# Patient Record
Sex: MALE | Race: ASIAN | NOT HISPANIC OR LATINO
[De-identification: names, ages, dates, MRNs, and addresses within clinical notes are randomized per-mention and may not be internally consistent; named-entity substitution may affect disease eponyms.]

---

## 2016-10-27 RX ORDER — SIMVASTATIN 20 MG/1
1 TABLET, FILM COATED ORAL
Qty: 0 | Refills: 0 | DISCHARGE
Start: 2016-10-27

## 2017-03-09 ENCOUNTER — APPOINTMENT (OUTPATIENT)
Dept: CARDIOLOGY | Facility: CLINIC | Age: 72
End: 2017-03-09

## 2017-03-09 ENCOUNTER — OUTPATIENT (OUTPATIENT)
Dept: OUTPATIENT SERVICES | Facility: HOSPITAL | Age: 72
LOS: 1 days | End: 2017-03-09
Payer: MEDICARE

## 2017-03-09 VITALS
HEIGHT: 69 IN | BODY MASS INDEX: 31.1 KG/M2 | OXYGEN SATURATION: 95 % | HEART RATE: 58 BPM | RESPIRATION RATE: 16 BRPM | WEIGHT: 210 LBS | DIASTOLIC BLOOD PRESSURE: 62 MMHG | SYSTOLIC BLOOD PRESSURE: 182 MMHG

## 2017-03-09 DIAGNOSIS — Z00.8 ENCOUNTER FOR OTHER GENERAL EXAMINATION: ICD-10-CM

## 2017-03-09 DIAGNOSIS — I10 ESSENTIAL (PRIMARY) HYPERTENSION: ICD-10-CM

## 2017-03-09 DIAGNOSIS — Z98.49 CATARACT EXTRACTION STATUS, UNSPECIFIED EYE: Chronic | ICD-10-CM

## 2017-03-09 DIAGNOSIS — Z98.89 OTHER SPECIFIED POSTPROCEDURAL STATES: Chronic | ICD-10-CM

## 2017-03-09 DIAGNOSIS — Z96.659 PRESENCE OF UNSPECIFIED ARTIFICIAL KNEE JOINT: Chronic | ICD-10-CM

## 2017-03-09 PROCEDURE — G0463: CPT | Mod: 25

## 2017-03-09 PROCEDURE — 93010 ELECTROCARDIOGRAM REPORT: CPT

## 2017-03-09 PROCEDURE — 93005 ELECTROCARDIOGRAM TRACING: CPT

## 2017-03-10 ENCOUNTER — APPOINTMENT (OUTPATIENT)
Dept: INTERNAL MEDICINE | Facility: CLINIC | Age: 72
End: 2017-03-10

## 2017-03-17 ENCOUNTER — RX RENEWAL (OUTPATIENT)
Age: 72
End: 2017-03-17

## 2017-03-20 ENCOUNTER — RX RENEWAL (OUTPATIENT)
Age: 72
End: 2017-03-20

## 2017-04-01 ENCOUNTER — APPOINTMENT (OUTPATIENT)
Dept: INTERNAL MEDICINE | Facility: CLINIC | Age: 72
End: 2017-04-01

## 2017-04-01 VITALS
TEMPERATURE: 98.3 F | BODY MASS INDEX: 31.1 KG/M2 | OXYGEN SATURATION: 98 % | DIASTOLIC BLOOD PRESSURE: 80 MMHG | SYSTOLIC BLOOD PRESSURE: 140 MMHG | WEIGHT: 210 LBS | HEIGHT: 69 IN | RESPIRATION RATE: 16 BRPM | HEART RATE: 77 BPM

## 2017-04-01 RX ORDER — POLYVINYL ALCOHOL 14 MG/ML
1.4 SOLUTION/ DROPS OPHTHALMIC
Qty: 15 | Refills: 5 | Status: ACTIVE | COMMUNITY
Start: 2017-02-04

## 2017-05-04 ENCOUNTER — RX RENEWAL (OUTPATIENT)
Age: 72
End: 2017-05-04

## 2017-06-01 ENCOUNTER — OUTPATIENT (OUTPATIENT)
Dept: OUTPATIENT SERVICES | Facility: HOSPITAL | Age: 72
LOS: 1 days | End: 2017-06-01
Payer: MEDICAID

## 2017-06-01 DIAGNOSIS — Z96.659 PRESENCE OF UNSPECIFIED ARTIFICIAL KNEE JOINT: Chronic | ICD-10-CM

## 2017-06-01 DIAGNOSIS — Z98.49 CATARACT EXTRACTION STATUS, UNSPECIFIED EYE: Chronic | ICD-10-CM

## 2017-06-01 DIAGNOSIS — Z98.89 OTHER SPECIFIED POSTPROCEDURAL STATES: Chronic | ICD-10-CM

## 2017-06-08 DIAGNOSIS — R69 ILLNESS, UNSPECIFIED: ICD-10-CM

## 2017-07-22 ENCOUNTER — APPOINTMENT (OUTPATIENT)
Dept: INTERNAL MEDICINE | Facility: CLINIC | Age: 72
End: 2017-07-22

## 2017-07-22 VITALS
SYSTOLIC BLOOD PRESSURE: 130 MMHG | TEMPERATURE: 98.3 F | OXYGEN SATURATION: 98 % | RESPIRATION RATE: 16 BRPM | BODY MASS INDEX: 31.16 KG/M2 | WEIGHT: 211 LBS | DIASTOLIC BLOOD PRESSURE: 80 MMHG | HEART RATE: 66 BPM

## 2017-07-23 LAB
ALBUMIN SERPL ELPH-MCNC: 4.4 G/DL
ALP BLD-CCNC: 46 U/L
ALT SERPL-CCNC: 11 U/L
ANION GAP SERPL CALC-SCNC: 13 MMOL/L
AST SERPL-CCNC: 23 U/L
BASOPHILS # BLD AUTO: 0.03 K/UL
BASOPHILS NFR BLD AUTO: 0.5 %
BILIRUB SERPL-MCNC: 0.5 MG/DL
BUN SERPL-MCNC: 12 MG/DL
CALCIUM SERPL-MCNC: 9.8 MG/DL
CHLORIDE SERPL-SCNC: 95 MMOL/L
CHOLEST SERPL-MCNC: 159 MG/DL
CHOLEST/HDLC SERPL: 2.6 RATIO
CO2 SERPL-SCNC: 25 MMOL/L
CREAT SERPL-MCNC: 0.87 MG/DL
EOSINOPHIL # BLD AUTO: 0.22 K/UL
EOSINOPHIL NFR BLD AUTO: 3.9 %
GGT SERPL-CCNC: 25 U/L
GLUCOSE SERPL-MCNC: 127 MG/DL
HBA1C MFR BLD HPLC: 7.2 %
HCT VFR BLD CALC: 37 %
HDLC SERPL-MCNC: 61 MG/DL
HEMOCCULT STL QL IA: NEGATIVE
HGB BLD-MCNC: 12.2 G/DL
IMM GRANULOCYTES NFR BLD AUTO: 0.2 %
LDLC SERPL CALC-MCNC: 79 MG/DL
LYMPHOCYTES # BLD AUTO: 1.55 K/UL
LYMPHOCYTES NFR BLD AUTO: 27.4 %
MAN DIFF?: NORMAL
MCHC RBC-ENTMCNC: 29.5 PG
MCHC RBC-ENTMCNC: 33 GM/DL
MCV RBC AUTO: 89.4 FL
MONOCYTES # BLD AUTO: 0.73 K/UL
MONOCYTES NFR BLD AUTO: 12.9 %
NEUTROPHILS # BLD AUTO: 3.12 K/UL
NEUTROPHILS NFR BLD AUTO: 55.1 %
PLATELET # BLD AUTO: 286 K/UL
POTASSIUM SERPL-SCNC: 5.2 MMOL/L
PROT SERPL-MCNC: 7.3 G/DL
RBC # BLD: 4.14 M/UL
RBC # FLD: 13.9 %
SODIUM SERPL-SCNC: 133 MMOL/L
TRIGL SERPL-MCNC: 94 MG/DL
WBC # FLD AUTO: 5.66 K/UL

## 2017-09-01 PROCEDURE — G9001: CPT

## 2017-09-11 ENCOUNTER — RX RENEWAL (OUTPATIENT)
Age: 72
End: 2017-09-11

## 2017-10-21 ENCOUNTER — MOBILE ON CALL (OUTPATIENT)
Age: 72
End: 2017-10-21

## 2017-10-22 LAB
ALBUMIN SERPL ELPH-MCNC: 4.2 G/DL
ALP BLD-CCNC: 42 U/L
ALT SERPL-CCNC: 8 U/L
ANION GAP SERPL CALC-SCNC: 15 MMOL/L
AST SERPL-CCNC: 14 U/L
BASOPHILS # BLD AUTO: 0.01 K/UL
BASOPHILS NFR BLD AUTO: 0.2 %
BILIRUB SERPL-MCNC: 0.5 MG/DL
BUN SERPL-MCNC: 8 MG/DL
CALCIUM SERPL-MCNC: 9.5 MG/DL
CHLORIDE SERPL-SCNC: 97 MMOL/L
CHOLEST SERPL-MCNC: 134 MG/DL
CHOLEST/HDLC SERPL: 2.4 RATIO
CO2 SERPL-SCNC: 24 MMOL/L
CREAT SERPL-MCNC: 0.79 MG/DL
EOSINOPHIL # BLD AUTO: 0.18 K/UL
EOSINOPHIL NFR BLD AUTO: 3.3 %
GGT SERPL-CCNC: 19 U/L
GLUCOSE SERPL-MCNC: 111 MG/DL
HBA1C MFR BLD HPLC: 7.1 %
HCT VFR BLD CALC: 36.3 %
HDLC SERPL-MCNC: 56 MG/DL
HGB BLD-MCNC: 11.7 G/DL
IMM GRANULOCYTES NFR BLD AUTO: 0 %
LDLC SERPL CALC-MCNC: 60 MG/DL
LYMPHOCYTES # BLD AUTO: 1.53 K/UL
LYMPHOCYTES NFR BLD AUTO: 27.7 %
MAN DIFF?: NORMAL
MCHC RBC-ENTMCNC: 30.5 PG
MCHC RBC-ENTMCNC: 32.2 GM/DL
MCV RBC AUTO: 94.5 FL
MONOCYTES # BLD AUTO: 0.8 K/UL
MONOCYTES NFR BLD AUTO: 14.5 %
NEUTROPHILS # BLD AUTO: 3 K/UL
NEUTROPHILS NFR BLD AUTO: 54.3 %
PLATELET # BLD AUTO: 256 K/UL
POTASSIUM SERPL-SCNC: 4.2 MMOL/L
PROT SERPL-MCNC: 6.9 G/DL
RBC # BLD: 3.84 M/UL
RBC # FLD: 14.7 %
SODIUM SERPL-SCNC: 136 MMOL/L
TRIGL SERPL-MCNC: 89 MG/DL
WBC # FLD AUTO: 5.52 K/UL

## 2017-10-28 ENCOUNTER — APPOINTMENT (OUTPATIENT)
Dept: INTERNAL MEDICINE | Facility: CLINIC | Age: 72
End: 2017-10-28
Payer: MEDICARE

## 2017-10-28 VITALS
BODY MASS INDEX: 32.58 KG/M2 | DIASTOLIC BLOOD PRESSURE: 80 MMHG | OXYGEN SATURATION: 97 % | HEART RATE: 63 BPM | SYSTOLIC BLOOD PRESSURE: 130 MMHG | RESPIRATION RATE: 18 BRPM | WEIGHT: 215 LBS | HEIGHT: 68 IN | TEMPERATURE: 98.7 F

## 2017-10-28 PROCEDURE — G0008: CPT

## 2017-10-28 PROCEDURE — 99214 OFFICE O/P EST MOD 30 MIN: CPT | Mod: 25

## 2017-10-28 PROCEDURE — 90686 IIV4 VACC NO PRSV 0.5 ML IM: CPT

## 2017-10-28 RX ORDER — SIMVASTATIN 20 MG/1
20 TABLET, FILM COATED ORAL
Refills: 0 | Status: DISCONTINUED | COMMUNITY
End: 2017-10-28

## 2017-10-28 RX ORDER — CHLORHEXIDINE GLUCONATE, 0.12% ORAL RINSE 1.2 MG/ML
0.12 SOLUTION DENTAL
Qty: 473 | Refills: 0 | Status: DISCONTINUED | COMMUNITY
Start: 2017-09-19 | End: 2017-10-28

## 2017-10-28 RX ORDER — CHLORHEXIDINE GLUCONATE, 0.12% ORAL RINSE 1.2 MG/ML
0.12 SOLUTION DENTAL
Refills: 0 | Status: DISCONTINUED | COMMUNITY
End: 2017-10-28

## 2017-10-28 RX ORDER — METFORMIN HYDROCHLORIDE 500 MG/1
500 TABLET, COATED ORAL
Refills: 0 | Status: DISCONTINUED | COMMUNITY
End: 2017-10-28

## 2017-10-28 RX ORDER — AMLODIPINE BESYLATE 5 MG/1
5 TABLET ORAL
Refills: 0 | Status: DISCONTINUED | COMMUNITY
End: 2017-10-28

## 2017-10-28 RX ORDER — LINAGLIPTIN 5 MG/1
5 TABLET, FILM COATED ORAL
Refills: 0 | Status: DISCONTINUED | COMMUNITY
End: 2017-10-28

## 2017-10-28 RX ORDER — DOXAZOSIN 4 MG/1
4 TABLET ORAL
Refills: 0 | Status: DISCONTINUED | COMMUNITY
End: 2017-10-28

## 2017-10-28 RX ORDER — CLOPIDOGREL BISULFATE 75 MG/1
75 TABLET, FILM COATED ORAL
Refills: 0 | Status: DISCONTINUED | COMMUNITY
End: 2017-10-28

## 2017-10-28 RX ORDER — CARVEDILOL 25 MG/1
25 TABLET, FILM COATED ORAL
Refills: 0 | Status: DISCONTINUED | COMMUNITY
End: 2017-10-28

## 2017-11-01 ENCOUNTER — MOBILE ON CALL (OUTPATIENT)
Age: 72
End: 2017-11-01

## 2018-01-12 ENCOUNTER — RX RENEWAL (OUTPATIENT)
Age: 73
End: 2018-01-12

## 2018-02-01 ENCOUNTER — TRANSCRIPTION ENCOUNTER (OUTPATIENT)
Age: 73
End: 2018-02-01

## 2018-03-17 ENCOUNTER — RX RENEWAL (OUTPATIENT)
Age: 73
End: 2018-03-17

## 2018-04-05 ENCOUNTER — CLINICAL ADVICE (OUTPATIENT)
Age: 73
End: 2018-04-05

## 2018-04-05 ENCOUNTER — LABORATORY RESULT (OUTPATIENT)
Age: 73
End: 2018-04-05

## 2018-04-07 ENCOUNTER — APPOINTMENT (OUTPATIENT)
Dept: INTERNAL MEDICINE | Facility: CLINIC | Age: 73
End: 2018-04-07
Payer: MEDICARE

## 2018-04-07 ENCOUNTER — NON-APPOINTMENT (OUTPATIENT)
Age: 73
End: 2018-04-07

## 2018-04-07 VITALS
DIASTOLIC BLOOD PRESSURE: 80 MMHG | SYSTOLIC BLOOD PRESSURE: 140 MMHG | BODY MASS INDEX: 32.89 KG/M2 | RESPIRATION RATE: 18 BRPM | OXYGEN SATURATION: 93 % | HEIGHT: 68 IN | HEART RATE: 64 BPM | TEMPERATURE: 98.6 F | WEIGHT: 217 LBS

## 2018-04-07 PROCEDURE — 93000 ELECTROCARDIOGRAM COMPLETE: CPT

## 2018-04-07 PROCEDURE — 99214 OFFICE O/P EST MOD 30 MIN: CPT | Mod: 25

## 2018-04-07 RX ORDER — AMOXICILLIN 500 MG/1
500 CAPSULE ORAL
Qty: 21 | Refills: 0 | Status: DISCONTINUED | COMMUNITY
Start: 2017-11-06 | End: 2018-04-07

## 2018-04-07 RX ORDER — IBUPROFEN 600 MG/1
600 TABLET, FILM COATED ORAL
Qty: 18 | Refills: 0 | Status: DISCONTINUED | COMMUNITY
Start: 2017-11-06 | End: 2018-04-07

## 2018-04-08 LAB
25(OH)D3 SERPL-MCNC: 29.8 NG/ML
ALBUMIN SERPL ELPH-MCNC: 4 G/DL
ALP BLD-CCNC: 54 U/L
ALT SERPL-CCNC: 9 U/L
ANION GAP SERPL CALC-SCNC: 16 MMOL/L
APPEARANCE: CLEAR
AST SERPL-CCNC: 21 U/L
BASOPHILS # BLD AUTO: 0.03 K/UL
BASOPHILS NFR BLD AUTO: 0.5 %
BILIRUB SERPL-MCNC: 0.5 MG/DL
BILIRUBIN URINE: NEGATIVE
BLOOD URINE: NEGATIVE
BUN SERPL-MCNC: 11 MG/DL
CALCIUM SERPL-MCNC: 9.5 MG/DL
CHLORIDE SERPL-SCNC: 95 MMOL/L
CHOLEST SERPL-MCNC: 145 MG/DL
CHOLEST/HDLC SERPL: 2.2 RATIO
CO2 SERPL-SCNC: 24 MMOL/L
COLOR: YELLOW
CREAT SERPL-MCNC: 0.81 MG/DL
EOSINOPHIL # BLD AUTO: 0.18 K/UL
EOSINOPHIL NFR BLD AUTO: 3 %
ERYTHROCYTE [SEDIMENTATION RATE] IN BLOOD BY WESTERGREN METHOD: 28 MM/HR
FOLATE SERPL-MCNC: 18.2 NG/ML
GGT SERPL-CCNC: 29 U/L
GLUCOSE QUALITATIVE U: NEGATIVE
GLUCOSE SERPL-MCNC: 161 MG/DL
HBA1C MFR BLD HPLC: 8.2 %
HCT VFR BLD CALC: 37.3 %
HDLC SERPL-MCNC: 65 MG/DL
HGB BLD-MCNC: 12.3 G/DL
IMM GRANULOCYTES NFR BLD AUTO: 0.2 %
IRON SATN MFR SERPL: 15 %
IRON SERPL-MCNC: 61 UG/DL
KETONES URINE: NEGATIVE
LDLC SERPL CALC-MCNC: 64 MG/DL
LEUKOCYTE ESTERASE URINE: NEGATIVE
LYMPHOCYTES # BLD AUTO: 1.44 K/UL
LYMPHOCYTES NFR BLD AUTO: 24.2 %
MAN DIFF?: NORMAL
MCHC RBC-ENTMCNC: 29.1 PG
MCHC RBC-ENTMCNC: 33 GM/DL
MCV RBC AUTO: 88.2 FL
MONOCYTES # BLD AUTO: 0.87 K/UL
MONOCYTES NFR BLD AUTO: 14.6 %
NEUTROPHILS # BLD AUTO: 3.42 K/UL
NEUTROPHILS NFR BLD AUTO: 57.5 %
NITRITE URINE: NEGATIVE
PH URINE: 7
PLATELET # BLD AUTO: 273 K/UL
POTASSIUM SERPL-SCNC: 4.5 MMOL/L
PROT SERPL-MCNC: 7.5 G/DL
PROTEIN URINE: 100
PSA FREE FLD-MCNC: 54.2
PSA FREE SERPL-MCNC: 0.32 NG/ML
PSA SERPL-MCNC: 0.59 NG/ML
RBC # BLD: 4.23 M/UL
RBC # FLD: 15.1 %
SODIUM SERPL-SCNC: 135 MMOL/L
SPECIFIC GRAVITY URINE: 1.01
T3 SERPL-MCNC: 122 NG/DL
T4 FREE SERPL-MCNC: 1.4 NG/DL
TIBC SERPL-MCNC: 419 UG/DL
TRIGL SERPL-MCNC: 82 MG/DL
TSH SERPL-ACNC: 2.57 UIU/ML
UIBC SERPL-MCNC: 358 UG/DL
UROBILINOGEN URINE: NEGATIVE
VIT B12 SERPL-MCNC: >2000 PG/ML
WBC # FLD AUTO: 5.95 K/UL

## 2018-04-11 ENCOUNTER — RX RENEWAL (OUTPATIENT)
Age: 73
End: 2018-04-11

## 2018-07-01 LAB
ALBUMIN SERPL ELPH-MCNC: 4.1 G/DL
ALP BLD-CCNC: 48 U/L
ALT SERPL-CCNC: 10 U/L
ANION GAP SERPL CALC-SCNC: 15 MMOL/L
AST SERPL-CCNC: 20 U/L
BASOPHILS # BLD AUTO: 0.03 K/UL
BASOPHILS NFR BLD AUTO: 0.5 %
BILIRUB SERPL-MCNC: 0.4 MG/DL
BUN SERPL-MCNC: 8 MG/DL
CALCIUM SERPL-MCNC: 9.5 MG/DL
CHLORIDE SERPL-SCNC: 98 MMOL/L
CHOLEST SERPL-MCNC: 209 MG/DL
CHOLEST/HDLC SERPL: 3.7 RATIO
CO2 SERPL-SCNC: 26 MMOL/L
CREAT SERPL-MCNC: 0.74 MG/DL
EOSINOPHIL # BLD AUTO: 0.24 K/UL
EOSINOPHIL NFR BLD AUTO: 4.3 %
GGT SERPL-CCNC: 26 U/L
GLUCOSE SERPL-MCNC: 131 MG/DL
HBA1C MFR BLD HPLC: 7.7 %
HCT VFR BLD CALC: 38.2 %
HDLC SERPL-MCNC: 57 MG/DL
HGB BLD-MCNC: 12 G/DL
IMM GRANULOCYTES NFR BLD AUTO: 0.2 %
LDLC SERPL CALC-MCNC: 130 MG/DL
LYMPHOCYTES # BLD AUTO: 1.56 K/UL
LYMPHOCYTES NFR BLD AUTO: 28 %
MAN DIFF?: NORMAL
MCHC RBC-ENTMCNC: 28.8 PG
MCHC RBC-ENTMCNC: 31.4 GM/DL
MCV RBC AUTO: 91.8 FL
MONOCYTES # BLD AUTO: 0.68 K/UL
MONOCYTES NFR BLD AUTO: 12.2 %
NEUTROPHILS # BLD AUTO: 3.06 K/UL
NEUTROPHILS NFR BLD AUTO: 54.8 %
PLATELET # BLD AUTO: 276 K/UL
POTASSIUM SERPL-SCNC: 5.1 MMOL/L
PROT SERPL-MCNC: 7.2 G/DL
RBC # BLD: 4.16 M/UL
RBC # FLD: 15.2 %
SODIUM SERPL-SCNC: 139 MMOL/L
TRIGL SERPL-MCNC: 112 MG/DL
WBC # FLD AUTO: 5.58 K/UL

## 2018-09-01 ENCOUNTER — APPOINTMENT (OUTPATIENT)
Dept: INTERNAL MEDICINE | Facility: CLINIC | Age: 73
End: 2018-09-01
Payer: MEDICARE

## 2018-09-01 VITALS
WEIGHT: 218 LBS | HEART RATE: 60 BPM | BODY MASS INDEX: 33.04 KG/M2 | TEMPERATURE: 97.6 F | SYSTOLIC BLOOD PRESSURE: 140 MMHG | DIASTOLIC BLOOD PRESSURE: 80 MMHG | HEIGHT: 68 IN | OXYGEN SATURATION: 95 % | RESPIRATION RATE: 16 BRPM

## 2018-09-01 PROCEDURE — G0009: CPT

## 2018-09-01 PROCEDURE — 99214 OFFICE O/P EST MOD 30 MIN: CPT | Mod: 25

## 2018-09-01 PROCEDURE — 90670 PCV13 VACCINE IM: CPT

## 2018-09-01 RX ORDER — ASPIRIN 81 MG
81 TABLET, DELAYED RELEASE (ENTERIC COATED) ORAL DAILY
Qty: 90 | Refills: 3 | Status: ACTIVE | COMMUNITY
Start: 2017-10-28 | End: 1900-01-01

## 2018-09-01 NOTE — ASSESSMENT
[FreeTextEntry1] : 73 year old male found to have stable Essential Hypertension, Type 2 Diabetes Mellitus with hyperglycemia, CAD, Hypercholesterolemia with Hypertriglyceridemia, Allergic Bronchitis, improved Edema of legs,with the current regimen, diet and life style modifications, as counseled. Prior results reviewed and discussed with the patient during today's examination. Plan as ordered.\par

## 2018-09-01 NOTE — HISTORY OF PRESENT ILLNESS
[Weight Gain ___ Pounds] : Weight gain of [unfilled] pounds [___ # of attempts] : [unfilled] weight lost attempts [Following Diet Successfully] : Following the diet successfully [___ times per week] : Patient exercises [unfilled] times per week [Following  treatment as advised] : Patient is following  treatment as advised [Action] : Action: patient is following a plan to lose weight [Good understanding] : Patient has a good understanding of disease, goals and obesity follow-up plan [de-identified] : 73 year old  male patient with history of stable Essential Hypertension, Type 2 Diabetes Mellitus with hyperglycemia, CAD, Hypercholesterolemia with Hypertriglyceridemia, Allergic Bronchitis, Edema of legs, history as stated, presented for follow up examination of Elevated BP checked. Patient is compliant with all medications. Denies shortness of breath, chest pain or abdominal pains at this time. ROS as stated.\par

## 2018-09-01 NOTE — HEALTH RISK ASSESSMENT
[No falls in past year] : Patient reported no falls in the past year [0] : 2) Feeling down, depressed, or hopeless: Not at all (0) [Discussed at today's visit] : Advance Directives Discussed at today's visit [Fully functional (bathing, dressing, toileting, transferring, walking, feeding)] : Fully functional (bathing, dressing, toileting, transferring, walking, feeding) [Fully functional (using the telephone, shopping, preparing meals, housekeeping, doing laundry, using] : Fully functional and needs no help or supervision to perform IADLs (using the telephone, shopping, preparing meals, housekeeping, doing laundry, using transportation, managing medications and managing finances) [] : No [de-identified] : None [[id number]] : 0

## 2018-10-08 ENCOUNTER — RX RENEWAL (OUTPATIENT)
Age: 73
End: 2018-10-08

## 2018-10-17 ENCOUNTER — RX RENEWAL (OUTPATIENT)
Age: 73
End: 2018-10-17

## 2018-12-01 ENCOUNTER — LABORATORY RESULT (OUTPATIENT)
Age: 73
End: 2018-12-01

## 2018-12-02 LAB
ALBUMIN SERPL ELPH-MCNC: 4.2 G/DL
ALP BLD-CCNC: 53 U/L
ALT SERPL-CCNC: 11 U/L
ANION GAP SERPL CALC-SCNC: 12 MMOL/L
APPEARANCE: CLEAR
AST SERPL-CCNC: 21 U/L
BASOPHILS # BLD AUTO: 0.05 K/UL
BASOPHILS NFR BLD AUTO: 0.9 %
BILIRUB SERPL-MCNC: 0.3 MG/DL
BILIRUBIN URINE: NEGATIVE
BLOOD URINE: NEGATIVE
BUN SERPL-MCNC: 11 MG/DL
CALCIUM SERPL-MCNC: 9.1 MG/DL
CHLORIDE SERPL-SCNC: 95 MMOL/L
CHOLEST SERPL-MCNC: 221 MG/DL
CHOLEST/HDLC SERPL: 4.3 RATIO
CO2 SERPL-SCNC: 28 MMOL/L
COLOR: YELLOW
CREAT SERPL-MCNC: 0.76 MG/DL
CREAT SPEC-SCNC: 41 MG/DL
EOSINOPHIL # BLD AUTO: 0.31 K/UL
EOSINOPHIL NFR BLD AUTO: 5.3 %
GGT SERPL-CCNC: 29 U/L
GLUCOSE QUALITATIVE U: NEGATIVE MG/DL
GLUCOSE SERPL-MCNC: 153 MG/DL
HBA1C MFR BLD HPLC: 7.8 %
HCT VFR BLD CALC: 37.1 %
HDLC SERPL-MCNC: 52 MG/DL
HEMOCCULT STL QL IA: NEGATIVE
HGB BLD-MCNC: 12 G/DL
IMM GRANULOCYTES NFR BLD AUTO: 0.2 %
KETONES URINE: NEGATIVE
LDLC SERPL CALC-MCNC: 148 MG/DL
LEUKOCYTE ESTERASE URINE: NEGATIVE
LYMPHOCYTES # BLD AUTO: 1.79 K/UL
LYMPHOCYTES NFR BLD AUTO: 30.6 %
MAN DIFF?: NORMAL
MCHC RBC-ENTMCNC: 28.8 PG
MCHC RBC-ENTMCNC: 32.3 GM/DL
MCV RBC AUTO: 89.2 FL
MICROALBUMIN 24H UR DL<=1MG/L-MCNC: 22.3 MG/DL
MICROALBUMIN/CREAT 24H UR-RTO: 548 MG/G
MONOCYTES # BLD AUTO: 0.93 K/UL
MONOCYTES NFR BLD AUTO: 15.9 %
NEUTROPHILS # BLD AUTO: 2.76 K/UL
NEUTROPHILS NFR BLD AUTO: 47.1 %
NITRITE URINE: NEGATIVE
PH URINE: 7
PLATELET # BLD AUTO: 243 K/UL
POTASSIUM SERPL-SCNC: 4.5 MMOL/L
PROT SERPL-MCNC: 7.1 G/DL
PROTEIN URINE: 30 MG/DL
RBC # BLD: 4.16 M/UL
RBC # FLD: 14 %
SODIUM SERPL-SCNC: 135 MMOL/L
SPECIFIC GRAVITY URINE: 1.01
TRIGL SERPL-MCNC: 105 MG/DL
UROBILINOGEN URINE: NEGATIVE MG/DL
WBC # FLD AUTO: 5.85 K/UL

## 2018-12-15 ENCOUNTER — APPOINTMENT (OUTPATIENT)
Dept: INTERNAL MEDICINE | Facility: CLINIC | Age: 73
End: 2018-12-15
Payer: MEDICARE

## 2018-12-15 VITALS
HEART RATE: 64 BPM | HEIGHT: 68 IN | BODY MASS INDEX: 31.83 KG/M2 | SYSTOLIC BLOOD PRESSURE: 140 MMHG | OXYGEN SATURATION: 96 % | RESPIRATION RATE: 16 BRPM | DIASTOLIC BLOOD PRESSURE: 80 MMHG | WEIGHT: 210 LBS | TEMPERATURE: 97.7 F

## 2018-12-15 DIAGNOSIS — R80.9 PROTEINURIA, UNSPECIFIED: ICD-10-CM

## 2018-12-15 PROCEDURE — G0008: CPT

## 2018-12-15 PROCEDURE — 90662 IIV NO PRSV INCREASED AG IM: CPT

## 2018-12-15 PROCEDURE — 99214 OFFICE O/P EST MOD 30 MIN: CPT | Mod: 25

## 2018-12-15 NOTE — HISTORY OF PRESENT ILLNESS
[de-identified] : 73 year old  male patient with history of stable Essential Hypertension, CAD, Type 2 Diabetes Mellitus with hyperglycemia, Hypercholesterolemia with Hypertriglyceridemia, Allergic Bronchitis, Edema of legs , history as stated, presented for follow up examination of Elevated BP checked. Patient is compliant with all medications. Denies shortness of breath, chest pain or abdominal pains at this time. ROS as stated.\par

## 2018-12-15 NOTE — ASSESSMENT
[FreeTextEntry1] : 73 year old male found to have stable Essential Hypertension, CAD, Type 2 Diabetes Mellitus with hyperglycemia, Hypercholesterolemia with Hypertriglyceridemia, Allergic Bronchitis, Edema of legs,with the current regimen, diet and life style modifications, as counseled. Prior results reviewed and discussed with the patient during today's examination. Plan as ordered.\par

## 2018-12-15 NOTE — HEALTH RISK ASSESSMENT
[No falls in past year] : Patient reported no falls in the past year [0] : 2) Feeling down, depressed, or hopeless: Not at all (0) [] : No [de-identified] : None [[id number]] : 0

## 2019-01-16 ENCOUNTER — RX RENEWAL (OUTPATIENT)
Age: 74
End: 2019-01-16

## 2019-02-11 ENCOUNTER — RX RENEWAL (OUTPATIENT)
Age: 74
End: 2019-02-11

## 2019-02-28 ENCOUNTER — RX RENEWAL (OUTPATIENT)
Age: 74
End: 2019-02-28

## 2019-04-07 LAB
ALBUMIN SERPL ELPH-MCNC: 4.3 G/DL
ALP BLD-CCNC: 50 U/L
ALT SERPL-CCNC: 11 U/L
ANION GAP SERPL CALC-SCNC: 13 MMOL/L
AST SERPL-CCNC: 18 U/L
BASOPHILS # BLD AUTO: 0.05 K/UL
BASOPHILS NFR BLD AUTO: 0.8 %
BILIRUB SERPL-MCNC: 0.4 MG/DL
BUN SERPL-MCNC: 10 MG/DL
CALCIUM SERPL-MCNC: 9.2 MG/DL
CHLORIDE SERPL-SCNC: 95 MMOL/L
CHOLEST SERPL-MCNC: 143 MG/DL
CHOLEST/HDLC SERPL: 2.4 RATIO
CO2 SERPL-SCNC: 27 MMOL/L
CREAT SERPL-MCNC: 0.67 MG/DL
EOSINOPHIL # BLD AUTO: 0.24 K/UL
EOSINOPHIL NFR BLD AUTO: 3.8 %
GGT SERPL-CCNC: 25 U/L
GLUCOSE SERPL-MCNC: 154 MG/DL
HBA1C MFR BLD HPLC: 8.2 %
HCT VFR BLD CALC: 39.3 %
HDLC SERPL-MCNC: 60 MG/DL
HGB BLD-MCNC: 12.1 G/DL
IMM GRANULOCYTES NFR BLD AUTO: 0.3 %
LDLC SERPL CALC-MCNC: 67 MG/DL
LYMPHOCYTES # BLD AUTO: 1.6 K/UL
LYMPHOCYTES NFR BLD AUTO: 25.3 %
MAN DIFF?: NORMAL
MCHC RBC-ENTMCNC: 29.3 PG
MCHC RBC-ENTMCNC: 30.8 GM/DL
MCV RBC AUTO: 95.2 FL
MONOCYTES # BLD AUTO: 0.79 K/UL
MONOCYTES NFR BLD AUTO: 12.5 %
NEUTROPHILS # BLD AUTO: 3.63 K/UL
NEUTROPHILS NFR BLD AUTO: 57.3 %
PLATELET # BLD AUTO: 246 K/UL
POTASSIUM SERPL-SCNC: 4.3 MMOL/L
PROT SERPL-MCNC: 6.9 G/DL
RBC # BLD: 4.13 M/UL
RBC # FLD: 13.9 %
SODIUM SERPL-SCNC: 135 MMOL/L
TRIGL SERPL-MCNC: 78 MG/DL
WBC # FLD AUTO: 6.33 K/UL

## 2019-05-04 ENCOUNTER — NON-APPOINTMENT (OUTPATIENT)
Age: 74
End: 2019-05-04

## 2019-05-04 ENCOUNTER — APPOINTMENT (OUTPATIENT)
Dept: INTERNAL MEDICINE | Facility: CLINIC | Age: 74
End: 2019-05-04
Payer: MEDICARE

## 2019-05-04 VITALS
BODY MASS INDEX: 32.58 KG/M2 | DIASTOLIC BLOOD PRESSURE: 80 MMHG | OXYGEN SATURATION: 97 % | HEART RATE: 65 BPM | HEIGHT: 68 IN | SYSTOLIC BLOOD PRESSURE: 140 MMHG | TEMPERATURE: 98.4 F | WEIGHT: 215 LBS | RESPIRATION RATE: 16 BRPM

## 2019-05-04 PROCEDURE — 99214 OFFICE O/P EST MOD 30 MIN: CPT | Mod: 25

## 2019-05-04 PROCEDURE — 93000 ELECTROCARDIOGRAM COMPLETE: CPT

## 2019-05-04 NOTE — ASSESSMENT
[FreeTextEntry1] : 74 year old male found to have stable Essential Hypertension, CAD, Type 2 Diabetes Mellitus with hyperglycemia, Hypercholesterolemia with Hypertriglyceridemia, Allergic Bronchitis, Edema of legs,with the current regimen, diet and life style modifications, as counseled. Prior results reviewed and discussed with the patient during today's examination. Plan as ordered.\par EKG showed NSR at the rate of 62 BPM, LAD, non-sp ST-T changes noted.\par

## 2019-05-04 NOTE — HISTORY OF PRESENT ILLNESS
[de-identified] : 74 year old  male patient with history of stable Essential Hypertension, CAD, Type 2 Diabetes Mellitus with hyperglycemia, Hypercholesterolemia with Hypertriglyceridemia, Allergic Bronchitis, Edema of legs , history as stated, presented for follow up examination of Elevated BP checked. Patient is compliant with all medications. Denies shortness of breath, chest pain or abdominal pains at this time. ROS as stated.\par

## 2019-05-04 NOTE — HEALTH RISK ASSESSMENT
[No falls in past year] : Patient reported no falls in the past year [0] : 2) Feeling down, depressed, or hopeless: Not at all (0) [] : No [de-identified] : None [[id number]] : 0

## 2019-06-05 ENCOUNTER — NON-APPOINTMENT (OUTPATIENT)
Age: 74
End: 2019-06-05

## 2019-06-05 ENCOUNTER — APPOINTMENT (OUTPATIENT)
Dept: CARDIOLOGY | Facility: CLINIC | Age: 74
End: 2019-06-05
Payer: MEDICARE

## 2019-06-05 VITALS
OXYGEN SATURATION: 96 % | WEIGHT: 215 LBS | SYSTOLIC BLOOD PRESSURE: 182 MMHG | DIASTOLIC BLOOD PRESSURE: 74 MMHG | HEIGHT: 68 IN | BODY MASS INDEX: 32.58 KG/M2 | HEART RATE: 54 BPM

## 2019-06-05 PROCEDURE — 93000 ELECTROCARDIOGRAM COMPLETE: CPT

## 2019-06-05 PROCEDURE — 99214 OFFICE O/P EST MOD 30 MIN: CPT

## 2019-06-05 RX ORDER — SPIRONOLACTONE 50 MG/1
TABLET ORAL
Refills: 0 | Status: DISCONTINUED | COMMUNITY
End: 2019-06-05

## 2019-06-05 RX ORDER — AMOXICILLIN 500 MG/1
CAPSULE ORAL
Refills: 0 | Status: DISCONTINUED | COMMUNITY
End: 2019-06-05

## 2019-06-05 NOTE — DISCUSSION/SUMMARY
[FreeTextEntry1] : 1.  cad - s/p stent to the lcx.  WIll continue meds..  Stop plavix\par \par 2.  htn -  on amlodipine 5 mg a day..   Told him to email me the bp results at home for 1 week morning and night.  He doesn’t email but his son does.

## 2019-06-05 NOTE — HISTORY OF PRESENT ILLNESS
[FreeTextEntry1] : Mr. Amaya is a 74 year who has cad s/p stent in 2004 at [facility] and now in October with a stent to the LCx.  Has some tension about his bp.  No chest pain or sob.  Has an exercise tolerance of a few blocks.  No TUAN< PND or orthopnea.  Follows a low salt diet.  States that his bp is high today due to lack of sleep last night.

## 2019-06-05 NOTE — PHYSICAL EXAM
[General Appearance - Well Developed] : well developed [Normal Appearance] : normal appearance [Well Groomed] : well groomed [General Appearance - Well Nourished] : well nourished [No Deformities] : no deformities [General Appearance - In No Acute Distress] : no acute distress [Normal Conjunctiva] : the conjunctiva exhibited no abnormalities [Normal Oral Mucosa] : normal oral mucosa [Eyelids - No Xanthelasma] : the eyelids demonstrated no xanthelasmas [No Oral Pallor] : no oral pallor [No Oral Cyanosis] : no oral cyanosis [Normal Jugular Venous A Waves Present] : normal jugular venous A waves present [Normal Jugular Venous V Waves Present] : normal jugular venous V waves present [No Jugular Venous Shrestha A Waves] : no jugular venous shrestha A waves [Respiration, Rhythm And Depth] : normal respiratory rhythm and effort [Exaggerated Use Of Accessory Muscles For Inspiration] : no accessory muscle use [Heart Rate And Rhythm] : heart rate and rhythm were normal [Heart Sounds] : normal S1 and S2 [Auscultation Breath Sounds / Voice Sounds] : lungs were clear to auscultation bilaterally [Abdomen Soft] : soft [Murmurs] : no murmurs present [Abdomen Mass (___ Cm)] : no abdominal mass palpated [Abdomen Tenderness] : non-tender [] : no hepato-splenomegaly [Gait - Sufficient For Exercise Testing] : the gait was sufficient for exercise testing [Abnormal Walk] : normal gait

## 2019-06-21 ENCOUNTER — MEDICATION RENEWAL (OUTPATIENT)
Age: 74
End: 2019-06-21

## 2019-06-25 ENCOUNTER — RX RENEWAL (OUTPATIENT)
Age: 74
End: 2019-06-25

## 2019-08-21 ENCOUNTER — LABORATORY RESULT (OUTPATIENT)
Age: 74
End: 2019-08-21

## 2019-08-21 PROBLEM — R60.0 EDEMA EXTREMITIES: Status: ACTIVE | Noted: 2017-10-28

## 2019-08-21 LAB
25(OH)D3 SERPL-MCNC: 29.1 NG/ML
ALBUMIN SERPL ELPH-MCNC: 4.4 G/DL
ALP BLD-CCNC: 51 U/L
ALT SERPL-CCNC: 11 U/L
ANION GAP SERPL CALC-SCNC: 15 MMOL/L
APPEARANCE: CLEAR
AST SERPL-CCNC: 22 U/L
BASOPHILS # BLD AUTO: 0.06 K/UL
BASOPHILS NFR BLD AUTO: 0.8 %
BILIRUB SERPL-MCNC: 0.5 MG/DL
BILIRUBIN URINE: NEGATIVE
BLOOD URINE: NEGATIVE
BUN SERPL-MCNC: 9 MG/DL
CALCIUM SERPL-MCNC: 9.5 MG/DL
CHLORIDE SERPL-SCNC: 98 MMOL/L
CHOLEST SERPL-MCNC: 151 MG/DL
CHOLEST/HDLC SERPL: 2.4 RATIO
CO2 SERPL-SCNC: 26 MMOL/L
COLOR: YELLOW
CREAT SERPL-MCNC: 0.79 MG/DL
CREAT SPEC-SCNC: 143 MG/DL
EOSINOPHIL # BLD AUTO: 0.23 K/UL
EOSINOPHIL NFR BLD AUTO: 3.2 %
ERYTHROCYTE [SEDIMENTATION RATE] IN BLOOD BY WESTERGREN METHOD: 25 MM/HR
ESTIMATED AVERAGE GLUCOSE: 177 MG/DL
FOLATE SERPL-MCNC: >20 NG/ML
GGT SERPL-CCNC: 23 U/L
GLUCOSE QUALITATIVE U: NEGATIVE
GLUCOSE SERPL-MCNC: 146 MG/DL
HBA1C MFR BLD HPLC: 7.8 %
HCT VFR BLD CALC: 41.1 %
HDLC SERPL-MCNC: 62 MG/DL
HGB BLD-MCNC: 12.9 G/DL
IMM GRANULOCYTES NFR BLD AUTO: 0.1 %
IRON SATN MFR SERPL: 14 %
IRON SERPL-MCNC: 59 UG/DL
KETONES URINE: NEGATIVE
LDLC SERPL CALC-MCNC: 71 MG/DL
LEUKOCYTE ESTERASE URINE: NEGATIVE
LYMPHOCYTES # BLD AUTO: 1.69 K/UL
LYMPHOCYTES NFR BLD AUTO: 23.4 %
MAN DIFF?: NORMAL
MCHC RBC-ENTMCNC: 29.1 PG
MCHC RBC-ENTMCNC: 31.4 GM/DL
MCV RBC AUTO: 92.8 FL
MICROALBUMIN 24H UR DL<=1MG/L-MCNC: 200.9 MG/DL
MICROALBUMIN/CREAT 24H UR-RTO: 1404 MG/G
MONOCYTES # BLD AUTO: 0.91 K/UL
MONOCYTES NFR BLD AUTO: 12.6 %
NEUTROPHILS # BLD AUTO: 4.33 K/UL
NEUTROPHILS NFR BLD AUTO: 59.9 %
NITRITE URINE: NEGATIVE
PH URINE: 6.5
PLATELET # BLD AUTO: 223 K/UL
POTASSIUM SERPL-SCNC: 4.9 MMOL/L
PROT SERPL-MCNC: 7.4 G/DL
PROTEIN URINE: ABNORMAL
PSA FREE FLD-MCNC: 56 %
PSA FREE SERPL-MCNC: 0.44 NG/ML
PSA SERPL-MCNC: 0.79 NG/ML
RBC # BLD: 4.43 M/UL
RBC # FLD: 14.6 %
SODIUM SERPL-SCNC: 139 MMOL/L
SPECIFIC GRAVITY URINE: 1.02
T3 SERPL-MCNC: 142 NG/DL
T4 FREE SERPL-MCNC: 1.4 NG/DL
TIBC SERPL-MCNC: 423 UG/DL
TRIGL SERPL-MCNC: 90 MG/DL
TSH SERPL-ACNC: 2.87 UIU/ML
UIBC SERPL-MCNC: 364 UG/DL
UROBILINOGEN URINE: NORMAL
VIT B12 SERPL-MCNC: >2000 PG/ML
WBC # FLD AUTO: 7.23 K/UL

## 2019-08-24 ENCOUNTER — APPOINTMENT (OUTPATIENT)
Dept: INTERNAL MEDICINE | Facility: CLINIC | Age: 74
End: 2019-08-24
Payer: MEDICARE

## 2019-08-24 VITALS
OXYGEN SATURATION: 97 % | SYSTOLIC BLOOD PRESSURE: 146 MMHG | RESPIRATION RATE: 16 BRPM | BODY MASS INDEX: 32.28 KG/M2 | WEIGHT: 213 LBS | DIASTOLIC BLOOD PRESSURE: 80 MMHG | HEART RATE: 68 BPM | TEMPERATURE: 97.2 F | HEIGHT: 68 IN

## 2019-08-24 DIAGNOSIS — R60.0 LOCALIZED EDEMA: ICD-10-CM

## 2019-08-24 PROCEDURE — 99214 OFFICE O/P EST MOD 30 MIN: CPT

## 2019-08-25 NOTE — ASSESSMENT
[FreeTextEntry1] : 74 year old male found to have stable Essential Hypertension, CAD, Type 2 Diabetes Mellitus with hyperglycemia, Hypercholesterolemia with Hypertriglyceridemia, Allergic Bronchitis, Edema of legs,with the current regimen, diet and life style modifications, as counseled. Prior results reviewed and discussed with the patient during today's examination. Plan as ordered.\par Unable to tolerate ACEI/ARB in the past, due to persistent cough, consider trial of Ramipril after PULM F/U, to prevent progressively worsening Microalbuminuria, as counseled.

## 2019-08-25 NOTE — HEALTH RISK ASSESSMENT
[No] : In the past 12 months have you used drugs other than those required for medical reasons? No [No falls in past year] : Patient reported no falls in the past year [0] : 2) Feeling down, depressed, or hopeless: Not at all (0) [] : No [de-identified] : None [[id number]] : 0

## 2019-08-25 NOTE — HISTORY OF PRESENT ILLNESS
[de-identified] : 74 year old  male patient with history of stable Essential Hypertension, CAD, Type 2 Diabetes Mellitus with hyperglycemia, Hypercholesterolemia with Hypertriglyceridemia, Allergic Bronchitis, Edema of legs , history as stated, presented for follow up examination of Elevated BP checked. Patient is compliant with all medications. Denies shortness of breath, chest pain or abdominal pains at this time. ROS as stated.\par

## 2019-08-26 ENCOUNTER — FORM ENCOUNTER (OUTPATIENT)
Age: 74
End: 2019-08-26

## 2019-08-27 ENCOUNTER — APPOINTMENT (OUTPATIENT)
Dept: RADIOLOGY | Facility: IMAGING CENTER | Age: 74
End: 2019-08-27

## 2019-08-27 ENCOUNTER — APPOINTMENT (OUTPATIENT)
Dept: PULMONOLOGY | Facility: CLINIC | Age: 74
End: 2019-08-27
Payer: MEDICARE

## 2019-08-27 ENCOUNTER — OUTPATIENT (OUTPATIENT)
Dept: OUTPATIENT SERVICES | Facility: HOSPITAL | Age: 74
LOS: 1 days | End: 2019-08-27
Payer: MEDICARE

## 2019-08-27 VITALS
WEIGHT: 213 LBS | OXYGEN SATURATION: 94 % | RESPIRATION RATE: 16 BRPM | SYSTOLIC BLOOD PRESSURE: 163 MMHG | HEIGHT: 68 IN | DIASTOLIC BLOOD PRESSURE: 86 MMHG | TEMPERATURE: 98.4 F | BODY MASS INDEX: 32.28 KG/M2 | HEART RATE: 69 BPM

## 2019-08-27 VITALS — SYSTOLIC BLOOD PRESSURE: 150 MMHG | DIASTOLIC BLOOD PRESSURE: 73 MMHG

## 2019-08-27 DIAGNOSIS — J45.909 UNSPECIFIED ASTHMA, UNCOMPLICATED: ICD-10-CM

## 2019-08-27 DIAGNOSIS — Z98.49 CATARACT EXTRACTION STATUS, UNSPECIFIED EYE: Chronic | ICD-10-CM

## 2019-08-27 DIAGNOSIS — Z96.659 PRESENCE OF UNSPECIFIED ARTIFICIAL KNEE JOINT: Chronic | ICD-10-CM

## 2019-08-27 DIAGNOSIS — Z98.89 OTHER SPECIFIED POSTPROCEDURAL STATES: Chronic | ICD-10-CM

## 2019-08-27 PROCEDURE — 99215 OFFICE O/P EST HI 40 MIN: CPT | Mod: 25

## 2019-08-27 PROCEDURE — 36415 COLL VENOUS BLD VENIPUNCTURE: CPT

## 2019-08-27 PROCEDURE — 71046 X-RAY EXAM CHEST 2 VIEWS: CPT | Mod: 26

## 2019-08-27 PROCEDURE — 71046 X-RAY EXAM CHEST 2 VIEWS: CPT

## 2019-08-27 RX ORDER — LOSARTAN POTASSIUM 25 MG/1
25 TABLET, FILM COATED ORAL DAILY
Qty: 90 | Refills: 3 | Status: DISCONTINUED | COMMUNITY
Start: 2018-12-15 | End: 2019-08-27

## 2019-08-27 NOTE — HISTORY OF PRESENT ILLNESS
[Snoring] : no snoring [Frequent Nocturnal Awakening] : no nocturnal awakening [Witnessed Apneas] : no witnessed sleep apnea [Unintentional Sleep while Active] : no unintentional sleep while active [Daytime Somnolence] : no daytime somnolence [Unintentional Sleep While Inactive] : no unintentional sleep while inactive [Awakes Unrefreshed] : does not awake unrefreshed [FreeTextEntry1] : This letter  is regarding your patient  who  attended pulmonary out patient office today.  I have reviewed  patient's  past history, social history, family history and medication list. I also  reviewed nurse practitioners/ and fellows  notes and assessment and agree with it.  \par The patient was referred by Dr. Chen\par \par ------No history of , fever, chills , rigors, chest pain, or hemoptysis. Questionable history of Raynaud's phenomenon. No h/o significant weight loss in last few months. No history of liver dysfunction , collagen vascular disorder or chronic thromboembolic disease. I would classify the patient's dyspnea as WHO  FUNCTIONAL CLASS II--------\par \denisha Has chronic cough associated with some shortness of breath. --COUGH BECAME WORSE  ON ACE INHIBITOR\denisha Has also been prescribed ACe inhibitor due to significant proteinuria from his diabtes. \par \par ----Echo  date------ N/AVAILABLE  \par ----Pft date--------- not done\par ----Ct scan date------- not done\par ----Cath date------------ not done\par \par

## 2019-08-27 NOTE — PHYSICAL EXAM
[General Appearance - Well Developed] : well developed [Normal Appearance] : normal appearance [General Appearance - Well Nourished] : well nourished [Normal Conjunctiva] : the conjunctiva exhibited no abnormalities [Neck Appearance] : the appearance of the neck was normal [Heart Sounds] : normal S1 and S2 [Heart Rate And Rhythm] : heart rate and rhythm were normal [] : no respiratory distress [Respiration, Rhythm And Depth] : normal respiratory rhythm and effort [Bowel Sounds] : normal bowel sounds [Abdomen Tenderness] : non-tender [Abdomen Soft] : soft [Cranial Nerves] : cranial nerves 2-12 were intact [Skin Color & Pigmentation] : normal skin color and pigmentation [Abnormal Walk] : normal gait [Impaired Insight] : insight and judgment were intact [Nail Clubbing] : no clubbing of the fingernails [FreeTextEntry1] : NO SPINE TENDERNESS

## 2019-08-27 NOTE — DISCUSSION/SUMMARY
[FreeTextEntry1] : ---Assessment plan----------The patient has been referred here for further opinion regarding cough, shortness of breath. --FIABTES UNCONTROLLED-PROTEINURIA\par Likley combination of allergy and reactive airways disease, maybe worsened by ACEi. Needs to be on an ACEi or ARB though because of significant proteinuria, 1/5g/day\par \par \par 2- Cough\par Will check IgE today , Eos done last week were normal\par Start symbicort today, check xray\par \par 3Will check sleep study given age, history of hypertension, though less likely severe disease as he is not obese, has no daytime sleepiness/headache, has resorative sleep/ \par \par 4  DM--F/U ENDOCRINOLOGIST \par Thanks for allowing  me to participate  in the care of this patient.  Patient at this time  will follow  the above mentioned recommendations and return back for follow up visit. If you have any questions  I can be reached  at #[phone] (beeper)  or  [phone] (office).\par \par Darby Krueger MD, FCCP \par Director, Pulmonary Hypertension Program \par [facility] \par Division of Pulmonary, Critical Care and Sleep Medicine \par  Professor of Medicine \par [employer]\par

## 2019-08-28 ENCOUNTER — MEDICATION RENEWAL (OUTPATIENT)
Age: 74
End: 2019-08-28

## 2019-08-28 RX ORDER — FLUTICASONE PROPIONATE AND SALMETEROL 250; 50 UG/1; UG/1
250-50 POWDER RESPIRATORY (INHALATION) TWICE DAILY
Qty: 1 | Refills: 1 | Status: DISCONTINUED | COMMUNITY
Start: 2019-08-28 | End: 2019-08-28

## 2019-08-28 RX ORDER — FLUTICASONE PROPIONATE AND SALMETEROL 113; 14 UG/1; UG/1
113-14 POWDER, METERED RESPIRATORY (INHALATION) TWICE DAILY
Qty: 1 | Refills: 0 | Status: DISCONTINUED | COMMUNITY
Start: 2019-08-28 | End: 2019-08-28

## 2019-08-28 RX ORDER — BUDESONIDE AND FORMOTEROL FUMARATE DIHYDRATE 80; 4.5 UG/1; UG/1
80-4.5 AEROSOL RESPIRATORY (INHALATION) TWICE DAILY
Qty: 1 | Refills: 2 | Status: DISCONTINUED | COMMUNITY
Start: 2019-08-27 | End: 2019-08-28

## 2019-08-30 ENCOUNTER — APPOINTMENT (OUTPATIENT)
Dept: SLEEP CENTER | Facility: CLINIC | Age: 74
End: 2019-08-30
Payer: MEDICARE

## 2019-08-30 ENCOUNTER — OUTPATIENT (OUTPATIENT)
Dept: OUTPATIENT SERVICES | Facility: HOSPITAL | Age: 74
LOS: 1 days | End: 2019-08-30
Payer: MEDICARE

## 2019-08-30 DIAGNOSIS — Z98.89 OTHER SPECIFIED POSTPROCEDURAL STATES: Chronic | ICD-10-CM

## 2019-08-30 DIAGNOSIS — Z96.659 PRESENCE OF UNSPECIFIED ARTIFICIAL KNEE JOINT: Chronic | ICD-10-CM

## 2019-08-30 DIAGNOSIS — Z98.49 CATARACT EXTRACTION STATUS, UNSPECIFIED EYE: Chronic | ICD-10-CM

## 2019-08-30 PROCEDURE — 95810 POLYSOM 6/> YRS 4/> PARAM: CPT

## 2019-08-30 PROCEDURE — 95810 POLYSOM 6/> YRS 4/> PARAM: CPT | Mod: 26

## 2019-09-03 DIAGNOSIS — G47.33 OBSTRUCTIVE SLEEP APNEA (ADULT) (PEDIATRIC): ICD-10-CM

## 2019-09-06 LAB — TOTAL IGE SMQN RAST: 808 KU/L

## 2019-09-09 ENCOUNTER — APPOINTMENT (OUTPATIENT)
Dept: SLEEP CENTER | Facility: CLINIC | Age: 74
End: 2019-09-09
Payer: MEDICARE

## 2019-09-09 ENCOUNTER — OUTPATIENT (OUTPATIENT)
Dept: OUTPATIENT SERVICES | Facility: HOSPITAL | Age: 74
LOS: 1 days | End: 2019-09-09
Payer: MEDICARE

## 2019-09-09 DIAGNOSIS — Z96.659 PRESENCE OF UNSPECIFIED ARTIFICIAL KNEE JOINT: Chronic | ICD-10-CM

## 2019-09-09 DIAGNOSIS — Z98.89 OTHER SPECIFIED POSTPROCEDURAL STATES: Chronic | ICD-10-CM

## 2019-09-09 DIAGNOSIS — Z98.49 CATARACT EXTRACTION STATUS, UNSPECIFIED EYE: Chronic | ICD-10-CM

## 2019-09-09 PROCEDURE — 95811 POLYSOM 6/>YRS CPAP 4/> PARM: CPT

## 2019-09-09 PROCEDURE — 95811 POLYSOM 6/>YRS CPAP 4/> PARM: CPT | Mod: 26

## 2019-09-10 ENCOUNTER — TRANSCRIPTION ENCOUNTER (OUTPATIENT)
Age: 74
End: 2019-09-10

## 2019-09-10 DIAGNOSIS — G47.33 OBSTRUCTIVE SLEEP APNEA (ADULT) (PEDIATRIC): ICD-10-CM

## 2019-09-27 ENCOUNTER — APPOINTMENT (OUTPATIENT)
Dept: PULMONOLOGY | Facility: CLINIC | Age: 74
End: 2019-09-27
Payer: MEDICARE

## 2019-09-27 ENCOUNTER — MED ADMIN CHARGE (OUTPATIENT)
Age: 74
End: 2019-09-27

## 2019-09-27 VITALS
HEIGHT: 68 IN | DIASTOLIC BLOOD PRESSURE: 77 MMHG | BODY MASS INDEX: 31.07 KG/M2 | TEMPERATURE: 98.3 F | SYSTOLIC BLOOD PRESSURE: 168 MMHG | HEART RATE: 69 BPM | OXYGEN SATURATION: 95 % | WEIGHT: 205 LBS | RESPIRATION RATE: 15 BRPM

## 2019-09-27 VITALS — SYSTOLIC BLOOD PRESSURE: 127 MMHG | DIASTOLIC BLOOD PRESSURE: 74 MMHG

## 2019-09-27 PROCEDURE — 99215 OFFICE O/P EST HI 40 MIN: CPT | Mod: 25

## 2019-09-27 PROCEDURE — 90662 IIV NO PRSV INCREASED AG IM: CPT

## 2019-09-27 PROCEDURE — G0008: CPT

## 2019-09-27 NOTE — PHYSICAL EXAM
[General Appearance - Well Developed] : well developed [Normal Appearance] : normal appearance [Normal Conjunctiva] : the conjunctiva exhibited no abnormalities [General Appearance - Well Nourished] : well nourished [Neck Appearance] : the appearance of the neck was normal [Heart Rate And Rhythm] : heart rate and rhythm were normal [Heart Sounds] : normal S1 and S2 [Respiration, Rhythm And Depth] : normal respiratory rhythm and effort [Bowel Sounds] : normal bowel sounds [Abdomen Tenderness] : non-tender [Abdomen Soft] : soft [Abnormal Walk] : normal gait [Cranial Nerves] : cranial nerves 2-12 were intact [Nail Clubbing] : no clubbing of the fingernails [Impaired Insight] : insight and judgment were intact [Normal Oropharynx] : normal oropharynx [Skin Color & Pigmentation] : normal skin color and pigmentation [No Venous Stasis] : no venous stasis [] : no rash [No Skin Ulcers] : no skin ulcer [Skin Lesions] : no skin lesions [No Xanthoma] : no  xanthoma was observed [FreeTextEntry1] : NO SPINE TENDERNESS

## 2019-09-27 NOTE — HISTORY OF PRESENT ILLNESS
[Snoring] : no snoring [Witnessed Apneas] : no witnessed sleep apnea [Frequent Nocturnal Awakening] : no nocturnal awakening [Daytime Somnolence] : no daytime somnolence [Unintentional Sleep while Active] : no unintentional sleep while active [Unintentional Sleep While Inactive] : no unintentional sleep while inactive [Awakes Unrefreshed] : does not awake unrefreshed [FreeTextEntry1] : This letter  is regarding your patient  who  attended pulmonary out patient office today.  I have reviewed  patient's  past history, social history, family history and medication list. I also  reviewed nurse practitioners/ and fellows  notes and assessment and agree with it.  \par The patient was referred by Dr. Chen\par \par ------No history of , fever, chills , rigors, chest pain, or hemoptysis. Questionable history of Raynaud's phenomenon. No h/o significant weight loss in last few months. No history of liver dysfunction , collagen vascular disorder or chronic thromboembolic disease. I would classify the patient's dyspnea as WHO  FUNCTIONAL CLASS II--------\par \denisha Has chronic cough associated with some shortness of breath. --COUGH BECAME WORSE  ON ACE INHIBITOR\denisha Has also been prescribed ACe inhibitor due to significant proteinuria from his diabtes. \par \par ----Echo  date------ N/AVAILABLE  \par ----Pft date--------- not done\par ----Ct scan date------- not done\par ----Cath date------------ not done\par \par   ---DAX  --- psg w/ahi=10.7  ON CPAP 8  CM H2O \par \par sept 2019 accompanied by children- feels improved since last visit\par

## 2019-10-17 ENCOUNTER — FORM ENCOUNTER (OUTPATIENT)
Age: 74
End: 2019-10-17

## 2019-10-18 ENCOUNTER — APPOINTMENT (OUTPATIENT)
Dept: CV DIAGNOSITCS | Facility: HOSPITAL | Age: 74
End: 2019-10-18
Payer: MEDICARE

## 2019-10-18 ENCOUNTER — OUTPATIENT (OUTPATIENT)
Dept: OUTPATIENT SERVICES | Facility: HOSPITAL | Age: 74
LOS: 1 days | End: 2019-10-18
Payer: MEDICARE

## 2019-10-18 ENCOUNTER — OUTPATIENT (OUTPATIENT)
Dept: OUTPATIENT SERVICES | Facility: HOSPITAL | Age: 74
LOS: 1 days | End: 2019-10-18

## 2019-10-18 ENCOUNTER — APPOINTMENT (OUTPATIENT)
Dept: CT IMAGING | Facility: IMAGING CENTER | Age: 74
End: 2019-10-18
Payer: MEDICARE

## 2019-10-18 DIAGNOSIS — I10 ESSENTIAL (PRIMARY) HYPERTENSION: ICD-10-CM

## 2019-10-18 DIAGNOSIS — Z96.659 PRESENCE OF UNSPECIFIED ARTIFICIAL KNEE JOINT: Chronic | ICD-10-CM

## 2019-10-18 DIAGNOSIS — Z98.89 OTHER SPECIFIED POSTPROCEDURAL STATES: Chronic | ICD-10-CM

## 2019-10-18 DIAGNOSIS — Z98.49 CATARACT EXTRACTION STATUS, UNSPECIFIED EYE: Chronic | ICD-10-CM

## 2019-10-18 PROCEDURE — 71250 CT THORAX DX C-: CPT | Mod: 26

## 2019-10-18 PROCEDURE — 93306 TTE W/DOPPLER COMPLETE: CPT | Mod: 26

## 2019-10-18 PROCEDURE — 71250 CT THORAX DX C-: CPT

## 2019-10-23 ENCOUNTER — MOBILE ON CALL (OUTPATIENT)
Age: 74
End: 2019-10-23

## 2019-10-24 ENCOUNTER — CHART COPY (OUTPATIENT)
Age: 74
End: 2019-10-24

## 2019-11-13 ENCOUNTER — NON-APPOINTMENT (OUTPATIENT)
Age: 74
End: 2019-11-13

## 2019-11-13 ENCOUNTER — APPOINTMENT (OUTPATIENT)
Dept: CARDIOLOGY | Facility: CLINIC | Age: 74
End: 2019-11-13
Payer: MEDICARE

## 2019-11-13 VITALS
SYSTOLIC BLOOD PRESSURE: 154 MMHG | HEIGHT: 68 IN | OXYGEN SATURATION: 98 % | HEART RATE: 56 BPM | BODY MASS INDEX: 29.71 KG/M2 | DIASTOLIC BLOOD PRESSURE: 64 MMHG

## 2019-11-13 VITALS — BODY MASS INDEX: 29.71 KG/M2 | WEIGHT: 195.38 LBS

## 2019-11-13 VITALS — DIASTOLIC BLOOD PRESSURE: 67 MMHG | SYSTOLIC BLOOD PRESSURE: 125 MMHG

## 2019-11-13 PROCEDURE — 99215 OFFICE O/P EST HI 40 MIN: CPT

## 2019-11-13 PROCEDURE — 93000 ELECTROCARDIOGRAM COMPLETE: CPT

## 2019-11-15 NOTE — PHYSICAL EXAM
[General Appearance - Well Developed] : well developed [Normal Conjunctiva] : the conjunctiva exhibited no abnormalities [Normal Oropharynx] : normal oropharynx [Normal Jugular Venous V Waves Present] : normal jugular venous V waves present [Heart Sounds] : normal S1 and S2 [Heart Rate And Rhythm] : heart rate and rhythm were normal [Respiration, Rhythm And Depth] : normal respiratory rhythm and effort [Abdomen Soft] : soft [Nail Clubbing] : no clubbing of the fingernails

## 2019-11-15 NOTE — HISTORY OF PRESENT ILLNESS
[FreeTextEntry1] : 74 Yr with CAD, PHT, s/p PCI to OM.\par Over last few weeks has been complaining of chest tightness walking 10min, that he could do without any symptoms. He also get SOb and has been diagnosed with PHT and is fu Dr Krueger.

## 2019-11-15 NOTE — REVIEW OF SYSTEMS
[Fever] : no fever [Blurry Vision] : no blurred vision [Shortness Of Breath] : shortness of breath [Earache] : no earache [Cough] : no cough [Chest Pain] : chest pain [Abdominal Pain] : no abdominal pain [Skin: A Rash] : no rash: [Joint Pain] : no joint pain [Confusion] : no confusion was observed [Dizziness] : no dizziness [Excessive Thirst] : no polydipsia [Easy Bleeding] : no tendency for easy bleeding

## 2019-12-13 ENCOUNTER — OUTPATIENT (OUTPATIENT)
Dept: OUTPATIENT SERVICES | Facility: HOSPITAL | Age: 74
LOS: 1 days | End: 2019-12-13
Payer: MEDICARE

## 2019-12-13 VITALS
SYSTOLIC BLOOD PRESSURE: 170 MMHG | OXYGEN SATURATION: 98 % | RESPIRATION RATE: 16 BRPM | TEMPERATURE: 98 F | HEART RATE: 57 BPM | WEIGHT: 186.95 LBS | HEIGHT: 69 IN | DIASTOLIC BLOOD PRESSURE: 69 MMHG

## 2019-12-13 DIAGNOSIS — Z98.49 CATARACT EXTRACTION STATUS, UNSPECIFIED EYE: Chronic | ICD-10-CM

## 2019-12-13 DIAGNOSIS — Z98.89 OTHER SPECIFIED POSTPROCEDURAL STATES: Chronic | ICD-10-CM

## 2019-12-13 DIAGNOSIS — Z96.659 PRESENCE OF UNSPECIFIED ARTIFICIAL KNEE JOINT: Chronic | ICD-10-CM

## 2019-12-13 DIAGNOSIS — I25.10 ATHEROSCLEROTIC HEART DISEASE OF NATIVE CORONARY ARTERY WITHOUT ANGINA PECTORIS: ICD-10-CM

## 2019-12-13 LAB
ALBUMIN SERPL ELPH-MCNC: 4.5 G/DL — SIGNIFICANT CHANGE UP (ref 3.3–5)
ALP SERPL-CCNC: 47 U/L — SIGNIFICANT CHANGE UP (ref 40–120)
ALT FLD-CCNC: 10 U/L — SIGNIFICANT CHANGE UP (ref 10–45)
ANION GAP SERPL CALC-SCNC: 10 MMOL/L — SIGNIFICANT CHANGE UP (ref 5–17)
AST SERPL-CCNC: 21 U/L — SIGNIFICANT CHANGE UP (ref 10–40)
BILIRUB SERPL-MCNC: 0.4 MG/DL — SIGNIFICANT CHANGE UP (ref 0.2–1.2)
BUN SERPL-MCNC: 13 MG/DL — SIGNIFICANT CHANGE UP (ref 7–23)
CALCIUM SERPL-MCNC: 9.3 MG/DL — SIGNIFICANT CHANGE UP (ref 8.4–10.5)
CHLORIDE SERPL-SCNC: 98 MMOL/L — SIGNIFICANT CHANGE UP (ref 96–108)
CO2 SERPL-SCNC: 27 MMOL/L — SIGNIFICANT CHANGE UP (ref 22–31)
CREAT SERPL-MCNC: 0.73 MG/DL — SIGNIFICANT CHANGE UP (ref 0.5–1.3)
GLUCOSE SERPL-MCNC: 102 MG/DL — HIGH (ref 70–99)
HCT VFR BLD CALC: 39.3 % — SIGNIFICANT CHANGE UP (ref 39–50)
HGB BLD-MCNC: 12.8 G/DL — LOW (ref 13–17)
MCHC RBC-ENTMCNC: 29.4 PG — SIGNIFICANT CHANGE UP (ref 27–34)
MCHC RBC-ENTMCNC: 32.6 GM/DL — SIGNIFICANT CHANGE UP (ref 32–36)
MCV RBC AUTO: 90.1 FL — SIGNIFICANT CHANGE UP (ref 80–100)
NRBC # BLD: 0 /100 WBCS — SIGNIFICANT CHANGE UP (ref 0–0)
PLATELET # BLD AUTO: 230 K/UL — SIGNIFICANT CHANGE UP (ref 150–400)
POTASSIUM SERPL-MCNC: 3.9 MMOL/L — SIGNIFICANT CHANGE UP (ref 3.5–5.3)
POTASSIUM SERPL-SCNC: 3.9 MMOL/L — SIGNIFICANT CHANGE UP (ref 3.5–5.3)
PROT SERPL-MCNC: 7.9 G/DL — SIGNIFICANT CHANGE UP (ref 6–8.3)
RBC # BLD: 4.36 M/UL — SIGNIFICANT CHANGE UP (ref 4.2–5.8)
RBC # FLD: 14.7 % — HIGH (ref 10.3–14.5)
SODIUM SERPL-SCNC: 135 MMOL/L — SIGNIFICANT CHANGE UP (ref 135–145)
WBC # BLD: 7.37 K/UL — SIGNIFICANT CHANGE UP (ref 3.8–10.5)
WBC # FLD AUTO: 7.37 K/UL — SIGNIFICANT CHANGE UP (ref 3.8–10.5)

## 2019-12-13 PROCEDURE — 85027 COMPLETE CBC AUTOMATED: CPT

## 2019-12-13 PROCEDURE — 99152 MOD SED SAME PHYS/QHP 5/>YRS: CPT

## 2019-12-13 PROCEDURE — 93460 R&L HRT ART/VENTRICLE ANGIO: CPT | Mod: 26

## 2019-12-13 PROCEDURE — 93010 ELECTROCARDIOGRAM REPORT: CPT

## 2019-12-13 PROCEDURE — C1887: CPT

## 2019-12-13 PROCEDURE — 80053 COMPREHEN METABOLIC PANEL: CPT

## 2019-12-13 PROCEDURE — C1894: CPT

## 2019-12-13 PROCEDURE — 93005 ELECTROCARDIOGRAM TRACING: CPT

## 2019-12-13 PROCEDURE — 93460 R&L HRT ART/VENTRICLE ANGIO: CPT

## 2019-12-13 PROCEDURE — C1769: CPT

## 2019-12-13 NOTE — H&P CARDIOLOGY - HISTORY OF PRESENT ILLNESS
75 y/o [place] male PMH of HTN, HLD, uncomplicated DM2, CAD s/p stents, with c/o exertional dyspnea (> 2 flights) over the past 6 months. Reports occasional intermittent left sided chest pain. Seen and evaluated by Dr. Alaniz and referred for further evaluation.     Symptoms:       Angina (Class): 2      Ischemic Symptoms: exertional dyspnea   Heart Failure: NO      Systolic/Diastolic/Combined:       NYHA Class (within 2 weeks):   Assessment of LVEF:       EF: 65%      Assessed by: echo      Date: 10/18/19  Prior Cardiac Interventions:      PCI's:  < from: Cardiac Cath Lab - Adult (10.26.16 @ 17:27) > CORONARY VESSELS: The coronary circulation is right dominant. LM:   --  LM: Angiography showed minor luminal irregularities with no flow limiting lesions. LAD:   --  Mid LAD: There was a 10 % stenosis at the site of a prior stent. CX:   --  Distal circumflex: There was a 70 % stenosis. RCA:   --  RCA: Angiography showed minor luminal irregularities with no flow limiting lesions. COMPLICATIONS: There were no complications. DIAGNOSTIC RECOMMENDATIONS: ASA and Plavix for 1 year INTERVENTIONAL RECOMMENDATIONS: ASA and Plavix for 1 year Prepared and signed by Rylan Alaniz M.D.  < end of copied text >       CABG: NO  Noninvasive Testing:   Stress Test: Date:N/A       Protocol:       Duration of Exercise:       Symptoms:       EKG Changes:       DTS:       Myocardial Imaging:       Risk Assessment:   Echo:  < from: Transthoracic Echocardiogram (10.18.19 @ 13:18) > CONCLUSIONS: 1. Mitral annular calcification, otherwise normal mitral valve. Minimal mitral regurgitation. 2. Moderately dilated left atrium.  LA volume index = 45 cc/m2. 3. Normal left ventricular internal dimensions and wall thicknesses. 4. Normal left ventricular systolic function. No segmental wall motion abnormalities. 5. Normal right ventricular size and function. 6. Estimated right ventricular systolic pressure equals 54 mm Hg, assuming right atrial pressure equals 10 mm Hg, consistent with moderate pulmonary hypertension.  < end of copied text >  Antianginal Therapies:       Beta Blockers:  carvedilol      Calcium Channel Blockers: amlodipine      Long Acting Nitrates:       Ranexa:   Associated Risk Factors:       Cerebrovascular Disease: N/A      Chronic Lung Disease: N/A      Peripheral Arterial Disease: N/A      Chronic Kidney Disease (if yes, what is GFR): N/A      Uncontrolled Diabetes (if yes, what is HgbA1C or FBS): N/A      Poorly Controlled Hypertension (if yes, what is SBP): N/A      Morbid Obesity (if yes, what is BMI): N/A      History of Recent Ventricular Arrhythmia: N/A      Inability to Ambulate Safely: N/A      Need for Therapeutic Anticoagulation: N/A      Antiplatelet or Contrast Allergy: N/A 73 y/o [place] male PMH of HTN, HLD, uncomplicated DM2, CAD s/p stents, with c/o exertional dyspnea (> 2 flights) over the past 6 months. Reports occasional intermittent left sided chest pain. Pt also saw Dr. Krueger due to chronic cough and had recent CT chest with findings below. Referred for R/LHC.  < from: CT Chest No Cont (10.18.19 @ 14:50) > IMPRESSION:  Bilateral upper lobe peripheral areas of fibrosis with associated  traction bronchiectasis.  Multiple calcified lymph nodes within the mediastinum. This cluster of  findings is highly suggestive of sarcoidosis.          MARYANN MORALES M.D., ATTENDING RADIOLOGIST  This document has been electronically signed. Oct 18 2019  3:44PM  < end of copied text >     Symptoms:       Angina (Class): 2      Ischemic Symptoms: exertional dyspnea   Heart Failure: NO      Systolic/Diastolic/Combined:       NYHA Class (within 2 weeks):   Assessment of LVEF:       EF: 65%      Assessed by: echo      Date: 10/18/19  Prior Cardiac Interventions:      PCI's:  < from: Cardiac Cath Lab - Adult (10.26.16 @ 17:27) > CORONARY VESSELS: The coronary circulation is right dominant. LM:   --  LM: Angiography showed minor luminal irregularities with no flow limiting lesions. LAD:   --  Mid LAD: There was a 10 % stenosis at the site of a prior stent. CX:   --  Distal circumflex: There was a 70 % stenosis. RCA:   --  RCA: Angiography showed minor luminal irregularities with no flow limiting lesions. COMPLICATIONS: There were no complications. DIAGNOSTIC RECOMMENDATIONS: ASA and Plavix for 1 year INTERVENTIONAL RECOMMENDATIONS: ASA and Plavix for 1 year Prepared and signed by Rylan Alaniz M.D.  < end of copied text >       CABG: NO  Noninvasive Testing:   Stress Test: Date:N/A       Protocol:       Duration of Exercise:       Symptoms:       EKG Changes:       DTS:       Myocardial Imaging:       Risk Assessment:   Echo:  < from: Transthoracic Echocardiogram (10.18.19 @ 13:18) > CONCLUSIONS: 1. Mitral annular calcification, otherwise normal mitral valve. Minimal mitral regurgitation. 2. Moderately dilated left atrium.  LA volume index = 45 cc/m2. 3. Normal left ventricular internal dimensions and wall thicknesses. 4. Normal left ventricular systolic function. No segmental wall motion abnormalities. 5. Normal right ventricular size and function. 6. Estimated right ventricular systolic pressure equals 54 mm Hg, assuming right atrial pressure equals 10 mm Hg, consistent with moderate pulmonary hypertension.  < end of copied text >   Antianginal Therapies:       Beta Blockers:  carvedilol      Calcium Channel Blockers: amlodipine      Long Acting Nitrates:       Ranexa:   Associated Risk Factors:       Cerebrovascular Disease: N/A      Chronic Lung Disease: N/A      Peripheral Arterial Disease: N/A      Chronic Kidney Disease (if yes, what is GFR): N/A      Uncontrolled Diabetes (if yes, what is HgbA1C or FBS): N/A      Poorly Controlled Hypertension (if yes, what is SBP): N/A      Morbid Obesity (if yes, what is BMI): N/A      History of Recent Ventricular Arrhythmia: N/A      Inability to Ambulate Safely: N/A      Need for Therapeutic Anticoagulation: N/A      Antiplatelet or Contrast Allergy: N/A

## 2019-12-13 NOTE — H&P CARDIOLOGY - PMH
Coronary artery disease of native artery of native heart with stable angina pectoris    Diabetes    High cholesterol    Hypertension    Primary osteoarthritis of both knees

## 2019-12-28 ENCOUNTER — APPOINTMENT (OUTPATIENT)
Dept: INTERNAL MEDICINE | Facility: CLINIC | Age: 74
End: 2019-12-28

## 2020-01-08 ENCOUNTER — NON-APPOINTMENT (OUTPATIENT)
Age: 75
End: 2020-01-08

## 2020-01-08 ENCOUNTER — APPOINTMENT (OUTPATIENT)
Dept: CARDIOLOGY | Facility: CLINIC | Age: 75
End: 2020-01-08
Payer: MEDICARE

## 2020-01-08 VITALS
OXYGEN SATURATION: 96 % | DIASTOLIC BLOOD PRESSURE: 77 MMHG | BODY MASS INDEX: 28.34 KG/M2 | HEIGHT: 68 IN | WEIGHT: 187 LBS | HEART RATE: 54 BPM | SYSTOLIC BLOOD PRESSURE: 155 MMHG

## 2020-01-08 PROCEDURE — 93000 ELECTROCARDIOGRAM COMPLETE: CPT

## 2020-01-08 PROCEDURE — 99214 OFFICE O/P EST MOD 30 MIN: CPT

## 2020-01-08 RX ORDER — RAMIPRIL 5 MG/1
5 CAPSULE ORAL
Qty: 90 | Refills: 3 | Status: DISCONTINUED | COMMUNITY
Start: 2019-08-27 | End: 2020-01-08

## 2020-01-08 RX ORDER — AMLODIPINE BESYLATE 10 MG/1
10 TABLET ORAL
Qty: 90 | Refills: 0 | Status: DISCONTINUED | COMMUNITY
Start: 2019-09-06

## 2020-01-08 RX ORDER — BLOOD SUGAR DIAGNOSTIC
STRIP MISCELLANEOUS
Qty: 200 | Refills: 0 | Status: DISCONTINUED | COMMUNITY
Start: 2019-09-16

## 2020-01-08 RX ORDER — LANCETS 30 GAUGE
EACH MISCELLANEOUS
Qty: 200 | Refills: 0 | Status: DISCONTINUED | COMMUNITY
Start: 2019-09-16

## 2020-01-08 RX ORDER — BLOOD-GLUCOSE METER
W/DEVICE EACH MISCELLANEOUS
Qty: 1 | Refills: 0 | Status: DISCONTINUED | COMMUNITY
Start: 2019-09-11

## 2020-01-08 RX ORDER — VALSARTAN 80 MG/1
80 TABLET, COATED ORAL
Qty: 30 | Refills: 0 | Status: DISCONTINUED | COMMUNITY
Start: 2019-09-06

## 2020-01-08 NOTE — HISTORY OF PRESENT ILLNESS
[FreeTextEntry1] : Mr. Amaya is a 74 year who has cad s/p stent in 2004 at [facility] and now in October with a stent to the LCx.  Has some tension about his bp.  No chest pain or sob.  Has an exercise tolerance of a few blocks.  No TUAN< PND or orthopnea.  Follows a low salt diet.  States that his bp is high today due to lack of sleep last night.  He had an angiogram which showed nonobstructive disease.

## 2020-01-08 NOTE — DISCUSSION/SUMMARY
[FreeTextEntry1] : 1.  cad - s/p stent to the lcx.  WIll continue meds..   \par \par 2.  htn -  on amlodipine 5 mg a day..   BP is much better controlled.

## 2020-01-08 NOTE — PHYSICAL EXAM
[General Appearance - Well Developed] : well developed [Normal Appearance] : normal appearance [Well Groomed] : well groomed [General Appearance - Well Nourished] : well nourished [General Appearance - In No Acute Distress] : no acute distress [No Deformities] : no deformities [Eyelids - No Xanthelasma] : the eyelids demonstrated no xanthelasmas [Normal Conjunctiva] : the conjunctiva exhibited no abnormalities [Normal Oral Mucosa] : normal oral mucosa [No Oral Pallor] : no oral pallor [No Oral Cyanosis] : no oral cyanosis [Normal Jugular Venous V Waves Present] : normal jugular venous V waves present [Normal Jugular Venous A Waves Present] : normal jugular venous A waves present [No Jugular Venous Shrestha A Waves] : no jugular venous shrestha A waves [Respiration, Rhythm And Depth] : normal respiratory rhythm and effort [Auscultation Breath Sounds / Voice Sounds] : lungs were clear to auscultation bilaterally [Exaggerated Use Of Accessory Muscles For Inspiration] : no accessory muscle use [Heart Sounds] : normal S1 and S2 [Heart Rate And Rhythm] : heart rate and rhythm were normal [Murmurs] : no murmurs present [Abdomen Tenderness] : non-tender [Abdomen Soft] : soft [] : no hepato-splenomegaly [Abdomen Mass (___ Cm)] : no abdominal mass palpated [Abnormal Walk] : normal gait [Gait - Sufficient For Exercise Testing] : the gait was sufficient for exercise testing

## 2020-02-04 ENCOUNTER — APPOINTMENT (OUTPATIENT)
Dept: PULMONOLOGY | Facility: CLINIC | Age: 75
End: 2020-02-04
Payer: MEDICARE

## 2020-02-04 VITALS — BODY MASS INDEX: 28.49 KG/M2 | HEIGHT: 68 IN | WEIGHT: 188 LBS

## 2020-02-04 VITALS
WEIGHT: 187 LBS | TEMPERATURE: 97.4 F | SYSTOLIC BLOOD PRESSURE: 147 MMHG | HEART RATE: 63 BPM | RESPIRATION RATE: 15 BRPM | DIASTOLIC BLOOD PRESSURE: 88 MMHG | HEIGHT: 68 IN | BODY MASS INDEX: 28.34 KG/M2 | OXYGEN SATURATION: 96 %

## 2020-02-04 PROCEDURE — ZZZZZ: CPT

## 2020-02-04 PROCEDURE — 94729 DIFFUSING CAPACITY: CPT

## 2020-02-04 PROCEDURE — 99215 OFFICE O/P EST HI 40 MIN: CPT | Mod: 25

## 2020-02-04 PROCEDURE — 94726 PLETHYSMOGRAPHY LUNG VOLUMES: CPT

## 2020-02-04 PROCEDURE — 94618 PULMONARY STRESS TESTING: CPT

## 2020-02-04 PROCEDURE — 94060 EVALUATION OF WHEEZING: CPT

## 2020-02-04 RX ORDER — BUDESONIDE AND FORMOTEROL FUMARATE DIHYDRATE 80; 4.5 UG/1; UG/1
80-4.5 AEROSOL RESPIRATORY (INHALATION) TWICE DAILY
Qty: 1 | Refills: 1 | Status: DISCONTINUED | COMMUNITY
Start: 2019-08-28 | End: 2020-02-04

## 2020-02-04 NOTE — DISCUSSION/SUMMARY
[FreeTextEntry1] : ---Assessment plan----------The patient has been referred here for further opinion regarding cough, shortness of breath. --Diabetes uNCONTROLLED-PROTEINURIA\par likely combination of allergy and reactive airways disease, maybe worsened by ACEi. 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  Patient at this time  will follow  the above mentioned recommendations and return back for follow up visit. If you have any questions  I can be reached  at #[phone] (beeper)  or  [phone] (office).\par \par Darby Krueger MD, FCCP \par Director, Pulmonary Hypertension Program \par [facility] \par Division of Pulmonary, Critical Care and Sleep Medicine \par  Professor of Medicine \par [employer]\par

## 2020-02-04 NOTE — PHYSICAL EXAM
[General Appearance - Well Developed] : well developed [Normal Appearance] : normal appearance [Normal Conjunctiva] : the conjunctiva exhibited no abnormalities [Normal Oropharynx] : normal oropharynx [General Appearance - Well Nourished] : well nourished [Neck Appearance] : the appearance of the neck was normal [Heart Rate And Rhythm] : heart rate and rhythm were normal [Heart Sounds] : normal S1 and S2 [Respiration, Rhythm And Depth] : normal respiratory rhythm and effort [Bowel Sounds] : normal bowel sounds [Abdomen Soft] : soft [Abdomen Tenderness] : non-tender [Abnormal Walk] : normal gait [Nail Clubbing] : no clubbing of the fingernails [] : no rash [No Venous Stasis] : no venous stasis [Skin Lesions] : no skin lesions [No Skin Ulcers] : no skin ulcer [Cranial Nerves] : cranial nerves 2-12 were intact [No Xanthoma] : no  xanthoma was observed [Impaired Insight] : insight and judgment were intact [Skin Color & Pigmentation] : normal skin color and pigmentation [FreeTextEntry1] : NO SPINE TENDERNESS

## 2020-02-04 NOTE — HISTORY OF PRESENT ILLNESS
[Snoring] : no snoring [Witnessed Apneas] : no witnessed sleep apnea [Frequent Nocturnal Awakening] : no nocturnal awakening [Daytime Somnolence] : no daytime somnolence [Awakes Unrefreshed] : does not awake unrefreshed [Unintentional Sleep While Inactive] : no unintentional sleep while inactive [Unintentional Sleep while Active] : no unintentional sleep while active [FreeTextEntry1] : This letter  is regarding your patient  who  attended pulmonary out patient office today.  I have reviewed  patient's  past history, social history, family history and medication list. I also  reviewed nurse practitioners/ and fellows  notes and assessment and agree with it.  \par The patient was referred by Dr. Chen\par \par ------No history of , fever, chills , rigors, chest pain, or hemoptysis. Questionable history of Raynaud's phenomenon. No h/o significant weight loss in last few months. No history of liver dysfunction , collagen vascular disorder or chronic thromboembolic disease. I would classify the patient's dyspnea as WHO  FUNCTIONAL CLASS II--------\par \par Has chronic cough associated with some shortness of breath. --COUGH BECAME WORSE  ON ACE INHIBITOR\par Has also been prescribed ACe inhibitor due to significant proteinuria from his diabtes. \par \par ----Echo  date----2019-- pasp 56 \par ----Pft date---------2020  MILD RESTRICTION \par --6MWT  2020--363 METERS\par ----Ct scan date-------SLIGHT UPPER LOBE FIBROSIS \par ----Cath date------------ H/O LCX STENT, MT SINAInot done\par ----repeat   Jennifer womack  mean pa---24  mmhg\par \par   ---DAX  --- psg w/ahi=10.7  ON CPAP 8  CM H2O \par \par sept 2019 accompanied by children- feels improved since last visit\par \par FEB 2020------ DAX ON CPAP, NO SIGNIFICANT RESP COMPLAINTS

## 2020-08-03 ENCOUNTER — APPOINTMENT (OUTPATIENT)
Dept: ULTRASOUND IMAGING | Facility: CLINIC | Age: 75
End: 2020-08-03
Payer: MEDICARE

## 2020-08-03 ENCOUNTER — APPOINTMENT (OUTPATIENT)
Dept: RADIOLOGY | Facility: CLINIC | Age: 75
End: 2020-08-03
Payer: MEDICARE

## 2020-08-03 ENCOUNTER — OUTPATIENT (OUTPATIENT)
Dept: OUTPATIENT SERVICES | Facility: HOSPITAL | Age: 75
LOS: 1 days | End: 2020-08-03
Payer: COMMERCIAL

## 2020-08-03 DIAGNOSIS — Z00.8 ENCOUNTER FOR OTHER GENERAL EXAMINATION: ICD-10-CM

## 2020-08-03 DIAGNOSIS — Z98.89 OTHER SPECIFIED POSTPROCEDURAL STATES: Chronic | ICD-10-CM

## 2020-08-03 DIAGNOSIS — Z98.49 CATARACT EXTRACTION STATUS, UNSPECIFIED EYE: Chronic | ICD-10-CM

## 2020-08-03 DIAGNOSIS — Z96.659 PRESENCE OF UNSPECIFIED ARTIFICIAL KNEE JOINT: Chronic | ICD-10-CM

## 2020-08-03 PROCEDURE — 71046 X-RAY EXAM CHEST 2 VIEWS: CPT | Mod: 26

## 2020-08-03 PROCEDURE — 71046 X-RAY EXAM CHEST 2 VIEWS: CPT

## 2020-08-05 ENCOUNTER — NON-APPOINTMENT (OUTPATIENT)
Age: 75
End: 2020-08-05

## 2020-08-05 ENCOUNTER — APPOINTMENT (OUTPATIENT)
Dept: CARDIOLOGY | Facility: CLINIC | Age: 75
End: 2020-08-05
Payer: MEDICARE

## 2020-08-05 VITALS
WEIGHT: 188 LBS | BODY MASS INDEX: 28.49 KG/M2 | HEIGHT: 68 IN | DIASTOLIC BLOOD PRESSURE: 76 MMHG | OXYGEN SATURATION: 97 % | SYSTOLIC BLOOD PRESSURE: 145 MMHG | HEART RATE: 59 BPM

## 2020-08-05 PROCEDURE — 99214 OFFICE O/P EST MOD 30 MIN: CPT

## 2020-08-05 PROCEDURE — 93000 ELECTROCARDIOGRAM COMPLETE: CPT

## 2020-08-05 RX ORDER — BENZONATATE 100 MG/1
100 CAPSULE ORAL 3 TIMES DAILY
Qty: 30 | Refills: 5 | Status: DISCONTINUED | COMMUNITY
Start: 2018-04-07 | End: 2020-08-05

## 2020-08-05 NOTE — PHYSICAL EXAM
[General Appearance - Well Developed] : well developed [Normal Appearance] : normal appearance [Well Groomed] : well groomed [General Appearance - Well Nourished] : well nourished [No Deformities] : no deformities [General Appearance - In No Acute Distress] : no acute distress [Normal Conjunctiva] : the conjunctiva exhibited no abnormalities [Eyelids - No Xanthelasma] : the eyelids demonstrated no xanthelasmas [Normal Oral Mucosa] : normal oral mucosa [No Oral Pallor] : no oral pallor [No Oral Cyanosis] : no oral cyanosis [Normal Jugular Venous A Waves Present] : normal jugular venous A waves present [Normal Jugular Venous V Waves Present] : normal jugular venous V waves present [No Jugular Venous Shrestha A Waves] : no jugular venous srhestha A waves [Respiration, Rhythm And Depth] : normal respiratory rhythm and effort [Exaggerated Use Of Accessory Muscles For Inspiration] : no accessory muscle use [Auscultation Breath Sounds / Voice Sounds] : lungs were clear to auscultation bilaterally [Heart Rate And Rhythm] : heart rate and rhythm were normal [Heart Sounds] : normal S1 and S2 [Murmurs] : no murmurs present [Abdomen Soft] : soft [Abdomen Tenderness] : non-tender [] : no hepato-splenomegaly [Abdomen Mass (___ Cm)] : no abdominal mass palpated [Abnormal Walk] : normal gait [Gait - Sufficient For Exercise Testing] : the gait was sufficient for exercise testing

## 2020-08-07 ENCOUNTER — APPOINTMENT (OUTPATIENT)
Dept: ULTRASOUND IMAGING | Facility: CLINIC | Age: 75
End: 2020-08-07
Payer: MEDICARE

## 2020-08-07 ENCOUNTER — OUTPATIENT (OUTPATIENT)
Dept: OUTPATIENT SERVICES | Facility: HOSPITAL | Age: 75
LOS: 1 days | End: 2020-08-07
Payer: COMMERCIAL

## 2020-08-07 DIAGNOSIS — Z00.8 ENCOUNTER FOR OTHER GENERAL EXAMINATION: ICD-10-CM

## 2020-08-07 DIAGNOSIS — Z98.89 OTHER SPECIFIED POSTPROCEDURAL STATES: Chronic | ICD-10-CM

## 2020-08-07 DIAGNOSIS — Z96.659 PRESENCE OF UNSPECIFIED ARTIFICIAL KNEE JOINT: Chronic | ICD-10-CM

## 2020-08-07 DIAGNOSIS — Z98.49 CATARACT EXTRACTION STATUS, UNSPECIFIED EYE: Chronic | ICD-10-CM

## 2020-08-07 PROCEDURE — 93970 EXTREMITY STUDY: CPT

## 2020-08-07 PROCEDURE — 93970 EXTREMITY STUDY: CPT | Mod: 26

## 2020-08-11 ENCOUNTER — OUTPATIENT (OUTPATIENT)
Dept: OUTPATIENT SERVICES | Facility: HOSPITAL | Age: 75
LOS: 1 days | End: 2020-08-11
Payer: COMMERCIAL

## 2020-08-11 ENCOUNTER — APPOINTMENT (OUTPATIENT)
Dept: ULTRASOUND IMAGING | Facility: HOSPITAL | Age: 75
End: 2020-08-11
Payer: MEDICARE

## 2020-08-11 DIAGNOSIS — Z98.89 OTHER SPECIFIED POSTPROCEDURAL STATES: Chronic | ICD-10-CM

## 2020-08-11 DIAGNOSIS — Z96.659 PRESENCE OF UNSPECIFIED ARTIFICIAL KNEE JOINT: Chronic | ICD-10-CM

## 2020-08-11 DIAGNOSIS — Z00.00 ENCOUNTER FOR GENERAL ADULT MEDICAL EXAMINATION WITHOUT ABNORMAL FINDINGS: ICD-10-CM

## 2020-08-11 DIAGNOSIS — Z98.49 CATARACT EXTRACTION STATUS, UNSPECIFIED EYE: Chronic | ICD-10-CM

## 2020-08-11 PROCEDURE — 93925 LOWER EXTREMITY STUDY: CPT

## 2020-08-11 PROCEDURE — 93925 LOWER EXTREMITY STUDY: CPT | Mod: 26

## 2020-08-12 ENCOUNTER — APPOINTMENT (OUTPATIENT)
Dept: ULTRASOUND IMAGING | Facility: CLINIC | Age: 75
End: 2020-08-12

## 2020-09-03 ENCOUNTER — APPOINTMENT (OUTPATIENT)
Dept: PULMONOLOGY | Facility: CLINIC | Age: 75
End: 2020-09-03
Payer: MEDICARE

## 2020-09-03 PROCEDURE — 99205 OFFICE O/P NEW HI 60 MIN: CPT | Mod: 95

## 2020-09-03 NOTE — DISCUSSION/SUMMARY
[FreeTextEntry1] : 1.  cad - s/p stent to the lcx.  WIll continue meds..   \par \par 2.  htn -  on amlodipine 5 mg a day..   BP is much better controlled.\par \par The patient is at acceptable risk to proceed with his surgery

## 2020-09-03 NOTE — HISTORY OF PRESENT ILLNESS
[FreeTextEntry1] : Mr. Amaya is a 75 year who has cad s/p stent in 2004 at [facility] and now in October, 2016 with a stent to the LCx.  Has some tension about his bp.  No chest pain or sob.  Has an exercise tolerance of a few blocks.  No TUAN< PND or orthopnea.  Follows a low salt diet.  S .  He had an angiogram in 2019 which showed nonobstructive disease.

## 2020-09-03 NOTE — PHYSICAL EXAM
[No Acute Distress] : no acute distress [Normal Oropharynx] : normal oropharynx [III] : Mallampati Class: III [No Neck Mass] : no neck mass [No Murmurs] : no murmurs [Clear to Auscultation Bilaterally] : clear to auscultation bilaterally [No Abnormalities] : no abnormalities [Benign] : benign [Normal Gait] : normal gait [No Clubbing] : no clubbing [No Focal Deficits] : no focal deficits [Normal Color/ Pigmentation] : normal color/ pigmentation [Oriented x3] : oriented x3 [Normal Affect] : normal affect

## 2020-09-03 NOTE — DISCUSSION/SUMMARY
[FreeTextEntry1] : Assessment plan----------The patient has been referred here for further opinion regarding cough, shortness of breath. --Diabetes uNCONTROLLED-PROTEINURIA\par likely combination of allergy and reactive airways disease, maybe worsened by ACEi. 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  Patient at this time  will follow  the above mentioned recommendations and return back for follow up visit. If you have any questions  I can be reached  at #[phone] (beeper)  or  [phone] (office).\par \par Darby Krueger MD, FCCP \par Director, Pulmonary Hypertension Program \par [facility] \par Division of Pulmonary, Critical Care and Sleep Medicine \par  Professor of Medicine \par [employer]\par

## 2020-09-03 NOTE — REVIEW OF SYSTEMS
[Cough] : cough [Nocturia] : nocturia [Diabetes] : diabetes [Fever] : no fever [Dry Eyes] : no dry eyes [Chest Discomfort] : no chest discomfort [Hay Fever] : no hay fever [GERD] : no gerd [Arthralgias] : no arthralgias [Raynaud] : no raynaud [Anemia] : no anemia [Headache] : no headache

## 2020-09-03 NOTE — HISTORY OF PRESENT ILLNESS
[TextBox_4] : francesco Amaya ----- son. I can be reached at [phone]. \par \par This letter  is regarding your patient  who  attended pulmonary out patient office today.  I have reviewed  patient's  past history, social history, family history and medication list. I also  reviewed nurse practitioners/ and fellows  notes and assessment and agree with it.  \par The patient was referred by Dr. Chen\par \par ------No history of , fever, chills , rigors, chest pain, or hemoptysis. Questionable history of Raynaud's phenomenon. No h/o significant weight loss in last few months. No history of liver dysfunction , collagen vascular disorder or chronic thromboembolic disease. I would classify the patient's dyspnea as WHO  FUNCTIONAL CLASS II--------\par \par Has chronic cough associated with some shortness of breath. --COUGH BECAME WORSE  ON ACE INHIBITOR\par Has also been prescribed ACe inhibitor due to significant proteinuria from his diabtes. \par \par ----Echo  date----2019-- pasp 56 \par ----Pft date---------2020  MILD RESTRICTION \par --6MWT  2020--363 METERS\par ----Ct scan date-------SLIGHT UPPER LOBE FIBROSIS \par ---ct chest 2020---b/l upper lobe ggo\par ----Cath date------------ H/O LCX STENT, [place] SINAInot done\par ----repeat   Jennifer womack  mean pa---24  mmhg\par \par   ---DAX  --- psg w/ahi=10.7  ON CPAP 8  CM H2O \par \par sept 2019 accompanied by children- feels improved since last visit\par \par FEB 2020------ DAX ON CPAP, NO SIGNIFICANT RESP COMPLAINTS\par \par august 2020-----  sq cell ca tongue ------being planned for surgery at [place]--on cpap 8 cm b27---hv chest done report not available------

## 2020-09-03 NOTE — REASON FOR VISIT
[Follow-Up] : a follow-up visit [Home] : at home, [unfilled] , at the time of the visit. [Medical Office: ([facility])___] : at the medical office located in

## 2020-10-07 NOTE — PHYSICAL EXAM
[No Acute Distress] : no acute distress [Normal Oropharynx] : normal oropharynx [III] : Mallampati Class: III [No Neck Mass] : no neck mass [No Murmurs] : no murmurs [Clear to Auscultation Bilaterally] : clear to auscultation bilaterally [No Abnormalities] : no abnormalities [Benign] : benign [Normal Gait] : normal gait [No Clubbing] : no clubbing [Normal Color/ Pigmentation] : normal color/ pigmentation [No Focal Deficits] : no focal deficits [Oriented x3] : oriented x3 [Normal Affect] : normal affect

## 2020-10-09 ENCOUNTER — APPOINTMENT (OUTPATIENT)
Dept: RADIATION ONCOLOGY | Facility: CLINIC | Age: 75
End: 2020-10-09
Payer: MEDICARE

## 2020-10-09 ENCOUNTER — OUTPATIENT (OUTPATIENT)
Dept: OUTPATIENT SERVICES | Facility: HOSPITAL | Age: 75
LOS: 1 days | Discharge: HOME | End: 2020-10-09
Payer: MEDICARE

## 2020-10-09 ENCOUNTER — APPOINTMENT (OUTPATIENT)
Dept: PULMONOLOGY | Facility: CLINIC | Age: 75
End: 2020-10-09
Payer: MEDICARE

## 2020-10-09 VITALS
OXYGEN SATURATION: 98 % | SYSTOLIC BLOOD PRESSURE: 164 MMHG | BODY MASS INDEX: 28.34 KG/M2 | DIASTOLIC BLOOD PRESSURE: 79 MMHG | HEIGHT: 68 IN | RESPIRATION RATE: 17 BRPM | TEMPERATURE: 97.8 F | HEART RATE: 58 BPM | WEIGHT: 187 LBS

## 2020-10-09 DIAGNOSIS — J45.909 UNSPECIFIED ASTHMA, UNCOMPLICATED: ICD-10-CM

## 2020-10-09 DIAGNOSIS — Z96.659 PRESENCE OF UNSPECIFIED ARTIFICIAL KNEE JOINT: Chronic | ICD-10-CM

## 2020-10-09 DIAGNOSIS — Z98.49 CATARACT EXTRACTION STATUS, UNSPECIFIED EYE: Chronic | ICD-10-CM

## 2020-10-09 DIAGNOSIS — Z86.79 PERSONAL HISTORY OF OTHER DISEASES OF THE CIRCULATORY SYSTEM: ICD-10-CM

## 2020-10-09 DIAGNOSIS — Z98.89 OTHER SPECIFIED POSTPROCEDURAL STATES: Chronic | ICD-10-CM

## 2020-10-09 LAB
ALBUMIN SERPL ELPH-MCNC: 4.4 G/DL
ALP BLD-CCNC: 48 U/L
ALT SERPL-CCNC: 9 U/L
ANION GAP SERPL CALC-SCNC: 12 MMOL/L
AST SERPL-CCNC: 22 U/L
BASOPHILS # BLD AUTO: 0.04 K/UL
BASOPHILS NFR BLD AUTO: 0.5 %
BILIRUB SERPL-MCNC: 0.4 MG/DL
BUN SERPL-MCNC: 12 MG/DL
CALCIUM SERPL-MCNC: 9.6 MG/DL
CHLORIDE SERPL-SCNC: 102 MMOL/L
CO2 SERPL-SCNC: 27 MMOL/L
CREAT SERPL-MCNC: 0.75 MG/DL
EOSINOPHIL # BLD AUTO: 0.16 K/UL
EOSINOPHIL NFR BLD AUTO: 1.8 %
GLUCOSE SERPL-MCNC: 118 MG/DL
HCT VFR BLD CALC: 40.2 %
HGB BLD-MCNC: 12.4 G/DL
IMM GRANULOCYTES NFR BLD AUTO: 0.2 %
LYMPHOCYTES # BLD AUTO: 1.46 K/UL
LYMPHOCYTES NFR BLD AUTO: 16.6 %
MAN DIFF?: NORMAL
MCHC RBC-ENTMCNC: 28.7 PG
MCHC RBC-ENTMCNC: 30.8 GM/DL
MCV RBC AUTO: 93.1 FL
MONOCYTES # BLD AUTO: 1.05 K/UL
MONOCYTES NFR BLD AUTO: 11.9 %
NEUTROPHILS # BLD AUTO: 6.06 K/UL
NEUTROPHILS NFR BLD AUTO: 69 %
PLATELET # BLD AUTO: 227 K/UL
POTASSIUM SERPL-SCNC: 4.9 MMOL/L
PROT SERPL-MCNC: 7.2 G/DL
RBC # BLD: 4.32 M/UL
RBC # FLD: 15.3 %
SARS-COV-2 IGG SERPL IA-ACNC: 0.09 INDEX
SARS-COV-2 IGG SERPL QL IA: NEGATIVE
SODIUM SERPL-SCNC: 141 MMOL/L
WBC # FLD AUTO: 8.79 K/UL

## 2020-10-09 PROCEDURE — 36415 COLL VENOUS BLD VENIPUNCTURE: CPT

## 2020-10-09 PROCEDURE — 77263 THER RADIOLOGY TX PLNG CPLX: CPT

## 2020-10-09 PROCEDURE — 99205 OFFICE O/P NEW HI 60 MIN: CPT | Mod: 25,GC,95

## 2020-10-09 PROCEDURE — 99215 OFFICE O/P EST HI 40 MIN: CPT | Mod: 25

## 2020-10-09 NOTE — HISTORY OF PRESENT ILLNESS
[TextBox_4] : francesco Amaya ----- son. I can be reached at [phone]. \par \par This letter  is regarding your patient  who  attended pulmonary out patient office today.  I have reviewed  patient's  past history, social history, family history and medication list. I also  reviewed nurse practitioners/ and fellows  notes and assessment and agree with it.  \par The patient was referred by Dr. Chen--S/P SURGERY FOR TONGUE CA   SEPT 2020-----\par \par ------No history of , fever, chills , rigors, chest pain, or hemoptysis. Questionable history of Raynaud's phenomenon. No h/o significant weight loss in last few months. No history of liver dysfunction , collagen vascular disorder or chronic thromboembolic disease. I would classify the patient's dyspnea as WHO  FUNCTIONAL CLASS II--------\par \par Has chronic cough associated with some shortness of breath. --COUGH BECAME WORSE  ON ACE INHIBITOR\par Has also been prescribed ACe inhibitor due to significant proteinuria from his diabtes. \par \par ----Echo  date----2019-- pasp 56 \par ----Pft date---------2020  MILD RESTRICTION \par --6MWT  2020--363 METERS\par ----Ct scan date-------SLIGHT UPPER LOBE FIBROSIS \par ---ct chest 2020---b/l upper lobe ggo\par ----Cath date------------ H/O LCX STENT, MT SINAInot done\par ----repeat   Jennifer womack  mean pa---24  mmhg\par \par   ---DAX  --- psg w/ahi=10.7  ON CPAP 8  CM H2O \par \par sept 2019 accompanied by children- feels improved since last visit\par \par FEB 2020------ DAX ON CPAP, NO SIGNIFICANT RESP COMPLAINTS\par \par OCT  2020-----  sq cell ca tongue -----s/p  surgery at [place]--\par DAX on cpap 8 cm h20---

## 2020-10-09 NOTE — REASON FOR VISIT
[Consideration of Curative Therapy] : consideration of curative therapy for [Head and Neck Cancer] : head and neck cancer [Home] : at home, [unfilled] , at the time of the visit. [Medical Office: ([facility])___] : at the medical office located in  [Family Member] : family member [Verbal consent obtained from patient] : the patient, [unfilled]

## 2020-10-09 NOTE — DISCUSSION/SUMMARY
[FreeTextEntry1] : Assessment plan----------The patient has been referred here for further opinion regarding cough, shortness of breath. --Diabetes uNCONTROLLED-PROTEINURIA\par likely combination of allergy and reactive airways disease, maybe worsened by ACEi. 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  Patient at this time  will follow  the above mentioned recommendations and return back for follow up visit. If you have any questions  I can be reached  at #[phone] (beeper)  or  [phone] (office).\par \par Darby Krueger MD, FCCP \par Director, Pulmonary Hypertension Program \par [facility] \par Division of Pulmonary, Critical Care and Sleep Medicine \par  Professor of Medicine \par [employer]\par

## 2020-10-11 NOTE — REVIEW OF SYSTEMS
[Negative] : Allergic/Immunologic [FreeTextEntry4] : h/o surgery for tongue cancer [FreeTextEntry5] : HTN, CAD

## 2020-10-11 NOTE — HISTORY OF PRESENT ILLNESS
[FreeTextEntry1] : 74 y/o M recently diagnosed with SCC of the R lateral tongue, presenting as ill-fit of his dentures causing tongue irritation. Ulcerative 2cm mass of R lateral tongue noted by ENT. Biopsy August 2020 demonstrated well to moderately differentiated squamous cell carcinoma.\par \par CT neck 8/26/20: tongue lesion poorly visualized, no pathological cervical nodes\par \par PET-CT 9/4/20: FDG avid lesion of anterior right oropharynx/lateral tongue SUV 5.5, no cervical LAD. Additionally multiple mildly enlarged, faintly calcified, mildly avid right paratracheal, subcarinal, and b/l hilar nodes c/w inflammatory etiology suspicious for sarcoidosis.\par \par On 9/10/20 he underwent right hemiglossectomy and partial neck dissection with Dr. Darinel Servin at [facility]. Pathology revealed a 1.2x1.0x0.55cm well to moderately differentiated squamous cell carcinoma, DOI 5.5mm, PNI+, LVI-, a closest lateral margin was 4mm. 4 lymph nodes from right levels IA, IB, and 2 were evaluated and negative for carcinoma.\par \par He presents for consideration of adjuvant radiotherapy.  none

## 2021-01-01 ENCOUNTER — NON-APPOINTMENT (OUTPATIENT)
Age: 76
End: 2021-01-01

## 2021-01-01 ENCOUNTER — APPOINTMENT (OUTPATIENT)
Dept: OTOLARYNGOLOGY | Facility: CLINIC | Age: 76
End: 2021-01-01

## 2021-01-01 ENCOUNTER — TRANSCRIPTION ENCOUNTER (OUTPATIENT)
Age: 76
End: 2021-01-01

## 2021-01-01 ENCOUNTER — APPOINTMENT (OUTPATIENT)
Dept: HEMATOLOGY ONCOLOGY | Facility: CLINIC | Age: 76
End: 2021-01-01
Payer: MEDICARE

## 2021-01-01 ENCOUNTER — RX RENEWAL (OUTPATIENT)
Age: 76
End: 2021-01-01

## 2021-01-01 ENCOUNTER — APPOINTMENT (OUTPATIENT)
Dept: CARDIOLOGY | Facility: CLINIC | Age: 76
End: 2021-01-01

## 2021-01-01 ENCOUNTER — APPOINTMENT (OUTPATIENT)
Dept: SURGERY | Facility: CLINIC | Age: 76
End: 2021-01-01

## 2021-01-01 ENCOUNTER — APPOINTMENT (OUTPATIENT)
Dept: CT IMAGING | Facility: IMAGING CENTER | Age: 76
End: 2021-01-01
Payer: MEDICARE

## 2021-01-01 ENCOUNTER — APPOINTMENT (OUTPATIENT)
Dept: PULMONOLOGY | Facility: CLINIC | Age: 76
End: 2021-01-01
Payer: MEDICARE

## 2021-01-01 ENCOUNTER — RESULT REVIEW (OUTPATIENT)
Age: 76
End: 2021-01-01

## 2021-01-01 ENCOUNTER — APPOINTMENT (OUTPATIENT)
Dept: RADIATION ONCOLOGY | Facility: CLINIC | Age: 76
End: 2021-01-01
Payer: MEDICARE

## 2021-01-01 ENCOUNTER — LABORATORY RESULT (OUTPATIENT)
Age: 76
End: 2021-01-01

## 2021-01-01 ENCOUNTER — APPOINTMENT (OUTPATIENT)
Dept: SPEECH THERAPY | Facility: CLINIC | Age: 76
End: 2021-01-01

## 2021-01-01 ENCOUNTER — APPOINTMENT (OUTPATIENT)
Dept: INFUSION THERAPY | Facility: HOSPITAL | Age: 76
End: 2021-01-01

## 2021-01-01 ENCOUNTER — APPOINTMENT (OUTPATIENT)
Dept: SPEECH THERAPY | Facility: CLINIC | Age: 76
End: 2021-01-01
Payer: MEDICARE

## 2021-01-01 ENCOUNTER — APPOINTMENT (OUTPATIENT)
Dept: HEMATOLOGY ONCOLOGY | Facility: CLINIC | Age: 76
End: 2021-01-01

## 2021-01-01 ENCOUNTER — INPATIENT (INPATIENT)
Facility: HOSPITAL | Age: 76
LOS: 38 days | Discharge: NURSING, REHAB OR LONG-TERM CARE | DRG: 4 | End: 2022-01-28
Attending: INTERNAL MEDICINE | Admitting: INTERNAL MEDICINE
Payer: MEDICARE

## 2021-01-01 ENCOUNTER — APPOINTMENT (OUTPATIENT)
Dept: NUCLEAR MEDICINE | Facility: IMAGING CENTER | Age: 76
End: 2021-01-01
Payer: MEDICARE

## 2021-01-01 ENCOUNTER — OUTPATIENT (OUTPATIENT)
Dept: OUTPATIENT SERVICES | Facility: HOSPITAL | Age: 76
LOS: 1 days | Discharge: HOME | End: 2021-01-01

## 2021-01-01 ENCOUNTER — RESULT CHARGE (OUTPATIENT)
Age: 76
End: 2021-01-01

## 2021-01-01 ENCOUNTER — APPOINTMENT (OUTPATIENT)
Dept: OTOLARYNGOLOGY | Facility: CLINIC | Age: 76
End: 2021-01-01
Payer: MEDICARE

## 2021-01-01 ENCOUNTER — APPOINTMENT (OUTPATIENT)
Dept: PULMONOLOGY | Facility: CLINIC | Age: 76
End: 2021-01-01

## 2021-01-01 ENCOUNTER — OUTPATIENT (OUTPATIENT)
Dept: OUTPATIENT SERVICES | Facility: HOSPITAL | Age: 76
LOS: 1 days | End: 2021-01-01

## 2021-01-01 ENCOUNTER — APPOINTMENT (OUTPATIENT)
Dept: RADIOLOGY | Facility: HOSPITAL | Age: 76
End: 2021-01-01
Payer: MEDICARE

## 2021-01-01 ENCOUNTER — OUTPATIENT (OUTPATIENT)
Dept: OUTPATIENT SERVICES | Facility: HOSPITAL | Age: 76
LOS: 1 days | End: 2021-01-01
Payer: MEDICARE

## 2021-01-01 ENCOUNTER — APPOINTMENT (OUTPATIENT)
Dept: INTERNAL MEDICINE | Facility: CLINIC | Age: 76
End: 2021-01-01

## 2021-01-01 ENCOUNTER — APPOINTMENT (OUTPATIENT)
Dept: RADIOLOGY | Facility: IMAGING CENTER | Age: 76
End: 2021-01-01

## 2021-01-01 ENCOUNTER — APPOINTMENT (OUTPATIENT)
Dept: INTERNAL MEDICINE | Facility: CLINIC | Age: 76
End: 2021-01-01
Payer: MEDICARE

## 2021-01-01 ENCOUNTER — APPOINTMENT (OUTPATIENT)
Dept: PHYSICAL MEDICINE AND REHAB | Facility: CLINIC | Age: 76
End: 2021-01-01
Payer: MEDICARE

## 2021-01-01 ENCOUNTER — APPOINTMENT (OUTPATIENT)
Dept: RADIATION ONCOLOGY | Facility: CLINIC | Age: 76
End: 2021-01-01

## 2021-01-01 ENCOUNTER — APPOINTMENT (OUTPATIENT)
Dept: RADIOLOGY | Facility: IMAGING CENTER | Age: 76
End: 2021-01-01
Payer: MEDICARE

## 2021-01-01 ENCOUNTER — APPOINTMENT (OUTPATIENT)
Dept: SPEECH THERAPY | Facility: HOSPITAL | Age: 76
End: 2021-01-01
Payer: MEDICARE

## 2021-01-01 ENCOUNTER — OUTPATIENT (OUTPATIENT)
Dept: OUTPATIENT SERVICES | Facility: HOSPITAL | Age: 76
LOS: 1 days | Discharge: HOME | End: 2021-01-01
Payer: MEDICARE

## 2021-01-01 ENCOUNTER — OUTPATIENT (OUTPATIENT)
Dept: OUTPATIENT SERVICES | Facility: HOSPITAL | Age: 76
LOS: 1 days | End: 2021-01-01
Payer: COMMERCIAL

## 2021-01-01 ENCOUNTER — INPATIENT (INPATIENT)
Facility: HOSPITAL | Age: 76
LOS: 7 days | Discharge: HOME | End: 2021-11-11
Attending: INTERNAL MEDICINE | Admitting: INTERNAL MEDICINE
Payer: MEDICARE

## 2021-01-01 ENCOUNTER — APPOINTMENT (OUTPATIENT)
Dept: CARDIOLOGY | Facility: CLINIC | Age: 76
End: 2021-01-01
Payer: MEDICARE

## 2021-01-01 VITALS
RESPIRATION RATE: 15 BRPM | BODY MASS INDEX: 27.28 KG/M2 | HEART RATE: 54 BPM | OXYGEN SATURATION: 97 % | WEIGHT: 180 LBS | DIASTOLIC BLOOD PRESSURE: 75 MMHG | SYSTOLIC BLOOD PRESSURE: 158 MMHG | HEIGHT: 68 IN | TEMPERATURE: 97 F

## 2021-01-01 VITALS — BODY MASS INDEX: 26.83 KG/M2 | HEIGHT: 68 IN | WEIGHT: 177 LBS | RESPIRATION RATE: 16 BRPM

## 2021-01-01 VITALS
HEART RATE: 68 BPM | DIASTOLIC BLOOD PRESSURE: 77 MMHG | OXYGEN SATURATION: 94 % | WEIGHT: 157.38 LBS | BODY MASS INDEX: 23.31 KG/M2 | RESPIRATION RATE: 17 BRPM | SYSTOLIC BLOOD PRESSURE: 137 MMHG | HEIGHT: 69 IN | TEMPERATURE: 97.5 F

## 2021-01-01 VITALS
RESPIRATION RATE: 18 BRPM | OXYGEN SATURATION: 97 % | SYSTOLIC BLOOD PRESSURE: 129 MMHG | TEMPERATURE: 98 F | HEART RATE: 66 BPM | DIASTOLIC BLOOD PRESSURE: 82 MMHG

## 2021-01-01 VITALS
OXYGEN SATURATION: 97 % | DIASTOLIC BLOOD PRESSURE: 75 MMHG | TEMPERATURE: 98.8 F | RESPIRATION RATE: 18 BRPM | SYSTOLIC BLOOD PRESSURE: 132 MMHG | HEART RATE: 68 BPM | HEIGHT: 67.99 IN | WEIGHT: 176.59 LBS | BODY MASS INDEX: 26.76 KG/M2

## 2021-01-01 VITALS
BODY MASS INDEX: 26.81 KG/M2 | HEIGHT: 69 IN | HEART RATE: 56 BPM | SYSTOLIC BLOOD PRESSURE: 125 MMHG | WEIGHT: 181 LBS | DIASTOLIC BLOOD PRESSURE: 66 MMHG

## 2021-01-01 VITALS
OXYGEN SATURATION: 96 % | TEMPERATURE: 97.9 F | WEIGHT: 179.02 LBS | RESPIRATION RATE: 18 BRPM | BODY MASS INDEX: 27.13 KG/M2 | SYSTOLIC BLOOD PRESSURE: 149 MMHG | HEART RATE: 64 BPM | DIASTOLIC BLOOD PRESSURE: 63 MMHG | HEIGHT: 67.99 IN

## 2021-01-01 VITALS
RESPIRATION RATE: 16 BRPM | HEART RATE: 61 BPM | SYSTOLIC BLOOD PRESSURE: 163 MMHG | WEIGHT: 176.37 LBS | TEMPERATURE: 98.1 F | BODY MASS INDEX: 26.82 KG/M2 | DIASTOLIC BLOOD PRESSURE: 75 MMHG | OXYGEN SATURATION: 96 %

## 2021-01-01 VITALS
SYSTOLIC BLOOD PRESSURE: 115 MMHG | OXYGEN SATURATION: 97 % | BODY MASS INDEX: 27.4 KG/M2 | HEART RATE: 56 BPM | HEIGHT: 66.97 IN | DIASTOLIC BLOOD PRESSURE: 71 MMHG | RESPIRATION RATE: 18 BRPM | WEIGHT: 174.58 LBS | TEMPERATURE: 97.8 F

## 2021-01-01 VITALS
WEIGHT: 182.98 LBS | DIASTOLIC BLOOD PRESSURE: 79 MMHG | OXYGEN SATURATION: 98 % | TEMPERATURE: 98 F | RESPIRATION RATE: 14 BRPM | HEART RATE: 60 BPM | BODY MASS INDEX: 27.82 KG/M2 | SYSTOLIC BLOOD PRESSURE: 158 MMHG

## 2021-01-01 VITALS
BODY MASS INDEX: 22.45 KG/M2 | DIASTOLIC BLOOD PRESSURE: 68 MMHG | HEART RATE: 65 BPM | SYSTOLIC BLOOD PRESSURE: 113 MMHG | RESPIRATION RATE: 17 BRPM | TEMPERATURE: 98.1 F | OXYGEN SATURATION: 95 % | WEIGHT: 152 LBS

## 2021-01-01 VITALS
HEIGHT: 69 IN | RESPIRATION RATE: 16 BRPM | DIASTOLIC BLOOD PRESSURE: 64 MMHG | TEMPERATURE: 98 F | SYSTOLIC BLOOD PRESSURE: 151 MMHG | OXYGEN SATURATION: 99 % | HEART RATE: 70 BPM

## 2021-01-01 VITALS
HEART RATE: 59 BPM | BODY MASS INDEX: 2.51 KG/M2 | HEIGHT: 67 IN | TEMPERATURE: 96.9 F | DIASTOLIC BLOOD PRESSURE: 66 MMHG | OXYGEN SATURATION: 97 % | SYSTOLIC BLOOD PRESSURE: 130 MMHG | WEIGHT: 16 LBS

## 2021-01-01 VITALS — WEIGHT: 176.15 LBS | HEIGHT: 69 IN

## 2021-01-01 VITALS — BODY MASS INDEX: 27.28 KG/M2 | WEIGHT: 180 LBS | TEMPERATURE: 97.9 F | HEIGHT: 68 IN | HEART RATE: 54 BPM

## 2021-01-01 VITALS
BODY MASS INDEX: 25.92 KG/M2 | WEIGHT: 175 LBS | HEART RATE: 64 BPM | DIASTOLIC BLOOD PRESSURE: 77 MMHG | HEIGHT: 69 IN | SYSTOLIC BLOOD PRESSURE: 137 MMHG

## 2021-01-01 VITALS
HEART RATE: 59 BPM | OXYGEN SATURATION: 98 % | SYSTOLIC BLOOD PRESSURE: 148 MMHG | DIASTOLIC BLOOD PRESSURE: 66 MMHG | TEMPERATURE: 97.3 F

## 2021-01-01 VITALS
WEIGHT: 179.21 LBS | DIASTOLIC BLOOD PRESSURE: 68 MMHG | HEART RATE: 58 BPM | RESPIRATION RATE: 16 BRPM | OXYGEN SATURATION: 99 % | SYSTOLIC BLOOD PRESSURE: 145 MMHG | BODY MASS INDEX: 26.85 KG/M2 | WEIGHT: 172 LBS | BODY MASS INDEX: 25.48 KG/M2 | TEMPERATURE: 98.1 F | HEIGHT: 68.5 IN | RESPIRATION RATE: 18 BRPM | HEIGHT: 69 IN

## 2021-01-01 VITALS
DIASTOLIC BLOOD PRESSURE: 66 MMHG | HEART RATE: 75 BPM | WEIGHT: 182 LBS | OXYGEN SATURATION: 98 % | BODY MASS INDEX: 27.67 KG/M2 | SYSTOLIC BLOOD PRESSURE: 145 MMHG

## 2021-01-01 DIAGNOSIS — Z92.3 PERSONAL HISTORY OF IRRADIATION: ICD-10-CM

## 2021-01-01 DIAGNOSIS — Z98.890 OTHER SPECIFIED POSTPROCEDURAL STATES: Chronic | ICD-10-CM

## 2021-01-01 DIAGNOSIS — Z96.659 PRESENCE OF UNSPECIFIED ARTIFICIAL KNEE JOINT: Chronic | ICD-10-CM

## 2021-01-01 DIAGNOSIS — R62.7 ADULT FAILURE TO THRIVE: ICD-10-CM

## 2021-01-01 DIAGNOSIS — J96.01 ACUTE RESPIRATORY FAILURE WITH HYPOXIA: ICD-10-CM

## 2021-01-01 DIAGNOSIS — G89.3 NEOPLASM RELATED PAIN (ACUTE) (CHRONIC): ICD-10-CM

## 2021-01-01 DIAGNOSIS — Z51.11 ENCOUNTER FOR ANTINEOPLASTIC CHEMOTHERAPY: ICD-10-CM

## 2021-01-01 DIAGNOSIS — C02.9 MALIGNANT NEOPLASM OF TONGUE, UNSPECIFIED: ICD-10-CM

## 2021-01-01 DIAGNOSIS — I25.10 ATHEROSCLEROTIC HEART DISEASE OF NATIVE CORONARY ARTERY W/OUT ANGINA PECTORIS: ICD-10-CM

## 2021-01-01 DIAGNOSIS — I46.9 CARDIAC ARREST, CAUSE UNSPECIFIED: ICD-10-CM

## 2021-01-01 DIAGNOSIS — K59.00 CONSTIPATION, UNSPECIFIED: ICD-10-CM

## 2021-01-01 DIAGNOSIS — R11.2 NAUSEA WITH VOMITING, UNSPECIFIED: ICD-10-CM

## 2021-01-01 DIAGNOSIS — E43 UNSPECIFIED SEVERE PROTEIN-CALORIE MALNUTRITION: ICD-10-CM

## 2021-01-01 DIAGNOSIS — E86.0 DEHYDRATION: ICD-10-CM

## 2021-01-01 DIAGNOSIS — E87.1 HYPO-OSMOLALITY AND HYPONATREMIA: ICD-10-CM

## 2021-01-01 DIAGNOSIS — Z98.49 CATARACT EXTRACTION STATUS, UNSPECIFIED EYE: Chronic | ICD-10-CM

## 2021-01-01 DIAGNOSIS — Z00.00 ENCOUNTER FOR GENERAL ADULT MEDICAL EXAMINATION W/OUT ABNORMAL FINDINGS: ICD-10-CM

## 2021-01-01 DIAGNOSIS — Z98.89 OTHER SPECIFIED POSTPROCEDURAL STATES: Chronic | ICD-10-CM

## 2021-01-01 DIAGNOSIS — E11.9 TYPE 2 DIABETES MELLITUS WITHOUT COMPLICATIONS: ICD-10-CM

## 2021-01-01 DIAGNOSIS — C79.9 SECONDARY MALIGNANT NEOPLASM OF UNSPECIFIED SITE: ICD-10-CM

## 2021-01-01 DIAGNOSIS — E78.2 MIXED HYPERLIPIDEMIA: ICD-10-CM

## 2021-01-01 DIAGNOSIS — G47.33 OBSTRUCTIVE SLEEP APNEA (ADULT) (PEDIATRIC): ICD-10-CM

## 2021-01-01 DIAGNOSIS — C76.0 MALIGNANT NEOPLASM OF HEAD, FACE AND NECK: ICD-10-CM

## 2021-01-01 DIAGNOSIS — C34.90 MALIGNANT NEOPLASM OF UNSPECIFIED PART OF UNSPECIFIED BRONCHUS OR LUNG: ICD-10-CM

## 2021-01-01 DIAGNOSIS — L03.211 CELLULITIS OF FACE: ICD-10-CM

## 2021-01-01 DIAGNOSIS — J93.9 PNEUMOTHORAX, UNSPECIFIED: ICD-10-CM

## 2021-01-01 DIAGNOSIS — Z71.89 OTHER SPECIFIED COUNSELING: ICD-10-CM

## 2021-01-01 DIAGNOSIS — Z96.651 PRESENCE OF RIGHT ARTIFICIAL KNEE JOINT: ICD-10-CM

## 2021-01-01 DIAGNOSIS — I25.10 ATHEROSCLEROTIC HEART DISEASE OF NATIVE CORONARY ARTERY WITHOUT ANGINA PECTORIS: ICD-10-CM

## 2021-01-01 DIAGNOSIS — I10 ESSENTIAL (PRIMARY) HYPERTENSION: ICD-10-CM

## 2021-01-01 DIAGNOSIS — Z51.5 ENCOUNTER FOR PALLIATIVE CARE: ICD-10-CM

## 2021-01-01 DIAGNOSIS — Z85.118 PERSONAL HISTORY OF OTHER MALIGNANT NEOPLASM OF BRONCHUS AND LUNG: ICD-10-CM

## 2021-01-01 DIAGNOSIS — R09.89 OTHER SPECIFIED SYMPTOMS AND SIGNS INVOLVING THE CIRCULATORY AND RESPIRATORY SYSTEMS: ICD-10-CM

## 2021-01-01 DIAGNOSIS — J84.10 PULMONARY FIBROSIS, UNSPECIFIED: ICD-10-CM

## 2021-01-01 DIAGNOSIS — J93.83 OTHER PNEUMOTHORAX: ICD-10-CM

## 2021-01-01 DIAGNOSIS — J43.9 EMPHYSEMA, UNSPECIFIED: ICD-10-CM

## 2021-01-01 DIAGNOSIS — E55.9 VITAMIN D DEFICIENCY, UNSPECIFIED: ICD-10-CM

## 2021-01-01 DIAGNOSIS — E11.319 TYPE 2 DIABETES MELLITUS WITH UNSPECIFIED DIABETIC RETINOPATHY W/OUT MACULAR EDEMA: ICD-10-CM

## 2021-01-01 DIAGNOSIS — K12.30 ORAL MUCOSITIS (ULCERATIVE), UNSPECIFIED: ICD-10-CM

## 2021-01-01 DIAGNOSIS — R13.12 DYSPHAGIA, OROPHARYNGEAL PHASE: ICD-10-CM

## 2021-01-01 DIAGNOSIS — F43.20 ADJUSTMENT DISORDER, UNSPECIFIED: ICD-10-CM

## 2021-01-01 DIAGNOSIS — E11.65 TYPE 2 DIABETES MELLITUS WITH HYPERGLYCEMIA: ICD-10-CM

## 2021-01-01 DIAGNOSIS — J98.4 EMPHYSEMA, UNSPECIFIED: ICD-10-CM

## 2021-01-01 DIAGNOSIS — R53.81 OTHER MALAISE: ICD-10-CM

## 2021-01-01 DIAGNOSIS — R42 DIZZINESS AND GIDDINESS: ICD-10-CM

## 2021-01-01 DIAGNOSIS — C78.00 SECONDARY MALIGNANT NEOPLASM OF UNSPECIFIED LUNG: ICD-10-CM

## 2021-01-01 DIAGNOSIS — R13.10 DYSPHAGIA, UNSPECIFIED: ICD-10-CM

## 2021-01-01 DIAGNOSIS — Z29.9 ENCOUNTER FOR PROPHYLACTIC MEASURES, UNSPECIFIED: ICD-10-CM

## 2021-01-01 DIAGNOSIS — R53.83 OTHER FATIGUE: ICD-10-CM

## 2021-01-01 LAB
25(OH)D3 SERPL-MCNC: 82.2 NG/ML
A1C WITH ESTIMATED AVERAGE GLUCOSE RESULT: 6.3 % — HIGH (ref 4–5.6)
ALBUMIN SERPL ELPH-MCNC: 1.3 G/DL — LOW (ref 3.5–5)
ALBUMIN SERPL ELPH-MCNC: 1.3 G/DL — LOW (ref 3.5–5)
ALBUMIN SERPL ELPH-MCNC: 1.4 G/DL — LOW (ref 3.5–5)
ALBUMIN SERPL ELPH-MCNC: 1.5 G/DL — LOW (ref 3.5–5)
ALBUMIN SERPL ELPH-MCNC: 1.6 G/DL — LOW (ref 3.5–5)
ALBUMIN SERPL ELPH-MCNC: 1.7 G/DL — LOW (ref 3.5–5)
ALBUMIN SERPL ELPH-MCNC: 1.7 G/DL — LOW (ref 3.5–5)
ALBUMIN SERPL ELPH-MCNC: 1.8 G/DL — LOW (ref 3.5–5)
ALBUMIN SERPL ELPH-MCNC: 1.9 G/DL — LOW (ref 3.5–5)
ALBUMIN SERPL ELPH-MCNC: 3.2 G/DL
ALBUMIN SERPL ELPH-MCNC: 3.6 G/DL — SIGNIFICANT CHANGE UP (ref 3.3–5)
ALBUMIN SERPL ELPH-MCNC: 3.8 G/DL
ALBUMIN SERPL ELPH-MCNC: 3.9 G/DL — SIGNIFICANT CHANGE UP (ref 3.3–5)
ALBUMIN SERPL ELPH-MCNC: 4 G/DL
ALP BLD-CCNC: 46 U/L
ALP BLD-CCNC: 54 U/L
ALP BLD-CCNC: 56 U/L
ALP SERPL-CCNC: 46 U/L — SIGNIFICANT CHANGE UP (ref 40–120)
ALP SERPL-CCNC: 49 U/L — SIGNIFICANT CHANGE UP (ref 40–120)
ALP SERPL-CCNC: 51 U/L — SIGNIFICANT CHANGE UP (ref 40–120)
ALP SERPL-CCNC: 52 U/L — SIGNIFICANT CHANGE UP (ref 40–120)
ALP SERPL-CCNC: 53 U/L — SIGNIFICANT CHANGE UP (ref 40–120)
ALP SERPL-CCNC: 54 U/L — SIGNIFICANT CHANGE UP (ref 40–120)
ALP SERPL-CCNC: 60 U/L — SIGNIFICANT CHANGE UP (ref 40–120)
ALP SERPL-CCNC: 62 U/L — SIGNIFICANT CHANGE UP (ref 40–120)
ALP SERPL-CCNC: 66 U/L — SIGNIFICANT CHANGE UP (ref 40–120)
ALP SERPL-CCNC: 72 U/L — SIGNIFICANT CHANGE UP (ref 40–120)
ALP SERPL-CCNC: 89 U/L — SIGNIFICANT CHANGE UP (ref 40–120)
ALT FLD-CCNC: 12 U/L DA — SIGNIFICANT CHANGE UP (ref 10–60)
ALT FLD-CCNC: 12 U/L DA — SIGNIFICANT CHANGE UP (ref 10–60)
ALT FLD-CCNC: 13 U/L DA — SIGNIFICANT CHANGE UP (ref 10–60)
ALT FLD-CCNC: 13 U/L DA — SIGNIFICANT CHANGE UP (ref 10–60)
ALT FLD-CCNC: 14 U/L DA — SIGNIFICANT CHANGE UP (ref 10–60)
ALT FLD-CCNC: 14 U/L DA — SIGNIFICANT CHANGE UP (ref 10–60)
ALT FLD-CCNC: 15 U/L DA — SIGNIFICANT CHANGE UP (ref 10–60)
ALT FLD-CCNC: 15 U/L — SIGNIFICANT CHANGE UP (ref 4–41)
ALT FLD-CCNC: 15 U/L — SIGNIFICANT CHANGE UP (ref 4–41)
ALT FLD-CCNC: 21 U/L DA — SIGNIFICANT CHANGE UP (ref 10–60)
ALT FLD-CCNC: 24 U/L DA — SIGNIFICANT CHANGE UP (ref 10–60)
ALT FLD-CCNC: 32 U/L DA — SIGNIFICANT CHANGE UP (ref 10–60)
ALT FLD-CCNC: 37 U/L DA — SIGNIFICANT CHANGE UP (ref 10–60)
ALT SERPL-CCNC: 27 U/L
ALT SERPL-CCNC: 8 U/L
ALT SERPL-CCNC: 9 U/L
ANION GAP SERPL CALC-SCNC: 10 MMOL/L — SIGNIFICANT CHANGE UP (ref 7–14)
ANION GAP SERPL CALC-SCNC: 11 MMOL/L
ANION GAP SERPL CALC-SCNC: 11 MMOL/L
ANION GAP SERPL CALC-SCNC: 13 MMOL/L
ANION GAP SERPL CALC-SCNC: 2 MMOL/L — LOW (ref 5–17)
ANION GAP SERPL CALC-SCNC: 3 MMOL/L — LOW (ref 5–17)
ANION GAP SERPL CALC-SCNC: 4 MMOL/L — LOW (ref 5–17)
ANION GAP SERPL CALC-SCNC: 5 MMOL/L — SIGNIFICANT CHANGE UP (ref 5–17)
ANION GAP SERPL CALC-SCNC: 6 MMOL/L — SIGNIFICANT CHANGE UP (ref 5–17)
ANION GAP SERPL CALC-SCNC: 8 MMOL/L — SIGNIFICANT CHANGE UP (ref 7–14)
ANION GAP SERPL CALC-SCNC: 9 MMOL/L — SIGNIFICANT CHANGE UP (ref 7–14)
ANISOCYTOSIS BLD QL: SLIGHT — SIGNIFICANT CHANGE UP
APPEARANCE UR: CLEAR — SIGNIFICANT CHANGE UP
AST SERPL-CCNC: 14 U/L — SIGNIFICANT CHANGE UP (ref 10–40)
AST SERPL-CCNC: 17 U/L
AST SERPL-CCNC: 17 U/L — SIGNIFICANT CHANGE UP (ref 10–40)
AST SERPL-CCNC: 20 U/L
AST SERPL-CCNC: 20 U/L — SIGNIFICANT CHANGE UP (ref 4–40)
AST SERPL-CCNC: 21 U/L — SIGNIFICANT CHANGE UP (ref 10–40)
AST SERPL-CCNC: 22 U/L — SIGNIFICANT CHANGE UP (ref 10–40)
AST SERPL-CCNC: 24 U/L — SIGNIFICANT CHANGE UP (ref 10–40)
AST SERPL-CCNC: 24 U/L — SIGNIFICANT CHANGE UP (ref 4–40)
AST SERPL-CCNC: 25 U/L — SIGNIFICANT CHANGE UP (ref 10–40)
AST SERPL-CCNC: 28 U/L — SIGNIFICANT CHANGE UP (ref 10–40)
AST SERPL-CCNC: 29 U/L — SIGNIFICANT CHANGE UP (ref 10–40)
AST SERPL-CCNC: 32 U/L
AST SERPL-CCNC: 37 U/L — SIGNIFICANT CHANGE UP (ref 10–40)
AST SERPL-CCNC: 55 U/L — HIGH (ref 10–40)
B PERT DNA SPEC QL NAA+PROBE: SIGNIFICANT CHANGE UP
B PERT+PARAPERT DNA PNL SPEC NAA+PROBE: SIGNIFICANT CHANGE UP
BASE EXCESS BLDA CALC-SCNC: 11.1 MMOL/L — HIGH (ref -2–3)
BASE EXCESS BLDA CALC-SCNC: 11.4 MMOL/L — HIGH (ref -2–3)
BASE EXCESS BLDA CALC-SCNC: 5 MMOL/L — HIGH (ref -2–3)
BASE EXCESS BLDA CALC-SCNC: 5.1 MMOL/L — HIGH (ref -2–3)
BASE EXCESS BLDA CALC-SCNC: 5.5 MMOL/L — HIGH (ref -2–3)
BASE EXCESS BLDA CALC-SCNC: 5.7 MMOL/L — HIGH (ref -2–3)
BASE EXCESS BLDA CALC-SCNC: 6.4 MMOL/L — HIGH (ref -2–3)
BASE EXCESS BLDA CALC-SCNC: 7.4 MMOL/L — HIGH (ref -2–3)
BASE EXCESS BLDA CALC-SCNC: 7.6 MMOL/L — HIGH (ref -2–3)
BASE EXCESS BLDA CALC-SCNC: 8.2 MMOL/L — HIGH (ref -2–3)
BASE EXCESS BLDA CALC-SCNC: 8.5 MMOL/L — HIGH (ref -2–3)
BASE EXCESS BLDA CALC-SCNC: 8.5 MMOL/L — HIGH (ref -2–3)
BASE EXCESS BLDA CALC-SCNC: 8.7 MMOL/L — HIGH (ref -2–3)
BASE EXCESS BLDA CALC-SCNC: 8.8 MMOL/L — HIGH (ref -2–3)
BASE EXCESS BLDV CALC-SCNC: 7.6 MMOL/L — SIGNIFICANT CHANGE UP
BASOPHILS # BLD AUTO: 0 K/UL — SIGNIFICANT CHANGE UP (ref 0–0.2)
BASOPHILS # BLD AUTO: 0 K/UL — SIGNIFICANT CHANGE UP (ref 0–0.2)
BASOPHILS # BLD AUTO: 0.01 K/UL — SIGNIFICANT CHANGE UP (ref 0–0.2)
BASOPHILS # BLD AUTO: 0.02 K/UL — SIGNIFICANT CHANGE UP (ref 0–0.2)
BASOPHILS # BLD AUTO: 0.03 K/UL
BASOPHILS # BLD AUTO: 0.03 K/UL — SIGNIFICANT CHANGE UP (ref 0–0.2)
BASOPHILS # BLD AUTO: 0.04 K/UL — SIGNIFICANT CHANGE UP (ref 0–0.2)
BASOPHILS # BLD AUTO: 0.05 K/UL
BASOPHILS # BLD AUTO: 0.05 K/UL — SIGNIFICANT CHANGE UP (ref 0–0.2)
BASOPHILS # BLD AUTO: 0.05 K/UL — SIGNIFICANT CHANGE UP (ref 0–0.2)
BASOPHILS # BLD AUTO: 0.06 K/UL — SIGNIFICANT CHANGE UP (ref 0–0.2)
BASOPHILS # BLD AUTO: 0.06 K/UL — SIGNIFICANT CHANGE UP (ref 0–0.2)
BASOPHILS # BLD AUTO: 0.11 K/UL — SIGNIFICANT CHANGE UP (ref 0–0.2)
BASOPHILS NFR BLD AUTO: 0 % — SIGNIFICANT CHANGE UP (ref 0–2)
BASOPHILS NFR BLD AUTO: 0 % — SIGNIFICANT CHANGE UP (ref 0–2)
BASOPHILS NFR BLD AUTO: 0.1 % — SIGNIFICANT CHANGE UP (ref 0–2)
BASOPHILS NFR BLD AUTO: 0.2 % — SIGNIFICANT CHANGE UP (ref 0–2)
BASOPHILS NFR BLD AUTO: 0.2 % — SIGNIFICANT CHANGE UP (ref 0–2)
BASOPHILS NFR BLD AUTO: 0.3 %
BASOPHILS NFR BLD AUTO: 0.3 % — SIGNIFICANT CHANGE UP (ref 0–2)
BASOPHILS NFR BLD AUTO: 0.3 % — SIGNIFICANT CHANGE UP (ref 0–2)
BASOPHILS NFR BLD AUTO: 0.4 % — SIGNIFICANT CHANGE UP (ref 0–2)
BASOPHILS NFR BLD AUTO: 0.4 % — SIGNIFICANT CHANGE UP (ref 0–2)
BASOPHILS NFR BLD AUTO: 0.5 % — SIGNIFICANT CHANGE UP (ref 0–2)
BASOPHILS NFR BLD AUTO: 0.5 % — SIGNIFICANT CHANGE UP (ref 0–2)
BASOPHILS NFR BLD AUTO: 0.7 % — SIGNIFICANT CHANGE UP (ref 0–2)
BASOPHILS NFR BLD AUTO: 0.8 % — SIGNIFICANT CHANGE UP (ref 0–2)
BASOPHILS NFR BLD AUTO: 0.8 % — SIGNIFICANT CHANGE UP (ref 0–2)
BASOPHILS NFR BLD AUTO: 0.9 % — SIGNIFICANT CHANGE UP (ref 0–2)
BASOPHILS NFR BLD AUTO: 1 % — SIGNIFICANT CHANGE UP (ref 0–2)
BASOPHILS NFR BLD AUTO: 1.2 % — SIGNIFICANT CHANGE UP (ref 0–2)
BASOPHILS NFR BLD AUTO: 2 % — SIGNIFICANT CHANGE UP (ref 0–2)
BASOPHILS NFR BLD AUTO: 3.5 %
BILIRUB SERPL-MCNC: 0.2 MG/DL
BILIRUB SERPL-MCNC: 0.3 MG/DL — SIGNIFICANT CHANGE UP (ref 0.2–1.2)
BILIRUB SERPL-MCNC: 0.4 MG/DL
BILIRUB SERPL-MCNC: 0.4 MG/DL
BILIRUB SERPL-MCNC: 0.4 MG/DL — SIGNIFICANT CHANGE UP (ref 0.2–1.2)
BILIRUB SERPL-MCNC: 0.5 MG/DL — SIGNIFICANT CHANGE UP (ref 0.2–1.2)
BILIRUB SERPL-MCNC: 0.6 MG/DL — SIGNIFICANT CHANGE UP (ref 0.2–1.2)
BILIRUB UR-MCNC: NEGATIVE — SIGNIFICANT CHANGE UP
BLOOD GAS COMMENTS ARTERIAL: SIGNIFICANT CHANGE UP
BLOOD GAS VENOUS COMPREHENSIVE RESULT: SIGNIFICANT CHANGE UP
BORDETELLA PARAPERTUSSIS (RAPRVP): SIGNIFICANT CHANGE UP
BUN SERPL-MCNC: 10 MG/DL — SIGNIFICANT CHANGE UP (ref 7–23)
BUN SERPL-MCNC: 10 MG/DL — SIGNIFICANT CHANGE UP (ref 7–23)
BUN SERPL-MCNC: 11 MG/DL — SIGNIFICANT CHANGE UP (ref 7–23)
BUN SERPL-MCNC: 12 MG/DL
BUN SERPL-MCNC: 12 MG/DL
BUN SERPL-MCNC: 13 MG/DL — SIGNIFICANT CHANGE UP (ref 7–23)
BUN SERPL-MCNC: 15 MG/DL — SIGNIFICANT CHANGE UP (ref 7–23)
BUN SERPL-MCNC: 16 MG/DL
BUN SERPL-MCNC: 19 MG/DL — HIGH (ref 7–18)
BUN SERPL-MCNC: 21 MG/DL — HIGH (ref 7–18)
BUN SERPL-MCNC: 21 MG/DL — HIGH (ref 7–18)
BUN SERPL-MCNC: 22 MG/DL — HIGH (ref 7–18)
BUN SERPL-MCNC: 27 MG/DL — HIGH (ref 7–18)
BUN SERPL-MCNC: 27 MG/DL — HIGH (ref 7–18)
BUN SERPL-MCNC: 29 MG/DL — HIGH (ref 7–18)
BUN SERPL-MCNC: 29 MG/DL — HIGH (ref 7–18)
BUN SERPL-MCNC: 35 MG/DL — HIGH (ref 7–18)
BUN SERPL-MCNC: 36 MG/DL — HIGH (ref 7–18)
BUN SERPL-MCNC: 36 MG/DL — HIGH (ref 7–18)
BUN SERPL-MCNC: 37 MG/DL — HIGH (ref 7–18)
BUN SERPL-MCNC: 37 MG/DL — HIGH (ref 7–18)
BUN SERPL-MCNC: 38 MG/DL — HIGH (ref 7–18)
BUN SERPL-MCNC: 38 MG/DL — HIGH (ref 7–18)
BUN SERPL-MCNC: 40 MG/DL — HIGH (ref 7–18)
BUN SERPL-MCNC: 41 MG/DL — HIGH (ref 7–18)
BUN SERPL-MCNC: 7 MG/DL — SIGNIFICANT CHANGE UP (ref 7–23)
BUN SERPL-MCNC: 7 MG/DL — SIGNIFICANT CHANGE UP (ref 7–23)
BUN SERPL-MCNC: 9 MG/DL — SIGNIFICANT CHANGE UP (ref 7–23)
C PNEUM DNA SPEC QL NAA+PROBE: SIGNIFICANT CHANGE UP
CALCIUM SERPL-MCNC: 10 MG/DL — SIGNIFICANT CHANGE UP (ref 8.4–10.5)
CALCIUM SERPL-MCNC: 10.1 MG/DL
CALCIUM SERPL-MCNC: 10.1 MG/DL — SIGNIFICANT CHANGE UP (ref 8.4–10.5)
CALCIUM SERPL-MCNC: 10.1 MG/DL — SIGNIFICANT CHANGE UP (ref 8.4–10.5)
CALCIUM SERPL-MCNC: 10.2 MG/DL — SIGNIFICANT CHANGE UP (ref 8.4–10.5)
CALCIUM SERPL-MCNC: 10.2 MG/DL — SIGNIFICANT CHANGE UP (ref 8.4–10.5)
CALCIUM SERPL-MCNC: 10.3 MG/DL — SIGNIFICANT CHANGE UP (ref 8.4–10.5)
CALCIUM SERPL-MCNC: 10.3 MG/DL — SIGNIFICANT CHANGE UP (ref 8.4–10.5)
CALCIUM SERPL-MCNC: 10.6 MG/DL — HIGH (ref 8.4–10.5)
CALCIUM SERPL-MCNC: 10.7 MG/DL — HIGH (ref 8.4–10.5)
CALCIUM SERPL-MCNC: 12 MG/DL — HIGH (ref 8.4–10.5)
CALCIUM SERPL-MCNC: 8.8 MG/DL — SIGNIFICANT CHANGE UP (ref 8.4–10.5)
CALCIUM SERPL-MCNC: 8.8 MG/DL — SIGNIFICANT CHANGE UP (ref 8.4–10.5)
CALCIUM SERPL-MCNC: 9.2 MG/DL
CALCIUM SERPL-MCNC: 9.2 MG/DL — SIGNIFICANT CHANGE UP (ref 8.4–10.5)
CALCIUM SERPL-MCNC: 9.4 MG/DL — SIGNIFICANT CHANGE UP (ref 8.4–10.5)
CALCIUM SERPL-MCNC: 9.5 MG/DL — SIGNIFICANT CHANGE UP (ref 8.4–10.5)
CALCIUM SERPL-MCNC: 9.6 MG/DL — SIGNIFICANT CHANGE UP (ref 8.4–10.5)
CALCIUM SERPL-MCNC: 9.7 MG/DL
CALCIUM SERPL-MCNC: 9.7 MG/DL — SIGNIFICANT CHANGE UP (ref 8.4–10.5)
CALCIUM SERPL-MCNC: 9.7 MG/DL — SIGNIFICANT CHANGE UP (ref 8.4–10.5)
CALCIUM SERPL-MCNC: 9.8 MG/DL — SIGNIFICANT CHANGE UP (ref 8.4–10.5)
CALCIUM SERPL-MCNC: 9.8 MG/DL — SIGNIFICANT CHANGE UP (ref 8.4–10.5)
CALCIUM SERPL-MCNC: 9.9 MG/DL — SIGNIFICANT CHANGE UP (ref 8.4–10.5)
CALCIUM SERPL-MCNC: 9.9 MG/DL — SIGNIFICANT CHANGE UP (ref 8.4–10.5)
CHLORIDE SERPL-SCNC: 104 MMOL/L — SIGNIFICANT CHANGE UP (ref 96–108)
CHLORIDE SERPL-SCNC: 107 MMOL/L — SIGNIFICANT CHANGE UP (ref 96–108)
CHLORIDE SERPL-SCNC: 110 MMOL/L — HIGH (ref 96–108)
CHLORIDE SERPL-SCNC: 111 MMOL/L — HIGH (ref 96–108)
CHLORIDE SERPL-SCNC: 112 MMOL/L — HIGH (ref 96–108)
CHLORIDE SERPL-SCNC: 113 MMOL/L — HIGH (ref 96–108)
CHLORIDE SERPL-SCNC: 114 MMOL/L — HIGH (ref 96–108)
CHLORIDE SERPL-SCNC: 114 MMOL/L — HIGH (ref 96–108)
CHLORIDE SERPL-SCNC: 115 MMOL/L — HIGH (ref 96–108)
CHLORIDE SERPL-SCNC: 117 MMOL/L — HIGH (ref 96–108)
CHLORIDE SERPL-SCNC: 118 MMOL/L — HIGH (ref 96–108)
CHLORIDE SERPL-SCNC: 118 MMOL/L — HIGH (ref 96–108)
CHLORIDE SERPL-SCNC: 119 MMOL/L — HIGH (ref 96–108)
CHLORIDE SERPL-SCNC: 119 MMOL/L — HIGH (ref 96–108)
CHLORIDE SERPL-SCNC: 90 MMOL/L — LOW (ref 98–107)
CHLORIDE SERPL-SCNC: 91 MMOL/L
CHLORIDE SERPL-SCNC: 91 MMOL/L — LOW (ref 98–107)
CHLORIDE SERPL-SCNC: 91 MMOL/L — LOW (ref 98–107)
CHLORIDE SERPL-SCNC: 92 MMOL/L
CHLORIDE SERPL-SCNC: 92 MMOL/L — LOW (ref 98–107)
CHLORIDE SERPL-SCNC: 93 MMOL/L — LOW (ref 98–107)
CHLORIDE SERPL-SCNC: 94 MMOL/L — LOW (ref 98–107)
CHLORIDE SERPL-SCNC: 94 MMOL/L — LOW (ref 98–107)
CHLORIDE SERPL-SCNC: 95 MMOL/L
CHLORIDE SERPL-SCNC: 96 MMOL/L — LOW (ref 98–107)
CHLORIDE SERPL-SCNC: 98 MMOL/L — SIGNIFICANT CHANGE UP (ref 96–108)
CHOLEST SERPL-MCNC: 188 MG/DL
CO2 SERPL-SCNC: 24 MMOL/L
CO2 SERPL-SCNC: 25 MMOL/L — SIGNIFICANT CHANGE UP (ref 22–31)
CO2 SERPL-SCNC: 26 MMOL/L
CO2 SERPL-SCNC: 28 MMOL/L — SIGNIFICANT CHANGE UP (ref 22–31)
CO2 SERPL-SCNC: 29 MMOL/L — SIGNIFICANT CHANGE UP (ref 22–31)
CO2 SERPL-SCNC: 30 MMOL/L — SIGNIFICANT CHANGE UP (ref 22–31)
CO2 SERPL-SCNC: 31 MMOL/L
CO2 SERPL-SCNC: 31 MMOL/L — SIGNIFICANT CHANGE UP (ref 22–31)
CO2 SERPL-SCNC: 31 MMOL/L — SIGNIFICANT CHANGE UP (ref 22–31)
CO2 SERPL-SCNC: 32 MMOL/L — HIGH (ref 22–31)
CO2 SERPL-SCNC: 33 MMOL/L — HIGH (ref 22–31)
CO2 SERPL-SCNC: 35 MMOL/L — HIGH (ref 22–31)
CO2 SERPL-SCNC: 36 MMOL/L — HIGH (ref 22–31)
COLOR SPEC: YELLOW — SIGNIFICANT CHANGE UP
COVID-19 NUCLEOCAPSID GAM AB INTERP: NEGATIVE — SIGNIFICANT CHANGE UP
COVID-19 NUCLEOCAPSID TOTAL GAM ANTIBODY RESULT: 0.08 INDEX — SIGNIFICANT CHANGE UP
COVID-19 SPIKE DOMAIN AB INTERP: POSITIVE
COVID-19 SPIKE DOMAIN ANTIBODY RESULT: >250 U/ML — HIGH
CREAT SERPL-MCNC: 0.5 MG/DL — SIGNIFICANT CHANGE UP (ref 0.5–1.3)
CREAT SERPL-MCNC: 0.54 MG/DL — SIGNIFICANT CHANGE UP (ref 0.5–1.3)
CREAT SERPL-MCNC: 0.54 MG/DL — SIGNIFICANT CHANGE UP (ref 0.5–1.3)
CREAT SERPL-MCNC: 0.55 MG/DL — SIGNIFICANT CHANGE UP (ref 0.5–1.3)
CREAT SERPL-MCNC: 0.56 MG/DL — SIGNIFICANT CHANGE UP (ref 0.5–1.3)
CREAT SERPL-MCNC: 0.57 MG/DL — SIGNIFICANT CHANGE UP (ref 0.5–1.3)
CREAT SERPL-MCNC: 0.58 MG/DL — SIGNIFICANT CHANGE UP (ref 0.5–1.3)
CREAT SERPL-MCNC: 0.6 MG/DL — SIGNIFICANT CHANGE UP (ref 0.5–1.3)
CREAT SERPL-MCNC: 0.65 MG/DL
CREAT SERPL-MCNC: 0.65 MG/DL
CREAT SERPL-MCNC: 0.66 MG/DL — SIGNIFICANT CHANGE UP (ref 0.5–1.3)
CREAT SERPL-MCNC: 0.67 MG/DL
CREAT SERPL-MCNC: 0.74 MG/DL — SIGNIFICANT CHANGE UP (ref 0.5–1.3)
CREAT SERPL-MCNC: 0.75 MG/DL — SIGNIFICANT CHANGE UP (ref 0.5–1.3)
CREAT SERPL-MCNC: 0.76 MG/DL — SIGNIFICANT CHANGE UP (ref 0.5–1.3)
CREAT SERPL-MCNC: 0.76 MG/DL — SIGNIFICANT CHANGE UP (ref 0.5–1.3)
CREAT SERPL-MCNC: 0.78 MG/DL — SIGNIFICANT CHANGE UP (ref 0.5–1.3)
CREAT SERPL-MCNC: 0.79 MG/DL — SIGNIFICANT CHANGE UP (ref 0.5–1.3)
CREAT SERPL-MCNC: 0.81 MG/DL — SIGNIFICANT CHANGE UP (ref 0.5–1.3)
CREAT SERPL-MCNC: 0.82 MG/DL — SIGNIFICANT CHANGE UP (ref 0.5–1.3)
CREAT SERPL-MCNC: 0.83 MG/DL — SIGNIFICANT CHANGE UP (ref 0.5–1.3)
CREAT SERPL-MCNC: 0.83 MG/DL — SIGNIFICANT CHANGE UP (ref 0.5–1.3)
CREAT SERPL-MCNC: 0.85 MG/DL — SIGNIFICANT CHANGE UP (ref 0.5–1.3)
CREAT SERPL-MCNC: 0.93 MG/DL — SIGNIFICANT CHANGE UP (ref 0.5–1.3)
CREAT SERPL-MCNC: 0.97 MG/DL — SIGNIFICANT CHANGE UP (ref 0.5–1.3)
CREAT SERPL-MCNC: 1.03 MG/DL — SIGNIFICANT CHANGE UP (ref 0.5–1.3)
CREAT SPEC-SCNC: 47 MG/DL
CRP SERPL-MCNC: 344 MG/L — HIGH
CULTURE RESULTS: NO GROWTH — SIGNIFICANT CHANGE UP
CULTURE RESULTS: SIGNIFICANT CHANGE UP
DACRYOCYTES BLD QL SMEAR: SLIGHT — SIGNIFICANT CHANGE UP
DIFF PNL FLD: NEGATIVE — SIGNIFICANT CHANGE UP
ELLIPTOCYTES BLD QL SMEAR: SLIGHT — SIGNIFICANT CHANGE UP
EOSINOPHIL # BLD AUTO: 0 K/UL — SIGNIFICANT CHANGE UP (ref 0–0.5)
EOSINOPHIL # BLD AUTO: 0.01 K/UL — SIGNIFICANT CHANGE UP (ref 0–0.5)
EOSINOPHIL # BLD AUTO: 0.02 K/UL — SIGNIFICANT CHANGE UP (ref 0–0.5)
EOSINOPHIL # BLD AUTO: 0.03 K/UL — SIGNIFICANT CHANGE UP (ref 0–0.5)
EOSINOPHIL # BLD AUTO: 0.03 K/UL — SIGNIFICANT CHANGE UP (ref 0–0.5)
EOSINOPHIL # BLD AUTO: 0.04 K/UL — SIGNIFICANT CHANGE UP (ref 0–0.5)
EOSINOPHIL # BLD AUTO: 0.05 K/UL — SIGNIFICANT CHANGE UP (ref 0–0.5)
EOSINOPHIL # BLD AUTO: 0.06 K/UL — SIGNIFICANT CHANGE UP (ref 0–0.5)
EOSINOPHIL # BLD AUTO: 0.08 K/UL — SIGNIFICANT CHANGE UP (ref 0–0.5)
EOSINOPHIL # BLD AUTO: 0.08 K/UL — SIGNIFICANT CHANGE UP (ref 0–0.5)
EOSINOPHIL # BLD AUTO: 0.1 K/UL
EOSINOPHIL # BLD AUTO: 0.11 K/UL
EOSINOPHIL # BLD AUTO: 0.11 K/UL — SIGNIFICANT CHANGE UP (ref 0–0.5)
EOSINOPHIL # BLD AUTO: 0.13 K/UL — SIGNIFICANT CHANGE UP (ref 0–0.5)
EOSINOPHIL # BLD AUTO: 0.14 K/UL — SIGNIFICANT CHANGE UP (ref 0–0.5)
EOSINOPHIL # BLD AUTO: 0.15 K/UL — SIGNIFICANT CHANGE UP (ref 0–0.5)
EOSINOPHIL # BLD AUTO: 0.16 K/UL — SIGNIFICANT CHANGE UP (ref 0–0.5)
EOSINOPHIL # BLD AUTO: 0.17 K/UL — SIGNIFICANT CHANGE UP (ref 0–0.5)
EOSINOPHIL # BLD AUTO: 0.19 K/UL — SIGNIFICANT CHANGE UP (ref 0–0.5)
EOSINOPHIL # BLD AUTO: 0.21 K/UL — SIGNIFICANT CHANGE UP (ref 0–0.5)
EOSINOPHIL # BLD AUTO: 0.28 K/UL — SIGNIFICANT CHANGE UP (ref 0–0.5)
EOSINOPHIL # BLD AUTO: 0.55 K/UL — HIGH (ref 0–0.5)
EOSINOPHIL NFR BLD AUTO: 0 % — SIGNIFICANT CHANGE UP (ref 0–6)
EOSINOPHIL NFR BLD AUTO: 0.1 % — SIGNIFICANT CHANGE UP (ref 0–6)
EOSINOPHIL NFR BLD AUTO: 0.1 % — SIGNIFICANT CHANGE UP (ref 0–6)
EOSINOPHIL NFR BLD AUTO: 0.3 % — SIGNIFICANT CHANGE UP (ref 0–6)
EOSINOPHIL NFR BLD AUTO: 0.3 % — SIGNIFICANT CHANGE UP (ref 0–6)
EOSINOPHIL NFR BLD AUTO: 0.4 % — SIGNIFICANT CHANGE UP (ref 0–6)
EOSINOPHIL NFR BLD AUTO: 0.4 % — SIGNIFICANT CHANGE UP (ref 0–6)
EOSINOPHIL NFR BLD AUTO: 0.6 % — SIGNIFICANT CHANGE UP (ref 0–6)
EOSINOPHIL NFR BLD AUTO: 0.9 % — SIGNIFICANT CHANGE UP (ref 0–6)
EOSINOPHIL NFR BLD AUTO: 1 %
EOSINOPHIL NFR BLD AUTO: 1.2 % — SIGNIFICANT CHANGE UP (ref 0–6)
EOSINOPHIL NFR BLD AUTO: 1.2 % — SIGNIFICANT CHANGE UP (ref 0–6)
EOSINOPHIL NFR BLD AUTO: 1.3 % — SIGNIFICANT CHANGE UP (ref 0–6)
EOSINOPHIL NFR BLD AUTO: 1.3 % — SIGNIFICANT CHANGE UP (ref 0–6)
EOSINOPHIL NFR BLD AUTO: 1.6 % — SIGNIFICANT CHANGE UP (ref 0–6)
EOSINOPHIL NFR BLD AUTO: 1.7 % — SIGNIFICANT CHANGE UP (ref 0–6)
EOSINOPHIL NFR BLD AUTO: 2 % — SIGNIFICANT CHANGE UP (ref 0–6)
EOSINOPHIL NFR BLD AUTO: 2.1 % — SIGNIFICANT CHANGE UP (ref 0–6)
EOSINOPHIL NFR BLD AUTO: 2.5 % — SIGNIFICANT CHANGE UP (ref 0–6)
EOSINOPHIL NFR BLD AUTO: 2.6 % — SIGNIFICANT CHANGE UP (ref 0–6)
EOSINOPHIL NFR BLD AUTO: 2.6 % — SIGNIFICANT CHANGE UP (ref 0–6)
EOSINOPHIL NFR BLD AUTO: 2.8 % — SIGNIFICANT CHANGE UP (ref 0–6)
EOSINOPHIL NFR BLD AUTO: 3.3 % — SIGNIFICANT CHANGE UP (ref 0–6)
EOSINOPHIL NFR BLD AUTO: 5.6 % — SIGNIFICANT CHANGE UP (ref 0–6)
EOSINOPHIL NFR BLD AUTO: 7 %
ESTIMATED AVERAGE GLUCOSE: 134 — SIGNIFICANT CHANGE UP
ESTIMATED AVERAGE GLUCOSE: 146 MG/DL
FLUAV SUBTYP SPEC NAA+PROBE: SIGNIFICANT CHANGE UP
FLUBV RNA SPEC QL NAA+PROBE: SIGNIFICANT CHANGE UP
GAMMA INTERFERON BACKGROUND BLD IA-ACNC: 0.02 IU/ML — SIGNIFICANT CHANGE UP
GLUCOSE BLDC GLUCOMTR-MCNC: 102 MG/DL — HIGH (ref 70–99)
GLUCOSE BLDC GLUCOMTR-MCNC: 102 MG/DL — HIGH (ref 70–99)
GLUCOSE BLDC GLUCOMTR-MCNC: 109 MG/DL — HIGH (ref 70–99)
GLUCOSE BLDC GLUCOMTR-MCNC: 111 MG/DL — HIGH (ref 70–99)
GLUCOSE BLDC GLUCOMTR-MCNC: 113 MG/DL — HIGH (ref 70–99)
GLUCOSE BLDC GLUCOMTR-MCNC: 115 MG/DL — HIGH (ref 70–99)
GLUCOSE BLDC GLUCOMTR-MCNC: 116 MG/DL — HIGH (ref 70–99)
GLUCOSE BLDC GLUCOMTR-MCNC: 116 MG/DL — HIGH (ref 70–99)
GLUCOSE BLDC GLUCOMTR-MCNC: 118 MG/DL — HIGH (ref 70–99)
GLUCOSE BLDC GLUCOMTR-MCNC: 120 MG/DL — HIGH (ref 70–99)
GLUCOSE BLDC GLUCOMTR-MCNC: 122 MG/DL — HIGH (ref 70–99)
GLUCOSE BLDC GLUCOMTR-MCNC: 122 MG/DL — HIGH (ref 70–99)
GLUCOSE BLDC GLUCOMTR-MCNC: 123 MG/DL — HIGH (ref 70–99)
GLUCOSE BLDC GLUCOMTR-MCNC: 124 MG/DL — HIGH (ref 70–99)
GLUCOSE BLDC GLUCOMTR-MCNC: 125 MG/DL — HIGH (ref 70–99)
GLUCOSE BLDC GLUCOMTR-MCNC: 125 MG/DL — HIGH (ref 70–99)
GLUCOSE BLDC GLUCOMTR-MCNC: 130 MG/DL — HIGH (ref 70–99)
GLUCOSE BLDC GLUCOMTR-MCNC: 133 MG/DL — HIGH (ref 70–99)
GLUCOSE BLDC GLUCOMTR-MCNC: 136 MG/DL — HIGH (ref 70–99)
GLUCOSE BLDC GLUCOMTR-MCNC: 136 MG/DL — HIGH (ref 70–99)
GLUCOSE BLDC GLUCOMTR-MCNC: 138 MG/DL — HIGH (ref 70–99)
GLUCOSE BLDC GLUCOMTR-MCNC: 140 MG/DL — HIGH (ref 70–99)
GLUCOSE BLDC GLUCOMTR-MCNC: 146 MG/DL — HIGH (ref 70–99)
GLUCOSE BLDC GLUCOMTR-MCNC: 150 MG/DL — HIGH (ref 70–99)
GLUCOSE BLDC GLUCOMTR-MCNC: 150 MG/DL — HIGH (ref 70–99)
GLUCOSE BLDC GLUCOMTR-MCNC: 161 MG/DL — HIGH (ref 70–99)
GLUCOSE BLDC GLUCOMTR-MCNC: 162 MG/DL — HIGH (ref 70–99)
GLUCOSE BLDC GLUCOMTR-MCNC: 164 MG/DL — HIGH (ref 70–99)
GLUCOSE BLDC GLUCOMTR-MCNC: 165 MG/DL — HIGH (ref 70–99)
GLUCOSE BLDC GLUCOMTR-MCNC: 175 MG/DL — HIGH (ref 70–99)
GLUCOSE BLDC GLUCOMTR-MCNC: 177 MG/DL — HIGH (ref 70–99)
GLUCOSE SERPL-MCNC: 101 MG/DL
GLUCOSE SERPL-MCNC: 102 MG/DL
GLUCOSE SERPL-MCNC: 108 MG/DL — HIGH (ref 70–99)
GLUCOSE SERPL-MCNC: 113 MG/DL — HIGH (ref 70–99)
GLUCOSE SERPL-MCNC: 114 MG/DL — HIGH (ref 70–99)
GLUCOSE SERPL-MCNC: 116 MG/DL — HIGH (ref 70–99)
GLUCOSE SERPL-MCNC: 120 MG/DL — HIGH (ref 70–99)
GLUCOSE SERPL-MCNC: 127 MG/DL — HIGH (ref 70–99)
GLUCOSE SERPL-MCNC: 131 MG/DL — HIGH (ref 70–99)
GLUCOSE SERPL-MCNC: 157 MG/DL — HIGH (ref 70–99)
GLUCOSE SERPL-MCNC: 160 MG/DL — HIGH (ref 70–99)
GLUCOSE SERPL-MCNC: 171 MG/DL
GLUCOSE SERPL-MCNC: 171 MG/DL — HIGH (ref 70–99)
GLUCOSE SERPL-MCNC: 174 MG/DL — HIGH (ref 70–99)
GLUCOSE SERPL-MCNC: 182 MG/DL — HIGH (ref 70–99)
GLUCOSE SERPL-MCNC: 188 MG/DL — HIGH (ref 70–99)
GLUCOSE SERPL-MCNC: 189 MG/DL — HIGH (ref 70–99)
GLUCOSE SERPL-MCNC: 200 MG/DL — HIGH (ref 70–99)
GLUCOSE SERPL-MCNC: 201 MG/DL — HIGH (ref 70–99)
GLUCOSE SERPL-MCNC: 204 MG/DL — HIGH (ref 70–99)
GLUCOSE SERPL-MCNC: 213 MG/DL — HIGH (ref 70–99)
GLUCOSE SERPL-MCNC: 214 MG/DL — HIGH (ref 70–99)
GLUCOSE SERPL-MCNC: 215 MG/DL — HIGH (ref 70–99)
GLUCOSE SERPL-MCNC: 223 MG/DL — HIGH (ref 70–99)
GLUCOSE SERPL-MCNC: 226 MG/DL — HIGH (ref 70–99)
GLUCOSE SERPL-MCNC: 257 MG/DL — HIGH (ref 70–99)
GLUCOSE SERPL-MCNC: 259 MG/DL — HIGH (ref 70–99)
GLUCOSE SERPL-MCNC: 301 MG/DL — HIGH (ref 70–99)
GLUCOSE UR QL: ABNORMAL
GRAM STN FLD: SIGNIFICANT CHANGE UP
HADV DNA SPEC QL NAA+PROBE: SIGNIFICANT CHANGE UP
HAV IGM SER QL: NONREACTIVE
HBA1C MFR BLD HPLC: 6.7 %
HBV CORE IGM SER QL: NONREACTIVE
HBV SURFACE AG SER QL: NONREACTIVE
HCO3 BLDA-SCNC: 29 MMOL/L — HIGH (ref 21–28)
HCO3 BLDA-SCNC: 29 MMOL/L — HIGH (ref 21–28)
HCO3 BLDA-SCNC: 30 MMOL/L — HIGH (ref 21–28)
HCO3 BLDA-SCNC: 31 MMOL/L — HIGH (ref 21–28)
HCO3 BLDA-SCNC: 32 MMOL/L — HIGH (ref 21–28)
HCO3 BLDA-SCNC: 33 MMOL/L — HIGH (ref 21–28)
HCO3 BLDA-SCNC: 34 MMOL/L — HIGH (ref 21–28)
HCO3 BLDA-SCNC: 34 MMOL/L — HIGH (ref 21–28)
HCO3 BLDA-SCNC: 35 MMOL/L — HIGH (ref 21–28)
HCO3 BLDA-SCNC: 35 MMOL/L — HIGH (ref 21–28)
HCO3 BLDA-SCNC: 36 MMOL/L — HIGH (ref 21–28)
HCO3 BLDV-SCNC: 32 MMOL/L — HIGH (ref 22–29)
HCOV 229E RNA SPEC QL NAA+PROBE: SIGNIFICANT CHANGE UP
HCOV HKU1 RNA SPEC QL NAA+PROBE: SIGNIFICANT CHANGE UP
HCOV NL63 RNA SPEC QL NAA+PROBE: SIGNIFICANT CHANGE UP
HCOV OC43 RNA SPEC QL NAA+PROBE: SIGNIFICANT CHANGE UP
HCT VFR BLD CALC: 27.7 % — LOW (ref 39–50)
HCT VFR BLD CALC: 28.2 % — LOW (ref 39–50)
HCT VFR BLD CALC: 28.8 % — LOW (ref 39–50)
HCT VFR BLD CALC: 28.9 % — LOW (ref 39–50)
HCT VFR BLD CALC: 29.6 % — LOW (ref 39–50)
HCT VFR BLD CALC: 29.6 % — LOW (ref 39–50)
HCT VFR BLD CALC: 30.2 % — LOW (ref 39–50)
HCT VFR BLD CALC: 30.3 %
HCT VFR BLD CALC: 30.7 % — LOW (ref 39–50)
HCT VFR BLD CALC: 30.9 % — LOW (ref 39–50)
HCT VFR BLD CALC: 30.9 % — LOW (ref 39–50)
HCT VFR BLD CALC: 31.3 % — LOW (ref 39–50)
HCT VFR BLD CALC: 31.7 % — LOW (ref 39–50)
HCT VFR BLD CALC: 31.8 % — LOW (ref 39–50)
HCT VFR BLD CALC: 31.8 % — LOW (ref 39–50)
HCT VFR BLD CALC: 32.4 % — LOW (ref 39–50)
HCT VFR BLD CALC: 32.8 % — LOW (ref 39–50)
HCT VFR BLD CALC: 33.2 % — LOW (ref 39–50)
HCT VFR BLD CALC: 33.4 %
HCT VFR BLD CALC: 34 % — LOW (ref 39–50)
HCT VFR BLD CALC: 34 % — LOW (ref 39–50)
HCT VFR BLD CALC: 34.1 % — LOW (ref 39–50)
HCT VFR BLD CALC: 34.1 % — LOW (ref 39–50)
HCT VFR BLD CALC: 34.2 % — LOW (ref 39–50)
HCT VFR BLD CALC: 34.4 % — LOW (ref 39–50)
HCT VFR BLD CALC: 34.6 % — LOW (ref 39–50)
HCT VFR BLD CALC: 34.8 % — LOW (ref 39–50)
HCT VFR BLD CALC: 35.2 % — LOW (ref 39–50)
HCT VFR BLD CALC: 35.4 % — LOW (ref 39–50)
HCT VFR BLD CALC: 35.5 % — LOW (ref 39–50)
HCT VFR BLD CALC: 35.9 % — LOW (ref 39–50)
HCT VFR BLD CALC: 36.2 % — LOW (ref 39–50)
HCT VFR BLD CALC: 36.3 % — LOW (ref 39–50)
HCT VFR BLD CALC: 37.7 % — LOW (ref 39–50)
HCV AB SER QL: NONREACTIVE
HCV S/CO RATIO: 0.41 S/CO
HDLC SERPL-MCNC: 75 MG/DL
HGB BLD-MCNC: 10 G/DL — LOW (ref 13–17)
HGB BLD-MCNC: 10.1 G/DL — LOW (ref 13–17)
HGB BLD-MCNC: 10.4 G/DL — LOW (ref 13–17)
HGB BLD-MCNC: 10.4 G/DL — LOW (ref 13–17)
HGB BLD-MCNC: 10.5 G/DL — LOW (ref 13–17)
HGB BLD-MCNC: 10.5 G/DL — LOW (ref 13–17)
HGB BLD-MCNC: 10.6 G/DL — LOW (ref 13–17)
HGB BLD-MCNC: 10.7 G/DL — LOW (ref 13–17)
HGB BLD-MCNC: 10.9 G/DL
HGB BLD-MCNC: 10.9 G/DL — LOW (ref 13–17)
HGB BLD-MCNC: 11 G/DL — LOW (ref 13–17)
HGB BLD-MCNC: 11.2 G/DL — LOW (ref 13–17)
HGB BLD-MCNC: 11.2 G/DL — LOW (ref 13–17)
HGB BLD-MCNC: 11.3 G/DL — LOW (ref 13–17)
HGB BLD-MCNC: 11.5 G/DL — LOW (ref 13–17)
HGB BLD-MCNC: 11.7 G/DL — LOW (ref 13–17)
HGB BLD-MCNC: 11.7 G/DL — LOW (ref 13–17)
HGB BLD-MCNC: 11.8 G/DL — LOW (ref 13–17)
HGB BLD-MCNC: 11.8 G/DL — LOW (ref 13–17)
HGB BLD-MCNC: 11.9 G/DL — LOW (ref 13–17)
HGB BLD-MCNC: 11.9 G/DL — LOW (ref 13–17)
HGB BLD-MCNC: 12.3 G/DL — LOW (ref 13–17)
HGB BLD-MCNC: 12.4 G/DL — LOW (ref 13–17)
HGB BLD-MCNC: 12.5 G/DL — LOW (ref 13–17)
HGB BLD-MCNC: 8.6 G/DL — LOW (ref 13–17)
HGB BLD-MCNC: 8.8 G/DL — LOW (ref 13–17)
HGB BLD-MCNC: 9.1 G/DL — LOW (ref 13–17)
HGB BLD-MCNC: 9.3 G/DL — LOW (ref 13–17)
HGB BLD-MCNC: 9.4 G/DL — LOW (ref 13–17)
HGB BLD-MCNC: 9.4 G/DL — LOW (ref 13–17)
HGB BLD-MCNC: 9.5 G/DL — LOW (ref 13–17)
HGB BLD-MCNC: 9.9 G/DL
HMPV RNA SPEC QL NAA+PROBE: SIGNIFICANT CHANGE UP
HOROWITZ INDEX BLDA+IHG-RTO: 100 — SIGNIFICANT CHANGE UP
HOROWITZ INDEX BLDA+IHG-RTO: 40 — SIGNIFICANT CHANGE UP
HOROWITZ INDEX BLDA+IHG-RTO: 60 — SIGNIFICANT CHANGE UP
HOROWITZ INDEX BLDA+IHG-RTO: 60 — SIGNIFICANT CHANGE UP
HOROWITZ INDEX BLDV+IHG-RTO: 40 — SIGNIFICANT CHANGE UP
HPIV1 RNA SPEC QL NAA+PROBE: SIGNIFICANT CHANGE UP
HPIV2 RNA SPEC QL NAA+PROBE: SIGNIFICANT CHANGE UP
HPIV3 RNA SPEC QL NAA+PROBE: SIGNIFICANT CHANGE UP
HPIV4 RNA SPEC QL NAA+PROBE: SIGNIFICANT CHANGE UP
IANC: 6.35 K/UL — SIGNIFICANT CHANGE UP (ref 1.5–8.5)
IANC: 7.62 K/UL — SIGNIFICANT CHANGE UP (ref 1.5–8.5)
IMM GRANULOCYTES NFR BLD AUTO: 0.3 % — SIGNIFICANT CHANGE UP (ref 0–1.5)
IMM GRANULOCYTES NFR BLD AUTO: 0.3 % — SIGNIFICANT CHANGE UP (ref 0–1.5)
IMM GRANULOCYTES NFR BLD AUTO: 0.4 % — SIGNIFICANT CHANGE UP (ref 0–1.5)
IMM GRANULOCYTES NFR BLD AUTO: 0.5 %
IMM GRANULOCYTES NFR BLD AUTO: 0.5 % — SIGNIFICANT CHANGE UP (ref 0–1.5)
IMM GRANULOCYTES NFR BLD AUTO: 0.6 % — SIGNIFICANT CHANGE UP (ref 0–1.5)
IMM GRANULOCYTES NFR BLD AUTO: 0.9 % — SIGNIFICANT CHANGE UP (ref 0–1.5)
IMM GRANULOCYTES NFR BLD AUTO: 1 % — SIGNIFICANT CHANGE UP (ref 0–1.5)
IMM GRANULOCYTES NFR BLD AUTO: 1.2 % — SIGNIFICANT CHANGE UP (ref 0–1.5)
IMM GRANULOCYTES NFR BLD AUTO: 1.4 % — SIGNIFICANT CHANGE UP (ref 0–1.5)
IMM GRANULOCYTES NFR BLD AUTO: 1.9 % — HIGH (ref 0–1.5)
IMM GRANULOCYTES NFR BLD AUTO: 2 % — HIGH (ref 0–1.5)
KETONES UR-MCNC: NEGATIVE — SIGNIFICANT CHANGE UP
LACTATE SERPL-SCNC: 1.3 MMOL/L — SIGNIFICANT CHANGE UP (ref 0.7–2)
LDLC SERPL CALC-MCNC: 102 MG/DL
LDLC SERPL DIRECT ASSAY-MCNC: 103 MG/DL
LEUKOCYTE ESTERASE UR-ACNC: NEGATIVE — SIGNIFICANT CHANGE UP
LG PLATELETS BLD QL AUTO: SLIGHT — SIGNIFICANT CHANGE UP
LIDOCAIN IGE QN: 43 U/L — SIGNIFICANT CHANGE UP (ref 7–60)
LYMPHOCYTES # BLD AUTO: 0.21 K/UL — LOW (ref 1–3.3)
LYMPHOCYTES # BLD AUTO: 0.29 K/UL — LOW (ref 1–3.3)
LYMPHOCYTES # BLD AUTO: 0.35 K/UL — LOW (ref 1–3.3)
LYMPHOCYTES # BLD AUTO: 0.37 K/UL — LOW (ref 1–3.3)
LYMPHOCYTES # BLD AUTO: 0.38 K/UL — LOW (ref 1–3.3)
LYMPHOCYTES # BLD AUTO: 0.38 K/UL — LOW (ref 1–3.3)
LYMPHOCYTES # BLD AUTO: 0.41 K/UL — LOW (ref 1–3.3)
LYMPHOCYTES # BLD AUTO: 0.42 K/UL — LOW (ref 1–3.3)
LYMPHOCYTES # BLD AUTO: 0.42 K/UL — LOW (ref 1–3.3)
LYMPHOCYTES # BLD AUTO: 0.46 K/UL — LOW (ref 1–3.3)
LYMPHOCYTES # BLD AUTO: 0.47 K/UL
LYMPHOCYTES # BLD AUTO: 0.47 K/UL — LOW (ref 1–3.3)
LYMPHOCYTES # BLD AUTO: 0.48 K/UL — LOW (ref 1–3.3)
LYMPHOCYTES # BLD AUTO: 0.48 K/UL — LOW (ref 1–3.3)
LYMPHOCYTES # BLD AUTO: 0.49 K/UL — LOW (ref 1–3.3)
LYMPHOCYTES # BLD AUTO: 0.51 K/UL — LOW (ref 1–3.3)
LYMPHOCYTES # BLD AUTO: 0.53 K/UL — LOW (ref 1–3.3)
LYMPHOCYTES # BLD AUTO: 0.54 K/UL — LOW (ref 1–3.3)
LYMPHOCYTES # BLD AUTO: 0.54 K/UL — LOW (ref 1–3.3)
LYMPHOCYTES # BLD AUTO: 0.58 K/UL — LOW (ref 1–3.3)
LYMPHOCYTES # BLD AUTO: 0.58 K/UL — LOW (ref 1–3.3)
LYMPHOCYTES # BLD AUTO: 0.62 K/UL — LOW (ref 1–3.3)
LYMPHOCYTES # BLD AUTO: 0.63 K/UL — LOW (ref 1–3.3)
LYMPHOCYTES # BLD AUTO: 0.65 K/UL
LYMPHOCYTES # BLD AUTO: 0.65 K/UL — LOW (ref 1–3.3)
LYMPHOCYTES # BLD AUTO: 0.71 K/UL — LOW (ref 1–3.3)
LYMPHOCYTES # BLD AUTO: 0.75 K/UL — LOW (ref 1–3.3)
LYMPHOCYTES # BLD AUTO: 1 % — LOW (ref 13–44)
LYMPHOCYTES # BLD AUTO: 10.4 % — LOW (ref 13–44)
LYMPHOCYTES # BLD AUTO: 10.5 % — LOW (ref 13–44)
LYMPHOCYTES # BLD AUTO: 11 % — LOW (ref 13–44)
LYMPHOCYTES # BLD AUTO: 13.7 % — SIGNIFICANT CHANGE UP (ref 13–44)
LYMPHOCYTES # BLD AUTO: 16.5 % — SIGNIFICANT CHANGE UP (ref 13–44)
LYMPHOCYTES # BLD AUTO: 17.4 % — SIGNIFICANT CHANGE UP (ref 13–44)
LYMPHOCYTES # BLD AUTO: 2.7 % — LOW (ref 13–44)
LYMPHOCYTES # BLD AUTO: 2.78 K/UL — SIGNIFICANT CHANGE UP (ref 1–3.3)
LYMPHOCYTES # BLD AUTO: 2.9 % — LOW (ref 13–44)
LYMPHOCYTES # BLD AUTO: 2.9 % — LOW (ref 13–44)
LYMPHOCYTES # BLD AUTO: 21 % — SIGNIFICANT CHANGE UP (ref 13–44)
LYMPHOCYTES # BLD AUTO: 3 % — LOW (ref 13–44)
LYMPHOCYTES # BLD AUTO: 3.3 % — LOW (ref 13–44)
LYMPHOCYTES # BLD AUTO: 3.3 % — LOW (ref 13–44)
LYMPHOCYTES # BLD AUTO: 3.5 % — LOW (ref 13–44)
LYMPHOCYTES # BLD AUTO: 3.8 % — LOW (ref 13–44)
LYMPHOCYTES # BLD AUTO: 3.8 % — LOW (ref 13–44)
LYMPHOCYTES # BLD AUTO: 3.9 % — LOW (ref 13–44)
LYMPHOCYTES # BLD AUTO: 4.2 % — LOW (ref 13–44)
LYMPHOCYTES # BLD AUTO: 5 % — LOW (ref 13–44)
LYMPHOCYTES # BLD AUTO: 5.5 % — LOW (ref 13–44)
LYMPHOCYTES # BLD AUTO: 7.3 % — LOW (ref 13–44)
LYMPHOCYTES # BLD AUTO: 7.8 % — LOW (ref 13–44)
LYMPHOCYTES # BLD AUTO: 8.2 % — LOW (ref 13–44)
LYMPHOCYTES # BLD AUTO: 8.6 % — LOW (ref 13–44)
LYMPHOCYTES # BLD AUTO: 9.1 % — LOW (ref 13–44)
LYMPHOCYTES NFR BLD AUTO: 33.3 %
LYMPHOCYTES NFR BLD AUTO: 5.7 %
M PNEUMO DNA SPEC QL NAA+PROBE: SIGNIFICANT CHANGE UP
M TB IFN-G BLD-IMP: ABNORMAL
M TB IFN-G CD4+ BCKGRND COR BLD-ACNC: 0 IU/ML — SIGNIFICANT CHANGE UP
M TB IFN-G CD4+CD8+ BCKGRND COR BLD-ACNC: 0 IU/ML — SIGNIFICANT CHANGE UP
MACROCYTES BLD QL: SLIGHT — SIGNIFICANT CHANGE UP
MAGNESIUM SERPL-MCNC: 1.7 MG/DL — SIGNIFICANT CHANGE UP (ref 1.6–2.6)
MAGNESIUM SERPL-MCNC: 1.8 MG/DL — SIGNIFICANT CHANGE UP (ref 1.6–2.6)
MAGNESIUM SERPL-MCNC: 1.9 MG/DL — SIGNIFICANT CHANGE UP (ref 1.6–2.6)
MAGNESIUM SERPL-MCNC: 2 MG/DL — SIGNIFICANT CHANGE UP (ref 1.6–2.6)
MAGNESIUM SERPL-MCNC: 2 MG/DL — SIGNIFICANT CHANGE UP (ref 1.6–2.6)
MAGNESIUM SERPL-MCNC: 2.1 MG/DL — SIGNIFICANT CHANGE UP (ref 1.6–2.6)
MAGNESIUM SERPL-MCNC: 2.2 MG/DL — SIGNIFICANT CHANGE UP (ref 1.6–2.6)
MAGNESIUM SERPL-MCNC: 2.3 MG/DL
MAGNESIUM SERPL-MCNC: 2.3 MG/DL — SIGNIFICANT CHANGE UP (ref 1.6–2.6)
MAGNESIUM SERPL-MCNC: 2.4 MG/DL — SIGNIFICANT CHANGE UP (ref 1.6–2.6)
MAGNESIUM SERPL-MCNC: 2.5 MG/DL — SIGNIFICANT CHANGE UP (ref 1.6–2.6)
MAGNESIUM SERPL-MCNC: 2.5 MG/DL — SIGNIFICANT CHANGE UP (ref 1.6–2.6)
MAGNESIUM SERPL-MCNC: 2.6 MG/DL — SIGNIFICANT CHANGE UP (ref 1.6–2.6)
MAN DIFF?: NORMAL
MAN DIFF?: NORMAL
MANUAL SMEAR VERIFICATION: SIGNIFICANT CHANGE UP
MCHC RBC-ENTMCNC: 27.9 PG — SIGNIFICANT CHANGE UP (ref 27–34)
MCHC RBC-ENTMCNC: 28.4 PG — SIGNIFICANT CHANGE UP (ref 27–34)
MCHC RBC-ENTMCNC: 28.5 PG — SIGNIFICANT CHANGE UP (ref 27–34)
MCHC RBC-ENTMCNC: 28.6 PG — SIGNIFICANT CHANGE UP (ref 27–34)
MCHC RBC-ENTMCNC: 28.8 PG — SIGNIFICANT CHANGE UP (ref 27–34)
MCHC RBC-ENTMCNC: 29.1 PG — SIGNIFICANT CHANGE UP (ref 27–34)
MCHC RBC-ENTMCNC: 29.2 PG — SIGNIFICANT CHANGE UP (ref 27–34)
MCHC RBC-ENTMCNC: 29.5 PG
MCHC RBC-ENTMCNC: 29.6 PG — SIGNIFICANT CHANGE UP (ref 27–34)
MCHC RBC-ENTMCNC: 29.6 PG — SIGNIFICANT CHANGE UP (ref 27–34)
MCHC RBC-ENTMCNC: 29.8 PG
MCHC RBC-ENTMCNC: 29.9 PG — SIGNIFICANT CHANGE UP (ref 27–34)
MCHC RBC-ENTMCNC: 29.9 PG — SIGNIFICANT CHANGE UP (ref 27–34)
MCHC RBC-ENTMCNC: 30.1 PG — SIGNIFICANT CHANGE UP (ref 27–34)
MCHC RBC-ENTMCNC: 30.2 PG — SIGNIFICANT CHANGE UP (ref 27–34)
MCHC RBC-ENTMCNC: 30.3 PG — SIGNIFICANT CHANGE UP (ref 27–34)
MCHC RBC-ENTMCNC: 30.4 GM/DL — LOW (ref 32–36)
MCHC RBC-ENTMCNC: 30.4 GM/DL — LOW (ref 32–36)
MCHC RBC-ENTMCNC: 30.4 PG — SIGNIFICANT CHANGE UP (ref 27–34)
MCHC RBC-ENTMCNC: 30.6 GM/DL — LOW (ref 32–36)
MCHC RBC-ENTMCNC: 30.6 PG — SIGNIFICANT CHANGE UP (ref 27–34)
MCHC RBC-ENTMCNC: 30.7 GM/DL — LOW (ref 32–36)
MCHC RBC-ENTMCNC: 30.8 GM/DL — LOW (ref 32–36)
MCHC RBC-ENTMCNC: 30.9 GM/DL — LOW (ref 32–36)
MCHC RBC-ENTMCNC: 30.9 PG — SIGNIFICANT CHANGE UP (ref 27–34)
MCHC RBC-ENTMCNC: 30.9 PG — SIGNIFICANT CHANGE UP (ref 27–34)
MCHC RBC-ENTMCNC: 31 GM/DL — LOW (ref 32–36)
MCHC RBC-ENTMCNC: 31 PG — SIGNIFICANT CHANGE UP (ref 27–34)
MCHC RBC-ENTMCNC: 31.2 PG — SIGNIFICANT CHANGE UP (ref 27–34)
MCHC RBC-ENTMCNC: 31.2 PG — SIGNIFICANT CHANGE UP (ref 27–34)
MCHC RBC-ENTMCNC: 31.4 GM/DL — LOW (ref 32–36)
MCHC RBC-ENTMCNC: 31.5 GM/DL — LOW (ref 32–36)
MCHC RBC-ENTMCNC: 32.1 GM/DL — SIGNIFICANT CHANGE UP (ref 32–36)
MCHC RBC-ENTMCNC: 32.1 GM/DL — SIGNIFICANT CHANGE UP (ref 32–36)
MCHC RBC-ENTMCNC: 32.3 GM/DL — SIGNIFICANT CHANGE UP (ref 32–36)
MCHC RBC-ENTMCNC: 32.5 GM/DL — SIGNIFICANT CHANGE UP (ref 32–36)
MCHC RBC-ENTMCNC: 32.6 GM/DL
MCHC RBC-ENTMCNC: 32.6 GM/DL — SIGNIFICANT CHANGE UP (ref 32–36)
MCHC RBC-ENTMCNC: 32.7 GM/DL
MCHC RBC-ENTMCNC: 32.9 G/DL — SIGNIFICANT CHANGE UP (ref 32–36)
MCHC RBC-ENTMCNC: 32.9 G/DL — SIGNIFICANT CHANGE UP (ref 32–36)
MCHC RBC-ENTMCNC: 33.2 GM/DL — SIGNIFICANT CHANGE UP (ref 32–36)
MCHC RBC-ENTMCNC: 33.2 GM/DL — SIGNIFICANT CHANGE UP (ref 32–36)
MCHC RBC-ENTMCNC: 33.6 G/DL — SIGNIFICANT CHANGE UP (ref 32–36)
MCHC RBC-ENTMCNC: 33.6 G/DL — SIGNIFICANT CHANGE UP (ref 32–36)
MCHC RBC-ENTMCNC: 33.7 G/DL — SIGNIFICANT CHANGE UP (ref 32–36)
MCHC RBC-ENTMCNC: 33.9 GM/DL — SIGNIFICANT CHANGE UP (ref 32–36)
MCHC RBC-ENTMCNC: 34 GM/DL — SIGNIFICANT CHANGE UP (ref 32–36)
MCHC RBC-ENTMCNC: 34.2 G/DL — SIGNIFICANT CHANGE UP (ref 32–36)
MCHC RBC-ENTMCNC: 34.2 G/DL — SIGNIFICANT CHANGE UP (ref 32–36)
MCHC RBC-ENTMCNC: 34.2 GM/DL — SIGNIFICANT CHANGE UP (ref 32–36)
MCHC RBC-ENTMCNC: 34.3 G/DL — SIGNIFICANT CHANGE UP (ref 32–36)
MCHC RBC-ENTMCNC: 34.3 G/DL — SIGNIFICANT CHANGE UP (ref 32–36)
MCHC RBC-ENTMCNC: 34.3 GM/DL — SIGNIFICANT CHANGE UP (ref 32–36)
MCHC RBC-ENTMCNC: 34.4 G/DL — SIGNIFICANT CHANGE UP (ref 32–36)
MCHC RBC-ENTMCNC: 34.6 G/DL — SIGNIFICANT CHANGE UP (ref 32–36)
MCHC RBC-ENTMCNC: 34.9 GM/DL — SIGNIFICANT CHANGE UP (ref 32–36)
MCV RBC AUTO: 86.7 FL — SIGNIFICANT CHANGE UP (ref 80–100)
MCV RBC AUTO: 87.5 FL — SIGNIFICANT CHANGE UP (ref 80–100)
MCV RBC AUTO: 87.5 FL — SIGNIFICANT CHANGE UP (ref 80–100)
MCV RBC AUTO: 87.8 FL — SIGNIFICANT CHANGE UP (ref 80–100)
MCV RBC AUTO: 87.9 FL — SIGNIFICANT CHANGE UP (ref 80–100)
MCV RBC AUTO: 88 FL — SIGNIFICANT CHANGE UP (ref 80–100)
MCV RBC AUTO: 88.3 FL — SIGNIFICANT CHANGE UP (ref 80–100)
MCV RBC AUTO: 88.8 FL — SIGNIFICANT CHANGE UP (ref 80–100)
MCV RBC AUTO: 89.2 FL — SIGNIFICANT CHANGE UP (ref 80–100)
MCV RBC AUTO: 89.3 FL — SIGNIFICANT CHANGE UP (ref 80–100)
MCV RBC AUTO: 89.5 FL — SIGNIFICANT CHANGE UP (ref 80–100)
MCV RBC AUTO: 90.1 FL — SIGNIFICANT CHANGE UP (ref 80–100)
MCV RBC AUTO: 90.3 FL
MCV RBC AUTO: 90.3 FL — SIGNIFICANT CHANGE UP (ref 80–100)
MCV RBC AUTO: 90.4 FL — SIGNIFICANT CHANGE UP (ref 80–100)
MCV RBC AUTO: 90.5 FL — SIGNIFICANT CHANGE UP (ref 80–100)
MCV RBC AUTO: 90.7 FL — SIGNIFICANT CHANGE UP (ref 80–100)
MCV RBC AUTO: 91.3 FL
MCV RBC AUTO: 91.5 FL — SIGNIFICANT CHANGE UP (ref 80–100)
MCV RBC AUTO: 91.7 FL — SIGNIFICANT CHANGE UP (ref 80–100)
MCV RBC AUTO: 92.1 FL — SIGNIFICANT CHANGE UP (ref 80–100)
MCV RBC AUTO: 92.1 FL — SIGNIFICANT CHANGE UP (ref 80–100)
MCV RBC AUTO: 92.7 FL — SIGNIFICANT CHANGE UP (ref 80–100)
MCV RBC AUTO: 92.9 FL — SIGNIFICANT CHANGE UP (ref 80–100)
MCV RBC AUTO: 92.9 FL — SIGNIFICANT CHANGE UP (ref 80–100)
MCV RBC AUTO: 93 FL — SIGNIFICANT CHANGE UP (ref 80–100)
MCV RBC AUTO: 93.1 FL — SIGNIFICANT CHANGE UP (ref 80–100)
MCV RBC AUTO: 93.6 FL — SIGNIFICANT CHANGE UP (ref 80–100)
MCV RBC AUTO: 94.1 FL — SIGNIFICANT CHANGE UP (ref 80–100)
MCV RBC AUTO: 95.2 FL — SIGNIFICANT CHANGE UP (ref 80–100)
MICROALBUMIN 24H UR DL<=1MG/L-MCNC: 3.7 MG/DL
MICROALBUMIN/CREAT 24H UR-RTO: 79 MG/G
MICROCYTES BLD QL: SLIGHT — SIGNIFICANT CHANGE UP
MONOCYTES # BLD AUTO: 0.12 K/UL — SIGNIFICANT CHANGE UP (ref 0–0.9)
MONOCYTES # BLD AUTO: 0.28 K/UL
MONOCYTES # BLD AUTO: 0.29 K/UL — SIGNIFICANT CHANGE UP (ref 0–0.9)
MONOCYTES # BLD AUTO: 0.31 K/UL — SIGNIFICANT CHANGE UP (ref 0–0.9)
MONOCYTES # BLD AUTO: 0.32 K/UL — SIGNIFICANT CHANGE UP (ref 0–0.9)
MONOCYTES # BLD AUTO: 0.32 K/UL — SIGNIFICANT CHANGE UP (ref 0–0.9)
MONOCYTES # BLD AUTO: 0.33 K/UL — SIGNIFICANT CHANGE UP (ref 0–0.9)
MONOCYTES # BLD AUTO: 0.41 K/UL — SIGNIFICANT CHANGE UP (ref 0–0.9)
MONOCYTES # BLD AUTO: 0.44 K/UL — SIGNIFICANT CHANGE UP (ref 0–0.9)
MONOCYTES # BLD AUTO: 0.44 K/UL — SIGNIFICANT CHANGE UP (ref 0–0.9)
MONOCYTES # BLD AUTO: 0.45 K/UL — SIGNIFICANT CHANGE UP (ref 0–0.9)
MONOCYTES # BLD AUTO: 0.48 K/UL — SIGNIFICANT CHANGE UP (ref 0–0.9)
MONOCYTES # BLD AUTO: 0.48 K/UL — SIGNIFICANT CHANGE UP (ref 0–0.9)
MONOCYTES # BLD AUTO: 0.5 K/UL — SIGNIFICANT CHANGE UP (ref 0–0.9)
MONOCYTES # BLD AUTO: 0.5 K/UL — SIGNIFICANT CHANGE UP (ref 0–0.9)
MONOCYTES # BLD AUTO: 0.53 K/UL — SIGNIFICANT CHANGE UP (ref 0–0.9)
MONOCYTES # BLD AUTO: 0.72 K/UL — SIGNIFICANT CHANGE UP (ref 0–0.9)
MONOCYTES # BLD AUTO: 0.76 K/UL
MONOCYTES # BLD AUTO: 0.79 K/UL — SIGNIFICANT CHANGE UP (ref 0–0.9)
MONOCYTES # BLD AUTO: 0.83 K/UL — SIGNIFICANT CHANGE UP (ref 0–0.9)
MONOCYTES # BLD AUTO: 0.84 K/UL — SIGNIFICANT CHANGE UP (ref 0–0.9)
MONOCYTES # BLD AUTO: 0.85 K/UL — SIGNIFICANT CHANGE UP (ref 0–0.9)
MONOCYTES # BLD AUTO: 0.9 K/UL — SIGNIFICANT CHANGE UP (ref 0–0.9)
MONOCYTES # BLD AUTO: 0.93 K/UL — HIGH (ref 0–0.9)
MONOCYTES # BLD AUTO: 1.08 K/UL — HIGH (ref 0–0.9)
MONOCYTES # BLD AUTO: 1.09 K/UL — HIGH (ref 0–0.9)
MONOCYTES # BLD AUTO: 1.12 K/UL — HIGH (ref 0–0.9)
MONOCYTES # BLD AUTO: 1.39 K/UL — HIGH (ref 0–0.9)
MONOCYTES NFR BLD AUTO: 1 % — LOW (ref 2–14)
MONOCYTES NFR BLD AUTO: 11 % — SIGNIFICANT CHANGE UP (ref 2–14)
MONOCYTES NFR BLD AUTO: 11.3 % — SIGNIFICANT CHANGE UP (ref 2–14)
MONOCYTES NFR BLD AUTO: 14.5 % — HIGH (ref 2–14)
MONOCYTES NFR BLD AUTO: 14.5 % — HIGH (ref 2–14)
MONOCYTES NFR BLD AUTO: 15 % — HIGH (ref 2–14)
MONOCYTES NFR BLD AUTO: 2.3 % — SIGNIFICANT CHANGE UP (ref 2–14)
MONOCYTES NFR BLD AUTO: 2.3 % — SIGNIFICANT CHANGE UP (ref 2–14)
MONOCYTES NFR BLD AUTO: 20.2 %
MONOCYTES NFR BLD AUTO: 24.6 % — HIGH (ref 2–14)
MONOCYTES NFR BLD AUTO: 25.3 % — HIGH (ref 2–14)
MONOCYTES NFR BLD AUTO: 3 % — SIGNIFICANT CHANGE UP (ref 2–14)
MONOCYTES NFR BLD AUTO: 3.3 % — SIGNIFICANT CHANGE UP (ref 2–14)
MONOCYTES NFR BLD AUTO: 3.8 % — SIGNIFICANT CHANGE UP (ref 2–14)
MONOCYTES NFR BLD AUTO: 3.8 % — SIGNIFICANT CHANGE UP (ref 2–14)
MONOCYTES NFR BLD AUTO: 32.2 % — HIGH (ref 2–14)
MONOCYTES NFR BLD AUTO: 4 % — SIGNIFICANT CHANGE UP (ref 2–14)
MONOCYTES NFR BLD AUTO: 4.1 % — SIGNIFICANT CHANGE UP (ref 2–14)
MONOCYTES NFR BLD AUTO: 4.2 % — SIGNIFICANT CHANGE UP (ref 2–14)
MONOCYTES NFR BLD AUTO: 4.3 % — SIGNIFICANT CHANGE UP (ref 2–14)
MONOCYTES NFR BLD AUTO: 4.5 % — SIGNIFICANT CHANGE UP (ref 2–14)
MONOCYTES NFR BLD AUTO: 4.6 % — SIGNIFICANT CHANGE UP (ref 2–14)
MONOCYTES NFR BLD AUTO: 6 % — SIGNIFICANT CHANGE UP (ref 2–14)
MONOCYTES NFR BLD AUTO: 6.3 % — SIGNIFICANT CHANGE UP (ref 2–14)
MONOCYTES NFR BLD AUTO: 6.6 %
MONOCYTES NFR BLD AUTO: 6.8 % — SIGNIFICANT CHANGE UP (ref 2–14)
MONOCYTES NFR BLD AUTO: 6.8 % — SIGNIFICANT CHANGE UP (ref 2–14)
MONOCYTES NFR BLD AUTO: 9.7 % — SIGNIFICANT CHANGE UP (ref 2–14)
MRSA PCR RESULT.: SIGNIFICANT CHANGE UP
MRSA PCR RESULT.: SIGNIFICANT CHANGE UP
NEUTROPHILS # BLD AUTO: 0.48 K/UL
NEUTROPHILS # BLD AUTO: 1.5 K/UL — LOW (ref 1.8–7.4)
NEUTROPHILS # BLD AUTO: 1.93 K/UL — SIGNIFICANT CHANGE UP (ref 1.8–7.4)
NEUTROPHILS # BLD AUTO: 10.5 K/UL — HIGH (ref 1.8–7.4)
NEUTROPHILS # BLD AUTO: 10.5 K/UL — HIGH (ref 1.8–7.4)
NEUTROPHILS # BLD AUTO: 10.81 K/UL — HIGH (ref 1.8–7.4)
NEUTROPHILS # BLD AUTO: 11.11 K/UL — HIGH (ref 1.8–7.4)
NEUTROPHILS # BLD AUTO: 11.81 K/UL — HIGH (ref 1.8–7.4)
NEUTROPHILS # BLD AUTO: 12.86 K/UL — HIGH (ref 1.8–7.4)
NEUTROPHILS # BLD AUTO: 13.4 K/UL — HIGH (ref 1.8–7.4)
NEUTROPHILS # BLD AUTO: 14.76 K/UL — HIGH (ref 1.8–7.4)
NEUTROPHILS # BLD AUTO: 16.23 K/UL — HIGH (ref 1.8–7.4)
NEUTROPHILS # BLD AUTO: 16.63 K/UL — HIGH (ref 1.8–7.4)
NEUTROPHILS # BLD AUTO: 19.62 K/UL — HIGH (ref 1.8–7.4)
NEUTROPHILS # BLD AUTO: 2.56 K/UL — SIGNIFICANT CHANGE UP (ref 1.8–7.4)
NEUTROPHILS # BLD AUTO: 3.68 K/UL — SIGNIFICANT CHANGE UP (ref 1.8–7.4)
NEUTROPHILS # BLD AUTO: 3.7 K/UL — SIGNIFICANT CHANGE UP (ref 1.8–7.4)
NEUTROPHILS # BLD AUTO: 3.8 K/UL — SIGNIFICANT CHANGE UP (ref 1.8–7.4)
NEUTROPHILS # BLD AUTO: 4.02 K/UL — SIGNIFICANT CHANGE UP (ref 1.8–7.4)
NEUTROPHILS # BLD AUTO: 4.24 K/UL — SIGNIFICANT CHANGE UP (ref 1.8–7.4)
NEUTROPHILS # BLD AUTO: 5.49 K/UL — SIGNIFICANT CHANGE UP (ref 1.8–7.4)
NEUTROPHILS # BLD AUTO: 5.79 K/UL — SIGNIFICANT CHANGE UP (ref 1.8–7.4)
NEUTROPHILS # BLD AUTO: 5.91 K/UL — SIGNIFICANT CHANGE UP (ref 1.8–7.4)
NEUTROPHILS # BLD AUTO: 6.35 K/UL — SIGNIFICANT CHANGE UP (ref 1.8–7.4)
NEUTROPHILS # BLD AUTO: 7.62 K/UL — HIGH (ref 1.8–7.4)
NEUTROPHILS # BLD AUTO: 8.16 K/UL — HIGH (ref 1.8–7.4)
NEUTROPHILS # BLD AUTO: 9.7 K/UL — HIGH (ref 1.8–7.4)
NEUTROPHILS # BLD AUTO: 9.83 K/UL
NEUTROPHILS NFR BLD AUTO: 34.2 %
NEUTROPHILS NFR BLD AUTO: 44.4 % — SIGNIFICANT CHANGE UP (ref 43–77)
NEUTROPHILS NFR BLD AUTO: 54.2 % — SIGNIFICANT CHANGE UP (ref 43–77)
NEUTROPHILS NFR BLD AUTO: 56.3 % — SIGNIFICANT CHANGE UP (ref 43–77)
NEUTROPHILS NFR BLD AUTO: 70 % — SIGNIFICANT CHANGE UP (ref 43–77)
NEUTROPHILS NFR BLD AUTO: 73.7 % — SIGNIFICANT CHANGE UP (ref 43–77)
NEUTROPHILS NFR BLD AUTO: 77.1 % — HIGH (ref 43–77)
NEUTROPHILS NFR BLD AUTO: 77.4 % — HIGH (ref 43–77)
NEUTROPHILS NFR BLD AUTO: 77.9 % — HIGH (ref 43–77)
NEUTROPHILS NFR BLD AUTO: 78.7 % — HIGH (ref 43–77)
NEUTROPHILS NFR BLD AUTO: 79.5 % — HIGH (ref 43–77)
NEUTROPHILS NFR BLD AUTO: 79.8 % — HIGH (ref 43–77)
NEUTROPHILS NFR BLD AUTO: 79.9 % — HIGH (ref 43–77)
NEUTROPHILS NFR BLD AUTO: 81.4 % — HIGH (ref 43–77)
NEUTROPHILS NFR BLD AUTO: 83 % — HIGH (ref 43–77)
NEUTROPHILS NFR BLD AUTO: 83.9 % — HIGH (ref 43–77)
NEUTROPHILS NFR BLD AUTO: 85.2 % — HIGH (ref 43–77)
NEUTROPHILS NFR BLD AUTO: 85.9 %
NEUTROPHILS NFR BLD AUTO: 88.8 % — HIGH (ref 43–77)
NEUTROPHILS NFR BLD AUTO: 90 % — HIGH (ref 43–77)
NEUTROPHILS NFR BLD AUTO: 91.2 % — HIGH (ref 43–77)
NEUTROPHILS NFR BLD AUTO: 91.3 % — HIGH (ref 43–77)
NEUTROPHILS NFR BLD AUTO: 91.7 % — HIGH (ref 43–77)
NEUTROPHILS NFR BLD AUTO: 92.2 % — HIGH (ref 43–77)
NEUTROPHILS NFR BLD AUTO: 93.1 % — HIGH (ref 43–77)
NEUTROPHILS NFR BLD AUTO: 93.8 % — HIGH (ref 43–77)
NEUTROPHILS NFR BLD AUTO: 94.3 % — HIGH (ref 43–77)
NEUTROPHILS NFR BLD AUTO: 95 % — HIGH (ref 43–77)
NEUTS BAND # BLD: 13 % — HIGH (ref 0–8)
NIGHT BLUE STAIN TISS: SIGNIFICANT CHANGE UP
NITRITE UR-MCNC: NEGATIVE — SIGNIFICANT CHANGE UP
NONHDLC SERPL-MCNC: 113 MG/DL
NRBC # BLD: 0 /100 WBCS — SIGNIFICANT CHANGE UP
NRBC # BLD: 0 /100 WBCS — SIGNIFICANT CHANGE UP (ref 0–0)
NRBC # BLD: 0 /100 — SIGNIFICANT CHANGE UP (ref 0–0)
NRBC # BLD: SIGNIFICANT CHANGE UP /100 WBCS (ref 0–0)
NRBC # FLD: 0 K/UL — SIGNIFICANT CHANGE UP
NT-PROBNP SERPL-SCNC: 885 PG/ML — HIGH (ref 0–450)
PCO2 BLDA: 36 MMHG — SIGNIFICANT CHANGE UP (ref 35–48)
PCO2 BLDA: 36 MMHG — SIGNIFICANT CHANGE UP (ref 35–48)
PCO2 BLDA: 37 MMHG — SIGNIFICANT CHANGE UP (ref 35–48)
PCO2 BLDA: 38 MMHG — SIGNIFICANT CHANGE UP (ref 35–48)
PCO2 BLDA: 40 MMHG — SIGNIFICANT CHANGE UP (ref 35–48)
PCO2 BLDA: 41 MMHG — SIGNIFICANT CHANGE UP (ref 35–48)
PCO2 BLDA: 43 MMHG — SIGNIFICANT CHANGE UP (ref 35–48)
PCO2 BLDA: 43 MMHG — SIGNIFICANT CHANGE UP (ref 35–48)
PCO2 BLDA: 45 MMHG — SIGNIFICANT CHANGE UP (ref 35–48)
PCO2 BLDA: 47 MMHG — SIGNIFICANT CHANGE UP (ref 35–48)
PCO2 BLDA: 72 MMHG — CRITICAL HIGH (ref 35–48)
PCO2 BLDA: 90 MMHG — CRITICAL HIGH (ref 35–48)
PCO2 BLDV: 43 MMHG — SIGNIFICANT CHANGE UP (ref 42–55)
PH BLDA: 7.21 — LOW (ref 7.35–7.45)
PH BLDA: 7.29 — LOW (ref 7.35–7.45)
PH BLDA: 7.42 — SIGNIFICANT CHANGE UP (ref 7.35–7.45)
PH BLDA: 7.47 — HIGH (ref 7.35–7.45)
PH BLDA: 7.48 — HIGH (ref 7.35–7.45)
PH BLDA: 7.48 — HIGH (ref 7.35–7.45)
PH BLDA: 7.49 — HIGH (ref 7.35–7.45)
PH BLDA: 7.5 — HIGH (ref 7.35–7.45)
PH BLDA: 7.51 — HIGH (ref 7.35–7.45)
PH BLDA: 7.52 — HIGH (ref 7.35–7.45)
PH BLDA: 7.53 — HIGH (ref 7.35–7.45)
PH BLDA: 7.54 — HIGH (ref 7.35–7.45)
PH BLDA: 7.55 — HIGH (ref 7.35–7.45)
PH BLDA: 7.57 — HIGH (ref 7.35–7.45)
PH BLDV: 7.48 — HIGH (ref 7.32–7.43)
PH UR: 8 — SIGNIFICANT CHANGE UP (ref 5–8)
PHOSPHATE 24H UR-MCNC: 67.9 MG/DL — SIGNIFICANT CHANGE UP
PHOSPHATE CL ?TM UR+SERPL-VRATE: SIGNIFICANT CHANGE UP % (ref 78–97)
PHOSPHATE SERPL-MCNC: 1.9 MG/DL — LOW (ref 2.5–4.5)
PHOSPHATE SERPL-MCNC: 2 MG/DL — LOW (ref 2.5–4.5)
PHOSPHATE SERPL-MCNC: 2 MG/DL — LOW (ref 2.5–4.5)
PHOSPHATE SERPL-MCNC: 2.1 MG/DL — LOW (ref 2.5–4.5)
PHOSPHATE SERPL-MCNC: 2.2 MG/DL — LOW (ref 2.5–4.5)
PHOSPHATE SERPL-MCNC: 2.3 MG/DL — LOW (ref 2.5–4.5)
PHOSPHATE SERPL-MCNC: 2.3 MG/DL — LOW (ref 2.5–4.5)
PHOSPHATE SERPL-MCNC: 2.4 MG/DL — LOW (ref 2.5–4.5)
PHOSPHATE SERPL-MCNC: 2.5 MG/DL — SIGNIFICANT CHANGE UP (ref 2.5–4.5)
PHOSPHATE SERPL-MCNC: 2.6 MG/DL — SIGNIFICANT CHANGE UP (ref 2.5–4.5)
PHOSPHATE SERPL-MCNC: 2.6 MG/DL — SIGNIFICANT CHANGE UP (ref 2.5–4.5)
PHOSPHATE SERPL-MCNC: 2.7 MG/DL — SIGNIFICANT CHANGE UP (ref 2.5–4.5)
PHOSPHATE SERPL-MCNC: 2.7 MG/DL — SIGNIFICANT CHANGE UP (ref 2.5–4.5)
PHOSPHATE SERPL-MCNC: 2.8 MG/DL — SIGNIFICANT CHANGE UP (ref 2.5–4.5)
PHOSPHATE SERPL-MCNC: 3.2 MG/DL — SIGNIFICANT CHANGE UP (ref 2.5–4.5)
PHOSPHATE SERPL-MCNC: 4.1 MG/DL — SIGNIFICANT CHANGE UP (ref 2.5–4.5)
PLAT MORPH BLD: NORMAL — SIGNIFICANT CHANGE UP
PLATELET # BLD AUTO: 125 K/UL — LOW (ref 150–400)
PLATELET # BLD AUTO: 149 K/UL — LOW (ref 150–400)
PLATELET # BLD AUTO: 152 K/UL — SIGNIFICANT CHANGE UP (ref 150–400)
PLATELET # BLD AUTO: 185 K/UL — SIGNIFICANT CHANGE UP (ref 150–400)
PLATELET # BLD AUTO: 191 K/UL — SIGNIFICANT CHANGE UP (ref 150–400)
PLATELET # BLD AUTO: 210 K/UL — SIGNIFICANT CHANGE UP (ref 150–400)
PLATELET # BLD AUTO: 210 K/UL — SIGNIFICANT CHANGE UP (ref 150–400)
PLATELET # BLD AUTO: 213 K/UL — SIGNIFICANT CHANGE UP (ref 150–400)
PLATELET # BLD AUTO: 213 K/UL — SIGNIFICANT CHANGE UP (ref 150–400)
PLATELET # BLD AUTO: 220 K/UL — SIGNIFICANT CHANGE UP (ref 150–400)
PLATELET # BLD AUTO: 223 K/UL — SIGNIFICANT CHANGE UP (ref 150–400)
PLATELET # BLD AUTO: 232 K/UL — SIGNIFICANT CHANGE UP (ref 150–400)
PLATELET # BLD AUTO: 232 K/UL — SIGNIFICANT CHANGE UP (ref 150–400)
PLATELET # BLD AUTO: 241 K/UL — SIGNIFICANT CHANGE UP (ref 150–400)
PLATELET # BLD AUTO: 242 K/UL
PLATELET # BLD AUTO: 249 K/UL — SIGNIFICANT CHANGE UP (ref 150–400)
PLATELET # BLD AUTO: 252 K/UL — SIGNIFICANT CHANGE UP (ref 150–400)
PLATELET # BLD AUTO: 253 K/UL — SIGNIFICANT CHANGE UP (ref 150–400)
PLATELET # BLD AUTO: 255 K/UL — SIGNIFICANT CHANGE UP (ref 150–400)
PLATELET # BLD AUTO: 255 K/UL — SIGNIFICANT CHANGE UP (ref 150–400)
PLATELET # BLD AUTO: 258 K/UL — SIGNIFICANT CHANGE UP (ref 150–400)
PLATELET # BLD AUTO: 261 K/UL — SIGNIFICANT CHANGE UP (ref 150–400)
PLATELET # BLD AUTO: 262 K/UL — SIGNIFICANT CHANGE UP (ref 150–400)
PLATELET # BLD AUTO: 262 K/UL — SIGNIFICANT CHANGE UP (ref 150–400)
PLATELET # BLD AUTO: 274 K/UL — SIGNIFICANT CHANGE UP (ref 150–400)
PLATELET # BLD AUTO: 276 K/UL — SIGNIFICANT CHANGE UP (ref 150–400)
PLATELET # BLD AUTO: 278 K/UL — SIGNIFICANT CHANGE UP (ref 150–400)
PLATELET # BLD AUTO: 279 K/UL — SIGNIFICANT CHANGE UP (ref 150–400)
PLATELET # BLD AUTO: 285 K/UL — SIGNIFICANT CHANGE UP (ref 150–400)
PLATELET # BLD AUTO: 297 K/UL — SIGNIFICANT CHANGE UP (ref 150–400)
PLATELET # BLD AUTO: 315 K/UL
PLATELET # BLD AUTO: 331 K/UL — SIGNIFICANT CHANGE UP (ref 150–400)
PLATELET COUNT - ESTIMATE: NORMAL — SIGNIFICANT CHANGE UP
PO2 BLDA: 112 MMHG — HIGH (ref 83–108)
PO2 BLDA: 127 MMHG — HIGH (ref 83–108)
PO2 BLDA: 131 MMHG — HIGH (ref 83–108)
PO2 BLDA: 134 MMHG — HIGH (ref 83–108)
PO2 BLDA: 140 MMHG — HIGH (ref 83–108)
PO2 BLDA: 201 MMHG — HIGH (ref 83–108)
PO2 BLDA: 340 MMHG — HIGH (ref 83–108)
PO2 BLDA: 50 MMHG — CRITICAL LOW (ref 83–108)
PO2 BLDA: 60 MMHG — LOW (ref 83–108)
PO2 BLDA: 68 MMHG — LOW (ref 83–108)
PO2 BLDA: 73 MMHG — LOW (ref 83–108)
PO2 BLDA: 89 MMHG — SIGNIFICANT CHANGE UP (ref 83–108)
PO2 BLDA: 93 MMHG — SIGNIFICANT CHANGE UP (ref 83–108)
PO2 BLDA: 93 MMHG — SIGNIFICANT CHANGE UP (ref 83–108)
PO2 BLDV: 45 MMHG — SIGNIFICANT CHANGE UP
POIKILOCYTOSIS BLD QL AUTO: SLIGHT — SIGNIFICANT CHANGE UP
POLYCHROMASIA BLD QL SMEAR: SLIGHT — SIGNIFICANT CHANGE UP
POTASSIUM SERPL-MCNC: 3.3 MMOL/L — LOW (ref 3.5–5.3)
POTASSIUM SERPL-MCNC: 3.3 MMOL/L — LOW (ref 3.5–5.3)
POTASSIUM SERPL-MCNC: 3.5 MMOL/L — SIGNIFICANT CHANGE UP (ref 3.5–5.3)
POTASSIUM SERPL-MCNC: 3.5 MMOL/L — SIGNIFICANT CHANGE UP (ref 3.5–5.3)
POTASSIUM SERPL-MCNC: 3.6 MMOL/L — SIGNIFICANT CHANGE UP (ref 3.5–5.3)
POTASSIUM SERPL-MCNC: 3.7 MMOL/L — SIGNIFICANT CHANGE UP (ref 3.5–5.3)
POTASSIUM SERPL-MCNC: 3.8 MMOL/L — SIGNIFICANT CHANGE UP (ref 3.5–5.3)
POTASSIUM SERPL-MCNC: 3.9 MMOL/L — SIGNIFICANT CHANGE UP (ref 3.5–5.3)
POTASSIUM SERPL-MCNC: 4 MMOL/L — SIGNIFICANT CHANGE UP (ref 3.5–5.3)
POTASSIUM SERPL-MCNC: 4.1 MMOL/L — SIGNIFICANT CHANGE UP (ref 3.5–5.3)
POTASSIUM SERPL-MCNC: 4.3 MMOL/L — SIGNIFICANT CHANGE UP (ref 3.5–5.3)
POTASSIUM SERPL-MCNC: 4.4 MMOL/L — SIGNIFICANT CHANGE UP (ref 3.5–5.3)
POTASSIUM SERPL-MCNC: 4.5 MMOL/L — SIGNIFICANT CHANGE UP (ref 3.5–5.3)
POTASSIUM SERPL-SCNC: 3.3 MMOL/L — LOW (ref 3.5–5.3)
POTASSIUM SERPL-SCNC: 3.3 MMOL/L — LOW (ref 3.5–5.3)
POTASSIUM SERPL-SCNC: 3.5 MMOL/L — SIGNIFICANT CHANGE UP (ref 3.5–5.3)
POTASSIUM SERPL-SCNC: 3.5 MMOL/L — SIGNIFICANT CHANGE UP (ref 3.5–5.3)
POTASSIUM SERPL-SCNC: 3.6 MMOL/L — SIGNIFICANT CHANGE UP (ref 3.5–5.3)
POTASSIUM SERPL-SCNC: 3.7 MMOL/L — SIGNIFICANT CHANGE UP (ref 3.5–5.3)
POTASSIUM SERPL-SCNC: 3.8 MMOL/L — SIGNIFICANT CHANGE UP (ref 3.5–5.3)
POTASSIUM SERPL-SCNC: 3.9 MMOL/L — SIGNIFICANT CHANGE UP (ref 3.5–5.3)
POTASSIUM SERPL-SCNC: 4 MMOL/L — SIGNIFICANT CHANGE UP (ref 3.5–5.3)
POTASSIUM SERPL-SCNC: 4.1 MMOL/L — SIGNIFICANT CHANGE UP (ref 3.5–5.3)
POTASSIUM SERPL-SCNC: 4.3 MMOL/L
POTASSIUM SERPL-SCNC: 4.3 MMOL/L — SIGNIFICANT CHANGE UP (ref 3.5–5.3)
POTASSIUM SERPL-SCNC: 4.4 MMOL/L — SIGNIFICANT CHANGE UP (ref 3.5–5.3)
POTASSIUM SERPL-SCNC: 4.5 MMOL/L
POTASSIUM SERPL-SCNC: 4.5 MMOL/L — SIGNIFICANT CHANGE UP (ref 3.5–5.3)
POTASSIUM SERPL-SCNC: 5 MMOL/L
PROCALCITONIN SERPL-MCNC: 0.59 NG/ML — HIGH (ref 0.02–0.1)
PROT SERPL-MCNC: 5.1 G/DL — LOW (ref 6–8.3)
PROT SERPL-MCNC: 5.3 G/DL — LOW (ref 6–8.3)
PROT SERPL-MCNC: 5.5 G/DL — LOW (ref 6–8.3)
PROT SERPL-MCNC: 5.5 G/DL — LOW (ref 6–8.3)
PROT SERPL-MCNC: 5.7 G/DL — LOW (ref 6–8.3)
PROT SERPL-MCNC: 5.7 G/DL — LOW (ref 6–8.3)
PROT SERPL-MCNC: 5.9 G/DL — LOW (ref 6–8.3)
PROT SERPL-MCNC: 6 G/DL
PROT SERPL-MCNC: 6 G/DL — SIGNIFICANT CHANGE UP (ref 6–8.3)
PROT SERPL-MCNC: 6.1 G/DL — SIGNIFICANT CHANGE UP (ref 6–8.3)
PROT SERPL-MCNC: 6.4 G/DL — SIGNIFICANT CHANGE UP (ref 6–8.3)
PROT SERPL-MCNC: 6.5 G/DL
PROT SERPL-MCNC: 6.5 G/DL — SIGNIFICANT CHANGE UP (ref 6–8.3)
PROT SERPL-MCNC: 6.7 G/DL — SIGNIFICANT CHANGE UP (ref 6–8.3)
PROT SERPL-MCNC: 7.2 G/DL
PROT SERPL-MCNC: 7.4 G/DL — SIGNIFICANT CHANGE UP (ref 6–8.3)
PROT UR-MCNC: ABNORMAL
PTH RELATED PROT SERPL-MCNC: <2 PMOL/L — SIGNIFICANT CHANGE UP
PTH-INTACT FLD-MCNC: 6 PG/ML — LOW (ref 15–65)
QUANT TB PLUS MITOGEN MINUS NIL: 0.35 IU/ML — SIGNIFICANT CHANGE UP
RAPID RVP RESULT: SIGNIFICANT CHANGE UP
RBC # BLD: 3.02 M/UL — LOW (ref 4.2–5.8)
RBC # BLD: 3.13 M/UL — LOW (ref 4.2–5.8)
RBC # BLD: 3.15 M/UL — LOW (ref 4.2–5.8)
RBC # BLD: 3.23 M/UL — LOW (ref 4.2–5.8)
RBC # BLD: 3.27 M/UL — LOW (ref 4.2–5.8)
RBC # BLD: 3.27 M/UL — LOW (ref 4.2–5.8)
RBC # BLD: 3.3 M/UL — LOW (ref 4.2–5.8)
RBC # BLD: 3.32 M/UL
RBC # BLD: 3.42 M/UL — LOW (ref 4.2–5.8)
RBC # BLD: 3.45 M/UL — LOW (ref 4.2–5.8)
RBC # BLD: 3.5 M/UL — LOW (ref 4.2–5.8)
RBC # BLD: 3.51 M/UL — LOW (ref 4.2–5.8)
RBC # BLD: 3.52 M/UL — LOW (ref 4.2–5.8)
RBC # BLD: 3.53 M/UL — LOW (ref 4.2–5.8)
RBC # BLD: 3.57 M/UL — LOW (ref 4.2–5.8)
RBC # BLD: 3.61 M/UL — LOW (ref 4.2–5.8)
RBC # BLD: 3.62 M/UL — LOW (ref 4.2–5.8)
RBC # BLD: 3.68 M/UL — LOW (ref 4.2–5.8)
RBC # BLD: 3.68 M/UL — LOW (ref 4.2–5.8)
RBC # BLD: 3.69 M/UL — LOW (ref 4.2–5.8)
RBC # BLD: 3.7 M/UL
RBC # BLD: 3.72 M/UL — LOW (ref 4.2–5.8)
RBC # BLD: 3.74 M/UL — LOW (ref 4.2–5.8)
RBC # BLD: 3.75 M/UL — LOW (ref 4.2–5.8)
RBC # BLD: 3.76 M/UL — LOW (ref 4.2–5.8)
RBC # BLD: 3.82 M/UL — LOW (ref 4.2–5.8)
RBC # BLD: 3.88 M/UL — LOW (ref 4.2–5.8)
RBC # BLD: 3.89 M/UL — LOW (ref 4.2–5.8)
RBC # BLD: 3.93 M/UL — LOW (ref 4.2–5.8)
RBC # BLD: 3.97 M/UL — LOW (ref 4.2–5.8)
RBC # BLD: 4.01 M/UL — LOW (ref 4.2–5.8)
RBC # BLD: 4.05 M/UL — LOW (ref 4.2–5.8)
RBC # FLD: 13.1 % — SIGNIFICANT CHANGE UP (ref 10.3–14.5)
RBC # FLD: 13.6 % — SIGNIFICANT CHANGE UP (ref 10.3–14.5)
RBC # FLD: 13.9 % — SIGNIFICANT CHANGE UP (ref 10.3–14.5)
RBC # FLD: 13.9 % — SIGNIFICANT CHANGE UP (ref 10.3–14.5)
RBC # FLD: 14.1 % — SIGNIFICANT CHANGE UP (ref 10.3–14.5)
RBC # FLD: 14.6 %
RBC # FLD: 14.7 % — HIGH (ref 10.3–14.5)
RBC # FLD: 14.7 % — HIGH (ref 10.3–14.5)
RBC # FLD: 14.8 % — HIGH (ref 10.3–14.5)
RBC # FLD: 14.9 % — HIGH (ref 10.3–14.5)
RBC # FLD: 14.9 % — HIGH (ref 10.3–14.5)
RBC # FLD: 15 % — HIGH (ref 10.3–14.5)
RBC # FLD: 15.1 % — HIGH (ref 10.3–14.5)
RBC # FLD: 15.1 % — HIGH (ref 10.3–14.5)
RBC # FLD: 15.2 % — HIGH (ref 10.3–14.5)
RBC # FLD: 15.3 % — HIGH (ref 10.3–14.5)
RBC # FLD: 15.4 % — HIGH (ref 10.3–14.5)
RBC # FLD: 15.5 % — HIGH (ref 10.3–14.5)
RBC # FLD: 15.5 % — HIGH (ref 10.3–14.5)
RBC # FLD: 15.7 % — HIGH (ref 10.3–14.5)
RBC # FLD: 15.7 % — HIGH (ref 10.3–14.5)
RBC # FLD: 15.8 % — HIGH (ref 10.3–14.5)
RBC # FLD: 15.8 % — HIGH (ref 10.3–14.5)
RBC # FLD: 15.9 % — HIGH (ref 10.3–14.5)
RBC # FLD: 17.5 % — HIGH (ref 10.3–14.5)
RBC # FLD: 17.5 % — HIGH (ref 10.3–14.5)
RBC # FLD: 18.4 %
RBC # FLD: 18.6 % — HIGH (ref 10.3–14.5)
RBC # FLD: 18.9 % — HIGH (ref 10.3–14.5)
RBC BLD AUTO: ABNORMAL
RBC BLD AUTO: ABNORMAL
RBC BLD AUTO: NORMAL — SIGNIFICANT CHANGE UP
RBC BLD AUTO: SIGNIFICANT CHANGE UP
RSV RNA SPEC QL NAA+PROBE: SIGNIFICANT CHANGE UP
RV+EV RNA SPEC QL NAA+PROBE: SIGNIFICANT CHANGE UP
S AUREUS DNA NOSE QL NAA+PROBE: SIGNIFICANT CHANGE UP
S AUREUS DNA NOSE QL NAA+PROBE: SIGNIFICANT CHANGE UP
SAO2 % BLDA: 81 % — SIGNIFICANT CHANGE UP
SAO2 % BLDA: 94 % — SIGNIFICANT CHANGE UP
SAO2 % BLDA: 96 % — SIGNIFICANT CHANGE UP
SAO2 % BLDA: 96 % — SIGNIFICANT CHANGE UP
SAO2 % BLDA: 98 % — SIGNIFICANT CHANGE UP
SAO2 % BLDA: 99 % — SIGNIFICANT CHANGE UP
SAO2 % BLDV: 80.2 % — SIGNIFICANT CHANGE UP
SARS-COV-2 IGG+IGM SERPL QL IA: 0.08 INDEX — SIGNIFICANT CHANGE UP
SARS-COV-2 IGG+IGM SERPL QL IA: >250 U/ML — HIGH
SARS-COV-2 IGG+IGM SERPL QL IA: NEGATIVE — SIGNIFICANT CHANGE UP
SARS-COV-2 IGG+IGM SERPL QL IA: POSITIVE
SARS-COV-2 RNA SPEC QL NAA+PROBE: SIGNIFICANT CHANGE UP
SODIUM SERPL-SCNC: 128 MMOL/L — LOW (ref 135–145)
SODIUM SERPL-SCNC: 129 MMOL/L — LOW (ref 135–145)
SODIUM SERPL-SCNC: 129 MMOL/L — LOW (ref 135–145)
SODIUM SERPL-SCNC: 130 MMOL/L
SODIUM SERPL-SCNC: 130 MMOL/L — LOW (ref 135–145)
SODIUM SERPL-SCNC: 131 MMOL/L
SODIUM SERPL-SCNC: 131 MMOL/L — LOW (ref 135–145)
SODIUM SERPL-SCNC: 133 MMOL/L
SODIUM SERPL-SCNC: 133 MMOL/L — LOW (ref 135–145)
SODIUM SERPL-SCNC: 136 MMOL/L — SIGNIFICANT CHANGE UP (ref 135–145)
SODIUM SERPL-SCNC: 143 MMOL/L — SIGNIFICANT CHANGE UP (ref 135–145)
SODIUM SERPL-SCNC: 143 MMOL/L — SIGNIFICANT CHANGE UP (ref 135–145)
SODIUM SERPL-SCNC: 145 MMOL/L — SIGNIFICANT CHANGE UP (ref 135–145)
SODIUM SERPL-SCNC: 146 MMOL/L — HIGH (ref 135–145)
SODIUM SERPL-SCNC: 147 MMOL/L — HIGH (ref 135–145)
SODIUM SERPL-SCNC: 148 MMOL/L — HIGH (ref 135–145)
SODIUM SERPL-SCNC: 148 MMOL/L — HIGH (ref 135–145)
SODIUM SERPL-SCNC: 149 MMOL/L — HIGH (ref 135–145)
SODIUM SERPL-SCNC: 149 MMOL/L — HIGH (ref 135–145)
SODIUM SERPL-SCNC: 150 MMOL/L — HIGH (ref 135–145)
SODIUM SERPL-SCNC: 151 MMOL/L — HIGH (ref 135–145)
SODIUM SERPL-SCNC: 152 MMOL/L — HIGH (ref 135–145)
SODIUM SERPL-SCNC: 153 MMOL/L — HIGH (ref 135–145)
SODIUM SERPL-SCNC: 153 MMOL/L — HIGH (ref 135–145)
SODIUM SERPL-SCNC: 154 MMOL/L — HIGH (ref 135–145)
SODIUM SERPL-SCNC: 155 MMOL/L — HIGH (ref 135–145)
SP GR SPEC: 1.01 — SIGNIFICANT CHANGE UP (ref 1–1.05)
SPECIMEN SOURCE: SIGNIFICANT CHANGE UP
T4 FREE SERPL-MCNC: 1.4 NG/DL
TARGETS BLD QL SMEAR: SLIGHT — SIGNIFICANT CHANGE UP
TRIGL SERPL-MCNC: 168 MG/DL — HIGH
TRIGL SERPL-MCNC: 55 MG/DL
TROPONIN I, HIGH SENSITIVITY RESULT: 110.3 NG/L — HIGH
TROPONIN I, HIGH SENSITIVITY RESULT: 130.6 NG/L — HIGH
TROPONIN I, HIGH SENSITIVITY RESULT: 145.5 NG/L — HIGH
TROPONIN I, HIGH SENSITIVITY RESULT: 32 NG/L — SIGNIFICANT CHANGE UP
TROPONIN T, HIGH SENSITIVITY RESULT: 21 NG/L — SIGNIFICANT CHANGE UP
TSH SERPL-ACNC: 2.78 UIU/ML
TSH SERPL-ACNC: 2.87 UIU/ML
UROBILINOGEN FLD QL: SIGNIFICANT CHANGE UP
WBC # BLD: 10.34 K/UL — SIGNIFICANT CHANGE UP (ref 3.8–10.5)
WBC # BLD: 10.93 K/UL — HIGH (ref 3.8–10.5)
WBC # BLD: 11.5 K/UL — HIGH (ref 3.8–10.5)
WBC # BLD: 11.57 K/UL — HIGH (ref 3.8–10.5)
WBC # BLD: 11.66 K/UL — HIGH (ref 3.8–10.5)
WBC # BLD: 11.79 K/UL — HIGH (ref 3.8–10.5)
WBC # BLD: 12.59 K/UL — HIGH (ref 3.8–10.5)
WBC # BLD: 14.1 K/UL — HIGH (ref 3.8–10.5)
WBC # BLD: 14.4 K/UL — HIGH (ref 3.8–10.5)
WBC # BLD: 14.9 K/UL — HIGH (ref 3.8–10.5)
WBC # BLD: 16.03 K/UL — HIGH (ref 3.8–10.5)
WBC # BLD: 17.64 K/UL — HIGH (ref 3.8–10.5)
WBC # BLD: 20.3 K/UL — HIGH (ref 3.8–10.5)
WBC # BLD: 20.65 K/UL — HIGH (ref 3.8–10.5)
WBC # BLD: 3.38 K/UL — LOW (ref 3.8–10.5)
WBC # BLD: 3.56 K/UL — LOW (ref 3.8–10.5)
WBC # BLD: 4.55 K/UL — SIGNIFICANT CHANGE UP (ref 3.8–10.5)
WBC # BLD: 4.7 K/UL — SIGNIFICANT CHANGE UP (ref 3.8–10.5)
WBC # BLD: 4.88 K/UL — SIGNIFICANT CHANGE UP (ref 3.8–10.5)
WBC # BLD: 5.04 K/UL — SIGNIFICANT CHANGE UP (ref 3.8–10.5)
WBC # BLD: 5.2 K/UL — SIGNIFICANT CHANGE UP (ref 3.8–10.5)
WBC # BLD: 5.26 K/UL — SIGNIFICANT CHANGE UP (ref 3.8–10.5)
WBC # BLD: 7.45 K/UL — SIGNIFICANT CHANGE UP (ref 3.8–10.5)
WBC # BLD: 7.45 K/UL — SIGNIFICANT CHANGE UP (ref 3.8–10.5)
WBC # BLD: 7.52 K/UL — SIGNIFICANT CHANGE UP (ref 3.8–10.5)
WBC # BLD: 7.64 K/UL — SIGNIFICANT CHANGE UP (ref 3.8–10.5)
WBC # BLD: 7.91 K/UL — SIGNIFICANT CHANGE UP (ref 3.8–10.5)
WBC # BLD: 9.04 K/UL — SIGNIFICANT CHANGE UP (ref 3.8–10.5)
WBC # BLD: 9.39 K/UL — SIGNIFICANT CHANGE UP (ref 3.8–10.5)
WBC # BLD: 9.59 K/UL — SIGNIFICANT CHANGE UP (ref 3.8–10.5)
WBC # BLD: 9.68 K/UL — SIGNIFICANT CHANGE UP (ref 3.8–10.5)
WBC # BLD: 9.74 K/UL — SIGNIFICANT CHANGE UP (ref 3.8–10.5)
WBC # FLD AUTO: 1.41 K/UL
WBC # FLD AUTO: 10.34 K/UL — SIGNIFICANT CHANGE UP (ref 3.8–10.5)
WBC # FLD AUTO: 10.93 K/UL — HIGH (ref 3.8–10.5)
WBC # FLD AUTO: 11.44 K/UL
WBC # FLD AUTO: 11.5 K/UL — HIGH (ref 3.8–10.5)
WBC # FLD AUTO: 11.57 K/UL — HIGH (ref 3.8–10.5)
WBC # FLD AUTO: 11.66 K/UL — HIGH (ref 3.8–10.5)
WBC # FLD AUTO: 11.79 K/UL — HIGH (ref 3.8–10.5)
WBC # FLD AUTO: 12.59 K/UL — HIGH (ref 3.8–10.5)
WBC # FLD AUTO: 14.1 K/UL — HIGH (ref 3.8–10.5)
WBC # FLD AUTO: 14.4 K/UL — HIGH (ref 3.8–10.5)
WBC # FLD AUTO: 14.9 K/UL — HIGH (ref 3.8–10.5)
WBC # FLD AUTO: 16.03 K/UL — HIGH (ref 3.8–10.5)
WBC # FLD AUTO: 17.64 K/UL — HIGH (ref 3.8–10.5)
WBC # FLD AUTO: 20.3 K/UL — HIGH (ref 3.8–10.5)
WBC # FLD AUTO: 20.65 K/UL — HIGH (ref 3.8–10.5)
WBC # FLD AUTO: 3.38 K/UL — LOW (ref 3.8–10.5)
WBC # FLD AUTO: 3.56 K/UL — LOW (ref 3.8–10.5)
WBC # FLD AUTO: 4.55 K/UL — SIGNIFICANT CHANGE UP (ref 3.8–10.5)
WBC # FLD AUTO: 4.7 K/UL — SIGNIFICANT CHANGE UP (ref 3.8–10.5)
WBC # FLD AUTO: 4.88 K/UL — SIGNIFICANT CHANGE UP (ref 3.8–10.5)
WBC # FLD AUTO: 5.04 K/UL — SIGNIFICANT CHANGE UP (ref 3.8–10.5)
WBC # FLD AUTO: 5.2 K/UL — SIGNIFICANT CHANGE UP (ref 3.8–10.5)
WBC # FLD AUTO: 5.26 K/UL — SIGNIFICANT CHANGE UP (ref 3.8–10.5)
WBC # FLD AUTO: 7.45 K/UL — SIGNIFICANT CHANGE UP (ref 3.8–10.5)
WBC # FLD AUTO: 7.45 K/UL — SIGNIFICANT CHANGE UP (ref 3.8–10.5)
WBC # FLD AUTO: 7.52 K/UL — SIGNIFICANT CHANGE UP (ref 3.8–10.5)
WBC # FLD AUTO: 7.64 K/UL — SIGNIFICANT CHANGE UP (ref 3.8–10.5)
WBC # FLD AUTO: 7.91 K/UL — SIGNIFICANT CHANGE UP (ref 3.8–10.5)
WBC # FLD AUTO: 9.04 K/UL — SIGNIFICANT CHANGE UP (ref 3.8–10.5)
WBC # FLD AUTO: 9.39 K/UL — SIGNIFICANT CHANGE UP (ref 3.8–10.5)
WBC # FLD AUTO: 9.59 K/UL — SIGNIFICANT CHANGE UP (ref 3.8–10.5)
WBC # FLD AUTO: 9.68 K/UL — SIGNIFICANT CHANGE UP (ref 3.8–10.5)
WBC # FLD AUTO: 9.74 K/UL — SIGNIFICANT CHANGE UP (ref 3.8–10.5)

## 2021-01-01 PROCEDURE — 74230 X-RAY XM SWLNG FUNCJ C+: CPT | Mod: 26

## 2021-01-01 PROCEDURE — 99232 SBSQ HOSP IP/OBS MODERATE 35: CPT | Mod: GC

## 2021-01-01 PROCEDURE — 99443: CPT

## 2021-01-01 PROCEDURE — 71250 CT THORAX DX C-: CPT | Mod: 26,MA

## 2021-01-01 PROCEDURE — 77014: CPT

## 2021-01-01 PROCEDURE — G0439: CPT

## 2021-01-01 PROCEDURE — 99215 OFFICE O/P EST HI 40 MIN: CPT | Mod: 25

## 2021-01-01 PROCEDURE — 99223 1ST HOSP IP/OBS HIGH 75: CPT | Mod: 57

## 2021-01-01 PROCEDURE — 99291 CRITICAL CARE FIRST HOUR: CPT | Mod: 25

## 2021-01-01 PROCEDURE — 99072 ADDL SUPL MATRL&STAF TM PHE: CPT

## 2021-01-01 PROCEDURE — 99291 CRITICAL CARE FIRST HOUR: CPT

## 2021-01-01 PROCEDURE — 71045 X-RAY EXAM CHEST 1 VIEW: CPT | Mod: 26,77

## 2021-01-01 PROCEDURE — 71045 X-RAY EXAM CHEST 1 VIEW: CPT | Mod: 26

## 2021-01-01 PROCEDURE — 71046 X-RAY EXAM CHEST 2 VIEWS: CPT | Mod: 26

## 2021-01-01 PROCEDURE — 71260 CT THORAX DX C+: CPT

## 2021-01-01 PROCEDURE — 99214 OFFICE O/P EST MOD 30 MIN: CPT | Mod: 95

## 2021-01-01 PROCEDURE — 99239 HOSP IP/OBS DSCHRG MGMT >30: CPT

## 2021-01-01 PROCEDURE — 71045 X-RAY EXAM CHEST 1 VIEW: CPT | Mod: 26,76

## 2021-01-01 PROCEDURE — G1004: CPT

## 2021-01-01 PROCEDURE — 98967 PH1 ASSMT&MGMT NQHP 11-20: CPT | Mod: GN,CR

## 2021-01-01 PROCEDURE — 99205 OFFICE O/P NEW HI 60 MIN: CPT | Mod: 25

## 2021-01-01 PROCEDURE — 71046 X-RAY EXAM CHEST 2 VIEWS: CPT

## 2021-01-01 PROCEDURE — 99233 SBSQ HOSP IP/OBS HIGH 50: CPT | Mod: GC

## 2021-01-01 PROCEDURE — 99205 OFFICE O/P NEW HI 60 MIN: CPT

## 2021-01-01 PROCEDURE — 92522 EVALUATE SPEECH PRODUCTION: CPT | Mod: GN

## 2021-01-01 PROCEDURE — 94010 BREATHING CAPACITY TEST: CPT

## 2021-01-01 PROCEDURE — 94729 DIFFUSING CAPACITY: CPT

## 2021-01-01 PROCEDURE — 78815 PET IMAGE W/CT SKULL-THIGH: CPT | Mod: 26,PS,MH

## 2021-01-01 PROCEDURE — 99215 OFFICE O/P EST HI 40 MIN: CPT

## 2021-01-01 PROCEDURE — 99024 POSTOP FOLLOW-UP VISIT: CPT

## 2021-01-01 PROCEDURE — 94726 PLETHYSMOGRAPHY LUNG VOLUMES: CPT

## 2021-01-01 PROCEDURE — 99204 OFFICE O/P NEW MOD 45 MIN: CPT | Mod: 25

## 2021-01-01 PROCEDURE — 77263 THER RADIOLOGY TX PLNG CPLX: CPT

## 2021-01-01 PROCEDURE — G6015: CPT

## 2021-01-01 PROCEDURE — 99233 SBSQ HOSP IP/OBS HIGH 50: CPT

## 2021-01-01 PROCEDURE — 77300 RADIATION THERAPY DOSE PLAN: CPT

## 2021-01-01 PROCEDURE — 99213 OFFICE O/P EST LOW 20 MIN: CPT

## 2021-01-01 PROCEDURE — 99223 1ST HOSP IP/OBS HIGH 75: CPT

## 2021-01-01 PROCEDURE — 99448 NTRPROF PH1/NTRNET/EHR 21-30: CPT | Mod: GN

## 2021-01-01 PROCEDURE — 71260 CT THORAX DX C+: CPT | Mod: 26,MH

## 2021-01-01 PROCEDURE — ZZZZZ: CPT

## 2021-01-01 PROCEDURE — 92610 EVALUATE SWALLOWING FUNCTION: CPT | Mod: GN

## 2021-01-01 PROCEDURE — 70552 MRI BRAIN STEM W/DYE: CPT | Mod: 26

## 2021-01-01 PROCEDURE — 99214 OFFICE O/P EST MOD 30 MIN: CPT

## 2021-01-01 PROCEDURE — 93000 ELECTROCARDIOGRAM COMPLETE: CPT

## 2021-01-01 PROCEDURE — 77301 RADIOTHERAPY DOSE PLAN IMRT: CPT

## 2021-01-01 PROCEDURE — 70491 CT SOFT TISSUE NECK W/DYE: CPT | Mod: 26,MH

## 2021-01-01 PROCEDURE — 92526 ORAL FUNCTION THERAPY: CPT | Mod: GN

## 2021-01-01 PROCEDURE — 99358 PROLONG SERVICE W/O CONTACT: CPT

## 2021-01-01 PROCEDURE — 92526 ORAL FUNCTION THERAPY: CPT | Mod: GN,KX

## 2021-01-01 PROCEDURE — 99223 1ST HOSP IP/OBS HIGH 75: CPT | Mod: GC

## 2021-01-01 PROCEDURE — 70491 CT SOFT TISSUE NECK W/DYE: CPT

## 2021-01-01 PROCEDURE — 70498 CT ANGIOGRAPHY NECK: CPT | Mod: 26,MA

## 2021-01-01 PROCEDURE — 32551 INSERTION OF CHEST TUBE: CPT

## 2021-01-01 PROCEDURE — 99213 OFFICE O/P EST LOW 20 MIN: CPT | Mod: 95

## 2021-01-01 PROCEDURE — 99443: CPT | Mod: 95

## 2021-01-01 PROCEDURE — 72125 CT NECK SPINE W/O DYE: CPT | Mod: 26,MA

## 2021-01-01 PROCEDURE — 99222 1ST HOSP IP/OBS MODERATE 55: CPT | Mod: GC

## 2021-01-01 PROCEDURE — 71260 CT THORAX DX C+: CPT | Mod: 26

## 2021-01-01 PROCEDURE — 12345: CPT | Mod: NC,GC

## 2021-01-01 PROCEDURE — 78815 PET IMAGE W/CT SKULL-THIGH: CPT

## 2021-01-01 PROCEDURE — 99214 OFFICE O/P EST MOD 30 MIN: CPT | Mod: GC

## 2021-01-01 PROCEDURE — 99215 OFFICE O/P EST HI 40 MIN: CPT | Mod: 95

## 2021-01-01 PROCEDURE — 77338 DESIGN MLC DEVICE FOR IMRT: CPT

## 2021-01-01 PROCEDURE — 77334 RADIATION TREATMENT AID(S): CPT

## 2021-01-01 PROCEDURE — 74160 CT ABDOMEN W/CONTRAST: CPT | Mod: 26,MH

## 2021-01-01 PROCEDURE — 99221 1ST HOSP IP/OBS SF/LOW 40: CPT

## 2021-01-01 PROCEDURE — 99212 OFFICE O/P EST SF 10 MIN: CPT | Mod: 95

## 2021-01-01 PROCEDURE — 90791 PSYCH DIAGNOSTIC EVALUATION: CPT | Mod: 95

## 2021-01-01 PROCEDURE — 99214 OFFICE O/P EST MOD 30 MIN: CPT | Mod: 25

## 2021-01-01 PROCEDURE — 31500 INSERT EMERGENCY AIRWAY: CPT

## 2021-01-01 PROCEDURE — 70496 CT ANGIOGRAPHY HEAD: CPT | Mod: 26,MG

## 2021-01-01 PROCEDURE — 31575 DIAGNOSTIC LARYNGOSCOPY: CPT

## 2021-01-01 PROCEDURE — 92612 ENDOSCOPY SWALLOW (FEES) VID: CPT | Mod: GN

## 2021-01-01 PROCEDURE — 70450 CT HEAD/BRAIN W/O DYE: CPT | Mod: 26,MA,59

## 2021-01-01 PROCEDURE — 74160 CT ABDOMEN W/CONTRAST: CPT

## 2021-01-01 PROCEDURE — A9552: CPT

## 2021-01-01 PROCEDURE — 99232 SBSQ HOSP IP/OBS MODERATE 35: CPT

## 2021-01-01 PROCEDURE — 82565 ASSAY OF CREATININE: CPT

## 2021-01-01 PROCEDURE — 95720 EEG PHY/QHP EA INCR W/VEEG: CPT

## 2021-01-01 PROCEDURE — 99447 NTRPROF PH1/NTRNET/EHR 11-20: CPT | Mod: GN

## 2021-01-01 PROCEDURE — 98967 PH1 ASSMT&MGMT NQHP 11-20: CPT | Mod: GN

## 2021-01-01 PROCEDURE — 77336 RADIATION PHYSICS CONSULT: CPT

## 2021-01-01 PROCEDURE — 99285 EMERGENCY DEPT VISIT HI MDM: CPT

## 2021-01-01 PROCEDURE — 77427 RADIATION TX MANAGEMENT X5: CPT

## 2021-01-01 PROCEDURE — 99497 ADVNCD CARE PLAN 30 MIN: CPT | Mod: 25

## 2021-01-01 PROCEDURE — 93010 ELECTROCARDIOGRAM REPORT: CPT

## 2021-01-01 RX ORDER — ACETAMINOPHEN 500 MG
650 TABLET ORAL EVERY 6 HOURS
Refills: 0 | Status: DISCONTINUED | OUTPATIENT
Start: 2021-01-01 | End: 2021-01-01

## 2021-01-01 RX ORDER — FUROSEMIDE 40 MG
40 TABLET ORAL ONCE
Refills: 0 | Status: COMPLETED | OUTPATIENT
Start: 2021-01-01 | End: 2021-01-01

## 2021-01-01 RX ORDER — METHADONE HYDROCHLORIDE 40 MG/1
1.25 TABLET ORAL
Refills: 0 | Status: DISCONTINUED | OUTPATIENT
Start: 2021-01-01 | End: 2021-01-01

## 2021-01-01 RX ORDER — DEXMEDETOMIDINE HYDROCHLORIDE IN 0.9% SODIUM CHLORIDE 4 UG/ML
0.2 INJECTION INTRAVENOUS
Qty: 400 | Refills: 0 | Status: DISCONTINUED | OUTPATIENT
Start: 2021-01-01 | End: 2021-01-01

## 2021-01-01 RX ORDER — FENTANYL CITRATE 50 UG/ML
25 INJECTION INTRAVENOUS EVERY 6 HOURS
Refills: 0 | Status: DISCONTINUED | OUTPATIENT
Start: 2021-01-01 | End: 2022-01-01

## 2021-01-01 RX ORDER — IPRATROPIUM BROMIDE AND ALBUTEROL SULFATE 2.5; .5 MG/3ML; MG/3ML
0.5-2.5 (3) SOLUTION RESPIRATORY (INHALATION) 4 TIMES DAILY
Qty: 120 | Refills: 4 | Status: DISCONTINUED | COMMUNITY
Start: 2021-01-01 | End: 2021-01-01

## 2021-01-01 RX ORDER — POTASSIUM CHLORIDE 20 MEQ
20 PACKET (EA) ORAL
Refills: 0 | Status: COMPLETED | OUTPATIENT
Start: 2021-01-01 | End: 2021-01-01

## 2021-01-01 RX ORDER — PROPOFOL 10 MG/ML
30 INJECTION, EMULSION INTRAVENOUS
Qty: 1000 | Refills: 0 | Status: DISCONTINUED | OUTPATIENT
Start: 2021-01-01 | End: 2021-01-01

## 2021-01-01 RX ORDER — ACETAMINOPHEN 500 MG
1000 TABLET ORAL ONCE
Refills: 0 | Status: COMPLETED | OUTPATIENT
Start: 2021-01-01 | End: 2021-01-01

## 2021-01-01 RX ORDER — BUDESONIDE AND FORMOTEROL FUMARATE DIHYDRATE 160; 4.5 UG/1; UG/1
2 AEROSOL RESPIRATORY (INHALATION)
Qty: 0 | Refills: 0 | DISCHARGE

## 2021-01-01 RX ORDER — SENNA PLUS 8.6 MG/1
2 TABLET ORAL AT BEDTIME
Refills: 0 | Status: DISCONTINUED | OUTPATIENT
Start: 2021-01-01 | End: 2021-01-01

## 2021-01-01 RX ORDER — ROBINUL 0.2 MG/ML
2 INJECTION INTRAMUSCULAR; INTRAVENOUS ONCE
Refills: 0 | Status: DISCONTINUED | OUTPATIENT
Start: 2021-01-01 | End: 2021-01-01

## 2021-01-01 RX ORDER — NOREPINEPHRINE BITARTRATE/D5W 8 MG/250ML
0.05 PLASTIC BAG, INJECTION (ML) INTRAVENOUS
Qty: 16 | Refills: 0 | Status: DISCONTINUED | OUTPATIENT
Start: 2021-01-01 | End: 2021-01-01

## 2021-01-01 RX ORDER — ALBUTEROL SULFATE 2.5 MG/3ML
(2.5 MG/3ML) SOLUTION RESPIRATORY (INHALATION) 4 TIMES DAILY
Qty: 120 | Refills: 1 | Status: ACTIVE | COMMUNITY
Start: 2021-01-01 | End: 1900-01-01

## 2021-01-01 RX ORDER — ETOMIDATE 2 MG/ML
20 INJECTION INTRAVENOUS ONCE
Refills: 0 | Status: COMPLETED | OUTPATIENT
Start: 2021-01-01 | End: 2021-01-01

## 2021-01-01 RX ORDER — POTASSIUM PHOSPHATE, MONOBASIC POTASSIUM PHOSPHATE, DIBASIC 236; 224 MG/ML; MG/ML
15 INJECTION, SOLUTION INTRAVENOUS ONCE
Refills: 0 | Status: COMPLETED | OUTPATIENT
Start: 2021-01-01 | End: 2021-01-01

## 2021-01-01 RX ORDER — PROPOFOL 10 MG/ML
10 INJECTION, EMULSION INTRAVENOUS
Qty: 1000 | Refills: 0 | Status: DISCONTINUED | OUTPATIENT
Start: 2021-01-01 | End: 2021-01-01

## 2021-01-01 RX ORDER — METHADONE HYDROCHLORIDE 40 MG/1
1.25 TABLET ORAL DAILY
Refills: 0 | Status: DISCONTINUED | OUTPATIENT
Start: 2021-01-01 | End: 2021-01-01

## 2021-01-01 RX ORDER — SODIUM CHLORIDE 9 MG/ML
1000 INJECTION, SOLUTION INTRAVENOUS
Refills: 0 | Status: COMPLETED | OUTPATIENT
Start: 2021-01-01 | End: 2021-01-01

## 2021-01-01 RX ORDER — IBUPROFEN 200 MG/1
CAPSULE, LIQUID FILLED ORAL
Refills: 0 | Status: DISCONTINUED | COMMUNITY
End: 2021-01-01

## 2021-01-01 RX ORDER — MIDODRINE HYDROCHLORIDE 2.5 MG/1
10 TABLET ORAL EVERY 8 HOURS
Refills: 0 | Status: DISCONTINUED | OUTPATIENT
Start: 2021-01-01 | End: 2021-01-01

## 2021-01-01 RX ORDER — SODIUM CHLORIDE 9 MG/ML
1000 INJECTION, SOLUTION INTRAVENOUS
Refills: 0 | Status: DISCONTINUED | OUTPATIENT
Start: 2021-01-01 | End: 2021-01-01

## 2021-01-01 RX ORDER — OXYCODONE HYDROCHLORIDE 5 MG/1
5 TABLET ORAL EVERY 4 HOURS
Refills: 0 | Status: DISCONTINUED | OUTPATIENT
Start: 2021-01-01 | End: 2021-01-01

## 2021-01-01 RX ORDER — AMLODIPINE BESYLATE 2.5 MG/1
5 TABLET ORAL DAILY
Refills: 0 | Status: DISCONTINUED | OUTPATIENT
Start: 2021-01-01 | End: 2021-01-01

## 2021-01-01 RX ORDER — VALSARTAN 80 MG/1
40 TABLET ORAL DAILY
Refills: 0 | Status: DISCONTINUED | OUTPATIENT
Start: 2021-01-01 | End: 2021-01-01

## 2021-01-01 RX ORDER — AMLODIPINE BESYLATE 2.5 MG/1
1 TABLET ORAL
Qty: 0 | Refills: 0 | DISCHARGE

## 2021-01-01 RX ORDER — EMPAGLIFLOZIN 10 MG/1
10 TABLET, FILM COATED ORAL
Qty: 90 | Refills: 0 | Status: DISCONTINUED | COMMUNITY
Start: 2019-09-16 | End: 2021-01-01

## 2021-01-01 RX ORDER — CHLORHEXIDINE GLUCONATE 213 G/1000ML
15 SOLUTION TOPICAL EVERY 12 HOURS
Refills: 0 | Status: DISCONTINUED | OUTPATIENT
Start: 2021-01-01 | End: 2021-01-01

## 2021-01-01 RX ORDER — MIDAZOLAM HYDROCHLORIDE 1 MG/ML
4 INJECTION, SOLUTION INTRAMUSCULAR; INTRAVENOUS ONCE
Refills: 0 | Status: DISCONTINUED | OUTPATIENT
Start: 2021-01-01 | End: 2021-01-01

## 2021-01-01 RX ORDER — SODIUM CHLORIDE 9 MG/ML
500 INJECTION, SOLUTION INTRAVENOUS ONCE
Refills: 0 | Status: DISCONTINUED | OUTPATIENT
Start: 2021-01-01 | End: 2021-01-01

## 2021-01-01 RX ORDER — INSULIN LISPRO 100/ML
VIAL (ML) SUBCUTANEOUS
Refills: 0 | Status: DISCONTINUED | OUTPATIENT
Start: 2021-01-01 | End: 2021-01-01

## 2021-01-01 RX ORDER — ALBUTEROL 90 UG/1
2 AEROSOL, METERED ORAL EVERY 6 HOURS
Refills: 0 | Status: DISCONTINUED | OUTPATIENT
Start: 2021-01-01 | End: 2022-01-01

## 2021-01-01 RX ORDER — FENTANYL 50 UG/H
50 PATCH, EXTENDED RELEASE TRANSDERMAL
Qty: 5 | Refills: 0 | Status: DISCONTINUED | COMMUNITY
Start: 2021-01-01 | End: 2021-01-01

## 2021-01-01 RX ORDER — ONDANSETRON 8 MG/1
4 TABLET, FILM COATED ORAL ONCE
Refills: 0 | Status: COMPLETED | OUTPATIENT
Start: 2021-01-01 | End: 2021-01-01

## 2021-01-01 RX ORDER — ROCURONIUM BROMIDE 10 MG/ML
50 VIAL (ML) INTRAVENOUS ONCE
Refills: 0 | Status: COMPLETED | OUTPATIENT
Start: 2021-01-01 | End: 2021-01-01

## 2021-01-01 RX ORDER — GABAPENTIN 400 MG/1
400 CAPSULE ORAL THREE TIMES A DAY
Refills: 0 | Status: DISCONTINUED | OUTPATIENT
Start: 2021-01-01 | End: 2021-01-01

## 2021-01-01 RX ORDER — BENZOCAINE AND MENTHOL 5; 1 G/100ML; G/100ML
1 LIQUID ORAL
Refills: 0 | Status: DISCONTINUED | OUTPATIENT
Start: 2021-01-01 | End: 2021-01-01

## 2021-01-01 RX ORDER — PROPOFOL 10 MG/ML
50 INJECTION, EMULSION INTRAVENOUS ONCE
Refills: 0 | Status: COMPLETED | OUTPATIENT
Start: 2021-01-01 | End: 2021-01-01

## 2021-01-01 RX ORDER — ASPIRIN/CALCIUM CARB/MAGNESIUM 324 MG
1 TABLET ORAL
Qty: 0 | Refills: 0 | DISCHARGE

## 2021-01-01 RX ORDER — CEFEPIME 1 G/1
2000 INJECTION, POWDER, FOR SOLUTION INTRAMUSCULAR; INTRAVENOUS ONCE
Refills: 0 | Status: COMPLETED | OUTPATIENT
Start: 2021-01-01 | End: 2021-01-01

## 2021-01-01 RX ORDER — BLOOD SUGAR DIAGNOSTIC
STRIP MISCELLANEOUS TWICE DAILY
Qty: 1 | Refills: 11 | Status: ACTIVE | COMMUNITY
Start: 2021-01-01 | End: 1900-01-01

## 2021-01-01 RX ORDER — DEXTROSE 50 % IN WATER 50 %
12.5 SYRINGE (ML) INTRAVENOUS ONCE
Refills: 0 | Status: DISCONTINUED | OUTPATIENT
Start: 2021-01-01 | End: 2021-01-01

## 2021-01-01 RX ORDER — 70%ISOPROPYL ALCOHOL 0.7 ML/ML
70 SWAB TOPICAL
Refills: 0 | Status: ACTIVE | COMMUNITY

## 2021-01-01 RX ORDER — METOPROLOL TARTRATE 50 MG
5 TABLET ORAL ONCE
Refills: 0 | Status: COMPLETED | OUTPATIENT
Start: 2021-01-01 | End: 2021-01-01

## 2021-01-01 RX ORDER — GLUCAGON INJECTION, SOLUTION 0.5 MG/.1ML
1 INJECTION, SOLUTION SUBCUTANEOUS ONCE
Refills: 0 | Status: DISCONTINUED | OUTPATIENT
Start: 2021-01-01 | End: 2021-01-01

## 2021-01-01 RX ORDER — OXYCODONE HYDROCHLORIDE 5 MG/1
2.5 TABLET ORAL EVERY 4 HOURS
Refills: 0 | Status: DISCONTINUED | OUTPATIENT
Start: 2021-01-01 | End: 2021-01-01

## 2021-01-01 RX ORDER — HYDROMORPHONE HYDROCHLORIDE 2 MG/ML
1 INJECTION INTRAMUSCULAR; INTRAVENOUS; SUBCUTANEOUS ONCE
Refills: 0 | Status: DISCONTINUED | OUTPATIENT
Start: 2021-01-01 | End: 2021-01-01

## 2021-01-01 RX ORDER — MIDODRINE HYDROCHLORIDE 2.5 MG/1
5 TABLET ORAL EVERY 8 HOURS
Refills: 0 | Status: DISCONTINUED | OUTPATIENT
Start: 2021-01-01 | End: 2022-01-01

## 2021-01-01 RX ORDER — METHADONE HYDROCHLORIDE 10 MG/5ML
10 SOLUTION ORAL 3 TIMES DAILY
Qty: 90 | Refills: 0 | Status: ACTIVE | COMMUNITY
Start: 2021-01-01 | End: 1900-01-01

## 2021-01-01 RX ORDER — NOREPINEPHRINE BITARTRATE/D5W 8 MG/250ML
0.15 PLASTIC BAG, INJECTION (ML) INTRAVENOUS
Qty: 8 | Refills: 0 | Status: DISCONTINUED | OUTPATIENT
Start: 2021-01-01 | End: 2021-01-01

## 2021-01-01 RX ORDER — LINAGLIPTIN 5 MG/1
5 TABLET, FILM COATED ORAL DAILY
Refills: 0 | Status: ACTIVE | COMMUNITY

## 2021-01-01 RX ORDER — ASPIRIN/CALCIUM CARB/MAGNESIUM 324 MG
81 TABLET ORAL DAILY
Refills: 0 | Status: DISCONTINUED | OUTPATIENT
Start: 2021-01-01 | End: 2021-01-01

## 2021-01-01 RX ORDER — FENTANYL CITRATE 50 UG/ML
0.5 INJECTION INTRAVENOUS
Qty: 2500 | Refills: 0 | Status: DISCONTINUED | OUTPATIENT
Start: 2021-01-01 | End: 2021-01-01

## 2021-01-01 RX ORDER — METOCLOPRAMIDE 10 MG/1
10 TABLET ORAL EVERY 6 HOURS
Qty: 40 | Refills: 3 | Status: ACTIVE | COMMUNITY
Start: 2021-01-01 | End: 1900-01-01

## 2021-01-01 RX ORDER — METFORMIN HYDROCHLORIDE 500 MG/1
500 TABLET, COATED ORAL DAILY
Qty: 1 | Refills: 0 | Status: ACTIVE | COMMUNITY
Start: 1900-01-01 | End: 1900-01-01

## 2021-01-01 RX ORDER — PANTOPRAZOLE SODIUM 20 MG/1
40 TABLET, DELAYED RELEASE ORAL DAILY
Refills: 0 | Status: DISCONTINUED | OUTPATIENT
Start: 2021-01-01 | End: 2021-01-01

## 2021-01-01 RX ORDER — OXYCODONE HYDROCHLORIDE 5 MG/1
7.5 TABLET ORAL EVERY 4 HOURS
Refills: 0 | Status: DISCONTINUED | OUTPATIENT
Start: 2021-01-01 | End: 2021-01-01

## 2021-01-01 RX ORDER — ETOMIDATE 2 MG/ML
10 INJECTION INTRAVENOUS ONCE
Refills: 0 | Status: COMPLETED | OUTPATIENT
Start: 2021-01-01 | End: 2021-01-01

## 2021-01-01 RX ORDER — SODIUM CHLORIDE 9 MG/ML
500 INJECTION, SOLUTION INTRAVENOUS ONCE
Refills: 0 | Status: COMPLETED | OUTPATIENT
Start: 2021-01-01 | End: 2021-01-01

## 2021-01-01 RX ORDER — MULTIVIT-MIN/FOLIC/VIT K/LYCOP 400-300MCG
25 MCG TABLET ORAL
Qty: 30 | Refills: 11 | Status: ACTIVE | COMMUNITY
Start: 2021-01-01 | End: 1900-01-01

## 2021-01-01 RX ORDER — ROBINUL 0.2 MG/ML
0.2 INJECTION INTRAMUSCULAR; INTRAVENOUS EVERY 12 HOURS
Refills: 0 | Status: DISCONTINUED | OUTPATIENT
Start: 2021-01-01 | End: 2021-01-01

## 2021-01-01 RX ORDER — LACTULOSE 10 G/15ML
10 SOLUTION ORAL
Qty: 1 | Refills: 3 | Status: ACTIVE | COMMUNITY
Start: 2021-01-01 | End: 1900-01-01

## 2021-01-01 RX ORDER — NOREPINEPHRINE BITARTRATE/D5W 8 MG/250ML
0.5 PLASTIC BAG, INJECTION (ML) INTRAVENOUS
Qty: 16 | Refills: 0 | Status: DISCONTINUED | OUTPATIENT
Start: 2021-01-01 | End: 2021-01-01

## 2021-01-01 RX ORDER — CHLORHEXIDINE GLUCONATE 213 G/1000ML
1 SOLUTION TOPICAL
Refills: 0 | Status: DISCONTINUED | OUTPATIENT
Start: 2021-01-01 | End: 2022-01-01

## 2021-01-01 RX ORDER — HEPARIN SODIUM 5000 [USP'U]/ML
5000 INJECTION INTRAVENOUS; SUBCUTANEOUS EVERY 12 HOURS
Refills: 0 | Status: DISCONTINUED | OUTPATIENT
Start: 2021-01-01 | End: 2021-01-01

## 2021-01-01 RX ORDER — MORPHINE SULFATE 50 MG/1
4 CAPSULE, EXTENDED RELEASE ORAL ONCE
Refills: 0 | Status: DISCONTINUED | OUTPATIENT
Start: 2021-01-01 | End: 2021-01-01

## 2021-01-01 RX ORDER — VALSARTAN 40 MG/1
40 TABLET, COATED ORAL
Qty: 90 | Refills: 3 | Status: ACTIVE | COMMUNITY
Start: 2021-01-01

## 2021-01-01 RX ORDER — CEFEPIME 1 G/1
INJECTION, POWDER, FOR SOLUTION INTRAMUSCULAR; INTRAVENOUS
Refills: 0 | Status: DISCONTINUED | OUTPATIENT
Start: 2021-01-01 | End: 2021-01-01

## 2021-01-01 RX ORDER — PROPOFOL 10 MG/ML
20 INJECTION, EMULSION INTRAVENOUS
Qty: 500 | Refills: 0 | Status: DISCONTINUED | OUTPATIENT
Start: 2021-01-01 | End: 2021-01-01

## 2021-01-01 RX ORDER — METHADONE HYDROCHLORIDE 40 MG/1
0.63 TABLET ORAL
Qty: 8.82 | Refills: 0
Start: 2021-01-01 | End: 2021-01-01

## 2021-01-01 RX ORDER — CARVEDILOL PHOSPHATE 80 MG/1
1 CAPSULE, EXTENDED RELEASE ORAL
Qty: 0 | Refills: 0 | DISCHARGE

## 2021-01-01 RX ORDER — DEXAMETHASONE 2 MG/1
2 TABLET ORAL TWICE DAILY
Qty: 14 | Refills: 0 | Status: ACTIVE | COMMUNITY
Start: 2021-01-01 | End: 1900-01-01

## 2021-01-01 RX ORDER — IPRATROPIUM BROMIDE 0.5 MG/2.5ML
0.02 SOLUTION RESPIRATORY (INHALATION) 4 TIMES DAILY
Qty: 120 | Refills: 5 | Status: ACTIVE | COMMUNITY
Start: 2021-01-01 | End: 1900-01-01

## 2021-01-01 RX ORDER — OXYCODONE HYDROCHLORIDE 5 MG/5ML
5 SOLUTION ORAL
Refills: 0 | Status: DISCONTINUED | COMMUNITY
End: 2021-01-01

## 2021-01-01 RX ORDER — DIPHENHYDRAMINE HYDROCHLORIDE AND LIDOCAINE HYDROCHLORIDE AND ALUMINUM HYDROXIDE AND MAGNESIUM HYDRO
30 KIT EVERY 6 HOURS
Refills: 0 | Status: DISCONTINUED | OUTPATIENT
Start: 2021-01-01 | End: 2021-01-01

## 2021-01-01 RX ORDER — OXYCODONE 5 MG/1
5 TABLET ORAL
Qty: 90 | Refills: 0 | Status: ACTIVE | COMMUNITY
Start: 2021-01-01 | End: 1900-01-01

## 2021-01-01 RX ORDER — POLYETHYLENE GLYCOL 3350 17 G/17G
17 POWDER, FOR SOLUTION ORAL DAILY
Refills: 0 | Status: DISCONTINUED | OUTPATIENT
Start: 2021-01-01 | End: 2021-01-01

## 2021-01-01 RX ORDER — PROPOFOL 10 MG/ML
20 INJECTION, EMULSION INTRAVENOUS ONCE
Refills: 0 | Status: COMPLETED | OUTPATIENT
Start: 2021-01-01 | End: 2021-01-01

## 2021-01-01 RX ORDER — DIPHENHYDRAMINE HYDROCHLORIDE AND LIDOCAINE HYDROCHLORIDE AND ALUMINUM HYDROXIDE AND MAGNESIUM HYDRO
KIT
Qty: 1 | Refills: 0 | Status: ACTIVE | COMMUNITY
Start: 2021-01-01 | End: 1900-01-01

## 2021-01-01 RX ORDER — EMPAGLIFLOZIN 10 MG/1
1 TABLET, FILM COATED ORAL
Qty: 0 | Refills: 0 | DISCHARGE

## 2021-01-01 RX ORDER — POTASSIUM CHLORIDE 20 MEQ
40 PACKET (EA) ORAL ONCE
Refills: 0 | Status: COMPLETED | OUTPATIENT
Start: 2021-01-01 | End: 2021-01-01

## 2021-01-01 RX ORDER — BENZONATATE 100 MG/1
100 CAPSULE ORAL 3 TIMES DAILY
Qty: 90 | Refills: 0 | Status: DISCONTINUED | COMMUNITY
Start: 2021-01-01 | End: 2021-01-01

## 2021-01-01 RX ORDER — DEXTROSE 50 % IN WATER 50 %
15 SYRINGE (ML) INTRAVENOUS ONCE
Refills: 0 | Status: DISCONTINUED | OUTPATIENT
Start: 2021-01-01 | End: 2021-01-01

## 2021-01-01 RX ORDER — PREDNISONE 10 MG/1
10 TABLET ORAL DAILY
Qty: 30 | Refills: 1 | Status: ACTIVE | COMMUNITY
Start: 2021-01-01 | End: 1900-01-01

## 2021-01-01 RX ORDER — AZITHROMYCIN 250 MG/1
250 TABLET, FILM COATED ORAL
Qty: 1 | Refills: 0 | Status: ACTIVE | COMMUNITY
Start: 2021-01-01 | End: 1900-01-01

## 2021-01-01 RX ORDER — ACETAMINOPHEN 500 MG
1090 TABLET ORAL EVERY 6 HOURS
Refills: 0 | Status: DISCONTINUED | OUTPATIENT
Start: 2021-01-01 | End: 2021-01-01

## 2021-01-01 RX ORDER — OXYCODONE HYDROCHLORIDE 5 MG/1
5 TABLET ORAL
Qty: 210 | Refills: 0
Start: 2021-01-01 | End: 2021-01-01

## 2021-01-01 RX ORDER — HYDRALAZINE HCL 50 MG
10 TABLET ORAL ONCE
Refills: 0 | Status: COMPLETED | OUTPATIENT
Start: 2021-01-01 | End: 2021-01-01

## 2021-01-01 RX ORDER — OXYCODONE HYDROCHLORIDE 5 MG/1
5 TABLET ORAL EVERY 6 HOURS
Refills: 0 | Status: DISCONTINUED | OUTPATIENT
Start: 2021-01-01 | End: 2021-01-01

## 2021-01-01 RX ORDER — INSULIN LISPRO 100/ML
VIAL (ML) SUBCUTANEOUS EVERY 6 HOURS
Refills: 0 | Status: DISCONTINUED | OUTPATIENT
Start: 2021-01-01 | End: 2022-01-01

## 2021-01-01 RX ORDER — SODIUM CHLORIDE 9 MG/ML
1000 INJECTION INTRAMUSCULAR; INTRAVENOUS; SUBCUTANEOUS ONCE
Refills: 0 | Status: COMPLETED | OUTPATIENT
Start: 2021-01-01 | End: 2021-01-01

## 2021-01-01 RX ORDER — OXYCODONE 5 MG/1
5 TABLET ORAL
Refills: 0 | Status: DISCONTINUED | COMMUNITY
End: 2021-01-01

## 2021-01-01 RX ORDER — DEXTROSE 50 % IN WATER 50 %
25 SYRINGE (ML) INTRAVENOUS ONCE
Refills: 0 | Status: DISCONTINUED | OUTPATIENT
Start: 2021-01-01 | End: 2021-01-01

## 2021-01-01 RX ORDER — SODIUM CHLORIDE 9 MG/ML
1000 INJECTION, SOLUTION INTRAVENOUS ONCE
Refills: 0 | Status: COMPLETED | OUTPATIENT
Start: 2021-01-01 | End: 2021-01-01

## 2021-01-01 RX ORDER — POLYETHYLENE GLYCOL 3350 17 G/17G
17 POWDER, FOR SOLUTION ORAL
Refills: 0 | Status: DISCONTINUED | OUTPATIENT
Start: 2021-01-01 | End: 2021-01-01

## 2021-01-01 RX ORDER — DOXAZOSIN MESYLATE 4 MG
2 TABLET ORAL DAILY
Refills: 0 | Status: DISCONTINUED | OUTPATIENT
Start: 2021-01-01 | End: 2021-01-01

## 2021-01-01 RX ORDER — DEXMEDETOMIDINE HYDROCHLORIDE IN 0.9% SODIUM CHLORIDE 4 UG/ML
0.5 INJECTION INTRAVENOUS
Qty: 400 | Refills: 0 | Status: DISCONTINUED | OUTPATIENT
Start: 2021-01-01 | End: 2021-01-01

## 2021-01-01 RX ORDER — GABAPENTIN 400 MG/1
300 CAPSULE ORAL THREE TIMES A DAY
Refills: 0 | Status: DISCONTINUED | OUTPATIENT
Start: 2021-01-01 | End: 2021-01-01

## 2021-01-01 RX ORDER — LANCETS 28 GAUGE
EACH MISCELLANEOUS
Qty: 300 | Refills: 0 | Status: ACTIVE | COMMUNITY
Start: 2021-01-01 | End: 1900-01-01

## 2021-01-01 RX ORDER — GABAPENTIN 400 MG/1
400 CAPSULE ORAL
Qty: 90 | Refills: 2 | Status: ACTIVE | COMMUNITY
Start: 2021-01-01 | End: 1900-01-01

## 2021-01-01 RX ORDER — ACETYLCYSTEINE 200 MG/ML
5 VIAL (ML) MISCELLANEOUS EVERY 6 HOURS
Refills: 0 | Status: DISCONTINUED | OUTPATIENT
Start: 2021-01-01 | End: 2021-01-01

## 2021-01-01 RX ORDER — POLYETHYLENE GLYCOL 3350 17 G/17G
17 POWDER, FOR SOLUTION ORAL
Qty: 476 | Refills: 0
Start: 2021-01-01 | End: 2021-01-01

## 2021-01-01 RX ORDER — NOREPINEPHRINE BITARTRATE/D5W 8 MG/250ML
0.05 PLASTIC BAG, INJECTION (ML) INTRAVENOUS
Qty: 8 | Refills: 0 | Status: DISCONTINUED | OUTPATIENT
Start: 2021-01-01 | End: 2021-01-01

## 2021-01-01 RX ORDER — LANCETS 33 GAUGE
EACH MISCELLANEOUS
Qty: 100 | Refills: 11 | Status: ACTIVE | COMMUNITY
Start: 2021-01-01 | End: 1900-01-01

## 2021-01-01 RX ORDER — INSULIN LISPRO 100/ML
VIAL (ML) SUBCUTANEOUS AT BEDTIME
Refills: 0 | Status: DISCONTINUED | OUTPATIENT
Start: 2021-01-01 | End: 2021-01-01

## 2021-01-01 RX ORDER — ENOXAPARIN SODIUM 100 MG/ML
40 INJECTION SUBCUTANEOUS DAILY
Refills: 0 | Status: DISCONTINUED | OUTPATIENT
Start: 2021-01-01 | End: 2022-01-01

## 2021-01-01 RX ORDER — SODIUM CHLORIDE 9 MG/ML
7 INJECTION INTRAMUSCULAR; INTRAVENOUS; SUBCUTANEOUS EVERY 8 HOURS
Refills: 0 | Status: DISCONTINUED | OUTPATIENT
Start: 2021-01-01 | End: 2021-01-01

## 2021-01-01 RX ORDER — PANTOPRAZOLE SODIUM 20 MG/1
40 TABLET, DELAYED RELEASE ORAL DAILY
Refills: 0 | Status: DISCONTINUED | OUTPATIENT
Start: 2021-01-01 | End: 2022-01-01

## 2021-01-01 RX ORDER — ACETAMINOPHEN 500 MG
650 TABLET ORAL EVERY 6 HOURS
Refills: 0 | Status: DISCONTINUED | OUTPATIENT
Start: 2021-01-01 | End: 2022-01-01

## 2021-01-01 RX ORDER — EMPAGLIFLOZIN 10 MG/1
10 TABLET, FILM COATED ORAL
Qty: 30 | Refills: 2 | Status: ACTIVE | COMMUNITY
Start: 2021-01-01

## 2021-01-01 RX ORDER — DIPHENHYDRAMINE HYDROCHLORIDE AND LIDOCAINE HYDROCHLORIDE AND ALUMINUM HYDROXIDE AND MAGNESIUM HYDRO
10 KIT EVERY 8 HOURS
Refills: 0 | Status: DISCONTINUED | OUTPATIENT
Start: 2021-01-01 | End: 2021-01-01

## 2021-01-01 RX ORDER — FAMOTIDINE 40 MG/1
40 TABLET, FILM COATED ORAL DAILY
Qty: 20 | Refills: 2 | Status: ACTIVE | COMMUNITY
Start: 2021-01-01 | End: 1900-01-01

## 2021-01-01 RX ORDER — CARVEDILOL PHOSPHATE 80 MG/1
25 CAPSULE, EXTENDED RELEASE ORAL
Refills: 0 | Status: DISCONTINUED | OUTPATIENT
Start: 2021-01-01 | End: 2021-01-01

## 2021-01-01 RX ORDER — KETOROLAC TROMETHAMINE 30 MG/ML
15 SYRINGE (ML) INJECTION EVERY 6 HOURS
Refills: 0 | Status: DISCONTINUED | OUTPATIENT
Start: 2021-01-01 | End: 2021-01-01

## 2021-01-01 RX ORDER — VALSARTAN 80 MG/1
25 TABLET ORAL
Qty: 0 | Refills: 0 | DISCHARGE

## 2021-01-01 RX ORDER — CARVEDILOL PHOSPHATE 80 MG/1
25 CAPSULE, EXTENDED RELEASE ORAL EVERY 12 HOURS
Refills: 0 | Status: DISCONTINUED | OUTPATIENT
Start: 2021-01-01 | End: 2021-01-01

## 2021-01-01 RX ORDER — ROBINUL 0.2 MG/ML
1 INJECTION INTRAMUSCULAR; INTRAVENOUS ONCE
Refills: 0 | Status: COMPLETED | OUTPATIENT
Start: 2021-01-01 | End: 2021-01-01

## 2021-01-01 RX ORDER — CEPHALEXIN 250 MG/1
250 CAPSULE ORAL 4 TIMES DAILY
Qty: 28 | Refills: 0 | Status: DISCONTINUED | COMMUNITY
Start: 2021-01-01 | End: 2021-01-01

## 2021-01-01 RX ORDER — NOREPINEPHRINE BITARTRATE/D5W 8 MG/250ML
0.01 PLASTIC BAG, INJECTION (ML) INTRAVENOUS
Qty: 16 | Refills: 0 | Status: DISCONTINUED | OUTPATIENT
Start: 2021-01-01 | End: 2021-01-01

## 2021-01-01 RX ORDER — SIMVASTATIN 20 MG/1
20 TABLET, FILM COATED ORAL AT BEDTIME
Refills: 0 | Status: DISCONTINUED | OUTPATIENT
Start: 2021-01-01 | End: 2021-01-01

## 2021-01-01 RX ORDER — NOREPINEPHRINE BITARTRATE/D5W 8 MG/250ML
0.08 PLASTIC BAG, INJECTION (ML) INTRAVENOUS
Qty: 16 | Refills: 0 | Status: DISCONTINUED | OUTPATIENT
Start: 2021-01-01 | End: 2021-01-01

## 2021-01-01 RX ORDER — SENNA PLUS 8.6 MG/1
2 TABLET ORAL
Qty: 28 | Refills: 0
Start: 2021-01-01 | End: 2021-01-01

## 2021-01-01 RX ORDER — ENOXAPARIN SODIUM 100 MG/ML
40 INJECTION SUBCUTANEOUS DAILY
Refills: 0 | Status: DISCONTINUED | OUTPATIENT
Start: 2021-01-01 | End: 2021-01-01

## 2021-01-01 RX ORDER — ROCURONIUM BROMIDE 10 MG/ML
20 VIAL (ML) INTRAVENOUS ONCE
Refills: 0 | Status: COMPLETED | OUTPATIENT
Start: 2021-01-01 | End: 2021-01-01

## 2021-01-01 RX ORDER — SODIUM CHLORIDE 9 MG/ML
1000 INJECTION INTRAMUSCULAR; INTRAVENOUS; SUBCUTANEOUS
Refills: 0 | Status: DISCONTINUED | OUTPATIENT
Start: 2021-01-01 | End: 2021-01-01

## 2021-01-01 RX ORDER — POTASSIUM PHOSPHATE, MONOBASIC POTASSIUM PHOSPHATE, DIBASIC 236; 224 MG/ML; MG/ML
30 INJECTION, SOLUTION INTRAVENOUS ONCE
Refills: 0 | Status: COMPLETED | OUTPATIENT
Start: 2021-01-01 | End: 2021-01-01

## 2021-01-01 RX ORDER — CEFEPIME 1 G/1
2000 INJECTION, POWDER, FOR SOLUTION INTRAMUSCULAR; INTRAVENOUS EVERY 12 HOURS
Refills: 0 | Status: DISCONTINUED | OUTPATIENT
Start: 2021-01-01 | End: 2021-01-01

## 2021-01-01 RX ORDER — PIPERACILLIN AND TAZOBACTAM 4; .5 G/20ML; G/20ML
3.38 INJECTION, POWDER, LYOPHILIZED, FOR SOLUTION INTRAVENOUS EVERY 8 HOURS
Refills: 0 | Status: DISCONTINUED | OUTPATIENT
Start: 2021-01-01 | End: 2022-01-01

## 2021-01-01 RX ADMIN — MIDODRINE HYDROCHLORIDE 5 MILLIGRAM(S): 2.5 TABLET ORAL at 21:07

## 2021-01-01 RX ADMIN — OXYCODONE HYDROCHLORIDE 5 MILLIGRAM(S): 5 TABLET ORAL at 00:00

## 2021-01-01 RX ADMIN — Medication 100 MILLIGRAM(S): at 17:47

## 2021-01-01 RX ADMIN — PIPERACILLIN AND TAZOBACTAM 25 GRAM(S): 4; .5 INJECTION, POWDER, LYOPHILIZED, FOR SOLUTION INTRAVENOUS at 14:00

## 2021-01-01 RX ADMIN — POLYETHYLENE GLYCOL 3350 17 GRAM(S): 17 POWDER, FOR SOLUTION ORAL at 13:09

## 2021-01-01 RX ADMIN — METHADONE HYDROCHLORIDE 1.25 MILLIGRAM(S): 40 TABLET ORAL at 13:12

## 2021-01-01 RX ADMIN — Medication 1: at 06:17

## 2021-01-01 RX ADMIN — Medication 10 MILLIGRAM(S): at 23:42

## 2021-01-01 RX ADMIN — Medication 2: at 23:14

## 2021-01-01 RX ADMIN — PROPOFOL 13.1 MICROGRAM(S)/KG/MIN: 10 INJECTION, EMULSION INTRAVENOUS at 18:40

## 2021-01-01 RX ADMIN — CEFEPIME 100 MILLIGRAM(S): 1 INJECTION, POWDER, FOR SOLUTION INTRAMUSCULAR; INTRAVENOUS at 05:59

## 2021-01-01 RX ADMIN — CHLORHEXIDINE GLUCONATE 1 APPLICATION(S): 213 SOLUTION TOPICAL at 05:57

## 2021-01-01 RX ADMIN — Medication 40 MILLIGRAM(S): at 17:15

## 2021-01-01 RX ADMIN — PROPOFOL 8.4 MICROGRAM(S)/KG/MIN: 10 INJECTION, EMULSION INTRAVENOUS at 03:30

## 2021-01-01 RX ADMIN — GABAPENTIN 400 MILLIGRAM(S): 400 CAPSULE ORAL at 05:43

## 2021-01-01 RX ADMIN — Medication 1: at 11:42

## 2021-01-01 RX ADMIN — CEFEPIME 100 MILLIGRAM(S): 1 INJECTION, POWDER, FOR SOLUTION INTRAMUSCULAR; INTRAVENOUS at 05:16

## 2021-01-01 RX ADMIN — Medication 5 MILLIGRAM(S): at 22:39

## 2021-01-01 RX ADMIN — Medication 50 MILLIEQUIVALENT(S): at 10:03

## 2021-01-01 RX ADMIN — Medication 2 MILLIGRAM(S): at 05:40

## 2021-01-01 RX ADMIN — DEXMEDETOMIDINE HYDROCHLORIDE IN 0.9% SODIUM CHLORIDE 9.06 MICROGRAM(S)/KG/HR: 4 INJECTION INTRAVENOUS at 20:29

## 2021-01-01 RX ADMIN — GABAPENTIN 400 MILLIGRAM(S): 400 CAPSULE ORAL at 23:29

## 2021-01-01 RX ADMIN — CHLORHEXIDINE GLUCONATE 15 MILLILITER(S): 213 SOLUTION TOPICAL at 17:08

## 2021-01-01 RX ADMIN — HEPARIN SODIUM 5000 UNIT(S): 5000 INJECTION INTRAVENOUS; SUBCUTANEOUS at 17:19

## 2021-01-01 RX ADMIN — PIPERACILLIN AND TAZOBACTAM 25 GRAM(S): 4; .5 INJECTION, POWDER, LYOPHILIZED, FOR SOLUTION INTRAVENOUS at 21:38

## 2021-01-01 RX ADMIN — Medication 1: at 11:43

## 2021-01-01 RX ADMIN — Medication 40 MILLIGRAM(S): at 18:00

## 2021-01-01 RX ADMIN — HEPARIN SODIUM 5000 UNIT(S): 5000 INJECTION INTRAVENOUS; SUBCUTANEOUS at 07:25

## 2021-01-01 RX ADMIN — Medication 3: at 23:17

## 2021-01-01 RX ADMIN — Medication 1: at 17:07

## 2021-01-01 RX ADMIN — Medication 81 MILLIGRAM(S): at 13:08

## 2021-01-01 RX ADMIN — OXYCODONE HYDROCHLORIDE 5 MILLIGRAM(S): 5 TABLET ORAL at 05:45

## 2021-01-01 RX ADMIN — Medication 40 MILLIGRAM(S): at 05:16

## 2021-01-01 RX ADMIN — CHLORHEXIDINE GLUCONATE 1 APPLICATION(S): 213 SOLUTION TOPICAL at 05:58

## 2021-01-01 RX ADMIN — PROPOFOL 13.1 MICROGRAM(S)/KG/MIN: 10 INJECTION, EMULSION INTRAVENOUS at 06:00

## 2021-01-01 RX ADMIN — Medication 2 MILLIGRAM(S): at 06:49

## 2021-01-01 RX ADMIN — CEFEPIME 100 MILLIGRAM(S): 1 INJECTION, POWDER, FOR SOLUTION INTRAMUSCULAR; INTRAVENOUS at 05:15

## 2021-01-01 RX ADMIN — Medication 1000 MILLIGRAM(S): at 17:30

## 2021-01-01 RX ADMIN — POLYETHYLENE GLYCOL 3350 17 GRAM(S): 17 POWDER, FOR SOLUTION ORAL at 05:24

## 2021-01-01 RX ADMIN — CHLORHEXIDINE GLUCONATE 1 APPLICATION(S): 213 SOLUTION TOPICAL at 06:01

## 2021-01-01 RX ADMIN — ENOXAPARIN SODIUM 40 MILLIGRAM(S): 100 INJECTION SUBCUTANEOUS at 11:13

## 2021-01-01 RX ADMIN — SIMVASTATIN 20 MILLIGRAM(S): 20 TABLET, FILM COATED ORAL at 23:07

## 2021-01-01 RX ADMIN — Medication 0: at 23:41

## 2021-01-01 RX ADMIN — HEPARIN SODIUM 5000 UNIT(S): 5000 INJECTION INTRAVENOUS; SUBCUTANEOUS at 19:06

## 2021-01-01 RX ADMIN — HYDROMORPHONE HYDROCHLORIDE 1 MILLIGRAM(S): 2 INJECTION INTRAMUSCULAR; INTRAVENOUS; SUBCUTANEOUS at 00:36

## 2021-01-01 RX ADMIN — METHADONE HYDROCHLORIDE 1.25 MILLIGRAM(S): 40 TABLET ORAL at 13:15

## 2021-01-01 RX ADMIN — SIMVASTATIN 20 MILLIGRAM(S): 20 TABLET, FILM COATED ORAL at 21:50

## 2021-01-01 RX ADMIN — OXYCODONE HYDROCHLORIDE 7.5 MILLIGRAM(S): 5 TABLET ORAL at 11:04

## 2021-01-01 RX ADMIN — BENZOCAINE AND MENTHOL 1 LOZENGE: 5; 1 LIQUID ORAL at 13:24

## 2021-01-01 RX ADMIN — SODIUM CHLORIDE 7 MILLILITER(S): 9 INJECTION INTRAMUSCULAR; INTRAVENOUS; SUBCUTANEOUS at 09:28

## 2021-01-01 RX ADMIN — GABAPENTIN 400 MILLIGRAM(S): 400 CAPSULE ORAL at 22:20

## 2021-01-01 RX ADMIN — Medication 7.49 MICROGRAM(S)/KG/MIN: at 05:18

## 2021-01-01 RX ADMIN — DEXMEDETOMIDINE HYDROCHLORIDE IN 0.9% SODIUM CHLORIDE 9.06 MICROGRAM(S)/KG/HR: 4 INJECTION INTRAVENOUS at 10:59

## 2021-01-01 RX ADMIN — CEFEPIME 100 MILLIGRAM(S): 1 INJECTION, POWDER, FOR SOLUTION INTRAMUSCULAR; INTRAVENOUS at 05:56

## 2021-01-01 RX ADMIN — Medication 81 MILLIGRAM(S): at 11:03

## 2021-01-01 RX ADMIN — Medication 1: at 11:01

## 2021-01-01 RX ADMIN — MIDAZOLAM HYDROCHLORIDE 4 MILLIGRAM(S): 1 INJECTION, SOLUTION INTRAMUSCULAR; INTRAVENOUS at 20:31

## 2021-01-01 RX ADMIN — Medication 1: at 05:47

## 2021-01-01 RX ADMIN — MIDODRINE HYDROCHLORIDE 10 MILLIGRAM(S): 2.5 TABLET ORAL at 14:03

## 2021-01-01 RX ADMIN — POLYETHYLENE GLYCOL 3350 17 GRAM(S): 17 POWDER, FOR SOLUTION ORAL at 17:25

## 2021-01-01 RX ADMIN — Medication 15 MILLIGRAM(S): at 19:06

## 2021-01-01 RX ADMIN — DEXMEDETOMIDINE HYDROCHLORIDE IN 0.9% SODIUM CHLORIDE 9.06 MICROGRAM(S)/KG/HR: 4 INJECTION INTRAVENOUS at 21:13

## 2021-01-01 RX ADMIN — ENOXAPARIN SODIUM 40 MILLIGRAM(S): 100 INJECTION SUBCUTANEOUS at 12:00

## 2021-01-01 RX ADMIN — OXYCODONE HYDROCHLORIDE 7.5 MILLIGRAM(S): 5 TABLET ORAL at 15:55

## 2021-01-01 RX ADMIN — CEFEPIME 100 MILLIGRAM(S): 1 INJECTION, POWDER, FOR SOLUTION INTRAMUSCULAR; INTRAVENOUS at 17:06

## 2021-01-01 RX ADMIN — Medication 650 MILLIGRAM(S): at 22:30

## 2021-01-01 RX ADMIN — AMLODIPINE BESYLATE 5 MILLIGRAM(S): 2.5 TABLET ORAL at 05:24

## 2021-01-01 RX ADMIN — FENTANYL CITRATE 25 MICROGRAM(S): 50 INJECTION INTRAVENOUS at 16:02

## 2021-01-01 RX ADMIN — CHLORHEXIDINE GLUCONATE 15 MILLILITER(S): 213 SOLUTION TOPICAL at 05:09

## 2021-01-01 RX ADMIN — Medication 400 MILLIGRAM(S): at 16:03

## 2021-01-01 RX ADMIN — DIPHENHYDRAMINE HYDROCHLORIDE AND LIDOCAINE HYDROCHLORIDE AND ALUMINUM HYDROXIDE AND MAGNESIUM HYDRO 10 MILLILITER(S): KIT at 06:19

## 2021-01-01 RX ADMIN — Medication 2: at 05:35

## 2021-01-01 RX ADMIN — CHLORHEXIDINE GLUCONATE 1 APPLICATION(S): 213 SOLUTION TOPICAL at 05:24

## 2021-01-01 RX ADMIN — ALBUTEROL 2 PUFF(S): 90 AEROSOL, METERED ORAL at 09:21

## 2021-01-01 RX ADMIN — OXYCODONE HYDROCHLORIDE 5 MILLIGRAM(S): 5 TABLET ORAL at 23:15

## 2021-01-01 RX ADMIN — Medication 81 MILLIGRAM(S): at 12:50

## 2021-01-01 RX ADMIN — Medication 1: at 17:43

## 2021-01-01 RX ADMIN — OXYCODONE HYDROCHLORIDE 5 MILLIGRAM(S): 5 TABLET ORAL at 22:50

## 2021-01-01 RX ADMIN — SODIUM CHLORIDE 7 MILLILITER(S): 9 INJECTION INTRAMUSCULAR; INTRAVENOUS; SUBCUTANEOUS at 03:02

## 2021-01-01 RX ADMIN — SODIUM CHLORIDE 100 MILLILITER(S): 9 INJECTION, SOLUTION INTRAVENOUS at 12:00

## 2021-01-01 RX ADMIN — SIMVASTATIN 20 MILLIGRAM(S): 20 TABLET, FILM COATED ORAL at 23:41

## 2021-01-01 RX ADMIN — CHLORHEXIDINE GLUCONATE 1 APPLICATION(S): 213 SOLUTION TOPICAL at 05:08

## 2021-01-01 RX ADMIN — MORPHINE SULFATE 4 MILLIGRAM(S): 50 CAPSULE, EXTENDED RELEASE ORAL at 18:27

## 2021-01-01 RX ADMIN — Medication 650 MILLIGRAM(S): at 00:00

## 2021-01-01 RX ADMIN — ALBUTEROL 2 PUFF(S): 90 AEROSOL, METERED ORAL at 15:37

## 2021-01-01 RX ADMIN — PROPOFOL 20 MILLIGRAM(S): 10 INJECTION, EMULSION INTRAVENOUS at 11:15

## 2021-01-01 RX ADMIN — ENOXAPARIN SODIUM 40 MILLIGRAM(S): 100 INJECTION SUBCUTANEOUS at 14:17

## 2021-01-01 RX ADMIN — MIDODRINE HYDROCHLORIDE 10 MILLIGRAM(S): 2.5 TABLET ORAL at 13:47

## 2021-01-01 RX ADMIN — GABAPENTIN 400 MILLIGRAM(S): 400 CAPSULE ORAL at 05:05

## 2021-01-01 RX ADMIN — Medication 1: at 12:14

## 2021-01-01 RX ADMIN — Medication 10 MILLIGRAM(S): at 02:22

## 2021-01-01 RX ADMIN — SODIUM CHLORIDE 7 MILLILITER(S): 9 INJECTION INTRAMUSCULAR; INTRAVENOUS; SUBCUTANEOUS at 04:34

## 2021-01-01 RX ADMIN — SIMVASTATIN 20 MILLIGRAM(S): 20 TABLET, FILM COATED ORAL at 23:29

## 2021-01-01 RX ADMIN — Medication 2: at 05:24

## 2021-01-01 RX ADMIN — Medication 15 MILLIGRAM(S): at 09:14

## 2021-01-01 RX ADMIN — CHLORHEXIDINE GLUCONATE 1 APPLICATION(S): 213 SOLUTION TOPICAL at 05:48

## 2021-01-01 RX ADMIN — HEPARIN SODIUM 5000 UNIT(S): 5000 INJECTION INTRAVENOUS; SUBCUTANEOUS at 17:47

## 2021-01-01 RX ADMIN — CHLORHEXIDINE GLUCONATE 15 MILLILITER(S): 213 SOLUTION TOPICAL at 05:03

## 2021-01-01 RX ADMIN — SODIUM CHLORIDE 100 MILLILITER(S): 9 INJECTION, SOLUTION INTRAVENOUS at 20:12

## 2021-01-01 RX ADMIN — GABAPENTIN 300 MILLIGRAM(S): 400 CAPSULE ORAL at 06:44

## 2021-01-01 RX ADMIN — Medication 0: at 23:04

## 2021-01-01 RX ADMIN — SENNA PLUS 2 TABLET(S): 8.6 TABLET ORAL at 23:42

## 2021-01-01 RX ADMIN — Medication 2: at 23:21

## 2021-01-01 RX ADMIN — DEXMEDETOMIDINE HYDROCHLORIDE IN 0.9% SODIUM CHLORIDE 9.06 MICROGRAM(S)/KG/HR: 4 INJECTION INTRAVENOUS at 16:25

## 2021-01-01 RX ADMIN — OXYCODONE HYDROCHLORIDE 2.5 MILLIGRAM(S): 5 TABLET ORAL at 02:50

## 2021-01-01 RX ADMIN — ALBUTEROL 2 PUFF(S): 90 AEROSOL, METERED ORAL at 20:26

## 2021-01-01 RX ADMIN — CEFEPIME 100 MILLIGRAM(S): 1 INJECTION, POWDER, FOR SOLUTION INTRAMUSCULAR; INTRAVENOUS at 18:00

## 2021-01-01 RX ADMIN — PROPOFOL 3.9 MICROGRAM(S)/KG/MIN: 10 INJECTION, EMULSION INTRAVENOUS at 06:44

## 2021-01-01 RX ADMIN — ALBUTEROL 2 PUFF(S): 90 AEROSOL, METERED ORAL at 09:58

## 2021-01-01 RX ADMIN — OXYCODONE HYDROCHLORIDE 5 MILLIGRAM(S): 5 TABLET ORAL at 12:49

## 2021-01-01 RX ADMIN — OXYCODONE HYDROCHLORIDE 5 MILLIGRAM(S): 5 TABLET ORAL at 11:36

## 2021-01-01 RX ADMIN — Medication 650 MILLIGRAM(S): at 05:52

## 2021-01-01 RX ADMIN — SIMVASTATIN 20 MILLIGRAM(S): 20 TABLET, FILM COATED ORAL at 22:39

## 2021-01-01 RX ADMIN — ALBUTEROL 2 PUFF(S): 90 AEROSOL, METERED ORAL at 20:41

## 2021-01-01 RX ADMIN — SODIUM CHLORIDE 60 MILLILITER(S): 9 INJECTION, SOLUTION INTRAVENOUS at 21:13

## 2021-01-01 RX ADMIN — CEFEPIME 100 MILLIGRAM(S): 1 INJECTION, POWDER, FOR SOLUTION INTRAMUSCULAR; INTRAVENOUS at 17:24

## 2021-01-01 RX ADMIN — HEPARIN SODIUM 5000 UNIT(S): 5000 INJECTION INTRAVENOUS; SUBCUTANEOUS at 05:22

## 2021-01-01 RX ADMIN — GABAPENTIN 400 MILLIGRAM(S): 400 CAPSULE ORAL at 14:58

## 2021-01-01 RX ADMIN — ALBUTEROL 2 PUFF(S): 90 AEROSOL, METERED ORAL at 03:07

## 2021-01-01 RX ADMIN — CHLORHEXIDINE GLUCONATE 15 MILLILITER(S): 213 SOLUTION TOPICAL at 05:56

## 2021-01-01 RX ADMIN — SENNA PLUS 2 TABLET(S): 8.6 TABLET ORAL at 23:07

## 2021-01-01 RX ADMIN — GABAPENTIN 400 MILLIGRAM(S): 400 CAPSULE ORAL at 21:49

## 2021-01-01 RX ADMIN — Medication 2: at 06:00

## 2021-01-01 RX ADMIN — PIPERACILLIN AND TAZOBACTAM 25 GRAM(S): 4; .5 INJECTION, POWDER, LYOPHILIZED, FOR SOLUTION INTRAVENOUS at 14:03

## 2021-01-01 RX ADMIN — DIPHENHYDRAMINE HYDROCHLORIDE AND LIDOCAINE HYDROCHLORIDE AND ALUMINUM HYDROXIDE AND MAGNESIUM HYDRO 10 MILLILITER(S): KIT at 13:09

## 2021-01-01 RX ADMIN — ETOMIDATE 10 MILLIGRAM(S): 2 INJECTION INTRAVENOUS at 12:30

## 2021-01-01 RX ADMIN — CEFEPIME 100 MILLIGRAM(S): 1 INJECTION, POWDER, FOR SOLUTION INTRAMUSCULAR; INTRAVENOUS at 17:11

## 2021-01-01 RX ADMIN — PIPERACILLIN AND TAZOBACTAM 25 GRAM(S): 4; .5 INJECTION, POWDER, LYOPHILIZED, FOR SOLUTION INTRAVENOUS at 13:47

## 2021-01-01 RX ADMIN — FENTANYL CITRATE 25 MICROGRAM(S): 50 INJECTION INTRAVENOUS at 18:00

## 2021-01-01 RX ADMIN — ALBUTEROL 2 PUFF(S): 90 AEROSOL, METERED ORAL at 15:27

## 2021-01-01 RX ADMIN — CHLORHEXIDINE GLUCONATE 1 APPLICATION(S): 213 SOLUTION TOPICAL at 05:17

## 2021-01-01 RX ADMIN — ALBUTEROL 2 PUFF(S): 90 AEROSOL, METERED ORAL at 09:03

## 2021-01-01 RX ADMIN — HEPARIN SODIUM 5000 UNIT(S): 5000 INJECTION INTRAVENOUS; SUBCUTANEOUS at 17:28

## 2021-01-01 RX ADMIN — CEFEPIME 100 MILLIGRAM(S): 1 INJECTION, POWDER, FOR SOLUTION INTRAMUSCULAR; INTRAVENOUS at 17:15

## 2021-01-01 RX ADMIN — DIPHENHYDRAMINE HYDROCHLORIDE AND LIDOCAINE HYDROCHLORIDE AND ALUMINUM HYDROXIDE AND MAGNESIUM HYDRO 10 MILLILITER(S): KIT at 08:44

## 2021-01-01 RX ADMIN — OXYCODONE HYDROCHLORIDE 7.5 MILLIGRAM(S): 5 TABLET ORAL at 16:37

## 2021-01-01 RX ADMIN — ALBUTEROL 2 PUFF(S): 90 AEROSOL, METERED ORAL at 03:36

## 2021-01-01 RX ADMIN — Medication 100 MILLIGRAM(S): at 10:02

## 2021-01-01 RX ADMIN — OXYCODONE HYDROCHLORIDE 5 MILLIGRAM(S): 5 TABLET ORAL at 05:15

## 2021-01-01 RX ADMIN — HEPARIN SODIUM 5000 UNIT(S): 5000 INJECTION INTRAVENOUS; SUBCUTANEOUS at 05:43

## 2021-01-01 RX ADMIN — AMLODIPINE BESYLATE 5 MILLIGRAM(S): 2.5 TABLET ORAL at 05:15

## 2021-01-01 RX ADMIN — ALBUTEROL 2 PUFF(S): 90 AEROSOL, METERED ORAL at 20:30

## 2021-01-01 RX ADMIN — METHADONE HYDROCHLORIDE 1.25 MILLIGRAM(S): 40 TABLET ORAL at 06:49

## 2021-01-01 RX ADMIN — DEXMEDETOMIDINE HYDROCHLORIDE IN 0.9% SODIUM CHLORIDE 9.06 MICROGRAM(S)/KG/HR: 4 INJECTION INTRAVENOUS at 17:45

## 2021-01-01 RX ADMIN — Medication 2: at 18:00

## 2021-01-01 RX ADMIN — ALBUTEROL 2 PUFF(S): 90 AEROSOL, METERED ORAL at 21:39

## 2021-01-01 RX ADMIN — Medication 5 MILLIGRAM(S): at 00:38

## 2021-01-01 RX ADMIN — CHLORHEXIDINE GLUCONATE 15 MILLILITER(S): 213 SOLUTION TOPICAL at 05:15

## 2021-01-01 RX ADMIN — CHLORHEXIDINE GLUCONATE 15 MILLILITER(S): 213 SOLUTION TOPICAL at 18:38

## 2021-01-01 RX ADMIN — METHADONE HYDROCHLORIDE 1.25 MILLIGRAM(S): 40 TABLET ORAL at 13:05

## 2021-01-01 RX ADMIN — HEPARIN SODIUM 5000 UNIT(S): 5000 INJECTION INTRAVENOUS; SUBCUTANEOUS at 06:44

## 2021-01-01 RX ADMIN — ENOXAPARIN SODIUM 40 MILLIGRAM(S): 100 INJECTION SUBCUTANEOUS at 12:51

## 2021-01-01 RX ADMIN — SODIUM CHLORIDE 75 MILLILITER(S): 9 INJECTION INTRAMUSCULAR; INTRAVENOUS; SUBCUTANEOUS at 09:00

## 2021-01-01 RX ADMIN — Medication 40 MILLIGRAM(S): at 10:01

## 2021-01-01 RX ADMIN — DEXMEDETOMIDINE HYDROCHLORIDE IN 0.9% SODIUM CHLORIDE 9.06 MICROGRAM(S)/KG/HR: 4 INJECTION INTRAVENOUS at 05:16

## 2021-01-01 RX ADMIN — Medication 40 MILLIGRAM(S): at 03:19

## 2021-01-01 RX ADMIN — Medication 400 MILLIGRAM(S): at 03:59

## 2021-01-01 RX ADMIN — Medication 1000 MILLIGRAM(S): at 18:57

## 2021-01-01 RX ADMIN — GABAPENTIN 400 MILLIGRAM(S): 400 CAPSULE ORAL at 13:12

## 2021-01-01 RX ADMIN — OXYCODONE HYDROCHLORIDE 7.5 MILLIGRAM(S): 5 TABLET ORAL at 12:00

## 2021-01-01 RX ADMIN — POTASSIUM PHOSPHATE, MONOBASIC POTASSIUM PHOSPHATE, DIBASIC 62.5 MILLIMOLE(S): 236; 224 INJECTION, SOLUTION INTRAVENOUS at 08:28

## 2021-01-01 RX ADMIN — CHLORHEXIDINE GLUCONATE 15 MILLILITER(S): 213 SOLUTION TOPICAL at 05:16

## 2021-01-01 RX ADMIN — ALBUTEROL 2 PUFF(S): 90 AEROSOL, METERED ORAL at 04:02

## 2021-01-01 RX ADMIN — GABAPENTIN 300 MILLIGRAM(S): 400 CAPSULE ORAL at 05:40

## 2021-01-01 RX ADMIN — CHLORHEXIDINE GLUCONATE 1 APPLICATION(S): 213 SOLUTION TOPICAL at 06:17

## 2021-01-01 RX ADMIN — FENTANYL CITRATE 3.63 MICROGRAM(S)/KG/HR: 50 INJECTION INTRAVENOUS at 04:05

## 2021-01-01 RX ADMIN — SIMVASTATIN 20 MILLIGRAM(S): 20 TABLET, FILM COATED ORAL at 22:20

## 2021-01-01 RX ADMIN — Medication 50 MILLIEQUIVALENT(S): at 11:04

## 2021-01-01 RX ADMIN — MIDODRINE HYDROCHLORIDE 10 MILLIGRAM(S): 2.5 TABLET ORAL at 21:25

## 2021-01-01 RX ADMIN — ALBUTEROL 2 PUFF(S): 90 AEROSOL, METERED ORAL at 15:45

## 2021-01-01 RX ADMIN — SENNA PLUS 2 TABLET(S): 8.6 TABLET ORAL at 22:39

## 2021-01-01 RX ADMIN — FENTANYL CITRATE 25 MICROGRAM(S): 50 INJECTION INTRAVENOUS at 17:36

## 2021-01-01 RX ADMIN — DIPHENHYDRAMINE HYDROCHLORIDE AND LIDOCAINE HYDROCHLORIDE AND ALUMINUM HYDROXIDE AND MAGNESIUM HYDRO 10 MILLILITER(S): KIT at 12:49

## 2021-01-01 RX ADMIN — GABAPENTIN 400 MILLIGRAM(S): 400 CAPSULE ORAL at 13:14

## 2021-01-01 RX ADMIN — DIPHENHYDRAMINE HYDROCHLORIDE AND LIDOCAINE HYDROCHLORIDE AND ALUMINUM HYDROXIDE AND MAGNESIUM HYDRO 10 MILLILITER(S): KIT at 13:06

## 2021-01-01 RX ADMIN — SODIUM CHLORIDE 4 MILLILITER(S): 9 INJECTION INTRAMUSCULAR; INTRAVENOUS; SUBCUTANEOUS at 16:06

## 2021-01-01 RX ADMIN — PROPOFOL 50 MILLIGRAM(S): 10 INJECTION, EMULSION INTRAVENOUS at 04:06

## 2021-01-01 RX ADMIN — Medication 81 MILLIGRAM(S): at 13:05

## 2021-01-01 RX ADMIN — DIPHENHYDRAMINE HYDROCHLORIDE AND LIDOCAINE HYDROCHLORIDE AND ALUMINUM HYDROXIDE AND MAGNESIUM HYDRO 10 MILLILITER(S): KIT at 05:43

## 2021-01-01 RX ADMIN — PROPOFOL 13.1 MICROGRAM(S)/KG/MIN: 10 INJECTION, EMULSION INTRAVENOUS at 04:07

## 2021-01-01 RX ADMIN — ALBUTEROL 2 PUFF(S): 90 AEROSOL, METERED ORAL at 20:06

## 2021-01-01 RX ADMIN — Medication 50 MILLIEQUIVALENT(S): at 09:25

## 2021-01-01 RX ADMIN — OXYCODONE HYDROCHLORIDE 2.5 MILLIGRAM(S): 5 TABLET ORAL at 01:48

## 2021-01-01 RX ADMIN — Medication 81 MILLIGRAM(S): at 13:14

## 2021-01-01 RX ADMIN — AMLODIPINE BESYLATE 5 MILLIGRAM(S): 2.5 TABLET ORAL at 06:50

## 2021-01-01 RX ADMIN — ALBUTEROL 2 PUFF(S): 90 AEROSOL, METERED ORAL at 09:26

## 2021-01-01 RX ADMIN — METHADONE HYDROCHLORIDE 1.25 MILLIGRAM(S): 40 TABLET ORAL at 17:17

## 2021-01-01 RX ADMIN — Medication 2: at 11:25

## 2021-01-01 RX ADMIN — DIPHENHYDRAMINE HYDROCHLORIDE AND LIDOCAINE HYDROCHLORIDE AND ALUMINUM HYDROXIDE AND MAGNESIUM HYDRO 10 MILLILITER(S): KIT at 21:49

## 2021-01-01 RX ADMIN — ALBUTEROL 2 PUFF(S): 90 AEROSOL, METERED ORAL at 04:01

## 2021-01-01 RX ADMIN — Medication 2: at 12:00

## 2021-01-01 RX ADMIN — Medication 15 MILLIGRAM(S): at 18:05

## 2021-01-01 RX ADMIN — GABAPENTIN 400 MILLIGRAM(S): 400 CAPSULE ORAL at 05:15

## 2021-01-01 RX ADMIN — POLYETHYLENE GLYCOL 3350 17 GRAM(S): 17 POWDER, FOR SOLUTION ORAL at 06:50

## 2021-01-01 RX ADMIN — DIPHENHYDRAMINE HYDROCHLORIDE AND LIDOCAINE HYDROCHLORIDE AND ALUMINUM HYDROXIDE AND MAGNESIUM HYDRO 10 MILLILITER(S): KIT at 23:28

## 2021-01-01 RX ADMIN — PIPERACILLIN AND TAZOBACTAM 25 GRAM(S): 4; .5 INJECTION, POWDER, LYOPHILIZED, FOR SOLUTION INTRAVENOUS at 05:23

## 2021-01-01 RX ADMIN — Medication 20.4 MICROGRAM(S)/KG/MIN: at 06:44

## 2021-01-01 RX ADMIN — Medication 20 MILLIGRAM(S): at 12:00

## 2021-01-01 RX ADMIN — Medication 2 MILLIGRAM(S): at 06:44

## 2021-01-01 RX ADMIN — Medication 15 MILLIGRAM(S): at 09:13

## 2021-01-01 RX ADMIN — Medication 3: at 06:12

## 2021-01-01 RX ADMIN — GABAPENTIN 400 MILLIGRAM(S): 400 CAPSULE ORAL at 23:41

## 2021-01-01 RX ADMIN — METHADONE HYDROCHLORIDE 1.25 MILLIGRAM(S): 40 TABLET ORAL at 05:20

## 2021-01-01 RX ADMIN — CHLORHEXIDINE GLUCONATE 15 MILLILITER(S): 213 SOLUTION TOPICAL at 06:29

## 2021-01-01 RX ADMIN — METHADONE HYDROCHLORIDE 1.25 MILLIGRAM(S): 40 TABLET ORAL at 13:07

## 2021-01-01 RX ADMIN — ALBUTEROL 2 PUFF(S): 90 AEROSOL, METERED ORAL at 20:43

## 2021-01-01 RX ADMIN — CHLORHEXIDINE GLUCONATE 1 APPLICATION(S): 213 SOLUTION TOPICAL at 06:29

## 2021-01-01 RX ADMIN — CEFEPIME 100 MILLIGRAM(S): 1 INJECTION, POWDER, FOR SOLUTION INTRAMUSCULAR; INTRAVENOUS at 05:08

## 2021-01-01 RX ADMIN — DEXMEDETOMIDINE HYDROCHLORIDE IN 0.9% SODIUM CHLORIDE 9.06 MICROGRAM(S)/KG/HR: 4 INJECTION INTRAVENOUS at 04:27

## 2021-01-01 RX ADMIN — MIDODRINE HYDROCHLORIDE 10 MILLIGRAM(S): 2.5 TABLET ORAL at 10:12

## 2021-01-01 RX ADMIN — MIDODRINE HYDROCHLORIDE 10 MILLIGRAM(S): 2.5 TABLET ORAL at 05:23

## 2021-01-01 RX ADMIN — Medication 1000 MILLIGRAM(S): at 04:45

## 2021-01-01 RX ADMIN — HEPARIN SODIUM 5000 UNIT(S): 5000 INJECTION INTRAVENOUS; SUBCUTANEOUS at 05:41

## 2021-01-01 RX ADMIN — DIPHENHYDRAMINE HYDROCHLORIDE AND LIDOCAINE HYDROCHLORIDE AND ALUMINUM HYDROXIDE AND MAGNESIUM HYDRO 10 MILLILITER(S): KIT at 13:48

## 2021-01-01 RX ADMIN — SODIUM CHLORIDE 50 MILLILITER(S): 9 INJECTION, SOLUTION INTRAVENOUS at 11:26

## 2021-01-01 RX ADMIN — FENTANYL CITRATE 25 MICROGRAM(S): 50 INJECTION INTRAVENOUS at 16:12

## 2021-01-01 RX ADMIN — HEPARIN SODIUM 5000 UNIT(S): 5000 INJECTION INTRAVENOUS; SUBCUTANEOUS at 17:17

## 2021-01-01 RX ADMIN — Medication 15 MILLIGRAM(S): at 06:20

## 2021-01-01 RX ADMIN — CHLORHEXIDINE GLUCONATE 15 MILLILITER(S): 213 SOLUTION TOPICAL at 17:07

## 2021-01-01 RX ADMIN — HEPARIN SODIUM 5000 UNIT(S): 5000 INJECTION INTRAVENOUS; SUBCUTANEOUS at 19:24

## 2021-01-01 RX ADMIN — Medication 650 MILLIGRAM(S): at 15:34

## 2021-01-01 RX ADMIN — ONDANSETRON 4 MILLIGRAM(S): 8 TABLET, FILM COATED ORAL at 16:11

## 2021-01-01 RX ADMIN — HEPARIN SODIUM 5000 UNIT(S): 5000 INJECTION INTRAVENOUS; SUBCUTANEOUS at 06:49

## 2021-01-01 RX ADMIN — POLYETHYLENE GLYCOL 3350 17 GRAM(S): 17 POWDER, FOR SOLUTION ORAL at 13:14

## 2021-01-01 RX ADMIN — PANTOPRAZOLE SODIUM 40 MILLIGRAM(S): 20 TABLET, DELAYED RELEASE ORAL at 11:13

## 2021-01-01 RX ADMIN — CEFEPIME 100 MILLIGRAM(S): 1 INJECTION, POWDER, FOR SOLUTION INTRAMUSCULAR; INTRAVENOUS at 17:09

## 2021-01-01 RX ADMIN — ALBUTEROL 2 PUFF(S): 90 AEROSOL, METERED ORAL at 09:17

## 2021-01-01 RX ADMIN — Medication 81 MILLIGRAM(S): at 13:48

## 2021-01-01 RX ADMIN — CEFEPIME 100 MILLIGRAM(S): 1 INJECTION, POWDER, FOR SOLUTION INTRAMUSCULAR; INTRAVENOUS at 11:52

## 2021-01-01 RX ADMIN — GABAPENTIN 400 MILLIGRAM(S): 400 CAPSULE ORAL at 13:05

## 2021-01-01 RX ADMIN — HYDROMORPHONE HYDROCHLORIDE 1 MILLIGRAM(S): 2 INJECTION INTRAMUSCULAR; INTRAVENOUS; SUBCUTANEOUS at 18:49

## 2021-01-01 RX ADMIN — SODIUM CHLORIDE 100 MILLILITER(S): 9 INJECTION, SOLUTION INTRAVENOUS at 18:47

## 2021-01-01 RX ADMIN — GABAPENTIN 400 MILLIGRAM(S): 400 CAPSULE ORAL at 07:24

## 2021-01-01 RX ADMIN — Medication 15 MILLIGRAM(S): at 17:50

## 2021-01-01 RX ADMIN — BENZOCAINE AND MENTHOL 1 LOZENGE: 5; 1 LIQUID ORAL at 10:02

## 2021-01-01 RX ADMIN — METHADONE HYDROCHLORIDE 1.25 MILLIGRAM(S): 40 TABLET ORAL at 13:47

## 2021-01-01 RX ADMIN — Medication 400 MILLIGRAM(S): at 01:39

## 2021-01-01 RX ADMIN — ALBUTEROL 2 PUFF(S): 90 AEROSOL, METERED ORAL at 09:09

## 2021-01-01 RX ADMIN — Medication 2: at 23:46

## 2021-01-01 RX ADMIN — MIDODRINE HYDROCHLORIDE 10 MILLIGRAM(S): 2.5 TABLET ORAL at 05:47

## 2021-01-01 RX ADMIN — POLYETHYLENE GLYCOL 3350 17 GRAM(S): 17 POWDER, FOR SOLUTION ORAL at 17:17

## 2021-01-01 RX ADMIN — VALSARTAN 40 MILLIGRAM(S): 80 TABLET ORAL at 06:43

## 2021-01-01 RX ADMIN — SENNA PLUS 2 TABLET(S): 8.6 TABLET ORAL at 22:20

## 2021-01-01 RX ADMIN — Medication 2: at 17:12

## 2021-01-01 RX ADMIN — Medication 2 MILLIGRAM(S): at 05:05

## 2021-01-01 RX ADMIN — Medication 40 MILLIGRAM(S): at 17:06

## 2021-01-01 RX ADMIN — Medication 15 MILLIGRAM(S): at 05:20

## 2021-01-01 RX ADMIN — GABAPENTIN 300 MILLIGRAM(S): 400 CAPSULE ORAL at 13:48

## 2021-01-01 RX ADMIN — CHLORHEXIDINE GLUCONATE 15 MILLILITER(S): 213 SOLUTION TOPICAL at 05:59

## 2021-01-01 RX ADMIN — SODIUM CHLORIDE 50 MILLILITER(S): 9 INJECTION, SOLUTION INTRAVENOUS at 09:57

## 2021-01-01 RX ADMIN — Medication 650 MILLIGRAM(S): at 12:03

## 2021-01-01 RX ADMIN — Medication 650 MILLIGRAM(S): at 11:42

## 2021-01-01 RX ADMIN — POTASSIUM PHOSPHATE, MONOBASIC POTASSIUM PHOSPHATE, DIBASIC 62.5 MILLIMOLE(S): 236; 224 INJECTION, SOLUTION INTRAVENOUS at 05:36

## 2021-01-01 RX ADMIN — ALBUTEROL 2 PUFF(S): 90 AEROSOL, METERED ORAL at 02:54

## 2021-01-01 RX ADMIN — DEXMEDETOMIDINE HYDROCHLORIDE IN 0.9% SODIUM CHLORIDE 9.06 MICROGRAM(S)/KG/HR: 4 INJECTION INTRAVENOUS at 20:32

## 2021-01-01 RX ADMIN — Medication 2: at 23:55

## 2021-01-01 RX ADMIN — Medication 81 MILLIGRAM(S): at 13:12

## 2021-01-01 RX ADMIN — AMLODIPINE BESYLATE 5 MILLIGRAM(S): 2.5 TABLET ORAL at 07:24

## 2021-01-01 RX ADMIN — ALBUTEROL 2 PUFF(S): 90 AEROSOL, METERED ORAL at 15:20

## 2021-01-01 RX ADMIN — OXYCODONE HYDROCHLORIDE 5 MILLIGRAM(S): 5 TABLET ORAL at 00:15

## 2021-01-01 RX ADMIN — AMLODIPINE BESYLATE 5 MILLIGRAM(S): 2.5 TABLET ORAL at 06:47

## 2021-01-01 RX ADMIN — DIPHENHYDRAMINE HYDROCHLORIDE AND LIDOCAINE HYDROCHLORIDE AND ALUMINUM HYDROXIDE AND MAGNESIUM HYDRO 10 MILLILITER(S): KIT at 13:35

## 2021-01-01 RX ADMIN — ALBUTEROL 2 PUFF(S): 90 AEROSOL, METERED ORAL at 02:21

## 2021-01-01 RX ADMIN — CARVEDILOL PHOSPHATE 25 MILLIGRAM(S): 80 CAPSULE, EXTENDED RELEASE ORAL at 06:44

## 2021-01-01 RX ADMIN — SODIUM CHLORIDE 50 MILLILITER(S): 9 INJECTION, SOLUTION INTRAVENOUS at 05:16

## 2021-01-01 RX ADMIN — Medication 0.5 MICROGRAM(S)/KG/MIN: at 05:58

## 2021-01-01 RX ADMIN — DEXMEDETOMIDINE HYDROCHLORIDE IN 0.9% SODIUM CHLORIDE 9.06 MICROGRAM(S)/KG/HR: 4 INJECTION INTRAVENOUS at 00:29

## 2021-01-01 RX ADMIN — METHADONE HYDROCHLORIDE 1.25 MILLIGRAM(S): 40 TABLET ORAL at 17:31

## 2021-01-01 RX ADMIN — Medication 81 MILLIGRAM(S): at 15:46

## 2021-01-01 RX ADMIN — SODIUM CHLORIDE 1000 MILLILITER(S): 9 INJECTION, SOLUTION INTRAVENOUS at 17:37

## 2021-01-01 RX ADMIN — ALBUTEROL 2 PUFF(S): 90 AEROSOL, METERED ORAL at 20:11

## 2021-01-01 RX ADMIN — GABAPENTIN 400 MILLIGRAM(S): 400 CAPSULE ORAL at 05:24

## 2021-01-01 RX ADMIN — PIPERACILLIN AND TAZOBACTAM 25 GRAM(S): 4; .5 INJECTION, POWDER, LYOPHILIZED, FOR SOLUTION INTRAVENOUS at 21:25

## 2021-01-01 RX ADMIN — Medication 10 MILLIGRAM(S): at 02:53

## 2021-01-01 RX ADMIN — MORPHINE SULFATE 4 MILLIGRAM(S): 50 CAPSULE, EXTENDED RELEASE ORAL at 00:36

## 2021-01-01 RX ADMIN — CHLORHEXIDINE GLUCONATE 15 MILLILITER(S): 213 SOLUTION TOPICAL at 17:26

## 2021-01-01 RX ADMIN — CHLORHEXIDINE GLUCONATE 1 APPLICATION(S): 213 SOLUTION TOPICAL at 05:15

## 2021-01-01 RX ADMIN — POTASSIUM PHOSPHATE, MONOBASIC POTASSIUM PHOSPHATE, DIBASIC 62.5 MILLIMOLE(S): 236; 224 INJECTION, SOLUTION INTRAVENOUS at 09:36

## 2021-01-01 RX ADMIN — Medication 400 MILLIGRAM(S): at 18:41

## 2021-01-01 RX ADMIN — Medication 3: at 11:41

## 2021-01-01 RX ADMIN — DIPHENHYDRAMINE HYDROCHLORIDE AND LIDOCAINE HYDROCHLORIDE AND ALUMINUM HYDROXIDE AND MAGNESIUM HYDRO 10 MILLILITER(S): KIT at 13:15

## 2021-01-01 RX ADMIN — GABAPENTIN 400 MILLIGRAM(S): 400 CAPSULE ORAL at 06:50

## 2021-01-01 RX ADMIN — Medication 1: at 05:57

## 2021-01-01 RX ADMIN — POTASSIUM PHOSPHATE, MONOBASIC POTASSIUM PHOSPHATE, DIBASIC 62.5 MILLIMOLE(S): 236; 224 INJECTION, SOLUTION INTRAVENOUS at 14:02

## 2021-01-01 RX ADMIN — DIPHENHYDRAMINE HYDROCHLORIDE AND LIDOCAINE HYDROCHLORIDE AND ALUMINUM HYDROXIDE AND MAGNESIUM HYDRO 30 MILLILITER(S): KIT at 06:43

## 2021-01-01 RX ADMIN — OXYCODONE HYDROCHLORIDE 5 MILLIGRAM(S): 5 TABLET ORAL at 23:30

## 2021-01-01 RX ADMIN — CEFEPIME 100 MILLIGRAM(S): 1 INJECTION, POWDER, FOR SOLUTION INTRAMUSCULAR; INTRAVENOUS at 17:07

## 2021-01-01 RX ADMIN — Medication 3: at 17:25

## 2021-01-01 RX ADMIN — Medication 50 MILLIGRAM(S): at 03:22

## 2021-01-01 RX ADMIN — Medication 2: at 17:33

## 2021-01-01 RX ADMIN — GABAPENTIN 400 MILLIGRAM(S): 400 CAPSULE ORAL at 12:50

## 2021-01-01 RX ADMIN — PANTOPRAZOLE SODIUM 40 MILLIGRAM(S): 20 TABLET, DELAYED RELEASE ORAL at 11:56

## 2021-01-01 RX ADMIN — SODIUM CHLORIDE 75 MILLILITER(S): 9 INJECTION, SOLUTION INTRAVENOUS at 04:59

## 2021-01-01 RX ADMIN — MORPHINE SULFATE 4 MILLIGRAM(S): 50 CAPSULE, EXTENDED RELEASE ORAL at 17:50

## 2021-01-01 RX ADMIN — DIPHENHYDRAMINE HYDROCHLORIDE AND LIDOCAINE HYDROCHLORIDE AND ALUMINUM HYDROXIDE AND MAGNESIUM HYDRO 10 MILLILITER(S): KIT at 07:45

## 2021-01-01 RX ADMIN — OXYCODONE HYDROCHLORIDE 5 MILLIGRAM(S): 5 TABLET ORAL at 23:49

## 2021-01-01 RX ADMIN — ALBUTEROL 2 PUFF(S): 90 AEROSOL, METERED ORAL at 15:30

## 2021-01-01 RX ADMIN — Medication 2 MILLIGRAM(S): at 05:15

## 2021-01-01 RX ADMIN — DIPHENHYDRAMINE HYDROCHLORIDE AND LIDOCAINE HYDROCHLORIDE AND ALUMINUM HYDROXIDE AND MAGNESIUM HYDRO 10 MILLILITER(S): KIT at 14:58

## 2021-01-01 RX ADMIN — ALBUTEROL 2 PUFF(S): 90 AEROSOL, METERED ORAL at 09:32

## 2021-01-01 RX ADMIN — ETOMIDATE 20 MILLIGRAM(S): 2 INJECTION INTRAVENOUS at 04:04

## 2021-01-01 RX ADMIN — ALBUTEROL 2 PUFF(S): 90 AEROSOL, METERED ORAL at 14:34

## 2021-01-01 RX ADMIN — Medication 1000 MILLIGRAM(S): at 01:39

## 2021-01-01 RX ADMIN — CHLORHEXIDINE GLUCONATE 15 MILLILITER(S): 213 SOLUTION TOPICAL at 17:14

## 2021-01-01 RX ADMIN — Medication 1: at 12:46

## 2021-01-01 RX ADMIN — Medication 2: at 05:02

## 2021-01-01 RX ADMIN — POTASSIUM PHOSPHATE, MONOBASIC POTASSIUM PHOSPHATE, DIBASIC 83.33 MILLIMOLE(S): 236; 224 INJECTION, SOLUTION INTRAVENOUS at 08:38

## 2021-01-01 RX ADMIN — AMLODIPINE BESYLATE 5 MILLIGRAM(S): 2.5 TABLET ORAL at 05:40

## 2021-01-01 RX ADMIN — PANTOPRAZOLE SODIUM 40 MILLIGRAM(S): 20 TABLET, DELAYED RELEASE ORAL at 12:43

## 2021-01-01 RX ADMIN — MIDODRINE HYDROCHLORIDE 10 MILLIGRAM(S): 2.5 TABLET ORAL at 21:40

## 2021-01-01 RX ADMIN — GABAPENTIN 400 MILLIGRAM(S): 400 CAPSULE ORAL at 22:38

## 2021-01-01 RX ADMIN — Medication 15 MILLIGRAM(S): at 06:44

## 2021-01-01 RX ADMIN — PIPERACILLIN AND TAZOBACTAM 25 GRAM(S): 4; .5 INJECTION, POWDER, LYOPHILIZED, FOR SOLUTION INTRAVENOUS at 21:07

## 2021-01-01 RX ADMIN — Medication 40 MILLIEQUIVALENT(S): at 10:02

## 2021-01-01 RX ADMIN — CEFEPIME 100 MILLIGRAM(S): 1 INJECTION, POWDER, FOR SOLUTION INTRAMUSCULAR; INTRAVENOUS at 05:03

## 2021-01-01 RX ADMIN — POTASSIUM PHOSPHATE, MONOBASIC POTASSIUM PHOSPHATE, DIBASIC 62.5 MILLIMOLE(S): 236; 224 INJECTION, SOLUTION INTRAVENOUS at 08:17

## 2021-01-01 RX ADMIN — AMLODIPINE BESYLATE 5 MILLIGRAM(S): 2.5 TABLET ORAL at 17:47

## 2021-01-01 RX ADMIN — HEPARIN SODIUM 5000 UNIT(S): 5000 INJECTION INTRAVENOUS; SUBCUTANEOUS at 05:15

## 2021-01-01 RX ADMIN — Medication 1000 MILLIGRAM(S): at 23:56

## 2021-01-01 RX ADMIN — Medication 650 MILLIGRAM(S): at 23:45

## 2021-01-01 RX ADMIN — CHLORHEXIDINE GLUCONATE 15 MILLILITER(S): 213 SOLUTION TOPICAL at 17:11

## 2021-01-01 RX ADMIN — Medication 10 MILLIGRAM(S): at 22:21

## 2021-01-01 RX ADMIN — GABAPENTIN 400 MILLIGRAM(S): 400 CAPSULE ORAL at 13:08

## 2021-01-01 RX ADMIN — POLYETHYLENE GLYCOL 3350 17 GRAM(S): 17 POWDER, FOR SOLUTION ORAL at 13:05

## 2021-01-01 RX ADMIN — ALBUTEROL 2 PUFF(S): 90 AEROSOL, METERED ORAL at 04:11

## 2021-01-01 RX ADMIN — CHLORHEXIDINE GLUCONATE 1 APPLICATION(S): 213 SOLUTION TOPICAL at 05:02

## 2021-01-01 RX ADMIN — Medication 100 MILLIGRAM(S): at 13:35

## 2021-01-01 RX ADMIN — Medication 650 MILLIGRAM(S): at 21:30

## 2021-01-01 RX ADMIN — Medication 650 MILLIGRAM(S): at 12:44

## 2021-01-01 RX ADMIN — Medication 15 MILLIGRAM(S): at 09:29

## 2021-01-01 RX ADMIN — ALBUTEROL 2 PUFF(S): 90 AEROSOL, METERED ORAL at 14:38

## 2021-01-01 RX ADMIN — ALBUTEROL 2 PUFF(S): 90 AEROSOL, METERED ORAL at 15:18

## 2021-01-01 RX ADMIN — Medication 15 MILLIGRAM(S): at 19:39

## 2021-01-01 RX ADMIN — CHLORHEXIDINE GLUCONATE 15 MILLILITER(S): 213 SOLUTION TOPICAL at 17:10

## 2021-01-01 RX ADMIN — Medication 2 MILLIGRAM(S): at 05:22

## 2021-01-01 RX ADMIN — POLYETHYLENE GLYCOL 3350 17 GRAM(S): 17 POWDER, FOR SOLUTION ORAL at 13:12

## 2021-01-01 RX ADMIN — OXYCODONE HYDROCHLORIDE 5 MILLIGRAM(S): 5 TABLET ORAL at 12:20

## 2021-01-01 RX ADMIN — DIPHENHYDRAMINE HYDROCHLORIDE AND LIDOCAINE HYDROCHLORIDE AND ALUMINUM HYDROXIDE AND MAGNESIUM HYDRO 10 MILLILITER(S): KIT at 22:20

## 2021-01-01 RX ADMIN — GABAPENTIN 300 MILLIGRAM(S): 400 CAPSULE ORAL at 22:41

## 2021-01-01 RX ADMIN — ALBUTEROL 2 PUFF(S): 90 AEROSOL, METERED ORAL at 03:12

## 2021-01-01 RX ADMIN — POTASSIUM PHOSPHATE, MONOBASIC POTASSIUM PHOSPHATE, DIBASIC 62.5 MILLIMOLE(S): 236; 224 INJECTION, SOLUTION INTRAVENOUS at 08:19

## 2021-01-01 RX ADMIN — Medication 0.5 MICROGRAM(S)/KG/MIN: at 16:30

## 2021-01-01 RX ADMIN — ROBINUL 1 MILLIGRAM(S): 0.2 INJECTION INTRAMUSCULAR; INTRAVENOUS at 10:31

## 2021-01-01 RX ADMIN — Medication 400 MILLIGRAM(S): at 23:55

## 2021-01-01 RX ADMIN — DIPHENHYDRAMINE HYDROCHLORIDE AND LIDOCAINE HYDROCHLORIDE AND ALUMINUM HYDROXIDE AND MAGNESIUM HYDRO 10 MILLILITER(S): KIT at 06:51

## 2021-01-01 RX ADMIN — ENOXAPARIN SODIUM 40 MILLIGRAM(S): 100 INJECTION SUBCUTANEOUS at 12:43

## 2021-01-01 RX ADMIN — Medication 40 MILLIGRAM(S): at 05:08

## 2021-01-01 RX ADMIN — SENNA PLUS 2 TABLET(S): 8.6 TABLET ORAL at 21:50

## 2021-01-01 RX ADMIN — CHLORHEXIDINE GLUCONATE 15 MILLILITER(S): 213 SOLUTION TOPICAL at 05:18

## 2021-01-01 RX ADMIN — ALBUTEROL 2 PUFF(S): 90 AEROSOL, METERED ORAL at 08:37

## 2021-01-01 RX ADMIN — SODIUM CHLORIDE 2000 MILLILITER(S): 9 INJECTION, SOLUTION INTRAVENOUS at 15:45

## 2021-01-01 RX ADMIN — PIPERACILLIN AND TAZOBACTAM 25 GRAM(S): 4; .5 INJECTION, POWDER, LYOPHILIZED, FOR SOLUTION INTRAVENOUS at 05:47

## 2021-01-01 RX ADMIN — Medication 650 MILLIGRAM(S): at 21:44

## 2021-01-01 RX ADMIN — Medication 40 MILLIEQUIVALENT(S): at 05:18

## 2021-01-01 RX ADMIN — Medication 650 MILLIGRAM(S): at 05:42

## 2021-01-01 RX ADMIN — Medication 650 MILLIGRAM(S): at 21:00

## 2021-01-01 RX ADMIN — GABAPENTIN 400 MILLIGRAM(S): 400 CAPSULE ORAL at 23:07

## 2021-01-01 RX ADMIN — MORPHINE SULFATE 4 MILLIGRAM(S): 50 CAPSULE, EXTENDED RELEASE ORAL at 21:14

## 2021-01-01 RX ADMIN — Medication 2 MILLIGRAM(S): at 05:43

## 2021-01-01 RX ADMIN — SODIUM CHLORIDE 2000 MILLILITER(S): 9 INJECTION INTRAMUSCULAR; INTRAVENOUS; SUBCUTANEOUS at 18:32

## 2021-01-01 RX ADMIN — Medication 40 MILLIGRAM(S): at 06:00

## 2021-01-01 RX ADMIN — Medication 650 MILLIGRAM(S): at 12:45

## 2021-01-01 RX ADMIN — Medication 2 MILLIGRAM(S): at 07:24

## 2021-01-01 RX ADMIN — GABAPENTIN 300 MILLIGRAM(S): 400 CAPSULE ORAL at 15:46

## 2021-01-01 RX ADMIN — ALBUTEROL 2 PUFF(S): 90 AEROSOL, METERED ORAL at 16:04

## 2021-01-01 RX ADMIN — SODIUM CHLORIDE 75 MILLILITER(S): 9 INJECTION, SOLUTION INTRAVENOUS at 04:11

## 2021-01-01 RX ADMIN — DEXMEDETOMIDINE HYDROCHLORIDE IN 0.9% SODIUM CHLORIDE 9.06 MICROGRAM(S)/KG/HR: 4 INJECTION INTRAVENOUS at 15:33

## 2021-01-01 RX ADMIN — Medication 650 MILLIGRAM(S): at 15:22

## 2021-01-01 RX ADMIN — MIDODRINE HYDROCHLORIDE 5 MILLIGRAM(S): 2.5 TABLET ORAL at 14:01

## 2021-01-01 RX ADMIN — POTASSIUM PHOSPHATE, MONOBASIC POTASSIUM PHOSPHATE, DIBASIC 62.5 MILLIMOLE(S): 236; 224 INJECTION, SOLUTION INTRAVENOUS at 06:52

## 2021-04-13 PROBLEM — R09.89 ABNORMAL FINDING OF LUNG: Status: ACTIVE | Noted: 2021-01-01

## 2021-04-13 NOTE — DISCUSSION/SUMMARY
[FreeTextEntry1] : Assessment plan----------The patient has been referred here for further opinion regarding cough, shortness of breath. --Diabetes uNCONTROLLED-PROTEINURIA\par likely combination of allergy and reactive airways disease, maybe worsened by ACEi. Needs to be on an ACEi or ARB though because of significant proteinuria, 1/5g/day-S/P SURGERY FOR TONGUE CA   SEPT 2020----- \par ----august 2020  diagnosed sq cell ca tongue \par sq cell ca tongue -----s/p  surgery at [place]--\par DAX on cpap 8 cm h20---benefits from nightly use. 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  Patient at this time  will follow  the above mentioned recommendations and return back for follow up visit. If you have any questions  I can be reached  at #[phone] (beeper)  or  [phone] (office).\par \par Darby Krueger MD, FCCP \par Director, Pulmonary Hypertension Program \par [facility] \par Division of Pulmonary, Critical Care and Sleep Medicine \par \par [place]\par \par  Professor of Medicine \par [employer]\par

## 2021-04-13 NOTE — HISTORY OF PRESENT ILLNESS
[Home] : at home, [unfilled] , at the time of the visit. [Medical Office: ([facility])___] : at the medical office located in  [Verbal consent obtained from patient] : the patient, [unfilled] [Family Member] : family member [FreeTextEntry3] : son & daughter [TextBox_4] : francesco Amaya ----- son. I can be reached at [phone]. \par \par This letter  is regarding your patient  who  attended pulmonary out patient office today.  I have reviewed  patient's  past history, social history, family history and medication list. I also  reviewed nurse practitioners/ and fellows  notes and assessment and agree with it.  \par The patient was referred by Dr. Chen--S/P SURGERY FOR TONGUE CA   SEPT 2020-----\par \par ------No history of , fever, chills , rigors, chest pain, or hemoptysis. Questionable history of Raynaud's phenomenon. No h/o significant weight loss in last few months. No history of liver dysfunction , collagen vascular disorder or chronic thromboembolic disease. I would classify the patient's dyspnea as WHO  FUNCTIONAL CLASS II--------\par \par Has chronic cough associated with some shortness of breath. --COUGH BECAME WORSE  ON ACE INHIBITOR\par Has also been prescribed ACe inhibitor due to significant proteinuria from his diabtes. \par \par ----Echo  date----2019-- pasp 56 \par ----Pft date---------2020  MILD RESTRICTION \par --6MWT  2020--363 METERS\par ----Ct scan date-------SLIGHT UPPER LOBE FIBROSIS \par ---ct chest 2020---b/l upper lobe ggo\par ----Cath date------------ H/O LCX STENT, MT SINAInot done\par ----repeat   Jennifer womack  mean pa---24  mmhg\par \par   ---DAX  --- psg w/ahi=10.7  ON CPAP 8  CM H2O \par \par \par PET SCAN APRIL2021---NEW SUV AVID CERVICAL  LN  AND ALSO A CAVITARY LN LLL\par \par sept 2019 accompanied by children- feels improved since last visit\par \par FEB 2020------ DAX ON CPAP, NO SIGNIFICANT RESP COMPLAINTS\par \par april 2021  sq cell ca tongue -----s/p  surgery at [place]--\par DAX on cpap 8 cm h20---benefits from nightly use. Recent abn pet/ct - awaiting biopsy OF S[address]NE CERVICAL LN---PT HAS NO PULMONARY SYMTOMS  --NOT HAVING ANY SPUTUM------------MAY NEED BRONCH  AF TER THE CERVICAL LN BIIOPSY

## 2021-04-21 NOTE — PHYSICAL EXAM
[General Appearance - Well Developed] : well developed [Normal Appearance] : normal appearance [Well Groomed] : well groomed [General Appearance - Well Nourished] : well nourished [No Deformities] : no deformities [General Appearance - In No Acute Distress] : no acute distress [Eyelids - No Xanthelasma] : the eyelids demonstrated no xanthelasmas [Normal Conjunctiva] : the conjunctiva exhibited no abnormalities [Normal Oral Mucosa] : normal oral mucosa [No Oral Pallor] : no oral pallor [No Oral Cyanosis] : no oral cyanosis [Normal Jugular Venous A Waves Present] : normal jugular venous A waves present [Normal Jugular Venous V Waves Present] : normal jugular venous V waves present [No Jugular Venous Shrestha A Waves] : no jugular venous shrestha A waves [Respiration, Rhythm And Depth] : normal respiratory rhythm and effort [Auscultation Breath Sounds / Voice Sounds] : lungs were clear to auscultation bilaterally [Exaggerated Use Of Accessory Muscles For Inspiration] : no accessory muscle use [Heart Rate And Rhythm] : heart rate and rhythm were normal [Heart Sounds] : normal S1 and S2 [Murmurs] : no murmurs present [Abdomen Soft] : soft [Abdomen Tenderness] : non-tender [] : no hepato-splenomegaly [Abdomen Mass (___ Cm)] : no abdominal mass palpated [Abnormal Walk] : normal gait [Gait - Sufficient For Exercise Testing] : the gait was sufficient for exercise testing

## 2021-05-10 NOTE — HISTORY OF PRESENT ILLNESS
[Home] : at home, [unfilled] , at the time of the visit. [Medical Office: ([facility])___] : at the medical office located in  [Family Member] : family member [Verbal consent obtained from patient] : the patient, [unfilled] [TextBox_4] : francesco Amaya ----- son. I can be reached at [phone]. \par \par This letter  is regarding your patient  who  attended pulmonary out patient office today.  I have reviewed  patient's  past history, social history, family history and medication list. I also  reviewed nurse practitioners/ and fellows  notes and assessment and agree with it.  \par The patient was referred by Dr. Chen--S/P SURGERY FOR TONGUE CA   SEPT 2020-----\par \par ------No history of , fever, chills , rigors, chest pain, or hemoptysis. Questionable history of Raynaud's phenomenon. No h/o significant weight loss in last few months. No history of liver dysfunction , collagen vascular disorder or chronic thromboembolic disease. I would classify the patient's dyspnea as WHO  FUNCTIONAL CLASS II--------\par \par Has chronic cough associated with some shortness of breath. --COUGH BECAME WORSE  ON ACE INHIBITOR\par Has also been prescribed ACe inhibitor due to significant proteinuria from his diabtes. \par \par ----Echo  date----2019-- pasp 56 \par ----Pft date---------2020  MILD RESTRICTION \par --6MWT  2020--363 METERS\par ----Ct scan date-------SLIGHT UPPER LOBE FIBROSIS \par ---ct chest 2020---b/l upper lobe ggo\par ----Cath date------------ H/O LCX STENT, MT SINAInot done\par ----repeat   Jennifer womack  mean pa---24  mmhg\par \par   ---DAX  --- psg w/ahi=10.7  ON CPAP 8  CM H2O \par \par \par PET SCAN APRIL2021---NEW SUV AVID CERVICAL  LN  AND ALSO A CAVITARY LN LLL\par \par sept 2019 accompanied by children- feels improved since last visit\par \par FEB 2020------ DAX ON CPAP, NO SIGNIFICANT RESP COMPLAINTS\par \par april 2021  sq cell ca tongue -----s/p  surgery at [place]--\par DAX on cpap 8 cm h20---benefits from nightly use. Recent abn pet/ct - awaiting biopsy OF SUSANNE CERVICAL LN---PT HAS NO PULMONARY SYMTOMS  --NOT HAVING ANY SPUTUM------------MAY NEED BRONCH  AF TER THE CERVICAL LN BIIOPSY\par \par \par MAY 2021------  AS PER DAUGHTER the cervical lymph node biopsy was reported as positive for squamous cell carcinoma------- patient also have has a cystic lesion in the left--upper lobe of the lNG-------- bronchoscopy was performed at [facility] but was not conclusive-----he has been referred for resection at [facility] by thoracic surgery------ needs PFT and a follow-up visit [FreeTextEntry3] : son & daughter

## 2021-05-10 NOTE — DISCUSSION/SUMMARY
[FreeTextEntry1] : Assessment plan----------The patient has been referred here for further opinion regarding cough, shortness of breath. --Diabetes uNCONTROLLED-PROTEINURIA\par likely combination of allergy and reactive airways disease, maybe worsened by ACEi. Needs to be on an ACEi or ARB though because of significant proteinuria, 1/5g/day-S/P SURGERY FOR TONGUE CA   SEPT 2020----- \par ----august 2020  diagnosed sq cell ca tongue ue -----s/p  surgery at [facility]-- S/P RT-----AS PER DAUGHTER the cervical lymph node biopsy  IN APRIL 2021  was reported as positive for squamous cell carcinoma------- patient also have has a cystic lesion in the left--upper lobe of the lNG-------- bronchoscopy was performed at [facility] but was not conclusive-----he has been referred for resection at [facility] by thoracic surgery------ needs PFT and a follow-up visit\par \par \par 1) DAX- ON   at 8 cmH20  -benefits from nightly use-- \par 2)  Hyperreactive  AIRWAYS-- cough improved w/symbicort, \par 3) 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  Patient at this time  will follow  the above mentioned recommendations and return back for follow up visit. If you have any questions  I can be reached  at #[phone] (beeper)  or  [phone] (office).\par \par Darby Krueger MD, FCCP \par Director, Pulmonary Hypertension Program \par [facility] \par Division of Pulmonary, Critical Care and Sleep Medicine \par \par BEBETO Carlos-C\par \par  Professor of Medicine \par [facility]\par

## 2021-05-14 NOTE — HISTORY OF PRESENT ILLNESS
[Family Member] : family member [TextBox_4] : francesco Amaya ----- son. I can be reached at [phone]. \par \par This letter  is regarding your patient  who  attended pulmonary out patient office today.  I have reviewed  patient's  past history, social history, family history and medication list. I also  reviewed nurse practitioners/ and fellows  notes and assessment and agree with it.  \par The patient was referred by Dr. Chen--S/P SURGERY FOR TONGUE CA   SEPT 2020-----\par \par ------No history of , fever, chills , rigors, chest pain, or hemoptysis. Questionable history of Raynaud's phenomenon. No h/o significant weight loss in last few months. No history of liver dysfunction , collagen vascular disorder or chronic thromboembolic disease. I would classify the patient's dyspnea as WHO  FUNCTIONAL CLASS II--------\par \par Has chronic cough associated with some shortness of breath. --COUGH BECAME WORSE  ON ACE INHIBITOR\par Has also been prescribed ACe inhibitor due to significant proteinuria from his diabtes. \par \par ----Echo  date----2019-- pasp 56 \par ----Pft date---------2020  MILD RESTRICTION \par PFT MAY  2021---- MILD RESTRICTION \par --6MWT  2020--363 METERS\par ----Ct scan date-------SLIGHT UPPER LOBE FIBROSIS \par ---ct chest 2020---b/l upper lobe ggo\par ----Cath date------------ H/O LCX STENT, MT SINAInot done\par ----repeat   Jennifer womack  mean pa---24  mmhg\par \par   ---DAX  --- psg w/ahi=10.7  ON CPAP 8  CM H2O \par \par \par PET SCAN APRIL2021---NEW SUV AVID CERVICAL  LN  AND ALSO A CAVITARY LN LLL\par \par sept 2019 accompanied by children- feels improved since last visit\par \par FEB 2020------ DAX ON CPAP, NO SIGNIFICANT RESP COMPLAINTS\par \par april 2021  sq cell ca tongue -----s/p  surgery at [place]--\par DAX on cpap 8 cm h20---benefits from nightly use. Recent abn pet/ct - awaiting biopsy OF SUSANNE CERVICAL LN---PT HAS NO PULMONARY SYMTOMS  --NOT HAVING ANY SPUTUM------------MAY NEED BRONCH  AF TER THE CERVICAL LN BIIOPSY\[facility] MAY 2021------  AS PER DAUGHTER the cervical lymph node biopsy was reported as positive for squamous cell carcinoma------- patient also have has a cystic lesion in the left- LUNG SUPERIOR SEGMENT -------- bronchoscopy was performed at [facility] but was not conclusive-----he has been referred for resection at [facility] by thoracic surgery------ \[facility] \[facility] MAY 14 2021-----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

## 2021-05-14 NOTE — DISCUSSION/SUMMARY
[FreeTextEntry1] : Assessment plan----------The patient has been referred here for further opinion regarding cough, shortness of breath. --Diabetes uNCONTROLLED-PROTEINURIA\par likely combination of allergy and reactive airways disease, maybe worsened by ACEi. Needs to be on an ACEi or ARB though because of significant proteinuria, 1/5g/day-S/P SURGERY FOR TONGUE CA   SEPT 2020----- \par ----august 2020  diagnosed sq cell ca tongue ue -----s/p  surgery at [facility]-- S/P RT-----AS PER DAUGHTER the cervical lymph node biopsy  IN APRIL 2021  was reported as positive for squamous cell carcinoma------- patient also have has a cystic lesion in the left-lung superior segment ------ bronchoscopy was performed at [facility] but was not conclusive-----he has been referred for resection at [place] by thoracic surgery-----which was chronic\par Patient has metastatic squamous cell CA  OF TONGUE ---\par 1 LET LUNG SUPERIOR SEGEMNT LESION-bronchoscopy negative thoracic surgery\par 1) DAX- ON   at 8 cmH20  -benefits from nightly use-- \par 2)  Hyperreactive  AIRWAYS-- cough improved w/symbicort, \par 3) 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  Patient at this time  will follow  the above mentioned recommendations and return back for follow up visit. If you have any questions  I can be reached  at #[phone] (beeper)  or  [phone] (office).\par \par Darby Krueger MD, FCCP \par Director, Pulmonary Hypertension Program \par [facility] \par Division of Pulmonary, Critical Care and Sleep Medicine \par \par SANTO Carlos\par \par  Professor of Medicine \par [employer]\par

## 2021-05-19 NOTE — DISCUSSION/SUMMARY
[FreeTextEntry1] : 1.  cad - s/p stent to the lcx.  WIll continue meds..   \par \par 2.  htn -  on amlodipine 5 mg a day..   BP is much better controlled.\par \par 3.  the patient is at acceptable risk to proceed with his surgical procedure.  Anti platelet agents can be stopped as per the clinical needs of the surgeon 5 days before the procedure and started asap after

## 2021-05-19 NOTE — HISTORY OF PRESENT ILLNESS
[FreeTextEntry1] : Mr. Amaya is a 76 year who has cad s/p stent in 2004 at [place] and now in October, 2016 with a stent to the LCx.  Has some tension about his bp.  No chest pain or sob.  Has an exercise tolerance of a few blocks.  No TUAN< PND or orthopnea.  Follows a low salt diet.  S .  He had an angiogram in 2019 which showed nonobstructive disease.He has squamous cell cancer of tongue and had proton therapy and surgery

## 2021-06-11 NOTE — REVIEW OF SYSTEMS
[Fatigue] : fatigue [Recent Change In Weight] : ~T recent weight change [Dysphagia] : dysphagia [Constipation] : constipation [Negative] : Allergic/Immunologic [FreeTextEntry4] : as above

## 2021-06-11 NOTE — ASSESSMENT
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 \par Reviewed results of recent scans (need discs)\par Discussed metastatic disease and incurability\par Recommend carbo/taxol or abrxane/Keytruda\par Pt signed informed consent today\par Plan to treat 6/25\par Antiemetic sent\par refer to [facility]-for symptom management\par

## 2021-06-11 NOTE — HISTORY OF PRESENT ILLNESS
[Disease: _____________________] : Disease: [unfilled] [M: ___] : M[unfilled] [AJCC Stage: ____] : AJCC Stage: [unfilled] [de-identified] : Mr. Amaya is a pleasant 76 y/o M recently diagnosed with SCC of the R lateral tongue, presented to [facility] last year for ill-fitting dentures causing tongue irritation. ulcerative 2cm mass of R lateral tongue noted by ENT. Biopsy August 2020 demonstrated well to moderately differentiated squamous cell carcinoma.\par \par CT neck 8/26/20: tongue lesion poorly visualized, no pathological cervical nodes\par \par PET-CT 9/4/20: FDG avid lesion of anterior right oropharynx/lateral tongue SUV 5.5, no cervical LAD. Additionally multiple mildly enlarged, faintly calcified, mildly avid right paratracheal, subcarinal, and b/l hilar nodes c/w inflammatory etiology suspicious for sarcoidosis.\par \par On 9/10/20 he underwent right hemiglossectomy and partial neck dissection with Dr. Darinel Servin at [facility]. Pathology revealed a 1.2x1.0x0.55cm well to moderately differentiated squamous cell carcinoma, DOI 5.5mm, PNI+, LVI-, a closest lateral margin was 4mm. 4 lymph nodes from right levels IA, IB, and 2 were evaluated and negative for carcinoma. He was treated with adjuvant proton tx. \par \par March 2021, surveillance PET/CT showed cervical adenopathy and KEZIA cystic lesion. Bx of cervical LN pos for sq cell ca. FNA of lung lesion non-diagnostic. Wedge resection of lung lesion was c/w sq cell ca, morphologically distinct but likely met. PD-L1 10-15% CPS. \par \par Of note, pt is a former smoker- smoked for 4-5 years 1 pack/week, quit 50 years ago, occasional etoh, +history of chewing tobacco for few years, quit 30 years ago, dentures for 20 years. \par \par Today, pt co severe oral/neck pain radiating to head and neck. He was given oxycodone for this- this only partially relieves the pain. He is taking 10 mg BID at this time. He was also taking ibuprofen which was not helping. He reports some constipation- takes Miralax for this as needed. He is currently using CPAP for sleep apnea since one year. He endorses 30 lb weight loss over one year. He has no dysphagia but can only take liquids because of lack of dentures. \par \par \par  [de-identified] : sq cell ca

## 2021-06-11 NOTE — CONSULT LETTER
[Dear  ___] : Dear  [unfilled], [Consult Letter:] : I had the pleasure of evaluating your patient, [unfilled]. [Please see my note below.] : Please see my note below. [Consult Closing:] : Thank you very much for allowing me to participate in the care of this patient.  If you have any questions, please do not hesitate to contact me. [Sincerely,] : Sincerely, [FreeTextEntry2] : Dr. Marcus Soto [FreeTextEntry3] : Kraig Porras MD\par \par

## 2021-06-11 NOTE — REASON FOR VISIT
[Initial Consultation] : an initial consultation [Family Member] : family member [FreeTextEntry2] : head and neck cancer

## 2021-06-11 NOTE — PHYSICAL EXAM
[Restricted in physically strenuous activity but ambulatory and able to carry out work of a light or sedentary nature] : Status 1- Restricted in physically strenuous activity but ambulatory and able to carry out work of a light or sedentary nature, e.g., light house work, office work [Normal] : affect appropriate [de-identified] : rt neck stiffness, s/p reconstruction, edentulous

## 2021-06-17 NOTE — VITALS
[Maximal Pain Intensity: 4/10] : 4/10 [Least Pain Intensity: 1/10] : 1/10 [Pain Description/Quality: ___] : Pain description/quality: [unfilled] [Pain Duration: ___] : Pain duration: [unfilled] [Pain Location: ___] : Pain Location: [unfilled] [Opioid] : opioid [80: Normal activity with effort; some signs or symptoms of disease.] : 80: Normal activity with effort; some signs or symptoms of disease.  [ECOG Performance Status: 2 - Ambulatory and capable of all self care but unable to carry out any work activities] : Performance Status: 2 - Ambulatory and capable of all self care but unable to carry out any work activities. Up and about more than 50% of waking hours

## 2021-06-17 NOTE — REASON FOR VISIT
[Consideration of Curative Therapy] : consideration of curative therapy for [Family Member] : family member

## 2021-06-18 NOTE — REVIEW OF SYSTEMS
[Negative] : Allergic/Immunologic [FreeTextEntry4] : h/o surgery for h&n cancer [FreeTextEntry6] : h/o lung surgery in 6/2021

## 2021-06-18 NOTE — HISTORY OF PRESENT ILLNESS
[FreeTextEntry1] : Mr. 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 He subsequently underwent adjuvant radiation with protons at [facility], completing adjuvant RT 11/2020. On surveilance scans 4/2021, note to have recurrent and metastatic disease concern s/p confirmatory biopsy. Patient presents to discuss radiation therapy.\par \par Brief Oncological History:\par Presenting as ill-fit of his dentures causing tongue irritation. Ulcerative 2cm mass of R lateral tongue noted by ENT. Biopsy August 2020 demonstrated well to moderately differentiated squamous cell carcinoma\par \par CT neck 8/26/20: Tongue lesion poorly visualized, no pathological cervical nodes.\par \par PET-CT 9/4/20: FDG avid lesion of anterior right oropharynx/lateral tongue SUV 5.5, no cervical LAD. Additionally multiple mildly enlarged, faintly calcified, mildly avid right paratracheal, subcarinal, and b/l hilar nodes c/w inflammatory etiology suspicious for sarcoidosis.\par \par On 9/10/20 he underwent right hemiglossectomy and partial neck dissection with Dr. Darinel Servin at [facility]. Pathology revealed a 1.2x1.0x0.55cm well to moderately differentiated squamous cell carcinoma, DOI 5.5mm, PNI+, LVI-, a closest lateral margin was 4mm. 4 lymph nodes from right levels IA, IB, and 2 were evaluated and negative for carcinoma.\par \par Patient received proton therapy and subsequently in [facility]. \par \par Surveillance 3/15/21 PET/CT showed cervical adenopathy and LLL cystic lesion. \par \par Bx of cervical LN pos for sq cell ca. FNA of lung lesion non-diagnostic. \par \par 6/9/2021 - Wedge resection of lung lesion was c/w sq cell ca, morphologically distinct but likely met. PD-L1 10-15% CPS. Resection margins negative. \par \par Today he notes plan to start chemotherapy and immunotherapy w/ Dr. Porras next week. He has developed R neck pain and jaw pain in the last 3 weeks, radiating to forehead and into jaw. Patient w/ son and are anxious regarding recurrence and now metastatic disease with lung nodule pointing to likely metastatic disease vs. new primary. \par \par

## 2021-06-18 NOTE — PHYSICAL EXAM
[Sclera] : the sclera and conjunctiva were normal [Outer Ear] : the ears and nose were normal in appearance [] : no respiratory distress [Heart Rate And Rhythm] : heart rate and rhythm were normal [Normal] : no JVD, no calf tenderness [Skin Color & Pigmentation] : normal skin color and pigmentation [No Focal Deficits] : no focal deficits [Oriented To Time, Place, And Person] : oriented to person, place, and time [de-identified] : R supraclavilcular adenopathy, aprox 4x4cm in size.

## 2021-06-18 NOTE — HISTORY OF PRESENT ILLNESS
[Home] : at home, [unfilled] , at the time of the visit. [Medical Office: ([facility])___] : at the medical office located in  [Verbal consent obtained from patient] : the patient, [unfilled] [FreeTextEntry1] : 76yoM with recently diagnosed SCC of the R tongue presents for initial palliative care visit, referred by Dr. Porras.\par PMH also significant for DAX on CPAP, remote h/o tobacco smoking and chewing. \par \par Patient initially diagnosed 8/2020, and on 9/10/20 he underwent R hemiglossectomy and partial neck dissection with Dr. Darinel Servin at [facility]. Pathology revealed a 1.2x1.0x0.55cm well to moderately differentiated squamous cell carcinoma, DOI 5.5mm, PNI+, LVI-, a closest lateral margin was 4mm. 4 lymph nodes from right levels IA, IB, and 2 were evaluated and negative for carcinoma. He was treated with adjuvant proton tx at [facility].  On surveillance PET/CT in 3/2021 he was found to have cervical adenopathy and KEZIA cystic lesion. Bx of cervical LN pos for sq cell ca. FNA of lung lesion non-diagnostic. Wedge resection of lung lesion was c/w sq cell ca, morphologically distinct but likely met. PD-L1 10-15% CPS. \par \par Patient to start carbo/Abraxane/Keytruda next week.\par \par Patient reports pain that begins in the R neck that travels up to the post-auricular region, travels up and around his ear and towards the R temporal area. The pain began in May, shortly after he had a biopsy of the area. \par He is currently using Oxycodone 10mg BID and Gabapentin 300mg BID. The gabapentin started one week ago and he has found that this has been helping from keeping the pain from traveling to severe levels. \par \par He is edentulous so he only eats soft foods. Is vegetarian. Drinks fresh fruit/vegetable juices, wheatgrass. \par \par ROS:\par +voice changes\par +constipation - uses colace, miralax which are helping\par +occasional fatigue\par +mood has been a bit lower lately since recurrence of disease\par +some LE swelling\par +30lb weight loss over the last year\par Denies trouble sleeping, SOB, dysphagia\par \par Patient is , lives with his wife. He has six children. \par Is independent in ADLs.  He used to run a grocery store up until about 10 years ago.\par \par [employer] Ref#: [id number]

## 2021-06-18 NOTE — ASSESSMENT
[FreeTextEntry1] : 76yoM with:\par \par 1. SCC of tongue, recurrent - Patient to begin systemic treatment next week with Carbo/Abraxane/Keytruda. \par \par 2. R neck pain - likely related to recurrent neoplasm - Recommend increase of gabapentin to 300mg TID.  May initiate taper of oxycodone as this does not seem to be doing much for his pain.\par \par 3. Constipation - c/w miralax, colace as he is benefiting from this regimen. \par \par 4. Encounter for Palliative Care - Emotional support provided.\par \par Follow up in 1 month, call sooner with questions or issues.\par

## 2021-06-23 PROBLEM — R80.9 PROTEINURIA, UNSPECIFIED: Chronic | Status: ACTIVE | Noted: 2021-01-01

## 2021-06-23 PROBLEM — C44.92 SQUAMOUS CELL CARCINOMA OF SKIN, UNSPECIFIED: Chronic | Status: ACTIVE | Noted: 2021-01-01

## 2021-06-23 PROBLEM — R69 ILLNESS, UNSPECIFIED: Chronic | Status: ACTIVE | Noted: 2021-01-01

## 2021-06-23 PROBLEM — C79.9 SECONDARY MALIGNANT NEOPLASM OF UNSPECIFIED SITE: Chronic | Status: ACTIVE | Noted: 2021-01-01

## 2021-06-23 PROBLEM — M79.2 NEURALGIA AND NEURITIS, UNSPECIFIED: Chronic | Status: ACTIVE | Noted: 2021-01-01

## 2021-06-23 PROBLEM — R60.0 LOCALIZED EDEMA: Chronic | Status: ACTIVE | Noted: 2021-01-01

## 2021-06-23 PROBLEM — E55.9 VITAMIN D DEFICIENCY, UNSPECIFIED: Chronic | Status: ACTIVE | Noted: 2021-01-01

## 2021-06-23 PROBLEM — E87.1 HYPO-OSMOLALITY AND HYPONATREMIA: Chronic | Status: ACTIVE | Noted: 2021-01-01

## 2021-06-23 PROBLEM — J45.909 UNSPECIFIED ASTHMA, UNCOMPLICATED: Chronic | Status: ACTIVE | Noted: 2021-01-01

## 2021-06-23 PROBLEM — G47.33 OBSTRUCTIVE SLEEP APNEA (ADULT) (PEDIATRIC): Chronic | Status: ACTIVE | Noted: 2021-01-01

## 2021-06-23 PROBLEM — E11.319 TYPE 2 DIABETES MELLITUS WITH UNSPECIFIED DIABETIC RETINOPATHY WITHOUT MACULAR EDEMA: Chronic | Status: ACTIVE | Noted: 2021-01-01

## 2021-06-23 PROBLEM — C06.9 MALIGNANT NEOPLASM OF MOUTH, UNSPECIFIED: Chronic | Status: ACTIVE | Noted: 2021-01-01

## 2021-06-28 NOTE — REASON FOR VISIT
[Family Member] : family member [Follow-Up Visit] : a follow-up [FreeTextEntry2] : head and neck cancer

## 2021-06-28 NOTE — HISTORY OF PRESENT ILLNESS
[Disease: _____________________] : Disease: [unfilled] [M: ___] : M[unfilled] [AJCC Stage: ____] : AJCC Stage: [unfilled] [de-identified] : Mr. Amaya is a pleasant 76 y/o M recently diagnosed with SCC of the R lateral tongue, presented to [facility] last year for ill-fitting dentures causing tongue irritation. ulcerative 2cm mass of R lateral tongue noted by ENT. Biopsy August 2020 demonstrated well to moderately differentiated squamous cell carcinoma.\par \par CT neck 8/26/20: tongue lesion poorly visualized, no pathological cervical nodes\par \par PET-CT 9/4/20: FDG avid lesion of anterior right oropharynx/lateral tongue SUV 5.5, no cervical LAD. Additionally multiple mildly enlarged, faintly calcified, mildly avid right paratracheal, subcarinal, and b/l hilar nodes c/w inflammatory etiology suspicious for sarcoidosis.\par \par On 9/10/20 he underwent right hemiglossectomy and partial neck dissection with Dr. Darinel Servin at [facility]. Pathology revealed a 1.2x1.0x0.55cm well to moderately differentiated squamous cell carcinoma, DOI 5.5mm, PNI+, LVI-, a closest lateral margin was 4mm. 4 lymph nodes from right levels IA, IB, and 2 were evaluated and negative for carcinoma. He was treated with adjuvant proton tx. \par \par March 2021, surveillance PET/CT showed cervical adenopathy and KEZIA cystic lesion. Bx of cervical LN pos for sq cell ca. FNA of lung lesion non-diagnostic. Wedge resection of lung lesion was c/w sq cell ca, morphologically distinct but likely met. PD-L1 10-15% CPS. \par \par Of note, pt is a former smoker- smoked for 4-5 years 1 pack/week, quit 50 years ago, occasional etoh, +history of chewing tobacco for few years, quit 30 years ago, dentures for 20 years. \par \par Today, pt co severe oral/neck pain radiating to head and neck. He was given oxycodone for this- this only partially relieves the pain. He is taking 10 mg BID at this time. He was also taking ibuprofen which was not helping. He reports some constipation- takes Miralax for this as needed. He is currently using CPAP for sleep apnea since one year. He endorses 30 lb weight loss over one year. He has no dysphagia but can only take liquids because of lack of dentures. \par \par 6/25/21: Pt presents for first treatment today C#1 Carbo/ Abraxane/ Keytruda. He c/o pain but states he forgot to bring his pain medication along with him today. He is being followed now by palliative care. No new complaints\par \par  [de-identified] : sq cell ca

## 2021-06-28 NOTE — REVIEW OF SYSTEMS
[Fatigue] : fatigue [Recent Change In Weight] : ~T recent weight change [Dysphagia] : dysphagia [Constipation] : constipation [Negative] : Heme/Lymph [FreeTextEntry4] : as above

## 2021-06-28 NOTE — ASSESSMENT
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 \par \par \par Starting C#1 carbo/ abrxane/Keytruda today\par Antiemetic sent\par Continue f/u with  pall care-for symptom management\par WIll give usual pain med today in treatment OXY 10 mg. Will reassess for relief \par OV 3 weeks

## 2021-06-28 NOTE — PHYSICAL EXAM
[Restricted in physically strenuous activity but ambulatory and able to carry out work of a light or sedentary nature] : Status 1- Restricted in physically strenuous activity but ambulatory and able to carry out work of a light or sedentary nature, e.g., light house work, office work [Normal] : grossly intact [de-identified] : rt neck stiffness, s/p reconstruction, edentulous

## 2021-07-19 NOTE — HISTORY OF PRESENT ILLNESS
[Disease: _____________________] : Disease: [unfilled] [M: ___] : M[unfilled] [AJCC Stage: ____] : AJCC Stage: [unfilled] [de-identified] : Mr. 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 ulcerative 2cm mass of R lateral tongue noted by ENT. Biopsy August 2020 demonstrated well to moderately differentiated squamous cell carcinoma.\par \par CT neck 8/26/20: tongue lesion poorly visualized, no pathological cervical nodes\par \par PET-CT 9/4/20: FDG avid lesion of anterior right oropharynx/lateral tongue SUV 5.5, no cervical LAD. Additionally multiple mildly enlarged, faintly calcified, mildly avid right paratracheal, subcarinal, and b/l hilar nodes c/w inflammatory etiology suspicious for sarcoidosis.\par \par On 9/10/20 he underwent right hemiglossectomy and partial neck dissection with Dr. Darinel Servin at [facility]. Pathology revealed a 1.2x1.0x0.55cm well to moderately differentiated squamous cell carcinoma, DOI 5.5mm, PNI+, LVI-, a closest lateral margin was 4mm. 4 lymph nodes from right levels IA, IB, and 2 were evaluated and negative for carcinoma. He was treated with adjuvant proton tx. \par \par March 2021, surveillance PET/CT showed cervical adenopathy and KEZIA cystic lesion. Bx of cervical LN pos for sq cell ca. FNA of lung lesion non-diagnostic. Wedge resection of lung lesion was c/w sq cell ca, morphologically distinct but likely met. PD-L1 10-15% CPS. \par \par Of note, pt is a former smoker- smoked for 4-5 years 1 pack/week, quit 50 years ago, occasional etoh, +history of chewing tobacco for few years, quit 30 years ago, dentures for 20 years. \par \par Today, pt co severe oral/neck pain radiating to head and neck. He was given oxycodone for this- this only partially relieves the pain. He is taking 10 mg BID at this time. He was also taking ibuprofen which was not helping. He reports some constipation- takes Miralax for this as needed. He is currently using CPAP for sleep apnea since one year. He endorses 30 lb weight loss over one year. He has no dysphagia but can only take liquids because of lack of dentures. \par \par 6/25/21: Pt presents for first treatment today C#1 Carbo/ Abraxane/ Keytruda. He c/o pain but states he forgot to bring his pain medication along with him today. He is being followed now by palliative care. No new complaints\par \par 7/16/21:  Pt returned for f/u visit.  Pt denies any irAEs such as diarrhea, SOB, Rash, cough.   Pt denies other side effects and offers no complaints.   Pt will continue on oxycodone for pain management.  Patient will receive  cycle 2 of treatment today.  \par  [de-identified] : sq cell ca

## 2021-07-19 NOTE — PHYSICAL EXAM
[Restricted in physically strenuous activity but ambulatory and able to carry out work of a light or sedentary nature] : Status 1- Restricted in physically strenuous activity but ambulatory and able to carry out work of a light or sedentary nature, e.g., light house work, office work [Normal] : affect appropriate [de-identified] : rt neck stiffness, s/p reconstruction

## 2021-07-19 NOTE — ASSESSMENT
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 \par \par \par S/P  C#1 carbo/ abrxane/Keytruda 6/25/21. Due for C#2 today\par Pt will continue with treatment regimen\par Continue f/u with  pall care-for symptom management\par will continue with Oxycodone 10mg for pain management\par Will request scans after third cycle of treatment.  \par No irAEs or other complaints\par OV 3 weeks

## 2021-07-19 NOTE — REASON FOR VISIT
[Follow-Up Visit] : a follow-up [Family Member] : family member [FreeTextEntry2] : head and neck cancer

## 2021-08-02 NOTE — PHYSICAL EXAM
[FreeTextEntry1] : GEN: AAOx3, NAD.\par PSYCH: Normal mood and affect. Responds appropriately to commands.\par EYES: Sclerae Anicteric. No discharge. EOMI.\par RESP: Breathing unlabored.\par CV: Radial pulses 2+ \par EXT: trace b/l LE edema, b/l well healed incision b/l knees\par SKIN: No lesions noted. Incisions well healed,  no erythema  \par LYMPH: No supraclavicular, anterior or posterior cervical lymphadenopathy appreciated.\par GAIT: Non antalgic, Normal reciprocating heel to toe.\par STRENGTH: 5/5 bilateral upper extremities\par REFLEXES: 2+ symmetric brachioradialis\par SENSATION: Grossly intact to light touch bilateral upper extremities.\par INSP: no visible swelling appreciated\par CERVICAL ROM: pain and limited ROM with rotation to the left (pain on right). \par SHOULDER ROM: Full and pain free bilaterally.\par PALP: there is no tenderness to palpation b/l masseter, mandible + firmness to palpation anterior neck, incision well healed, reports discomfort with numbness radiating to the right clavicle\par MOUTH OPENIN.5 cm (edentulous)\par \par \par \par

## 2021-08-02 NOTE — HISTORY OF PRESENT ILLNESS
[FreeTextEntry1] : Stoney Amaya is a 76 year old M with SCC of the R tongue (dx. 8/2020, s/p R hemiglossectomy with partial neck dissection, with lung lesion on PET in 2021 s/p wedge resection, chemo), DAX on CPAP presenting for initial PM&R evaluation.  He is accompanied by his daughter today. \par \par Patient sees Dr. Jose Cornell for pain and takes Oxycodone 10mg BID and Gabapentin 400mg TID.  \par Pt report right sided neck pain radiating to R chest. Denies shoulder pain. States pain can be 2 or 3/10, however at night can be worse.  Pain is 5/10 currently.  \par He reports r sided neck pain with rotation to the left.  \par He has been attending acupuncture treatments twice a week. \par Patient is , lives with his wife. He has six children.   Lives in elevator without stairs. \par and is independent in ADLs, ambulates without device. He used to run a grocery store up until about 10 years ago.\par He has not been wearing dentures for the past year due to irritation, reports he is eating soft diet however daughter reports patient sometimes drips liquids from the right side of his mouth.   He reports neck swelling which becomes more firm at times and at times is softer. Symptoms of pain and discomfort are milder when swelling softens.

## 2021-08-02 NOTE — ASSESSMENT
[FreeTextEntry1] : 76 year old male presenting for neck stiffness and swelling s/p treatment for scc of the tongue with metastasis\par -start lymphedema therapy for head and neck, may benefit from compression, lymphedema pump\par -also referred to SLP for speech and swallow evaluation\par -follow up in 4-6 weeks

## 2021-08-06 NOTE — HISTORY OF PRESENT ILLNESS
[Disease: _____________________] : Disease: [unfilled] [M: ___] : M[unfilled] [AJCC Stage: ____] : AJCC Stage: [unfilled] [de-identified] : Mr. 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 ulcerative 2cm mass of R lateral tongue noted by ENT. Biopsy August 2020 demonstrated well to moderately differentiated squamous cell carcinoma.\par \par CT neck 8/26/20: tongue lesion poorly visualized, no pathological cervical nodes\par \par PET-CT 9/4/20: FDG avid lesion of anterior right oropharynx/lateral tongue SUV 5.5, no cervical LAD. Additionally multiple mildly enlarged, faintly calcified, mildly avid right paratracheal, subcarinal, and b/l hilar nodes c/w inflammatory etiology suspicious for sarcoidosis.\par \par On 9/10/20 he underwent right hemiglossectomy and partial neck dissection with Dr. Darinel Servin at [facility]. Pathology revealed a 1.2x1.0x0.55cm well to moderately differentiated squamous cell carcinoma, DOI 5.5mm, PNI+, LVI-, a closest lateral margin was 4mm. 4 lymph nodes from right levels IA, IB, and 2 were evaluated and negative for carcinoma. He was treated with adjuvant proton tx. \par \par March 2021, surveillance PET/CT showed cervical adenopathy and KEZIA cystic lesion. Bx of cervical LN pos for sq cell ca. FNA of lung lesion non-diagnostic. Wedge resection of lung lesion was c/w sq cell ca, morphologically distinct but likely met. PD-L1 10-15% CPS. \par \par Of note, pt is a former smoker- smoked for 4-5 years 1 pack/week, quit 50 years ago, occasional etoh, +history of chewing tobacco for few years, quit 30 years ago, dentures for 20 years. \par \par Today, pt co severe oral/neck pain radiating to head and neck. He was given oxycodone for this- this only partially relieves the pain. He is taking 10 mg BID at this time. He was also taking ibuprofen which was not helping. He reports some constipation- takes Miralax for this as needed. He is currently using CPAP for sleep apnea since one year. He endorses 30 lb weight loss over one year. He has no dysphagia but can only take liquids because of lack of dentures. \par \par 6/25/21: Pt presents for first treatment today C#1 Carbo/ Abraxane/ Keytruda. He c/o pain but states he forgot to bring his pain medication along with him today. He is being followed now by palliative care. No new complaints\par \par 7/16/21:  Pt returned for f/u visit.  Pt denies any irAEs such as diarrhea, SOB, Rash, cough.   Pt denies other side effects and offers no complaints.   Pt will continue on oxycodone for pain management.  Patient will receive  cycle 2 of treatment today.  \par \par 8/6/21: Doing well. Pain has improved with CBD and THC. Has been recommended to undergo speech and lymphedema therapy. He is here for day 1, cycle 3. Tolerating carbo/abraxane/keytruda well. No AEs. \par  [de-identified] : sq cell ca

## 2021-08-06 NOTE — PHYSICAL EXAM
[Restricted in physically strenuous activity but ambulatory and able to carry out work of a light or sedentary nature] : Status 1- Restricted in physically strenuous activity but ambulatory and able to carry out work of a light or sedentary nature, e.g., light house work, office work [Normal] : affect appropriate [de-identified] : rt neck stiffness, s/p reconstruction

## 2021-08-06 NOTE — ASSESSMENT
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 \par \par \par S/P  C#2 carbo/ abraxane/Keytruda 6/25/21. Due for C#3 today\par Pt will continue with treatment regimen\par Continue f/u with  pall care-for symptom management\par Will request scans after third or 4th cycle of treatment.  Ordered today\par NGS reviewed- no actionable mutations. \par Microsatellite status MS-Stable §\par Tumor Mutational [place] 5 Muts/Mb §\par CCND1 amplification §\par CDKN2A D84N\par DIS3 amplification §\par FGF19 amplification §\par FGF3 amplification §\par FGF4 amplification §\par GATA6 amplification §\par IGL fusion §\par TERT promoter -124C>T\par TP53 R306*\par TP53 R282W\par No irAEs or other complaints\par Reports dark stool- started 2 weeks after first chemo, no diarrhea or constipation. This seems to be resolving. Recommend GI appt. CBC from today shows Hgb of 10.9, MCV normal- not suggestive of major bleed. \par OV 3 weeks

## 2021-09-17 NOTE — ASSESSMENT
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 \par S/P  C#3 carbo/ abraxane/Keytruda since 6/25/21.\par Reviewed all scans with pt and his daughter- increased size of cervical nodes could be related to immunotherapy response. \par Cough likely aspiration. Swallow exercises recommended. \par Continue f/u with  [facility]ospit[facility]-for symptom management\par Repeat PET/CT after 2 cycles of maintenance immunotherapy. \par NGS reviewed- no actionable mutations. \par Microsatellite status MS-Stable §\par Tumor Mutational [place] 5 Muts/Mb §\par CCND1 amplification §\par CDKN2A D84N\par DIS3 amplification §\par FGF19 amplification §\par FGF3 amplification §\par FGF4 amplification §\par GATA6 amplification §\par IGL fusion §\par TERT promoter -124C>T\par TP53 R306*\par TP53 R282W\par No irAEs or other complaints\par CEA elevated- no signficance. Will stop checking\par All questions answered.

## 2021-09-17 NOTE — PHYSICAL EXAM
[Restricted in physically strenuous activity but ambulatory and able to carry out work of a light or sedentary nature] : Status 1- Restricted in physically strenuous activity but ambulatory and able to carry out work of a light or sedentary nature, e.g., light house work, office work [Normal] : affect appropriate [de-identified] : rt neck stiffness, s/p reconstruction

## 2021-09-17 NOTE — HISTORY OF PRESENT ILLNESS
[Disease: _____________________] : Disease: [unfilled] [M: ___] : M[unfilled] [AJCC Stage: ____] : AJCC Stage: [unfilled] [de-identified] : Mr. Amaya is a pleasant 77 y/o M recently diagnosed with SCC of the R lateral tongue, presented to [facility] last year for ill-fitting dentures causing tongue irritation. ulcerative 2cm mass of R lateral tongue noted by ENT. Biopsy August 2020 demonstrated well to moderately differentiated squamous cell carcinoma.\par \par CT neck 8/26/20: tongue lesion poorly visualized, no pathological cervical nodes\par \par PET-CT 9/4/20: FDG avid lesion of anterior right oropharynx/lateral tongue SUV 5.5, no cervical LAD. Additionally multiple mildly enlarged, faintly calcified, mildly avid right paratracheal, subcarinal, and b/l hilar nodes c/w inflammatory etiology suspicious for sarcoidosis.\par \par On 9/10/20 he underwent right hemiglossectomy and partial neck dissection with Dr. Darinel Servin at [facility]. Pathology revealed a 1.2x1.0x0.55cm well to moderately differentiated squamous cell carcinoma, DOI 5.5mm, PNI+, LVI-, a closest lateral margin was 4mm. 4 lymph nodes from right levels IA, IB, and 2 were evaluated and negative for carcinoma. He was treated with adjuvant proton tx. \par \par March 2021, surveillance PET/CT showed cervical adenopathy and KEZIA cystic lesion. Bx of cervical LN pos for sq cell ca. FNA of lung lesion non-diagnostic. Wedge resection of lung lesion was c/w sq cell ca, morphologically distinct but likely met. PD-L1 10-15% CPS. \par \par Of note, pt is a former smoker- smoked for 4-5 years 1 pack/week, quit 50 years ago, occasional etoh, +history of chewing tobacco for few years, quit 30 years ago, dentures for 20 years. \par \par Today, pt co severe oral/neck pain radiating to head and neck. He was given oxycodone for this- this only partially relieves the pain. He is taking 10 mg BID at this time. He was also taking ibuprofen which was not helping. He reports some constipation- takes Miralax for this as needed. He is currently using CPAP for sleep apnea since one year. He endorses 30 lb weight loss over one year. He has no dysphagia but can only take liquids because of lack of dentures. \par \par 6/25/21: Pt presents for first treatment today C#1 Carbo/ Abraxane/ Keytruda. He c/o pain but states he forgot to bring his pain medication along with him today. He is being followed now by palliative care. No new complaints\par \par 7/16/21:  Pt returned for f/u visit.  Pt denies any irAEs such as diarrhea, SOB, Rash, cough.   Pt denies other side effects and offers no complaints.   Pt will continue on oxycodone for pain management.  Patient will receive  cycle 2 of treatment today.  \par \par 8/6/21: Doing well. Pain has improved with CBD and THC. Has been recommended to undergo speech and lymphedema therapy. He is here for day 1, cycle 3. Tolerating carbo/abraxane/keytruda well. No AEs. \par \par 8/31/21: Rt neck and throat pain. Pain seems to be getting worse again. He started PT last week and I think the pain is likely related to it. He has been coughing more especially after eating or during eating, bringing up while phlegm. He just has swallow eval and passed. \par  [de-identified] : sq cell ca

## 2021-09-17 NOTE — REVIEW OF SYSTEMS
[Fatigue] : fatigue [Recent Change In Weight] : ~T recent weight change [Constipation] : constipation [Negative] : ENT [Dysphagia] : no dysphagia

## 2021-09-20 NOTE — HISTORY OF PRESENT ILLNESS
[Disease: _____________________] : Disease: [unfilled] [M: ___] : M[unfilled] [AJCC Stage: ____] : AJCC Stage: [unfilled] [de-identified] : Mr. Amaya is a pleasant 75 y/o M recently diagnosed with SCC of the R lateral tongue, presented to [facility] last year for ill-fitting dentures causing tongue irritation. ulcerative 2cm mass of R lateral tongue noted by ENT. Biopsy August 2020 demonstrated well to moderately differentiated squamous cell carcinoma.\par \par CT neck 8/26/20: tongue lesion poorly visualized, no pathological cervical nodes\par \par PET-CT 9/4/20: FDG avid lesion of anterior right oropharynx/lateral tongue SUV 5.5, no cervical LAD. Additionally multiple mildly enlarged, faintly calcified, mildly avid right paratracheal, subcarinal, and b/l hilar nodes c/w inflammatory etiology suspicious for sarcoidosis.\par \par On 9/10/20 he underwent right hemiglossectomy and partial neck dissection with Dr. Darinel Servin at [facility]. Pathology revealed a 1.2x1.0x0.55cm well to moderately differentiated squamous cell carcinoma, DOI 5.5mm, PNI+, LVI-, a closest lateral margin was 4mm. 4 lymph nodes from right levels IA, IB, and 2 were evaluated and negative for carcinoma. He was treated with adjuvant proton tx. \par \par March 2021, surveillance PET/CT showed cervical adenopathy and KEZIA cystic lesion. Bx of cervical LN pos for sq cell ca. FNA of lung lesion non-diagnostic. Wedge resection of lung lesion was c/w sq cell ca, morphologically distinct but likely met. PD-L1 10-15% CPS. \par \par Of note, pt is a former smoker- smoked for 4-5 years 1 pack/week, quit 50 years ago, occasional etoh, +history of chewing tobacco for few years, quit 30 years ago, dentures for 20 years. \par \par Today, pt co severe oral/neck pain radiating to head and neck. He was given oxycodone for this- this only partially relieves the pain. He is taking 10 mg BID at this time. He was also taking ibuprofen which was not helping. He reports some constipation- takes Miralax for this as needed. He is currently using CPAP for sleep apnea since one year. He endorses 30 lb weight loss over one year. He has no dysphagia but can only take liquids because of lack of dentures. \par \par 6/25/21: Pt presents for first treatment today C#1 Carbo/ Abraxane/ Keytruda. He c/o pain but states he forgot to bring his pain medication along with him today. He is being followed now by palliative care. No new complaints\par \par 7/16/21:  Pt returned for f/u visit.  Pt denies any irAEs such as diarrhea, SOB, Rash, cough.   Pt denies other side effects and offers no complaints.   Pt will continue on oxycodone for pain management.  Patient will receive  cycle 2 of treatment today.  \par \par 8/6/21: Doing well. Pain has improved with CBD and THC. Has been recommended to undergo speech and lymphedema therapy. He is here for day 1, cycle 3. Tolerating carbo/abraxane/keytruda well. No AEs. \par \par 8/31/21: Rt neck and throat pain. Pain seems to be getting worse again. He started PT last week and I think the pain is likely related to it. He has been coughing more especially after eating or during eating, bringing up while phlegm. He just has swallow eval and passed. \par \par 9/17/21: Pt returns for follow-up. He is accompanied by his son. Pt reports that he continues to experience R facial/neck pain. The pain is not constant, but it can intermittently be an 8/10. He believes it may be slightly worse since his last follow-up visit. He continues to participate in Speech Therapy and is unsure if this might be contributing. No recent fevers, vision changes, chest pain, shortness of breath, nausea, vomiting, or abdominal pain. He reports good PO intake and he is remaining active at home. His weight is currently stable. [de-identified] : sq cell ca

## 2021-09-20 NOTE — PHYSICAL EXAM
[Restricted in physically strenuous activity but ambulatory and able to carry out work of a light or sedentary nature] : Status 1- Restricted in physically strenuous activity but ambulatory and able to carry out work of a light or sedentary nature, e.g., light house work, office work [Normal] : affect appropriate [de-identified] : Moist mucus membranes [de-identified] : R neck stiffness (stable), S/p reconstruction

## 2021-09-20 NOTE — REVIEW OF SYSTEMS
[Fever] : no fever [Chills] : no chills [Eye Pain] : no eye pain [Red Eyes] : eyes not red [Mucosal Pain] : no mucosal pain [Chest Pain] : no chest pain [Palpitations] : no palpitations [Shortness Of Breath] : no shortness of breath [Wheezing] : no wheezing [Abdominal Pain] : no abdominal pain [Vomiting] : no vomiting [Dysuria] : no dysuria [Incontinence] : no incontinence [Joint Pain] : no joint pain [Joint Stiffness] : no joint stiffness [Skin Rash] : no skin rash [Skin Wound] : no skin wound [Confused] : no confusion [Dizziness] : no dizziness [Easy Bleeding] : no tendency for easy bleeding [Easy Bruising] : no tendency for easy bruising [FreeTextEntry4] : + R facial pain, + R neck pain

## 2021-09-20 NOTE — ASSESSMENT
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 S/p  6 weeks of RT (completed in 12/2020). Then, PET/CT in 4/2021 showed new hyper-metabolic right neck nodes, suspicious for metastatic disease, and a LLL lung lesion, s/p lung resection on 5/27/21 w/ pathology showing metastatic SCC.\par - NGS reviewed - No actionable mutations, MS-Stable, TMB - 5 Muts/Mb, CCND1 amplification, CDKN2A D84N, DIS3 amplification, FGF19 amplification, FGF3 amplification, FGF4 amplification, GATA6 amplification, IGL fusion, TERT promoter -124C>T,  TP53 R306*, TP53 R282W\par - Pt was started on Carbo/Abraxane/Keytruda in 6/2021 and completed 4 cycles. No irAEs or other complaints. He is scheduled to start maintenance Keytruda today (9/17)\par - Follow-up scans in 8/2021 was notable for interval worsening of right-sided cervical lymphadenopathy. The rest of the findings were stable. Scans were reviewed with pt and his children. Enlarged R cervical LN is noted to be necrotic. Suspect imaging findings may be related to immunotherapy response; however, unable to definitively rule out POD. \par - Pt w/ chronic R facial/neck pain which has slightly worsened recently. Unclear if this is related to recently starting Speech Therapy vs worsening disease. Current plan is to repeat a PET/CT after 2 cycles of maintenance immunotherapy. D/w pt and his children that if pt's R neck pain were to worsen over the next few weeks, this PET/CT can be performed sooner. However, if it remains stable, would prefer to wait until 2 cycles of maintenance immunotherapy are completed. Pt and his family stated understanding\par - Continue Palliative Care follow-up. Continue medical cannabis and gabapentin as per Palliative\par - CEA elevated, but of no significance. Will stop checking\par - Will check labs today\par - Follow-up in 3 weeks or sooner as-needed\par \par Case was discussed with Dr. Porras\par \par Kris Aguero, PGY6\par Hematology-Oncology Fellow

## 2021-09-21 PROBLEM — E78.2 HYPERCHOLESTEROLEMIA WITH HYPERTRIGLYCERIDEMIA: Status: ACTIVE | Noted: 2018-04-06

## 2021-09-21 PROBLEM — Z85.118: Status: RESOLVED | Noted: 2021-01-01 | Resolved: 2021-01-01

## 2021-09-21 PROBLEM — G47.33 OBSTRUCTIVE SLEEP APNEA: Status: ACTIVE | Noted: 2019-09-05

## 2021-09-21 PROBLEM — E11.319 DIABETIC RETINOPATHY: Status: ACTIVE | Noted: 2018-10-23

## 2021-09-21 PROBLEM — Z85.118 HISTORY OF MALIGNANT NEOPLASM OF LUNG: Status: RESOLVED | Noted: 2021-01-01 | Resolved: 2021-01-01

## 2021-09-21 NOTE — HEALTH RISK ASSESSMENT
[Good] : ~his/her~  mood as  good [Never (0 pts)] : Never (0 points) [No] : In the past 12 months have you used drugs other than those required for medical reasons? No [No falls in past year] : Patient reported no falls in the past year [0] : 2) Feeling down, depressed, or hopeless: Not at all (0) [PHQ-2 Negative - No further assessment needed] : PHQ-2 Negative - No further assessment needed [HIV test declined] : HIV test declined [Hepatitis C test declined] : Hepatitis C test declined [None] : None [With Significant Other] : lives with significant other [Retired] : retired [High School] : high school [] :  [Fully functional (bathing, dressing, toileting, transferring, walking, feeding)] : Fully functional (bathing, dressing, toileting, transferring, walking, feeding) [Fully functional (using the telephone, shopping, preparing meals, housekeeping, doing laundry, using] : Fully functional and needs no help or supervision to perform IADLs (using the telephone, shopping, preparing meals, housekeeping, doing laundry, using transportation, managing medications and managing finances) [Smoke Detector] : smoke detector [Carbon Monoxide Detector] : carbon monoxide detector [Seat Belt] :  uses seat belt [With Patient/Caregiver] : , with patient/caregiver [Designated Healthcare Proxy] : Designated healthcare proxy [Relationship: ___] : Relationship: [unfilled] [] : No [[id number]] : 0 [Change in mental status noted] : No change in mental status noted [High Risk Behavior] : no high risk behavior [Reports changes in hearing] : Reports no changes in hearing [Reports changes in vision] : Reports no changes in vision [Travel to Developing Areas] : does not  travel to developing areas [ColonoscopyComments] : colonoscopy was done 5-10 yr ago [de-identified] : wife [de-identified] : last seen optho- 6 mo ago [de-identified] : last seen dentist 08/20- dentures [AdvancecareDate] : 09/21

## 2021-09-21 NOTE — HISTORY OF PRESENT ILLNESS
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  s/p resection of lung mets\par reviewed Heme note- 9/17/21.  reviewed 9/17/21 labs - anemia, neutropenia, hyponatremia.  \par DM- on meds. reviewed 9/14/21 labs elev vit D, A1c 6.7.  pt is being f/u by [facility] and meds chg 2 1/2 mo ago\par HTN- compliant w meds\par CAD- s/p stent 2004, 10/16.  Cath 2019- non obst dis.  reviewed Cardio notes- 4/21/21.  no CP or SOB\par lipid- compliant w meds. no myalgia\par \par

## 2021-09-21 NOTE — PHYSICAL EXAM
[No Acute Distress] : no acute distress [Well Nourished] : well nourished [Well Developed] : well developed [Well-Appearing] : well-appearing [Normal Sclera/Conjunctiva] : normal sclera/conjunctiva [PERRL] : pupils equal round and reactive to light [Normal Outer Ear/Nose] : the outer ears and nose were normal in appearance [Normal Oropharynx] : the oropharynx was normal [Normal TMs] : both tympanic membranes were normal [No Lymphadenopathy] : no lymphadenopathy [Supple] : supple [Thyroid Normal, No Nodules] : the thyroid was normal and there were no nodules present [No Respiratory Distress] : no respiratory distress  [No Accessory Muscle Use] : no accessory muscle use [Clear to Auscultation] : lungs were clear to auscultation bilaterally [Normal Rate] : normal rate  [Regular Rhythm] : with a regular rhythm [Normal S1, S2] : normal S1 and S2 [No Murmur] : no murmur heard [No Carotid Bruits] : no carotid bruits [Pedal Pulses Present] : the pedal pulses are present [No Edema] : there was no peripheral edema [Soft] : abdomen soft [Non Tender] : non-tender [Non-distended] : non-distended [No Masses] : no abdominal mass palpated [Normal Bowel Sounds] : normal bowel sounds [Normal Supraclavicular Nodes] : no supraclavicular lymphadenopathy [Normal Axillary Nodes] : no axillary lymphadenopathy [Normal Posterior Cervical Nodes] : no posterior cervical lymphadenopathy [Normal Anterior Cervical Nodes] : no anterior cervical lymphadenopathy [Grossly Normal Strength/Tone] : grossly normal strength/tone [No Focal Deficits] : no focal deficits [Normal Gait] : normal gait [Normal Affect] : the affect was normal [Normal Insight/Judgement] : insight and judgment were intact [de-identified] : irreg pupil- hx of surgery [de-identified] : skin thickening of R side of neck- from XRT

## 2021-09-30 NOTE — HISTORY OF PRESENT ILLNESS
[Home] : at home, [unfilled] , at the time of the visit. [Medical Office: ([facility])___] : at the medical office located in  [Verbal consent obtained from patient] : the patient, [unfilled] [FreeTextEntry1] : 76yoM with recently diagnosed SCC of the R tongue presents via telemedicine for follow up visit.\par PMH also significant for DAX on CPAP, remote h/o tobacco smoking and chewing. \par \par Patient initially diagnosed 2020, and on 9/10/20 he underwent R hemiglossectomy and partial neck dissection with Dr. Darinel Servin at [facility]. Pathology revealed a 1.2x1.0x0.55cm well to moderately differentiated squamous cell carcinoma, DOI 5.5mm, PNI+, LVI-, a closest lateral margin was 4mm. 4 lymph nodes from right levels IA, IB, and 2 were evaluated and negative for carcinoma. He was treated with adjuvant proton tx at [facility].  On surveillance PET/CT in 3/2021 he was found to have cervical adenopathy and KEZIA cystic lesion. Bx of cervical LN pos for sq cell ca. FNA of lung lesion non-diagnostic. Wedge resection of lung lesion was c/w sq cell ca, morphologically distinct but likely met. PD-L1 10-15% CPS. \par \par Patient to start carbo/Abraxane/Keytruda next week.\par \par Patient reports pain that begins in the R neck that travels up to the post-auricular region, travels up and around his ear and towards the R temporal area. The pain began in May, shortly after he had a biopsy of the area. \par He is currently using Oxycodone 10mg BID and Gabapentin 300mg BID. The gabapentin started one week ago and he has found that this has been helping from keeping the pain from traveling to severe levels. \par \par He is edentulous so he only eats soft foods. Is vegetarian. Drinks fresh fruit/vegetable juices, wheatgrass. \par \par Interval Hx (2021): Patient presents for urgent visit for acute pain.  He started using medical cannabis a few months ago and was able to discontinue oxycodone. Endorses increased right neck pain for the past ~2 weeks.  Of note, he recently started lymphedema therapy and dysphagia therapy.\par \par His pain regimen is as follows:\par Gabapentin 400mg TID\par 1:20 THC:CBD tincture in the morning and afternoon (0.5mg:10mg)\par 1:1 THC:CBD tincture at bedtime (5mmg)\par 1:1 microtab PRN for acute pain (uses about once per day) this provides relief  \par \par Overall he reports pain is tolerable on this regimen\par \par ROS:\par +voice changes, difficultly with enunciating words due to neck stiffness. \par +constipation - uses colace, miralax which are helping\par +occasional fatigue\par +some LE swelling\par +30lb weight loss over the last year\par Denies trouble sleeping, SOB, dysphagia\par \par Patient is , lives with his wife. He has six children. \par Is independent in ADLs.  He used to run a grocery store up until about 10 years ago.\par \par [employer]-Stop Ref#:[id number]

## 2021-09-30 NOTE — REASON FOR VISIT
[Initial Consultation] : an initial consultation for [FreeTextEntry2] : referred by Dr Ab Costello for neck lymph node

## 2021-09-30 NOTE — PROCEDURE
[Gag Reflex] : gag reflex preventing mirror examination [None] : none [Flexible Endoscope] : examined with the flexible endoscope [Serial Number: ___] : Serial Number: [unfilled] [de-identified] : No lesions in the NPx, OPx, HPx or larynx.  Expected posttreatment changes, VC are mobile, airway patent.\par

## 2021-09-30 NOTE — HISTORY OF PRESENT ILLNESS
[de-identified] : 76 year old male referred by Dr Costello for neck lymph node hx SCC tongue 08/2020. surgery done 09/2020. Completed 6 weeks of radiation treatment in 12/2020.\par PET CT 04/2021 New hyper metabolic right neck nodes, suspicious for metastatic disease. \par Reports mild right sided neck pain. Denies dysphagia, odynophagia, dysphonia, otalgia, fevers, chills, hemoptysis. Weight is stable. Reports breathing is good. Reports hx of smoker for only one year in his 20s

## 2021-09-30 NOTE — PHYSICAL EXAM
[de-identified] : Posttreatment changes, LAD in the right neck approx 2 cm in level II.   [Laryngoscopy Performed] : laryngoscopy was performed, see procedure section for findings [FreeTextEntry1] : Posttreatment changes. No concerning lesions in the OC/OPx on inspection or palpation.\par  [Normal] : no rashes

## 2021-09-30 NOTE — REASON FOR VISIT
[Initial Consultation] : an initial consultation for [Family Member] : family member [FreeTextEntry2] : referred by Dr Ab Costello for neck lymph node

## 2021-09-30 NOTE — ASSESSMENT
[FreeTextEntry1] : 76yoM with:\par \par 1. SCC of tongue, recurrent - On Carbo/Abraxane/Keytruda. Scans pending. Med Onc follow up. \par \par 2. R neck pain 2/2 neoplasm - C/w medical cannabis regimen as is as it is benefiting patient.  He may utilize PRN 1:1 (2mg : 2mg) in lieu of 1:20 during day time to see if this provides better pain control. Patient prefers not to utilize PRN oxycodone IR 5mg but was encouraged to do so should medical cannabis not provide sufficient relief. C/w gabapentin 400mg TID. \par \par 3. Constipation - c/w miralax, colace as he is benefiting from this regimen. \par \par 4. Encounter for Palliative Care - Emotional support provided.\par \par Follow up in 1 month, call sooner with questions or issues.\par

## 2021-09-30 NOTE — PHYSICAL EXAM
[de-identified] : Posttreatment changes, LAD in the right neck approx 2 cm in level II.   [Laryngoscopy Performed] : laryngoscopy was performed, see procedure section for findings [FreeTextEntry1] : Posttreatment changes. No concerning lesions in the OC/OPx on inspection or palpation.\par  [Normal] : no rashes

## 2021-09-30 NOTE — HISTORY OF PRESENT ILLNESS
[de-identified] : 76 year old male referred by Dr Costello for neck lymph node,  hx SCC tongue 08/2020 s/p surgery in  09/2020 and Completed 6 weeks of radiation treatment in 12/2020. PET CT 04/2021 New hyper metabolic right neck nodes, suspicious for metastatic disease and KEZIA cystic lesion, s/p lung resection showed SCC on 5/27/2021.  pt is now being f/u with Dr. Porras for immunochemotherapy and want to discuss with he needs RT due to the ct neck result. last ct of neck on 5/24/2021,  pt c.o worsen of right sided neck pain radiate to the head and posterior ear. pt admits some wt loss, ?? due to on teeth.   Denies dysphagia, odynophagia, dysphonia, otalgia, fevers, chills, hemoptysis.  Reports breathing is good. Reports hx of smoker for only one year in his 20s

## 2021-09-30 NOTE — PROCEDURE
[Serial Number: ___] : Serial Number: [unfilled] [Gag Reflex] : gag reflex preventing mirror examination [None] : none [Flexible Endoscope] : examined with the flexible endoscope [de-identified] : No lesions in the NPx, OPx, HPx or larynx.  Stable posttreatment changes, VC are mobile, airway patent.\par

## 2021-10-08 PROBLEM — L03.211 CELLULITIS OF FACE: Status: ACTIVE | Noted: 2021-01-01

## 2021-10-08 NOTE — PHYSICAL EXAM
[Restricted in physically strenuous activity but ambulatory and able to carry out work of a light or sedentary nature] : Status 1- Restricted in physically strenuous activity but ambulatory and able to carry out work of a light or sedentary nature, e.g., light house work, office work [Normal] : affect appropriate [de-identified] : Moist mucus membranes [de-identified] : R neck stiffness (stable), S/p reconstruction, rt neck warmth and redness

## 2021-10-08 NOTE — ASSESSMENT
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 S/p  6 weeks of RT (completed in 12/2020). Then, PET/CT in 4/2021 showed new hyper-metabolic right neck nodes, suspicious for metastatic disease, and a LLL lung lesion, s/p lung resection on 5/27/21 w/ pathology showing metastatic SCC.\par - NGS reviewed - No actionable mutations, MS-Stable, TMB - 5 Muts/Mb, CCND1 amplification, CDKN2A D84N, DIS3 amplification, FGF19 amplification, FGF3 amplification, FGF4 amplification, GATA6 amplification, IGL fusion, TERT promoter -124C>T,  TP53 R306*, TP53 R282W\par - Pt was started on Carbo/Abraxane/Keytruda in 6/2021 and completed 4 cycles. No irAEs or other complaints. He is scheduled to start maintenance Keytruda today (9/17)\par - Follow-up scans in 8/2021 was notable for interval worsening of right-sided cervical lymphadenopathy. The rest of the findings were stable. Scans were reviewed with pt and his children. Enlarged R cervical LN is noted to be necrotic. Suspect imaging findings may be related to immunotherapy response; however, unable to definitively rule out POD. \par - Pt w/ chronic R facial/neck pain which has slightly worsened recently. Unclear if this is related to recently starting Speech Therapy vs worsening disease. \par -Incremental pain is concerning for POD. PET/CT is scheduled for next week. \par -Continue Palliative Care follow-up. Continue medical cannabis, oxycodone and gabapentin as per Palliative\par -rt neck redness and warmth- trial of abx even though suspicion for cellulitis is low\par - Follow-up in 3 weeks or sooner as-needed\par

## 2021-10-08 NOTE — HISTORY OF PRESENT ILLNESS
[Disease: _____________________] : Disease: [unfilled] [M: ___] : M[unfilled] [AJCC Stage: ____] : AJCC Stage: [unfilled] [de-identified] : Mr. Amaya is a pleasant 75 y/o M recently diagnosed with SCC of the R lateral tongue, presented to [facility] last year for ill-fitting dentures causing tongue irritation. ulcerative 2cm mass of R lateral tongue noted by ENT. Biopsy August 2020 demonstrated well to moderately differentiated squamous cell carcinoma.\par \par CT neck 8/26/20: tongue lesion poorly visualized, no pathological cervical nodes\par \par PET-CT 9/4/20: FDG avid lesion of anterior right oropharynx/lateral tongue SUV 5.5, no cervical LAD. Additionally multiple mildly enlarged, faintly calcified, mildly avid right paratracheal, subcarinal, and b/l hilar nodes c/w inflammatory etiology suspicious for sarcoidosis.\par \par On 9/10/20 he underwent right hemiglossectomy and partial neck dissection with Dr. Darinel Servin at [facility]. Pathology revealed a 1.2x1.0x0.55cm well to moderately differentiated squamous cell carcinoma, DOI 5.5mm, PNI+, LVI-, a closest lateral margin was 4mm. 4 lymph nodes from right levels IA, IB, and 2 were evaluated and negative for carcinoma. He was treated with adjuvant proton tx. \par \par March 2021, surveillance PET/CT showed cervical adenopathy and KEZIA cystic lesion. Bx of cervical LN pos for sq cell ca. FNA of lung lesion non-diagnostic. Wedge resection of lung lesion was c/w sq cell ca, morphologically distinct but likely met. PD-L1 10-15% CPS. \par \par Of note, pt is a former smoker- smoked for 4-5 years 1 pack/week, quit 50 years ago, occasional etoh, +history of chewing tobacco for few years, quit 30 years ago, dentures for 20 years. \par \par Today, pt co severe oral/neck pain radiating to head and neck. He was given oxycodone for this- this only partially relieves the pain. He is taking 10 mg BID at this time. He was also taking ibuprofen which was not helping. He reports some constipation- takes Miralax for this as needed. He is currently using CPAP for sleep apnea since one year. He endorses 30 lb weight loss over one year. He has no dysphagia but can only take liquids because of lack of dentures. \par \par 6/25/21: Pt presents for first treatment today C#1 Carbo/ Abraxane/ Keytruda. He c/o pain but states he forgot to bring his pain medication along with him today. He is being followed now by palliative care. No new complaints\par \par 7/16/21:  Pt returned for f/u visit.  Pt denies any irAEs such as diarrhea, SOB, Rash, cough.   Pt denies other side effects and offers no complaints.   Pt will continue on oxycodone for pain management.  Patient will receive  cycle 2 of treatment today.  \par \par 8/6/21: Doing well. Pain has improved with CBD and THC. Has been recommended to undergo speech and lymphedema therapy. He is here for day 1, cycle 3. Tolerating carbo/abraxane/keytruda well. No AEs. \par \par 8/31/21: Rt neck and throat pain. Pain seems to be getting worse again. He started PT last week and I think the pain is likely related to it. He has been coughing more especially after eating or during eating, bringing up while phlegm. He just has swallow eval and passed. \par \par 9/17/21: Pt returns for follow-up. He is accompanied by his son. Pt reports that he continues to experience R facial/neck pain. The pain is not constant, but it can intermittently be an 8/10. He believes it may be slightly worse since his last follow-up visit. He continues to participate in Speech Therapy and is unsure if this might be contributing. No recent fevers, vision changes, chest pain, shortness of breath, nausea, vomiting, or abdominal pain. He reports good PO intake and he is remaining active at home. His weight is currently stable.\par \par 10/8/21: Started having incremental pain in the rt side of the face necessitating him to restart oxycodone. He has stopped lymphedema treatment.  [de-identified] : sq cell ca

## 2021-10-22 NOTE — REVIEW OF SYSTEMS
[Fever] : no fever [Chills] : no chills [Fatigue] : fatigue [Recent Change In Weight] : ~T recent weight change [Eye Pain] : no eye pain [Red Eyes] : eyes not red [Dysphagia] : dysphagia [Mucosal Pain] : no mucosal pain [Chest Pain] : no chest pain [Palpitations] : no palpitations [Shortness Of Breath] : no shortness of breath [Wheezing] : no wheezing [Abdominal Pain] : no abdominal pain [Vomiting] : no vomiting [Dysuria] : no dysuria [Incontinence] : no incontinence [Joint Pain] : no joint pain [Joint Stiffness] : no joint stiffness [Skin Rash] : no skin rash [Skin Wound] : no skin wound [Confused] : no confusion [Dizziness] : no dizziness [Easy Bleeding] : no tendency for easy bleeding [Easy Bruising] : no tendency for easy bruising [Negative] : Allergic/Immunologic [FreeTextEntry4] : + R facial pain, + R neck pain

## 2021-10-22 NOTE — REASON FOR VISIT
[Follow-Up] : a follow-up visit [Follow-Up Visit] : a follow-up [Family Member] : family member [FreeTextEntry2] : Head and neck cancer Banner Transposition Flap Text: The defect edges were debeveled with a #15 scalpel blade.  Given the location of the defect and the proximity to free margins a Banner transposition flap was deemed most appropriate.  Using a sterile surgical marker, an appropriate flap drawn around the defect. The area thus outlined was incised deep to adipose tissue with a #15 scalpel blade.  The skin margins were undermined to an appropriate distance in all directions utilizing iris scissors.

## 2021-10-22 NOTE — VITALS
[Maximal Pain Intensity: 6/10] : 6/10 [Least Pain Intensity: 1/10] : 1/10 [Pain Description/Quality: ___] : Pain description/quality: [unfilled] [Pain Duration: ___] : Pain duration: [unfilled] [Pain Location: ___] : Pain Location: [unfilled] [Opioid] : opioid [Adjuvant (neuroleptics)] : adjuvant (neuroleptics) [80: Normal activity with effort; some signs or symptoms of disease.] : 80: Normal activity with effort; some signs or symptoms of disease.  [ECOG Performance Status: 2 - Ambulatory and capable of all self care but unable to carry out any work activities] : Performance Status: 2 - Ambulatory and capable of all self care but unable to carry out any work activities. Up and about more than 50% of waking hours

## 2021-10-22 NOTE — HISTORY OF PRESENT ILLNESS
[Home] : at home, [unfilled] , at the time of the visit. [Medical Office: ([facility])___] : at the medical office located in  [Verbal consent obtained from patient] : the patient, [unfilled] [Disease: _____________________] : Disease: [unfilled] [M: ___] : M[unfilled] [AJCC Stage: ____] : AJCC Stage: [unfilled] [de-identified] : Mr. 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 ulcerative 2cm mass of R lateral tongue noted by ENT. Biopsy August 2020 demonstrated well to moderately differentiated squamous cell carcinoma.\par \par CT neck 8/26/20: tongue lesion poorly visualized, no pathological cervical nodes\par \par PET-CT 9/4/20: FDG avid lesion of anterior right oropharynx/lateral tongue SUV 5.5, no cervical LAD. Additionally multiple mildly enlarged, faintly calcified, mildly avid right paratracheal, subcarinal, and b/l hilar nodes c/w inflammatory etiology suspicious for sarcoidosis.\par \par On 9/10/20 he underwent right hemiglossectomy and partial neck dissection with Dr. Darinel Servin at [facility]. Pathology revealed a 1.2x1.0x0.55cm well to moderately differentiated squamous cell carcinoma, DOI 5.5mm, PNI+, LVI-, a closest lateral margin was 4mm. 4 lymph nodes from right levels IA, IB, and 2 were evaluated and negative for carcinoma. He was treated with adjuvant proton tx. \par \par March 2021, surveillance PET/CT showed cervical adenopathy and KEZIA cystic lesion. Bx of cervical LN pos for sq cell ca. FNA of lung lesion non-diagnostic. Wedge resection of lung lesion was c/w sq cell ca, morphologically distinct but likely met. PD-L1 10-15% CPS. \par \par Of note, pt is a former smoker- smoked for 4-5 years 1 pack/week, quit 50 years ago, occasional etoh, +history of chewing tobacco for few years, quit 30 years ago, dentures for 20 years. \par \par Today, pt co severe oral/neck pain radiating to head and neck. He was given oxycodone for this- this only partially relieves the pain. He is taking 10 mg BID at this time. He was also taking ibuprofen which was not helping. He reports some constipation- takes Miralax for this as needed. He is currently using CPAP for sleep apnea since one year. He endorses 30 lb weight loss over one year. He has no dysphagia but can only take liquids because of lack of dentures. \par \par 6/25/21: Pt presents for first treatment today C#1 Carbo/ Abraxane/ Keytruda. He c/o pain but states he forgot to bring his pain medication along with him today. He is being followed now by palliative care. No new complaints\par \par 7/16/21:  Pt returned for f/u visit.  Pt denies any irAEs such as diarrhea, SOB, Rash, cough.   Pt denies other side effects and offers no complaints.   Pt will continue on oxycodone for pain management.  Patient will receive  cycle 2 of treatment today.  \par \par 8/6/21: Doing well. Pain has improved with CBD and THC. Has been recommended to undergo speech and lymphedema therapy. He is here for day 1, cycle 3. Tolerating carbo/abraxane/keytruda well. No AEs. \par \par 8/31/21: Rt neck and throat pain. Pain seems to be getting worse again. He started PT last week and I think the pain is likely related to it. He has been coughing more especially after eating or during eating, bringing up while phlegm. He just has swallow eval and passed. \par \par 9/17/21: Pt returns for follow-up. He is accompanied by his son. Pt reports that he continues to experience R facial/neck pain. The pain is not constant, but it can intermittently be an 8/10. He believes it may be slightly worse since his last follow-up visit. He continues to participate in Speech Therapy and is unsure if this might be contributing. No recent fevers, vision changes, chest pain, shortness of breath, nausea, vomiting, or abdominal pain. He reports good PO intake and he is remaining active at home. His weight is currently stable.\par \par 10/8/21: Started having incremental pain in the rt side of the face necessitating him to restart oxycodone. He has stopped lymphedema treatment. \par \par 10/22/21: Severe neck pain. Had scans done which is pasted below:\par \par MPRESSION: Compared to FDG-PET/CT scan dated 4/2/2021:\par \par 1. FDG-avid partially necrotic metastatic right cervical lymphadenopathy is increased in size and metabolism as compared to prior PET/CT, and is mildly increased in size as compared to CT dated 8/21/2021.\par \par 2. Mildly FDG-avid cavitary bilateral lung lesions are increased in size, and are similar, or increased metabolism, compatible with progression of lung metastases. Status post interval left lower lobe wedge resection.\par \par 3. Mildly FDG-avid, partially calcified mediastinal and bilateral hilar lymph nodes are unchanged, compatible with sarcoidosis.\par \par 4. Hypermetabolism in left thyroid lobe may be further evaluated with ultrasound. Differential diagnosis includes thyroiditis.\par \par 5. Findings compatible with right vocal cord paralysis. Please correlate clinically and with direct visualization, as indicated.\par \par [place] [facility] [place] options for multiple clinical trials. He also saw Dr. Mcwilliams today who discussed "quadshot" as a palliative option. \par \par The pt reports severe neck pain and now has some dysphagia as well. \par \par  [de-identified] : sq cell ca

## 2021-10-22 NOTE — ASSESSMENT
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 S/p  6 weeks of RT (completed in 12/2020). Then, PET/CT in 4/2021 showed new hyper-metabolic right neck nodes, suspicious for metastatic disease, and a LLL lung lesion, s/p lung resection on 5/27/21 w/ pathology showing metastatic SCC.\par - NGS reviewed - No actionable mutations, MS-Stable, TMB - 5 Muts/Mb, CCND1 amplification, CDKN2A D84N, DIS3 amplification, FGF19 amplification, FGF3 amplification, FGF4 amplification, GATA6 amplification, IGL fusion, TERT promoter -124C>T,  TP53 R306*, TP53 R282W\par - Pt was started on Carbo/Abraxane/Keytruda in 6/2021 and completed 4 cycles. No irAEs or other complaints. He is scheduled to start maintenance Keytruda today (9/17)\par - Follow-up scans in 8/2021 was notable for interval worsening of right-sided cervical lymphadenopathy. The rest of the findings were stable. Scans were reviewed with pt and his children. Enlarged R cervical LN is noted to be necrotic. Suspect imaging findings may be related to immunotherapy response; however, unable to definitively rule out POD. \par -Incremental pain is concerning for POD. PET/CT confirms over POD\par He saw [place]. Almanza [place] [facility] [place] who [place] evaluating him for clinical trials. \par -He saw Dr. Thurston who recommended Quadshot palliative RT\par -Incremental pain- appreciate Dr. Veronica's recommendations. \par -Since he is being evaluated for clinical trials which may involve cetuximab, I will refrain from starting that medication right now. \par for  now, will continue Keytruda. Pt will consider Quadshot and try to get enrolled in clinical trial.\par WIll follow up with all parties

## 2021-10-22 NOTE — PHYSICAL EXAM
[General Appearance - Alert] : alert [General Appearance - In No Acute Distress] : in no acute distress [Oriented To Time, Place, And Person] : oriented to person, place, and time [Affect] : the affect was normal [Restricted in physically strenuous activity but ambulatory and able to carry out work of a light or sedentary nature] : Status 1- Restricted in physically strenuous activity but ambulatory and able to carry out work of a light or sedentary nature, e.g., light house work, office work [Normal] : affect appropriate [de-identified] : R neck stiffness (stable), S/p reconstruction, rt neck fullness [de-identified] : Moist mucus membranes

## 2021-10-25 NOTE — PHYSICAL EXAM
[] : no respiratory distress [Normal] : oriented to person, place and time, the affect was normal, the mood was normal and not anxious [de-identified] : Progressive R neck disease [de-identified] : As above [de-identified] : As above

## 2021-10-25 NOTE — DATA REVIEWED
[FreeTextEntry1] : PET/CT (10/15/2021):\par 1. FDG-avid partially necrotic metastatic right cervical lymphadenopathy is increased in size and metabolism as compared to prior PET/CT, and is mildly increased in size as compared to CT dated 8/21/2021.\par \par 2. Mildly FDG-avid cavitary bilateral lung lesions are increased in size, and are similar, or increased metabolism, compatible with progression of lung metastases. Status post interval left lower lobe wedge resection.\par \par 3. Mildly FDG-avid, partially calcified mediastinal and bilateral hilar lymph nodes are unchanged, compatible with sarcoidosis.\par \par 4. Hypermetabolism in left thyroid lobe may be further evaluated with ultrasound. Differential diagnosis includes thyroiditis.\par \par 5. Findings compatible with right vocal cord paralysis. Please correlate clinically and with direct visualization, as indicated.

## 2021-10-25 NOTE — REVIEW OF SYSTEMS
[Negative] : Allergic/Immunologic [Dysphagia: Grade 1 - Symptomatic, able to eat regular diet] : Dysphagia: Grade 1 - Symptomatic, able to eat regular diet [Xerostomia: Grade 1 - Symptomatic (e.g., dry or thick saliva) without significant dietary alteration; unstimulated saliva flow >0.2 ml/min] : Xerostomia: Grade 1 - Symptomatic (e.g., dry or thick saliva) without significant dietary alteration; unstimulated saliva flow >0.2 ml/min [Oral Pain: Grade 2 - Moderate pain; limiting instrumental ADL] : Oral Pain: Grade 2 - Moderate pain; limiting instrumental ADL [Cough: Grade 0] : Cough: Grade 0 [Dyspnea: Grade 0] : Dyspnea: Grade 0 [FreeTextEntry4] : h/o surgery for h&n cancer [FreeTextEntry6] : h/o lung surgery in 6/2021

## 2021-10-25 NOTE — HISTORY OF PRESENT ILLNESS
[FreeTextEntry1] : 76yoM with SCC of the R tongue seen for acute visit for pain.\par PMH also significant for DAX on CPAP, remote h/o tobacco smoking and chewing. \par \par Patient initially diagnosed 2020, and on 9/10/20 he underwent R hemiglossectomy and partial neck dissection with Dr. Darinel Servin at [facility]. Pathology revealed a 1.2x1.0x0.55cm well to moderately differentiated squamous cell carcinoma, DOI 5.5mm, PNI+, LVI-, a closest lateral margin was 4mm. 4 lymph nodes from right levels IA, IB, and 2 were evaluated and negative for carcinoma. He was treated with adjuvant proton tx at [facility].  On surveillance PET/CT in 3/2021 he was found to have cervical adenopathy and KEZIA cystic lesion. Bx of cervical LN pos for sq cell ca. FNA of lung lesion non-diagnostic. Wedge resection of lung lesion was c/w sq cell ca, morphologically distinct but likely met. PD-L1 10-15% CPS. \par \par Patient reports pain that begins in the R neck that travels up to the post-auricular region, travels up and around his ear and towards the R temporal area. The pain began in May, shortly after he had a biopsy of the area. \par He is currently using Oxycodone 10mg BID and Gabapentin 300mg BID. The gabapentin started one week ago and he has found that this has been helping from keeping the pain from traveling to severe levels. \par \par He is edentulous so he only eats soft foods. Is vegetarian. Drinks fresh fruit/vegetable juices, wheatgrass. \par \par Interval Hx (10/22/2021): Patient presents for urgent visit for acute pain at the request of Dr. Porras.  He started using medical cannabis a few months ago and was able to discontinue oxycodone. Endorses increased right neck pain for about the past 2 weeks, stopped lymphedema therapy. Pain is severe, at times. Gets as high as 10/10. Is not sleeping well. \par \par His pain regimen is as follows: \par Gabapentin 400mg TID\par Oxycodone 5mg PRN (uses 4-5 times daily; had a dizzy spell after taking a 10mg dose once)\par 1:20 THC:CBD tincture in the morning and afternoon (0.3mmg), AND 1:1 THC:CBD (2.5m.5mg)\par 1:1 THC:CBD tincture at bedtime (7.5m.5mg)\par 1:1 microtab PRN for acute pain (not as helpful as previously)\par \par ROS:\par +voice changes, difficultly with enunciating words due to neck stiffness. \par +constipation - uses colace, miralax which are helping\par +occasional fatigue\par +some LE swelling\par +30lb weight loss over the last year\par Denies trouble sleeping, SOB, dysphagia\par \par Patient is , lives with his wife. He has six children. \par Is independent in ADLs.  He used to run a grocery store up until about 10 years ago.\par \par I-Stop Ref#: [id number]

## 2021-10-25 NOTE — PHYSICAL EXAM
[Sclera] : the sclera and conjunctiva were normal [Normal Oral Mucosa] : normal oral mucosa [FreeTextEntry1] : s/p reconstruction; R neck fullness/stiffness [] : no respiratory distress [Auscultation Breath Sounds / Voice Sounds] : lungs were clear to auscultation bilaterally [Heart Rate And Rhythm] : heart rate was normal and rhythm regular [Heart Sounds] : normal S1 and S2 [Bowel Sounds] : normal bowel sounds [Abdomen Soft] : soft [Abnormal Walk] : normal gait [Skin Color & Pigmentation] : normal skin color and pigmentation

## 2021-10-25 NOTE — HISTORY OF PRESENT ILLNESS
[FreeTextEntry1] : Mr. 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 He subsequently underwent adjuvant radiation with protons at [facility], completing adjuvant RT 11/2020. On surveilance scans 4/2021, note to have recurrent and metastatic disease concern s/p confirmatory biopsy. Patient presents to discuss radiation therapy.\par \par Brief Oncological History:\par Presenting as ill-fit of his dentures causing tongue irritation. Ulcerative 2cm mass of R lateral tongue noted by ENT. Biopsy August 2020 demonstrated well to moderately differentiated squamous cell carcinoma\par \par CT neck 8/26/20: Tongue lesion poorly visualized, no pathological cervical nodes.\par \par PET-CT 9/4/20: FDG avid lesion of anterior right oropharynx/lateral tongue SUV 5.5, no cervical LAD. Additionally multiple mildly enlarged, faintly calcified, mildly avid right paratracheal, subcarinal, and b/l hilar nodes c/w inflammatory etiology suspicious for sarcoidosis.\par \par On 9/10/20 he underwent right hemiglossectomy and partial neck dissection with Dr. Darinel Servin at [facility]. Pathology revealed a 1.2x1.0x0.55cm well to moderately differentiated squamous cell carcinoma, DOI 5.5mm, PNI+, LVI-, a closest lateral margin was 4mm. 4 lymph nodes from right levels IA, IB, and 2 were evaluated and negative for carcinoma.\par \par Patient received proton therapy in [facility]. \par \par Surveillance 3/15/21 PET/CT showed cervical adenopathy and LLL cystic lesion. \par \par Bx of cervical LN pos for sq cell ca. FNA of lung lesion non-diagnostic. \par \par 6/9/2021 - Wedge resection of lung lesion was c/w sq cell ca, morphologically distinct but likely met. PD-L1 10-15% CPS. Resection margins negative. \par \par Today for f/u. h/o chemotherapy and immunotherapy w/ Dr. Porras. c/o right facial pain, right jaw pain, dysphagia. Patient w/ son and are anxious regarding recurrence and now metastatic disease with lung nodule pointing to likely metastatic disease vs. new primary. \par \par

## 2021-10-25 NOTE — ASSESSMENT
[FreeTextEntry1] : 76yoM with:\par \par 1. SCC of tongue, recurrent - POD on recent scans. Pt to consider Quadshot and clinical trial. On Keytruda in the meantime. Med Onc follow up. \par \par 2. R neck pain 2/2 neoplasm - Start methadone 2.5mg BID with plans to uptitrate as needed for pain. c/w PRN Oxycodone 5mg. \par C/w medical cannabis regimen as is as it is benefiting patient. C/w gabapentin 400mg TID. \par \par 3. Constipation - c/w miralax, senna. Educated on importance of maintaining bowel regularity, particularly in light of being on opioid therapy. \par \par 4. Encounter for Palliative Care - Emotional support provided.\par \par Follow up in 1 week, call sooner with questions or issues.\par

## 2021-10-26 NOTE — DISCUSSION/SUMMARY
[FreeTextEntry1] : Assessment plan----------The patient has been referred here for further opinion regarding cough, shortness of breath. --Diabetes uNCONTROLLED-PROTEINURIA\par likely combination of allergy and reactive airways disease, maybe worsened by ACEi. Needs to be on an ACEi or ARB though because of significant proteinuria, 1/5g/day-S/P SURGERY FOR TONGUE CA   SEPT 2020----- \par ----august 2020  diagnosed sq cell ca tongue ue -----s/p  surgery at [facility]-- S/P RT-----AS PER DAUGHTER the cervical lymph node biopsy  IN APRIL 2021  was reported as positive for squamous cell carcinoma------- patient also have has a cystic lesion in the left-lung superior segment ------ bronchoscopy was performed at [place] but was not conclusive-----he has been referred for resection at [facility] by thoracic surgery-----which was chronic\par Patient has metastatic squamous cell CA  --\par 1 -it is medically necessary for pt to use oxygen x 24hrs\par 2) family informed to monitor o2 sat -if o2 consistent less than 90% to bring to ER\par 3) follows palliative care, RT this week\par 4) DAX- ON   at 8 cmH20  -benefits from nightly use-- \par 5) continue inhalers/nebs\par 6)ttm thursday\par \par \par Thanks for allowing  me to participate  in the care of this patient.  Patient at this time  will follow  the above mentioned recommendations and return back for follow up visit. If you have any questions  I can be reached  at #[phone] (beeper)  or  [phone] (office).\par \par Darby Krueger MD, FCCP \par Director, Pulmonary Hypertension Program \par [facility] \par Division of Pulmonary, Critical Care and Sleep Medicine \par \par SANTO Carlos\par \par  Professor of Medicine \par [employer]\par

## 2021-10-26 NOTE — HISTORY OF PRESENT ILLNESS
[Home] : at home, [unfilled] , at the time of the visit. [Medical Office: ([facility])___] : at the medical office located in  [Family Member] : family member [TextBox_4] : francesco Amaya ----- son. I can be reached at [phone]. \par \par This letter  is regarding your patient  who  attended pulmonary out patient office today.  I have reviewed  patient's  past history, social history, family history and medication list. I also  reviewed nurse practitioners/ and fellows  notes and assessment and agree with it.  \par The patient was referred by Dr. Chen--S/P SURGERY FOR TONGUE CA   SEPT 2020-----\par \par ------No history of , fever, chills , rigors, chest pain, or hemoptysis. Questionable history of Raynaud's phenomenon. No h/o significant weight loss in last few months. No history of liver dysfunction , collagen vascular disorder or chronic thromboembolic disease. I would classify the patient's dyspnea as WHO  FUNCTIONAL CLASS II--------\par \par Has chronic cough associated with some shortness of breath. --COUGH BECAME WORSE  ON ACE INHIBITOR\par Has also been prescribed ACe inhibitor due to significant proteinuria from his diabtes. \par \par ----Echo  date----2019-- pasp 56 \par ----Pft date---------2020  MILD RESTRICTION \par PFT MAY  2021---- MILD RESTRICTION \par --6MWT  2020--363 METERS\par ----Ct scan date-------SLIGHT UPPER LOBE FIBROSIS \par ---ct chest 2020---b/l upper lobe ggo\par ----Cath date------------ H/O LCX STENT, MT SINAInot done\par ----repeat   Jennifer womack  mean pa---24  mmhg\par \par   ---DAX  --- psg w/ahi=10.7  ON CPAP 8  CM H2O \par \par \par PET SCAN APRIL2021---NEW SUV AVID CERVICAL  LN  AND ALSO A CAVITARY LN LLL\par \par sept 2019 accompanied by children- feels improved since last visit\par \par FEB 2020------ DAX ON CPAP, NO SIGNIFICANT RESP COMPLAINTS\par \par april 2021  sq cell ca tongue -----s/p  surgery at [facility]--\par DAX on cpap 8 cm h20---benefits from nightly use. Recent abn pet/ct - awaiting biopsy OF SUSANNE CERVICAL LN---PT HAS NO PULMONARY SYMTOMS  --NOT HAVING ANY SPUTUM------------MAY NEED BRONCH  AF TER THE CERVICAL LN BIIOPSY\par \par \par MAY 2021------  AS PER DAUGHTER the cervical lymph node biopsy was reported as positive for squamous cell carcinoma------- patient also have has a cystic lesion in the left- LUNG SUPERIOR SEGMENT -------- bronchoscopy was performed at [place] but was not conclusive-----he has been referred for resection at [place] by thoracic surgery------ \par \par MAY 14 2021-----recurrent metastatic squamous cell carcinoma of the tongue neck recent cervical lymph node biopsy was positive------cystic lesion in the left- LUNG SUPERIOR SEGMENT --- --- possible another primary lesion in the lung------- bronchoscopy at [place] was negative resection\par \par pet/ct 10/2021 IMPRESSION: Compared to FDG-PET/CT scan dated 4/2/2021:\par \par 1. FDG-avid partially necrotic metastatic right cervical lymphadenopathy is increased in size and metabolism as compared to prior PET/CT, and is mildly increased in size as compared to CT dated 8/21/2021.\par \par 2. Mildly FDG-avid cavitary bilateral lung lesions are increased in size, and are similar, or increased metabolism, compatible with progression of lung metastases. Status post interval left lower lobe wedge resection.\par \par 3. Mildly FDG-avid, partially calcified mediastinal and bilateral hilar lymph nodes are unchanged, compatible with sarcoidosis.\par \par 4. Hypermetabolism in left thyroid lobe may be further evaluated with ultrasound. Differential diagnosis includes thyroiditis.\par \par 5. Findings compatible with right vocal cord paralysis. Please correlate clinically and with direct visualization, as indicated.\par \par \par 10/2021 Mr. 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 He subsequently underwent adjuvant radiation with protons at [facility], completing adjuvant RT 11/2020. On surveilance scans 4/2021, note to have recurrent and metastatic disease concern s/p confirmatory biopsy\par TTM with family (son & daughter)- recent CT c/w pneumothorax- pt w/lung malignancy\par O2 sat 87% room air (rest)but comfortable/ Following palliative care and to get RT this week\par

## 2021-10-28 PROBLEM — J93.83 PNEUMOTHORAX, ACUTE: Status: ACTIVE | Noted: 2021-01-01

## 2021-10-28 NOTE — HISTORY OF PRESENT ILLNESS
[Home] : at home, [unfilled] , at the time of the visit. [Medical Office: ([facility])___] : at the medical office located in  [Family Member] : family member [TextBox_4] : francesco Amaya ----- son. I can be reached at [phone]. \par \par This letter  is regarding your patient  who  attended pulmonary out patient office today.  I have reviewed  patient's  past history, social history, family history and medication list. I also  reviewed nurse practitioners/ and fellows  notes and assessment and agree with it.  \par The patient was referred by Dr. Chen--S/P SURGERY FOR TONGUE CA   SEPT 2020-----\par \par ------No history of , fever, chills , rigors, chest pain, or hemoptysis. Questionable history of Raynaud's phenomenon. No h/o significant weight loss in last few months. No history of liver dysfunction , collagen vascular disorder or chronic thromboembolic disease. I would classify the patient's dyspnea as WHO  FUNCTIONAL CLASS II--------\par \par Has chronic cough associated with some shortness of breath. --COUGH BECAME WORSE  ON ACE INHIBITOR\par Has also been prescribed ACe inhibitor due to significant proteinuria from his diabtes. \par \par ----Echo  date----2019-- pasp 56 \par ----Pft date---------2020  MILD RESTRICTION \par PFT MAY  2021---- MILD RESTRICTION \par --6MWT  2020--363 METERS\par ----Ct scan date-------SLIGHT UPPER LOBE FIBROSIS \par ---ct chest 2020---b/l upper lobe ggo\par ----Cath date------------ H/O LCX STENT, MT SINAInot done\par ----repeat   Jennifer womack  mean pa---24  mmhg\par \par   ---DAX  --- psg w/ahi=10.7  ON CPAP 8  CM H2O \par \par \par PET SCAN APRIL2021---NEW SUV AVID CERVICAL  LN  AND ALSO A CAVITARY LN LLL\par \par sept 2019 accompanied by children- feels improved since last visit\par \par FEB 2020------ DAX ON CPAP, NO SIGNIFICANT RESP COMPLAINTS\par \par april 2021  sq cell ca tongue -----s/p  surgery at [place]--\par DAX on cpap 8 cm h20---benefits from nightly use. 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 FDG-avid partially necrotic metastatic right cervical lymphadenopathy is increased in size and metabolism as compared to prior PET/CT, and is mildly increased in size as compared to CT dated 8/21/2021.\par \par 2. Mildly FDG-avid cavitary bilateral lung lesions are increased in size, and are similar, or increased metabolism, compatible with progression of lung metastases. Status post interval left lower lobe wedge resection.\par \par 3. Mildly FDG-avid, partially calcified mediastinal and bilateral hilar lymph nodes are unchanged, compatible with sarcoidosis.\par \par 4. Hypermetabolism in left thyroid lobe may be further evaluated with ultrasound. Differential diagnosis includes thyroiditis.\par \par 5. Findings compatible with right vocal cord paralysis. Please correlate clinically and with direct visualization, as indicated.\par \par \par 10/2021 Mr. 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 He subsequently underwent adjuvant radiation with protons at [facility], completing adjuvant RT 11/2020. On surveilance scans 4/2021, note to have recurrent and metastatic disease concern s/p confirmatory biopsy\par TTM with family (son & daughter)- recent CT c/w pneumothorax[ small  left side] - pt w/lung malignancy\par O2 sat 87% room air (rest)but comfortable/ Following palliative care and to get RT this week\par \par oct 2021 --- patient feels much better on supplemental oxygen------- will repeat his chest x-ray next week----- Saturation is 96% no dyspnea He is continuing with------------- radiation to the neck following up with his oncologist---

## 2021-10-28 NOTE — DISCUSSION/SUMMARY
[FreeTextEntry1] : Assessment plan----------The patient has been referred here for further opinion regarding cough, shortness of breath. --Diabetes uNCONTROLLED-PROTEINURIA\par likely combination of allergy and reactive airways disease, maybe worsened by ACEi. Needs to be on an ACEi or ARB though because of significant proteinuria, 1/5g/day-S/P SURGERY FOR TONGUE CA   SEPT 2020----- \par ----august 2020  diagnosed sq cell ca tongue ue -----s/p  surgery at [place]-- S/P RT-----AS PER DAUGHTER the cervical lymph node biopsy  IN APRIL 2021  was reported as positive for squamous cell carcinoma------- patient also have has a cystic lesion in the left-lung superior segment ------ bronchoscopy was performed at [place] but was not conclusive-----he has been referred for resection at [facility] by thoracic surgery-----which was chronic\par Patient has metastatic squamous cell CA  --undergoing radiation to the neck\par Previous CT small----left apical pneumothorax no significant respiratory distress----he is already on supplemental oxygen-saturating at 97%\par 1 -it is medically necessary for pt to use oxygen x 24hrs\par 2) family informed to monitor o2 sat -if o2 consistent less than 90% to bring to ER\par 3) follows palliative care, RT this week  to the neck\par 4) DAX- ON   at 8 cmH20  -benefits from nightly use-- \par 5) continue inhalers/nebs\par 6) ttm   one week\par 7]  \par \par \par Thanks for allowing  me to participate  in the care of this patient.  Patient at this time  will follow  the above mentioned recommendations and return back for follow up visit. If you have any questions  I can be reached  at #[phone] (beeper)  or  [phone] (office).\par \par Darby Krueger MD, FCCP \par Director, Pulmonary Hypertension Program \par [facility] \par Division of Pulmonary, Critical Care and Sleep Medicine \par \par HENRIK CarlosC\par \par  Professor of Medicine \par [facility]\par

## 2021-11-01 NOTE — PHYSICAL EXAM
[General Appearance - Alert] : alert [General Appearance - In No Acute Distress] : in no acute distress [Skin Color & Pigmentation] : normal skin color and pigmentation [Oriented To Time, Place, And Person] : oriented to person, place, and time [Affect] : the affect was normal

## 2021-11-03 PROBLEM — R53.83 FATIGUE: Status: ACTIVE | Noted: 2021-01-01

## 2021-11-03 PROBLEM — R62.7 ADULT FAILURE TO THRIVE: Status: ACTIVE | Noted: 2021-01-01

## 2021-11-03 NOTE — ASSESSMENT
[FreeTextEntry1] : 76yoM with:\par \par 1. SCC of tongue, recurrent - Pt is s/p Quadshot last week. [place] [facility] Keytruda in the meantime. Med Onc follow up. \par \par 2. R neck pain 2/2 neoplasm - C/w methadone 1.6mg BID at this time. Suspect these reported presyncopal episodes have occurred in the setting of FTT given his poor nutritional intake and malaise post-Quad shot.\par Encouraged use of PRN Oxycodone for sever pain, can use a dose of 2.5mg in lieu of a 5mg dose.\par C/w medical cannabis regimen as is as it is benefiting patient. C/w gabapentin 400mg TID. \par \par 3. FTT - Recommend 1 week course of dexamethasone 2mg BID. This was discussed with Dr. Porras. \par \par 4. Oral mucositis - magic mouthwash prescribed.\par \par 5. Encounter for Palliative Care - Emotional support provided.\par \par Follow up in 2 weeks, call sooner with questions or issues.\par

## 2021-11-03 NOTE — ED ADULT NURSE NOTE - OBJECTIVE STATEMENT
Pt received to room 7 presents with head/neck pain. Pt a&ox3, ambulatory with assistance, skin intact, respirations even and unlabored, abd soft and non-distended, non-tender to palpation. Pt has hx of head/neck cancer and received radiation tx this past Friday. As per daughter at bedside "was advised to come to ER for him to be evaluated due to his recent decrease in appetite". As per pt daughter, pt has had decreased PO intake since Friday. Pt endorsing nausea, and dizziness, denies fever, chills, diarrhea, vomiting, or any other symptoms. 18G Iv placed in left arm. Will await further orders, and continue to monitor.

## 2021-11-03 NOTE — ED ADULT NURSE REASSESSMENT NOTE - NS ED NURSE REASSESS COMMENT FT1
pt received alert and oriented x4. respirations equal and unlabored. 100 % on room air. pt c/o having right neck pain. pt medicated as per EMAR. pt noted to have pneumothorax on xray. pt placed on non rebreather as per MD huynh . pt tolerating treatment. nsr on cm. Call bell in reach, warm blanket provided, bed in lowest position, side rails up x2,safety maintained. will continue to monitor.

## 2021-11-03 NOTE — ED ADULT NURSE NOTE - NSICDXPASTSURGICALHX_GEN_ALL_CORE_FT
PAST SURGICAL HISTORY:  History of surgery On 9/10/20 he underwent right hemiglossectomy and partial neck dissection with Dr. Darinel Servin at [facility].    S/P hernia repair b/l    S/P knee replacement b/l    Status post cataract extraction and insertion of intraocular lens, unspecified laterality b/l

## 2021-11-03 NOTE — ED PROVIDER NOTE - OBJECTIVE STATEMENT
75yo M w/ pmhx off SCC of the R tongue Patient initially diagnosed 8/2020, and on 9/10/20 he underwent R hemiglossectomy and partial neck dissection with Dr. Darinel Servin at [facility], hx of lung CA, HTN, HLD, DM2, CAD s/p stent 2004 presented the the ER with c/o fatigue, dizziness, nausea, decreased PO intake advised to come to the ED by his Oncologist Dr. Arriaga ([facility]) for evaluation. Cough w/ no productive sputum. Denies f/c, cp, sob, abd pain.

## 2021-11-03 NOTE — ED PROVIDER NOTE - NSICDXPASTSURGICALHX_GEN_ALL_CORE_FT
PAST SURGICAL HISTORY:  History of surgery On 9/10/20 he underwent right hemiglossectomy and partial neck dissection with Dr. Darinel Servin at [facility].    S/P hernia repair b/l    S/P knee replacement b/l    Status post cataract extraction and insertion of intraocular lens, unspecified laterality b/l

## 2021-11-03 NOTE — ED PROVIDER NOTE - CLINICAL SUMMARY MEDICAL DECISION MAKING FREE TEXT BOX
A:  -77yo M w/ pmhx off SCC of the R tongue Patient initially diagnosed 8/2020, and on 9/10/20 he underwent R hemiglossectomy and partial neck dissection with Dr. Darinel Servin at [facility], hx of lung CA, HTN, HLD, DM2, CAD s/p stent 2004 presented the the ER with c/o fatigue, dizziness, nausea, decreased PO intake advised to come to the ED by his Oncologist Dr. Arriaga ([facility]) for evaluation.  P:  -Labs, CT neck, IVF, pain control

## 2021-11-03 NOTE — ED PROVIDER NOTE - PHYSICAL EXAMINATION
Neck: sugical scars on the anterior neck c/w prior incisions, +TTP R side of jaw, no crepitus, mild jaw erythema

## 2021-11-03 NOTE — ED ADULT NURSE NOTE - NSIMPLEMENTINTERV_GEN_ALL_ED
Implemented All Fall Risk Interventions:  [place] to call system. Call bell, personal items and telephone within reach. Instruct patient to call for assistance. Room bathroom lighting operational. Non-slip footwear when patient is off stretcher. Physically safe environment: no spills, clutter or unnecessary equipment. Stretcher in lowest position, wheels locked, appropriate side rails in place. Provide visual cue, wrist band, yellow gown, etc. Monitor gait and stability. Monitor for mental status changes and reorient to person, place, and time. Review medications for side effects contributing to fall risk. Reinforce activity limits and safety measures with patient and family.

## 2021-11-03 NOTE — HISTORY OF PRESENT ILLNESS
[Home] : at home, [unfilled] , at the time of the visit. [Medical Office: ([facility])___] : at the medical office located in  [Other:____] : [unfilled] [Verbal consent obtained from patient] : the patient, [unfilled] [FreeTextEntry1] : 76yoM with SCC of the R tongue seen for acute visit for pain.\par PMH also significant for DAX on CPAP, remote h/o tobacco smoking and chewing. \par \par Patient initially diagnosed 2020, and on 9/10/20 he underwent R hemiglossectomy and partial neck dissection with Dr. Darinel Servin at [facility]. Pathology revealed a 1.2x1.0x0.55cm well to moderately differentiated squamous cell carcinoma, DOI 5.5mm, PNI+, LVI-, a closest lateral margin was 4mm. 4 lymph nodes from right levels IA, IB, and 2 were evaluated and negative for carcinoma. He was treated with adjuvant proton tx at [facility].  On surveillance PET/CT in 3/2021 he was found to have cervical adenopathy and KEZIA cystic lesion. Bx of cervical LN pos for sq cell ca. FNA of lung lesion non-diagnostic. Wedge resection of lung lesion was c/w sq cell ca, morphologically distinct but likely met. PD-L1 10-15% CPS. \par \par Patient reports pain that begins in the R neck that travels up to the post-auricular region, travels up and around his ear and towards the R temporal area. The pain began in May, shortly after he had a biopsy of the area. \par He is currently using Oxycodone 10mg BID and Gabapentin 300mg BID. The gabapentin started one week ago and he has found that this has been helping from keeping the pain from traveling to severe levels. \par \par He is edentulous so he only eats soft foods. Is vegetarian. Drinks fresh fruit/vegetable juices, wheatgrass. \par \par Interval Hx (2021): Patient received quad shot of RT last week. \par He is now on methadone BID, family reduced the dose down to 0.8mL(1.6mg) from 1.25mL(2.5mg) BID in the last couple of days due to two episodes where he seemed to be pre-synocpal after taking a dose of the methadone. He was seated during the episodes where he was noted to drop his head and hands. He was arousable after about 10 seconds. He did not exhibit this with the lower dosage.  He has been very fatigued and eating minimally.  He takes one smoothie in the morning, otherwise eats little to nothing. Pain level is very high. Patient is noted to be falling asleep during this virtual encounter.\par \par His pain regimen is as follows: \par Gabapentin 400mg TID\par Oxycodone 5mg PRN (uses 4-5 times daily; had a dizzy spell after taking a 10mg dose once)\par 1:20 THC:CBD tincture in the morning and afternoon (0.3mmg), AND 1:1 THC:CBD (2.5m.5mg)\par 1:1 THC:CBD tincture at bedtime (7.5m.5mg)\par 1:1 microtab PRN for acute pain (not as helpful as previously)\par CBD oil topically to the tumor\par \par ROS:\par +nausea - cannabis and metoclopramide are being used, not helping much\par +low appetite\par +burning sensation in his mouth when he tries to eat - oral ulcers popping up\par +voice changes, difficultly with enunciating words due to neck stiffness. \par +constipation - uses colace, miralax which are helping\par +occasional fatigue\par +some LE swelling\par +30lb weight loss over the last year\par Denies trouble sleeping, SOB, dysphagia\par \par Patient is , lives with his wife. He has six children. \par Is independent in ADLs.  He used to run a grocery store up until about 10 years ago.\par \par [employer] Ref#: [id number]

## 2021-11-03 NOTE — ED PROVIDER NOTE - NSICDXPASTMEDICALHX_GEN_ALL_CORE_FT
PAST MEDICAL HISTORY:  Allergic bronchitis     Coronary artery disease of native artery of native heart with stable angina pectoris     Diabetes     Diabetic retinopathy     Edema of extremity     High cholesterol     Hypertension     Hyponatremia     Metastatic cancer     Microalbuminuria     Neuralgia     Obstructive sleep apnea, adult     Oral cancer     Primary osteoarthritis of both knees     Recurrent disease     SCC (squamous cell carcinoma)     Vitamin D deficiency

## 2021-11-03 NOTE — ED PROVIDER NOTE - CCCP TRG CHIEF CMPLNT
Problem: Safety  Goal: Will remain free from injury  Outcome: PROGRESSING AS EXPECTED         sent by MD

## 2021-11-03 NOTE — ED PROVIDER NOTE - CHIEF COMPLAINT
September 28, 2020     Ana Paula Sears MD  [address]    Patient: Jonita Homans   YOB: 1944   Date of Visit: 9/28/2020       Dear Dr Karsten Choudhary:    Thank you for referring Mary Mancilla to me for evaluation  Below are my notes for this consultation  If you have questions, please do not hesitate to call me  Sincerely,        Anh Mireles MD        CC: MD Anh Gallo MD  9/28/2020  4:04 PM  Sign when Signing Visit  [place] [address] [facility] Note     Patient Name: Jonita Homans  Encounter Date: 9 28 2020     Have you been cared for by a [place] [address] Dermatologist in the last 3 years and, if so, which one? No    · Have you traveled outside of the [address] [place] [address] in the past 3 months or outside of the [facility] area in the last 2 weeks? No     May we call your Preferred Phone number to discuss your specific medical information? Yes     May we leave a detailed message that includes your specific medical information? Yes      Today's Chief Concerns:   Concern #1:  rash   Concern #2:      Past Medical History:  Have you personally ever had or currently have any of the following? · Skin cancer (such as Melanoma, Basal Cell Carcinoma, Squamous Cell Carcinoma? (If Yes, please provide more detail)- No  · Eczema: No  · Psoriasis: No  · HIV/AIDS: No  · Hepatitis B or C: No  · Tuberculosis: No  · Systemic Immunosuppression such as Diabetes, Biologic or Immunotherapy, Chemotherapy, Organ Transplantation, Bone Marrow Transplantation (If YES, please provide more detail): YES, diabetic  · Radiation Treatment (If YES, please provide more detail): No  · Any other major medical conditions/concerns? (If Yes, which types)- YES, Hypertension, diabetes, venous insufficiency, pace maker, sleep apnea, blood thinners (eliquis),hyperlipidemia   hypothyroidism     Social History:     What is/was your primary occupation? retired     What are your hobbies/past-times? Housework, cooking    Family History:  Have any of your "first degree relatives" (parent, brother, sister, or child) had any of the following       · Skin cancer such as Melanoma or Merkel Cell Carcinoma or Pancreatic Cancer? No  · Eczema, Asthma, Hay Fever or Seasonal Allergies: YES, father had psoriasis,  · Psoriasis or Psoriatic Arthritis: No  · Do any other medical conditions seem to run in your family? If Yes, what condition and which relatives?   YES, mother had unknown heart condition, father and brother diabetes    Current Medications:   (please update all dermatological medications before printing patient's AVS!)      Current Outpatient Medications:     acetaminophen (TYLENOL) 325 mg tablet, Take 650 mg by mouth , Disp: , Rfl:     apixaban (Eliquis) 5 mg, Take 1 tablet (5 mg total) by mouth 2 (two) times a day Lot #[id number] Exp 9/2022, Disp: 56 tablet, Rfl: 0    Ascorbic Acid (VITAMIN C) 1000 MG tablet, Take 1,000 mg by mouth , Disp: , Rfl:     atorvastatin (LIPITOR) 80 mg tablet, TAKE 1 TABLET AT BEDTIME, Disp: 90 tablet, Rfl: 3    cholecalciferol (VITAMIN D3) 1,000 units tablet, Take 2,000 Units by mouth daily , Disp: , Rfl:     citalopram (CeleXA) 20 mg tablet, TAKE 1 TABLET EVERY DAY, Disp: 90 tablet, Rfl: 3    Continuous Blood Gluc  (FREESTYLE RONNI 14 DAY READER) GARRY, Use to check BG levels 4+times daily and as needed, Disp: 1 Device, Rfl: 0    Continuous Blood Gluc Sensor (FREESTYLE RONNI 14 DAY SENSOR) MISC, Change sensor every 14 days to check BG levels 4+ times daily, Disp: 6 each, Rfl: 3    GLUCOSAMINE CHONDROITIN COMPLX PO, Take 1 tablet by mouth 2 (two) times a day, Disp: , Rfl:     glucose blood (TRUE METRIX BLOOD GLUCOSE TEST) test strip, Test 4 times plus daily, as instructed, Disp: 400 each, Rfl: 1    hydrOXYzine HCL (ATARAX) 25 mg tablet, Take 1 tablet (25 mg total) by mouth daily at bedtime, Disp: 30 tablet, Rfl: 0    insulin aspart (NovoLOG) 100 Units/mL injection pen, 12u with breakfast, 10u with lunch, 10u with dinner +scale (Use up to 40 units daily), Disp: 15 pen, Rfl: 1    insulin glargine (TOUJEO) 300 units/mL CONCETRATED U-300 injection pen (1-unit dial), Inject 35 Units under the skin daily at bedtime, Disp: 9 pen, Rfl: 1    Insulin Pen Needle (PEN NEEDLES) 32G X 6 MM MISC, Use 4 times a day, Disp: 400 each, Rfl: 1    levothyroxine 125 mcg tablet, TAKE 1 TABLET BY MOUTH ONCE DAILY EXCEPT ON SUNDAY NO TABLET, Disp: 78 tablet, Rfl: 1    lisinopril (ZESTRIL) 20 mg tablet, Take 1 tablet (20 mg total) by mouth 2 (two) times a day, Disp: 180 tablet, Rfl: 3    loratadine (CLARITIN) 10 mg tablet, Take 1 tablet (10 mg total) by mouth daily, Disp: 30 tablet, Rfl: 2    metoprolol tartrate (LOPRESSOR) 25 mg tablet, TAKE 1 TABLET EVERY 12 HOURS, Disp: 180 tablet, Rfl: 3    Omega-3 Fatty Acids (FISH OIL) 1200 MG CAPS, Take 1,200 mg by mouth 2 (two) times a day , Disp: , Rfl:     oxybutynin (DITROPAN) 5 mg tablet, TAKE 1 TABLET TWICE DAILY, Disp: 180 tablet, Rfl: 1    Tetrahydroz-Polyvinyl Al-Povid (MURINE TEARS PLUS OP), Apply to eye 3 (three) times a day, Disp: , Rfl:     triamcinolone (KENALOG) 0 1 % lotion, Apply topically 3 (three) times a day for 14 days, Disp: 60 mL, Rfl: 0    Calcium Carbonate-Vit D-Min (CALCIUM 1200 PO), Take by mouth, Disp: , Rfl:     furosemide (LASIX) 20 mg tablet, TAKE 1 TABLET EVERY DAY, Disp: 90 tablet, Rfl: 3      Review of Systems:  Have you recently had or currently have any of the following? If YES, what are you doing for the problem? · Fever, chills or unintended weight loss: No  · Sudden loss or change in your vision: No  · Nausea, vomiting or blood in your stool: No  · Painful or swollen joints: No  · Wheezing or cough: No  · Changing mole or non-healing wound: No  · Nosebleeds: No  · Excessive sweating: No  · Easy or prolonged bleeding?   YES, prescribed eliquis  · Over the last 2 weeks, how often have you been bothered by the following problems? · Taking little interest or pleasure in doing things: 1 - Not at All  · Feeling down, depressed, or hopeless: 1 - Not at All  · Rapid heartbeat with epinephrine:  No    · FEMALES ONLY:    · Are you pregnant or planning to become pregnant? No  · Are you currently or planning to be nursing or breast feeding? No    · Any known allergies? Allergies   Allergen Reactions    Ampicillin Hives   ·       Physical Exam:     Was a chaperone (Derm Clinical Assistant) present throughout the entire Physical Exam? Yes     Did the Dermatology Team specifically  the patient on the importance of a Full Skin Exam to be sure that nothing is missed clinically?  Yes}  o Did the patient ultimately request or accept a Full Skin Exam?  Yes  o Did the patient specifically refuse to have the areas "under-the-bra" examined by the Dermatologist? No  o Did the patient specifically refuse to have the areas "under-the-underwear" examined by the Dermatologist? No    CONSTITUTIONAL:   Vitals:    09/28/20 1307   Temp: 98 5 °F (36 9 °C)   TempSrc: Temporal   Weight: 106 kg (234 lb)   Height: 5' 6" (1 676 m)           PSYCH: Normal mood and affect  EYES: Normal conjunctiva  ENT: Normal lips and oral mucosa  CARDIOVASCULAR: No edema  RESPIRATORY: Normal respirations  HEME/LYMPH/IMMUNO:  No regional lymphadenopathy except as noted below in "ASSESSMENT AND PLAN BY DIAGNOSIS"    SKIN:  FULL ORGAN SYSTEM EXAM    Hair, Scalp, Ears, Face Normal except as noted below in Assessment   Neck, Cervical Chain Nodes Normal except as noted below in Assessment   Right Arm/Hand/Fingers Normal except as noted below in Assessment   Left Arm/Hand/Fingers Normal except as noted below in Assessment   Chest/Breasts/Axillae Viewed areas Normal except as noted below in Assessment   Abdomen, Umbilicus Normal except as noted below in Assessment   Back/Spine Normal except as noted below in Assessment   Groin/Genitalia/Buttocks Normal except as noted below in Assessment   Right Leg, Foot, Toes Normal except as noted below in Assessment   Left Leg, Foot, Toes Normal except as noted below in Assessment        Assessment and Plan by Diagnosis:    History of Present Condition:     Duration:  How long has this been an issue for you?    o  a month   Location Affected:  Where on the body is this affecting you?    o  extremitites   Quality:  Is there any bleeding, pain, itch, burning/irritation, or redness associated with the skin lesion? o  itching, inflammation/redness of skin   Severity:  Describe any bleeding, pain, itch, burning/irritation, or redness on a scale of 1 to 10 (with 10 being the worst)  o  8   Timing:  Does this condition seem to be there pretty constantly or do you notice it more at specific times throughout the day? o  constant   Context:  Have you ever noticed that this condition seems to be associated with specific activities you do?    o  denies   Modifying Factors:    o Anything that seems to make the condition worse?    -  denies  o What have you tried to do to make the condition better?    -  OTC Hydrocorisone, Triamcinolone   Associated Signs and Symptoms:  Does this skin lesion seem to be associated with any of the following:  o  SL AMB DERM SIGNS AND SYMPTOMS: Itching and Scratching         FAVOR STASIS DERMATITIS WITH "ID" REACTION    Physical Exam:   (Anatomic Location); (Size and Morphological Description); (Differential Diagnosis):  o Pink scaling plaque circumferential on left lower leg with pink edematous excoriated thin plaques on b/l arms, lower back  Sparing upper back   Pertinent Positives:   Pertinent Negatives: Additional History of Present Condition:  Patient reports a rash started a month ago on backand extremities  Symptoms include inflammation and intense itching   Has treated with over the counter hydrocortisone cream and Triamcinolone 0 1% lotion occasionally with minimal improvement  Has hx of stasis dermatitis and uses a venous pressure sock on left leg  Recently noted rashing up on this leg as well  Assessment and Plan:  Based on a thorough discussion of this condition and the management approach to it (including a comprehensive discussion of the known risks, side effects and potential benefits of treatment), the patient (family) agrees to implement the following specific plan:   Start gentle skin care:   o Use Dove soap in shower only  o Use daily moisturizers  While flared, use plain greasy Vaseline all over  - Start Triamcinolone 0 1% ointment  Best to use this using WET WRAP INSTRUCTIONS as the medication will absorb better on the skin  See below  In between treatments, moisturize with Vaseline   You can keep the medicines in the fridge to help "cool" down the skin when using them  This often helps with itch   Continue taking Hydroxyzine 25 mg at bedtime as needed for itching   Begin taking heraclio the counter Zyrtec 10 mg twice daily    Take Prednisone as followed: Take 40 mg (4 tablets) for 3 days    Take 30 mg (3 tablets)for 3 days    Take 20 mg (2 tablets) for 3 days    Take 10 mg (1 tablet)  3 days     THEN STOP    We will CC this note to your PCP and Endocrinologist to make them aware that we are starting the prednisone  Advised of likely spiking in sugar with this medication- pt is on a sliding scale  FOLLOW UP 4-5 weeks    WET WRAP THERAPY    What is wet wrap therapy? Wet wrap therapy is a useful tool in the treatment of  dermatitis  Wet wraps can help:  · Put moisture into the skin  · Increase absorbtion of topical steroids into the skin, so more medicine is delivered to the affected areas  · Act as a barrier to keep your child from scratching, which can help your child sleep better    When are they used? Wet wraps are usually for severe dermatitis flare-ups   They are most often used for only a few days at a time, but can be used for longer as instructed by your healthcare provider  Wet wraps can also be used without topical steroids to help moisturizers work better on areas that are very dry  What supplies do I need? Before you do a wet wrap, gather these supplies:  · Topical steroid ointment prescribed by your childs healthcare provider  · Moisturizer (emollient)- we often suggest using a clear, greasy ointment like Vaseline  · Two layers of a wrap of your choice: one for a wet layer and the other for a dry layer  What kind of wrap do I choose? Chose the wrap that works best for the part of the body you are covering  Here are some examples of materials that can work well:  · Gauze: This is great for any body area, but can be expensive  · Fitted cotton pajamas: This is great for more widespread rash  · 100% cotton socks: This can be helpful for stubborn eczema on the hands or feet  · 100% cotton adult socks: If you cut a small hole in the toe of an adult sock, you will have a great wrap that will fit easily over an arm or a leg that can be moved up or down as needed  How do I apply the wet wrap? It is best to apply a wet wrap after a bath  You can also wet skin with moist towels on the skin for 10-15 minutes  STEPS FOR APPLYING THE WET WRAPS:  1  Soak in tub for 10- 15 minutes  2  Apply the steroid ointment to your  inflamed skin as directed  3  Can follow this by generous layer of moisturizing ointment like VASELINE to all affected skin  4  Soak one layer of wrap in warm water  5  Wring out excess water until it is slightly damp  6  Wrap the affected area with this wet layer, making sure that it is not too tight  7  Immediately put the dry layer on over the wet layer  8  Try to stay in a warm environment or cover yourself with a blanket so they dont get cold  9  Wet wraps are sometimes left in place overnight, but you can leave it on for 1 or 2 hours   As always, follow the specific advice of your provider for frequency and duration of wet wrap therapy  10  If you have any questions or concerns, contact your provider  NEOPLASM OF UNCERTAIN BEHAVIOR OF SKIN    Physical Exam:   (Anatomic Location); (Size and Morphological Description); (Differential Diagnosis):  o A: Right cheek; skin; shave biopsy; 64year old female with 2 8 X 3 5cm tan patch with darker patch of pigment within lesion with perifollicular pigment accentuation on dermoscopy- r/o lentigo maligna melanoma    Pertinent Positives:   Pertinent Negatives: Additional History of Present Condition:      Assessment and Plan:   I have discussed with the patient that a sample of skin via a "skin biopsy would be potentially helpful to further make a specific diagnosis under the microscope   Based on a thorough discussion of this condition and the management approach to it (including a comprehensive discussion of the known risks, side effects and potential benefits of treatment), the patient (family) agrees to implement the following specific plan:    o Procedure:  Skin Biopsy  After a thorough discussion of treatment options and risk/benefits/alternatives (including but not limited to local pain, scarring, dyspigmentation, blistering, possible superinfection, and inability to confirm a diagnosis via histopathology), verbal and written consent were obtained and portion of the rash was biopsied for tissue sample  See below for consent that was obtained from patient and subsequent Procedure Note        PROCEDURE SHAVE BIOPSY NOTE:     Performing Physician: Shannon Gil Anatomic Location; Clinical Description with size (cm); Pre-Op Diagnosis:   o A: Right cheek; 2 8 X 3 5cm tan patch with darker patch of pigment within lesion with perifollicular pigment accentuation on dermoscopy- r/o lentigo maligna melanoma    Post-op diagnosis: Same      Local anesthesia: 1% xylocaine with epi       Topical anesthesia: None     Hemostasis: Electrocautery       After obtaining informed consent  at which time there was a discussion about the purpose of biopsy  and low risks of infection and bleeding  The area was prepped and draped in the usual fashion  Anesthesia was obtained with 1% lidocaine with epinephrine  A shave biopsy to an appropriate sampling depth was obtained with a sterile blade (such as a 15-blade or DermaBlade)  The resulting wound was covered with surgical ointment and bandaged appropriately  The patient tolerated the procedure well without complications and was without signs of functional compromise  Specimen has been sent for review by Dermatopathology  Standard post-procedure care has been explained and has been included in written form within the patient's copy of Informed Consent  INFORMED CONSENT DISCUSSION AND POST-OPERATIVE INSTRUCTIONS FOR PATIENT    I   RATIONALE FOR PROCEDURE  I understand that a skin biopsy allows the Dermatologist to test a lesion or rash under the microscope to obtain a diagnosis  It usually involves numbing the area with numbing medication and removing a small piece of skin; sometimes the area will be closed with sutures  In this specific procedure, sutures are not usually needed  If any sutures are placed, then they are usually need to be removed in 2 weeks or less  I understand that my Dermatologist recommends that a skin "shave" biopsy be performed today  A local anesthetic, similar to the kind that a dentist uses when filling a cavity, will be injected with a very small needle into the skin area to be sampled  The injected skin and tissue underneath "will go to sleep and become numb so no pain should be felt afterwards  An instrument shaped like a tiny "razor blade" (shave biopsy instrument) will be used to cut a small piece of tissue and skin from the area so that a sample of tissue can be taken and examined more closely under the microscope    A slight amount of bleeding will occur, but it will be stopped with direct pressure and a pressure bandage and any other appropriate methods  I understands that a scar will form where the wound was created  Surgical ointment will be applied to help protect the wound  Sutures are not usually needed  II   RISKS AND POTENTIAL COMPLICATIONS   I understand the risks and potential complications of a skin biopsy include but are not limited to the following:   Bleeding   Infection   Pain   Scar/keloid   Skin discoloration   Incomplete Removal   Recurrence   Nerve Damage/Numbness/Loss of Function   Allergic Reaction to Anesthesia   Biopsies are diagnostic procedures and based on findings additional treatment or evaluation may be required   Loss or destruction of specimen resulting in no additional findings    My Dermatologist has explained to me the nature of the condition, the nature of the procedure, and the benefits to be reasonably expected compared with alternative approaches  My Dermatologist has discussed the likelihood of major risks or complications of this procedure including the specific risks listed above, such as bleeding, infection, and scarring/keloid  I understand that a scar is expected after this procedure  I understand that my physician cannot predict if the scar will form a "keloid," which extends beyond the borders of the wound that is created  A keloid is a thick, painful, and bumpy scar  A keloid can be difficult to treat, as it does not always respond well to therapy, which includes injecting cortisone directly into the keloid every few weeks  While this usually reduces the pain and size of the scar, it does not eliminate it  I understand that photographs may be taken before and after the procedure  These will be maintained as part of the medical providers confidential records and may not be made available to me    I further authorize the medical provider to use the photographs for teaching purposes or to illustrate scientific papers, books, or lectures if in his/her judgment, medical research, education, or science may benefit from its use  I have had an opportunity to fully inquire about the risks and benefits of this procedure and its alternatives  I have been given ample time and opportunity to ask questions and to seek a second opinion if I wished to do so  I acknowledge that there have specifically been no guarantees as to the cosmetic results from the procedure  I am aware that with any procedure there is always the possibility of an unexpected complication  III  POST-PROCEDURAL CARE (WHAT YOU WILL NEED TO DO "AFTER THE BIOPSY" TO OPTIMIZE HEALING)     Keep the area clean and dry  Try NOT to remove the bandage or get it wet for the first 24 hours   Gently clean the area and apply surgical ointment (such as Vaseline petrolatum ointment, which is available "over the counter" and not a prescription) to the biopsy site for up to 2 weeks straight  This acts to protect the wound from the outside world   Sutures are not usually placed in this procedure  If any sutures were placed, return for suture removal as instructed (generally 1 week for the face, 2 weeks for the body)   Take Acetaminophen (Tylenol) for discomfort, if no contraindications  Ibuprofen or aspirin could make bleeding worse   Call our office immediately for signs of infection: fever, chills, increased redness, warmth, tenderness, discomfort/pain, or pus or foul smell coming from the wound  WHAT TO DO IF THERE IS ANY BLEEDING? If a small amount of bleeding is noticed, place a clean cloth over the area and apply firm pressure for ten minutes  Check the wound after 10 minutes of direct pressure  If bleeding persists, try one more time for an additional 10 minutes of direct pressure on the area    If the bleeding becomes heavier or does not stop after the second attempt, or if you have any other questions about this procedure, then please call your [facility]'s Dermatologist by calling [phone] (SKIN)  I hereby acknowledge that I have reviewed and verified the site with my Dermatologist and have requested and authorized my Dermatologist to proceed with the procedure        Scribe Attestation    I,:   Dannie Muhammad am acting as a scribe while in the presence of the attending physician :        I,:   Michaela Mccullough MD personally performed the services described in this documentation    as scribed in my presence : The patient is a 76y Male complaining of

## 2021-11-03 NOTE — ED ADULT NURSE REASSESSMENT NOTE - NS ED NURSE REASSESS COMMENT FT1
Pt still endorsing pain, rates pain as 7/10, will make SERVANDO Mccauley aware, will continue to monitor.

## 2021-11-03 NOTE — ED ADULT NURSE REASSESSMENT NOTE - NS ED NURSE REASSESS COMMENT FT1
SERVANDO Mccauley and MD Mcadams made aware of ECG strip from tele technician, rpt ECG performed, pt in NAD, denies CP, will continue to monitor.

## 2021-11-03 NOTE — ED ADULT TRIAGE NOTE - CHIEF COMPLAINT QUOTE
Pt being treated for head and neck cancer, last chemo in August, last radiation on Friday. States was sent in by oncologist d/t general weakness, dizziness and decreased PO intake. PMH: HTN, DM. .

## 2021-11-03 NOTE — ED PROVIDER NOTE - PROGRESS NOTE DETAILS
[facility] EM[facility]IM PGY4: Pt endorsed to hospitalist for admission. CT chest obtained for further eval of pneumothorax, pt comfortable at this time without respiratory distress and without hypoxia. Vascular surgery consulted for effacement of IJ on CTA as well as CT surgery for L pneumothorax.

## 2021-11-03 NOTE — ED PROVIDER NOTE - ATTENDING CONTRIBUTION TO CARE
76M h/o head and neck CA s/p resection in 2020, HTN HLD CAD s/p stent.  Had radiation therapy on 8th of oct fatigue nausea dizziness.  Sent in by H/O Dr Gay.  C/o pain to R lower jaw has redness and warmth, DA says this is similar to previous, here it does not appear infected, appears reddened without much warmth c/w radiation change.  Unable to rhea PO (+)nausea.  RUL rhonchi (h/o Lung CA).  Admit for FTT.   Multiple dropped beats noted on EKG - ?2nd deg AVB - will repeat EKG, sent trops.  - No dropped beats on EKG - continue to monitor on tele.   VS:  unremarkable except HTN    GEN - NAD;  malaise;   A+O x3   HEAD - NC/AT     ENT - PEERL, EOMI, mucous membranes   dry , no discharge      NECK: Neck supple, non-tender without lymphadenopathy, no masses, no JVD R lower jaw redness and mild warmth, slight tenderness, no crepitus or purulence, likely c/w radiation change.    PULM - CTA b/l,  symmetric breath sounds  COR -  normal heart sounds    ABD - , ND, NT, soft,  BACK - no CVA tenderness, nontender spine     EXTREMS - no edema, no deformity, warm and well perfused    SKIN - no rash    or bruising      NEUROLOGIC - alert, face symmetric, speech fluent, sensation nl, motor no focal deficit.

## 2021-11-04 NOTE — H&P ADULT - PROBLEM SELECTOR PLAN 3
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

## 2021-11-04 NOTE — PROGRESS NOTE ADULT - PROBLEM SELECTOR PLAN 1
PTX noted on CTChest; per outpatient notes: 10/27 note of PTX but unable to find corresponding scans   - Currently hemodynamically stable, satting % on 2L NC   - left lung decreased sounds throughout   - F/U CT surgery recs Dx 8/2020 s/p R hemiglossectomy and partial neck dissection 9/2020 T2NO. PET/CT in 3/2021 he was found to have cervical adenopathy and KEZIA cystic lesion s/p LLL wedge resection.    - Dr. 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  - F/U Vascular consulted re: IJV effacement and arterial encasement  - Will supplement with D5NS and encourage PO intake; puree diet per speech and swallow eval 11/3  - Pain: tylenol, toradol prn magic mouthwash and benzocaine. Gabapentin 300tid standing   - Pt daughter has home CBD; will discuss with pharmacists possibility of ordering this inpatient

## 2021-11-04 NOTE — CONSULT NOTE ADULT - ASSESSMENT
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 Vascular Surgery consulted for encasement of R CCA, ICA, ECA without significant stenosis and R IJ effacement by large R neck necrotic mass.     Recommendations:  - Final plans pending attending discussions  - Would obtain ENT, Oncology, Palliative c/s for input regarding GOC  - Rest of care per primary team     Vascular Surgery  [id number]

## 2021-11-04 NOTE — H&P ADULT - HISTORY OF PRESENT ILLNESS
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 Daughter at bedside who confirms that throughout the chemo pt had lower appetite but was able to force self to eat puree foods. After recent quadshot of radiation, he started feeling dizzy, lightheaded, difficulty swallowing with some pain. He has fallen 2x in the last week with head strike, possible loc. +neck/right head pain from the radiation areas. No fevers, n/v/cp/sob/abd pain/new swelling/d/dysuria.

## 2021-11-04 NOTE — PROGRESS NOTE ADULT - PROBLEM SELECTOR PLAN 3
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 CT chest during this admission showed left cavitary lesions demonstrating increased peripheral opacity. Outpatient records reviewed, could not find previous QuantiFeron test result.    - F/U sputum cultures   - F/U QuantiFeron   - Airborne precautions pending above results PTX noted on CT Chest; per outpatient notes: 10/27 note of PTX but unable to find corresponding scans     - Currently hemodynamically stable, satting % on 2L NC   - left lung decreased sounds throughout   - CT Surgery consulted - no intervention indicated at this time

## 2021-11-04 NOTE — H&P ADULT - PROBLEM SELECTOR PLAN 4
SBP: 140-160s   - c/w home amlodipine 5mg, valsartan 25mg SBP: 140-160s   - c/w home amlodipine 5mg, valsartan 25mg, doxazosin

## 2021-11-04 NOTE — PROGRESS NOTE ADULT - ASSESSMENT
76M Hx HTN, HLD, DM2, CAD s/p stents, SCC base of tongue 8/2020 s/p resection currently on chemoradiation last 10/8 and 10/29 respectively here for decreased PO intake, dizziness i/s/o recent radiation therapy found to have left ptx currently stable

## 2021-11-04 NOTE — H&P ADULT - NSHPSOCIALHISTORY_GEN_ALL_CORE
Home: Domiciled with wife  ADL: Full ADL   Next of Kin: daughter   Alcohol: Denies  Smoking: Denies   Drugs: Denies

## 2021-11-04 NOTE — H&P ADULT - NSHPREVIEWOFSYSTEMS_GEN_ALL_CORE
REVIEW OF SYSTEMS:  CONSTITUTIONAL: No weakness, fevers, chills, sick contacts, or unintended weight loss  EYES: No visual changes or vertigo  ENT: No throat pain, rhinorrhea, or hearing loss   NECK: No pain or stiffness  RESPIRATORY: No cough, wheezing, hemoptysis; No shortness of breath  CARDIOVASCULAR: No chest pain or palpitations  GASTROINTESTINAL: No abdominal or epigastric pain. No nausea, vomiting, or hematemesis; No diarrhea or constipation. No melena or hematochezia.  GENITOURINARY: No dysuria, frequency or hematuria  NEUROLOGICAL: No numbness or weakness  SKIN: No itching, rashes, or bruises  Psych: Good mood, no substance use REVIEW OF SYSTEMS:  CONSTITUTIONAL: +weakness. No fevers, chills, sick contacts.   EYES: No visual changes or vertigo  ENT: + right throat pain, odynophagia. No rhinorrhea, or hearing loss   NECK: +neck pain right   RESPIRATORY: + cough baseline. No wheezing, hemoptysis; No shortness of breath  CARDIOVASCULAR: No chest pain or palpitations  GASTROINTESTINAL: No abdominal or epigastric pain. No nausea, vomiting, or hematemesis; No diarrhea or constipation. No melena or hematochezia.  GENITOURINARY: No dysuria, frequency or hematuria  NEUROLOGICAL: No numbness or weakness  SKIN: No itching. Right neck rash from radiation  Psych: Good mood, no substance use

## 2021-11-04 NOTE — H&P ADULT - PROBLEM SELECTOR PLAN 7
DVT PPx: Pending for any CTX intervention, lovenox subQ i/s/o malignancy if no intervention   Diet: Regular puree, vegetarian   Code: Full  Dispo: Pending Hospital Course  Communication: Spoke with patient and daughter at bedside 3:30AM 11/4 DVT PPx: Heparin subq 5000 q12 for now if there were ctx interventions   Diet: Regular puree, vegetarian   Code: Full  Dispo: Pending Hospital Course  Communication: Spoke with patient and daughter at bedside 3:30AM 11/4

## 2021-11-04 NOTE — PROGRESS NOTE ADULT - PROBLEM SELECTOR PLAN 4
SBP: 140-160s   - c/w home amlodipine 5mg, valsartan 25mg, doxazosin Chronic Mild hyponatremia since 7/2021: 131 baseline.    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

## 2021-11-04 NOTE — CONSULT NOTE ADULT - SUBJECTIVE AND OBJECTIVE BOX
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 Daughter at bedside who confirms that throughout the chemo pt had lower appetite but was able to force self to eat puree foods. After recent quadshot of radiation, he started feeling dizzy, lightheaded, difficulty swallowing with some pain. He has fallen 2x in the last week with head strike, possible loc. +neck/right head pain from the radiation areas. No fevers, n/v/cp/sob/abd pain/new swelling/d/dysuria.  (2021 03:29)      PAST MEDICAL & SURGICAL HISTORY:  Hypertension    Diabetes    High cholesterol    Primary osteoarthritis of both knees    Coronary artery disease of native artery of native heart with stable angina pectoris    Allergic bronchitis    Diabetic retinopathy    Oral cancer    SCC (squamous cell carcinoma)    Metastatic cancer    Recurrent disease    Edema of extremity    Hyponatremia    Microalbuminuria    Neuralgia    Obstructive sleep apnea, adult    Vitamin D deficiency    S/P knee replacement  b/l    S/P hernia repair  b/l    Status post cataract extraction and insertion of intraocular lens, unspecified laterality  b/l    History of surgery  On 9/10/20 he underwent right hemiglossectomy and partial neck dissection with Dr. Darinel Servin at [facility].        ROS: Negative except for HPI    MEDICATIONS:  aspirin enteric coated 81 milliGRAM(s) Oral daily  heparin   Injectable 5000 Unit(s) SubCutaneous every 12 hours      amLODIPine   Tablet 5 milliGRAM(s) Oral daily  carvedilol 25 milliGRAM(s) Oral every 12 hours  doxazosin 2 milliGRAM(s) Oral daily  valsartan 40 milliGRAM(s) Oral daily      benzocaine 15 mG/menthol 3.6 mG (Sugar-Free) Lozenge 1 Lozenge Oral four times a day PRN  dextrose 40% Gel 15 Gram(s) Oral once  dextrose 5% + sodium chloride 0.9%. 1000 milliLiter(s) IV Continuous <Continuous>  dextrose 5%. 1000 milliLiter(s) IV Continuous <Continuous>  dextrose 5%. 1000 milliLiter(s) IV Continuous <Continuous>  dextrose 50% Injectable 25 Gram(s) IV Push once  dextrose 50% Injectable 12.5 Gram(s) IV Push once  dextrose 50% Injectable 25 Gram(s) IV Push once  FIRST- Mouthwash  BLM 30 milliLiter(s) Swish and Spit every 6 hours PRN  gabapentin 300 milliGRAM(s) Oral three times a day  glucagon  Injectable 1 milliGRAM(s) IntraMuscular once  insulin lispro (ADMELOG) corrective regimen sliding scale   SubCutaneous three times a day before meals  insulin lispro (ADMELOG) corrective regimen sliding scale   SubCutaneous at bedtime  simvastatin 20 milliGRAM(s) Oral at bedtime      Allergies    No Known Allergies    Intolerances        SOCIAL HISTORY: Denies tobacco, social ETOH, denies illicit drug use    FAMILY HISTORY:  Family history of acute myocardial infarction (Sibling)        Vital Signs Last 24 Hrs  T(C): 36.8 (2021 04:02), Max: 37.1 (2021 21:05)  T(F): 98.3 (2021 04:02), Max: 98.8 (2021 21:05)  HR: 67 (2021 04:02) (63 - 70)  BP: 168/77 (2021 04:02) (140/79 - 168/77)  BP(mean): --  RR: 18 (2021 04:02) (16 - 18)  SpO2: 98% (2021 04:02) (98% - 100%)    PHYSICAL EXAM:    Constitutional: NAD  HEENT: PERRLA, EOMI  Neck: No swelling, palpable carotid pulse bilaterally   Respiratory: Unlabored    Cardiovascular: RRR  Gastrointestinal: soft, NT/ND. No rebound or guarding.   Extremities: No peripheral edema  Vascular: 2+ radial/ulnar pulses bilaterally.   Neurological: A/O x 3, no focal deficits. Motor/sensation grossly intact in b/l UE and LE       LABS:                        12.3   9.59  )-----------( 274      ( 2021 17:36 )             35.9     11-03    131<L>  |  90<L>  |  15  ----------------------------<  131<H>  3.8   |  32<H>  |  0.60    Ca    9.9      2021 17:36    TPro  7.4  /  Alb  3.9  /  TBili  0.4  /  DBili  x   /  AST  24  /  ALT  15  /  AlkPhos  53  11-      Urinalysis Basic - ( 2021 17:59 )    Color: Yellow / Appearance: Clear / S.013 / pH: x  Gluc: x / Ketone: Negative  / Bili: Negative / Urobili: <2 mg/dL   Blood: x / Protein: Trace / Nitrite: Negative   Leuk Esterase: Negative / RBC: 1 /HPF / WBC 0 /HPF   Sq Epi: x / Non Sq Epi: 0 /HPF / Bacteria: Negative          RADIOLOGY & ADDITIONAL STUDIES:    EXAM:  CT ANGIO NECK (W)AW IC      EXAM:  CT ANGIO BRAIN (W)AW IC        PROCEDURE DATE:  Nov  3 2021         INTERPRETATION:  CLINICAL INDICATION: Neck pain, radiation, carotid injury.    TECHNIQUE: A CT scan of the brain with intravenous contrastand a CT angiogram study of the neck and brain were performed. The brain CT was performed with 5 mm axial images. The CT angiogram study of the neck and brain were performed with thin axial slices. Sagittal and coronal maximum intensity projection reformats were provided. 90 cc intravenous Omnipaque 350 contrast was administered, 10 cc contrast was discarded.    COMPARISON:  Neck CT 2021    FINDINGS:    CTA neck:  The visualized aorta and great vessels demonstrate no significant stenosis.  No hemodynamically significant stenosis of the internal carotid arteries.  The vertebral arteries demonstrate no significant stenosis.    There are treatment related changes of the aerodigestive tract.    There is evidence of prior right neck dissection. Persistent necrotic conglomerate right neck mass which has significantly increased in size. There is extension into the right parotid gland, right  space, right pharyngeal wall, right base of tongue, right oral tongue, and right sternocleidomastoid muscle. There is encasement of the distal right common carotid artery, right external carotid artery and portions of the right internal carotid artery. There is effacement of the right internal jugular vein.    MISCELLANEOUS: Left pneumothorax. Mediastinal adenopathy.    CTA brain:  The petrocavernous and supraclinoid ICA segments are patent bilaterally without flow limiting stenosis. There is a right posterior communicating artery.  The middle cerebral arteries are patent bilaterally without flow limiting stenosis.  The anterior cerebral arteries are patent bilaterally without flow limiting stenosis.    The intradural right vertebral artery is patent. Intradural left vertebral artery terminates at the left posterior-inferior cerebral artery.  The basilar artery is patent. Superior cerebellar arteries are patent.  Both posterior cerebral arteries are patent without flow limiting stenosis.      IMPRESSION:  CTA neck: Persistent large right neck necrotic conglomerate mass which has increased in size. There is progression in local-regional invasion and mass effect as described. There is encasement of portions of the right common carotid artery, right internal carotid artery and right external carotid artery without hemodynamically significant stenosis. There is effacement of the right internal jugular vein.    There is a left pneumothorax.    CTA brain: No large vessel occlusion or flow-limiting stenosis.    EXAM:  CT CERVICAL SPINE      EXAM:  CT BRAIN        PROCEDURE DATE:  Nov  3 2021         INTERPRETATION:  INDICATIONS:  Trauma, head injury, dizziness    TECHNIQUE:    CT BRAIN: Multiple contiguous axial images were obtained from the skull base tothe vertex without the use of intravenous contrast. Sagittal and coronal reformats were subsequently obtained.    CT CERVICAL SPINE: Axial images were obtained through the cervical spine using multislice helical technique.  Reformatted coronal and sagittal images were performed.    COMPARISON EXAMINATION: CT neck 2021 and PET/CT 10/15/2021    FINDINGS:    CT BRAIN:  There is no CT evidence of acute transcortical infarct.    There is no hydrocephalus, mass effect, midline shift, or acute intracranial hemorrhage. No extra-axial collection. Basal cisterns are patent.    The visualized paranasal sinuses and mastoid air cells are clear.    The calvarium is intact.      CT CERVICAL SPINE:    There is straightening of usual cervical lordosis. There is no significant subluxation. Vertebral body height is preserved throughout the cervical spine. There is moderate multilevel loss of intervertebral disc height throughout the cervical spine.    There is persistent prominence of the prevertebral soft tissues which is likely treatment related.    Multilevel diffuse disc osteophyte complexes contribute to high-grade multilevel central canal narrowing. Multilevel uncovertebral spurring contribute to neural foraminal narrowing.    Refer to corresponding report of the CT angiogram of the neck from same day for details regarding the left pneumothorax and findings within the soft tissues of the neck.      IMPRESSION:  CT head: No acute intracranial hemorrhage or depressed calvarial fracture.    CT cervical spine: No acute fracture or traumatic subluxation. Advanced degenerative changes as described.   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 Per pt and daughter, after radiation 10/29 pt states he started feeling dizzy, lightheaded, difficulty swallowing with some pain. Oncologist was informed of current symptoms and suggested pt present to ED for further workup and possible CTA neck. On presentation pt with no additional complaints.     On arrival to ED pt afebrile and HD stable. Labs with mild hyponatremia, otherwise no acute findings. CTA brain/neck performed and notable for know neck mass with persistent large right neck necrotic conglomerate mass which has increased in size. There is progression in local-regional invasion and mass effect as described. There is encasement of portions of the right common carotid artery, right internal carotid artery and right external carotid artery without hemodynamically significant stenosis. There is effacement of the right internal jugular vein.      PAST MEDICAL & SURGICAL HISTORY:  Hypertension    Diabetes    High cholesterol    Primary osteoarthritis of both knees    Coronary artery disease of native artery of native heart with stable angina pectoris    Allergic bronchitis    Diabetic retinopathy    Oral cancer    SCC (squamous cell carcinoma)    Metastatic cancer    Recurrent disease    Edema of extremity    Hyponatremia    Microalbuminuria    Neuralgia    Obstructive sleep apnea, adult    Vitamin D deficiency    S/P knee replacement  b/l    S/P hernia repair  b/l    Status post cataract extraction and insertion of intraocular lens, unspecified laterality  b/l    History of surgery  On 9/10/20 he underwent right hemiglossectomy and partial neck dissection with Dr. Darinel Servin at [facility].        ROS: Negative except for HPI    MEDICATIONS:  aspirin enteric coated 81 milliGRAM(s) Oral daily  heparin   Injectable 5000 Unit(s) SubCutaneous every 12 hours      amLODIPine   Tablet 5 milliGRAM(s) Oral daily  carvedilol 25 milliGRAM(s) Oral every 12 hours  doxazosin 2 milliGRAM(s) Oral daily  valsartan 40 milliGRAM(s) Oral daily      benzocaine 15 mG/menthol 3.6 mG (Sugar-Free) Lozenge 1 Lozenge Oral four times a day PRN  dextrose 40% Gel 15 Gram(s) Oral once  dextrose 5% + sodium chloride 0.9%. 1000 milliLiter(s) IV Continuous <Continuous>  dextrose 5%. 1000 milliLiter(s) IV Continuous <Continuous>  dextrose 5%. 1000 milliLiter(s) IV Continuous <Continuous>  dextrose 50% Injectable 25 Gram(s) IV Push once  dextrose 50% Injectable 12.5 Gram(s) IV Push once  dextrose 50% Injectable 25 Gram(s) IV Push once  FIRST- Mouthwash  BLM 30 milliLiter(s) Swish and Spit every 6 hours PRN  gabapentin 300 milliGRAM(s) Oral three times a day  glucagon  Injectable 1 milliGRAM(s) IntraMuscular once  insulin lispro (ADMELOG) corrective regimen sliding scale   SubCutaneous three times a day before meals  insulin lispro (ADMELOG) corrective regimen sliding scale   SubCutaneous at bedtime  simvastatin 20 milliGRAM(s) Oral at bedtime      Allergies    No Known Allergies    Intolerances        SOCIAL HISTORY: Denies tobacco, social ETOH, denies illicit drug use    FAMILY HISTORY:  Family history of acute myocardial infarction (Sibling)        Vital Signs Last 24 Hrs  T(C): 36.8 (2021 04:02), Max: 37.1 (2021 21:05)  T(F): 98.3 (2021 04:02), Max: 98.8 (2021 21:05)  HR: 67 (2021 04:02) (63 - 70)  BP: 168/77 (2021 04:02) (140/79 - 168/77)  BP(mean): --  RR: 18 (2021 04:02) (16 - 18)  SpO2: 98% (2021 04:02) (98% - 100%)    PHYSICAL EXAM:    Constitutional: NAD  HEENT: PERRLA, EOMI  Neck: No swelling; palpable carotid pulse bilaterally   Respiratory: Unlabored    Cardiovascular: RRR  Gastrointestinal: soft, NT/ND. No rebound or guarding.   Extremities: No peripheral edema  Vascular: 2+ radial/ulnar pulses bilaterally.   Neurological: A/O x 3, no focal deficits. Motor/sensation grossly intact in b/l UE and LE       LABS:                        12.3   9.59  )-----------( 274      ( 2021 17:36 )             35.9     11-03    131<L>  |  90<L>  |  15  ----------------------------<  131<H>  3.8   |  32<H>  |  0.60    Ca    9.9      2021 17:36    TPro  7.4  /  Alb  3.9  /  TBili  0.4  /  DBili  x   /  AST  24  /  ALT  15  /  AlkPhos  53  11-03      Urinalysis Basic - ( 2021 17:59 )    Color: Yellow / Appearance: Clear / S.013 / pH: x  Gluc: x / Ketone: Negative  / Bili: Negative / Urobili: <2 mg/dL   Blood: x / Protein: Trace / Nitrite: Negative   Leuk Esterase: Negative / RBC: 1 /HPF / WBC 0 /HPF   Sq Epi: x / Non Sq Epi: 0 /HPF / Bacteria: Negative          RADIOLOGY & ADDITIONAL STUDIES:    EXAM:  CT ANGIO NECK (W)AW IC      EXAM:  CT ANGIO BRAIN (W)AW IC        PROCEDURE DATE:  Nov  3 2021         INTERPRETATION:  CLINICAL INDICATION: Neck pain, radiation, carotid injury.    TECHNIQUE: A CT scan of the brain with intravenous contrastand a CT angiogram study of the neck and brain were performed. The brain CT was performed with 5 mm axial images. The CT angiogram study of the neck and brain were performed with thin axial slices. Sagittal and coronal maximum intensity projection reformats were provided. 90 cc intravenous Omnipaque 350 contrast was administered, 10 cc contrast was discarded.    COMPARISON:  Neck CT 2021    FINDINGS:    CTA neck:  The visualized aorta and great vessels demonstrate no significant stenosis.  No hemodynamically significant stenosis of the internal carotid arteries.  The vertebral arteries demonstrate no significant stenosis.    There are treatment related changes of the aerodigestive tract.    There is evidence of prior right neck dissection. Persistent necrotic conglomerate right neck mass which has significantly increased in size. There is extension into the right parotid gland, right  space, right pharyngeal wall, right base of tongue, right oral tongue, and right sternocleidomastoid muscle. There is encasement of the distal right common carotid artery, right external carotid artery and portions of the right internal carotid artery. There is effacement of the right internal jugular vein.    MISCELLANEOUS: Left pneumothorax. Mediastinal adenopathy.    CTA brain:  The petrocavernous and supraclinoid ICA segments are patent bilaterally without flow limiting stenosis. There is a right posterior communicating artery.  The middle cerebral arteries are patent bilaterally without flow limiting stenosis.  The anterior cerebral arteries are patent bilaterally without flow limiting stenosis.    The intradural right vertebral artery is patent. Intradural left vertebral artery terminates at the left posterior-inferior cerebral artery.  The basilar artery is patent. Superior cerebellar arteries are patent.  Both posterior cerebral arteries are patent without flow limiting stenosis.      IMPRESSION:  CTA neck: Persistent large right neck necrotic conglomerate mass which has increased in size. There is progression in local-regional invasion and mass effect as described. There is encasement of portions of the right common carotid artery, right internal carotid artery and right external carotid artery without hemodynamically significant stenosis. There is effacement of the right internal jugular vein.    There is a left pneumothorax.    CTA brain: No large vessel occlusion or flow-limiting stenosis.    EXAM:  CT CERVICAL SPINE      EXAM:  CT BRAIN        PROCEDURE DATE:  Nov  3 2021         INTERPRETATION:  INDICATIONS:  Trauma, head injury, dizziness    TECHNIQUE:    CT BRAIN: Multiple contiguous axial images were obtained from the skull base tothe vertex without the use of intravenous contrast. Sagittal and coronal reformats were subsequently obtained.    CT CERVICAL SPINE: Axial images were obtained through the cervical spine using multislice helical technique.  Reformatted coronal and sagittal images were performed.    COMPARISON EXAMINATION: CT neck 2021 and PET/CT 10/15/2021    FINDINGS:    CT BRAIN:  There is no CT evidence of acute transcortical infarct.    There is no hydrocephalus, mass effect, midline shift, or acute intracranial hemorrhage. No extra-axial collection. Basal cisterns are patent.    The visualized paranasal sinuses and mastoid air cells are clear.    The calvarium is intact.      CT CERVICAL SPINE:    There is straightening of usual cervical lordosis. There is no significant subluxation. Vertebral body height is preserved throughout the cervical spine. There is moderate multilevel loss of intervertebral disc height throughout the cervical spine.    There is persistent prominence of the prevertebral soft tissues which is likely treatment related.    Multilevel diffuse disc osteophyte complexes contribute to high-grade multilevel central canal narrowing. Multilevel uncovertebral spurring contribute to neural foraminal narrowing.    Refer to corresponding report of the CT angiogram of the neck from same day for details regarding the left pneumothorax and findings within the soft tissues of the neck.      IMPRESSION:  CT head: No acute intracranial hemorrhage or depressed calvarial fracture.    CT cervical spine: No acute fracture or traumatic subluxation. Advanced degenerative changes as described.

## 2021-11-04 NOTE — H&P ADULT - NSHPPHYSICALEXAM_GEN_ALL_CORE
Objective:    Vitals: Vital Signs Last 24 Hrs  T(C): 36.8 (11-04-21 @ 04:02), Max: 37.1 (11-03-21 @ 21:05)  T(F): 98.3 (11-04-21 @ 04:02), Max: 98.8 (11-03-21 @ 21:05)  HR: 67 (11-04-21 @ 04:02) (63 - 70)  BP: 168/77 (11-04-21 @ 04:02) (140/79 - 168/77)  BP(mean): --  RR: 18 (11-04-21 @ 04:02) (16 - 18)  SpO2: 98% (11-04-21 @ 04:02) (98% - 100%)            I&O's Summary      PHYSICAL EXAM:  GENERAL: NAD, well-groomed, well-developed  HEAD:  Atraumatic, Normocephalic  EYES: EOMI, PERRLA, conjunctiva and sclera clear  ENMT: No teeth. Base of tongue resection, mild mucositis mild secretions. right neck noted laterality, ttp around discoloration areas.   CHEST/LUNG: Decreased breath sounds left lung throughout; No rales, rhonchi, wheezing, or rubs  HEART: Regular rate and rhythm; No murmurs, rubs, or gallops  ABDOMEN: Soft, Nontender, Nondistended; Bowel sounds present  EXTREMITIES:  2+ Peripheral Pulses, No clubbing, cyanosis, or edema  LYMPH: No lymphadenopathy noted  SKIN: Right neck large discoloration   NERVOUS SYSTEM:  Alert & Oriented X4, Good concentration. Nonfocal exam.   PSYCH: Normal Affect. Speaking in Full Sentences. Laying in bed comfortably; not agitated

## 2021-11-04 NOTE — PROGRESS NOTE ADULT - PROBLEM SELECTOR PLAN 8
DVT PPx: Heparin subq 5000 q12 for now if there were ctx interventions   Diet: Regular puree, vegetarian   Code: Full  Dispo: Pending Hospital Course  Communication: Spoke with patient and daughter at bedside 3:30AM 11/4 DVT PPx: Heparin subq 5000 q12 for now if there were ctx interventions   Diet: Regular puree, vegetarian   Code: Full  Dispo: Pending Hospital Course

## 2021-11-04 NOTE — H&P ADULT - ASSESSMENT
76M Hx HTN, HLD, DM2, CAD s/p stents, SCC base of tongue 8/2020 s/p resection currently on chemoradiation last 10/8 and 10/29 respectively here for decreased PO intake, dizziness i/s/o recent radiation therapy currently stable  76M Hx HTN, HLD, DM2, CAD s/p stents, SCC base of tongue 8/2020 s/p resection currently on chemoradiation last 10/8 and 10/29 respectively here for decreased PO intake, dizziness i/s/o recent radiation therapy found to have left ptx currently stable

## 2021-11-04 NOTE — H&P ADULT - PROBLEM SELECTOR PLAN 6
9/11/21 A1c: 6.7   - Home Medications: metformin 500bid, jardiance 10mg, and tradjenta   - FS qachs ISS

## 2021-11-04 NOTE — H&P ADULT - NSHPLABSRESULTS_GEN_ALL_CORE
LABS:                        12.3   9.59  )-----------( 274      ( 2021 17:36 )             35.9     Hgb Trend: 12.3<--  11-03    131<L>  |  90<L>  |  15  ----------------------------<  131<H>  3.8   |  32<H>  |  0.60    Ca    9.9      2021 17:36    TPro  7.4  /  Alb  3.9  /  TBili  0.4  /  DBili  x   /  AST  24  /  ALT  15  /  AlkPhos  53  11-03    Creatinine Trend: 0.60<--                Urinalysis Basic - ( 2021 17:59 )    Color: Yellow / Appearance: Clear / S.013 / pH: x  Gluc: x / Ketone: Negative  / Bili: Negative / Urobili: <2 mg/dL   Blood: x / Protein: Trace / Nitrite: Negative   Leuk Esterase: Negative / RBC: 1 /HPF / WBC 0 /HPF   Sq Epi: x / Non Sq Epi: 0 /HPF / Bacteria: Negative        CAPILLARY BLOOD GLUCOSE      POCT Blood Glucose.: 139 mg/dL (2021 14:40)    < from: CT Chest No Cont (21 @ 02:04) >    1.  Small left pneumothorax as seen on CTA neck.  2.  Interval development of 1.4 cm right middle lobe cavitary nodule and 0.7 cm left upper lobe nodule suspicious for progression of pulmonary metastases in the setting of squamous cell carcinoma of the right tongue CA.  Known left cavitary lesions demonstrate increased peripheral opacity.  Superimposed infection should be excluded clinically.  Redemonstration of nonspecific reticular opacities in the right upper lobe.  3.  Interval development of intercostal lung herniation between left third and fourth ribs.    < end of copied text >    < from: CT Angio Neck w/ IV Cont (21 @ 19:42) >    IMPRESSION:  CTA neck: Persistent large right neck necrotic conglomerate mass which has increased in size. There is progression in local-regional invasion and mass effect as described. There is encasement of portions of the right common carotid artery, right internal carotid artery and right external carotid artery without hemodynamically significant stenosis. There is effacement of the right internal jugular vein.    There is a left pneumothorax.    CTA brain: No large vessel occlusion or flow-limiting stenosis.    < end of copied text >

## 2021-11-04 NOTE — PROGRESS NOTE ADULT - PROBLEM SELECTOR PLAN 5
CAD s/p stents  - c/w carvedilol 25mg, amlodipine 5mg, valsartan 40mg, simvastatin 20mg SBP: 140-160s     - c/w home amlodipine 5mg, valsartan 25mg, doxazosin

## 2021-11-04 NOTE — PROGRESS NOTE ADULT - PROBLEM SELECTOR PLAN 7
DVT PPx: Heparin subq 5000 q12 for now if there were ctx interventions   Diet: Regular puree, vegetarian   Code: Full  Dispo: Pending Hospital Course  Communication: Spoke with patient and daughter at bedside 3:30AM 11/4 9/11/21 A1c: 6.7     - Home Medications: metformin 500bid, jardiance 10mg, and tradjenta   - FS qachs ISS

## 2021-11-04 NOTE — H&P ADULT - PROBLEM SELECTOR PLAN 2
Dx 8/2020 s/p R hemiglossectomy and partial neck dissection 9/2020 T2NO. PET/CT in 3/2021 he was found to have cervical adenopathy and KEZIA cystic lesion s/p LLL wedge resection   - Dr. Porras at [facility] following; will c/s Onc   - Last Quadruple RT 11/29-30, last chemo 10/8 Keytruda   - Suspect current odynophagia, decreased PO intake, and dizziness as side effect of radiation therapy   - CTH, neck, and CTA showed Encasement of portions of the right common carotid artery, right internal carotid artery and right external carotid artery without hemodynamically significant stenosis and effacement of the right internal jugular vein.  - Will supplement with D5NS and encourage PO intake; puree as per latest outpatient S and S evaluation (11/3) Dx 8/2020 s/p R hemiglossectomy and partial neck dissection 9/2020 T2NO. PET/CT in 3/2021 he was found to have cervical adenopathy and KEZIA cystic lesion s/p LLL wedge resection   - Dr. Porras at [facility] following; will c/s Onc   - Last Quadruple RT 11/29-30, last chemo 10/8 Keytruda   - Suspect current odynophagia, decreased PO intake, and dizziness as side effect of radiation therapy   - CTH, neck, and CTA showed Encasement of portions of the right common carotid artery, right internal carotid artery and right external carotid artery without hemodynamically significant stenosis and effacement of the right internal jugular vein.  - Vascular consulted re: IJV effacement and arterial encasement  - Will supplement with D5NS and encourage PO intake; puree as per latest outpatient S and S evaluation (11/3) Dx 8/2020 s/p R hemiglossectomy and partial neck dissection 9/2020 T2NO. PET/CT in 3/2021 he was found to have cervical adenopathy and KEZIA cystic lesion s/p LLL wedge resection   - Dr. Porras at [facility] following; will c/s Onc   - Last Quadruple RT 11/29-30, last chemo 10/8 Keytruda   - Suspect current odynophagia, decreased PO intake, and dizziness as side effect of radiation therapy   - CTH, neck, and CTA showed Encasement of portions of the right common carotid artery, right internal carotid artery and right external carotid artery without hemodynamically significant stenosis and effacement of the right internal jugular vein.  - Vascular consulted re: IJV effacement and arterial encasement  - Will supplement with D5NS and encourage PO intake; puree as per latest outpatient S and S evaluation (11/3)  - Pain: tylenol, toradol prn magic mouthwash and benzocaine. Gabapentin 300tid standing   - Pt daughter has home CBD; assess with pharmacy in AM

## 2021-11-04 NOTE — PROGRESS NOTE ADULT - PROBLEM SELECTOR PLAN 6
9/11/21 A1c: 6.7   - Home Medications: metformin 500bid, jardiance 10mg, and tradjenta   - FS qachs ISS CAD s/p stents    - c/w carvedilol 25mg, amlodipine 5mg, valsartan 40mg, simvastatin 20mg

## 2021-11-04 NOTE — CONSULT NOTE ADULT - SUBJECTIVE AND OBJECTIVE BOX
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 Daughter at bedside who confirms that throughout the chemo pt had lower appetite but was able to force self to eat puree foods. After recent quadshot of radiation, he started feeling dizzy, lightheaded, difficulty swallowing with some pain. He has fallen 2x in the last week with head strike, possible loc. +neck/right head pain from the radiation areas. No fevers, n/v/cp/sob/abd pain/new swelling/d/dysuria.     Allergies  No Known Allergies    MEDICATIONS  (STANDING):  amLODIPine   Tablet 5 milliGRAM(s) Oral daily  aspirin enteric coated 81 milliGRAM(s) Oral daily  carvedilol 25 milliGRAM(s) Oral every 12 hours  dextrose 40% Gel 15 Gram(s) Oral once  dextrose 5% + sodium chloride 0.9%. 1000 milliLiter(s) (75 mL/Hr) IV Continuous <Continuous>  dextrose 5%. 1000 milliLiter(s) (100 mL/Hr) IV Continuous <Continuous>  dextrose 5%. 1000 milliLiter(s) (50 mL/Hr) IV Continuous <Continuous>  dextrose 50% Injectable 25 Gram(s) IV Push once  dextrose 50% Injectable 12.5 Gram(s) IV Push once  dextrose 50% Injectable 25 Gram(s) IV Push once  doxazosin 2 milliGRAM(s) Oral daily  gabapentin 300 milliGRAM(s) Oral three times a day  glucagon  Injectable 1 milliGRAM(s) IntraMuscular once  heparin   Injectable 5000 Unit(s) SubCutaneous every 12 hours  insulin lispro (ADMELOG) corrective regimen sliding scale   SubCutaneous three times a day before meals  insulin lispro (ADMELOG) corrective regimen sliding scale   SubCutaneous at bedtime  simvastatin 20 milliGRAM(s) Oral at bedtime  sodium chloride 3%  Inhalation 7 milliLiter(s) Inhalation every 8 hours  valsartan 40 milliGRAM(s) Oral daily    MEDICATIONS  (PRN):  acetaminophen     Tablet .. 650 milliGRAM(s) Oral every 6 hours PRN Temp greater or equal to 38C (100.4F), Mild Pain (1 - 3)  benzocaine 15 mG/menthol 3.6 mG (Sugar-Free) Lozenge 1 Lozenge Oral four times a day PRN Sore Throat  FIRST- Mouthwash  BLM 30 milliLiter(s) Swish and Spit every 6 hours PRN Mouth Care  ketorolac   Injectable 15 milliGRAM(s) IV Push every 6 hours PRN Moderate Pain (4 - 6)      PAST MEDICAL & SURGICAL HISTORY:  Hypertension    Diabetes    High cholesterol    Primary osteoarthritis of both knees    Coronary artery disease of native artery of native heart with stable angina pectoris    Allergic bronchitis    Diabetic retinopathy    Oral cancer    SCC (squamous cell carcinoma)    Metastatic cancer    Recurrent disease    Edema of extremity    Hyponatremia    Microalbuminuria    Neuralgia    Obstructive sleep apnea, adult    Vitamin D deficiency    S/P knee replacement  b/l    S/P hernia repair  b/l    Status post cataract extraction and insertion of intraocular lens, unspecified laterality  b/l    History of surgery  On 9/10/20 he underwent right hemiglossectomy and partial neck dissection with Dr. Darinel Servin at [facility].    FAMILY HISTORY:  Family history of acute myocardial infarction (Sibling)    SOCIAL HISTORY: No EtOH, no tobacco    REVIEW OF SYSTEMS:  CONSTITUTIONAL: no fever  EYES/ENT: see above hpi  NECK: see above hpi   RESPIRATORY: no sob  CARDIOVASCULAR: No chest pain or palpitations  GASTROINTESTINAL: No abdominal or epigastric pain. No NV/D/C  GENITOURINARY: No dysuria, change in frequency or hematuria  NEUROLOGICAL: (+) dizziness   SKIN: No itching, burning, rashes, or lesions   MSK: no joint pain  Allergy: no urticaria     Height (cm): 175.3 (11-03 @ 14:35)    T(F): 98.3 (11-04-21 @ 06:17), Max: 98.8 (11-03-21 @ 21:05)  HR: 81 (11-04-21 @ 06:17)  BP: 159/90 (11-04-21 @ 06:17)  RR: 18 (11-04-21 @ 06:17)  SpO2: 100% (11-04-21 @ 06:17)  Wt(kg): --    GENERAL: NAD  HEENT: EOMI, MMM, (+) cervical lymphadenoptahy   Pulm: no inc wob  CV: RRR  ABDOMEN: soft, nt, nd  MSK: nl ROM  EXTREMITIES:  no appreciable edema in b/l LE  Neuro: A&Ox3,   SKIN: warm and dry, no visible rash                          11.7   7.45  )-----------( 241      ( 04 Nov 2021 11:05 )             34.4       11-04    129<L>  |  91<L>  |  13  ----------------------------<  171<H>  3.5   |  28  |  0.58    Ca    9.4      04 Nov 2021 11:05  Phos  2.8     11-04  Mg     1.80     11-04    TPro  6.7  /  Alb  3.6  /  TBili  0.5  /  DBili  x   /  AST  20  /  ALT  15  /  AlkPhos  49  11-04      Magnesium, Serum: 1.80 mg/dL (11-04 @ 11:05)  Phosphorus Level, Serum: 2.8 mg/dL (11-04 @ 11:05)    Radiology:   < from: CT Chest No Cont (11.04.21 @ 02:04) >  PROCEDURE:  CT of the Chest was performed.  Sagittal and coronal reformats were performed.    FINDINGS:    LUNGS AND AIRWAYS: Patent central airways.  Interlobular septal thickening with peripheral right upper lobe reticular opacities are increased. Bilateral lower lobe subsegmental atelectasis. Solid and cavitary nodules are again noted with increased peripheral opacity in the left upper and lower lobes. Interval development of a cavitary 1.4 cm rightmiddle lobe nodule and 0.7 cm left upper lobe nodule (series 2, image 21).  Interval development of an intercostal lung herniation between left anterior third and fourth ribs.  Surgical material in the periphery of the left lower lobe (2:47).  PLEURA: Small left pneumothorax.  MEDIASTINUM AND KHRIS: Calcified and noncalcified mediastinal lymphadenopathy.  VESSELS: Atherosclerotic changes of the thoracic aorta and coronary arteries.  HEART: Heart size is normal. No pericardial effusion.  CHEST WALLAND LOWER NECK: Mild lateral chest musculature emphysema at the region of the pneumothorax.  VISUALIZED UPPER ABDOMEN: High density is noted within the bilateral ureters, likely from recent contrast enhanced scan.  BONES: Degenerative changes.    IMPRESSION:  1.  Small left pneumothorax as seen on CTA neck.  2.  Interval development of 1.4 cm right middle lobe cavitary nodule and 0.7 cm left upper lobe nodule suspicious for progression of pulmonary metastases in the setting of squamous cell carcinoma of the right tongue CA.  Known left cavitary lesions demonstrate increased peripheral opacity.  Superimposed infection should be excluded clinically.  Redemonstration of nonspecific reticular opacities in the right upper lobe.  3.  Interval development of intercostal lung herniation between left third and fourth ribs.    < end of copied text >    < from: CT Head No Cont (11.03.21 @ 19:42) >    CT BRAIN:  There is no CT evidence of acute transcortical infarct.    There is no hydrocephalus, mass effect, midline shift, or acute intracranial hemorrhage. No extra-axial collection. Basal cisterns are patent.    The visualized paranasal sinuses and mastoid air cells are clear.    The calvarium is intact.      CT CERVICAL SPINE:    There is straightening of usual cervical lordosis. There is no significant subluxation. Vertebral body height is preserved throughout the cervical spine. There is moderate multilevel loss of intervertebral disc height throughout the cervical spine.    There is persistent prominence of the prevertebral soft tissues which is likely treatment related.    Multilevel diffuse disc osteophyte complexes contribute to high-grade multilevel central canal narrowing. Multilevel uncovertebral spurring contribute to neural foraminal narrowing.    Refer to corresponding report of the CT angiogram of the neck from same day for details regarding the left pneumothorax and findings within the soft tissues of the neck.      IMPRESSION:  CT head: No acute intracranial hemorrhage or depressed calvarial fracture.    CT cervical spine: No acute fracture or traumatic subluxation. Advanced degenerative changes as described.    --- End of Report ---    < end of copied text >    < from: CT Angio Neck w/ IV Cont (11.03.21 @ 19:42) >  CTA neck:  The visualized aorta and great vessels demonstrate no significant stenosis.  No hemodynamically significant stenosis of the internal carotid arteries.  The vertebral arteries demonstrate no significant stenosis.    There are treatment related changes of the aerodigestive tract.    There is evidence of prior right neck dissection. Persistent necrotic conglomerate right neck mass which has significantly increased in size. There is extension into the right parotid gland, right  space, right pharyngeal wall, right base of tongue, right oral tongue, and right sternocleidomastoid muscle. There is encasement of the distal right common carotid artery, right external carotid artery and portions of the right internal carotid artery. There is effacement of the right internal jugular vein.    MISCELLANEOUS: Left pneumothorax. Mediastinal adenopathy.    CTA brain:  The petrocavernous and supraclinoid ICA segments are patent bilaterally without flow limiting stenosis. There is a right posterior communicating artery.  The middle cerebral arteries are patent bilaterally without flow limiting stenosis.  The anterior cerebral arteries are patent bilaterally without flow limiting stenosis.    The intradural right vertebral artery is patent. Intradural left vertebral artery terminates at the left posterior-inferior cerebral artery.  The basilar artery is patent. Superior cerebellar arteries are patent.  Both posterior cerebral arteries are patent without flow limiting stenosis.      IMPRESSION:  CTA neck: Persistent large right neck necrotic conglomerate mass which has increased in size. There is progression in local-regional invasion and mass effect as described. There is encasement of portions of the right common carotid artery, right internal carotid artery and right external carotid artery without hemodynamically significant stenosis. There is effacement of the right internal jugular vein.    There is a left pneumothorax.    CTA brain: No large vessel occlusion or flow-limiting stenosis.    < end of copied text >

## 2021-11-04 NOTE — PROGRESS NOTE ADULT - PROBLEM SELECTOR PLAN 2
Dx 8/2020 s/p R hemiglossectomy and partial neck dissection 9/2020 T2NO. PET/CT in 3/2021 he was found to have cervical adenopathy and KEZIA cystic lesion s/p LLL wedge resection   - Dr. Porras at [facility] following; will c/s Onc   - Last Quadruple RT 11/29-30, last chemo 10/8 Keytruda   - Suspect current odynophagia, decreased PO intake, and dizziness as side effect of radiation therapy   - CTH, neck, and CTA showed Encasement of portions of the right common carotid artery, right internal carotid artery and right external carotid artery without hemodynamically significant stenosis and effacement of the right internal jugular vein.  - F/U Vascular consulted re: IJV effacement and arterial encasement  - Will supplement with D5NS and encourage PO intake; puree as per latest outpatient S and S evaluation (11/3)  - Pain: tylenol, toradol prn magic mouthwash and benzocaine. Gabapentin 300tid standing   - Pt daughter has home CBD; assess with pharmacy in AM The patient has a history of granulomatous lung lesion seen on previous imaging. CT chest during this admission showed left cavitary lesions demonstrating increased peripheral opacity. Outpatient records reviewed, could not find previous QuantiFeron test result.    - F/U sputum cultures   - F/U QuantiFeron   - Airborne precautions pending above results

## 2021-11-04 NOTE — PROGRESS NOTE ADULT - SUBJECTIVE AND OBJECTIVE BOX
DATE OF SERVICE: 21 @ 07:16    Patient is a 76y old  Male who presents with a chief complaint of Pneumothorax (2021 04:44)      SUBJECTIVE / OVERNIGHT EVENTS:    MEDICATIONS  (STANDING):  amLODIPine   Tablet 5 milliGRAM(s) Oral daily  aspirin enteric coated 81 milliGRAM(s) Oral daily  carvedilol 25 milliGRAM(s) Oral every 12 hours  dextrose 40% Gel 15 Gram(s) Oral once  dextrose 5% + sodium chloride 0.9%. 1000 milliLiter(s) (75 mL/Hr) IV Continuous <Continuous>  dextrose 5%. 1000 milliLiter(s) (100 mL/Hr) IV Continuous <Continuous>  dextrose 5%. 1000 milliLiter(s) (50 mL/Hr) IV Continuous <Continuous>  dextrose 50% Injectable 25 Gram(s) IV Push once  dextrose 50% Injectable 12.5 Gram(s) IV Push once  dextrose 50% Injectable 25 Gram(s) IV Push once  doxazosin 2 milliGRAM(s) Oral daily  gabapentin 300 milliGRAM(s) Oral three times a day  glucagon  Injectable 1 milliGRAM(s) IntraMuscular once  heparin   Injectable 5000 Unit(s) SubCutaneous every 12 hours  insulin lispro (ADMELOG) corrective regimen sliding scale   SubCutaneous three times a day before meals  insulin lispro (ADMELOG) corrective regimen sliding scale   SubCutaneous at bedtime  simvastatin 20 milliGRAM(s) Oral at bedtime  valsartan 40 milliGRAM(s) Oral daily    MEDICATIONS  (PRN):  acetaminophen     Tablet .. 650 milliGRAM(s) Oral every 6 hours PRN Temp greater or equal to 38C (100.4F), Mild Pain (1 - 3)  benzocaine 15 mG/menthol 3.6 mG (Sugar-Free) Lozenge 1 Lozenge Oral four times a day PRN Sore Throat  FIRST- Mouthwash  BLM 30 milliLiter(s) Swish and Spit every 6 hours PRN Mouth Care  ketorolac   Injectable 15 milliGRAM(s) IV Push every 6 hours PRN Moderate Pain (4 - 6)      Vital Signs Last 24 Hrs  T(C): 36.8 (2021 06:17), Max: 37.1 (2021 21:05)  T(F): 98.3 (2021 06:17), Max: 98.8 (2021 21:05)  HR: 81 (2021 06:17) (63 - 81)  BP: 159/90 (2021 06:17) (140/79 - 168/77)  BP(mean): --  RR: 18 (2021 06:17) (16 - 18)  SpO2: 100% (2021 06:17) (98% - 100%)  CAPILLARY BLOOD GLUCOSE      POCT Blood Glucose.: 116 mg/dL (2021 05:21)  POCT Blood Glucose.: 139 mg/dL (2021 14:40)    I&O's Summary      PHYSICAL EXAM:  GENERAL: NAD, well-groomed, well-developed  HEAD:  Atraumatic, Normocephalic  EYES: EOMI, PERRLA, conjunctiva and sclera clear  ENMT: No teeth. Base of tongue resection, mild mucositis mild secretions. right neck noted laterality, ttp around discoloration areas.   CHEST/LUNG: Decreased breath sounds left lung throughout; No rales, rhonchi, wheezing, or rubs  HEART: Regular rate and rhythm; No murmurs, rubs, or gallops  ABDOMEN: Soft, Nontender, Nondistended; Bowel sounds present  EXTREMITIES:  2+ Peripheral Pulses, No clubbing, cyanosis, or edema  LYMPH: No lymphadenopathy noted  SKIN: Right neck large discoloration   NERVOUS SYSTEM:  Alert & Oriented X4, Good concentration. Nonfocal exam.   PSYCH: Normal Affect. Speaking in Full Sentences. Laying in bed comfortably; not agitated  LABS:                        12.3   9.59  )-----------( 274      ( 2021 17:36 )             35.9     11-03    131<L>  |  90<L>  |  15  ----------------------------<  131<H>  3.8   |  32<H>  |  0.60    Ca    9.9      2021 17:36    TPro  7.4  /  Alb  3.9  /  TBili  0.4  /  DBili  x   /  AST  24  /  ALT  15  /  AlkPhos  53  11-03          Urinalysis Basic - ( 2021 17:59 )    Color: Yellow / Appearance: Clear / S.013 / pH: x  Gluc: x / Ketone: Negative  / Bili: Negative / Urobili: <2 mg/dL   Blood: x / Protein: Trace / Nitrite: Negative   Leuk Esterase: Negative / RBC: 1 /HPF / WBC 0 /HPF   Sq Epi: x / Non Sq Epi: 0 /HPF / Bacteria: Negative     DATE OF SERVICE: 21 @ 07:16    Patient is a 76y old  Male who presents with a chief complaint of Pneumothorax (2021 04:44)      SUBJECTIVE / OVERNIGHT EVENTS:  Spoke with the patient's daughter Damaris at bedside. She states the patient has been feeling dizzy and nauseous and is in pain.    MEDICATIONS  (STANDING):  amLODIPine   Tablet 5 milliGRAM(s) Oral daily  aspirin enteric coated 81 milliGRAM(s) Oral daily  carvedilol 25 milliGRAM(s) Oral every 12 hours  dextrose 40% Gel 15 Gram(s) Oral once  dextrose 5% + sodium chloride 0.9%. 1000 milliLiter(s) (75 mL/Hr) IV Continuous <Continuous>  dextrose 5%. 1000 milliLiter(s) (100 mL/Hr) IV Continuous <Continuous>  dextrose 5%. 1000 milliLiter(s) (50 mL/Hr) IV Continuous <Continuous>  dextrose 50% Injectable 25 Gram(s) IV Push once  dextrose 50% Injectable 12.5 Gram(s) IV Push once  dextrose 50% Injectable 25 Gram(s) IV Push once  doxazosin 2 milliGRAM(s) Oral daily  gabapentin 300 milliGRAM(s) Oral three times a day  glucagon  Injectable 1 milliGRAM(s) IntraMuscular once  heparin   Injectable 5000 Unit(s) SubCutaneous every 12 hours  insulin lispro (ADMELOG) corrective regimen sliding scale   SubCutaneous three times a day before meals  insulin lispro (ADMELOG) corrective regimen sliding scale   SubCutaneous at bedtime  simvastatin 20 milliGRAM(s) Oral at bedtime  valsartan 40 milliGRAM(s) Oral daily    MEDICATIONS  (PRN):  acetaminophen     Tablet .. 650 milliGRAM(s) Oral every 6 hours PRN Temp greater or equal to 38C (100.4F), Mild Pain (1 - 3)  benzocaine 15 mG/menthol 3.6 mG (Sugar-Free) Lozenge 1 Lozenge Oral four times a day PRN Sore Throat  FIRST- Mouthwash  BLM 30 milliLiter(s) Swish and Spit every 6 hours PRN Mouth Care  ketorolac   Injectable 15 milliGRAM(s) IV Push every 6 hours PRN Moderate Pain (4 - 6)      Vital Signs Last 24 Hrs  T(C): 36.8 (2021 06:17), Max: 37.1 (2021 21:05)  T(F): 98.3 (2021 06:17), Max: 98.8 (2021 21:05)  HR: 81 (2021 06:17) (63 - 81)  BP: 159/90 (2021 06:17) (140/79 - 168/77)  BP(mean): --  RR: 18 (2021 06:17) (16 - 18)  SpO2: 100% (2021 06:17) (98% - 100%)  CAPILLARY BLOOD GLUCOSE      POCT Blood Glucose.: 116 mg/dL (2021 05:21)  POCT Blood Glucose.: 139 mg/dL (2021 14:40)    I&O's Summary      PHYSICAL EXAM:  GENERAL: well-groomed, well-developed, Appears uncomfortable  HEAD:  Atraumatic, Normocephalic  EYES: EOMI, PERRLA, conjunctiva and sclera clear  ENMT: No teeth. Base of tongue resection, mild mucositis mild secretions. right neck noted laterality, ttp around discoloration areas.   CHEST/LUNG: Decreased breath sounds left lung throughout; No rales, rhonchi, wheezing, or rubs  HEART: Regular rate and rhythm; No murmurs, rubs, or gallops  ABDOMEN: Soft, Nontender, Nondistended; Bowel sounds present  EXTREMITIES:  2+ Peripheral Pulses, No clubbing, cyanosis, or edema  LYMPH: No lymphadenopathy noted  SKIN: Right neck large discoloration   NERVOUS SYSTEM:  Alert & Oriented X4, Good concentration. Nonfocal exam.   PSYCH: Normal Affect. Not speaking much   LABS:                        12.3   9.59  )-----------( 274      ( 2021 17:36 )             35.9     11-03    131<L>  |  90<L>  |  15  ----------------------------<  131<H>  3.8   |  32<H>  |  0.60    Ca    9.9      2021 17:36    TPro  7.4  /  Alb  3.9  /  TBili  0.4  /  DBili  x   /  AST  24  /  ALT  15  /  AlkPhos  53  11-03          Urinalysis Basic - ( 2021 17:59 )    Color: Yellow / Appearance: Clear / S.013 / pH: x  Gluc: x / Ketone: Negative  / Bili: Negative / Urobili: <2 mg/dL   Blood: x / Protein: Trace / Nitrite: Negative   Leuk Esterase: Negative / RBC: 1 /HPF / WBC 0 /HPF   Sq Epi: x / Non Sq Epi: 0 /HPF / Bacteria: Negative        IMAGING  CTA neck: Persistent large right neck necrotic conglomerate mass which has increased in size. There is progression in local-regional invasion and mass effect as described. There is encasement of portions of the right common carotid artery, right internal carotid artery and right external carotid artery without hemodynamically significant stenosis. There is effacement of the right internal jugular vein.    There is a left pneumothorax.    CTA brain: No large vessel occlusion or flow-limiting stenosis.    CT head: No acute intracranial hemorrhage or depressed calvarial fracture.    CT cervical spine: No acute fracture or traumatic subluxation. Advanced degenerative changes as described.    CT Chest  1.  Small left pneumothorax as seen on CTA neck.  2.  Interval development of 1.4 cm right middle lobe cavitary nodule and 0.7 cm left upper lobe nodule suspicious for progression of pulmonary metastases in the setting of squamous cell carcinoma of the right tongue CA.  Known left cavitary lesions demonstrate increased peripheral opacity.  Superimposed infection should be excluded clinically. Redemonstration of nonspecific reticular opacities in the right upper lobe.  3.  Interval development of intercostal lung herniation between left third and fourth ribs.

## 2021-11-04 NOTE — CONSULT NOTE ADULT - SUBJECTIVE AND OBJECTIVE BOX
HPI: 75yo M w/ pmhx off SCC of the R tongue Patient initially diagnosed 2020, and on 9/10/20 he underwent R hemiglossectomy and partial neck dissection with Dr. Darinel Servin at [facility].  PET CT showing cervical adenopathy and KEZIA cystic lesion, bx of cervical lymph node and left lung wedge resection positive for squamous cell ca.  Patient with hx of HTN, HLD, DM2, CAD s/p stent  presented the the ER with c/o fatigue, dizziness, nausea, decreased PO intake advised to come to the ED by his Oncologist for evaluation. Cough w/ no productive sputum.  Thoracic surgery consulted because of incidental finding of left pneumothorax during workup.  As per patient, family and documentation in [employer], patient with known small left pneumothorax since 10/26/21 (CT done at [facility]).  Patient denies any shortness of breath.      PAST MEDICAL & SURGICAL HISTORY:  Hypertension    Diabetes    High cholesterol    Primary osteoarthritis of both knees    Coronary artery disease of native artery of native heart with stable angina pectoris    Allergic bronchitis    Diabetic retinopathy    Oral cancer    SCC (squamous cell carcinoma)    Metastatic cancer    Recurrent disease    Edema of extremity    Hyponatremia    Microalbuminuria    Neuralgia    Obstructive sleep apnea, adult    Vitamin D deficiency    S/P knee replacement  b/l    S/P hernia repair  b/l    Status post cataract extraction and insertion of intraocular lens, unspecified laterality  b/l    History of surgery  On 9/10/20 he underwent right hemiglossectomy and partial neck dissection with Dr. Darinel Servin at [facility].        REVIEW OF SYSTEMS      General: +fatigue, dizziness     Skin/Breast: No Rashes/ Lesions/ Masses  	  Ophthalmologic: No Blurry vision/ Glaucoma/ Blindness  	  ENMT: No Hearing loss/ Drainage/ Lesions	    Respiratory and Thorax:+non productive cough   	  Cardiovascular: No Chest pain/ Palpitations/ Diaphoresis	    Gastrointestinal: +Nausea/ decreased oral intake     Genitourinary: No Heamturia/ Dysuria/ Frequency change/ Impotence	    Musculoskeletal: No Pain/ Weakness/ Claudication	    Neurological: No Seizures/ TIA/CVA/ Parastesias	    Psychiatric: No Dementia/ Depression/ SI/HI	    Hematology/Lymphatics: No hx of bleeding/ Edema	    Endocrine:	No Hyperglycemia/ Hypoglycemia    Allergic/Immunologic:	 No Anaphylaxis/ Intolerance/ Recent illnesses    MEDICATIONS  (STANDING):  dextrose 40% Gel 15 Gram(s) Oral once  dextrose 5% + sodium chloride 0.9%. 1000 milliLiter(s) (75 mL/Hr) IV Continuous <Continuous>  dextrose 5%. 1000 milliLiter(s) (100 mL/Hr) IV Continuous <Continuous>  dextrose 5%. 1000 milliLiter(s) (50 mL/Hr) IV Continuous <Continuous>  dextrose 50% Injectable 25 Gram(s) IV Push once  dextrose 50% Injectable 12.5 Gram(s) IV Push once  dextrose 50% Injectable 25 Gram(s) IV Push once  glucagon  Injectable 1 milliGRAM(s) IntraMuscular once  insulin lispro (ADMELOG) corrective regimen sliding scale   SubCutaneous three times a day before meals  insulin lispro (ADMELOG) corrective regimen sliding scale   SubCutaneous at bedtime    MEDICATIONS  (PRN):      Allergies    No Known Allergies    Intolerances        SOCIAL HISTORY:    FAMILY HISTORY:  Family history of acute myocardial infarction (Sibling)        Vital Signs Last 24 Hrs  T(C): 36.8 (2021 04:02), Max: 37.1 (2021 21:05)  T(F): 98.3 (2021 04:02), Max: 98.8 (2021 21:05)  HR: 67 (2021 04:02) (63 - 70)  BP: 168/77 (2021 04:02) (140/79 - 168/77)  BP(mean): --  RR: 18 (2021 04:02) (16 - 18)  SpO2: 98% (2021 04:02) (98% - 100%)    General: WN/WD NAD  Neurology: Awake, nonfocal, CARR x 4  Eyes: Scleras clear, PERRLA/ EOMI, Gross vision intact  ENT:Gross hearing intact, facial swelling  Respiratory: CTA B/L, No wheezing, rales, rhonchi  CV: RRR, S1S2, no murmurs, rubs or gallops  Abdominal: Soft, NT, ND +BS,   Extremities: No edema, + peripheral pulses  Skin: No Rashes, Hematoma, Ecchymosis  Lymphatic: No Neck, axilla, groin LAD  Psych: Oriented x 3, normal affect    LABS:                        12.3   9.59  )-----------( 274      ( 2021 17:36 )             35.9     11-03    131<L>  |  90<L>  |  15  ----------------------------<  131<H>  3.8   |  32<H>  |  0.60    Ca    9.9      2021 17:36    TPro  7.4  /  Alb  3.9  /  TBili  0.4  /  DBili  x   /  AST  24  /  ALT  15  /  AlkPhos  53  11-03      Urinalysis Basic - ( 2021 17:59 )    Color: Yellow / Appearance: Clear / S.013 / pH: x  Gluc: x / Ketone: Negative  / Bili: Negative / Urobili: <2 mg/dL   Blood: x / Protein: Trace / Nitrite: Negative   Leuk Esterase: Negative / RBC: 1 /HPF / WBC 0 /HPF   Sq Epi: x / Non Sq Epi: 0 /HPF / Bacteria: Negative        RADIOLOGY & ADDITIONAL STUDIES:    ASSESSMENT:   76yMalePAST MEDICAL & SURGICAL HISTORY:  Hypertension    Diabetes    High cholesterol    Primary osteoarthritis of both knees    Coronary artery disease of native artery of native heart with stable angina pectoris    Allergic bronchitis    Diabetic retinopathy    Oral cancer    SCC (squamous cell carcinoma)    Metastatic cancer    Recurrent disease    Edema of extremity    Hyponatremia    Microalbuminuria    Neuralgia    Obstructive sleep apnea, adult    Vitamin D deficiency    S/P knee replacement  b/l    S/P hernia repair  b/l    Status post cataract extraction and insertion of intraocular lens, unspecified laterality  b/l    History of surgery  On 9/10/20 he underwent right hemiglossectomy and partial neck dissection with Dr. Darinel Servin at [facility].  Left lung wedge resection   HEALTH ISSUES - PROBLEM Dx:  Pneumothorax, left    Hypertension    Type 2 diabetes mellitus    CAD (coronary artery disease)    Squamous cell cancer of tongue    Prophylactic measure    Hyponatremia        HEALTH ISSUES - R/O PROBLEM Dx:  Left pneumothorax     PLAN:  Above to be discussed with Dr. Gastelum   Repeat Chest x-ray in am  No acute thoracic surgery intervention needed   Medical management

## 2021-11-04 NOTE — H&P ADULT - PROBLEM SELECTOR PLAN 1
PTX noted on CTChest; per outpatient notes: 10/27 note of PTX but unable to find corresponding scans   - Currently hemodynamically stable, satting % on RA   - left lung decreased sounds throughout   - CT surgery consulted, recs appreciated PTX noted on CTChest; per outpatient notes: 10/27 note of PTX but unable to find corresponding scans   - Currently hemodynamically stable, satting % on 2L NC   - left lung decreased sounds throughout   - CT surgery consulted, recs appreciated PTX noted on CTChest; per outpatient notes: 10/27 note of PTX but unable to find corresponding scans   - Currently hemodynamically stable, satting % on 2L NC   - left lung decreased sounds throughout   - CT surgery consulted, recs appreciated  - Found cavitary lesions (seems to be known lesions)  - wwill need to contact outpatient pulmologist inregards to prior tests for TB  - Contact precautions for now

## 2021-11-04 NOTE — CONSULT NOTE ADULT - ASSESSMENT
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 S/p 6 weeks of RT (completed in 12/2020). Then, PET/CT in 4/2021 showed new hyper-metabolic right neck nodes, suspicious for metastatic disease, and a LLL lung lesion, s/p lung resection on 5/27/21 w/ pathology showing metastatic SCC.  - NGS reviewed - No actionable mutations, MS-Stable, TMB - 5 Muts/Mb  - Pt was started on Carbo/Abraxane/Keytruda in 6/2021 and completed 4 cycles.   -started maintenance Keytruda today (9/17), last keytruda on 10/8   - Follow-up scans in 8/2021 was notable for interval worsening of right-sided cervical lymphadenopathy. The rest of the findings were stable.=  -Incremental pain is concerning for POD. PET/CT Oct 2021 confirmed POD: inc in size of right necrotic cervical node and b/l lung nodules.  -saw Dr. Almanza at [facility] who is evaluating him for clinical trials.  -He saw Dr. Thurston who recommended Quadshot palliative RT  -CTA neck: CTA neck: Persistent large right neck necrotic conglomerate mass which has increased in size. There is progression in local-regional invasion and mass effect as described. There is encasement of portions of the right common carotid artery, right internal carotid artery and right external carotid artery without hemodynamically significant stenosis. There is effacement of the right internal jugular vein. There is a left pneumothorax.  -vascular consulted and recs appreciated.   -CTsx eval appreciated, no need for intervention of ptx   -agree with MR brain to r/o brain mets   -c/w supportive care,  check orthostatics and would give IVF for dec PO intake. recommend S/S + nutrition eval. if has dysphagia can attempt trial of sucralfate q6 given potential for radiation mucositis   -no plan for inpatient onc treatment, can c/w outpt f/u with Dr. Chiquita Schultz Heme/onc PGY5 [id number]    76M h/o T2DM, CAD s/p stents, with recurrent/metastatic base of tongue sq cell ca w/ mets to lung, s/p resection of lung met and known cervical rt adenopathy, started on Carbo/Abraxane/Keytruda in 6/2021 and completed 4 cycles and has been on maintenance keytruda with POD, now undergoing palliative RT and presents with decreased PO intake, dizziness after radiation therapy:     #metastatic SCC of Tongue, dizziness, dec PO Intake   - Dx in 8/2020 s/p surgery in 09/2020 w/ biopsy consistent with SCC. S/p 6 weeks of RT (completed in 12/2020). Then, PET/CT in 4/2021 showed new hyper-metabolic right neck nodes, suspicious for metastatic disease, and a LLL lung lesion, s/p lung resection on 5/27/21 w/ pathology showing metastatic SCC.  - NGS reviewed - No actionable mutations, MS-Stable, TMB - 5 Muts/Mb  - Pt was started on Carbo/Abraxane/Keytruda in 6/2021 and completed 4 cycles.   -started maintenance Keytruda today (9/17), last keytruda on 10/8   - Follow-up scans in 8/2021 was notable for interval worsening of right-sided cervical lymphadenopathy. The rest of the findings were stable.=  -Incremental pain is concerning for POD. PET/CT Oct 2021 confirmed POD: inc in size of right necrotic cervical node and b/l lung nodules.  -saw Dr. Almanza at [facility] [place] who is evaluating him for clinical trials.  -He saw Dr. Thurston who recommended Quadshot palliative RT  -CTA neck: CTA neck: Persistent large right neck necrotic conglomerate mass which has increased in size. There is progression in local-regional invasion and mass effect as described. There is encasement of portions of the right common carotid artery, right internal carotid artery and right external carotid artery without hemodynamically significant stenosis. There is effacement of the right internal jugular vein. There is a left pneumothorax.  -vascular consulted and recs appreciated.   -CTsx eval appreciated, no need for intervention of ptx   -agree with MR brain to r/o brain mets   -c/w supportive care,  check orthostatics and would give IVF for dec PO intake. recommend S/S + nutrition eval. if has dysphagia can attempt trial of sucralfate q6 given potential for radiation mucositis   -patient with cough CT chest showing: interval development of 1.4 cm right middle lobe cavitary nodule and 0.7 cm left upper lobe nodule suspicious for progression of pulmonary metastases in the setting of squamous cell carcinoma of the right tongue CA.  Known left cavitary lesions demonstrate increased peripheral opacity.  Superimposed infection should be excluded clinically. primary team ruling out TB  -no plan for inpatient onc treatment, can c/w outpt f/u with Dr. Chiquita Schultz Heme/onc PGY5 [id number]

## 2021-11-04 NOTE — H&P ADULT - PROBLEM SELECTOR PLAN 5
CAD s/p stents   - c/w carvedilol 25mg, amlodipine 5mg, valsartan 25mg, simvastatin 20mg CAD s/p stents  - c/w carvedilol 25mg, amlodipine 5mg, valsartan 40mg, simvastatin 20mg

## 2021-11-04 NOTE — PATIENT PROFILE ADULT - BRAND OF COVID-19 VACCINATION
Please inform pt that  Radiologist reported stable lymph node on Thyroid U/S,  . Otherwise nothing urgent to discuss , keep apt and will discuss further on your next apt. Pfizer dose 1 and 2

## 2021-11-04 NOTE — H&P ADULT - NSICDXPASTSURGICALHX_GEN_ALL_CORE_FT
PAST SURGICAL HISTORY:  History of surgery On 9/10/20 he underwent right hemiglossectomy and partial neck dissection with Dr. Darinel Servin at [facility].    S/P hernia repair b/l    S/P knee replacement b/l    Status post cataract extraction and insertion of intraocular lens, unspecified laterality b/l

## 2021-11-05 NOTE — PROGRESS NOTE ADULT - PROBLEM SELECTOR PLAN 8
DVT PPx: Heparin subq 5000 q12 for now if there were ctx interventions   Diet: Regular puree, vegetarian   Code: Full  Dispo: Pending Hospital Course DVT PPx: Heparin subq 5000 q12 for now if there were ctx interventions   Diet: Pureed diet with mildly thick liquids, must turn head to the right and swallow twice while eating - patient and son understand.  Code: Full  Dispo: Pending Hospital Course

## 2021-11-05 NOTE — CONSULT NOTE ADULT - PROBLEM SELECTOR RECOMMENDATION 7
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 Patient saw [place]. Almanza [place] [facility] who [place] him for clinical trials.  Oncology recommendations appreciated - " Will await MRI of the brain; pt currently in between palliative treatments and will follow clinically. "

## 2021-11-05 NOTE — SWALLOW BEDSIDE ASSESSMENT ADULT - SWALLOW EVAL: RECOMMENDED DIET
Puree with Mildly thick liquids, with use of Postural Strategies of RIGHT HEAD TURN and two swallows Puree with Mildly thick liquids, with use of Postural Strategies of RIGHT HEAD TURN and TWO SWALLOWS

## 2021-11-05 NOTE — CONSULT NOTE ADULT - PROBLEM SELECTOR RECOMMENDATION 9
Emotional support provided, questions answered.    Thank you for allowing us to participate in your patient's care. We will continue to follow with you. Please page [phone] for any questions/concerns. - Per discussion with son, patient and family self-discontinued Methadone 1.25mg BID about 5 days ago due to pain improving s/p radiation as well as due to their concerns that opioids were causing patient to have dizzy spells. Discussed with son that symptoms may be due to location of cancer encasing the carotid as well as due to poor PO intake.   - Restart as PO Methadone Solution 1.25mg QD (QTC on 11/3- 387)  - Restart home dose of PO Gabapentin 400mg TID  - Start PO Oxycodone 2.5mg q4 PRN for moderate-severe pain  - Bowel regimen while on opiates: Miralax QD, Senna 2 tabs QHS, Dulcolax PRN

## 2021-11-05 NOTE — CONSULT NOTE ADULT - PROBLEM SELECTOR RECOMMENDATION 3
- Patient likely with pharyngeal mucositis 2/2 radiation treatments  - No mucositis seen in oral cavity. However, Per son, patient with improvement of pain if given magic mouthwash prior to eating due to pain.  - Start Magic Moutwash TID - given 30 minutes before meals.

## 2021-11-05 NOTE — PROGRESS NOTE ADULT - SUBJECTIVE AND OBJECTIVE BOX
DATE OF SERVICE: 21 @ 09:06    Patient is a 76y old  Male who presents with a chief complaint of Pneumothorax (2021 14:31)      SUBJECTIVE / OVERNIGHT EVENTS:  The patient initially states he is feeling better today. Although he developed pain while I was talking to him. Spoke with Son at bedside for 30 minutes. Discussed code status in-depth, the son wants to think about it and discuss with the rest of his family (his sister is HCP).     MEDICATIONS  (STANDING):  amLODIPine   Tablet 5 milliGRAM(s) Oral daily  aspirin enteric coated 81 milliGRAM(s) Oral daily  dextrose 40% Gel 15 Gram(s) Oral once  dextrose 5% + sodium chloride 0.9%. 1000 milliLiter(s) (75 mL/Hr) IV Continuous <Continuous>  dextrose 5%. 1000 milliLiter(s) (100 mL/Hr) IV Continuous <Continuous>  dextrose 5%. 1000 milliLiter(s) (50 mL/Hr) IV Continuous <Continuous>  dextrose 50% Injectable 25 Gram(s) IV Push once  dextrose 50% Injectable 12.5 Gram(s) IV Push once  dextrose 50% Injectable 25 Gram(s) IV Push once  doxazosin 2 milliGRAM(s) Oral daily  gabapentin 300 milliGRAM(s) Oral three times a day  glucagon  Injectable 1 milliGRAM(s) IntraMuscular once  heparin   Injectable 5000 Unit(s) SubCutaneous every 12 hours  insulin lispro (ADMELOG) corrective regimen sliding scale   SubCutaneous three times a day before meals  insulin lispro (ADMELOG) corrective regimen sliding scale   SubCutaneous at bedtime  simvastatin 20 milliGRAM(s) Oral at bedtime  sodium chloride 3%  Inhalation 7 milliLiter(s) Inhalation every 8 hours    MEDICATIONS  (PRN):  acetaminophen     Tablet .. 650 milliGRAM(s) Oral every 6 hours PRN Temp greater or equal to 38C (100.4F), Mild Pain (1 - 3)  benzocaine 15 mG/menthol 3.6 mG (Sugar-Free) Lozenge 1 Lozenge Oral four times a day PRN Sore Throat  FIRST- Mouthwash  BLM 30 milliLiter(s) Swish and Spit every 6 hours PRN Mouth Care  ketorolac   Injectable 15 milliGRAM(s) IV Push every 6 hours PRN Moderate Pain (4 - 6)      Vital Signs Last 24 Hrs  T(C): 37 (2021 05:30), Max: 37.1 (2021 10:36)  T(F): 98.6 (2021 05:30), Max: 98.7 (2021 10:36)  HR: 80 (2021 05:30) (61 - 80)  BP: 141/72 (2021 05:30) (83/55 - 148/83)  BP(mean): 90 (2021 05:30) (90 - 95)  RR: 18 (2021 05:30) (17 - 19)  SpO2: 100% (2021 05:30) (98% - 100%)  CAPILLARY BLOOD GLUCOSE      POCT Blood Glucose.: 102 mg/dL (2021 08:57)  POCT Blood Glucose.: 136 mg/dL (2021 22:55)    I&O's Summary      PHYSICAL EXAM:  GENERAL: well-groomed, well-developed, Appears comfortable initially, sitting up on edge of bed, even smiles, but then develops pain during exam, and lies on side appearing more uncomfortable.  HEAD:  Atraumatic, Normocephalic  EYES: EOMI, PERRLA, conjunctiva and sclera clear  ENMT: No teeth. Base of tongue resection, mild mucositis mild secretions. right neck noted laterality, ttp around discoloration areas.   CHEST/LUNG: Decreased breath sounds left lung throughout; No rales, rhonchi, wheezing, or rubs  HEART: Regular rate and rhythm; No murmurs, rubs, or gallops  ABDOMEN: Soft, Nontender, Nondistended; Bowel sounds present  EXTREMITIES:  2+ Peripheral Pulses, No clubbing, cyanosis, or edema  LYMPH: No lymphadenopathy noted  SKIN: Right neck large discoloration   NERVOUS SYSTEM:  Alert & Oriented X4, Good concentration. Nonfocal exam.   PSYCH: Normal Affect. Not speaking much     LABS:                        12.5   10.34 )-----------( 255      ( 2021 07:32 )             37.7     11-05    133<L>  |  94<L>  |  10  ----------------------------<  113<H>  3.9   |  29  |  0.54    Ca    9.4      2021 07:32  Phos  2.7     11-  Mg     1.90     11-05    TPro  6.7  /  Alb  3.6  /  TBili  0.5  /  DBili  x   /  AST  20  /  ALT  15  /  AlkPhos  49  11-04          Urinalysis Basic - ( 2021 17:59 )    Color: Yellow / Appearance: Clear / S.013 / pH: x  Gluc: x / Ketone: Negative  / Bili: Negative / Urobili: <2 mg/dL   Blood: x / Protein: Trace / Nitrite: Negative   Leuk Esterase: Negative / RBC: 1 /HPF / WBC 0 /HPF   Sq Epi: x / Non Sq Epi: 0 /HPF / Bacteria: Negative        RADIOLOGY & ADDITIONAL TESTS:    Consultant(s) Notes Reviewed:  Oncology, vascular surgery, thoracic surgery

## 2021-11-05 NOTE — CONSULT NOTE ADULT - PROBLEM SELECTOR RECOMMENDATION 6
Patient being ruled out for TB; Patient is on airborne precautions   workup/ management as per primary team

## 2021-11-05 NOTE — SWALLOW BEDSIDE ASSESSMENT ADULT - ADDITIONAL RECOMMENDATIONS
1) Recommend Swallow therapy upon discharge as an outpatient basis to maximize swallow mechanism ([facility] [phone])

## 2021-11-05 NOTE — CONSULT NOTE ADULT - PROBLEM SELECTOR RECOMMENDATION 8
Discussed importance of discussing code status with patient's son. Son understands importance and will further discuss with his family.

## 2021-11-05 NOTE — CONSULT NOTE ADULT - SUBJECTIVE AND OBJECTIVE BOX
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 Daughter at bedside who confirms that throughout the chemo pt had lower appetite but was able to force self to eat puree foods. After recent quadshot of radiation, he started feeling dizzy, lightheaded, difficulty swallowing with some pain. He has fallen 2x in the last week with head strike, possible loc. +neck/right head pain from the radiation areas. No fevers, n/v/cp/sob/abd pain/new swelling/d/dysuria.  (04 Nov 2021 03:29)    PERTINENT PM/SXH:   Hypertension  Diabetes  High cholesterol  Primary osteoarthritis of both knees  Coronary artery disease of native artery of native heart with stable angina pectoris  Allergic bronchitis  Diabetic retinopathy  Oral cancer  SCC (squamous cell carcinoma)  Metastatic cancer  Recurrent disease  Edema of extremity  Hyponatremia  Microalbuminuria  Neuralgia  Obstructive sleep apnea, adult  Vitamin D deficiency  S/P knee replacement  S/P hernia repair  Status post cataract extraction and insertion of intraocular lens, unspecified laterality  History of surgery    FAMILY HISTORY:  Family history of acute myocardial infarction (Sibling)    ITEMS NOT CHECKED ARE NOT PRESENT    SOCIAL HISTORY:   Significant other/partner[x ]  Children[ x]  [employer]ist/Spirituality: Gnosticist  Substance hx:  [ ]   Tobacco hx:  [ ]   Alcohol hx: [ ]   Home Opioid hx:  [x ] I-Stop Reference No:[id number]  Living Situation: [x ]Home  [ ]Long term care  [ ]Rehab [ ]Other      ADVANCE DIRECTIVES:    MOLST  [ ]  Living Will  [ ]   DECISION MAKER(s):  [ ] Health Care Proxy(s)  [x ] Surrogate(s)  [ ] Guardian           Name(s): Phone Number(s):  Daughter Damaris Lugo- [phone]  Daughter Sandi Amaya- [phone]    BASELINE (I)ADL(s) (prior to admission):  Liberty: [ ]Total  [x ] Moderate [ ]Dependent    Allergies    No Known Allergies    Intolerances    MEDICATIONS  (STANDING):  aspirin enteric coated 81 milliGRAM(s) Oral daily  dextrose 40% Gel 15 Gram(s) Oral once  dextrose 5% + sodium chloride 0.9%. 1000 milliLiter(s) (75 mL/Hr) IV Continuous <Continuous>  dextrose 5%. 1000 milliLiter(s) (100 mL/Hr) IV Continuous <Continuous>  dextrose 5%. 1000 milliLiter(s) (50 mL/Hr) IV Continuous <Continuous>  dextrose 50% Injectable 25 Gram(s) IV Push once  dextrose 50% Injectable 12.5 Gram(s) IV Push once  dextrose 50% Injectable 25 Gram(s) IV Push once  doxazosin 2 milliGRAM(s) Oral daily  FIRST- Mouthwash  BLM 10 milliLiter(s) Swish and Spit every 8 hours  gabapentin 400 milliGRAM(s) Oral three times a day  glucagon  Injectable 1 milliGRAM(s) IntraMuscular once  heparin   Injectable 5000 Unit(s) SubCutaneous every 12 hours  insulin lispro (ADMELOG) corrective regimen sliding scale   SubCutaneous three times a day before meals  insulin lispro (ADMELOG) corrective regimen sliding scale   SubCutaneous at bedtime  methadone   Solution 1.25 milliGRAM(s) Oral daily  simvastatin 20 milliGRAM(s) Oral at bedtime  sodium chloride 3%  Inhalation 7 milliLiter(s) Inhalation every 8 hours    MEDICATIONS  (PRN):  acetaminophen     Tablet .. 650 milliGRAM(s) Oral every 6 hours PRN Temp greater or equal to 38C (100.4F), Mild Pain (1 - 3)  benzocaine 15 mG/menthol 3.6 mG (Sugar-Free) Lozenge 1 Lozenge Oral four times a day PRN Sore Throat  ketorolac   Injectable 15 milliGRAM(s) IV Push every 6 hours PRN Moderate Pain (4 - 6)  oxyCODONE    Solution 2.5 milliGRAM(s) Oral every 4 hours PRN Moderate Pain (4 - 6)- severe pain      PRESENT SYMPTOMS: [ ]Unable to obtain due to poor mentation   Source if other than patient:  [ ]Family   [ ]Team     Pain: [x ]yes [ ]no  QOL impact - mild-moderate  Location -    neck                Aggravating factors - none  Quality - unable to elaborate  Radiation -none   Timing-intermittent   Severity (0-10 scale): unable to leaborate  Minimal acceptable level (0-10 scale):     CPOT:    https://www.[email or web address]/getattachment/[email or web address]/Critical-Care-Pain-Observation-Tool-(CPOT)      PAIN AD Score:     http://geriatrictoolkit.[email or web address]/cog/painad.pdf (press ctrl +  left click to view)    Dyspnea:                           [ ]Mild [ ]Moderate [ ]Severe  Anxiety:                             [ ]Mild [ ]Moderate [ ]Severe  Agitation:                          [ ]Mild [ ]Moderate [ ]Severe  Fatigue:                             [ ]Mild [ ]Moderate [ ]Severe  Nausea:                             [ ]Mild [ ]Moderate [ ]Severe  Loss of appetite:              [ ]Mild [x ]Moderate [ ]Severe  Constipation:                   [ ]Mild [ ]Moderate [ ]Severe  Diarrhea:                          [ ]Mild [ ]Moderate [ ]Severe    Other Symptoms:  [ x]All other review of systems negative     Palliative Performance Status Version 2:  50-60       %    http://[facility].org/files/news/palliative_performance_scale_ppsv2.pdf    PHYSICAL EXAM:  Vital Signs Last 24 Hrs  T(C): 36.7 (05 Nov 2021 13:46), Max: 37 (05 Nov 2021 05:30)  T(F): 98 (05 Nov 2021 13:46), Max: 98.6 (05 Nov 2021 05:30)  HR: 74 (05 Nov 2021 18:38) (70 - 109)  BP: 134/70 (05 Nov 2021 18:38) (84/32 - 153/60)  BP(mean): 90 (05 Nov 2021 05:30) (90 - 95)  RR: 18 (05 Nov 2021 13:46) (18 - 18)  SpO2: 95% (05 Nov 2021 13:46) (95% - 100%)     I&O's Summary      GENERAL:  [x ] Awake, Alert  [x ]Oriented x  3 [ ]Lethargic  [ ]Cachexia  [ ]Unarousable  [ x]Verbal- hoarse voice, soft spoken  [ ]Non-Verbal  [ x] No Distress  Behavioral:   [ ] Anxiety  [ ] Delirium [ ] Agitation [ x] Calm  [ ] Other  HEENT:  [x ]Normal  [ ] Temporal Wasting  [ ]Dry mouth   [ ]ET Tube/Trach  [ ]Oral lesions  [ ] Mucositis  PULMONARY:   [ x]Clear [ ]Tachypnea  [ ]Audible excessive secretions   [ ]Rhonchi        [ ]Right [ ]Left [ ]Bilateral  [ ]Crackles        [ ]Right [ ]Left [ ]Bilateral  [ ]Wheezing     [ ]Right [ ]Left [ ]Bilateral  [ ]Diminished breath sounds [ ]right [ ]left [ ]bilateral  CARDIOVASCULAR:    [ x]Regular [ ]Irregular [ ]Tachy  [ ]Rogelio [ ]Murmur [ ]Other  GASTROINTESTINAL:  [x ]Soft  [ ]Distended   [ ]+BS  [ x]Non tender [ ]Tender  [ ]PEG [ ]OGT/ NGT  Last BM: 11/4  GENITOURINARY:  [x ]Normal [ ] Incontinent   [ ]Oliguria/Anuria   [ ][place]  MUSCULOSKELETAL:   [ ]Normal   [ x]Weakness  [ ]Bed/Wheelchair bound [ ]Edema  [  ] amputation  [  ] contraction  NEUROLOGIC:   [x ]No focal deficits  [ ]Cognitive impairment  [ ]Dysphagia [ ]Dysarthria [ ]Paresis [ ]Other   SKIN: See Nursing Skin Assessment for further details  [ x]Normal    [ ]Rash  [ ]Pressure ulcer(s)       Present on admission [ ]y [ ]n   [  ]  Wound    [  ] hyperpigmentation    CRITICAL CARE:  [ ] Shock Present  [ ]Septic [ ]Cardiogenic [ ]Neurologic [ ]Hypovolemic  [ ]  Vasopressors [ ]  Inotropes   [ ]Respiratory failure present [ ]Mechanical ventilation [ ]Non-invasive ventilatory support [ ]High flow    [ ]Acute  [ ]Chronic [ ]Hypoxic  [ ]Hypercarbic [ ]Other  [ ]Other organ failure     LABS:                        12.5   10.34 )-----------( 255      ( 05 Nov 2021 07:32 )             37.7   11-05    133<L>  |  94<L>  |  10  ----------------------------<  113<H>  3.9   |  29  |  0.54    Ca    9.4      05 Nov 2021 07:32  Phos  2.7     11-05  Mg     1.90     11-05    TPro  6.7  /  Alb  3.6  /  TBili  0.5  /  DBili  x   /  AST  20  /  ALT  15  /  AlkPhos  49  11-04      CAPILLARY BLOOD GLUCOSE  POCT Blood Glucose.: 140 mg/dL (05 Nov 2021 17:58)  POCT Blood Glucose.: 120 mg/dL (05 Nov 2021 12:09)  POCT Blood Glucose.: 102 mg/dL (05 Nov 2021 08:57)  POCT Blood Glucose.: 136 mg/dL (04 Nov 2021 22:55)  POCT Blood Glucose.: 161 mg/dL (04 Nov 2021 21:07)    RADIOLOGY & ADDITIONAL STUDIES:  CT Chest 11/4/2021  IMPRESSION:  1.  Small left pneumothorax as seen on CTA neck.  2.  Interval development of 1.4 cm right middle lobe cavitary nodule and 0.7 cm left upper lobe nodule suspicious for progression of pulmonary metastases in the setting of squamous cell carcinoma of the right tongue CA.  Known left cavitary lesions demonstrate increased peripheral opacity.  Superimposed infection should be excluded clinically.  Redemonstration of nonspecific reticular opacities in the right upper lobe.  3.  Interval development of intercostal lung herniation between left third and fourth ribs.    CT Head/ Cervical Spine 11/3/2021  CT head: No acute intracranial hemorrhage or depressed calvarial fracture.  CT cervical spine: No acute fracture or traumatic subluxation. Advanced degenerative changes as described.    CTA Head / Neck 11/3/2021  CTA neck: Persistent large right neck necrotic conglomerate mass which has increased in size. There is progression in local-regional invasion and mass effect as described. There is encasement of portions of the right common carotid artery, right internal carotid artery and right external carotid artery without hemodynamically significant stenosis. There is effacement of the right internal jugular vein.  There is a left pneumothorax.  CTA brain: No large vessel occlusion or flow-limiting stenosis.    CXRAY 11/3/2021  Left lung opacity likely corresponds to cavitary left lung lesion previously evaluated on CT.    PROTEIN CALORIE MALNUTRITION PRESENT: [ ]mild [ ]moderate [ ]severe [ ]underweight [ ]morbid obesity  https://www.andeal.org/vault/2440/web/files/ONC/Table_Clinical%20Characteristics%20to%20Document%20Malnutrition-White%20JV%20et%20al%202012.pdf    Height (cm): 175.3 (11-03-21 @ 14:35), 175.3 (07-23-21 @ 11:17), 173 (06-25-21 @ 08:58)  Weight (kg): 81.2 (07-23-21 @ 11:17), 82.6 (06-25-21 @ 08:58), 81 (06-23-21 @ 14:24)  BMI (kg/m2): 26.4 (11-03-21 @ 14:35), 26.4 (07-23-21 @ 11:17), 27.6 (06-25-21 @ 08:58)    [ ]PPSV2 < or = to 30% [ ]significant weight loss  [ ]poor nutritional intake  [ ]anasarca      [ ]Artificial Nutrition      REFERRALS:   [ ]Chaplaincy  [ ]Hospice  [ ]Child Life  [ ]Social Work  [ x]Case management [ ]Holistic Therapy     ************************************************************************  PALLIATIVE MEDICINE COORDINATION OF CARE DOCUMENTATION  [x] Inpatient Consult  [ ] Other:  ************************************************************************    HPI:  76M Hx HTN, HLD, DM2, CAD s/p stents, 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 Daughter at bedside who confirms that throughout the chemo pt had lower appetite but was able to force self to eat puree foods. After recent quadshot of radiation, he started feeling dizzy, lightheaded, difficulty swallowing with some pain. He has fallen 2x in the last week with head strike, possible loc. +neck/right head pain from the radiation areas. No fevers, n/v/cp/sob/abd pain/new swelling/d/dysuria.  (04 Nov 2021 03:29)    ------------------------------------------------------------------------    MEDICATION REVIEW:  --- Pls refer to current medicatons in the body of this note  ISTOP REFERENCE:[id number]  Others' Prescriptions  Patient Name: Stoney Berman Date: 1945  Address: [address] [place] [id number]: Male  Rx Written	Rx Dispensed	Drug	Quantity	Days Supply	Prescriber Name	Prescriber Catherine #	Payment Method	Dispenser  06/28/2021	10/01/2021	[facility] (1:1) 2.5mgthc and 2.5 mgcbd/0.5 ml tct (orange)	1	15	Tiarra Aguilar Breanne	[id number]	[place]	[facility]  06/28/2021	10/01/2021	[facility]af (1:1) 2.5mgthc and 2.5 mgcbd/0.5 ml tct (orange)	1	15	PadTiarra anaya Breanne	[id number]	[place]	[facility]  06/28/2021	10/01/2021	[facility]af (1:1) 2.5mgthc and 2.5 mgcbd/0.5 ml tct (orange)	1	15	PadronTiarra bardales Breanne	[id number]	[place]	[facility]  06/28/2021	10/01/2021	[facility]aleaf (1:20) 0.24mg thc and 5mg cbd/0.5 ml tct orange	1	5	PadronTiarra bardales Breanne	[id number]	[place]	[facility]  06/28/2021	10/01/2021	curaleaf (1:20) 0.24mg thc and 5mg cbd/0.5 ml tct orange	1	5	PadronTiarra bardales Breanne	[id number]	[place]	[facility]  06/28/2021	10/01/2021	[facility]aleaf (1:20) 0.24mg thc and 5mg cbd/0.5 ml tct orange	1	5	PadronTiarra bardales Breanne	[id number]	[place]	[facility]  06/28/2021	10/01/2021	curaleaf (1:1) 2.5mgthc and 2.5 mgcbd/microtab peppermint	1	2	PadNarendra anayanorberto Raymundo	[id number]	[place]	[facility]  06/28/2021	10/01/2021	[facility]af (1:1) 2.5mgthc and 2.5 mgcbd/microtab peppermint	1	2	PadronTiarra bardales	[id number]	[place]	[facility]  06/28/2021	08/15/2021	[facility]af (1:1) 2.5mgthc and 2.5 mgcbd/0.5 ml tct (orange)	1	15	PadroneTiarra	[id number]	[place]	[facility]  06/28/2021	08/15/2021	[facility]aleaf (1:1) 2.5mgthc and 2.5 mgcbd/0.5 ml tct (orange)	1	15	PadroneTiarra	[id number]	[place]	[facility]  06/28/2021	08/15/2021	[facility]af (1:1) 2.5mgthc and 2.5 mgcbd/0.5 ml tct (orange)	1	15	PadroneTiarra	[id number]	[place]	[facility]  06/28/2021	07/13/2021	curaleaf (1:1) 2.5mgthc and 2.5 mgcbd/microtab peppermint	1	2	PadroneTiarar	[id number]	[place]	[facility]  06/28/2021	07/13/2021	[facility]aleaf (1:1) 2.5mgthc and 2.5 mgcbd/microtab peppermint	1	2	PadronTiarra bardales	[id number]	[place]	[facility]  06/28/2021	07/13/2021	[facility]aleaf (1:1) 2.5mgthc and 2.5 mgcbd/0.5 ml tct (orange)	1	15	PadroneTiarra	[id number]	[place]	[facility]  06/28/2021	07/13/2021	[facility]af (1:1) 2.5mgthc and 2.5 mgcbd/0.5 ml tct (orange)	1	15	PadroneTiarra	[id number]	[place]	[facility]  06/28/2021	07/13/2021	[facility]af (1:20) 0.24mg thc and 5mg cbd/0.5 ml tct orange	1	5	Tiarra Aguilar Breanen	[id number]	[place]	[facility]  06/28/2021	07/13/2021	[facility]aleaf (1:20) 0.24mg thc and 5mg cbd/0.5 ml tct orange	1	5	Tiarra Aguilarley	[id number]	[facility]  Patient Name: Stoney AmayaBirth Date: 1945  Address: [address] [place] [id number]: Male  Rx Written	Rx Dispensed	Drug	Quantity	Days Supply	Prescriber Name	Prescriber Catherine #	Payment Method	Dispenser  10/22/2021	10/22/2021	methadone 10 mg/5 ml solution	75ml	30	Polina Veronica)	[id number]	Medicare	[facility]  --- PRN usage: The patient used PRN dose of Magic Mouthwash in the last 24h.  ------------------------------------------------------------------------  COORDINATION OF CARE:  --- Palliative Care consulted for: pain management   --- Patient assessed: 11/5/2021  --- Patient previously seen by Palliative Care service: NO  ADVANCE CARE PLANNING  --- Code status: FULL CODE  --- MOLST reviewed in chart: NONE  --- Surrogate: Children: Damaris Prateradryan Sandi Amaya  --- [facility] document found in Alpha: NONE  --- HCP/ Living will/ Other advanced directives in Alpha: NONE  ------------------------------------------------------------------------  CARE PROVIDER DOCUMENTATION:  --- Medicine notes reviewd  --- Thoracic Surgery recs: consulted for "incidental pneumothorax; no distress; recommend serial cxray to assess size unless change in clinical status. will defer tube placement at this time given size"  --- Vascular Surgery : "consulted for encasement of R CCA, ICA, ECA without significant stenosis and R IJ effacement by large R neck necrotic mass. " "ENT eval but doubt pt is a candidate for surgical resection since the tumor is metastatic  no indication for vascular reconstruction unless pt. undergoes tumor resection no indication for A/C there is a risk of tumor erosion into carotid which can lead to devastating bleeding but there is nothing that can be done to prevent that. "   --- Heme/Onc : " h/o SCC of the base of tongue and with progression of disease after carboplatin/ nab paclitaxel/ pembrolizumab." "saw Dr. Almanza at [facility] who is evaluating him for clinical trials." " Will await MRI of the brain; pt currently in between palliative treatments and will follow clinically. "   --- Sp/Sw recs: Patient with "mild pharyngeal dysphagia for puree and mildly thick fluids marked by suspected delayed initiation of pharygneal swallow trigger and reduced laryngeal elevation slick digital palpation" Recommend "uree with Mildly thick liquids, with use of Postural Strategies of RIGHT HEAD TURN and TWO SWALLOW"    PLAN OF CARE  --- Known admissions in past year: 1  --- Current admit date: 11/4/2021  --- LOS:1 day  --- LACE score: 9  --- Current dispo plan: TO BE DETERMINED  ------------------------------------------------------------------------  --- Chart reviewed: 30 Minutes [including time used to gather, review and transfer data to this note]    Prolonged services rendered, as part of this patient's care provided by Palliative Medicine, include: i.chart review for provider and ancillary service documentation, ii.pertinent diagnostics including laboratory and imaging studies,iii. medication review including PRN use, iv. admission history including previous palliative care encounters and GOC notes, v.advance care planning documents including HCP and MOLST forms in Alpha. Part of Palliative Medicine extended evaluation and management also involves coordination of care with our IDT, the primary and consulting [facility], and unit CM/SW and Hospice if eligible. Recommendations based on the information gathered and discussed are outline in the AP of our notes.    ************************************************************************

## 2021-11-05 NOTE — CONSULT NOTE ADULT - PROBLEM SELECTOR RECOMMENDATION 4
Speech and Swallow recommendations appreciated: Patient with "mild pharyngeal dysphagia for puree and mildly thick fluids marked by suspected delayed initiation of pharygneal swallow trigger and reduced laryngeal elevation slick digital palpation" Recommend "Puree with Mildly thick liquids, with use of Postural Strategies of RIGHT HEAD TURN and TWO SWALLOW"

## 2021-11-05 NOTE — SWALLOW BEDSIDE ASSESSMENT ADULT - COMMENTS
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 Daughter at bedside who confirms that throughout the chemo pt had lower appetite but was able to force self to eat puree foods. After recent quadshot of radiation, he started feeling dizzy, lightheaded, difficulty swallowing with some pain. He has fallen 2x in the last week with head strike, possible loc. +neck/right head pain from the radiation areas."    11/3/21 "CTA Neck: Persistent large right neck necrotic conglomerate mass which has increased in size. There is progression in local-regional invasion and mass effect as described. There is encasement of portions of the right common carotid artery, right internal carotid artery and right external carotid artery without hemodynamically significant stenosis. There is effacement of the right internal jugular vein. There is a left pneumothorax. CTA brain: No large vessel occlusion or flow-limiting stenosis."    Pt known to this service as he is being followed as an Outpatient by ENT and Speech department. Pt seen recently on 11/3/21 for an Outpatient Cinesophagram with recommendations for Puree with Mildly thick liquids with RIGHT head turn and two swallows postural strategies (see note for details).     Pt seen at bedside, awake/alert and oriented, during clinical swallow evaluation this AM. Pt's son present during the assessment. Pt followed directions and answered questions appropriately. Pt recalled recent cinesophagram and results for use of postural strategies. Pt and son reported use of strategies during PO intake. Pt demonstrated adequate use of Right head turns and 2 swallows strategy independently during PO trials. Pt was cooperative and denied swallowing concerns. Pt noted with hoarse and breathy vocal quality, with reduced vocal intensity. 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 Daughter at bedside who confirms that throughout the chemo pt had lower appetite but was able to force self to eat puree foods. After recent quadshot of radiation, he started feeling dizzy, lightheaded, difficulty swallowing with some pain. He has fallen 2x in the last week with head strike, possible loc. +neck/right head pain from the radiation areas."    11/3/21 "CTA Neck: Persistent large right neck necrotic conglomerate mass which has increased in size. There is progression in local-regional invasion and mass effect as described. There is encasement of portions of the right common carotid artery, right internal carotid artery and right external carotid artery without hemodynamically significant stenosis. There is effacement of the right internal jugular vein. There is a left pneumothorax. CTA brain: No large vessel occlusion or flow-limiting stenosis."    Pt known to this service as he is being followed as an Outpatient by ENT and Speech department. Pt seen recently on 11/3/21 for an Outpatient Cinesophagram with recommendations for Puree with Mildly thick liquids with RIGHT head turn and two swallows postural strategies (see note for details).     Pt seen at bedside, awake/alert and oriented, during clinical swallow evaluation this AM. Pt's son present at bedside. Pt followed directions and answered questions appropriately. Pt recalled recent cinesophagram and results for use of postural strategies. Pt and son reported use of strategies during PO intake. Pt demonstrated adequate use of Right head turns and 2 swallows strategy independently during PO trials. Pt was cooperative and denied swallowing concerns. Pt noted with hoarse and breathy vocal quality, with reduced vocal intensity. 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 Daughter at bedside who confirms that throughout the chemo pt had lower appetite but was able to force self to eat puree foods. After recent quadshot of radiation, he started feeling dizzy, lightheaded, difficulty swallowing with some pain. He has fallen 2x in the last week with head strike, possible loc. +neck/right head pain from the radiation areas."    11/3/21 "CTA Neck: Persistent large right neck necrotic conglomerate mass which has increased in size. There is progression in local-regional invasion and mass effect as described. There is encasement of portions of the right common carotid artery, right internal carotid artery and right external carotid artery without hemodynamically significant stenosis. There is effacement of the right internal jugular vein. There is a left pneumothorax. CTA brain: No large vessel occlusion or flow-limiting stenosis."    Pt known to this service as he is being followed as an Outpatient by ENT and Speech/swallow department. Pt seen recently on 11/3/21 for an Outpatient Cinesophagram with recommendations for Puree with Mildly thick liquids with RIGHT HEAD TURN and TWO SWALLOWS postural strategies (see outpatient report in [employer]/PACS for details).     Pt seen at bedside, awake/alert and oriented, during clinical swallow evaluation this AM. Pt's son present at bedside. Pt followed directions and answered questions appropriately. Pt recalled recent cinesophagram and results for use of postural strategies. Pt and son reported use of strategies during PO intake. Pt demonstrated adequate use of RIGHT HEAD TURN and TWO SWALLOWS strategy independently during PO trials. Pt was cooperative and is compliant with strategies. Pt noted with hoarse/breathy vocal quality, with reduced vocal intensity.

## 2021-11-05 NOTE — ED PROVIDER NOTE - EMPLOYMENT
Per Dr. Maddie Wolf \"Okay ,she should send some BS's\"    Medication faxed to pharmacy as noted.   Sent patient a message to send recent BS's per Dr. Maddie Wolf's request.     N/A

## 2021-11-05 NOTE — SWALLOW BEDSIDE ASSESSMENT ADULT - NS SPL SWALLOW CLINIC TRIAL FT
Problem: Pain Acute  Goal: Acceptable Pain Control and Functional Ability  Intervention: Develop Pain Management Plan  Recent Flowsheet Documentation  Taken 8/5/2021 0108 by Modesta Allan RN  Pain Management Interventions: medication (see MAR)      Pt's bdominal pain has been mild, states pain is sharp in RUQ, oxycodone given x2.  Abdominal lap sites x2 and incision CDI and LUZ with derma bond.  Pt states he is passing gas. WILLA dressing remains CDI with scant amount of dried drainage,  to bulb suction, brett color output, 15 ml this shift.   ---------------------------------------------------------------------------------------------------------------------------  Problem: Urinary Retention  Goal: Effective Urinary Elimination  Outcome: Improving    Pt has had adequate UOP overnight, using urinal with some assistance.          Pt was assessed using RIGHT HEAD TURN and TWO SWALLOWS strategy for all trials.

## 2021-11-05 NOTE — SWALLOW BEDSIDE ASSESSMENT ADULT - SWALLOW EVAL: RECOMMENDED FEEDING/EATING TECHNIQUES
RIGHT Head Turn and 2 Swallows per bolus/alternate food with liquid/maintain upright posture during/after eating for 30 mins/oral hygiene/position upright (90 degrees) Upright position; Puree via teaspoon with RIGHT Head Turn and TWO SWALLOWS per bolus, Mildly thick liquids via small single cup sip with RIGHT HEAD TURN and TWO SWALLOWS; Alternate Puree and Mildly Thick Liquids; Maintain good oral hygiene care

## 2021-11-05 NOTE — PROGRESS NOTE ADULT - PROBLEM SELECTOR PLAN 2
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 CT chest during this admission showed left cavitary lesions demonstrating increased peripheral opacity. Outpatient records reviewed, could not find previous QuantiFeron test result.    - F/U sputum cultures   - F/U QuantiFeron   - Airborne precautions pending above results The patient has a history of granulomatous lung lesion seen on previous imaging. CT chest during this admission showed left cavitary lesions demonstrating increased peripheral opacity. Outpatient records reviewed, could not find previous QuantiFeron test result.    - F/U sputum cultures, if positive, will consult ID  - F/U QuantiFeron   - Airborne precautions pending above results  - Known metastatic disease to lungs - no need for bronchoscopy at this time

## 2021-11-05 NOTE — SWALLOW BEDSIDE ASSESSMENT ADULT - NS ASR SWALLOW FINDINGS DISCUS
SLP provided extensive education to pt and family./Nursing/Patient/Family SLP spoke with Team 3 provider. SLP provided extensive education to pt and family./Nursing/Patient/Family

## 2021-11-05 NOTE — SWALLOW BEDSIDE ASSESSMENT ADULT - SWALLOW EVAL: DIAGNOSIS
1) Functional oral stage of swallow for puree and mildly thick fluids marked by adequate acceptance, bolus manipulation, containment, and coordination. Anterior to posterior oral transit/transfer noted. Adequate oral clearance observed. 2) Moderate pharyngeal dysphagia for puree and mildly thick fluids marked by suspected delayed initiation of pharyngeal swallow trigger and reduced laryngeal elevation upon digital palpation. No overt s/s of penetration/aspiration noted for these consistencies. Pt utilized RIGHT head turn and two swallows independently following each PO trial to ensure laryngeal clearance and prevent airway penetration/aspiration. 1) Functional oral stage of swallow for puree and mildly thick fluids marked by adequate acceptance, bolus manipulation, containment, and coordination. Anterior to posterior oral transit/transfer noted. Adequate oral clearance observed. 2) Mild pharyngeal dysphagia for puree and mildly thick fluids marked by suspected delayed initiation of pharyngeal swallow trigger and reduced laryngeal elevation upon digital palpation. No overt s/s of penetration/aspiration noted for these consistencies when patient utilized RIGHT HEAD TURN and TWO SWALLOWS independently. (Of note, PO trials were assessed with pt utilizing postural strategies).

## 2021-11-05 NOTE — PROGRESS NOTE ADULT - PROBLEM SELECTOR PLAN 1
Dx 8/2020 s/p R hemiglossectomy and partial neck dissection 9/2020 T2NO. PET/CT in 3/2021 he was found to have cervical adenopathy and KEZIA cystic lesion s/p LLL wedge resection.    - Dr. 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  - F/U Vascular consulted re: IJV effacement and arterial encasement  - Will supplement with D5NS and encourage PO intake; puree diet   - Pain: tylenol, toradol prn, magic mouthwash and benzocaine. Gabapentin 300tid standing   - F/U MR brain to r/o brain mets Dx 8/2020 s/p R hemiglossectomy and partial neck dissection 9/2020 T2NO. PET/CT in 3/2021 he was found to have cervical adenopathy and KEZIA cystic lesion s/p LLL wedge resection.    - Dr. Porras at [facility] following - no plan for inpatient treatment  - Last Quadruple RT 10/29-30, last chemo 10/8 Keytruda   - Suspect current odynophagia, decreased PO intake, and dizziness as side effect of radiation therapy   - CTH, neck, and CTA showed Encasement of portions of the right common carotid artery, right internal carotid artery and right external carotid artery without hemodynamically significant stenosis and effacement of the right internal jugular vein.  - F/U Vascular consulted re: IJV effacement and arterial encasement  - Will supplement with D5NS and encourage PO intake; puree diet   - Pain: tylenol, toradol prn, magic mouthwash and benzocaine. Gabapentin 300tid standing   - F/U MR brain to r/o brain mets

## 2021-11-05 NOTE — PROGRESS NOTE ADULT - PROBLEM SELECTOR PLAN 3
PTX noted on CT Chest; per outpatient notes: 10/27 note of PTX but unable to find corresponding scans     - Currently hemodynamically stable, satting 100% on RA  - left lung decreased sounds throughout   - CT Surgery consulted - no intervention indicated at this time

## 2021-11-05 NOTE — CONSULT NOTE ADULT - ASSESSMENT
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  Palliative Care consulted for complex decision making  related to goals of care discussions in the setting of advanced illness/ evaluation and management of pain/ symptoms

## 2021-11-05 NOTE — PROGRESS NOTE ADULT - PROBLEM SELECTOR PLAN 4
Chronic Mild hyponatremia since 7/2021: 131 baseline.    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

## 2021-11-05 NOTE — SWALLOW BEDSIDE ASSESSMENT ADULT - ASR SWALLOW RECOMMEND DIAG
Pt with recent Cinesophagram completed on 11/3/21. Cinesophagram NOT warranted at this time. Pt with recent outpatient Cinesophagram completed on 11/3/21. Cinesophagram NOT warranted at this time.

## 2021-11-05 NOTE — SWALLOW BEDSIDE ASSESSMENT ADULT - ASR SWALLOW REFERRAL
ENT consult given history of SCC tongue and hoarse vocal quality/ENT MD to consider ENT consult given history of SCC tongue and hoarse vocal quality/ENT

## 2021-11-06 NOTE — PROGRESS NOTE ADULT - PROBLEM SELECTOR PLAN 1
Dx 8/2020 s/p R hemiglossectomy and partial neck dissection 9/2020 T2NO. PET/CT in 3/2021 he was found to have cervical adenopathy and KEZIA cystic lesion s/p LLL wedge resection.    - Dr. Porras at [facility] following - no plan for inpatient treatment  - Last Quadruple RT 10/29-30, last chemo 10/8 Keytruda   - Suspect current odynophagia, decreased PO intake, and dizziness as side effect of radiation therapy   - CTH, neck, and CTA showed Encasement of portions of the right common carotid artery, right internal carotid artery and right external carotid artery without hemodynamically significant stenosis and effacement of the right internal jugular vein.  - F/U Vascular consulted re: IJV effacement and arterial encasement  - Will supplement with D5NS and encourage PO intake; puree diet   - Pain: tylenol, toradol prn, magic mouthwash and benzocaine. Gabapentin 400tid, oxy 2.5 q4 PRN, methadone 1.25 qD per palliative   - F/U MR brain to r/o brain mets

## 2021-11-06 NOTE — PROGRESS NOTE ADULT - PROBLEM SELECTOR PLAN 2
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 CT chest during this admission showed left cavitary lesions demonstrating increased peripheral opacity. Outpatient records reviewed, could not find previous QuantiFeron test result.    - F/U sputum cultures, if positive, will consult ID  - F/U QuantiFeron   - Airborne precautions pending above results  - Known metastatic disease to lungs - no need for bronchoscopy at this time

## 2021-11-06 NOTE — PHYSICAL THERAPY INITIAL EVALUATION ADULT - ACTIVE RANGE OF MOTION EXAMINATION, REHAB EVAL
except R shouldRUE er flexion 0-110 degrees/bilateral upper extremity Active ROM was WFL (within functional limits)/bilateral  lower extremity Active ROM was WFL (within functional limits)

## 2021-11-06 NOTE — PROGRESS NOTE ADULT - SUBJECTIVE AND OBJECTIVE BOX
Patient is a 76y old  Male who presents with a chief complaint of Pneumothorax (05 Nov 2021 20:50)      SUBJECTIVE / OVERNIGHT EVENTS:  no acute events overnight   this morning has slight right sided mouth pain   denies chest pain, fevers, n/v/d, shortness of breath     MEDICATIONS  (STANDING):  aspirin enteric coated 81 milliGRAM(s) Oral daily  dextrose 40% Gel 15 Gram(s) Oral once  dextrose 5%. 1000 milliLiter(s) (100 mL/Hr) IV Continuous <Continuous>  dextrose 5%. 1000 milliLiter(s) (50 mL/Hr) IV Continuous <Continuous>  dextrose 50% Injectable 25 Gram(s) IV Push once  dextrose 50% Injectable 12.5 Gram(s) IV Push once  dextrose 50% Injectable 25 Gram(s) IV Push once  doxazosin 2 milliGRAM(s) Oral daily  FIRST- Mouthwash  BLM 10 milliLiter(s) Swish and Spit every 8 hours  gabapentin 400 milliGRAM(s) Oral three times a day  glucagon  Injectable 1 milliGRAM(s) IntraMuscular once  heparin   Injectable 5000 Unit(s) SubCutaneous every 12 hours  insulin lispro (ADMELOG) corrective regimen sliding scale   SubCutaneous three times a day before meals  insulin lispro (ADMELOG) corrective regimen sliding scale   SubCutaneous at bedtime  methadone   Solution 1.25 milliGRAM(s) Oral daily  polyethylene glycol 3350 17 Gram(s) Oral daily  senna 2 Tablet(s) Oral at bedtime  simvastatin 20 milliGRAM(s) Oral at bedtime  sodium chloride 3%  Inhalation 7 milliLiter(s) Inhalation every 8 hours    MEDICATIONS  (PRN):  acetaminophen     Tablet .. 650 milliGRAM(s) Oral every 6 hours PRN Temp greater or equal to 38C (100.4F), Mild Pain (1 - 3)  benzocaine 15 mG/menthol 3.6 mG (Sugar-Free) Lozenge 1 Lozenge Oral four times a day PRN Sore Throat  ketorolac   Injectable 15 milliGRAM(s) IV Push every 6 hours PRN Moderate Pain (4 - 6)  oxyCODONE    Solution 2.5 milliGRAM(s) Oral every 4 hours PRN Moderate Pain (4 - 6)- severe pain      CAPILLARY BLOOD GLUCOSE      POCT Blood Glucose.: 115 mg/dL (06 Nov 2021 07:26)  POCT Blood Glucose.: 164 mg/dL (05 Nov 2021 22:43)  POCT Blood Glucose.: 140 mg/dL (05 Nov 2021 17:58)  POCT Blood Glucose.: 120 mg/dL (05 Nov 2021 12:09)    I&O's Summary      PHYSICAL EXAM:  Vital Signs Last 24 Hrs  T(C): 36.7 (11-06-21 @ 05:05)  T(F): 98.1 (11-06-21 @ 05:05), Max: 98.1 (11-05-21 @ 23:00)  HR: 67 (11-06-21 @ 05:05) (67 - 109)  BP: 149/60 (11-06-21 @ 05:05)  BP(mean): --  RR: 18 (11-06-21 @ 05:05) (18 - 18)  SpO2: 100% (11-06-21 @ 05:05) (95% - 100%)  Wt(kg): --    PHYSICAL EXAM:  GENERAL: well-groomed, well-developed, Appears comfortable  HEAD:  Atraumatic, Normocephalic  EYES: EOMI, PERRLA, conjunctiva and sclera clear  ENMT: No teeth. Base of tongue resection, mild mucositis mild secretions. right neck noted laterality, ttp around discoloration areas., right sided swelling   CHEST/LUNG: Decreased breath sounds left lung throughout; No rales, rhonchi, wheezing, or rubs  HEART: Regular rate and rhythm; No murmurs, rubs, or gallops  ABDOMEN: Soft, Nontender, Nondistended; Bowel sounds present  EXTREMITIES:  2+ Peripheral Pulses, No clubbing, cyanosis, or edema  LYMPH: No lymphadenopathy noted  SKIN: Right neck large discoloration   NERVOUS SYSTEM:  Alert & Oriented X4, Good concentration. Nonfocal exam.   PSYCH: Normal Affect. Not speaking much       Personally reviewed imaging, labs, EKG  LABS:                        12.5   10.34 )-----------( 255      ( 05 Nov 2021 07:32 )             37.7     11-05    133<L>  |  94<L>  |  10  ----------------------------<  113<H>  3.9   |  29  |  0.54    Ca    9.4      05 Nov 2021 07:32  Phos  2.7     11-05  Mg     1.90     11-05    TPro  6.7  /  Alb  3.6  /  TBili  0.5  /  DBili  x   /  AST  20  /  ALT  15  /  AlkPhos  49  11-04              RADIOLOGY & ADDITIONAL TESTS:    Imaging Personally Reviewed:    Consultant(s) Notes Reviewed:      Care Discussed with Consultants/Other Providers:

## 2021-11-06 NOTE — PROVIDER CONTACT NOTE (OTHER) - BACKGROUND
patient admitted for dizziness.
Patient with a history of SCC tongue cancer came to the hospital reporting dizziness

## 2021-11-06 NOTE — PROVIDER CONTACT NOTE (OTHER) - RECOMMENDATIONS
Beatriz Palacios ordered a bolus of  mL to be run over 15 minutes.
provider made aware, provider said to start them on IV fluids, run for 12 hours.

## 2021-11-06 NOTE — PROGRESS NOTE ADULT - PROBLEM SELECTOR PLAN 8
DVT PPx: Heparin subq 5000 q12 for now if there were ctx interventions   Diet: Pureed diet with mildly thick liquids, must turn head to the right and swallow twice while eating - patient and son understand.  Code: Full  Dispo: Pending Hospital Course

## 2021-11-06 NOTE — PROVIDER CONTACT NOTE (OTHER) - ACTION/TREATMENT ORDERED:
Beatriz Palacios ordered a bolus of  mL to be run over 15 minutes.
provider made aware,provider said to start them on IV fluids, run for 12 hours.

## 2021-11-06 NOTE — PHYSICAL THERAPY INITIAL EVALUATION ADULT - PERTINENT HX OF CURRENT PROBLEM, REHAB EVAL
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

## 2021-11-06 NOTE — PROVIDER CONTACT NOTE (OTHER) - ASSESSMENT
Patient finished eating and complained of feeling dizzy and BP was 83/55
patient was not started on IVF d5% and NS 0.9% 75ml/hr at task start time of 3pm 11/5/21

## 2021-11-07 NOTE — PROGRESS NOTE ADULT - PROBLEM SELECTOR PLAN 1
Dx 8/2020 s/p R hemiglossectomy and partial neck dissection 9/2020 T2NO. PET/CT in 3/2021 he was found to have cervical adenopathy and KEZIA cystic lesion s/p LLL wedge resection.    - Dr. Porras at [facility] following - no plan for inpatient treatment  - Last Quadruple RT 10/29-30, last chemo 10/8 Keytruda   - Suspect current odynophagia, decreased PO intake, and dizziness as side effect of radiation therapy   - CTH, neck, and CTA showed Encasement of portions of the right common carotid artery, right internal carotid artery and right external carotid artery without hemodynamically significant stenosis and effacement of the right internal jugular vein.  - F/U Vascular consulted re: IJV effacement and arterial encasement  - Will supplement with D5NS and encourage PO intake; puree diet   - Pain: tylenol, toradol prn, magic mouthwash and benzocaine. Gabapentin 400tid, oxy 2.5 q4 PRN, methadone 1.25 qD per palliative   - F/U MR brain to r/o brain mets

## 2021-11-07 NOTE — DISCHARGE NOTE PROVIDER - NSDCMRMEDTOKEN_GEN_ALL_CORE_FT
amLODIPine 5 mg oral tablet: 1 tab(s) orally once a day  aspirin 81 mg oral delayed release tablet: 1 tab(s) orally once a day  carvedilol 25 mg oral tablet: 1 tab(s) orally 2 times a day  doxazosin 2 mg oral tablet: 1 tab(s) orally once a day  gabapentin 300 mg oral capsule: orally 3 times a day  Jardiance 10 mg oral tablet: 1 tab(s) orally once a day (in the morning)  metFORMIN 500 mg oral tablet: 1 tab(s) orally 2 times a day  simvastatin 20 mg oral tablet: 1 tab(s) orally once a day (at bedtime)  Tradjenta 5 mg oral tablet: 1 tab(s) orally once a day  valsartan: 25 milligram(s) orally once a day   amLODIPine 5 mg oral tablet: 1 tab(s) orally once a day  aspirin 81 mg oral delayed release tablet: 1 tab(s) orally once a day  carvedilol 25 mg oral tablet: 0.5 tab(s) orally 2 times a day  doxazosin 2 mg oral tablet: 1 tab(s) orally once a day  gabapentin 300 mg oral capsule: orally 3 times a day  metFORMIN 500 mg oral tablet: 1 tab(s) orally 2 times a day  simvastatin 20 mg oral tablet: 1 tab(s) orally once a day (at bedtime)   amLODIPine 5 mg oral tablet: 1 tab(s) orally once a day  aspirin 81 mg oral delayed release tablet: 1 tab(s) orally once a day  bisacodyl 5 mg oral delayed release tablet: 2 tab(s) orally once a day (at bedtime)  carvedilol 25 mg oral tablet: 0.5 tab(s) orally 2 times a day  doxazosin 2 mg oral tablet: 1 tab(s) orally once a day  gabapentin 300 mg oral capsule: orally 3 times a day  metFORMIN 500 mg oral tablet: 1 tab(s) orally 2 times a day  methadone 10 mg/5 mL oral solution: 0.63 milliliter(s) orally 2 times a day MDD:2.5 mg  oxyCODONE 5 mg/5 mL oral solution: 5 milliliter(s) orally every 4 hours, As needed, Moderate Pain (4 - 6) MDD:30 mg  polyethylene glycol 3350 oral powder for reconstitution: 17 gram(s) orally 2 times a day  senna oral tablet: 2 tab(s) orally once a day (at bedtime)  simvastatin 20 mg oral tablet: 1 tab(s) orally once a day (at bedtime)   amLODIPine 5 mg oral tablet: 1 tab(s) orally once a day  aspirin 81 mg oral delayed release tablet: 1 tab(s) orally once a day  bisacodyl 5 mg oral delayed release tablet: 2 tab(s) orally once a day (at bedtime)  carvedilol 25 mg oral tablet: 0.5 tab(s) orally 2 times a day  doxazosin 2 mg oral tablet: 1 tab(s) orally once a day  gabapentin 300 mg oral capsule: orally 3 times a day  metFORMIN 500 mg oral tablet: 1 tab(s) orally 2 times a day  methadone 10 mg/5 mL oral solution: 0.63 milliliter(s) orally 2 times a day MDD:2.5 mg  oxyCODONE 5 mg/5 mL oral solution: 5 milliliter(s) orally every 4 hours, As needed, Moderate Pain (4 - 6) MDD:30 mg  Physical Therapy : 3 times a week   polyethylene glycol 3350 oral powder for reconstitution: 17 gram(s) orally 2 times a day  senna oral tablet: 2 tab(s) orally once a day (at bedtime)  simvastatin 20 mg oral tablet: 1 tab(s) orally once a day (at bedtime)  Speech Therapy for Dysphagia : 2 times a week

## 2021-11-07 NOTE — DISCHARGE NOTE PROVIDER - CARE PROVIDER_API CALL
Ira Curry)  Internal Medicine  [address] [place] [address], [facility] [address]  [place], [place] [address]  Phone: [phone]  Fax: [phone]  Follow Up Time:    Ira Curry)  Internal Medicine  [address] [place] [address], [address]  [place], [place] [id number]  Phone: [phone]  Fax: [phone]  Follow Up Time:     Polina Mckoen)  [facility] Medicine; Internal Medicine  [address] [place] [address]  Phone: [phone]  Fax: [phone]  Follow Up Time:     Silvano Ling; DDS)  HeadNeck Surgery; OralMaxillofacial Surgery; Otolaryngology  [address] [place] [address]  Phone: [phone]  Fax: [phone]  Follow Up Time:    Ira Curry)  Internal Medicine  [address] [place] [address], [address]  [place], [place] [id number]  Phone: [phone]  Fax: [phone]  Follow Up Time:     Polina Mckeon)  [facility]; Internal Medicine  [address] [place] [address]  Phone: [phone]  Fax: [phone]  Follow Up Time:     Silvano Ling; DDS)  HeadNeck Surgery; OralMaxillofacial Surgery; Otolaryngology  [address] [place] [address]  Phone: [phone]  Fax: [phone]  Follow Up Time:     Kraig Porras; MBBS)  Hematology; [facility]; Medical Oncology  [address] [place] [id number]  Phone: [phone]  Fax: [phone]  Follow Up Time:    Ira Curry)  Internal Medicine  [address] [place] [address], [address]  [place], [place] [id number]  Phone: [phone]  Fax: [phone]  Follow Up Time:     Polina Mckeon)  [facility]; Internal Medicine  [address] [place] [address]  Phone: [phone]  Fax: [phone]  Follow Up Time:     Silvano Ling; DDS)  HeadNeck Surgery; OralMaxillofacial Surgery; Otolaryngology  [address] [place] [address]  Phone: [phone]  Fax: [phone]  Follow Up Time:     Kraig Porras; MBBS)  Hematology; [facility]; Medical Oncology  [address] [place], [place] [id number]  Phone: [phone]  Fax: [phone]  Follow Up Time:     Neda Holloway)  Surgery; Vascular Surgery  [address] [facility], [address]  [place] [address]  Phone: [phone]  Fax: [phone]  Follow Up Time: 1 month

## 2021-11-07 NOTE — DISCHARGE NOTE PROVIDER - HOSPITAL COURSE
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 Daughter at bedside who confirms that throughout the chemo pt had lower appetite but was able to force self to eat puree foods. After recent quadshot of radiation, he started feeling dizzy, lightheaded, difficulty swallowing with some pain. He has fallen 2x in the last week with head strike, possible loc. +neck/right head pain from the radiation areas. No fevers, n/v/cp/sob/abd pain/new swelling/d/dysuria. 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  After recent quadshot of radiation, he started feeling dizzy, lightheaded, difficulty swallowing with some pain. He fell 2x in the week prior to admission, with head strike, possible loc. +neck/right head pain from the radiation areas. No fevers, n/v/cp/sob/abd pain/new swelling/d/dysuria.     CT Chest showed stable left pneumothorax with  Interval development of intercostal lung herniation between left third and fourth ribs. There was Interval development of 1.4 cm right middle lobe cavitary nodule and 0.7 cm left upper lobe nodule suspicious for progression of pulmonary metastases in the setting of squamous cell carcinoma of the right tongue CA.  Known left cavitary lesions demonstrate increased peripheral opacity.      CTA Neck showed  Persistent large right neck necrotic conglomerate mass which has increased in size. There is progression in local-regional invasion and mass effect as described. There is encasement of portions of the right common carotid artery, right internal carotid artery and right external carotid artery without hemodynamically significant stenosis. There is effacement of the right internal jugular vein. CTA brain showed No large vessel occlusion or flow-limiting stenosis. MRI brain showed _________.     In the hospital the patient passed speech and swallow and had a pureed diet with mild thick liquids (he was instructed to turn his head to the right and swallow twice). Palliative was consulted and the patient was started on daily methadone. 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  After recent quadshot of radiation, he started feeling dizzy, lightheaded, difficulty swallowing with some pain. He fell 2x in the week prior to admission, with head strike, possible loc. +neck/right head pain from the radiation areas. No fevers, n/v/cp/sob/abd pain/new swelling/d/dysuria.     CT Chest showed stable left pneumothorax with  Interval development of intercostal lung herniation between left third and fourth ribs. There was Interval development of 1.4 cm right middle lobe cavitary nodule and 0.7 cm left upper lobe nodule suspicious for progression of pulmonary metastases in the setting of squamous cell carcinoma of the right tongue CA.  Known left cavitary lesions demonstrate increased peripheral opacity.      CTA Neck showed  Persistent large right neck necrotic conglomerate mass which has increased in size. There is progression in local-regional invasion and mass effect as described. There is encasement of portions of the right common carotid artery, right internal carotid artery and right external carotid artery without hemodynamically significant stenosis. There is effacement of the right internal jugular vein. CTA brain showed No large vessel occlusion or flow-limiting stenosis. MRI brain showed _________.     In the hospital the patient passed speech and swallow and had a pureed diet with mild thick liquids (he was instructed to turn his head to the right and swallow twice). Palliative was consulted and the patient was started on daily methadone. Quantiferon and sputum cultures were performed to rule out TB given pulmonary cavitary lesions. Quantiferon Plus came back indeterminant. Sputum cultures showed _____. 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     Neoplasm related pain.   - Concern that acute pain was contributing to delirium, now mentation has improved.   - Seen and evaluated by palliative  - Tylenol, Toradol prn, Gabapentin 400tid, Oxy 5, 7.5mg q4 PRN, Methadone 1.25 BID   - Continue magic mouthwash and benzocaine.   - Ensure bowel regimen: s/p large BM on 11/10.    Squamous cell cancer of tongue.   - Dx 8/2020 s/p R hemiglossectomy and partial neck dissection 9/2020 T2NO. PET/CT in 3/2021 he was found to have cervical adenopathy and KEZIA cystic lesion s/p LLL wedge resection.  - Dr. Porras at [facility] following - no plan for inpatient treatment  - Last Quadruple RT 10/29-30, last chemo 10/8 Keytruda   - Suspect current odynophagia, decreased PO, dizziness 2/2 RT?  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  -  Vascular consulted re: IJV effacement and arterial encasement- unable for intervention.   - Per Vascular no indication for AC.   - MR brain to r/o brain mets- negative.   - Delirium improved, low suspicion for seizures, can DC EEG order.  - Outpatient f/u with Oncology , Dr. Porras at [facility] upon discharge.     Granulomatous lung disease.   - The patient has a history of granulomatous lung lesion seen on previous imaging. CT chest during this admission showed left cavitary lesions demonstrating increased peripheral opacity.   - Acid fast sputum negative x 3   - QuantiFeron - inconclusive.  - Airborne precautions during workup   - Known metastatic disease to lungs - no need for bronchoscopy at this time.    Pneumothorax, left.   ·  Plan: PTX noted on CT Chest; per outpatient notes: 10/27 note of PTX but unable to find corresponding scans.  - Currently hemodynamically stable, satting 100% on RA  - Left lung decreased sounds throughout.  - CT Surgery consulted - no intervention indicated at this time.    Hyponatremia.   - Chronic Mild hyponatremia since 7/2021: 131 baseline.  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    - SBP: 140-160s   -  home Amlodipine 5mg, valsartan 25mg, Doxazosin continued   - Pt to follow up with PCP as outpatient for further blood pressure management.     CAD (coronary artery disease) sp stents  -  Carvedilol 25mg, Amlodipine 5mg, Valsartan 40mg, Simvastatin 20mg.  - Pt to follow up with cardiology as outpatient     Type 2 diabetes mellitus.   - 9/11/21 A1c: 6.7   - Home Medications: Metformin 500bid, Jardiance 10mg, and Tradjenta   - FS qachs ISS.  - Pt to follow up with PCP as outpatient for further diabetic management    Prophylactic measure.   DVT PPx: Heparin subq 5000 q12 for now if there were ctx interventions   Diet: Pureed diet with mildly thick liquids, must turn head to the right and swallow twice while eating - patient and son understand.  Code: Full Code.       On_________, discussed with __________, patient is medically cleared and optimized for discharge today. All medications were reviewed with attending, and sent to mutually agreed upon pharmacy.   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     Neoplasm related pain.   - Concern that acute pain was contributing to delirium, now mentation has improved.   - Seen and evaluated by palliative  - Tylenol, Toradol prn, Gabapentin 400tid, Oxy 5, 7.5mg q4 PRN, Methadone 1.25 BID   - Continue magic mouthwash and benzocaine.   - Ensure bowel regimen: s/p large BM on 11/10.    Squamous cell cancer of tongue.   - Dx 8/2020 s/p R hemiglossectomy and partial neck dissection 9/2020 T2NO. PET/CT in 3/2021 he was found to have cervical adenopathy and KEZIA cystic lesion s/p LLL wedge resection.  - Dr. Porras at [facility] following - no plan for inpatient treatment  - Last Quadruple RT 10/29-30, last chemo 10/8 Keytruda   - Suspect current odynophagia, decreased PO, dizziness 2/2 RT?  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  -  Vascular consulted re: IJV effacement and arterial encasement- unable for intervention.   - Per Vascular no indication for AC.   - MR brain to r/o brain mets- negative.   - Delirium improved, low suspicion for seizures, can DC EEG order.  - Outpatient f/u with Oncology , Dr. Porras at [facility] upon discharge.     Granulomatous lung disease.   - The patient has a history of granulomatous lung lesion seen on previous imaging. CT chest during this admission showed left cavitary lesions demonstrating increased peripheral opacity.   - Acid fast sputum negative x 3   - QuantiFeron - inconclusive.  - Airborne precautions during workup   - Known metastatic disease to lungs - no need for bronchoscopy at this time.    Pneumothorax, left.   ·  Plan: PTX noted on CT Chest; per outpatient notes: 10/27 note of PTX but unable to find corresponding scans.  - Currently hemodynamically stable, satting 100% on RA  - Left lung decreased sounds throughout.  - CT Surgery consulted - no intervention indicated at this time.    Hyponatremia.   - Chronic Mild hyponatremia since 7/2021: 131 baseline.  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    - SBP: 140-160s   -  home Amlodipine 5mg, valsartan 25mg, Doxazosin continued   - Pt to follow up with PCP as outpatient for further blood pressure management.     CAD (coronary artery disease) sp stents  -  Carvedilol 25mg, Amlodipine 5mg, Valsartan 40mg, Simvastatin 20mg.  - Pt to follow up with cardiology as outpatient     Type 2 diabetes mellitus.   - 9/11/21 A1c: 6.7   - Home Medications: Metformin 500bid, Jardiance 10mg, and Tradjenta   - FS qachs ISS.  - Pt to follow up with PCP as outpatient for further diabetic management    Prophylactic measure.   DVT PPx: Heparin subq 5000 q12 for now if there were ctx interventions   Diet: Pureed diet with mildly thick liquids, must turn head to the right and swallow twice while eating - patient and son understand.  Code: Full Code.       On 11/11/2021, discussed with Dr. Easton, patient is medically cleared and optimized for discharge today. All medications were reviewed with attending, and sent to mutually agreed upon pharmacy.

## 2021-11-07 NOTE — DISCHARGE NOTE PROVIDER - NSDCFUADDAPPT_GEN_ALL_CORE_FT
Follow up with your primary care physician for further monitoring in 1-2 weeks. Please call to arrange appointment.  Follow up with oncology within 1-2 weeks of discharge.

## 2021-11-07 NOTE — DISCHARGE NOTE PROVIDER - CARE PROVIDERS DIRECT ADDRESSES
[email or web address] ,[email or web address],DirectAddress_Unknown,[email or web address] ,[email or web address]

## 2021-11-07 NOTE — DISCHARGE NOTE PROVIDER - DETAILS OF MALNUTRITION DIAGNOSIS/DIAGNOSES
This patient has been assessed with a concern for Malnutrition and was treated during this hospitalization for the following Nutrition diagnosis/diagnoses:     -  11/10/2021: Severe protein-calorie malnutrition

## 2021-11-07 NOTE — PROGRESS NOTE ADULT - SUBJECTIVE AND OBJECTIVE BOX
Domenic Snyder MD  Academic Hospitalist  Pager [phone]  Email: [email or web address]          PROGRESS NOTE:     Patient is a 76y old  Male who presents with a chief complaint of Pneumothorax (07 Nov 2021 06:58)      SUBJECTIVE / OVERNIGHT EVENTS:  Patient seen and examined this morning. Discussed at length w/daughter at bedside. Pending MRI and sputum cxs.    ADDITIONAL REVIEW OF SYSTEMS:  Has moments of dizziness/weakness and at times feels hot, needing to remove his socks. No f/c/n/v    MEDICATIONS  (STANDING):  aspirin enteric coated 81 milliGRAM(s) Oral daily  dextrose 40% Gel 15 Gram(s) Oral once  dextrose 5%. 1000 milliLiter(s) (100 mL/Hr) IV Continuous <Continuous>  dextrose 5%. 1000 milliLiter(s) (50 mL/Hr) IV Continuous <Continuous>  dextrose 50% Injectable 25 Gram(s) IV Push once  dextrose 50% Injectable 12.5 Gram(s) IV Push once  dextrose 50% Injectable 25 Gram(s) IV Push once  doxazosin 2 milliGRAM(s) Oral daily  FIRST- Mouthwash  BLM 10 milliLiter(s) Swish and Spit every 8 hours  gabapentin 400 milliGRAM(s) Oral three times a day  glucagon  Injectable 1 milliGRAM(s) IntraMuscular once  heparin   Injectable 5000 Unit(s) SubCutaneous every 12 hours  insulin lispro (ADMELOG) corrective regimen sliding scale   SubCutaneous three times a day before meals  insulin lispro (ADMELOG) corrective regimen sliding scale   SubCutaneous at bedtime  lactated ringers. 1000 milliLiter(s) (100 mL/Hr) IV Continuous <Continuous>  methadone   Solution 1.25 milliGRAM(s) Oral daily  polyethylene glycol 3350 17 Gram(s) Oral daily  senna 2 Tablet(s) Oral at bedtime  simvastatin 20 milliGRAM(s) Oral at bedtime  sodium chloride 3%  Inhalation 7 milliLiter(s) Inhalation every 8 hours    MEDICATIONS  (PRN):  acetaminophen     Tablet .. 650 milliGRAM(s) Oral every 6 hours PRN Temp greater or equal to 38C (100.4F), Mild Pain (1 - 3)  benzocaine 15 mG/menthol 3.6 mG (Sugar-Free) Lozenge 1 Lozenge Oral four times a day PRN Sore Throat  ketorolac   Injectable 15 milliGRAM(s) IV Push every 6 hours PRN Moderate Pain (4 - 6)  oxyCODONE    Solution 2.5 milliGRAM(s) Oral every 4 hours PRN Moderate Pain (4 - 6)- severe pain      CAPILLARY BLOOD GLUCOSE      POCT Blood Glucose.: 102 mg/dL (07 Nov 2021 06:47)  POCT Blood Glucose.: 133 mg/dL (06 Nov 2021 21:10)  POCT Blood Glucose.: 111 mg/dL (06 Nov 2021 17:34)  POCT Blood Glucose.: 165 mg/dL (06 Nov 2021 11:40)    I&O's Summary      PHYSICAL EXAM:  Vital Signs Last 24 Hrs  T(C): 36.8 (07 Nov 2021 05:00), Max: 37.1 (06 Nov 2021 13:24)  T(F): 98.2 (07 Nov 2021 05:00), Max: 98.8 (06 Nov 2021 13:24)  HR: 95 (07 Nov 2021 05:00) (60 - 95)  BP: 158/80 (07 Nov 2021 05:00) (91/51 - 166/79)  BP(mean): 66 (06 Nov 2021 19:11) (66 - 107)  RR: 18 (07 Nov 2021 05:00) (18 - 19)  SpO2: 100% (07 Nov 2021 05:00) (100% - 100%)    CONSTITUTIONAL: NAD, well-developed, laying in bed comfortably   RESPIRATORY: Normal respiratory effort; lungs are clear to auscultation bilaterally  CARDIOVASCULAR: Regular rate and rhythm, normal S1 and S2, no murmur/rub/gallop; No lower extremity edema;  ABDOMEN: Nontender to palpation, normoactive bowel sounds, no rebound/guarding; No   MUSCLOSKELETAL: no clubbing or cyanosis of digits;   PSYCH: calm and pleasant    LABS:                        11.5   9.39  )-----------( 241      ( 07 Nov 2021 07:36 )             35.4     11-07    131<L>  |  96<L>  |  11  ----------------------------<  108<H>  4.3   |  25  |  0.56    Ca    8.8      07 Nov 2021 07:36  Phos  2.6     11-07  Mg     1.90     11-07                Culture - Acid Fast - Sputum w/Smear (collected 06 Nov 2021 02:28)  Source: .Sputum Sputum    Culture - Blood (collected 04 Nov 2021 14:54)  Source: .Blood Blood  Preliminary Report (05 Nov 2021 15:01):    No growth to date.    Culture - Blood (collected 04 Nov 2021 14:52)  Source: .Blood Blood  Preliminary Report (05 Nov 2021 15:01):    No growth to date.        RADIOLOGY & ADDITIONAL TESTS:  Results Reviewed:   Imaging Personally Reviewed:  Electrocardiogram Personally Reviewed:    COORDINATION OF CARE:  Care Discussed with Consultants/Other Providers [Y/N]:  Prior or Outpatient Records Reviewed [Y/N]:

## 2021-11-07 NOTE — DISCHARGE NOTE PROVIDER - NSFOLLOWUPCLINICS_GEN_ALL_ED_FT
[facility]  Hematology/Oncology  [address] [id number]  Phone: [phone]  Fax:

## 2021-11-07 NOTE — DISCHARGE NOTE PROVIDER - NPI NUMBER (FOR SYSADMIN USE ONLY) :
[[id number]] [[id number]],[[id number]],[[id number]] [[id number]],[[id number]],[[id number]],[[id number]] [[id number]],[[id number]],[[id number]],[[id number]]

## 2021-11-07 NOTE — DISCHARGE NOTE PROVIDER - NSDCCPCAREPLAN_GEN_ALL_CORE_FT
PRINCIPAL DISCHARGE DIAGNOSIS  Diagnosis: Squamous cell cancer of tongue  Assessment and Plan of Treatment: Imaging studies performed in the hospital showed persistent large right neck mass which has increased in size, with encasement of portions of the right common carotid artery, right internal carotid artery and right external carotid artery without hemodynamically significant stenosis and effacement of the right internal jugular vein. You were seen by the Palliative team who started you on methadone to help better manage your pain. You had a speech and swallow evaluation and were given a pureed diet with mildly thick liquid, and instructed to turn your head to the right and swallow twice.         SECONDARY DISCHARGE DIAGNOSES  Diagnosis: Dizziness  Assessment and Plan of Treatment: When you came to the hospital you had been feeling increasingly dizzy and had a couple falls prior to presentation. Whenever someone feels this way, we need to rule out any brain injury. CT angiogram of your brain showed no clots. MRI of your brain showed ________. It si likely that you have been feeling dizzy due to the radiation.    Diagnosis: Pneumothorax, left  Assessment and Plan of Treatment:      PRINCIPAL DISCHARGE DIAGNOSIS  Diagnosis: Squamous cell cancer of tongue  Assessment and Plan of Treatment: Imaging studies performed in the hospital showed persistent large right neck mass which has increased in size, with encasement of portions of the right common carotid artery, right internal carotid artery and right external carotid artery without hemodynamically significant stenosis and effacement of the right internal jugular vein. You were seen by the Palliative team who started you on methadone to help better manage your pain. You had a speech and swallow evaluation and were given a pureed diet with mildly thick liquid, and instructed to turn your head to the right and swallow twice.         SECONDARY DISCHARGE DIAGNOSES  Diagnosis: Pneumothorax, left  Assessment and Plan of Treatment:     Diagnosis: Dizziness  Assessment and Plan of Treatment: When you came to the hospital you had been feeling increasingly dizzy and had a couple falls prior to presentation. Whenever someone feels this way, we need to rule out any brain injury. CT angiogram of your brain showed no clots. MRI of your brain was negatoive for metastasis. It is likely that you have been feeling dizzy due to the radiation.    Diagnosis: CAD (coronary artery disease)  Assessment and Plan of Treatment: Continue aspirin and Plavix, do not stop unless instructed by your physician.  Continue low salt, fat, cholesterol and carbohydrate diet. Follow up with cardiologist and primary care physician's routine appointment.      Diagnosis: Type 2 diabetes mellitus  Assessment and Plan of Treatment: Monitor finger sticks pre-meal and bedtime, low salt, fat and carbohydrate diet, minimize glucose intake.  Exercise daily for at least 30 minutes and weight loss.  Follow up with primary care physician and endocrinologist for routine Hemoglobin A1C checks and management.  Follow up with your ophthalmologist for routine yearly vision exams.      Diagnosis: Hyponatremia  Assessment and Plan of Treatment: Your sodium levels were found to be low. You are to follow up with PCP for further monitoring of sodium levels.    Diagnosis: Hypertension  Assessment and Plan of Treatment: Low sodium and fat diet, continue anti-hypertensive medications, and follow up with primary care physician.       PRINCIPAL DISCHARGE DIAGNOSIS  Diagnosis: Squamous cell cancer of tongue  Assessment and Plan of Treatment: Imaging studies performed in the hospital showed persistent large right neck mass which has increased in size, with encasement of portions of the right common carotid artery, right internal carotid artery and right external carotid artery without hemodynamically significant stenosis and effacement of the right internal jugular vein. You were seen by the Palliative team who started you on methadone to help better manage your pain. You had a speech and swallow evaluation and were given a pureed diet with mildly thick liquid, and instructed to turn your head to the right and swallow twice.         SECONDARY DISCHARGE DIAGNOSES  Diagnosis: Dizziness  Assessment and Plan of Treatment: When you came to the hospital you had been feeling increasingly dizzy and had a couple falls prior to presentation. Whenever someone feels this way, we need to rule out any brain injury. CT angiogram of your brain showed no clots. MRI of your brain was negatoive for metastasis. It is likely that you have been feeling dizzy due to the radiation.    Diagnosis: Hypertension  Assessment and Plan of Treatment: Low sodium and fat diet, continue anti-hypertensive medications, and follow up with primary care physician.      Diagnosis: Type 2 diabetes mellitus  Assessment and Plan of Treatment: Monitor finger sticks pre-meal and bedtime, low salt, fat and carbohydrate diet, minimize glucose intake.  Exercise daily for at least 30 minutes and weight loss.  Follow up with primary care physician and endocrinologist for routine Hemoglobin A1C checks and management.  Follow up with your ophthalmologist for routine yearly vision exams.      Diagnosis: CAD (coronary artery disease)  Assessment and Plan of Treatment: Continue aspirin and Plavix, do not stop unless instructed by your physician.  Continue low salt, fat, cholesterol and carbohydrate diet. Follow up with cardiologist and primary care physician's routine appointment.      Diagnosis: Hyponatremia  Assessment and Plan of Treatment: Your sodium levels were found to be low. You are to follow up with PCP for further monitoring of sodium levels.     PRINCIPAL DISCHARGE DIAGNOSIS  Diagnosis: Squamous cell cancer of tongue  Assessment and Plan of Treatment: Imaging studies performed in the hospital showed persistent large right neck mass which has increased in size, with encasement of portions of the right common carotid artery, right internal carotid artery and right external carotid artery without hemodynamically significant stenosis and effacement of the right internal jugular vein. You were seen by the Palliative team who started you on methadone to help better manage your pain. You had a speech and swallow evaluation and were given a pureed diet with mildly thick liquid, and instructed to turn your head to the right and swallow twice. Please return to hospital if pain gets worse or you develop new neurological symptoms such as loss of sensation.         SECONDARY DISCHARGE DIAGNOSES  Diagnosis: Dizziness  Assessment and Plan of Treatment: When you came to the hospital you had been feeling increasingly dizzy and had a couple falls prior to presentation. Whenever someone feels this way, we need to rule out any brain injury. CT angiogram of your brain showed no clots. MRI of your brain was negatoive for metastasis. It is likely that you have been feeling dizzy due to the radiation.    Diagnosis: Hypertension  Assessment and Plan of Treatment: Low sodium and fat diet, continue anti-hypertensive medications, and follow up with primary care physician.      Diagnosis: Type 2 diabetes mellitus  Assessment and Plan of Treatment: Monitor finger sticks pre-meal and bedtime, low salt, fat and carbohydrate diet, minimize glucose intake.  Exercise daily for at least 30 minutes and weight loss.  Follow up with primary care physician and endocrinologist for routine Hemoglobin A1C checks and management.  Follow up with your ophthalmologist for routine yearly vision exams.  Please hold jardiance and tradjenta until following up with your primary care doctor. If your finger sticks become elevated (greater than 200 consistently), please re-start jardiance and tradjenta.    Diagnosis: CAD (coronary artery disease)  Assessment and Plan of Treatment: Continue aspirin and Plavix, do not stop unless instructed by your physician.  Continue low salt, fat, cholesterol and carbohydrate diet. Follow up with cardiologist and primary care physician's routine appointment.      Diagnosis: Hyponatremia  Assessment and Plan of Treatment: Your sodium levels were found to be low. You are to follow up with PCP for further monitoring of sodium levels.

## 2021-11-07 NOTE — PROGRESS NOTE ADULT - PROBLEM SELECTOR PLAN 2
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 CT chest during this admission showed left cavitary lesions demonstrating increased peripheral opacity.     - F/U sputum cultures, if positive, will consult ID  - QuantiFeron - inconclusive  - Airborne precautions pending above results  - Known metastatic disease to lungs - no need for bronchoscopy at this time

## 2021-11-07 NOTE — DISCHARGE NOTE PROVIDER - PROVIDER TOKENS
PROVIDER:[TOKEN:[[id number]]] PROVIDER:[TOKEN:[[id number]]],PROVIDER:[TOKEN:[[id number]]],PROVIDER:[TOKEN:[[id number]]] PROVIDER:[TOKEN:[[id number]]],PROVIDER:[TOKEN:[[id number]]],PROVIDER:[TOKEN:[[id number]]],PROVIDER:[TOKEN:[[id number]]] PROVIDER:[TOKEN:[[id number]]],PROVIDER:[TOKEN:[[id number]]],PROVIDER:[TOKEN:[[id number]]],PROVIDER:[TOKEN:[[id number]]],PROVIDER:[TOKEN:[[id number]],FOLLOWUP:[1 month]]

## 2021-11-07 NOTE — DISCHARGE NOTE PROVIDER - NSDCFUSCHEDAPPT_GEN_ALL_CORE_FT
MELISSA JOHNS ; 11/10/2021 ; N[facility] Cardio [address] MELISSA Feldman ; 11/12/2021 ; NPP OtoLaryng [address] [facility]  MELISSA JOHNS ; 11/15/2021 ; [facility]  MELISSA JOHNS ; 11/26/2021 ; [facility]  MELISSA JOHNS ; 12/03/2021 ; NPP RADMED [address]  MELISSA JOHNS ; 12/17/2021 ; [facility]  MELISSA JOHNS ; 12/17/2021 ; NPP Med GenInt [facility]  MELISSA JOHNS ; 01/07/2022 ; [facility] MELISSA JOHNS ; 11/12/2021 ; NPP OtoLaryng [address] [facility]  MELISSA JOHNS ; 11/15/2021 ; [facility]  MELISSA JOHNS ; 11/26/2021 ; [facility]  MELISSA JOHNS ; 12/03/2021 ; NPP RADMED [address]  MELISSA JOHNS ; 12/17/2021 ; [facility]  MELISSA JOHNS ; 12/17/2021 ; [facility]  MELISSA JOHNS ; 01/07/2022 ; N[facility]

## 2021-11-08 NOTE — PROGRESS NOTE ADULT - PROBLEM SELECTOR PLAN 7
- 9/11/21 A1c: 6.7   - Home Medications: metformin 500bid, jardiance 10mg, and tradjenta   - FS qachs ISS

## 2021-11-08 NOTE — PROGRESS NOTE ADULT - PROBLEM SELECTOR PLAN 4
- Patient likely with pharyngeal mucositis 2/2 radiation treatments  - No mucositis seen in oral cavity. However, Per son, patient with improvement of pain if given magic mouthwash prior to eating due to pain.  - Continue Magic Moutwash TID - given 30 minutes before meals.

## 2021-11-08 NOTE — PROGRESS NOTE ADULT - PROBLEM SELECTOR PLAN 8
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 [place] [facility] [place] clinical [place].  Oncology recommendations appreciated

## 2021-11-08 NOTE — PROGRESS NOTE ADULT - SUBJECTIVE AND OBJECTIVE BOX
INTERVAL HPI/OVERNIGHT EVENTS:  Patient S&E at bedside. no F/C, CP, SOB, abn pain, N/V      VITAL SIGNS:  T(F): 98.1 (11-08-21 @ 07:20)  HR: 83 (11-08-21 @ 07:20)  BP: 142/78 (11-08-21 @ 07:20)  RR: 18 (11-08-21 @ 07:20)  SpO2: 100% (11-08-21 @ 07:20)  Wt(kg): --    PHYSICAL EXAM:    Constitutional: NAD  Eyes: EOMI, sclera non-icteric  Neck: supple, (+) right cervical lymphadenopathy   Respiratory: no inc wob  Cardiovascular: RRR,   Gastrointestinal: soft, NTND  Extremities: no c/c/e  Neurological: AAOx3    MEDICATIONS  (STANDING):  amLODIPine   Tablet 5 milliGRAM(s) Oral daily  aspirin enteric coated 81 milliGRAM(s) Oral daily  bisacodyl 5 milliGRAM(s) Oral at bedtime  dextrose 40% Gel 15 Gram(s) Oral once  dextrose 5%. 1000 milliLiter(s) (100 mL/Hr) IV Continuous <Continuous>  dextrose 5%. 1000 milliLiter(s) (50 mL/Hr) IV Continuous <Continuous>  dextrose 50% Injectable 25 Gram(s) IV Push once  dextrose 50% Injectable 12.5 Gram(s) IV Push once  dextrose 50% Injectable 25 Gram(s) IV Push once  doxazosin 2 milliGRAM(s) Oral daily  FIRST- Mouthwash  BLM 10 milliLiter(s) Swish and Spit every 8 hours  gabapentin 400 milliGRAM(s) Oral three times a day  glucagon  Injectable 1 milliGRAM(s) IntraMuscular once  heparin   Injectable 5000 Unit(s) SubCutaneous every 12 hours  insulin lispro (ADMELOG) corrective regimen sliding scale   SubCutaneous three times a day before meals  insulin lispro (ADMELOG) corrective regimen sliding scale   SubCutaneous at bedtime  lactated ringers. 1000 milliLiter(s) (100 mL/Hr) IV Continuous <Continuous>  methadone   Solution 1.25 milliGRAM(s) Oral daily  polyethylene glycol 3350 17 Gram(s) Oral daily  senna 2 Tablet(s) Oral at bedtime  simvastatin 20 milliGRAM(s) Oral at bedtime  sodium chloride 3%  Inhalation 7 milliLiter(s) Inhalation every 8 hours    MEDICATIONS  (PRN):  acetaminophen     Tablet .. 650 milliGRAM(s) Oral every 6 hours PRN Temp greater or equal to 38C (100.4F), Mild Pain (1 - 3)  benzocaine 15 mG/menthol 3.6 mG (Sugar-Free) Lozenge 1 Lozenge Oral four times a day PRN Sore Throat  guaiFENesin Oral Liquid (Sugar-Free) 100 milliGRAM(s) Oral every 6 hours PRN Cough  oxyCODONE    Solution 2.5 milliGRAM(s) Oral every 4 hours PRN Moderate Pain (4 - 6)  oxyCODONE    Solution 5 milliGRAM(s) Oral every 4 hours PRN Severe Pain (7 - 10)      LABS:                        12.4   9.04  )-----------( 241      ( 08 Nov 2021 06:59 )             36.3     11-08    131<L>  |  94<L>  |  7   ----------------------------<  116<H>  3.7   |  28  |  0.50    Ca    9.2      08 Nov 2021 06:59  Phos  2.5     11-08  Mg     1.80     11-08            RADIOLOGY & ADDITIONAL TESTS:

## 2021-11-08 NOTE — PROGRESS NOTE ADULT - PROBLEM SELECTOR PLAN 4
Chronic Mild hyponatremia since 7/2021: 131 baseline.  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

## 2021-11-08 NOTE — PROGRESS NOTE ADULT - PROBLEM SELECTOR PLAN 1
- D/C IV Toradol   - Continue PO Methadone Solution 1.25mg QD (QTC on 11/3- 387)  - Continue home dose of PO Gabapentin 400mg TID  - Continue PO Oxycodone 2.5mg q4 PRN for moderate pain  - Start PO Oxycodone 5mg q4 PRN for severe pain  - Bowel regimen while on opiates: Miralax QD, Senna 2 tabs QHS, Dulcolax PRN.

## 2021-11-08 NOTE — PROGRESS NOTE ADULT - PROBLEM SELECTOR PLAN 7
Patient being ruled out for TB; Patient is on airborne precautions   workup/ management as per primary team.

## 2021-11-08 NOTE — PROGRESS NOTE ADULT - SUBJECTIVE AND OBJECTIVE BOX
SUBJECTIVE AND OBJECTIVE:  Patient seen and examined with deven Huerta at bedside. Patient reports pain better controlled. Stated he had 6/10 pain in right neck with radiation to head at time of encounter; asked bedside RN to administer PRN dose of oxycodone for pain. Patient complaining of constipation; reports last bowel movement 3 days ago    INTERVAL HPI/OVERNIGHT EVENTS:  In the past 24 hours, patient received 2 prn doses of IV Toradol.     DNR on chart:   Allergies    No Known Allergies    Intolerances    MEDICATIONS  (STANDING):  amLODIPine   Tablet 5 milliGRAM(s) Oral daily  aspirin enteric coated 81 milliGRAM(s) Oral daily  bisacodyl 5 milliGRAM(s) Oral at bedtime  dextrose 40% Gel 15 Gram(s) Oral once  dextrose 5%. 1000 milliLiter(s) (100 mL/Hr) IV Continuous <Continuous>  dextrose 5%. 1000 milliLiter(s) (50 mL/Hr) IV Continuous <Continuous>  dextrose 50% Injectable 25 Gram(s) IV Push once  dextrose 50% Injectable 12.5 Gram(s) IV Push once  dextrose 50% Injectable 25 Gram(s) IV Push once  doxazosin 2 milliGRAM(s) Oral daily  FIRST- Mouthwash  BLM 10 milliLiter(s) Swish and Spit every 8 hours  gabapentin 400 milliGRAM(s) Oral three times a day  glucagon  Injectable 1 milliGRAM(s) IntraMuscular once  heparin   Injectable 5000 Unit(s) SubCutaneous every 12 hours  insulin lispro (ADMELOG) corrective regimen sliding scale   SubCutaneous three times a day before meals  insulin lispro (ADMELOG) corrective regimen sliding scale   SubCutaneous at bedtime  lactated ringers. 1000 milliLiter(s) (100 mL/Hr) IV Continuous <Continuous>  methadone   Solution 1.25 milliGRAM(s) Oral daily  polyethylene glycol 3350 17 Gram(s) Oral daily  senna 2 Tablet(s) Oral at bedtime  simvastatin 20 milliGRAM(s) Oral at bedtime  sodium chloride 3%  Inhalation 7 milliLiter(s) Inhalation every 8 hours    MEDICATIONS  (PRN):  acetaminophen     Tablet .. 650 milliGRAM(s) Oral every 6 hours PRN Temp greater or equal to 38C (100.4F), Mild Pain (1 - 3)  benzocaine 15 mG/menthol 3.6 mG (Sugar-Free) Lozenge 1 Lozenge Oral four times a day PRN Sore Throat  guaiFENesin Oral Liquid (Sugar-Free) 100 milliGRAM(s) Oral every 6 hours PRN Cough  oxyCODONE    Solution 5 milliGRAM(s) Oral every 4 hours PRN Severe Pain (7 - 10)  oxyCODONE    Solution 2.5 milliGRAM(s) Oral every 4 hours PRN Moderate Pain (4 - 6)      ITEMS UNCHECKED ARE NOT PRESENT    PRESENT SYMPTOMS: [ ]Unable to obtain due to poor mentation   Source if other than patient:  [ ]Family   [ ]Team     Pain: [x ]yes [ ]no  QOL impact - mild-moderate  Location -    neck                Aggravating factors - none  Quality - unable to elaborate  Radiation - head   Timing-intermittent   Severity (0-10 scale): 6  Minimal acceptable level (0-10 scale):     Dyspnea:                           [ ]Mild [ ]Moderate [ ]Severe  Anxiety:                             [ ]Mild [ ]Moderate [ ]Severe  Agitation:                          [ ]Mild [ ]Moderate [ ]Severe  Fatigue:                             [ ]Mild [ ]Moderate [ ]Severe  Nausea:                             [ ]Mild [ ]Moderate [ ]Severe  Loss of appetite:              [ ]Mild [ ]Moderate [ ]Severe  Constipation:                   [ ]Mild [ ]Moderate [ ]Severe  Diarrhea:                          [ ]Mild [ ]Moderate [ ]Severe      CPOT:    https://www.[facility].org/getattachment/[email or web address]/Critical-Care-Pain-Observation-Tool-(CPOT)    PAIN AD Score:	  http://geriatrictoolkit.[facility]/cog/painad.pdf (Ctrl + left click to view)    Other Symptoms:  [ x]All other review of systems negative     Palliative Performance Status Version 2:   50-60      %      http://npcrc.org/files/news/palliative_performance_scale_ppsv2.pdf    PHYSICAL EXAM:  Vital Signs Last 24 Hrs  T(C): 36.9 (08 Nov 2021 13:10), Max: 37 (07 Nov 2021 23:00)  T(F): 98.5 (08 Nov 2021 13:10), Max: 98.6 (07 Nov 2021 23:00)  HR: 65 (08 Nov 2021 13:10) (65 - 88)  BP: 115/62 (08 Nov 2021 13:10) (115/62 - 142/78)  BP(mean): --  RR: 18 (08 Nov 2021 13:10) (18 - 18)  SpO2: 100% (08 Nov 2021 13:10) (100% - 100%)     I&O's Summary       GENERAL:  [x ] Awake, Alert  [x ]Oriented x  3 [ ]Lethargic  [ ]Cachexia  [ ]Unarousable  [ x]Verbal  [ ]Non-Verbal  [ x] No Distress  Behavioral:   [ ] Anxiety  [ ] Delirium [ ] Agitation [ x] Calm  [ ] Other  HEENT:  [x ]Normal  [ ] Temporal Wasting  [ ]Dry mouth   [ ]ET Tube/Trach  [ ]Oral lesions  [ ] Mucositis  PULMONARY:   [ x]Clear [ ]Tachypnea  [ ]Audible excessive secretions   [ ]Rhonchi        [ ]Right [ ]Left [ ]Bilateral  [ ]Crackles        [ ]Right [ ]Left [ ]Bilateral  [ ]Wheezing     [ ]Right [ ]Left [ ]Bilateral  [ ]Diminished breath sounds [ ]right [ ]left [ ]bilateral  CARDIOVASCULAR:    [ x]Regular [ ]Irregular [ ]Tachy  [ ]Rogelio [ ]Murmur [ ]Other  GASTROINTESTINAL:  [x ]Soft  [ ]Distended   [ ]+BS  [ x]Non tender [ ]Tender  [ ]PEG [ ]OGT/ NGT  Last BM: 11/6  GENITOURINARY:  [x ]Normal [ ] Incontinent   [ ]Oliguria/Anuria   [ ][place]  MUSCULOSKELETAL:   [ ]Normal   [ x]Weakness  [ ]Bed/Wheelchair bound [ ]Edema  [  ] amputation  [  ] contraction  NEUROLOGIC:   [x ]No focal deficits  [ ]Cognitive impairment  [ ]Dysphagia [ ]Dysarthria [ ]Paresis [ ]Other   SKIN: See Nursing Skin Assessment for further details  [ x]Normal    [ ]Rash  [ ]Pressure ulcer(s)       Present on admission [ ]y [ ]n   [  ]  Wound    [  ] hyperpigmentation    CRITICAL CARE:  [ ]Shock Present  [ ]Septic [ ]Cardiogenic [ ]Neurologic [ ]Hypovolemic  [ ]Vasopressors [ ]Inotropes  [ ]Respiratory failure present [ ]Mechanical Ventilation [ ]Non-invasive ventilatory support [ ]High-Flow   [ ]Acute  [ ]Chronic [ ]Hypoxic  [ ]Hypercarbic [ ]Other  [ ]Other organ failure     LABS:                        12.4   9.04  )-----------( 241      ( 08 Nov 2021 06:59 )             36.3   11-08    131<L>  |  94<L>  |  7   ----------------------------<  116<H>  3.7   |  28  |  0.50    Ca    9.2      08 Nov 2021 06:59  Phos  2.5     11-08  Mg     1.80     11-08        CAPILLARY BLOOD GLUCOSE      POCT Blood Glucose.: 138 mg/dL (08 Nov 2021 16:33)  POCT Blood Glucose.: 130 mg/dL (08 Nov 2021 11:38)  POCT Blood Glucose.: 109 mg/dL (08 Nov 2021 07:18)  POCT Blood Glucose.: 125 mg/dL (07 Nov 2021 23:12)      RADIOLOGY & ADDITIONAL STUDIES:  MRI Head 11/7/2021  FINDINGS: Multiple nonspecific foci of T2/FLAIR hyperintensity are noted throughout the deep and periventricular white matter of the cerebral hemispheres. There is no associated mass effect. There is no evidence of acute ischemia on the diffusion-weighted images. There is no abnormal brain parenchymal or leptomeningeal enhancement.    There is diffuse cerebral volume loss with prominence of the sulci, fissures, and cisternal spaces which is normal for the patient's age. Mild ventriculomegaly appears unchanged. Flow-voids are noted throughout the major intracranial vessels, on the T2 weighted images, consistent with their patency. 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 The sellar region and posterior fossa appear unremarkable. Fatty replacement of the tongue is seen on the sagittal T1-weighted series.    The paranasal sinuses and mastoid air cells are clear. Calvarial signal is within normal limits. There is evidence of bilateral cataract removal.    IMPRESSION: No evidence of intracranial metastatic disease.    No acute intracranial hemorrhage or evidence of acute ischemia.        Protein Calorie Malnutrition Present: [ ]mild [ ]moderate [ ]severe [ ]underweight [ ]morbid obesity  https://www.andeal.org/vault/2440/web/files/ONC/Table_Clinical%20Characteristics%20to%20Document%20Malnutrition-White%20JV%20et%20al%202012.pdf    Height (cm): 175.3 (11-03-21 @ 14:35), 175.3 (07-23-21 @ 11:17), 173 (06-25-21 @ 08:58)  Weight (kg): 77.6 (11-07-21 @ 17:40), 81.2 (07-23-21 @ 11:17), 82.6 (06-25-21 @ 08:58)  BMI (kg/m2): 25.3 (11-07-21 @ 17:40), 26.4 (11-03-21 @ 14:35), 26.4 (07-23-21 @ 11:17)    [ ]PPSV2 < or = 30%  [ ]significant weight loss [ ]poor nutritional intake [ ]anasarca    [ ]Artificial Nutrition    REFERRALS:   [ ]Chaplaincy  [ ]Hospice  [ ]Child Life  [ ]Social Work  [ x]Case management [ ]Holistic Therapy     Goals of Care Document:

## 2021-11-08 NOTE — PROGRESS NOTE ADULT - PROBLEM SELECTOR PLAN 1
Dx 8/2020 s/p R hemiglossectomy and partial neck dissection 9/2020 T2NO. PET/CT in 3/2021 he was found to have cervical adenopathy and KEZIA cystic lesion s/p LLL wedge resection.  - Dr. Porras at [facility] following - no plan for inpatient treatment  - Last Quadruple RT 10/29-30, last chemo 10/8 Keytruda   - Suspect current odynophagia, decreased PO, dizziness 2/2 RT?  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  - F/U Vascular consulted re: IJV effacement and arterial encasement- unable for intervention.  - Will supplement with D5NS and encourage PO intake; puree diet   - Pain: Tylenol, Toradol prn, Gabapentin 400tid, oxy 2.5 q4 PRN, methadone 1.25 qD per palliative   - Continue magic mouthwash and benzocaine.   - MR brain to r/o brain mets- negative.  - f/u EEG to evaluate for seizures given recurrent "blank stares" lasting several seconds.

## 2021-11-08 NOTE — PROGRESS NOTE ADULT - SUBJECTIVE AND OBJECTIVE BOX
PROGRESS NOTE:     Patient is a 76y old  Male who presents with a chief complaint of Pneumothorax (08 Nov 2021 12:18)    SUBJECTIVE / OVERNIGHT EVENTS: Patient seen and evaluated at bedside. No acute distress evident, no overnight events. No sob, chest pain, abd pain, nausea, vomiting, dizziness, palpitations.     ADDITIONAL REVIEW OF SYSTEMS:    MEDICATIONS  (STANDING):  amLODIPine   Tablet 5 milliGRAM(s) Oral daily  aspirin enteric coated 81 milliGRAM(s) Oral daily  bisacodyl 5 milliGRAM(s) Oral at bedtime  dextrose 40% Gel 15 Gram(s) Oral once  dextrose 5%. 1000 milliLiter(s) (100 mL/Hr) IV Continuous <Continuous>  dextrose 5%. 1000 milliLiter(s) (50 mL/Hr) IV Continuous <Continuous>  dextrose 50% Injectable 25 Gram(s) IV Push once  dextrose 50% Injectable 12.5 Gram(s) IV Push once  dextrose 50% Injectable 25 Gram(s) IV Push once  doxazosin 2 milliGRAM(s) Oral daily  FIRST- Mouthwash  BLM 10 milliLiter(s) Swish and Spit every 8 hours  gabapentin 400 milliGRAM(s) Oral three times a day  glucagon  Injectable 1 milliGRAM(s) IntraMuscular once  heparin   Injectable 5000 Unit(s) SubCutaneous every 12 hours  insulin lispro (ADMELOG) corrective regimen sliding scale   SubCutaneous three times a day before meals  insulin lispro (ADMELOG) corrective regimen sliding scale   SubCutaneous at bedtime  lactated ringers. 1000 milliLiter(s) (100 mL/Hr) IV Continuous <Continuous>  methadone   Solution 1.25 milliGRAM(s) Oral daily  polyethylene glycol 3350 17 Gram(s) Oral daily  senna 2 Tablet(s) Oral at bedtime  simvastatin 20 milliGRAM(s) Oral at bedtime  sodium chloride 3%  Inhalation 7 milliLiter(s) Inhalation every 8 hours    MEDICATIONS  (PRN):  acetaminophen     Tablet .. 650 milliGRAM(s) Oral every 6 hours PRN Temp greater or equal to 38C (100.4F), Mild Pain (1 - 3)  benzocaine 15 mG/menthol 3.6 mG (Sugar-Free) Lozenge 1 Lozenge Oral four times a day PRN Sore Throat  guaiFENesin Oral Liquid (Sugar-Free) 100 milliGRAM(s) Oral every 6 hours PRN Cough  oxyCODONE    Solution 5 milliGRAM(s) Oral every 4 hours PRN Severe Pain (7 - 10)  oxyCODONE    Solution 2.5 milliGRAM(s) Oral every 4 hours PRN Moderate Pain (4 - 6)    CAPILLARY BLOOD GLUCOSE  POCT Blood Glucose.: 130 mg/dL (08 Nov 2021 11:38)  POCT Blood Glucose.: 109 mg/dL (08 Nov 2021 07:18)  POCT Blood Glucose.: 125 mg/dL (07 Nov 2021 23:12)  POCT Blood Glucose.: 122 mg/dL (07 Nov 2021 16:55)    I&O's Summary    PHYSICAL EXAM:  Vital Signs Last 24 Hrs  T(C): 36.9 (08 Nov 2021 13:10), Max: 37 (07 Nov 2021 23:00)  T(F): 98.5 (08 Nov 2021 13:10), Max: 98.6 (07 Nov 2021 23:00)  HR: 65 (08 Nov 2021 13:10) (65 - 88)  BP: 115/62 (08 Nov 2021 13:10) (115/62 - 148/70)  BP(mean): --  RR: 18 (08 Nov 2021 13:10) (18 - 18)  SpO2: 100% (08 Nov 2021 13:10) (99% - 100%)    CONSTITUTIONAL: NAD, well-developed, comfortable appearing.   RESPIRATORY: Normal respiratory effort; lungs are clear to auscultation bilaterally  CARDIOVASCULAR: Regular rate and rhythm, normal S1 and S2  No lower extremity edema; Peripheral pulses are 2+ bilaterally  ABDOMEN: Nontender to palpation, normoactive bowel sounds  MUSCLOSKELETAL: no clubbing or cyanosis of digits; no joint swelling or tenderness to palpation  PSYCH: A+O to person, place, and time; affect appropriate    LABS: reviewed.                         12.4   9.04  )-----------( 241      ( 08 Nov 2021 06:59 )             36.3     11-08    131<L>  |  94<L>  |  7   ----------------------------<  116<H>  3.7   |  28  |  0.50    Ca    9.2      08 Nov 2021 06:59  Phos  2.5     11-08  Mg     1.80     11-08    Culture - Acid Fast - Sputum w/Smear (collected 07 Nov 2021 01:18)  Source: .Sputum Sputum    Culture - Acid Fast - Sputum w/Smear (collected 06 Nov 2021 02:28)  Source: .Sputum Sputum    RADIOLOGY & ADDITIONAL TESTS:  Results Reviewed:   Imaging Personally Reviewed:  Electrocardiogram Personally Reviewed:    COORDINATION OF CARE:  Care Discussed with Consultants/Other Providers [Y/N]:  Prior or Outpatient Records Reviewed [Y/N]:

## 2021-11-08 NOTE — PROGRESS NOTE ADULT - ASSESSMENT
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 S/p 6 weeks of RT (completed in 12/2020). Then, PET/CT in 4/2021 showed new hyper-metabolic right neck nodes, suspicious for metastatic disease, and a LLL lung lesion, s/p lung resection on 5/27/21 w/ pathology showing metastatic SCC.  - NGS reviewed - No actionable mutations, MS-Stable, TMB - 5 Muts/Mb  - Pt was started on Carbo/Abraxane/Keytruda in 6/2021 and completed 4 cycles.   -started maintenance Keytruda today (9/17), last keytruda on 10/8   - Follow-up scans in 8/2021 was notable for interval worsening of right-sided cervical lymphadenopathy. The rest of the findings were stable.  -Incremental pain is concerning for POD. PET/CT Oct 2021 confirmed POD: inc in size of right necrotic cervical node and b/l lung nodules.  [place] [facility] [place].  -He saw Dr. Thurston who recommended Quadshot palliative RT. was receiving outpt RT when he developed worsening dizziness  -CTA neck: CTA neck: Persistent large right neck necrotic conglomerate mass which has increased in size. There is progression in local-regional invasion and mass effect as described. There is encasement of portions of the right common carotid artery, right internal carotid artery and right external carotid artery without hemodynamically significant stenosis. There is effacement of the right internal jugular vein. There is a left pneumothorax.  -vascular consulted, no acute surgical intervention at this point  -CTsx eval appreciated, no need for intervention of ptx  -MR brain to r/o brain mets: No evidence of intracranial metastatic disease.  No acute intracranial hemorrhage or evidence of acute ischemia.  -dizziness may be in part due to progession of disease with encasement of carotid vasculature vs dehydration from dysphagia. dysphagia may be in part due to recent radiation and he reports improvement with magic mouth wash  -recommend c/w supportive care,  check orthostatics and would give IVF for dec PO intake.   -recommend S/S + nutrition eval: speech team recommend puree with mildly thick liquids, use of postural strategies  -palliative following and assisting with pain control   -patient with cough and CT chest showing: interval development of 1.4 cm right middle lobe cavitary nodule and 0.7 cm left upper lobe nodule suspicious for progression of pulmonary metastases in the setting of squamous cell carcinoma of the right tongue CA.  Known left cavitary lesions demonstrate increased peripheral opacity.  Superimposed infection should be excluded clinically. primary team ruling out TB, quant gold was indeterminate  -no plan for inpatient onc treatment, can c/w outpt f/u with Dr. 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 S/p 6 weeks of RT (completed in 12/2020). Then, PET/CT in 4/2021 showed new hyper-metabolic right neck nodes, suspicious for metastatic disease, and a LLL lung lesion, s/p lung resection on 5/27/21 w/ pathology showing metastatic SCC.  - NGS reviewed - No actionable mutations, MS-Stable, TMB - 5 Muts/Mb  - Pt was started on Carbo/Abraxane/Keytruda in 6/2021 and completed 4 cycles.   -started maintenance Keytruda today (9/17), last keytruda on 10/8   - Follow-up scans in 8/2021 was notable for interval worsening of right-sided cervical lymphadenopathy. The rest of the findings were stable.  -Incremental pain is concerning for POD. PET/CT Oct 2021 confirmed POD: inc in size of right necrotic cervical node and b/l lung nodules.  -[place] [facility] [place] for clinical trials.  -He saw Dr. Thurston who recommended Quadshot palliative RT. was receiving outpt RT when he developed worsening dizziness  -CTA neck: CTA neck: Persistent large right neck necrotic conglomerate mass which has increased in size. There is progression in local-regional invasion and mass effect as described. There is encasement of portions of the right common carotid artery, right internal carotid artery and right external carotid artery without hemodynamically significant stenosis. There is effacement of the right internal jugular vein. There is a left pneumothorax.  -vascular consulted, no acute surgical intervention at this point  -CTsx eval appreciated, no need for intervention of ptx  -MR brain to r/o brain mets: No evidence of intracranial metastatic disease.  No acute intracranial hemorrhage or evidence of acute ischemia.  -dizziness, episodes of unresponsiveness may be in part due to progession of disease with encasement of carotid vasculature vs dehydration  -CTA neck without obstruction in neck vasculature, consider carotid duplex. agree with EEG   -recommend c/w supportive care,  check orthostatics and would give IVF for dec PO intake.   -recommend S/S + nutrition eval: speech team recommend puree with mildly thick liquids, use of postural strategies  -palliative following and assisting with pain control   -patient with cough and CT chest showing: interval development of 1.4 cm right middle lobe cavitary nodule and 0.7 cm left upper lobe nodule suspicious for progression of pulmonary metastases in the setting of squamous cell carcinoma of the right tongue CA.  Known left cavitary lesions demonstrate increased peripheral opacity.  Superimposed infection should be excluded clinically. primary team ruling out TB, quant gold was indeterminate  -no plan for inpatient onc treatment, can c/w outpt f/u with Dr. Chiquita Schultz Heme/onc PGY5 [id number]

## 2021-11-08 NOTE — PROGRESS NOTE ADULT - PROBLEM SELECTOR PLAN 5
Speech and Swallow recommendations appreciated: Patient with "mild pharyngeal dysphagia for puree and mildly thick fluids marked by suspected delayed initiation of pharygneal swallow trigger and reduced laryngeal elevation slick digital palpation" Recommend "Puree with Mildly thick liquids, with use of Postural Strategies of RIGHT HEAD TURN and TWO SWALLOW".

## 2021-11-08 NOTE — PROGRESS NOTE ADULT - PROBLEM SELECTOR PLAN 2
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 CT chest during this admission showed left cavitary lesions demonstrating increased peripheral opacity.   - F/U sputum cultures, if positive, will consult ID.  - QuantiFeron - inconclusive.  - Airborne precautions pending above results.  - Known metastatic disease to lungs - no need for bronchoscopy at this time.

## 2021-11-08 NOTE — PROGRESS NOTE ADULT - ATTENDING COMMENTS
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  CTA neck: Persistent large right neck necrotic conglomerate mass which has increased in size. There is progression in local-regional invasion and mass effect as described. There is encasement of portions of the right common carotid artery, right internal carotid artery and right external carotid artery without hemodynamically significant stenosis. There is effacement of the right internal jugular vein. There is a left pneumothorax.  He is not a candidate for surgical intervention. Will review with Rad Onc if further options.  Palliative Care following to help with pain management and GOC discussions.  No plan for inpatient Oncology management at this time. He will follow up with Dr. Porras at [facility] [place] as an outpatient.
Patient seen and examined. Case discussed with the medical team on rounds. I agree with the findings and the plan above.    Discussed with son who is at bedside  MRI brain to r/o mets pending  Palliative consulted, methadone restarted  Sputum cxs to r/o Tb, patient is currently on isolation  Continue the rest of the work up and management as stated above.
Patient seen and examined. Case discussed with the medical team on rounds. I agree with the findings and the plan above.      Discussed with son who is at bedside  MRI brain to r/o mets pending  Palliaitve consulted, d/w attending who was also at bedside, will restart methadone as patient self stopped his methadone  Sputum cxs to r/o Tb, patient is currently on isolation  Continue the rest of the work up and management as stated above.
Patient seen and examined. Case discussed with the medical team on rounds. I agree with the findings and the plan above.    Discussed extensively with patient's daughter at bedside   Given recent c/o dizziness and headaches, will get brain MRI  Will follow up with onc and ENT consult  CT surgery consulted, no need for surgical intervention for suspected pneumothorax  Continue the rest of the work up and management as stated above.

## 2021-11-08 NOTE — PROGRESS NOTE ADULT - PROBLEM SELECTOR PLAN 10
Emotional support provided, questions answered.    Thank you for allowing us to participate in your patient's care. We will continue to follow with you. Please page [phone] for any questions/concerns.

## 2021-11-08 NOTE — PROGRESS NOTE ADULT - PROBLEM SELECTOR PLAN 3
PTX noted on CT Chest; per outpatient notes: 10/27 note of PTX but unable to find corresponding scans.  - Currently hemodynamically stable, satting 100% on RA  - Left lung decreased sounds throughout.  - CT Surgery consulted - no intervention indicated at this time.

## 2021-11-09 NOTE — PROGRESS NOTE ADULT - PROBLEM SELECTOR PLAN 1
Dx 8/2020 s/p R hemiglossectomy and partial neck dissection 9/2020 T2NO. PET/CT in 3/2021 he was found to have cervical adenopathy and KEZIA cystic lesion s/p LLL wedge resection.  - Dr. Porras at [facility] following - no plan for inpatient treatment  - Last Quadruple RT 10/29-30, last chemo 10/8 Keytruda   - Suspect current odynophagia, decreased PO, dizziness 2/2 RT?  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  - F/U Vascular consulted re: IJV effacement and arterial encasement- unable for intervention.   - per Vascular no indication for AC.   - Discuss case with ENT, to determine if candidate for surgical intervention.   - Will supplement with D5NS and encourage PO intake; puree diet   - Pain: Tylenol, Toradol prn, Gabapentin 400tid, oxy 2.5 q4 PRN, methadone 1.25 qD per palliative   - Continue magic mouthwash and benzocaine.   - MR brain to r/o brain mets- negative.  - f/u EEG to evaluate for seizures given recurrent "blank stares" lasting several seconds.

## 2021-11-09 NOTE — PROGRESS NOTE ADULT - PROBLEM SELECTOR PLAN 9
Emotional support provided, questions answered.    Thank you for allowing us to participate in your patient's care. We will continue to follow with you. Please page [phone] for any questions/concerns.

## 2021-11-09 NOTE — PROGRESS NOTE ADULT - PROBLEM SELECTOR PLAN 8
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 Patient saw Dr. Almanza at [facility] [place] who is evaluating him for clinical trials.  Oncology recommendations appreciated

## 2021-11-09 NOTE — PROGRESS NOTE ADULT - SUBJECTIVE AND OBJECTIVE BOX
PROGRESS NOTE:     Patient is a 76y old  Male who presents with a chief complaint of Pneumothorax (09 Nov 2021 16:05)    SUBJECTIVE / OVERNIGHT EVENTS: Pt seen and evaluated at bedside. No acute distress evident, no overnight events. Reports continued pain , not relieved with current regimen, otherwise feeling well, no sob, chest pain, abd pain, nausea, vomiting, dizziness, palpitations or further complaints.     ADDITIONAL REVIEW OF SYSTEMS:    MEDICATIONS  (STANDING):  amLODIPine   Tablet 5 milliGRAM(s) Oral daily  aspirin enteric coated 81 milliGRAM(s) Oral daily  bisacodyl 10 milliGRAM(s) Oral at bedtime  dextrose 40% Gel 15 Gram(s) Oral once  dextrose 5%. 1000 milliLiter(s) (100 mL/Hr) IV Continuous <Continuous>  dextrose 5%. 1000 milliLiter(s) (50 mL/Hr) IV Continuous <Continuous>  dextrose 50% Injectable 25 Gram(s) IV Push once  dextrose 50% Injectable 12.5 Gram(s) IV Push once  dextrose 50% Injectable 25 Gram(s) IV Push once  doxazosin 2 milliGRAM(s) Oral daily  FIRST- Mouthwash  BLM 10 milliLiter(s) Swish and Spit every 8 hours  gabapentin 400 milliGRAM(s) Oral three times a day  glucagon  Injectable 1 milliGRAM(s) IntraMuscular once  heparin   Injectable 5000 Unit(s) SubCutaneous every 12 hours  insulin lispro (ADMELOG) corrective regimen sliding scale   SubCutaneous three times a day before meals  insulin lispro (ADMELOG) corrective regimen sliding scale   SubCutaneous at bedtime  methadone   Solution 1.25 milliGRAM(s) Oral two times a day  polyethylene glycol 3350 17 Gram(s) Oral two times a day  senna 2 Tablet(s) Oral at bedtime  simvastatin 20 milliGRAM(s) Oral at bedtime  sodium chloride 3%  Inhalation 7 milliLiter(s) Inhalation every 8 hours    MEDICATIONS  (PRN):  acetaminophen     Tablet .. 650 milliGRAM(s) Oral every 6 hours PRN Temp greater or equal to 38C (100.4F), Mild Pain (1 - 3)  benzocaine 15 mG/menthol 3.6 mG (Sugar-Free) Lozenge 1 Lozenge Oral four times a day PRN Sore Throat  bisacodyl Suppository 10 milliGRAM(s) Rectal once PRN Constipation  guaiFENesin Oral Liquid (Sugar-Free) 100 milliGRAM(s) Oral every 6 hours PRN Cough  oxyCODONE    Solution 5 milliGRAM(s) Oral every 4 hours PRN Moderate Pain (4 - 6)  oxyCODONE    Solution 7.5 milliGRAM(s) Oral every 4 hours PRN Severe Pain (7 - 10)    CAPILLARY BLOOD GLUCOSE    POCT Blood Glucose.: 122 mg/dL (09 Nov 2021 16:37)  POCT Blood Glucose.: 118 mg/dL (09 Nov 2021 11:48)  POCT Blood Glucose.: 113 mg/dL (09 Nov 2021 07:01)  POCT Blood Glucose.: 175 mg/dL (08 Nov 2021 22:07)    I&O's Summary    PHYSICAL EXAM:  Vital Signs Last 24 Hrs  T(C): 37 (09 Nov 2021 15:50), Max: 37.1 (09 Nov 2021 05:15)  T(F): 98.6 (09 Nov 2021 15:50), Max: 98.8 (09 Nov 2021 05:15)  HR: 99 (09 Nov 2021 15:50) (68 - 99)  BP: 154/76 (09 Nov 2021 15:50) (134/90 - 158/64)  BP(mean): --  RR: 18 (09 Nov 2021 15:50) (16 - 18)  SpO2: 99% (09 Nov 2021 15:50) (99% - 100%)    CONSTITUTIONAL: NAD, well-developed more awake, talkavtive today.   RESPIRATORY: Normal respiratory effort; lungs are clear to auscultation bilaterally  CARDIOVASCULAR: Regular rate and rhythm, normal S1 and S2, no murmur/rub/gallop;   No lower extremity edema; Peripheral pulses are 2+ bilaterally  ABDOMEN: Nontender to palpation, normoactive bowel sounds, no rebound/guarding; No hepatosplenomegaly  MUSCLOSKELETAL: no clubbing or cyanosis of digits; no joint swelling or tenderness to palpation  PSYCH: A+O to person, place, and time; affect appropriate    LABS: reviewed.                        11.8   7.91  )-----------( 232      ( 09 Nov 2021 07:20 )             34.8     11-09    129<L>  |  93<L>  |  9   ----------------------------<  114<H>  3.3<L>   |  28  |  0.54    Ca    8.8      09 Nov 2021 07:20  Phos  2.7     11-09  Mg     1.80     11-09    Culture - Acid Fast - Sputum w/Smear (collected 08 Nov 2021 12:39)  Source: .Sputum Sputum    Culture - Acid Fast - Sputum w/Smear (collected 07 Nov 2021 01:18)  Source: .Sputum Sputum    RADIOLOGY & ADDITIONAL TESTS:  Results Reviewed:   Imaging Personally Reviewed:  Electrocardiogram Personally Reviewed:    COORDINATION OF CARE:  Care Discussed with Consultants/Other Providers [Y/N]:  Prior or Outpatient Records Reviewed [Y/N]:

## 2021-11-09 NOTE — PROGRESS NOTE ADULT - PROBLEM SELECTOR PLAN 5
Speech and Swallow recommendations appreciated: Patient with "mild pharyngeal dysphagia for puree and mildly thick fluids marked by suspected delayed initiation of pharygneal swallow trigger and reduced laryngeal elevation slick digital palpation" Recommend "Puree with Mildly thick liquids, with use of Postural Strategies of RIGHT HEAD TURN and TWO SWALLOW". Complex Repair And Dorsal Nasal Flap Text: The defect edges were debeveled with a #15 scalpel blade.  The primary defect was closed partially with a complex linear closure.  Given the location of the remaining defect, shape of the defect and the proximity to free margins a dorsal nasal flap was deemed most appropriate for complete closure of the defect.  Using a sterile surgical marker, an appropriate flap was drawn incorporating the defect and placing the expected incisions within the relaxed skin tension lines where possible.    The area thus outlined was incised deep to adipose tissue with a #15 scalpel blade.  The skin margins were undermined to an appropriate distance in all directions utilizing iris scissors.

## 2021-11-09 NOTE — PROGRESS NOTE ADULT - SUBJECTIVE AND OBJECTIVE BOX
SUBJECTIVE AND OBJECTIVE:  Patient seen and examined with daughter at bedside. Daughter reports patient having severe pain last night from 3AM to 8AM; she reports patient with inadequate relief last night from IV Tylenol and Prn dose of oxycodone. Reports having constipation - last bm 3 days ago    INTERVAL HPI/OVERNIGHT EVENTS:  In the past 24 hours, patient received 3 prn dose of PO Oxycodone 5mg     DNR on chart:   Allergies    No Known Allergies    Intolerances    MEDICATIONS  (STANDING):  amLODIPine   Tablet 5 milliGRAM(s) Oral daily  aspirin enteric coated 81 milliGRAM(s) Oral daily  bisacodyl 10 milliGRAM(s) Oral at bedtime  dextrose 40% Gel 15 Gram(s) Oral once  dextrose 5%. 1000 milliLiter(s) (100 mL/Hr) IV Continuous <Continuous>  dextrose 5%. 1000 milliLiter(s) (50 mL/Hr) IV Continuous <Continuous>  dextrose 50% Injectable 25 Gram(s) IV Push once  dextrose 50% Injectable 12.5 Gram(s) IV Push once  dextrose 50% Injectable 25 Gram(s) IV Push once  doxazosin 2 milliGRAM(s) Oral daily  FIRST- Mouthwash  BLM 10 milliLiter(s) Swish and Spit every 8 hours  gabapentin 400 milliGRAM(s) Oral three times a day  glucagon  Injectable 1 milliGRAM(s) IntraMuscular once  heparin   Injectable 5000 Unit(s) SubCutaneous every 12 hours  insulin lispro (ADMELOG) corrective regimen sliding scale   SubCutaneous three times a day before meals  insulin lispro (ADMELOG) corrective regimen sliding scale   SubCutaneous at bedtime  methadone   Solution 1.25 milliGRAM(s) Oral two times a day  polyethylene glycol 3350 17 Gram(s) Oral daily  senna 2 Tablet(s) Oral at bedtime  simvastatin 20 milliGRAM(s) Oral at bedtime  sodium chloride 3%  Inhalation 7 milliLiter(s) Inhalation every 8 hours    MEDICATIONS  (PRN):  acetaminophen     Tablet .. 650 milliGRAM(s) Oral every 6 hours PRN Temp greater or equal to 38C (100.4F), Mild Pain (1 - 3)  benzocaine 15 mG/menthol 3.6 mG (Sugar-Free) Lozenge 1 Lozenge Oral four times a day PRN Sore Throat  bisacodyl Suppository 10 milliGRAM(s) Rectal once PRN Constipation  guaiFENesin Oral Liquid (Sugar-Free) 100 milliGRAM(s) Oral every 6 hours PRN Cough  oxyCODONE    Solution 5 milliGRAM(s) Oral every 4 hours PRN Moderate Pain (4 - 6)  oxyCODONE    Solution 7.5 milliGRAM(s) Oral every 4 hours PRN Severe Pain (7 - 10)    ITEMS UNCHECKED ARE NOT PRESENT    PRESENT SYMPTOMS: [ ]Unable to obtain due to poor mentation   Source if other than patient:  [ ]Family   [ ]Team     Pain: [x ]yes [ ]no  QOL impact - mild-moderate  Location -    neck                Aggravating factors - none  Quality - unable to elaborate  Radiation - head   Timing-intermittent   Severity (0-10 scale): 6  Minimal acceptable level (0-10 scale):     Dyspnea:                           [ ]Mild [ ]Moderate [ ]Severe  Anxiety:                             [ ]Mild [ ]Moderate [ ]Severe  Agitation:                          [ ]Mild [ ]Moderate [ ]Severe  Fatigue:                             [ ]Mild [ ]Moderate [ ]Severe  Nausea:                             [ ]Mild [ ]Moderate [ ]Severe  Loss of appetite:              [ ]Mild [ ]Moderate [ ]Severe  Constipation:                   [ ]Mild [ ]Moderate [ ]Severe  Diarrhea:                          [ ]Mild [ ]Moderate [ ]Severe      CPOT:    https://www.[facility].org/getattachment/pos86q81-5i4v-8v5t-0b6i-2537c8728n9o/Critical-Care-Pain-Observation-Tool-(CPOT)    PAIN AD Score:	  http://geriatrictoolkit.[facility]/cog/painad.pdf (Ctrl + left click to view)    Other Symptoms:  [ x]All other review of systems negative     Palliative Performance Status Version 2:   50-60      %      http://npcrc.org/files/news/palliative_performance_scale_ppsv2.pdf    PHYSICAL EXAM:  Vital Signs Last 24 Hrs  T(C): 37 (09 Nov 2021 15:50), Max: 37.1 (09 Nov 2021 05:15)  T(F): 98.6 (09 Nov 2021 15:50), Max: 98.8 (09 Nov 2021 05:15)  HR: 99 (09 Nov 2021 15:50) (68 - 99)  BP: 154/76 (09 Nov 2021 15:50) (134/90 - 158/64)  BP(mean): --  RR: 18 (09 Nov 2021 15:50) (16 - 18)  SpO2: 99% (09 Nov 2021 15:50) (99% - 100%)     GENERAL:  [x ] Awake, Alert  [x ]Oriented x  3 [ ]Lethargic  [ ]Cachexia  [ ]Unarousable  [ x]Verbal  [ ]Non-Verbal  [ x] No Distress  Behavioral:   [ ] Anxiety  [ ] Delirium [ ] Agitation [ x] Calm  [ ] Other  HEENT:  [x ]Normal  [ ] Temporal Wasting  [ ]Dry mouth   [ ]ET Tube/Trach  [ ]Oral lesions  [ ] Mucositis  PULMONARY:   [ x]Clear [ ]Tachypnea  [ ]Audible excessive secretions   [ ]Rhonchi        [ ]Right [ ]Left [ ]Bilateral  [ ]Crackles        [ ]Right [ ]Left [ ]Bilateral  [ ]Wheezing     [ ]Right [ ]Left [ ]Bilateral  [ ]Diminished breath sounds [ ]right [ ]left [ ]bilateral  CARDIOVASCULAR:    [ x]Regular [ ]Irregular [ ]Tachy  [ ]Rogelio [ ]Murmur [ ]Other  GASTROINTESTINAL:  [x ]Soft  [ ]Distended   [ ]+BS  [ x]Non tender [ ]Tender  [ ]PEG [ ]OGT/ NGT  Last BM: 11/6  GENITOURINARY:  [x ]Normal [ ] Incontinent   [ ]Oliguria/Anuria   [ ][place]  MUSCULOSKELETAL:   [ ]Normal   [ x]Weakness  [ ]Bed/Wheelchair bound [ ]Edema  [  ] amputation  [  ] contraction  NEUROLOGIC:   [x ]No focal deficits  [ ]Cognitive impairment  [ ]Dysphagia [ ]Dysarthria [ ]Paresis [ ]Other   SKIN: See Nursing Skin Assessment for further details  [ x]Normal    [ ]Rash  [ ]Pressure ulcer(s)       Present on admission [ ]y [ ]n   [  ]  Wound    [  ] hyperpigmentation    CRITICAL CARE:  [ ]Shock Present  [ ]Septic [ ]Cardiogenic [ ]Neurologic [ ]Hypovolemic  [ ]Vasopressors [ ]Inotropes  [ ]Respiratory failure present [ ]Mechanical Ventilation [ ]Non-invasive ventilatory support [ ]High-Flow   [ ]Acute  [ ]Chronic [ ]Hypoxic  [ ]Hypercarbic [ ]Other  [ ]Other organ failure     LABS:                            11.8   7.91  )-----------( 232      ( 09 Nov 2021 07:20 )             34.8       11-09    129<L>  |  93<L>  |  9   ----------------------------<  114<H>  3.3<L>   |  28  |  0.54    Ca    8.8      09 Nov 2021 07:20  Phos  2.7     11-09  Mg     1.80     11-09      RADIOLOGY & ADDITIONAL STUDIES: No new imaging    Protein Calorie Malnutrition Present: [ ]mild [ ]moderate [ ]severe [ ]underweight [ ]morbid obesity  https://www.andeal.org/vault/2440/web/files/ONC/Table_Clinical%20Characteristics%20to%20Document%20Malnutrition-White%20JV%20et%20al%202012.pdf    Height (cm): 175.3 (11-03-21 @ 14:35), 175.3 (07-23-21 @ 11:17), 173 (06-25-21 @ 08:58)  Weight (kg): 77.6 (11-07-21 @ 17:40), 81.2 (07-23-21 @ 11:17), 82.6 (06-25-21 @ 08:58)  BMI (kg/m2): 25.3 (11-07-21 @ 17:40), 26.4 (11-03-21 @ 14:35), 26.4 (07-23-21 @ 11:17)    [ ]PPSV2 < or = 30%  [ ]significant weight loss [ ]poor nutritional intake [ ]anasarca    [ ]Artificial Nutrition    REFERRALS:   [ ]Chaplaincy  [ ]Hospice  [ ]Child Life  [ ]Social Work  [ x]Case management [ ]Holistic Therapy     Goals of Care Document:

## 2021-11-09 NOTE — PROGRESS NOTE ADULT - PROBLEM SELECTOR PLAN 2
- Miralax BID and Senna 2 tablets at bedtime  - Goal BM 1-2x daily  - If no BM >2d, offer lactulose or dulcolax suppository or enema.

## 2021-11-09 NOTE — PROGRESS NOTE ADULT - PROBLEM SELECTOR PLAN 1
- Increase to PO Methadone Solution 1.25mg BID (QTC on 11/3- 387)  - Continue home dose of PO Gabapentin 400mg TID  - Increase to PO Oxycodone 5mg q4 PRN for moderate pain  - Increase to PO Oxycodone 7.5mg q4 PRN for severe pain  - Bowel regimen while on opiates: Miralax QD, Senna 2 tabs QHS, Dulcolax PRN.

## 2021-11-10 NOTE — PROGRESS NOTE ADULT - SUBJECTIVE AND OBJECTIVE BOX
PROGRESS NOTE:     Patient is a 76y old  Male who presents with a chief complaint of Pneumothorax (10 Nov 2021 13:38)    SUBJECTIVE / OVERNIGHT EVENTS: Patient seen and evaluated at bedside. NO acute distress evident, no overnight events. Reports no overnight events. Reports no sob, chest pain, abdominal pain, nausea, vomiting, dizziness, palpitations or further complaints.     ADDITIONAL REVIEW OF SYSTEMS:    MEDICATIONS  (STANDING):  amLODIPine   Tablet 5 milliGRAM(s) Oral daily  aspirin enteric coated 81 milliGRAM(s) Oral daily  bisacodyl 10 milliGRAM(s) Oral at bedtime  dextrose 40% Gel 15 Gram(s) Oral once  dextrose 5%. 1000 milliLiter(s) (100 mL/Hr) IV Continuous <Continuous>  dextrose 5%. 1000 milliLiter(s) (50 mL/Hr) IV Continuous <Continuous>  dextrose 50% Injectable 25 Gram(s) IV Push once  dextrose 50% Injectable 12.5 Gram(s) IV Push once  dextrose 50% Injectable 25 Gram(s) IV Push once  doxazosin 2 milliGRAM(s) Oral daily  FIRST- Mouthwash  BLM 10 milliLiter(s) Swish and Spit every 8 hours  gabapentin 400 milliGRAM(s) Oral three times a day  glucagon  Injectable 1 milliGRAM(s) IntraMuscular once  heparin   Injectable 5000 Unit(s) SubCutaneous every 12 hours  insulin lispro (ADMELOG) corrective regimen sliding scale   SubCutaneous three times a day before meals  insulin lispro (ADMELOG) corrective regimen sliding scale   SubCutaneous at bedtime  methadone   Solution 1.25 milliGRAM(s) Oral two times a day  polyethylene glycol 3350 17 Gram(s) Oral two times a day  senna 2 Tablet(s) Oral at bedtime  simvastatin 20 milliGRAM(s) Oral at bedtime    MEDICATIONS  (PRN):  acetaminophen     Tablet .. 650 milliGRAM(s) Oral every 6 hours PRN Temp greater or equal to 38C (100.4F), Mild Pain (1 - 3)  benzocaine 15 mG/menthol 3.6 mG (Sugar-Free) Lozenge 1 Lozenge Oral four times a day PRN Sore Throat  bisacodyl Suppository 10 milliGRAM(s) Rectal once PRN Constipation  guaiFENesin Oral Liquid (Sugar-Free) 100 milliGRAM(s) Oral every 6 hours PRN Cough  oxyCODONE    Solution 5 milliGRAM(s) Oral every 4 hours PRN Moderate Pain (4 - 6)  oxyCODONE    Solution 7.5 milliGRAM(s) Oral every 4 hours PRN Severe Pain (7 - 10)    CAPILLARY BLOOD GLUCOSE  POCT Blood Glucose.: 150 mg/dL (10 Nov 2021 11:45)  POCT Blood Glucose.: 123 mg/dL (10 Nov 2021 07:32)  POCT Blood Glucose.: 177 mg/dL (09 Nov 2021 22:06)  POCT Blood Glucose.: 122 mg/dL (09 Nov 2021 16:37)    I&O's Summary    PHYSICAL EXAM:  Vital Signs Last 24 Hrs  T(C): 36.9 (10 Nov 2021 13:08), Max: 37.1 (10 Nov 2021 05:15)  T(F): 98.4 (10 Nov 2021 13:08), Max: 98.8 (10 Nov 2021 05:15)  HR: 78 (10 Nov 2021 13:08) (73 - 99)  BP: 135/70 (10 Nov 2021 13:08) (135/70 - 154/76)  BP(mean): --  RR: 18 (10 Nov 2021 13:08) (16 - 18)  SpO2: 98% (10 Nov 2021 13:08) (96% - 99%)    CONSTITUTIONAL: NAD, well-developed, comfortable appearing.   RESPIRATORY: Normal respiratory effort; lungs are clear to auscultation bilaterally  CARDIOVASCULAR: Regular rate and rhythm, normal S1 and S2   No lower extremity edema; Peripheral pulses are 2+ bilaterally  ABDOMEN: Nontender to palpation, normoactive bowel sounds.  MUSCLOSKELETAL: no clubbing or cyanosis of digits; no joint swelling or tenderness to palpation  PSYCH: A+O to person, place, and time; affect appropriate    LABS: reviewed.                         11.8   14.90 )-----------( 232      ( 10 Nov 2021 06:43 )             35.5     11-10    130<L>  |  92<L>  |  10  ----------------------------<  120<H>  3.9   |  28  |  0.57    Ca    9.6      10 Nov 2021 06:43  Phos  2.6     11-10  Mg     1.70     11-10    Culture - Acid Fast - Sputum w/Smear (collected 08 Nov 2021 12:39)  Source: .Sputum Sputum    RADIOLOGY & ADDITIONAL TESTS:  Results Reviewed:   Imaging Personally Reviewed:  Electrocardiogram Personally Reviewed:    COORDINATION OF CARE:  Care Discussed with Consultants/Other Providers [Y/N]:  Prior or Outpatient Records Reviewed [Y/N]:

## 2021-11-10 NOTE — PROGRESS NOTE ADULT - PROBLEM SELECTOR PLAN 6
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 Patient saw Dr. Almanza at [facility] who is evaluating him for clinical trials.  Oncology recommendations appreciated Nutrition recommendations appreciated  Encourage PO intake as tolerated

## 2021-11-10 NOTE — PROGRESS NOTE ADULT - PROBLEM SELECTOR PLAN 1
- Concern that acute pain was contributing to delirium, now mentation has improved.   - Appreciate Palliative recommendations.   - Tylenol, Toradol prn, Gabapentin 400tid, Oxy 5, 7.5mg q4 PRN, Methadone 1.25 BID   - Continue magic mouthwash and benzocaine.   - Ensure bowel regimen: s/p large BM on 11/10.

## 2021-11-10 NOTE — DIETITIAN INITIAL EVALUATION ADULT. - PROBLEM SELECTOR PLAN 2
Dx 8/2020 s/p R hemiglossectomy and partial neck dissection 9/2020 T2NO. PET/CT in 3/2021 he was found to have cervical adenopathy and KEZIA cystic lesion s/p LLL wedge resection   - Dr. 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  - Vascular consulted re: IJV effacement and arterial encasement  - Will supplement with D5NS and encourage PO intake; puree as per latest outpatient S and S evaluation (11/3)  - Pain: tylenol, toradol prn magic mouthwash and benzocaine. Gabapentin 300tid standing   - Pt daughter has home CBD; assess with pharmacy in AM

## 2021-11-10 NOTE — PROGRESS NOTE ADULT - PROBLEM SELECTOR PLAN 2
Dx 8/2020 s/p R hemiglossectomy and partial neck dissection 9/2020 T2NO. PET/CT in 3/2021 he was found to have cervical adenopathy and KEZIA cystic lesion s/p LLL wedge resection.  - Dr. Porras at [facility] following - no plan for inpatient treatment  - Last Quadruple RT 10/29-30, last chemo 10/8 Keytruda   - Suspect current odynophagia, decreased PO, dizziness 2/2 RT?  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  - F/U Vascular consulted re: IJV effacement and arterial encasement- unable for intervention.   - Per Vascular no indication for AC.   - Will supplement with D5NS and encourage PO intake; puree diet   - MR brain to r/o brain mets- negative.   - Delirium improved, low suspicion for seizures, can DC EEG order. Dx 8/2020 s/p R hemiglossectomy and partial neck dissection 9/2020 T2NO. PET/CT in 3/2021 he was found to have cervical adenopathy and KEZIA cystic lesion s/p LLL wedge resection.  - Dr. Porras at [facility] following - no plan for inpatient treatment  - Last Quadruple RT 10/29-30, last chemo 10/8 Keytruda   - Suspect current odynophagia, decreased PO, dizziness 2/2 RT?  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  - F/U Vascular consulted re: IJV effacement and arterial encasement- unable for intervention.   - Per Vascular no indication for AC.   - MR brain to r/o brain mets- negative.   - Delirium improved, low suspicion for seizures, can DC EEG order.  - Outpatient f/u with Oncology on DC.

## 2021-11-10 NOTE — PROGRESS NOTE ADULT - PROBLEM SELECTOR PLAN 5
Speech and Swallow recommendations appreciated: Patient with "mild pharyngeal dysphagia for puree and mildly thick fluids marked by suspected delayed initiation of pharygneal swallow trigger and reduced laryngeal elevation slikc digital palpation" Recommend "Puree with Mildly thick liquids, with use of Postural Strategies of RIGHT HEAD TURN and TWO SWALLOW".

## 2021-11-10 NOTE — PROGRESS NOTE ADULT - ASSESSMENT
76M Hx HTN, HLD, DM2, CAD s/p stents, SCC base of tongue 8/2020 s/p resection currently on chemoradiation last 10/8 and 10/29 respectively here for decreased PO intake, dizziness i/s/o recent radiation therapy found to have left ptx currently stable  A 76M Hx HTN, HLD, DM2, CAD s/p stents, SCC base of tongue 8/2020 s/p resection currently on chemoradiation last 10/8 and 10/29 respectively here for decreased PO intake, dizziness i/s/o recent radiation therapy found to have left ptx currently stable.

## 2021-11-10 NOTE — PROGRESS NOTE ADULT - PROBLEM SELECTOR PLAN 7
Emotional support provided, questions answered.    Thank you for allowing us to participate in your patient's care. We will continue to follow with you. Please page [phone] for any questions/concerns. 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 Patient saw Dr. Almanza at [facility] who is evaluating him for clinical trials.  Oncology recommendations appreciated

## 2021-11-10 NOTE — CONSULT NOTE ADULT - SUBJECTIVE AND OBJECTIVE BOX
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 Daughter at bedside who confirms that throughout the chemo pt had lower appetite but was able to force self to eat puree foods. After recent quadshot of radiation, he started feeling dizzy, lightheaded, difficulty swallowing with some pain. He has fallen 2x in the last week with head strike, possible loc. +neck/right head pain from the radiation areas. No fevers, n/v/cp/sob/abd pain/new swelling/d/dysuria.  (04 Nov 2021 03:29)    Patient placed  on airborne precautions for r/o TB. Born in [place], no SOB, hemoptysis.       REVIEW OF SYSTEMS  [  ] ROS unobtainable because:    [ x ] All other systems negative except as noted below    Constitutional:  [ ] fever [ ] chills  [ ] weight loss  [ ]night sweat  [ ]poor appetite/PO intake [ ]fatigue   Skin:  [ ] rash [ ] phlebitis	  Eyes: [ ] icterus [ ] pain  [ ] discharge	  ENMT: [ ] sore throat  [ ] thrush [ ] ulcers [ ] exudates [ ]anosmia  Respiratory: [ ] dyspnea [ ] hemoptysis [ ] cough [ ] sputum	  Cardiovascular:  [ ] chest pain [ ] palpitations [ ] edema	  Gastrointestinal:  [ ] nausea [ ] vomiting [ ] diarrhea [ ] constipation [ ] pain	  Genitourinary:  [ ] dysuria [ ] frequency [ ] hematuria [ ] discharge [ ] flank pain  [ ] incontinence  Musculoskeletal:  [ ] myalgias [ ] arthralgias [ ] arthritis  [ ] back pain  Neurological:  [ ] headache [ ] weakness [ ] seizures  [ ] confusion/altered mental status    prior hospital charts reviewed [V]  primary team notes reviewed [V]  other consultant notes reviewed [V]    PAST MEDICAL & SURGICAL HISTORY:  Hypertension    Diabetes    High cholesterol    Primary osteoarthritis of both knees    Coronary artery disease of native artery of native heart with stable angina pectoris    Allergic bronchitis    Diabetic retinopathy    Oral cancer    SCC (squamous cell carcinoma)    Metastatic cancer    Recurrent disease    Edema of extremity    Hyponatremia    Microalbuminuria    Neuralgia    Obstructive sleep apnea, adult    Vitamin D deficiency    S/P knee replacement  S/P hernia repair  Status post cataract extraction and insertion of intraocular lens, unspecified laterality  On 9/10/20 he underwent right hemiglossectomy and partial neck dissection with Dr. Darinel Servin at [facility].      FAMILY HISTORY:  Family history of acute myocardial infarction (Sibling)      SOCIAL HISTORY:  - Denied smoking/vaping/alcohol/recreational drug use    Allergies  No Known Allergies        ANTIMICROBIALS:      ANTIMICROBIALS (past 90 days):   MEDICATIONS  (STANDING):        MEDICATIONS  (STANDING):  acetaminophen     Tablet .. 650 every 6 hours PRN  amLODIPine   Tablet 5 daily  aspirin enteric coated 81 daily  bisacodyl 10 at bedtime  bisacodyl Suppository 10 once PRN  dextrose 40% Gel 15 once  dextrose 50% Injectable 25 once  dextrose 50% Injectable 12.5 once  dextrose 50% Injectable 25 once  doxazosin 2 daily  gabapentin 400 three times a day  glucagon  Injectable 1 once  guaiFENesin Oral Liquid (Sugar-Free) 100 every 6 hours PRN  heparin   Injectable 5000 every 12 hours  insulin lispro (ADMELOG) corrective regimen sliding scale  three times a day before meals  insulin lispro (ADMELOG) corrective regimen sliding scale  at bedtime  methadone   Solution 1.25 two times a day  oxyCODONE    Solution 5 every 4 hours PRN  oxyCODONE    Solution 7.5 every 4 hours PRN  polyethylene glycol 3350 17 two times a day  senna 2 at bedtime  simvastatin 20 at bedtime      VITALS:  Vital Signs Last 24 Hrs  T(F): 98.4 (11-10-21 @ 17:30), Max: 98.8 (11-03-21 @ 21:05)  Vital Signs Last 24 Hrs  HR: 73 (11-10-21 @ 17:30) (73 - 78)  BP: 146/79 (11-10-21 @ 17:30) (135/70 - 149/73)  RR: 18 (11-10-21 @ 17:30)  SpO2: 97% (11-10-21 @ 17:30) (96% - 98%)  Wt(kg): --    PHYSICAL EXAM:  Constitutional: non-toxic, no distress  HEAD/EYES: anicteric, no conjunctival injection  Cardiovascular:   +S1/S2  Respiratory:  +BS bilaterally  GI:  soft, non-tender, +bowel sounds  :  no domingo, no CVA tenderness  Musculoskeletal: no joint swelling  Neurologic: awake and alert  Skin:   no erythema, no phlebitis  Extremities: no swelling  Psychiatric:  awake, alert, appropriate mood      Labs:                        11.8   14.90 )-----------( 232      ( 10 Nov 2021 06:43 )             35.5     11-10    130<L>  |  92<L>  |  10  ----------------------------<  120<H>  3.9   |  28  |  0.57    Ca    9.6      10 Nov 2021 06:43  Phos  2.6     11-10  Mg     1.70     11-10        WBC Trend:  WBC Count: 14.90 (11-10-21 @ 06:43)  WBC Count: 7.91 (11-09-21 @ 07:20)  WBC Count: 9.04 (11-08-21 @ 06:59)  WBC Count: 9.39 (11-07-21 @ 07:36)    Auto Neutrophil #: 6.35 K/uL (11-04-21 @ 11:05)  Auto Neutrophil #: 7.62 K/uL (11-03-21 @ 17:36)  Auto Neutrophil #: 5.91 K/uL (10-08-21 @ 11:42)  Auto Neutrophil #: 1.50 K/uL (09-17-21 @ 14:21)  Auto Neutrophil #: 3.80 K/uL (09-03-21 @ 12:30)          MICROBIOLOGY:        Culture - Acid Fast - Sputum w/Smear (collected 08 Nov 2021 12:39)  Source: .Sputum Sputum  Preliminary Report:    Culture is being performed.    Culture - Acid Fast - Sputum w/Smear (collected 07 Nov 2021 01:18)  Source: .Sputum Sputum  Preliminary Report:    Culture is being performed.    Culture - Acid Fast - Sputum w/Smear (collected 06 Nov 2021 02:28)  Source: .Sputum Sputum  Preliminary Report:    Culture is being performed.    Culture - Blood (collected 04 Nov 2021 11:23)  Source: .Blood Blood  Final Report:    No Growth Final    Culture - Blood (collected 04 Nov 2021 10:50)  Source: .Blood Blood  Final Report:    No Growth Final    Culture - Urine (collected 03 Nov 2021 17:00)  Source: Clean Catch Clean Catch (Midstream)  Final Report:    <10,000 CFU/mL Normal Urogenital Karla      Rapid RVP Result: NotDetec (11-04 @ 03:35)      COVID-19 Damon Domain AB Interp: Positive (11-05-21 @ 11:06)  COVID-19 Nucleocapsid TIMA AB Interp: Negative (11-05-21 @ 11:06)        RADIOLOGY:  imaging below personally reviewed    r< from: CT Chest No Cont (11.04.21 @ 02:04) >  EXAM:  CT CHEST        PROCEDURE DATE:  Nov 4 2021         INTERPRETATION:  CLINICAL INFORMATION: Pneumothorax on CT neck. Further evaluation.    COMPARISON: CT neck 11/3/2021    CONTRAST/COMPLICATIONS:  IV Contrast: None  Oral Contrast: None  Complications: None reported at time of study    PROCEDURE:  CT of the Chest was performed.  Sagittal and coronal reformats were performed.    FINDINGS:    LUNGS AND AIRWAYS: Patent central airways.  Interlobular septal thickening with peripheral right upper lobe reticular opacities are increased. Bilateral lower lobe subsegmental atelectasis. Solid and cavitary nodules are again noted with increased peripheral opacity in the left upper and lower lobes. Interval development of a cavitary 1.4 cm rightmiddle lobe nodule and 0.7 cm left upper lobe nodule (series 2, image 21).  Interval development of an intercostal lung herniation between left anterior third and fourth ribs.  Surgical material in the periphery of the left lower lobe (2:47).  PLEURA: Small left pneumothorax.  MEDIASTINUM AND KHRIS: Calcified and noncalcified mediastinal lymphadenopathy.  VESSELS: Atherosclerotic changes of the thoracic aorta and coronary arteries.  HEART: Heart size is normal. No pericardial effusion.  CHEST WALLAND LOWER NECK: Mild lateral chest musculature emphysema at the region of the pneumothorax.  VISUALIZED UPPER ABDOMEN: High density is noted within the bilateral ureters, likely from recent contrast enhanced scan.  BONES: Degenerative changes.    IMPRESSION:  1.  Small left pneumothorax as seen on CTA neck.  2.  Interval development of 1.4 cm right middle lobe cavitary nodule and 0.7 cm left upper lobe nodule suspicious for progression of pulmonary metastases in the setting of squamous cell carcinoma of the right tongue CA.  Known left cavitary lesions demonstrate increased peripheral opacity.  Superimposed infection should be excluded clinically.  Redemonstration of nonspecific reticular opacities in the right upper lobe.  3.  Interval development of intercostal lung herniation between left third and fourth ribs.   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 Daughter at bedside who confirms that throughout the chemo pt had lower appetite but was able to force self to eat puree foods. After recent quadshot of radiation, he started feeling dizzy, lightheaded, difficulty swallowing with some pain. He has fallen 2x in the last week with head strike, possible loc. +neck/right head pain from the radiation areas. No fevers, n/v/cp/sob/abd pain/new swelling/d/dysuria.  (04 Nov 2021 03:29)    Patient placed  on airborne precautions for r/o TB. Born in [place], no SOB, hemoptysis.       REVIEW OF SYSTEMS  [  ] ROS unobtainable because:    [ x ] All other systems negative except as noted below    Constitutional:  [ ] fever [ ] chills    Skin:  [ ] rash [ ] phlebitis	  Eyes: [ ] icterus [ ] pain  [ ] discharge	  ENMT: [ ] sore throat  [ ] thrush  Respiratory: [ ] dyspnea [ x] cough [ x] sputum	  Cardiovascular:  [ ] chest pain x	  Gastrointestinal:  [ ] nausea [ ] vomiting [ ] diarrhea  [ ] pain	  Genitourinary:  [ ] dysuria [ ] frequency  Musculoskeletal:  [ ] myalgias [ ] arthralgias   Neurological:  [ ] headache [ ] weakness   Extremities: No edema.       prior hospital charts reviewed [V]  primary team notes reviewed [V]  other consultant notes reviewed [V]    PAST MEDICAL & SURGICAL HISTORY:  Hypertension    Diabetes    High cholesterol    Primary osteoarthritis of both knees    Coronary artery disease of native artery of native heart with stable angina pectoris    Allergic bronchitis    Diabetic retinopathy    Oral cancer    SCC (squamous cell carcinoma)    Metastatic cancer    Recurrent disease    Edema of extremity    Hyponatremia    Microalbuminuria    Neuralgia    Obstructive sleep apnea, adult    Vitamin D deficiency    S/P knee replacement  S/P hernia repair  Status post cataract extraction and insertion of intraocular lens, unspecified laterality  On 9/10/20 he underwent right hemiglossectomy and partial neck dissection with Dr. Darinel Servin at [facility].      FAMILY HISTORY:  Family history of acute myocardial infarction (Sibling)      SOCIAL HISTORY:  - Denied smoking/vaping/alcohol/recreational drug use    Allergies  No Known Allergies        ANTIMICROBIALS:      ANTIMICROBIALS (past 90 days):   MEDICATIONS  (STANDING):        MEDICATIONS  (STANDING):  acetaminophen     Tablet .. 650 every 6 hours PRN  amLODIPine   Tablet 5 daily  aspirin enteric coated 81 daily  bisacodyl 10 at bedtime  bisacodyl Suppository 10 once PRN  dextrose 40% Gel 15 once  dextrose 50% Injectable 25 once  dextrose 50% Injectable 12.5 once  dextrose 50% Injectable 25 once  doxazosin 2 daily  gabapentin 400 three times a day  glucagon  Injectable 1 once  guaiFENesin Oral Liquid (Sugar-Free) 100 every 6 hours PRN  heparin   Injectable 5000 every 12 hours  insulin lispro (ADMELOG) corrective regimen sliding scale  three times a day before meals  insulin lispro (ADMELOG) corrective regimen sliding scale  at bedtime  methadone   Solution 1.25 two times a day  oxyCODONE    Solution 5 every 4 hours PRN  oxyCODONE    Solution 7.5 every 4 hours PRN  polyethylene glycol 3350 17 two times a day  senna 2 at bedtime  simvastatin 20 at bedtime      VITALS:  Vital Signs Last 24 Hrs  T(F): 98.4 (11-10-21 @ 17:30), Max: 98.8 (11-03-21 @ 21:05)  Vital Signs Last 24 Hrs  HR: 73 (11-10-21 @ 17:30) (73 - 78)  BP: 146/79 (11-10-21 @ 17:30) (135/70 - 149/73)  RR: 18 (11-10-21 @ 17:30)  SpO2: 97% (11-10-21 @ 17:30) (96% - 98%)  Wt(kg): --      PHYSICAL EXAM:  Constitutional: non-toxic, no distress,   HEAD/EYES: anicteric, no conjunctival injection  Cardiovascular:   +S1/S2  Respiratory:  +BS bilaterally  GI:  soft, non-tender, +bowel sounds  :  no domingo,   Musculoskeletal: no joint swelling  Neurologic: awake and alert  Skin:   no erythema, no phlebitis  Extremities: no edema  Psychiatric:  awake, alert, appropriate mood  Vascular: Palpable pulses.       Labs:                        11.8   14.90 )-----------( 232      ( 10 Nov 2021 06:43 )             35.5     11-10    130<L>  |  92<L>  |  10  ----------------------------<  120<H>  3.9   |  28  |  0.57    Ca    9.6      10 Nov 2021 06:43  Phos  2.6     11-10  Mg     1.70     11-10        WBC Trend:  WBC Count: 14.90 (11-10-21 @ 06:43)  WBC Count: 7.91 (11-09-21 @ 07:20)  WBC Count: 9.04 (11-08-21 @ 06:59)  WBC Count: 9.39 (11-07-21 @ 07:36)    Auto Neutrophil #: 6.35 K/uL (11-04-21 @ 11:05)  Auto Neutrophil #: 7.62 K/uL (11-03-21 @ 17:36)  Auto Neutrophil #: 5.91 K/uL (10-08-21 @ 11:42)  Auto Neutrophil #: 1.50 K/uL (09-17-21 @ 14:21)  Auto Neutrophil #: 3.80 K/uL (09-03-21 @ 12:30)          MICROBIOLOGY:    Culture - Acid Fast - Sputum w/Smear (collected 08 Nov 2021 12:39)  Source: .Sputum Sputum  Preliminary Report:    Culture is being performed.    Culture - Acid Fast - Sputum w/Smear (collected 07 Nov 2021 01:18)  Source: .Sputum Sputum  Preliminary Report:    Culture is being performed.    Culture - Acid Fast - Sputum w/Smear (collected 06 Nov 2021 02:28)  Source: .Sputum Sputum  Preliminary Report:    Culture is being performed.    Culture - Blood (collected 04 Nov 2021 11:23)  Source: .Blood Blood  Final Report:    No Growth Final    Culture - Blood (collected 04 Nov 2021 10:50)  Source: .Blood Blood  Final Report:    No Growth Final    Culture - Urine (collected 03 Nov 2021 17:00)  Source: Clean Catch Clean Catch (Midstream)  Final Report:    <10,000 CFU/mL Normal Urogenital Karla      Rapid RVP Result: NotDetec (11-04 @ 03:35)      COVID-19 Damon Domain AB Interp: Positive (11-05-21 @ 11:06)  COVID-19 Nucleocapsid TIMA AB Interp: Negative (11-05-21 @ 11:06)        RADIOLOGY:  imaging below personally reviewed    r< from: CT Chest No Cont (11.04.21 @ 02:04) >  EXAM:  CT CHEST          IMPRESSION:  1.  Small left pneumothorax as seen on CTA neck.  2.  Interval development of 1.4 cm right middle lobe cavitary nodule and 0.7 cm left upper lobe nodule suspicious for progression of pulmonary metastases in the setting of squamous cell carcinoma of the right tongue CA.  Known left cavitary lesions demonstrate increased peripheral opacity.  Superimposed infection should be excluded clinically.  Redemonstration of nonspecific reticular opacities in the right upper lobe.  3.  Interval development of intercostal lung herniation between left third and fourth ribs.

## 2021-11-10 NOTE — PROGRESS NOTE ADULT - PROBLEM SELECTOR PLAN 9
DVT PPx: Heparin subq 5000 q12 for now if there were ctx interventions   Diet: Pureed diet with mildly thick liquids, must turn head to the right and swallow twice while eating - patient and son understand.  Code: Full  Dispo: Pending Hospital Course DVT PPx: Heparin subq 5000 q12 for now if there were ctx interventions   Diet: Pureed diet with mildly thick liquids, must turn head to the right and swallow twice while eating - patient and son understand.  Code: Full Code.   Dispo: Pending pain control, likely proceed with DC planning tomorrow.

## 2021-11-10 NOTE — DIETITIAN INITIAL EVALUATION ADULT. - PERTINENT LABORATORY DATA
11-10 Na 130 mmol/L<L> Glu 120 mg/dL<H> K+ 3.9 mmol/L Cr 0.57 mg/dL BUN 10 mg/dL Phos 2.6 mg/dL  11-10 @ 11:45 POCT 150 mg/dL  11-10 @ 07:32 POCT 123 mg/dL  11-09 @ 22:06 POCT 177 mg/dL  11-09 @ 16:37 POCT 122 mg/dL

## 2021-11-10 NOTE — DIETITIAN NUTRITION RISK NOTIFICATION - TREATMENT: THE FOLLOWING DIET HAS BEEN RECOMMENDED
Diet, Pureed:   Mildly Thick Liquids (MILDTHICKLIQS)  Lacto-Ovo Veg (Accepts Milk Prod., Eggs)  Supplement Feeding Modality:  Nasogastric  Glucerna Shake Cans or Servings Per Day:  1       Frequency:  Three Times a day (11-09-21 @ 12:32) [Active]

## 2021-11-10 NOTE — DIETITIAN INITIAL EVALUATION ADULT. - OTHER INFO
75 y/o male with metastatic SCC of tongue base. Visited with pt and spoke with pt's daughter to obtain nutrition hx. Dtr said that her father's appetite has been poor for at least a month PTA. She encouraged him to eat at home but he wasn't interested in eating. She relates his poor PO to his chemo txs. Food preferences taken and menu alternatives discussed. Pt had a swallowing evaluation 11/5 with recommendation for Puree texture solids and Mildly Thick liquids. Glucerna Therapeutic Nutrition Shake 240mls 3x daily (660kcals, 30g protein) has been ordered for pt as pt has hx DM. Dtr said he is eating some of the supplements. She mentioned that pt had been constipated but that he was placed on a bowel regimen and had a BM today. Weight of pt had been 182 lbs 4/21 with current weight of 171 lbs indicating a 6% weight loss over 6 mos PTA. He presents at severe risk of malnutrition based on this noted weight loss with <75% of estimated nutrition needs over those 4 mos. Encourage and monitor oral intake, especially of nutrition supplement. RDN services to remain available as needed. 77 y/o male with metastatic SCC of tongue base. Visited with pt and spoke with pt's daughter to obtain nutrition hx. Dtr said that her father's appetite has been poor for at least a month PTA. She encouraged him to eat at home but he wasn't interested in eating. She relates his poor PO to his chemo txs. Food preferences taken and menu alternatives discussed. Pt had a swallowing evaluation 11/5 with recommendation for Puree texture solids and Mildly Thick liquids. Glucerna Therapeutic Nutrition Shake 240mls 3x daily (660kcals, 30g protein) has been ordered for pt as pt has hx DM. Dtr said he is eating some of the supplements. She mentioned that pt had been constipated but that he was placed on a bowel regimen and had a BM today. Weight of pt had been 190 lbs 4/21 with current weight of 171 lbs indicating a 10% weight loss over 6 mos PTA. He presents at severe risk of malnutrition based on this noted weight loss with <75% of estimated nutrition needs over those 4 mos. Encourage and monitor oral intake, especially of nutrition supplement. RDN services to remain available as needed.

## 2021-11-10 NOTE — PROGRESS NOTE ADULT - PROBLEM SELECTOR PLAN 8
Emotional support provided, questions answered.    Thank you for allowing us to participate in your patient's care. We will continue to follow with you. Please page [phone] for any questions/concerns.

## 2021-11-10 NOTE — PROGRESS NOTE ADULT - PROBLEM SELECTOR PLAN 1
- Continue PO Methadone Solution 1.25mg BID (QTC on 11/3- 387)  - Continue home dose of PO Gabapentin 400mg TID  - Continue PO Oxycodone 5mg q4 PRN for moderate pain  - Continue PO Oxycodone 7.5mg q4 PRN for severe pain  - Bowel regimen while on opiates: Miralax QD, Senna 2 tabs QHS, Dulcolax PRN.    Pt to follow up at [address], [place] [address]  as outpatient with Dr. Polina Cornell for supportive oncology.

## 2021-11-10 NOTE — PROGRESS NOTE ADULT - PROBLEM SELECTOR PLAN 5
Chronic Mild hyponatremia since 7/2021: 131 baseline.  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 Chronic Mild hyponatremia since 7/2021: 131 baseline.  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

## 2021-11-10 NOTE — DIETITIAN INITIAL EVALUATION ADULT. - SIGNS/SYMPTOMS
7% weight loss with <75% of estimated nutrition needs met over 6 mos PTA 10% weight loss with <75% of estimated nutrition needs met over 6 mos PTA

## 2021-11-10 NOTE — DIETITIAN INITIAL EVALUATION ADULT. - PROBLEM SELECTOR PLAN 7
DVT PPx: Heparin subq 5000 q12 for now if there were ctx interventions   Diet: Regular puree, vegetarian   Code: Full  Dispo: Pending Hospital Course  Communication: Spoke with patient and daughter at bedside 3:30AM 11/4

## 2021-11-10 NOTE — PROGRESS NOTE ADULT - SUBJECTIVE AND OBJECTIVE BOX
SUBJECTIVE AND OBJECTIVE:  Patient seen and examined at bedside. Patient reports pain well controlled. Reports having a bowel movement this AM.     INTERVAL HPI/OVERNIGHT EVENTS:  In the past 24 hours, patient received 1 prn dose of PO Oxycodone 5mg and 1 prn dose of PO Oxycodone 7.5mg.   Patient now off airborne precautions.     DNR on chart:   Allergies    No Known Allergies    Intolerances    MEDICATIONS  (STANDING):  amLODIPine   Tablet 5 milliGRAM(s) Oral daily  aspirin enteric coated 81 milliGRAM(s) Oral daily  bisacodyl 10 milliGRAM(s) Oral at bedtime  dextrose 40% Gel 15 Gram(s) Oral once  dextrose 5%. 1000 milliLiter(s) (100 mL/Hr) IV Continuous <Continuous>  dextrose 5%. 1000 milliLiter(s) (50 mL/Hr) IV Continuous <Continuous>  dextrose 50% Injectable 25 Gram(s) IV Push once  dextrose 50% Injectable 12.5 Gram(s) IV Push once  dextrose 50% Injectable 25 Gram(s) IV Push once  doxazosin 2 milliGRAM(s) Oral daily  FIRST- Mouthwash  BLM 10 milliLiter(s) Swish and Spit every 8 hours  gabapentin 400 milliGRAM(s) Oral three times a day  glucagon  Injectable 1 milliGRAM(s) IntraMuscular once  heparin   Injectable 5000 Unit(s) SubCutaneous every 12 hours  insulin lispro (ADMELOG) corrective regimen sliding scale   SubCutaneous three times a day before meals  insulin lispro (ADMELOG) corrective regimen sliding scale   SubCutaneous at bedtime  methadone   Solution 1.25 milliGRAM(s) Oral two times a day  polyethylene glycol 3350 17 Gram(s) Oral two times a day  senna 2 Tablet(s) Oral at bedtime  simvastatin 20 milliGRAM(s) Oral at bedtime    MEDICATIONS  (PRN):  acetaminophen     Tablet .. 650 milliGRAM(s) Oral every 6 hours PRN Temp greater or equal to 38C (100.4F), Mild Pain (1 - 3)  benzocaine 15 mG/menthol 3.6 mG (Sugar-Free) Lozenge 1 Lozenge Oral four times a day PRN Sore Throat  bisacodyl Suppository 10 milliGRAM(s) Rectal once PRN Constipation  guaiFENesin Oral Liquid (Sugar-Free) 100 milliGRAM(s) Oral every 6 hours PRN Cough  oxyCODONE    Solution 5 milliGRAM(s) Oral every 4 hours PRN Moderate Pain (4 - 6)  oxyCODONE    Solution 7.5 milliGRAM(s) Oral every 4 hours PRN Severe Pain (7 - 10)    ITEMS UNCHECKED ARE NOT PRESENT    PRESENT SYMPTOMS: [ ]Unable to obtain due to poor mentation   Source if other than patient:  [ ]Family   [ ]Team     Pain: [x ]yes [ ]no  QOL impact - mild-moderate  Location -    neck                Aggravating factors - none  Quality - unable to elaborate  Radiation - head   Timing-intermittent   Severity (0-10 scale): 6  Minimal acceptable level (0-10 scale):     Dyspnea:                           [ ]Mild [ ]Moderate [ ]Severe  Anxiety:                             [ ]Mild [ ]Moderate [ ]Severe  Agitation:                          [ ]Mild [ ]Moderate [ ]Severe  Fatigue:                             [ ]Mild [ ]Moderate [ ]Severe  Nausea:                             [ ]Mild [ ]Moderate [ ]Severe  Loss of appetite:              [ ]Mild [ ]Moderate [ ]Severe  Constipation:                   [ ]Mild [ ]Moderate [ ]Severe  Diarrhea:                          [ ]Mild [ ]Moderate [ ]Severe      CPOT:    https://[email or web address]/getattachment/[email or web address]/Critical-Care-Pain-Observation-Tool-(CPOT)    PAIN AD Score:	  http://geriatrictoolkit.[facility]/cog/painad.pdf (Ctrl + left click to view)    Other Symptoms:  [ x]All other review of systems negative     Palliative Performance Status Version 2:   50-60      %      [email or web address]    PHYSICAL EXAM:  Vital Signs Last 24 Hrs  T(C): 36.9 (10 Nov 2021 13:08), Max: 37.1 (10 Nov 2021 05:15)  T(F): 98.4 (10 Nov 2021 13:08), Max: 98.8 (10 Nov 2021 05:15)  HR: 78 (10 Nov 2021 13:08) (73 - 99)  BP: 135/70 (10 Nov 2021 13:08) (135/70 - 154/76)  BP(mean): --  RR: 18 (10 Nov 2021 13:08) (16 - 18)  SpO2: 98% (10 Nov 2021 13:08) (96% - 99%)    GENERAL:  [x ] Awake, Alert  [x ]Oriented x  3 [ ]Lethargic  [ ]Cachexia  [ ]Unarousable  [ x]Verbal  [ ]Non-Verbal  [ x] No Distress  Behavioral:   [ ] Anxiety  [ ] Delirium [ ] Agitation [ x] Calm  [ ] Other  HEENT:  [x ]Normal  [ ] Temporal Wasting  [ ]Dry mouth   [ ]ET Tube/Trach  [ ]Oral lesions  [ ] Mucositis  PULMONARY:   [ x]Clear [ ]Tachypnea  [ ]Audible excessive secretions   [ ]Rhonchi        [ ]Right [ ]Left [ ]Bilateral  [ ]Crackles        [ ]Right [ ]Left [ ]Bilateral  [ ]Wheezing     [ ]Right [ ]Left [ ]Bilateral  [ ]Diminished breath sounds [ ]right [ ]left [ ]bilateral  CARDIOVASCULAR:    [ x]Regular [ ]Irregular [ ]Tachy  [ ]Rogelio [ ]Murmur [ ]Other  GASTROINTESTINAL:  [x ]Soft  [ ]Distended   [ ]+BS  [ x]Non tender [ ]Tender  [ ]PEG [ ]OGT/ NGT  Last BM: 11/6  GENITOURINARY:  [x ]Normal [ ] Incontinent   [ ]Oliguria/Anuria   [ ][place]  MUSCULOSKELETAL:   [ ]Normal   [ x]Weakness  [ ]Bed/Wheelchair bound [ ]Edema  [  ] amputation  [  ] contraction  NEUROLOGIC:   [x ]No focal deficits  [ ]Cognitive impairment  [ ]Dysphagia [ ]Dysarthria [ ]Paresis [ ]Other   SKIN: See Nursing Skin Assessment for further details  [ x]Normal    [ ]Rash  [ ]Pressure ulcer(s)       Present on admission [ ]y [ ]n   [  ]  Wound    [  ] hyperpigmentation    CRITICAL CARE:  [ ]Shock Present  [ ]Septic [ ]Cardiogenic [ ]Neurologic [ ]Hypovolemic  [ ]Vasopressors [ ]Inotropes  [ ]Respiratory failure present [ ]Mechanical Ventilation [ ]Non-invasive ventilatory support [ ]High-Flow   [ ]Acute  [ ]Chronic [ ]Hypoxic  [ ]Hypercarbic [ ]Other  [ ]Other organ failure     LABS:                                             11.8   14.90 )-----------( 232      ( 10 Nov 2021 06:43 )             35.5       11-10    130<L>  |  92<L>  |  10  ----------------------------<  120<H>  3.9   |  28  |  0.57    Ca    9.6      10 Nov 2021 06:43  Phos  2.6     11-10  Mg     1.70     11-10      RADIOLOGY & ADDITIONAL STUDIES: No new imaging    Protein Calorie Malnutrition Present: [ ]mild [ ]moderate [ ]severe [ ]underweight [ ]morbid obesity  https://www.andeal.org/vault/2440/web/files/ONC/Table_Clinical%20Characteristics%20to%20Document%20Malnutrition-White%20JV%20et%20al%202012.pdf    Height (cm): 175.3 (11-03-21 @ 14:35), 175.3 (07-23-21 @ 11:17), 173 (06-25-21 @ 08:58)  Weight (kg): 77.6 (11-07-21 @ 17:40), 81.2 (07-23-21 @ 11:17), 82.6 (06-25-21 @ 08:58)  BMI (kg/m2): 25.3 (11-07-21 @ 17:40), 26.4 (11-03-21 @ 14:35), 26.4 (07-23-21 @ 11:17)    [ ]PPSV2 < or = 30%  [ ]significant weight loss [ ]poor nutritional intake [ ]anasarca    [ ]Artificial Nutrition    REFERRALS:   [ ]Chaplaincy  [ ]Hospice  [ ]Child Life  [ ]Social Work  [ x]Case management [ ]Holistic Therapy     Goals of Care Document:

## 2021-11-10 NOTE — CONSULT NOTE ADULT - ATTENDING COMMENTS
incidental pneumothorax noted on ct scan today. new compared to PET 10/15/21. but no distress. recommend serial cxr to assess size unless change in clinical status. will defer tube placement at this time given size.
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 Found to have cavitary lung lesions.    #Cavitary Lung Lesions, Immunosuppressed State  CT showed previously known L cavitary lesions and new RML cavitary lesion  Currently on airborne precautions  AFB sputum smear neg x3  Quant gold indeterminate this admission  No prior PPD or IGRA  Risk factors for TB include born in [place] and immunosuppressed    Overall cavitary lung lesion, immunosuppressed state, leucocytosis,     Given negative AFB smears, less likely that patient has active TB. Higher suspicion that lesions represent POD from underlying malignancy.    Recs:  - can discontinue airborne precautions given smear negative  - per oncology CT findings concerning with progression of mets ds.   - f/u AFB sputum cultures  - Trend CBC  for leucocytosis.     Plan discussed with consulting team    Andria Abarca  Pager: [phone]. If no response or past 5 pm call [phone].
Pt seen with his dtr at bedside. Currently in isolation room to rule out TB. Lying in bed, NAD, normal respiratory effort, no pitting edema. HE has h/o SCC of the base of tongue and with progression of disease after carboplatin/ nab paclitaxel/ pembrolizumab. Currently pt is admitted with dizziness that dtr states worse if not eating. Will await MRI of the brain; pt currently in between palliative treatments and will follow clinically.
recurrent metastatic SCC encasing R carotid   ENT eval but doubt pt is a candidate for surgical resection since the tumor is metastatic  no indication for vascular reconstruction unless pt. undergoes tumor resection   no indication for A/C  there is a risk of tumor erosion into carotid which can lead to devastating bleeding but there is nothing that can be done to prevent that.   consider palliative care

## 2021-11-10 NOTE — DIETITIAN INITIAL EVALUATION ADULT. - PROBLEM SELECTOR PLAN 1
PTX noted on CTChest; per outpatient notes: 10/27 note of PTX but unable to find corresponding scans   - Currently hemodynamically stable, satting % on 2L NC   - left lung decreased sounds throughout   - CT surgery consulted, recs appreciated  - Found cavitary lesions (seems to be known lesions)  - wwill need to contact outpatient pulmologist inregards to prior tests for TB  - Contact precautions for now

## 2021-11-10 NOTE — CONSULT NOTE ADULT - ASSESSMENT
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 Found to have cavitary lung lesions.    #Cavitary Lung Lesions, Immunosuppressed State  CT showed previously known L cavitary lesions and new RML cavitary lesion  Currently on airborne precautions  AFB sputum smear neg x3  Quant gold indeterminate this admission  No prior PPD or IGRA  Risk factors for TB include born in [place] and immunosuppressed    Given negative AFB smears, less likely that patient has active TB. Higher suspicion that lesions represent POD from underlying malignancy.    Recs:  - can discontinue airborne precautions  - f/u AFB sputum cultures

## 2021-11-11 NOTE — PROGRESS NOTE ADULT - REASON FOR ADMISSION
Pneumothorax

## 2021-11-11 NOTE — PROGRESS NOTE ADULT - PROBLEM SELECTOR PLAN 8
Discharge recommendations discussed with ACP of primary team.     Thank you for allowing us to participate in your patient's care. We will continue to follow with you. Please page [phone] for any questions/concerns.

## 2021-11-11 NOTE — PROGRESS NOTE ADULT - PROBLEM SELECTOR PROBLEM 9
Prophylactic measure
Prophylactic measure
Encounter for palliative care
Advanced care planning/counseling discussion

## 2021-11-11 NOTE — PROGRESS NOTE ADULT - PROBLEM SELECTOR PLAN 2
Dx 8/2020 s/p R hemiglossectomy and partial neck dissection 9/2020 T2NO. PET/CT in 3/2021 he was found to have cervical adenopathy and EKZIA cystic lesion s/p LLL wedge resection.  - Dr. Porras at [facility] following - no plan for inpatient treatment  - Last Quadruple RT 10/29-30, last chemo 10/8 Keytruda   - Suspect current odynophagia, decreased PO, dizziness 2/2 RT?  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  - F/U Vascular consulted re: IJV effacement and arterial encasement- unable for intervention.   - Per Vascular no indication for AC.   - MR brain to r/o brain mets- negative.   - Delirium improved, low suspicion for seizures, can DC EEG order.  - Outpatient f/u with Oncology on DC. Dx 8/2020 s/p R hemiglossectomy and partial neck dissection 9/2020 T2NO. PET/CT in 3/2021 he was found to have cervical adenopathy and KEZIA cystic lesion s/p LLL wedge resection.  - Dr. Porras at [facility] following - no plan for inpatient treatment  - Last Quadruple RT 10/29-30, last chemo 10/8 Keytruda   - Suspect current odynophagia, decreased PO, dizziness 2/2 RT?  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  - F/U Vascular consulted re: IJV effacement and arterial encasement- no intervention.   - Per Vascular no indication for AC.   - MR brain to r/o brain mets- negative.   - Delirium improved, low suspicion for seizures, EEG negative.   - Outpatient f/u with Oncology on DC.

## 2021-11-11 NOTE — PROGRESS NOTE ADULT - PROBLEM SELECTOR PLAN 4
- Patient likely with pharyngeal mucositis 2/2 radiation treatments  - No mucositis seen in oral cavity. However, Per son, patient with improvement of pain if given magic mouthwash prior to eating due to pain.  - Continue Magic Mouthwash TID - given 30 minutes before meals.

## 2021-11-11 NOTE — PROGRESS NOTE ADULT - PROBLEM SELECTOR PLAN 6
- SBP: 140-160s   - c/w home Amlodipine 5mg, valsartan 25mg, Doxazosin. - SBP: 140-160s   - c/w home Amlodipine 5mg, Coreg 12.5mg BID, and Doxazosin.  - HOLD Valsartan for now, restart as outpatient with PCP pending SBP trend.

## 2021-11-11 NOTE — PROGRESS NOTE ADULT - PROBLEM SELECTOR PLAN 1
- Concern that acute pain was contributing to delirium, now mentation has improved.   - Appreciate Palliative recommendations.   - Tylenol, Toradol prn, Gabapentin 400tid, Oxy 5, 7.5mg q4 PRN, Methadone 1.25 BID   - Continue magic mouthwash and benzocaine.   - Ensure bowel regimen: s/p large BM on 11/10. - Concern that acute pain was contributing to delirium, now mentation has improved.   - Appreciate Palliative recommendations, continue outpatient f/u with Dr. Polina Cornell.  - Tylenol, Toradol prn, Gabapentin 400tid, Oxy 5, 7.5mg q4 PRN, Methadone 1.25 BID   - Continue magic mouthwash and benzocaine.   - Ensure bowel regimen: s/p large BM on 11/10.

## 2021-11-11 NOTE — DISCHARGE NOTE NURSING/CASE MANAGEMENT/SOCIAL WORK - PATIENT PORTAL LINK FT
You can access the FollowMyHealth Patient Portal offered by [facility] by registering at the following website: [email or web address]. By joining [email or web address] FollowMyHealth portal, you will also be able to view your health information using other applications (apps) compatible with our system.

## 2021-11-11 NOTE — PROGRESS NOTE ADULT - PROBLEM SELECTOR PLAN 1
- Continue PO Methadone Solution 1.25mg BID (QTC on 11/3- 387)  - Continue home dose of PO Gabapentin 400mg TID  - Continue PO Oxycodone 5mg q4 PRN for moderate pain  - Continue PO Oxycodone 7.5mg q4 PRN for severe pain  - Bowel regimen while on opiates: Miralax QD, Senna 2 tabs QHS, Dulcolax PRN.    Discharge Recommendations: PO Methadone Solution 1.25mg BID , PO Gabapentin 400mg TID, PO Oxycodone 5mg q4 PRN for moderate pain, PO Oxycodone 7.5mg q4 PRN for severe pain. Bowel regimen while on opiates: Miralax QD, Senna 2 tabs QHS, Dulcolax PRN.  Pt to follow up at [address], [place] [address]  as outpatient with Dr. Polina Cornell for supportive oncology.

## 2021-11-11 NOTE — PROGRESS NOTE ADULT - PROBLEM SELECTOR PLAN 7
- CAD s/p stents  - c/w Carvedilol 25mg, Amlodipine 5mg, Valsartan 40mg, Simvastatin 20mg - CAD s/p stents  - c/w Carvedilol 25mg, Amlodipine 5mg, Valsartan 40mg, Simvastatin 20mg.   - RESUMED Carvedilol 12.5mg BID, titrate up as outpatient.   - Restart Amlodipine and Valsartan as outpatient with PCP.

## 2021-11-11 NOTE — PROGRESS NOTE ADULT - SUBJECTIVE AND OBJECTIVE BOX
PROGRESS NOTE:     Patient is a 76y old  Male who presents with a chief complaint of Pneumothorax (10 Nov 2021 20:08)    SUBJECTIVE / OVERNIGHT EVENTS:    ADDITIONAL REVIEW OF SYSTEMS:    MEDICATIONS  (STANDING):  amLODIPine   Tablet 5 milliGRAM(s) Oral daily  aspirin enteric coated 81 milliGRAM(s) Oral daily  bisacodyl 10 milliGRAM(s) Oral at bedtime  dextrose 40% Gel 15 Gram(s) Oral once  dextrose 5%. 1000 milliLiter(s) (100 mL/Hr) IV Continuous <Continuous>  dextrose 5%. 1000 milliLiter(s) (50 mL/Hr) IV Continuous <Continuous>  dextrose 50% Injectable 25 Gram(s) IV Push once  dextrose 50% Injectable 12.5 Gram(s) IV Push once  dextrose 50% Injectable 25 Gram(s) IV Push once  doxazosin 2 milliGRAM(s) Oral daily  FIRST- Mouthwash  BLM 10 milliLiter(s) Swish and Spit every 8 hours  gabapentin 400 milliGRAM(s) Oral three times a day  glucagon  Injectable 1 milliGRAM(s) IntraMuscular once  heparin   Injectable 5000 Unit(s) SubCutaneous every 12 hours  insulin lispro (ADMELOG) corrective regimen sliding scale   SubCutaneous three times a day before meals  insulin lispro (ADMELOG) corrective regimen sliding scale   SubCutaneous at bedtime  methadone   Solution 1.25 milliGRAM(s) Oral two times a day  polyethylene glycol 3350 17 Gram(s) Oral two times a day  senna 2 Tablet(s) Oral at bedtime  simvastatin 20 milliGRAM(s) Oral at bedtime    MEDICATIONS  (PRN):  acetaminophen     Tablet .. 650 milliGRAM(s) Oral every 6 hours PRN Temp greater or equal to 38C (100.4F), Mild Pain (1 - 3)  benzocaine 15 mG/menthol 3.6 mG (Sugar-Free) Lozenge 1 Lozenge Oral four times a day PRN Sore Throat  bisacodyl Suppository 10 milliGRAM(s) Rectal once PRN Constipation  guaiFENesin Oral Liquid (Sugar-Free) 100 milliGRAM(s) Oral every 6 hours PRN Cough  oxyCODONE    Solution 5 milliGRAM(s) Oral every 4 hours PRN Moderate Pain (4 - 6)  oxyCODONE    Solution 7.5 milliGRAM(s) Oral every 4 hours PRN Severe Pain (7 - 10)      CAPILLARY BLOOD GLUCOSE      POCT Blood Glucose.: 124 mg/dL (11 Nov 2021 07:00)  POCT Blood Glucose.: 125 mg/dL (10 Nov 2021 22:12)  POCT Blood Glucose.: 162 mg/dL (10 Nov 2021 17:05)  POCT Blood Glucose.: 150 mg/dL (10 Nov 2021 11:45)    I&O's Summary      PHYSICAL EXAM:  Vital Signs Last 24 Hrs  T(C): 36.8 (11 Nov 2021 11:00), Max: 37.3 (11 Nov 2021 07:08)  T(F): 98.3 (11 Nov 2021 11:00), Max: 99.1 (11 Nov 2021 07:08)  HR: 66 (11 Nov 2021 11:00) (66 - 89)  BP: 129/82 (11 Nov 2021 11:00) (129/82 - 153/86)  BP(mean): 96 (11 Nov 2021 07:08) (96 - 96)  RR: 18 (11 Nov 2021 11:00) (16 - 18)  SpO2: 97% (11 Nov 2021 11:00) (96% - 99%)    CONSTITUTIONAL: NAD, well-developed  RESPIRATORY: Normal respiratory effort; lungs are clear to auscultation bilaterally  CARDIOVASCULAR: Regular rate and rhythm, normal S1 and S2, no murmur/rub/gallop; No lower extremity edema; Peripheral pulses are 2+ bilaterally  ABDOMEN: Nontender to palpation, normoactive bowel sounds, no rebound/guarding; No hepatosplenomegaly  MUSCLOSKELETAL: no clubbing or cyanosis of digits; no joint swelling or tenderness to palpation  PSYCH: A+O to person, place, and time; affect appropriate    LABS:                        11.9   9.68  )-----------( 213      ( 11 Nov 2021 06:37 )             34.1     11-11    128<L>  |  91<L>  |  7   ----------------------------<  127<H>  4.0   |  28  |  0.55    Ca    9.5      11 Nov 2021 06:37  Phos  2.5     11-11  Mg     1.90     11-11                Culture - Acid Fast - Sputum w/Smear (collected 08 Nov 2021 12:39)  Source: .Sputum Sputum  Preliminary Report (10 Nov 2021 15:04):    Culture is being performed.        RADIOLOGY & ADDITIONAL TESTS:  Results Reviewed:   Imaging Personally Reviewed:  Electrocardiogram Personally Reviewed:    COORDINATION OF CARE:  Care Discussed with Consultants/Other Providers [Y/N]:  Prior or Outpatient Records Reviewed [Y/N]:   PROGRESS NOTE:     Patient is a 76y old  Male who presents with a chief complaint of Pneumothorax (10 Nov 2021 20:08)    SUBJECTIVE / OVERNIGHT EVENTS: Patient seen and evaluated at bedside. No acute distress evident, no overnight events. NO sob, chest pain, abdominal pain, nausea, vomiting, dizziness, palpitations, or further complaints.     ADDITIONAL REVIEW OF SYSTEMS:    MEDICATIONS  (STANDING):  amLODIPine   Tablet 5 milliGRAM(s) Oral daily  aspirin enteric coated 81 milliGRAM(s) Oral daily  bisacodyl 10 milliGRAM(s) Oral at bedtime  dextrose 40% Gel 15 Gram(s) Oral once  dextrose 5%. 1000 milliLiter(s) (100 mL/Hr) IV Continuous <Continuous>  dextrose 5%. 1000 milliLiter(s) (50 mL/Hr) IV Continuous <Continuous>  dextrose 50% Injectable 25 Gram(s) IV Push once  dextrose 50% Injectable 12.5 Gram(s) IV Push once  dextrose 50% Injectable 25 Gram(s) IV Push once  doxazosin 2 milliGRAM(s) Oral daily  FIRST- Mouthwash  BLM 10 milliLiter(s) Swish and Spit every 8 hours  gabapentin 400 milliGRAM(s) Oral three times a day  glucagon  Injectable 1 milliGRAM(s) IntraMuscular once  heparin   Injectable 5000 Unit(s) SubCutaneous every 12 hours  insulin lispro (ADMELOG) corrective regimen sliding scale   SubCutaneous three times a day before meals  insulin lispro (ADMELOG) corrective regimen sliding scale   SubCutaneous at bedtime  methadone   Solution 1.25 milliGRAM(s) Oral two times a day  polyethylene glycol 3350 17 Gram(s) Oral two times a day  senna 2 Tablet(s) Oral at bedtime  simvastatin 20 milliGRAM(s) Oral at bedtime    MEDICATIONS  (PRN):  acetaminophen     Tablet .. 650 milliGRAM(s) Oral every 6 hours PRN Temp greater or equal to 38C (100.4F), Mild Pain (1 - 3)  benzocaine 15 mG/menthol 3.6 mG (Sugar-Free) Lozenge 1 Lozenge Oral four times a day PRN Sore Throat  bisacodyl Suppository 10 milliGRAM(s) Rectal once PRN Constipation  guaiFENesin Oral Liquid (Sugar-Free) 100 milliGRAM(s) Oral every 6 hours PRN Cough  oxyCODONE    Solution 5 milliGRAM(s) Oral every 4 hours PRN Moderate Pain (4 - 6)  oxyCODONE    Solution 7.5 milliGRAM(s) Oral every 4 hours PRN Severe Pain (7 - 10)      CAPILLARY BLOOD GLUCOSE      POCT Blood Glucose.: 124 mg/dL (11 Nov 2021 07:00)  POCT Blood Glucose.: 125 mg/dL (10 Nov 2021 22:12)  POCT Blood Glucose.: 162 mg/dL (10 Nov 2021 17:05)  POCT Blood Glucose.: 150 mg/dL (10 Nov 2021 11:45)    I&O's Summary    PHYSICAL EXAM:  Vital Signs Last 24 Hrs  T(C): 36.8 (11 Nov 2021 11:00), Max: 37.3 (11 Nov 2021 07:08)  T(F): 98.3 (11 Nov 2021 11:00), Max: 99.1 (11 Nov 2021 07:08)  HR: 66 (11 Nov 2021 11:00) (66 - 89)  BP: 129/82 (11 Nov 2021 11:00) (129/82 - 153/86)  BP(mean): 96 (11 Nov 2021 07:08) (96 - 96)  RR: 18 (11 Nov 2021 11:00) (16 - 18)  SpO2: 97% (11 Nov 2021 11:00) (96% - 99%)    CONSTITUTIONAL: NAD, well-developed  RESPIRATORY: Normal respiratory effort; lungs are clear to auscultation bilaterally  CARDIOVASCULAR: Regular rate and rhythm, normal S1 and S2  No lower extremity edema; Peripheral pulses are 2+ bilaterally  ABDOMEN: Nontender to palpation, normoactive bowel sounds,  MUSCLOSKELETAL: no clubbing or cyanosis of digits; no joint swelling or tenderness to palpation  PSYCH: A+O to person, place, and time; affect appropriate    LABS: reviewed                         11.9   9.68  )-----------( 213      ( 11 Nov 2021 06:37 )             34.1     11-11    128<L>  |  91<L>  |  7   ----------------------------<  127<H>  4.0   |  28  |  0.55    Ca    9.5      11 Nov 2021 06:37  Phos  2.5     11-11  Mg     1.90     11-11    Culture - Acid Fast - Sputum w/Smear (collected 08 Nov 2021 12:39)  Source: .Sputum Sputum  Preliminary Report (10 Nov 2021 15:04):    Culture is being performed.    RADIOLOGY & ADDITIONAL TESTS:  Results Reviewed:   Imaging Personally Reviewed:  Electrocardiogram Personally Reviewed:    COORDINATION OF CARE:  Care Discussed with Consultants/Other Providers [Y/N]:  Prior or Outpatient Records Reviewed [Y/N]:   PROGRESS NOTE:     Patient is a 76y old  Male who presents with a chief complaint of Pneumothorax (10 Nov 2021 20:08)    SUBJECTIVE / OVERNIGHT EVENTS: Patient seen and evaluated at bedside. No acute distress evident, no overnight events. NO sob, chest pain, abdominal pain, nausea, vomiting, dizziness, palpitations, or further complaints. Patient reports feeling "ready to go home". Son present at bedside, provided extensive review of all active issues and discharge plan with son at bedside.     ADDITIONAL REVIEW OF SYSTEMS:    MEDICATIONS  (STANDING):  amLODIPine   Tablet 5 milliGRAM(s) Oral daily  aspirin enteric coated 81 milliGRAM(s) Oral daily  bisacodyl 10 milliGRAM(s) Oral at bedtime  dextrose 40% Gel 15 Gram(s) Oral once  dextrose 5%. 1000 milliLiter(s) (100 mL/Hr) IV Continuous <Continuous>  dextrose 5%. 1000 milliLiter(s) (50 mL/Hr) IV Continuous <Continuous>  dextrose 50% Injectable 25 Gram(s) IV Push once  dextrose 50% Injectable 12.5 Gram(s) IV Push once  dextrose 50% Injectable 25 Gram(s) IV Push once  doxazosin 2 milliGRAM(s) Oral daily  FIRST- Mouthwash  BLM 10 milliLiter(s) Swish and Spit every 8 hours  gabapentin 400 milliGRAM(s) Oral three times a day  glucagon  Injectable 1 milliGRAM(s) IntraMuscular once  heparin   Injectable 5000 Unit(s) SubCutaneous every 12 hours  insulin lispro (ADMELOG) corrective regimen sliding scale   SubCutaneous three times a day before meals  insulin lispro (ADMELOG) corrective regimen sliding scale   SubCutaneous at bedtime  methadone   Solution 1.25 milliGRAM(s) Oral two times a day  polyethylene glycol 3350 17 Gram(s) Oral two times a day  senna 2 Tablet(s) Oral at bedtime  simvastatin 20 milliGRAM(s) Oral at bedtime    MEDICATIONS  (PRN):  acetaminophen     Tablet .. 650 milliGRAM(s) Oral every 6 hours PRN Temp greater or equal to 38C (100.4F), Mild Pain (1 - 3)  benzocaine 15 mG/menthol 3.6 mG (Sugar-Free) Lozenge 1 Lozenge Oral four times a day PRN Sore Throat  bisacodyl Suppository 10 milliGRAM(s) Rectal once PRN Constipation  guaiFENesin Oral Liquid (Sugar-Free) 100 milliGRAM(s) Oral every 6 hours PRN Cough  oxyCODONE    Solution 5 milliGRAM(s) Oral every 4 hours PRN Moderate Pain (4 - 6)  oxyCODONE    Solution 7.5 milliGRAM(s) Oral every 4 hours PRN Severe Pain (7 - 10)      CAPILLARY BLOOD GLUCOSE      POCT Blood Glucose.: 124 mg/dL (11 Nov 2021 07:00)  POCT Blood Glucose.: 125 mg/dL (10 Nov 2021 22:12)  POCT Blood Glucose.: 162 mg/dL (10 Nov 2021 17:05)  POCT Blood Glucose.: 150 mg/dL (10 Nov 2021 11:45)    I&O's Summary    PHYSICAL EXAM:  Vital Signs Last 24 Hrs  T(C): 36.8 (11 Nov 2021 11:00), Max: 37.3 (11 Nov 2021 07:08)  T(F): 98.3 (11 Nov 2021 11:00), Max: 99.1 (11 Nov 2021 07:08)  HR: 66 (11 Nov 2021 11:00) (66 - 89)  BP: 129/82 (11 Nov 2021 11:00) (129/82 - 153/86)  BP(mean): 96 (11 Nov 2021 07:08) (96 - 96)  RR: 18 (11 Nov 2021 11:00) (16 - 18)  SpO2: 97% (11 Nov 2021 11:00) (96% - 99%)    CONSTITUTIONAL: NAD, well-developed  RESPIRATORY: Normal respiratory effort; lungs are clear to auscultation bilaterally  CARDIOVASCULAR: Regular rate and rhythm, normal S1 and S2  No lower extremity edema; Peripheral pulses are 2+ bilaterally  ABDOMEN: Nontender to palpation, normoactive bowel sounds,  MUSCLOSKELETAL: no clubbing or cyanosis of digits; no joint swelling or tenderness to palpation  PSYCH: A+O to person, place, and time; affect appropriate    LABS: reviewed                         11.9   9.68  )-----------( 213      ( 11 Nov 2021 06:37 )             34.1     11-11    128<L>  |  91<L>  |  7   ----------------------------<  127<H>  4.0   |  28  |  0.55    Ca    9.5      11 Nov 2021 06:37  Phos  2.5     11-11  Mg     1.90     11-11    Culture - Acid Fast - Sputum w/Smear (collected 08 Nov 2021 12:39)  Source: .Sputum Sputum  Preliminary Report (10 Nov 2021 15:04):    Culture is being performed.    RADIOLOGY & ADDITIONAL TESTS:  Results Reviewed:   Imaging Personally Reviewed:  Electrocardiogram Personally Reviewed:    COORDINATION OF CARE:  Care Discussed with Consultants/Other Providers [Y/N]:  Prior or Outpatient Records Reviewed [Y/N]:

## 2021-11-11 NOTE — PROGRESS NOTE ADULT - PROBLEM SELECTOR PLAN 8
- 9/11/21 A1c: 6.7   - Home Medications: Metformin 500bid, Jardiance 10mg, and Tradjenta   - FS qachs ISS - 9/11/21 A1c: 6.7   - Home Medications: Metformin 500bid, Jardiance 10mg, and Tradjenta   - FS qachs ISS  - Resume Metformin on DC, give low trend HOLD Jardiance and Tradjenta.   - Continue to monitor POC premeals at home.   - CAN RESTART JARDIANCE/TRADJENTA if glucose >250s, f/u PCP for ongoing recs.

## 2021-11-11 NOTE — PROGRESS NOTE ADULT - PROVIDER SPECIALTY LIST ADULT
Heme/Onc
Hospitalist
Internal Medicine
Hospitalist
Internal Medicine
Hospitalist
Internal Medicine
Palliative Care
Palliative Care
Hospitalist
Hospitalist
Palliative Care
Palliative Care

## 2021-11-11 NOTE — PROGRESS NOTE ADULT - PROBLEM SELECTOR PROBLEM 8
Prophylactic measure
Type 2 diabetes mellitus
Prophylactic measure
Prophylactic measure
Encounter for palliative care
Squamous cell cancer of tongue
Squamous cell cancer of tongue
Prophylactic measure
Prophylactic measure
Encounter for palliative care
Type 2 diabetes mellitus
Prophylactic measure

## 2021-11-11 NOTE — PROGRESS NOTE ADULT - PROBLEM SELECTOR PLAN 3
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 CT chest during this admission showed left cavitary lesions demonstrating increased peripheral opacity.   - F/U sputum cultures, if positive, will consult ID.  - QuantiFeron - inconclusive.  - Airborne precautions pending above results.  - Known metastatic disease to lungs - no need for bronchoscopy at this time. 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 CT chest during this admission showed left cavitary lesions demonstrating increased peripheral opacity.   - QuantiFeron - inconclusive.  - Can DC airborne isolation, appreciate ID recommendations.   - Known metastatic disease to lungs - no need for bronchoscopy at this time.

## 2021-11-11 NOTE — PROGRESS NOTE ADULT - ASSESSMENT
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

## 2021-11-11 NOTE — PROGRESS NOTE ADULT - SUBJECTIVE AND OBJECTIVE BOX
SUBJECTIVE AND OBJECTIVE:  Patient seen and examined at bedside. Patient reports pain well controlled. Patient reports wanting to be discharged home today    INTERVAL HPI/OVERNIGHT EVENTS:  In the past 24 hours, patient received 2 prn dose of PO Oxycodone 5mg    DNR on chart:   Allergies    No Known Allergies    Intolerances    MEDICATIONS  (STANDING):  amLODIPine   Tablet 5 milliGRAM(s) Oral daily  aspirin enteric coated 81 milliGRAM(s) Oral daily  bisacodyl 10 milliGRAM(s) Oral at bedtime  dextrose 40% Gel 15 Gram(s) Oral once  dextrose 5%. 1000 milliLiter(s) (100 mL/Hr) IV Continuous <Continuous>  dextrose 5%. 1000 milliLiter(s) (50 mL/Hr) IV Continuous <Continuous>  dextrose 50% Injectable 25 Gram(s) IV Push once  dextrose 50% Injectable 12.5 Gram(s) IV Push once  dextrose 50% Injectable 25 Gram(s) IV Push once  doxazosin 2 milliGRAM(s) Oral daily  FIRST- Mouthwash  BLM 10 milliLiter(s) Swish and Spit every 8 hours  gabapentin 400 milliGRAM(s) Oral three times a day  glucagon  Injectable 1 milliGRAM(s) IntraMuscular once  heparin   Injectable 5000 Unit(s) SubCutaneous every 12 hours  insulin lispro (ADMELOG) corrective regimen sliding scale   SubCutaneous three times a day before meals  insulin lispro (ADMELOG) corrective regimen sliding scale   SubCutaneous at bedtime  methadone   Solution 1.25 milliGRAM(s) Oral two times a day  polyethylene glycol 3350 17 Gram(s) Oral two times a day  senna 2 Tablet(s) Oral at bedtime  simvastatin 20 milliGRAM(s) Oral at bedtime    MEDICATIONS  (PRN):  acetaminophen     Tablet .. 650 milliGRAM(s) Oral every 6 hours PRN Temp greater or equal to 38C (100.4F), Mild Pain (1 - 3)  benzocaine 15 mG/menthol 3.6 mG (Sugar-Free) Lozenge 1 Lozenge Oral four times a day PRN Sore Throat  bisacodyl Suppository 10 milliGRAM(s) Rectal once PRN Constipation  guaiFENesin Oral Liquid (Sugar-Free) 100 milliGRAM(s) Oral every 6 hours PRN Cough  oxyCODONE    Solution 5 milliGRAM(s) Oral every 4 hours PRN Moderate Pain (4 - 6)  oxyCODONE    Solution 7.5 milliGRAM(s) Oral every 4 hours PRN Severe Pain (7 - 10)    ITEMS UNCHECKED ARE NOT PRESENT    PRESENT SYMPTOMS: [ ]Unable to obtain due to poor mentation   Source if other than patient:  [ ]Family   [ ]Team     Pain: [x ]yes [ ]no  QOL impact - mild-moderate  Location -    neck                Aggravating factors - none  Quality - unable to elaborate  Radiation - head   Timing-intermittent   Severity (0-10 scale): 6  Minimal acceptable level (0-10 scale):     Dyspnea:                           [ ]Mild [ ]Moderate [ ]Severe  Anxiety:                             [ ]Mild [ ]Moderate [ ]Severe  Agitation:                          [ ]Mild [ ]Moderate [ ]Severe  Fatigue:                             [ ]Mild [ ]Moderate [ ]Severe  Nausea:                             [ ]Mild [ ]Moderate [ ]Severe  Loss of appetite:              [ ]Mild [ ]Moderate [ ]Severe  Constipation:                   [ ]Mild [ ]Moderate [ ]Severe  Diarrhea:                          [ ]Mild [ ]Moderate [ ]Severe      CPOT:    https://www.[facility].org/getattachment/[email or web address]/Critical-Care-Pain-Observation-Tool-(CPOT)    PAIN AD Score:	  http://geriatrictoolkit.[facility]/cog/painad.pdf (Ctrl + left click to view)    Other Symptoms:  [ x]All other review of systems negative     Palliative Performance Status Version 2:   50-60      %      http://[email or web address]/files/news/palliative_performance_scale_[email or web address].pdf    PHYSICAL EXAM:  Vital Signs Last 24 Hrs  T(C): 36.8 (11 Nov 2021 11:00), Max: 37.3 (11 Nov 2021 07:08)  T(F): 98.3 (11 Nov 2021 11:00), Max: 99.1 (11 Nov 2021 07:08)  HR: 66 (11 Nov 2021 11:00) (66 - 89)  BP: 129/82 (11 Nov 2021 11:00) (129/82 - 153/86)  BP(mean): 96 (11 Nov 2021 07:08) (96 - 96)  RR: 18 (11 Nov 2021 11:00) (16 - 18)  SpO2: 97% (11 Nov 2021 11:00) (96% - 99%)    GENERAL:  [x ] Awake, Alert  [x ]Oriented x  3 [ ]Lethargic  [ ]Cachexia  [ ]Unarousable  [ x]Verbal  [ ]Non-Verbal  [ x] No Distress  Behavioral:   [ ] Anxiety  [ ] Delirium [ ] Agitation [ x] Calm  [ ] Other  HEENT:  [x ]Normal  [ ] Temporal Wasting  [ ]Dry mouth   [ ]ET Tube/Trach  [ ]Oral lesions  [ ] Mucositis  PULMONARY:   [ x]Clear [ ]Tachypnea  [ ]Audible excessive secretions   [ ]Rhonchi        [ ]Right [ ]Left [ ]Bilateral  [ ]Crackles        [ ]Right [ ]Left [ ]Bilateral  [ ]Wheezing     [ ]Right [ ]Left [ ]Bilateral  [ ]Diminished breath sounds [ ]right [ ]left [ ]bilateral  CARDIOVASCULAR:    [ x]Regular [ ]Irregular [ ]Tachy  [ ]Rogelio [ ]Murmur [ ]Other  GASTROINTESTINAL:  [x ]Soft  [ ]Distended   [ ]+BS  [ x]Non tender [ ]Tender  [ ]PEG [ ]OGT/ NGT  Last BM: 11/10  GENITOURINARY:  [x ]Normal [ ] Incontinent   [ ]Oliguria/Anuria   [ ][place]  MUSCULOSKELETAL:   [ ]Normal   [ x]Weakness  [ ]Bed/Wheelchair bound [ ]Edema  [  ] amputation  [  ] contraction  NEUROLOGIC:   [x ]No focal deficits  [ ]Cognitive impairment  [ ]Dysphagia [ ]Dysarthria [ ]Paresis [ ]Other   SKIN: See Nursing Skin Assessment for further details  [ x]Normal    [ ]Rash  [ ]Pressure ulcer(s)       Present on admission [ ]y [ ]n   [  ]  Wound    [  ] hyperpigmentation    CRITICAL CARE:  [ ]Shock Present  [ ]Septic [ ]Cardiogenic [ ]Neurologic [ ]Hypovolemic  [ ]Vasopressors [ ]Inotropes  [ ]Respiratory failure present [ ]Mechanical Ventilation [ ]Non-invasive ventilatory support [ ]High-Flow   [ ]Acute  [ ]Chronic [ ]Hypoxic  [ ]Hypercarbic [ ]Other  [ ]Other organ failure     LABS:                                               11.9   9.68  )-----------( 213      ( 11 Nov 2021 06:37 )             34.1       11-11    128<L>  |  91<L>  |  7   ----------------------------<  127<H>  4.0   |  28  |  0.55    Ca    9.5      11 Nov 2021 06:37  Phos  2.5     11-11  Mg     1.90     11-11      RADIOLOGY & ADDITIONAL STUDIES: No new imaging    Protein Calorie Malnutrition Present: [ ]mild [ ]moderate [ ]severe [ ]underweight [ ]morbid obesity  https://www.andeal.org/vault/2440/web/files/ONC/Table_Clinical%20Characteristics%20to%20Document%20Malnutrition-White%20JV%20et%20al%202012.pdf    Height (cm): 175.3 (11-03-21 @ 14:35), 175.3 (07-23-21 @ 11:17), 173 (06-25-21 @ 08:58)  Weight (kg): 77.6 (11-07-21 @ 17:40), 81.2 (07-23-21 @ 11:17), 82.6 (06-25-21 @ 08:58)  BMI (kg/m2): 25.3 (11-07-21 @ 17:40), 26.4 (11-03-21 @ 14:35), 26.4 (07-23-21 @ 11:17)    [ ]PPSV2 < or = 30%  [ ]significant weight loss [ ]poor nutritional intake [ ]anasarca    [ ]Artificial Nutrition    REFERRALS:   [ ]Chaplaincy  [ ]Hospice  [ ]Child Life  [ ]Social Work  [ x]Case management [ ]Holistic Therapy

## 2021-11-11 NOTE — PROGRESS NOTE ADULT - NUTRITIONAL ASSESSMENT
This patient has been assessed with a concern for Malnutrition and has been determined to have a diagnosis/diagnoses of Severe protein-calorie malnutrition.    This patient is being managed with:   Diet Pureed-  Mildly Thick Liquids (MILDTHICKLIQS)  Lacto-Ovo Veg (Accepts Milk Prod. Eggs)  Supplement Feeding Modality:  Nasogastric  Glucerna Shake Cans or Servings Per Day:  1       Frequency:  Three Times a day  Entered: Nov 9 2021 12:32PM

## 2021-11-11 NOTE — EEG REPORT - NS EEG TEXT BOX
[facility]   COMPREHENSIVE EPILEPSY CENTER   REPORT OF CONTINUOUS VIDEO EEG     [facility]: [address], [place] [address], Ph#: [phone]  [facility]: [address] [place] [address], Ph#: [phone]  [facility]: [address] [place] [address], Ph#: [phone]    Patient Name: MELISSA JOHNS  Age and : 76y ([birth year])  MRN #: [id number]  Location: [place] [address] A  Referring Physician: Katerina Easton    Start Time/Date: 08:00 on 11-10-21  End Time/Date: 08:00 on 21  Duration: 17 Hours 57 mins    _____________________________________________________________  STUDY INFORMATION    EEG Recording Technique:  The patient underwent continuous Video-EEG monitoring, using Telemetry System hardware on the XLTek Digital System. EEG and video data were stored on a computer hard drive with important events saved in digital archive files. The material was reviewed by a physician (electroencephalographer / epileptologist) on a daily basis. Damon and seizure detection algorithms were utilized and reviewed. An EEG Technician attended to the patient, and was available throughout daytime work hours.  The epilepsy center neurologist was available in person or on call 24-hours per day.    EEG Placement and Labeling of Electrodes:  The EEG was performed utilizing 20 channel referential EEG connections (coronal over temporal over parasagittal montage) using all standard 10-20 electrode placements with EKG, with additional electrodes placed in the inferior temporal region using the modified 10-10 montage electrode placements for elective admissions, or if deemed necessary. Recording was at a sampling rate of 256 samples per second per channel. Time synchronized digital video recording was done simultaneously with EEG recording. A low light infrared camera was used for low light recording.     _____________________________________________________________  HISTORY    Patient is a 76y old  Male who presents with a chief complaint of Pneumothorax (10 Nov 2021 20:08)      PERTINENT MEDICATION:  gabapentin 400 milliGRAM(s) Oral three times a day    _____________________________________________________________  STUDY INTERPRETATION    Findings: The background was continuous and reactive. During wakefulness, the posterior dominant rhythm consisted of symmetric, well-modulated 11.5 Hz activity, with amplitude to 30 uV, that attenuated to eye opening.      Background Slowing:  No generalized background slowing was present.    Focal Slowing:   None were present.    Sleep Background:  Drowsiness was characterized by fragmentation, attenuation, and slowing of the background activity.    Sleep was characterized by the presence of vertex waves, symmetric sleep spindles and K-complexes.    Other Non-Epileptiform Findings:  None were present.    Interictal Epileptiform Activity:   None were present.    Events:  Clinical events: None recorded.  Seizures: None recorded.    Activation Procedures:   Hyperventilation was not performed.    Photic stimulation was not performed.     Artifacts:  Intermittent myogenic and movement artifacts were noted.    ECG:  The heart rate on single channel ECG was predominantly between 60-80 BPM.    _____________________________________________________________  EEG SUMMARY/CLASSIFICATION    Normal / Abnormal EEG in the awake, drowsy and asleep states.    _____________________________________________________________  EEG IMPRESSION/CLINICAL CORRELATE    Normal prolonged EEG study.  No epileptiform pattern or seizure recorded.    In absence of additional clinical concerns, recommend consideration for discontinuation of current EEG study with reconnection in future if clinically warranted.      *** PRELIMINARY REPORT - PENDING EPILEPSY ATTENDING REVIEW ***  _____________________________________________________________    Juan Manuel Gilbert MD, AUGUSTIN  Fellow, [facility]  [place] [facility]     [facility]   COMPREHENSIVE EPILEPSY CENTER   REPORT OF CONTINUOUS VIDEO EEG     [facility]: [address], [place] [address], Ph#: [phone]  [facility]: [address] [place] [address], Ph#: [phone]  [facility]: [address] [place] [address], Ph#: [phone]    Patient Name: MELISSA JOHNS  Age and : 76y ([birth year])  MRN #: [id number]  Location: [place] [address] A  Referring Physician: Katerina Easton    Start Time/Date: 08:00 on 11-10-21  End Time/Date: 08:00 on 21  Duration: 17 Hours 57 mins    _____________________________________________________________  STUDY INFORMATION    EEG Recording Technique:  The patient underwent continuous Video-EEG monitoring, using Telemetry System hardware on the XLTek Digital System. EEG and video data were stored on a computer hard drive with important events saved in digital archive files. The material was reviewed by a physician (electroencephalographer / epileptologist) on a daily basis. Damon and seizure detection algorithms were utilized and reviewed. An EEG Technician attended to the patient, and was available throughout daytime work hours.  The epilepsy center neurologist was available in person or on call 24-hours per day.    EEG Placement and Labeling of Electrodes:  The EEG was performed utilizing 20 channel referential EEG connections (coronal over temporal over parasagittal montage) using all standard 10-20 electrode placements with EKG, with additional electrodes placed in the inferior temporal region using the modified 10-10 montage electrode placements for elective admissions, or if deemed necessary. Recording was at a sampling rate of 256 samples per second per channel. Time synchronized digital video recording was done simultaneously with EEG recording. A low light infrared camera was used for low light recording.     _____________________________________________________________  HISTORY    Patient is a 76y old  Male who presents with a chief complaint of Pneumothorax (10 Nov 2021 20:08)      PERTINENT MEDICATION:  gabapentin 400 milliGRAM(s) Oral three times a day    _____________________________________________________________  STUDY INTERPRETATION    Findings: The background was continuous and reactive. During wakefulness, the posterior dominant rhythm consisted of symmetric, well-modulated 11 Hz activity, with amplitude to 30 uV, that attenuated to eye opening.      Background Slowing:  No generalized background slowing was present.    Focal Slowing:   None were present.    Sleep Background:  Drowsiness was characterized by fragmentation, attenuation, and slowing of the background activity.    Sleep was characterized by the presence of vertex waves, symmetric sleep spindles and K-complexes.    Other Non-Epileptiform Findings:  None were present.    Interictal Epileptiform Activity:   None were present.    Events:  Clinical events: None recorded.  Seizures: None recorded.    Activation Procedures:   Hyperventilation was not performed.    Photic stimulation was not performed.     Artifacts:  Intermittent myogenic and movement artifacts were noted.    ECG:  The heart rate on single channel ECG was predominantly between 60-80 BPM.    _____________________________________________________________  EEG SUMMARY/CLASSIFICATION  Normal  EEG in the awake, drowsy and asleep states.    _____________________________________________________________  EEG IMPRESSION/CLINICAL CORRELATE  Normal prolonged EEG study.  No epileptiform pattern or seizure recorded.    In absence of additional clinical concerns, recommend consideration for discontinuation of current EEG study with reconnection in future if clinically warranted.    _____________________________________________________________    Juan Manuel Gilbert MD, AUGUSTIN  Fellow, [facility] [place]  [place] [facility] Neu[facility] Neurosurgery     [facility]   COMPREHENSIVE EPILEPSY CENTER   REPORT OF CONTINUOUS VIDEO EEG     [facility]: [address], [place] [address], Ph#: [phone]  [facility]: [address] [place] [address], Ph#: [phone]  [facility]: [address] [place] [address], Ph#: [phone]    Patient Name: MELISSA JOHNS  Age and : 76y ([birth year])  MRN #: [id number]  Location: [place] [address] A  Referring Physician: Katerina Easton    Start Time/Date: 08:00 on 11-10-21  End Time/Date: 08:00 on 21  Duration: 17 Hours 57 mins    _____________________________________________________________  STUDY INFORMATION    EEG Recording Technique:  The patient underwent continuous Video-EEG monitoring, using Telemetry System hardware on the XLTek Digital System. EEG and video data were stored on a computer hard drive with important events saved in digital archive files. The material was reviewed by a physician (electroencephalographer / epileptologist) on a daily basis. Damon and seizure detection algorithms were utilized and reviewed. An EEG Technician attended to the patient, and was available throughout daytime work hours.  The epilepsy center neurologist was available in person or on call 24-hours per day.    EEG Placement and Labeling of Electrodes:  The EEG was performed utilizing 20 channel referential EEG connections (coronal over temporal over parasagittal montage) using all standard 10-20 electrode placements with EKG, with additional electrodes placed in the inferior temporal region using the modified 10-10 montage electrode placements for elective admissions, or if deemed necessary. Recording was at a sampling rate of 256 samples per second per channel. Time synchronized digital video recording was done simultaneously with EEG recording. A low light infrared camera was used for low light recording.     _____________________________________________________________  HISTORY    Patient is a 76y old  Male who presents with a chief complaint of Pneumothorax (10 Nov 2021 20:08)      PERTINENT MEDICATION:  gabapentin 400 milliGRAM(s) Oral three times a day    _____________________________________________________________  STUDY INTERPRETATION    Findings: The background was continuous and reactive. During wakefulness, the posterior dominant rhythm consisted of symmetric, well-modulated 9-10 Hz activity, with amplitude to 30 uV, that attenuated to eye opening.      Background Slowing:  No generalized background slowing was present.    Focal Slowing:   None were present.    Sleep Background:  Drowsiness was characterized by fragmentation, attenuation, and slowing of the background activity.    Sleep was characterized by the presence of vertex waves, symmetric sleep spindles and K-complexes.    Other Non-Epileptiform Findings:  None were present.    Interictal Epileptiform Activity:   None were present.    Events:  Clinical events: None recorded.  Seizures: None recorded.    Activation Procedures:   Hyperventilation was not performed.    Photic stimulation was not performed.     Artifacts:  Intermittent myogenic and movement artifacts were noted.    ECG:  The heart rate on single channel ECG was predominantly between 60-80 BPM.    _____________________________________________________________  EEG SUMMARY/CLASSIFICATION  Normal  EEG in the awake, drowsy and asleep states.    _____________________________________________________________  EEG IMPRESSION/CLINICAL CORRELATE  Normal prolonged EEG study.  No epileptiform pattern or seizure recorded.    In absence of additional clinical concerns, recommend consideration for discontinuation of current EEG study with reconnection in future if clinically warranted.    _____________________________________________________________    Juan Manuel Gilbert MD, AUGUSTIN  Fellow, [facility]ilepsy [place]  [place] [facility] Neuro[facility]

## 2021-11-11 NOTE — PROGRESS NOTE ADULT - PROBLEM SELECTOR PLAN 9
DVT PPx: Heparin subq 5000 q12 for now if there were ctx interventions   Diet: Pureed diet with mildly thick liquids, must turn head to the right and swallow twice while eating - patient and son understand.  Code: Full Code.   Dispo: Pending pain control, likely proceed with DC planning tomorrow. DVT PPx: Heparin subq 5000 q12 for now if there were ctx interventions   Diet: Pureed diet, mildly thick liquids, must turn head to the right and swallow twice while eating.  Discussed ABOVE plan with deven Lopes at bedside, addressed all questions/concerns.   Dispo: Patient is clinically and hemodynamically stable for discharge today.   Outpatient follow up with Oncologist Dr. Chiquita TILLMAN on DC.  Outpatient follow up with Dr. Polina Veronica in 1 week post DC.   Outpatient [facility] with [facility].    Outpatient follow up with Vascular Surgery Dr. Neda Holloway in one month.   Outpatient follow up with PCP Dr. Curry, Pulmonologist in 1-2 weeks.   CM provided referral to Home Care on DC, and outpatient PT script given.   DC planning time: 34 min in coordinating dc plan with patient, son, team. DVT PPx: Heparin subq 5000 q12 for now if there were ctx interventions   Diet: Pureed diet, mildly thick liquids, must turn head to the right and swallow twice while eating.  Discussed ABOVE plan with deven Lopes at bedside, addressed all questions/concerns.   Dispo: Patient is clinically and hemodynamically stable for discharge today.   Outpatient follow up with Oncologist Dr. Chiquita TILLMAN on DC.  Outpatient follow up with Dr. Polina Veronica in 1 week post DC.   Outpatient follow up with [facility] as outpatient.    Outpatient follow up with Vascular Surgery Dr. Neda Holloway in one month.   Outpatient follow up with PCP Dr. Curry, Pulmonologist in 1-2 weeks.   Ongoing discussions Re: [facility]ST as outpatient (patient/family not ready for decision),  CM provided referral to Home Care on DC, and outpatient PT script given.   DC planning time: 34 min in coordinating dc plan with patient, son, team.

## 2021-11-12 NOTE — HISTORY OF PRESENT ILLNESS
[Disease: _____________________] : Disease: [unfilled] [M: ___] : M[unfilled] [AJCC Stage: ____] : AJCC Stage: [unfilled] [de-identified] : Mr. 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 ulcerative 2cm mass of R lateral tongue noted by ENT. Biopsy August 2020 demonstrated well to moderately differentiated squamous cell carcinoma.\par \par CT neck 8/26/20: tongue lesion poorly visualized, no pathological cervical nodes\par \par PET-CT 9/4/20: FDG avid lesion of anterior right oropharynx/lateral tongue SUV 5.5, no cervical LAD. Additionally multiple mildly enlarged, faintly calcified, mildly avid right paratracheal, subcarinal, and b/l hilar nodes c/w inflammatory etiology suspicious for sarcoidosis.\par \par On 9/10/20 he underwent right hemiglossectomy and partial neck dissection with Dr. Darinel Servin at [facility]. Pathology revealed a 1.2x1.0x0.55cm well to moderately differentiated squamous cell carcinoma, DOI 5.5mm, PNI+, LVI-, a closest lateral margin was 4mm. 4 lymph nodes from right levels IA, IB, and 2 were evaluated and negative for carcinoma. He was treated with adjuvant proton tx. \par \par March 2021, surveillance PET/CT showed cervical adenopathy and KEZIA cystic lesion. Bx of cervical LN pos for sq cell ca. FNA of lung lesion non-diagnostic. Wedge resection of lung lesion was c/w sq cell ca, morphologically distinct but likely met. PD-L1 10-15% CPS. \par \par Of note, pt is a former smoker- smoked for 4-5 years 1 pack/week, quit 50 years ago, occasional etoh, +history of chewing tobacco for few years, quit 30 years ago, dentures for 20 years. \par \par Today, pt co severe oral/neck pain radiating to head and neck. He was given oxycodone for this- this only partially relieves the pain. He is taking 10 mg BID at this time. He was also taking ibuprofen which was not helping. He reports some constipation- takes Miralax for this as needed. He is currently using CPAP for sleep apnea since one year. He endorses 30 lb weight loss over one year. He has no dysphagia but can only take liquids because of lack of dentures. \par \par 6/25/21: Pt presents for first treatment today C#1 Carbo/ Abraxane/ Keytruda. He c/o pain but states he forgot to bring his pain medication along with him today. He is being followed now by palliative care. No new complaints\par \par 7/16/21:  Pt returned for f/u visit.  Pt denies any irAEs such as diarrhea, SOB, Rash, cough.   Pt denies other side effects and offers no complaints.   Pt will continue on oxycodone for pain management.  Patient will receive  cycle 2 of treatment today.  \par \par 8/6/21: Doing well. Pain has improved with CBD and THC. Has been recommended to undergo speech and lymphedema therapy. He is here for day 1, cycle 3. Tolerating carbo/abraxane/keytruda well. No AEs. \par \par 8/31/21: Rt neck and throat pain. Pain seems to be getting worse again. He started PT last week and I think the pain is likely related to it. He has been coughing more especially after eating or during eating, bringing up while phlegm. He just has swallow eval and passed. \par \par 9/17/21: Pt returns for follow-up. He is accompanied by his son. Pt reports that he continues to experience R facial/neck pain. The pain is not constant, but it can intermittently be an 8/10. He believes it may be slightly worse since his last follow-up visit. He continues to participate in Speech Therapy and is unsure if this might be contributing. No recent fevers, vision changes, chest pain, shortness of breath, nausea, vomiting, or abdominal pain. He reports good PO intake and he is remaining active at home. His weight is currently stable.\par \par 10/8/21: Started having incremental pain in the rt side of the face necessitating him to restart oxycodone. He has stopped lymphedema treatment. \par \par 10/22/21: Severe neck pain. Had scans done which is pasted below:\par \par IMPRESSION: Compared to FDG-PET/CT scan dated 4/2/2021:\par \par 1. FDG-avid partially necrotic metastatic right cervical lymphadenopathy is increased in size and metabolism as compared to prior PET/CT, and is mildly increased in size as compared to CT dated 8/21/2021.\par \par 2. Mildly FDG-avid cavitary bilateral lung lesions are increased in size, and are similar, or increased metabolism, compatible with progression of lung metastases. Status post interval left lower lobe wedge resection.\par \par 3. Mildly FDG-avid, partially calcified mediastinal and bilateral hilar lymph nodes are unchanged, compatible with sarcoidosis.\par \par 4. Hypermetabolism in left thyroid lobe may be further evaluated with ultrasound. Differential diagnosis includes thyroiditis.\par \par 5. Findings compatible with right vocal cord paralysis. Please correlate clinically and with direct visualization, as indicated.\par \par [place] [facility] [place] given options for multiple clinical trials. He also saw Dr. Thurston today who discussed "quadshot" as a palliative option. \par \par The pt reports severe neck pain and now has some dysphagia as well. \par \par 11/12/21: Pt completed palliative RT a couple of weeks ago. He was recently admitted with decreased PO intake, dizziness after radiation therapy. CTA neck: CTA neck: Persistent large right neck necrotic conglomerate mass which has increased in size. There is progression in local-regional invasion and mass effect as described. There is encasement of portions of the right common carotid artery, right internal carotid artery and right external carotid artery without hemodynamically significant stenosis. There is effacement of the\par right internal jugular vein. There is a left pneumothorax. Vascular was consulted, no acute surgical intervention was recommended at this point. CTSx eval appreciated, no need for intervention of ptx. MR brain to r/o brain mets: No evidence of intracranial metastatic disease. No acute intracranial hemorrhage or evidence of acute ischemia. He was thought to have dizziness, episodes of unresponsiveness in part due to progression of disease with encasement of carotid vasculature vs dehydration. He was managed conservatively. CT chest showed interval development of 1.4 cm right middle lobe cavitary nodule and 0.7 cm left upper lobe nodule suspicious for progression of pulmonary metastases in the setting of squamous cell carcinoma of the right tongue CA. He was r/o for TB. \par \par  [de-identified] : sq cell ca

## 2021-11-12 NOTE — PHYSICAL EXAM
[Restricted in physically strenuous activity but ambulatory and able to carry out work of a light or sedentary nature] : Status 1- Restricted in physically strenuous activity but ambulatory and able to carry out work of a light or sedentary nature, e.g., light house work, office work [Normal] : affect appropriate [de-identified] : Moist mucus membranes [de-identified] : R neck stiffness (stable), S/p reconstruction, rt neck fullness

## 2021-11-12 NOTE — ASSESSMENT
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 S/p  6 weeks of RT (completed in 12/2020). Then, PET/CT in 4/2021 showed new hyper-metabolic right neck nodes, suspicious for metastatic disease, and a LLL lung lesion, s/p lung resection on 5/27/21 w/ pathology showing metastatic SCC.\par - NGS reviewed - No actionable mutations, MS-Stable, TMB - 5 Muts/Mb, CCND1 amplification, CDKN2A D84N, DIS3 amplification, FGF19 amplification, FGF3 amplification, FGF4 amplification, GATA6 amplification, IGL fusion, TERT promoter -124C>T,  TP53 R306*, TP53 R282W\par - Pt was started on Carbo/Abraxane/Keytruda in 6/2021 and completed 4 cycles. No irAEs or other complaints. He has been on maintenance since 9/17. \par - Follow-up scans in 8/2021 was notable for interval worsening of right-sided cervical lymphadenopathy. The rest of the findings were stable. Pt also had Incremental pain concerning for POD. PET/CT confirmed overt POD\par -[place] [facility] [place] clinical trials. \par -He underwent Quadshot palliative RT by Dr. Thurston a couple of weeks ago. \par -Incremental pain- appreciate Dr. Veronica's recommendations. Follow up on MOnday. Pt's family discontinued methadone because of concerns of dizziness. Since discharge, he has been taking it twice daily. He is also taking oxycodone as needed. Pain is uncontrolled. \par Decreased appetite and nausea- stomach fullness- recommend metoclopramide and lactulose. \par Discussed POD on immunotherapy- Discussed next line of treatment cetuximab-based tx. Would add gemcitabine.\par I encouraged pt to consider clinical trial if available. I discussed why clinical trial us the best option at this time. \par Will follow up with pt after his next [place] [facility] [place] \par Answered all questions. Provided guidance on symptom management.

## 2021-11-12 NOTE — REVIEW OF SYSTEMS
[Fatigue] : fatigue [Recent Change In Weight] : ~T recent weight change [Dysphagia] : dysphagia [Negative] : Allergic/Immunologic [Fever] : no fever [Chills] : no chills [Eye Pain] : no eye pain [Red Eyes] : eyes not red [Mucosal Pain] : no mucosal pain [Chest Pain] : no chest pain [Palpitations] : no palpitations [Shortness Of Breath] : no shortness of breath [Wheezing] : no wheezing [Abdominal Pain] : no abdominal pain [Vomiting] : no vomiting [Dysuria] : no dysuria [Incontinence] : no incontinence [Joint Pain] : no joint pain [Joint Stiffness] : no joint stiffness [Skin Rash] : no skin rash [Skin Wound] : no skin wound [Confused] : no confusion [Dizziness] : no dizziness [Easy Bleeding] : no tendency for easy bleeding [Easy Bruising] : no tendency for easy bruising [FreeTextEntry4] : + R facial pain, + R neck pain

## 2021-11-15 NOTE — DISCUSSION/SUMMARY
[FreeTextEntry1] : Assessment plan----------The patient has been referred here for further opinion regarding cough, shortness of breath. --Diabetes uNCONTROLLED-PROTEINURIA\par likely combination of allergy and reactive airways disease, maybe worsened by ACEi. Needs to be on an ACEi or ARB though because of significant proteinuria, 1/5g/day-S/P SURGERY FOR TONGUE CA   SEPT 2020----- \par ----august 2020  diagnosed sq cell ca tongue ue -----s/p  surgery at [place]-- S/P RT-----AS PER DAUGHTER the cervical lymph node biopsy  IN APRIL 2021  was reported as positive for squamous cell carcinoma------- patient also have has a cystic lesion in the left-lung superior segment ------ bronchoscopy was performed at [place] but was not conclusive-----he has been referred for resection at [place] by thoracic surgery-----which was chronic\par Patient has metastatic squamous cell CA  -S/P radiation to the neck\par Previous CT small----left apical pneumothorax no significant respiratory distress----he is already on supplemental oxygen-saturating at 97%\par 1 REPEAT IMAGING STUDIES IN [facility] \par 2) family informed to monitor o2 sat -if o2 consistent less than 90% to bring to ER\par 3) follows palliative care,  S/P RT   to the neck\par 4) DAX- ON   at 8 cmH20  -benefits from nightly use-AT PRESENT NOT USING ADVISED TO USE SUPPLEMENTAL O2 AT NIGHT \par 5) continue inhalers/nebs\par 6) SMALL APICAL PNEU REPRESNETS TRAPPED LUNG --\par 7] ttm  2 week\par 7]  \par \par \par Thanks for allowing  me to participate  in the care of this patient.  Patient at this time  will follow  the above mentioned recommendations and return back for follow up visit. If you have any questions  I can be reached  at #[phone] (beeper)  or  [phone] (office).\par \par Darby Krueger MD, FCCP \par Director, Pulmonary Hypertension Program \par [facility] \par Division of Pulmonary, Critical Care and Sleep Medicine \par \par HENRIK CarlosC\par \par  Professor of Medicine \par \[facility]\""[facility]\par

## 2021-11-16 NOTE — CHART NOTE - NSCHARTNOTEFT_GEN_A_CORE
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  After recent quadshot of radiation, he started feeling dizzy, lightheaded, difficulty swallowing with some pain. He fell 2x in the week prior to admission, with head strike, possible loc. +neck/right head pain from the radiation areas. No fevers, n/v/cp/sob/abd pain/new swelling/d/dysuria.     CT Chest showed stable left pneumothorax with  Interval development of intercostal lung herniation between left third and fourth ribs. There was Interval development of 1.4 cm right middle lobe cavitary nodule and 0.7 cm left upper lobe nodule suspicious for progression of pulmonary metastases in the setting of squamous cell carcinoma of the right tongue CA. Known left cavitary lesions demonstrate increased peripheral opacity.      CTA Neck showed  Persistent large right neck necrotic conglomerate mass which has increased in size. There is progression in local-regional invasion and mass effect as described. There is encasement of portions of the right common carotid artery, right internal carotid artery and right external carotid artery without hemodynamically significant stenosis. There is effacement of the right internal jugular vein. CTA brain showed No large vessel occlusion or flow-limiting stenosis.     In the hospital the patient passed speech and swallow and had a pureed diet with mild thick liquids (he was instructed to turn his head to the right and swallow twice). Palliative was consulted and the patient was started on daily methadone. Interferon gold and sputum cultures were performed to rule out TB given cavitary nodules in lung. QuantiFeron TB Plus result was indeterminant.     TO DO:    [ ] F/U MRI brain   [ ] F/U Sputum cultures  [ ] Continue to monitor dizziness
Core Lab:   Contacted from Core lab that patient had AFB detected in broth, identification to follow in 1-2 days.
Seen in ED with CT surgeon at bedside  In [facility], on room air with 96% sat  Small Ptx noted on imaging  Follow up AM CXR   No interventions from Thoracic at this time

## 2021-11-29 NOTE — HISTORY OF PRESENT ILLNESS
[Home] : at home, [unfilled] , at the time of the visit. [Medical Office: ([facility])___] : at the medical office located in  [Verbal consent obtained from patient] : the patient, [unfilled] [Disease: _____________________] : Disease: [unfilled] [M: ___] : M[unfilled] [AJCC Stage: ____] : AJCC Stage: [unfilled] [de-identified] : Mr. Amaya is a pleasant 77 y/o M recently diagnosed with SCC of the R lateral tongue, presented to [facility] last year for ill-fitting dentures causing tongue irritation. ulcerative 2cm mass of R lateral tongue noted by ENT. Biopsy August 2020 demonstrated well to moderately differentiated squamous cell carcinoma.\par \par CT neck 8/26/20: tongue lesion poorly visualized, no pathological cervical nodes\par \par PET-CT 9/4/20: FDG avid lesion of anterior right oropharynx/lateral tongue SUV 5.5, no cervical LAD. Additionally multiple mildly enlarged, faintly calcified, mildly avid right paratracheal, subcarinal, and b/l hilar nodes c/w inflammatory etiology suspicious for sarcoidosis.\par \par On 9/10/20 he underwent right hemiglossectomy and partial neck dissection with Dr. Darinel Servin at [facility]. Pathology revealed a 1.2x1.0x0.55cm well to moderately differentiated squamous cell carcinoma, DOI 5.5mm, PNI+, LVI-, a closest lateral margin was 4mm. 4 lymph nodes from right levels IA, IB, and 2 were evaluated and negative for carcinoma. He was treated with adjuvant proton tx. \par \par March 2021, surveillance PET/CT showed cervical adenopathy and KEZIA cystic lesion. Bx of cervical LN pos for sq cell ca. FNA of lung lesion non-diagnostic. Wedge resection of lung lesion was c/w sq cell ca, morphologically distinct but likely met. PD-L1 10-15% CPS. \par \par Of note, pt is a former smoker- smoked for 4-5 years 1 pack/week, quit 50 years ago, occasional etoh, +history of chewing tobacco for few years, quit 30 years ago, dentures for 20 years. \par \par Today, pt co severe oral/neck pain radiating to head and neck. He was given oxycodone for this- this only partially relieves the pain. He is taking 10 mg BID at this time. He was also taking ibuprofen which was not helping. He reports some constipation- takes Miralax for this as needed. He is currently using CPAP for sleep apnea since one year. He endorses 30 lb weight loss over one year. He has no dysphagia but can only take liquids because of lack of dentures. \par \par 6/25/21: Pt presents for first treatment today C#1 Carbo/ Abraxane/ Keytruda. He c/o pain but states he forgot to bring his pain medication along with him today. He is being followed now by palliative care. No new complaints\par \par 7/16/21:  Pt returned for f/u visit.  Pt denies any irAEs such as diarrhea, SOB, Rash, cough.   Pt denies other side effects and offers no complaints.   Pt will continue on oxycodone for pain management.  Patient will receive  cycle 2 of treatment today.  \par \par 8/6/21: Doing well. Pain has improved with CBD and THC. Has been recommended to undergo speech and lymphedema therapy. He is here for day 1, cycle 3. Tolerating carbo/abraxane/keytruda well. No AEs. \par \par 8/31/21: Rt neck and throat pain. Pain seems to be getting worse again. He started PT last week and I think the pain is likely related to it. He has been coughing more especially after eating or during eating, bringing up while phlegm. He just has swallow eval and passed. \par \par 9/17/21: Pt returns for follow-up. He is accompanied by his son. Pt reports that he continues to experience R facial/neck pain. The pain is not constant, but it can intermittently be an 8/10. He believes it may be slightly worse since his last follow-up visit. He continues to participate in Speech Therapy and is unsure if this might be contributing. No recent fevers, vision changes, chest pain, shortness of breath, nausea, vomiting, or abdominal pain. He reports good PO intake and he is remaining active at home. His weight is currently stable.\par \par 10/8/21: Started having incremental pain in the rt side of the face necessitating him to restart oxycodone. He has stopped lymphedema treatment. \par \par 10/22/21: Severe neck pain. Had scans done which is pasted below:\par \par IMPRESSION: Compared to FDG-PET/CT scan dated 4/2/2021:\par \par 1. FDG-avid partially necrotic metastatic right cervical lymphadenopathy is increased in size and metabolism as compared to prior PET/CT, and is mildly increased in size as compared to CT dated 8/21/2021.\par \par 2. Mildly FDG-avid cavitary bilateral lung lesions are increased in size, and are similar, or increased metabolism, compatible with progression of lung metastases. Status post interval left lower lobe wedge resection.\par \par 3. Mildly FDG-avid, partially calcified mediastinal and bilateral hilar lymph nodes are unchanged, compatible with sarcoidosis.\par \par 4. Hypermetabolism in left thyroid lobe may be further evaluated with ultrasound. Differential diagnosis includes thyroiditis.\par \par 5. Findings compatible with right vocal cord paralysis. Please correlate clinically and with direct visualization, as indicated.\par \par Pt saw Dr. Almanza yesterday at [facility] and was given options for multiple clinical trials. He also saw Dr. Thurston today who discussed "quadshot" as a palliative option. \par \par The pt reports severe neck pain and now has some dysphagia as well. \par \par 11/12/21: Pt completed palliative RT a couple of weeks ago. He was recently admitted with decreased PO intake, dizziness after radiation therapy. CTA neck: CTA neck: Persistent large right neck necrotic conglomerate mass which has increased in size. There is progression in local-regional invasion and mass effect as described. There is encasement of portions of the right common carotid artery, right internal carotid artery and right external carotid artery without hemodynamically significant stenosis. There is effacement of the\par right internal jugular vein. There is a left pneumothorax. Vascular was consulted, no acute surgical intervention was recommended at this point. CTSx eval appreciated, no need for intervention of ptx. MR brain to r/o brain mets: No evidence of intracranial metastatic disease. No acute intracranial hemorrhage or evidence of acute ischemia. He was thought to have dizziness, episodes of unresponsiveness in part due to progression of disease with encasement of carotid vasculature vs dehydration. He was managed conservatively. CT chest showed interval development of 1.4 cm right middle lobe cavitary nodule and 0.7 cm left upper lobe nodule suspicious for progression of pulmonary metastases in the setting of squamous cell carcinoma of the right tongue CA. He was r/o for TB. \par \par 11/29/21: Pt seen via tele. Has been having rt neck/jaw mass associated with pain. He has lost a lot weight and is anorexia.  [de-identified] : sq cell ca

## 2021-11-29 NOTE — REVIEW OF SYSTEMS
[Fever] : no fever [Chills] : no chills [Fatigue] : fatigue [Recent Change In Weight] : ~T recent weight change [Eye Pain] : no eye pain [Red Eyes] : eyes not red [Dysphagia] : dysphagia [Hoarseness] : hoarseness [Mucosal Pain] : no mucosal pain [Chest Pain] : no chest pain [Palpitations] : no palpitations [Shortness Of Breath] : no shortness of breath [Wheezing] : no wheezing [Abdominal Pain] : no abdominal pain [Vomiting] : no vomiting [Dysuria] : no dysuria [Incontinence] : no incontinence [Joint Pain] : no joint pain [Joint Stiffness] : no joint stiffness [Skin Rash] : no skin rash [Skin Wound] : no skin wound [Confused] : no confusion [Dizziness] : no dizziness [Easy Bleeding] : no tendency for easy bleeding [Easy Bruising] : no tendency for easy bruising [Negative] : Allergic/Immunologic [FreeTextEntry4] : + R facial pain, + R neck pain

## 2021-11-29 NOTE — PHYSICAL EXAM
[Restricted in physically strenuous activity but ambulatory and able to carry out work of a light or sedentary nature] : Status 1- Restricted in physically strenuous activity but ambulatory and able to carry out work of a light or sedentary nature, e.g., light house work, office work [Normal] : affect appropriate [de-identified] : Moist mucus membranes, raspy voice [de-identified] : R neck stiffness (stable), S/p reconstruction, rt neck fullness

## 2021-11-29 NOTE — ASSESSMENT
[FreeTextEntry1] : 77 y/o M with recurrent/metastatic sq cell ca w/ mets to lung, s/p resection of lung met and known cervical rt adenopathy, started on Carbo/Abraxane/Keytruda in 6/2021 and completed 4 cycles, now scheduled to start maintenance Keytruda today (9/17), who presents for follow-up.\par \par # SCC of Tongue\par - Dx in 8/2020 s/p surgery in 09/2020 w/ biopsy consistent with SCC. S/p  6 weeks of RT (completed in 12/2020). Then, PET/CT in 4/2021 showed new hyper-metabolic right neck nodes, suspicious for metastatic disease, and a LLL lung lesion, s/p lung resection on 5/27/21 w/ pathology showing metastatic SCC.\par - NGS reviewed - No actionable mutations, MS-Stable, TMB - 5 Muts/Mb, CCND1 amplification, CDKN2A D84N, DIS3 amplification, FGF19 amplification, FGF3 amplification, FGF4 amplification, GATA6 amplification, IGL fusion, TERT promoter -124C>T,  TP53 R306*, TP53 R282W\par - Pt was started on Carbo/Abraxane/Keytruda in 6/2021 and completed 4 cycles. No irAEs or other complaints. He has been on maintenance since 9/17. \par - Follow-up scans in 8/2021 was notable for interval worsening of right-sided cervical lymphadenopathy. The rest of the findings were stable. Pt also had Incremental pain concerning for POD. PET/CT confirmed overt POD\par -He saw Dr. Almanza at [facility] who is evaluating him for clinical trials. Trials may not open until January.\par -He underwent Quadshot palliative RT by Dr. Thurston a few weeks ago. He can possibly be treated again. \par -Discussed increasing symptom burden, and symptom-based tx vs. continued cancer-directed treatment.\par -Discussed POD on immunotherapy- Discussed next line of treatment cetuximab-based tx. Would add gemcitabine. -However, if the clinical trial offered at [facility] involves cetuximab, I would avoid that drug. We discussed starting gemcitabine alone. We discussed risks and benefits\par Increased cough- concern for aspiration. Follow instructions provided by speech pathologist. \par Pt will discuss with family and tell us the decision. \par Follow up with Dr. Veronica for symptom management.

## 2021-11-30 PROBLEM — K59.00 CONSTIPATION: Status: ACTIVE | Noted: 2021-01-01

## 2021-11-30 NOTE — ASSESSMENT
[FreeTextEntry1] : 76yoM with:\par \par 1. SCC of tongue, recurrent - Pt is s/p Quadshot late October. Family considering clinical trial at [facility]. On Keytruda in the meantime. Med Onc follow up. \par \par 2. R neck pain 2/2 neoplasm - C/w methadone 2mg BID, encouraged use of PRN oxycodone 2.5mg - 5mg PRN.\par C/w medical cannabis regimen as is as it is benefiting patient. C/w gabapentin 400mg TID. \par \par 3. Constipation - c/w miralax daily\par \par 4. Encounter for Palliative Care - Emotional support provided.\par \par Follow up in 2 weeks, call sooner with questions or issues.\par

## 2021-11-30 NOTE — ASSESSMENT
[FreeTextEntry1] : 76yoM with:\par \par 1. SCC of tongue, recurrent - Pt is s/p Quadshot late October. Family considering clinical trial at [facility]. On Keytruda in the meantime. Med Onc follow up. \par \par 2. R neck pain 2/2 neoplasm - C/w methadone 2mg TID and PRN oxycodone 2.5 - 5mg PRN. \par C/w medical cannabis regimen as is as it is benefiting patient. C/w gabapentin 400mg TID. Pain is overall controlled on this regimen. \par \par 3. Low mood - patient has established care with psycho-oncology. \par \par 4. Encounter for Palliative Care - Emotional support provided.\par Need to discuss advance directives.\par \par Follow up in 2 weeks, call sooner with questions or issues.\par

## 2021-11-30 NOTE — HISTORY OF PRESENT ILLNESS
[Home] : at home, [unfilled] , at the time of the visit. [Medical Office: ([facility])___] : at the medical office located in  [Other:____] : [unfilled] [Verbal consent obtained from patient] : the patient, [unfilled] [FreeTextEntry1] : 76yoM with SCC of the R tongue seen for follow up visit via telemedicine. \par PMH also significant for DAX on CPAP, remote h/o tobacco smoking and chewing. \par \par Patient initially diagnosed 2020, and on 9/10/20 he underwent R hemiglossectomy and partial neck dissection with Dr. Darinel Servin at [facility]. Pathology revealed a 1.2x1.0x0.55cm well to moderately differentiated squamous cell carcinoma, DOI 5.5mm, PNI+, LVI-, a closest lateral margin was 4mm. 4 lymph nodes from right levels IA, IB, and 2 were evaluated and negative for carcinoma. He was treated with adjuvant proton tx at [facility].  On surveillance PET/CT in 3/2021 he was found to have cervical adenopathy and KEZIA cystic lesion. Bx of cervical LN pos for sq cell ca. FNA of lung lesion non-diagnostic. Wedge resection of lung lesion was c/w sq cell ca, morphologically distinct but likely met. PD-L1 10-15% CPS. \par \par Patient reports pain that begins in the R neck that travels up to the post-auricular region, travels up and around his ear and towards the R temporal area. The pain began in May, shortly after he had a biopsy of the area. \par He is currently using Oxycodone 10mg BID and Gabapentin 300mg BID. The gabapentin started one week ago and he has found that this has been helping from keeping the pain from traveling to severe levels. \par \par He is edentulous so he only eats soft foods. Is vegetarian. Drinks fresh fruit/vegetable juices, wheatgrass. \par \par Patient received quad shot of RT in late October.\par \par Interval Hx (11/15/2021): \par Patient was admitted to the hospital with acute pain and delirium. \par Has been using methadone 2mg BID. Continues to experience acute pain that goes as high as 7-8/10. He has not been using any oxycodone for this pain.  He wraps a cloth around his head to help absorb the pain. He uses medicinal cannabis as per below. \par \par His pain regimen is as follows: \par Gabapentin 400mg TID\par Oxycodone 5mg PRN (uses 4-5 times daily; had a dizzy spell after taking a 10mg dose once)\par 1:20 THC:CBD tincture in the morning and afternoon (0.3mmg), AND 1:1 THC:CBD (3.5mg:3.5mg)\par 1:1 THC:CBD tincture at bedtime (5mmg)\par 1:1 microtab PRN for acute pain (not as helpful as previously)\par CBD oil topically to the tumor\par \par ROS:\par +nausea - cannabis and metoclopramide are being used, not helping much\par +low appetite\par +voice changes, difficultly with enunciating words due to neck stiffness. \par +constipation - uses colace, miralax which are helping\par +occasional fatigue\par +30lb weight loss over the last year\par Denies trouble sleeping, SOB, dysphagia\par \par Patient is , lives with his wife. He has six children. \par Is independent in ADLs.  He used to run a grocery store up until about 10 years ago.\par \par [employer] Ref#: [id number]

## 2021-11-30 NOTE — HISTORY OF PRESENT ILLNESS
[Home] : at home, [unfilled] , at the time of the visit. [Medical Office: ([facility])___] : at the medical office located in  [Other:____] : [unfilled] [Verbal consent obtained from patient] : the patient, [unfilled] [FreeTextEntry1] : 76yoM with SCC of the R tongue seen for follow up visit via telemedicine. \par PMH also significant for DAX on CPAP, remote h/o tobacco smoking and chewing. \par \par Patient initially diagnosed 2020, and on 9/10/20 he underwent R hemiglossectomy and partial neck dissection with Dr. Darinel Servin at [facility]. Pathology revealed a 1.2x1.0x0.55cm well to moderately differentiated squamous cell carcinoma, DOI 5.5mm, PNI+, LVI-, a closest lateral margin was 4mm. 4 lymph nodes from right levels IA, IB, and 2 were evaluated and negative for carcinoma. He was treated with adjuvant proton tx at [facility].  On surveillance PET/CT in 3/2021 he was found to have cervical adenopathy and KEZIA cystic lesion. Bx of cervical LN pos for sq cell ca. FNA of lung lesion non-diagnostic. Wedge resection of lung lesion was c/w sq cell ca, morphologically distinct but likely met. PD-L1 10-15% CPS. \par \par Patient reports pain that begins in the R neck that travels up to the post-auricular region, travels up and around his ear and towards the R temporal area. The pain began in May, shortly after he had a biopsy of the area. \par He is currently using Oxycodone 10mg BID and Gabapentin 300mg BID. The gabapentin started one week ago and he has found that this has been helping from keeping the pain from traveling to severe levels. \par \par He is edentulous so he only eats soft foods. Is vegetarian. Drinks fresh fruit/vegetable juices, wheatgrass. \par \par Patient received quad shot of RT in late October.\par \par Interval Hx (2021): \par Patient was admitted to the hospital\par Has been using methadone 2mg TID. Over the last couple of days he has had to take a dose of oxycodone 5mg. \par currently rates his pain at about 4-5 /10. Pain can get as high as 7/10. He wraps a cloth around his head to help absorb the pain. He uses medicinal cannabis as per regimen below, sometimes family lowers his daytime dose due to sedation. \par \par Has been keeping to a mainly liquid diet. \par \par His pain regimen is as follows: \par Gabapentin 400mg TID\par 1:20 THC:CBD tincture in the morning and afternoon (0.3mmg), AND 1:1 THC:CBD (3.5mg:3.5mg)\par 1:1 THC:CBD tincture at bedtime (5mmg)\par 1:1 microtab PRN for acute pain (not as helpful as previously)\par CBD oil topically to the tumor\par \par ROS:\par +low appetite\par +voice changes, difficultly with enunciating words due to neck stiffness. \par +constipation - uses senna, fiber pro-biotic which are helping - has a BM every other day on average\par +occasional fatigue\par +20lb weight loss over the last 5 weeks (daughter reports a weight of 153lbs this week)\par Denies trouble sleeping, SOB, dysphagia\par \par Patient is , lives with his wife. He has six children. \par Is independent in ADLs.  He used to run a grocery store up until about 10 years ago.\par \par [employer] Ref#: [id number]

## 2021-12-06 NOTE — HISTORY OF PRESENT ILLNESS
[FreeTextEntry1] : Mr. 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 He subsequently underwent adjuvant radiation with protons at [facility], completing adjuvant RT 11/2020. On surveilance scans 4/2021, note to have recurrent and metastatic disease concern s/p confirmatory biopsy. Patient presents to discuss radiation therapy. He completed 14 Gy in 4 fractions of radiation to right neck in 10/2021. \par \par Brief Oncological History:\par Presenting as ill-fit of his dentures causing tongue irritation. Ulcerative 2cm mass of R lateral tongue noted by ENT. Biopsy August 2020 demonstrated well to moderately differentiated squamous cell carcinoma\par \par CT neck 8/26/20: Tongue lesion poorly visualized, no pathological cervical nodes.\par \par PET-CT 9/4/20: FDG avid lesion of anterior right oropharynx/lateral tongue SUV 5.5, no cervical LAD. Additionally multiple mildly enlarged, faintly calcified, mildly avid right paratracheal, subcarinal, and b/l hilar nodes c/w inflammatory etiology suspicious for sarcoidosis.\par \par On 9/10/20 he underwent right hemiglossectomy and partial neck dissection with Dr. Darinel Servin at [facility]. Pathology revealed a 1.2x1.0x0.55cm well to moderately differentiated squamous cell carcinoma, DOI 5.5mm, PNI+, LVI-, a closest lateral margin was 4mm. 4 lymph nodes from right levels IA, IB, and 2 were evaluated and negative for carcinoma.\par \par Patient received proton therapy in [facility]. \par \par Surveillance 3/15/21 PET/CT showed cervical adenopathy and LLL cystic lesion. \par \par Bx of cervical LN pos for sq cell ca. FNA of lung lesion non-diagnostic. \par \par 6/9/2021 - Wedge resection of lung lesion was c/w sq cell ca, morphologically distinct but likely met. PD-L1 10-15% CPS. Resection margins negative. \par \par 10/22/2021 --- Today for f/u. h/o chemotherapy and immunotherapy w/ Dr. Seetharamu. c/o right facial pain, right jaw pain, dysphagia. Patient w/ son and are anxious regarding recurrence and now metastatic disease with lung nodule pointing to likely metastatic disease vs. new primary. \par \par He completed 14 Gy in 4 fractions of radiation to right neck in 10/2021. Continues f/u with Med Onc and Palliative care service. \par \par

## 2021-12-06 NOTE — PHYSICAL EXAM
[Normal] : oriented to person, place and time, the affect was normal, the mood was normal and not anxious [de-identified] : right neck nodule + [de-identified] : As above

## 2021-12-14 PROBLEM — F43.20 ADJUSTMENT DISORDER: Status: ACTIVE | Noted: 2021-01-01

## 2021-12-14 PROBLEM — J43.9 MIXED RESTRICTIVE AND OBSTRUCTIVE LUNG DISEASE: Status: ACTIVE | Noted: 2021-01-01

## 2021-12-14 PROBLEM — G89.3 PAIN, NEOPLASM-RELATED: Status: ACTIVE | Noted: 2021-01-01

## 2021-12-14 NOTE — HISTORY OF PRESENT ILLNESS
[TextBox_4] : francesco Amaya ----- son. I can be reached at [phone]. \par \par This letter  is regarding your patient  who  attended pulmonary out patient office today.  I have reviewed  patient's  past history, social history, family history and medication list. I also  reviewed nurse practitioners/ and fellows  notes and assessment and agree with it.  \par The patient was referred by Dr. Chen--S/P SURGERY FOR TONGUE CA   SEPT 2020-----\par \par ------No history of , fever, chills , rigors, chest pain, or hemoptysis. Questionable history of Raynaud's phenomenon. No h/o significant weight loss in last few months. No history of liver dysfunction , collagen vascular disorder or chronic thromboembolic disease. I would classify the patient's dyspnea as WHO  FUNCTIONAL CLASS II--------\par \par Has chronic cough associated with some shortness of breath. --COUGH BECAME WORSE  ON ACE INHIBITOR\par Has also been prescribed ACe inhibitor due to significant proteinuria from his diabtes. \par \par ----Echo  date----2019-- pasp 56 \par ----Pft date---------2020  MILD RESTRICTION \par PFT MAY  2021---- MILD RESTRICTION \par --6MWT  2020--363 METERS\par ----Ct scan date-------SLIGHT UPPER LOBE FIBROSIS \par ---ct chest 2020---b/l upper lobe ggo\par ----Cath date------------ H/O LCX STENT, MT SINAInot done\par ----repeat   Jennifer womack  mean pa---24  mmhg\par \par   ---DAX  --- psg w/ahi=10.7  ON CPAP 8  CM H2O \par \par \par PET SCAN APRIL2021---NEW SUV AVID CERVICAL  LN  AND ALSO A CAVITARY LN LLL\par \par sept 2019 accompanied by children- feels improved since last visit\par \par FEB 2020------ DAX ON CPAP, NO SIGNIFICANT RESP COMPLAINTS\par \par april 2021  sq cell ca tongue -----s/p  surgery at [place]--\par DAX on cpap 8 cm h20---benefits from nightly use. Recent abn pet/ct - awaiting biopsy OF SUSANNE CERVICAL LN---PT HAS NO PULMONARY SYMTOMS  --NOT HAVING ANY SPUTUM------------MAY NEED BRONCH  AF TER THE CERVICAL LN BIIOPSY\par \par \par MAY 2021------  AS PER DAUGHTER the cervical lymph node biopsy was reported as positive for squamous cell carcinoma------- patient also have has a cystic lesion in the left- LUNG SUPERIOR SEGMENT -------- bronchoscopy was performed at [place] but was not conclusive-----he has been referred for resection at [facility] by thoracic surgery------ \par \par MAY 14 2021-----recurrent metastatic squamous cell carcinoma of the tongue neck recent cervical lymph node biopsy was positive------cystic lesion in the left- LUNG SUPERIOR SEGMENT --- --- possible another primary lesion in the lung------- bronchoscopy at [facility] was negative resection\par \par pet/ct 10/2021 IMPRESSION: Compared to FDG-PET/CT scan dated 4/2/2021:\par \par 1. FDG-avid partially necrotic metastatic right cervical lymphadenopathy is increased in size and metabolism as compared to prior PET/CT, and is mildly increased in size as compared to CT dated 8/21/2021.\par \par 2. Mildly FDG-avid cavitary bilateral lung lesions are increased in size, and are similar, or increased metabolism, compatible with progression of lung metastases. Status post interval left lower lobe wedge resection.\par \par 3. Mildly FDG-avid, partially calcified mediastinal and bilateral hilar lymph nodes are unchanged, compatible with sarcoidosis.\par \par 4. Hypermetabolism in left thyroid lobe may be further evaluated with ultrasound. Differential diagnosis includes thyroiditis.\par \par 5. Findings compatible with right vocal cord paralysis. Please correlate clinically and with direct visualization, as indicated.\par \par \par 10/2021 Mr. 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 He subsequently underwent adjuvant radiation with protons at [facility], completing adjuvant RT 11/2020. On surveilance scans 4/2021, note to have recurrent and metastatic disease concern s/p confirmatory biopsy\par TTM with family (son & daughter)- recent CT c/w pneumothorax[ small  left side] - pt w/lung malignancy\par O2 sat 87% room air (rest)but comfortable/ Following palliative care and to get RT this week\par \par oct 2021 --- patient feels much better on supplemental oxygen------- will repeat his chest x-ray next week----- Saturation is 96% no dyspnea He is continuing with------------- radiation to the neck following up with his oncologist---\par NOV 2021------RA SAT I S98% , NO DYSPNEA AT REST  , S/P RT TO NECK\par \par 12/2021 squamous cell carcinoma of tongue s/p chemo & RT- following palliative care- on pain meds-\par  patient feels much better on supplemental oxygen----uses it to maintain o2 sat >93%. c/o cough and congestion.\par covid vac x 3 and flu shot up to date\par He is accompanied to visit by his son

## 2021-12-14 NOTE — DISCUSSION/SUMMARY
[FreeTextEntry1] : Assessment plan----------The patient has been referred here for further opinion regarding cough, shortness of breath. -76-year gentleman history of diabetes history of squamous cell carcinoma right oral tongue s/p hemiglossectomy September 2020 subsequently had adjuvant radiation ------- April 2021- developed recurrent and metastatic lesion consistent with biopsy he also has developed slight pneumothorax which is stable he is home oxygen \par Following up with oncology to see if he can start on any experimental protocol for his malignancy\par [facility] \par 2) family informed to monitor o2 sat -if o2 consistent less than 90% to bring to ER\par 3) follows palliative care,  S/P RT   to the neck\par 4) DAX- ON   at 8 cmH20  -benefits from nightly use-AT PRESENT NOT USING ADVISED TO USE SUPPLEMENTAL O2 AT NIGHT \par 5) continue inhalers/nebs\par 6) SMALL APICAL PNEU REPRESNETS TRAPPED LUNG --\par 7] prednisone 10 mg p.o. daily Zithromax,\par 8 ) will start duoneb 3-4 x daily\par \par 9) Follow-up in a month\par \par \par Thanks for allowing  me to participate  in the care of this patient.  Patient at this time  will follow  the above mentioned recommendations and return back for follow up visit. If you have any questions  I can be reached  at #[phone] (beeper)  or  [phone] (office).\par \par Darby Krueger MD, [facility] \par Director, Pulmonary Hypertension Program \par [facility] \par Division of Pulmonary, Critical Care and Sleep Medicine \par  Professor of Medicine \par [employer]\par \par [place]\par \par \par

## 2021-12-16 PROBLEM — Z51.5 ENCOUNTER FOR PALLIATIVE CARE: Status: ACTIVE | Noted: 2021-01-01

## 2021-12-16 NOTE — ASSESSMENT
[FreeTextEntry1] : 76yoM with:\par \par 1. SCC of tongue, recurrent - Pt is s/p Quadshot late October. Family considering clinical trial at [facility]. On Keytruda in the meantime. Med Onc follow up. \par \par 2. R neck pain 2/2 neoplasm - Discussed with patient's children that pain management remains suboptimal.  Recommend increase of methadone to 3mg TID, which we can do in increments. Can start tonight with incressing the HS dose to 3mg, and increase to 3/2/3 after 3-4 days. C/w PRN oxycodone 2.5 - 5mg. \par C/w medical cannabis regimen as is as it is benefiting patient. C/w gabapentin 400mg TID. Pain is overall controlled on this regimen. \par \par 3. Low mood - patient has established care with psycho-oncology. \par \par 4. Encounter for Palliative Care - Emotional support provided.\par Need to discuss advance directives.\par \par Follow up in 2 weeks, call sooner with questions or issues.\par

## 2021-12-16 NOTE — HISTORY OF PRESENT ILLNESS
[Home] : at home, [unfilled] , at the time of the visit. [Medical Office: ([facility])___] : at the medical office located in  [Other:____] : [unfilled] [Verbal consent obtained from patient] : the patient, [unfilled] [FreeTextEntry1] : 76yoM with SCC of the R tongue seen for follow up visit via telemedicine. \par PMH also significant for DAX on CPAP, remote h/o tobacco smoking and chewing. \par \par Patient initially diagnosed 2020, and on 9/10/20 he underwent R hemiglossectomy and partial neck dissection with Dr. Darinel Servin at [facility]. Pathology revealed a 1.2x1.0x0.55cm well to moderately differentiated squamous cell carcinoma, DOI 5.5mm, PNI+, LVI-, a closest lateral margin was 4mm. 4 lymph nodes from right levels IA, IB, and 2 were evaluated and negative for carcinoma. He was treated with adjuvant proton tx at [facility].  On surveillance PET/CT in 3/2021 he was found to have cervical adenopathy and KEZIA cystic lesion. Bx of cervical LN pos for sq cell ca. FNA of lung lesion non-diagnostic. Wedge resection of lung lesion was c/w sq cell ca, morphologically distinct but likely met. PD-L1 10-15% CPS. \par \par Patient reports pain that begins in the R neck that travels up to the post-auricular region, travels up and around his ear and towards the R temporal area. The pain began in May, shortly after he had a biopsy of the area. \par He is currently using Oxycodone 10mg BID and Gabapentin 300mg BID. The gabapentin started one week ago and he has found that this has been helping from keeping the pain from traveling to severe levels. \par \par He is edentulous so he only eats soft foods. Is vegetarian. Drinks fresh fruit/vegetable juices, wheatgrass. \par \par Patient received quad shot of RT in late October.\par \par Interval Hx (2021): \par Has been using methadone 2mg TID, uses the oxycodone 5mg PRN, usually a dose every other day. \par currently rates his pain at about 4-5 /10. Pain can get as high as 7/10. He wraps a cloth around his head to help absorb the pain. He uses medicinal cannabis as per regimen below, sometimes family lowers his daytime dose due to sedation. \par He is working with [facility] to start a clinical trial. \par \denisha Has been keeping to a mainly liquid diet, takes a very long time to finish a meal. \par \par His pain regimen is as follows: \par Methadone 2mg TID\par Oxycodone IR 5mg PRN\par Gabapentin 400mg TID\par 1:20 THC:CBD tincture in the morning and afternoon (0.3mmg), AND 1:1 THC:CBD (3.5mg:3.5mg)\par 1:1 THC:CBD tincture at bedtime (5mmg)\par 1:1 microtab PRN for acute pain (not as helpful as previously)\par CBD oil topically to the tumor\par \par ROS:\par +low appetite\par +voice changes, difficultly with enunciating words due to neck stiffness. \par +constipation - uses senna, fiber pro-biotic which are helping - has a BM every other day on average\par +occasional fatigue\par +20lb weight loss over the last 5 weeks (daughter reports a weight of 153lbs this week)\par Denies trouble sleeping, SOB, dysphagia\par \par Patient is , lives with his wife. He has six children. \par Is independent in ADLs.  He used to run a grocery store up until about 10 years ago.\par \par [employer] Ref#: [id number]

## 2021-12-17 NOTE — PLAN
[FreeTextEntry1] : 10   minutes was spent during this encounter preparing for this encounter, evaluating the patient and documenting the above EM service.  This includes counseling, and educating the patient on the disease course and it's treatment/ management. \par \par

## 2021-12-20 NOTE — ED PROVIDER NOTE - PHYSICAL EXAMINATION
Gen: unresponsive   Head: NCAT  HEENT: oral mucosa dry  Lung: CTAB  CV: pulseless   Abd: soft  MSK: no visible deformities  Neuro: limited exam   Skin: cold    Darrin Christian DO

## 2021-12-20 NOTE — ED ADULT NURSE NOTE - ED STAT RN HANDOFF DETAILS
brought in by ems as notification for respiraoty distress/hypoxia. pt.was intubated in ED .on vent setting  /5/15 F102 100% hx.squamous cell CA was metastazis to lungs.chest x-ray shows large left sided pneumothorax.pig tail was placed in EDby dr. bella brought in by ems as notification for respiratory distress/hypoxia. pt.was intubated in ED .on vent setting  /5/15 F102 100% hx.squamous cell CA was metastazis to lungs.chest x-ray shows large left sided pneumothorax.pig tail was placed in ED by dr. bella.T  94.5 placed on barrington hugger for  hypothermia. on propofol & levophed in progress.transfer to ICU report given to yury bella. brought in by ems as notification for respiratory distress/hypoxia. pt.was intubated in ED .on vent setting  /5/15 F102 100% hx.squamous cell CA was metastazis to lungs.chest x-ray shows large left sided pneumothorax.pig tail was placed in ED by dr. bella.T  94.5 placed on barrington hugger for  hypothermia. on propofol at 5 mcq to titrate  & levophed 0.6 mcq/kg/min  in progress.transfer to ICU report given to yury bella.

## 2021-12-20 NOTE — ED ADULT NURSE REASSESSMENT NOTE - NS ED NURSE REASSESS COMMENT FT1
0250 Endorsed to RN Nebgarcía pt bradycardiec with strong pulses appreciated on Norepinephrine drip, portable CXR done , ICU consult in progress

## 2021-12-20 NOTE — ED ADULT NURSE NOTE - NSIMPLEMENTINTERV_GEN_ALL_ED
Implemented All Fall Risk Interventions:  [place] to call system. Call bell, personal items and telephone within reach. Instruct patient to call for assistance. Room bathroom lighting operational. Non-slip footwear when patient is off stretcher. Physically safe environment: no spills, clutter or unnecessary equipment. Stretcher in lowest position, wheels locked, appropriate side rails in place. Provide visual cue, wrist band, yellow gown, etc. Monitor gait and stability. Monitor for mental status changes and reorient to person, place, and time. Review medications for side effects contributing to fall risk. Reinforce activity limits and safety measures with patient and family.

## 2021-12-20 NOTE — PATIENT PROFILE ADULT - FALL HARM RISK - HARM RISK INTERVENTIONS

## 2021-12-20 NOTE — ED PROVIDER NOTE - NSICDXPASTSURGICALHX_GEN_ALL_CORE_FT
PAST SURGICAL HISTORY:  History of surgery On 9/10/20 he underwent right hemiglossectomy and partial neck dissection with Dr. Darinel Servin at [facility].    S/P hernia repair b/l    S/P knee replacement b/l    Status post cataract extraction and insertion of intraocular lens, unspecified laterality b/l

## 2021-12-20 NOTE — ED ADULT TRIAGE NOTE - BRAND OF FIRST COVID-19 BOOSTER
[employer] O-Z Plasty Text: The defect edges were debeveled with a #15 scalpel blade.  Given the location of the defect, shape of the defect and the proximity to free margins an O-Z plasty (double transposition flap) was deemed most appropriate.  Using a sterile surgical marker, the appropriate transposition flaps were drawn incorporating the defect and placing the expected incisions within the relaxed skin tension lines where possible.    The area thus outlined was incised deep to adipose tissue with a #15 scalpel blade.  The skin margins were undermined to an appropriate distance in all directions utilizing iris scissors.  Hemostasis was achieved with electrocautery.  The flaps were then transposed into place, one clockwise and the other counterclockwise, and anchored with interrupted buried subcutaneous sutures.

## 2021-12-20 NOTE — H&P ADULT - ASSESSMENT
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 Patient had cardiac arrest in ED , was intubated CPR was initiated and subsequently ROSC was acieved after 20 minutes. Patient is admitted to ICU    Assessment:    - Cardiac arrest  -Hypoxic respiratory failure  -Left sided pneumothorax  -Metastatic squamous cell cancer  -CAD  -Diabetes        Neuro  - status post cardiac arrest  had pinpoint pupils after ROSC  continue with propofol for sedation for vent synchrony      Cardiovascular  - s/p cardiac arrest  ROSC was achieved after 7 cycles of cpr.  continue with levophed to maintain MAP > 65  EKG showed    Pulmonary  - Hypoxic respiratory failure  Patient intubated on ventilator.  ABG showed 7.21/90/340/36, vent settings adjusted as per ABG.  follow repeat ABG.    Pneumothorax:    Patient noted to have large left sided pneumothorax/ s/p pigtail in ED.  post pigtain CXR noted showed improvement.    Infections  - Patient with leukocytosis, a febrile.   Follow sputum cultures.  likely reactive process.    Nephro  - Hypercalcemia:    Patient with hypercalcemia, corrected Ca 14   will continue with IV fluids.  follow repeat Ca    Gastrointestinal  - NPO, start diet after NG tube is in    Heme/ONC    - metastatic squamous cell Cancer  patient was on py    Endocrine  - diabetes  Follow A1c continue with sliding scale      Prophylaxis   - lovenox for dvt ppx.  protonix for GI ppx

## 2021-12-20 NOTE — ED PROVIDER NOTE - OBJECTIVE STATEMENT
76M with PMH including metastatic squamous cancer p/w cardiac arrest. Experienced trouble breathing today which prompted family to call EMS. Lost pulses in route to hospital. Full code per son at bedside. Intubated on arrival to ER.

## 2021-12-20 NOTE — H&P ADULT - NSHPPHYSICALEXAM_GEN_ALL_CORE
Vital Signs Last 24 Hrs  T(C): --  T(F): --  HR: 55 (20 Dec 2021 03:37) (45 - 102)  BP: 98/61 (20 Dec 2021 03:37) (86/55 - 190/102)  BP(mean): --  RR: 24 (20 Dec 2021 03:37) (16 - 24)  SpO2: 100% (20 Dec 2021 03:37) (98% - 100%)    GENERAL: elderly male intubated  HEAD:  Atraumatic, Normocephalic  EYES: EOMI, PERRLA, conjunctiva and sclera clear  NECK: Supple, No JVD  CHEST/LUNG: Clear to auscultation bilaterally; No wheeze; No crackles; No accessory muscles used  HEART: Regular rate and rhythm; No murmurs;   ABDOMEN: Soft, Nontender, Nondistended; Bowel sounds present; No guarding  EXTREMITIES:  2+ Peripheral Pulses, No cyanosis or edema  PSYCH:  Normal Affect  NEUROLOGY:intubated, pin point pupils, +ve cough reflex  SKIN: No rashes or lesions

## 2021-12-20 NOTE — H&P ADULT - HISTORY OF PRESENT ILLNESS
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 Patient was intubated on arival to ED, he had a cardiac arrest and achieved ROSC 20 minutes later. History was obtained with son at bed side , he states that patient Squamous cell cancer was metastasized to luns and he was getting immunotherapy. They noticed patient O2 saturation was low so decided to bring hi to ED,       On arrival to ED patient was intubated, and he lost the pulse. high quality CPR was started , patient achieved Rosc after 7 cycles of CPR. Post intubation CXR showed large left sided pneumothorax. pigtail was placed by ED physician. emergent IO acces was done during the CPR. Patient was started on levophed and propofol for sedation.

## 2021-12-20 NOTE — ED ADULT TRIAGE NOTE - CADM TRG TX PRIOR TO ARRIVAL
left tibial IO/oxygen left tibial IO, Etomidate 25 mg IO, Nitro 0.4 mg, Epinephrine 10 mcg IV/oxygen

## 2021-12-20 NOTE — ED PROCEDURE NOTE - CPROC ED TIME OUT STATEMENT1
“Patient's name, , procedure and correct site were confirmed during the [place] Timeout.”
“Patient's name, , procedure and correct site were confirmed during the [place] Timeout.”

## 2021-12-20 NOTE — PROGRESS NOTE ADULT - SUBJECTIVE AND OBJECTIVE BOX
INTERVAL HPI/OVERNIGHT EVENTS: ***    Antimicrobial:  cefepime   IVPB      cefepime   IVPB 2000 milliGRAM(s) IV Intermittent every 12 hours    Cardiovascular:  norepinephrine Infusion 0.15 MICROgram(s)/kG/Min IV Continuous <Continuous>    Pulmonary:    Hematalogic:    Other:  chlorhexidine 0.12% Liquid 15 milliLiter(s) Oral Mucosa every 12 hours  chlorhexidine 2% Cloths 1 Application(s) Topical <User Schedule>  methylPREDNISolone sodium succinate Injectable 40 milliGRAM(s) IV Push every 12 hours  sodium chloride 0.9%. 1000 milliLiter(s) IV Continuous <Continuous>    cefepime   IVPB      cefepime   IVPB 2000 milliGRAM(s) IV Intermittent every 12 hours  chlorhexidine 0.12% Liquid 15 milliLiter(s) Oral Mucosa every 12 hours  chlorhexidine 2% Cloths 1 Application(s) Topical <User Schedule>  methylPREDNISolone sodium succinate Injectable 40 milliGRAM(s) IV Push every 12 hours  norepinephrine Infusion 0.15 MICROgram(s)/kG/Min IV Continuous <Continuous>  sodium chloride 0.9%. 1000 milliLiter(s) IV Continuous <Continuous>    Drug Dosing Weight  Height (cm): 175.3 (20 Dec 2021 02:21)  Weight (kg): 72.5 (20 Dec 2021 06:30)  BMI (kg/m2): 23.6 (20 Dec 2021 06:30)  BSA (m2): 1.88 (20 Dec 2021 06:30)    ICU Vital Signs Last 24 Hrs  T(C): 36.3 (20 Dec 2021 08:00), Max: 36.3 (20 Dec 2021 08:00)  T(F): 97.3 (20 Dec 2021 08:00), Max: 97.3 (20 Dec 2021 08:00)  HR: 76 (20 Dec 2021 12:00) (45 - 102)  BP: 117/55 (20 Dec 2021 11:45) (86/55 - 192/78)  BP(mean): 68 (20 Dec 2021 11:45) (61 - 112)  ABP: --  ABP(mean): --  RR: 24 (20 Dec 2021 12:00) (15 - 29)  SpO2: 98% (20 Dec 2021 12:00) (91% - 100%)      ABG - ( 20 Dec 2021 03:09 )  pH, Arterial: 7.21  pH, Blood: x     /  pCO2: 90    /  pO2: 340   / HCO3: 36    / Base Excess: 5.7   /  SaO2: 99                    12-19 @ 07:01  -  12-20 @ 07:00  --------------------------------------------------------  IN: 53.8 mL / OUT: 1280 mL / NET: -1226.2 mL        Mode: AC/ CMV (Assist Control/ Continuous Mandatory Ventilation)  RR (machine): 24  TV (machine): 450  FiO2: 40  PEEP: 5  ITime: 1  MAP: 18  PIP: 32        PHYSICAL EXAM:    GENERAL: NAD, well-groomed, well-developed, AAOx3  EYES: EOMI, PERRLA,   NECK: Supple, No JVD; Normal thyroid; Trachea midline  NERVOUS SYSTEM: Motor Strength 5/5 B/L upper and lower extremities; DTRs 2+ intact and symmetric  CHEST/LUNG: No rales, rhonchi, wheezing   HEART: Regular rate and rhythm; No murmurs,   ABDOMEN: Soft, Nontender, Nondistended; Bowel sounds present  EXTREMITIES:  2+ Peripheral Pulses, No clubbing, cyanosis, or edema  LINES/TUBES/CATHETERS: n/a    LABS:  CBC Full  -  ( 20 Dec 2021 11:19 )  WBC Count : 11.57 K/uL  RBC Count : 3.88 M/uL  Hemoglobin : 11.3 g/dL  Hematocrit : 35.2 %  Platelet Count - Automated : 278 K/uL  Mean Cell Volume : 90.7 fl  Mean Cell Hemoglobin : 29.1 pg  Mean Cell Hemoglobin Concentration : 32.1 gm/dL  Auto Neutrophil # : x  Auto Lymphocyte # : x  Auto Monocyte # : x  Auto Eosinophil # : x  Auto Basophil # : x  Auto Neutrophil % : x  Auto Lymphocyte % : x  Auto Monocyte % : x  Auto Eosinophil % : x  Auto Basophil % : x    12-20    143  |  104  |  19<H>  ----------------------------<  201<H>  3.8   |  33<H>  |  0.93    Ca    10.3      20 Dec 2021 11:19  Phos  1.9     12-20  Mg     2.0     12-20    TPro  6.1  /  Alb  1.9<L>  /  TBili  0.6  /  DBili  x   /  AST  55<H>  /  ALT  37  /  AlkPhos  51  12-20            RADIOLOGY & ADDITIONAL STUDIES REVIEWED:  Yes    CRITICAL CARE TIME SPENT: 35 minutes INTERVAL HPI/OVERNIGHT EVENTS: Patient remains intubated, but off sedation at the time of examination. No response to painful stimuli noted.    Antimicrobial:  cefepime   IVPB      cefepime   IVPB 2000 milliGRAM(s) IV Intermittent every 12 hours    Cardiovascular:  norepinephrine Infusion 0.15 MICROgram(s)/kG/Min IV Continuous <Continuous>    Pulmonary:    Hematalogic:    Other:  chlorhexidine 0.12% Liquid 15 milliLiter(s) Oral Mucosa every 12 hours  chlorhexidine 2% Cloths 1 Application(s) Topical <User Schedule>  methylPREDNISolone sodium succinate Injectable 40 milliGRAM(s) IV Push every 12 hours  sodium chloride 0.9%. 1000 milliLiter(s) IV Continuous <Continuous>    cefepime   IVPB      cefepime   IVPB 2000 milliGRAM(s) IV Intermittent every 12 hours  chlorhexidine 0.12% Liquid 15 milliLiter(s) Oral Mucosa every 12 hours  chlorhexidine 2% Cloths 1 Application(s) Topical <User Schedule>  methylPREDNISolone sodium succinate Injectable 40 milliGRAM(s) IV Push every 12 hours  norepinephrine Infusion 0.15 MICROgram(s)/kG/Min IV Continuous <Continuous>  sodium chloride 0.9%. 1000 milliLiter(s) IV Continuous <Continuous>    Drug Dosing Weight  Height (cm): 175.3 (20 Dec 2021 02:21)  Weight (kg): 72.5 (20 Dec 2021 06:30)  BMI (kg/m2): 23.6 (20 Dec 2021 06:30)  BSA (m2): 1.88 (20 Dec 2021 06:30)    ICU Vital Signs Last 24 Hrs  T(C): 36.3 (20 Dec 2021 08:00), Max: 36.3 (20 Dec 2021 08:00)  T(F): 97.3 (20 Dec 2021 08:00), Max: 97.3 (20 Dec 2021 08:00)  HR: 76 (20 Dec 2021 12:00) (45 - 102)  BP: 117/55 (20 Dec 2021 11:45) (86/55 - 192/78)  BP(mean): 68 (20 Dec 2021 11:45) (61 - 112)  ABP: --  ABP(mean): --  RR: 24 (20 Dec 2021 12:00) (15 - 29)  SpO2: 98% (20 Dec 2021 12:00) (91% - 100%)      ABG - ( 20 Dec 2021 03:09 )  pH, Arterial: 7.21  pH, Blood: x     /  pCO2: 90    /  pO2: 340   / HCO3: 36    / Base Excess: 5.7   /  SaO2: 99                    12-19 @ 07:01  -  12-20 @ 07:00  --------------------------------------------------------  IN: 53.8 mL / OUT: 1280 mL / NET: -1226.2 mL        Mode: AC/ CMV (Assist Control/ Continuous Mandatory Ventilation)  RR (machine): 24  TV (machine): 450  FiO2: 40  PEEP: 5  ITime: 1  MAP: 18  PIP: 32        PHYSICAL EXAM:  GENERAL: intubated; off sedation  HEAD:  Atraumatic, Normocephalic  EYES: pinpoint pupil b/l, no reactive to light and no accomodation; left pupil abnormalities noted  NECK: Supple, No JVD  CHEST/LUNG: expiratory wheezes noted; No crackles; No accessory muscles used  HEART: distant heart sounds heart; No murmurs;   ABDOMEN: Soft, Nondistended; Bowel sounds present; No guarding  EXTREMITIES:  2+ Peripheral Pulses, No cyanosis or edema  PSYCH: intubated  NEUROLOGY:+ve cough reflex  SKIN: No rashes or lesions    LABS:  CBC Full  -  ( 20 Dec 2021 11:19 )  WBC Count : 11.57 K/uL  RBC Count : 3.88 M/uL  Hemoglobin : 11.3 g/dL  Hematocrit : 35.2 %  Platelet Count - Automated : 278 K/uL  Mean Cell Volume : 90.7 fl  Mean Cell Hemoglobin : 29.1 pg  Mean Cell Hemoglobin Concentration : 32.1 gm/dL  Auto Neutrophil # : x  Auto Lymphocyte # : x  Auto Monocyte # : x  Auto Eosinophil # : x  Auto Basophil # : x  Auto Neutrophil % : x  Auto Lymphocyte % : x  Auto Monocyte % : x  Auto Eosinophil % : x  Auto Basophil % : x    12-20    143  |  104  |  19<H>  ----------------------------<  201<H>  3.8   |  33<H>  |  0.93    Ca    10.3      20 Dec 2021 11:19  Phos  1.9     12-20  Mg     2.0     12-20    TPro  6.1  /  Alb  1.9<L>  /  TBili  0.6  /  DBili  x   /  AST  55<H>  /  ALT  37  /  AlkPhos  51  12-20            RADIOLOGY & ADDITIONAL STUDIES REVIEWED:  Yes    CRITICAL CARE TIME SPENT: 35 minutes

## 2021-12-20 NOTE — H&P ADULT - ATTENDING COMMENTS
Patient seen and examined with resident in the ED upon consultation request at 2.40AM. Data reviewed. Case and plan of care discussed with ICU resident and agree with note except as otherwise stated in my note as follows.  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 He is s/p left pigtail insertion with expansion of left lung. ROSC is approx 20mins. Also with obstructive shock and on vasopressor support. Overall prognosis is poor. No targeted temperature management offered in view of poor prognosis and son made patient DNR.

## 2021-12-20 NOTE — PROGRESS NOTE ADULT - ASSESSMENT
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 Patient had cardiac arrest in ED , was intubated CPR was initiated and subsequently ROSC was acieved after 20 minutes. Patient is admitted to ICU    Assessment:    - Cardiac arrest  -Hypoxic respiratory failure  -Left sided pneumothorax  -Metastatic squamous cell cancer  -CAD  -Diabetes        Neuro  - status post cardiac arrest  had pinpoint pupils after ROSC  continue with propofol for sedation for vent synchrony      Cardiovascular  - s/p cardiac arrest  ROSC was achieved after 7 cycles of cpr.  continue with levophed to maintain MAP > 65  EKG showed    Pulmonary  - Hypoxic respiratory failure  Patient intubated on ventilator.  ABG showed 7.21/90/340/36, vent settings adjusted as per ABG.  follow repeat ABG.    Pneumothorax:    Patient noted to have large left sided pneumothorax/ s/p pigtail in ED.  post pigtain CXR noted showed improvement.    Infections  - Patient with leukocytosis, a febrile.   Follow sputum cultures.  likely reactive process.    Nephro  - Hypercalcemia:    Patient with hypercalcemia, corrected Ca 14   will continue with IV fluids.  follow repeat Ca    Gastrointestinal  - NPO, start diet after NG tube is in    Heme/ONC    - metastatic squamous cell Cancer  patient was on py    Endocrine  - diabetes  Follow A1c continue with sliding scale      Prophylaxis   - lovenox for dvt ppx.  protonix for GI ppx     Assessment:  - 76 year old male with medical history of HTN, HLD, DM2, CAD s/p stents, 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 Patient had cardiac arrest in ED , was intubated CPR was initiated and subsequently ROSC was acieved after 20 minutes. 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    ====GI====  #no active issues  #diet: none  #bowel regimen: none    ====nephro====  #hypercalcemia  - ddx: dehydration vs SCC-related  - c/w IVF  - f/u PTH, PTHrP, vitamin D, repeat calcium    ====ID====  #leukocytosis  - likely 2/2 aspiration PNA vs reactive process  - c/w cefepime   - f/u sputum and urine culture    ====Heme/onc====  #Metastatic SCC of tongue base  - s/p resection, chemo/radiation, and immunotherapy (10/2021)  - plan for experimental drug as outpatient.    ====Endo====  #DM  - c/w sliding scale  - f/u a1c    ====Ppx====  #DVT: lovenox  #GI: PPI

## 2021-12-20 NOTE — ED PROVIDER NOTE - CLINICAL SUMMARY MEDICAL DECISION MAKING FREE TEXT BOX
76M with PMH including metastatic squamous cancer p/w cardiac arrest. Experienced trouble breathing today which prompted family to call EMS. Lost pulses in route to hospital. Full code per son at bedside. Intubated on arrival to ER. Non ischemic EKG. Labs, XR, ICU consult.

## 2021-12-21 NOTE — DIETITIAN INITIAL EVALUATION ADULT. - SIGNS/SYMPTOMS
vent support, NGT tube in place recent Dx of Severe Malnutrition, wt loss x >10% with poor oral intake x 6 to 7m

## 2021-12-21 NOTE — PROGRESS NOTE ADULT - SUBJECTIVE AND OBJECTIVE BOX
INTERVAL HPI/OVERNIGHT EVENTS: Central line placed overnight due to increased pressor required overnight. Patient was also started on precedex due to mild agitation. Patient is more alert and follows command this morning. He is being tried for SBT for extubation readiness.    Antimicrobial:  cefepime   IVPB      cefepime   IVPB 2000 milliGRAM(s) IV Intermittent every 12 hours    Cardiovascular:  norepinephrine Infusion 0.08 MICROgram(s)/kG/Min IV Continuous <Continuous>    Pulmonary:    Hematalogic:    Other:  chlorhexidine 0.12% Liquid 15 milliLiter(s) Oral Mucosa every 12 hours  chlorhexidine 2% Cloths 1 Application(s) Topical <User Schedule>  methylPREDNISolone sodium succinate Injectable 40 milliGRAM(s) IV Push every 12 hours    cefepime   IVPB      cefepime   IVPB 2000 milliGRAM(s) IV Intermittent every 12 hours  chlorhexidine 0.12% Liquid 15 milliLiter(s) Oral Mucosa every 12 hours  chlorhexidine 2% Cloths 1 Application(s) Topical <User Schedule>  methylPREDNISolone sodium succinate Injectable 40 milliGRAM(s) IV Push every 12 hours  norepinephrine Infusion 0.08 MICROgram(s)/kG/Min IV Continuous <Continuous>    Drug Dosing Weight  Height (cm): 175.3 (20 Dec 2021 02:21)  Weight (kg): 72.5 (20 Dec 2021 06:30)  BMI (kg/m2): 23.6 (20 Dec 2021 06:30)  BSA (m2): 1.88 (20 Dec 2021 06:30)    ICU Vital Signs Last 24 Hrs  T(C): 36.7 (21 Dec 2021 11:00), Max: 38.9 (20 Dec 2021 16:25)  T(F): 98.1 (21 Dec 2021 11:00), Max: 102 (20 Dec 2021 16:25)  HR: 65 (21 Dec 2021 11:26) (41 - 89)  BP: 144/65 (21 Dec 2021 11:00) (79/46 - 188/61)  BP(mean): 85 (21 Dec 2021 11:00) (53 - 118)  ABP: --  ABP(mean): --  RR: 19 (21 Dec 2021 11:00) (11 - 24)  SpO2: 96% (21 Dec 2021 11:26) (93% - 100%)      ABG - ( 21 Dec 2021 05:37 )  pH, Arterial: 7.55  pH, Blood: x     /  pCO2: 36    /  pO2: 68    / HCO3: 32    / Base Excess: 8.7   /  SaO2: 96                    12-20 @ 07:01  -  12-21 @ 07:00  --------------------------------------------------------  IN: 1508.7 mL / OUT: 1040 mL / NET: 468.7 mL        Mode: AC/ CMV (Assist Control/ Continuous Mandatory Ventilation)  RR (machine): 16  TV (machine): 450  FiO2: 40  PEEP: 5  ITime: 1  MAP: 11  PIP: 22        PHYSICAL EXAM:  GENERAL: intubated but alert and follows command; off sedation  HEAD:  Atraumatic, Normocephalic  EYES: pinpoint pupil b/l, no reactive to light and no accomodation; left pupil abnormalities noted  NECK: Supple, No JVD  CHEST/LUNG: expiratory wheezes noted; No crackles; No accessory muscles used  HEART: distant heart sounds heart; No murmurs;   ABDOMEN: Soft, Nondistended; Bowel sounds present; No guarding  EXTREMITIES:  2+ Peripheral Pulses, No cyanosis or edema  PSYCH: intubated  NEUROLOGY:+ve cough reflex  SKIN: No rashes or lesions    LABS:  CBC Full  -  ( 21 Dec 2021 03:10 )  WBC Count : 16.03 K/uL  RBC Count : 3.23 M/uL  Hemoglobin : 9.4 g/dL  Hematocrit : 28.8 %  Platelet Count - Automated : 261 K/uL  Mean Cell Volume : 89.2 fl  Mean Cell Hemoglobin : 29.1 pg  Mean Cell Hemoglobin Concentration : 32.6 gm/dL  Auto Neutrophil # : 14.76 K/uL  Auto Lymphocyte # : 0.53 K/uL  Auto Monocyte # : 0.48 K/uL  Auto Eosinophil # : 0.15 K/uL  Auto Basophil # : 0.04 K/uL  Auto Neutrophil % : 92.2 %  Auto Lymphocyte % : 3.3 %  Auto Monocyte % : 3.0 %  Auto Eosinophil % : 0.9 %  Auto Basophil % : 0.2 %    12-21    143  |  107  |  21<H>  ----------------------------<  214<H>  3.3<L>   |  32<H>  |  0.78    Ca    9.8      21 Dec 2021 03:10  Phos  3.2     12-21  Mg     2.0     12-21    TPro  5.7<L>  /  Alb  1.6<L>  /  TBili  0.5  /  DBili  x   /  AST  37  /  ALT  32  /  AlkPhos  46  12-21            RADIOLOGY & ADDITIONAL STUDIES REVIEWED:  Yes    CRITICAL CARE TIME SPENT: 35 minutes

## 2021-12-21 NOTE — DIETITIAN INITIAL EVALUATION ADULT. - PERTINENT LABORATORY DATA
12-21 Na143 mmol/L Glu 214 mg/dL<H> K+ 3.3 mmol/L<L> Cr  0.78 mg/dL BUN 21 mg/dL<H>   12-21 Phos 3.2 mg/dL   12-21 Alb 1.6 g/dL<L>

## 2021-12-21 NOTE — PROGRESS NOTE ADULT - ASSESSMENT
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 Patient had cardiac arrest in ED , was intubated CPR was initiated and subsequently ROSC was acieved after 20 minutes. Patient is admitted to ICU.    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  - CAD s/p stent  - Diabetes    Plan  ====Neuro====  #baseline mentation: AAOx3  #intubated  - on 12/20 in ED during Code blue  - off sedation  - SBT (12/21)    ====CVS====  #Cardiac arrest  - ROSC after 20min  - trop trending down    ====Pulm====  #Acute hypoxic respiratory failure  - likely 2/2 PTX vs aspiration PNA  - ABG 7.55/36/68/32  - c/w mechanical ventilation and solumedrol 40 bid    #PTX  - s/p pigtail in ED  - repeat CXR complete resolution    ====GI====  #no active issues  #diet: none  #bowel regimen: none    ====nephro====  #hypercalcemia  - resolved  - ddx: dehydration vs SCC-related  - PTH low  - s/p IVF    ====ID====  #leukocytosis  - likely 2/2 aspiration PNA vs reactive process  - c/w cefepime   - f/u sputum and urine culture    ====Heme/onc====  #Metastatic SCC of tongue base  - s/p resection, chemo/radiation, and immunotherapy (10/2021)  - plan for experimental drug as outpatient.    ====Endo====  #DM  - c/w sliding scale  - f/u a1c    ====Ppx====  #DVT: lovenox  #GI: PPI

## 2021-12-21 NOTE — DIETITIAN INITIAL EVALUATION ADULT. - OTHER INFO
Pt lives home PTA, recent admission with Severe Malnutrition identified ( >10% wt loss and poor intake x 6m); readmitted for cardiac arrest, in ICU, sedated on Vent; NPO per team x 1 to 2d, NGT tube in place; BghZ0B=4.3, no h/o DM, on Steroid, finger stick cqtmt=644 to 259

## 2021-12-21 NOTE — DIETITIAN INITIAL EVALUATION ADULT. - PERTINENT MEDS FT
MEDICATIONS  (STANDING):  cefepime   IVPB      cefepime   IVPB 2000 milliGRAM(s) IV Intermittent every 12 hours  chlorhexidine 0.12% Liquid 15 milliLiter(s) Oral Mucosa every 12 hours  chlorhexidine 2% Cloths 1 Application(s) Topical <User Schedule>  dexMEDEtomidine Infusion 0.5 MICROgram(s)/kG/Hr (9.06 mL/Hr) IV Continuous <Continuous>  methylPREDNISolone sodium succinate Injectable 40 milliGRAM(s) IV Push every 12 hours  norepinephrine Infusion 0.08 MICROgram(s)/kG/Min (5.44 mL/Hr) IV Continuous <Continuous>

## 2021-12-21 NOTE — DIETITIAN NUTRITION RISK NOTIFICATION - ADDITIONAL COMMENTS/DIETITIAN RECOMMENDATIONS
Malnutrition Diagnosis Parameters: recent Dx of Severe Malnutrition, wt loss x >10% with poor oral intake x 6 to 7m.

## 2021-12-21 NOTE — DIETITIAN INITIAL EVALUATION ADULT. - ENTERAL
If TF as medically feasible/consistent with GOC, Goal: Glucerna 1.5 @ 40ml/h x 24h (960ml, 1440kcal, 79g protein, 729ml water at goal)   MD to adjust free water as needed

## 2021-12-21 NOTE — DIETITIAN INITIAL EVALUATION ADULT. - FACTORS AFF FOOD INTAKE
acute on chronic comorbidities including cardiac arrest s/p intubation, metastatic cancer on chemotherapy, immunotherapy,/change in mental status/difficulty swallowing

## 2021-12-21 NOTE — DIETITIAN INITIAL EVALUATION ADULT. - ETIOLOGY
acute on chronic comorbidities including cardiac arrest s/p intubation, metastatic cancer on chemotherapy, immunotherapy, inadequate intake with cardiac arrest s/p intubation, metastatic cancer on chemotherapy, immunotherapy,

## 2021-12-21 NOTE — DIETITIAN NUTRITION RISK NOTIFICATION - TREATMENT: THE FOLLOWING DIET HAS BEEN RECOMMENDED
If TF as medically feasible/consistent per Goal of Care: Goal: Glucerna 1.5 @ 40ml/h x 24h (960ml, 1440kcal, 79g protein, 729ml water at goal)   MD to adjust free water as needed

## 2021-12-22 NOTE — CHART NOTE - NSCHARTNOTEFT_GEN_A_CORE
Palliative care SW called pt's son to provide emotional support. No answer, voicemail left, no returned call.    Peter Garcia  [phone]

## 2021-12-22 NOTE — PROGRESS NOTE ADULT - ASSESSMENT
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 Patient had cardiac arrest in ED , was intubated CPR was initiated and subsequently ROSC was acieved after 20 minutes. Patient is admitted to ICU.    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  - CAD s/p stent  - Diabetes    Plan  ====Neuro====  #baseline mentation: AAOx3  #intubated  - on 12/20 in ED during Code blue  - off sedation  - SBT (12/21)    ====CVS====  #Cardiac arrest  - ROSC after 20min  - trop trending down    ====Pulm====  #Acute hypoxic respiratory failure  - likely 2/2 PTX vs aspiration PNA  - ABG 7.55/36/68/32  - c/w mechanical ventilation and solumedrol 40 bid    #PTX  - s/p pigtail in ED  - repeat CXR complete resolution    ====GI====  #no active issues  #diet: none  #bowel regimen: none    ====nephro====  #hypercalcemia  - resolved  - ddx: dehydration vs SCC-related  - PTH low  - s/p IVF    ====ID====  #leukocytosis  - likely 2/2 aspiration PNA vs reactive process  - c/w cefepime   - f/u sputum and urine culture    ====Heme/onc====  #Metastatic SCC of tongue base  - s/p resection, chemo/radiation, and immunotherapy (10/2021)  - plan for experimental drug as outpatient.    ====Endo====  #DM  - c/w sliding scale  - f/u a1c    ====Ppx====  #DVT: lovenox  #GI: PPI   Assessment:  - 76 year old male with medical history of HTN, HLD, DM2, CAD s/p stents, 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 Patient had cardiac arrest in ED , was intubated CPR was initiated and subsequently ROSC was acieved after 20 minutes. Patient is admitted to ICU.    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  - CAD s/p stent  - Diabetes    Plan  ====Neuro====  #baseline mentation: AAOx3  #intubated  - on 12/20 in ED during Code blue  - off sedation  - SBT (12/21, 22)  - extubated on 12/22    ====CVS====  #Cardiac arrest  - ROSC after 20min  - trop trending down    ====Pulm====  #Acute hypoxic respiratory failure  - likely 2/2 PTX vs aspiration PNA  - ABG 7.55/36/68/32  - c/w NC and solumedrol 40 bid  - c/w cefepime    #PTX  - s/p pigtail in ED  - repeat CXR complete resolution    ====GI====  #no active issues  #diet: none  #bowel regimen: none    ====nephro====  #hypercalcemia  - resolved  - ddx: dehydration vs SCC-related  - PTH low  - s/p IVF    ====ID====  #leukocytosis  - likely 2/2 aspiration PNA vs reactive process  - Ucx neg  - Scx MAC  - c/w cefepime (12/20~)  - f/u sputum and urine culture    ====Heme/onc====  #Metastatic SCC of tongue base  - s/p resection, chemo/radiation, and immunotherapy (10/2021)  - plan for experimental drug as outpatient.    ====Endo====  #DM  - a1c 6.3  - c/w sliding scale    ====Ppx====  #DVT: lovenox  #GI: PPI

## 2021-12-22 NOTE — PROGRESS NOTE ADULT - SUBJECTIVE AND OBJECTIVE BOX
INTERVAL HPI/OVERNIGHT EVENTS: ***    Antimicrobial:  cefepime   IVPB      cefepime   IVPB 2000 milliGRAM(s) IV Intermittent every 12 hours    Cardiovascular:    Pulmonary:    Hematalogic:    Other:  chlorhexidine 0.12% Liquid 15 milliLiter(s) Oral Mucosa every 12 hours  chlorhexidine 2% Cloths 1 Application(s) Topical <User Schedule>  dexMEDEtomidine Infusion 0.5 MICROgram(s)/kG/Hr IV Continuous <Continuous>  methylPREDNISolone sodium succinate Injectable 40 milliGRAM(s) IV Push every 12 hours    cefepime   IVPB      cefepime   IVPB 2000 milliGRAM(s) IV Intermittent every 12 hours  chlorhexidine 0.12% Liquid 15 milliLiter(s) Oral Mucosa every 12 hours  chlorhexidine 2% Cloths 1 Application(s) Topical <User Schedule>  dexMEDEtomidine Infusion 0.5 MICROgram(s)/kG/Hr IV Continuous <Continuous>  methylPREDNISolone sodium succinate Injectable 40 milliGRAM(s) IV Push every 12 hours    Drug Dosing Weight  Height (cm): 175.3 (20 Dec 2021 02:21)  Weight (kg): 72.5 (20 Dec 2021 06:30)  BMI (kg/m2): 23.6 (20 Dec 2021 06:30)  BSA (m2): 1.88 (20 Dec 2021 06:30)    ICU Vital Signs Last 24 Hrs  T(C): 37.7 (22 Dec 2021 14:00), Max: 37.9 (22 Dec 2021 09:00)  T(F): 99.9 (22 Dec 2021 14:00), Max: 100.2 (22 Dec 2021 09:00)  HR: 82 (22 Dec 2021 14:00) (55 - 82)  BP: 159/71 (22 Dec 2021 14:00) (118/57 - 171/62)  BP(mean): 92 (22 Dec 2021 14:00) (72 - 92)  ABP: --  ABP(mean): --  RR: 29 (22 Dec 2021 14:00) (10 - 32)  SpO2: 99% (22 Dec 2021 14:00) (93% - 99%)      ABG - ( 22 Dec 2021 03:23 )  pH, Arterial: 7.52  pH, Blood: x     /  pCO2: 36    /  pO2: 140   / HCO3: 29    / Base Excess: 6.4   /  SaO2: 99                    12-21 @ 07:01  -  12-22 @ 07:00  --------------------------------------------------------  IN: 474.4 mL / OUT: 1430 mL / NET: -955.6 mL        Mode: CPAP with PS  FiO2: 40  PEEP: 5  MAP: 8        PHYSICAL EXAM:    GENERAL: NAD, well-groomed, well-developed, AAOx3  EYES: EOMI, PERRLA,   NECK: Supple, No JVD; Normal thyroid; Trachea midline  NERVOUS SYSTEM: Motor Strength 5/5 B/L upper and lower extremities; DTRs 2+ intact and symmetric  CHEST/LUNG: No rales, rhonchi, wheezing   HEART: Regular rate and rhythm; No murmurs,   ABDOMEN: Soft, Nontender, Nondistended; Bowel sounds present  EXTREMITIES:  2+ Peripheral Pulses, No clubbing, cyanosis, or edema  LINES/TUBES/CATHETERS: n/a    LABS:  CBC Full  -  ( 22 Dec 2021 04:00 )  WBC Count : 14.40 K/uL  RBC Count : 3.27 M/uL  Hemoglobin : 9.5 g/dL  Hematocrit : 29.6 %  Platelet Count - Automated : 255 K/uL  Mean Cell Volume : 90.5 fl  Mean Cell Hemoglobin : 29.1 pg  Mean Cell Hemoglobin Concentration : 32.1 gm/dL  Auto Neutrophil # : 13.40 K/uL  Auto Lymphocyte # : 0.42 K/uL  Auto Monocyte # : 0.48 K/uL  Auto Eosinophil # : 0.00 K/uL  Auto Basophil # : 0.01 K/uL  Auto Neutrophil % : 93.1 %  Auto Lymphocyte % : 2.9 %  Auto Monocyte % : 3.3 %  Auto Eosinophil % : 0.0 %  Auto Basophil % : 0.1 %    12-22    145  |  111<H>  |  27<H>  ----------------------------<  213<H>  4.1   |  29  |  0.76    Ca    9.9      22 Dec 2021 04:00  Phos  2.2     12-22  Mg     2.1     12-22    TPro  6.0  /  Alb  1.6<L>  /  TBili  0.4  /  DBili  x   /  AST  21  /  ALT  24  /  AlkPhos  54  12-22        Culture Results:   No growth (12-21 @ 04:42)      RADIOLOGY & ADDITIONAL STUDIES REVIEWED:  Yes    CRITICAL CARE TIME SPENT: 35 minutes INTERVAL HPI/OVERNIGHT EVENTS: No acute events overnight. Patient has been extubated and is tolerating NC very well.    Antimicrobial:  cefepime   IVPB      cefepime   IVPB 2000 milliGRAM(s) IV Intermittent every 12 hours    Cardiovascular:    Pulmonary:    Hematalogic:    Other:  chlorhexidine 0.12% Liquid 15 milliLiter(s) Oral Mucosa every 12 hours  chlorhexidine 2% Cloths 1 Application(s) Topical <User Schedule>  dexMEDEtomidine Infusion 0.5 MICROgram(s)/kG/Hr IV Continuous <Continuous>  methylPREDNISolone sodium succinate Injectable 40 milliGRAM(s) IV Push every 12 hours    cefepime   IVPB      cefepime   IVPB 2000 milliGRAM(s) IV Intermittent every 12 hours  chlorhexidine 0.12% Liquid 15 milliLiter(s) Oral Mucosa every 12 hours  chlorhexidine 2% Cloths 1 Application(s) Topical <User Schedule>  dexMEDEtomidine Infusion 0.5 MICROgram(s)/kG/Hr IV Continuous <Continuous>  methylPREDNISolone sodium succinate Injectable 40 milliGRAM(s) IV Push every 12 hours    Drug Dosing Weight  Height (cm): 175.3 (20 Dec 2021 02:21)  Weight (kg): 72.5 (20 Dec 2021 06:30)  BMI (kg/m2): 23.6 (20 Dec 2021 06:30)  BSA (m2): 1.88 (20 Dec 2021 06:30)    ICU Vital Signs Last 24 Hrs  T(C): 37.7 (22 Dec 2021 14:00), Max: 37.9 (22 Dec 2021 09:00)  T(F): 99.9 (22 Dec 2021 14:00), Max: 100.2 (22 Dec 2021 09:00)  HR: 82 (22 Dec 2021 14:00) (55 - 82)  BP: 159/71 (22 Dec 2021 14:00) (118/57 - 171/62)  BP(mean): 92 (22 Dec 2021 14:00) (72 - 92)  ABP: --  ABP(mean): --  RR: 29 (22 Dec 2021 14:00) (10 - 32)  SpO2: 99% (22 Dec 2021 14:00) (93% - 99%)      ABG - ( 22 Dec 2021 03:23 )  pH, Arterial: 7.52  pH, Blood: x     /  pCO2: 36    /  pO2: 140   / HCO3: 29    / Base Excess: 6.4   /  SaO2: 99                    12-21 @ 07:01  -  12-22 @ 07:00  --------------------------------------------------------  IN: 474.4 mL / OUT: 1430 mL / NET: -955.6 mL        Mode: CPAP with PS  FiO2: 40  PEEP: 5  MAP: 8    PHYSICAL EXAM:  GENERAL: AAOx2; off intubated and sedation today  HEAD:  Atraumatic, Normocephalic  EYES: EOMI  NECK: Supple, No JVD  CHEST/LUNG: decreasing expiratory wheezes noted; No crackles; No accessory muscles used  HEART: distant heart sounds heart; No murmurs;   ABDOMEN: Soft, Nondistended; Bowel sounds present; No guarding  EXTREMITIES:  2+ Peripheral Pulses, No cyanosis or edema  NEUROLOGY:+ve cough reflex  SKIN: No rashes or lesions    LABS:  CBC Full  -  ( 22 Dec 2021 04:00 )  WBC Count : 14.40 K/uL  RBC Count : 3.27 M/uL  Hemoglobin : 9.5 g/dL  Hematocrit : 29.6 %  Platelet Count - Automated : 255 K/uL  Mean Cell Volume : 90.5 fl  Mean Cell Hemoglobin : 29.1 pg  Mean Cell Hemoglobin Concentration : 32.1 gm/dL  Auto Neutrophil # : 13.40 K/uL  Auto Lymphocyte # : 0.42 K/uL  Auto Monocyte # : 0.48 K/uL  Auto Eosinophil # : 0.00 K/uL  Auto Basophil # : 0.01 K/uL  Auto Neutrophil % : 93.1 %  Auto Lymphocyte % : 2.9 %  Auto Monocyte % : 3.3 %  Auto Eosinophil % : 0.0 %  Auto Basophil % : 0.1 %    12-22    145  |  111<H>  |  27<H>  ----------------------------<  213<H>  4.1   |  29  |  0.76    Ca    9.9      22 Dec 2021 04:00  Phos  2.2     12-22  Mg     2.1     12-22    TPro  6.0  /  Alb  1.6<L>  /  TBili  0.4  /  DBili  x   /  AST  21  /  ALT  24  /  AlkPhos  54  12-22        Culture Results:   No growth (12-21 @ 04:42)      RADIOLOGY & ADDITIONAL STUDIES REVIEWED:  Yes    CRITICAL CARE TIME SPENT: 35 minutes

## 2021-12-23 NOTE — PROGRESS NOTE ADULT - ASSESSMENT
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 Patient had cardiac arrest in ED , was intubated CPR was initiated and subsequently ROSC was acieved after 20 minutes. Patient is admitted to ICU.    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  - CAD s/p stent  - Diabetes    Plan  ====Neuro====  #baseline mentation: AAOx3  #intubated  - on 12/20 in ED during Code blue  - off sedation  - SBT (12/21, 22)  - extubated on 12/22  - re-intubated on 12/23    ====CVS====  #Cardiac arrest  - ROSC after 20min  - trop trending down    ====Pulm====  #Acute hypoxic respiratory failure  - likely 2/2 PTX vs aspiration PNA  - ABG 7.55/36/68/32  - s/p solumedrol  - c/w cefepime (12/20~)    #PTX  - s/p pigtail in ED  - repeat CXR complete resolution    ====GI====  #no active issues  #diet: NPO w/ tube feeding  #bowel regimen: none    ====nephro====  #hypercalcemia  - resolved  - ddx: dehydration vs SCC-related  - PTH low  - s/p IVF    ====ID====  #leukocytosis  - likely 2/2 aspiration PNA vs reactive process  - Ucx neg  - Scx MAC  - c/w cefepime (12/20~)    ====Heme/onc====  #Metastatic SCC of tongue base  - s/p resection, chemo/radiation, and immunotherapy (10/2021)  - plan for experimental drug as outpatient.    ====Endo====  #DM  - a1c 6.3  - c/w sliding scale    ====Ppx====  #DVT: lovenox  #GI: PPI

## 2021-12-23 NOTE — PROGRESS NOTE ADULT - NUTRITIONAL ASSESSMENT
This patient has been assessed with a concern for Malnutrition and has been determined to have a diagnosis/diagnoses of Severe protein-calorie malnutrition.    This patient is being managed with:   Diet NPO with Tube Feed-  Tube Feeding Modality: Nasogastric  Jevity 1.5 Dhruv  Total Volume for 24 Hours (mL): 1200  Continuous  Starting Tube Feed Rate {mL per Hour}: 10  Increase Tube Feed Rate by (mL): 10     Every 4 hours  Until Goal Tube Feed Rate (mL per Hour): 50  Tube Feed Duration (in Hours): 24  Tube Feed Start Time: 16:00  Free Water Flush  Bolus   Total Volume per Flush (mL): 250   Frequency: Every 4 Hours  Entered: Dec 22 2021  3:46PM

## 2021-12-23 NOTE — CONSULT NOTE ADULT - SUBJECTIVE AND OBJECTIVE BOX
Called for intubation,Immediatly came to floor,suction,pre O2,DLS,&.5 mm ETT,placced uneventfully,cuff up,,BBS positive,confirm by medical staff,secure at 23 cm,Safely hand over to ICU staff satisfactorily

## 2021-12-23 NOTE — PROGRESS NOTE ADULT - SUBJECTIVE AND OBJECTIVE BOX
INTERVAL HPI/OVERNIGHT EVENTS: Patient re-intubated overnight due to desaturation. He is currently sedated and intubated    Antimicrobial:  cefepime   IVPB      cefepime   IVPB 2000 milliGRAM(s) IV Intermittent every 12 hours    Cardiovascular:  norepinephrine Infusion 0.05 MICROgram(s)/kG/Min IV Continuous <Continuous>    Pulmonary:  acetylcysteine 10%  Inhalation 5 milliLiter(s) Inhalation every 6 hours  ALBUTerol    90 MICROgram(s) HFA Inhaler 2 Puff(s) Inhalation every 6 hours    Hematalogic:    Other:  chlorhexidine 0.12% Liquid 15 milliLiter(s) Oral Mucosa every 12 hours  chlorhexidine 2% Cloths 1 Application(s) Topical <User Schedule>  dexMEDEtomidine Infusion 0.5 MICROgram(s)/kG/Hr IV Continuous <Continuous>  dextrose 40% Gel 15 Gram(s) Oral once  dextrose 5%. 1000 milliLiter(s) IV Continuous <Continuous>  dextrose 5%. 1000 milliLiter(s) IV Continuous <Continuous>  dextrose 50% Injectable 25 Gram(s) IV Push once  dextrose 50% Injectable 12.5 Gram(s) IV Push once  dextrose 50% Injectable 25 Gram(s) IV Push once  glucagon  Injectable 1 milliGRAM(s) IntraMuscular once  insulin lispro (ADMELOG) corrective regimen sliding scale   SubCutaneous every 6 hours    acetylcysteine 10%  Inhalation 5 milliLiter(s) Inhalation every 6 hours  ALBUTerol    90 MICROgram(s) HFA Inhaler 2 Puff(s) Inhalation every 6 hours  cefepime   IVPB      cefepime   IVPB 2000 milliGRAM(s) IV Intermittent every 12 hours  chlorhexidine 0.12% Liquid 15 milliLiter(s) Oral Mucosa every 12 hours  chlorhexidine 2% Cloths 1 Application(s) Topical <User Schedule>  dexMEDEtomidine Infusion 0.5 MICROgram(s)/kG/Hr IV Continuous <Continuous>  dextrose 40% Gel 15 Gram(s) Oral once  dextrose 5%. 1000 milliLiter(s) IV Continuous <Continuous>  dextrose 5%. 1000 milliLiter(s) IV Continuous <Continuous>  dextrose 50% Injectable 25 Gram(s) IV Push once  dextrose 50% Injectable 12.5 Gram(s) IV Push once  dextrose 50% Injectable 25 Gram(s) IV Push once  glucagon  Injectable 1 milliGRAM(s) IntraMuscular once  insulin lispro (ADMELOG) corrective regimen sliding scale   SubCutaneous every 6 hours  norepinephrine Infusion 0.05 MICROgram(s)/kG/Min IV Continuous <Continuous>    Drug Dosing Weight  Height (cm): 175.3 (20 Dec 2021 02:21)  Weight (kg): 72.5 (20 Dec 2021 06:30)  BMI (kg/m2): 23.6 (20 Dec 2021 06:30)  BSA (m2): 1.88 (20 Dec 2021 06:30)    ICU Vital Signs Last 24 Hrs  T(C): 37.6 (23 Dec 2021 12:45), Max: 37.8 (22 Dec 2021 13:00)  T(F): 99.7 (23 Dec 2021 12:45), Max: 100 (22 Dec 2021 13:00)  HR: 66 (23 Dec 2021 12:45) (58 - 118)  BP: 148/69 (23 Dec 2021 12:45) (79/47 - 189/81)  BP(mean): 90 (23 Dec 2021 12:45) (54 - 110)  ABP: --  ABP(mean): --  RR: 20 (23 Dec 2021 12:45) (17 - 34)  SpO2: 100% (23 Dec 2021 12:45) (88% - 100%)      ABG - ( 23 Dec 2021 05:56 )  pH, Arterial: 7.42  pH, Blood: x     /  pCO2: 47    /  pO2: 201   / HCO3: 30    / Base Excess: 5.1   /  SaO2: 98                    12-22 @ 07:01  -  12-23 @ 07:00  --------------------------------------------------------  IN: 649.1 mL / OUT: 3250 mL / NET: -2600.9 mL        Mode: AC/ CMV (Assist Control/ Continuous Mandatory Ventilation)  RR (machine): 20  TV (machine): 450  FiO2: 60  PEEP: 5  ITime: 1  MAP: 10  PIP: 28        PHYSICAL EXAM:  GENERAL: sedated and intubated  HEAD:  Atraumatic, Normocephalic  EYES: intubated and sedated  NECK: Supple, No JVD  CHEST/LUNG: CTABL; No crackles or wheezes  HEART: distant heart sounds heart; No murmurs;   ABDOMEN: Soft, Nondistended; Bowel sounds present; No guarding  EXTREMITIES:  2+ Peripheral Pulses, No cyanosis or edema  NEUROLOGY:+ve cough reflex  SKIN: No rashes or lesions    LABS:  CBC Full  -  ( 23 Dec 2021 04:24 )  WBC Count : 20.65 K/uL  RBC Count : 3.72 M/uL  Hemoglobin : 10.7 g/dL  Hematocrit : 34.6 %  Platelet Count - Automated : 331 K/uL  Mean Cell Volume : 93.0 fl  Mean Cell Hemoglobin : 28.8 pg  Mean Cell Hemoglobin Concentration : 30.9 gm/dL  Auto Neutrophil # : 19.62 K/uL  Auto Lymphocyte # : 0.21 K/uL  Auto Monocyte # : 0.83 K/uL  Auto Eosinophil # : 0.00 K/uL  Auto Basophil # : 0.00 K/uL  Auto Neutrophil % : 95.0 %  Auto Lymphocyte % : 1.0 %  Auto Monocyte % : 4.0 %  Auto Eosinophil % : 0.0 %  Auto Basophil % : 0.0 %    12-23    148<H>  |  114<H>  |  36<H>  ----------------------------<  259<H>  4.4   |  29  |  0.81    Ca    10.1      23 Dec 2021 04:24  Phos  4.1     12-23  Mg     2.3     12-23    TPro  6.5  /  Alb  1.8<L>  /  TBili  0.4  /  DBili  x   /  AST  17  /  ALT  21  /  AlkPhos  60  12-23        Culture Results:   No growth (12-21 @ 04:42)      RADIOLOGY & ADDITIONAL STUDIES REVIEWED:  Yes    CRITICAL CARE TIME SPENT: 35 minutes

## 2021-12-24 NOTE — PROGRESS NOTE ADULT - NUTRITIONAL ASSESSMENT
This patient has been assessed with a concern for Malnutrition and has been determined to have a diagnosis/diagnoses of Severe protein-calorie malnutrition.    This patient is being managed with:   Diet NPO with Tube Feed-  Tube Feeding Modality: Nasogastric  Glucerna 1.5 Dhruv  Total Volume for 24 Hours (mL): 1200  Continuous  Starting Tube Feed Rate {mL per Hour}: 10  Increase Tube Feed Rate by (mL): 10     Every 4 hours  Until Goal Tube Feed Rate (mL per Hour): 50  Tube Feed Duration (in Hours): 24  Tube Feed Start Time: 10:00  Free Water Flush  Bolus   Total Volume per Flush (mL): 250   Frequency: Every 4 Hours  Entered: Dec 24 2021  8:53AM

## 2021-12-24 NOTE — PROGRESS NOTE ADULT - SUBJECTIVE AND OBJECTIVE BOX
INTERVAL HPI/OVERNIGHT EVENTS: No acute events overnight. Patient is more alert and oriented and following commands this morning. He remains mildly sedated and intubated. He reports no complaints. He is tolerating SBT well.    Antimicrobial:  cefepime   IVPB      cefepime   IVPB 2000 milliGRAM(s) IV Intermittent every 12 hours    Cardiovascular:  norepinephrine Infusion 0.05 MICROgram(s)/kG/Min IV Continuous <Continuous>    Pulmonary:  acetylcysteine 10%  Inhalation 5 milliLiter(s) Inhalation every 6 hours  ALBUTerol    90 MICROgram(s) HFA Inhaler 2 Puff(s) Inhalation every 6 hours    Hematalogic:    Other:  chlorhexidine 0.12% Liquid 15 milliLiter(s) Oral Mucosa every 12 hours  chlorhexidine 2% Cloths 1 Application(s) Topical <User Schedule>  dexMEDEtomidine Infusion 0.5 MICROgram(s)/kG/Hr IV Continuous <Continuous>  dextrose 40% Gel 15 Gram(s) Oral once  dextrose 5%. 1000 milliLiter(s) IV Continuous <Continuous>  dextrose 5%. 1000 milliLiter(s) IV Continuous <Continuous>  dextrose 50% Injectable 25 Gram(s) IV Push once  dextrose 50% Injectable 12.5 Gram(s) IV Push once  dextrose 50% Injectable 25 Gram(s) IV Push once  glucagon  Injectable 1 milliGRAM(s) IntraMuscular once  insulin lispro (ADMELOG) corrective regimen sliding scale   SubCutaneous every 6 hours  potassium phosphate IVPB 15 milliMole(s) IV Intermittent once    acetylcysteine 10%  Inhalation 5 milliLiter(s) Inhalation every 6 hours  ALBUTerol    90 MICROgram(s) HFA Inhaler 2 Puff(s) Inhalation every 6 hours  cefepime   IVPB      cefepime   IVPB 2000 milliGRAM(s) IV Intermittent every 12 hours  chlorhexidine 0.12% Liquid 15 milliLiter(s) Oral Mucosa every 12 hours  chlorhexidine 2% Cloths 1 Application(s) Topical <User Schedule>  dexMEDEtomidine Infusion 0.5 MICROgram(s)/kG/Hr IV Continuous <Continuous>  dextrose 40% Gel 15 Gram(s) Oral once  dextrose 5%. 1000 milliLiter(s) IV Continuous <Continuous>  dextrose 5%. 1000 milliLiter(s) IV Continuous <Continuous>  dextrose 50% Injectable 25 Gram(s) IV Push once  dextrose 50% Injectable 12.5 Gram(s) IV Push once  dextrose 50% Injectable 25 Gram(s) IV Push once  glucagon  Injectable 1 milliGRAM(s) IntraMuscular once  insulin lispro (ADMELOG) corrective regimen sliding scale   SubCutaneous every 6 hours  norepinephrine Infusion 0.05 MICROgram(s)/kG/Min IV Continuous <Continuous>  potassium phosphate IVPB 15 milliMole(s) IV Intermittent once    Drug Dosing Weight  Height (cm): 175.3 (20 Dec 2021 02:21)  Weight (kg): 72.5 (20 Dec 2021 06:30)  BMI (kg/m2): 23.6 (20 Dec 2021 06:30)  BSA (m2): 1.88 (20 Dec 2021 06:30)    ICU Vital Signs Last 24 Hrs  T(C): 36.9 (24 Dec 2021 08:00), Max: 38.2 (23 Dec 2021 19:42)  T(F): 98.4 (24 Dec 2021 08:00), Max: 100.8 (23 Dec 2021 19:42)  HR: 79 (24 Dec 2021 08:06) (53 - 80)  BP: 132/48 (24 Dec 2021 08:00) (100/59 - 153/69)  BP(mean): 71 (24 Dec 2021 08:00) (64 - 91)  ABP: --  ABP(mean): --  RR: 29 (24 Dec 2021 08:00) (16 - 29)  SpO2: 99% (24 Dec 2021 08:06) (98% - 100%)      ABG - ( 24 Dec 2021 04:02 )  pH, Arterial: 7.51  pH, Blood: x     /  pCO2: 40    /  pO2: 127   / HCO3: 32    / Base Excess: 8.2   /  SaO2: 98                    12-23 @ 07:01  -  12-24 @ 07:00  --------------------------------------------------------  IN: 662.5 mL / OUT: 1100 mL / NET: -437.5 mL        Mode: PS (Pressure Support)/ Spontaneous  FiO2: 40  PEEP: 5  PS: 5  ITime: 1  MAP: 7  PIP: 11      PHYSICAL EXAM:  GENERAL: sedated and intubated, NAD  HEAD:  Atraumatic, Normocephalic  EYES: EOMI   NECK: Supple, No JVD  CHEST/LUNG: inspiratory wheezes noted b/l; No crackles  HEART: distant heart sounds heart; No murmurs;   ABDOMEN: Soft, Nondistended; Bowel sounds present; No guarding  EXTREMITIES:  2+ Peripheral Pulses, No cyanosis or edema  NEUROLOGY:+ve cough reflex, alert and oriented and following commands  SKIN: No rashes or lesions    LABS:  CBC Full  -  ( 24 Dec 2021 06:30 )  WBC Count : 11.50 K/uL  RBC Count : 3.42 M/uL  Hemoglobin : 10.0 g/dL  Hematocrit : 31.7 %  Platelet Count - Automated : 249 K/uL  Mean Cell Volume : 92.7 fl  Mean Cell Hemoglobin : 29.2 pg  Mean Cell Hemoglobin Concentration : 31.5 gm/dL  Auto Neutrophil # : 10.50 K/uL  Auto Lymphocyte # : 0.48 K/uL  Auto Monocyte # : 0.44 K/uL  Auto Eosinophil # : 0.00 K/uL  Auto Basophil # : 0.01 K/uL  Auto Neutrophil % : 91.3 %  Auto Lymphocyte % : 4.2 %  Auto Monocyte % : 3.8 %  Auto Eosinophil % : 0.0 %  Auto Basophil % : 0.1 %    12-24    153<H>  |  117<H>  |  41<H>  ----------------------------<  257<H>  3.8   |  32<H>  |  0.82    Ca    9.6      24 Dec 2021 06:30  Phos  2.1     12-24  Mg     2.5     12-24    TPro  6.0  /  Alb  1.6<L>  /  TBili  0.4  /  DBili  x   /  AST  14  /  ALT  15  /  AlkPhos  52  12-24            RADIOLOGY & ADDITIONAL STUDIES REVIEWED:  Yes    CRITICAL CARE TIME SPENT: 35 minutes

## 2021-12-24 NOTE — PROGRESS NOTE ADULT - ASSESSMENT
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 Patient had cardiac arrest in ED , was intubated CPR was initiated and subsequently ROSC was acieved after 20 minutes. Patient is admitted to ICU.    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  - CAD s/p stent  - Diabetes    Plan  ====Neuro====  #baseline mentation: AAOx3  #intubated  - on 12/20 in ED during Code blue  - SBT (12/21, 22, 24)  - extubated on 12/22  - re-intubated on 12/23  - family wishes to trach if needed    ====CVS====  #Cardiac arrest  - ROSC after 20min  - trop trending down    ====Pulm====  #Acute hypoxic respiratory failure  - likely 2/2 PTX vs aspiration PNA  - ABG 7.51/40/127/32  - s/p solumedrol  - c/w cefepime (12/20~)    #PTX  - s/p pigtail in ED  - repeat CXR complete resolution    ====GI====  #no active issues  #diet: NPO w/ tube feeding  #bowel regimen: none    ====nephro====  #hypercalcemia  - resolved  - ddx: dehydration vs SCC-related  - PTH low  - s/p IVF    ====ID====  #leukocytosis  - likely 2/2 aspiration PNA vs reactive process  - Ucx neg  - Scx MAC  - c/w cefepime (12/20~)    ====Heme/onc====  #Metastatic SCC of tongue base  - s/p resection, chemo/radiation, and immunotherapy (10/2021)  - plan for experimental drug as outpatient.    ====Endo====  #DM  - a1c 6.3  - c/w sliding scale    ====Ppx====  #DVT: lovenox  #GI: PPI

## 2021-12-25 NOTE — PROGRESS NOTE ADULT - ASSESSMENT
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 Patient had cardiac arrest in ED , was intubated CPR was initiated and subsequently ROSC was acieved after 20 minutes. Patient is admitted to ICU.    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  - CAD s/p stent  - Diabetes    Plan  ====Neuro====  #baseline mentation: AAOx3  #intubated  - on 12/20 in ED during Code blue  - SBT (12/21, 22, 24)  - extubated on 12/22  - re-intubated on 12/23  - family wishes to trach if needed    ====CVS====  #Cardiac arrest  - ROSC after 20min  - trop trending down    ====Pulm====  #Acute hypoxic respiratory failure  - likely 2/2 PTX vs aspiration PNA  - ABG 7.51/40/127/32  - s/p solumedrol  - c/w cefepime (12/20~)    #PTX  - s/p pigtail in ED  - repeat CXR complete resolution    ====GI====  #no active issues  #diet: NPO w/ tube feeding  #bowel regimen: none    ====nephro====  #hypercalcemia  - resolved  - ddx: dehydration vs SCC-related  - PTH low  - s/p IVF    ====ID====  #leukocytosis  - likely 2/2 aspiration PNA vs reactive process  - Ucx neg  - Scx MAC  - c/w cefepime (12/20~)    ====Heme/onc====  #Metastatic SCC of tongue base  - s/p resection, chemo/radiation, and immunotherapy (10/2021)  - plan for experimental drug as outpatient.    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 Patient had cardiac arrest in ED , was intubated CPR was initiated and subsequently ROSC was acieved after 20 minutes. Patient is admitted to ICU.    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  - CAD s/p stent  - Diabetes    Plan  ====Neuro====  #baseline mentation: AAOx3  #intubated  - on 12/20 in ED during Code blue  - SBT (12/21, 22, 24)  - extubated on 12/22  - re-intubated on 12/23  - family wishes to trach if needed  -SBT today     ====CVS====  #Cardiac arrest  - ROSC after 20min  - trop trending down    ====Pulm====  #Acute hypoxic respiratory failure  - likely 2/2 PTX vs aspiration PNA  - ABG 7.51/40/127/32  - s/p solumedrol  - c/w cefepime (12/20~)  -SBT today     #PTX  - s/p pigtail in ED  - repeat CXR complete resolution    ====GI====  #no active issues  #diet: NPO w/ tube feeding  #bowel regimen: none    ====nephro====  #hypercalcemia  - resolved  - ddx: dehydration vs SCC-related  - PTH low  - s/p IVF    ====ID====  #leukocytosis  - likely 2/2 aspiration PNA vs reactive process  - Ucx neg  - Scx MAC  - c/w cefepime (12/20~)    ====Heme/onc====  #Metastatic SCC of tongue base  - s/p resection, chemo/radiation, and immunotherapy (10/2021)  - plan for experimental drug as outpatient.    ====Endo====  #DM  - a1c 6.3  - c/w sliding scale    ====Ppx====  #DVT: lovenox  #GI: PPI

## 2021-12-25 NOTE — PROGRESS NOTE ADULT - SUBJECTIVE AND OBJECTIVE BOX
PGY-1 Progress Note discussed with attending    PAGER #: [-----] TILL 5:00 PM  PLEASE CONTACT ON CALL TEAM:  - On Call Team (Please refer to Renée) FROM 5:00 PM - 8:30PM  - Nightfloat Team FROM 8:30 -7:30 AM      INTERVAL HPI/OVERNIGHT EVENTS: No acute events overnight.    MEDICATIONS:  ALBUTerol    90 MICROgram(s) HFA Inhaler 2 Puff(s) Inhalation every 6 hours  cefepime   IVPB      cefepime   IVPB 2000 milliGRAM(s) IV Intermittent every 12 hours  chlorhexidine 0.12% Liquid 15 milliLiter(s) Oral Mucosa every 12 hours  chlorhexidine 2% Cloths 1 Application(s) Topical <User Schedule>  dexMEDEtomidine Infusion 0.5 MICROgram(s)/kG/Hr IV Continuous <Continuous>  dextrose 40% Gel 15 Gram(s) Oral once  dextrose 5%. 1000 milliLiter(s) IV Continuous <Continuous>  dextrose 5%. 1000 milliLiter(s) IV Continuous <Continuous>  dextrose 50% Injectable 25 Gram(s) IV Push once  dextrose 50% Injectable 12.5 Gram(s) IV Push once  dextrose 50% Injectable 25 Gram(s) IV Push once  glucagon  Injectable 1 milliGRAM(s) IntraMuscular once  insulin lispro (ADMELOG) corrective regimen sliding scale   SubCutaneous every 6 hours  norepinephrine Infusion 0.5 MICROgram(s)/kG/Min IV Continuous <Continuous>  sodium chloride 0.45%. 1000 milliLiter(s) IV Continuous <Continuous>      REVIEW OF SYSTEMS:  CONSTITUTIONAL: No fever, weight loss, or fatigue  RESPIRATORY: No cough, wheezing, chills or hemoptysis; No shortness of breath  CARDIOVASCULAR: No chest pain, palpitations, dizziness, or leg swelling  GASTROINTESTINAL: No abdominal pain. No nausea, vomiting, or hematemesis; No diarrhea or constipation. No melena or hematochezia.  GENITOURINARY: No dysuria or hematuria, urinary frequency  NEUROLOGICAL: No headaches, memory loss, loss of strength, numbness, or tremors  SKIN: No itching, burning, rashes, or lesions     Vital Signs Last 24 Hrs  T(C): 38.4 (25 Dec 2021 04:00), Max: 38.6 (25 Dec 2021 02:00)  T(F): 101.1 (25 Dec 2021 04:00), Max: 101.5 (25 Dec 2021 02:00)  HR: 69 (25 Dec 2021 04:00) (62 - 93)  BP: 114/52 (25 Dec 2021 04:00) (100/56 - 147/65)  BP(mean): 67 (25 Dec 2021 04:00) (66 - 85)  RR: 16 (25 Dec 2021 04:00) (16 - 30)  SpO2: 97% (25 Dec 2021 04:00) (96% - 99%)    PHYSICAL EXAMINATION:  GENERAL: sedated and intubated, NAD  HEAD:  Atraumatic, Normocephalic  EYES: EOMI   NECK: Supple, No JVD  CHEST/LUNG: inspiratory wheezes noted b/l; No crackles  HEART: distant heart sounds heart; No murmurs;   ABDOMEN: Soft, Nondistended; Bowel sounds present; No guarding  EXTREMITIES:  2+ Peripheral Pulses, No cyanosis or edema  NEUROLOGY:+ve cough reflex, alert and oriented and following commands  SKIN: No rashes or lesions                          9.4    11.79 )-----------( 220      ( 25 Dec 2021 04:13 )             30.9     12-25    154<H>  |  118<H>  |  38<H>  ----------------------------<  215<H>  3.9   |  30  |  0.83    Ca    9.7      25 Dec 2021 04:13  Phos  2.0     12-25  Mg     2.3     12-25    TPro  5.5<L>  /  Alb  1.4<L>  /  TBili  0.3  /  DBili  x   /  AST  17  /  ALT  14  /  AlkPhos  49  12-25    LIVER FUNCTIONS - ( 25 Dec 2021 04:13 )  Alb: 1.4 g/dL / Pro: 5.5 g/dL / ALK PHOS: 49 U/L / ALT: 14 U/L DA / AST: 17 U/L / GGT: x                   CAPILLARY BLOOD GLUCOSE      RADIOLOGY & ADDITIONAL TESTS:

## 2021-12-26 NOTE — PROGRESS NOTE ADULT - SUBJECTIVE AND OBJECTIVE BOX
INTERVAL HPI/OVERNIGHT EVENTS: No acute events overnight. Patient remains intubated, but off sedation. He is alert and follows commands appropriately    Antimicrobial:  cefepime   IVPB      cefepime   IVPB 2000 milliGRAM(s) IV Intermittent every 12 hours    Cardiovascular:  norepinephrine Infusion 0.5 MICROgram(s)/kG/Min IV Continuous <Continuous>    Pulmonary:  ALBUTerol    90 MICROgram(s) HFA Inhaler 2 Puff(s) Inhalation every 6 hours    Hematalogic:    Other:  chlorhexidine 0.12% Liquid 15 milliLiter(s) Oral Mucosa every 12 hours  chlorhexidine 2% Cloths 1 Application(s) Topical <User Schedule>  dexMEDEtomidine Infusion 0.5 MICROgram(s)/kG/Hr IV Continuous <Continuous>  dextrose 40% Gel 15 Gram(s) Oral once  dextrose 5% + sodium chloride 0.45%. 1000 milliLiter(s) IV Continuous <Continuous>  dextrose 5%. 1000 milliLiter(s) IV Continuous <Continuous>  dextrose 5%. 1000 milliLiter(s) IV Continuous <Continuous>  dextrose 50% Injectable 25 Gram(s) IV Push once  dextrose 50% Injectable 12.5 Gram(s) IV Push once  dextrose 50% Injectable 25 Gram(s) IV Push once  insulin lispro (ADMELOG) corrective regimen sliding scale   SubCutaneous every 6 hours    ALBUTerol    90 MICROgram(s) HFA Inhaler 2 Puff(s) Inhalation every 6 hours  cefepime   IVPB      cefepime   IVPB 2000 milliGRAM(s) IV Intermittent every 12 hours  chlorhexidine 0.12% Liquid 15 milliLiter(s) Oral Mucosa every 12 hours  chlorhexidine 2% Cloths 1 Application(s) Topical <User Schedule>  dexMEDEtomidine Infusion 0.5 MICROgram(s)/kG/Hr IV Continuous <Continuous>  dextrose 40% Gel 15 Gram(s) Oral once  dextrose 5% + sodium chloride 0.45%. 1000 milliLiter(s) IV Continuous <Continuous>  dextrose 5%. 1000 milliLiter(s) IV Continuous <Continuous>  dextrose 5%. 1000 milliLiter(s) IV Continuous <Continuous>  dextrose 50% Injectable 25 Gram(s) IV Push once  dextrose 50% Injectable 12.5 Gram(s) IV Push once  dextrose 50% Injectable 25 Gram(s) IV Push once  insulin lispro (ADMELOG) corrective regimen sliding scale   SubCutaneous every 6 hours  norepinephrine Infusion 0.5 MICROgram(s)/kG/Min IV Continuous <Continuous>    Drug Dosing Weight  Height (cm): 175.3 (20 Dec 2021 02:21)  Weight (kg): 72.5 (20 Dec 2021 06:30)  BMI (kg/m2): 23.6 (20 Dec 2021 06:30)  BSA (m2): 1.88 (20 Dec 2021 06:30)    ICU Vital Signs Last 24 Hrs  T(C): 37.9 (25 Dec 2021 23:13), Max: 38.6 (25 Dec 2021 02:00)  T(F): 100.2 (25 Dec 2021 23:13), Max: 101.5 (25 Dec 2021 02:00)  HR: 71 (25 Dec 2021 22:00) (55 - 78)  BP: 126/55 (25 Dec 2021 22:00) (102/58 - 148/56)  BP(mean): 72 (25 Dec 2021 22:00) (63 - 81)  ABP: --  ABP(mean): --  RR: 19 (25 Dec 2021 22:00) (14 - 22)  SpO2: 99% (25 Dec 2021 22:00) (93% - 99%)      ABG - ( 25 Dec 2021 03:38 )  pH, Arterial: 7.50  pH, Blood: x     /  pCO2: 40    /  pO2: 89    / HCO3: 31    / Base Excess: 7.4   /  SaO2: 98                    12-24 @ 07:01  -  12-25 @ 07:00  --------------------------------------------------------  IN: 2920 mL / OUT: 1275 mL / NET: 1645 mL        Mode: AC/ CMV (Assist Control/ Continuous Mandatory Ventilation)  RR (machine): 16  TV (machine): 450  FiO2: 40  PEEP: 5  ITime: 1  MAP: 10  PIP: 28        PHYSICAL EXAM:  GENERAL: intubated, NAD, alert  HEAD:  Atraumatic, Normocephalic  EYES: EOMI   NECK: Supple, No JVD  CHEST/LUNG: inspiratory wheezes noted b/l; No crackles  HEART: distant heart sounds heart; No murmurs;   ABDOMEN: Soft, Nondistended; Bowel sounds present; No guarding  EXTREMITIES:  2+ Peripheral Pulses, No cyanosis or edema  NEUROLOGY:+ve cough reflex, alert and oriented and following commands  SKIN: No rashes or lesions  LABS:  CBC Full  -  ( 25 Dec 2021 04:13 )  WBC Count : 11.79 K/uL  RBC Count : 3.30 M/uL  Hemoglobin : 9.4 g/dL  Hematocrit : 30.9 %  Platelet Count - Automated : 220 K/uL  Mean Cell Volume : 93.6 fl  Mean Cell Hemoglobin : 28.5 pg  Mean Cell Hemoglobin Concentration : 30.4 gm/dL  Auto Neutrophil # : 10.81 K/uL  Auto Lymphocyte # : 0.41 K/uL  Auto Monocyte # : 0.50 K/uL  Auto Eosinophil # : 0.01 K/uL  Auto Basophil # : 0.01 K/uL  Auto Neutrophil % : 91.7 %  Auto Lymphocyte % : 3.5 %  Auto Monocyte % : 4.2 %  Auto Eosinophil % : 0.1 %  Auto Basophil % : 0.1 %    12-25    154<H>  |  118<H>  |  38<H>  ----------------------------<  215<H>  3.9   |  30  |  0.83    Ca    9.7      25 Dec 2021 04:13  Phos  2.0     12-25  Mg     2.3     12-25    TPro  5.5<L>  /  Alb  1.4<L>  /  TBili  0.3  /  DBili  x   /  AST  17  /  ALT  14  /  AlkPhos  49  12-25            RADIOLOGY & ADDITIONAL STUDIES REVIEWED:  Yes    CRITICAL CARE TIME SPENT: 35 minutes INTERVAL HPI/OVERNIGHT EVENTS:   No acute events overnight. Patient remains intubated, but off sedation. He is alert and follows commands appropriately    Antimicrobial:  cefepime   IVPB      cefepime   IVPB 2000 milliGRAM(s) IV Intermittent every 12 hours    Cardiovascular:  norepinephrine Infusion 0.5 MICROgram(s)/kG/Min IV Continuous <Continuous>    Pulmonary:  ALBUTerol    90 MICROgram(s) HFA Inhaler 2 Puff(s) Inhalation every 6 hours    Hematalogic:    Other:  chlorhexidine 0.12% Liquid 15 milliLiter(s) Oral Mucosa every 12 hours  chlorhexidine 2% Cloths 1 Application(s) Topical <User Schedule>  dexMEDEtomidine Infusion 0.5 MICROgram(s)/kG/Hr IV Continuous <Continuous>  dextrose 40% Gel 15 Gram(s) Oral once  dextrose 5% + sodium chloride 0.45%. 1000 milliLiter(s) IV Continuous <Continuous>  dextrose 5%. 1000 milliLiter(s) IV Continuous <Continuous>  dextrose 5%. 1000 milliLiter(s) IV Continuous <Continuous>  dextrose 50% Injectable 25 Gram(s) IV Push once  dextrose 50% Injectable 12.5 Gram(s) IV Push once  dextrose 50% Injectable 25 Gram(s) IV Push once  insulin lispro (ADMELOG) corrective regimen sliding scale   SubCutaneous every 6 hours    ALBUTerol    90 MICROgram(s) HFA Inhaler 2 Puff(s) Inhalation every 6 hours  cefepime   IVPB      cefepime   IVPB 2000 milliGRAM(s) IV Intermittent every 12 hours  chlorhexidine 0.12% Liquid 15 milliLiter(s) Oral Mucosa every 12 hours  chlorhexidine 2% Cloths 1 Application(s) Topical <User Schedule>  dexMEDEtomidine Infusion 0.5 MICROgram(s)/kG/Hr IV Continuous <Continuous>  dextrose 40% Gel 15 Gram(s) Oral once  dextrose 5% + sodium chloride 0.45%. 1000 milliLiter(s) IV Continuous <Continuous>  dextrose 5%. 1000 milliLiter(s) IV Continuous <Continuous>  dextrose 5%. 1000 milliLiter(s) IV Continuous <Continuous>  dextrose 50% Injectable 25 Gram(s) IV Push once  dextrose 50% Injectable 12.5 Gram(s) IV Push once  dextrose 50% Injectable 25 Gram(s) IV Push once  insulin lispro (ADMELOG) corrective regimen sliding scale   SubCutaneous every 6 hours  norepinephrine Infusion 0.5 MICROgram(s)/kG/Min IV Continuous <Continuous>    Drug Dosing Weight  Height (cm): 175.3 (20 Dec 2021 02:21)  Weight (kg): 72.5 (20 Dec 2021 06:30)  BMI (kg/m2): 23.6 (20 Dec 2021 06:30)  BSA (m2): 1.88 (20 Dec 2021 06:30)    ICU Vital Signs Last 24 Hrs  T(C): 37.9 (25 Dec 2021 23:13), Max: 38.6 (25 Dec 2021 02:00)  T(F): 100.2 (25 Dec 2021 23:13), Max: 101.5 (25 Dec 2021 02:00)  HR: 71 (25 Dec 2021 22:00) (55 - 78)  BP: 126/55 (25 Dec 2021 22:00) (102/58 - 148/56)  BP(mean): 72 (25 Dec 2021 22:00) (63 - 81)  ABP: --  ABP(mean): --  RR: 19 (25 Dec 2021 22:00) (14 - 22)  SpO2: 99% (25 Dec 2021 22:00) (93% - 99%)      ABG - ( 25 Dec 2021 03:38 )  pH, Arterial: 7.50  pH, Blood: x     /  pCO2: 40    /  pO2: 89    / HCO3: 31    / Base Excess: 7.4   /  SaO2: 98                    12-24 @ 07:01  -  12-25 @ 07:00  --------------------------------------------------------  IN: 2920 mL / OUT: 1275 mL / NET: 1645 mL        Mode: AC/ CMV (Assist Control/ Continuous Mandatory Ventilation)  RR (machine): 16  TV (machine): 450  FiO2: 40  PEEP: 5  ITime: 1  MAP: 10  PIP: 28        PHYSICAL EXAM:  GENERAL: intubated, NAD, alert  HEAD:  Atraumatic, Normocephalic  EYES: EOMI   NECK: Supple, No JVD  CHEST/LUNG: inspiratory wheezes noted b/l; No crackles  HEART: distant heart sounds heart; No murmurs;   ABDOMEN: Soft, Nondistended; Bowel sounds present; No guarding  EXTREMITIES:  2+ Peripheral Pulses, No cyanosis or edema  NEUROLOGY:+ve cough reflex, alert and oriented and following commands  SKIN: No rashes or lesions  LABS:  CBC Full  -  ( 25 Dec 2021 04:13 )  WBC Count : 11.79 K/uL  RBC Count : 3.30 M/uL  Hemoglobin : 9.4 g/dL  Hematocrit : 30.9 %  Platelet Count - Automated : 220 K/uL  Mean Cell Volume : 93.6 fl  Mean Cell Hemoglobin : 28.5 pg  Mean Cell Hemoglobin Concentration : 30.4 gm/dL  Auto Neutrophil # : 10.81 K/uL  Auto Lymphocyte # : 0.41 K/uL  Auto Monocyte # : 0.50 K/uL  Auto Eosinophil # : 0.01 K/uL  Auto Basophil # : 0.01 K/uL  Auto Neutrophil % : 91.7 %  Auto Lymphocyte % : 3.5 %  Auto Monocyte % : 4.2 %  Auto Eosinophil % : 0.1 %  Auto Basophil % : 0.1 %    12-25    154<H>  |  118<H>  |  38<H>  ----------------------------<  215<H>  3.9   |  30  |  0.83    Ca    9.7      25 Dec 2021 04:13  Phos  2.0     12-25  Mg     2.3     12-25    TPro  5.5<L>  /  Alb  1.4<L>  /  TBili  0.3  /  DBili  x   /  AST  17  /  ALT  14  /  AlkPhos  49  12-25            RADIOLOGY & ADDITIONAL STUDIES REVIEWED:  Yes    CRITICAL CARE TIME SPENT: 35 minutes

## 2021-12-26 NOTE — PROGRESS NOTE ADULT - ASSESSMENT
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 Patient had cardiac arrest in ED , was intubated CPR was initiated and subsequently ROSC was acieved after 20 minutes. Patient is admitted to ICU.    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  - CAD s/p stent  - Diabetes    Plan  ====Neuro====  #baseline mentation: AAOx3  #intubated  - on 12/20 in ED during Code blue  - SBT (12/21, 22, 24)  - extubated on 12/22  - re-intubated on 12/23  - family wishes to trach if needed  -SBT today     ====CVS====  #Cardiac arrest  - ROSC after 20min  - trop trending down    ====Pulm====  #Acute hypoxic respiratory failure  - likely 2/2 PTX vs aspiration PNA  - ABG 7.51/40/127/32  - s/p solumedrol  - c/w cefepime (12/20~)  -SBT today     #PTX  - s/p pigtail in ED  - repeat CXR complete resolution    ====GI====  #no active issues  #diet: NPO w/ tube feeding  #bowel regimen: none    ====nephro====  #hypercalcemia  - resolved  - ddx: dehydration vs SCC-related  - PTH low  - s/p IVF    ====ID====  #leukocytosis  - likely 2/2 aspiration PNA vs reactive process  - Ucx neg  - Scx MAC  - c/w cefepime (12/20~)    ====Heme/onc====  #Metastatic SCC of tongue base  - s/p resection, chemo/radiation, and immunotherapy (10/2021)  - plan for experimental drug as outpatient.    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 Patient had cardiac arrest in ED , was intubated CPR was initiated and subsequently ROSC was acieved after 20 minutes. Patient is admitted to ICU.    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  - CAD s/p stent  - Diabetes    Plan  ====Neuro====  #baseline mentation: AAOx3  #intubated  - on light sedation  -c/w light sedation- Precedex  -fentanyl pushes for pain      ====CVS====  #Cardiac arrest  - ROSC after 20min  - trop trending down  -HD stable    ====Pulm====  #Acute hypoxic respiratory failure  - likely 2/2 PTX vs aspiration PNA  - ABG 7.51/40/127/32  - SBT (12/21, 22, 24)  - extubated on 12/22  - re-intubated on 12/23  - s/p solumedrol  - c/w cefepime (12/20~)  -SBT today   -trache consult tomorrow    #PTX  - s/p pigtail in ED  - repeat CXR complete resolution  -has baseline chronic PTX on left  -persistent small intermittent airleak  -julia  ====GI====  #no active issues  #diet: NPO w/ tube feeding  #bowel regimen: none    ====nephro====  #hypercalcemia  - resolved  - ddx: dehydration vs SCC-related  - PTH low  - s/p IVF    ====ID====  #leukocytosis  - likely 2/2 aspiration PNA vs reactive process  - Ucx neg  - Scx MAC  - c/w cefepime (12/20~)    ====Heme/onc====  #Metastatic SCC of tongue base  - s/p resection, chemo/radiation, and immunotherapy (10/2021)  - plan for experimental drug as outpatient.    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 Patient had cardiac arrest in ED , was intubated CPR was initiated and subsequently ROSC was acieved after 20 minutes. Patient is admitted to ICU.    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  - CAD s/p stent  - Diabetes    Plan  ====Neuro====  #baseline mentation: AAOx3  #intubated  - on light sedation  -c/w light sedation- Precedex  -fentanyl pushes for pain      ====CVS====  #Cardiac arrest  - ROSC after 20min  - trop trending down  -HD stable    ====Pulm====  #Acute hypoxic respiratory failure  - likely 2/2 PTX vs aspiration PNA  - ABG 7.51/40/127/32  - SBT (12/21, 22, 24)  - extubated on 12/22  - re-intubated on 12/23  - s/p solumedrol  - c/w cefepime (12/20~)  -SBT today   -trache consult tomorrow    #PTX  - s/p pigtail in ED  - repeat CXR complete resolution  -has baseline chronic PTX on left  -persistent small intermittent airleak  -julia  ====GI====  #no active issues  #diet: NPO w/ tube feeding  #bowel regimen: none    ====nephro====  #hypercalcemia    - ddx: dehydration vs SCC-related  - PTH low  -- resolved s/p IVF    ====ID====  #leukocytosis  - likely 2/2 aspiration PNA vs reactive process  - Ucx neg  - Scx MAC  - c/w cefepime (12/20~)    ====Heme/onc====  #Metastatic SCC of tongue base  - s/p resection, chemo/radiation, and immunotherapy (10/2021)  - plan for experimental drug as outpatient.    ====Endo====  #DM  - a1c 6.3  - c/w sliding scale    ====Ppx====  #DVT: lovenox  #GI: PPI

## 2021-12-27 NOTE — CHART NOTE - NSCHARTNOTEFT_GEN_A_CORE
Patient's POC wife Sandi updated regarding patient's condition and extubation. All questions answered.

## 2021-12-27 NOTE — PROGRESS NOTE ADULT - SUBJECTIVE AND OBJECTIVE BOX
INTERVAL HPI/OVERNIGHT EVENTS: ***    PRESSORS: [ ] YES [ ] NO    Antimicrobial:    Cardiovascular:    Pulmonary:  ALBUTerol    90 MICROgram(s) HFA Inhaler 2 Puff(s) Inhalation every 6 hours    Hematologic:    Other:  acetaminophen     Tablet .. 650 milliGRAM(s) Oral every 6 hours PRN  chlorhexidine 0.12% Liquid 15 milliLiter(s) Oral Mucosa every 12 hours  chlorhexidine 2% Cloths 1 Application(s) Topical <User Schedule>  dexMEDEtomidine Infusion 0.5 MICROgram(s)/kG/Hr IV Continuous <Continuous>  dextrose 50% Injectable 12.5 Gram(s) IV Push once  dextrose 50% Injectable 25 Gram(s) IV Push once  insulin lispro (ADMELOG) corrective regimen sliding scale   SubCutaneous every 6 hours    acetaminophen     Tablet .. 650 milliGRAM(s) Oral every 6 hours PRN  ALBUTerol    90 MICROgram(s) HFA Inhaler 2 Puff(s) Inhalation every 6 hours  chlorhexidine 0.12% Liquid 15 milliLiter(s) Oral Mucosa every 12 hours  chlorhexidine 2% Cloths 1 Application(s) Topical <User Schedule>  dexMEDEtomidine Infusion 0.5 MICROgram(s)/kG/Hr IV Continuous <Continuous>  dextrose 50% Injectable 12.5 Gram(s) IV Push once  dextrose 50% Injectable 25 Gram(s) IV Push once  insulin lispro (ADMELOG) corrective regimen sliding scale   SubCutaneous every 6 hours    Drug Dosing Weight  Height (cm): 175.3 (20 Dec 2021 02:21)  Weight (kg): 72.5 (20 Dec 2021 06:30)  BMI (kg/m2): 23.6 (20 Dec 2021 06:30)  BSA (m2): 1.88 (20 Dec 2021 06:30)    CENTRAL LINE: [ ] YES [ ] NO  LOCATION:   DATE INSERTED:  REMOVE: [ ] YES [ ] NO  EXPLAIN:    HUTCHINS: [ ] YES [ ] NO    DATE INSERTED:  REMOVE:  [ ] YES [ ] NO  EXPLAIN:    A-LINE:  [ ] YES [ ] NO  LOCATION:   DATE INSERTED:  REMOVE:  [ ] YES [ ] NO  EXPLAIN:    PMH -reviewed admission note, no change since admission      ABG - ( 26 Dec 2021 04:12 )  pH, Arterial: 7.48  pH, Blood: x     /  pCO2: 43    /  pO2: 93    / HCO3: 32    / Base Excess: 7.6   /  SaO2: 98                    12-26 @ 07:01  -  12-27 @ 07:00  --------------------------------------------------------  IN: 3677.9 mL / OUT: 1900 mL / NET: 1777.9 mL        Mode: AC/ CMV (Assist Control/ Continuous Mandatory Ventilation)  RR (machine): 16  TV (machine): 450  FiO2: 40  PEEP: 5  ITime: 0.8  MAP: 10  PIP: 22      PHYSICAL EXAM:    GENERAL: NAD, well-groomed, well-developed  HEAD:  Atraumatic, Normocephalic  EYES: EOMI, PERRLA, conjunctiva and sclera clear  ENMT: No tonsillar erythema, exudates, or enlargement; Moist mucous membranes, Good dentition, [ ]No lesions  NECK: Supple, normal appearance, No JVD; Normal thyroid; Trachea midline  NERVOUS SYSTEM:  Alert & Oriented X3, Good concentration; Motor Strength 5/5 B/L upper and lower extremities; DTRs 2+ intact and symmetric  CHEST/LUNG: No chest deformity; Normal percussion bilaterally; No rales, rhonchi, wheezing   HEART: Regular rate and rhythm; No murmurs, rubs, or gallops  ABDOMEN: Soft, Nontender, Nondistended;Bowel sounds present  EXTREMITIES:  2+ Peripheral Pulses, No clubbing, cyanosis, or edema  LYMPH: No lymphadenopathy noted  SKIN: No rashes or lesions; Good capillary refill      LABS:  CBC Full  -  ( 27 Dec 2021 04:29 )  WBC Count : 9.74 K/uL  RBC Count : 3.02 M/uL  Hemoglobin : 8.6 g/dL  Hematocrit : 27.7 %  Platelet Count - Automated : 191 K/uL  Mean Cell Volume : 91.7 fl  Mean Cell Hemoglobin : 28.5 pg  Mean Cell Hemoglobin Concentration : 31.0 gm/dL  Auto Neutrophil # : 8.16 K/uL  Auto Lymphocyte # : 0.54 K/uL  Auto Monocyte # : 0.44 K/uL  Auto Eosinophil # : 0.55 K/uL  Auto Basophil # : 0.01 K/uL  Auto Neutrophil % : 83.9 %  Auto Lymphocyte % : 5.5 %  Auto Monocyte % : 4.5 %  Auto Eosinophil % : 5.6 %  Auto Basophil % : 0.1 %    12-27    148<H>  |  113<H>  |  27<H>  ----------------------------<  223<H>  3.7   |  33<H>  |  0.66    Ca    9.4      27 Dec 2021 04:29  Phos  2.3     12-27  Mg     2.3     12-27    TPro  5.1<L>  /  Alb  1.4<L>  /  TBili  0.3  /  DBili  x   /  AST  17  /  ALT  13  /  AlkPhos  54  12-27            RADIOLOGY & ADDITIONAL STUDIES REVIEWED:  ***    [ ]GOALS OF CARE DISCUSSION WITH PATIENT/FAMILY/PROXY:    CRITICAL CARE TIME SPENT: 35 minutes INTERVAL HPI/OVERNIGHT EVENTS: Continues to have LEFT chest tube to suction    PRESSORS: [ ] YES [ ] NO    Antimicrobial:    Cardiovascular:    Pulmonary:  ALBUTerol    90 MICROgram(s) HFA Inhaler 2 Puff(s) Inhalation every 6 hours    Hematologic:    Other:  acetaminophen     Tablet .. 650 milliGRAM(s) Oral every 6 hours PRN  chlorhexidine 0.12% Liquid 15 milliLiter(s) Oral Mucosa every 12 hours  chlorhexidine 2% Cloths 1 Application(s) Topical <User Schedule>  dexMEDEtomidine Infusion 0.5 MICROgram(s)/kG/Hr IV Continuous <Continuous>  dextrose 50% Injectable 12.5 Gram(s) IV Push once  dextrose 50% Injectable 25 Gram(s) IV Push once  insulin lispro (ADMELOG) corrective regimen sliding scale   SubCutaneous every 6 hours    acetaminophen     Tablet .. 650 milliGRAM(s) Oral every 6 hours PRN  ALBUTerol    90 MICROgram(s) HFA Inhaler 2 Puff(s) Inhalation every 6 hours  chlorhexidine 0.12% Liquid 15 milliLiter(s) Oral Mucosa every 12 hours  chlorhexidine 2% Cloths 1 Application(s) Topical <User Schedule>  dexMEDEtomidine Infusion 0.5 MICROgram(s)/kG/Hr IV Continuous <Continuous>  dextrose 50% Injectable 12.5 Gram(s) IV Push once  dextrose 50% Injectable 25 Gram(s) IV Push once  insulin lispro (ADMELOG) corrective regimen sliding scale   SubCutaneous every 6 hours    Drug Dosing Weight  Height (cm): 175.3 (20 Dec 2021 02:21)  Weight (kg): 72.5 (20 Dec 2021 06:30)  BMI (kg/m2): 23.6 (20 Dec 2021 06:30)  BSA (m2): 1.88 (20 Dec 2021 06:30)    CENTRAL LINE: [ ] YES [ ] NO  LOCATION:   DATE INSERTED:  REMOVE: [ ] YES [ ] NO  EXPLAIN:    HUTCHINS: [ ] YES [ ] NO    DATE INSERTED:  REMOVE:  [ ] YES [ ] NO  EXPLAIN:    A-LINE:  [ ] YES [ ] NO  LOCATION:   DATE INSERTED:  REMOVE:  [ ] YES [ ] NO  EXPLAIN:    PMH -reviewed admission note, no change since admission      ABG - ( 26 Dec 2021 04:12 )  pH, Arterial: 7.48  pH, Blood: x     /  pCO2: 43    /  pO2: 93    / HCO3: 32    / Base Excess: 7.6   /  SaO2: 98                    12-26 @ 07:01 - 12-27 @ 07:00  --------------------------------------------------------  IN: 3677.9 mL / OUT: 1900 mL / NET: 1777.9 mL        Mode: AC/ CMV (Assist Control/ Continuous Mandatory Ventilation)  RR (machine): 16  TV (machine): 450  FiO2: 40  PEEP: 5  ITime: 0.8  MAP: 10  PIP: 22      PHYSICAL EXAM:    GENERAL: NAD, well-groomed, well-developed  HEAD:  Atraumatic, Normocephalic  EYES: EOMI, PERRLA, conjunctiva and sclera clear  ENMT: No tonsillar erythema, exudates, or enlargement; Moist mucous membranes, Good dentition, [ ]No lesions  NECK: Supple, normal appearance, No JVD; Normal thyroid; Trachea midline  NERVOUS SYSTEM:  Alert & Oriented X3, Good concentration; Motor Strength 5/5 B/L upper and lower extremities; DTRs 2+ intact and symmetric  CHEST/LUNG: No chest deformity; Normal percussion bilaterally; No rales, rhonchi, wheezing   HEART: Regular rate and rhythm; No murmurs, rubs, or gallops  ABDOMEN: Soft, Nontender, Nondistended;Bowel sounds present  EXTREMITIES:  2+ Peripheral Pulses, No clubbing, cyanosis, or edema  LYMPH: No lymphadenopathy noted  SKIN: No rashes or lesions; Good capillary refill      LABS:  CBC Full  -  ( 27 Dec 2021 04:29 )  WBC Count : 9.74 K/uL  RBC Count : 3.02 M/uL  Hemoglobin : 8.6 g/dL  Hematocrit : 27.7 %  Platelet Count - Automated : 191 K/uL  Mean Cell Volume : 91.7 fl  Mean Cell Hemoglobin : 28.5 pg  Mean Cell Hemoglobin Concentration : 31.0 gm/dL  Auto Neutrophil # : 8.16 K/uL  Auto Lymphocyte # : 0.54 K/uL  Auto Monocyte # : 0.44 K/uL  Auto Eosinophil # : 0.55 K/uL  Auto Basophil # : 0.01 K/uL  Auto Neutrophil % : 83.9 %  Auto Lymphocyte % : 5.5 %  Auto Monocyte % : 4.5 %  Auto Eosinophil % : 5.6 %  Auto Basophil % : 0.1 %    12-27    148<H>  |  113<H>  |  27<H>  ----------------------------<  223<H>  3.7   |  33<H>  |  0.66    Ca    9.4      27 Dec 2021 04:29  Phos  2.3     12-27  Mg     2.3     12-27    TPro  5.1<L>  /  Alb  1.4<L>  /  TBili  0.3  /  DBili  x   /  AST  17  /  ALT  13  /  AlkPhos  54  12-27            RADIOLOGY & ADDITIONAL STUDIES REVIEWED:  ***    [ ]GOALS OF CARE DISCUSSION WITH PATIENT/FAMILY/PROXY:    CRITICAL CARE TIME SPENT: 35 minutes INTERVAL HPI/OVERNIGHT EVENTS: Continues to have LEFT chest tube to suction, mentation is improved. Minimal sedation. For EXTUBATION today as tolerated CPAP trial.    PRESSORS: [ ] YES [x] NO    Antimicrobial:    Cardiovascular:    Pulmonary:  ALBUTerol    90 MICROgram(s) HFA Inhaler 2 Puff(s) Inhalation every 6 hours    Hematologic:    Other:  acetaminophen     Tablet .. 650 milliGRAM(s) Oral every 6 hours PRN  chlorhexidine 0.12% Liquid 15 milliLiter(s) Oral Mucosa every 12 hours  chlorhexidine 2% Cloths 1 Application(s) Topical <User Schedule>  dexMEDEtomidine Infusion 0.5 MICROgram(s)/kG/Hr IV Continuous <Continuous>  dextrose 50% Injectable 12.5 Gram(s) IV Push once  dextrose 50% Injectable 25 Gram(s) IV Push once  insulin lispro (ADMELOG) corrective regimen sliding scale   SubCutaneous every 6 hours    acetaminophen     Tablet .. 650 milliGRAM(s) Oral every 6 hours PRN  ALBUTerol    90 MICROgram(s) HFA Inhaler 2 Puff(s) Inhalation every 6 hours  chlorhexidine 0.12% Liquid 15 milliLiter(s) Oral Mucosa every 12 hours  chlorhexidine 2% Cloths 1 Application(s) Topical <User Schedule>  dexMEDEtomidine Infusion 0.5 MICROgram(s)/kG/Hr IV Continuous <Continuous>  dextrose 50% Injectable 12.5 Gram(s) IV Push once  dextrose 50% Injectable 25 Gram(s) IV Push once  insulin lispro (ADMELOG) corrective regimen sliding scale   SubCutaneous every 6 hours    Drug Dosing Weight  Height (cm): 175.3 (20 Dec 2021 02:21)  Weight (kg): 72.5 (20 Dec 2021 06:30)  BMI (kg/m2): 23.6 (20 Dec 2021 06:30)  BSA (m2): 1.88 (20 Dec 2021 06:30)    CENTRAL LINE: [ ] YES [ ] NO  LOCATION:   DATE INSERTED:  REMOVE: [ ] YES [ ] NO  EXPLAIN:    HUTCHINS: [ ] YES [ ] NO    DATE INSERTED:  REMOVE:  [ ] YES [ ] NO  EXPLAIN:    A-LINE:  [ ] YES [ ] NO  LOCATION:   DATE INSERTED:  REMOVE:  [ ] YES [ ] NO  EXPLAIN:    PMH -reviewed admission note, no change since admission      ABG - ( 26 Dec 2021 04:12 )  pH, Arterial: 7.48  pH, Blood: x     /  pCO2: 43    /  pO2: 93    / HCO3: 32    / Base Excess: 7.6   /  SaO2: 98                    12-26 @ 07:01  -  12-27 @ 07:00  --------------------------------------------------------  IN: 3677.9 mL / OUT: 1900 mL / NET: 1777.9 mL        Mode: AC/ CMV (Assist Control/ Continuous Mandatory Ventilation)  RR (machine): 16  TV (machine): 450  FiO2: 40  PEEP: 5  ITime: 0.8  MAP: 10  PIP: 22      PHYSICAL EXAM:    GENERAL: NAD, well-groomed, well-developed  HEAD:  Atraumatic, Normocephalic  EYES: EOMI, PERRLA, conjunctiva and sclera clear  ENMT: No tonsillar erythema, exudates, or enlargement; Moist mucous membranes, Good dentition, [ ]No lesions  NECK: Supple, normal appearance, No JVD; Normal thyroid; Trachea midline  NERVOUS SYSTEM:  Alert & Oriented X3, Good concentration; Motor Strength 5/5 B/L upper and lower extremities; DTRs 2+ intact and symmetric  CHEST/LUNG: No chest deformity; Normal percussion bilaterally; No rales, rhonchi, wheezing   HEART: Regular rate and rhythm; No murmurs, rubs, or gallops  ABDOMEN: Soft, Nontender, Nondistended;Bowel sounds present  EXTREMITIES:  2+ Peripheral Pulses, No clubbing, cyanosis, or edema  LYMPH: No lymphadenopathy noted  SKIN: No rashes or lesions; Good capillary refill      LABS:  CBC Full  -  ( 27 Dec 2021 04:29 )  WBC Count : 9.74 K/uL  RBC Count : 3.02 M/uL  Hemoglobin : 8.6 g/dL  Hematocrit : 27.7 %  Platelet Count - Automated : 191 K/uL  Mean Cell Volume : 91.7 fl  Mean Cell Hemoglobin : 28.5 pg  Mean Cell Hemoglobin Concentration : 31.0 gm/dL  Auto Neutrophil # : 8.16 K/uL  Auto Lymphocyte # : 0.54 K/uL  Auto Monocyte # : 0.44 K/uL  Auto Eosinophil # : 0.55 K/uL  Auto Basophil # : 0.01 K/uL  Auto Neutrophil % : 83.9 %  Auto Lymphocyte % : 5.5 %  Auto Monocyte % : 4.5 %  Auto Eosinophil % : 5.6 %  Auto Basophil % : 0.1 %    12-27    148<H>  |  113<H>  |  27<H>  ----------------------------<  223<H>  3.7   |  33<H>  |  0.66    Ca    9.4      27 Dec 2021 04:29  Phos  2.3     12-27  Mg     2.3     12-27    TPro  5.1<L>  /  Alb  1.4<L>  /  TBili  0.3  /  DBili  x   /  AST  17  /  ALT  13  /  AlkPhos  54  12-27            RADIOLOGY & ADDITIONAL STUDIES REVIEWED:  ***    [ ]GOALS OF CARE DISCUSSION WITH PATIENT/FAMILY/PROXY:    CRITICAL CARE TIME SPENT: 35 minutes INTERVAL HPI/OVERNIGHT EVENTS: Continues to have LEFT chest tube to suction, mentation is improved. Minimal sedation. For EXTUBATION today as patient tolerated CPAP trial without issue.    PRESSORS: [ ] YES [x] NO    Antimicrobial:    Cardiovascular:    Pulmonary:  ALBUTerol    90 MICROgram(s) HFA Inhaler 2 Puff(s) Inhalation every 6 hours    Hematologic:    Other:  acetaminophen     Tablet .. 650 milliGRAM(s) Oral every 6 hours PRN  chlorhexidine 0.12% Liquid 15 milliLiter(s) Oral Mucosa every 12 hours  chlorhexidine 2% Cloths 1 Application(s) Topical <User Schedule>  dexMEDEtomidine Infusion 0.5 MICROgram(s)/kG/Hr IV Continuous <Continuous>  dextrose 50% Injectable 12.5 Gram(s) IV Push once  dextrose 50% Injectable 25 Gram(s) IV Push once  insulin lispro (ADMELOG) corrective regimen sliding scale   SubCutaneous every 6 hours    acetaminophen     Tablet .. 650 milliGRAM(s) Oral every 6 hours PRN  ALBUTerol    90 MICROgram(s) HFA Inhaler 2 Puff(s) Inhalation every 6 hours  chlorhexidine 0.12% Liquid 15 milliLiter(s) Oral Mucosa every 12 hours  chlorhexidine 2% Cloths 1 Application(s) Topical <User Schedule>  dexMEDEtomidine Infusion 0.5 MICROgram(s)/kG/Hr IV Continuous <Continuous>  dextrose 50% Injectable 12.5 Gram(s) IV Push once  dextrose 50% Injectable 25 Gram(s) IV Push once  insulin lispro (ADMELOG) corrective regimen sliding scale   SubCutaneous every 6 hours    Drug Dosing Weight  Height (cm): 175.3 (20 Dec 2021 02:21)  Weight (kg): 72.5 (20 Dec 2021 06:30)  BMI (kg/m2): 23.6 (20 Dec 2021 06:30)  BSA (m2): 1.88 (20 Dec 2021 06:30)    CENTRAL LINE: [ x] YES [ ] NO  LOCATION: [facility]  DATE INSERTED: 12/20  REMOVE: [ ] YES [ ] NO  EXPLAIN:    HUTCHINS: [x ] YES [ ] NO    DATE INSERTED: 12/20  REMOVE:  [ ] YES [ ] NO  EXPLAIN:    A-LINE:  [ ] YES [ ] NO  LOCATION:   DATE INSERTED:  REMOVE:  [ ] YES [ ] NO  EXPLAIN:    PMH -reviewed admission note, no change since admission      ABG - ( 26 Dec 2021 04:12 )  pH, Arterial: 7.48  pH, Blood: x     /  pCO2: 43    /  pO2: 93    / HCO3: 32    / Base Excess: 7.6   /  SaO2: 98          12-26 @ 07:01  -  12-27 @ 07:00  --------------------------------------------------------  IN: 3677.9 mL / OUT: 1900 mL / NET: 1777.9 mL    Mode: AC/ CMV (Assist Control/ Continuous Mandatory Ventilation)  RR (machine): 16  TV (machine): 450  FiO2: 40  PEEP: 5  ITime: 0.8  MAP: 10  PIP: 22    PHYSICAL EXAM:    GENERAL: NAD, well-groomed, well-developed, intubated on ventilator  HEAD:  Atraumatic, Normocephalic  MOUTH: RIGHT jaw chronic deformity  EYES: EOMI, PERRLA, conjunctiva and sclera clear  ENMT: intubated, NGT in place  NECK: Supple, normal appearance, No JVD; Normal thyroid; Trachea midline  NERVOUS SYSTEM:  Alert, follows commands. Good concentration; Motor Strength 5/5 B/L upper and lower extremities; DTRs 2+ intact and symmetric  CHEST/LUNG: No chest deformity; No rales, rhonchi, wheezing   HEART: Regular rate and rhythm; No murmurs, rubs, or gallops  ABDOMEN: Soft, Nontender, Nondistended; bowel sounds present  EXTREMITIES:  2+ Peripheral Pulses, No clubbing, cyanosis, or edema  LYMPH: No lymphadenopathy noted  SKIN: No rashes or lesions; Good capillary refill      LABS:  CBC Full  -  ( 27 Dec 2021 04:29 )  WBC Count : 9.74 K/uL  RBC Count : 3.02 M/uL  Hemoglobin : 8.6 g/dL  Hematocrit : 27.7 %  Platelet Count - Automated : 191 K/uL  Mean Cell Volume : 91.7 fl  Mean Cell Hemoglobin : 28.5 pg  Mean Cell Hemoglobin Concentration : 31.0 gm/dL  Auto Neutrophil # : 8.16 K/uL  Auto Lymphocyte # : 0.54 K/uL  Auto Monocyte # : 0.44 K/uL  Auto Eosinophil # : 0.55 K/uL  Auto Basophil # : 0.01 K/uL  Auto Neutrophil % : 83.9 %  Auto Lymphocyte % : 5.5 %  Auto Monocyte % : 4.5 %  Auto Eosinophil % : 5.6 %  Auto Basophil % : 0.1 %    12-27    148<H>  |  113<H>  |  27<H>  ----------------------------<  223<H>  3.7   |  33<H>  |  0.66    Ca    9.4      27 Dec 2021 04:29  Phos  2.3     12-27  Mg     2.3     12-27    TPro  5.1<L>  /  Alb  1.4<L>  /  TBili  0.3  /  DBili  x   /  AST  17  /  ALT  13  /  AlkPhos  54  12-27      RADIOLOGY & ADDITIONAL STUDIES REVIEWED:  yes CXR    [ ]GOALS OF CARE DISCUSSION WITH PATIENT/FAMILY/PROXY:    CRITICAL CARE TIME SPENT: 35 minutes

## 2021-12-27 NOTE — AIRWAY REMOVAL NOTE  ADULT & PEDS - ARTIFICAL AIRWAY REMOVAL COMMENTS
Patient extubated and placed on nasal cannula of 4 liters. HR= 88, SP02=96%. will continue to monitor.

## 2021-12-27 NOTE — PROGRESS NOTE ADULT - ASSESSMENT
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 Patient had cardiac arrest in ED , was intubated CPR was initiated and subsequently ROSC was acieved after 20 minutes. Patient is admitted to ICU.    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  - CAD s/p stent  - Diabetes    Plan  ====Neuro====  #baseline mentation: AAOx3  #intubated  - on light sedation  -c/w light sedation- Precedex  -fentanyl pushes for pain      ====CVS====  #Cardiac arrest  - ROSC after 20min  - trop trending down  -HD stable    ====Pulm====  #Acute hypoxic respiratory failure  - likely 2/2 PTX vs aspiration PNA  - ABG 7.51/40/127/32  - SBT (12/21, 22, 24)  - extubated on 12/22  - re-intubated on 12/23  - s/p solumedrol  - c/w cefepime (12/20~)  -SBT today   -trache consult tomorrow    #PTX  - s/p pigtail in ED  - repeat CXR complete resolution  -has baseline chronic PTX on left  -persistent small intermittent airleak  -julia  ====GI====  #no active issues  #diet: NPO w/ tube feeding  #bowel regimen: none    ====nephro====  #hypercalcemia    - ddx: dehydration vs SCC-related  - PTH low  -- resolved s/p IVF    ====ID====  #leukocytosis  - likely 2/2 aspiration PNA vs reactive process  - Ucx neg  - Scx MAC  - c/w cefepime (12/20~)    ====Heme/onc====  #Metastatic SCC of tongue base  - s/p resection, chemo/radiation, and immunotherapy (10/2021)  - plan for experimental drug as outpatient.    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 Patient had cardiac arrest in ED , was intubated CPR was initiated and subsequently ROSC was acieved after 20 minutes. Patient is admitted to ICU.    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  - CAD s/p stent  - Diabetes    Plan  ====Neuro====  #baseline mentation: AAOx3  #intubated  - c/w light sedation- Precedex  - s/p fentanyl pushes for pain  - Extubated on 12/27, taper sedation    ====CVS====  #Cardiac arrest   12/20 on arrival to ED  - ROSC after 20min  - trop trended down  - hemodynamically stable, no pressors    ====Pulm====  #Acute hypoxic respiratory failure  - has Hx of chronic PTX on LEFT  - likely 2/2 PTX vs aspiration PNA  - SBT (12/21, 22, 24)  - extubated on 12/22  - re-intubated on 12/23  - s/p solumedrol  - c/w cefepime 2g q12 (12/20~)  - extubated again on 12/27  - repeat CXR    #PTX (acute/on/chronic)  - s/p pigtail in ED 12/20  - chest tube to suction  - transition to water seal and f/u repeat CXR post extubation    ====GI====  #no active issues  #diet: NPO w/ tube feeding + free water 350 q 6  #bowel regimen: none    ====nephro====  #hypernatremia  serum Na 153 >>> now 148  - c/w free water 350 q 6    #hypercalcemia  ddx: dehydration vs SCC-related  - PTH low  - resolved s/p IVF    ====ID====  #leukocytosis  - likely 2/2 aspiration PNA vs reactive process  - Ucx neg  - Scx MAC  - c/w cefepime 2g q 12 (12/20~)    ====Heme/onc====  #Metastatic SCC of tongue base  - s/p resection, chemo/radiation, and immunotherapy (10/2021)  - plan for experimental drug as outpatient.    ====Endo====  #DM  - a1c 6.3  - c/w sliding scale    ====Ppx====  #DVT: Lovenox  #GI: PPI

## 2021-12-28 NOTE — CONSULT NOTE ADULT - SUBJECTIVE AND OBJECTIVE BOX
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 Patient was intubated on arival to ED, he had a cardiac arrest and achieved ROSC 20 minutes later. History was obtained with son at bed side , he states that patient Squamous cell cancer was metastasized to luns and he was getting immunotherapy. They noticed patient O2 saturation was low so decided to bring hi to ED,       On arrival to ED patient was intubated, and he lost the pulse. high quality CPR was started , patient achieved Rosc after 7 cycles of CPR. Post intubation CXR showed large left sided pneumothorax. pigtail was placed by ED physician. emergent IO acces was done during the CPR. Patient was started on levophed and propofol for sedation. 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   As per chart review patient has history of b/l hernia repair and on 9/10/20 he underwent right hemiglossectomy and partial neck dissection with Dr. Darinel Servin at [facility].  As per primary care provider and chart review patient was intubated in the ED on 12/20 , extubated on 12/22, reintubated on 12/23, extubated again on 12/27 and reintubated 12/28. He underwent SBT on 12/21,22,24.   Patient was seen at bedside in the ICU, and remains intubated, sedated and vent dependent.   Vent setting reviewed and currently at Peep/ Fio2 of 8/60.       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 Patient was intubated on arival to ED, he had a cardiac arrest and achieved ROSC 20 minutes later. History was obtained with son at bed side , he states that patient Squamous cell cancer was metastasized to luns and he was getting immunotherapy. They noticed patient O2 saturation was low so decided to bring hi to ED,    On arrival to ED patient was intubated, and he lost the pulse. high quality CPR was started , patient achieved Rosc after 7 cycles of CPR. Post intubation CXR showed large left sided pneumothorax. pigtail was placed by ED physician. emergent IO acces was done during the CPR. Patient was started on levophed and propofol for sedation. (20 Dec 2021 03:52)    Patient is a 76y old  Male who presents with a chief complaint of cardiac arrest (28 Dec 2021 17:50)      PAST MEDICAL & SURGICAL HISTORY:  Hypertension    Diabetes    High cholesterol    Primary osteoarthritis of both knees    Coronary artery disease of native artery of native heart with stable angina pectoris    Allergic bronchitis    Diabetic retinopathy    Oral cancer    SCC (squamous cell carcinoma)    Metastatic cancer    Recurrent disease    Edema of extremity    Hyponatremia    Microalbuminuria    Neuralgia    Obstructive sleep apnea, adult    Vitamin D deficiency    S/P knee replacement  b/l    S/P hernia repair  b/l    Status post cataract extraction and insertion of intraocular lens, unspecified laterality  b/l    History of surgery  On 9/10/20 he underwent right hemiglossectomy and partial neck dissection with Dr. Darinel Servin at [facility].        MEDICATIONS  (STANDING):  acetaminophen   IVPB .. 1000 milliGRAM(s) IV Intermittent once  ALBUTerol    90 MICROgram(s) HFA Inhaler 2 Puff(s) Inhalation every 6 hours  chlorhexidine 0.12% Liquid 15 milliLiter(s) Oral Mucosa every 12 hours  chlorhexidine 2% Cloths 1 Application(s) Topical <User Schedule>  enoxaparin Injectable 40 milliGRAM(s) SubCutaneous daily  insulin lispro (ADMELOG) corrective regimen sliding scale   SubCutaneous every 6 hours  norepinephrine Infusion 0.007 MICROgram(s)/kG/Min (0.5 mL/Hr) IV Continuous <Continuous>  propofol Infusion 30 MICROgram(s)/kG/Min (13.1 mL/Hr) IV Continuous <Continuous>  sodium chloride 0.9% Bolus 1000 milliLiter(s) IV Bolus once    MEDICATIONS  (PRN):  acetaminophen     Tablet .. 650 milliGRAM(s) Oral every 6 hours PRN Temp greater or equal to 38C (100.4F), Mild Pain (1 - 3)  bisacodyl Suppository 10 milliGRAM(s) Rectal daily PRN Constipation      Allergies    No Known Allergies    Intolerances        Vital Signs Last 24 Hrs  T(C): 38.3 (28 Dec 2021 17:00), Max: 38.3 (28 Dec 2021 17:00)  T(F): 100.9 (28 Dec 2021 17:00), Max: 100.9 (28 Dec 2021 17:00)  HR: 104 (28 Dec 2021 17:00) (97 - 120)  BP: 122/60 (28 Dec 2021 17:00) (67/39 - 173/67)  BP(mean): 71 (28 Dec 2021 17:00) (44 - 105)  RR: 45 (28 Dec 2021 17:00) (16 - 45)  SpO2: 97% (28 Dec 2021 17:00) (72% - 100%)    Physical:  Gen: intubated, sedated, no acute distress   ENT: trachea midline, neck supple, mass noted at base of right jaw   Abd: Soft, nondistended, nontender, no masses, lesions       I&O's Detail    27 Dec 2021 07:01  -  28 Dec 2021 07:00  --------------------------------------------------------  IN:    Dexmedetomidine: 27 mL    dextrose 5%: 100 mL    Glucerna 1.5: 50 mL    IV PiggyBack: 62.5 mL  Total IN: 239.5 mL    OUT:    Chest Tube (mL): 170 mL    Indwelling Catheter - Urethral (mL): 2650 mL  Total OUT: 2820 mL    Total NET: -2580.5 mL      28 Dec 2021 07:01  -  28 Dec 2021 18:13  --------------------------------------------------------  IN:    Norepinephrine: 81 mL    Propofol: 78 mL  Total IN: 159 mL    OUT:    Chest Tube (mL): 80 mL    Indwelling Catheter - Urethral (mL): 500 mL  Total OUT: 580 mL    Total NET: -421 mL          LABS:                        10.5   14.10 )-----------( 262      ( 28 Dec 2021 03:45 )             34.2              12-28    147<H>  |  110<H>  |  21<H>  ----------------------------<  160<H>  3.7   |  35<H>  |  0.60    Ca    10.3      28 Dec 2021 03:45  Phos  2.5     12-28  Mg     2.3     12-28    TPro  6.4  /  Alb  1.7<L>  /  TBili  0.6  /  DBili  x   /  AST  22  /  ALT  14  /  AlkPhos  62  12-28

## 2021-12-28 NOTE — PROGRESS NOTE ADULT - ASSESSMENT
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 Patient had cardiac arrest in ED , was intubated CPR was initiated and subsequently ROSC was acieved after 20 minutes. Patient is admitted to ICU.    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  - CAD s/p stent  - Diabetes    Plan  ====Neuro====  #baseline mentation: AAOx3  #intubated  - c/w light sedation- Precedex  - s/p fentanyl pushes for pain  - Extubated on 12/27, taper sedation    ====CVS====  #Cardiac arrest   12/20 on arrival to ED  - ROSC after 20min  - trop trended down  - hemodynamically stable, no pressors    ====Pulm====  #Acute hypoxic respiratory failure  - has Hx of chronic PTX on LEFT  - likely 2/2 PTX vs aspiration PNA  - SBT (12/21, 22, 24)  - extubated on 12/22  - re-intubated on 12/23  - s/p solumedrol  - c/w cefepime 2g q12 (12/20~)  - extubated again on 12/27  - repeat CXR    #PTX (acute/on/chronic)  - s/p pigtail in ED 12/20  - chest tube to suction  - transition to water seal and f/u repeat CXR post extubation    ====GI====  #no active issues  #diet: NPO w/ tube feeding + free water 350 q 6  #bowel regimen: none    ====nephro====  #hypernatremia  serum Na 153 >>> now 148  - c/w free water 350 q 6    #hypercalcemia  ddx: dehydration vs SCC-related  - PTH low  - resolved s/p IVF    ====ID====  #leukocytosis  - likely 2/2 aspiration PNA vs reactive process  - Ucx neg  - Scx MAC  - c/w cefepime 2g q 12 (12/20~)    ====Heme/onc====  #Metastatic SCC of tongue base  - s/p resection, chemo/radiation, and immunotherapy (10/2021)  - plan for experimental drug as outpatient.    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 Patient had cardiac arrest in ED , was intubated CPR was initiated and subsequently ROSC was acieved after 20 minutes. Patient is admitted to ICU.    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  - CAD s/p stent  - Diabetes    Plan  ====Neuro====  #baseline mentation: AAOx3  #intubated  - Extubated on 12/27, taper sedation  - re-intubated on 12/28  - c/w precedex gtt for sedation    ====CVS====  #Cardiac arrest   12/20 on arrival to ED  - ROSC after 20min  - trop trended down  - started norepi 12/28 post intubation    ====Pulm====  #Acute hypoxic respiratory failure  - has Hx of chronic PTX on LEFT  - likely 2/2 PTX vs aspiration PNA  - SBT (12/21, 22, 24)  - extubated on 12/22  - re-intubated on 12/23  - s/p solumedrol  - c/w cefepime 2g q12 (12/20~)  - extubated again on 12/27  - re-intubated on 12/28  - repeat CXR    #PTX (acute/on/chronic)  - s/p pigtail in ED 12/20  - chest tube to suction    ====GI====  #no active issues  #diet: NPO w/ tube feeding + free water 350 q 6  #bowel regimen: none    ====nephro====  #hypernatremia  serum Na 153 >>> now 148  - c/w free water 350 q 6    #hypercalcemia  ddx: dehydration vs SCC-related  - PTH low  - resolved s/p IVF    ====ID====  #leukocytosis  - likely 2/2 aspiration PNA vs reactive process  - Ucx neg  - Scx MAC  - c/w cefepime 2g q 12 (12/20~)    ====Heme/onc====  #Metastatic SCC of tongue base  - s/p resection, chemo/radiation, and immunotherapy (10/2021)  - plan for experimental drug as outpatient.    ====Endo====  #DM  - a1c 6.3  - c/w sliding scale    ====Ppx====  #DVT: Lovenox  #GI: PPI

## 2021-12-28 NOTE — PROGRESS NOTE ADULT - ASSESSMENT
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 Patient had cardiac arrest in ED , was intubated CPR was initiated and subsequently ROSC was acieved after 20 minutes. Patient is admitted to ICU.    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  - CAD s/p stent  - Diabetes    Plan  ====Neuro====  #baseline mentation: AAOx3  #intubated  - c/w light sedation- Precedex  - s/p fentanyl pushes for pain  - Extubated on 12/27, taper sedation    ====CVS====  #Cardiac arrest   12/20 on arrival to ED  - ROSC after 20min  - trop trended down  - hemodynamically stable, no pressors    ====Pulm====  #Acute hypoxic respiratory failure  - has Hx of chronic PTX on LEFT  - likely 2/2 PTX vs aspiration PNA  - SBT (12/21, 22, 24)  - extubated on 12/22  - re-intubated on 12/23  - s/p solumedrol  - c/w cefepime 2g q12 (12/20~)  - extubated again on 12/27  - repeat CXR    #PTX (acute/on/chronic)  - s/p pigtail in ED 12/20  - chest tube to suction  - transition to water seal and f/u repeat CXR post extubation    ====GI====  #no active issues  #diet: NPO w/ tube feeding + free water 350 q 6  #bowel regimen: none    ====nephro====  #hypernatremia  serum Na 153 >>> now 148  - c/w free water 350 q 6    #hypercalcemia  ddx: dehydration vs SCC-related  - PTH low  - resolved s/p IVF    ====ID====  #leukocytosis  - likely 2/2 aspiration PNA vs reactive process  - Ucx neg  - Scx MAC  - c/w cefepime 2g q 12 (12/20~)    ====Heme/onc====  #Metastatic SCC of tongue base  - s/p resection, chemo/radiation, and immunotherapy (10/2021)  - plan for experimental drug as outpatient.    ====Endo====  #DM  - a1c 6.3  - c/w sliding scale    ====Ppx====  #DVT: Lovenox  #GI: PPI

## 2021-12-28 NOTE — PROGRESS NOTE ADULT - SUBJECTIVE AND OBJECTIVE BOX
INTERVAL HPI/OVERNIGHT EVENTS: ***    PRESSORS: [ ] YES [ ] NO    Antimicrobial:    Cardiovascular:    Pulmonary:  ALBUTerol    90 MICROgram(s) HFA Inhaler 2 Puff(s) Inhalation every 6 hours    Hematologic:  enoxaparin Injectable 40 milliGRAM(s) SubCutaneous daily    Other:  acetaminophen     Tablet .. 650 milliGRAM(s) Oral every 6 hours PRN  bisacodyl Suppository 10 milliGRAM(s) Rectal daily PRN  chlorhexidine 2% Cloths 1 Application(s) Topical <User Schedule>  insulin lispro (ADMELOG) corrective regimen sliding scale   SubCutaneous every 6 hours    acetaminophen     Tablet .. 650 milliGRAM(s) Oral every 6 hours PRN  ALBUTerol    90 MICROgram(s) HFA Inhaler 2 Puff(s) Inhalation every 6 hours  bisacodyl Suppository 10 milliGRAM(s) Rectal daily PRN  chlorhexidine 2% Cloths 1 Application(s) Topical <User Schedule>  enoxaparin Injectable 40 milliGRAM(s) SubCutaneous daily  insulin lispro (ADMELOG) corrective regimen sliding scale   SubCutaneous every 6 hours    Drug Dosing Weight  Height (cm): 175.3 (20 Dec 2021 02:21)  Weight (kg): 72.5 (20 Dec 2021 06:30)  BMI (kg/m2): 23.6 (20 Dec 2021 06:30)  BSA (m2): 1.88 (20 Dec 2021 06:30)    CENTRAL LINE: [ ] YES [ ] NO  LOCATION:   DATE INSERTED:  REMOVE: [ ] YES [ ] NO  EXPLAIN:    LISET: [ ] YES [ ] NO    DATE INSERTED:  REMOVE:  [ ] YES [ ] NO  EXPLAIN:    A-LINE:  [ ] YES [ ] NO  LOCATION:   DATE INSERTED:  REMOVE:  [ ] YES [ ] NO  EXPLAIN:    [facility] -reviewed admission note, no change since admission            12-27 @ 07:01  -  12-28 @ 07:00  --------------------------------------------------------  IN: 239.5 mL / OUT: 2820 mL / NET: -2580.5 mL            PHYSICAL EXAM:    GENERAL: NAD, well-groomed, well-developed  HEAD:  Atraumatic, Normocephalic  EYES: EOMI, PERRLA, conjunctiva and sclera clear  ENMT: No tonsillar erythema, exudates, or enlargement; Moist mucous membranes, Good dentition, [ ]No lesions  NECK: Supple, normal appearance, No JVD; Normal thyroid; Trachea midline  NERVOUS SYSTEM:  Alert & Oriented X3, Good concentration; Motor Strength 5/5 B/L upper and lower extremities; DTRs 2+ intact and symmetric  CHEST/LUNG: No chest deformity; Normal percussion bilaterally; No rales, rhonchi, wheezing   HEART: Regular rate and rhythm; No murmurs, rubs, or gallops  ABDOMEN: Soft, Nontender, Nondistended;Bowel sounds present  EXTREMITIES:  2+ Peripheral Pulses, No clubbing, cyanosis, or edema  LYMPH: No lymphadenopathy noted  SKIN: No rashes or lesions; Good capillary refill      LABS:  CBC Full  -  ( 28 Dec 2021 03:45 )  WBC Count : 14.10 K/uL  RBC Count : 3.68 M/uL  Hemoglobin : 10.5 g/dL  Hematocrit : 34.2 %  Platelet Count - Automated : 262 K/uL  Mean Cell Volume : 92.9 fl  Mean Cell Hemoglobin : 28.5 pg  Mean Cell Hemoglobin Concentration : 30.7 gm/dL  Auto Neutrophil # : 12.86 K/uL  Auto Lymphocyte # : 0.54 K/uL  Auto Monocyte # : 0.53 K/uL  Auto Eosinophil # : 0.08 K/uL  Auto Basophil # : 0.02 K/uL  Auto Neutrophil % : 91.2 %  Auto Lymphocyte % : 3.8 %  Auto Monocyte % : 3.8 %  Auto Eosinophil % : 0.6 %  Auto Basophil % : 0.1 %    12-28    147<H>  |  110<H>  |  21<H>  ----------------------------<  160<H>  3.7   |  35<H>  |  0.60    Ca    10.3      28 Dec 2021 03:45  Phos  2.5     12-28  Mg     2.3     12-28    TPro  6.4  /  Alb  1.7<L>  /  TBili  0.6  /  DBili  x   /  AST  22  /  ALT  14  /  AlkPhos  62  12-28            RADIOLOGY & ADDITIONAL STUDIES REVIEWED:  ***    [ ]GOALS OF CARE DISCUSSION WITH PATIENT/FAMILY/PROXY:    CRITICAL CARE TIME SPENT: 35 minutes INTERVAL HPI/OVERNIGHT EVENTS: Extubated yesterday, continued to have poor secretion management and was intubated today for hypoxia not improved with HFNC, NRBM and suction + chest PT. Family called and informed. Agreed with repeat intubation.    PRESSORS: [ x] YES [ ] NO - NorEpi    Antimicrobial:    Cardiovascular:    Pulmonary:  ALBUTerol    90 MICROgram(s) HFA Inhaler 2 Puff(s) Inhalation every 6 hours    Hematologic:  enoxaparin Injectable 40 milliGRAM(s) SubCutaneous daily    Other:  acetaminophen     Tablet .. 650 milliGRAM(s) Oral every 6 hours PRN  bisacodyl Suppository 10 milliGRAM(s) Rectal daily PRN  chlorhexidine 2% Cloths 1 Application(s) Topical <User Schedule>  insulin lispro (ADMELOG) corrective regimen sliding scale   SubCutaneous every 6 hours    acetaminophen     Tablet .. 650 milliGRAM(s) Oral every 6 hours PRN  ALBUTerol    90 MICROgram(s) HFA Inhaler 2 Puff(s) Inhalation every 6 hours  bisacodyl Suppository 10 milliGRAM(s) Rectal daily PRN  chlorhexidine 2% Cloths 1 Application(s) Topical <User Schedule>  enoxaparin Injectable 40 milliGRAM(s) SubCutaneous daily  insulin lispro (ADMELOG) corrective regimen sliding scale   SubCutaneous every 6 hours    Drug Dosing Weight  Height (cm): 175.3 (20 Dec 2021 02:21)  Weight (kg): 72.5 (20 Dec 2021 06:30)  BMI (kg/m2): 23.6 (20 Dec 2021 06:30)  BSA (m2): 1.88 (20 Dec 2021 06:30)    CENTRAL LINE: [ x] YES [ ] NO  LOCATION: [facility]  DATE INSERTED: 12/20  REMOVE: [ ] YES [ ] NO  EXPLAIN:    LISET: [x ] YES [ ] NO    DATE INSERTED: 12/20  REMOVE:  [ ] YES [ ] NO  EXPLAIN:    A-LINE:  [ ] YES [ ] NO  LOCATION:   DATE INSERTED:  REMOVE:  [ ] YES [ ] NO  EXPLAIN:    [facility] -reviewed admission note, no change since admission    12-27 @ 07:01  -  12-28 @ 07:00  --------------------------------------------------------  IN: 239.5 mL / OUT: 2820 mL / NET: -2580.5 mL      PHYSICAL EXAM:    GENERAL: NAD, well-groomed, well-developed, intubated on ventilator  HEAD:  Atraumatic, Normocephalic  MOUTH: RIGHT jaw chronic deformity  EYES: EOMI, PERRLA, conjunctiva and sclera clear  ENMT: intubated, NGT in place  NECK: Supple, normal appearance, No JVD; Normal thyroid; Trachea midline  NERVOUS SYSTEM:  Alert, follows commands. Good concentration; Motor Strength 5/5 B/L upper and lower extremities; DTRs 2+ intact and symmetric  CHEST/LUNG: No chest deformity; No rales, rhonchi, wheezing   HEART: Regular rate and rhythm; No murmurs, rubs, or gallops  ABDOMEN: Soft, Nontender, Nondistended; bowel sounds present  EXTREMITIES:  2+ Peripheral Pulses, No clubbing, cyanosis, or edema  LYMPH: No lymphadenopathy noted  SKIN: No rashes or lesions; Good capillary refill      LABS:  CBC Full  -  ( 28 Dec 2021 03:45 )  WBC Count : 14.10 K/uL  RBC Count : 3.68 M/uL  Hemoglobin : 10.5 g/dL  Hematocrit : 34.2 %  Platelet Count - Automated : 262 K/uL  Mean Cell Volume : 92.9 fl  Mean Cell Hemoglobin : 28.5 pg  Mean Cell Hemoglobin Concentration : 30.7 gm/dL  Auto Neutrophil # : 12.86 K/uL  Auto Lymphocyte # : 0.54 K/uL  Auto Monocyte # : 0.53 K/uL  Auto Eosinophil # : 0.08 K/uL  Auto Basophil # : 0.02 K/uL  Auto Neutrophil % : 91.2 %  Auto Lymphocyte % : 3.8 %  Auto Monocyte % : 3.8 %  Auto Eosinophil % : 0.6 %  Auto Basophil % : 0.1 %    12-28    147<H>  |  110<H>  |  21<H>  ----------------------------<  160<H>  3.7   |  35<H>  |  0.60    Ca    10.3      28 Dec 2021 03:45  Phos  2.5     12-28  Mg     2.3     12-28    TPro  6.4  /  Alb  1.7<L>  /  TBili  0.6  /  DBili  x   /  AST  22  /  ALT  14  /  AlkPhos  62  12-28      RADIOLOGY & ADDITIONAL STUDIES REVIEWED:  yes    [ ]GOALS OF CARE DISCUSSION WITH PATIENT/FAMILY/PROXY:    CRITICAL CARE TIME SPENT: 35 minutes

## 2021-12-28 NOTE — PROGRESS NOTE ADULT - SUBJECTIVE AND OBJECTIVE BOX
INTERVAL HPI/OVERNIGHT EVENTS: ***    PRESSORS: [ ] YES [ ] NO    Antimicrobial:    Cardiovascular:    Pulmonary:  ALBUTerol    90 MICROgram(s) HFA Inhaler 2 Puff(s) Inhalation every 6 hours    Hematologic:  enoxaparin Injectable 40 milliGRAM(s) SubCutaneous daily    Other:  acetaminophen     Tablet .. 650 milliGRAM(s) Oral every 6 hours PRN  bisacodyl Suppository 10 milliGRAM(s) Rectal daily PRN  chlorhexidine 2% Cloths 1 Application(s) Topical <User Schedule>  insulin lispro (ADMELOG) corrective regimen sliding scale   SubCutaneous every 6 hours    acetaminophen     Tablet .. 650 milliGRAM(s) Oral every 6 hours PRN  ALBUTerol    90 MICROgram(s) HFA Inhaler 2 Puff(s) Inhalation every 6 hours  bisacodyl Suppository 10 milliGRAM(s) Rectal daily PRN  chlorhexidine 2% Cloths 1 Application(s) Topical <User Schedule>  enoxaparin Injectable 40 milliGRAM(s) SubCutaneous daily  insulin lispro (ADMELOG) corrective regimen sliding scale   SubCutaneous every 6 hours    Drug Dosing Weight  Height (cm): 175.3 (20 Dec 2021 02:21)  Weight (kg): 72.5 (20 Dec 2021 06:30)  BMI (kg/m2): 23.6 (20 Dec 2021 06:30)  BSA (m2): 1.88 (20 Dec 2021 06:30)    CENTRAL LINE: [ ] YES [ ] NO  LOCATION:   DATE INSERTED:  REMOVE: [ ] YES [ ] NO  EXPLAIN:    LISET: [ ] YES [ ] NO    DATE INSERTED:  REMOVE:  [ ] YES [ ] NO  EXPLAIN:    A-LINE:  [ ] YES [ ] NO  LOCATION:   DATE INSERTED:  REMOVE:  [ ] YES [ ] NO  EXPLAIN:    [facility] -reviewed admission note, no change since admission            12-27 @ 07:01  -  12-28 @ 07:00  --------------------------------------------------------  IN: 239.5 mL / OUT: 2820 mL / NET: -2580.5 mL            PHYSICAL EXAM:    GENERAL: NAD, well-groomed, well-developed  HEAD:  Atraumatic, Normocephalic  EYES: EOMI, PERRLA, conjunctiva and sclera clear  ENMT: No tonsillar erythema, exudates, or enlargement; Moist mucous membranes, Good dentition, [ ]No lesions  NECK: Supple, normal appearance, No JVD; Normal thyroid; Trachea midline  NERVOUS SYSTEM:  Alert & Oriented X3, Good concentration; Motor Strength 5/5 B/L upper and lower extremities; DTRs 2+ intact and symmetric  CHEST/LUNG: No chest deformity; Normal percussion bilaterally; No rales, rhonchi, wheezing   HEART: Regular rate and rhythm; No murmurs, rubs, or gallops  ABDOMEN: Soft, Nontender, Nondistended;Bowel sounds present  EXTREMITIES:  2+ Peripheral Pulses, No clubbing, cyanosis, or edema  LYMPH: No lymphadenopathy noted  SKIN: No rashes or lesions; Good capillary refill      LABS:  CBC Full  -  ( 28 Dec 2021 03:45 )  WBC Count : 14.10 K/uL  RBC Count : 3.68 M/uL  Hemoglobin : 10.5 g/dL  Hematocrit : 34.2 %  Platelet Count - Automated : 262 K/uL  Mean Cell Volume : 92.9 fl  Mean Cell Hemoglobin : 28.5 pg  Mean Cell Hemoglobin Concentration : 30.7 gm/dL  Auto Neutrophil # : 12.86 K/uL  Auto Lymphocyte # : 0.54 K/uL  Auto Monocyte # : 0.53 K/uL  Auto Eosinophil # : 0.08 K/uL  Auto Basophil # : 0.02 K/uL  Auto Neutrophil % : 91.2 %  Auto Lymphocyte % : 3.8 %  Auto Monocyte % : 3.8 %  Auto Eosinophil % : 0.6 %  Auto Basophil % : 0.1 %    12-28    147<H>  |  110<H>  |  21<H>  ----------------------------<  160<H>  3.7   |  35<H>  |  0.60    Ca    10.3      28 Dec 2021 03:45  Phos  2.5     12-28  Mg     2.3     12-28    TPro  6.4  /  Alb  1.7<L>  /  TBili  0.6  /  DBili  x   /  AST  22  /  ALT  14  /  AlkPhos  62  12-28            RADIOLOGY & ADDITIONAL STUDIES REVIEWED:  ***    [ ]GOALS OF CARE DISCUSSION WITH PATIENT/FAMILY/PROXY:    CRITICAL CARE TIME SPENT: 35 minutes

## 2021-12-28 NOTE — CHART NOTE - NSCHARTNOTEFT_GEN_A_CORE
POC daughter Sandi updated regarding patient's condition plan for re-intubation as unable to maintain secretions. All questions answered.

## 2021-12-28 NOTE — CHART NOTE - NSCHARTNOTEFT_GEN_A_CORE
Assessment:   76yMalePatient is a 76y old  Male who presents with a chief complaint of cardiac arrest (28 Dec 2021 10:58)      Factors impacting intake: [ ] none [ ] nausea  [ ] vomiting [ ] diarrhea [ ] constipation  [ ]chewing problems [ ] swallowing issues  [x ] other: unable to visit patient in room as is being re-intubated per RN. was extubated On 12/27/21 and feeds were held per RN. prior To that , patient was receiving glucerna 1.5 at 50ml/hx24 and tolerating. Tube feeding provides: (1200ml, 1800kcal, 99g protein, 910ml free water) which meets higher end of needs. suggest if patient remains intubated To resume tube feeding with glucerna 1.5 and To decrease rate To 40ml/hx24(960ml,1440kcal,79g protein, 729ml free water) as previously recommended. MD to monitor/assess fluid status as needed.     Diet Presciption: Diet, NPO with Tube Feed:   Tube Feeding Modality: Nasogastric  Glucerna 1.5 Dhruv  Total Volume for 24 Hours (mL): 1200  Continuous  Starting Tube Feed Rate {mL per Hour}: 10  Increase Tube Feed Rate by (mL): 10     Every 4 hours  Until Goal Tube Feed Rate (mL per Hour): 50  Tube Feed Duration (in Hours): 24  Tube Feed Start Time: 10:00  Free Water Flush  Bolus   Total Volume per Flush (mL): 250   Frequency: Every 4 Hours (12-24-21 @ 08:53)    Intake: meeting needs while On tube feeding     Current Weight: 65.9kg  % Weight change: patient with 8% wt loss in 1 week     Pertinent Medications: MEDICATIONS  (STANDING):  ALBUTerol    90 MICROgram(s) HFA Inhaler 2 Puff(s) Inhalation every 6 hours  chlorhexidine 2% Cloths 1 Application(s) Topical <User Schedule>  enoxaparin Injectable 40 milliGRAM(s) SubCutaneous daily  insulin lispro (ADMELOG) corrective regimen sliding scale   SubCutaneous every 6 hours    MEDICATIONS  (PRN):  acetaminophen     Tablet .. 650 milliGRAM(s) Oral every 6 hours PRN Temp greater or equal to 38C (100.4F), Mild Pain (1 - 3)  bisacodyl Suppository 10 milliGRAM(s) Rectal daily PRN Constipation    Pertinent Labs: 12-28 Na147 mmol/L<H> Glu 160 mg/dL<H> K+ 3.7 mmol/L Cr  0.60 mg/dL BUN 21 mg/dL<H> 12-28 Phos 2.5 mg/dL 12-28 Alb 1.7 g/dL<L>     CAPILLARY BLOOD GLUCOSE      POCT Blood Glucose.: 158 mg/dL (28 Dec 2021 11:45)  POCT Blood Glucose.: 154 mg/dL (28 Dec 2021 05:48)  POCT Blood Glucose.: 147 mg/dL (27 Dec 2021 23:00)  POCT Blood Glucose.: 134 mg/dL (27 Dec 2021 17:10)    Skin: No pressure injury     Estimated Needs:   [ x] no change since previous assessment  [ ] recalculated:     Previous Nutrition Diagnosis:   [ ] Inadequate Energy Intake [ ]Inadequate Oral Intake [ ] Excessive Energy Intake   [ ] Underweight [ ] Increased Nutrient Needs [ ] Overweight/Obesity   [ ] Altered GI Function [ ] Unintended Weight Loss [ ] Food & Nutrition Related Knowledge Deficit [x ] Malnutrition , severe     Nutrition Diagnosis is [x ] ongoing  [ ] resolved [ ] not applicable     New Nutrition Diagnosis: [ ] not applicable       Interventions:   Recommend  [ ] Change Diet To:  [ ] Nutrition Supplement  [ ] Nutrition Support  [ x] Other:  suggest if patient remains intubated To resume tube feeding with glucerna 1.5 and To decrease rate To 40ml/hx24(960ml,1440kcal,79g protein, 729ml free water) as previously recommended. MD to monitor/assess fluid status as needed.       Monitoring and Evaluation:   [ ] PO intake [ x ] Tolerance to diet prescription [ x ] weights [ x ] labs[ x ] follow up per protocol  [ ] other: Assessment:   76yMalePatient is a 76y old  Male who presents with a chief complaint of cardiac arrest (28 Dec 2021 10:58)      Factors impacting intake: [ ] none [ ] nausea  [ ] vomiting [ ] diarrhea [ ] constipation  [ ]chewing problems [ ] swallowing issues  [x ] other: unable to visit patient in room as is being re-intubated per RN. was extubated On 12/27/21 and feeds were held per RN. prior To that , patient was receiving glucerna 1.5 at 50ml/hx24 and tolerating. Tube feeding provides: (1200ml, 1800kcal, 99g protein, 910ml free water) which meets higher end of needs. suggest if patient remains intubated To resume tube feeding with glucerna 1.5 and To decrease rate To 40ml/hx24(960ml,1440kcal,79g protein, 729ml free water) as previously recommended. MD to monitor/assess fluid status as needed.     Diet Presciption: Diet, NPO with Tube Feed:   Tube Feeding Modality: Nasogastric  Glucerna 1.5 Dhruv  Total Volume for 24 Hours (mL): 1200  Continuous  Starting Tube Feed Rate {mL per Hour}: 10  Increase Tube Feed Rate by (mL): 10     Every 4 hours  Until Goal Tube Feed Rate (mL per Hour): 50  Tube Feed Duration (in Hours): 24  Tube Feed Start Time: 10:00  Free Water Flush  Bolus   Total Volume per Flush (mL): 250   Frequency: Every 4 Hours (12-24-21 @ 08:53)    Intake: meeting needs while On tube feeding     Current Weight: 65.9kg  % Weight change: patient with 8% wt loss in 1 week     Pertinent Medications: MEDICATIONS  (STANDING):  ALBUTerol    90 MICROgram(s) HFA Inhaler 2 Puff(s) Inhalation every 6 hours  chlorhexidine 2% Cloths 1 Application(s) Topical <User Schedule>  enoxaparin Injectable 40 milliGRAM(s) SubCutaneous daily  insulin lispro (ADMELOG) corrective regimen sliding scale   SubCutaneous every 6 hours    MEDICATIONS  (PRN):  acetaminophen     Tablet .. 650 milliGRAM(s) Oral every 6 hours PRN Temp greater or equal to 38C (100.4F), Mild Pain (1 - 3)  bisacodyl Suppository 10 milliGRAM(s) Rectal daily PRN Constipation    Pertinent Labs: 12-28 Na147 mmol/L<H> Glu 160 mg/dL<H> K+ 3.7 mmol/L Cr  0.60 mg/dL BUN 21 mg/dL<H> 12-28 Phos 2.5 mg/dL 12-28 Alb 1.7 g/dL<L>     CAPILLARY BLOOD GLUCOSE      POCT Blood Glucose.: 158 mg/dL (28 Dec 2021 11:45)  POCT Blood Glucose.: 154 mg/dL (28 Dec 2021 05:48)  POCT Blood Glucose.: 147 mg/dL (27 Dec 2021 23:00)  POCT Blood Glucose.: 134 mg/dL (27 Dec 2021 17:10)    Skin: pressure injury     Estimated Needs:   [ x] no change since previous assessment  [ ] recalculated:     Previous Nutrition Diagnosis:   [ ] Inadequate Energy Intake [ ]Inadequate Oral Intake [ ] Excessive Energy Intake   [ ] Underweight [ ] Increased Nutrient Needs [ ] Overweight/Obesity   [ ] Altered GI Function [ ] Unintended Weight Loss [ ] Food & Nutrition Related Knowledge Deficit [x ] Malnutrition , severe     Nutrition Diagnosis is [x ] ongoing  [ ] resolved [ ] not applicable     New Nutrition Diagnosis: [ ] not applicable       Interventions:   Recommend  [ ] Change Diet To:  [ ] Nutrition Supplement  [ ] Nutrition Support  [ x] Other:  suggest if patient remains intubated To resume tube feeding with glucerna 1.5 and To decrease rate To 40ml/hx24(960ml,1440kcal,79g protein, 729ml free water) as previously recommended. MD to monitor/assess fluid status as needed.       Monitoring and Evaluation:   [ ] PO intake [ x ] Tolerance to diet prescription [ x ] weights [ x ] labs[ x ] follow up per protocol  [ ] other:

## 2021-12-29 PROBLEM — C78.00 LUNG METASTASES: Status: ACTIVE | Noted: 2021-01-01

## 2021-12-29 PROBLEM — C02.9: Status: ACTIVE | Noted: 2020-10-09

## 2021-12-29 NOTE — PROGRESS NOTE ADULT - SUBJECTIVE AND OBJECTIVE BOX
INTERVAL HPI/OVERNIGHT EVENTS: ***    PRESSORS: [ ] YES [ ] NO    Antimicrobial:    Cardiovascular:  norepinephrine Infusion 0.007 MICROgram(s)/kG/Min IV Continuous <Continuous>    Pulmonary:  ALBUTerol    90 MICROgram(s) HFA Inhaler 2 Puff(s) Inhalation every 6 hours    Hematologic:  enoxaparin Injectable 40 milliGRAM(s) SubCutaneous daily    Other:  acetaminophen     Tablet .. 650 milliGRAM(s) Oral every 6 hours PRN  bisacodyl Suppository 10 milliGRAM(s) Rectal daily PRN  chlorhexidine 2% Cloths 1 Application(s) Topical <User Schedule>  insulin lispro (ADMELOG) corrective regimen sliding scale   SubCutaneous every 6 hours  pantoprazole  Injectable 40 milliGRAM(s) IV Push daily  propofol Infusion 30 MICROgram(s)/kG/Min IV Continuous <Continuous>    acetaminophen     Tablet .. 650 milliGRAM(s) Oral every 6 hours PRN  ALBUTerol    90 MICROgram(s) HFA Inhaler 2 Puff(s) Inhalation every 6 hours  bisacodyl Suppository 10 milliGRAM(s) Rectal daily PRN  chlorhexidine 2% Cloths 1 Application(s) Topical <User Schedule>  enoxaparin Injectable 40 milliGRAM(s) SubCutaneous daily  insulin lispro (ADMELOG) corrective regimen sliding scale   SubCutaneous every 6 hours  norepinephrine Infusion 0.007 MICROgram(s)/kG/Min IV Continuous <Continuous>  pantoprazole  Injectable 40 milliGRAM(s) IV Push daily  propofol Infusion 30 MICROgram(s)/kG/Min IV Continuous <Continuous>    Drug Dosing Weight  Height (cm): 175.3 (20 Dec 2021 02:21)  Weight (kg): 72.5 (20 Dec 2021 06:30)  BMI (kg/m2): 23.6 (20 Dec 2021 06:30)  BSA (m2): 1.88 (20 Dec 2021 06:30)    CENTRAL LINE: [ ] YES [ ] NO  LOCATION:   DATE INSERTED:  REMOVE: [ ] YES [ ] NO  EXPLAIN:    HUTCHINS: [ ] YES [ ] NO    DATE INSERTED:  REMOVE:  [ ] YES [ ] NO  EXPLAIN:    A-LINE:  [ ] YES [ ] NO  LOCATION:   DATE INSERTED:  REMOVE:  [ ] YES [ ] NO  EXPLAIN:    PMH -reviewed admission note, no change since admission      ABG - ( 29 Dec 2021 04:19 )  pH, Arterial: 7.47  pH, Blood: x     /  pCO2: 40    /  pO2: 93    / HCO3: 29    / Base Excess: 5.0   /  SaO2: 98                    12-28 @ 07:01  -  12-29 @ 07:00  --------------------------------------------------------  IN: 2900.2 mL / OUT: 1280 mL / NET: 1620.2 mL        Mode: AC/ CMV (Assist Control/ Continuous Mandatory Ventilation)  RR (machine): 16  TV (machine): 450  FiO2: 60  PEEP: 8  ITime: 0.8  MAP: 14  PIP: 32      PHYSICAL EXAM:    GENERAL: NAD, well-groomed, well-developed  HEAD:  Atraumatic, Normocephalic  EYES: EOMI, PERRLA, conjunctiva and sclera clear  ENMT: No tonsillar erythema, exudates, or enlargement; Moist mucous membranes, Good dentition, [ ]No lesions  NECK: Supple, normal appearance, No JVD; Normal thyroid; Trachea midline  NERVOUS SYSTEM:  Alert & Oriented X3, Good concentration; Motor Strength 5/5 B/L upper and lower extremities; DTRs 2+ intact and symmetric  CHEST/LUNG: No chest deformity; Normal percussion bilaterally; No rales, rhonchi, wheezing   HEART: Regular rate and rhythm; No murmurs, rubs, or gallops  ABDOMEN: Soft, Nontender, Nondistended;Bowel sounds present  EXTREMITIES:  2+ Peripheral Pulses, No clubbing, cyanosis, or edema  LYMPH: No lymphadenopathy noted  SKIN: No rashes or lesions; Good capillary refill      LABS:  CBC Full  -  ( 29 Dec 2021 04:45 )  WBC Count : 17.64 K/uL  RBC Count : 3.53 M/uL  Hemoglobin : 10.1 g/dL  Hematocrit : 32.8 %  Platelet Count - Automated : 276 K/uL  Mean Cell Volume : 92.9 fl  Mean Cell Hemoglobin : 28.6 pg  Mean Cell Hemoglobin Concentration : 30.8 gm/dL  Auto Neutrophil # : 16.63 K/uL  Auto Lymphocyte # : 0.47 K/uL  Auto Monocyte # : 0.41 K/uL  Auto Eosinophil # : 0.02 K/uL  Auto Basophil # : 0.04 K/uL  Auto Neutrophil % : 94.3 %  Auto Lymphocyte % : 2.7 %  Auto Monocyte % : 2.3 %  Auto Eosinophil % : 0.1 %  Auto Basophil % : 0.2 %    12-29    149<H>  |  113<H>  |  35<H>  ----------------------------<  157<H>  3.8   |  30  |  1.03    Ca    9.7      29 Dec 2021 04:45  Phos  2.5     12-28  Mg     2.5     12-29    TPro  5.7<L>  /  Alb  1.5<L>  /  TBili  0.6  /  DBili  x   /  AST  29  /  ALT  15  /  AlkPhos  66  12-29            RADIOLOGY & ADDITIONAL STUDIES REVIEWED:  ***    [ ]GOALS OF CARE DISCUSSION WITH PATIENT/FAMILY/PROXY:    CRITICAL CARE TIME SPENT: 35 minutes INTERVAL HPI/OVERNIGHT EVENTS: Spiking fevers post intubation, CXR improved lung volume on left. Starting zosyn Abx to cover aspiration PNA. Chest tube to suction w/ serosanguinous exudate. On levo and propofol. Lowering O2 from 60 > 50% on vent.  16| 450| 50%| 8   ABG 7.47 | 40| 93| 29 (improving)    PRESSORS: [ x] YES [ ] NO levopehd    Antimicrobial:    Cardiovascular:  norepinephrine Infusion 0.007 MICROgram(s)/kG/Min IV Continuous <Continuous>    Pulmonary:  ALBUTerol    90 MICROgram(s) HFA Inhaler 2 Puff(s) Inhalation every 6 hours    Hematologic:  enoxaparin Injectable 40 milliGRAM(s) SubCutaneous daily    Other:  acetaminophen     Tablet .. 650 milliGRAM(s) Oral every 6 hours PRN  bisacodyl Suppository 10 milliGRAM(s) Rectal daily PRN  chlorhexidine 2% Cloths 1 Application(s) Topical <User Schedule>  insulin lispro (ADMELOG) corrective regimen sliding scale   SubCutaneous every 6 hours  pantoprazole  Injectable 40 milliGRAM(s) IV Push daily  propofol Infusion 30 MICROgram(s)/kG/Min IV Continuous <Continuous>    acetaminophen     Tablet .. 650 milliGRAM(s) Oral every 6 hours PRN  ALBUTerol    90 MICROgram(s) HFA Inhaler 2 Puff(s) Inhalation every 6 hours  bisacodyl Suppository 10 milliGRAM(s) Rectal daily PRN  chlorhexidine 2% Cloths 1 Application(s) Topical <User Schedule>  enoxaparin Injectable 40 milliGRAM(s) SubCutaneous daily  insulin lispro (ADMELOG) corrective regimen sliding scale   SubCutaneous every 6 hours  norepinephrine Infusion 0.007 MICROgram(s)/kG/Min IV Continuous <Continuous>  pantoprazole  Injectable 40 milliGRAM(s) IV Push daily  propofol Infusion 30 MICROgram(s)/kG/Min IV Continuous <Continuous>    Drug Dosing Weight  Height (cm): 175.3 (20 Dec 2021 02:21)  Weight (kg): 72.5 (20 Dec 2021 06:30)  BMI (kg/m2): 23.6 (20 Dec 2021 06:30)  BSA (m2): 1.88 (20 Dec 2021 06:30)    CENTRAL LINE: [ x] YES [ ] NO  LOCATION: [facility]  DATE INSERTED: 12/20  REMOVE: [ ] YES [ ] NO  EXPLAIN: will remove today 12/29    HUTCHINS: [x ] YES [ ] NO    DATE INSERTED: 12/20  REMOVE:  [ ] YES [ ] NO  EXPLAIN:    A-LINE:  [ ] YES [ ] NO  LOCATION:   DATE INSERTED:  REMOVE:  [ ] YES [ ] NO  EXPLAIN:    PMH -reviewed admission note, no change since admission    ABG - ( 29 Dec 2021 04:19 )  pH, Arterial: 7.47  pH, Blood: x     /  pCO2: 40    /  pO2: 93    / HCO3: 29    / Base Excess: 5.0   /  SaO2: 98          12-28 @ 07:01  -  12-29 @ 07:00  --------------------------------------------------------  IN: 2900.2 mL / OUT: 1280 mL / NET: 1620.2 mL      Mode: AC/ CMV (Assist Control/ Continuous Mandatory Ventilation)  RR (machine): 16  TV (machine): 450  FiO2: 60  PEEP: 8  ITime: 0.8  MAP: 14  PIP: 32    PHYSICAL EXAM:    GENERAL: NAD, well-groomed, well-developed, intubated on ventilator  HEAD:  Atraumatic, Normocephalic  MOUTH: RIGHT jaw chronic deformity  EYES: EOMI, PERRLA, conjunctiva and sclera clear  ENMT: intubated, NGT in place  NECK: Supple, normal appearance, No JVD; Normal thyroid; Trachea midline  NERVOUS SYSTEM:  Alert, follows commands. Good concentration; Motor Strength 5/5 B/L upper and lower extremities; DTRs 2+ intact and symmetric  CHEST/LUNG: good bilateral air entry, diminished sounds at right base. right chest tube in place to suction. serosanguinous exudate.  HEART: Regular rate and rhythm; No murmurs, rubs, or gallops  ABDOMEN: Soft, Nontender, Nondistended; bowel sounds present  EXTREMITIES:  2+ Peripheral Pulses, No clubbing, cyanosis, or edema  LYMPH: No lymphadenopathy noted  SKIN: No rashes or lesions; Good capillary refill    LABS:  CBC Full  -  ( 29 Dec 2021 04:45 )  WBC Count : 17.64 K/uL  RBC Count : 3.53 M/uL  Hemoglobin : 10.1 g/dL  Hematocrit : 32.8 %  Platelet Count - Automated : 276 K/uL  Mean Cell Volume : 92.9 fl  Mean Cell Hemoglobin : 28.6 pg  Mean Cell Hemoglobin Concentration : 30.8 gm/dL  Auto Neutrophil # : 16.63 K/uL  Auto Lymphocyte # : 0.47 K/uL  Auto Monocyte # : 0.41 K/uL  Auto Eosinophil # : 0.02 K/uL  Auto Basophil # : 0.04 K/uL  Auto Neutrophil % : 94.3 %  Auto Lymphocyte % : 2.7 %  Auto Monocyte % : 2.3 %  Auto Eosinophil % : 0.1 %  Auto Basophil % : 0.2 %    12-29    149<H>  |  113<H>  |  35<H>  ----------------------------<  157<H>  3.8   |  30  |  1.03    Ca    9.7      29 Dec 2021 04:45  Phos  2.5     12-28  Mg     2.5     12-29    TPro  5.7<L>  /  Alb  1.5<L>  /  TBili  0.6  /  DBili  x   /  AST  29  /  ALT  15  /  AlkPhos  66  12-29            RADIOLOGY & ADDITIONAL STUDIES REVIEWED:  ***    [ ]GOALS OF CARE DISCUSSION WITH PATIENT/FAMILY/PROXY:    CRITICAL CARE TIME SPENT: 35 minutes INTERVAL HPI/OVERNIGHT EVENTS: Spiking fevers post intubation, CXR improved lung volume on left. Starting zosyn Abx to cover aspiration PNA. Chest tube to suction w/ serosanguinous exudate. On levo and propofol. Lowering O2 from 60 > 50% on vent.  16| 450| 50%| 8   ABG 7.47 | 40| 93| 29 (improving)    PRESSORS: [ x] YES [ ] NO levopehd    Antimicrobial:    Cardiovascular:  norepinephrine Infusion 0.007 MICROgram(s)/kG/Min IV Continuous <Continuous>    Pulmonary:  ALBUTerol    90 MICROgram(s) HFA Inhaler 2 Puff(s) Inhalation every 6 hours    Hematologic:  enoxaparin Injectable 40 milliGRAM(s) SubCutaneous daily    Other:  acetaminophen     Tablet .. 650 milliGRAM(s) Oral every 6 hours PRN  bisacodyl Suppository 10 milliGRAM(s) Rectal daily PRN  chlorhexidine 2% Cloths 1 Application(s) Topical <User Schedule>  insulin lispro (ADMELOG) corrective regimen sliding scale   SubCutaneous every 6 hours  pantoprazole  Injectable 40 milliGRAM(s) IV Push daily  propofol Infusion 30 MICROgram(s)/kG/Min IV Continuous <Continuous>    acetaminophen     Tablet .. 650 milliGRAM(s) Oral every 6 hours PRN  ALBUTerol    90 MICROgram(s) HFA Inhaler 2 Puff(s) Inhalation every 6 hours  bisacodyl Suppository 10 milliGRAM(s) Rectal daily PRN  chlorhexidine 2% Cloths 1 Application(s) Topical <User Schedule>  enoxaparin Injectable 40 milliGRAM(s) SubCutaneous daily  insulin lispro (ADMELOG) corrective regimen sliding scale   SubCutaneous every 6 hours  norepinephrine Infusion 0.007 MICROgram(s)/kG/Min IV Continuous <Continuous>  pantoprazole  Injectable 40 milliGRAM(s) IV Push daily  propofol Infusion 30 MICROgram(s)/kG/Min IV Continuous <Continuous>    Drug Dosing Weight  Height (cm): 175.3 (20 Dec 2021 02:21)  Weight (kg): 72.5 (20 Dec 2021 06:30)  BMI (kg/m2): 23.6 (20 Dec 2021 06:30)  BSA (m2): 1.88 (20 Dec 2021 06:30)    CENTRAL LINE: [ x] YES [ ] NO  LOCATION: [facility]  DATE INSERTED: 12/20  REMOVE: [ ] YES [ ] NO  EXPLAIN: will remove today 12/29    HUTCHINS: [x ] YES [ ] NO    DATE INSERTED: 12/20  REMOVE:  [ ] YES [ ] NO  EXPLAIN:    A-LINE:  [ ] YES [ ] NO  LOCATION:   DATE INSERTED:  REMOVE:  [ ] YES [ ] NO  EXPLAIN:    PMH -reviewed admission note, no change since admission    ABG - ( 29 Dec 2021 04:19 )  pH, Arterial: 7.47  pH, Blood: x     /  pCO2: 40    /  pO2: 93    / HCO3: 29    / Base Excess: 5.0   /  SaO2: 98          12-28 @ 07:01  -  12-29 @ 07:00  --------------------------------------------------------  IN: 2900.2 mL / OUT: 1280 mL / NET: 1620.2 mL      Mode: AC/ CMV (Assist Control/ Continuous Mandatory Ventilation)  RR (machine): 16  TV (machine): 450  FiO2: 60  PEEP: 8  ITime: 0.8  MAP: 14  PIP: 32    PHYSICAL EXAM:    GENERAL: NAD, well-groomed, well-developed, intubated on ventilator  HEAD:  Atraumatic, Normocephalic  MOUTH: RIGHT jaw chronic deformity  EYES: EOMI, PERRLA, conjunctiva and sclera clear  ENMT: intubated, NGT in place  NECK: Supple, normal appearance, No JVD; Normal thyroid; Trachea midline  NERVOUS SYSTEM:  Alert, follows commands. Good concentration; Motor Strength 5/5 B/L upper and lower extremities; DTRs 2+ intact and symmetric  CHEST/LUNG: good bilateral air entry, diminished sounds at right base. right chest tube in place to suction. serosanguinous exudate.  HEART: Regular rate and rhythm; No murmurs, rubs, or gallops  ABDOMEN: Soft, Nontender, Nondistended; bowel sounds present  EXTREMITIES:  2+ Peripheral Pulses, No clubbing, cyanosis, or edema  LYMPH: No lymphadenopathy noted  SKIN: No rashes or lesions; Good capillary refill    LABS:  CBC Full  -  ( 29 Dec 2021 04:45 )  WBC Count : 17.64 K/uL  RBC Count : 3.53 M/uL  Hemoglobin : 10.1 g/dL  Hematocrit : 32.8 %  Platelet Count - Automated : 276 K/uL  Mean Cell Volume : 92.9 fl  Mean Cell Hemoglobin : 28.6 pg  Mean Cell Hemoglobin Concentration : 30.8 gm/dL  Auto Neutrophil # : 16.63 K/uL  Auto Lymphocyte # : 0.47 K/uL  Auto Monocyte # : 0.41 K/uL  Auto Eosinophil # : 0.02 K/uL  Auto Basophil # : 0.04 K/uL  Auto Neutrophil % : 94.3 %  Auto Lymphocyte % : 2.7 %  Auto Monocyte % : 2.3 %  Auto Eosinophil % : 0.1 %  Auto Basophil % : 0.2 %    12-29    149<H>  |  113<H>  |  35<H>  ----------------------------<  157<H>  3.8   |  30  |  1.03    Ca    9.7      29 Dec 2021 04:45  Phos  2.5     12-28  Mg     2.5     12-29    TPro  5.7<L>  /  Alb  1.5<L>  /  TBili  0.6  /  DBili  x   /  AST  29  /  ALT  15  /  AlkPhos  66  12-29      RADIOLOGY & ADDITIONAL STUDIES REVIEWED: yes CXR    [ ]GOALS OF CARE DISCUSSION WITH PATIENT/FAMILY/PROXY:    CRITICAL CARE TIME SPENT: 35 minutes INTERVAL HPI/OVERNIGHT EVENTS: Spiking fevers post intubation, CXR improved lung volume on left. Starting zosyn Abx to cover aspiration PNA. Chest tube to suction w/ serosanguinous exudate. On levo and propofol. Lowering O2 from 60 > 50% on vent.  ABG 16| 450| 50%| 8 , ABG 7.47 | 40| 93| 29 (improving).     PRESSORS: [ x] YES [ ] NO levopehd    Antimicrobial:    Cardiovascular:  norepinephrine Infusion 0.007 MICROgram(s)/kG/Min IV Continuous <Continuous>    Pulmonary:  ALBUTerol    90 MICROgram(s) HFA Inhaler 2 Puff(s) Inhalation every 6 hours    Hematologic:  enoxaparin Injectable 40 milliGRAM(s) SubCutaneous daily    Other:  acetaminophen     Tablet .. 650 milliGRAM(s) Oral every 6 hours PRN  bisacodyl Suppository 10 milliGRAM(s) Rectal daily PRN  chlorhexidine 2% Cloths 1 Application(s) Topical <User Schedule>  insulin lispro (ADMELOG) corrective regimen sliding scale   SubCutaneous every 6 hours  pantoprazole  Injectable 40 milliGRAM(s) IV Push daily  propofol Infusion 30 MICROgram(s)/kG/Min IV Continuous <Continuous>    acetaminophen     Tablet .. 650 milliGRAM(s) Oral every 6 hours PRN  ALBUTerol    90 MICROgram(s) HFA Inhaler 2 Puff(s) Inhalation every 6 hours  bisacodyl Suppository 10 milliGRAM(s) Rectal daily PRN  chlorhexidine 2% Cloths 1 Application(s) Topical <User Schedule>  enoxaparin Injectable 40 milliGRAM(s) SubCutaneous daily  insulin lispro (ADMELOG) corrective regimen sliding scale   SubCutaneous every 6 hours  norepinephrine Infusion 0.007 MICROgram(s)/kG/Min IV Continuous <Continuous>  pantoprazole  Injectable 40 milliGRAM(s) IV Push daily  propofol Infusion 30 MICROgram(s)/kG/Min IV Continuous <Continuous>    Drug Dosing Weight  Height (cm): 175.3 (20 Dec 2021 02:21)  Weight (kg): 72.5 (20 Dec 2021 06:30)  BMI (kg/m2): 23.6 (20 Dec 2021 06:30)  BSA (m2): 1.88 (20 Dec 2021 06:30)    CENTRAL LINE: [ x] YES [ ] NO  LOCATION: [facility]  DATE INSERTED: 12/20  REMOVE: [ ] YES [ ] NO  EXPLAIN: will remove today 12/29    HUTCHINS: [x ] YES [ ] NO    DATE INSERTED: 12/20  REMOVE:  [ ] YES [ ] NO  EXPLAIN:    A-LINE:  [ ] YES [ ] NO  LOCATION:   DATE INSERTED:  REMOVE:  [ ] YES [ ] NO  EXPLAIN:    PMH -reviewed admission note, no change since admission    ABG - ( 29 Dec 2021 04:19 )  pH, Arterial: 7.47  pH, Blood: x     /  pCO2: 40    /  pO2: 93    / HCO3: 29    / Base Excess: 5.0   /  SaO2: 98          12-28 @ 07:01  -  12-29 @ 07:00  --------------------------------------------------------  IN: 2900.2 mL / OUT: 1280 mL / NET: 1620.2 mL      Mode: AC/ CMV (Assist Control/ Continuous Mandatory Ventilation)  RR (machine): 16  TV (machine): 450  FiO2: 60  PEEP: 8  ITime: 0.8  MAP: 14  PIP: 32    PHYSICAL EXAM:    GENERAL: NAD, well-groomed, well-developed, intubated on ventilator  HEAD:  Atraumatic, Normocephalic  MOUTH: RIGHT jaw chronic deformity  EYES: EOMI, PERRLA, conjunctiva and sclera clear  ENMT: intubated, NGT in place  NECK: Supple, normal appearance, No JVD; Normal thyroid; Trachea midline  NERVOUS SYSTEM:  Alert, follows commands. Good concentration; Motor Strength 5/5 B/L upper and lower extremities; DTRs 2+ intact and symmetric  CHEST/LUNG: good bilateral air entry, diminished sounds at right base. right chest tube in place to suction. serosanguinous exudate.  HEART: Regular rate and rhythm; No murmurs, rubs, or gallops  ABDOMEN: Soft, Nontender, Nondistended; bowel sounds present  EXTREMITIES:  2+ Peripheral Pulses, No clubbing, cyanosis, or edema  LYMPH: No lymphadenopathy noted  SKIN: No rashes or lesions; Good capillary refill    LABS:  CBC Full  -  ( 29 Dec 2021 04:45 )  WBC Count : 17.64 K/uL  RBC Count : 3.53 M/uL  Hemoglobin : 10.1 g/dL  Hematocrit : 32.8 %  Platelet Count - Automated : 276 K/uL  Mean Cell Volume : 92.9 fl  Mean Cell Hemoglobin : 28.6 pg  Mean Cell Hemoglobin Concentration : 30.8 gm/dL  Auto Neutrophil # : 16.63 K/uL  Auto Lymphocyte # : 0.47 K/uL  Auto Monocyte # : 0.41 K/uL  Auto Eosinophil # : 0.02 K/uL  Auto Basophil # : 0.04 K/uL  Auto Neutrophil % : 94.3 %  Auto Lymphocyte % : 2.7 %  Auto Monocyte % : 2.3 %  Auto Eosinophil % : 0.1 %  Auto Basophil % : 0.2 %    12-29    149<H>  |  113<H>  |  35<H>  ----------------------------<  157<H>  3.8   |  30  |  1.03    Ca    9.7      29 Dec 2021 04:45  Phos  2.5     12-28  Mg     2.5     12-29    TPro  5.7<L>  /  Alb  1.5<L>  /  TBili  0.6  /  DBili  x   /  AST  29  /  ALT  15  /  AlkPhos  66  12-29      RADIOLOGY & ADDITIONAL STUDIES REVIEWED: yes CXR    [ ]GOALS OF CARE DISCUSSION WITH PATIENT/FAMILY/PROXY:    CRITICAL CARE TIME SPENT: 35 minutes

## 2021-12-29 NOTE — PHARMACOTHERAPY INTERVENTION NOTE - COMMENTS
Recommended to obtain triglyceride levels during the use of propofol.
Recommended switching pantoprazole from IV to Suspension because patient can take medication via NG tube.

## 2021-12-29 NOTE — PHYSICAL EXAM
[de-identified] : Posttreatment changes, significant progression of LAD in the right neck, firm, limited mobility, approx. 5 cm.    [Laryngoscopy Performed] : laryngoscopy was performed, see procedure section for findings [FreeTextEntry1] : Posttreatment changes. No concerning lesions in the OC/OPx on inspection or palpation.\par  [Normal] : no rashes

## 2021-12-29 NOTE — CONSULT NOTE ADULT - PROBLEM SELECTOR RECOMMENDATION 3
s/p PEA arrest, ROSC after 20 min. Echo showed preserved EF, no significant valvular disease. +L pneumothorax s/p pigtail catheter. Pt responsive. Remains intubated. DNR

## 2021-12-29 NOTE — CHART NOTE - NSCHARTNOTEFT_GEN_A_CORE
Andrew Lopes [phone] was called and given update on current condition. Informed of plan for trach. Palliative to call regarding long term GOC and prognosis.

## 2021-12-29 NOTE — CONSULT NOTE ADULT - SUBJECTIVE AND OBJECTIVE BOX
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 Patient was intubated on arival to ED, he had a cardiac arrest and achieved ROSC 20 minutes later. History was obtained with son at bed side , he states that patient Squamous cell cancer was metastasized to luns and he was getting immunotherapy. They noticed patient O2 saturation was low so decided to bring hi to ED,       On arrival to ED patient was intubated, and he lost the pulse. high quality CPR was started , patient achieved Rosc after 7 cycles of CPR. Post intubation CXR showed large left sided pneumothorax. pigtail was placed by ED physician. emergent IO acces was done during the CPR. Patient was started on levophed and propofol for sedation. (20 Dec 2021 03:52)    Interval history; remains intubated, under ICU care. +fevers. DNR on file.     PAST MEDICAL & SURGICAL HISTORY:  Hypertension    Diabetes    High cholesterol    Primary osteoarthritis of both knees    Coronary artery disease of native artery of native heart with stable angina pectoris    Allergic bronchitis    Diabetic retinopathy    Oral cancer    SCC (squamous cell carcinoma)    Metastatic cancer    Recurrent disease    Edema of extremity    Hyponatremia    Microalbuminuria    Neuralgia    Obstructive sleep apnea, adult    Vitamin D deficiency    S/P knee replacement  b/l    S/P hernia repair  b/l    Status post cataract extraction and insertion of intraocular lens, unspecified laterality  b/l    History of surgery  On 9/10/20 he underwent right hemiglossectomy and partial neck dissection with Dr. Darinel Servin at [facility].        SOCIAL HISTORY:  has 2 children  Admitted from:  home         Surrogate/HCP/Guardian:    Sandi Amaya daughter         Phone#: [phone]    FAMILY HISTORY:  Family history of acute myocardial infarction (Sibling)      Baseline ADLs (prior to admission): alert, ambulatory    Allergies    No Known Allergies    Intolerances      Present Symptoms:      Review of Systems:   Unable to obtain due to poor mentation     MEDICATIONS  (STANDING):  ALBUTerol    90 MICROgram(s) HFA Inhaler 2 Puff(s) Inhalation every 6 hours  chlorhexidine 2% Cloths 1 Application(s) Topical <User Schedule>  enoxaparin Injectable 40 milliGRAM(s) SubCutaneous daily  insulin lispro (ADMELOG) corrective regimen sliding scale   SubCutaneous every 6 hours  midodrine 10 milliGRAM(s) Oral every 8 hours  norepinephrine Infusion 0.007 MICROgram(s)/kG/Min (0.5 mL/Hr) IV Continuous <Continuous>  pantoprazole   Suspension 40 milliGRAM(s) Oral daily  piperacillin/tazobactam IVPB.. 3.375 Gram(s) IV Intermittent every 8 hours  propofol Infusion 30 MICROgram(s)/kG/Min (13.1 mL/Hr) IV Continuous <Continuous>    MEDICATIONS  (PRN):  acetaminophen     Tablet .. 650 milliGRAM(s) Oral every 6 hours PRN Temp greater or equal to 38C (100.4F), Mild Pain (1 - 3)  bisacodyl Suppository 10 milliGRAM(s) Rectal daily PRN Constipation      PHYSICAL EXAM:    Vital Signs Last 24 Hrs  T(C): 37.9 (29 Dec 2021 14:00), Max: 38.8 (29 Dec 2021 00:45)  T(F): 100.2 (29 Dec 2021 14:00), Max: 101.8 (29 Dec 2021 00:45)  HR: 92 (29 Dec 2021 14:00) (91 - 125)  BP: 105/54 (29 Dec 2021 14:00) (80/49 - 140/69)  BP(mean): 65 (29 Dec 2021 14:00) (55 - 96)  RR: 32 (29 Dec 2021 14:00) (12 - 46)  SpO2: 96% (29 Dec 2021 14:00) (90% - 100%)     Karnofsky Performance Score/Palliative Performance Status Version2: 20    %     GENERAL: sedated, intubated,  NAD  HEENT: Atraumatic, oropharynx clear, neck supple  CHEST/LUNG: unlabored  HEART: Regular rate and rhythm    ABDOMEN: Soft, Nontender, Nondistended   MUSCULOSKELETAL:  No  edema   NERVOUS SYSTEM:  sedated  SKIN: No rashes or lesions noted  Oral intake: npo/TF       LABS:                        10.1   17.64 )-----------( 276      ( 29 Dec 2021 04:45 )             32.8     12-29    149<H>  |  113<H>  |  35<H>  ----------------------------<  157<H>  3.8   |  30  |  1.03    Ca    9.7      29 Dec 2021 04:45  Phos  2.5     12-28  Mg     2.5     12-29    TPro  5.7<L>  /  Alb  1.5<L>  /  TBili  0.6  /  DBili  x   /  AST  29  /  ALT  15  /  AlkPhos  66  12-29        RADIOLOGY & ADDITIONAL STUDIES:      ADVANCE DIRECTIVES:

## 2021-12-29 NOTE — CONSULT NOTE ADULT - CONVERSATION DETAILS
Spoke with patient's daughter Sandi regarding current condition, advanced malignancy, goals of care. Provided anticipatory guidance regarding trach/PEG and need for ventilator facility, would preclude further cancer treatment. [place] had been getting [place] [facility] [place] prior to [place], was on immunotherapy but last PET in October had shown progression of disease. She thought that he would breathe fine on his own after the trach and would no longer need the ventilator as he only needed the trach because of his secretions. Discussed that they would continue to try to wean him following the tracheostomy but possible that he would remain ventilator dependent. Encouraged her to speak w her brother regarding what quality of life they feel is most important to their father for the time he has left. She verbalized understanding. She requested that the ICU team also touch base with his ENT Dr Dupree, ICU team made aware. Support provided.   ELDER on file for DNR.

## 2021-12-29 NOTE — CONSULT NOTE ADULT - PROBLEM SELECTOR RECOMMENDATION 2
metastatic tongue CA dx'd 8/20, with lung mets, s/p chemo, most recently on immunotherapy. Last PET Oct showed progression of disease. [place] [facility] [place] Foll by Dr Porras at [facility]. Family made aware trach would preclude further treatment while vent dependent. DNR on file. hospice eligible.

## 2021-12-29 NOTE — PROGRESS NOTE ADULT - ASSESSMENT
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 Patient had cardiac arrest in ED , was intubated CPR was initiated and subsequently ROSC was acieved after 20 minutes. Patient is admitted to ICU.    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  - CAD s/p stent  - Diabetes    Plan  ====Neuro====  #baseline mentation: AAOx3  #intubated  - Extubated on 12/27, taper sedation  - re-intubated on 12/28  - c/w precedex gtt for sedation    ====CVS====  #Cardiac arrest   12/20 on arrival to ED  - ROSC after 20min  - trop trended down  - started norepi 12/28 post intubation    ====Pulm====  #Acute hypoxic respiratory failure  - has Hx of chronic PTX on LEFT  - likely 2/2 PTX vs aspiration PNA  - SBT (12/21, 22, 24)  - extubated on 12/22  - re-intubated on 12/23  - s/p solumedrol  - c/w cefepime 2g q12 (12/20~)  - extubated again on 12/27  - re-intubated on 12/28  - repeat CXR    #PTX (acute/on/chronic)  - s/p pigtail in ED 12/20  - chest tube to suction    ====GI====  #no active issues  #diet: NPO w/ tube feeding + free water 350 q 6  #bowel regimen: none    ====nephro====  #hypernatremia  serum Na 153 >>> now 148  - c/w free water 350 q 6    #hypercalcemia  ddx: dehydration vs SCC-related  - PTH low  - resolved s/p IVF    ====ID====  #leukocytosis  - likely 2/2 aspiration PNA vs reactive process  - Ucx neg  - Scx MAC  - c/w cefepime 2g q 12 (12/20~)    ====Heme/onc====  #Metastatic SCC of tongue base  - s/p resection, chemo/radiation, and immunotherapy (10/2021)  - plan for experimental drug as outpatient.    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 Patient had cardiac arrest in ED , was intubated CPR was initiated and subsequently ROSC was acieved after 20 minutes. Patient is admitted to ICU.    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  - CAD s/p stent  - Diabetes    Plan  ====Neuro====  #baseline mentation: AAOx3  #intubated  - Extubated on 12/27  - re-intubated on 12/28  - taper propofol gtt for sedation and taper pressor    ====CVS====  #Cardiac arrest   12/20 on arrival to ED  - ROSC after 20min  - trop trended down  - started norepi 12/28 post intubation, taper down  - midodrine 10 started 12/29    ====Pulm====  #Acute hypoxic respiratory failure  - has Hx of chronic PTX on LEFT  - likely 2/2 PTX vs aspiration PNA  - SBT (12/21, 22, 24)  - extubated on 12/22  - re-intubated on 12/23  - s/p solumedrol  - c/w cefepime 2g q12 (12/20~)  - extubated again on 12/27  - re-intubated on 12/28  - CXR w/ improvement  - thoracic surgery consulted for trach/PEG    #PTX (acute/on/chronic)  - s/p pigtail in ED 12/20  - chest tube to suction - serosanginous exudate  - CXR interval improvement    ====GI====  #no active issues  #diet: NPO w/ tube feeding + free water 350 q 6  #bowel regimen: none    ====nephro====  #hypernatremia  serum Na 153 >>> now 148  - c/w free water 350 q 6    #hypercalcemia  ddx: dehydration vs SCC-related  - PTH low  - resolved s/p IVF    ====ID====  #fever  started 12/29 after intubation  wbc slight incr to 14 from 12  - f/u sputum  - started Zosyn 12/29    #leukocytosis  - likely 2/2 aspiration PNA vs reactive process  - Ucx neg  - Scx MAC  - s/p cefepime 2g q 12 (12/20-12/28)    ====Heme/onc====  #Metastatic SCC of tongue base  - s/p resection, chemo/radiation, and immunotherapy (10/2021)  - plan for experimental drug as outpatient.  - heme/onc as o/p   - palliative chemo, post trach/PEG will not be candidate for chemo  - palliative on board    ====Endo====  #DM  - a1c 6.3  - c/w sliding scale    ====Ppx====  #DVT: Lovenox  #GI: PPI

## 2021-12-29 NOTE — HISTORY OF PRESENT ILLNESS
[de-identified] : 76 year old male referred by Dr Costello for neck lymph node,  hx SCC tongue 08/2020 s/p surgery in  09/2020 and Completed 6 weeks of radiation treatment in 12/2020. PET CT 04/2021 New hyper metabolic right neck nodes, suspicious for metastatic disease and KEZIA cystic lesion, s/p lung resection showed SCC on 5/27/2021.  pt is now being f/u with Dr. Porras for completed chemo and on  immunotherapy.   last  pet ct 10/2021. pt still c/o right side face pain on gabapetin and oxycodon 5 mg- no relief, pt is also being Pallicare and no getting SLP/ lymphadenoma treatment due to the pain. pt is on soft diet, sometime dislodge at the throat.\par  Denies dysphagia, odynophagia, dysphonia, otalgia, fevers, chills, hemoptysis.  Reports breathing is good. Reports hx of smoker for only one year in his 20s

## 2021-12-29 NOTE — PROCEDURE
[None] : none [Flexible Endoscope] : examined with the flexible endoscope [Serial Number: ___] : Serial Number: [unfilled] [de-identified] : No lesions in the NPx, OPx, HPx or larynx.  Stable posttreatment changes, VC are mobile, airway patent.\par  [de-identified] : Recurrent tongue cancer

## 2021-12-29 NOTE — CONSULT NOTE ADULT - PROBLEM SELECTOR RECOMMENDATION 4
GOC discussion as above. MOLST on file for DNR. Family considering trach/PEG. Hospice eligible given poor overall prognosis. Palliative will follow.

## 2021-12-30 NOTE — PROGRESS NOTE ADULT - ASSESSMENT
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 Patient had cardiac arrest in ED , was intubated CPR was initiated and subsequently ROSC was acieved after 20 minutes. Patient is admitted to ICU.    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  - CAD s/p stent  - Diabetes    Plan  ====Neuro====  #baseline mentation: AAOx3  #intubated  - Extubated on 12/27  - re-intubated on 12/28  - taper propofol gtt for sedation and taper pressor    ====CVS====  #Cardiac arrest   12/20 on arrival to ED  - ROSC after 20min  - trop trended down  - started norepi 12/28 post intubation, taper down  - midodrine 10 started 12/29    ====Pulm====  #Acute hypoxic respiratory failure  - has Hx of chronic PTX on LEFT  - likely 2/2 PTX vs aspiration PNA  - SBT (12/21, 22, 24)  - extubated on 12/22  - re-intubated on 12/23  - s/p solumedrol  - c/w cefepime 2g q12 (12/20~)  - extubated again on 12/27  - re-intubated on 12/28  - CXR w/ improvement  - thoracic surgery consulted for trach/PEG    #PTX (acute/on/chronic)  - s/p pigtail in ED 12/20  - chest tube to suction - serosanginous exudate  - CXR interval improvement    ====GI====  #no active issues  #diet: NPO w/ tube feeding + free water 350 q 6  #bowel regimen: none    ====nephro====  #hypernatremia  serum Na 153 >>> now 148  - c/w free water 350 q 6    #hypercalcemia  ddx: dehydration vs SCC-related  - PTH low  - resolved s/p IVF    ====ID====  #fever  started 12/29 after intubation  wbc slight incr to 14 from 12  - f/u sputum  - started Zosyn 12/29    #leukocytosis  - likely 2/2 aspiration PNA vs reactive process  - Ucx neg  - Scx MAC  - s/p cefepime 2g q 12 (12/20-12/28)    ====Heme/onc====  #Metastatic SCC of tongue base  - s/p resection, chemo/radiation, and immunotherapy (10/2021)  - plan for experimental drug as outpatient.  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 Patient had cardiac arrest in ED , was intubated CPR was initiated and subsequently ROSC was acieved after 20 minutes. Patient is admitted to ICU.    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  - CAD s/p stent  - Diabetes    Plan  ====Neuro====  #baseline mentation: AAOx3  #intubated  - Extubated on 12/27  - re-intubated on 12/28  - OFF propofol and pressors    ====CVS====  #Cardiac arrest   12/20 on arrival to ED  - ROSC after 20min  - trop trended down  - started norepi 12/28 post intubation, tapered OFF  - midodrine 10 started 12/29    ====Pulm====  #Acute hypoxic respiratory failure  - has Hx of chronic PTX on LEFT  - likely 2/2 PTX vs aspiration PNA  - SBT (12/21, 22, 24)  - extubated on 12/22  - re-intubated on 12/23  - s/p solumedrol  - s/p cefepime 2g q12 (12/20-12/28)  - extubated again on 12/27; re-intubated on 12/28  - CXR w/ improvement, stable chronic LEFT PTX  - thoracic surgery consulted for trach/PEG  - plan for CT - with IV contrast today 12/30  - f/u chest tube cytology sample    #PTX (acute/on/chronic)  - s/p pigtail in ED 12/20  - chest tube to suction - serosanginous exudate  - CXR interval improvement    ====GI====  #no active issues  #diet: NPO w/ tube feeding + free water 350 q 6  #bowel regimen: none    ====nephro====  #hypernatremia  serum Na 153 >>> now 148  - c/w free water 350 q 6  - 2.5 L free water deficit    ====ID====  #fever  started 12/29 after intubation  wbc trending back down  - f/u sputum  - started Zosyn 12/29    #leukocytosis  - likely 2/2 aspiration PNA vs reactive process  - Ucx neg  - Scx MAC  - s/p cefepime 2g q 12 (12/20-12/28)    ====Heme/onc====  #Metastatic SCC of tongue base  - s/p resection, chemo/radiation, and immunotherapy (10/2021)  - plan for experimental drug as outpatient.  - heme/onc as o/p   - palliative chemo, post trach/PEG will not be candidate for chemo  - palliative on board, discussion to be had today regarding trach PEG    ====Endo====  #DM  - a1c 6.3  - c/w sliding scale    ====Ppx====  #DVT: Lovenox  #GI: PPI

## 2021-12-30 NOTE — PROGRESS NOTE ADULT - PROBLEM SELECTOR PLAN 1
s/p cardiac arrest, w L pneumothorax, s/p L pigtail catheter. Reintubated x2 on this admission 2/2 thick secretions, mucus plugging, collapsed lung and worsening lung mets Family considering trach/PEG. Currently w fevers, workup per ICU. DNR on file

## 2021-12-30 NOTE — PROGRESS NOTE ADULT - SUBJECTIVE AND OBJECTIVE BOX
follow up on:  complex medical decision making related to goals of care    [facility] Geriatric and Palliative Consult Service:  Dr. Anette Tim: cell ([phone])  Dr. Colleen Wells: cell ([phone])   Breanne Leal NP: cell ([phone])  Cristina Queen NP: cell ([phone])   Chapin Garcia LSW: cell ([phone])     OVERNIGHT EVENTS:    Present Symptoms:   Pain:  Fatigue:  Nausea:  Lack of Appetite:   SOB:  Depression:  Anxiety:  Review of Systems: [All others negative or Unable to obtain due to poor mentation]    MEDICATIONS  (STANDING):  ALBUTerol    90 MICROgram(s) HFA Inhaler 2 Puff(s) Inhalation every 6 hours  chlorhexidine 2% Cloths 1 Application(s) Topical <User Schedule>  enoxaparin Injectable 40 milliGRAM(s) SubCutaneous daily  insulin lispro (ADMELOG) corrective regimen sliding scale   SubCutaneous every 6 hours  midodrine 10 milliGRAM(s) Oral every 8 hours  pantoprazole   Suspension 40 milliGRAM(s) Oral daily  piperacillin/tazobactam IVPB.. 3.375 Gram(s) IV Intermittent every 8 hours    MEDICATIONS  (PRN):  acetaminophen    Suspension .. 650 milliGRAM(s) Oral every 6 hours PRN Temp greater or equal to 38C (100.4F)  bisacodyl Suppository 10 milliGRAM(s) Rectal daily PRN Constipation  fentaNYL    Injectable 25 MICROGram(s) IV Push every 6 hours PRN agitation      PHYSICAL EXAM:  Vital Signs Last 24 Hrs  T(C): 37.8 (30 Dec 2021 15:00), Max: 38.7 (29 Dec 2021 18:00)  T(F): 100 (30 Dec 2021 15:00), Max: 101.8 (29 Dec 2021 18:00)  HR: 90 (30 Dec 2021 15:00) (73 - 111)  BP: 106/61 (30 Dec 2021 15:00) (90/47 - 144/73)  BP(mean): 70 (30 Dec 2021 15:00) (57 - 87)  RR: 22 (30 Dec 2021 15:00) (13 - 36)  SpO2: 97% (30 Dec 2021 15:00) (94% - 100%)    General: alert  oriented x ____    lethargic distressed cachexia  verbal nonverbal  unarousable     Palliative Performance Scale/Karnofsky Score:  ECOG Performance:    HEENT: no abnormal lesion, dry mouth  ET tube/trach oral lesions:  Lungs: tachypnea/labored breathing, audible excessive secretions  CV: RRR, S1S2, tachycardia  GI: soft non distended non tender  incontinent               PEG/NG/OG tube  constipation  last BM:   : incontinent  oliguria/anuria  domingo  Musculoskeletal: weakness x4 edema x4    ambulatory with assistance   mostly/fully bedbound/wheelchair bound  Skin: no abnormal skin lesions, poor skin turgor, pressure ulcer stage:   Neuro: no deficits, cognitive impairment dsyphagia/dysarthria paresis  Oral intake ability: unable/only mouth care, minimal moderate full capability    LABS:                          9.1    12.59 )-----------( 252      ( 30 Dec 2021 05:35 )             28.2     12-30    149<H>  |  114<H>  |  38<H>  ----------------------------<  200<H>  3.7   |  32<H>  |  0.85    Ca    10.7<H>      30 Dec 2021 14:27  Phos  2.0     12-30  Mg     2.6     12-30    TPro  5.5<L>  /  Alb  1.3<L>  /  TBili  0.4  /  DBili  x   /  AST  25  /  ALT  13  /  AlkPhos  72  12-30        RADIOLOGY & ADDITIONAL STUDIES: follow up on:  complex medical decision making related to goals of care    [facility] Geriatric and Palliative Consult Service:  Dr. Anette Tim: cell ([phone])  Dr. Colleen Wells: cell ([phone])   Breanne Leal NP: cell ([phone])  Cristina Queen NP: cell ([phone])   Chapin Garcia LSW: cell ([phone])     OVERNIGHT EVENTS:    Present Symptoms:   Pain: throat soreness  Fatigue: severe  Nausea: none  Lack of Appetite: severe  SOB: mild on full vent support  Depression: not able to assess  Anxiety: not able to assess  Review of Systems: [All others negative     MEDICATIONS  (STANDING):  ALBUTerol    90 MICROgram(s) HFA Inhaler 2 Puff(s) Inhalation every 6 hours  chlorhexidine 2% Cloths 1 Application(s) Topical <User Schedule>  enoxaparin Injectable 40 milliGRAM(s) SubCutaneous daily  insulin lispro (ADMELOG) corrective regimen sliding scale   SubCutaneous every 6 hours  midodrine 10 milliGRAM(s) Oral every 8 hours  pantoprazole   Suspension 40 milliGRAM(s) Oral daily  piperacillin/tazobactam IVPB.. 3.375 Gram(s) IV Intermittent every 8 hours    MEDICATIONS  (PRN):  acetaminophen    Suspension .. 650 milliGRAM(s) Oral every 6 hours PRN Temp greater or equal to 38C (100.4F)  bisacodyl Suppository 10 milliGRAM(s) Rectal daily PRN Constipation  fentaNYL    Injectable 25 MICROGram(s) IV Push every 6 hours PRN agitation      PHYSICAL EXAM:  Vital Signs Last 24 Hrs  T(C): 37.8 (30 Dec 2021 15:00), Max: 38.7 (29 Dec 2021 18:00)  T(F): 100 (30 Dec 2021 15:00), Max: 101.8 (29 Dec 2021 18:00)  HR: 90 (30 Dec 2021 15:00) (73 - 111)  BP: 106/61 (30 Dec 2021 15:00) (90/47 - 144/73)  BP(mean): 70 (30 Dec 2021 15:00) (57 - 87)  RR: 22 (30 Dec 2021 15:00) (13 - 36)  SpO2: 97% (30 Dec 2021 15:00) (94% - 100%)    General: alert  oriented x 1  lethargic cachexia  able to interact and answer very simple questions     Palliative Performance Scale/Karnofsky Score: 20-30%  ECOG Performance: 4    HEENT: no abnormal lesion, dry mouth  ET tube, NG tube in place  Lungs: no tachypnea/labored breathing, mild audible excessive secretions  CV: RRR, S1S2  GI: soft non distended non tender  incontinent  : incontinent  oliguria/anuria  domingo  Musculoskeletal: weakness x4 edema x4 fully bedbound  Skin: no abnormal skin lesions, poor skin turgor  Neuro: moderate to severe cognitive impairment dsyphagia  Oral intake ability: unable/only mouth care,       LABS:                        9.1    12.59 )-----------( 252      ( 30 Dec 2021 05:35 )             28.2     12-30    149<H>  |  114<H>  |  38<H>  ----------------------------<  200<H>  3.7   |  32<H>  |  0.85    Ca    10.7<H>      30 Dec 2021 14:27  Phos  2.0     12-30  Mg     2.6     12-30    TPro  5.5<L>  /  Alb  1.3<L>  /  TBili  0.4  /  DBili  x   /  AST  25  /  ALT  13  /  AlkPhos  72  12-30      RADIOLOGY & ADDITIONAL STUDIES: < from: CT Chest w/ IV Cont (12.30.21 @ 12:53) >  CC: [id number] EXAM:  CT CHEST IC                          PROCEDURE DATE:  12/30/2021          INTERPRETATION:  INDICATION: Squamous cell carcinoma of the tongue    TECHNIQUE: Volumetric images of the chest after the administration of 40   mL of Omnipaque 350. Maximum intensity projection images were generated.    COMPARISON: CT chest 11/4/2021.    FINDINGS:    LUNGS/AIRWAYS/PLEURA: New occlusion of the left lower lobe bronchus and   associated left lower lobe collapse. Heterogeneous enhancement of left   lower lobe atelectasis, suggesting a superimposed process. New   consolidation in the right lower lobe and lingula, and peribronchial   nodules in all lobes of the right lung.    Larger bilateral cavitary masses, for example, 4.6 cm in themiddle lobe   (measured on 3-90), prior 2.1 cm. Other unchanged solid nodules, for   example, 1 cm in the left apex (3-34).    Endotracheal tube tip in the mid trachea. Stable mild fibrosis in the   anterior upper lobes.    Slightly increased moderate left pneumothorax. Left pleural pigtail   catheter in the left posterior hemithorax. Trace left basilar pleural   effusion. New right pleural thickening in the right cardiophrenic angle.    LYMPH NODES/MEDIASTINUM: Partially included necrotic right   supraclavicular lymphadenopathy. Slightly larger right paratracheal lymph   nodes up to 1.1 cm (3-37). Calcified lymph nodes, likely prior   granulomatous disease.    HEART/VASCULATURE: Normal heart size. Small pericardial effusion. Heavily   calcified coronary arteries. Normal caliber aorta and main pulmonary   artery.    UPPER ABDOMEN:NG tube tip in the gastric fundus.    BONES/SOFT TISSUES: New non and mildly displaced fractures of the right   anterior third through sixth ribs, and mildly displaced fracture of the   left anterior fifth rib.      IMPRESSION:    New multilobar consolidation, including likely within a collapsed left   lower lobe, and peribronchial nodules in all lobes of the right lung,   favored to be infection.    New occlusion of the left lower lobe bronchus, possibly by mucus, and   associated left lower lobe collapse.    Larger bilateral cavitary metastases.    Increased moderate left pneumothorax. New right pleural thickening in the   right cardiophrenic angle.    Mildly increased mediastinal lymphadenopathy. Partially included large   right supraclavicular lymphadenopathy.    Bilateral acute anterior rib fractures.    --- End of Report ---        SATISH WALDROP M.D., ATTENDING RADIOGIST  This document has been electronically signed. Dec 30 2021  1:22PM    < end of copied text >

## 2021-12-30 NOTE — CONSULT NOTE ADULT - SUBJECTIVE AND OBJECTIVE BOX
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 Patient was intubated on arival to ED, he had a cardiac arrest and achieved ROSC 20 minutes later. History was obtained with son at bed side , he states that patient Squamous cell cancer was metastasized to luns and he was getting immunotherapy. They noticed patient O2 saturation was low so decided to bring hi to ED,       On arrival to ED patient was intubated, and he lost the pulse. high quality CPR was started , patient achieved Rosc after 7 cycles of CPR. Post intubation CXR showed large left sided pneumothorax. pigtail was placed by ED physician. emergent IO acces was done during the CPR. Patient was started on levophed and propofol for sedation. (20 Dec 2021 03:52)      PAST MEDICAL & SURGICAL HISTORY:  Hypertension    Diabetes    High cholesterol    Primary osteoarthritis of both knees    Coronary artery disease of native artery of native heart with stable angina pectoris    Allergic bronchitis    Diabetic retinopathy    Oral cancer    SCC (squamous cell carcinoma)    Metastatic cancer    Recurrent disease    Edema of extremity    Hyponatremia    Microalbuminuria    Neuralgia    Obstructive sleep apnea, adult    Vitamin D deficiency    S/P knee replacement  b/l    S/P hernia repair  b/l    Status post cataract extraction and insertion of intraocular lens, unspecified laterality  b/l    History of surgery  On 9/10/20 he underwent right hemiglossectomy and partial neck dissection with Dr. Darinel Servin at [facility].        No Known Allergies      Meds:  acetaminophen    Suspension .. 650 milliGRAM(s) Oral every 6 hours PRN  ALBUTerol    90 MICROgram(s) HFA Inhaler 2 Puff(s) Inhalation every 6 hours  bisacodyl Suppository 10 milliGRAM(s) Rectal daily PRN  chlorhexidine 2% Cloths 1 Application(s) Topical <User Schedule>  enoxaparin Injectable 40 milliGRAM(s) SubCutaneous daily  fentaNYL    Injectable 25 MICROGram(s) IV Push every 6 hours PRN  insulin lispro (ADMELOG) corrective regimen sliding scale   SubCutaneous every 6 hours  midodrine 10 milliGRAM(s) Oral every 8 hours  pantoprazole   Suspension 40 milliGRAM(s) Oral daily  piperacillin/tazobactam IVPB.. 3.375 Gram(s) IV Intermittent every 8 hours      SOCIAL HISTORY:  Smoker:  YES / NO        PACK YEARS:                         WHEN QUIT?  ETOH use:  YES / NO               FREQUENCY / QUANTITY:  Ilicit Drug use:  YES / NO  Occupation:  Assisted device use (Cane / Walker):  Live with:    FAMILY HISTORY:  Family history of acute myocardial infarction (Sibling)        VITALS:  Vital Signs Last 24 Hrs  T(C): 38.1 (30 Dec 2021 18:00), Max: 38.4 (29 Dec 2021 19:00)  T(F): 100.6 (30 Dec 2021 18:00), Max: 101.1 (29 Dec 2021 19:00)  HR: 90 (30 Dec 2021 18:00) (73 - 100)  BP: 127/58 (30 Dec 2021 18:00) (106/61 - 141/63)  BP(mean): 75 (30 Dec 2021 18:00) (65 - 78)  RR: 21 (30 Dec 2021 18:00) (13 - 36)  SpO2: 95% (30 Dec 2021 18:00) (95% - 100%)    LABS/DIAGNOSTIC TESTS:                          9.1    12.59 )-----------( 252      ( 30 Dec 2021 05:35 )             28.2     WBC Count: 12.59 K/uL (12-30 @ 05:35)  WBC Count: 17.64 K/uL (12-29 @ 04:45)  WBC Count: 14.10 K/uL (12-28 @ 03:45)      12-30    149<H>  |  114<H>  |  38<H>  ----------------------------<  200<H>  3.7   |  32<H>  |  0.85    Ca    10.7<H>      30 Dec 2021 14:27  Phos  2.0     12-30  Mg     2.6     12-30    TPro  5.5<L>  /  Alb  1.3<L>  /  TBili  0.4  /  DBili  x   /  AST  25  /  ALT  13  /  AlkPhos  72  12-30          LIVER FUNCTIONS - ( 30 Dec 2021 05:35 )  Alb: 1.3 g/dL / Pro: 5.5 g/dL / ALK PHOS: 72 U/L / ALT: 13 U/L DA / AST: 25 U/L / GGT: x                 LACTATE:    ABG - ABG - ( 30 Dec 2021 03:21 )  pH, Arterial: 7.48  pH, Blood: x     /  pCO2: 45    /  pO2: 131   / HCO3: 34    / Base Excess: 8.8   /  SaO2: 99                  CULTURES:   .Sputum Sputum  12-29 @ 18:28   Normal Respiratory Karla present  --    Numerous polymorphonuclear leukocytes per low power field  No squamous epithelial cells per low power field  Few Gram positive cocci in pairs per oil power field      Clean Catch Clean Catch (Midstream)  12-21 @ 04:42   No growth  --  --      .Sputum Sputum  11-08 @ 12:39   Few Mycobacterium avium complex  --  --      .Sputum Sputum  11-06 @ 19:34   No acid fast bacilli isolated after 6 weeks.  --  --      .Sputum Sputum  11-05 @ 23:15   Growth of acid fast bacilli detected in broth,  Mycobacterium avium complex by Dna Probe  --  --      .Blood Blood  11-04 @ 11:23   No Growth Final  --  --      .Blood Blood  11-04 @ 10:50   No Growth Final  --  --      Clean Catch Clean Catch (Midstream)  11-03 @ 17:00   <10,000 CFU/mL Normal Urogenital Karla  --  --          RADIOLOGY:< from: CT Chest w/ IV Cont (12.30.21 @ 12:53) >  ACC: [id number] EXAM:  CT CHEST IC                          PROCEDURE DATE:  12/30/2021          INTERPRETATION:  INDICATION: Squamous cell carcinoma of the tongue    TECHNIQUE: Volumetric images of the chest after the administration of 40   mL of Omnipaque 350. Maximum intensity projection images were generated.    COMPARISON: CT chest 11/4/2021.    FINDINGS:    LUNGS/AIRWAYS/PLEURA: New occlusion of the left lower lobe bronchus and   associated left lower lobe collapse. Heterogeneous enhancement of left   lower lobe atelectasis, suggesting a superimposed process. New   consolidation in the right lower lobe and lingula, and peribronchial   nodules in all lobes of the right lung.    Larger bilateral cavitary masses, for example, 4.6 cm in themiddle lobe   (measured on 3-90), prior 2.1 cm. Other unchanged solid nodules, for   example, 1 cm in the left apex (3-34).    Endotracheal tube tip in the mid trachea. Stable mild fibrosis in the   anterior upper lobes.    Slightly increased moderate left pneumothorax. Left pleural pigtail   catheter in the left posterior hemithorax. Trace left basilar pleural   effusion. New right pleural thickening in the right cardiophrenic angle.    LYMPH NODES/MEDIASTINUM: Partially included necrotic right   supraclavicular lymphadenopathy. Slightly larger right paratracheal lymph   nodes up to 1.1 cm (3-37). Calcified lymph nodes, likely prior   granulomatous disease.    HEART/VASCULATURE: Normal heart size. Small pericardial effusion. Heavily   calcified coronary arteries. Normal caliber aorta and main pulmonary   artery.    UPPER ABDOMEN:NG tube tip in the gastric fundus.    BONES/SOFT TISSUES: New non and mildly displaced fractures of the right   anterior third through sixth ribs, and mildly displaced fracture of the   left anterior fifth rib.      IMPRESSION:    New multilobar consolidation, including likely within a collapsed left   lower lobe, and peribronchial nodules in all lobes of the right lung,   favored to be infection.    New occlusion of the left lower lobe bronchus, possibly by mucus, and   associated left lower lobe collapse.    Larger bilateral cavitary metastases.    Increased moderate left pneumothorax. New right pleural thickening in the   right cardiophrenic angle.    Mildly increased mediastinal lymphadenopathy. Partially included large   right supraclavicular lymphadenopathy.    Bilateral acute anterior rib fractures.    --- End of Report ---            SATISH WALDROP M.D., ATTENDING RADIOGIST  This document has been electronically signed. 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 Patient was intubated on arival to ED, he had a cardiac arrest and achieved ROSC 20 minutes later. History was obtained with son at bed side , he states that patient Squamous cell cancer was metastasized to lungs and he was getting immunotherapy. They noticed patient O2 saturation was low so decided to bring him to ED,   On arrival to ED patient was intubated, and he lost the pulse. high quality CPR was started , patient achieved Rosc after 7 cycles of CPR. Post intubation CXR showed large left sided pneumothorax. pigtail was placed by ED physician. emergent IO acces was done during the CPR. Patient was started on levophed and propofol for sedation. (20 Dec 2021 03:52)      History as above,         PAST MEDICAL & SURGICAL HISTORY:  Hypertension    Diabetes    High cholesterol    Primary osteoarthritis of both knees    Coronary artery disease of native artery of native heart with stable angina pectoris    Allergic bronchitis    Diabetic retinopathy    Oral cancer    SCC (squamous cell carcinoma)    Metastatic cancer    Recurrent disease    Edema of extremity    Hyponatremia    Microalbuminuria    Neuralgia    Obstructive sleep apnea, adult    Vitamin D deficiency    S/P knee replacement  b/l    S/P hernia repair  b/l    Status post cataract extraction and insertion of intraocular lens, unspecified laterality  b/l    History of surgery  On 9/10/20 he underwent right hemiglossectomy and partial neck dissection with Dr. Darinel Servin at [facility].        No Known Allergies      Meds:  acetaminophen    Suspension .. 650 milliGRAM(s) Oral every 6 hours PRN  ALBUTerol    90 MICROgram(s) HFA Inhaler 2 Puff(s) Inhalation every 6 hours  bisacodyl Suppository 10 milliGRAM(s) Rectal daily PRN  chlorhexidine 2% Cloths 1 Application(s) Topical <User Schedule>  enoxaparin Injectable 40 milliGRAM(s) SubCutaneous daily  fentaNYL    Injectable 25 MICROGram(s) IV Push every 6 hours PRN  insulin lispro (ADMELOG) corrective regimen sliding scale   SubCutaneous every 6 hours  midodrine 10 milliGRAM(s) Oral every 8 hours  pantoprazole   Suspension 40 milliGRAM(s) Oral daily  piperacillin/tazobactam IVPB.. 3.375 Gram(s) IV Intermittent every 8 hours      SOCIAL HISTORY:  Smoker:  YES / NO        PACK YEARS:                         WHEN QUIT?  ETOH use:  YES / NO               FREQUENCY / QUANTITY:  Ilicit Drug use:  YES / NO  Occupation:  Assisted device use (Cane / Walker):  Live with:    FAMILY HISTORY:  Family history of acute myocardial infarction (Sibling)        VITALS:  Vital Signs Last 24 Hrs  T(C): 38.1 (30 Dec 2021 18:00), Max: 38.4 (29 Dec 2021 19:00)  T(F): 100.6 (30 Dec 2021 18:00), Max: 101.1 (29 Dec 2021 19:00)  HR: 90 (30 Dec 2021 18:00) (73 - 100)  BP: 127/58 (30 Dec 2021 18:00) (106/61 - 141/63)  BP(mean): 75 (30 Dec 2021 18:00) (65 - 78)  RR: 21 (30 Dec 2021 18:00) (13 - 36)  SpO2: 95% (30 Dec 2021 18:00) (95% - 100%)    LABS/DIAGNOSTIC TESTS:                          9.1    12.59 )-----------( 252      ( 30 Dec 2021 05:35 )             28.2     WBC Count: 12.59 K/uL (12-30 @ 05:35)  WBC Count: 17.64 K/uL (12-29 @ 04:45)  WBC Count: 14.10 K/uL (12-28 @ 03:45)      12-30    149<H>  |  114<H>  |  38<H>  ----------------------------<  200<H>  3.7   |  32<H>  |  0.85    Ca    10.7<H>      30 Dec 2021 14:27  Phos  2.0     12-30  Mg     2.6     12-30    TPro  5.5<L>  /  Alb  1.3<L>  /  TBili  0.4  /  DBili  x   /  AST  25  /  ALT  13  /  AlkPhos  72  12-30          LIVER FUNCTIONS - ( 30 Dec 2021 05:35 )  Alb: 1.3 g/dL / Pro: 5.5 g/dL / ALK PHOS: 72 U/L / ALT: 13 U/L DA / AST: 25 U/L / GGT: x                 LACTATE:    ABG - ABG - ( 30 Dec 2021 03:21 )  pH, Arterial: 7.48  pH, Blood: x     /  pCO2: 45    /  pO2: 131   / HCO3: 34    / Base Excess: 8.8   /  SaO2: 99                  CULTURES:   .Sputum Sputum  12-29 @ 18:28   Normal Respiratory Karla present  --    Numerous polymorphonuclear leukocytes per low power field  No squamous epithelial cells per low power field  Few Gram positive cocci in pairs per oil power field      Clean Catch Clean Catch (Midstream)  12-21 @ 04:42   No growth  --  --      .Sputum Sputum  11-08 @ 12:39   Few Mycobacterium avium complex  --  --      .Sputum Sputum  11-06 @ 19:34   No acid fast bacilli isolated after 6 weeks.  --  --      .Sputum Sputum  11-05 @ 23:15   Growth of acid fast bacilli detected in broth,  Mycobacterium avium complex by Dna Probe  --  --      .Blood Blood  11-04 @ 11:23   No Growth Final  --  --      .Blood Blood  11-04 @ 10:50   No Growth Final  --  --      Clean Catch Clean Catch (Midstream)  11-03 @ 17:00   <10,000 CFU/mL Normal Urogenital Karla  --  --          RADIOLOGY:< from: CT Chest w/ IV Cont (12.30.21 @ 12:53) >  ACC: [id number] EXAM:  CT CHEST IC                          PROCEDURE DATE:  12/30/2021          INTERPRETATION:  INDICATION: Squamous cell carcinoma of the tongue    TECHNIQUE: Volumetric images of the chest after the administration of 40   mL of Omnipaque 350. Maximum intensity projection images were generated.    COMPARISON: CT chest 11/4/2021.    FINDINGS:    LUNGS/AIRWAYS/PLEURA: New occlusion of the left lower lobe bronchus and   associated left lower lobe collapse. Heterogeneous enhancement of left   lower lobe atelectasis, suggesting a superimposed process. New   consolidation in the right lower lobe and lingula, and peribronchial   nodules in all lobes of the right lung.    Larger bilateral cavitary masses, for example, 4.6 cm in themiddle lobe   (measured on 3-90), prior 2.1 cm. Other unchanged solid nodules, for   example, 1 cm in the left apex (3-34).    Endotracheal tube tip in the mid trachea. Stable mild fibrosis in the   anterior upper lobes.    Slightly increased moderate left pneumothorax. Left pleural pigtail   catheter in the left posterior hemithorax. Trace left basilar pleural   effusion. New right pleural thickening in the right cardiophrenic angle.    LYMPH NODES/MEDIASTINUM: Partially included necrotic right   supraclavicular lymphadenopathy. Slightly larger right paratracheal lymph   nodes up to 1.1 cm (3-37). Calcified lymph nodes, likely prior   granulomatous disease.    HEART/VASCULATURE: Normal heart size. Small pericardial effusion. Heavily   calcified coronary arteries. Normal caliber aorta and main pulmonary   artery.    UPPER ABDOMEN:NG tube tip in the gastric fundus.    BONES/SOFT TISSUES: New non and mildly displaced fractures of the right   anterior third through sixth ribs, and mildly displaced fracture of the   left anterior fifth rib.      IMPRESSION:    New multilobar consolidation, including likely within a collapsed left   lower lobe, and peribronchial nodules in all lobes of the right lung,   favored to be infection.    New occlusion of the left lower lobe bronchus, possibly by mucus, and   associated left lower lobe collapse.    Larger bilateral cavitary metastases.    Increased moderate left pneumothorax. New right pleural thickening in the   right cardiophrenic angle.    Mildly increased mediastinal lymphadenopathy. Partially included large   right supraclavicular lymphadenopathy.    Bilateral acute anterior rib fractures.    --- End of Report ---            SATISH WALDROP M.D., ATTENDING RADIOGIST  This document has been electronically signed. 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 Patient was intubated on arival to ED, he had a cardiac arrest and achieved ROSC 20 minutes later. History was obtained with son at bed side , he states that patient Squamous cell cancer was metastasized to lungs and he was getting immunotherapy. They noticed patient O2 saturation was low so decided to bring him to ED,   On arrival to ED patient was intubated, and he lost the pulse. high quality CPR was started , patient achieved Rosc after 7 cycles of CPR. Post intubation CXR showed large left sided pneumothorax. pigtail was placed by ED physician. emergent IO acces was done during the CPR. Patient was started on levophed and propofol for sedation. (20 Dec 2021 03:52)      History as above, asked to see this patient as he has pneumonia, fevers and leukocytosis. He was admitted after having a cardiac arrest, he is currently in the ICU , intubated , on sedation but easily arousable, he is not on any pressors, he is on an FIO2 of 40% and PEEP of 5. He was found to have a left sided pneumothorax and a left sided chest tube was placed. He has been febrile here. His CT scan shows areas of multiple focal pneumonia and right lung nodules ( he has lung ca as well).        PAST MEDICAL & SURGICAL HISTORY:  Hypertension    Diabetes    High cholesterol    Primary osteoarthritis of both knees    Coronary artery disease of native artery of native heart with stable angina pectoris    Allergic bronchitis    Diabetic retinopathy    Oral cancer    SCC (squamous cell carcinoma)    Metastatic cancer    Recurrent disease    Edema of extremity    Hyponatremia    Microalbuminuria    Neuralgia    Obstructive sleep apnea, adult    Vitamin D deficiency    S/P knee replacement  b/l    S/P hernia repair  b/l    Status post cataract extraction and insertion of intraocular lens, unspecified laterality  b/l    History of surgery  On 9/10/20 he underwent right hemiglossectomy and partial neck dissection with Dr. Darinel Servin at [facility].        No Known Allergies      Meds:  acetaminophen    Suspension .. 650 milliGRAM(s) Oral every 6 hours PRN  ALBUTerol    90 MICROgram(s) HFA Inhaler 2 Puff(s) Inhalation every 6 hours  bisacodyl Suppository 10 milliGRAM(s) Rectal daily PRN  chlorhexidine 2% Cloths 1 Application(s) Topical <User Schedule>  enoxaparin Injectable 40 milliGRAM(s) SubCutaneous daily  fentaNYL    Injectable 25 MICROGram(s) IV Push every 6 hours PRN  insulin lispro (ADMELOG) corrective regimen sliding scale   SubCutaneous every 6 hours  midodrine 10 milliGRAM(s) Oral every 8 hours  pantoprazole   Suspension 40 milliGRAM(s) Oral daily  piperacillin/tazobactam IVPB.. 3.375 Gram(s) IV Intermittent every 8 hours      SOCIAL HISTORY: unknown      FAMILY HISTORY:  Family history of acute myocardial infarction (Sibling)        VITALS:  Vital Signs Last 24 Hrs  T(C): 38.1 (30 Dec 2021 18:00), Max: 38.4 (29 Dec 2021 19:00)  T(F): 100.6 (30 Dec 2021 18:00), Max: 101.1 (29 Dec 2021 19:00)  HR: 90 (30 Dec 2021 18:00) (73 - 100)  BP: 127/58 (30 Dec 2021 18:00) (106/61 - 141/63)  BP(mean): 75 (30 Dec 2021 18:00) (65 - 78)  RR: 21 (30 Dec 2021 18:00) (13 - 36)  SpO2: 95% (30 Dec 2021 18:00) (95% - 100%)    LABS/DIAGNOSTIC TESTS:                          9.1    12.59 )-----------( 252      ( 30 Dec 2021 05:35 )             28.2     WBC Count: 12.59 K/uL (12-30 @ 05:35)  WBC Count: 17.64 K/uL (12-29 @ 04:45)  WBC Count: 14.10 K/uL (12-28 @ 03:45)      12-30    149<H>  |  114<H>  |  38<H>  ----------------------------<  200<H>  3.7   |  32<H>  |  0.85    Ca    10.7<H>      30 Dec 2021 14:27  Phos  2.0     12-30  Mg     2.6     12-30    TPro  5.5<L>  /  Alb  1.3<L>  /  TBili  0.4  /  DBili  x   /  AST  25  /  ALT  13  /  AlkPhos  72  12-30          LIVER FUNCTIONS - ( 30 Dec 2021 05:35 )  Alb: 1.3 g/dL / Pro: 5.5 g/dL / ALK PHOS: 72 U/L / ALT: 13 U/L DA / AST: 25 U/L / GGT: x                 LACTATE:    ABG - ABG - ( 30 Dec 2021 03:21 )  pH, Arterial: 7.48  pH, Blood: x     /  pCO2: 45    /  pO2: 131   / HCO3: 34    / Base Excess: 8.8   /  SaO2: 99                  CULTURES:   .Sputum Sputum  12-29 @ 18:28   Normal Respiratory Karla present  --    Numerous polymorphonuclear leukocytes per low power field  No squamous epithelial cells per low power field  Few Gram positive cocci in pairs per oil power field      Clean Catch Clean Catch (Midstream)  12-21 @ 04:42   No growth  --  --      .Sputum Sputum  11-08 @ 12:39   Few Mycobacterium avium complex  --  --      .Sputum Sputum  11-06 @ 19:34   No acid fast bacilli isolated after 6 weeks.  --  --      .Sputum Sputum  11-05 @ 23:15   Growth of acid fast bacilli detected in broth,  Mycobacterium avium complex by Dna Probe  --  --      .Blood Blood  11-04 @ 11:23   No Growth Final  --  --      .Blood Blood  11-04 @ 10:50   No Growth Final  --  --      Clean Catch Clean Catch (Midstream)  11-03 @ 17:00   <10,000 CFU/mL Normal Urogenital Karla  --  --          RADIOLOGY:< from: CT Chest w/ IV Cont (12.30.21 @ 12:53) >  ACC: [id number] EXAM:  CT CHEST IC                          PROCEDURE DATE:  12/30/2021          INTERPRETATION:  INDICATION: Squamous cell carcinoma of the tongue    TECHNIQUE: Volumetric images of the chest after the administration of 40   mL of Omnipaque 350. Maximum intensity projection images were generated.    COMPARISON: CT chest 11/4/2021.    FINDINGS:    LUNGS/AIRWAYS/PLEURA: New occlusion of the left lower lobe bronchus and   associated left lower lobe collapse. Heterogeneous enhancement of left   lower lobe atelectasis, suggesting a superimposed process. New   consolidation in the right lower lobe and lingula, and peribronchial   nodules in all lobes of the right lung.    Larger bilateral cavitary masses, for example, 4.6 cm in themiddle lobe   (measured on 3-90), prior 2.1 cm. Other unchanged solid nodules, for   example, 1 cm in the left apex (3-34).    Endotracheal tube tip in the mid trachea. Stable mild fibrosis in the   anterior upper lobes.    Slightly increased moderate left pneumothorax. Left pleural pigtail   catheter in the left posterior hemithorax. Trace left basilar pleural   effusion. New right pleural thickening in the right cardiophrenic angle.    LYMPH NODES/MEDIASTINUM: Partially included necrotic right   supraclavicular lymphadenopathy. Slightly larger right paratracheal lymph   nodes up to 1.1 cm (3-37). Calcified lymph nodes, likely prior   granulomatous disease.    HEART/VASCULATURE: Normal heart size. Small pericardial effusion. Heavily   calcified coronary arteries. Normal caliber aorta and main pulmonary   artery.    UPPER ABDOMEN:NG tube tip in the gastric fundus.    BONES/SOFT TISSUES: New non and mildly displaced fractures of the right   anterior third through sixth ribs, and mildly displaced fracture of the   left anterior fifth rib.      IMPRESSION:    New multilobar consolidation, including likely within a collapsed left   lower lobe, and peribronchial nodules in all lobes of the right lung,   favored to be infection.    New occlusion of the left lower lobe bronchus, possibly by mucus, and   associated left lower lobe collapse.    Larger bilateral cavitary metastases.    Increased moderate left pneumothorax. New right pleural thickening in the   right cardiophrenic angle.    Mildly increased mediastinal lymphadenopathy. Partially included large   right supraclavicular lymphadenopathy.    Bilateral acute anterior rib fractures.    --- End of Report ---            SATISH WALDROP M.D., ATTENDING RADIOGIST  This document has been electronically signed. Dec 30 2021  1:22PM    < end of copied text >        ROS  [ x ] UNABLE TO ELICIT

## 2021-12-30 NOTE — PROGRESS NOTE ADULT - CONVERSATION DETAILS
spoke with son at bedside with patient - though patient is much more alert and interactive than ever before, he is not able to understand and discuss deeper on complicated topics such as the extent of his disease, make decisions about trach/artificial life support, etc.   Son had wanted to have some idea of what patient would have wanted.   He is aware that patient is worsening in his cancer/lung capacity and overall general health - either on artificial life support or not, patient is declining and cancer is progressing. Patient is at endstage. made him aware I spoke with Dr. Porras earlier today who agreed that hospice is more appropriate.  Discussed what family's main goal is - son reported that they want patient to be comfortable and not to suffer.   I discussed that trach/peg will only buy a little bit more time but patient will have to endure surgery/nursing placement which will have visitation challenges and he will be faced with continued complications/infections.   other option is palliative wean - medical extubation without reintubation should patient decline, he would pass naturally with symptom management in inpatient hospice.   all questions answered, he will discuss with patient/family  support, counseling and contact info given

## 2021-12-30 NOTE — PROGRESS NOTE ADULT - PROBLEM SELECTOR PLAN 2
metastatic tongue CA dx'd 8/20, with lung mets, s/p chemo, most recently on immunotherapy. Last PET Oct showed progression of disease. Had been evaluated at [facility] for possible clinical trials.  discussed with Dr Porras at [facility] who made it clear with patient's family that patient will not likely benefit from any further treatment including a trial at [facility] and that hospice is most appropriate, even more so now that patient is vent dependent. ECOG is 4 and hospice eligible.     Patient is DNR only for now.

## 2021-12-30 NOTE — PROGRESS NOTE ADULT - SUBJECTIVE AND OBJECTIVE BOX
INTERVAL HPI/OVERNIGHT EVENTS: ***    PRESSORS: [ ] YES [ ] NO    Antimicrobial:  piperacillin/tazobactam IVPB.. 3.375 Gram(s) IV Intermittent every 8 hours    Cardiovascular:  midodrine 10 milliGRAM(s) Oral every 8 hours  norepinephrine Infusion 0.007 MICROgram(s)/kG/Min IV Continuous <Continuous>    Pulmonary:  ALBUTerol    90 MICROgram(s) HFA Inhaler 2 Puff(s) Inhalation every 6 hours    Hematologic:  enoxaparin Injectable 40 milliGRAM(s) SubCutaneous daily    Other:  acetaminophen    Suspension .. 650 milliGRAM(s) Oral every 6 hours PRN  bisacodyl Suppository 10 milliGRAM(s) Rectal daily PRN  chlorhexidine 2% Cloths 1 Application(s) Topical <User Schedule>  dexMEDEtomidine Infusion 0.2 MICROgram(s)/kG/Hr IV Continuous <Continuous>  insulin lispro (ADMELOG) corrective regimen sliding scale   SubCutaneous every 6 hours  pantoprazole   Suspension 40 milliGRAM(s) Oral daily  propofol Infusion 30 MICROgram(s)/kG/Min IV Continuous <Continuous>    acetaminophen    Suspension .. 650 milliGRAM(s) Oral every 6 hours PRN  ALBUTerol    90 MICROgram(s) HFA Inhaler 2 Puff(s) Inhalation every 6 hours  bisacodyl Suppository 10 milliGRAM(s) Rectal daily PRN  chlorhexidine 2% Cloths 1 Application(s) Topical <User Schedule>  dexMEDEtomidine Infusion 0.2 MICROgram(s)/kG/Hr IV Continuous <Continuous>  enoxaparin Injectable 40 milliGRAM(s) SubCutaneous daily  insulin lispro (ADMELOG) corrective regimen sliding scale   SubCutaneous every 6 hours  midodrine 10 milliGRAM(s) Oral every 8 hours  norepinephrine Infusion 0.007 MICROgram(s)/kG/Min IV Continuous <Continuous>  pantoprazole   Suspension 40 milliGRAM(s) Oral daily  piperacillin/tazobactam IVPB.. 3.375 Gram(s) IV Intermittent every 8 hours  propofol Infusion 30 MICROgram(s)/kG/Min IV Continuous <Continuous>    Drug Dosing Weight  Height (cm): 175.3 (20 Dec 2021 02:21)  Weight (kg): 72.5 (20 Dec 2021 06:30)  BMI (kg/m2): 23.6 (20 Dec 2021 06:30)  BSA (m2): 1.88 (20 Dec 2021 06:30)    CENTRAL LINE: [ ] YES [ ] NO  LOCATION:   DATE INSERTED:  REMOVE: [ ] YES [ ] NO  EXPLAIN:    HUTCHINS: [ ] YES [ ] NO    DATE INSERTED:  REMOVE:  [ ] YES [ ] NO  EXPLAIN:    A-LINE:  [ ] YES [ ] NO  LOCATION:   DATE INSERTED:  REMOVE:  [ ] YES [ ] NO  EXPLAIN:    PMH -reviewed admission note, no change since admission      ABG - ( 30 Dec 2021 03:21 )  pH, Arterial: 7.48  pH, Blood: x     /  pCO2: 45    /  pO2: 131   / HCO3: 34    / Base Excess: 8.8   /  SaO2: 99                    12-29 @ 07:01  -  12-30 @ 07:00  --------------------------------------------------------  IN: 2227.2 mL / OUT: 1380 mL / NET: 847.2 mL        Mode: AC/ CMV (Assist Control/ Continuous Mandatory Ventilation)  RR (machine): 16  TV (machine): 450  FiO2: 60  PEEP: 8  ITime: 1  MAP: 13  PIP: 27      PHYSICAL EXAM:    GENERAL: NAD, well-groomed, well-developed  HEAD:  Atraumatic, Normocephalic  EYES: EOMI, PERRLA, conjunctiva and sclera clear  ENMT: No tonsillar erythema, exudates, or enlargement; Moist mucous membranes, Good dentition, [ ]No lesions  NECK: Supple, normal appearance, No JVD; Normal thyroid; Trachea midline  NERVOUS SYSTEM:  Alert & Oriented X3, Good concentration; Motor Strength 5/5 B/L upper and lower extremities; DTRs 2+ intact and symmetric  CHEST/LUNG: No chest deformity; Normal percussion bilaterally; No rales, rhonchi, wheezing   HEART: Regular rate and rhythm; No murmurs, rubs, or gallops  ABDOMEN: Soft, Nontender, Nondistended;Bowel sounds present  EXTREMITIES:  2+ Peripheral Pulses, No clubbing, cyanosis, or edema  LYMPH: No lymphadenopathy noted  SKIN: No rashes or lesions; Good capillary refill      LABS:  CBC Full  -  ( 30 Dec 2021 05:35 )  WBC Count : 12.59 K/uL  RBC Count : 3.13 M/uL  Hemoglobin : 9.1 g/dL  Hematocrit : 28.2 %  Platelet Count - Automated : 252 K/uL  Mean Cell Volume : 90.1 fl  Mean Cell Hemoglobin : 29.1 pg  Mean Cell Hemoglobin Concentration : 32.3 gm/dL  Auto Neutrophil # : 11.81 K/uL  Auto Lymphocyte # : 0.37 K/uL  Auto Monocyte # : 0.29 K/uL  Auto Eosinophil # : 0.05 K/uL  Auto Basophil # : 0.01 K/uL  Auto Neutrophil % : 93.8 %  Auto Lymphocyte % : 2.9 %  Auto Monocyte % : 2.3 %  Auto Eosinophil % : 0.4 %  Auto Basophil % : 0.1 %    12-30    150<H>  |  113<H>  |  40<H>  ----------------------------<  204<H>  3.6   |  33<H>  |  0.83    Ca    10.1      30 Dec 2021 05:35  Phos  2.0     12-30  Mg     2.6     12-30    TPro  5.5<L>  /  Alb  1.3<L>  /  TBili  0.4  /  DBili  x   /  AST  25  /  ALT  13  /  AlkPhos  72  12-30            RADIOLOGY & ADDITIONAL STUDIES REVIEWED:  ***    [ ]GOALS OF CARE DISCUSSION WITH PATIENT/FAMILY/PROXY:    CRITICAL CARE TIME SPENT: 35 minutes INTERVAL HPI/OVERNIGHT EVENTS: More awake, off sedation and pressors. Plan for CT chest with IV contrast to eval lung pathology. Reducing vent settings from 50% to 40% FiO2. No further fevers since 6:30 pm yesterday. Remains on Zosyn. Family planning to visit today to discuss plan for trach with patient.    PRESSORS: [ ] YES [ x] NO    Antimicrobial:  piperacillin/tazobactam IVPB.. 3.375 Gram(s) IV Intermittent every 8 hours    Cardiovascular:  midodrine 10 milliGRAM(s) Oral every 8 hours  norepinephrine Infusion 0.007 MICROgram(s)/kG/Min IV Continuous <Continuous>    Pulmonary:  ALBUTerol    90 MICROgram(s) HFA Inhaler 2 Puff(s) Inhalation every 6 hours    Hematologic:  enoxaparin Injectable 40 milliGRAM(s) SubCutaneous daily    Other:  acetaminophen    Suspension .. 650 milliGRAM(s) Oral every 6 hours PRN  bisacodyl Suppository 10 milliGRAM(s) Rectal daily PRN  chlorhexidine 2% Cloths 1 Application(s) Topical <User Schedule>  dexMEDEtomidine Infusion 0.2 MICROgram(s)/kG/Hr IV Continuous <Continuous>  insulin lispro (ADMELOG) corrective regimen sliding scale   SubCutaneous every 6 hours  pantoprazole   Suspension 40 milliGRAM(s) Oral daily  propofol Infusion 30 MICROgram(s)/kG/Min IV Continuous <Continuous>    acetaminophen    Suspension .. 650 milliGRAM(s) Oral every 6 hours PRN  ALBUTerol    90 MICROgram(s) HFA Inhaler 2 Puff(s) Inhalation every 6 hours  bisacodyl Suppository 10 milliGRAM(s) Rectal daily PRN  chlorhexidine 2% Cloths 1 Application(s) Topical <User Schedule>  dexMEDEtomidine Infusion 0.2 MICROgram(s)/kG/Hr IV Continuous <Continuous>  enoxaparin Injectable 40 milliGRAM(s) SubCutaneous daily  insulin lispro (ADMELOG) corrective regimen sliding scale   SubCutaneous every 6 hours  midodrine 10 milliGRAM(s) Oral every 8 hours  norepinephrine Infusion 0.007 MICROgram(s)/kG/Min IV Continuous <Continuous>  pantoprazole   Suspension 40 milliGRAM(s) Oral daily  piperacillin/tazobactam IVPB.. 3.375 Gram(s) IV Intermittent every 8 hours  propofol Infusion 30 MICROgram(s)/kG/Min IV Continuous <Continuous>    Drug Dosing Weight  Height (cm): 175.3 (20 Dec 2021 02:21)  Weight (kg): 72.5 (20 Dec 2021 06:30)  BMI (kg/m2): 23.6 (20 Dec 2021 06:30)  BSA (m2): 1.88 (20 Dec 2021 06:30)    CENTRAL LINE: [ x] YES [ ] NO  LOCATION: [facility]  DATE INSERTED: 12/20  REMOVE: [ ] YES [ ] NO  EXPLAIN: will remove today 12/29    HUTCHINS: [x ] YES [ ] NO    DATE INSERTED: 12/20  REMOVE:  [ ] YES [ ] NO  EXPLAIN:    A-LINE:  [ ] YES [ ] NO  LOCATION:   DATE INSERTED:  REMOVE:  [ ] YES [ ] NO  EXPLAIN:    PMH -reviewed admission note, no change since admission    ABG - ( 30 Dec 2021 03:21 )  pH, Arterial: 7.48  pH, Blood: x     /  pCO2: 45    /  pO2: 131   / HCO3: 34    / Base Excess: 8.8   /  SaO2: 99          12-29 @ 07:01  -  12-30 @ 07:00  --------------------------------------------------------  IN: 2227.2 mL / OUT: 1380 mL / NET: 847.2 mL        Mode: AC/ CMV (Assist Control/ Continuous Mandatory Ventilation)  RR (machine): 16  TV (machine): 450  FiO2: 60  PEEP: 8  ITime: 1  MAP: 13  PIP: 27      PHYSICAL EXAM:      GENERAL: NAD, well-groomed, well-developed, intubated on ventilator  HEAD:  Atraumatic, Normocephalic  MOUTH: RIGHT jaw chronic deformity  EYES: EOMI, PERRLA, conjunctiva and sclera clear  ENMT: intubated, NGT in place  NECK: Supple, normal appearance, No JVD; Normal thyroid; Trachea midline  NERVOUS SYSTEM:  Alert, follows commands. Good concentration; Motor Strength 5/5 B/L upper and lower extremities; DTRs 2+ intact and symmetric  CHEST/LUNG: good bilateral air entry, diminished sounds at right base. right chest tube in place to suction. serosanguinous exudate.  HEART: Regular rate and rhythm; No murmurs, rubs, or gallops  ABDOMEN: Soft, Nontender, Nondistended; bowel sounds present  EXTREMITIES:  2+ Peripheral Pulses, No clubbing, cyanosis, or edema  LYMPH: No lymphadenopathy noted  SKIN: No rashes or lesions; Good capillary refill      LABS:  CBC Full  -  ( 30 Dec 2021 05:35 )  WBC Count : 12.59 K/uL  RBC Count : 3.13 M/uL  Hemoglobin : 9.1 g/dL  Hematocrit : 28.2 %  Platelet Count - Automated : 252 K/uL  Mean Cell Volume : 90.1 fl  Mean Cell Hemoglobin : 29.1 pg  Mean Cell Hemoglobin Concentration : 32.3 gm/dL  Auto Neutrophil # : 11.81 K/uL  Auto Lymphocyte # : 0.37 K/uL  Auto Monocyte # : 0.29 K/uL  Auto Eosinophil # : 0.05 K/uL  Auto Basophil # : 0.01 K/uL  Auto Neutrophil % : 93.8 %  Auto Lymphocyte % : 2.9 %  Auto Monocyte % : 2.3 %  Auto Eosinophil % : 0.4 %  Auto Basophil % : 0.1 %    12-30    150<H>  |  113<H>  |  40<H>  ----------------------------<  204<H>  3.6   |  33<H>  |  0.83    Ca    10.1      30 Dec 2021 05:35  Phos  2.0     12-30  Mg     2.6     12-30    TPro  5.5<L>  /  Alb  1.3<L>  /  TBili  0.4  /  DBili  x   /  AST  25  /  ALT  13  /  AlkPhos  72  12-30      RADIOLOGY & ADDITIONAL STUDIES REVIEWED:  yes CXR    [ ]GOALS OF CARE DISCUSSION WITH PATIENT/FAMILY/PROXY:    CRITICAL CARE TIME SPENT: 35 minutes

## 2021-12-30 NOTE — CHART NOTE - NSCHARTNOTEFT_GEN_A_CORE
Son Moses updated regarding fathers condition. Given permission to visit today to discuss trach. Daughter also updated (Sandi). All questions answered.

## 2021-12-30 NOTE — PROGRESS NOTE ADULT - PROBLEM SELECTOR PLAN 3
s/p PEA arrest, ROSC after 20 min. Echo showed preserved EF, no significant valvular disease. +L pneumothorax s/p pigtail catheter. Pt responsive. Remains intubated. DNR.

## 2021-12-30 NOTE — PROGRESS NOTE ADULT - TREATMENT GUIDELINE COMMENT
considering both options, may be interested in palliative wean and inpatient hospice - natural death instead of trach/PEG and nursing home placement

## 2021-12-30 NOTE — PROGRESS NOTE ADULT - NUTRITIONAL ASSESSMENT
This patient has been assessed with a concern for Malnutrition and has been determined to have a diagnosis/diagnoses of Severe protein-calorie malnutrition.    This patient is being managed with:   Diet NPO with Tube Feed-  Tube Feeding Modality: Nasogastric  Glucerna 1.5 Dhruv  Total Volume for 24 Hours (mL): 720  Continuous  Starting Tube Feed Rate {mL per Hour}: 10  Increase Tube Feed Rate by (mL): 10     Every 4 hours  Until Goal Tube Feed Rate (mL per Hour): 30  Tube Feed Duration (in Hours): 24  Tube Feed Start Time: 10:00  Free Water Flush  Bolus   Total Volume per Flush (mL): 250   Frequency: Every 4 Hours  Entered: Dec 29 2021  9:53PM

## 2021-12-31 NOTE — PROGRESS NOTE ADULT - SUBJECTIVE AND OBJECTIVE BOX
ICU visit:  76y Male is under our care for    REVIEW OF SYSTEMS:  [  ] Not able to elicit  General:	  Chest:	  GI:	  :  Skin:	  Musculoskeletal:	  Neuro:	    MEDS:  piperacillin/tazobactam IVPB.. 3.375 Gram(s) IV Intermittent every 8 hours    ALLERGIES: Allergies    No Known Allergies    Intolerances        VITALS:  ICU Vital Signs Last 24 Hrs  T(C): 37.8 (31 Dec 2021 13:00), Max: 38.2 (31 Dec 2021 00:00)  T(F): 100 (31 Dec 2021 13:00), Max: 100.8 (31 Dec 2021 00:00)  HR: 84 (31 Dec 2021 13:00) (66 - 100)  BP: 142/66 (31 Dec 2021 13:00) (106/61 - 165/81)  BP(mean): 84 (31 Dec 2021 13:00) (70 - 104)  ABP: --  ABP(mean): --  RR: 22 (31 Dec 2021 13:00) (17 - 32)  SpO2: 100% (31 Dec 2021 13:00) (92% - 100%)      PHYSICAL EXAM:  HEENT:  Neck:  Respiratory:  Cardiovascular:  Gastrointestinal:  Extremities:  Skin:  Ortho:  Neuro:    LABS/DIAGNOSTIC TESTS:                        9.3    11.66 )-----------( 279      ( 31 Dec 2021 05:45 )             29.6     WBC Count: 11.66 K/uL (12-31 @ 05:45)  WBC Count: 12.59 K/uL (12-30 @ 05:35)  WBC Count: 17.64 K/uL (12-29 @ 04:45)  WBC Count: 14.10 K/uL (12-28 @ 03:45)  WBC Count: 9.74 K/uL (12-27 @ 04:29)    12-31    146<H>  |  112<H>  |  29<H>  ----------------------------<  188<H>  3.8   |  30  |  0.74    Ca    10.6<H>      31 Dec 2021 05:45  Phos  2.4     12-31  Mg     2.4     12-31    TPro  6.0  /  Alb  1.3<L>  /  TBili  0.5  /  DBili  x   /  AST  24  /  ALT  12  /  AlkPhos  89  12-31        ABG - ( 31 Dec 2021 03:43 )  pH: 7.49  /  pCO2: 43    /  pO2: 134   / HCO3: 33    / Base Excess: 8.5   /  SaO2: 99                CULTURES:   .Sputum Sputum  12-29 @ 18:28   Normal Respiratory Karla present  --    Numerous polymorphonuclear leukocytes per low power field  No squamous epithelial cells per low power field  Few Gram positive cocci in pairs per oil power field      Clean Catch Clean Catch (Midstream)  12-21 @ 04:42   No growth  --  --      .Sputum Sputum  11-08 @ 10:53   Few Mycobacterium avium complex  --  --      .Sputum Sputum  11-06 @ 19:34   No acid fast bacilli isolated after 6 weeks.  --  --      .Sputum Sputum  11-05 @ 23:15   Growth of acid fast bacilli detected in broth,  Mycobacterium avium complex by Dna Probe  --  --      .Blood Blood  11-04 @ 11:23   No Growth Final  --  --      .Blood Blood  11-04 @ 10:50   No Growth Final  --  --      Clean Catch Clean Catch (Midstream)  11-03 @ 17:00   <10,000 CFU/mL Normal Urogenital Karla  --  --        RADIOLOGY:  no new studies ICU visit:  76y Male is under our care for pneumonia and fever.  Patient was seen in the ICU intubated and vent dependent with an FIO2 of 40% and an oxygen saturation of 100%.  He is awake and alert, had a fever early this morning and WBC count is downtrending.    REVIEW OF SYSTEMS:  [ x ] Not able to elicit      MEDS:  piperacillin/tazobactam IVPB.. 3.375 Gram(s) IV Intermittent every 8 hours    ALLERGIES: Allergies    No Known Allergies    Intolerances        VITALS:  ICU Vital Signs Last 24 Hrs  T(C): 37.8 (31 Dec 2021 13:00), Max: 38.2 (31 Dec 2021 00:00)  T(F): 100 (31 Dec 2021 13:00), Max: 100.8 (31 Dec 2021 00:00)  HR: 84 (31 Dec 2021 13:00) (66 - 100)  BP: 142/66 (31 Dec 2021 13:00) (106/61 - 165/81)  BP(mean): 84 (31 Dec 2021 13:00) (70 - 104)  ABP: --  ABP(mean): --  RR: 22 (31 Dec 2021 13:00) (17 - 32)  SpO2: 100% (31 Dec 2021 13:00) (92% - 100%)      PHYSICAL EXAM:  HEENT: ETT, left NG  Neck: supple no LN's   Respiratory: Bilateral rhonchi, left sided CT  Cardiovascular: S1 S2 reg no murmurs  Gastrointestinal: +BS with soft, nondistended abdomen; nontender  Extremities: no edema  Skin: no rashes  Ortho: n/a  Neuro: Awake and alert      LABS/DIAGNOSTIC TESTS:                        9.3    11.66 )-----------( 279      ( 31 Dec 2021 05:45 )             29.6     WBC Count: 11.66 K/uL (12-31 @ 05:45)  WBC Count: 12.59 K/uL (12-30 @ 05:35)  WBC Count: 17.64 K/uL (12-29 @ 04:45)  WBC Count: 14.10 K/uL (12-28 @ 03:45)  WBC Count: 9.74 K/uL (12-27 @ 04:29)    12-31    146<H>  |  112<H>  |  29<H>  ----------------------------<  188<H>  3.8   |  30  |  0.74    Ca    10.6<H>      31 Dec 2021 05:45  Phos  2.4     12-31  Mg     2.4     12-31    TPro  6.0  /  Alb  1.3<L>  /  TBili  0.5  /  DBili  x   /  AST  24  /  ALT  12  /  AlkPhos  89  12-31        ABG - ( 31 Dec 2021 03:43 )  pH: 7.49  /  pCO2: 43    /  pO2: 134   / HCO3: 33    / Base Excess: 8.5   /  SaO2: 99                CULTURES:   .Sputum Sputum  12-29 @ 18:28   Normal Respiratory Karla present  --    Numerous polymorphonuclear leukocytes per low power field  No squamous epithelial cells per low power field  Few Gram positive cocci in pairs per oil power field      Clean Catch Clean Catch (Midstream)  12-21 @ 04:42   No growth  --  --      .Sputum Sputum  11-08 @ 10:53   Few Mycobacterium avium complex  --  --      .Sputum Sputum  11-06 @ 19:34   No acid fast bacilli isolated after 6 weeks.  --  --      .Sputum Sputum  11-05 @ 23:15   Growth of acid fast bacilli detected in broth,  Mycobacterium avium complex by Dna Probe  --  --      .Blood Blood  11-04 @ 11:23   No Growth Final  --  --      .Blood Blood  11-04 @ 10:50   No Growth Final  --  --      Clean Catch Clean Catch (Midstream)  11-03 @ 17:00   <10,000 CFU/mL Normal Urogenital Karla  --  --        RADIOLOGY:    < from: Xray Chest 1 View- PORTABLE-Routine (Xray Chest 1 View- PORTABLE-Routine in AM.) (12.30.21 @ 09:56) >  ACC: [id number] EXAM:  XR CHEST PORTABLE ROUTINE 1V                        ACC: [id number] EXAM:  XR CHEST PORTABLE ROUTINE 1V                          PROCEDURE DATE:  12/29/2021          INTERPRETATION:  Portable chest radiograph    CLINICAL INFORMATION: Dyspnea, shortness of breath.    TECHNIQUE:  Portable  AP view of the chest.    COMPARISON: 12/28/2021 chest available for review.    FINDINGS:    LEFT IJ  Residual LEFT apical 5% pneumothorax.  LEFT multi-sidehole pigtail catheter overlies LEFT upper hemithorax.  Residual bilateral  perihilar/basilar diffuse airspace disease..   No pneumothorax.    The  heart is mildly enlarged in transverse diameter. No hilar mass.    Visualized osseous structures are intact.    IMPRESSION:   Residual LEFT apical 5% pneumothorax.  Otherwise no interval change..    FOLLOW-UP AP PORTABLE CHEST RADIOGRAPH 12/29/2021 AT 8:50 AM:  No interval change.    --- End of Report ---            MARIA DEL CARMEN LEUNG MD; Attending Radiologist  This document has been electronically signed. Dec 30 2021 10:33AM    < end of copied text >

## 2021-12-31 NOTE — PROGRESS NOTE ADULT - ASSESSMENT
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 Patient had cardiac arrest in ED , was intubated CPR was initiated and subsequently ROSC was acieved after 20 minutes. Patient is admitted to ICU.    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  - CAD s/p stent  - Diabetes    Plan  ====Neuro====  #baseline mentation: AAOx3  #intubated  - Extubated on 12/27  - re-intubated on 12/28  - OFF propofol and pressors    ====CVS====  #Cardiac arrest   12/20 on arrival to ED  - ROSC after 20min  - trop trended down  - started norepi 12/28 post intubation, tapered OFF  - midodrine 10 started 12/29    ====Pulm====  #Acute hypoxic respiratory failure  - has Hx of chronic PTX on LEFT  - likely 2/2 PTX vs aspiration PNA  - SBT (12/21, 22, 24)  - extubated on 12/22  - re-intubated on 12/23  - s/p solumedrol  - s/p cefepime 2g q12 (12/20-12/28)  - extubated again on 12/27; re-intubated on 12/28  - CXR w/ improvement, stable chronic LEFT PTX  - thoracic surgery consulted for trach/PEG  - plan for CT - with IV contrast today 12/30  - f/u chest tube cytology sample    #PTX (acute/on/chronic)  - s/p pigtail in ED 12/20  - chest tube to suction - serosanginous exudate  - CXR interval improvement    ====GI====  #no active issues  #diet: NPO w/ tube feeding + free water 350 q 6  #bowel regimen: none    ====nephro====  #hypernatremia  serum Na 153 >>> now 148  - c/w free water 350 q 6  - 2.5 L free water deficit    ====ID====  #fever  started 12/29 after intubation  wbc trending back down  - f/u sputum  - started Zosyn 12/29    #leukocytosis  - likely 2/2 aspiration PNA vs reactive process  - Ucx neg  - Scx MAC  - s/p cefepime 2g q 12 (12/20-12/28)    ====Heme/onc====  #Metastatic SCC of tongue base  - s/p resection, chemo/radiation, and immunotherapy (10/2021)  - plan for experimental drug as outpatient.  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 Patient had cardiac arrest in ED , was intubated CPR was initiated and subsequently ROSC was achieved after 20 minutes. Patient is admitted to ICU.    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  - CAD s/p stent  - Diabetes    Plan  ====Neuro====  #baseline mentation: AAOx3  #intubated  - Extubated on 12/27  - re-intubated on 12/28  - OFF propofol and pressors  - PRN fent pushes    ====CVS====  #Cardiac arrest   12/20 on arrival to ED  - ROSC after 20min  - trop trended down  - started norepi 12/28 post intubation, tapered OFF  - midodrine 10 started 12/29    ====Pulm====  #Acute hypoxic respiratory failure  - has Hx of chronic PTX on LEFT  - likely 2/2 PTX vs aspiration PNA  - SBT (12/21, 22, 24)  - extubated on 12/22  - re-intubated on 12/23  - s/p solumedrol  - s/p cefepime 2g q12 (12/20-12/28)  - extubated again on 12/27; re-intubated on 12/28  - CXR w/ improvement, stable chronic LEFT PTX  - thoracic surgery consulted for trach/PEG  - plan for CT - with IV contrast shows interval worsening  - on zosyn Abx for asp PNA coverage    #PTX (acute/on/chronic)  - s/p pigtail in ED 12/20  - chest tube to water seal- serosanginous exudate  - CXR interval improvement    ====GI====  #no active issues  #diet: NPO w/ tube feeding + free water 350 q 6  #bowel regimen: none    ====nephro====  #hypernatremia  serum Na 153 >>> now 148  - c/w free water 350 q 6  - 2.5 L free water deficit    ====ID====  #fever  started 12/29 after intubation  wbc trending back down  - f/u sputum  - started Zosyn 12/29    #leukocytosis  - likely 2/2 aspiration PNA vs reactive process  - Ucx neg  - Scx MAC  - s/p cefepime 2g q 12 (12/20-12/28)    ====Heme/onc====  #Metastatic SCC of tongue base  - s/p resection, chemo/radiation, and immunotherapy (10/2021)  - plan for experimental drug as outpatient.  - heme/onc as o/p   - palliative chemo, post trach/PEG will not be candidate for chemo  - palliative on board, discussion to be had today regarding trach PEG    ====Endo====  #DM  - a1c 6.3  - c/w sliding scale    ====Ppx====  #DVT: Lovenox  #GI: PPI

## 2021-12-31 NOTE — PROGRESS NOTE ADULT - ASSESSMENT
Pneumonia - likely aspiration   Leukocytosis - improving  Fevers    Plan - Cont Zosyn 3.375 gms iv q8hrs.    Time spent : 32 minutes Pneumonia - likely aspiration   Leukocytosis - improving  Fevers    Plan - Cont Zosyn 3.375 gms iv q8hrs.    Time spent : 32 minutes    I agree with above

## 2021-12-31 NOTE — PROGRESS NOTE ADULT - SUBJECTIVE AND OBJECTIVE BOX
INTERVAL HPI/OVERNIGHT EVENTS: ***    PRESSORS: [ ] YES [ ] NO    Antimicrobial:  piperacillin/tazobactam IVPB.. 3.375 Gram(s) IV Intermittent every 8 hours    Cardiovascular:  midodrine 10 milliGRAM(s) Oral every 8 hours    Pulmonary:  ALBUTerol    90 MICROgram(s) HFA Inhaler 2 Puff(s) Inhalation every 6 hours    Hematologic:  enoxaparin Injectable 40 milliGRAM(s) SubCutaneous daily    Other:  acetaminophen    Suspension .. 650 milliGRAM(s) Oral every 6 hours PRN  bisacodyl Suppository 10 milliGRAM(s) Rectal daily PRN  chlorhexidine 2% Cloths 1 Application(s) Topical <User Schedule>  fentaNYL    Injectable 25 MICROGram(s) IV Push every 6 hours PRN  insulin lispro (ADMELOG) corrective regimen sliding scale   SubCutaneous every 6 hours  pantoprazole   Suspension 40 milliGRAM(s) Oral daily    acetaminophen    Suspension .. 650 milliGRAM(s) Oral every 6 hours PRN  ALBUTerol    90 MICROgram(s) HFA Inhaler 2 Puff(s) Inhalation every 6 hours  bisacodyl Suppository 10 milliGRAM(s) Rectal daily PRN  chlorhexidine 2% Cloths 1 Application(s) Topical <User Schedule>  enoxaparin Injectable 40 milliGRAM(s) SubCutaneous daily  fentaNYL    Injectable 25 MICROGram(s) IV Push every 6 hours PRN  insulin lispro (ADMELOG) corrective regimen sliding scale   SubCutaneous every 6 hours  midodrine 10 milliGRAM(s) Oral every 8 hours  pantoprazole   Suspension 40 milliGRAM(s) Oral daily  piperacillin/tazobactam IVPB.. 3.375 Gram(s) IV Intermittent every 8 hours    Drug Dosing Weight  Height (cm): 175.3 (20 Dec 2021 02:21)  Weight (kg): 72.5 (20 Dec 2021 06:30)  BMI (kg/m2): 23.6 (20 Dec 2021 06:30)  BSA (m2): 1.88 (20 Dec 2021 06:30)    CENTRAL LINE: [ ] YES [ ] NO  LOCATION:   DATE INSERTED:  REMOVE: [ ] YES [ ] NO  EXPLAIN:    HUTCHINS: [ ] YES [ ] NO    DATE INSERTED:  REMOVE:  [ ] YES [ ] NO  EXPLAIN:    A-LINE:  [ ] YES [ ] NO  LOCATION:   DATE INSERTED:  REMOVE:  [ ] YES [ ] NO  EXPLAIN:    PMH -reviewed admission note, no change since admission      ABG - ( 31 Dec 2021 03:43 )  pH, Arterial: 7.49  pH, Blood: x     /  pCO2: 43    /  pO2: 134   / HCO3: 33    / Base Excess: 8.5   /  SaO2: 99                    12-30 @ 07:01  -  12-31 @ 07:00  --------------------------------------------------------  IN: 2740 mL / OUT: 1455 mL / NET: 1285 mL        Mode: AC/ CMV (Assist Control/ Continuous Mandatory Ventilation)  RR (machine): 16  TV (machine): 450  FiO2: 40  PEEP: 5  ITime: 1  MAP: 10  PIP: 23      PHYSICAL EXAM:    GENERAL: NAD, well-groomed, well-developed  HEAD:  Atraumatic, Normocephalic  EYES: EOMI, PERRLA, conjunctiva and sclera clear  ENMT: No tonsillar erythema, exudates, or enlargement; Moist mucous membranes, Good dentition, [ ]No lesions  NECK: Supple, normal appearance, No JVD; Normal thyroid; Trachea midline  NERVOUS SYSTEM:  Alert & Oriented X3, Good concentration; Motor Strength 5/5 B/L upper and lower extremities; DTRs 2+ intact and symmetric  CHEST/LUNG: No chest deformity; Normal percussion bilaterally; No rales, rhonchi, wheezing   HEART: Regular rate and rhythm; No murmurs, rubs, or gallops  ABDOMEN: Soft, Nontender, Nondistended;Bowel sounds present  EXTREMITIES:  2+ Peripheral Pulses, No clubbing, cyanosis, or edema  LYMPH: No lymphadenopathy noted  SKIN: No rashes or lesions; Good capillary refill      LABS:  CBC Full  -  ( 31 Dec 2021 05:45 )  WBC Count : 11.66 K/uL  RBC Count : 3.27 M/uL  Hemoglobin : 9.3 g/dL  Hematocrit : 29.6 %  Platelet Count - Automated : 279 K/uL  Mean Cell Volume : 90.5 fl  Mean Cell Hemoglobin : 28.4 pg  Mean Cell Hemoglobin Concentration : 31.4 gm/dL  Auto Neutrophil # : 10.50 K/uL  Auto Lymphocyte # : 0.38 K/uL  Auto Monocyte # : 0.50 K/uL  Auto Eosinophil # : 0.14 K/uL  Auto Basophil # : 0.03 K/uL  Auto Neutrophil % : 90.0 %  Auto Lymphocyte % : 3.3 %  Auto Monocyte % : 4.3 %  Auto Eosinophil % : 1.2 %  Auto Basophil % : 0.3 %    12-31    146<H>  |  112<H>  |  29<H>  ----------------------------<  188<H>  3.8   |  30  |  0.74    Ca    10.6<H>      31 Dec 2021 05:45  Phos  2.4     12-31  Mg     2.4     12-31    TPro  6.0  /  Alb  1.3<L>  /  TBili  0.5  /  DBili  x   /  AST  24  /  ALT  12  /  AlkPhos  89  12-31        Culture Results:   Normal Respiratory Karla present (12-29 @ 18:28)      RADIOLOGY & ADDITIONAL STUDIES REVIEWED:  ***    [ ]GOALS OF CARE DISCUSSION WITH PATIENT/FAMILY/PROXY:    CRITICAL CARE TIME SPENT: 35 minutes INTERVAL HPI/OVERNIGHT EVENTS: No overnight events. CT chest showing interval worsening of lung pathology yesterday. Pending decision on trach.    PRESSORS: [ ] YES [ x] NO    Antimicrobial:  piperacillin/tazobactam IVPB.. 3.375 Gram(s) IV Intermittent every 8 hours    Cardiovascular:  midodrine 10 milliGRAM(s) Oral every 8 hours    Pulmonary:  ALBUTerol    90 MICROgram(s) HFA Inhaler 2 Puff(s) Inhalation every 6 hours    Hematologic:  enoxaparin Injectable 40 milliGRAM(s) SubCutaneous daily    Other:  acetaminophen    Suspension .. 650 milliGRAM(s) Oral every 6 hours PRN  bisacodyl Suppository 10 milliGRAM(s) Rectal daily PRN  chlorhexidine 2% Cloths 1 Application(s) Topical <User Schedule>  fentaNYL    Injectable 25 MICROGram(s) IV Push every 6 hours PRN  insulin lispro (ADMELOG) corrective regimen sliding scale   SubCutaneous every 6 hours  pantoprazole   Suspension 40 milliGRAM(s) Oral daily    acetaminophen    Suspension .. 650 milliGRAM(s) Oral every 6 hours PRN  ALBUTerol    90 MICROgram(s) HFA Inhaler 2 Puff(s) Inhalation every 6 hours  bisacodyl Suppository 10 milliGRAM(s) Rectal daily PRN  chlorhexidine 2% Cloths 1 Application(s) Topical <User Schedule>  enoxaparin Injectable 40 milliGRAM(s) SubCutaneous daily  fentaNYL    Injectable 25 MICROGram(s) IV Push every 6 hours PRN  insulin lispro (ADMELOG) corrective regimen sliding scale   SubCutaneous every 6 hours  midodrine 10 milliGRAM(s) Oral every 8 hours  pantoprazole   Suspension 40 milliGRAM(s) Oral daily  piperacillin/tazobactam IVPB.. 3.375 Gram(s) IV Intermittent every 8 hours    Drug Dosing Weight  Height (cm): 175.3 (20 Dec 2021 02:21)  Weight (kg): 72.5 (20 Dec 2021 06:30)  BMI (kg/m2): 23.6 (20 Dec 2021 06:30)  BSA (m2): 1.88 (20 Dec 2021 06:30)    CENTRAL LINE: [ ] YES [ ] NO  LOCATION:   DATE INSERTED:  REMOVE: [ ] YES [ ] NO  EXPLAIN:    HUTCHINS: [ ] YES [ ] NO    DATE INSERTED:  REMOVE:  [ ] YES [ ] NO  EXPLAIN:    A-LINE:  [ ] YES [ ] NO  LOCATION:   DATE INSERTED:  REMOVE:  [ ] YES [ ] NO  EXPLAIN:    PMH -reviewed admission note, no change since admission      ABG - ( 31 Dec 2021 03:43 )  pH, Arterial: 7.49  pH, Blood: x     /  pCO2: 43    /  pO2: 134   / HCO3: 33    / Base Excess: 8.5   /  SaO2: 99                    12-30 @ 07:01  -  12-31 @ 07:00  --------------------------------------------------------  IN: 2740 mL / OUT: 1455 mL / NET: 1285 mL        Mode: AC/ CMV (Assist Control/ Continuous Mandatory Ventilation)  RR (machine): 16  TV (machine): 450  FiO2: 40  PEEP: 5  ITime: 1  MAP: 10  PIP: 23      PHYSICAL EXAM:    GENERAL: NAD, well-groomed, well-developed  HEAD:  Atraumatic, Normocephalic  EYES: EOMI, PERRLA, conjunctiva and sclera clear  ENMT: No tonsillar erythema, exudates, or enlargement; Moist mucous membranes, Good dentition, [ ]No lesions  NECK: Supple, normal appearance, No JVD; Normal thyroid; Trachea midline  NERVOUS SYSTEM:  Alert & Oriented X3, Good concentration; Motor Strength 5/5 B/L upper and lower extremities; DTRs 2+ intact and symmetric  CHEST/LUNG: No chest deformity; Normal percussion bilaterally; No rales, rhonchi, wheezing   HEART: Regular rate and rhythm; No murmurs, rubs, or gallops  ABDOMEN: Soft, Nontender, Nondistended;Bowel sounds present  EXTREMITIES:  2+ Peripheral Pulses, No clubbing, cyanosis, or edema  LYMPH: No lymphadenopathy noted  SKIN: No rashes or lesions; Good capillary refill      LABS:  CBC Full  -  ( 31 Dec 2021 05:45 )  WBC Count : 11.66 K/uL  RBC Count : 3.27 M/uL  Hemoglobin : 9.3 g/dL  Hematocrit : 29.6 %  Platelet Count - Automated : 279 K/uL  Mean Cell Volume : 90.5 fl  Mean Cell Hemoglobin : 28.4 pg  Mean Cell Hemoglobin Concentration : 31.4 gm/dL  Auto Neutrophil # : 10.50 K/uL  Auto Lymphocyte # : 0.38 K/uL  Auto Monocyte # : 0.50 K/uL  Auto Eosinophil # : 0.14 K/uL  Auto Basophil # : 0.03 K/uL  Auto Neutrophil % : 90.0 %  Auto Lymphocyte % : 3.3 %  Auto Monocyte % : 4.3 %  Auto Eosinophil % : 1.2 %  Auto Basophil % : 0.3 %    12-31    146<H>  |  112<H>  |  29<H>  ----------------------------<  188<H>  3.8   |  30  |  0.74    Ca    10.6<H>      31 Dec 2021 05:45  Phos  2.4     12-31  Mg     2.4     12-31    TPro  6.0  /  Alb  1.3<L>  /  TBili  0.5  /  DBili  x   /  AST  24  /  ALT  12  /  AlkPhos  89  12-31        Culture Results:   Normal Respiratory Karla present (12-29 @ 18:28)      RADIOLOGY & ADDITIONAL STUDIES REVIEWED:  ***    [ ]GOALS OF CARE DISCUSSION WITH PATIENT/FAMILY/PROXY:    CRITICAL CARE TIME SPENT: 35 minutes INTERVAL HPI/OVERNIGHT EVENTS: No overnight events. CT chest showing interval worsening of lung pathology yesterday. Pending decision on trach. Getting fent pushes, no sedation. Patient awake and in no distress. Will trial on SBT today for lung exercise. Chest tube to suction.    PRESSORS: [ ] YES [ x] NO    Antimicrobial:  piperacillin/tazobactam IVPB.. 3.375 Gram(s) IV Intermittent every 8 hours    Cardiovascular:  midodrine 10 milliGRAM(s) Oral every 8 hours    Pulmonary:  ALBUTerol    90 MICROgram(s) HFA Inhaler 2 Puff(s) Inhalation every 6 hours    Hematologic:  enoxaparin Injectable 40 milliGRAM(s) SubCutaneous daily    Other:  acetaminophen    Suspension .. 650 milliGRAM(s) Oral every 6 hours PRN  bisacodyl Suppository 10 milliGRAM(s) Rectal daily PRN  chlorhexidine 2% Cloths 1 Application(s) Topical <User Schedule>  fentaNYL    Injectable 25 MICROGram(s) IV Push every 6 hours PRN  insulin lispro (ADMELOG) corrective regimen sliding scale   SubCutaneous every 6 hours  pantoprazole   Suspension 40 milliGRAM(s) Oral daily    acetaminophen    Suspension .. 650 milliGRAM(s) Oral every 6 hours PRN  ALBUTerol    90 MICROgram(s) HFA Inhaler 2 Puff(s) Inhalation every 6 hours  bisacodyl Suppository 10 milliGRAM(s) Rectal daily PRN  chlorhexidine 2% Cloths 1 Application(s) Topical <User Schedule>  enoxaparin Injectable 40 milliGRAM(s) SubCutaneous daily  fentaNYL    Injectable 25 MICROGram(s) IV Push every 6 hours PRN  insulin lispro (ADMELOG) corrective regimen sliding scale   SubCutaneous every 6 hours  midodrine 10 milliGRAM(s) Oral every 8 hours  pantoprazole   Suspension 40 milliGRAM(s) Oral daily  piperacillin/tazobactam IVPB.. 3.375 Gram(s) IV Intermittent every 8 hours    Drug Dosing Weight  Height (cm): 175.3 (20 Dec 2021 02:21)  Weight (kg): 72.5 (20 Dec 2021 06:30)  BMI (kg/m2): 23.6 (20 Dec 2021 06:30)  BSA (m2): 1.88 (20 Dec 2021 06:30)    CENTRAL LINE: [ x] YES [ ] NO  LOCATION: [facility]  DATE INSERTED: 12/20  REMOVE: [ ] YES [ ] NO  EXPLAIN: will remove today 12/29    HUTCHINS: [x ] YES [ ] NO    DATE INSERTED: 12/20  REMOVE:  [ ] YES [ ] NO  EXPLAIN:    A-LINE:  [ ] YES [ ] NO  LOCATION:   DATE INSERTED:  REMOVE:  [ ] YES [ ] NO  EXPLAIN:    PMH -reviewed admission note, no change since admission      ABG - ( 31 Dec 2021 03:43 )  pH, Arterial: 7.49  pH, Blood: x     /  pCO2: 43    /  pO2: 134   / HCO3: 33    / Base Excess: 8.5   /  SaO2: 99        12-30 @ 07:01  -  12-31 @ 07:00  --------------------------------------------------------  IN: 2740 mL / OUT: 1455 mL / NET: 1285 mL    Mode: AC/ CMV (Assist Control/ Continuous Mandatory Ventilation)  RR (machine): 16  TV (machine): 450  FiO2: 40  PEEP: 5  ITime: 1  MAP: 10  PIP: 23    PHYSICAL EXAM:  GENERAL: NAD, well-groomed, well-developed, intubated on ventilator  HEAD:  Atraumatic, Normocephalic  MOUTH: RIGHT jaw chronic deformity  EYES: EOMI, PERRLA, conjunctiva and sclera clear  ENMT: intubated, NGT in place  NECK: Supple, normal appearance, No JVD; Normal thyroid; Trachea midline  NERVOUS SYSTEM:  Alert, follows commands. Good concentration; Motor Strength 5/5 B/L upper and lower extremities; DTRs 2+ intact and symmetric  CHEST/LUNG: good bilateral air entry, diminished sounds at right base. right chest tube in place to suction. serosanguinous exudate.  HEART: Regular rate and rhythm; No murmurs, rubs, or gallops  ABDOMEN: Soft, Nontender, Nondistended; bowel sounds present  EXTREMITIES:  2+ Peripheral Pulses, No clubbing, cyanosis, or edema  LYMPH: No lymphadenopathy noted  SKIN: No rashes or lesions; Good capillary refill      LABS:  CBC Full  -  ( 31 Dec 2021 05:45 )  WBC Count : 11.66 K/uL  RBC Count : 3.27 M/uL  Hemoglobin : 9.3 g/dL  Hematocrit : 29.6 %  Platelet Count - Automated : 279 K/uL  Mean Cell Volume : 90.5 fl  Mean Cell Hemoglobin : 28.4 pg  Mean Cell Hemoglobin Concentration : 31.4 gm/dL  Auto Neutrophil # : 10.50 K/uL  Auto Lymphocyte # : 0.38 K/uL  Auto Monocyte # : 0.50 K/uL  Auto Eosinophil # : 0.14 K/uL  Auto Basophil # : 0.03 K/uL  Auto Neutrophil % : 90.0 %  Auto Lymphocyte % : 3.3 %  Auto Monocyte % : 4.3 %  Auto Eosinophil % : 1.2 %  Auto Basophil % : 0.3 %    12-31    146<H>  |  112<H>  |  29<H>  ----------------------------<  188<H>  3.8   |  30  |  0.74    Ca    10.6<H>      31 Dec 2021 05:45  Phos  2.4     12-31  Mg     2.4     12-31    TPro  6.0  /  Alb  1.3<L>  /  TBili  0.5  /  DBili  x   /  AST  24  /  ALT  12  /  AlkPhos  89  12-31    Culture Results:   Normal Respiratory Karla present (12-29 @ 18:28)      RADIOLOGY & ADDITIONAL STUDIES REVIEWED:     < from: CT Chest w/ IV Cont (12.30.21 @ 12:53) >    ACC: [id number] EXAM:  CT CHEST IC                          PROCEDURE DATE:  12/30/2021      INTERPRETATION:  INDICATION: Squamous cell carcinoma of the tongue    TECHNIQUE: Volumetric images of the chest after the administration of 40   mL of Omnipaque 350. Maximum intensity projection images were generated.    COMPARISON: CT chest 11/4/2021.    FINDINGS:    LUNGS/AIRWAYS/PLEURA: New occlusion of the left lower lobe bronchus and   associated left lower lobe collapse. Heterogeneous enhancement of left   lower lobe atelectasis, suggesting a superimposed process. New   consolidation in the right lower lobe and lingula, and peribronchial   nodules in all lobes of the right lung.    Larger bilateral cavitary masses, for example, 4.6 cm in themiddle lobe   (measured on 3-90), prior 2.1 cm. Other unchanged solid nodules, for   example, 1 cm in the left apex (3-34).    Endotracheal tube tip in the mid trachea. Stable mild fibrosis in the   anterior upper lobes.    Slightly increased moderate left pneumothorax. Left pleural pigtail   catheter in the left posterior hemithorax. Trace left basilar pleural   effusion. New right pleural thickening in the right cardiophrenic angle.    LYMPH NODES/MEDIASTINUM: Partially included necrotic right   supraclavicular lymphadenopathy. Slightly larger right paratracheal lymph   nodes up to 1.1 cm (3-37). Calcified lymph nodes, likely prior   granulomatous disease.    HEART/VASCULATURE: Normal heart size. Small pericardial effusion. Heavily   calcified coronary arteries. Normal caliber aorta and main pulmonary   artery.    UPPER ABDOMEN:NG tube tip in the gastric fundus.    BONES/SOFT TISSUES: New non and mildly displaced fractures of the right   anterior third through sixth ribs, and mildly displaced fracture of the   left anterior fifth rib.      IMPRESSION:    New multilobar consolidation, including likely within a collapsed left   lower lobe, and peribronchial nodules in all lobes of the right lung,   favored to be infection.    New occlusion of the left lower lobe bronchus, possibly by mucus, and   associated left lower lobe collapse.    Larger bilateral cavitary metastases.    Increased moderate left pneumothorax. New right pleural thickening in the   right cardiophrenic angle.    Mildly increased mediastinal lymphadenopathy. Partially included large   right supraclavicular lymphadenopathy.    Bilateral acute anterior rib fractures.    --- End of Report ---    < end of copied text >    [ ]GOALS OF CARE DISCUSSION WITH PATIENT/FAMILY/PROXY:    CRITICAL CARE TIME SPENT: 35 minutes INTERVAL HPI/OVERNIGHT EVENTS: No overnight events. CT chest showing interval worsening of lung pathology yesterday. Pending decision on trach. Getting fent pushes, no sedation. Patient awake and in no distress. Will trial on SBT today for lung exercise. Chest tube to suction > to water seal.    PRESSORS: [ ] YES [ x] NO    Antimicrobial:  piperacillin/tazobactam IVPB.. 3.375 Gram(s) IV Intermittent every 8 hours    Cardiovascular:  midodrine 10 milliGRAM(s) Oral every 8 hours    Pulmonary:  ALBUTerol    90 MICROgram(s) HFA Inhaler 2 Puff(s) Inhalation every 6 hours    Hematologic:  enoxaparin Injectable 40 milliGRAM(s) SubCutaneous daily    Other:  acetaminophen    Suspension .. 650 milliGRAM(s) Oral every 6 hours PRN  bisacodyl Suppository 10 milliGRAM(s) Rectal daily PRN  chlorhexidine 2% Cloths 1 Application(s) Topical <User Schedule>  fentaNYL    Injectable 25 MICROGram(s) IV Push every 6 hours PRN  insulin lispro (ADMELOG) corrective regimen sliding scale   SubCutaneous every 6 hours  pantoprazole   Suspension 40 milliGRAM(s) Oral daily    acetaminophen    Suspension .. 650 milliGRAM(s) Oral every 6 hours PRN  ALBUTerol    90 MICROgram(s) HFA Inhaler 2 Puff(s) Inhalation every 6 hours  bisacodyl Suppository 10 milliGRAM(s) Rectal daily PRN  chlorhexidine 2% Cloths 1 Application(s) Topical <User Schedule>  enoxaparin Injectable 40 milliGRAM(s) SubCutaneous daily  fentaNYL    Injectable 25 MICROGram(s) IV Push every 6 hours PRN  insulin lispro (ADMELOG) corrective regimen sliding scale   SubCutaneous every 6 hours  midodrine 10 milliGRAM(s) Oral every 8 hours  pantoprazole   Suspension 40 milliGRAM(s) Oral daily  piperacillin/tazobactam IVPB.. 3.375 Gram(s) IV Intermittent every 8 hours    Drug Dosing Weight  Height (cm): 175.3 (20 Dec 2021 02:21)  Weight (kg): 72.5 (20 Dec 2021 06:30)  BMI (kg/m2): 23.6 (20 Dec 2021 06:30)  BSA (m2): 1.88 (20 Dec 2021 06:30)    CENTRAL LINE: [ x] YES [ ] NO  LOCATION: [facility]  DATE INSERTED: 12/20  REMOVE: [ ] YES [ ] NO  EXPLAIN: will remove today 12/29    HUTCHINS: [x ] YES [ ] NO    DATE INSERTED: 12/20  REMOVE:  [ ] YES [ ] NO  EXPLAIN:    A-LINE:  [ ] YES [ ] NO  LOCATION:   DATE INSERTED:  REMOVE:  [ ] YES [ ] NO  EXPLAIN:    PMH -reviewed admission note, no change since admission      ABG - ( 31 Dec 2021 03:43 )  pH, Arterial: 7.49  pH, Blood: x     /  pCO2: 43    /  pO2: 134   / HCO3: 33    / Base Excess: 8.5   /  SaO2: 99        12-30 @ 07:01  -  12-31 @ 07:00  --------------------------------------------------------  IN: 2740 mL / OUT: 1455 mL / NET: 1285 mL    Mode: AC/ CMV (Assist Control/ Continuous Mandatory Ventilation)  RR (machine): 16  TV (machine): 450  FiO2: 40  PEEP: 5  ITime: 1  MAP: 10  PIP: 23    PHYSICAL EXAM:  GENERAL: NAD, well-groomed, well-developed, intubated on ventilator  HEAD:  Atraumatic, Normocephalic  MOUTH: RIGHT jaw chronic deformity  EYES: EOMI, PERRLA, conjunctiva and sclera clear  ENMT: intubated, NGT in place  NECK: Supple, normal appearance, No JVD; Normal thyroid; Trachea midline  NERVOUS SYSTEM:  Alert, follows commands. Good concentration; Motor Strength 5/5 B/L upper and lower extremities; DTRs 2+ intact and symmetric  CHEST/LUNG: good bilateral air entry, diminished sounds at right base. right chest tube in place to suction. serosanguinous exudate.  HEART: Regular rate and rhythm; No murmurs, rubs, or gallops  ABDOMEN: Soft, Nontender, Nondistended; bowel sounds present  EXTREMITIES:  2+ Peripheral Pulses, No clubbing, cyanosis, or edema  LYMPH: No lymphadenopathy noted  SKIN: No rashes or lesions; Good capillary refill      LABS:  CBC Full  -  ( 31 Dec 2021 05:45 )  WBC Count : 11.66 K/uL  RBC Count : 3.27 M/uL  Hemoglobin : 9.3 g/dL  Hematocrit : 29.6 %  Platelet Count - Automated : 279 K/uL  Mean Cell Volume : 90.5 fl  Mean Cell Hemoglobin : 28.4 pg  Mean Cell Hemoglobin Concentration : 31.4 gm/dL  Auto Neutrophil # : 10.50 K/uL  Auto Lymphocyte # : 0.38 K/uL  Auto Monocyte # : 0.50 K/uL  Auto Eosinophil # : 0.14 K/uL  Auto Basophil # : 0.03 K/uL  Auto Neutrophil % : 90.0 %  Auto Lymphocyte % : 3.3 %  Auto Monocyte % : 4.3 %  Auto Eosinophil % : 1.2 %  Auto Basophil % : 0.3 %    12-31    146<H>  |  112<H>  |  29<H>  ----------------------------<  188<H>  3.8   |  30  |  0.74    Ca    10.6<H>      31 Dec 2021 05:45  Phos  2.4     12-31  Mg     2.4     12-31    TPro  6.0  /  Alb  1.3<L>  /  TBili  0.5  /  DBili  x   /  AST  24  /  ALT  12  /  AlkPhos  89  12-31    Culture Results:   Normal Respiratory Karla present (12-29 @ 18:28)      RADIOLOGY & ADDITIONAL STUDIES REVIEWED:     < from: CT Chest w/ IV Cont (12.30.21 @ 12:53) >    ACC: [id number] EXAM:  CT CHEST IC                          PROCEDURE DATE:  12/30/2021      INTERPRETATION:  INDICATION: Squamous cell carcinoma of the tongue    TECHNIQUE: Volumetric images of the chest after the administration of 40   mL of Omnipaque 350. Maximum intensity projection images were generated.    COMPARISON: CT chest 11/4/2021.    FINDINGS:    LUNGS/AIRWAYS/PLEURA: New occlusion of the left lower lobe bronchus and   associated left lower lobe collapse. Heterogeneous enhancement of left   lower lobe atelectasis, suggesting a superimposed process. New   consolidation in the right lower lobe and lingula, and peribronchial   nodules in all lobes of the right lung.    Larger bilateral cavitary masses, for example, 4.6 cm in themiddle lobe   (measured on 3-90), prior 2.1 cm. Other unchanged solid nodules, for   example, 1 cm in the left apex (3-34).    Endotracheal tube tip in the mid trachea. Stable mild fibrosis in the   anterior upper lobes.    Slightly increased moderate left pneumothorax. Left pleural pigtail   catheter in the left posterior hemithorax. Trace left basilar pleural   effusion. New right pleural thickening in the right cardiophrenic angle.    LYMPH NODES/MEDIASTINUM: Partially included necrotic right   supraclavicular lymphadenopathy. Slightly larger right paratracheal lymph   nodes up to 1.1 cm (3-37). Calcified lymph nodes, likely prior   granulomatous disease.    HEART/VASCULATURE: Normal heart size. Small pericardial effusion. Heavily   calcified coronary arteries. Normal caliber aorta and main pulmonary   artery.    UPPER ABDOMEN:NG tube tip in the gastric fundus.    BONES/SOFT TISSUES: New non and mildly displaced fractures of the right   anterior third through sixth ribs, and mildly displaced fracture of the   left anterior fifth rib.      IMPRESSION:    New multilobar consolidation, including likely within a collapsed left   lower lobe, and peribronchial nodules in all lobes of the right lung,   favored to be infection.    New occlusion of the left lower lobe bronchus, possibly by mucus, and   associated left lower lobe collapse.    Larger bilateral cavitary metastases.    Increased moderate left pneumothorax. New right pleural thickening in the   right cardiophrenic angle.    Mildly increased mediastinal lymphadenopathy. Partially included large   right supraclavicular lymphadenopathy.    Bilateral acute anterior rib fractures.    --- End of Report ---    < end of copied text >    [ ]GOALS OF CARE DISCUSSION WITH PATIENT/FAMILY/PROXY:    CRITICAL CARE TIME SPENT: 35 minutes

## 2022-01-01 ENCOUNTER — APPOINTMENT (OUTPATIENT)
Dept: INFUSION THERAPY | Facility: HOSPITAL | Age: 77
End: 2022-01-01

## 2022-01-01 ENCOUNTER — NON-APPOINTMENT (OUTPATIENT)
Age: 77
End: 2022-01-01

## 2022-01-01 ENCOUNTER — TRANSCRIPTION ENCOUNTER (OUTPATIENT)
Age: 77
End: 2022-01-01

## 2022-01-01 ENCOUNTER — APPOINTMENT (OUTPATIENT)
Dept: INTERNAL MEDICINE | Facility: CLINIC | Age: 77
End: 2022-01-01

## 2022-01-01 ENCOUNTER — OUTPATIENT (OUTPATIENT)
Dept: OUTPATIENT SERVICES | Facility: HOSPITAL | Age: 77
LOS: 1 days | Discharge: HOME | End: 2022-01-01

## 2022-01-01 ENCOUNTER — INPATIENT (INPATIENT)
Facility: HOSPITAL | Age: 77
LOS: 21 days | DRG: 377 | End: 2022-03-07
Attending: SURGERY | Admitting: SURGERY
Payer: MEDICARE

## 2022-01-01 ENCOUNTER — APPOINTMENT (OUTPATIENT)
Dept: THORACIC SURGERY | Facility: HOSPITAL | Age: 77
End: 2022-01-01

## 2022-01-01 ENCOUNTER — APPOINTMENT (OUTPATIENT)
Dept: RADIATION ONCOLOGY | Facility: CLINIC | Age: 77
End: 2022-01-01

## 2022-01-01 ENCOUNTER — APPOINTMENT (OUTPATIENT)
Dept: HEMATOLOGY ONCOLOGY | Facility: CLINIC | Age: 77
End: 2022-01-01

## 2022-01-01 VITALS
RESPIRATION RATE: 17 BRPM | DIASTOLIC BLOOD PRESSURE: 70 MMHG | SYSTOLIC BLOOD PRESSURE: 133 MMHG | TEMPERATURE: 99 F | HEART RATE: 98 BPM | OXYGEN SATURATION: 97 %

## 2022-01-01 VITALS
OXYGEN SATURATION: 100 % | DIASTOLIC BLOOD PRESSURE: 54 MMHG | RESPIRATION RATE: 14 BRPM | HEART RATE: 104 BPM | TEMPERATURE: 100 F | SYSTOLIC BLOOD PRESSURE: 103 MMHG

## 2022-01-01 VITALS
DIASTOLIC BLOOD PRESSURE: 60 MMHG | SYSTOLIC BLOOD PRESSURE: 112 MMHG | OXYGEN SATURATION: 100 % | HEART RATE: 106 BPM | WEIGHT: 171.96 LBS | RESPIRATION RATE: 17 BRPM | HEIGHT: 69 IN

## 2022-01-01 DIAGNOSIS — Z98.89 OTHER SPECIFIED POSTPROCEDURAL STATES: Chronic | ICD-10-CM

## 2022-01-01 DIAGNOSIS — Z98.49 CATARACT EXTRACTION STATUS, UNSPECIFIED EYE: Chronic | ICD-10-CM

## 2022-01-01 DIAGNOSIS — D63.8 ANEMIA IN OTHER CHRONIC DISEASES CLASSIFIED ELSEWHERE: ICD-10-CM

## 2022-01-01 DIAGNOSIS — Z02.9 ENCOUNTER FOR ADMINISTRATIVE EXAMINATIONS, UNSPECIFIED: ICD-10-CM

## 2022-01-01 DIAGNOSIS — J96.11 CHRONIC RESPIRATORY FAILURE WITH HYPOXIA: ICD-10-CM

## 2022-01-01 DIAGNOSIS — E87.0 HYPEROSMOLALITY AND HYPERNATREMIA: ICD-10-CM

## 2022-01-01 DIAGNOSIS — E43 UNSPECIFIED SEVERE PROTEIN-CALORIE MALNUTRITION: ICD-10-CM

## 2022-01-01 DIAGNOSIS — R46.89 OTHER SYMPTOMS AND SIGNS INVOLVING APPEARANCE AND BEHAVIOR: ICD-10-CM

## 2022-01-01 DIAGNOSIS — C76.0 MALIGNANT NEOPLASM OF HEAD, FACE AND NECK: ICD-10-CM

## 2022-01-01 DIAGNOSIS — Z51.5 ENCOUNTER FOR PALLIATIVE CARE: ICD-10-CM

## 2022-01-01 DIAGNOSIS — K92.2 GASTROINTESTINAL HEMORRHAGE, UNSPECIFIED: ICD-10-CM

## 2022-01-01 DIAGNOSIS — C79.9 SECONDARY MALIGNANT NEOPLASM OF UNSPECIFIED SITE: ICD-10-CM

## 2022-01-01 DIAGNOSIS — Z96.659 PRESENCE OF UNSPECIFIED ARTIFICIAL KNEE JOINT: Chronic | ICD-10-CM

## 2022-01-01 DIAGNOSIS — R57.1 HYPOVOLEMIC SHOCK: ICD-10-CM

## 2022-01-01 DIAGNOSIS — R60.1 GENERALIZED EDEMA: ICD-10-CM

## 2022-01-01 DIAGNOSIS — I95.9 HYPOTENSION, UNSPECIFIED: ICD-10-CM

## 2022-01-01 DIAGNOSIS — J96.01 ACUTE RESPIRATORY FAILURE WITH HYPOXIA: ICD-10-CM

## 2022-01-01 DIAGNOSIS — A41.9 SEPSIS, UNSPECIFIED ORGANISM: ICD-10-CM

## 2022-01-01 DIAGNOSIS — Z71.89 OTHER SPECIFIED COUNSELING: ICD-10-CM

## 2022-01-01 DIAGNOSIS — J98.11 ATELECTASIS: ICD-10-CM

## 2022-01-01 DIAGNOSIS — Z98.890 OTHER SPECIFIED POSTPROCEDURAL STATES: Chronic | ICD-10-CM

## 2022-01-01 DIAGNOSIS — K25.9 GASTRIC ULCER, UNSPECIFIED AS ACUTE OR CHRONIC, WITHOUT HEMORRHAGE OR PERFORATION: ICD-10-CM

## 2022-01-01 DIAGNOSIS — J18.9 PNEUMONIA, UNSPECIFIED ORGANISM: ICD-10-CM

## 2022-01-01 DIAGNOSIS — J93.9 PNEUMOTHORAX, UNSPECIFIED: ICD-10-CM

## 2022-01-01 DIAGNOSIS — Z29.9 ENCOUNTER FOR PROPHYLACTIC MEASURES, UNSPECIFIED: ICD-10-CM

## 2022-01-01 DIAGNOSIS — I95.1 ORTHOSTATIC HYPOTENSION: ICD-10-CM

## 2022-01-01 DIAGNOSIS — R53.2 FUNCTIONAL QUADRIPLEGIA: ICD-10-CM

## 2022-01-01 DIAGNOSIS — J69.0 PNEUMONITIS DUE TO INHALATION OF FOOD AND VOMIT: ICD-10-CM

## 2022-01-01 DIAGNOSIS — R45.1 RESTLESSNESS AND AGITATION: ICD-10-CM

## 2022-01-01 DIAGNOSIS — R78.81 BACTEREMIA: ICD-10-CM

## 2022-01-01 DIAGNOSIS — K56.600 PARTIAL INTESTINAL OBSTRUCTION, UNSPECIFIED AS TO CAUSE: ICD-10-CM

## 2022-01-01 DIAGNOSIS — E11.9 TYPE 2 DIABETES MELLITUS WITHOUT COMPLICATIONS: ICD-10-CM

## 2022-01-01 DIAGNOSIS — I10 ESSENTIAL (PRIMARY) HYPERTENSION: ICD-10-CM

## 2022-01-01 DIAGNOSIS — D62 ACUTE POSTHEMORRHAGIC ANEMIA: ICD-10-CM

## 2022-01-01 DIAGNOSIS — E11.65 TYPE 2 DIABETES MELLITUS WITH HYPERGLYCEMIA: ICD-10-CM

## 2022-01-01 DIAGNOSIS — E83.52 HYPERCALCEMIA: ICD-10-CM

## 2022-01-01 LAB
-  AMIKACIN: SIGNIFICANT CHANGE UP
-  AMIKACIN: SIGNIFICANT CHANGE UP
-  AMOXICILLIN/CLAVULANIC ACID: SIGNIFICANT CHANGE UP
-  AMOXICILLIN/CLAVULANIC ACID: SIGNIFICANT CHANGE UP
-  AMPICILLIN/SULBACTAM: SIGNIFICANT CHANGE UP
-  AMPICILLIN/SULBACTAM: SIGNIFICANT CHANGE UP
-  AMPICILLIN: SIGNIFICANT CHANGE UP
-  AZTREONAM: SIGNIFICANT CHANGE UP
-  AZTREONAM: SIGNIFICANT CHANGE UP
-  CEFAZOLIN: SIGNIFICANT CHANGE UP
-  CEFAZOLIN: SIGNIFICANT CHANGE UP
-  CEFEPIME: SIGNIFICANT CHANGE UP
-  CEFEPIME: SIGNIFICANT CHANGE UP
-  CEFTRIAXONE: SIGNIFICANT CHANGE UP
-  CEFTRIAXONE: SIGNIFICANT CHANGE UP
-  CIPROFLOXACIN: SIGNIFICANT CHANGE UP
-  CIPROFLOXACIN: SIGNIFICANT CHANGE UP
-  DAPTOMYCIN: SIGNIFICANT CHANGE UP
-  ERTAPENEM: SIGNIFICANT CHANGE UP
-  ERTAPENEM: SIGNIFICANT CHANGE UP
-  GENTAMICIN SYNERGY: SIGNIFICANT CHANGE UP
-  GENTAMICIN: SIGNIFICANT CHANGE UP
-  GENTAMICIN: SIGNIFICANT CHANGE UP
-  IMIPENEM: SIGNIFICANT CHANGE UP
-  IMIPENEM: SIGNIFICANT CHANGE UP
-  LEVOFLOXACIN: SIGNIFICANT CHANGE UP
-  LEVOFLOXACIN: SIGNIFICANT CHANGE UP
-  LINEZOLID: SIGNIFICANT CHANGE UP
-  MEROPENEM: SIGNIFICANT CHANGE UP
-  MEROPENEM: SIGNIFICANT CHANGE UP
-  PIPERACILLIN/TAZOBACTAM: SIGNIFICANT CHANGE UP
-  PIPERACILLIN/TAZOBACTAM: SIGNIFICANT CHANGE UP
-  STREPTOMYCIN SYNERGY: SIGNIFICANT CHANGE UP
-  TOBRAMYCIN: SIGNIFICANT CHANGE UP
-  TOBRAMYCIN: SIGNIFICANT CHANGE UP
-  TRIMETHOPRIM/SULFAMETHOXAZOLE: SIGNIFICANT CHANGE UP
-  TRIMETHOPRIM/SULFAMETHOXAZOLE: SIGNIFICANT CHANGE UP
-  VANCOMYCIN: SIGNIFICANT CHANGE UP
A1C WITH ESTIMATED AVERAGE GLUCOSE RESULT: 6.1 % — HIGH (ref 4–5.6)
ALBUMIN SERPL ELPH-MCNC: 0.8 G/DL — LOW (ref 3.5–5)
ALBUMIN SERPL ELPH-MCNC: 0.8 G/DL — LOW (ref 3.5–5)
ALBUMIN SERPL ELPH-MCNC: 0.9 G/DL — LOW (ref 3.5–5)
ALBUMIN SERPL ELPH-MCNC: 1 G/DL — LOW (ref 3.5–5)
ALBUMIN SERPL ELPH-MCNC: 1.1 G/DL — LOW (ref 3.5–5)
ALBUMIN SERPL ELPH-MCNC: 1.2 G/DL — LOW (ref 3.5–5)
ALBUMIN SERPL ELPH-MCNC: 1.3 G/DL — LOW (ref 3.5–5)
ALBUMIN SERPL ELPH-MCNC: 1.4 G/DL — LOW (ref 3.5–5)
ALBUMIN SERPL ELPH-MCNC: 1.5 G/DL — LOW (ref 3.5–5)
ALBUMIN SERPL ELPH-MCNC: 1.6 G/DL — LOW (ref 3.5–5)
ALBUMIN SERPL ELPH-MCNC: 1.7 G/DL — LOW (ref 3.5–5)
ALBUMIN SERPL ELPH-MCNC: 1.8 G/DL — LOW (ref 3.5–5)
ALBUMIN SERPL ELPH-MCNC: 1.9 G/DL — LOW (ref 3.5–5)
ALBUMIN SERPL ELPH-MCNC: 2.1 G/DL — LOW (ref 3.5–5)
ALBUMIN SERPL ELPH-MCNC: 2.2 G/DL — LOW (ref 3.5–5)
ALBUMIN SERPL ELPH-MCNC: 2.2 G/DL — LOW (ref 3.5–5)
ALBUMIN SERPL ELPH-MCNC: 2.3 G/DL — LOW (ref 3.5–5)
ALBUMIN SERPL ELPH-MCNC: 2.3 G/DL — LOW (ref 3.5–5)
ALBUMIN SERPL ELPH-MCNC: 2.5 G/DL — LOW (ref 3.5–5)
ALP SERPL-CCNC: 100 U/L — SIGNIFICANT CHANGE UP (ref 40–120)
ALP SERPL-CCNC: 100 U/L — SIGNIFICANT CHANGE UP (ref 40–120)
ALP SERPL-CCNC: 102 U/L — SIGNIFICANT CHANGE UP (ref 40–120)
ALP SERPL-CCNC: 112 U/L — SIGNIFICANT CHANGE UP (ref 40–120)
ALP SERPL-CCNC: 120 U/L — SIGNIFICANT CHANGE UP (ref 40–120)
ALP SERPL-CCNC: 121 U/L — HIGH (ref 40–120)
ALP SERPL-CCNC: 133 U/L — HIGH (ref 40–120)
ALP SERPL-CCNC: 133 U/L — HIGH (ref 40–120)
ALP SERPL-CCNC: 136 U/L — HIGH (ref 40–120)
ALP SERPL-CCNC: 138 U/L — HIGH (ref 40–120)
ALP SERPL-CCNC: 147 U/L — HIGH (ref 40–120)
ALP SERPL-CCNC: 176 U/L — HIGH (ref 40–120)
ALP SERPL-CCNC: 230 U/L — HIGH (ref 40–120)
ALP SERPL-CCNC: 246 U/L — HIGH (ref 40–120)
ALP SERPL-CCNC: 252 U/L — HIGH (ref 40–120)
ALP SERPL-CCNC: 288 U/L — HIGH (ref 40–120)
ALP SERPL-CCNC: 293 U/L — HIGH (ref 40–120)
ALP SERPL-CCNC: 375 U/L — HIGH (ref 40–120)
ALP SERPL-CCNC: 385 U/L — HIGH (ref 40–120)
ALP SERPL-CCNC: 398 U/L — HIGH (ref 40–120)
ALP SERPL-CCNC: 431 U/L — HIGH (ref 40–120)
ALP SERPL-CCNC: 58 U/L — SIGNIFICANT CHANGE UP (ref 40–120)
ALP SERPL-CCNC: 64 U/L — SIGNIFICANT CHANGE UP (ref 40–120)
ALP SERPL-CCNC: 69 U/L — SIGNIFICANT CHANGE UP (ref 40–120)
ALP SERPL-CCNC: 69 U/L — SIGNIFICANT CHANGE UP (ref 40–120)
ALP SERPL-CCNC: 75 U/L — SIGNIFICANT CHANGE UP (ref 40–120)
ALP SERPL-CCNC: 77 U/L — SIGNIFICANT CHANGE UP (ref 40–120)
ALP SERPL-CCNC: 77 U/L — SIGNIFICANT CHANGE UP (ref 40–120)
ALP SERPL-CCNC: 78 U/L — SIGNIFICANT CHANGE UP (ref 40–120)
ALP SERPL-CCNC: 79 U/L — SIGNIFICANT CHANGE UP (ref 40–120)
ALP SERPL-CCNC: 81 U/L — SIGNIFICANT CHANGE UP (ref 40–120)
ALP SERPL-CCNC: 82 U/L — SIGNIFICANT CHANGE UP (ref 40–120)
ALP SERPL-CCNC: 83 U/L — SIGNIFICANT CHANGE UP (ref 40–120)
ALP SERPL-CCNC: 84 U/L — SIGNIFICANT CHANGE UP (ref 40–120)
ALP SERPL-CCNC: 85 U/L — SIGNIFICANT CHANGE UP (ref 40–120)
ALP SERPL-CCNC: 86 U/L — SIGNIFICANT CHANGE UP (ref 40–120)
ALP SERPL-CCNC: 87 U/L — SIGNIFICANT CHANGE UP (ref 40–120)
ALP SERPL-CCNC: 88 U/L — SIGNIFICANT CHANGE UP (ref 40–120)
ALP SERPL-CCNC: 91 U/L — SIGNIFICANT CHANGE UP (ref 40–120)
ALP SERPL-CCNC: 92 U/L — SIGNIFICANT CHANGE UP (ref 40–120)
ALT FLD-CCNC: 10 U/L DA — SIGNIFICANT CHANGE UP (ref 10–60)
ALT FLD-CCNC: 11 U/L DA — SIGNIFICANT CHANGE UP (ref 10–60)
ALT FLD-CCNC: 12 U/L DA — SIGNIFICANT CHANGE UP (ref 10–60)
ALT FLD-CCNC: 12 U/L DA — SIGNIFICANT CHANGE UP (ref 10–60)
ALT FLD-CCNC: 13 U/L DA — SIGNIFICANT CHANGE UP (ref 10–60)
ALT FLD-CCNC: 14 U/L DA — SIGNIFICANT CHANGE UP (ref 10–60)
ALT FLD-CCNC: 17 U/L DA — SIGNIFICANT CHANGE UP (ref 10–60)
ALT FLD-CCNC: 17 U/L DA — SIGNIFICANT CHANGE UP (ref 10–60)
ALT FLD-CCNC: 19 U/L DA — SIGNIFICANT CHANGE UP (ref 10–60)
ALT FLD-CCNC: 21 U/L DA — SIGNIFICANT CHANGE UP (ref 10–60)
ALT FLD-CCNC: 22 U/L DA — SIGNIFICANT CHANGE UP (ref 10–60)
ALT FLD-CCNC: 23 U/L DA — SIGNIFICANT CHANGE UP (ref 10–60)
ALT FLD-CCNC: 27 U/L DA — SIGNIFICANT CHANGE UP (ref 10–60)
ALT FLD-CCNC: 28 U/L DA — SIGNIFICANT CHANGE UP (ref 10–60)
ALT FLD-CCNC: 28 U/L DA — SIGNIFICANT CHANGE UP (ref 10–60)
ALT FLD-CCNC: 6 U/L DA — LOW (ref 10–60)
ALT FLD-CCNC: 7 U/L DA — LOW (ref 10–60)
ALT FLD-CCNC: 8 U/L DA — LOW (ref 10–60)
ALT FLD-CCNC: 9 U/L DA — LOW (ref 10–60)
ALT FLD-CCNC: <6 U/L DA — LOW (ref 10–60)
ANION GAP SERPL CALC-SCNC: 10 MMOL/L — SIGNIFICANT CHANGE UP (ref 5–17)
ANION GAP SERPL CALC-SCNC: 10 MMOL/L — SIGNIFICANT CHANGE UP (ref 5–17)
ANION GAP SERPL CALC-SCNC: 11 MMOL/L — SIGNIFICANT CHANGE UP (ref 5–17)
ANION GAP SERPL CALC-SCNC: 12 MMOL/L — SIGNIFICANT CHANGE UP (ref 5–17)
ANION GAP SERPL CALC-SCNC: 2 MMOL/L — LOW (ref 5–17)
ANION GAP SERPL CALC-SCNC: 3 MMOL/L — LOW (ref 5–17)
ANION GAP SERPL CALC-SCNC: 4 MMOL/L — LOW (ref 5–17)
ANION GAP SERPL CALC-SCNC: 5 MMOL/L — SIGNIFICANT CHANGE UP (ref 5–17)
ANION GAP SERPL CALC-SCNC: 6 MMOL/L — SIGNIFICANT CHANGE UP (ref 5–17)
ANION GAP SERPL CALC-SCNC: 7 MMOL/L — SIGNIFICANT CHANGE UP (ref 5–17)
ANION GAP SERPL CALC-SCNC: 8 MMOL/L — SIGNIFICANT CHANGE UP (ref 5–17)
ANION GAP SERPL CALC-SCNC: 9 MMOL/L — SIGNIFICANT CHANGE UP (ref 5–17)
ANISOCYTOSIS BLD QL: SIGNIFICANT CHANGE UP
ANISOCYTOSIS BLD QL: SLIGHT — SIGNIFICANT CHANGE UP
APPEARANCE UR: CLEAR — SIGNIFICANT CHANGE UP
APTT BLD: 29.6 SEC — SIGNIFICANT CHANGE UP (ref 27.5–35.5)
APTT BLD: 29.9 SEC — SIGNIFICANT CHANGE UP (ref 27.5–35.5)
APTT BLD: 31.1 SEC — SIGNIFICANT CHANGE UP (ref 27.5–35.5)
APTT BLD: 31.3 SEC — SIGNIFICANT CHANGE UP (ref 27.5–35.5)
AST SERPL-CCNC: 10 U/L — SIGNIFICANT CHANGE UP (ref 10–40)
AST SERPL-CCNC: 11 U/L — SIGNIFICANT CHANGE UP (ref 10–40)
AST SERPL-CCNC: 13 U/L — SIGNIFICANT CHANGE UP (ref 10–40)
AST SERPL-CCNC: 15 U/L — SIGNIFICANT CHANGE UP (ref 10–40)
AST SERPL-CCNC: 16 U/L — SIGNIFICANT CHANGE UP (ref 10–40)
AST SERPL-CCNC: 18 U/L — SIGNIFICANT CHANGE UP (ref 10–40)
AST SERPL-CCNC: 19 U/L — SIGNIFICANT CHANGE UP (ref 10–40)
AST SERPL-CCNC: 20 U/L — SIGNIFICANT CHANGE UP (ref 10–40)
AST SERPL-CCNC: 20 U/L — SIGNIFICANT CHANGE UP (ref 10–40)
AST SERPL-CCNC: 21 U/L — SIGNIFICANT CHANGE UP (ref 10–40)
AST SERPL-CCNC: 22 U/L — SIGNIFICANT CHANGE UP (ref 10–40)
AST SERPL-CCNC: 23 U/L — SIGNIFICANT CHANGE UP (ref 10–40)
AST SERPL-CCNC: 24 U/L — SIGNIFICANT CHANGE UP (ref 10–40)
AST SERPL-CCNC: 24 U/L — SIGNIFICANT CHANGE UP (ref 10–40)
AST SERPL-CCNC: 26 U/L — SIGNIFICANT CHANGE UP (ref 10–40)
AST SERPL-CCNC: 27 U/L — SIGNIFICANT CHANGE UP (ref 10–40)
AST SERPL-CCNC: 29 U/L — SIGNIFICANT CHANGE UP (ref 10–40)
AST SERPL-CCNC: 30 U/L — SIGNIFICANT CHANGE UP (ref 10–40)
AST SERPL-CCNC: 32 U/L — SIGNIFICANT CHANGE UP (ref 10–40)
AST SERPL-CCNC: 35 U/L — SIGNIFICANT CHANGE UP (ref 10–40)
AST SERPL-CCNC: 36 U/L — SIGNIFICANT CHANGE UP (ref 10–40)
AST SERPL-CCNC: 37 U/L — SIGNIFICANT CHANGE UP (ref 10–40)
AST SERPL-CCNC: 42 U/L — HIGH (ref 10–40)
AST SERPL-CCNC: 46 U/L — HIGH (ref 10–40)
AST SERPL-CCNC: 47 U/L — HIGH (ref 10–40)
AST SERPL-CCNC: 52 U/L — HIGH (ref 10–40)
AST SERPL-CCNC: 54 U/L — HIGH (ref 10–40)
AST SERPL-CCNC: 57 U/L — HIGH (ref 10–40)
AST SERPL-CCNC: 63 U/L — HIGH (ref 10–40)
AST SERPL-CCNC: 63 U/L — HIGH (ref 10–40)
AST SERPL-CCNC: 65 U/L — HIGH (ref 10–40)
AST SERPL-CCNC: 8 U/L — LOW (ref 10–40)
AST SERPL-CCNC: 81 U/L — HIGH (ref 10–40)
AST SERPL-CCNC: 84 U/L — HIGH (ref 10–40)
AST SERPL-CCNC: 97 U/L — HIGH (ref 10–40)
BACTERIA # UR AUTO: ABNORMAL /HPF
BACTERIA # UR AUTO: NEGATIVE /HPF — SIGNIFICANT CHANGE UP
BACTERIA # UR AUTO: SIGNIFICANT CHANGE UP /HPF
BASE EXCESS BLDA CALC-SCNC: 3.9 MMOL/L — HIGH (ref -2–3)
BASE EXCESS BLDA CALC-SCNC: 7.5 MMOL/L — HIGH (ref -2–3)
BASE EXCESS BLDA CALC-SCNC: 8.6 MMOL/L — HIGH (ref -2–3)
BASE EXCESS BLDA CALC-SCNC: 9 MMOL/L — HIGH (ref -2–3)
BASE EXCESS BLDV CALC-SCNC: -4.3 MMOL/L — SIGNIFICANT CHANGE UP
BASE EXCESS BLDV CALC-SCNC: 13.4 MMOL/L — SIGNIFICANT CHANGE UP
BASOPHILS # BLD AUTO: 0 K/UL — SIGNIFICANT CHANGE UP (ref 0–0.2)
BASOPHILS # BLD AUTO: 0.01 K/UL — SIGNIFICANT CHANGE UP (ref 0–0.2)
BASOPHILS # BLD AUTO: 0.02 K/UL — SIGNIFICANT CHANGE UP (ref 0–0.2)
BASOPHILS # BLD AUTO: 0.03 K/UL — SIGNIFICANT CHANGE UP (ref 0–0.2)
BASOPHILS # BLD AUTO: 0.03 K/UL — SIGNIFICANT CHANGE UP (ref 0–0.2)
BASOPHILS # BLD AUTO: 0.04 K/UL — SIGNIFICANT CHANGE UP (ref 0–0.2)
BASOPHILS # BLD AUTO: 0.05 K/UL — SIGNIFICANT CHANGE UP (ref 0–0.2)
BASOPHILS # BLD AUTO: 0.05 K/UL — SIGNIFICANT CHANGE UP (ref 0–0.2)
BASOPHILS # BLD AUTO: 0.06 K/UL — SIGNIFICANT CHANGE UP (ref 0–0.2)
BASOPHILS # BLD AUTO: 0.07 K/UL — SIGNIFICANT CHANGE UP (ref 0–0.2)
BASOPHILS # BLD AUTO: 0.08 K/UL — SIGNIFICANT CHANGE UP (ref 0–0.2)
BASOPHILS # BLD AUTO: 0.13 K/UL — SIGNIFICANT CHANGE UP (ref 0–0.2)
BASOPHILS # BLD AUTO: SIGNIFICANT CHANGE UP K/UL (ref 0–0.2)
BASOPHILS NFR BLD AUTO: 0 % — SIGNIFICANT CHANGE UP (ref 0–2)
BASOPHILS NFR BLD AUTO: 0.1 % — SIGNIFICANT CHANGE UP (ref 0–2)
BASOPHILS NFR BLD AUTO: 0.2 % — SIGNIFICANT CHANGE UP (ref 0–2)
BASOPHILS NFR BLD AUTO: 0.3 % — SIGNIFICANT CHANGE UP (ref 0–2)
BASOPHILS NFR BLD AUTO: 0.4 % — SIGNIFICANT CHANGE UP (ref 0–2)
BASOPHILS NFR BLD AUTO: 0.5 % — SIGNIFICANT CHANGE UP (ref 0–2)
BASOPHILS NFR BLD AUTO: 0.5 % — SIGNIFICANT CHANGE UP (ref 0–2)
BASOPHILS NFR BLD AUTO: 0.6 % — SIGNIFICANT CHANGE UP (ref 0–2)
BASOPHILS NFR BLD AUTO: 0.6 % — SIGNIFICANT CHANGE UP (ref 0–2)
BASOPHILS NFR BLD AUTO: 0.7 % — SIGNIFICANT CHANGE UP (ref 0–2)
BASOPHILS NFR BLD AUTO: SIGNIFICANT CHANGE UP % (ref 0–2)
BILIRUB SERPL-MCNC: 0.2 MG/DL — SIGNIFICANT CHANGE UP (ref 0.2–1.2)
BILIRUB SERPL-MCNC: 0.3 MG/DL — SIGNIFICANT CHANGE UP (ref 0.2–1.2)
BILIRUB SERPL-MCNC: 0.4 MG/DL — SIGNIFICANT CHANGE UP (ref 0.2–1.2)
BILIRUB SERPL-MCNC: 0.5 MG/DL — SIGNIFICANT CHANGE UP (ref 0.2–1.2)
BILIRUB SERPL-MCNC: 0.6 MG/DL — SIGNIFICANT CHANGE UP (ref 0.2–1.2)
BILIRUB SERPL-MCNC: 0.7 MG/DL — SIGNIFICANT CHANGE UP (ref 0.2–1.2)
BILIRUB SERPL-MCNC: 0.8 MG/DL — SIGNIFICANT CHANGE UP (ref 0.2–1.2)
BILIRUB SERPL-MCNC: 0.8 MG/DL — SIGNIFICANT CHANGE UP (ref 0.2–1.2)
BILIRUB SERPL-MCNC: 1 MG/DL — SIGNIFICANT CHANGE UP (ref 0.2–1.2)
BILIRUB UR-MCNC: ABNORMAL
BILIRUB UR-MCNC: NEGATIVE — SIGNIFICANT CHANGE UP
BILIRUB UR-MCNC: NEGATIVE — SIGNIFICANT CHANGE UP
BLD GP AB SCN SERPL QL: SIGNIFICANT CHANGE UP
BLOOD GAS COMMENTS ARTERIAL: SIGNIFICANT CHANGE UP
BLOOD GAS COMMENTS, VENOUS: 14 — SIGNIFICANT CHANGE UP
BLOOD GAS COMMENTS, VENOUS: 450 — SIGNIFICANT CHANGE UP
BLOOD GAS COMMENTS, VENOUS: 5 — SIGNIFICANT CHANGE UP
BLOOD GAS COMMENTS, VENOUS: SIGNIFICANT CHANGE UP
BUN SERPL-MCNC: 10 MG/DL — SIGNIFICANT CHANGE UP (ref 7–18)
BUN SERPL-MCNC: 102 MG/DL — HIGH (ref 7–18)
BUN SERPL-MCNC: 11 MG/DL — SIGNIFICANT CHANGE UP (ref 7–18)
BUN SERPL-MCNC: 14 MG/DL — SIGNIFICANT CHANGE UP (ref 7–18)
BUN SERPL-MCNC: 15 MG/DL — SIGNIFICANT CHANGE UP (ref 7–18)
BUN SERPL-MCNC: 16 MG/DL — SIGNIFICANT CHANGE UP (ref 7–18)
BUN SERPL-MCNC: 18 MG/DL — SIGNIFICANT CHANGE UP (ref 7–18)
BUN SERPL-MCNC: 18 MG/DL — SIGNIFICANT CHANGE UP (ref 7–18)
BUN SERPL-MCNC: 19 MG/DL — HIGH (ref 7–18)
BUN SERPL-MCNC: 19 MG/DL — HIGH (ref 7–18)
BUN SERPL-MCNC: 20 MG/DL — HIGH (ref 7–18)
BUN SERPL-MCNC: 21 MG/DL — HIGH (ref 7–18)
BUN SERPL-MCNC: 22 MG/DL — HIGH (ref 7–18)
BUN SERPL-MCNC: 23 MG/DL — HIGH (ref 7–18)
BUN SERPL-MCNC: 24 MG/DL — HIGH (ref 7–18)
BUN SERPL-MCNC: 24 MG/DL — HIGH (ref 7–18)
BUN SERPL-MCNC: 26 MG/DL — HIGH (ref 7–18)
BUN SERPL-MCNC: 26 MG/DL — HIGH (ref 7–18)
BUN SERPL-MCNC: 27 MG/DL — HIGH (ref 7–18)
BUN SERPL-MCNC: 28 MG/DL — HIGH (ref 7–18)
BUN SERPL-MCNC: 28 MG/DL — HIGH (ref 7–18)
BUN SERPL-MCNC: 29 MG/DL — HIGH (ref 7–18)
BUN SERPL-MCNC: 31 MG/DL — HIGH (ref 7–18)
BUN SERPL-MCNC: 31 MG/DL — HIGH (ref 7–18)
BUN SERPL-MCNC: 32 MG/DL — HIGH (ref 7–18)
BUN SERPL-MCNC: 33 MG/DL — HIGH (ref 7–18)
BUN SERPL-MCNC: 36 MG/DL — HIGH (ref 7–18)
BUN SERPL-MCNC: 40 MG/DL — HIGH (ref 7–18)
BUN SERPL-MCNC: 40 MG/DL — HIGH (ref 7–18)
BUN SERPL-MCNC: 41 MG/DL — HIGH (ref 7–18)
BUN SERPL-MCNC: 42 MG/DL — HIGH (ref 7–18)
BUN SERPL-MCNC: 43 MG/DL — HIGH (ref 7–18)
BUN SERPL-MCNC: 45 MG/DL — HIGH (ref 7–18)
BUN SERPL-MCNC: 46 MG/DL — HIGH (ref 7–18)
BUN SERPL-MCNC: 47 MG/DL — HIGH (ref 7–18)
BUN SERPL-MCNC: 53 MG/DL — HIGH (ref 7–18)
BUN SERPL-MCNC: 59 MG/DL — HIGH (ref 7–18)
BUN SERPL-MCNC: 69 MG/DL — HIGH (ref 7–18)
BUN SERPL-MCNC: 79 MG/DL — HIGH (ref 7–18)
BUN SERPL-MCNC: 88 MG/DL — HIGH (ref 7–18)
BUN SERPL-MCNC: 96 MG/DL — HIGH (ref 7–18)
BURR CELLS BLD QL SMEAR: PRESENT — SIGNIFICANT CHANGE UP
CALCIUM SERPL-MCNC: 10 MG/DL — SIGNIFICANT CHANGE UP (ref 8.4–10.5)
CALCIUM SERPL-MCNC: 10 MG/DL — SIGNIFICANT CHANGE UP (ref 8.4–10.5)
CALCIUM SERPL-MCNC: 10.2 MG/DL — SIGNIFICANT CHANGE UP (ref 8.4–10.5)
CALCIUM SERPL-MCNC: 10.3 MG/DL — SIGNIFICANT CHANGE UP (ref 8.4–10.5)
CALCIUM SERPL-MCNC: 10.4 MG/DL — SIGNIFICANT CHANGE UP (ref 8.4–10.5)
CALCIUM SERPL-MCNC: 10.5 MG/DL — SIGNIFICANT CHANGE UP (ref 8.4–10.5)
CALCIUM SERPL-MCNC: 10.7 MG/DL — HIGH (ref 8.4–10.5)
CALCIUM SERPL-MCNC: 10.8 MG/DL — HIGH (ref 8.4–10.5)
CALCIUM SERPL-MCNC: 10.9 MG/DL — HIGH (ref 8.4–10.5)
CALCIUM SERPL-MCNC: 11.1 MG/DL — HIGH (ref 8.4–10.5)
CALCIUM SERPL-MCNC: 11.3 MG/DL — SIGNIFICANT CHANGE UP (ref 8.4–10.5)
CALCIUM SERPL-MCNC: 11.6 MG/DL — HIGH (ref 8.4–10.5)
CALCIUM SERPL-MCNC: 11.7 MG/DL — HIGH (ref 8.4–10.5)
CALCIUM SERPL-MCNC: 11.8 MG/DL — HIGH (ref 8.4–10.5)
CALCIUM SERPL-MCNC: 12 MG/DL — HIGH (ref 8.4–10.5)
CALCIUM SERPL-MCNC: 12 MG/DL — HIGH (ref 8.4–10.5)
CALCIUM SERPL-MCNC: 12.3 MG/DL — HIGH (ref 8.4–10.5)
CALCIUM SERPL-MCNC: 12.4 MG/DL — HIGH (ref 8.4–10.5)
CALCIUM SERPL-MCNC: 12.4 MG/DL — HIGH (ref 8.4–10.5)
CALCIUM SERPL-MCNC: 12.8 MG/DL — HIGH (ref 8.4–10.5)
CALCIUM SERPL-MCNC: 13.5 MG/DL — CRITICAL HIGH (ref 8.4–10.5)
CALCIUM SERPL-MCNC: 13.9 MG/DL — CRITICAL HIGH (ref 8.4–10.5)
CALCIUM SERPL-MCNC: 8.2 MG/DL — LOW (ref 8.4–10.5)
CALCIUM SERPL-MCNC: 8.4 MG/DL — SIGNIFICANT CHANGE UP (ref 8.4–10.5)
CALCIUM SERPL-MCNC: 8.5 MG/DL — SIGNIFICANT CHANGE UP (ref 8.4–10.5)
CALCIUM SERPL-MCNC: 8.6 MG/DL — SIGNIFICANT CHANGE UP (ref 8.4–10.5)
CALCIUM SERPL-MCNC: 8.7 MG/DL — SIGNIFICANT CHANGE UP (ref 8.4–10.5)
CALCIUM SERPL-MCNC: 8.8 MG/DL — SIGNIFICANT CHANGE UP (ref 8.4–10.5)
CALCIUM SERPL-MCNC: 9.2 MG/DL — SIGNIFICANT CHANGE UP (ref 8.4–10.5)
CALCIUM SERPL-MCNC: 9.3 MG/DL — SIGNIFICANT CHANGE UP (ref 8.4–10.5)
CALCIUM SERPL-MCNC: 9.4 MG/DL — SIGNIFICANT CHANGE UP (ref 8.4–10.5)
CALCIUM SERPL-MCNC: 9.5 MG/DL — SIGNIFICANT CHANGE UP (ref 8.4–10.5)
CALCIUM SERPL-MCNC: 9.6 MG/DL — SIGNIFICANT CHANGE UP (ref 8.4–10.5)
CALCIUM SERPL-MCNC: 9.7 MG/DL — SIGNIFICANT CHANGE UP (ref 8.4–10.5)
CALCIUM SERPL-MCNC: 9.7 MG/DL — SIGNIFICANT CHANGE UP (ref 8.4–10.5)
CALCIUM SERPL-MCNC: 9.9 MG/DL — SIGNIFICANT CHANGE UP (ref 8.4–10.5)
CHLORIDE SERPL-SCNC: 101 MMOL/L — SIGNIFICANT CHANGE UP (ref 96–108)
CHLORIDE SERPL-SCNC: 102 MMOL/L — SIGNIFICANT CHANGE UP (ref 96–108)
CHLORIDE SERPL-SCNC: 102 MMOL/L — SIGNIFICANT CHANGE UP (ref 96–108)
CHLORIDE SERPL-SCNC: 104 MMOL/L — SIGNIFICANT CHANGE UP (ref 96–108)
CHLORIDE SERPL-SCNC: 104 MMOL/L — SIGNIFICANT CHANGE UP (ref 96–108)
CHLORIDE SERPL-SCNC: 105 MMOL/L — SIGNIFICANT CHANGE UP (ref 96–108)
CHLORIDE SERPL-SCNC: 106 MMOL/L — SIGNIFICANT CHANGE UP (ref 96–108)
CHLORIDE SERPL-SCNC: 106 MMOL/L — SIGNIFICANT CHANGE UP (ref 96–108)
CHLORIDE SERPL-SCNC: 107 MMOL/L — SIGNIFICANT CHANGE UP (ref 96–108)
CHLORIDE SERPL-SCNC: 108 MMOL/L — SIGNIFICANT CHANGE UP (ref 96–108)
CHLORIDE SERPL-SCNC: 109 MMOL/L — HIGH (ref 96–108)
CHLORIDE SERPL-SCNC: 110 MMOL/L — HIGH (ref 96–108)
CHLORIDE SERPL-SCNC: 111 MMOL/L — HIGH (ref 96–108)
CHLORIDE SERPL-SCNC: 111 MMOL/L — HIGH (ref 96–108)
CHLORIDE SERPL-SCNC: 112 MMOL/L — HIGH (ref 96–108)
CHLORIDE SERPL-SCNC: 113 MMOL/L — HIGH (ref 96–108)
CHLORIDE SERPL-SCNC: 113 MMOL/L — HIGH (ref 96–108)
CHLORIDE SERPL-SCNC: 114 MMOL/L — HIGH (ref 96–108)
CHLORIDE SERPL-SCNC: 115 MMOL/L — HIGH (ref 96–108)
CHLORIDE SERPL-SCNC: 116 MMOL/L — HIGH (ref 96–108)
CHLORIDE SERPL-SCNC: 117 MMOL/L — HIGH (ref 96–108)
CHLORIDE SERPL-SCNC: 117 MMOL/L — HIGH (ref 96–108)
CHLORIDE SERPL-SCNC: 119 MMOL/L — HIGH (ref 96–108)
CHLORIDE SERPL-SCNC: 120 MMOL/L — HIGH (ref 96–108)
CHLORIDE SERPL-SCNC: 121 MMOL/L — HIGH (ref 96–108)
CHLORIDE SERPL-SCNC: 121 MMOL/L — HIGH (ref 96–108)
CHLORIDE SERPL-SCNC: 98 MMOL/L — SIGNIFICANT CHANGE UP (ref 96–108)
CHLORIDE SERPL-SCNC: 98 MMOL/L — SIGNIFICANT CHANGE UP (ref 96–108)
CHLORIDE SERPL-SCNC: 99 MMOL/L — SIGNIFICANT CHANGE UP (ref 96–108)
CHOLEST SERPL-MCNC: 88 MG/DL — SIGNIFICANT CHANGE UP
CO2 SERPL-SCNC: 18 MMOL/L — LOW (ref 22–31)
CO2 SERPL-SCNC: 20 MMOL/L — LOW (ref 22–31)
CO2 SERPL-SCNC: 21 MMOL/L — LOW (ref 22–31)
CO2 SERPL-SCNC: 22 MMOL/L — SIGNIFICANT CHANGE UP (ref 22–31)
CO2 SERPL-SCNC: 24 MMOL/L — SIGNIFICANT CHANGE UP (ref 22–31)
CO2 SERPL-SCNC: 25 MMOL/L — SIGNIFICANT CHANGE UP (ref 22–31)
CO2 SERPL-SCNC: 25 MMOL/L — SIGNIFICANT CHANGE UP (ref 22–31)
CO2 SERPL-SCNC: 26 MMOL/L — SIGNIFICANT CHANGE UP (ref 22–31)
CO2 SERPL-SCNC: 27 MMOL/L — SIGNIFICANT CHANGE UP (ref 22–31)
CO2 SERPL-SCNC: 28 MMOL/L — SIGNIFICANT CHANGE UP (ref 22–31)
CO2 SERPL-SCNC: 29 MMOL/L — SIGNIFICANT CHANGE UP (ref 22–31)
CO2 SERPL-SCNC: 29 MMOL/L — SIGNIFICANT CHANGE UP (ref 22–31)
CO2 SERPL-SCNC: 30 MMOL/L — SIGNIFICANT CHANGE UP (ref 22–31)
CO2 SERPL-SCNC: 31 MMOL/L — SIGNIFICANT CHANGE UP (ref 22–31)
CO2 SERPL-SCNC: 32 MMOL/L — HIGH (ref 22–31)
CO2 SERPL-SCNC: 33 MMOL/L — HIGH (ref 22–31)
CO2 SERPL-SCNC: 34 MMOL/L — HIGH (ref 22–31)
CO2 SERPL-SCNC: 36 MMOL/L — HIGH (ref 22–31)
CO2 SERPL-SCNC: 37 MMOL/L — HIGH (ref 22–31)
COLOR SPEC: YELLOW — SIGNIFICANT CHANGE UP
COMMENT - URINE: SIGNIFICANT CHANGE UP
CREAT SERPL-MCNC: 0.4 MG/DL — LOW (ref 0.5–1.3)
CREAT SERPL-MCNC: 0.42 MG/DL — LOW (ref 0.5–1.3)
CREAT SERPL-MCNC: 0.44 MG/DL — LOW (ref 0.5–1.3)
CREAT SERPL-MCNC: 0.44 MG/DL — LOW (ref 0.5–1.3)
CREAT SERPL-MCNC: 0.47 MG/DL — LOW (ref 0.5–1.3)
CREAT SERPL-MCNC: 0.5 MG/DL — SIGNIFICANT CHANGE UP (ref 0.5–1.3)
CREAT SERPL-MCNC: 0.5 MG/DL — SIGNIFICANT CHANGE UP (ref 0.5–1.3)
CREAT SERPL-MCNC: 0.52 MG/DL — SIGNIFICANT CHANGE UP (ref 0.5–1.3)
CREAT SERPL-MCNC: 0.52 MG/DL — SIGNIFICANT CHANGE UP (ref 0.5–1.3)
CREAT SERPL-MCNC: 0.53 MG/DL — SIGNIFICANT CHANGE UP (ref 0.5–1.3)
CREAT SERPL-MCNC: 0.53 MG/DL — SIGNIFICANT CHANGE UP (ref 0.5–1.3)
CREAT SERPL-MCNC: 0.54 MG/DL — SIGNIFICANT CHANGE UP (ref 0.5–1.3)
CREAT SERPL-MCNC: 0.54 MG/DL — SIGNIFICANT CHANGE UP (ref 0.5–1.3)
CREAT SERPL-MCNC: 0.55 MG/DL — SIGNIFICANT CHANGE UP (ref 0.5–1.3)
CREAT SERPL-MCNC: 0.56 MG/DL — SIGNIFICANT CHANGE UP (ref 0.5–1.3)
CREAT SERPL-MCNC: 0.57 MG/DL — SIGNIFICANT CHANGE UP (ref 0.5–1.3)
CREAT SERPL-MCNC: 0.57 MG/DL — SIGNIFICANT CHANGE UP (ref 0.5–1.3)
CREAT SERPL-MCNC: 0.59 MG/DL — SIGNIFICANT CHANGE UP (ref 0.5–1.3)
CREAT SERPL-MCNC: 0.59 MG/DL — SIGNIFICANT CHANGE UP (ref 0.5–1.3)
CREAT SERPL-MCNC: 0.61 MG/DL — SIGNIFICANT CHANGE UP (ref 0.5–1.3)
CREAT SERPL-MCNC: 0.61 MG/DL — SIGNIFICANT CHANGE UP (ref 0.5–1.3)
CREAT SERPL-MCNC: 0.62 MG/DL — SIGNIFICANT CHANGE UP (ref 0.5–1.3)
CREAT SERPL-MCNC: 0.62 MG/DL — SIGNIFICANT CHANGE UP (ref 0.5–1.3)
CREAT SERPL-MCNC: 0.63 MG/DL — SIGNIFICANT CHANGE UP (ref 0.5–1.3)
CREAT SERPL-MCNC: 0.68 MG/DL — SIGNIFICANT CHANGE UP (ref 0.5–1.3)
CREAT SERPL-MCNC: 0.7 MG/DL — SIGNIFICANT CHANGE UP (ref 0.5–1.3)
CREAT SERPL-MCNC: 0.7 MG/DL — SIGNIFICANT CHANGE UP (ref 0.5–1.3)
CREAT SERPL-MCNC: 0.71 MG/DL — SIGNIFICANT CHANGE UP (ref 0.5–1.3)
CREAT SERPL-MCNC: 0.71 MG/DL — SIGNIFICANT CHANGE UP (ref 0.5–1.3)
CREAT SERPL-MCNC: 0.73 MG/DL — SIGNIFICANT CHANGE UP (ref 0.5–1.3)
CREAT SERPL-MCNC: 0.75 MG/DL — SIGNIFICANT CHANGE UP (ref 0.5–1.3)
CREAT SERPL-MCNC: 0.78 MG/DL — SIGNIFICANT CHANGE UP (ref 0.5–1.3)
CREAT SERPL-MCNC: 0.78 MG/DL — SIGNIFICANT CHANGE UP (ref 0.5–1.3)
CREAT SERPL-MCNC: 0.79 MG/DL — SIGNIFICANT CHANGE UP (ref 0.5–1.3)
CREAT SERPL-MCNC: 0.81 MG/DL — SIGNIFICANT CHANGE UP (ref 0.5–1.3)
CREAT SERPL-MCNC: 0.82 MG/DL — SIGNIFICANT CHANGE UP (ref 0.5–1.3)
CREAT SERPL-MCNC: 0.84 MG/DL — SIGNIFICANT CHANGE UP (ref 0.5–1.3)
CREAT SERPL-MCNC: 0.92 MG/DL — SIGNIFICANT CHANGE UP (ref 0.5–1.3)
CREAT SERPL-MCNC: 0.95 MG/DL — SIGNIFICANT CHANGE UP (ref 0.5–1.3)
CREAT SERPL-MCNC: 1.04 MG/DL — SIGNIFICANT CHANGE UP (ref 0.5–1.3)
CREAT SERPL-MCNC: 1.09 MG/DL — SIGNIFICANT CHANGE UP (ref 0.5–1.3)
CREAT SERPL-MCNC: 1.28 MG/DL — SIGNIFICANT CHANGE UP (ref 0.5–1.3)
CREAT SERPL-MCNC: 1.33 MG/DL — HIGH (ref 0.5–1.3)
CREAT SERPL-MCNC: 1.37 MG/DL — HIGH (ref 0.5–1.3)
CREAT SERPL-MCNC: 1.64 MG/DL — HIGH (ref 0.5–1.3)
CREAT SERPL-MCNC: 1.82 MG/DL — HIGH (ref 0.5–1.3)
CREAT SERPL-MCNC: 2.04 MG/DL — HIGH (ref 0.5–1.3)
CULTURE RESULTS: NO GROWTH — SIGNIFICANT CHANGE UP
CULTURE RESULTS: SIGNIFICANT CHANGE UP
DIFF PNL FLD: ABNORMAL
DIFF PNL FLD: ABNORMAL
DIFF PNL FLD: NEGATIVE — SIGNIFICANT CHANGE UP
E FAECIUM DNA BLD POS QL NAA+NON-PROBE: SIGNIFICANT CHANGE UP
EGFR: 33 ML/MIN/1.73M2 — LOW
EGFR: 38 ML/MIN/1.73M2 — LOW
EGFR: 43 ML/MIN/1.73M2 — LOW
EGFR: 53 ML/MIN/1.73M2 — LOW
EGFR: 74 ML/MIN/1.73M2 — SIGNIFICANT CHANGE UP
EGFR: 82 ML/MIN/1.73M2 — SIGNIFICANT CHANGE UP
EOSINOPHIL # BLD AUTO: 0 K/UL — SIGNIFICANT CHANGE UP (ref 0–0.5)
EOSINOPHIL # BLD AUTO: 0.02 K/UL — SIGNIFICANT CHANGE UP (ref 0–0.5)
EOSINOPHIL # BLD AUTO: 0.03 K/UL — SIGNIFICANT CHANGE UP (ref 0–0.5)
EOSINOPHIL # BLD AUTO: 0.03 K/UL — SIGNIFICANT CHANGE UP (ref 0–0.5)
EOSINOPHIL # BLD AUTO: 0.04 K/UL — SIGNIFICANT CHANGE UP (ref 0–0.5)
EOSINOPHIL # BLD AUTO: 0.07 K/UL — SIGNIFICANT CHANGE UP (ref 0–0.5)
EOSINOPHIL # BLD AUTO: 0.1 K/UL — SIGNIFICANT CHANGE UP (ref 0–0.5)
EOSINOPHIL # BLD AUTO: 0.1 K/UL — SIGNIFICANT CHANGE UP (ref 0–0.5)
EOSINOPHIL # BLD AUTO: 0.11 K/UL — SIGNIFICANT CHANGE UP (ref 0–0.5)
EOSINOPHIL # BLD AUTO: 0.12 K/UL — SIGNIFICANT CHANGE UP (ref 0–0.5)
EOSINOPHIL # BLD AUTO: 0.14 K/UL — SIGNIFICANT CHANGE UP (ref 0–0.5)
EOSINOPHIL # BLD AUTO: 0.14 K/UL — SIGNIFICANT CHANGE UP (ref 0–0.5)
EOSINOPHIL # BLD AUTO: 0.16 K/UL — SIGNIFICANT CHANGE UP (ref 0–0.5)
EOSINOPHIL # BLD AUTO: 0.16 K/UL — SIGNIFICANT CHANGE UP (ref 0–0.5)
EOSINOPHIL # BLD AUTO: 0.17 K/UL — SIGNIFICANT CHANGE UP (ref 0–0.5)
EOSINOPHIL # BLD AUTO: 0.19 K/UL — SIGNIFICANT CHANGE UP (ref 0–0.5)
EOSINOPHIL # BLD AUTO: 0.21 K/UL — SIGNIFICANT CHANGE UP (ref 0–0.5)
EOSINOPHIL # BLD AUTO: 0.21 K/UL — SIGNIFICANT CHANGE UP (ref 0–0.5)
EOSINOPHIL # BLD AUTO: 0.24 K/UL — SIGNIFICANT CHANGE UP (ref 0–0.5)
EOSINOPHIL # BLD AUTO: 0.24 K/UL — SIGNIFICANT CHANGE UP (ref 0–0.5)
EOSINOPHIL # BLD AUTO: SIGNIFICANT CHANGE UP K/UL (ref 0–0.5)
EOSINOPHIL NFR BLD AUTO: 0 % — SIGNIFICANT CHANGE UP (ref 0–6)
EOSINOPHIL NFR BLD AUTO: 0.2 % — SIGNIFICANT CHANGE UP (ref 0–6)
EOSINOPHIL NFR BLD AUTO: 0.2 % — SIGNIFICANT CHANGE UP (ref 0–6)
EOSINOPHIL NFR BLD AUTO: 0.3 % — SIGNIFICANT CHANGE UP (ref 0–6)
EOSINOPHIL NFR BLD AUTO: 0.3 % — SIGNIFICANT CHANGE UP (ref 0–6)
EOSINOPHIL NFR BLD AUTO: 0.8 % — SIGNIFICANT CHANGE UP (ref 0–6)
EOSINOPHIL NFR BLD AUTO: 0.8 % — SIGNIFICANT CHANGE UP (ref 0–6)
EOSINOPHIL NFR BLD AUTO: 1 % — SIGNIFICANT CHANGE UP (ref 0–6)
EOSINOPHIL NFR BLD AUTO: 1.1 % — SIGNIFICANT CHANGE UP (ref 0–6)
EOSINOPHIL NFR BLD AUTO: 1.6 % — SIGNIFICANT CHANGE UP (ref 0–6)
EOSINOPHIL NFR BLD AUTO: 1.8 % — SIGNIFICANT CHANGE UP (ref 0–6)
EOSINOPHIL NFR BLD AUTO: 1.8 % — SIGNIFICANT CHANGE UP (ref 0–6)
EOSINOPHIL NFR BLD AUTO: 2.1 % — SIGNIFICANT CHANGE UP (ref 0–6)
EOSINOPHIL NFR BLD AUTO: 2.3 % — SIGNIFICANT CHANGE UP (ref 0–6)
EOSINOPHIL NFR BLD AUTO: 2.4 % — SIGNIFICANT CHANGE UP (ref 0–6)
EOSINOPHIL NFR BLD AUTO: 2.5 % — SIGNIFICANT CHANGE UP (ref 0–6)
EOSINOPHIL NFR BLD AUTO: 2.6 % — SIGNIFICANT CHANGE UP (ref 0–6)
EOSINOPHIL NFR BLD AUTO: 3.1 % — SIGNIFICANT CHANGE UP (ref 0–6)
EOSINOPHIL NFR BLD AUTO: SIGNIFICANT CHANGE UP % (ref 0–6)
EPI CELLS # UR: ABNORMAL /HPF
EPI CELLS # UR: ABNORMAL /HPF
EPI CELLS # UR: SIGNIFICANT CHANGE UP /HPF
ESTIMATED AVERAGE GLUCOSE: 128 MG/DL — HIGH (ref 68–114)
FERRITIN SERPL-MCNC: 467 NG/ML — HIGH (ref 30–400)
GIANT PLATELETS BLD QL SMEAR: PRESENT — SIGNIFICANT CHANGE UP
GIANT PLATELETS BLD QL SMEAR: PRESENT — SIGNIFICANT CHANGE UP
GLUCOSE SERPL-MCNC: 100 MG/DL — HIGH (ref 70–99)
GLUCOSE SERPL-MCNC: 103 MG/DL — HIGH (ref 70–99)
GLUCOSE SERPL-MCNC: 105 MG/DL — HIGH (ref 70–99)
GLUCOSE SERPL-MCNC: 112 MG/DL — HIGH (ref 70–99)
GLUCOSE SERPL-MCNC: 112 MG/DL — HIGH (ref 70–99)
GLUCOSE SERPL-MCNC: 116 MG/DL — HIGH (ref 70–99)
GLUCOSE SERPL-MCNC: 118 MG/DL — HIGH (ref 70–99)
GLUCOSE SERPL-MCNC: 121 MG/DL — HIGH (ref 70–99)
GLUCOSE SERPL-MCNC: 121 MG/DL — HIGH (ref 70–99)
GLUCOSE SERPL-MCNC: 132 MG/DL — HIGH (ref 70–99)
GLUCOSE SERPL-MCNC: 133 MG/DL — HIGH (ref 70–99)
GLUCOSE SERPL-MCNC: 133 MG/DL — HIGH (ref 70–99)
GLUCOSE SERPL-MCNC: 134 MG/DL — HIGH (ref 70–99)
GLUCOSE SERPL-MCNC: 135 MG/DL — HIGH (ref 70–99)
GLUCOSE SERPL-MCNC: 137 MG/DL — HIGH (ref 70–99)
GLUCOSE SERPL-MCNC: 146 MG/DL — HIGH (ref 70–99)
GLUCOSE SERPL-MCNC: 150 MG/DL — HIGH (ref 70–99)
GLUCOSE SERPL-MCNC: 151 MG/DL — HIGH (ref 70–99)
GLUCOSE SERPL-MCNC: 151 MG/DL — HIGH (ref 70–99)
GLUCOSE SERPL-MCNC: 152 MG/DL — HIGH (ref 70–99)
GLUCOSE SERPL-MCNC: 155 MG/DL — HIGH (ref 70–99)
GLUCOSE SERPL-MCNC: 158 MG/DL — HIGH (ref 70–99)
GLUCOSE SERPL-MCNC: 160 MG/DL — HIGH (ref 70–99)
GLUCOSE SERPL-MCNC: 163 MG/DL — HIGH (ref 70–99)
GLUCOSE SERPL-MCNC: 164 MG/DL — HIGH (ref 70–99)
GLUCOSE SERPL-MCNC: 164 MG/DL — HIGH (ref 70–99)
GLUCOSE SERPL-MCNC: 168 MG/DL — HIGH (ref 70–99)
GLUCOSE SERPL-MCNC: 169 MG/DL — HIGH (ref 70–99)
GLUCOSE SERPL-MCNC: 170 MG/DL — HIGH (ref 70–99)
GLUCOSE SERPL-MCNC: 175 MG/DL — HIGH (ref 70–99)
GLUCOSE SERPL-MCNC: 175 MG/DL — HIGH (ref 70–99)
GLUCOSE SERPL-MCNC: 177 MG/DL — HIGH (ref 70–99)
GLUCOSE SERPL-MCNC: 186 MG/DL — HIGH (ref 70–99)
GLUCOSE SERPL-MCNC: 187 MG/DL — HIGH (ref 70–99)
GLUCOSE SERPL-MCNC: 189 MG/DL — HIGH (ref 70–99)
GLUCOSE SERPL-MCNC: 194 MG/DL — HIGH (ref 70–99)
GLUCOSE SERPL-MCNC: 197 MG/DL — HIGH (ref 70–99)
GLUCOSE SERPL-MCNC: 197 MG/DL — HIGH (ref 70–99)
GLUCOSE SERPL-MCNC: 198 MG/DL — HIGH (ref 70–99)
GLUCOSE SERPL-MCNC: 198 MG/DL — HIGH (ref 70–99)
GLUCOSE SERPL-MCNC: 204 MG/DL — HIGH (ref 70–99)
GLUCOSE SERPL-MCNC: 210 MG/DL — HIGH (ref 70–99)
GLUCOSE SERPL-MCNC: 212 MG/DL — HIGH (ref 70–99)
GLUCOSE SERPL-MCNC: 223 MG/DL — HIGH (ref 70–99)
GLUCOSE SERPL-MCNC: 228 MG/DL — HIGH (ref 70–99)
GLUCOSE SERPL-MCNC: 259 MG/DL — HIGH (ref 70–99)
GLUCOSE SERPL-MCNC: 92 MG/DL — SIGNIFICANT CHANGE UP (ref 70–99)
GLUCOSE SERPL-MCNC: 99 MG/DL — SIGNIFICANT CHANGE UP (ref 70–99)
GLUCOSE UR QL: NEGATIVE — SIGNIFICANT CHANGE UP
GRAM STN FLD: SIGNIFICANT CHANGE UP
GRAN CASTS # UR COMP ASSIST: ABNORMAL /LPF
HCO3 BLDA-SCNC: 28 MMOL/L — SIGNIFICANT CHANGE UP (ref 21–28)
HCO3 BLDA-SCNC: 28 MMOL/L — SIGNIFICANT CHANGE UP (ref 21–28)
HCO3 BLDA-SCNC: 30 MMOL/L — HIGH (ref 21–28)
HCO3 BLDA-SCNC: 33 MMOL/L — HIGH (ref 21–28)
HCO3 BLDA-SCNC: 34 MMOL/L — HIGH (ref 21–28)
HCO3 BLDA-SCNC: 34 MMOL/L — HIGH (ref 21–28)
HCO3 BLDV-SCNC: 22 MMOL/L — SIGNIFICANT CHANGE UP (ref 22–29)
HCO3 BLDV-SCNC: 38 MMOL/L — HIGH (ref 22–29)
HCT VFR BLD CALC: 21.6 % — LOW (ref 39–50)
HCT VFR BLD CALC: 23.3 % — LOW (ref 39–50)
HCT VFR BLD CALC: 23.7 % — LOW (ref 39–50)
HCT VFR BLD CALC: 23.8 % — LOW (ref 39–50)
HCT VFR BLD CALC: 24 % — LOW (ref 39–50)
HCT VFR BLD CALC: 24.2 % — LOW (ref 39–50)
HCT VFR BLD CALC: 24.2 % — LOW (ref 39–50)
HCT VFR BLD CALC: 24.4 % — LOW (ref 39–50)
HCT VFR BLD CALC: 24.6 % — LOW (ref 39–50)
HCT VFR BLD CALC: 24.6 % — LOW (ref 39–50)
HCT VFR BLD CALC: 24.7 % — LOW (ref 39–50)
HCT VFR BLD CALC: 25.2 % — LOW (ref 39–50)
HCT VFR BLD CALC: 25.3 % — LOW (ref 39–50)
HCT VFR BLD CALC: 25.6 % — LOW (ref 39–50)
HCT VFR BLD CALC: 25.6 % — LOW (ref 39–50)
HCT VFR BLD CALC: 25.7 % — LOW (ref 39–50)
HCT VFR BLD CALC: 26 % — LOW (ref 39–50)
HCT VFR BLD CALC: 26.1 % — LOW (ref 39–50)
HCT VFR BLD CALC: 26.5 % — LOW (ref 39–50)
HCT VFR BLD CALC: 26.6 % — LOW (ref 39–50)
HCT VFR BLD CALC: 26.7 % — LOW (ref 39–50)
HCT VFR BLD CALC: 26.8 % — LOW (ref 39–50)
HCT VFR BLD CALC: 27.1 % — LOW (ref 39–50)
HCT VFR BLD CALC: 27.3 % — LOW (ref 39–50)
HCT VFR BLD CALC: 27.5 % — LOW (ref 39–50)
HCT VFR BLD CALC: 27.7 % — LOW (ref 39–50)
HCT VFR BLD CALC: 27.8 % — LOW (ref 39–50)
HCT VFR BLD CALC: 27.9 % — LOW (ref 39–50)
HCT VFR BLD CALC: 27.9 % — LOW (ref 39–50)
HCT VFR BLD CALC: 28 % — LOW (ref 39–50)
HCT VFR BLD CALC: 28.1 % — LOW (ref 39–50)
HCT VFR BLD CALC: 28.1 % — LOW (ref 39–50)
HCT VFR BLD CALC: 28.3 % — LOW (ref 39–50)
HCT VFR BLD CALC: 28.3 % — LOW (ref 39–50)
HCT VFR BLD CALC: 28.4 % — LOW (ref 39–50)
HCT VFR BLD CALC: 28.8 % — LOW (ref 39–50)
HCT VFR BLD CALC: 29.3 % — LOW (ref 39–50)
HCT VFR BLD CALC: 29.4 % — LOW (ref 39–50)
HCT VFR BLD CALC: 29.4 % — LOW (ref 39–50)
HCT VFR BLD CALC: 29.5 % — LOW (ref 39–50)
HCT VFR BLD CALC: 29.6 % — LOW (ref 39–50)
HCT VFR BLD CALC: 29.9 % — LOW (ref 39–50)
HCT VFR BLD CALC: 30.5 % — LOW (ref 39–50)
HCT VFR BLD CALC: 31 % — LOW (ref 39–50)
HCT VFR BLD CALC: 31.7 % — LOW (ref 39–50)
HCT VFR BLD CALC: 32 % — LOW (ref 39–50)
HCT VFR BLD CALC: 32.3 % — LOW (ref 39–50)
HCT VFR BLD CALC: 33 % — LOW (ref 39–50)
HCT VFR BLD CALC: 33.3 % — LOW (ref 39–50)
HCT VFR BLD CALC: 35.3 % — LOW (ref 39–50)
HCT VFR BLD CALC: 36.8 % — LOW (ref 39–50)
HDLC SERPL-MCNC: 20 MG/DL — LOW
HGB BLD-MCNC: 10.1 G/DL — LOW (ref 13–17)
HGB BLD-MCNC: 10.1 G/DL — LOW (ref 13–17)
HGB BLD-MCNC: 10.3 G/DL — LOW (ref 13–17)
HGB BLD-MCNC: 10.8 G/DL — LOW (ref 13–17)
HGB BLD-MCNC: 11.3 G/DL — LOW (ref 13–17)
HGB BLD-MCNC: 6.7 G/DL — CRITICAL LOW (ref 13–17)
HGB BLD-MCNC: 6.9 G/DL — CRITICAL LOW (ref 13–17)
HGB BLD-MCNC: 7.1 G/DL — LOW (ref 13–17)
HGB BLD-MCNC: 7.3 G/DL — LOW (ref 13–17)
HGB BLD-MCNC: 7.5 G/DL — LOW (ref 13–17)
HGB BLD-MCNC: 7.7 G/DL — LOW (ref 13–17)
HGB BLD-MCNC: 7.8 G/DL — LOW (ref 13–17)
HGB BLD-MCNC: 7.9 G/DL — LOW (ref 13–17)
HGB BLD-MCNC: 8 G/DL — LOW (ref 13–17)
HGB BLD-MCNC: 8 G/DL — LOW (ref 13–17)
HGB BLD-MCNC: 8.1 G/DL — LOW (ref 13–17)
HGB BLD-MCNC: 8.1 G/DL — LOW (ref 13–17)
HGB BLD-MCNC: 8.2 G/DL — LOW (ref 13–17)
HGB BLD-MCNC: 8.3 G/DL — LOW (ref 13–17)
HGB BLD-MCNC: 8.4 G/DL — LOW (ref 13–17)
HGB BLD-MCNC: 8.4 G/DL — LOW (ref 13–17)
HGB BLD-MCNC: 8.5 G/DL — LOW (ref 13–17)
HGB BLD-MCNC: 8.6 G/DL — LOW (ref 13–17)
HGB BLD-MCNC: 8.8 G/DL — LOW (ref 13–17)
HGB BLD-MCNC: 8.9 G/DL — LOW (ref 13–17)
HGB BLD-MCNC: 9 G/DL — LOW (ref 13–17)
HGB BLD-MCNC: 9 G/DL — LOW (ref 13–17)
HGB BLD-MCNC: 9.1 G/DL — LOW (ref 13–17)
HGB BLD-MCNC: 9.2 G/DL — LOW (ref 13–17)
HGB BLD-MCNC: 9.3 G/DL — LOW (ref 13–17)
HGB BLD-MCNC: 9.5 G/DL — LOW (ref 13–17)
HGB BLD-MCNC: 9.5 G/DL — LOW (ref 13–17)
HGB BLD-MCNC: 9.9 G/DL — LOW (ref 13–17)
HOROWITZ INDEX BLDA+IHG-RTO: 40 — SIGNIFICANT CHANGE UP
HOROWITZ INDEX BLDA+IHG-RTO: 45 — SIGNIFICANT CHANGE UP
HOROWITZ INDEX BLDA+IHG-RTO: 45 — SIGNIFICANT CHANGE UP
HOROWITZ INDEX BLDV+IHG-RTO: 40 — SIGNIFICANT CHANGE UP
HOROWITZ INDEX BLDV+IHG-RTO: 40 — SIGNIFICANT CHANGE UP
HYALINE CASTS # UR AUTO: ABNORMAL /LPF
HYPOCHROMIA BLD QL: SIGNIFICANT CHANGE UP
HYPOCHROMIA BLD QL: SLIGHT — SIGNIFICANT CHANGE UP
IMM GRANULOCYTES NFR BLD AUTO: 0.5 % — SIGNIFICANT CHANGE UP (ref 0–1.5)
IMM GRANULOCYTES NFR BLD AUTO: 0.6 % — SIGNIFICANT CHANGE UP (ref 0–1.5)
IMM GRANULOCYTES NFR BLD AUTO: 0.7 % — SIGNIFICANT CHANGE UP (ref 0–1.5)
IMM GRANULOCYTES NFR BLD AUTO: 0.8 % — SIGNIFICANT CHANGE UP (ref 0–1.5)
IMM GRANULOCYTES NFR BLD AUTO: 0.9 % — SIGNIFICANT CHANGE UP (ref 0–1.5)
IMM GRANULOCYTES NFR BLD AUTO: 1 % — SIGNIFICANT CHANGE UP (ref 0–1.5)
IMM GRANULOCYTES NFR BLD AUTO: 1 % — SIGNIFICANT CHANGE UP (ref 0–1.5)
IMM GRANULOCYTES NFR BLD AUTO: 1.1 % — SIGNIFICANT CHANGE UP (ref 0–1.5)
IMM GRANULOCYTES NFR BLD AUTO: 1.1 % — SIGNIFICANT CHANGE UP (ref 0–1.5)
IMM GRANULOCYTES NFR BLD AUTO: 1.3 % — SIGNIFICANT CHANGE UP (ref 0–1.5)
IMM GRANULOCYTES NFR BLD AUTO: 1.3 % — SIGNIFICANT CHANGE UP (ref 0–1.5)
IMM GRANULOCYTES NFR BLD AUTO: SIGNIFICANT CHANGE UP % (ref 0–1.5)
INR BLD: 1 RATIO — SIGNIFICANT CHANGE UP (ref 0.88–1.16)
INR BLD: 1.03 RATIO — SIGNIFICANT CHANGE UP (ref 0.88–1.16)
INR BLD: 1.04 RATIO — SIGNIFICANT CHANGE UP (ref 0.88–1.16)
INR BLD: 1.06 RATIO — SIGNIFICANT CHANGE UP (ref 0.88–1.16)
INR BLD: 1.06 RATIO — SIGNIFICANT CHANGE UP (ref 0.88–1.16)
INR BLD: 1.14 RATIO — SIGNIFICANT CHANGE UP (ref 0.88–1.16)
KETONES UR-MCNC: ABNORMAL
KETONES UR-MCNC: NEGATIVE — SIGNIFICANT CHANGE UP
KETONES UR-MCNC: NEGATIVE — SIGNIFICANT CHANGE UP
LACTATE SERPL-SCNC: 1.6 MMOL/L — SIGNIFICANT CHANGE UP (ref 0.7–2)
LACTATE SERPL-SCNC: 2.2 MMOL/L — HIGH (ref 0.7–2)
LACTATE SERPL-SCNC: 2.8 MMOL/L — HIGH (ref 0.7–2)
LACTATE SERPL-SCNC: 3 MMOL/L — HIGH (ref 0.7–2)
LACTATE SERPL-SCNC: 3.2 MMOL/L — HIGH (ref 0.7–2)
LACTATE SERPL-SCNC: 3.4 MMOL/L — HIGH (ref 0.7–2)
LACTATE SERPL-SCNC: 3.5 MMOL/L — HIGH (ref 0.7–2)
LACTATE SERPL-SCNC: 4.2 MMOL/L — CRITICAL HIGH (ref 0.7–2)
LACTATE SERPL-SCNC: 4.3 MMOL/L — CRITICAL HIGH (ref 0.7–2)
LACTATE SERPL-SCNC: 5.3 MMOL/L — CRITICAL HIGH (ref 0.7–2)
LACTATE SERPL-SCNC: 6.1 MMOL/L — CRITICAL HIGH (ref 0.7–2)
LEUKOCYTE ESTERASE UR-ACNC: ABNORMAL
LEUKOCYTE ESTERASE UR-ACNC: NEGATIVE — SIGNIFICANT CHANGE UP
LEUKOCYTE ESTERASE UR-ACNC: NEGATIVE — SIGNIFICANT CHANGE UP
LIPID PNL WITH DIRECT LDL SERPL: 31 MG/DL — SIGNIFICANT CHANGE UP
LYMPHOCYTES # BLD AUTO: 0.31 K/UL — LOW (ref 1–3.3)
LYMPHOCYTES # BLD AUTO: 0.32 K/UL — LOW (ref 1–3.3)
LYMPHOCYTES # BLD AUTO: 0.35 K/UL — LOW (ref 1–3.3)
LYMPHOCYTES # BLD AUTO: 0.37 K/UL — LOW (ref 1–3.3)
LYMPHOCYTES # BLD AUTO: 0.41 K/UL — LOW (ref 1–3.3)
LYMPHOCYTES # BLD AUTO: 0.41 K/UL — LOW (ref 1–3.3)
LYMPHOCYTES # BLD AUTO: 0.49 K/UL — LOW (ref 1–3.3)
LYMPHOCYTES # BLD AUTO: 0.5 K/UL — LOW (ref 1–3.3)
LYMPHOCYTES # BLD AUTO: 0.55 K/UL — LOW (ref 1–3.3)
LYMPHOCYTES # BLD AUTO: 0.59 K/UL — LOW (ref 1–3.3)
LYMPHOCYTES # BLD AUTO: 0.6 K/UL — LOW (ref 1–3.3)
LYMPHOCYTES # BLD AUTO: 0.64 K/UL — LOW (ref 1–3.3)
LYMPHOCYTES # BLD AUTO: 0.65 K/UL — LOW (ref 1–3.3)
LYMPHOCYTES # BLD AUTO: 0.65 K/UL — LOW (ref 1–3.3)
LYMPHOCYTES # BLD AUTO: 0.68 K/UL — LOW (ref 1–3.3)
LYMPHOCYTES # BLD AUTO: 0.68 K/UL — LOW (ref 1–3.3)
LYMPHOCYTES # BLD AUTO: 0.69 K/UL — LOW (ref 1–3.3)
LYMPHOCYTES # BLD AUTO: 0.71 K/UL — LOW (ref 1–3.3)
LYMPHOCYTES # BLD AUTO: 0.74 K/UL — LOW (ref 1–3.3)
LYMPHOCYTES # BLD AUTO: 0.79 K/UL — LOW (ref 1–3.3)
LYMPHOCYTES # BLD AUTO: 0.89 K/UL — LOW (ref 1–3.3)
LYMPHOCYTES # BLD AUTO: 1.02 K/UL — SIGNIFICANT CHANGE UP (ref 1–3.3)
LYMPHOCYTES # BLD AUTO: 2.8 % — LOW (ref 13–44)
LYMPHOCYTES # BLD AUTO: 3 % — LOW (ref 13–44)
LYMPHOCYTES # BLD AUTO: 3 % — LOW (ref 13–44)
LYMPHOCYTES # BLD AUTO: 3.5 % — LOW (ref 13–44)
LYMPHOCYTES # BLD AUTO: 3.8 % — LOW (ref 13–44)
LYMPHOCYTES # BLD AUTO: 4.1 % — LOW (ref 13–44)
LYMPHOCYTES # BLD AUTO: 4.5 % — LOW (ref 13–44)
LYMPHOCYTES # BLD AUTO: 5.1 % — LOW (ref 13–44)
LYMPHOCYTES # BLD AUTO: 5.2 % — LOW (ref 13–44)
LYMPHOCYTES # BLD AUTO: 6 % — LOW (ref 13–44)
LYMPHOCYTES # BLD AUTO: 7 % — LOW (ref 13–44)
LYMPHOCYTES # BLD AUTO: 7.1 % — LOW (ref 13–44)
LYMPHOCYTES # BLD AUTO: 7.2 % — LOW (ref 13–44)
LYMPHOCYTES # BLD AUTO: 7.2 % — LOW (ref 13–44)
LYMPHOCYTES # BLD AUTO: 7.4 % — LOW (ref 13–44)
LYMPHOCYTES # BLD AUTO: 7.5 % — LOW (ref 13–44)
LYMPHOCYTES # BLD AUTO: 7.5 % — LOW (ref 13–44)
LYMPHOCYTES # BLD AUTO: 7.7 % — LOW (ref 13–44)
LYMPHOCYTES # BLD AUTO: 7.9 % — LOW (ref 13–44)
LYMPHOCYTES # BLD AUTO: 7.9 % — LOW (ref 13–44)
LYMPHOCYTES # BLD AUTO: 8.6 % — LOW (ref 13–44)
LYMPHOCYTES # BLD AUTO: 9.5 % — LOW (ref 13–44)
LYMPHOCYTES # BLD AUTO: SIGNIFICANT CHANGE UP % (ref 13–44)
LYMPHOCYTES # BLD AUTO: SIGNIFICANT CHANGE UP K/UL (ref 1–3.3)
MACROCYTES BLD QL: SIGNIFICANT CHANGE UP
MACROCYTES BLD QL: SLIGHT — SIGNIFICANT CHANGE UP
MAGNESIUM SERPL-MCNC: 1.7 MG/DL — SIGNIFICANT CHANGE UP (ref 1.6–2.6)
MAGNESIUM SERPL-MCNC: 1.9 MG/DL — SIGNIFICANT CHANGE UP (ref 1.6–2.6)
MAGNESIUM SERPL-MCNC: 2 MG/DL — SIGNIFICANT CHANGE UP (ref 1.6–2.6)
MAGNESIUM SERPL-MCNC: 2.1 MG/DL — SIGNIFICANT CHANGE UP (ref 1.6–2.6)
MAGNESIUM SERPL-MCNC: 2.2 MG/DL — SIGNIFICANT CHANGE UP (ref 1.6–2.6)
MAGNESIUM SERPL-MCNC: 2.3 MG/DL — SIGNIFICANT CHANGE UP (ref 1.6–2.6)
MAGNESIUM SERPL-MCNC: 2.4 MG/DL — SIGNIFICANT CHANGE UP (ref 1.6–2.6)
MAGNESIUM SERPL-MCNC: 2.4 MG/DL — SIGNIFICANT CHANGE UP (ref 1.6–2.6)
MAGNESIUM SERPL-MCNC: 2.6 MG/DL — SIGNIFICANT CHANGE UP (ref 1.6–2.6)
MAGNESIUM SERPL-MCNC: 2.7 MG/DL — HIGH (ref 1.6–2.6)
MAGNESIUM SERPL-MCNC: 2.8 MG/DL — HIGH (ref 1.6–2.6)
MAGNESIUM SERPL-MCNC: 2.9 MG/DL — HIGH (ref 1.6–2.6)
MAGNESIUM SERPL-MCNC: 2.9 MG/DL — HIGH (ref 1.6–2.6)
MAGNESIUM SERPL-MCNC: 3 MG/DL — HIGH (ref 1.6–2.6)
MAGNESIUM SERPL-MCNC: 4.6 MG/DL — HIGH (ref 1.6–2.6)
MANUAL SMEAR VERIFICATION: SIGNIFICANT CHANGE UP
MCHC RBC-ENTMCNC: 27 PG — SIGNIFICANT CHANGE UP (ref 27–34)
MCHC RBC-ENTMCNC: 27 PG — SIGNIFICANT CHANGE UP (ref 27–34)
MCHC RBC-ENTMCNC: 27.4 PG — SIGNIFICANT CHANGE UP (ref 27–34)
MCHC RBC-ENTMCNC: 27.4 PG — SIGNIFICANT CHANGE UP (ref 27–34)
MCHC RBC-ENTMCNC: 27.5 PG — SIGNIFICANT CHANGE UP (ref 27–34)
MCHC RBC-ENTMCNC: 27.6 PG — SIGNIFICANT CHANGE UP (ref 27–34)
MCHC RBC-ENTMCNC: 27.7 PG — SIGNIFICANT CHANGE UP (ref 27–34)
MCHC RBC-ENTMCNC: 27.8 PG — SIGNIFICANT CHANGE UP (ref 27–34)
MCHC RBC-ENTMCNC: 27.9 PG — SIGNIFICANT CHANGE UP (ref 27–34)
MCHC RBC-ENTMCNC: 28 PG — SIGNIFICANT CHANGE UP (ref 27–34)
MCHC RBC-ENTMCNC: 28.1 PG — SIGNIFICANT CHANGE UP (ref 27–34)
MCHC RBC-ENTMCNC: 28.2 PG — SIGNIFICANT CHANGE UP (ref 27–34)
MCHC RBC-ENTMCNC: 28.3 PG — SIGNIFICANT CHANGE UP (ref 27–34)
MCHC RBC-ENTMCNC: 28.3 PG — SIGNIFICANT CHANGE UP (ref 27–34)
MCHC RBC-ENTMCNC: 28.4 PG — SIGNIFICANT CHANGE UP (ref 27–34)
MCHC RBC-ENTMCNC: 28.5 PG — SIGNIFICANT CHANGE UP (ref 27–34)
MCHC RBC-ENTMCNC: 28.5 PG — SIGNIFICANT CHANGE UP (ref 27–34)
MCHC RBC-ENTMCNC: 28.6 PG — SIGNIFICANT CHANGE UP (ref 27–34)
MCHC RBC-ENTMCNC: 28.7 PG — SIGNIFICANT CHANGE UP (ref 27–34)
MCHC RBC-ENTMCNC: 28.8 PG — SIGNIFICANT CHANGE UP (ref 27–34)
MCHC RBC-ENTMCNC: 28.9 PG — SIGNIFICANT CHANGE UP (ref 27–34)
MCHC RBC-ENTMCNC: 29 PG — SIGNIFICANT CHANGE UP (ref 27–34)
MCHC RBC-ENTMCNC: 29.1 PG — SIGNIFICANT CHANGE UP (ref 27–34)
MCHC RBC-ENTMCNC: 29.1 PG — SIGNIFICANT CHANGE UP (ref 27–34)
MCHC RBC-ENTMCNC: 29.2 GM/DL — LOW (ref 32–36)
MCHC RBC-ENTMCNC: 29.3 GM/DL — LOW (ref 32–36)
MCHC RBC-ENTMCNC: 29.3 GM/DL — LOW (ref 32–36)
MCHC RBC-ENTMCNC: 29.6 GM/DL — LOW (ref 32–36)
MCHC RBC-ENTMCNC: 29.7 GM/DL — LOW (ref 32–36)
MCHC RBC-ENTMCNC: 29.7 GM/DL — LOW (ref 32–36)
MCHC RBC-ENTMCNC: 29.8 GM/DL — LOW (ref 32–36)
MCHC RBC-ENTMCNC: 29.8 GM/DL — LOW (ref 32–36)
MCHC RBC-ENTMCNC: 29.9 GM/DL — LOW (ref 32–36)
MCHC RBC-ENTMCNC: 30.1 GM/DL — LOW (ref 32–36)
MCHC RBC-ENTMCNC: 30.3 GM/DL — LOW (ref 32–36)
MCHC RBC-ENTMCNC: 30.5 GM/DL — LOW (ref 32–36)
MCHC RBC-ENTMCNC: 30.6 GM/DL — LOW (ref 32–36)
MCHC RBC-ENTMCNC: 30.7 GM/DL — LOW (ref 32–36)
MCHC RBC-ENTMCNC: 30.9 GM/DL — LOW (ref 32–36)
MCHC RBC-ENTMCNC: 30.9 GM/DL — LOW (ref 32–36)
MCHC RBC-ENTMCNC: 31 GM/DL — LOW (ref 32–36)
MCHC RBC-ENTMCNC: 31.1 GM/DL — LOW (ref 32–36)
MCHC RBC-ENTMCNC: 31.2 GM/DL — LOW (ref 32–36)
MCHC RBC-ENTMCNC: 31.3 GM/DL — LOW (ref 32–36)
MCHC RBC-ENTMCNC: 31.3 GM/DL — LOW (ref 32–36)
MCHC RBC-ENTMCNC: 31.5 GM/DL — LOW (ref 32–36)
MCHC RBC-ENTMCNC: 31.6 GM/DL — LOW (ref 32–36)
MCHC RBC-ENTMCNC: 31.7 GM/DL — LOW (ref 32–36)
MCHC RBC-ENTMCNC: 31.8 GM/DL — LOW (ref 32–36)
MCHC RBC-ENTMCNC: 31.8 GM/DL — LOW (ref 32–36)
MCHC RBC-ENTMCNC: 31.9 GM/DL — LOW (ref 32–36)
MCHC RBC-ENTMCNC: 32 GM/DL — SIGNIFICANT CHANGE UP (ref 32–36)
MCHC RBC-ENTMCNC: 32.1 GM/DL — SIGNIFICANT CHANGE UP (ref 32–36)
MCHC RBC-ENTMCNC: 32.1 GM/DL — SIGNIFICANT CHANGE UP (ref 32–36)
MCHC RBC-ENTMCNC: 32.3 GM/DL — SIGNIFICANT CHANGE UP (ref 32–36)
MCHC RBC-ENTMCNC: 32.4 GM/DL — SIGNIFICANT CHANGE UP (ref 32–36)
MCHC RBC-ENTMCNC: 32.5 GM/DL — SIGNIFICANT CHANGE UP (ref 32–36)
MCHC RBC-ENTMCNC: 32.8 GM/DL — SIGNIFICANT CHANGE UP (ref 32–36)
MCHC RBC-ENTMCNC: 32.9 GM/DL — SIGNIFICANT CHANGE UP (ref 32–36)
MCHC RBC-ENTMCNC: 33.6 GM/DL — SIGNIFICANT CHANGE UP (ref 32–36)
MCHC RBC-ENTMCNC: 33.6 GM/DL — SIGNIFICANT CHANGE UP (ref 32–36)
MCV RBC AUTO: 84.7 FL — SIGNIFICANT CHANGE UP (ref 80–100)
MCV RBC AUTO: 85.2 FL — SIGNIFICANT CHANGE UP (ref 80–100)
MCV RBC AUTO: 86 FL — SIGNIFICANT CHANGE UP (ref 80–100)
MCV RBC AUTO: 86.6 FL — SIGNIFICANT CHANGE UP (ref 80–100)
MCV RBC AUTO: 86.6 FL — SIGNIFICANT CHANGE UP (ref 80–100)
MCV RBC AUTO: 87 FL — SIGNIFICANT CHANGE UP (ref 80–100)
MCV RBC AUTO: 88 FL — SIGNIFICANT CHANGE UP (ref 80–100)
MCV RBC AUTO: 88.3 FL — SIGNIFICANT CHANGE UP (ref 80–100)
MCV RBC AUTO: 88.3 FL — SIGNIFICANT CHANGE UP (ref 80–100)
MCV RBC AUTO: 88.5 FL — SIGNIFICANT CHANGE UP (ref 80–100)
MCV RBC AUTO: 88.6 FL — SIGNIFICANT CHANGE UP (ref 80–100)
MCV RBC AUTO: 89.1 FL — SIGNIFICANT CHANGE UP (ref 80–100)
MCV RBC AUTO: 89.1 FL — SIGNIFICANT CHANGE UP (ref 80–100)
MCV RBC AUTO: 89.2 FL — SIGNIFICANT CHANGE UP (ref 80–100)
MCV RBC AUTO: 89.4 FL — SIGNIFICANT CHANGE UP (ref 80–100)
MCV RBC AUTO: 89.4 FL — SIGNIFICANT CHANGE UP (ref 80–100)
MCV RBC AUTO: 89.8 FL — SIGNIFICANT CHANGE UP (ref 80–100)
MCV RBC AUTO: 89.8 FL — SIGNIFICANT CHANGE UP (ref 80–100)
MCV RBC AUTO: 90 FL — SIGNIFICANT CHANGE UP (ref 80–100)
MCV RBC AUTO: 90.2 FL — SIGNIFICANT CHANGE UP (ref 80–100)
MCV RBC AUTO: 90.3 FL — SIGNIFICANT CHANGE UP (ref 80–100)
MCV RBC AUTO: 90.6 FL — SIGNIFICANT CHANGE UP (ref 80–100)
MCV RBC AUTO: 90.6 FL — SIGNIFICANT CHANGE UP (ref 80–100)
MCV RBC AUTO: 90.7 FL — SIGNIFICANT CHANGE UP (ref 80–100)
MCV RBC AUTO: 90.8 FL — SIGNIFICANT CHANGE UP (ref 80–100)
MCV RBC AUTO: 90.9 FL — SIGNIFICANT CHANGE UP (ref 80–100)
MCV RBC AUTO: 91.1 FL — SIGNIFICANT CHANGE UP (ref 80–100)
MCV RBC AUTO: 91.1 FL — SIGNIFICANT CHANGE UP (ref 80–100)
MCV RBC AUTO: 91.2 FL — SIGNIFICANT CHANGE UP (ref 80–100)
MCV RBC AUTO: 91.4 FL — SIGNIFICANT CHANGE UP (ref 80–100)
MCV RBC AUTO: 91.5 FL — SIGNIFICANT CHANGE UP (ref 80–100)
MCV RBC AUTO: 91.7 FL — SIGNIFICANT CHANGE UP (ref 80–100)
MCV RBC AUTO: 91.7 FL — SIGNIFICANT CHANGE UP (ref 80–100)
MCV RBC AUTO: 91.9 FL — SIGNIFICANT CHANGE UP (ref 80–100)
MCV RBC AUTO: 91.9 FL — SIGNIFICANT CHANGE UP (ref 80–100)
MCV RBC AUTO: 92 FL — SIGNIFICANT CHANGE UP (ref 80–100)
MCV RBC AUTO: 92.3 FL — SIGNIFICANT CHANGE UP (ref 80–100)
MCV RBC AUTO: 92.4 FL — SIGNIFICANT CHANGE UP (ref 80–100)
MCV RBC AUTO: 92.5 FL — SIGNIFICANT CHANGE UP (ref 80–100)
MCV RBC AUTO: 92.5 FL — SIGNIFICANT CHANGE UP (ref 80–100)
MCV RBC AUTO: 92.7 FL — SIGNIFICANT CHANGE UP (ref 80–100)
MCV RBC AUTO: 92.7 FL — SIGNIFICANT CHANGE UP (ref 80–100)
MCV RBC AUTO: 92.8 FL — SIGNIFICANT CHANGE UP (ref 80–100)
MCV RBC AUTO: 94 FL — SIGNIFICANT CHANGE UP (ref 80–100)
MCV RBC AUTO: 94.1 FL — SIGNIFICANT CHANGE UP (ref 80–100)
MCV RBC AUTO: 94.2 FL — SIGNIFICANT CHANGE UP (ref 80–100)
METAMYELOCYTES # FLD: 2 % — HIGH (ref 0–0)
METHOD TYPE: SIGNIFICANT CHANGE UP
MICROCYTES BLD QL: SLIGHT — SIGNIFICANT CHANGE UP
MONOCYTES # BLD AUTO: 0.11 K/UL — SIGNIFICANT CHANGE UP (ref 0–0.9)
MONOCYTES # BLD AUTO: 0.12 K/UL — SIGNIFICANT CHANGE UP (ref 0–0.9)
MONOCYTES # BLD AUTO: 0.12 K/UL — SIGNIFICANT CHANGE UP (ref 0–0.9)
MONOCYTES # BLD AUTO: 0.16 K/UL — SIGNIFICANT CHANGE UP (ref 0–0.9)
MONOCYTES # BLD AUTO: 0.38 K/UL — SIGNIFICANT CHANGE UP (ref 0–0.9)
MONOCYTES # BLD AUTO: 0.42 K/UL — SIGNIFICANT CHANGE UP (ref 0–0.9)
MONOCYTES # BLD AUTO: 0.43 K/UL — SIGNIFICANT CHANGE UP (ref 0–0.9)
MONOCYTES # BLD AUTO: 0.49 K/UL — SIGNIFICANT CHANGE UP (ref 0–0.9)
MONOCYTES # BLD AUTO: 0.56 K/UL — SIGNIFICANT CHANGE UP (ref 0–0.9)
MONOCYTES # BLD AUTO: 0.62 K/UL — SIGNIFICANT CHANGE UP (ref 0–0.9)
MONOCYTES # BLD AUTO: 0.62 K/UL — SIGNIFICANT CHANGE UP (ref 0–0.9)
MONOCYTES # BLD AUTO: 0.65 K/UL — SIGNIFICANT CHANGE UP (ref 0–0.9)
MONOCYTES # BLD AUTO: 0.67 K/UL — SIGNIFICANT CHANGE UP (ref 0–0.9)
MONOCYTES # BLD AUTO: 0.68 K/UL — SIGNIFICANT CHANGE UP (ref 0–0.9)
MONOCYTES # BLD AUTO: 0.68 K/UL — SIGNIFICANT CHANGE UP (ref 0–0.9)
MONOCYTES # BLD AUTO: 0.69 K/UL — SIGNIFICANT CHANGE UP (ref 0–0.9)
MONOCYTES # BLD AUTO: 0.75 K/UL — SIGNIFICANT CHANGE UP (ref 0–0.9)
MONOCYTES # BLD AUTO: 0.75 K/UL — SIGNIFICANT CHANGE UP (ref 0–0.9)
MONOCYTES # BLD AUTO: 0.85 K/UL — SIGNIFICANT CHANGE UP (ref 0–0.9)
MONOCYTES # BLD AUTO: 0.95 K/UL — HIGH (ref 0–0.9)
MONOCYTES # BLD AUTO: 0.95 K/UL — HIGH (ref 0–0.9)
MONOCYTES # BLD AUTO: 1.04 K/UL — HIGH (ref 0–0.9)
MONOCYTES # BLD AUTO: SIGNIFICANT CHANGE UP K/UL (ref 0–0.9)
MONOCYTES NFR BLD AUTO: 1 % — LOW (ref 2–14)
MONOCYTES NFR BLD AUTO: 1.2 % — LOW (ref 2–14)
MONOCYTES NFR BLD AUTO: 1.4 % — LOW (ref 2–14)
MONOCYTES NFR BLD AUTO: 1.7 % — LOW (ref 2–14)
MONOCYTES NFR BLD AUTO: 2.3 % — SIGNIFICANT CHANGE UP (ref 2–14)
MONOCYTES NFR BLD AUTO: 3 % — SIGNIFICANT CHANGE UP (ref 2–14)
MONOCYTES NFR BLD AUTO: 4.4 % — SIGNIFICANT CHANGE UP (ref 2–14)
MONOCYTES NFR BLD AUTO: 5 % — SIGNIFICANT CHANGE UP (ref 2–14)
MONOCYTES NFR BLD AUTO: 5.2 % — SIGNIFICANT CHANGE UP (ref 2–14)
MONOCYTES NFR BLD AUTO: 6.8 % — SIGNIFICANT CHANGE UP (ref 2–14)
MONOCYTES NFR BLD AUTO: 6.9 % — SIGNIFICANT CHANGE UP (ref 2–14)
MONOCYTES NFR BLD AUTO: 7 % — SIGNIFICANT CHANGE UP (ref 2–14)
MONOCYTES NFR BLD AUTO: 7.6 % — SIGNIFICANT CHANGE UP (ref 2–14)
MONOCYTES NFR BLD AUTO: 7.7 % — SIGNIFICANT CHANGE UP (ref 2–14)
MONOCYTES NFR BLD AUTO: 8.1 % — SIGNIFICANT CHANGE UP (ref 2–14)
MONOCYTES NFR BLD AUTO: 8.2 % — SIGNIFICANT CHANGE UP (ref 2–14)
MONOCYTES NFR BLD AUTO: 8.2 % — SIGNIFICANT CHANGE UP (ref 2–14)
MONOCYTES NFR BLD AUTO: 8.4 % — SIGNIFICANT CHANGE UP (ref 2–14)
MONOCYTES NFR BLD AUTO: 8.7 % — SIGNIFICANT CHANGE UP (ref 2–14)
MONOCYTES NFR BLD AUTO: 8.8 % — SIGNIFICANT CHANGE UP (ref 2–14)
MONOCYTES NFR BLD AUTO: 9.6 % — SIGNIFICANT CHANGE UP (ref 2–14)
MONOCYTES NFR BLD AUTO: 9.9 % — SIGNIFICANT CHANGE UP (ref 2–14)
MONOCYTES NFR BLD AUTO: SIGNIFICANT CHANGE UP % (ref 2–14)
MRSA PCR RESULT.: SIGNIFICANT CHANGE UP
NEUTROPHILS # BLD AUTO: 10.51 K/UL — HIGH (ref 1.8–7.4)
NEUTROPHILS # BLD AUTO: 10.58 K/UL — HIGH (ref 1.8–7.4)
NEUTROPHILS # BLD AUTO: 10.76 K/UL — HIGH (ref 1.8–7.4)
NEUTROPHILS # BLD AUTO: 11.39 K/UL — HIGH (ref 1.8–7.4)
NEUTROPHILS # BLD AUTO: 11.42 K/UL — HIGH (ref 1.8–7.4)
NEUTROPHILS # BLD AUTO: 12.44 K/UL — HIGH (ref 1.8–7.4)
NEUTROPHILS # BLD AUTO: 12.71 K/UL — HIGH (ref 1.8–7.4)
NEUTROPHILS # BLD AUTO: 17.52 K/UL — HIGH (ref 1.8–7.4)
NEUTROPHILS # BLD AUTO: 5.74 K/UL — SIGNIFICANT CHANGE UP (ref 1.8–7.4)
NEUTROPHILS # BLD AUTO: 5.98 K/UL — SIGNIFICANT CHANGE UP (ref 1.8–7.4)
NEUTROPHILS # BLD AUTO: 6.17 K/UL — SIGNIFICANT CHANGE UP (ref 1.8–7.4)
NEUTROPHILS # BLD AUTO: 6.24 K/UL — SIGNIFICANT CHANGE UP (ref 1.8–7.4)
NEUTROPHILS # BLD AUTO: 6.3 K/UL — SIGNIFICANT CHANGE UP (ref 1.8–7.4)
NEUTROPHILS # BLD AUTO: 6.61 K/UL — SIGNIFICANT CHANGE UP (ref 1.8–7.4)
NEUTROPHILS # BLD AUTO: 6.62 K/UL — SIGNIFICANT CHANGE UP (ref 1.8–7.4)
NEUTROPHILS # BLD AUTO: 6.96 K/UL — SIGNIFICANT CHANGE UP (ref 1.8–7.4)
NEUTROPHILS # BLD AUTO: 7.23 K/UL — SIGNIFICANT CHANGE UP (ref 1.8–7.4)
NEUTROPHILS # BLD AUTO: 7.47 K/UL — HIGH (ref 1.8–7.4)
NEUTROPHILS # BLD AUTO: 7.55 K/UL — HIGH (ref 1.8–7.4)
NEUTROPHILS # BLD AUTO: 7.6 K/UL — HIGH (ref 1.8–7.4)
NEUTROPHILS # BLD AUTO: 8.55 K/UL — HIGH (ref 1.8–7.4)
NEUTROPHILS # BLD AUTO: 8.58 K/UL — HIGH (ref 1.8–7.4)
NEUTROPHILS # BLD AUTO: SIGNIFICANT CHANGE UP K/UL (ref 1.8–7.4)
NEUTROPHILS NFR BLD AUTO: 78.7 % — HIGH (ref 43–77)
NEUTROPHILS NFR BLD AUTO: 79.3 % — HIGH (ref 43–77)
NEUTROPHILS NFR BLD AUTO: 79.7 % — HIGH (ref 43–77)
NEUTROPHILS NFR BLD AUTO: 79.8 % — HIGH (ref 43–77)
NEUTROPHILS NFR BLD AUTO: 80.4 % — HIGH (ref 43–77)
NEUTROPHILS NFR BLD AUTO: 80.5 % — HIGH (ref 43–77)
NEUTROPHILS NFR BLD AUTO: 81 % — HIGH (ref 43–77)
NEUTROPHILS NFR BLD AUTO: 81.9 % — HIGH (ref 43–77)
NEUTROPHILS NFR BLD AUTO: 82.1 % — HIGH (ref 43–77)
NEUTROPHILS NFR BLD AUTO: 82.5 % — HIGH (ref 43–77)
NEUTROPHILS NFR BLD AUTO: 82.5 % — HIGH (ref 43–77)
NEUTROPHILS NFR BLD AUTO: 84.9 % — HIGH (ref 43–77)
NEUTROPHILS NFR BLD AUTO: 85.3 % — HIGH (ref 43–77)
NEUTROPHILS NFR BLD AUTO: 86.4 % — HIGH (ref 43–77)
NEUTROPHILS NFR BLD AUTO: 87 % — HIGH (ref 43–77)
NEUTROPHILS NFR BLD AUTO: 89.8 % — HIGH (ref 43–77)
NEUTROPHILS NFR BLD AUTO: 90 % — HIGH (ref 43–77)
NEUTROPHILS NFR BLD AUTO: 90.9 % — HIGH (ref 43–77)
NEUTROPHILS NFR BLD AUTO: 91 % — HIGH (ref 43–77)
NEUTROPHILS NFR BLD AUTO: 91.8 % — HIGH (ref 43–77)
NEUTROPHILS NFR BLD AUTO: 92.6 % — HIGH (ref 43–77)
NEUTROPHILS NFR BLD AUTO: 92.8 % — HIGH (ref 43–77)
NEUTROPHILS NFR BLD AUTO: SIGNIFICANT CHANGE UP % (ref 43–77)
NEUTS BAND # BLD: 6 % — SIGNIFICANT CHANGE UP (ref 0–8)
NITRITE UR-MCNC: NEGATIVE — SIGNIFICANT CHANGE UP
NON HDL CHOLESTEROL: 68 MG/DL — SIGNIFICANT CHANGE UP
NRBC # BLD: 0 /100 WBCS — SIGNIFICANT CHANGE UP (ref 0–0)
NRBC # BLD: 0 /100 — SIGNIFICANT CHANGE UP (ref 0–0)
NRBC # BLD: 1 /100 WBCS — HIGH (ref 0–0)
NRBC # BLD: 1 /100 WBCS — HIGH (ref 0–0)
OB PNL STL: POSITIVE
ORGANISM # SPEC MICROSCOPIC CNT: SIGNIFICANT CHANGE UP
PCO2 BLDA: 38 MMHG — SIGNIFICANT CHANGE UP (ref 35–48)
PCO2 BLDA: 38 MMHG — SIGNIFICANT CHANGE UP (ref 35–48)
PCO2 BLDA: 44 MMHG — SIGNIFICANT CHANGE UP (ref 35–48)
PCO2 BLDA: 46 MMHG — SIGNIFICANT CHANGE UP (ref 35–48)
PCO2 BLDA: 47 MMHG — SIGNIFICANT CHANGE UP (ref 35–48)
PCO2 BLDA: 51 MMHG — HIGH (ref 35–48)
PCO2 BLDV: 42 MMHG — SIGNIFICANT CHANGE UP (ref 42–55)
PCO2 BLDV: 50 MMHG — SIGNIFICANT CHANGE UP (ref 42–55)
PH BLDA: 7.38 — SIGNIFICANT CHANGE UP (ref 7.35–7.45)
PH BLDA: 7.45 — SIGNIFICANT CHANGE UP (ref 7.35–7.45)
PH BLDA: 7.47 — HIGH (ref 7.35–7.45)
PH BLDA: 7.49 — HIGH (ref 7.35–7.45)
PH BLDV: 7.32 — SIGNIFICANT CHANGE UP (ref 7.32–7.43)
PH BLDV: 7.49 — HIGH (ref 7.32–7.43)
PH UR: 5 — SIGNIFICANT CHANGE UP (ref 5–8)
PH UR: 5 — SIGNIFICANT CHANGE UP (ref 5–8)
PH UR: 6 — SIGNIFICANT CHANGE UP (ref 5–8)
PHOSPHATE SERPL-MCNC: 1.7 MG/DL — LOW (ref 2.5–4.5)
PHOSPHATE SERPL-MCNC: 1.8 MG/DL — LOW (ref 2.5–4.5)
PHOSPHATE SERPL-MCNC: 1.8 MG/DL — LOW (ref 2.5–4.5)
PHOSPHATE SERPL-MCNC: 1.9 MG/DL — LOW (ref 2.5–4.5)
PHOSPHATE SERPL-MCNC: 2 MG/DL — LOW (ref 2.5–4.5)
PHOSPHATE SERPL-MCNC: 2 MG/DL — LOW (ref 2.5–4.5)
PHOSPHATE SERPL-MCNC: 2.1 MG/DL — LOW (ref 2.5–4.5)
PHOSPHATE SERPL-MCNC: 2.2 MG/DL — LOW (ref 2.5–4.5)
PHOSPHATE SERPL-MCNC: 2.2 MG/DL — LOW (ref 2.5–4.5)
PHOSPHATE SERPL-MCNC: 2.3 MG/DL — LOW (ref 2.5–4.5)
PHOSPHATE SERPL-MCNC: 2.4 MG/DL — LOW (ref 2.5–4.5)
PHOSPHATE SERPL-MCNC: 2.5 MG/DL — SIGNIFICANT CHANGE UP (ref 2.5–4.5)
PHOSPHATE SERPL-MCNC: 2.5 MG/DL — SIGNIFICANT CHANGE UP (ref 2.5–4.5)
PHOSPHATE SERPL-MCNC: 2.7 MG/DL — SIGNIFICANT CHANGE UP (ref 2.5–4.5)
PHOSPHATE SERPL-MCNC: 2.8 MG/DL — SIGNIFICANT CHANGE UP (ref 2.5–4.5)
PHOSPHATE SERPL-MCNC: 2.9 MG/DL — SIGNIFICANT CHANGE UP (ref 2.5–4.5)
PHOSPHATE SERPL-MCNC: 3.1 MG/DL — SIGNIFICANT CHANGE UP (ref 2.5–4.5)
PHOSPHATE SERPL-MCNC: 3.2 MG/DL — SIGNIFICANT CHANGE UP (ref 2.5–4.5)
PHOSPHATE SERPL-MCNC: 3.2 MG/DL — SIGNIFICANT CHANGE UP (ref 2.5–4.5)
PHOSPHATE SERPL-MCNC: 3.3 MG/DL — SIGNIFICANT CHANGE UP (ref 2.5–4.5)
PHOSPHATE SERPL-MCNC: 3.5 MG/DL — SIGNIFICANT CHANGE UP (ref 2.5–4.5)
PHOSPHATE SERPL-MCNC: 3.6 MG/DL — SIGNIFICANT CHANGE UP (ref 2.5–4.5)
PHOSPHATE SERPL-MCNC: 3.7 MG/DL — SIGNIFICANT CHANGE UP (ref 2.5–4.5)
PHOSPHATE SERPL-MCNC: 3.7 MG/DL — SIGNIFICANT CHANGE UP (ref 2.5–4.5)
PHOSPHATE SERPL-MCNC: 3.8 MG/DL — SIGNIFICANT CHANGE UP (ref 2.5–4.5)
PHOSPHATE SERPL-MCNC: 3.8 MG/DL — SIGNIFICANT CHANGE UP (ref 2.5–4.5)
PHOSPHATE SERPL-MCNC: 3.9 MG/DL — SIGNIFICANT CHANGE UP (ref 2.5–4.5)
PHOSPHATE SERPL-MCNC: 4.3 MG/DL — SIGNIFICANT CHANGE UP (ref 2.5–4.5)
PHOSPHATE SERPL-MCNC: 4.5 MG/DL — SIGNIFICANT CHANGE UP (ref 2.5–4.5)
PHOSPHATE SERPL-MCNC: 4.8 MG/DL — HIGH (ref 2.5–4.5)
PHOSPHATE SERPL-MCNC: 6.3 MG/DL — HIGH (ref 2.5–4.5)
PHOSPHATE SERPL-MCNC: 6.7 MG/DL — HIGH (ref 2.5–4.5)
PLAT MORPH BLD: NORMAL — SIGNIFICANT CHANGE UP
PLATELET # BLD AUTO: 102 K/UL — LOW (ref 150–400)
PLATELET # BLD AUTO: 112 K/UL — LOW (ref 150–400)
PLATELET # BLD AUTO: 125 K/UL — LOW (ref 150–400)
PLATELET # BLD AUTO: 153 K/UL — SIGNIFICANT CHANGE UP (ref 150–400)
PLATELET # BLD AUTO: 162 K/UL — SIGNIFICANT CHANGE UP (ref 150–400)
PLATELET # BLD AUTO: 163 K/UL — SIGNIFICANT CHANGE UP (ref 150–400)
PLATELET # BLD AUTO: 170 K/UL — SIGNIFICANT CHANGE UP (ref 150–400)
PLATELET # BLD AUTO: 171 K/UL — SIGNIFICANT CHANGE UP (ref 150–400)
PLATELET # BLD AUTO: 176 K/UL — SIGNIFICANT CHANGE UP (ref 150–400)
PLATELET # BLD AUTO: 211 K/UL — SIGNIFICANT CHANGE UP (ref 150–400)
PLATELET # BLD AUTO: 213 K/UL — SIGNIFICANT CHANGE UP (ref 150–400)
PLATELET # BLD AUTO: 233 K/UL — SIGNIFICANT CHANGE UP (ref 150–400)
PLATELET # BLD AUTO: 234 K/UL — SIGNIFICANT CHANGE UP (ref 150–400)
PLATELET # BLD AUTO: 235 K/UL — SIGNIFICANT CHANGE UP (ref 150–400)
PLATELET # BLD AUTO: 243 K/UL — SIGNIFICANT CHANGE UP (ref 150–400)
PLATELET # BLD AUTO: 250 K/UL — SIGNIFICANT CHANGE UP (ref 150–400)
PLATELET # BLD AUTO: 254 K/UL — SIGNIFICANT CHANGE UP (ref 150–400)
PLATELET # BLD AUTO: 257 K/UL — SIGNIFICANT CHANGE UP (ref 150–400)
PLATELET # BLD AUTO: 263 K/UL — SIGNIFICANT CHANGE UP (ref 150–400)
PLATELET # BLD AUTO: 267 K/UL — SIGNIFICANT CHANGE UP (ref 150–400)
PLATELET # BLD AUTO: 268 K/UL — SIGNIFICANT CHANGE UP (ref 150–400)
PLATELET # BLD AUTO: 269 K/UL — SIGNIFICANT CHANGE UP (ref 150–400)
PLATELET # BLD AUTO: 271 K/UL — SIGNIFICANT CHANGE UP (ref 150–400)
PLATELET # BLD AUTO: 274 K/UL — SIGNIFICANT CHANGE UP (ref 150–400)
PLATELET # BLD AUTO: 285 K/UL — SIGNIFICANT CHANGE UP (ref 150–400)
PLATELET # BLD AUTO: 289 K/UL — SIGNIFICANT CHANGE UP (ref 150–400)
PLATELET # BLD AUTO: 289 K/UL — SIGNIFICANT CHANGE UP (ref 150–400)
PLATELET # BLD AUTO: 293 K/UL — SIGNIFICANT CHANGE UP (ref 150–400)
PLATELET # BLD AUTO: 295 K/UL — SIGNIFICANT CHANGE UP (ref 150–400)
PLATELET # BLD AUTO: 300 K/UL — SIGNIFICANT CHANGE UP (ref 150–400)
PLATELET # BLD AUTO: 309 K/UL — SIGNIFICANT CHANGE UP (ref 150–400)
PLATELET # BLD AUTO: 313 K/UL — SIGNIFICANT CHANGE UP (ref 150–400)
PLATELET # BLD AUTO: 316 K/UL — SIGNIFICANT CHANGE UP (ref 150–400)
PLATELET # BLD AUTO: 323 K/UL — SIGNIFICANT CHANGE UP (ref 150–400)
PLATELET # BLD AUTO: 330 K/UL — SIGNIFICANT CHANGE UP (ref 150–400)
PLATELET # BLD AUTO: 349 K/UL — SIGNIFICANT CHANGE UP (ref 150–400)
PLATELET # BLD AUTO: 366 K/UL — SIGNIFICANT CHANGE UP (ref 150–400)
PLATELET # BLD AUTO: 367 K/UL — SIGNIFICANT CHANGE UP (ref 150–400)
PLATELET # BLD AUTO: 375 K/UL — SIGNIFICANT CHANGE UP (ref 150–400)
PLATELET # BLD AUTO: 377 K/UL — SIGNIFICANT CHANGE UP (ref 150–400)
PLATELET # BLD AUTO: 379 K/UL — SIGNIFICANT CHANGE UP (ref 150–400)
PLATELET # BLD AUTO: 380 K/UL — SIGNIFICANT CHANGE UP (ref 150–400)
PLATELET # BLD AUTO: 400 K/UL — SIGNIFICANT CHANGE UP (ref 150–400)
PLATELET # BLD AUTO: 401 K/UL — HIGH (ref 150–400)
PLATELET # BLD AUTO: 402 K/UL — HIGH (ref 150–400)
PLATELET # BLD AUTO: 405 K/UL — HIGH (ref 150–400)
PLATELET # BLD AUTO: 407 K/UL — HIGH (ref 150–400)
PLATELET # BLD AUTO: 416 K/UL — HIGH (ref 150–400)
PLATELET # BLD AUTO: 418 K/UL — HIGH (ref 150–400)
PLATELET # BLD AUTO: 434 K/UL — HIGH (ref 150–400)
PLATELET # BLD AUTO: 443 K/UL — HIGH (ref 150–400)
PLATELET # BLD AUTO: 471 K/UL — HIGH (ref 150–400)
PLATELET # BLD AUTO: 479 K/UL — HIGH (ref 150–400)
PLATELET # BLD AUTO: 500 K/UL — HIGH (ref 150–400)
PLATELET # BLD AUTO: 586 K/UL — HIGH (ref 150–400)
PLATELET COUNT - ESTIMATE: ABNORMAL
PLATELET COUNT - ESTIMATE: NORMAL — SIGNIFICANT CHANGE UP
PO2 BLDA: 121 MMHG — HIGH (ref 83–108)
PO2 BLDA: 130 MMHG — HIGH (ref 83–108)
PO2 BLDA: 169 MMHG — HIGH (ref 83–108)
PO2 BLDA: 69 MMHG — LOW (ref 83–108)
PO2 BLDA: 82 MMHG — LOW (ref 83–108)
PO2 BLDA: 93 MMHG — SIGNIFICANT CHANGE UP (ref 83–108)
PO2 BLDV: 32 MMHG — SIGNIFICANT CHANGE UP
PO2 BLDV: 40 MMHG — SIGNIFICANT CHANGE UP
POIKILOCYTOSIS BLD QL AUTO: SLIGHT — SIGNIFICANT CHANGE UP
POLYCHROMASIA BLD QL SMEAR: SIGNIFICANT CHANGE UP
POLYCHROMASIA BLD QL SMEAR: SLIGHT — SIGNIFICANT CHANGE UP
POTASSIUM SERPL-MCNC: 2.5 MMOL/L — CRITICAL LOW (ref 3.5–5.3)
POTASSIUM SERPL-MCNC: 2.8 MMOL/L — CRITICAL LOW (ref 3.5–5.3)
POTASSIUM SERPL-MCNC: 2.9 MMOL/L — CRITICAL LOW (ref 3.5–5.3)
POTASSIUM SERPL-MCNC: 3.1 MMOL/L — LOW (ref 3.5–5.3)
POTASSIUM SERPL-MCNC: 3.3 MMOL/L — LOW (ref 3.5–5.3)
POTASSIUM SERPL-MCNC: 3.4 MMOL/L — LOW (ref 3.5–5.3)
POTASSIUM SERPL-MCNC: 3.5 MMOL/L — SIGNIFICANT CHANGE UP (ref 3.5–5.3)
POTASSIUM SERPL-MCNC: 3.6 MMOL/L — SIGNIFICANT CHANGE UP (ref 3.5–5.3)
POTASSIUM SERPL-MCNC: 3.7 MMOL/L — SIGNIFICANT CHANGE UP (ref 3.5–5.3)
POTASSIUM SERPL-MCNC: 3.8 MMOL/L — SIGNIFICANT CHANGE UP (ref 3.5–5.3)
POTASSIUM SERPL-MCNC: 3.9 MMOL/L — SIGNIFICANT CHANGE UP (ref 3.5–5.3)
POTASSIUM SERPL-MCNC: 4 MMOL/L — SIGNIFICANT CHANGE UP (ref 3.5–5.3)
POTASSIUM SERPL-MCNC: 4.1 MMOL/L — SIGNIFICANT CHANGE UP (ref 3.5–5.3)
POTASSIUM SERPL-MCNC: 4.2 MMOL/L — SIGNIFICANT CHANGE UP (ref 3.5–5.3)
POTASSIUM SERPL-MCNC: 4.3 MMOL/L — SIGNIFICANT CHANGE UP (ref 3.5–5.3)
POTASSIUM SERPL-MCNC: 4.3 MMOL/L — SIGNIFICANT CHANGE UP (ref 3.5–5.3)
POTASSIUM SERPL-MCNC: 4.7 MMOL/L — SIGNIFICANT CHANGE UP (ref 3.5–5.3)
POTASSIUM SERPL-MCNC: 4.9 MMOL/L — SIGNIFICANT CHANGE UP (ref 3.5–5.3)
POTASSIUM SERPL-MCNC: 5 MMOL/L — SIGNIFICANT CHANGE UP (ref 3.5–5.3)
POTASSIUM SERPL-MCNC: 5.4 MMOL/L — HIGH (ref 3.5–5.3)
POTASSIUM SERPL-MCNC: 5.7 MMOL/L — HIGH (ref 3.5–5.3)
POTASSIUM SERPL-MCNC: 6 MMOL/L — HIGH (ref 3.5–5.3)
POTASSIUM SERPL-SCNC: 2.5 MMOL/L — CRITICAL LOW (ref 3.5–5.3)
POTASSIUM SERPL-SCNC: 2.8 MMOL/L — CRITICAL LOW (ref 3.5–5.3)
POTASSIUM SERPL-SCNC: 2.9 MMOL/L — CRITICAL LOW (ref 3.5–5.3)
POTASSIUM SERPL-SCNC: 3.1 MMOL/L — LOW (ref 3.5–5.3)
POTASSIUM SERPL-SCNC: 3.3 MMOL/L — LOW (ref 3.5–5.3)
POTASSIUM SERPL-SCNC: 3.4 MMOL/L — LOW (ref 3.5–5.3)
POTASSIUM SERPL-SCNC: 3.5 MMOL/L — SIGNIFICANT CHANGE UP (ref 3.5–5.3)
POTASSIUM SERPL-SCNC: 3.6 MMOL/L — SIGNIFICANT CHANGE UP (ref 3.5–5.3)
POTASSIUM SERPL-SCNC: 3.7 MMOL/L — SIGNIFICANT CHANGE UP (ref 3.5–5.3)
POTASSIUM SERPL-SCNC: 3.8 MMOL/L — SIGNIFICANT CHANGE UP (ref 3.5–5.3)
POTASSIUM SERPL-SCNC: 3.9 MMOL/L — SIGNIFICANT CHANGE UP (ref 3.5–5.3)
POTASSIUM SERPL-SCNC: 4 MMOL/L — SIGNIFICANT CHANGE UP (ref 3.5–5.3)
POTASSIUM SERPL-SCNC: 4.1 MMOL/L — SIGNIFICANT CHANGE UP (ref 3.5–5.3)
POTASSIUM SERPL-SCNC: 4.2 MMOL/L — SIGNIFICANT CHANGE UP (ref 3.5–5.3)
POTASSIUM SERPL-SCNC: 4.3 MMOL/L — SIGNIFICANT CHANGE UP (ref 3.5–5.3)
POTASSIUM SERPL-SCNC: 4.3 MMOL/L — SIGNIFICANT CHANGE UP (ref 3.5–5.3)
POTASSIUM SERPL-SCNC: 4.7 MMOL/L — SIGNIFICANT CHANGE UP (ref 3.5–5.3)
POTASSIUM SERPL-SCNC: 4.9 MMOL/L — SIGNIFICANT CHANGE UP (ref 3.5–5.3)
POTASSIUM SERPL-SCNC: 5 MMOL/L — SIGNIFICANT CHANGE UP (ref 3.5–5.3)
POTASSIUM SERPL-SCNC: 5.4 MMOL/L — HIGH (ref 3.5–5.3)
POTASSIUM SERPL-SCNC: 5.7 MMOL/L — HIGH (ref 3.5–5.3)
POTASSIUM SERPL-SCNC: 6 MMOL/L — HIGH (ref 3.5–5.3)
PREALB SERPL-MCNC: 8 MG/DL — LOW (ref 20–40)
PROT SERPL-MCNC: 4.2 G/DL — LOW (ref 6–8.3)
PROT SERPL-MCNC: 4.3 G/DL — LOW (ref 6–8.3)
PROT SERPL-MCNC: 4.4 G/DL — LOW (ref 6–8.3)
PROT SERPL-MCNC: 4.4 G/DL — LOW (ref 6–8.3)
PROT SERPL-MCNC: 4.5 G/DL — LOW (ref 6–8.3)
PROT SERPL-MCNC: 4.5 G/DL — LOW (ref 6–8.3)
PROT SERPL-MCNC: 4.6 G/DL — LOW (ref 6–8.3)
PROT SERPL-MCNC: 4.6 G/DL — LOW (ref 6–8.3)
PROT SERPL-MCNC: 4.7 G/DL — LOW (ref 6–8.3)
PROT SERPL-MCNC: 4.9 G/DL — LOW (ref 6–8.3)
PROT SERPL-MCNC: 4.9 G/DL — LOW (ref 6–8.3)
PROT SERPL-MCNC: 5 G/DL — LOW (ref 6–8.3)
PROT SERPL-MCNC: 5.1 G/DL — LOW (ref 6–8.3)
PROT SERPL-MCNC: 5.2 G/DL — LOW (ref 6–8.3)
PROT SERPL-MCNC: 5.3 G/DL — LOW (ref 6–8.3)
PROT SERPL-MCNC: 5.4 G/DL — LOW (ref 6–8.3)
PROT SERPL-MCNC: 5.4 G/DL — LOW (ref 6–8.3)
PROT SERPL-MCNC: 5.5 G/DL — LOW (ref 6–8.3)
PROT SERPL-MCNC: 5.5 G/DL — LOW (ref 6–8.3)
PROT SERPL-MCNC: 5.6 G/DL — LOW (ref 6–8.3)
PROT SERPL-MCNC: 5.7 G/DL — LOW (ref 6–8.3)
PROT SERPL-MCNC: 5.7 G/DL — LOW (ref 6–8.3)
PROT SERPL-MCNC: 5.8 G/DL — LOW (ref 6–8.3)
PROT SERPL-MCNC: 5.9 G/DL — LOW (ref 6–8.3)
PROT SERPL-MCNC: 6 G/DL — SIGNIFICANT CHANGE UP (ref 6–8.3)
PROT SERPL-MCNC: 6.1 G/DL — SIGNIFICANT CHANGE UP (ref 6–8.3)
PROT SERPL-MCNC: 6.2 G/DL — SIGNIFICANT CHANGE UP (ref 6–8.3)
PROT SERPL-MCNC: 6.5 G/DL — SIGNIFICANT CHANGE UP (ref 6–8.3)
PROT UR-MCNC: 100
PROT UR-MCNC: 30 MG/DL
PROT UR-MCNC: 30 MG/DL
PROTHROM AB SERPL-ACNC: 11.9 SEC — SIGNIFICANT CHANGE UP (ref 10.6–13.6)
PROTHROM AB SERPL-ACNC: 12.2 SEC — SIGNIFICANT CHANGE UP (ref 10.6–13.6)
PROTHROM AB SERPL-ACNC: 12.4 SEC — SIGNIFICANT CHANGE UP (ref 10.6–13.6)
PROTHROM AB SERPL-ACNC: 12.6 SEC — SIGNIFICANT CHANGE UP (ref 10.6–13.6)
PROTHROM AB SERPL-ACNC: 12.6 SEC — SIGNIFICANT CHANGE UP (ref 10.6–13.6)
PROTHROM AB SERPL-ACNC: 13.5 SEC — SIGNIFICANT CHANGE UP (ref 10.6–13.6)
RBC # BLD: 2.4 M/UL — LOW (ref 4.2–5.8)
RBC # BLD: 2.51 M/UL — LOW (ref 4.2–5.8)
RBC # BLD: 2.59 M/UL — LOW (ref 4.2–5.8)
RBC # BLD: 2.61 M/UL — LOW (ref 4.2–5.8)
RBC # BLD: 2.63 M/UL — LOW (ref 4.2–5.8)
RBC # BLD: 2.64 M/UL — LOW (ref 4.2–5.8)
RBC # BLD: 2.67 M/UL — LOW (ref 4.2–5.8)
RBC # BLD: 2.69 M/UL — LOW (ref 4.2–5.8)
RBC # BLD: 2.76 M/UL — LOW (ref 4.2–5.8)
RBC # BLD: 2.76 M/UL — LOW (ref 4.2–5.8)
RBC # BLD: 2.82 M/UL — LOW (ref 4.2–5.8)
RBC # BLD: 2.83 M/UL — LOW (ref 4.2–5.8)
RBC # BLD: 2.84 M/UL — LOW (ref 4.2–5.8)
RBC # BLD: 2.85 M/UL — LOW (ref 4.2–5.8)
RBC # BLD: 2.87 M/UL — LOW (ref 4.2–5.8)
RBC # BLD: 2.88 M/UL — LOW (ref 4.2–5.8)
RBC # BLD: 2.9 M/UL — LOW (ref 4.2–5.8)
RBC # BLD: 2.91 M/UL — LOW (ref 4.2–5.8)
RBC # BLD: 2.92 M/UL — LOW (ref 4.2–5.8)
RBC # BLD: 2.92 M/UL — LOW (ref 4.2–5.8)
RBC # BLD: 2.95 M/UL — LOW (ref 4.2–5.8)
RBC # BLD: 2.99 M/UL — LOW (ref 4.2–5.8)
RBC # BLD: 2.99 M/UL — LOW (ref 4.2–5.8)
RBC # BLD: 3 M/UL — LOW (ref 4.2–5.8)
RBC # BLD: 3.01 M/UL — LOW (ref 4.2–5.8)
RBC # BLD: 3.03 M/UL — LOW (ref 4.2–5.8)
RBC # BLD: 3.04 M/UL — LOW (ref 4.2–5.8)
RBC # BLD: 3.06 M/UL — LOW (ref 4.2–5.8)
RBC # BLD: 3.07 M/UL — LOW (ref 4.2–5.8)
RBC # BLD: 3.08 M/UL — LOW (ref 4.2–5.8)
RBC # BLD: 3.09 M/UL — LOW (ref 4.2–5.8)
RBC # BLD: 3.12 M/UL — LOW (ref 4.2–5.8)
RBC # BLD: 3.14 M/UL — LOW (ref 4.2–5.8)
RBC # BLD: 3.15 M/UL — LOW (ref 4.2–5.8)
RBC # BLD: 3.2 M/UL — LOW (ref 4.2–5.8)
RBC # BLD: 3.21 M/UL — LOW (ref 4.2–5.8)
RBC # BLD: 3.23 M/UL — LOW (ref 4.2–5.8)
RBC # BLD: 3.24 M/UL — LOW (ref 4.2–5.8)
RBC # BLD: 3.24 M/UL — LOW (ref 4.2–5.8)
RBC # BLD: 3.3 M/UL — LOW (ref 4.2–5.8)
RBC # BLD: 3.33 M/UL — LOW (ref 4.2–5.8)
RBC # BLD: 3.34 M/UL — LOW (ref 4.2–5.8)
RBC # BLD: 3.46 M/UL — LOW (ref 4.2–5.8)
RBC # BLD: 3.54 M/UL — LOW (ref 4.2–5.8)
RBC # BLD: 3.58 M/UL — LOW (ref 4.2–5.8)
RBC # BLD: 3.59 M/UL — LOW (ref 4.2–5.8)
RBC # BLD: 3.61 M/UL — LOW (ref 4.2–5.8)
RBC # BLD: 3.81 M/UL — LOW (ref 4.2–5.8)
RBC # BLD: 4.04 M/UL — LOW (ref 4.2–5.8)
RBC # FLD: 15.1 % — HIGH (ref 10.3–14.5)
RBC # FLD: 15.2 % — HIGH (ref 10.3–14.5)
RBC # FLD: 15.3 % — HIGH (ref 10.3–14.5)
RBC # FLD: 15.5 % — HIGH (ref 10.3–14.5)
RBC # FLD: 15.5 % — HIGH (ref 10.3–14.5)
RBC # FLD: 16 % — HIGH (ref 10.3–14.5)
RBC # FLD: 16 % — HIGH (ref 10.3–14.5)
RBC # FLD: 16.1 % — HIGH (ref 10.3–14.5)
RBC # FLD: 16.2 % — HIGH (ref 10.3–14.5)
RBC # FLD: 16.4 % — HIGH (ref 10.3–14.5)
RBC # FLD: 16.4 % — HIGH (ref 10.3–14.5)
RBC # FLD: 16.5 % — HIGH (ref 10.3–14.5)
RBC # FLD: 16.7 % — HIGH (ref 10.3–14.5)
RBC # FLD: 16.7 % — HIGH (ref 10.3–14.5)
RBC # FLD: 16.8 % — HIGH (ref 10.3–14.5)
RBC # FLD: 16.8 % — HIGH (ref 10.3–14.5)
RBC # FLD: 16.9 % — HIGH (ref 10.3–14.5)
RBC # FLD: 16.9 % — HIGH (ref 10.3–14.5)
RBC # FLD: 17 % — HIGH (ref 10.3–14.5)
RBC # FLD: 17.1 % — HIGH (ref 10.3–14.5)
RBC # FLD: 17.1 % — HIGH (ref 10.3–14.5)
RBC # FLD: 17.2 % — HIGH (ref 10.3–14.5)
RBC # FLD: 17.3 % — HIGH (ref 10.3–14.5)
RBC # FLD: 17.3 % — HIGH (ref 10.3–14.5)
RBC # FLD: 17.4 % — HIGH (ref 10.3–14.5)
RBC # FLD: 17.6 % — HIGH (ref 10.3–14.5)
RBC # FLD: 17.6 % — HIGH (ref 10.3–14.5)
RBC # FLD: 17.7 % — HIGH (ref 10.3–14.5)
RBC # FLD: 17.8 % — HIGH (ref 10.3–14.5)
RBC # FLD: 17.9 % — HIGH (ref 10.3–14.5)
RBC # FLD: 17.9 % — HIGH (ref 10.3–14.5)
RBC # FLD: 18 % — HIGH (ref 10.3–14.5)
RBC # FLD: 18.1 % — HIGH (ref 10.3–14.5)
RBC # FLD: 18.2 % — HIGH (ref 10.3–14.5)
RBC # FLD: 18.2 % — HIGH (ref 10.3–14.5)
RBC # FLD: 18.8 % — HIGH (ref 10.3–14.5)
RBC # FLD: 18.9 % — HIGH (ref 10.3–14.5)
RBC # FLD: 19 % — HIGH (ref 10.3–14.5)
RBC # FLD: 19.2 % — HIGH (ref 10.3–14.5)
RBC # FLD: 19.5 % — HIGH (ref 10.3–14.5)
RBC # FLD: 19.6 % — HIGH (ref 10.3–14.5)
RBC BLD AUTO: ABNORMAL
RBC CASTS # UR COMP ASSIST: ABNORMAL /HPF (ref 0–2)
RBC CASTS # UR COMP ASSIST: NEGATIVE /HPF — SIGNIFICANT CHANGE UP (ref 0–2)
RBC CASTS # UR COMP ASSIST: SIGNIFICANT CHANGE UP /HPF (ref 0–2)
S AUREUS DNA NOSE QL NAA+PROBE: SIGNIFICANT CHANGE UP
SAO2 % BLDA: 100 % — SIGNIFICANT CHANGE UP
SAO2 % BLDA: 94 % — SIGNIFICANT CHANGE UP
SAO2 % BLDA: 98 % — SIGNIFICANT CHANGE UP
SAO2 % BLDA: 98 % — SIGNIFICANT CHANGE UP
SAO2 % BLDA: 99 % — SIGNIFICANT CHANGE UP
SAO2 % BLDA: 99 % — SIGNIFICANT CHANGE UP
SAO2 % BLDV: 44.9 % — SIGNIFICANT CHANGE UP
SAO2 % BLDV: 72.4 % — SIGNIFICANT CHANGE UP
SARS-COV-2 RNA SPEC QL NAA+PROBE: SIGNIFICANT CHANGE UP
SCHISTOCYTES BLD QL AUTO: SLIGHT — SIGNIFICANT CHANGE UP
SCHISTOCYTES BLD QL AUTO: SLIGHT — SIGNIFICANT CHANGE UP
SMUDGE CELLS # BLD: PRESENT — SIGNIFICANT CHANGE UP
SMUDGE CELLS # BLD: PRESENT — SIGNIFICANT CHANGE UP
SODIUM SERPL-SCNC: 133 MMOL/L — LOW (ref 135–145)
SODIUM SERPL-SCNC: 134 MMOL/L — LOW (ref 135–145)
SODIUM SERPL-SCNC: 135 MMOL/L — SIGNIFICANT CHANGE UP (ref 135–145)
SODIUM SERPL-SCNC: 136 MMOL/L — SIGNIFICANT CHANGE UP (ref 135–145)
SODIUM SERPL-SCNC: 136 MMOL/L — SIGNIFICANT CHANGE UP (ref 135–145)
SODIUM SERPL-SCNC: 139 MMOL/L — SIGNIFICANT CHANGE UP (ref 135–145)
SODIUM SERPL-SCNC: 140 MMOL/L — SIGNIFICANT CHANGE UP (ref 135–145)
SODIUM SERPL-SCNC: 140 MMOL/L — SIGNIFICANT CHANGE UP (ref 135–145)
SODIUM SERPL-SCNC: 141 MMOL/L — SIGNIFICANT CHANGE UP (ref 135–145)
SODIUM SERPL-SCNC: 141 MMOL/L — SIGNIFICANT CHANGE UP (ref 135–145)
SODIUM SERPL-SCNC: 142 MMOL/L — SIGNIFICANT CHANGE UP (ref 135–145)
SODIUM SERPL-SCNC: 143 MMOL/L — SIGNIFICANT CHANGE UP (ref 135–145)
SODIUM SERPL-SCNC: 144 MMOL/L — SIGNIFICANT CHANGE UP (ref 135–145)
SODIUM SERPL-SCNC: 145 MMOL/L — SIGNIFICANT CHANGE UP (ref 135–145)
SODIUM SERPL-SCNC: 146 MMOL/L — HIGH (ref 135–145)
SODIUM SERPL-SCNC: 147 MMOL/L — HIGH (ref 135–145)
SODIUM SERPL-SCNC: 148 MMOL/L — HIGH (ref 135–145)
SODIUM SERPL-SCNC: 149 MMOL/L — HIGH (ref 135–145)
SODIUM SERPL-SCNC: 150 MMOL/L — HIGH (ref 135–145)
SODIUM SERPL-SCNC: 151 MMOL/L — HIGH (ref 135–145)
SODIUM SERPL-SCNC: 151 MMOL/L — HIGH (ref 135–145)
SODIUM SERPL-SCNC: 152 MMOL/L — HIGH (ref 135–145)
SODIUM SERPL-SCNC: 153 MMOL/L — HIGH (ref 135–145)
SP GR SPEC: 1.01 — SIGNIFICANT CHANGE UP (ref 1.01–1.02)
SPECIMEN SOURCE: SIGNIFICANT CHANGE UP
SPHEROCYTES BLD QL SMEAR: SLIGHT — SIGNIFICANT CHANGE UP
TRIGL SERPL-MCNC: 183 MG/DL — HIGH
TROPONIN I, HIGH SENSITIVITY RESULT: 121.3 NG/L — HIGH
TROPONIN I, HIGH SENSITIVITY RESULT: 146.2 NG/L — HIGH
UROBILINOGEN FLD QL: 1
UROBILINOGEN FLD QL: 4
UROBILINOGEN FLD QL: NEGATIVE — SIGNIFICANT CHANGE UP
VANCOMYCIN TROUGH SERPL-MCNC: 19.2 UG/ML — SIGNIFICANT CHANGE UP (ref 10–20)
WBC # BLD: 10.04 K/UL — SIGNIFICANT CHANGE UP (ref 3.8–10.5)
WBC # BLD: 10.05 K/UL — SIGNIFICANT CHANGE UP (ref 3.8–10.5)
WBC # BLD: 10.33 K/UL — SIGNIFICANT CHANGE UP (ref 3.8–10.5)
WBC # BLD: 10.56 K/UL — HIGH (ref 3.8–10.5)
WBC # BLD: 10.73 K/UL — HIGH (ref 3.8–10.5)
WBC # BLD: 10.73 K/UL — HIGH (ref 3.8–10.5)
WBC # BLD: 10.77 K/UL — HIGH (ref 3.8–10.5)
WBC # BLD: 10.88 K/UL — HIGH (ref 3.8–10.5)
WBC # BLD: 11.23 K/UL — HIGH (ref 3.8–10.5)
WBC # BLD: 11.33 K/UL — HIGH (ref 3.8–10.5)
WBC # BLD: 11.53 K/UL — HIGH (ref 3.8–10.5)
WBC # BLD: 11.59 K/UL — HIGH (ref 3.8–10.5)
WBC # BLD: 11.64 K/UL — HIGH (ref 3.8–10.5)
WBC # BLD: 12.14 K/UL — HIGH (ref 3.8–10.5)
WBC # BLD: 12.28 K/UL — HIGH (ref 3.8–10.5)
WBC # BLD: 12.38 K/UL — HIGH (ref 3.8–10.5)
WBC # BLD: 12.4 K/UL — HIGH (ref 3.8–10.5)
WBC # BLD: 12.5 K/UL — HIGH (ref 3.8–10.5)
WBC # BLD: 12.65 K/UL — HIGH (ref 3.8–10.5)
WBC # BLD: 12.68 K/UL — HIGH (ref 3.8–10.5)
WBC # BLD: 12.75 K/UL — HIGH (ref 3.8–10.5)
WBC # BLD: 12.75 K/UL — HIGH (ref 3.8–10.5)
WBC # BLD: 12.88 K/UL — HIGH (ref 3.8–10.5)
WBC # BLD: 13.03 K/UL — HIGH (ref 3.8–10.5)
WBC # BLD: 13.31 K/UL — HIGH (ref 3.8–10.5)
WBC # BLD: 13.49 K/UL — HIGH (ref 3.8–10.5)
WBC # BLD: 13.67 K/UL — HIGH (ref 3.8–10.5)
WBC # BLD: 13.95 K/UL — HIGH (ref 3.8–10.5)
WBC # BLD: 13.99 K/UL — HIGH (ref 3.8–10.5)
WBC # BLD: 14.14 K/UL — HIGH (ref 3.8–10.5)
WBC # BLD: 14.4 K/UL — HIGH (ref 3.8–10.5)
WBC # BLD: 19.08 K/UL — HIGH (ref 3.8–10.5)
WBC # BLD: 4.46 K/UL — SIGNIFICANT CHANGE UP (ref 3.8–10.5)
WBC # BLD: 4.89 K/UL — SIGNIFICANT CHANGE UP (ref 3.8–10.5)
WBC # BLD: 4.96 K/UL — SIGNIFICANT CHANGE UP (ref 3.8–10.5)
WBC # BLD: 5.03 K/UL — SIGNIFICANT CHANGE UP (ref 3.8–10.5)
WBC # BLD: 6.93 K/UL — SIGNIFICANT CHANGE UP (ref 3.8–10.5)
WBC # BLD: 6.96 K/UL — SIGNIFICANT CHANGE UP (ref 3.8–10.5)
WBC # BLD: 7.49 K/UL — SIGNIFICANT CHANGE UP (ref 3.8–10.5)
WBC # BLD: 7.69 K/UL — SIGNIFICANT CHANGE UP (ref 3.8–10.5)
WBC # BLD: 7.82 K/UL — SIGNIFICANT CHANGE UP (ref 3.8–10.5)
WBC # BLD: 7.83 K/UL — SIGNIFICANT CHANGE UP (ref 3.8–10.5)
WBC # BLD: 7.92 K/UL — SIGNIFICANT CHANGE UP (ref 3.8–10.5)
WBC # BLD: 8.06 K/UL — SIGNIFICANT CHANGE UP (ref 3.8–10.5)
WBC # BLD: 8.21 K/UL — SIGNIFICANT CHANGE UP (ref 3.8–10.5)
WBC # BLD: 8.21 K/UL — SIGNIFICANT CHANGE UP (ref 3.8–10.5)
WBC # BLD: 8.22 K/UL — SIGNIFICANT CHANGE UP (ref 3.8–10.5)
WBC # BLD: 8.36 K/UL — SIGNIFICANT CHANGE UP (ref 3.8–10.5)
WBC # BLD: 8.83 K/UL — SIGNIFICANT CHANGE UP (ref 3.8–10.5)
WBC # BLD: 8.85 K/UL — SIGNIFICANT CHANGE UP (ref 3.8–10.5)
WBC # BLD: 8.91 K/UL — SIGNIFICANT CHANGE UP (ref 3.8–10.5)
WBC # BLD: 9.09 K/UL — SIGNIFICANT CHANGE UP (ref 3.8–10.5)
WBC # BLD: 9.16 K/UL — SIGNIFICANT CHANGE UP (ref 3.8–10.5)
WBC # BLD: 9.23 K/UL — SIGNIFICANT CHANGE UP (ref 3.8–10.5)
WBC # BLD: 9.59 K/UL — SIGNIFICANT CHANGE UP (ref 3.8–10.5)
WBC # FLD AUTO: 10.04 K/UL — SIGNIFICANT CHANGE UP (ref 3.8–10.5)
WBC # FLD AUTO: 10.05 K/UL — SIGNIFICANT CHANGE UP (ref 3.8–10.5)
WBC # FLD AUTO: 10.33 K/UL — SIGNIFICANT CHANGE UP (ref 3.8–10.5)
WBC # FLD AUTO: 10.56 K/UL — HIGH (ref 3.8–10.5)
WBC # FLD AUTO: 10.73 K/UL — HIGH (ref 3.8–10.5)
WBC # FLD AUTO: 10.73 K/UL — HIGH (ref 3.8–10.5)
WBC # FLD AUTO: 10.77 K/UL — HIGH (ref 3.8–10.5)
WBC # FLD AUTO: 10.88 K/UL — HIGH (ref 3.8–10.5)
WBC # FLD AUTO: 11.23 K/UL — HIGH (ref 3.8–10.5)
WBC # FLD AUTO: 11.33 K/UL — HIGH (ref 3.8–10.5)
WBC # FLD AUTO: 11.53 K/UL — HIGH (ref 3.8–10.5)
WBC # FLD AUTO: 11.59 K/UL — HIGH (ref 3.8–10.5)
WBC # FLD AUTO: 11.64 K/UL — HIGH (ref 3.8–10.5)
WBC # FLD AUTO: 12.14 K/UL — HIGH (ref 3.8–10.5)
WBC # FLD AUTO: 12.28 K/UL — HIGH (ref 3.8–10.5)
WBC # FLD AUTO: 12.38 K/UL — HIGH (ref 3.8–10.5)
WBC # FLD AUTO: 12.4 K/UL — HIGH (ref 3.8–10.5)
WBC # FLD AUTO: 12.5 K/UL — HIGH (ref 3.8–10.5)
WBC # FLD AUTO: 12.65 K/UL — HIGH (ref 3.8–10.5)
WBC # FLD AUTO: 12.68 K/UL — HIGH (ref 3.8–10.5)
WBC # FLD AUTO: 12.75 K/UL — HIGH (ref 3.8–10.5)
WBC # FLD AUTO: 12.75 K/UL — HIGH (ref 3.8–10.5)
WBC # FLD AUTO: 12.88 K/UL — HIGH (ref 3.8–10.5)
WBC # FLD AUTO: 13.03 K/UL — HIGH (ref 3.8–10.5)
WBC # FLD AUTO: 13.31 K/UL — HIGH (ref 3.8–10.5)
WBC # FLD AUTO: 13.49 K/UL — HIGH (ref 3.8–10.5)
WBC # FLD AUTO: 13.67 K/UL — HIGH (ref 3.8–10.5)
WBC # FLD AUTO: 13.95 K/UL — HIGH (ref 3.8–10.5)
WBC # FLD AUTO: 13.99 K/UL — HIGH (ref 3.8–10.5)
WBC # FLD AUTO: 14.14 K/UL — HIGH (ref 3.8–10.5)
WBC # FLD AUTO: 14.4 K/UL — HIGH (ref 3.8–10.5)
WBC # FLD AUTO: 19.08 K/UL — HIGH (ref 3.8–10.5)
WBC # FLD AUTO: 4.46 K/UL — SIGNIFICANT CHANGE UP (ref 3.8–10.5)
WBC # FLD AUTO: 4.89 K/UL — SIGNIFICANT CHANGE UP (ref 3.8–10.5)
WBC # FLD AUTO: 4.96 K/UL — SIGNIFICANT CHANGE UP (ref 3.8–10.5)
WBC # FLD AUTO: 5.03 K/UL — SIGNIFICANT CHANGE UP (ref 3.8–10.5)
WBC # FLD AUTO: 6.93 K/UL — SIGNIFICANT CHANGE UP (ref 3.8–10.5)
WBC # FLD AUTO: 6.96 K/UL — SIGNIFICANT CHANGE UP (ref 3.8–10.5)
WBC # FLD AUTO: 7.49 K/UL — SIGNIFICANT CHANGE UP (ref 3.8–10.5)
WBC # FLD AUTO: 7.69 K/UL — SIGNIFICANT CHANGE UP (ref 3.8–10.5)
WBC # FLD AUTO: 7.82 K/UL — SIGNIFICANT CHANGE UP (ref 3.8–10.5)
WBC # FLD AUTO: 7.83 K/UL — SIGNIFICANT CHANGE UP (ref 3.8–10.5)
WBC # FLD AUTO: 7.92 K/UL — SIGNIFICANT CHANGE UP (ref 3.8–10.5)
WBC # FLD AUTO: 8.06 K/UL — SIGNIFICANT CHANGE UP (ref 3.8–10.5)
WBC # FLD AUTO: 8.21 K/UL — SIGNIFICANT CHANGE UP (ref 3.8–10.5)
WBC # FLD AUTO: 8.21 K/UL — SIGNIFICANT CHANGE UP (ref 3.8–10.5)
WBC # FLD AUTO: 8.22 K/UL — SIGNIFICANT CHANGE UP (ref 3.8–10.5)
WBC # FLD AUTO: 8.36 K/UL — SIGNIFICANT CHANGE UP (ref 3.8–10.5)
WBC # FLD AUTO: 8.83 K/UL — SIGNIFICANT CHANGE UP (ref 3.8–10.5)
WBC # FLD AUTO: 8.85 K/UL — SIGNIFICANT CHANGE UP (ref 3.8–10.5)
WBC # FLD AUTO: 8.91 K/UL — SIGNIFICANT CHANGE UP (ref 3.8–10.5)
WBC # FLD AUTO: 9.09 K/UL — SIGNIFICANT CHANGE UP (ref 3.8–10.5)
WBC # FLD AUTO: 9.16 K/UL — SIGNIFICANT CHANGE UP (ref 3.8–10.5)
WBC # FLD AUTO: 9.23 K/UL — SIGNIFICANT CHANGE UP (ref 3.8–10.5)
WBC # FLD AUTO: 9.59 K/UL — SIGNIFICANT CHANGE UP (ref 3.8–10.5)
WBC UR QL: ABNORMAL /HPF (ref 0–5)
WBC UR QL: SIGNIFICANT CHANGE UP /HPF (ref 0–5)
WBC UR QL: SIGNIFICANT CHANGE UP /HPF (ref 0–5)

## 2022-01-01 PROCEDURE — L8699: CPT

## 2022-01-01 PROCEDURE — 86900 BLOOD TYPING SEROLOGIC ABO: CPT

## 2022-01-01 PROCEDURE — 99285 EMERGENCY DEPT VISIT HI MDM: CPT

## 2022-01-01 PROCEDURE — 36415 COLL VENOUS BLD VENIPUNCTURE: CPT

## 2022-01-01 PROCEDURE — 74018 RADEX ABDOMEN 1 VIEW: CPT | Mod: 26

## 2022-01-01 PROCEDURE — 85610 PROTHROMBIN TIME: CPT

## 2022-01-01 PROCEDURE — 99232 SBSQ HOSP IP/OBS MODERATE 35: CPT

## 2022-01-01 PROCEDURE — 80053 COMPREHEN METABOLIC PANEL: CPT

## 2022-01-01 PROCEDURE — 87640 STAPH A DNA AMP PROBE: CPT

## 2022-01-01 PROCEDURE — 71045 X-RAY EXAM CHEST 1 VIEW: CPT | Mod: 26,77

## 2022-01-01 PROCEDURE — 74018 RADEX ABDOMEN 1 VIEW: CPT

## 2022-01-01 PROCEDURE — 96374 THER/PROPH/DIAG INJ IV PUSH: CPT

## 2022-01-01 PROCEDURE — 36430 TRANSFUSION BLD/BLD COMPNT: CPT

## 2022-01-01 PROCEDURE — 31600 PLANNED TRACHEOSTOMY: CPT

## 2022-01-01 PROCEDURE — C1889: CPT

## 2022-01-01 PROCEDURE — 99497 ADVNCD CARE PLAN 30 MIN: CPT

## 2022-01-01 PROCEDURE — 99497 ADVNCD CARE PLAN 30 MIN: CPT | Mod: 25

## 2022-01-01 PROCEDURE — 99291 CRITICAL CARE FIRST HOUR: CPT

## 2022-01-01 PROCEDURE — 99233 SBSQ HOSP IP/OBS HIGH 50: CPT

## 2022-01-01 PROCEDURE — 71045 X-RAY EXAM CHEST 1 VIEW: CPT | Mod: 26

## 2022-01-01 PROCEDURE — 84100 ASSAY OF PHOSPHORUS: CPT

## 2022-01-01 PROCEDURE — 74176 CT ABD & PELVIS W/O CONTRAST: CPT | Mod: 26

## 2022-01-01 PROCEDURE — 80048 BASIC METABOLIC PNL TOTAL CA: CPT

## 2022-01-01 PROCEDURE — 82728 ASSAY OF FERRITIN: CPT

## 2022-01-01 PROCEDURE — 94002 VENT MGMT INPAT INIT DAY: CPT

## 2022-01-01 PROCEDURE — C1769: CPT

## 2022-01-01 PROCEDURE — 43235 EGD DIAGNOSTIC BRUSH WASH: CPT

## 2022-01-01 PROCEDURE — 87077 CULTURE AEROBIC IDENTIFY: CPT

## 2022-01-01 PROCEDURE — 82310 ASSAY OF CALCIUM: CPT

## 2022-01-01 PROCEDURE — 99498 ADVNCD CARE PLAN ADDL 30 MIN: CPT

## 2022-01-01 PROCEDURE — 87635 SARS-COV-2 COVID-19 AMP PRB: CPT

## 2022-01-01 PROCEDURE — 94760 N-INVAS EAR/PLS OXIMETRY 1: CPT

## 2022-01-01 PROCEDURE — 87641 MR-STAPH DNA AMP PROBE: CPT

## 2022-01-01 PROCEDURE — 85025 COMPLETE CBC W/AUTO DIFF WBC: CPT

## 2022-01-01 PROCEDURE — 82803 BLOOD GASES ANY COMBINATION: CPT

## 2022-01-01 PROCEDURE — 93306 TTE W/DOPPLER COMPLETE: CPT

## 2022-01-01 PROCEDURE — 80061 LIPID PANEL: CPT

## 2022-01-01 PROCEDURE — 85027 COMPLETE CBC AUTOMATED: CPT

## 2022-01-01 PROCEDURE — 86850 RBC ANTIBODY SCREEN: CPT

## 2022-01-01 PROCEDURE — 99222 1ST HOSP IP/OBS MODERATE 55: CPT | Mod: 25

## 2022-01-01 PROCEDURE — P9047: CPT

## 2022-01-01 PROCEDURE — 84478 ASSAY OF TRIGLYCERIDES: CPT

## 2022-01-01 PROCEDURE — 87040 BLOOD CULTURE FOR BACTERIA: CPT

## 2022-01-01 PROCEDURE — 86140 C-REACTIVE PROTEIN: CPT

## 2022-01-01 PROCEDURE — 49440 PLACE GASTROSTOMY TUBE PERC: CPT

## 2022-01-01 PROCEDURE — 70450 CT HEAD/BRAIN W/O DYE: CPT

## 2022-01-01 PROCEDURE — 94640 AIRWAY INHALATION TREATMENT: CPT

## 2022-01-01 PROCEDURE — 99222 1ST HOSP IP/OBS MODERATE 55: CPT

## 2022-01-01 PROCEDURE — 84484 ASSAY OF TROPONIN QUANT: CPT

## 2022-01-01 PROCEDURE — 87186 SC STD MICRODIL/AGAR DIL: CPT

## 2022-01-01 PROCEDURE — 83880 ASSAY OF NATRIURETIC PEPTIDE: CPT

## 2022-01-01 PROCEDURE — 94003 VENT MGMT INPAT SUBQ DAY: CPT

## 2022-01-01 PROCEDURE — 87150 DNA/RNA AMPLIFIED PROBE: CPT

## 2022-01-01 PROCEDURE — 93005 ELECTROCARDIOGRAM TRACING: CPT

## 2022-01-01 PROCEDURE — 83735 ASSAY OF MAGNESIUM: CPT

## 2022-01-01 PROCEDURE — 74174 CTA ABD&PLVS W/CONTRAST: CPT | Mod: 26

## 2022-01-01 PROCEDURE — 74177 CT ABD & PELVIS W/CONTRAST: CPT

## 2022-01-01 PROCEDURE — 99291 CRITICAL CARE FIRST HOUR: CPT | Mod: 25

## 2022-01-01 PROCEDURE — 83519 RIA NONANTIBODY: CPT

## 2022-01-01 PROCEDURE — 99285 EMERGENCY DEPT VISIT HI MDM: CPT | Mod: 25

## 2022-01-01 PROCEDURE — 97163 PT EVAL HIGH COMPLEX 45 MIN: CPT

## 2022-01-01 PROCEDURE — 82272 OCCULT BLD FECES 1-3 TESTS: CPT

## 2022-01-01 PROCEDURE — 87070 CULTURE OTHR SPECIMN AEROBIC: CPT

## 2022-01-01 PROCEDURE — 43752 NASAL/OROGASTRIC W/TUBE PLMT: CPT

## 2022-01-01 PROCEDURE — 86901 BLOOD TYPING SEROLOGIC RH(D): CPT

## 2022-01-01 PROCEDURE — 99223 1ST HOSP IP/OBS HIGH 75: CPT

## 2022-01-01 PROCEDURE — 97530 THERAPEUTIC ACTIVITIES: CPT

## 2022-01-01 PROCEDURE — ZZZZZ: CPT

## 2022-01-01 PROCEDURE — 85730 THROMBOPLASTIN TIME PARTIAL: CPT

## 2022-01-01 PROCEDURE — 80202 ASSAY OF VANCOMYCIN: CPT

## 2022-01-01 PROCEDURE — 74177 CT ABD & PELVIS W/CONTRAST: CPT | Mod: 26

## 2022-01-01 PROCEDURE — 49465 FLUORO EXAM OF G/COLON TUBE: CPT

## 2022-01-01 PROCEDURE — 97110 THERAPEUTIC EXERCISES: CPT

## 2022-01-01 PROCEDURE — 86923 COMPATIBILITY TEST ELECTRIC: CPT

## 2022-01-01 PROCEDURE — 43255 EGD CONTROL BLEEDING ANY: CPT

## 2022-01-01 PROCEDURE — 83605 ASSAY OF LACTIC ACID: CPT

## 2022-01-01 PROCEDURE — 76000 FLUOROSCOPY <1 HR PHYS/QHP: CPT

## 2022-01-01 PROCEDURE — 74174 CTA ABD&PLVS W/CONTRAST: CPT

## 2022-01-01 PROCEDURE — 71260 CT THORAX DX C+: CPT

## 2022-01-01 PROCEDURE — P9040: CPT

## 2022-01-01 PROCEDURE — 81001 URINALYSIS AUTO W/SCOPE: CPT

## 2022-01-01 PROCEDURE — 71045 X-RAY EXAM CHEST 1 VIEW: CPT

## 2022-01-01 PROCEDURE — 45330 DIAGNOSTIC SIGMOIDOSCOPY: CPT

## 2022-01-01 PROCEDURE — 87086 URINE CULTURE/COLONY COUNT: CPT

## 2022-01-01 PROCEDURE — 83970 ASSAY OF PARATHORMONE: CPT

## 2022-01-01 PROCEDURE — 70450 CT HEAD/BRAIN W/O DYE: CPT | Mod: 26

## 2022-01-01 PROCEDURE — 83036 HEMOGLOBIN GLYCOSYLATED A1C: CPT

## 2022-01-01 PROCEDURE — 74176 CT ABD & PELVIS W/O CONTRAST: CPT

## 2022-01-01 PROCEDURE — 84145 PROCALCITONIN (PCT): CPT

## 2022-01-01 PROCEDURE — 71045 X-RAY EXAM CHEST 1 VIEW: CPT | Mod: 26,76

## 2022-01-01 PROCEDURE — 82962 GLUCOSE BLOOD TEST: CPT

## 2022-01-01 PROCEDURE — 84134 ASSAY OF PREALBUMIN: CPT

## 2022-01-01 DEVICE — CLIP RESOLUTION 360 235CM: Type: IMPLANTABLE DEVICE | Status: FUNCTIONAL

## 2022-01-01 DEVICE — TRACH DIC CUFF FLEX SHAFT 7.0: Type: IMPLANTABLE DEVICE | Status: FUNCTIONAL

## 2022-01-01 DEVICE — CLIP RESOLUTION 360 ULTRA 235CM 20/BX: Type: IMPLANTABLE DEVICE | Status: FUNCTIONAL

## 2022-01-01 DEVICE — IMPLANTABLE DEVICE: Type: IMPLANTABLE DEVICE | Status: FUNCTIONAL

## 2022-01-01 RX ORDER — SODIUM,POTASSIUM PHOSPHATES 278-250MG
1 POWDER IN PACKET (EA) ORAL ONCE
Refills: 0 | Status: COMPLETED | OUTPATIENT
Start: 2022-01-01 | End: 2022-01-01

## 2022-01-01 RX ORDER — ALBUMIN HUMAN 25 %
50 VIAL (ML) INTRAVENOUS EVERY 6 HOURS
Refills: 0 | Status: COMPLETED | OUTPATIENT
Start: 2022-01-01 | End: 2022-01-01

## 2022-01-01 RX ORDER — SODIUM CHLORIDE 9 MG/ML
1000 INJECTION, SOLUTION INTRAVENOUS
Refills: 0 | Status: DISCONTINUED | OUTPATIENT
Start: 2022-01-01 | End: 2022-01-01

## 2022-01-01 RX ORDER — ATORVASTATIN CALCIUM 80 MG/1
40 TABLET, FILM COATED ORAL AT BEDTIME
Refills: 0 | Status: DISCONTINUED | OUTPATIENT
Start: 2022-01-01 | End: 2022-01-01

## 2022-01-01 RX ORDER — POTASSIUM CHLORIDE 20 MEQ
40 PACKET (EA) ORAL
Refills: 0 | Status: COMPLETED | OUTPATIENT
Start: 2022-01-01 | End: 2022-01-01

## 2022-01-01 RX ORDER — GLUCAGON INJECTION, SOLUTION 0.5 MG/.1ML
0.5 INJECTION, SOLUTION SUBCUTANEOUS ONCE
Refills: 0 | Status: DISCONTINUED | OUTPATIENT
Start: 2022-01-01 | End: 2022-01-01

## 2022-01-01 RX ORDER — COLLAGENASE CLOSTRIDIUM HIST. 250 UNIT/G
1 OINTMENT (GRAM) TOPICAL DAILY
Refills: 0 | Status: DISCONTINUED | OUTPATIENT
Start: 2022-01-01 | End: 2022-01-01

## 2022-01-01 RX ORDER — PANTOPRAZOLE SODIUM 20 MG/1
80 TABLET, DELAYED RELEASE ORAL ONCE
Refills: 0 | Status: COMPLETED | OUTPATIENT
Start: 2022-01-01 | End: 2022-01-01

## 2022-01-01 RX ORDER — FUROSEMIDE 40 MG
20 TABLET ORAL EVERY 6 HOURS
Refills: 0 | Status: COMPLETED | OUTPATIENT
Start: 2022-01-01 | End: 2022-01-01

## 2022-01-01 RX ORDER — POTASSIUM CHLORIDE 20 MEQ
40 PACKET (EA) ORAL ONCE
Refills: 0 | Status: COMPLETED | OUTPATIENT
Start: 2022-01-01 | End: 2022-01-01

## 2022-01-01 RX ORDER — SOD SULF/SODIUM/NAHCO3/KCL/PEG
4000 SOLUTION, RECONSTITUTED, ORAL ORAL ONCE
Refills: 0 | Status: COMPLETED | OUTPATIENT
Start: 2022-01-01 | End: 2022-01-01

## 2022-01-01 RX ORDER — SODIUM CHLORIDE 9 MG/ML
2100 INJECTION INTRAMUSCULAR; INTRAVENOUS; SUBCUTANEOUS ONCE
Refills: 0 | Status: COMPLETED | OUTPATIENT
Start: 2022-01-01 | End: 2022-01-01

## 2022-01-01 RX ORDER — CHLORHEXIDINE GLUCONATE 213 G/1000ML
15 SOLUTION TOPICAL EVERY 12 HOURS
Refills: 0 | Status: DISCONTINUED | OUTPATIENT
Start: 2022-01-01 | End: 2022-01-01

## 2022-01-01 RX ORDER — POTASSIUM PHOSPHATE, MONOBASIC POTASSIUM PHOSPHATE, DIBASIC 236; 224 MG/ML; MG/ML
30 INJECTION, SOLUTION INTRAVENOUS ONCE
Refills: 0 | Status: COMPLETED | OUTPATIENT
Start: 2022-01-01 | End: 2022-01-01

## 2022-01-01 RX ORDER — LINEZOLID 600 MG/300ML
600 INJECTION, SOLUTION INTRAVENOUS EVERY 12 HOURS
Refills: 0 | Status: DISCONTINUED | OUTPATIENT
Start: 2022-01-01 | End: 2022-01-01

## 2022-01-01 RX ORDER — NOREPINEPHRINE BITARTRATE/D5W 8 MG/250ML
0.05 PLASTIC BAG, INJECTION (ML) INTRAVENOUS
Qty: 16 | Refills: 0 | Status: DISCONTINUED | OUTPATIENT
Start: 2022-01-01 | End: 2022-01-01

## 2022-01-01 RX ORDER — LINEZOLID 600 MG/300ML
INJECTION, SOLUTION INTRAVENOUS
Refills: 0 | Status: DISCONTINUED | OUTPATIENT
Start: 2022-01-01 | End: 2022-01-01

## 2022-01-01 RX ORDER — FUROSEMIDE 40 MG
20 TABLET ORAL ONCE
Refills: 0 | Status: COMPLETED | OUTPATIENT
Start: 2022-01-01 | End: 2022-01-01

## 2022-01-01 RX ORDER — PIPERACILLIN AND TAZOBACTAM 4; .5 G/20ML; G/20ML
3.38 INJECTION, POWDER, LYOPHILIZED, FOR SOLUTION INTRAVENOUS EVERY 8 HOURS
Refills: 0 | Status: DISCONTINUED | OUTPATIENT
Start: 2022-01-01 | End: 2022-01-01

## 2022-01-01 RX ORDER — LACTULOSE 10 G/15ML
10 SOLUTION ORAL DAILY
Refills: 0 | Status: DISCONTINUED | OUTPATIENT
Start: 2022-01-01 | End: 2022-01-01

## 2022-01-01 RX ORDER — ENOXAPARIN SODIUM 100 MG/ML
40 INJECTION SUBCUTANEOUS DAILY
Refills: 0 | Status: DISCONTINUED | OUTPATIENT
Start: 2022-01-01 | End: 2022-01-01

## 2022-01-01 RX ORDER — ACETAMINOPHEN 500 MG
1000 TABLET ORAL ONCE
Refills: 0 | Status: COMPLETED | OUTPATIENT
Start: 2022-01-01 | End: 2022-01-01

## 2022-01-01 RX ORDER — POTASSIUM PHOSPHATE, MONOBASIC POTASSIUM PHOSPHATE, DIBASIC 236; 224 MG/ML; MG/ML
15 INJECTION, SOLUTION INTRAVENOUS ONCE
Refills: 0 | Status: COMPLETED | OUTPATIENT
Start: 2022-01-01 | End: 2022-01-01

## 2022-01-01 RX ORDER — SODIUM ZIRCONIUM CYCLOSILICATE 10 G/10G
10 POWDER, FOR SUSPENSION ORAL THREE TIMES A DAY
Refills: 0 | Status: DISCONTINUED | OUTPATIENT
Start: 2022-01-01 | End: 2022-01-01

## 2022-01-01 RX ORDER — SODIUM CHLORIDE 9 MG/ML
1000 INJECTION INTRAMUSCULAR; INTRAVENOUS; SUBCUTANEOUS
Refills: 0 | Status: DISCONTINUED | OUTPATIENT
Start: 2022-01-01 | End: 2022-01-01

## 2022-01-01 RX ORDER — MIDODRINE HYDROCHLORIDE 2.5 MG/1
2.5 TABLET ORAL THREE TIMES A DAY
Refills: 0 | Status: DISCONTINUED | OUTPATIENT
Start: 2022-01-01 | End: 2022-01-01

## 2022-01-01 RX ORDER — ASPIRIN/CALCIUM CARB/MAGNESIUM 324 MG
1 TABLET ORAL
Qty: 30 | Refills: 0
Start: 2022-01-01 | End: 2022-01-01

## 2022-01-01 RX ORDER — ACETAMINOPHEN 500 MG
650 TABLET ORAL ONCE
Refills: 0 | Status: COMPLETED | OUTPATIENT
Start: 2022-01-01 | End: 2022-01-01

## 2022-01-01 RX ORDER — ACETAMINOPHEN 500 MG
650 TABLET ORAL EVERY 6 HOURS
Refills: 0 | Status: DISCONTINUED | OUTPATIENT
Start: 2022-01-01 | End: 2022-01-01

## 2022-01-01 RX ORDER — POTASSIUM CHLORIDE 20 MEQ
40 PACKET (EA) ORAL EVERY 4 HOURS
Refills: 0 | Status: COMPLETED | OUTPATIENT
Start: 2022-01-01 | End: 2022-01-01

## 2022-01-01 RX ORDER — VANCOMYCIN HCL 1 G
1000 VIAL (EA) INTRAVENOUS EVERY 12 HOURS
Refills: 0 | Status: DISCONTINUED | OUTPATIENT
Start: 2022-01-01 | End: 2022-01-01

## 2022-01-01 RX ORDER — PIPERACILLIN AND TAZOBACTAM 4; .5 G/20ML; G/20ML
3.38 INJECTION, POWDER, LYOPHILIZED, FOR SOLUTION INTRAVENOUS EVERY 8 HOURS
Refills: 0 | Status: COMPLETED | OUTPATIENT
Start: 2022-01-01 | End: 2022-01-01

## 2022-01-01 RX ORDER — CEFEPIME 1 G/1
2000 INJECTION, POWDER, FOR SOLUTION INTRAMUSCULAR; INTRAVENOUS ONCE
Refills: 0 | Status: COMPLETED | OUTPATIENT
Start: 2022-01-01 | End: 2022-01-01

## 2022-01-01 RX ORDER — ZOLEDRONIC ACID 5 MG/100ML
4 INJECTION, SOLUTION INTRAVENOUS ONCE
Refills: 0 | Status: COMPLETED | OUTPATIENT
Start: 2022-01-01 | End: 2022-01-01

## 2022-01-01 RX ORDER — SODIUM ZIRCONIUM CYCLOSILICATE 10 G/10G
10 POWDER, FOR SUSPENSION ORAL
Refills: 0 | Status: COMPLETED | OUTPATIENT
Start: 2022-01-01 | End: 2022-01-01

## 2022-01-01 RX ORDER — PIPERACILLIN AND TAZOBACTAM 4; .5 G/20ML; G/20ML
3.38 INJECTION, POWDER, LYOPHILIZED, FOR SOLUTION INTRAVENOUS ONCE
Refills: 0 | Status: COMPLETED | OUTPATIENT
Start: 2022-01-01 | End: 2022-01-01

## 2022-01-01 RX ORDER — TAMSULOSIN HYDROCHLORIDE 0.4 MG/1
0.4 CAPSULE ORAL AT BEDTIME
Refills: 0 | Status: DISCONTINUED | OUTPATIENT
Start: 2022-01-01 | End: 2022-01-01

## 2022-01-01 RX ORDER — POTASSIUM CHLORIDE 20 MEQ
20 PACKET (EA) ORAL
Refills: 0 | Status: COMPLETED | OUTPATIENT
Start: 2022-01-01 | End: 2022-01-01

## 2022-01-01 RX ORDER — INSULIN LISPRO 100/ML
VIAL (ML) SUBCUTANEOUS EVERY 6 HOURS
Refills: 0 | Status: DISCONTINUED | OUTPATIENT
Start: 2022-01-01 | End: 2022-01-01

## 2022-01-01 RX ORDER — VANCOMYCIN HCL 1 G
VIAL (EA) INTRAVENOUS
Refills: 0 | Status: DISCONTINUED | OUTPATIENT
Start: 2022-01-01 | End: 2022-01-01

## 2022-01-01 RX ORDER — CHLORHEXIDINE GLUCONATE 213 G/1000ML
1 SOLUTION TOPICAL
Refills: 0 | Status: DISCONTINUED | OUTPATIENT
Start: 2022-01-01 | End: 2022-01-01

## 2022-01-01 RX ORDER — MEROPENEM 1 G/30ML
1000 INJECTION INTRAVENOUS EVERY 8 HOURS
Refills: 0 | Status: COMPLETED | OUTPATIENT
Start: 2022-01-01 | End: 2022-01-01

## 2022-01-01 RX ORDER — MIDAZOLAM HYDROCHLORIDE 1 MG/ML
1 INJECTION, SOLUTION INTRAMUSCULAR; INTRAVENOUS ONCE
Refills: 0 | Status: DISCONTINUED | OUTPATIENT
Start: 2022-01-01 | End: 2022-01-01

## 2022-01-01 RX ORDER — INSULIN LISPRO 100/ML
1 VIAL (ML) SUBCUTANEOUS
Qty: 1 | Refills: 0
Start: 2022-01-01 | End: 2022-01-01

## 2022-01-01 RX ORDER — FENTANYL CITRATE 50 UG/ML
25 INJECTION INTRAVENOUS ONCE
Refills: 0 | Status: DISCONTINUED | OUTPATIENT
Start: 2022-01-01 | End: 2022-01-01

## 2022-01-01 RX ORDER — ENOXAPARIN SODIUM 100 MG/ML
40 INJECTION SUBCUTANEOUS
Qty: 0 | Refills: 0 | DISCHARGE
Start: 2022-01-01

## 2022-01-01 RX ORDER — PIPERACILLIN AND TAZOBACTAM 4; .5 G/20ML; G/20ML
3.38 INJECTION, POWDER, LYOPHILIZED, FOR SOLUTION INTRAVENOUS
Qty: 0 | Refills: 0 | DISCHARGE
Start: 2022-01-01 | End: 2022-01-01

## 2022-01-01 RX ORDER — MIDODRINE HYDROCHLORIDE 2.5 MG/1
5 TABLET ORAL THREE TIMES A DAY
Refills: 0 | Status: DISCONTINUED | OUTPATIENT
Start: 2022-01-01 | End: 2022-01-01

## 2022-01-01 RX ORDER — VANCOMYCIN HCL 1 G
1000 VIAL (EA) INTRAVENOUS ONCE
Refills: 0 | Status: COMPLETED | OUTPATIENT
Start: 2022-01-01 | End: 2022-01-01

## 2022-01-01 RX ORDER — PANTOPRAZOLE SODIUM 20 MG/1
40 TABLET, DELAYED RELEASE ORAL DAILY
Refills: 0 | Status: DISCONTINUED | OUTPATIENT
Start: 2022-01-01 | End: 2022-01-01

## 2022-01-01 RX ORDER — PANTOPRAZOLE SODIUM 20 MG/1
40 TABLET, DELAYED RELEASE ORAL ONCE
Refills: 0 | Status: COMPLETED | OUTPATIENT
Start: 2022-01-01 | End: 2022-01-01

## 2022-01-01 RX ORDER — PANTOPRAZOLE SODIUM 20 MG/1
40 TABLET, DELAYED RELEASE ORAL
Refills: 0 | Status: DISCONTINUED | OUTPATIENT
Start: 2022-01-01 | End: 2022-01-01

## 2022-01-01 RX ORDER — CARVEDILOL PHOSPHATE 80 MG/1
0.5 CAPSULE, EXTENDED RELEASE ORAL
Qty: 0 | Refills: 0 | DISCHARGE

## 2022-01-01 RX ORDER — MULTIVIT-MIN/FERROUS GLUCONATE 9 MG/15 ML
15 LIQUID (ML) ORAL DAILY
Refills: 0 | Status: DISCONTINUED | OUTPATIENT
Start: 2022-01-01 | End: 2022-01-01

## 2022-01-01 RX ORDER — DEXTROSE 50 % IN WATER 50 %
50 SYRINGE (ML) INTRAVENOUS ONCE
Refills: 0 | Status: COMPLETED | OUTPATIENT
Start: 2022-01-01 | End: 2022-01-01

## 2022-01-01 RX ORDER — CEFEPIME 1 G/1
INJECTION, POWDER, FOR SOLUTION INTRAMUSCULAR; INTRAVENOUS
Refills: 0 | Status: DISCONTINUED | OUTPATIENT
Start: 2022-01-01 | End: 2022-01-01

## 2022-01-01 RX ORDER — NOREPINEPHRINE BITARTRATE/D5W 8 MG/250ML
0.22 PLASTIC BAG, INJECTION (ML) INTRAVENOUS
Qty: 16 | Refills: 0 | Status: DISCONTINUED | OUTPATIENT
Start: 2022-01-01 | End: 2022-01-01

## 2022-01-01 RX ORDER — PANTOPRAZOLE SODIUM 20 MG/1
40 TABLET, DELAYED RELEASE ORAL EVERY 12 HOURS
Refills: 0 | Status: DISCONTINUED | OUTPATIENT
Start: 2022-01-01 | End: 2022-01-01

## 2022-01-01 RX ORDER — SODIUM CHLORIDE 9 MG/ML
500 INJECTION, SOLUTION INTRAVENOUS
Refills: 0 | Status: DISCONTINUED | OUTPATIENT
Start: 2022-01-01 | End: 2022-01-01

## 2022-01-01 RX ORDER — MORPHINE SULFATE 50 MG/1
1 CAPSULE, EXTENDED RELEASE ORAL EVERY 4 HOURS
Refills: 0 | Status: DISCONTINUED | OUTPATIENT
Start: 2022-01-01 | End: 2022-01-01

## 2022-01-01 RX ORDER — PANTOPRAZOLE SODIUM 20 MG/1
1 TABLET, DELAYED RELEASE ORAL
Qty: 0 | Refills: 0 | DISCHARGE
Start: 2022-01-01

## 2022-01-01 RX ORDER — LACTULOSE 10 G/15ML
15 SOLUTION ORAL
Qty: 0 | Refills: 0 | DISCHARGE
Start: 2022-01-01

## 2022-01-01 RX ORDER — PHENYLEPHRINE HYDROCHLORIDE 10 MG/ML
0.1 INJECTION INTRAVENOUS
Qty: 40 | Refills: 0 | Status: DISCONTINUED | OUTPATIENT
Start: 2022-01-01 | End: 2022-01-01

## 2022-01-01 RX ORDER — GLUCAGON INJECTION, SOLUTION 0.5 MG/.1ML
1 INJECTION, SOLUTION SUBCUTANEOUS ONCE
Refills: 0 | Status: DISCONTINUED | OUTPATIENT
Start: 2022-01-01 | End: 2022-01-01

## 2022-01-01 RX ORDER — CALCITONIN SALMON 200 [IU]/ML
290 INJECTION, SOLUTION INTRAMUSCULAR EVERY 12 HOURS
Refills: 0 | Status: DISCONTINUED | OUTPATIENT
Start: 2022-01-01 | End: 2022-01-01

## 2022-01-01 RX ORDER — MIDODRINE HYDROCHLORIDE 2.5 MG/1
1 TABLET ORAL
Qty: 0 | Refills: 0 | DISCHARGE
Start: 2022-01-01

## 2022-01-01 RX ORDER — SODIUM,POTASSIUM PHOSPHATES 278-250MG
1 POWDER IN PACKET (EA) ORAL ONCE
Refills: 0 | Status: DISCONTINUED | OUTPATIENT
Start: 2022-01-01 | End: 2022-01-01

## 2022-01-01 RX ORDER — POTASSIUM CHLORIDE 20 MEQ
10 PACKET (EA) ORAL
Refills: 0 | Status: COMPLETED | OUTPATIENT
Start: 2022-01-01 | End: 2022-01-01

## 2022-01-01 RX ORDER — MORPHINE SULFATE 50 MG/1
1 CAPSULE, EXTENDED RELEASE ORAL EVERY 6 HOURS
Refills: 0 | Status: DISCONTINUED | OUTPATIENT
Start: 2022-01-01 | End: 2022-01-01

## 2022-01-01 RX ORDER — QUETIAPINE FUMARATE 200 MG/1
12.5 TABLET, FILM COATED ORAL
Refills: 0 | Status: DISCONTINUED | OUTPATIENT
Start: 2022-01-01 | End: 2022-01-01

## 2022-01-01 RX ORDER — ASCORBIC ACID 60 MG
500 TABLET,CHEWABLE ORAL DAILY
Refills: 0 | Status: DISCONTINUED | OUTPATIENT
Start: 2022-01-01 | End: 2022-01-01

## 2022-01-01 RX ORDER — QUETIAPINE FUMARATE 200 MG/1
12.5 TABLET, FILM COATED ORAL AT BEDTIME
Refills: 0 | Status: DISCONTINUED | OUTPATIENT
Start: 2022-01-01 | End: 2022-01-01

## 2022-01-01 RX ORDER — FUROSEMIDE 40 MG
20 TABLET ORAL EVERY 6 HOURS
Refills: 0 | Status: DISCONTINUED | OUTPATIENT
Start: 2022-01-01 | End: 2022-01-01

## 2022-01-01 RX ORDER — CEFEPIME 1 G/1
2000 INJECTION, POWDER, FOR SOLUTION INTRAMUSCULAR; INTRAVENOUS EVERY 8 HOURS
Refills: 0 | Status: DISCONTINUED | OUTPATIENT
Start: 2022-01-01 | End: 2022-01-01

## 2022-01-01 RX ORDER — LINEZOLID 600 MG/300ML
600 INJECTION, SOLUTION INTRAVENOUS ONCE
Refills: 0 | Status: COMPLETED | OUTPATIENT
Start: 2022-01-01 | End: 2022-01-01

## 2022-01-01 RX ORDER — VANCOMYCIN HCL 1 G
750 VIAL (EA) INTRAVENOUS ONCE
Refills: 0 | Status: DISCONTINUED | OUTPATIENT
Start: 2022-01-01 | End: 2022-01-01

## 2022-01-01 RX ORDER — POTASSIUM CHLORIDE 20 MEQ
40 PACKET (EA) ORAL EVERY 4 HOURS
Refills: 0 | Status: DISCONTINUED | OUTPATIENT
Start: 2022-01-01 | End: 2022-01-01

## 2022-01-01 RX ORDER — SODIUM POLYSTYRENE SULFONATE 4.1 MEQ/G
30 POWDER, FOR SUSPENSION ORAL ONCE
Refills: 0 | Status: COMPLETED | OUTPATIENT
Start: 2022-01-01 | End: 2022-01-01

## 2022-01-01 RX ORDER — ROBINUL 0.2 MG/ML
0.1 INJECTION INTRAMUSCULAR; INTRAVENOUS EVERY 6 HOURS
Refills: 0 | Status: DISCONTINUED | OUTPATIENT
Start: 2022-01-01 | End: 2022-01-01

## 2022-01-01 RX ORDER — SODIUM,POTASSIUM PHOSPHATES 278-250MG
1 POWDER IN PACKET (EA) ORAL
Refills: 0 | Status: COMPLETED | OUTPATIENT
Start: 2022-01-01 | End: 2022-01-01

## 2022-01-01 RX ORDER — IOHEXOL 300 MG/ML
30 INJECTION, SOLUTION INTRAVENOUS ONCE
Refills: 0 | Status: COMPLETED | OUTPATIENT
Start: 2022-01-01 | End: 2022-01-01

## 2022-01-01 RX ORDER — MULTIVIT WITH MIN/MFOLATE/K2 340-15/3 G
1 POWDER (GRAM) ORAL ONCE
Refills: 0 | Status: COMPLETED | OUTPATIENT
Start: 2022-01-01 | End: 2022-01-01

## 2022-01-01 RX ADMIN — Medication 650 MILLIGRAM(S): at 19:41

## 2022-01-01 RX ADMIN — MIDODRINE HYDROCHLORIDE 5 MILLIGRAM(S): 2.5 TABLET ORAL at 06:19

## 2022-01-01 RX ADMIN — Medication 2: at 12:11

## 2022-01-01 RX ADMIN — PIPERACILLIN AND TAZOBACTAM 25 GRAM(S): 4; .5 INJECTION, POWDER, LYOPHILIZED, FOR SOLUTION INTRAVENOUS at 13:24

## 2022-01-01 RX ADMIN — Medication 3: at 17:01

## 2022-01-01 RX ADMIN — Medication 650 MILLIGRAM(S): at 11:46

## 2022-01-01 RX ADMIN — Medication 0: at 17:05

## 2022-01-01 RX ADMIN — POTASSIUM PHOSPHATE, MONOBASIC POTASSIUM PHOSPHATE, DIBASIC 62.5 MILLIMOLE(S): 236; 224 INJECTION, SOLUTION INTRAVENOUS at 06:10

## 2022-01-01 RX ADMIN — Medication 1: at 12:39

## 2022-01-01 RX ADMIN — PIPERACILLIN AND TAZOBACTAM 25 GRAM(S): 4; .5 INJECTION, POWDER, LYOPHILIZED, FOR SOLUTION INTRAVENOUS at 21:11

## 2022-01-01 RX ADMIN — LINEZOLID 300 MILLIGRAM(S): 600 INJECTION, SOLUTION INTRAVENOUS at 17:07

## 2022-01-01 RX ADMIN — Medication 20 MILLIGRAM(S): at 06:38

## 2022-01-01 RX ADMIN — Medication 1 APPLICATION(S): at 17:17

## 2022-01-01 RX ADMIN — PANTOPRAZOLE SODIUM 40 MILLIGRAM(S): 20 TABLET, DELAYED RELEASE ORAL at 05:02

## 2022-01-01 RX ADMIN — SODIUM ZIRCONIUM CYCLOSILICATE 10 GRAM(S): 10 POWDER, FOR SUSPENSION ORAL at 17:18

## 2022-01-01 RX ADMIN — PIPERACILLIN AND TAZOBACTAM 25 GRAM(S): 4; .5 INJECTION, POWDER, LYOPHILIZED, FOR SOLUTION INTRAVENOUS at 13:06

## 2022-01-01 RX ADMIN — Medication 650 MILLIGRAM(S): at 00:33

## 2022-01-01 RX ADMIN — PIPERACILLIN AND TAZOBACTAM 25 GRAM(S): 4; .5 INJECTION, POWDER, LYOPHILIZED, FOR SOLUTION INTRAVENOUS at 21:17

## 2022-01-01 RX ADMIN — PANTOPRAZOLE SODIUM 40 MILLIGRAM(S): 20 TABLET, DELAYED RELEASE ORAL at 06:13

## 2022-01-01 RX ADMIN — Medication 650 MILLIGRAM(S): at 11:41

## 2022-01-01 RX ADMIN — Medication 3: at 06:24

## 2022-01-01 RX ADMIN — CHLORHEXIDINE GLUCONATE 15 MILLILITER(S): 213 SOLUTION TOPICAL at 17:21

## 2022-01-01 RX ADMIN — Medication 0: at 11:00

## 2022-01-01 RX ADMIN — POTASSIUM PHOSPHATE, MONOBASIC POTASSIUM PHOSPHATE, DIBASIC 62.5 MILLIMOLE(S): 236; 224 INJECTION, SOLUTION INTRAVENOUS at 11:29

## 2022-01-01 RX ADMIN — ALBUTEROL 2 PUFF(S): 90 AEROSOL, METERED ORAL at 15:53

## 2022-01-01 RX ADMIN — CHLORHEXIDINE GLUCONATE 1 APPLICATION(S): 213 SOLUTION TOPICAL at 06:24

## 2022-01-01 RX ADMIN — PANTOPRAZOLE SODIUM 40 MILLIGRAM(S): 20 TABLET, DELAYED RELEASE ORAL at 05:19

## 2022-01-01 RX ADMIN — Medication 50 MILLILITER(S): at 15:12

## 2022-01-01 RX ADMIN — ALBUTEROL 2 PUFF(S): 90 AEROSOL, METERED ORAL at 20:25

## 2022-01-01 RX ADMIN — CHLORHEXIDINE GLUCONATE 15 MILLILITER(S): 213 SOLUTION TOPICAL at 05:23

## 2022-01-01 RX ADMIN — MEROPENEM 100 MILLIGRAM(S): 1 INJECTION INTRAVENOUS at 14:00

## 2022-01-01 RX ADMIN — Medication 1: at 05:52

## 2022-01-01 RX ADMIN — ATORVASTATIN CALCIUM 40 MILLIGRAM(S): 80 TABLET, FILM COATED ORAL at 21:56

## 2022-01-01 RX ADMIN — MIDODRINE HYDROCHLORIDE 5 MILLIGRAM(S): 2.5 TABLET ORAL at 05:39

## 2022-01-01 RX ADMIN — CHLORHEXIDINE GLUCONATE 15 MILLILITER(S): 213 SOLUTION TOPICAL at 05:14

## 2022-01-01 RX ADMIN — Medication 650 MILLIGRAM(S): at 19:29

## 2022-01-01 RX ADMIN — Medication 1: at 17:40

## 2022-01-01 RX ADMIN — MIDODRINE HYDROCHLORIDE 5 MILLIGRAM(S): 2.5 TABLET ORAL at 17:20

## 2022-01-01 RX ADMIN — PANTOPRAZOLE SODIUM 40 MILLIGRAM(S): 20 TABLET, DELAYED RELEASE ORAL at 17:16

## 2022-01-01 RX ADMIN — PIPERACILLIN AND TAZOBACTAM 25 GRAM(S): 4; .5 INJECTION, POWDER, LYOPHILIZED, FOR SOLUTION INTRAVENOUS at 05:55

## 2022-01-01 RX ADMIN — FENTANYL CITRATE 25 MICROGRAM(S): 50 INJECTION INTRAVENOUS at 10:21

## 2022-01-01 RX ADMIN — Medication 15 MILLILITER(S): at 11:15

## 2022-01-01 RX ADMIN — CHLORHEXIDINE GLUCONATE 15 MILLILITER(S): 213 SOLUTION TOPICAL at 17:19

## 2022-01-01 RX ADMIN — FENTANYL CITRATE 25 MICROGRAM(S): 50 INJECTION INTRAVENOUS at 04:37

## 2022-01-01 RX ADMIN — CHLORHEXIDINE GLUCONATE 15 MILLILITER(S): 213 SOLUTION TOPICAL at 17:12

## 2022-01-01 RX ADMIN — Medication 650 MILLIGRAM(S): at 13:36

## 2022-01-01 RX ADMIN — MIDODRINE HYDROCHLORIDE 5 MILLIGRAM(S): 2.5 TABLET ORAL at 05:53

## 2022-01-01 RX ADMIN — MIDODRINE HYDROCHLORIDE 5 MILLIGRAM(S): 2.5 TABLET ORAL at 12:38

## 2022-01-01 RX ADMIN — CHLORHEXIDINE GLUCONATE 1 APPLICATION(S): 213 SOLUTION TOPICAL at 05:10

## 2022-01-01 RX ADMIN — CHLORHEXIDINE GLUCONATE 15 MILLILITER(S): 213 SOLUTION TOPICAL at 05:45

## 2022-01-01 RX ADMIN — QUETIAPINE FUMARATE 12.5 MILLIGRAM(S): 200 TABLET, FILM COATED ORAL at 21:04

## 2022-01-01 RX ADMIN — CHLORHEXIDINE GLUCONATE 15 MILLILITER(S): 213 SOLUTION TOPICAL at 05:52

## 2022-01-01 RX ADMIN — ENOXAPARIN SODIUM 40 MILLIGRAM(S): 100 INJECTION SUBCUTANEOUS at 12:03

## 2022-01-01 RX ADMIN — Medication 1: at 00:35

## 2022-01-01 RX ADMIN — CEFEPIME 100 MILLIGRAM(S): 1 INJECTION, POWDER, FOR SOLUTION INTRAMUSCULAR; INTRAVENOUS at 13:41

## 2022-01-01 RX ADMIN — Medication 20 MILLIGRAM(S): at 20:18

## 2022-01-01 RX ADMIN — Medication 20 MILLIGRAM(S): at 01:43

## 2022-01-01 RX ADMIN — Medication 650 MILLIGRAM(S): at 12:41

## 2022-01-01 RX ADMIN — ALBUTEROL 2 PUFF(S): 90 AEROSOL, METERED ORAL at 03:47

## 2022-01-01 RX ADMIN — Medication 2: at 11:54

## 2022-01-01 RX ADMIN — MEROPENEM 100 MILLIGRAM(S): 1 INJECTION INTRAVENOUS at 21:43

## 2022-01-01 RX ADMIN — MIDODRINE HYDROCHLORIDE 5 MILLIGRAM(S): 2.5 TABLET ORAL at 06:28

## 2022-01-01 RX ADMIN — ENOXAPARIN SODIUM 40 MILLIGRAM(S): 100 INJECTION SUBCUTANEOUS at 11:56

## 2022-01-01 RX ADMIN — PANTOPRAZOLE SODIUM 40 MILLIGRAM(S): 20 TABLET, DELAYED RELEASE ORAL at 05:07

## 2022-01-01 RX ADMIN — Medication 1: at 17:21

## 2022-01-01 RX ADMIN — Medication 2: at 11:58

## 2022-01-01 RX ADMIN — MIDODRINE HYDROCHLORIDE 5 MILLIGRAM(S): 2.5 TABLET ORAL at 05:11

## 2022-01-01 RX ADMIN — MIDODRINE HYDROCHLORIDE 5 MILLIGRAM(S): 2.5 TABLET ORAL at 12:30

## 2022-01-01 RX ADMIN — ATORVASTATIN CALCIUM 40 MILLIGRAM(S): 80 TABLET, FILM COATED ORAL at 21:55

## 2022-01-01 RX ADMIN — Medication 1: at 13:35

## 2022-01-01 RX ADMIN — MEROPENEM 100 MILLIGRAM(S): 1 INJECTION INTRAVENOUS at 06:17

## 2022-01-01 RX ADMIN — PIPERACILLIN AND TAZOBACTAM 25 GRAM(S): 4; .5 INJECTION, POWDER, LYOPHILIZED, FOR SOLUTION INTRAVENOUS at 22:11

## 2022-01-01 RX ADMIN — ENOXAPARIN SODIUM 40 MILLIGRAM(S): 100 INJECTION SUBCUTANEOUS at 11:24

## 2022-01-01 RX ADMIN — Medication 1: at 00:02

## 2022-01-01 RX ADMIN — Medication 2: at 00:18

## 2022-01-01 RX ADMIN — CHLORHEXIDINE GLUCONATE 15 MILLILITER(S): 213 SOLUTION TOPICAL at 06:19

## 2022-01-01 RX ADMIN — Medication 40 MILLIEQUIVALENT(S): at 11:36

## 2022-01-01 RX ADMIN — QUETIAPINE FUMARATE 12.5 MILLIGRAM(S): 200 TABLET, FILM COATED ORAL at 21:31

## 2022-01-01 RX ADMIN — MIDODRINE HYDROCHLORIDE 5 MILLIGRAM(S): 2.5 TABLET ORAL at 05:29

## 2022-01-01 RX ADMIN — PANTOPRAZOLE SODIUM 40 MILLIGRAM(S): 20 TABLET, DELAYED RELEASE ORAL at 05:55

## 2022-01-01 RX ADMIN — ENOXAPARIN SODIUM 40 MILLIGRAM(S): 100 INJECTION SUBCUTANEOUS at 11:10

## 2022-01-01 RX ADMIN — PIPERACILLIN AND TAZOBACTAM 25 GRAM(S): 4; .5 INJECTION, POWDER, LYOPHILIZED, FOR SOLUTION INTRAVENOUS at 21:41

## 2022-01-01 RX ADMIN — ROBINUL 0.1 MILLIGRAM(S): 0.2 INJECTION INTRAMUSCULAR; INTRAVENOUS at 16:05

## 2022-01-01 RX ADMIN — FENTANYL CITRATE 25 MICROGRAM(S): 50 INJECTION INTRAVENOUS at 22:00

## 2022-01-01 RX ADMIN — Medication 1 APPLICATION(S): at 11:17

## 2022-01-01 RX ADMIN — Medication 100 MILLIEQUIVALENT(S): at 11:10

## 2022-01-01 RX ADMIN — ENOXAPARIN SODIUM 40 MILLIGRAM(S): 100 INJECTION SUBCUTANEOUS at 11:46

## 2022-01-01 RX ADMIN — Medication 1: at 23:51

## 2022-01-01 RX ADMIN — PANTOPRAZOLE SODIUM 40 MILLIGRAM(S): 20 TABLET, DELAYED RELEASE ORAL at 17:34

## 2022-01-01 RX ADMIN — Medication 50 MILLILITER(S): at 06:03

## 2022-01-01 RX ADMIN — Medication 2: at 06:54

## 2022-01-01 RX ADMIN — Medication 15 MILLILITER(S): at 12:28

## 2022-01-01 RX ADMIN — IOHEXOL 30 MILLILITER(S): 300 INJECTION, SOLUTION INTRAVENOUS at 16:59

## 2022-01-01 RX ADMIN — Medication 50 MILLILITER(S): at 17:20

## 2022-01-01 RX ADMIN — PANTOPRAZOLE SODIUM 40 MILLIGRAM(S): 20 TABLET, DELAYED RELEASE ORAL at 17:07

## 2022-01-01 RX ADMIN — PANTOPRAZOLE SODIUM 40 MILLIGRAM(S): 20 TABLET, DELAYED RELEASE ORAL at 11:25

## 2022-01-01 RX ADMIN — Medication 650 MILLIGRAM(S): at 18:23

## 2022-01-01 RX ADMIN — CHLORHEXIDINE GLUCONATE 1 APPLICATION(S): 213 SOLUTION TOPICAL at 07:18

## 2022-01-01 RX ADMIN — MIDODRINE HYDROCHLORIDE 5 MILLIGRAM(S): 2.5 TABLET ORAL at 17:21

## 2022-01-01 RX ADMIN — SODIUM CHLORIDE 125 MILLILITER(S): 9 INJECTION INTRAMUSCULAR; INTRAVENOUS; SUBCUTANEOUS at 12:13

## 2022-01-01 RX ADMIN — PANTOPRAZOLE SODIUM 40 MILLIGRAM(S): 20 TABLET, DELAYED RELEASE ORAL at 06:18

## 2022-01-01 RX ADMIN — PANTOPRAZOLE SODIUM 40 MILLIGRAM(S): 20 TABLET, DELAYED RELEASE ORAL at 17:19

## 2022-01-01 RX ADMIN — Medication 1000 MILLIGRAM(S): at 23:37

## 2022-01-01 RX ADMIN — MIDODRINE HYDROCHLORIDE 5 MILLIGRAM(S): 2.5 TABLET ORAL at 12:11

## 2022-01-01 RX ADMIN — Medication 250 MILLIGRAM(S): at 22:39

## 2022-01-01 RX ADMIN — Medication 2: at 11:37

## 2022-01-01 RX ADMIN — CHLORHEXIDINE GLUCONATE 1 APPLICATION(S): 213 SOLUTION TOPICAL at 05:28

## 2022-01-01 RX ADMIN — MIDODRINE HYDROCHLORIDE 5 MILLIGRAM(S): 2.5 TABLET ORAL at 12:26

## 2022-01-01 RX ADMIN — ENOXAPARIN SODIUM 40 MILLIGRAM(S): 100 INJECTION SUBCUTANEOUS at 17:18

## 2022-01-01 RX ADMIN — ALBUTEROL 2 PUFF(S): 90 AEROSOL, METERED ORAL at 22:07

## 2022-01-01 RX ADMIN — MEROPENEM 100 MILLIGRAM(S): 1 INJECTION INTRAVENOUS at 21:55

## 2022-01-01 RX ADMIN — PIPERACILLIN AND TAZOBACTAM 25 GRAM(S): 4; .5 INJECTION, POWDER, LYOPHILIZED, FOR SOLUTION INTRAVENOUS at 06:00

## 2022-01-01 RX ADMIN — PIPERACILLIN AND TAZOBACTAM 25 GRAM(S): 4; .5 INJECTION, POWDER, LYOPHILIZED, FOR SOLUTION INTRAVENOUS at 05:09

## 2022-01-01 RX ADMIN — CHLORHEXIDINE GLUCONATE 1 APPLICATION(S): 213 SOLUTION TOPICAL at 11:16

## 2022-01-01 RX ADMIN — Medication 20 MILLIGRAM(S): at 12:54

## 2022-01-01 RX ADMIN — PIPERACILLIN AND TAZOBACTAM 25 GRAM(S): 4; .5 INJECTION, POWDER, LYOPHILIZED, FOR SOLUTION INTRAVENOUS at 22:08

## 2022-01-01 RX ADMIN — PANTOPRAZOLE SODIUM 40 MILLIGRAM(S): 20 TABLET, DELAYED RELEASE ORAL at 05:29

## 2022-01-01 RX ADMIN — Medication 1 APPLICATION(S): at 11:55

## 2022-01-01 RX ADMIN — Medication 2: at 23:39

## 2022-01-01 RX ADMIN — PANTOPRAZOLE SODIUM 40 MILLIGRAM(S): 20 TABLET, DELAYED RELEASE ORAL at 06:00

## 2022-01-01 RX ADMIN — Medication 1000 MILLIGRAM(S): at 18:00

## 2022-01-01 RX ADMIN — Medication 1: at 06:16

## 2022-01-01 RX ADMIN — Medication 1: at 23:59

## 2022-01-01 RX ADMIN — POTASSIUM PHOSPHATE, MONOBASIC POTASSIUM PHOSPHATE, DIBASIC 62.5 MILLIMOLE(S): 236; 224 INJECTION, SOLUTION INTRAVENOUS at 07:22

## 2022-01-01 RX ADMIN — Medication 50 MILLILITER(S): at 05:35

## 2022-01-01 RX ADMIN — Medication 650 MILLIGRAM(S): at 23:38

## 2022-01-01 RX ADMIN — PIPERACILLIN AND TAZOBACTAM 25 GRAM(S): 4; .5 INJECTION, POWDER, LYOPHILIZED, FOR SOLUTION INTRAVENOUS at 21:30

## 2022-01-01 RX ADMIN — Medication 20 MILLIGRAM(S): at 06:26

## 2022-01-01 RX ADMIN — ROBINUL 0.1 MILLIGRAM(S): 0.2 INJECTION INTRAMUSCULAR; INTRAVENOUS at 10:03

## 2022-01-01 RX ADMIN — MIDODRINE HYDROCHLORIDE 5 MILLIGRAM(S): 2.5 TABLET ORAL at 06:10

## 2022-01-01 RX ADMIN — Medication 50 MILLILITER(S): at 23:38

## 2022-01-01 RX ADMIN — Medication 50 MILLILITER(S): at 17:19

## 2022-01-01 RX ADMIN — SODIUM CHLORIDE 60 MILLILITER(S): 9 INJECTION, SOLUTION INTRAVENOUS at 13:07

## 2022-01-01 RX ADMIN — CHLORHEXIDINE GLUCONATE 15 MILLILITER(S): 213 SOLUTION TOPICAL at 17:28

## 2022-01-01 RX ADMIN — ROBINUL 0.1 MILLIGRAM(S): 0.2 INJECTION INTRAMUSCULAR; INTRAVENOUS at 02:47

## 2022-01-01 RX ADMIN — ALBUTEROL 2 PUFF(S): 90 AEROSOL, METERED ORAL at 04:05

## 2022-01-01 RX ADMIN — Medication 650 MILLIGRAM(S): at 06:11

## 2022-01-01 RX ADMIN — ALBUTEROL 2 PUFF(S): 90 AEROSOL, METERED ORAL at 14:52

## 2022-01-01 RX ADMIN — PANTOPRAZOLE SODIUM 40 MILLIGRAM(S): 20 TABLET, DELAYED RELEASE ORAL at 17:52

## 2022-01-01 RX ADMIN — PANTOPRAZOLE SODIUM 40 MILLIGRAM(S): 20 TABLET, DELAYED RELEASE ORAL at 18:25

## 2022-01-01 RX ADMIN — ATORVASTATIN CALCIUM 40 MILLIGRAM(S): 80 TABLET, FILM COATED ORAL at 21:42

## 2022-01-01 RX ADMIN — ALBUTEROL 2 PUFF(S): 90 AEROSOL, METERED ORAL at 08:35

## 2022-01-01 RX ADMIN — Medication 650 MILLIGRAM(S): at 18:58

## 2022-01-01 RX ADMIN — MEROPENEM 100 MILLIGRAM(S): 1 INJECTION INTRAVENOUS at 06:35

## 2022-01-01 RX ADMIN — ALBUTEROL 2 PUFF(S): 90 AEROSOL, METERED ORAL at 20:19

## 2022-01-01 RX ADMIN — MIDODRINE HYDROCHLORIDE 5 MILLIGRAM(S): 2.5 TABLET ORAL at 06:41

## 2022-01-01 RX ADMIN — Medication 250 MILLIGRAM(S): at 13:01

## 2022-01-01 RX ADMIN — Medication 14.9 MICROGRAM(S)/KG/MIN: at 21:02

## 2022-01-01 RX ADMIN — MIDODRINE HYDROCHLORIDE 5 MILLIGRAM(S): 2.5 TABLET ORAL at 05:35

## 2022-01-01 RX ADMIN — Medication 100 MILLIEQUIVALENT(S): at 14:20

## 2022-01-01 RX ADMIN — PANTOPRAZOLE SODIUM 40 MILLIGRAM(S): 20 TABLET, DELAYED RELEASE ORAL at 05:08

## 2022-01-01 RX ADMIN — MIDODRINE HYDROCHLORIDE 5 MILLIGRAM(S): 2.5 TABLET ORAL at 11:52

## 2022-01-01 RX ADMIN — Medication 20 MILLIGRAM(S): at 17:22

## 2022-01-01 RX ADMIN — ATORVASTATIN CALCIUM 40 MILLIGRAM(S): 80 TABLET, FILM COATED ORAL at 22:44

## 2022-01-01 RX ADMIN — Medication 1 TABLET(S): at 17:47

## 2022-01-01 RX ADMIN — PIPERACILLIN AND TAZOBACTAM 25 GRAM(S): 4; .5 INJECTION, POWDER, LYOPHILIZED, FOR SOLUTION INTRAVENOUS at 21:15

## 2022-01-01 RX ADMIN — Medication 250 MILLIGRAM(S): at 08:18

## 2022-01-01 RX ADMIN — PIPERACILLIN AND TAZOBACTAM 25 GRAM(S): 4; .5 INJECTION, POWDER, LYOPHILIZED, FOR SOLUTION INTRAVENOUS at 14:33

## 2022-01-01 RX ADMIN — Medication 2: at 23:51

## 2022-01-01 RX ADMIN — Medication 20 MILLIGRAM(S): at 02:39

## 2022-01-01 RX ADMIN — Medication 650 MILLIGRAM(S): at 20:34

## 2022-01-01 RX ADMIN — CHLORHEXIDINE GLUCONATE 1 APPLICATION(S): 213 SOLUTION TOPICAL at 05:33

## 2022-01-01 RX ADMIN — Medication 650 MILLIGRAM(S): at 07:40

## 2022-01-01 RX ADMIN — Medication 20 MILLIGRAM(S): at 13:36

## 2022-01-01 RX ADMIN — Medication 1: at 12:40

## 2022-01-01 RX ADMIN — CEFEPIME 100 MILLIGRAM(S): 1 INJECTION, POWDER, FOR SOLUTION INTRAMUSCULAR; INTRAVENOUS at 06:43

## 2022-01-01 RX ADMIN — QUETIAPINE FUMARATE 12.5 MILLIGRAM(S): 200 TABLET, FILM COATED ORAL at 17:24

## 2022-01-01 RX ADMIN — Medication 40 MILLIEQUIVALENT(S): at 02:50

## 2022-01-01 RX ADMIN — Medication 50 MILLILITER(S): at 17:22

## 2022-01-01 RX ADMIN — MIDODRINE HYDROCHLORIDE 5 MILLIGRAM(S): 2.5 TABLET ORAL at 06:02

## 2022-01-01 RX ADMIN — Medication 1: at 12:26

## 2022-01-01 RX ADMIN — Medication 650 MILLIGRAM(S): at 23:32

## 2022-01-01 RX ADMIN — PANTOPRAZOLE SODIUM 40 MILLIGRAM(S): 20 TABLET, DELAYED RELEASE ORAL at 06:29

## 2022-01-01 RX ADMIN — CHLORHEXIDINE GLUCONATE 1 APPLICATION(S): 213 SOLUTION TOPICAL at 06:29

## 2022-01-01 RX ADMIN — CHLORHEXIDINE GLUCONATE 15 MILLILITER(S): 213 SOLUTION TOPICAL at 06:13

## 2022-01-01 RX ADMIN — Medication 400 MILLIGRAM(S): at 23:46

## 2022-01-01 RX ADMIN — CHLORHEXIDINE GLUCONATE 1 APPLICATION(S): 213 SOLUTION TOPICAL at 05:55

## 2022-01-01 RX ADMIN — Medication 20 MILLIGRAM(S): at 00:31

## 2022-01-01 RX ADMIN — ENOXAPARIN SODIUM 40 MILLIGRAM(S): 100 INJECTION SUBCUTANEOUS at 11:01

## 2022-01-01 RX ADMIN — ENOXAPARIN SODIUM 40 MILLIGRAM(S): 100 INJECTION SUBCUTANEOUS at 17:20

## 2022-01-01 RX ADMIN — MIDODRINE HYDROCHLORIDE 5 MILLIGRAM(S): 2.5 TABLET ORAL at 17:12

## 2022-01-01 RX ADMIN — CHLORHEXIDINE GLUCONATE 1 APPLICATION(S): 213 SOLUTION TOPICAL at 05:38

## 2022-01-01 RX ADMIN — SODIUM CHLORIDE 75 MILLILITER(S): 9 INJECTION, SOLUTION INTRAVENOUS at 08:59

## 2022-01-01 RX ADMIN — MEROPENEM 100 MILLIGRAM(S): 1 INJECTION INTRAVENOUS at 06:43

## 2022-01-01 RX ADMIN — SODIUM CHLORIDE 30 MILLILITER(S): 9 INJECTION, SOLUTION INTRAVENOUS at 13:10

## 2022-01-01 RX ADMIN — POTASSIUM PHOSPHATE, MONOBASIC POTASSIUM PHOSPHATE, DIBASIC 62.5 MILLIMOLE(S): 236; 224 INJECTION, SOLUTION INTRAVENOUS at 06:46

## 2022-01-01 RX ADMIN — Medication 1: at 17:20

## 2022-01-01 RX ADMIN — Medication 2: at 17:14

## 2022-01-01 RX ADMIN — PIPERACILLIN AND TAZOBACTAM 25 GRAM(S): 4; .5 INJECTION, POWDER, LYOPHILIZED, FOR SOLUTION INTRAVENOUS at 06:46

## 2022-01-01 RX ADMIN — MIDODRINE HYDROCHLORIDE 5 MILLIGRAM(S): 2.5 TABLET ORAL at 12:28

## 2022-01-01 RX ADMIN — Medication 4: at 23:44

## 2022-01-01 RX ADMIN — PANTOPRAZOLE SODIUM 40 MILLIGRAM(S): 20 TABLET, DELAYED RELEASE ORAL at 06:28

## 2022-01-01 RX ADMIN — Medication 100 MILLIEQUIVALENT(S): at 14:52

## 2022-01-01 RX ADMIN — LINEZOLID 300 MILLIGRAM(S): 600 INJECTION, SOLUTION INTRAVENOUS at 05:34

## 2022-01-01 RX ADMIN — Medication 50 MILLILITER(S): at 23:23

## 2022-01-01 RX ADMIN — Medication 100 MILLIEQUIVALENT(S): at 09:42

## 2022-01-01 RX ADMIN — CHLORHEXIDINE GLUCONATE 15 MILLILITER(S): 213 SOLUTION TOPICAL at 06:11

## 2022-01-01 RX ADMIN — FENTANYL CITRATE 25 MICROGRAM(S): 50 INJECTION INTRAVENOUS at 08:21

## 2022-01-01 RX ADMIN — Medication 1 APPLICATION(S): at 12:27

## 2022-01-01 RX ADMIN — POTASSIUM PHOSPHATE, MONOBASIC POTASSIUM PHOSPHATE, DIBASIC 83.33 MILLIMOLE(S): 236; 224 INJECTION, SOLUTION INTRAVENOUS at 10:36

## 2022-01-01 RX ADMIN — PANTOPRAZOLE SODIUM 80 MILLIGRAM(S): 20 TABLET, DELAYED RELEASE ORAL at 20:05

## 2022-01-01 RX ADMIN — ATORVASTATIN CALCIUM 40 MILLIGRAM(S): 80 TABLET, FILM COATED ORAL at 21:46

## 2022-01-01 RX ADMIN — ENOXAPARIN SODIUM 40 MILLIGRAM(S): 100 INJECTION SUBCUTANEOUS at 17:33

## 2022-01-01 RX ADMIN — MIDODRINE HYDROCHLORIDE 2.5 MILLIGRAM(S): 2.5 TABLET ORAL at 05:19

## 2022-01-01 RX ADMIN — MIDODRINE HYDROCHLORIDE 5 MILLIGRAM(S): 2.5 TABLET ORAL at 11:36

## 2022-01-01 RX ADMIN — Medication 1: at 11:17

## 2022-01-01 RX ADMIN — Medication 500 MILLIGRAM(S): at 11:40

## 2022-01-01 RX ADMIN — Medication 2: at 17:21

## 2022-01-01 RX ADMIN — MIDODRINE HYDROCHLORIDE 5 MILLIGRAM(S): 2.5 TABLET ORAL at 11:58

## 2022-01-01 RX ADMIN — PANTOPRAZOLE SODIUM 40 MILLIGRAM(S): 20 TABLET, DELAYED RELEASE ORAL at 17:09

## 2022-01-01 RX ADMIN — CHLORHEXIDINE GLUCONATE 1 APPLICATION(S): 213 SOLUTION TOPICAL at 05:02

## 2022-01-01 RX ADMIN — MEROPENEM 100 MILLIGRAM(S): 1 INJECTION INTRAVENOUS at 21:01

## 2022-01-01 RX ADMIN — SODIUM CHLORIDE 70 MILLILITER(S): 9 INJECTION INTRAMUSCULAR; INTRAVENOUS; SUBCUTANEOUS at 10:29

## 2022-01-01 RX ADMIN — PANTOPRAZOLE SODIUM 40 MILLIGRAM(S): 20 TABLET, DELAYED RELEASE ORAL at 17:37

## 2022-01-01 RX ADMIN — ROBINUL 0.1 MILLIGRAM(S): 0.2 INJECTION INTRAMUSCULAR; INTRAVENOUS at 01:45

## 2022-01-01 RX ADMIN — PIPERACILLIN AND TAZOBACTAM 25 GRAM(S): 4; .5 INJECTION, POWDER, LYOPHILIZED, FOR SOLUTION INTRAVENOUS at 05:04

## 2022-01-01 RX ADMIN — Medication 650 MILLIGRAM(S): at 13:22

## 2022-01-01 RX ADMIN — MORPHINE SULFATE 1 MILLIGRAM(S): 50 CAPSULE, EXTENDED RELEASE ORAL at 09:30

## 2022-01-01 RX ADMIN — MEROPENEM 100 MILLIGRAM(S): 1 INJECTION INTRAVENOUS at 13:58

## 2022-01-01 RX ADMIN — ENOXAPARIN SODIUM 40 MILLIGRAM(S): 100 INJECTION SUBCUTANEOUS at 11:00

## 2022-01-01 RX ADMIN — Medication 1: at 23:50

## 2022-01-01 RX ADMIN — Medication 2: at 05:30

## 2022-01-01 RX ADMIN — Medication 650 MILLIGRAM(S): at 08:44

## 2022-01-01 RX ADMIN — Medication 50 MILLILITER(S): at 17:44

## 2022-01-01 RX ADMIN — Medication 2: at 11:51

## 2022-01-01 RX ADMIN — Medication 1: at 17:45

## 2022-01-01 RX ADMIN — Medication 650 MILLIGRAM(S): at 10:11

## 2022-01-01 RX ADMIN — CALCITONIN SALMON 290 INTERNATIONAL UNIT(S): 200 INJECTION, SOLUTION INTRAMUSCULAR at 05:38

## 2022-01-01 RX ADMIN — MEROPENEM 100 MILLIGRAM(S): 1 INJECTION INTRAVENOUS at 13:13

## 2022-01-01 RX ADMIN — CHLORHEXIDINE GLUCONATE 15 MILLILITER(S): 213 SOLUTION TOPICAL at 17:37

## 2022-01-01 RX ADMIN — Medication 250 MILLIGRAM(S): at 17:14

## 2022-01-01 RX ADMIN — Medication 20 MILLIGRAM(S): at 06:40

## 2022-01-01 RX ADMIN — ATORVASTATIN CALCIUM 40 MILLIGRAM(S): 80 TABLET, FILM COATED ORAL at 22:40

## 2022-01-01 RX ADMIN — Medication 100 MILLIEQUIVALENT(S): at 11:35

## 2022-01-01 RX ADMIN — CHLORHEXIDINE GLUCONATE 15 MILLILITER(S): 213 SOLUTION TOPICAL at 17:06

## 2022-01-01 RX ADMIN — Medication 20 MILLIGRAM(S): at 18:50

## 2022-01-01 RX ADMIN — Medication 1: at 00:28

## 2022-01-01 RX ADMIN — SODIUM CHLORIDE 70 MILLILITER(S): 9 INJECTION INTRAMUSCULAR; INTRAVENOUS; SUBCUTANEOUS at 02:13

## 2022-01-01 RX ADMIN — PANTOPRAZOLE SODIUM 40 MILLIGRAM(S): 20 TABLET, DELAYED RELEASE ORAL at 17:21

## 2022-01-01 RX ADMIN — CHLORHEXIDINE GLUCONATE 1 APPLICATION(S): 213 SOLUTION TOPICAL at 05:09

## 2022-01-01 RX ADMIN — CHLORHEXIDINE GLUCONATE 15 MILLILITER(S): 213 SOLUTION TOPICAL at 18:59

## 2022-01-01 RX ADMIN — PIPERACILLIN AND TAZOBACTAM 25 GRAM(S): 4; .5 INJECTION, POWDER, LYOPHILIZED, FOR SOLUTION INTRAVENOUS at 14:10

## 2022-01-01 RX ADMIN — MIDODRINE HYDROCHLORIDE 5 MILLIGRAM(S): 2.5 TABLET ORAL at 17:19

## 2022-01-01 RX ADMIN — MIDODRINE HYDROCHLORIDE 5 MILLIGRAM(S): 2.5 TABLET ORAL at 12:59

## 2022-01-01 RX ADMIN — Medication 650 MILLIGRAM(S): at 10:36

## 2022-01-01 RX ADMIN — ALBUTEROL 2 PUFF(S): 90 AEROSOL, METERED ORAL at 04:00

## 2022-01-01 RX ADMIN — PIPERACILLIN AND TAZOBACTAM 200 GRAM(S): 4; .5 INJECTION, POWDER, LYOPHILIZED, FOR SOLUTION INTRAVENOUS at 13:01

## 2022-01-01 RX ADMIN — Medication 1 APPLICATION(S): at 11:41

## 2022-01-01 RX ADMIN — Medication 1: at 23:32

## 2022-01-01 RX ADMIN — Medication 1: at 17:19

## 2022-01-01 RX ADMIN — Medication 50 MILLILITER(S): at 23:05

## 2022-01-01 RX ADMIN — CHLORHEXIDINE GLUCONATE 1 APPLICATION(S): 213 SOLUTION TOPICAL at 06:27

## 2022-01-01 RX ADMIN — Medication 1: at 17:58

## 2022-01-01 RX ADMIN — Medication 650 MILLIGRAM(S): at 12:18

## 2022-01-01 RX ADMIN — ENOXAPARIN SODIUM 40 MILLIGRAM(S): 100 INJECTION SUBCUTANEOUS at 12:11

## 2022-01-01 RX ADMIN — PIPERACILLIN AND TAZOBACTAM 25 GRAM(S): 4; .5 INJECTION, POWDER, LYOPHILIZED, FOR SOLUTION INTRAVENOUS at 13:32

## 2022-01-01 RX ADMIN — CHLORHEXIDINE GLUCONATE 15 MILLILITER(S): 213 SOLUTION TOPICAL at 06:44

## 2022-01-01 RX ADMIN — Medication 2: at 05:29

## 2022-01-01 RX ADMIN — CEFEPIME 100 MILLIGRAM(S): 1 INJECTION, POWDER, FOR SOLUTION INTRAMUSCULAR; INTRAVENOUS at 05:40

## 2022-01-01 RX ADMIN — POTASSIUM PHOSPHATE, MONOBASIC POTASSIUM PHOSPHATE, DIBASIC 62.5 MILLIMOLE(S): 236; 224 INJECTION, SOLUTION INTRAVENOUS at 09:39

## 2022-01-01 RX ADMIN — CHLORHEXIDINE GLUCONATE 1 APPLICATION(S): 213 SOLUTION TOPICAL at 06:00

## 2022-01-01 RX ADMIN — Medication 50 MILLILITER(S): at 01:27

## 2022-01-01 RX ADMIN — Medication 3: at 17:27

## 2022-01-01 RX ADMIN — FENTANYL CITRATE 25 MICROGRAM(S): 50 INJECTION INTRAVENOUS at 19:25

## 2022-01-01 RX ADMIN — ATORVASTATIN CALCIUM 40 MILLIGRAM(S): 80 TABLET, FILM COATED ORAL at 22:29

## 2022-01-01 RX ADMIN — Medication 20 MILLIGRAM(S): at 00:24

## 2022-01-01 RX ADMIN — Medication 1: at 05:24

## 2022-01-01 RX ADMIN — LINEZOLID 300 MILLIGRAM(S): 600 INJECTION, SOLUTION INTRAVENOUS at 05:39

## 2022-01-01 RX ADMIN — MIDODRINE HYDROCHLORIDE 5 MILLIGRAM(S): 2.5 TABLET ORAL at 14:01

## 2022-01-01 RX ADMIN — Medication 650 MILLIGRAM(S): at 11:48

## 2022-01-01 RX ADMIN — Medication 50 MILLILITER(S): at 23:45

## 2022-01-01 RX ADMIN — Medication 20 MILLIGRAM(S): at 00:15

## 2022-01-01 RX ADMIN — Medication 40 MILLIEQUIVALENT(S): at 23:05

## 2022-01-01 RX ADMIN — CHLORHEXIDINE GLUCONATE 1 APPLICATION(S): 213 SOLUTION TOPICAL at 05:39

## 2022-01-01 RX ADMIN — SODIUM CHLORIDE 75 MILLILITER(S): 9 INJECTION, SOLUTION INTRAVENOUS at 23:13

## 2022-01-01 RX ADMIN — PIPERACILLIN AND TAZOBACTAM 25 GRAM(S): 4; .5 INJECTION, POWDER, LYOPHILIZED, FOR SOLUTION INTRAVENOUS at 13:11

## 2022-01-01 RX ADMIN — MIDODRINE HYDROCHLORIDE 5 MILLIGRAM(S): 2.5 TABLET ORAL at 17:53

## 2022-01-01 RX ADMIN — Medication 0: at 17:09

## 2022-01-01 RX ADMIN — CHLORHEXIDINE GLUCONATE 1 APPLICATION(S): 213 SOLUTION TOPICAL at 05:12

## 2022-01-01 RX ADMIN — ALBUTEROL 2 PUFF(S): 90 AEROSOL, METERED ORAL at 08:23

## 2022-01-01 RX ADMIN — PANTOPRAZOLE SODIUM 40 MILLIGRAM(S): 20 TABLET, DELAYED RELEASE ORAL at 17:02

## 2022-01-01 RX ADMIN — CHLORHEXIDINE GLUCONATE 15 MILLILITER(S): 213 SOLUTION TOPICAL at 17:25

## 2022-01-01 RX ADMIN — PIPERACILLIN AND TAZOBACTAM 25 GRAM(S): 4; .5 INJECTION, POWDER, LYOPHILIZED, FOR SOLUTION INTRAVENOUS at 13:26

## 2022-01-01 RX ADMIN — Medication 40 MILLIEQUIVALENT(S): at 11:24

## 2022-01-01 RX ADMIN — Medication 1: at 23:14

## 2022-01-01 RX ADMIN — PANTOPRAZOLE SODIUM 40 MILLIGRAM(S): 20 TABLET, DELAYED RELEASE ORAL at 06:12

## 2022-01-01 RX ADMIN — Medication 1: at 00:47

## 2022-01-01 RX ADMIN — CHLORHEXIDINE GLUCONATE 1 APPLICATION(S): 213 SOLUTION TOPICAL at 05:23

## 2022-01-01 RX ADMIN — Medication 1: at 23:54

## 2022-01-01 RX ADMIN — Medication 20 MILLIGRAM(S): at 17:19

## 2022-01-01 RX ADMIN — Medication 40 MILLIEQUIVALENT(S): at 13:37

## 2022-01-01 RX ADMIN — ENOXAPARIN SODIUM 40 MILLIGRAM(S): 100 INJECTION SUBCUTANEOUS at 12:20

## 2022-01-01 RX ADMIN — PANTOPRAZOLE SODIUM 40 MILLIGRAM(S): 20 TABLET, DELAYED RELEASE ORAL at 05:53

## 2022-01-01 RX ADMIN — Medication 100 MILLIEQUIVALENT(S): at 11:51

## 2022-01-01 RX ADMIN — POTASSIUM PHOSPHATE, MONOBASIC POTASSIUM PHOSPHATE, DIBASIC 83.33 MILLIMOLE(S): 236; 224 INJECTION, SOLUTION INTRAVENOUS at 10:20

## 2022-01-01 RX ADMIN — Medication 1: at 05:33

## 2022-01-01 RX ADMIN — Medication 1: at 11:53

## 2022-01-01 RX ADMIN — PANTOPRAZOLE SODIUM 40 MILLIGRAM(S): 20 TABLET, DELAYED RELEASE ORAL at 11:01

## 2022-01-01 RX ADMIN — CHLORHEXIDINE GLUCONATE 1 APPLICATION(S): 213 SOLUTION TOPICAL at 05:47

## 2022-01-01 RX ADMIN — PANTOPRAZOLE SODIUM 40 MILLIGRAM(S): 20 TABLET, DELAYED RELEASE ORAL at 17:23

## 2022-01-01 RX ADMIN — MIDODRINE HYDROCHLORIDE 5 MILLIGRAM(S): 2.5 TABLET ORAL at 18:22

## 2022-01-01 RX ADMIN — Medication 1: at 17:25

## 2022-01-01 RX ADMIN — FENTANYL CITRATE 25 MICROGRAM(S): 50 INJECTION INTRAVENOUS at 05:14

## 2022-01-01 RX ADMIN — Medication 650 MILLIGRAM(S): at 19:03

## 2022-01-01 RX ADMIN — Medication 650 MILLIGRAM(S): at 20:54

## 2022-01-01 RX ADMIN — PANTOPRAZOLE SODIUM 40 MILLIGRAM(S): 20 TABLET, DELAYED RELEASE ORAL at 17:48

## 2022-01-01 RX ADMIN — Medication 3: at 05:26

## 2022-01-01 RX ADMIN — Medication 20 MILLIGRAM(S): at 16:26

## 2022-01-01 RX ADMIN — Medication 15 MILLILITER(S): at 11:41

## 2022-01-01 RX ADMIN — CHLORHEXIDINE GLUCONATE 1 APPLICATION(S): 213 SOLUTION TOPICAL at 06:38

## 2022-01-01 RX ADMIN — PANTOPRAZOLE SODIUM 40 MILLIGRAM(S): 20 TABLET, DELAYED RELEASE ORAL at 17:49

## 2022-01-01 RX ADMIN — Medication 1 APPLICATION(S): at 11:15

## 2022-01-01 RX ADMIN — CHLORHEXIDINE GLUCONATE 1 APPLICATION(S): 213 SOLUTION TOPICAL at 05:20

## 2022-01-01 RX ADMIN — CHLORHEXIDINE GLUCONATE 15 MILLILITER(S): 213 SOLUTION TOPICAL at 17:27

## 2022-01-01 RX ADMIN — Medication 14.9 MICROGRAM(S)/KG/MIN: at 17:21

## 2022-01-01 RX ADMIN — CHLORHEXIDINE GLUCONATE 15 MILLILITER(S): 213 SOLUTION TOPICAL at 18:25

## 2022-01-01 RX ADMIN — Medication 650 MILLIGRAM(S): at 07:20

## 2022-01-01 RX ADMIN — PANTOPRAZOLE SODIUM 40 MILLIGRAM(S): 20 TABLET, DELAYED RELEASE ORAL at 05:06

## 2022-01-01 RX ADMIN — PANTOPRAZOLE SODIUM 40 MILLIGRAM(S): 20 TABLET, DELAYED RELEASE ORAL at 06:10

## 2022-01-01 RX ADMIN — PIPERACILLIN AND TAZOBACTAM 25 GRAM(S): 4; .5 INJECTION, POWDER, LYOPHILIZED, FOR SOLUTION INTRAVENOUS at 05:42

## 2022-01-01 RX ADMIN — Medication 1 APPLICATION(S): at 13:35

## 2022-01-01 RX ADMIN — CHLORHEXIDINE GLUCONATE 15 MILLILITER(S): 213 SOLUTION TOPICAL at 18:23

## 2022-01-01 RX ADMIN — Medication 2: at 12:05

## 2022-01-01 RX ADMIN — Medication 1: at 17:01

## 2022-01-01 RX ADMIN — Medication 20 MILLIGRAM(S): at 06:45

## 2022-01-01 RX ADMIN — PIPERACILLIN AND TAZOBACTAM 25 GRAM(S): 4; .5 INJECTION, POWDER, LYOPHILIZED, FOR SOLUTION INTRAVENOUS at 05:29

## 2022-01-01 RX ADMIN — PANTOPRAZOLE SODIUM 40 MILLIGRAM(S): 20 TABLET, DELAYED RELEASE ORAL at 05:56

## 2022-01-01 RX ADMIN — Medication 50 MILLILITER(S): at 05:38

## 2022-01-01 RX ADMIN — PIPERACILLIN AND TAZOBACTAM 25 GRAM(S): 4; .5 INJECTION, POWDER, LYOPHILIZED, FOR SOLUTION INTRAVENOUS at 21:10

## 2022-01-01 RX ADMIN — Medication 1: at 23:38

## 2022-01-01 RX ADMIN — Medication 650 MILLIGRAM(S): at 20:59

## 2022-01-01 RX ADMIN — MEROPENEM 100 MILLIGRAM(S): 1 INJECTION INTRAVENOUS at 22:24

## 2022-01-01 RX ADMIN — Medication 650 MILLIGRAM(S): at 17:48

## 2022-01-01 RX ADMIN — Medication 1 TABLET(S): at 21:06

## 2022-01-01 RX ADMIN — Medication 1 PACKET(S): at 11:58

## 2022-01-01 RX ADMIN — Medication 20 MILLIGRAM(S): at 08:33

## 2022-01-01 RX ADMIN — Medication 500 MILLIGRAM(S): at 11:18

## 2022-01-01 RX ADMIN — Medication 15 MILLILITER(S): at 11:18

## 2022-01-01 RX ADMIN — Medication 1 TABLET(S): at 22:27

## 2022-01-01 RX ADMIN — Medication 2: at 18:21

## 2022-01-01 RX ADMIN — Medication 2: at 12:30

## 2022-01-01 RX ADMIN — SODIUM POLYSTYRENE SULFONATE 30 GRAM(S): 4.1 POWDER, FOR SUSPENSION ORAL at 10:33

## 2022-01-01 RX ADMIN — SODIUM CHLORIDE 125 MILLILITER(S): 9 INJECTION INTRAMUSCULAR; INTRAVENOUS; SUBCUTANEOUS at 16:26

## 2022-01-01 RX ADMIN — Medication 650 MILLIGRAM(S): at 22:56

## 2022-01-01 RX ADMIN — Medication 650 MILLIGRAM(S): at 22:30

## 2022-01-01 RX ADMIN — CHLORHEXIDINE GLUCONATE 1 APPLICATION(S): 213 SOLUTION TOPICAL at 05:07

## 2022-01-01 RX ADMIN — Medication 1 TABLET(S): at 17:52

## 2022-01-01 RX ADMIN — Medication 3: at 00:29

## 2022-01-01 RX ADMIN — Medication 650 MILLIGRAM(S): at 03:40

## 2022-01-01 RX ADMIN — ATORVASTATIN CALCIUM 40 MILLIGRAM(S): 80 TABLET, FILM COATED ORAL at 22:24

## 2022-01-01 RX ADMIN — Medication 20 MILLIGRAM(S): at 00:46

## 2022-01-01 RX ADMIN — Medication 650 MILLIGRAM(S): at 14:12

## 2022-01-01 RX ADMIN — Medication 50 MILLILITER(S): at 23:01

## 2022-01-01 RX ADMIN — CEFEPIME 100 MILLIGRAM(S): 1 INJECTION, POWDER, FOR SOLUTION INTRAMUSCULAR; INTRAVENOUS at 13:47

## 2022-01-01 RX ADMIN — Medication 50 MILLILITER(S): at 17:31

## 2022-01-01 RX ADMIN — CHLORHEXIDINE GLUCONATE 1 APPLICATION(S): 213 SOLUTION TOPICAL at 05:11

## 2022-01-01 RX ADMIN — Medication 1 BOTTLE: at 09:30

## 2022-01-01 RX ADMIN — PANTOPRAZOLE SODIUM 40 MILLIGRAM(S): 20 TABLET, DELAYED RELEASE ORAL at 17:10

## 2022-01-01 RX ADMIN — ZOLEDRONIC ACID 420 MILLIGRAM(S): 5 INJECTION, SOLUTION INTRAVENOUS at 18:52

## 2022-01-01 RX ADMIN — Medication 650 MILLIGRAM(S): at 21:40

## 2022-01-01 RX ADMIN — ATORVASTATIN CALCIUM 40 MILLIGRAM(S): 80 TABLET, FILM COATED ORAL at 21:43

## 2022-01-01 RX ADMIN — PIPERACILLIN AND TAZOBACTAM 25 GRAM(S): 4; .5 INJECTION, POWDER, LYOPHILIZED, FOR SOLUTION INTRAVENOUS at 13:48

## 2022-01-01 RX ADMIN — Medication 2: at 00:00

## 2022-01-01 RX ADMIN — SODIUM CHLORIDE 125 MILLILITER(S): 9 INJECTION INTRAMUSCULAR; INTRAVENOUS; SUBCUTANEOUS at 11:57

## 2022-01-01 RX ADMIN — CHLORHEXIDINE GLUCONATE 15 MILLILITER(S): 213 SOLUTION TOPICAL at 06:38

## 2022-01-01 RX ADMIN — Medication 40 MILLIEQUIVALENT(S): at 09:42

## 2022-01-01 RX ADMIN — MIDODRINE HYDROCHLORIDE 5 MILLIGRAM(S): 2.5 TABLET ORAL at 19:00

## 2022-01-01 RX ADMIN — MIDODRINE HYDROCHLORIDE 5 MILLIGRAM(S): 2.5 TABLET ORAL at 05:55

## 2022-01-01 RX ADMIN — CHLORHEXIDINE GLUCONATE 1 APPLICATION(S): 213 SOLUTION TOPICAL at 05:08

## 2022-01-01 RX ADMIN — Medication 650 MILLIGRAM(S): at 07:30

## 2022-01-01 RX ADMIN — MIDODRINE HYDROCHLORIDE 5 MILLIGRAM(S): 2.5 TABLET ORAL at 05:37

## 2022-01-01 RX ADMIN — Medication 650 MILLIGRAM(S): at 19:45

## 2022-01-01 RX ADMIN — POTASSIUM PHOSPHATE, MONOBASIC POTASSIUM PHOSPHATE, DIBASIC 62.5 MILLIMOLE(S): 236; 224 INJECTION, SOLUTION INTRAVENOUS at 12:55

## 2022-01-01 RX ADMIN — LINEZOLID 300 MILLIGRAM(S): 600 INJECTION, SOLUTION INTRAVENOUS at 05:22

## 2022-01-01 RX ADMIN — PANTOPRAZOLE SODIUM 40 MILLIGRAM(S): 20 TABLET, DELAYED RELEASE ORAL at 05:38

## 2022-01-01 RX ADMIN — Medication 650 MILLIGRAM(S): at 07:00

## 2022-01-01 RX ADMIN — ALBUTEROL 2 PUFF(S): 90 AEROSOL, METERED ORAL at 15:35

## 2022-01-01 RX ADMIN — Medication 20 MILLIGRAM(S): at 12:26

## 2022-01-01 RX ADMIN — Medication 650 MILLIGRAM(S): at 21:50

## 2022-01-01 RX ADMIN — ROBINUL 0.1 MILLIGRAM(S): 0.2 INJECTION INTRAMUSCULAR; INTRAVENOUS at 10:21

## 2022-01-01 RX ADMIN — PANTOPRAZOLE SODIUM 40 MILLIGRAM(S): 20 TABLET, DELAYED RELEASE ORAL at 17:14

## 2022-01-01 RX ADMIN — LINEZOLID 300 MILLIGRAM(S): 600 INJECTION, SOLUTION INTRAVENOUS at 17:40

## 2022-01-01 RX ADMIN — Medication 650 MILLIGRAM(S): at 11:50

## 2022-01-01 RX ADMIN — PANTOPRAZOLE SODIUM 40 MILLIGRAM(S): 20 TABLET, DELAYED RELEASE ORAL at 06:36

## 2022-01-01 RX ADMIN — PANTOPRAZOLE SODIUM 40 MILLIGRAM(S): 20 TABLET, DELAYED RELEASE ORAL at 11:47

## 2022-01-01 RX ADMIN — MEROPENEM 100 MILLIGRAM(S): 1 INJECTION INTRAVENOUS at 06:11

## 2022-01-01 RX ADMIN — CHLORHEXIDINE GLUCONATE 1 APPLICATION(S): 213 SOLUTION TOPICAL at 05:06

## 2022-01-01 RX ADMIN — MIDODRINE HYDROCHLORIDE 5 MILLIGRAM(S): 2.5 TABLET ORAL at 13:00

## 2022-01-01 RX ADMIN — Medication 650 MILLIGRAM(S): at 06:35

## 2022-01-01 RX ADMIN — POTASSIUM PHOSPHATE, MONOBASIC POTASSIUM PHOSPHATE, DIBASIC 83.33 MILLIMOLE(S): 236; 224 INJECTION, SOLUTION INTRAVENOUS at 22:44

## 2022-01-01 RX ADMIN — CHLORHEXIDINE GLUCONATE 15 MILLILITER(S): 213 SOLUTION TOPICAL at 17:44

## 2022-01-01 RX ADMIN — PANTOPRAZOLE SODIUM 40 MILLIGRAM(S): 20 TABLET, DELAYED RELEASE ORAL at 07:20

## 2022-01-01 RX ADMIN — Medication 20 MILLIGRAM(S): at 18:47

## 2022-01-01 RX ADMIN — PANTOPRAZOLE SODIUM 40 MILLIGRAM(S): 20 TABLET, DELAYED RELEASE ORAL at 17:13

## 2022-01-01 RX ADMIN — Medication 3: at 06:53

## 2022-01-01 RX ADMIN — PANTOPRAZOLE SODIUM 40 MILLIGRAM(S): 20 TABLET, DELAYED RELEASE ORAL at 18:24

## 2022-01-01 RX ADMIN — Medication 1000 MILLIGRAM(S): at 00:00

## 2022-01-01 RX ADMIN — Medication 1: at 06:31

## 2022-01-01 RX ADMIN — Medication 100 MILLIEQUIVALENT(S): at 11:47

## 2022-01-01 RX ADMIN — Medication 2: at 11:40

## 2022-01-01 RX ADMIN — PANTOPRAZOLE SODIUM 40 MILLIGRAM(S): 20 TABLET, DELAYED RELEASE ORAL at 05:40

## 2022-01-01 RX ADMIN — LINEZOLID 300 MILLIGRAM(S): 600 INJECTION, SOLUTION INTRAVENOUS at 17:22

## 2022-01-01 RX ADMIN — PANTOPRAZOLE SODIUM 40 MILLIGRAM(S): 20 TABLET, DELAYED RELEASE ORAL at 11:19

## 2022-01-01 RX ADMIN — MEROPENEM 100 MILLIGRAM(S): 1 INJECTION INTRAVENOUS at 22:04

## 2022-01-01 RX ADMIN — PANTOPRAZOLE SODIUM 40 MILLIGRAM(S): 20 TABLET, DELAYED RELEASE ORAL at 06:23

## 2022-01-01 RX ADMIN — Medication 2: at 06:27

## 2022-01-01 RX ADMIN — ATORVASTATIN CALCIUM 40 MILLIGRAM(S): 80 TABLET, FILM COATED ORAL at 21:57

## 2022-01-01 RX ADMIN — Medication 20 MILLIGRAM(S): at 11:49

## 2022-01-01 RX ADMIN — Medication 40 MILLIEQUIVALENT(S): at 11:49

## 2022-01-01 RX ADMIN — SODIUM CHLORIDE 125 MILLILITER(S): 9 INJECTION INTRAMUSCULAR; INTRAVENOUS; SUBCUTANEOUS at 16:08

## 2022-01-01 RX ADMIN — PIPERACILLIN AND TAZOBACTAM 25 GRAM(S): 4; .5 INJECTION, POWDER, LYOPHILIZED, FOR SOLUTION INTRAVENOUS at 13:08

## 2022-01-01 RX ADMIN — Medication 650 MILLIGRAM(S): at 06:30

## 2022-01-01 RX ADMIN — Medication 1: at 11:47

## 2022-01-01 RX ADMIN — Medication 1: at 06:33

## 2022-01-01 RX ADMIN — PIPERACILLIN AND TAZOBACTAM 25 GRAM(S): 4; .5 INJECTION, POWDER, LYOPHILIZED, FOR SOLUTION INTRAVENOUS at 06:28

## 2022-01-01 RX ADMIN — PANTOPRAZOLE SODIUM 40 MILLIGRAM(S): 20 TABLET, DELAYED RELEASE ORAL at 06:08

## 2022-01-01 RX ADMIN — PIPERACILLIN AND TAZOBACTAM 25 GRAM(S): 4; .5 INJECTION, POWDER, LYOPHILIZED, FOR SOLUTION INTRAVENOUS at 05:37

## 2022-01-01 RX ADMIN — CHLORHEXIDINE GLUCONATE 1 APPLICATION(S): 213 SOLUTION TOPICAL at 05:37

## 2022-01-01 RX ADMIN — Medication 0: at 05:38

## 2022-01-01 RX ADMIN — PIPERACILLIN AND TAZOBACTAM 25 GRAM(S): 4; .5 INJECTION, POWDER, LYOPHILIZED, FOR SOLUTION INTRAVENOUS at 05:06

## 2022-01-01 RX ADMIN — Medication 50 MILLILITER(S): at 05:56

## 2022-01-01 RX ADMIN — MIDODRINE HYDROCHLORIDE 5 MILLIGRAM(S): 2.5 TABLET ORAL at 11:41

## 2022-01-01 RX ADMIN — Medication 50 MILLILITER(S): at 17:04

## 2022-01-01 RX ADMIN — PIPERACILLIN AND TAZOBACTAM 25 GRAM(S): 4; .5 INJECTION, POWDER, LYOPHILIZED, FOR SOLUTION INTRAVENOUS at 21:31

## 2022-01-01 RX ADMIN — SODIUM CHLORIDE 100 MILLILITER(S): 9 INJECTION, SOLUTION INTRAVENOUS at 18:29

## 2022-01-01 RX ADMIN — Medication 2: at 06:12

## 2022-01-01 RX ADMIN — Medication 650 MILLIGRAM(S): at 13:06

## 2022-01-01 RX ADMIN — Medication 50 MILLILITER(S): at 12:58

## 2022-01-01 RX ADMIN — LINEZOLID 300 MILLIGRAM(S): 600 INJECTION, SOLUTION INTRAVENOUS at 16:42

## 2022-01-01 RX ADMIN — Medication 650 MILLIGRAM(S): at 14:11

## 2022-01-01 RX ADMIN — MEROPENEM 100 MILLIGRAM(S): 1 INJECTION INTRAVENOUS at 13:27

## 2022-01-01 RX ADMIN — TAMSULOSIN HYDROCHLORIDE 0.4 MILLIGRAM(S): 0.4 CAPSULE ORAL at 21:40

## 2022-01-01 RX ADMIN — ALBUTEROL 2 PUFF(S): 90 AEROSOL, METERED ORAL at 08:49

## 2022-01-01 RX ADMIN — MIDODRINE HYDROCHLORIDE 5 MILLIGRAM(S): 2.5 TABLET ORAL at 05:14

## 2022-01-01 RX ADMIN — SODIUM ZIRCONIUM CYCLOSILICATE 10 GRAM(S): 10 POWDER, FOR SUSPENSION ORAL at 06:08

## 2022-01-01 RX ADMIN — MIDODRINE HYDROCHLORIDE 5 MILLIGRAM(S): 2.5 TABLET ORAL at 05:13

## 2022-01-01 RX ADMIN — Medication 1: at 23:52

## 2022-01-01 RX ADMIN — Medication 1 TABLET(S): at 12:57

## 2022-01-01 RX ADMIN — ATORVASTATIN CALCIUM 40 MILLIGRAM(S): 80 TABLET, FILM COATED ORAL at 22:04

## 2022-01-01 RX ADMIN — Medication 50 MILLILITER(S): at 13:07

## 2022-01-01 RX ADMIN — POTASSIUM PHOSPHATE, MONOBASIC POTASSIUM PHOSPHATE, DIBASIC 62.5 MILLIMOLE(S): 236; 224 INJECTION, SOLUTION INTRAVENOUS at 05:23

## 2022-01-01 RX ADMIN — MIDODRINE HYDROCHLORIDE 5 MILLIGRAM(S): 2.5 TABLET ORAL at 12:02

## 2022-01-01 RX ADMIN — Medication 20 MILLIGRAM(S): at 14:31

## 2022-01-01 RX ADMIN — Medication 650 MILLIGRAM(S): at 06:40

## 2022-01-01 RX ADMIN — CHLORHEXIDINE GLUCONATE 1 APPLICATION(S): 213 SOLUTION TOPICAL at 06:13

## 2022-01-01 RX ADMIN — SODIUM CHLORIDE 75 MILLILITER(S): 9 INJECTION, SOLUTION INTRAVENOUS at 22:47

## 2022-01-01 RX ADMIN — ATORVASTATIN CALCIUM 40 MILLIGRAM(S): 80 TABLET, FILM COATED ORAL at 21:06

## 2022-01-01 RX ADMIN — PANTOPRAZOLE SODIUM 40 MILLIGRAM(S): 20 TABLET, DELAYED RELEASE ORAL at 18:59

## 2022-01-01 RX ADMIN — Medication 1 PACKET(S): at 11:24

## 2022-01-01 RX ADMIN — Medication 50 MILLILITER(S): at 12:51

## 2022-01-01 RX ADMIN — PIPERACILLIN AND TAZOBACTAM 25 GRAM(S): 4; .5 INJECTION, POWDER, LYOPHILIZED, FOR SOLUTION INTRAVENOUS at 21:02

## 2022-01-01 RX ADMIN — ENOXAPARIN SODIUM 40 MILLIGRAM(S): 100 INJECTION SUBCUTANEOUS at 13:23

## 2022-01-01 RX ADMIN — PIPERACILLIN AND TAZOBACTAM 25 GRAM(S): 4; .5 INJECTION, POWDER, LYOPHILIZED, FOR SOLUTION INTRAVENOUS at 21:37

## 2022-01-01 RX ADMIN — MIDODRINE HYDROCHLORIDE 5 MILLIGRAM(S): 2.5 TABLET ORAL at 11:49

## 2022-01-01 RX ADMIN — Medication 1 APPLICATION(S): at 12:38

## 2022-01-01 RX ADMIN — CHLORHEXIDINE GLUCONATE 1 APPLICATION(S): 213 SOLUTION TOPICAL at 06:09

## 2022-01-01 RX ADMIN — CHLORHEXIDINE GLUCONATE 1 APPLICATION(S): 213 SOLUTION TOPICAL at 05:04

## 2022-01-01 RX ADMIN — CHLORHEXIDINE GLUCONATE 15 MILLILITER(S): 213 SOLUTION TOPICAL at 17:47

## 2022-01-01 RX ADMIN — Medication 40 MILLIEQUIVALENT(S): at 08:29

## 2022-01-01 RX ADMIN — MEROPENEM 100 MILLIGRAM(S): 1 INJECTION INTRAVENOUS at 05:29

## 2022-01-01 RX ADMIN — Medication 50 MILLILITER(S): at 23:18

## 2022-01-01 RX ADMIN — Medication 1: at 11:00

## 2022-01-01 RX ADMIN — MEROPENEM 100 MILLIGRAM(S): 1 INJECTION INTRAVENOUS at 13:44

## 2022-01-01 RX ADMIN — Medication 2: at 11:05

## 2022-01-01 RX ADMIN — Medication 20 MILLIGRAM(S): at 11:58

## 2022-01-01 RX ADMIN — MIDODRINE HYDROCHLORIDE 5 MILLIGRAM(S): 2.5 TABLET ORAL at 17:09

## 2022-01-01 RX ADMIN — CHLORHEXIDINE GLUCONATE 15 MILLILITER(S): 213 SOLUTION TOPICAL at 06:02

## 2022-01-01 RX ADMIN — ALBUTEROL 2 PUFF(S): 90 AEROSOL, METERED ORAL at 15:50

## 2022-01-01 RX ADMIN — PANTOPRAZOLE SODIUM 40 MILLIGRAM(S): 20 TABLET, DELAYED RELEASE ORAL at 06:02

## 2022-01-01 RX ADMIN — Medication 20 MILLIGRAM(S): at 08:02

## 2022-01-01 RX ADMIN — ALBUTEROL 2 PUFF(S): 90 AEROSOL, METERED ORAL at 15:59

## 2022-01-01 RX ADMIN — TAMSULOSIN HYDROCHLORIDE 0.4 MILLIGRAM(S): 0.4 CAPSULE ORAL at 22:50

## 2022-01-01 RX ADMIN — FENTANYL CITRATE 25 MICROGRAM(S): 50 INJECTION INTRAVENOUS at 20:15

## 2022-01-01 RX ADMIN — CHLORHEXIDINE GLUCONATE 15 MILLILITER(S): 213 SOLUTION TOPICAL at 05:40

## 2022-01-01 RX ADMIN — PANTOPRAZOLE SODIUM 40 MILLIGRAM(S): 20 TABLET, DELAYED RELEASE ORAL at 17:26

## 2022-01-01 RX ADMIN — Medication 650 MILLIGRAM(S): at 17:35

## 2022-01-01 RX ADMIN — LINEZOLID 300 MILLIGRAM(S): 600 INJECTION, SOLUTION INTRAVENOUS at 17:14

## 2022-01-01 RX ADMIN — Medication 650 MILLIGRAM(S): at 07:23

## 2022-01-01 RX ADMIN — PIPERACILLIN AND TAZOBACTAM 25 GRAM(S): 4; .5 INJECTION, POWDER, LYOPHILIZED, FOR SOLUTION INTRAVENOUS at 15:23

## 2022-01-01 RX ADMIN — MEROPENEM 100 MILLIGRAM(S): 1 INJECTION INTRAVENOUS at 13:34

## 2022-01-01 RX ADMIN — Medication 2: at 05:39

## 2022-01-01 RX ADMIN — Medication 650 MILLIGRAM(S): at 08:38

## 2022-01-01 RX ADMIN — Medication 50 MILLILITER(S): at 05:00

## 2022-01-01 RX ADMIN — MEROPENEM 100 MILLIGRAM(S): 1 INJECTION INTRAVENOUS at 05:14

## 2022-01-01 RX ADMIN — Medication 2: at 00:41

## 2022-01-01 RX ADMIN — ALBUTEROL 2 PUFF(S): 90 AEROSOL, METERED ORAL at 20:42

## 2022-01-01 RX ADMIN — Medication 1: at 05:51

## 2022-01-01 RX ADMIN — ENOXAPARIN SODIUM 40 MILLIGRAM(S): 100 INJECTION SUBCUTANEOUS at 11:05

## 2022-01-01 RX ADMIN — PANTOPRAZOLE SODIUM 40 MILLIGRAM(S): 20 TABLET, DELAYED RELEASE ORAL at 17:04

## 2022-01-01 RX ADMIN — Medication 15 MILLILITER(S): at 13:07

## 2022-01-01 RX ADMIN — SODIUM CHLORIDE 500 MILLILITER(S): 9 INJECTION, SOLUTION INTRAVENOUS at 08:37

## 2022-01-01 RX ADMIN — IOHEXOL 30 MILLILITER(S): 300 INJECTION, SOLUTION INTRAVENOUS at 09:35

## 2022-01-01 RX ADMIN — MORPHINE SULFATE 1 MILLIGRAM(S): 50 CAPSULE, EXTENDED RELEASE ORAL at 09:19

## 2022-01-01 RX ADMIN — Medication 1: at 11:13

## 2022-01-01 RX ADMIN — PANTOPRAZOLE SODIUM 40 MILLIGRAM(S): 20 TABLET, DELAYED RELEASE ORAL at 05:41

## 2022-01-01 RX ADMIN — Medication 1: at 17:54

## 2022-01-01 RX ADMIN — PIPERACILLIN AND TAZOBACTAM 25 GRAM(S): 4; .5 INJECTION, POWDER, LYOPHILIZED, FOR SOLUTION INTRAVENOUS at 13:00

## 2022-01-01 RX ADMIN — PIPERACILLIN AND TAZOBACTAM 25 GRAM(S): 4; .5 INJECTION, POWDER, LYOPHILIZED, FOR SOLUTION INTRAVENOUS at 05:02

## 2022-01-01 RX ADMIN — CHLORHEXIDINE GLUCONATE 15 MILLILITER(S): 213 SOLUTION TOPICAL at 05:35

## 2022-01-01 RX ADMIN — ALBUTEROL 2 PUFF(S): 90 AEROSOL, METERED ORAL at 03:48

## 2022-01-01 RX ADMIN — CEFEPIME 100 MILLIGRAM(S): 1 INJECTION, POWDER, FOR SOLUTION INTRAMUSCULAR; INTRAVENOUS at 22:03

## 2022-01-01 RX ADMIN — Medication 500 MILLIGRAM(S): at 12:25

## 2022-01-01 RX ADMIN — Medication 100 MILLIEQUIVALENT(S): at 17:44

## 2022-01-01 RX ADMIN — PIPERACILLIN AND TAZOBACTAM 25 GRAM(S): 4; .5 INJECTION, POWDER, LYOPHILIZED, FOR SOLUTION INTRAVENOUS at 13:03

## 2022-01-01 RX ADMIN — Medication 50 MILLILITER(S): at 06:52

## 2022-01-01 RX ADMIN — Medication 1: at 06:07

## 2022-01-01 RX ADMIN — Medication 1: at 06:06

## 2022-01-01 RX ADMIN — MIDODRINE HYDROCHLORIDE 5 MILLIGRAM(S): 2.5 TABLET ORAL at 06:45

## 2022-01-01 RX ADMIN — CHLORHEXIDINE GLUCONATE 1 APPLICATION(S): 213 SOLUTION TOPICAL at 05:57

## 2022-01-01 RX ADMIN — Medication 400 MILLIGRAM(S): at 17:21

## 2022-01-01 RX ADMIN — Medication 1: at 00:41

## 2022-01-01 RX ADMIN — POTASSIUM PHOSPHATE, MONOBASIC POTASSIUM PHOSPHATE, DIBASIC 62.5 MILLIMOLE(S): 236; 224 INJECTION, SOLUTION INTRAVENOUS at 11:40

## 2022-01-01 RX ADMIN — Medication 100 MILLIEQUIVALENT(S): at 12:59

## 2022-01-01 RX ADMIN — Medication 650 MILLIGRAM(S): at 14:30

## 2022-01-01 RX ADMIN — Medication 6: at 11:47

## 2022-01-01 RX ADMIN — PANTOPRAZOLE SODIUM 40 MILLIGRAM(S): 20 TABLET, DELAYED RELEASE ORAL at 06:42

## 2022-01-01 RX ADMIN — MIDODRINE HYDROCHLORIDE 5 MILLIGRAM(S): 2.5 TABLET ORAL at 05:40

## 2022-01-01 RX ADMIN — ALBUTEROL 2 PUFF(S): 90 AEROSOL, METERED ORAL at 14:51

## 2022-01-01 RX ADMIN — Medication 1: at 17:18

## 2022-01-01 RX ADMIN — Medication 1: at 17:16

## 2022-01-01 RX ADMIN — Medication 40 MILLIEQUIVALENT(S): at 20:08

## 2022-01-01 RX ADMIN — PANTOPRAZOLE SODIUM 40 MILLIGRAM(S): 20 TABLET, DELAYED RELEASE ORAL at 05:33

## 2022-01-01 RX ADMIN — Medication 1: at 11:51

## 2022-01-01 RX ADMIN — MIDODRINE HYDROCHLORIDE 2.5 MILLIGRAM(S): 2.5 TABLET ORAL at 17:30

## 2022-01-01 RX ADMIN — PANTOPRAZOLE SODIUM 40 MILLIGRAM(S): 20 TABLET, DELAYED RELEASE ORAL at 18:03

## 2022-01-01 RX ADMIN — Medication 400 MILLIGRAM(S): at 22:10

## 2022-01-01 RX ADMIN — ALBUTEROL 2 PUFF(S): 90 AEROSOL, METERED ORAL at 03:51

## 2022-01-01 RX ADMIN — Medication 40 MILLIEQUIVALENT(S): at 21:57

## 2022-01-01 RX ADMIN — PANTOPRAZOLE SODIUM 40 MILLIGRAM(S): 20 TABLET, DELAYED RELEASE ORAL at 17:44

## 2022-01-01 RX ADMIN — Medication 650 MILLIGRAM(S): at 23:14

## 2022-01-01 RX ADMIN — Medication 2: at 06:18

## 2022-01-01 RX ADMIN — Medication 1: at 18:32

## 2022-01-01 RX ADMIN — SODIUM ZIRCONIUM CYCLOSILICATE 10 GRAM(S): 10 POWDER, FOR SUSPENSION ORAL at 14:33

## 2022-01-01 RX ADMIN — Medication 20 MILLIGRAM(S): at 06:29

## 2022-01-01 RX ADMIN — Medication 1 TABLET(S): at 11:49

## 2022-01-01 RX ADMIN — Medication 2: at 00:28

## 2022-01-01 RX ADMIN — Medication 1 APPLICATION(S): at 11:49

## 2022-01-01 RX ADMIN — Medication 2: at 00:29

## 2022-01-01 RX ADMIN — LINEZOLID 300 MILLIGRAM(S): 600 INJECTION, SOLUTION INTRAVENOUS at 06:08

## 2022-01-01 RX ADMIN — PANTOPRAZOLE SODIUM 40 MILLIGRAM(S): 20 TABLET, DELAYED RELEASE ORAL at 05:46

## 2022-01-01 RX ADMIN — PANTOPRAZOLE SODIUM 40 MILLIGRAM(S): 20 TABLET, DELAYED RELEASE ORAL at 17:31

## 2022-01-01 RX ADMIN — Medication 1 TABLET(S): at 11:35

## 2022-01-01 RX ADMIN — Medication 650 MILLIGRAM(S): at 14:25

## 2022-01-01 RX ADMIN — MIDODRINE HYDROCHLORIDE 5 MILLIGRAM(S): 2.5 TABLET ORAL at 06:04

## 2022-01-01 RX ADMIN — FENTANYL CITRATE 25 MICROGRAM(S): 50 INJECTION INTRAVENOUS at 08:01

## 2022-01-01 RX ADMIN — PANTOPRAZOLE SODIUM 40 MILLIGRAM(S): 20 TABLET, DELAYED RELEASE ORAL at 05:28

## 2022-01-01 RX ADMIN — ATORVASTATIN CALCIUM 40 MILLIGRAM(S): 80 TABLET, FILM COATED ORAL at 22:07

## 2022-01-01 RX ADMIN — CHLORHEXIDINE GLUCONATE 15 MILLILITER(S): 213 SOLUTION TOPICAL at 06:04

## 2022-01-01 RX ADMIN — Medication 1 APPLICATION(S): at 11:48

## 2022-01-01 RX ADMIN — MIDODRINE HYDROCHLORIDE 5 MILLIGRAM(S): 2.5 TABLET ORAL at 17:32

## 2022-01-01 RX ADMIN — MIDODRINE HYDROCHLORIDE 5 MILLIGRAM(S): 2.5 TABLET ORAL at 21:09

## 2022-01-01 RX ADMIN — Medication 650 MILLIGRAM(S): at 06:12

## 2022-01-01 RX ADMIN — ATORVASTATIN CALCIUM 40 MILLIGRAM(S): 80 TABLET, FILM COATED ORAL at 22:56

## 2022-01-01 RX ADMIN — Medication 1 PACKET(S): at 16:59

## 2022-01-01 RX ADMIN — Medication 50 MILLILITER(S): at 17:12

## 2022-01-01 RX ADMIN — MIDODRINE HYDROCHLORIDE 5 MILLIGRAM(S): 2.5 TABLET ORAL at 11:29

## 2022-01-01 RX ADMIN — Medication 650 MILLIGRAM(S): at 00:15

## 2022-01-01 RX ADMIN — Medication 650 MILLIGRAM(S): at 21:01

## 2022-01-01 RX ADMIN — Medication 1 TABLET(S): at 17:20

## 2022-01-01 RX ADMIN — Medication 1 APPLICATION(S): at 11:58

## 2022-01-01 RX ADMIN — MIDODRINE HYDROCHLORIDE 5 MILLIGRAM(S): 2.5 TABLET ORAL at 20:07

## 2022-01-01 RX ADMIN — Medication 500 MILLIGRAM(S): at 11:15

## 2022-01-01 RX ADMIN — PIPERACILLIN AND TAZOBACTAM 25 GRAM(S): 4; .5 INJECTION, POWDER, LYOPHILIZED, FOR SOLUTION INTRAVENOUS at 14:57

## 2022-01-01 RX ADMIN — Medication 500 MILLIGRAM(S): at 11:54

## 2022-01-01 RX ADMIN — PANTOPRAZOLE SODIUM 40 MILLIGRAM(S): 20 TABLET, DELAYED RELEASE ORAL at 17:54

## 2022-01-01 RX ADMIN — Medication 650 MILLIGRAM(S): at 21:43

## 2022-01-01 RX ADMIN — PIPERACILLIN AND TAZOBACTAM 25 GRAM(S): 4; .5 INJECTION, POWDER, LYOPHILIZED, FOR SOLUTION INTRAVENOUS at 21:01

## 2022-01-01 RX ADMIN — Medication 1: at 23:57

## 2022-01-01 RX ADMIN — CHLORHEXIDINE GLUCONATE 1 APPLICATION(S): 213 SOLUTION TOPICAL at 05:29

## 2022-01-01 RX ADMIN — Medication 1: at 12:02

## 2022-01-01 RX ADMIN — Medication 1: at 06:14

## 2022-01-01 RX ADMIN — PIPERACILLIN AND TAZOBACTAM 25 GRAM(S): 4; .5 INJECTION, POWDER, LYOPHILIZED, FOR SOLUTION INTRAVENOUS at 13:49

## 2022-01-01 RX ADMIN — PANTOPRAZOLE SODIUM 40 MILLIGRAM(S): 20 TABLET, DELAYED RELEASE ORAL at 06:09

## 2022-01-01 RX ADMIN — PANTOPRAZOLE SODIUM 40 MILLIGRAM(S): 20 TABLET, DELAYED RELEASE ORAL at 05:23

## 2022-01-01 RX ADMIN — ALBUTEROL 2 PUFF(S): 90 AEROSOL, METERED ORAL at 20:16

## 2022-01-01 RX ADMIN — Medication 1: at 06:11

## 2022-01-01 RX ADMIN — PIPERACILLIN AND TAZOBACTAM 25 GRAM(S): 4; .5 INJECTION, POWDER, LYOPHILIZED, FOR SOLUTION INTRAVENOUS at 22:02

## 2022-01-01 RX ADMIN — Medication 2: at 11:29

## 2022-01-01 RX ADMIN — PANTOPRAZOLE SODIUM 40 MILLIGRAM(S): 20 TABLET, DELAYED RELEASE ORAL at 17:20

## 2022-01-01 RX ADMIN — PANTOPRAZOLE SODIUM 40 MILLIGRAM(S): 20 TABLET, DELAYED RELEASE ORAL at 05:36

## 2022-01-01 RX ADMIN — TAMSULOSIN HYDROCHLORIDE 0.4 MILLIGRAM(S): 0.4 CAPSULE ORAL at 23:50

## 2022-01-01 RX ADMIN — Medication 50 MILLILITER(S): at 12:54

## 2022-01-01 RX ADMIN — MIDODRINE HYDROCHLORIDE 5 MILLIGRAM(S): 2.5 TABLET ORAL at 17:45

## 2022-01-01 RX ADMIN — MIDODRINE HYDROCHLORIDE 5 MILLIGRAM(S): 2.5 TABLET ORAL at 14:22

## 2022-01-01 RX ADMIN — PANTOPRAZOLE SODIUM 40 MILLIGRAM(S): 20 TABLET, DELAYED RELEASE ORAL at 06:14

## 2022-01-01 RX ADMIN — Medication 650 MILLIGRAM(S): at 07:15

## 2022-01-01 RX ADMIN — CHLORHEXIDINE GLUCONATE 15 MILLILITER(S): 213 SOLUTION TOPICAL at 17:52

## 2022-01-01 RX ADMIN — Medication 650 MILLIGRAM(S): at 17:02

## 2022-01-01 RX ADMIN — Medication 2: at 13:30

## 2022-01-01 RX ADMIN — SODIUM CHLORIDE 75 MILLILITER(S): 9 INJECTION, SOLUTION INTRAVENOUS at 13:11

## 2022-01-01 RX ADMIN — Medication 650 MILLIGRAM(S): at 06:04

## 2022-01-01 RX ADMIN — Medication 650 MILLIGRAM(S): at 05:38

## 2022-01-01 RX ADMIN — Medication 4000 MILLILITER(S): at 23:21

## 2022-01-01 RX ADMIN — Medication 650 MILLIGRAM(S): at 21:51

## 2022-01-01 RX ADMIN — Medication 2: at 12:04

## 2022-01-01 RX ADMIN — Medication 250 MILLIGRAM(S): at 06:29

## 2022-01-01 RX ADMIN — ALBUTEROL 2 PUFF(S): 90 AEROSOL, METERED ORAL at 09:11

## 2022-01-01 RX ADMIN — Medication 1 TABLET(S): at 08:47

## 2022-01-01 RX ADMIN — Medication 650 MILLIGRAM(S): at 23:54

## 2022-01-01 RX ADMIN — PANTOPRAZOLE SODIUM 40 MILLIGRAM(S): 20 TABLET, DELAYED RELEASE ORAL at 17:30

## 2022-01-01 RX ADMIN — PANTOPRAZOLE SODIUM 40 MILLIGRAM(S): 20 TABLET, DELAYED RELEASE ORAL at 17:27

## 2022-01-01 RX ADMIN — Medication 1 APPLICATION(S): at 12:02

## 2022-01-01 RX ADMIN — Medication 1 TABLET(S): at 08:23

## 2022-01-01 RX ADMIN — Medication 20 MILLIGRAM(S): at 17:13

## 2022-01-01 RX ADMIN — Medication 50 MILLILITER(S): at 05:27

## 2022-01-01 RX ADMIN — MIDODRINE HYDROCHLORIDE 5 MILLIGRAM(S): 2.5 TABLET ORAL at 12:57

## 2022-01-01 RX ADMIN — Medication 650 MILLIGRAM(S): at 22:40

## 2022-01-01 RX ADMIN — Medication 650 MILLIGRAM(S): at 18:59

## 2022-01-01 RX ADMIN — ALBUTEROL 2 PUFF(S): 90 AEROSOL, METERED ORAL at 03:37

## 2022-01-01 RX ADMIN — ATORVASTATIN CALCIUM 40 MILLIGRAM(S): 80 TABLET, FILM COATED ORAL at 22:03

## 2022-01-01 RX ADMIN — Medication 650 MILLIGRAM(S): at 10:52

## 2022-01-01 RX ADMIN — Medication 40 MILLIEQUIVALENT(S): at 14:32

## 2022-01-01 RX ADMIN — MIDODRINE HYDROCHLORIDE 2.5 MILLIGRAM(S): 2.5 TABLET ORAL at 11:46

## 2022-01-01 RX ADMIN — MIDODRINE HYDROCHLORIDE 5 MILLIGRAM(S): 2.5 TABLET ORAL at 16:01

## 2022-01-01 RX ADMIN — MIDODRINE HYDROCHLORIDE 5 MILLIGRAM(S): 2.5 TABLET ORAL at 17:23

## 2022-01-01 RX ADMIN — Medication 2: at 11:06

## 2022-01-01 RX ADMIN — LINEZOLID 300 MILLIGRAM(S): 600 INJECTION, SOLUTION INTRAVENOUS at 05:46

## 2022-01-01 RX ADMIN — ALBUTEROL 2 PUFF(S): 90 AEROSOL, METERED ORAL at 20:05

## 2022-01-01 RX ADMIN — PIPERACILLIN AND TAZOBACTAM 25 GRAM(S): 4; .5 INJECTION, POWDER, LYOPHILIZED, FOR SOLUTION INTRAVENOUS at 05:23

## 2022-01-01 RX ADMIN — PIPERACILLIN AND TAZOBACTAM 25 GRAM(S): 4; .5 INJECTION, POWDER, LYOPHILIZED, FOR SOLUTION INTRAVENOUS at 05:08

## 2022-01-01 RX ADMIN — Medication 2: at 17:04

## 2022-01-01 RX ADMIN — Medication 1000 MILLIGRAM(S): at 06:45

## 2022-01-01 RX ADMIN — SODIUM CHLORIDE 2100 MILLILITER(S): 9 INJECTION INTRAMUSCULAR; INTRAVENOUS; SUBCUTANEOUS at 23:50

## 2022-01-01 RX ADMIN — Medication 15 MILLILITER(S): at 11:55

## 2022-01-01 RX ADMIN — Medication 1: at 06:39

## 2022-01-01 RX ADMIN — MEROPENEM 100 MILLIGRAM(S): 1 INJECTION INTRAVENOUS at 13:31

## 2022-01-01 RX ADMIN — MIDODRINE HYDROCHLORIDE 5 MILLIGRAM(S): 2.5 TABLET ORAL at 05:38

## 2022-01-01 RX ADMIN — Medication 500 MILLIGRAM(S): at 12:09

## 2022-01-01 RX ADMIN — Medication 1: at 17:27

## 2022-01-01 RX ADMIN — ALBUTEROL 2 PUFF(S): 90 AEROSOL, METERED ORAL at 09:44

## 2022-01-01 RX ADMIN — Medication 2: at 17:18

## 2022-01-01 RX ADMIN — Medication 1 APPLICATION(S): at 11:51

## 2022-01-01 RX ADMIN — PANTOPRAZOLE SODIUM 40 MILLIGRAM(S): 20 TABLET, DELAYED RELEASE ORAL at 06:03

## 2022-01-01 RX ADMIN — ENOXAPARIN SODIUM 40 MILLIGRAM(S): 100 INJECTION SUBCUTANEOUS at 11:29

## 2022-01-01 RX ADMIN — PIPERACILLIN AND TAZOBACTAM 25 GRAM(S): 4; .5 INJECTION, POWDER, LYOPHILIZED, FOR SOLUTION INTRAVENOUS at 21:12

## 2022-01-01 RX ADMIN — PIPERACILLIN AND TAZOBACTAM 25 GRAM(S): 4; .5 INJECTION, POWDER, LYOPHILIZED, FOR SOLUTION INTRAVENOUS at 21:25

## 2022-01-01 RX ADMIN — PANTOPRAZOLE SODIUM 40 MILLIGRAM(S): 20 TABLET, DELAYED RELEASE ORAL at 05:35

## 2022-01-01 RX ADMIN — Medication 400 MILLIGRAM(S): at 23:41

## 2022-01-01 RX ADMIN — Medication 40 MILLIEQUIVALENT(S): at 16:56

## 2022-01-01 RX ADMIN — Medication 2: at 06:23

## 2022-01-01 RX ADMIN — MIDODRINE HYDROCHLORIDE 5 MILLIGRAM(S): 2.5 TABLET ORAL at 17:47

## 2022-01-01 RX ADMIN — Medication 1: at 23:26

## 2022-01-01 RX ADMIN — Medication 1: at 18:59

## 2022-01-01 RX ADMIN — Medication 2: at 17:11

## 2022-01-01 RX ADMIN — MIDODRINE HYDROCHLORIDE 5 MILLIGRAM(S): 2.5 TABLET ORAL at 17:27

## 2022-01-01 RX ADMIN — MIDODRINE HYDROCHLORIDE 5 MILLIGRAM(S): 2.5 TABLET ORAL at 06:12

## 2022-01-01 RX ADMIN — CHLORHEXIDINE GLUCONATE 15 MILLILITER(S): 213 SOLUTION TOPICAL at 07:23

## 2022-01-01 RX ADMIN — ATORVASTATIN CALCIUM 40 MILLIGRAM(S): 80 TABLET, FILM COATED ORAL at 21:35

## 2022-01-01 RX ADMIN — MIDODRINE HYDROCHLORIDE 5 MILLIGRAM(S): 2.5 TABLET ORAL at 17:54

## 2022-01-01 RX ADMIN — MIDODRINE HYDROCHLORIDE 5 MILLIGRAM(S): 2.5 TABLET ORAL at 13:35

## 2022-01-01 RX ADMIN — Medication 1 APPLICATION(S): at 12:30

## 2022-01-01 RX ADMIN — Medication 1: at 17:05

## 2022-01-01 RX ADMIN — Medication 3: at 23:37

## 2022-01-01 RX ADMIN — CHLORHEXIDINE GLUCONATE 15 MILLILITER(S): 213 SOLUTION TOPICAL at 18:51

## 2022-01-01 RX ADMIN — ATORVASTATIN CALCIUM 40 MILLIGRAM(S): 80 TABLET, FILM COATED ORAL at 21:47

## 2022-01-01 RX ADMIN — FENTANYL CITRATE 25 MICROGRAM(S): 50 INJECTION INTRAVENOUS at 21:09

## 2022-01-01 RX ADMIN — SODIUM CHLORIDE 75 MILLILITER(S): 9 INJECTION, SOLUTION INTRAVENOUS at 21:59

## 2022-01-01 RX ADMIN — PANTOPRAZOLE SODIUM 40 MILLIGRAM(S): 20 TABLET, DELAYED RELEASE ORAL at 19:07

## 2022-01-01 RX ADMIN — Medication 650 MILLIGRAM(S): at 19:56

## 2022-01-01 RX ADMIN — CHLORHEXIDINE GLUCONATE 15 MILLILITER(S): 213 SOLUTION TOPICAL at 06:41

## 2022-01-01 RX ADMIN — PANTOPRAZOLE SODIUM 40 MILLIGRAM(S): 20 TABLET, DELAYED RELEASE ORAL at 05:24

## 2022-01-01 RX ADMIN — Medication 400 MILLIGRAM(S): at 06:02

## 2022-01-01 RX ADMIN — MEROPENEM 100 MILLIGRAM(S): 1 INJECTION INTRAVENOUS at 21:05

## 2022-01-01 RX ADMIN — Medication 3: at 17:31

## 2022-01-01 RX ADMIN — CEFEPIME 100 MILLIGRAM(S): 1 INJECTION, POWDER, FOR SOLUTION INTRAMUSCULAR; INTRAVENOUS at 12:46

## 2022-01-01 RX ADMIN — QUETIAPINE FUMARATE 12.5 MILLIGRAM(S): 200 TABLET, FILM COATED ORAL at 21:01

## 2022-01-01 RX ADMIN — CEFEPIME 100 MILLIGRAM(S): 1 INJECTION, POWDER, FOR SOLUTION INTRAMUSCULAR; INTRAVENOUS at 22:40

## 2022-01-01 RX ADMIN — Medication 2: at 17:34

## 2022-01-01 RX ADMIN — PIPERACILLIN AND TAZOBACTAM 25 GRAM(S): 4; .5 INJECTION, POWDER, LYOPHILIZED, FOR SOLUTION INTRAVENOUS at 14:52

## 2022-01-01 RX ADMIN — Medication 20 MILLIGRAM(S): at 17:37

## 2022-01-01 RX ADMIN — Medication 650 MILLIGRAM(S): at 13:00

## 2022-01-01 RX ADMIN — Medication 2: at 05:11

## 2022-01-01 RX ADMIN — Medication 1: at 23:09

## 2022-01-01 RX ADMIN — Medication 50 MILLILITER(S): at 12:38

## 2022-01-01 RX ADMIN — PANTOPRAZOLE SODIUM 40 MILLIGRAM(S): 20 TABLET, DELAYED RELEASE ORAL at 05:37

## 2022-01-01 RX ADMIN — PANTOPRAZOLE SODIUM 40 MILLIGRAM(S): 20 TABLET, DELAYED RELEASE ORAL at 10:04

## 2022-01-01 RX ADMIN — Medication 2: at 12:10

## 2022-01-01 RX ADMIN — ALBUTEROL 2 PUFF(S): 90 AEROSOL, METERED ORAL at 03:30

## 2022-01-01 RX ADMIN — Medication 50 MILLILITER(S): at 13:34

## 2022-01-01 RX ADMIN — Medication 50 MILLILITER(S): at 11:57

## 2022-01-01 RX ADMIN — Medication 10 MILLIGRAM(S): at 00:40

## 2022-01-01 RX ADMIN — Medication 1000 MILLIGRAM(S): at 00:30

## 2022-01-01 RX ADMIN — CEFEPIME 100 MILLIGRAM(S): 1 INJECTION, POWDER, FOR SOLUTION INTRAMUSCULAR; INTRAVENOUS at 21:33

## 2022-01-01 RX ADMIN — CHLORHEXIDINE GLUCONATE 15 MILLILITER(S): 213 SOLUTION TOPICAL at 06:35

## 2022-01-01 RX ADMIN — Medication 40 MILLIEQUIVALENT(S): at 13:26

## 2022-01-01 RX ADMIN — CHLORHEXIDINE GLUCONATE 15 MILLILITER(S): 213 SOLUTION TOPICAL at 17:15

## 2022-01-01 RX ADMIN — PIPERACILLIN AND TAZOBACTAM 25 GRAM(S): 4; .5 INJECTION, POWDER, LYOPHILIZED, FOR SOLUTION INTRAVENOUS at 05:11

## 2022-01-01 RX ADMIN — MIDODRINE HYDROCHLORIDE 5 MILLIGRAM(S): 2.5 TABLET ORAL at 11:24

## 2022-01-01 RX ADMIN — QUETIAPINE FUMARATE 12.5 MILLIGRAM(S): 200 TABLET, FILM COATED ORAL at 06:13

## 2022-01-01 RX ADMIN — ATORVASTATIN CALCIUM 40 MILLIGRAM(S): 80 TABLET, FILM COATED ORAL at 21:24

## 2022-01-01 RX ADMIN — Medication 650 MILLIGRAM(S): at 01:42

## 2022-01-01 RX ADMIN — Medication 650 MILLIGRAM(S): at 04:59

## 2022-01-01 RX ADMIN — ENOXAPARIN SODIUM 40 MILLIGRAM(S): 100 INJECTION SUBCUTANEOUS at 12:27

## 2022-01-01 RX ADMIN — Medication 40 MILLIEQUIVALENT(S): at 13:59

## 2022-01-01 RX ADMIN — Medication 1: at 06:12

## 2022-01-01 RX ADMIN — MIDODRINE HYDROCHLORIDE 5 MILLIGRAM(S): 2.5 TABLET ORAL at 17:26

## 2022-01-01 RX ADMIN — Medication 1 APPLICATION(S): at 12:57

## 2022-01-01 RX ADMIN — Medication 0: at 17:03

## 2022-01-01 RX ADMIN — PIPERACILLIN AND TAZOBACTAM 25 GRAM(S): 4; .5 INJECTION, POWDER, LYOPHILIZED, FOR SOLUTION INTRAVENOUS at 05:28

## 2022-01-01 RX ADMIN — SODIUM CHLORIDE 55 MILLILITER(S): 9 INJECTION, SOLUTION INTRAVENOUS at 17:46

## 2022-01-01 RX ADMIN — Medication 20 MILLIGRAM(S): at 13:24

## 2022-01-01 RX ADMIN — ALBUTEROL 2 PUFF(S): 90 AEROSOL, METERED ORAL at 08:15

## 2022-01-01 RX ADMIN — Medication 2: at 23:32

## 2022-01-01 RX ADMIN — MIDODRINE HYDROCHLORIDE 5 MILLIGRAM(S): 2.5 TABLET ORAL at 06:35

## 2022-01-01 RX ADMIN — MEROPENEM 100 MILLIGRAM(S): 1 INJECTION INTRAVENOUS at 05:52

## 2022-01-01 RX ADMIN — MIDODRINE HYDROCHLORIDE 5 MILLIGRAM(S): 2.5 TABLET ORAL at 12:19

## 2022-01-01 RX ADMIN — CEFEPIME 100 MILLIGRAM(S): 1 INJECTION, POWDER, FOR SOLUTION INTRAMUSCULAR; INTRAVENOUS at 06:54

## 2022-01-01 RX ADMIN — PANTOPRAZOLE SODIUM 40 MILLIGRAM(S): 20 TABLET, DELAYED RELEASE ORAL at 06:46

## 2022-01-01 RX ADMIN — Medication 650 MILLIGRAM(S): at 00:51

## 2022-01-01 RX ADMIN — PIPERACILLIN AND TAZOBACTAM 25 GRAM(S): 4; .5 INJECTION, POWDER, LYOPHILIZED, FOR SOLUTION INTRAVENOUS at 14:15

## 2022-01-01 RX ADMIN — QUETIAPINE FUMARATE 12.5 MILLIGRAM(S): 200 TABLET, FILM COATED ORAL at 17:34

## 2022-01-01 RX ADMIN — Medication 50 MILLILITER(S): at 19:17

## 2022-01-01 RX ADMIN — Medication 1 APPLICATION(S): at 12:19

## 2022-01-01 RX ADMIN — CHLORHEXIDINE GLUCONATE 15 MILLILITER(S): 213 SOLUTION TOPICAL at 05:27

## 2022-01-01 RX ADMIN — ATORVASTATIN CALCIUM 40 MILLIGRAM(S): 80 TABLET, FILM COATED ORAL at 23:05

## 2022-01-01 RX ADMIN — PIPERACILLIN AND TAZOBACTAM 25 GRAM(S): 4; .5 INJECTION, POWDER, LYOPHILIZED, FOR SOLUTION INTRAVENOUS at 05:21

## 2022-01-01 RX ADMIN — PANTOPRAZOLE SODIUM 40 MILLIGRAM(S): 20 TABLET, DELAYED RELEASE ORAL at 05:14

## 2022-01-01 RX ADMIN — CHLORHEXIDINE GLUCONATE 15 MILLILITER(S): 213 SOLUTION TOPICAL at 06:36

## 2022-01-01 RX ADMIN — SODIUM CHLORIDE 125 MILLILITER(S): 9 INJECTION INTRAMUSCULAR; INTRAVENOUS; SUBCUTANEOUS at 14:38

## 2022-01-01 RX ADMIN — Medication 1: at 17:22

## 2022-01-01 RX ADMIN — Medication 1 TABLET(S): at 22:04

## 2022-01-01 RX ADMIN — PIPERACILLIN AND TAZOBACTAM 25 GRAM(S): 4; .5 INJECTION, POWDER, LYOPHILIZED, FOR SOLUTION INTRAVENOUS at 14:19

## 2022-01-01 RX ADMIN — Medication 3: at 17:20

## 2022-01-01 RX ADMIN — CHLORHEXIDINE GLUCONATE 15 MILLILITER(S): 213 SOLUTION TOPICAL at 17:30

## 2022-01-01 RX ADMIN — PANTOPRAZOLE SODIUM 40 MILLIGRAM(S): 20 TABLET, DELAYED RELEASE ORAL at 17:41

## 2022-01-01 RX ADMIN — Medication 50 MILLILITER(S): at 05:32

## 2022-01-01 RX ADMIN — Medication 2: at 11:48

## 2022-01-01 RX ADMIN — CHLORHEXIDINE GLUCONATE 15 MILLILITER(S): 213 SOLUTION TOPICAL at 05:56

## 2022-01-01 RX ADMIN — ALBUTEROL 2 PUFF(S): 90 AEROSOL, METERED ORAL at 09:27

## 2022-01-01 RX ADMIN — Medication 2: at 11:00

## 2022-01-01 RX ADMIN — CHLORHEXIDINE GLUCONATE 15 MILLILITER(S): 213 SOLUTION TOPICAL at 05:15

## 2022-01-01 RX ADMIN — ENOXAPARIN SODIUM 40 MILLIGRAM(S): 100 INJECTION SUBCUTANEOUS at 11:07

## 2022-01-01 RX ADMIN — Medication 650 MILLIGRAM(S): at 21:06

## 2022-01-01 RX ADMIN — ATORVASTATIN CALCIUM 40 MILLIGRAM(S): 80 TABLET, FILM COATED ORAL at 21:01

## 2022-01-01 RX ADMIN — FENTANYL CITRATE 25 MICROGRAM(S): 50 INJECTION INTRAVENOUS at 10:46

## 2022-01-01 RX ADMIN — PANTOPRAZOLE SODIUM 40 MILLIGRAM(S): 20 TABLET, DELAYED RELEASE ORAL at 17:32

## 2022-01-01 RX ADMIN — CHLORHEXIDINE GLUCONATE 15 MILLILITER(S): 213 SOLUTION TOPICAL at 05:55

## 2022-01-01 RX ADMIN — Medication 500 MILLIGRAM(S): at 16:03

## 2022-01-01 RX ADMIN — PIPERACILLIN AND TAZOBACTAM 25 GRAM(S): 4; .5 INJECTION, POWDER, LYOPHILIZED, FOR SOLUTION INTRAVENOUS at 22:31

## 2022-01-01 RX ADMIN — Medication 250 MILLIGRAM(S): at 05:45

## 2022-01-01 RX ADMIN — Medication 6: at 05:35

## 2022-01-01 RX ADMIN — Medication 2: at 12:01

## 2022-01-01 NOTE — PROGRESS NOTE ADULT - SUBJECTIVE AND OBJECTIVE BOX
INTERVAL HPI/OVERNIGHT EVENTS: Intubated, calm without any sedations. Off pressors, on midodrine 5mg TID    PRESSORS: [ ] YES [ x] NO  WHICH:    ANTIBIOTICS:                      Antimicrobial:  piperacillin/tazobactam IVPB.. 3.375 Gram(s) IV Intermittent every 8 hours    Cardiovascular:  midodrine 5 milliGRAM(s) Oral every 8 hours    Pulmonary:  ALBUTerol    90 MICROgram(s) HFA Inhaler 2 Puff(s) Inhalation every 6 hours    Hematalogic:  enoxaparin Injectable 40 milliGRAM(s) SubCutaneous daily    Other:  acetaminophen    Suspension .. 650 milliGRAM(s) Oral every 6 hours PRN  bisacodyl Suppository 10 milliGRAM(s) Rectal daily PRN  chlorhexidine 2% Cloths 1 Application(s) Topical <User Schedule>  fentaNYL    Injectable 25 MICROGram(s) IV Push every 6 hours PRN  insulin lispro (ADMELOG) corrective regimen sliding scale   SubCutaneous every 6 hours  pantoprazole   Suspension 40 milliGRAM(s) Oral daily    acetaminophen    Suspension .. 650 milliGRAM(s) Oral every 6 hours PRN  ALBUTerol    90 MICROgram(s) HFA Inhaler 2 Puff(s) Inhalation every 6 hours  bisacodyl Suppository 10 milliGRAM(s) Rectal daily PRN  chlorhexidine 2% Cloths 1 Application(s) Topical <User Schedule>  enoxaparin Injectable 40 milliGRAM(s) SubCutaneous daily  fentaNYL    Injectable 25 MICROGram(s) IV Push every 6 hours PRN  insulin lispro (ADMELOG) corrective regimen sliding scale   SubCutaneous every 6 hours  midodrine 5 milliGRAM(s) Oral every 8 hours  pantoprazole   Suspension 40 milliGRAM(s) Oral daily  piperacillin/tazobactam IVPB.. 3.375 Gram(s) IV Intermittent every 8 hours    Drug Dosing Weight  Height (cm): 175.3 (20 Dec 2021 02:21)  Weight (kg): 72.5 (20 Dec 2021 06:30)  BMI (kg/m2): 23.6 (20 Dec 2021 06:30)  BSA (m2): 1.88 (20 Dec 2021 06:30)    CENTRAL LINE: [ ] YES [ x] NO  LOCATION:   DATE INSERTED:  REMOVE: [ ] YES [ ] NO  EXPLAIN:    HUTCHINS: [ x] YES [ ] NO    DATE INSERTED:  REMOVE:  [ ] YES [ ] NO  EXPLAIN:    A-LINE:  [ ] YES [x ] NO  LOCATION:   DATE INSERTED:  REMOVE:  [ ] YES [ ] NO  EXPLAIN:    PMH -reviewed admission note, no change since admission    ICU Vital Signs Last 24 Hrs  T(C): 37.7 (31 Dec 2021 20:00), Max: 38.1 (31 Dec 2021 17:00)  T(F): 99.9 (31 Dec 2021 20:00), Max: 100.6 (31 Dec 2021 17:00)  HR: 71 (31 Dec 2021 22:00) (66 - 90)  BP: 123/62 (31 Dec 2021 22:00) (113/57 - 159/65)  BP(mean): 76 (31 Dec 2021 22:00) (68 - 105)  ABP: --  ABP(mean): --  RR: 17 (31 Dec 2021 22:00) (15 - 24)  SpO2: 99% (31 Dec 2021 22:00) (92% - 100%)      ABG - ( 31 Dec 2021 03:43 )  pH, Arterial: 7.49  pH, Blood: x     /  pCO2: 43    /  pO2: 134   / HCO3: 33    / Base Excess: 8.5   /  SaO2: 99                    12-30 @ 07:01  -  12-31 @ 07:00  --------------------------------------------------------  IN: 2740 mL / OUT: 1505 mL / NET: 1235 mL        Mode: AC/ CMV (Assist Control/ Continuous Mandatory Ventilation)  RR (machine): 16  TV (machine): 450  FiO2: 40  PEEP: 5  ITime: 1  MAP: 11  PIP: 29      PHYSICAL EXAM:    GENERAL: Intubated  HEAD:  Atraumatic, Normocephalic  EYES: EOMI, PERRLA, conjunctiva and sclera clear  ENMT: No tonsillar erythema, exudates, or enlargement; Moist mucous membranes, Good dentition, No lesions  NECK: Supple, normal appearance, No JVD; Normal thyroid; Trachea midline  NERVOUS SYSTEM:  Awake and follows simple commands   CHEST/LUNG: No chest deformity; Normal percussion bilaterally; No rales, rhonchi, wheezing   HEART: Regular rate and rhythm; No murmurs, rubs, or gallops  ABDOMEN: Soft, Nontender, Nondistended; Bowel sounds present  EXTREMITIES:  2+ Peripheral Pulses, No clubbing, cyanosis, or edema +ve Hutchins         LABS:  CBC Full  -  ( 31 Dec 2021 05:45 )  WBC Count : 11.66 K/uL  RBC Count : 3.27 M/uL  Hemoglobin : 9.3 g/dL  Hematocrit : 29.6 %  Platelet Count - Automated : 279 K/uL  Mean Cell Volume : 90.5 fl  Mean Cell Hemoglobin : 28.4 pg  Mean Cell Hemoglobin Concentration : 31.4 gm/dL  Auto Neutrophil # : 10.50 K/uL  Auto Lymphocyte # : 0.38 K/uL  Auto Monocyte # : 0.50 K/uL  Auto Eosinophil # : 0.14 K/uL  Auto Basophil # : 0.03 K/uL  Auto Neutrophil % : 90.0 %  Auto Lymphocyte % : 3.3 %  Auto Monocyte % : 4.3 %  Auto Eosinophil % : 1.2 %  Auto Basophil % : 0.3 %    12-31    146<H>  |  112<H>  |  29<H>  ----------------------------<  188<H>  3.8   |  30  |  0.74    Ca    10.6<H>      31 Dec 2021 05:45  Phos  2.4     12-31  Mg     2.4     12-31    TPro  6.0  /  Alb  1.3<L>  /  TBili  0.5  /  DBili  x   /  AST  24  /  ALT  12  /  AlkPhos  89  12-31        Culture Results:   Normal Respiratory Karla present (12-29 @ 18:28)      RADIOLOGY & ADDITIONAL STUDIES REVIEWED:  ***    GOALS OF CARE DISCUSSION WITH PATIENT/FAMILY/PROXY:    CRITICAL CARE TIME SPENT: 35 minutes INTERVAL HPI/OVERNIGHT EVENTS: Intubated, calm without any sedations. Off pressors, on midodrine 5mg TID    PRESSORS: [ ] YES [ x] NO  WHICH:    ANTIBIOTICS:                      Antimicrobial:  piperacillin/tazobactam IVPB.. 3.375 Gram(s) IV Intermittent every 8 hours    Cardiovascular:  midodrine 5 milliGRAM(s) Oral every 8 hours    Pulmonary:  ALBUTerol    90 MICROgram(s) HFA Inhaler 2 Puff(s) Inhalation every 6 hours    Hematalogic:  enoxaparin Injectable 40 milliGRAM(s) SubCutaneous daily    Other:  acetaminophen    Suspension .. 650 milliGRAM(s) Oral every 6 hours PRN  bisacodyl Suppository 10 milliGRAM(s) Rectal daily PRN  chlorhexidine 2% Cloths 1 Application(s) Topical <User Schedule>  fentaNYL    Injectable 25 MICROGram(s) IV Push every 6 hours PRN  insulin lispro (ADMELOG) corrective regimen sliding scale   SubCutaneous every 6 hours  pantoprazole   Suspension 40 milliGRAM(s) Oral daily    acetaminophen    Suspension .. 650 milliGRAM(s) Oral every 6 hours PRN  ALBUTerol    90 MICROgram(s) HFA Inhaler 2 Puff(s) Inhalation every 6 hours  bisacodyl Suppository 10 milliGRAM(s) Rectal daily PRN  chlorhexidine 2% Cloths 1 Application(s) Topical <User Schedule>  enoxaparin Injectable 40 milliGRAM(s) SubCutaneous daily  fentaNYL    Injectable 25 MICROGram(s) IV Push every 6 hours PRN  insulin lispro (ADMELOG) corrective regimen sliding scale   SubCutaneous every 6 hours  midodrine 5 milliGRAM(s) Oral every 8 hours  pantoprazole   Suspension 40 milliGRAM(s) Oral daily  piperacillin/tazobactam IVPB.. 3.375 Gram(s) IV Intermittent every 8 hours    Drug Dosing Weight  Height (cm): 175.3 (20 Dec 2021 02:21)  Weight (kg): 72.5 (20 Dec 2021 06:30)  BMI (kg/m2): 23.6 (20 Dec 2021 06:30)  BSA (m2): 1.88 (20 Dec 2021 06:30)    CENTRAL LINE: [ ] YES [ x] NO  LOCATION:   DATE INSERTED:  REMOVE: [ ] YES [ ] NO  EXPLAIN:    HUTCHINS: [ x] YES [ ] NO    DATE INSERTED:  REMOVE:  [ ] YES [ ] NO  EXPLAIN:    A-LINE:  [ ] YES [x ] NO  LOCATION:   DATE INSERTED:  REMOVE:  [ ] YES [ ] NO  EXPLAIN:    PMH -reviewed admission note, no change since admission    ICU Vital Signs Last 24 Hrs  T(C): 37.7 (31 Dec 2021 20:00), Max: 38.1 (31 Dec 2021 17:00)  T(F): 99.9 (31 Dec 2021 20:00), Max: 100.6 (31 Dec 2021 17:00)  HR: 71 (31 Dec 2021 22:00) (66 - 90)  BP: 123/62 (31 Dec 2021 22:00) (113/57 - 159/65)  BP(mean): 76 (31 Dec 2021 22:00) (68 - 105)  ABP: --  ABP(mean): --  RR: 17 (31 Dec 2021 22:00) (15 - 24)  SpO2: 99% (31 Dec 2021 22:00) (92% - 100%)      ABG - ( 31 Dec 2021 03:43 )  pH, Arterial: 7.49  pH, Blood: x     /  pCO2: 43    /  pO2: 134   / HCO3: 33    / Base Excess: 8.5   /  SaO2: 99                    12-30 @ 07:01  -  12-31 @ 07:00  --------------------------------------------------------  IN: 2740 mL / OUT: 1505 mL / NET: 1235 mL        Mode: AC/ CMV (Assist Control/ Continuous Mandatory Ventilation)  RR (machine): 16  TV (machine): 450  FiO2: 40  PEEP: 5  ITime: 1  MAP: 11  PIP: 29      PHYSICAL EXAM:    GENERAL: Intubated  HEAD:  Atraumatic, Normocephalic  EYES: EOMI, PERRLA, conjunctiva and sclera clear  ENMT: No tonsillar erythema, exudates, or enlargement; Moist mucous membranes, Good dentition, No lesions  NECK: Supple, normal appearance, No JVD; Normal thyroid; Trachea midline, right sided mass  NERVOUS SYSTEM:  Awake and follows simple commands   CHEST/LUNG: No chest deformity; Normal percussion bilaterally; No rales, rhonchi, wheezing, Left sided chest tube  HEART: Regular rate and rhythm; No murmurs, rubs, or gallops  ABDOMEN: Soft, Nontender, Nondistended; Bowel sounds present  EXTREMITIES:  2+ Peripheral Pulses, No clubbing, cyanosis, or edema +ve Hutchins         LABS:  CBC Full  -  ( 31 Dec 2021 05:45 )  WBC Count : 11.66 K/uL  RBC Count : 3.27 M/uL  Hemoglobin : 9.3 g/dL  Hematocrit : 29.6 %  Platelet Count - Automated : 279 K/uL  Mean Cell Volume : 90.5 fl  Mean Cell Hemoglobin : 28.4 pg  Mean Cell Hemoglobin Concentration : 31.4 gm/dL  Auto Neutrophil # : 10.50 K/uL  Auto Lymphocyte # : 0.38 K/uL  Auto Monocyte # : 0.50 K/uL  Auto Eosinophil # : 0.14 K/uL  Auto Basophil # : 0.03 K/uL  Auto Neutrophil % : 90.0 %  Auto Lymphocyte % : 3.3 %  Auto Monocyte % : 4.3 %  Auto Eosinophil % : 1.2 %  Auto Basophil % : 0.3 %    12-31    146<H>  |  112<H>  |  29<H>  ----------------------------<  188<H>  3.8   |  30  |  0.74    Ca    10.6<H>      31 Dec 2021 05:45  Phos  2.4     12-31  Mg     2.4     12-31    TPro  6.0  /  Alb  1.3<L>  /  TBili  0.5  /  DBili  x   /  AST  24  /  ALT  12  /  AlkPhos  89  12-31        Culture Results:   Normal Respiratory Karla present (12-29 @ 18:28)      RADIOLOGY & ADDITIONAL STUDIES REVIEWED:  ***    GOALS OF CARE DISCUSSION WITH PATIENT/FAMILY/PROXY:    CRITICAL CARE TIME SPENT: 35 minutes

## 2022-01-01 NOTE — PROGRESS NOTE ADULT - ASSESSMENT
Pneumonia - likely aspiration   Leukocytosis - normalized  Resp failure  Fevers    Plan - Cont Zosyn 3.375 gms iv q8hrs.  Time spent - 31 mins.

## 2022-01-01 NOTE — PROGRESS NOTE ADULT - SUBJECTIVE AND OBJECTIVE BOX
76y Male    Meds:  piperacillin/tazobactam IVPB.. 3.375 Gram(s) IV Intermittent every 8 hours    Allergies    No Known Allergies    Intolerances        VITALS:  Vital Signs Last 24 Hrs  T(C): 37.8 (01 Jan 2022 17:00), Max: 38.1 (31 Dec 2021 18:00)  T(F): 100 (01 Jan 2022 17:00), Max: 100.6 (31 Dec 2021 18:00)  HR: 84 (01 Jan 2022 17:00) (69 - 90)  BP: 123/58 (01 Jan 2022 17:00) (111/59 - 142/66)  BP(mean): 74 (01 Jan 2022 17:00) (68 - 86)  RR: 16 (01 Jan 2022 17:00) (13 - 20)  SpO2: 99% (01 Jan 2022 17:00) (97% - 100%)    LABS/DIAGNOSTIC TESTS:                          8.3    8.06  )-----------( 271      ( 01 Jan 2022 05:00 )             26.1         01-01    143  |  107  |  26<H>  ----------------------------<  158<H>  3.9   |  32<H>  |  0.68    Ca    9.6      01 Jan 2022 05:00  Phos  2.5     01-01  Mg     2.2     01-01    TPro  5.4<L>  /  Alb  1.2<L>  /  TBili  0.4  /  DBili  x   /  AST  22  /  ALT  12  /  AlkPhos  79  01-01      LIVER FUNCTIONS - ( 01 Jan 2022 05:00 )  Alb: 1.2 g/dL / Pro: 5.4 g/dL / ALK PHOS: 79 U/L / ALT: 12 U/L DA / AST: 22 U/L / GGT: x             CULTURES: .Sputum Sputum  12-29 @ 18:28   Normal Respiratory Karla present  --    Numerous polymorphonuclear leukocytes per low power field  No squamous epithelial cells per low power field  Few Gram positive cocci in pairs per oil power field      Clean Catch Clean Catch (Midstream)  12-21 @ 04:42   No growth  --  --                RADIOLOGY: < from: CT Chest w/ IV Cont (12.30.21 @ 12:53) >  ACC: [id number] EXAM:  CT CHEST IC                          PROCEDURE DATE:  12/30/2021          INTERPRETATION:  INDICATION: Squamous cell carcinoma of the tongue    TECHNIQUE: Volumetric images of the chest after the administration of 40   mL of Omnipaque 350. Maximum intensity projection images were generated.    COMPARISON: CT chest 11/4/2021.    FINDINGS:    LUNGS/AIRWAYS/PLEURA: New occlusion of the left lower lobe bronchus and   associated left lower lobe collapse. Heterogeneous enhancement of left   lower lobe atelectasis, suggesting a superimposed process. New   consolidation in the right lower lobe and lingula, and peribronchial   nodules in all lobes of the right lung.    Larger bilateral cavitary masses, for example, 4.6 cm in themiddle lobe   (measured on 3-90), prior 2.1 cm. Other unchanged solid nodules, for   example, 1 cm in the left apex (3-34).    Endotracheal tube tip in the mid trachea. Stable mild fibrosis in the   anterior upper lobes.    Slightly increased moderate left pneumothorax. Left pleural pigtail   catheter in the left posterior hemithorax. Trace left basilar pleural   effusion. New right pleural thickening in the right cardiophrenic angle.    LYMPH NODES/MEDIASTINUM: Partially included necrotic right   supraclavicular lymphadenopathy. Slightly larger right paratracheal lymph   nodes up to 1.1 cm (3-37). Calcified lymph nodes, likely prior   granulomatous disease.    HEART/VASCULATURE: Normal heart size. Small pericardial effusion. Heavily   calcified coronary arteries. Normal caliber aorta and main pulmonary   artery.    UPPER ABDOMEN:NG tube tip in the gastric fundus.    BONES/SOFT TISSUES: New non and mildly displaced fractures of the right   anterior third through sixth ribs, and mildly displaced fracture of the   left anterior fifth rib.      IMPRESSION:    New multilobar consolidation, including likely within a collapsed left   lower lobe, and peribronchial nodules in all lobes of the right lung,   favored to be infection.    New occlusion of the left lower lobe bronchus, possibly by mucus, and   associated left lower lobe collapse.    Larger bilateral cavitary metastases.    Increased moderate left pneumothorax. New right pleural thickening in the   right cardiophrenic angle.    Mildly increased mediastinal lymphadenopathy. Partially included large   right supraclavicular lymphadenopathy.    Bilateral acute anterior rib fractures.    --- End of Report ---            SATISH WALDROP M.D., ATTENDING RADIOGIST  This document has been electronically signed. Dec 30 2021  1:22PM    < end of copied text >        ROS:  [  ] UNABLE TO ELICIT ICU   76y Male who remains in the ICU , intubated  and vent dependent on an FIO2 of 40% and PEEP of 5 , he has some low grade fevers, he is easily arousable and seems more comfortable today, he seems to have no tenderness when I palpate his ribs but did just get pain meds a short while ago, he has thick secretions from his tracheal suctioning.    Meds:  piperacillin/tazobactam IVPB.. 3.375 Gram(s) IV Intermittent every 8 hours    Allergies    No Known Allergies    Intolerances        VITALS:  Vital Signs Last 24 Hrs  T(C): 37.8 (01 Jan 2022 17:00), Max: 38.1 (31 Dec 2021 18:00)  T(F): 100 (01 Jan 2022 17:00), Max: 100.6 (31 Dec 2021 18:00)  HR: 84 (01 Jan 2022 17:00) (69 - 90)  BP: 123/58 (01 Jan 2022 17:00) (111/59 - 142/66)  BP(mean): 74 (01 Jan 2022 17:00) (68 - 86)  RR: 16 (01 Jan 2022 17:00) (13 - 20)  SpO2: 99% (01 Jan 2022 17:00) (97% - 100%)    LABS/DIAGNOSTIC TESTS:                          8.3    8.06  )-----------( 271      ( 01 Jan 2022 05:00 )             26.1         01-01    143  |  107  |  26<H>  ----------------------------<  158<H>  3.9   |  32<H>  |  0.68    Ca    9.6      01 Jan 2022 05:00  Phos  2.5     01-01  Mg     2.2     01-01    TPro  5.4<L>  /  Alb  1.2<L>  /  TBili  0.4  /  DBili  x   /  AST  22  /  ALT  12  /  AlkPhos  79  01-01      LIVER FUNCTIONS - ( 01 Jan 2022 05:00 )  Alb: 1.2 g/dL / Pro: 5.4 g/dL / ALK PHOS: 79 U/L / ALT: 12 U/L DA / AST: 22 U/L / GGT: x             CULTURES: .Sputum Sputum  12-29 @ 18:28   Normal Respiratory Karla present  --    Numerous polymorphonuclear leukocytes per low power field  No squamous epithelial cells per low power field  Few Gram positive cocci in pairs per oil power field      Clean Catch Clean Catch (Midstream)  12-21 @ 04:42   No growth  --  --                RADIOLOGY: < from: CT Chest w/ IV Cont (12.30.21 @ 12:53) >  ACC: [id number] EXAM:  CT CHEST IC                          PROCEDURE DATE:  12/30/2021          INTERPRETATION:  INDICATION: Squamous cell carcinoma of the tongue    TECHNIQUE: Volumetric images of the chest after the administration of 40   mL of Omnipaque 350. Maximum intensity projection images were generated.    COMPARISON: CT chest 11/4/2021.    FINDINGS:    LUNGS/AIRWAYS/PLEURA: New occlusion of the left lower lobe bronchus and   associated left lower lobe collapse. Heterogeneous enhancement of left   lower lobe atelectasis, suggesting a superimposed process. New   consolidation in the right lower lobe and lingula, and peribronchial   nodules in all lobes of the right lung.    Larger bilateral cavitary masses, for example, 4.6 cm in themiddle lobe   (measured on 3-90), prior 2.1 cm. Other unchanged solid nodules, for   example, 1 cm in the left apex (3-34).    Endotracheal tube tip in the mid trachea. Stable mild fibrosis in the   anterior upper lobes.    Slightly increased moderate left pneumothorax. Left pleural pigtail   catheter in the left posterior hemithorax. Trace left basilar pleural   effusion. New right pleural thickening in the right cardiophrenic angle.    LYMPH NODES/MEDIASTINUM: Partially included necrotic right   supraclavicular lymphadenopathy. Slightly larger right paratracheal lymph   nodes up to 1.1 cm (3-37). Calcified lymph nodes, likely prior   granulomatous disease.    HEART/VASCULATURE: Normal heart size. Small pericardial effusion. Heavily   calcified coronary arteries. Normal caliber aorta and main pulmonary   artery.    UPPER ABDOMEN:NG tube tip in the gastric fundus.    BONES/SOFT TISSUES: New non and mildly displaced fractures of the right   anterior third through sixth ribs, and mildly displaced fracture of the   left anterior fifth rib.      IMPRESSION:    New multilobar consolidation, including likely within a collapsed left   lower lobe, and peribronchial nodules in all lobes of the right lung,   favored to be infection.    New occlusion of the left lower lobe bronchus, possibly by mucus, and   associated left lower lobe collapse.    Larger bilateral cavitary metastases.    Increased moderate left pneumothorax. New right pleural thickening in the   right cardiophrenic angle.    Mildly increased mediastinal lymphadenopathy. Partially included large   right supraclavicular lymphadenopathy.    Bilateral acute anterior rib fractures.    --- End of Report ---            SATISH WALDROP M.D., ATTENDING RADIOGIST  This document has been electronically signed. Dec 30 2021  1:22PM    < end of copied text >        ROS:  [  ] UNABLE TO ELICIT ICU   76y Male who remains in the ICU , intubated  and vent dependent on an FIO2 of 40% and PEEP of 5 , he has some low grade fevers, he is easily arousable and seems more comfortable today, he seems to have no tenderness when I palpate his ribs but did just get pain meds a short while ago, he has thick secretions from his tracheal suctioning. He is not on any pressors. He has no diarrhea.    Meds:  piperacillin/tazobactam IVPB.. 3.375 Gram(s) IV Intermittent every 8 hours    Allergies    No Known Allergies    Intolerances        VITALS:  Vital Signs Last 24 Hrs  T(C): 37.8 (01 Jan 2022 17:00), Max: 38.1 (31 Dec 2021 18:00)  T(F): 100 (01 Jan 2022 17:00), Max: 100.6 (31 Dec 2021 18:00)  HR: 84 (01 Jan 2022 17:00) (69 - 90)  BP: 123/58 (01 Jan 2022 17:00) (111/59 - 142/66)  BP(mean): 74 (01 Jan 2022 17:00) (68 - 86)  RR: 16 (01 Jan 2022 17:00) (13 - 20)  SpO2: 99% (01 Jan 2022 17:00) (97% - 100%)    LABS/DIAGNOSTIC TESTS:                          8.3    8.06  )-----------( 271      ( 01 Jan 2022 05:00 )             26.1         01-01    143  |  107  |  26<H>  ----------------------------<  158<H>  3.9   |  32<H>  |  0.68    Ca    9.6      01 Jan 2022 05:00  Phos  2.5     01-01  Mg     2.2     01-01    TPro  5.4<L>  /  Alb  1.2<L>  /  TBili  0.4  /  DBili  x   /  AST  22  /  ALT  12  /  AlkPhos  79  01-01      LIVER FUNCTIONS - ( 01 Jan 2022 05:00 )  Alb: 1.2 g/dL / Pro: 5.4 g/dL / ALK PHOS: 79 U/L / ALT: 12 U/L DA / AST: 22 U/L / GGT: x             CULTURES: .Sputum Sputum  12-29 @ 18:28   Normal Respiratory Karla present  --    Numerous polymorphonuclear leukocytes per low power field  No squamous epithelial cells per low power field  Few Gram positive cocci in pairs per oil power field      Clean Catch Clean Catch (Midstream)  12-21 @ 04:42   No growth  --  --                RADIOLOGY: < from: CT Chest w/ IV Cont (12.30.21 @ 12:53) >  ACC: [id number] EXAM:  CT CHEST IC                          PROCEDURE DATE:  12/30/2021          INTERPRETATION:  INDICATION: Squamous cell carcinoma of the tongue    TECHNIQUE: Volumetric images of the chest after the administration of 40   mL of Omnipaque 350. Maximum intensity projection images were generated.    COMPARISON: CT chest 11/4/2021.    FINDINGS:    LUNGS/AIRWAYS/PLEURA: New occlusion of the left lower lobe bronchus and   associated left lower lobe collapse. Heterogeneous enhancement of left   lower lobe atelectasis, suggesting a superimposed process. New   consolidation in the right lower lobe and lingula, and peribronchial   nodules in all lobes of the right lung.    Larger bilateral cavitary masses, for example, 4.6 cm in themiddle lobe   (measured on 3-90), prior 2.1 cm. Other unchanged solid nodules, for   example, 1 cm in the left apex (3-34).    Endotracheal tube tip in the mid trachea. Stable mild fibrosis in the   anterior upper lobes.    Slightly increased moderate left pneumothorax. Left pleural pigtail   catheter in the left posterior hemithorax. Trace left basilar pleural   effusion. New right pleural thickening in the right cardiophrenic angle.    LYMPH NODES/MEDIASTINUM: Partially included necrotic right   supraclavicular lymphadenopathy. Slightly larger right paratracheal lymph   nodes up to 1.1 cm (3-37). Calcified lymph nodes, likely prior   granulomatous disease.    HEART/VASCULATURE: Normal heart size. Small pericardial effusion. Heavily   calcified coronary arteries. Normal caliber aorta and main pulmonary   artery.    UPPER ABDOMEN:NG tube tip in the gastric fundus.    BONES/SOFT TISSUES: New non and mildly displaced fractures of the right   anterior third through sixth ribs, and mildly displaced fracture of the   left anterior fifth rib.      IMPRESSION:    New multilobar consolidation, including likely within a collapsed left   lower lobe, and peribronchial nodules in all lobes of the right lung,   favored to be infection.    New occlusion of the left lower lobe bronchus, possibly by mucus, and   associated left lower lobe collapse.    Larger bilateral cavitary metastases.    Increased moderate left pneumothorax. New right pleural thickening in the   right cardiophrenic angle.    Mildly increased mediastinal lymphadenopathy. Partially included large   right supraclavicular lymphadenopathy.    Bilateral acute anterior rib fractures.    --- End of Report ---            SATISH WALDROP M.D., ATTENDING RADIOGIST  This document has been electronically signed. Dec 30 2021  1:22PM    < end of copied text >        ROS:  [ x ] UNABLE TO ELICIT

## 2022-01-01 NOTE — PROGRESS NOTE ADULT - NUTRITIONAL ASSESSMENT
This patient has been assessed with a concern for Malnutrition and has been determined to have a diagnosis/diagnoses of Severe protein-calorie malnutrition.    This patient is being managed with:   Diet NPO-  NPO for Procedure/Test     NPO Start Date: 03-Jan-2022   NPO Start Time: 00:00  Entered: Dec 31 2021  1:08PM    Diet NPO with Tube Feed-  Tube Feeding Modality: Nasogastric  Glucerna 1.5 Dhruv  Total Volume for 24 Hours (mL): 720  Continuous  Starting Tube Feed Rate {mL per Hour}: 10  Increase Tube Feed Rate by (mL): 10     Every 4 hours  Until Goal Tube Feed Rate (mL per Hour): 30  Tube Feed Duration (in Hours): 24  Tube Feed Start Time: 10:00  Free Water Flush  Bolus   Total Volume per Flush (mL): 250   Frequency: Every 4 Hours  Entered: Dec 29 2021  9:53PM

## 2022-01-01 NOTE — PROGRESS NOTE ADULT - ASSESSMENT
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 Patient had cardiac arrest in ED , was intubated CPR was initiated and subsequently ROSC was achieved after 20 minutes. Patient is admitted to ICU.    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  - CAD s/p stent  - Diabetes    Plan  ====Neuro====  #baseline mentation: AAOx3  #intubated  - Extubated on 12/27  - re-intubated on 12/28  - OFF propofol and pressors  - PRN fent pushes    ====CVS====  #Cardiac arrest   12/20 on arrival to ED  - ROSC after 20min  - trop trended down  - started norepi 12/28 post intubation, tapered OFF  - midodrine 10 started 12/29 5mg TID    ====Pulm====  #Acute hypoxic respiratory failure  - has Hx of chronic PTX on LEFT  - likely 2/2 PTX vs aspiration PNA  - SBT (12/21, 22, 24)  - extubated on 12/22  - re-intubated on 12/23  - s/p solumedrol  - s/p cefepime 2g q12 (12/20-12/28)  - extubated again on 12/27; re-intubated on 12/28  - CXR w/ improvement, stable chronic LEFT PTX  - thoracic surgery consulted for trach/PEG  - plan for CT - with IV contrast shows interval worsening  - on zosyn Abx for asp PNA coverage    #PTX (acute/on/chronic)  - s/p pigtail in ED 12/20  - chest tube to water seal- serosanginous exudate  - CXR interval improvement    ====GI====  #no active issues  #diet: NPO w/ tube feeding + free water 350 q 6  #bowel regimen: none    ====nephro====  #hypernatremia  serum Na 153 >>> now 146  - c/w free water 350 q 6    ====ID====  #fever  started 12/29 after intubation  wbc trending back down  - sputum culture no bacterial growth   - started Zosyn 12/29    #leukocytosis  - likely 2/2 aspiration PNA vs reactive process  - Ucx neg  - Scx MAC  - s/p cefepime 2g q 12 (12/20-12/28)    ====Heme/onc====  #Metastatic SCC of tongue base  - s/p resection, chemo/radiation, and immunotherapy (10/2021)  - plan for experimental drug as outpatient.  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 Patient had cardiac arrest in ED , was intubated CPR was initiated and subsequently ROSC was achieved after 20 minutes. Patient is admitted to ICU.    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  - CAD s/p stent  - Diabetes    Plan  ====Neuro====  #baseline mentation: AAOx3  #intubated  - Extubated on 12/27  - re-intubated on 12/28  - OFF propofol and pressors  - PRN fent pushes    ====CVS====  #Cardiac arrest   12/20 on arrival to ED  - ROSC after 20min  - trop trended down  - started norepi 12/28 post intubation, tapered OFF  - midodrine 5mg TID     ====Pulm====  #Acute hypoxic respiratory failure  - has Hx of chronic PTX on LEFT  - likely 2/2 PTX vs aspiration PNA  - SBT (12/21, 22, 24)  - extubated on 12/22  - re-intubated on 12/23  - s/p solumedrol  - s/p cefepime 2g q12 (12/20-12/28)  - extubated again on 12/27; re-intubated on 12/28  - CXR w/ improvement, stable chronic LEFT PTX  - thoracic surgery consulted for trach/PEG  - plan for CT - with IV contrast shows interval worsening  - on zosyn Abx for asp PNA coverage    #PTX (acute/on/chronic)  - s/p pigtail in ED 12/20  - chest tube to water seal- serosanginous exudate  - CXR interval improvement    ====GI====  #no active issues  #diet: NPO w/ tube feeding + free water 350 q 6  #bowel regimen: none    ====nephro====  #hypernatremia  serum Na 153 >>> now 146  - c/w free water 350 q 6    ====ID====  #fever  started 12/29 after intubation  wbc trending back down  - sputum culture no bacterial growth   - started Zosyn 12/29    #leukocytosis  - likely 2/2 aspiration PNA vs reactive process  - Ucx neg  - Scx MAC  - s/p cefepime 2g q 12 (12/20-12/28)    ====Heme/onc====  #Metastatic SCC of tongue base  - s/p resection, chemo/radiation, and immunotherapy (10/2021)  - plan for experimental drug as outpatient.  - heme/onc as o/p   - palliative chemo, post trach/PEG will not be candidate for chemo  - palliative on board, discussion to be had today regarding trach PEG    ====Endo====  #DM  - a1c 6.3  - c/w sliding scale    ====Ppx====  #DVT: Lovenox  #GI: PPI

## 2022-01-01 NOTE — CHART NOTE - NSCHARTNOTEFT_GEN_A_CORE
Spoke with son Moses regarding father, given update. Stated that father alluded to possibly not wanting tracheostomy today. Will visit Sunday to discuss with him further. Dr. Tim gave permission for another visit on 1/2/2022.

## 2022-01-02 NOTE — PROGRESS NOTE ADULT - NUTRITIONAL ASSESSMENT
This patient has been assessed with a concern for Malnutrition and has been determined to have a diagnosis/diagnoses of Severe protein-calorie malnutrition.    This patient is being managed with:   Diet NPO with Tube Feed-  Tube Feeding Modality: Nasogastric  Total Volume for 24 Hours (mL): 720  Continuous  Starting Tube Feed Rate {mL per Hour}: 10  Increase Tube Feed Rate by (mL): 10     Every 4 hours  Until Goal Tube Feed Rate (mL per Hour): 30  Tube Feed Duration (in Hours): 24  Tube Feed Start Time: 10:00  Free Water Flush  Bolus   Total Volume per Flush (mL): 250   Frequency: Every 4 Hours  No Carb Prosource TF     Qty per Day:  2  Entered: Jan 1 2022  8:05PM    Diet NPO-  NPO for Procedure/Test     NPO Start Date: 03-Jan-2022   NPO Start Time: 00:00  Entered: Dec 31 2021  1:08PM

## 2022-01-02 NOTE — GOALS OF CARE CONVERSATION - ADVANCED CARE PLANNING - CONVERSATION DETAILS
Palliative care SW received returned call from pt's son Moses Amaya ([phone]). Pt's son reported that he is adjusting appropriately to his father's hospitalization. Pt's son reflected on the pt's ICU admission and his father's recent intubation. Pt's son shared that prior to updated visitation policies, he was at bedside with his father and able to communicate with his father where his father was able to follow commands. Pt's son stated that the medical team had attempted to give the pt commands, but the pt would only follow commands when they were given by his son. He shared that the medical team had set him and his father up on a video call, but that it was unsuccessful as the connection was "choppy." Pt's son acknowledged updated visitation policies and shared that he is understanding of health and safety protocol. Pt's son inquired about the possibility of being able to visit his father "for even an hour a day" as he feels it could help the pt in his recovery process. Palliative care SW provided normalization and empathic listening. Pt's son agreed to reach out as needed. Palliative care SW provided information to Patient Experience Coordinator.     Peter Garcia  [phone]
Received a call from Jimena Amaya. All questions answered regarding tracheostomy and PEG placement. Son inquiring if patient would be weaned off ventilator post tracheostomy. It was explained to him that the team will continue to attempt weaning but there is a possibility that the patient may need long term mechanical ventilation. He will likely end up going to a vent weaning facility post acute treatment in the hospital. Son asked about alternatives to tracheostomy/peg which was explained to be withdrawal of the ventilator and focusing on comfort measures. Family is conflicted at this time regarding approach to treatment. At this time want to focus on continued acute treatment.
Patient's son at bedside, family going back and forth about tracheostomy placement. Son wanted to discuss with the father in person regarding placement of tracheostomy. All options discussed, including weaning off the vent with plan not to reintubate if patient has respiratory distress. Vs. moving forward with the tracheostomy and peg placement and eventually placement in a weaning facility. At this time the family would like to move forward with tracheostomy and peg placement as scheduled tentatively for tomorrow.

## 2022-01-02 NOTE — PROGRESS NOTE ADULT - ASSESSMENT
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 Patient had cardiac arrest in ED , was intubated CPR was initiated and subsequently ROSC was achieved after 20 minutes. Patient is admitted to ICU.    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  - CAD s/p stent  - Diabetes    Plan  ====Neuro====  #baseline mentation: AAOx3  #intubated  - Extubated on 12/27  - re-intubated on 12/28  - OFF propofol and pressors  - PRN fent pushes    ====CVS====  #Cardiac arrest   12/20 on arrival to ED  - ROSC after 20min  - trop trended down  - started norepi 12/28 post intubation, tapered OFF  - midodrine 5mg TID     ====Pulm====  #Acute hypoxic respiratory failure  - has Hx of chronic PTX on LEFT  - likely 2/2 PTX vs aspiration PNA  - SBT (12/21, 22, 24)  - extubated on 12/22  - re-intubated on 12/23  - s/p solumedrol  - s/p cefepime 2g q12 (12/20-12/28)  - extubated again on 12/27; re-intubated on 12/28  - CXR w/ improvement, stable chronic LEFT PTX  - thoracic surgery consulted for trach/PEG  - plan for CT - with IV contrast shows interval worsening  - on zosyn Abx for asp PNA coverage    #PTX (acute/on/chronic)  - s/p pigtail in ED 12/20  - chest tube to water seal- serosanginous exudate  - CXR interval improvement    ====GI====  #no active issues  #diet: NPO w/ tube feeding + free water 350 q 6  #bowel regimen: none    ====nephro====  #hypernatremia  serum Na 153 >>> now 146  - c/w free water 350 q 6    ====ID====  #fever  started 12/29 after intubation  wbc trending back down  - sputum culture no bacterial growth   - started Zosyn 12/29    #leukocytosis  - likely 2/2 aspiration PNA vs reactive process  - Ucx neg  - Scx MAC  - s/p cefepime 2g q 12 (12/20-12/28)    ====Heme/onc====  #Metastatic SCC of tongue base  - s/p resection, chemo/radiation, and immunotherapy (10/2021)  - plan for experimental drug as outpatient.  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 Patient had cardiac arrest in ED , was intubated CPR was initiated and subsequently ROSC was achieved after 20 minutes. Patient is admitted to ICU.    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  - CAD s/p stent  - Diabetes    Plan  ====Neuro====  #baseline mentation: AAOx3  #intubated  - Extubated on 12/27  - re-intubated on 12/28  - OFF propofol and pressors  - PRN fent pushes    ====CVS====  #Cardiac arrest   12/20 on arrival to ED  - ROSC after 20min  - trop trended down  - started norepi 12/28 post intubation, tapered OFF  - c/w midodrine 5mg TID     ====Pulm====  #Acute hypoxic respiratory failure  - has Hx of chronic PTX on LEFT  - likely 2/2 PTX vs aspiration PNA  - SBT (12/21, 22, 24)  - extubated on 12/22  - re-intubated on 12/23  - s/p solumedrol  - s/p cefepime 2g q12 (12/20-12/28)  - extubated again on 12/27; re-intubated on 12/28  - CXR w/ improvement, stable chronic LEFT PTX  - thoracic surgery consulted for trach/PEG  - plan for CT - with IV contrast shows interval worsening  - on zosyn Abx for asp PNA coverage    #PTX (acute/on/chronic)  - s/p pigtail in ED 12/20  - chest tube to water seal- serosanginous exudate  - CXR interval improvement    ====GI====  #no active issues  #diet: NPO w/ tube feeding + free water 350 q 6 + prosource  #bowel regimen: none    ====nephro====  #hypernatremia  serum Na 153 >>> improving 146 >  - c/w free water 250 q 4    ====ID====  #fever  started 12/29 after intubation  wbc trending back down  - sputum culture no bacterial growth   - started Zosyn 12/29    #leukocytosis  - likely 2/2 aspiration PNA vs reactive process  - Ucx neg  - Scx MAC  - s/p cefepime 2g q 12 (12/20-12/28)  - started Zosyn 12/29    ====Heme/onc====  #Metastatic SCC of tongue base  - s/p resection, chemo/radiation, and immunotherapy (10/2021)  - plan for experimental drug as outpatient.  - heme/onc as o/p   - palliative chemo, post trach/PEG will not be candidate for chemo  - palliative on board, discussion to be had today regarding trach PEG    ====Endo====  #DM  - a1c 6.3  - c/w sliding scale    ====Ppx====  #DVT: Lovenox  #GI: PPI

## 2022-01-02 NOTE — PROGRESS NOTE ADULT - ASSESSMENT
Pneumonia - likely aspiration   Leukocytosis - improving  Fevers - improving    Plan - Cont Zosyn 3.375 gms iv q8hrs.  going for Trach and Peg on Monday.  Time spent : 32 minutes

## 2022-01-02 NOTE — PROGRESS NOTE ADULT - SUBJECTIVE AND OBJECTIVE BOX
76y Male    Meds:  piperacillin/tazobactam IVPB.. 3.375 Gram(s) IV Intermittent every 8 hours    Allergies    No Known Allergies    Intolerances        VITALS:  Vital Signs Last 24 Hrs  T(C): 37.8 (02 Jan 2022 16:00), Max: 37.8 (01 Jan 2022 17:00)  T(F): 100 (02 Jan 2022 16:00), Max: 100 (01 Jan 2022 17:00)  HR: 78 (02 Jan 2022 16:00) (65 - 90)  BP: 125/61 (02 Jan 2022 16:00) (103/57 - 156/76)  BP(mean): 75 (02 Jan 2022 16:00) (67 - 98)  RR: 17 (02 Jan 2022 16:00) (13 - 19)  SpO2: 100% (02 Jan 2022 16:00) (97% - 100%)    LABS/DIAGNOSTIC TESTS:                          7.9    6.96  )-----------( 274      ( 02 Jan 2022 04:21 )             24.6         01-02    143  |  108  |  24<H>  ----------------------------<  197<H>  4.1   |  33<H>  |  0.70    Ca    9.6      02 Jan 2022 04:21  Phos  2.7     01-02  Mg     2.1     01-02    TPro  5.3<L>  /  Alb  1.2<L>  /  TBili  0.4  /  DBili  x   /  AST  19  /  ALT  10  /  AlkPhos  69  01-02      LIVER FUNCTIONS - ( 02 Jan 2022 04:21 )  Alb: 1.2 g/dL / Pro: 5.3 g/dL / ALK PHOS: 69 U/L / ALT: 10 U/L DA / AST: 19 U/L / GGT: x             CULTURES: .Sputum Sputum  12-29 @ 18:28   Normal Respiratory Karla present  --    Numerous polymorphonuclear leukocytes per low power field  No squamous epithelial cells per low power field  Few Gram positive cocci in pairs per oil power field      Clean Catch Clean Catch (Midstream)  12-21 @ 04:42   No growth  --  --              RADIOLOGY:< from: Xray Chest 1 View- PORTABLE-Routine (Xray Chest 1 View- PORTABLE-Routine in AM.) (01.01.22 @ 09:33) >  ACC: [id number] EXAM:  XR CHEST PORTABLE ROUTINE 1V                        ACC: [id number] EXAM:  XR CHEST PORTABLE ROUTINE 1V                          PROCEDURE DATE:  12/31/2021          INTERPRETATION:  Portable chest radiograph    CLINICAL INFORMATION: LEFT chest tube. Follow-up    TECHNIQUE:  Portable  AP view of the chest.    COMPARISON: 12/30/2021 chest available for review.    FINDINGS: ET tube tip above tracheal bifurcation.  NG tube tip beyond GE junction.  LEFT multi-sidehole pigtail catheter overlies LEFT mid hemithorax.    Multifocal LEFT perihilar/LEFT basilar airspace disease/consolidations..   No pneumothorax.  Mild RIGHT basilar ill-defined airspace disease. RIGHT upper zone clear.  Note, 12/30/2021 chest CT shows RIGHT middle lobe 4.6 cm air fluid-filled   cavity not evident on plain film radiography.    The heart and mediastinum size and configuration are within normal limits.    Visualized osseous structures are intact.    IMPRESSION:   Catheters and tubes in place.  Residual LEFT perihilar/basilar multifocal airspace disease.  Early RIGHT basilar diffuse airspace disease. No pneumothorax..            FOLLOW-UP AP PORTABLE CHEST RADIOGRAPH 1/1/2022 AT 8:22 AM:  No interval change.    --- End of Report ---            MARIA DEL CARMEN LEUNG MD; Attending Radiologist  This document has been electronically signed. Jan 2 2022  9:13AM    < end of copied text >        ROS:  [  ] UNABLE TO ELICIT ICU VISIT  76y Male whore    Meds:  piperacillin/tazobactam IVPB.. 3.375 Gram(s) IV Intermittent every 8 hours    Allergies    No Known Allergies    Intolerances        VITALS:  Vital Signs Last 24 Hrs  T(C): 37.8 (02 Jan 2022 16:00), Max: 37.8 (01 Jan 2022 17:00)  T(F): 100 (02 Jan 2022 16:00), Max: 100 (01 Jan 2022 17:00)  HR: 78 (02 Jan 2022 16:00) (65 - 90)  BP: 125/61 (02 Jan 2022 16:00) (103/57 - 156/76)  BP(mean): 75 (02 Jan 2022 16:00) (67 - 98)  RR: 17 (02 Jan 2022 16:00) (13 - 19)  SpO2: 100% (02 Jan 2022 16:00) (97% - 100%)    LABS/DIAGNOSTIC TESTS:                          7.9    6.96  )-----------( 274      ( 02 Jan 2022 04:21 )             24.6         01-02    143  |  108  |  24<H>  ----------------------------<  197<H>  4.1   |  33<H>  |  0.70    Ca    9.6      02 Jan 2022 04:21  Phos  2.7     01-02  Mg     2.1     01-02    TPro  5.3<L>  /  Alb  1.2<L>  /  TBili  0.4  /  DBili  x   /  AST  19  /  ALT  10  /  AlkPhos  69  01-02      LIVER FUNCTIONS - ( 02 Jan 2022 04:21 )  Alb: 1.2 g/dL / Pro: 5.3 g/dL / ALK PHOS: 69 U/L / ALT: 10 U/L DA / AST: 19 U/L / GGT: x             CULTURES: .Sputum Sputum  12-29 @ 18:28   Normal Respiratory Karla present  --    Numerous polymorphonuclear leukocytes per low power field  No squamous epithelial cells per low power field  Few Gram positive cocci in pairs per oil power field      Clean Catch Clean Catch (Midstream)  12-21 @ 04:42   No growth  --  --              RADIOLOGY:< from: Xray Chest 1 View- PORTABLE-Routine (Xray Chest 1 View- PORTABLE-Routine in AM.) (01.01.22 @ 09:33) >  ACC: [id number] EXAM:  XR CHEST PORTABLE ROUTINE 1V                        ACC: [id number] EXAM:  XR CHEST PORTABLE ROUTINE 1V                          PROCEDURE DATE:  12/31/2021          INTERPRETATION:  Portable chest radiograph    CLINICAL INFORMATION: LEFT chest tube. Follow-up    TECHNIQUE:  Portable  AP view of the chest.    COMPARISON: 12/30/2021 chest available for review.    FINDINGS: ET tube tip above tracheal bifurcation.  NG tube tip beyond GE junction.  LEFT multi-sidehole pigtail catheter overlies LEFT mid hemithorax.    Multifocal LEFT perihilar/LEFT basilar airspace disease/consolidations..   No pneumothorax.  Mild RIGHT basilar ill-defined airspace disease. RIGHT upper zone clear.  Note, 12/30/2021 chest CT shows RIGHT middle lobe 4.6 cm air fluid-filled   cavity not evident on plain film radiography.    The heart and mediastinum size and configuration are within normal limits.    Visualized osseous structures are intact.    IMPRESSION:   Catheters and tubes in place.  Residual LEFT perihilar/basilar multifocal airspace disease.  Early RIGHT basilar diffuse airspace disease. No pneumothorax..            FOLLOW-UP AP PORTABLE CHEST RADIOGRAPH 1/1/2022 AT 8:22 AM:  No interval change.    --- End of Report ---            MARIA DEL CARMEN LEUNG MD; Attending Radiologist  This document has been electronically signed. Jan 2 2022  9:13AM    < end of copied text >        ROS:  [  ] UNABLE TO ELICIT ICU VISIT  76y Male who remains in the ICU still, he is intubated and vent dependent , he is not on any sedation, he was having some low grade fevers which is improving. He has no leukocytosis. He is awake and in no acute distress.    Meds:  piperacillin/tazobactam IVPB.. 3.375 Gram(s) IV Intermittent every 8 hours    Allergies    No Known Allergies    Intolerances        VITALS:  Vital Signs Last 24 Hrs  T(C): 37.8 (02 Jan 2022 16:00), Max: 37.8 (01 Jan 2022 17:00)  T(F): 100 (02 Jan 2022 16:00), Max: 100 (01 Jan 2022 17:00)  HR: 78 (02 Jan 2022 16:00) (65 - 90)  BP: 125/61 (02 Jan 2022 16:00) (103/57 - 156/76)  BP(mean): 75 (02 Jan 2022 16:00) (67 - 98)  RR: 17 (02 Jan 2022 16:00) (13 - 19)  SpO2: 100% (02 Jan 2022 16:00) (97% - 100%)    LABS/DIAGNOSTIC TESTS:                          7.9    6.96  )-----------( 274      ( 02 Jan 2022 04:21 )             24.6         01-02    143  |  108  |  24<H>  ----------------------------<  197<H>  4.1   |  33<H>  |  0.70    Ca    9.6      02 Jan 2022 04:21  Phos  2.7     01-02  Mg     2.1     01-02    TPro  5.3<L>  /  Alb  1.2<L>  /  TBili  0.4  /  DBili  x   /  AST  19  /  ALT  10  /  AlkPhos  69  01-02      LIVER FUNCTIONS - ( 02 Jan 2022 04:21 )  Alb: 1.2 g/dL / Pro: 5.3 g/dL / ALK PHOS: 69 U/L / ALT: 10 U/L DA / AST: 19 U/L / GGT: x             CULTURES: .Sputum Sputum  12-29 @ 18:28   Normal Respiratory Karla present  --    Numerous polymorphonuclear leukocytes per low power field  No squamous epithelial cells per low power field  Few Gram positive cocci in pairs per oil power field      Clean Catch Clean Catch (Midstream)  12-21 @ 04:42   No growth  --  --              RADIOLOGY:< from: Xray Chest 1 View- PORTABLE-Routine (Xray Chest 1 View- PORTABLE-Routine in AM.) (01.01.22 @ 09:33) >  ACC: [id number] EXAM:  XR CHEST PORTABLE ROUTINE 1V                        ACC: [id number] EXAM:  XR CHEST PORTABLE ROUTINE 1V                          PROCEDURE DATE:  12/31/2021          INTERPRETATION:  Portable chest radiograph    CLINICAL INFORMATION: LEFT chest tube. Follow-up    TECHNIQUE:  Portable  AP view of the chest.    COMPARISON: 12/30/2021 chest available for review.    FINDINGS: ET tube tip above tracheal bifurcation.  NG tube tip beyond GE junction.  LEFT multi-sidehole pigtail catheter overlies LEFT mid hemithorax.    Multifocal LEFT perihilar/LEFT basilar airspace disease/consolidations..   No pneumothorax.  Mild RIGHT basilar ill-defined airspace disease. RIGHT upper zone clear.  Note, 12/30/2021 chest CT shows RIGHT middle lobe 4.6 cm air fluid-filled   cavity not evident on plain film radiography.    The heart and mediastinum size and configuration are within normal limits.    Visualized osseous structures are intact.    IMPRESSION:   Catheters and tubes in place.  Residual LEFT perihilar/basilar multifocal airspace disease.  Early RIGHT basilar diffuse airspace disease. No pneumothorax..            FOLLOW-UP AP PORTABLE CHEST RADIOGRAPH 1/1/2022 AT 8:22 AM:  No interval change.    --- End of Report ---            MARIA DEL CARMEN LEUNG MD; Attending Radiologist  This document has been electronically signed. Jan 2 2022  9:13AM    < end of copied text >        ROS:  [ x ] UNABLE TO ELICIT

## 2022-01-02 NOTE — PROGRESS NOTE ADULT - SUBJECTIVE AND OBJECTIVE BOX
INTERVAL HPI/OVERNIGHT EVENTS: ***    PRESSORS: [ ] YES [ ] NO    Antimicrobial:  piperacillin/tazobactam IVPB.. 3.375 Gram(s) IV Intermittent every 8 hours    Cardiovascular:  midodrine 5 milliGRAM(s) Oral every 8 hours    Pulmonary:  ALBUTerol    90 MICROgram(s) HFA Inhaler 2 Puff(s) Inhalation every 6 hours    Hematologic:  enoxaparin Injectable 40 milliGRAM(s) SubCutaneous daily    Other:  acetaminophen    Suspension .. 650 milliGRAM(s) Oral every 6 hours PRN  bisacodyl Suppository 10 milliGRAM(s) Rectal daily PRN  chlorhexidine 2% Cloths 1 Application(s) Topical <User Schedule>  fentaNYL    Injectable 25 MICROGram(s) IV Push every 6 hours PRN  glycopyrrolate Injectable 0.1 milliGRAM(s) IV Push every 6 hours PRN  insulin lispro (ADMELOG) corrective regimen sliding scale   SubCutaneous every 6 hours  pantoprazole   Suspension 40 milliGRAM(s) Oral daily    acetaminophen    Suspension .. 650 milliGRAM(s) Oral every 6 hours PRN  ALBUTerol    90 MICROgram(s) HFA Inhaler 2 Puff(s) Inhalation every 6 hours  bisacodyl Suppository 10 milliGRAM(s) Rectal daily PRN  chlorhexidine 2% Cloths 1 Application(s) Topical <User Schedule>  enoxaparin Injectable 40 milliGRAM(s) SubCutaneous daily  fentaNYL    Injectable 25 MICROGram(s) IV Push every 6 hours PRN  glycopyrrolate Injectable 0.1 milliGRAM(s) IV Push every 6 hours PRN  insulin lispro (ADMELOG) corrective regimen sliding scale   SubCutaneous every 6 hours  midodrine 5 milliGRAM(s) Oral every 8 hours  pantoprazole   Suspension 40 milliGRAM(s) Oral daily  piperacillin/tazobactam IVPB.. 3.375 Gram(s) IV Intermittent every 8 hours    Drug Dosing Weight  Height (cm): 175.3 (20 Dec 2021 02:21)  Weight (kg): 72.5 (20 Dec 2021 06:30)  BMI (kg/m2): 23.6 (20 Dec 2021 06:30)  BSA (m2): 1.88 (20 Dec 2021 06:30)    CENTRAL LINE: [ ] YES [ ] NO  LOCATION:   DATE INSERTED:  REMOVE: [ ] YES [ ] NO  EXPLAIN:    HUTCHINS: [ ] YES [ ] NO    DATE INSERTED:  REMOVE:  [ ] YES [ ] NO  EXPLAIN:    A-LINE:  [ ] YES [ ] NO  LOCATION:   DATE INSERTED:  REMOVE:  [ ] YES [ ] NO  EXPLAIN:    PMH -reviewed admission note, no change since admission      ABG - ( 01 Jan 2022 03:45 )  pH, Arterial: 7.47  pH, Blood: x     /  pCO2: 46    /  pO2: 93    / HCO3: 34    / Base Excess: 8.6   /  SaO2: 98                    12-31 @ 07:01 - 01-01 @ 07:00  --------------------------------------------------------  IN: 2845 mL / OUT: 1135 mL / NET: 1710 mL        Mode: AC/ CMV (Assist Control/ Continuous Mandatory Ventilation)  RR (machine): 16  TV (machine): 450  FiO2: 40  PEEP: 5  ITime: 1  MAP: 10  PIP: 27      PHYSICAL EXAM:    GENERAL: NAD, well-groomed, well-developed  HEAD:  Atraumatic, Normocephalic  EYES: EOMI, PERRLA, conjunctiva and sclera clear  ENMT: No tonsillar erythema, exudates, or enlargement; Moist mucous membranes, Good dentition, [ ]No lesions  NECK: Supple, normal appearance, No JVD; Normal thyroid; Trachea midline  NERVOUS SYSTEM:  Alert & Oriented X3, Good concentration; Motor Strength 5/5 B/L upper and lower extremities; DTRs 2+ intact and symmetric  CHEST/LUNG: No chest deformity; Normal percussion bilaterally; No rales, rhonchi, wheezing   HEART: Regular rate and rhythm; No murmurs, rubs, or gallops  ABDOMEN: Soft, Nontender, Nondistended;Bowel sounds present  EXTREMITIES:  2+ Peripheral Pulses, No clubbing, cyanosis, or edema  LYMPH: No lymphadenopathy noted  SKIN: No rashes or lesions; Good capillary refill      LABS:  CBC Full  -  ( 01 Jan 2022 05:00 )  WBC Count : 8.06 K/uL  RBC Count : 2.92 M/uL  Hemoglobin : 8.3 g/dL  Hematocrit : 26.1 %  Platelet Count - Automated : 271 K/uL  Mean Cell Volume : 89.4 fl  Mean Cell Hemoglobin : 28.4 pg  Mean Cell Hemoglobin Concentration : 31.8 gm/dL  Auto Neutrophil # : 6.96 K/uL  Auto Lymphocyte # : 0.41 K/uL  Auto Monocyte # : 0.42 K/uL  Auto Eosinophil # : 0.19 K/uL  Auto Basophil # : 0.02 K/uL  Auto Neutrophil % : 86.4 %  Auto Lymphocyte % : 5.1 %  Auto Monocyte % : 5.2 %  Auto Eosinophil % : 2.4 %  Auto Basophil % : 0.2 %    01-01    143  |  107  |  26<H>  ----------------------------<  158<H>  3.9   |  32<H>  |  0.68    Ca    9.6      01 Jan 2022 05:00  Phos  2.5     01-01  Mg     2.2     01-01    TPro  5.4<L>  /  Alb  1.2<L>  /  TBili  0.4  /  DBili  x   /  AST  22  /  ALT  12  /  AlkPhos  79  01-01            RADIOLOGY & ADDITIONAL STUDIES REVIEWED:  ***    [ ]GOALS OF CARE DISCUSSION WITH PATIENT/FAMILY/PROXY:    CRITICAL CARE TIME SPENT: 35 minutes INTERVAL HPI/OVERNIGHT EVENTS: No overnight events. Pending family visit today to discuss trach decision.On zosyn, WBC and fever trending down. Added pro-source to tube feeds.    PRESSORS: [ ] YES [ x] NO    Antimicrobial:  piperacillin/tazobactam IVPB.. 3.375 Gram(s) IV Intermittent every 8 hours    Cardiovascular:  midodrine 5 milliGRAM(s) Oral every 8 hours    Pulmonary:  ALBUTerol    90 MICROgram(s) HFA Inhaler 2 Puff(s) Inhalation every 6 hours    Hematologic:  enoxaparin Injectable 40 milliGRAM(s) SubCutaneous daily    Other:  acetaminophen    Suspension .. 650 milliGRAM(s) Oral every 6 hours PRN  bisacodyl Suppository 10 milliGRAM(s) Rectal daily PRN  chlorhexidine 2% Cloths 1 Application(s) Topical <User Schedule>  fentaNYL    Injectable 25 MICROGram(s) IV Push every 6 hours PRN  glycopyrrolate Injectable 0.1 milliGRAM(s) IV Push every 6 hours PRN  insulin lispro (ADMELOG) corrective regimen sliding scale   SubCutaneous every 6 hours  pantoprazole   Suspension 40 milliGRAM(s) Oral daily    acetaminophen    Suspension .. 650 milliGRAM(s) Oral every 6 hours PRN  ALBUTerol    90 MICROgram(s) HFA Inhaler 2 Puff(s) Inhalation every 6 hours  bisacodyl Suppository 10 milliGRAM(s) Rectal daily PRN  chlorhexidine 2% Cloths 1 Application(s) Topical <User Schedule>  enoxaparin Injectable 40 milliGRAM(s) SubCutaneous daily  fentaNYL    Injectable 25 MICROGram(s) IV Push every 6 hours PRN  glycopyrrolate Injectable 0.1 milliGRAM(s) IV Push every 6 hours PRN  insulin lispro (ADMELOG) corrective regimen sliding scale   SubCutaneous every 6 hours  midodrine 5 milliGRAM(s) Oral every 8 hours  pantoprazole   Suspension 40 milliGRAM(s) Oral daily  piperacillin/tazobactam IVPB.. 3.375 Gram(s) IV Intermittent every 8 hours    Drug Dosing Weight  Height (cm): 175.3 (20 Dec 2021 02:21)  Weight (kg): 72.5 (20 Dec 2021 06:30)  BMI (kg/m2): 23.6 (20 Dec 2021 06:30)  BSA (m2): 1.88 (20 Dec 2021 06:30)    CENTRAL LINE: [ ] YES [ x] NO  LOCATION:   DATE INSERTED:  REMOVE: [ ] YES [ ] NO  EXPLAIN:    HUTCHINS: [ x] YES [ ] NO    DATE INSERTED:  REMOVE:  [ ] YES [ ] NO  EXPLAIN:    A-LINE:  [ ] YES [x ] NO  LOCATION:   DATE INSERTED:  REMOVE:  [ ] YES [ ] NO  EXPLAIN:    PMH -reviewed admission note, no change since admission      ABG - ( 01 Jan 2022 03:45 )  pH, Arterial: 7.47  pH, Blood: x     /  pCO2: 46    /  pO2: 93    / HCO3: 34    / Base Excess: 8.6   /  SaO2: 98            12-31 @ 07:01  -  01-01 @ 07:00  --------------------------------------------------------  IN: 2845 mL / OUT: 1135 mL / NET: 1710 mL    Mode: AC/ CMV (Assist Control/ Continuous Mandatory Ventilation)  RR (machine): 16  TV (machine): 450  FiO2: 40  PEEP: 5  ITime: 1  MAP: 10  PIP: 27      PHYSICAL EXAM:    GENERAL: NAD, well-groomed, well-developed, intubated on ventilator  HEAD:  Atraumatic, Normocephalic  MOUTH: RIGHT jaw chronic deformity  EYES: EOMI, PERRLA, conjunctiva and sclera clear  ENMT: intubated, NGT in place  NECK: Supple, normal appearance, No JVD; Normal thyroid; Trachea midline  NERVOUS SYSTEM:  Alert, follows commands. Good concentration; Motor Strength 5/5 B/L upper and lower extremities; DTRs 2+ intact and symmetric  CHEST/LUNG: good bilateral air entry, diminished sounds at right base. right chest tube in place to suction. serosanguinous exudate.  HEART: Regular rate and rhythm; No murmurs, rubs, or gallops  ABDOMEN: Soft, Nontender, Nondistended; bowel sounds present  EXTREMITIES:  2+ Peripheral Pulses, No clubbing, cyanosis, or edema  LYMPH: No lymphadenopathy noted  SKIN: No rashes or lesions; Good capillary refill      LABS:  CBC Full  -  ( 01 Jan 2022 05:00 )  WBC Count : 8.06 K/uL  RBC Count : 2.92 M/uL  Hemoglobin : 8.3 g/dL  Hematocrit : 26.1 %  Platelet Count - Automated : 271 K/uL  Mean Cell Volume : 89.4 fl  Mean Cell Hemoglobin : 28.4 pg  Mean Cell Hemoglobin Concentration : 31.8 gm/dL  Auto Neutrophil # : 6.96 K/uL  Auto Lymphocyte # : 0.41 K/uL  Auto Monocyte # : 0.42 K/uL  Auto Eosinophil # : 0.19 K/uL  Auto Basophil # : 0.02 K/uL  Auto Neutrophil % : 86.4 %  Auto Lymphocyte % : 5.1 %  Auto Monocyte % : 5.2 %  Auto Eosinophil % : 2.4 %  Auto Basophil % : 0.2 %    01-01    143  |  107  |  26<H>  ----------------------------<  158<H>  3.9   |  32<H>  |  0.68    Ca    9.6      01 Jan 2022 05:00  Phos  2.5     01-01  Mg     2.2     01-01    TPro  5.4<L>  /  Alb  1.2<L>  /  TBili  0.4  /  DBili  x   /  AST  22  /  ALT  12  /  AlkPhos  79  01-01    RADIOLOGY & ADDITIONAL STUDIES REVIEWED:  yes    [ ]GOALS OF CARE DISCUSSION WITH PATIENT/FAMILY/PROXY:    CRITICAL CARE TIME SPENT: 35 minutes

## 2022-01-03 NOTE — PROGRESS NOTE ADULT - SUBJECTIVE AND OBJECTIVE BOX
ICU visit:  76y Male is under our care for    REVIEW OF SYSTEMS:  [  ] Not able to elicit  General:	  Chest:	  GI:	  :  Skin:	  Musculoskeletal:	  Neuro:	    MEDS:  piperacillin/tazobactam IVPB.. 3.375 Gram(s) IV Intermittent every 8 hours    ALLERGIES: Allergies    No Known Allergies    Intolerances        VITALS:  ICU Vital Signs Last 24 Hrs  T(C): 36.9 (03 Jan 2022 11:00), Max: 37.8 (02 Jan 2022 16:00)  T(F): 98.4 (03 Jan 2022 11:00), Max: 100 (02 Jan 2022 16:00)  HR: 70 (03 Jan 2022 11:00) (66 - 83)  BP: 130/60 (03 Jan 2022 11:00) (100/51 - 142/66)  BP(mean): 76 (03 Jan 2022 11:00) (59 - 88)  ABP: --  ABP(mean): --  RR: 12 (03 Jan 2022 11:00) (12 - 19)  SpO2: 100% (03 Jan 2022 11:00) (99% - 100%)      PHYSICAL EXAM:  HEENT:  Neck:  Respiratory:  Cardiovascular:  Gastrointestinal:  Extremities:  Skin:  Ortho:  Neuro:    LABS/DIAGNOSTIC TESTS:                        8.2    8.21  )-----------( 309      ( 03 Jan 2022 03:24 )             26.0     WBC Count: 8.21 K/uL (01-03 @ 03:24)  WBC Count: 6.96 K/uL (01-02 @ 04:21)  WBC Count: 8.06 K/uL (01-01 @ 05:00)  WBC Count: 11.66 K/uL (12-31 @ 05:45)  WBC Count: 12.59 K/uL (12-30 @ 05:35)    01-03    143  |  107  |  21<H>  ----------------------------<  152<H>  3.8   |  31  |  0.62    Ca    9.4      03 Jan 2022 03:24  Phos  2.5     01-03  Mg     2.1     01-03    TPro  5.6<L>  /  Alb  1.3<L>  /  TBili  0.4  /  DBili  x   /  AST  16  /  ALT  9<L>  /  AlkPhos  78  01-03        ABG - ( 03 Jan 2022 03:27 )  pH: 7.45  /  pCO2: 47    /  pO2: 121   / HCO3: 33    / Base Excess: 7.5   /  SaO2: 99                CULTURES:   .Sputum Sputum  12-29 @ 18:28   Normal Respiratory Karla present  --    Numerous polymorphonuclear leukocytes per low power field  No squamous epithelial cells per low power field  Few Gram positive cocci in pairs per oil power field      Clean Catch Clean Catch (Midstream)  12-21 @ 04:42   No growth  --  --      .Sputum Sputum  11-08 @ 10:53   Few Mycobacterium avium complex  --  --      .Sputum Sputum  11-06 @ 19:34   No acid fast bacilli isolated after 6 weeks.  --  --      .Sputum Sputum  11-05 @ 23:15   Growth of acid fast bacilli detected in broth,  Mycobacterium avium complex by Dna Probe  --  --      .Blood Blood  11-04 @ 11:23   No Growth Final  --  --      .Blood Blood  11-04 @ 10:50   No Growth Final  --  --      Clean Catch Clean Catch (Midstream)  11-03 @ 17:00   <10,000 CFU/mL Normal Urogenital Karla  --  --        RADIOLOGY:  no new studies ICU visit:  76y Male is under our care for pneumonia.  Patient was seen in the ICU intubated and vent dependent with an FIO2 of 40% and an oxygen saturation of 100%.  He remains afebrile and is pending trach and peg on Wednesday.    REVIEW OF SYSTEMS:  [ x ] Not able to elicit      MEDS:  piperacillin/tazobactam IVPB.. 3.375 Gram(s) IV Intermittent every 8 hours    ALLERGIES: Allergies    No Known Allergies    Intolerances        VITALS:  ICU Vital Signs Last 24 Hrs  T(C): 36.9 (03 Jan 2022 11:00), Max: 37.8 (02 Jan 2022 16:00)  T(F): 98.4 (03 Jan 2022 11:00), Max: 100 (02 Jan 2022 16:00)  HR: 70 (03 Jan 2022 11:00) (66 - 83)  BP: 130/60 (03 Jan 2022 11:00) (100/51 - 142/66)  BP(mean): 76 (03 Jan 2022 11:00) (59 - 88)  ABP: --  ABP(mean): --  RR: 12 (03 Jan 2022 11:00) (12 - 19)  SpO2: 100% (03 Jan 2022 11:00) (99% - 100%)      PHYSICAL EXAM:  HEENT: ETT, left NG  Neck: supple no LN's   Respiratory: Bilateral rhonchi  Cardiovascular: S1 S2 reg no murmurs  Gastrointestinal: +BS with soft, nondistended abdomen; nontender  Extremities: no edema  Skin: no rashes  Ortho: n/a  Neuro: Awake and alert    LABS/DIAGNOSTIC TESTS:                        8.2    8.21  )-----------( 309      ( 03 Jan 2022 03:24 )             26.0     WBC Count: 8.21 K/uL (01-03 @ 03:24)  WBC Count: 6.96 K/uL (01-02 @ 04:21)  WBC Count: 8.06 K/uL (01-01 @ 05:00)  WBC Count: 11.66 K/uL (12-31 @ 05:45)  WBC Count: 12.59 K/uL (12-30 @ 05:35)    01-03    143  |  107  |  21<H>  ----------------------------<  152<H>  3.8   |  31  |  0.62    Ca    9.4      03 Jan 2022 03:24  Phos  2.5     01-03  Mg     2.1     01-03    TPro  5.6<L>  /  Alb  1.3<L>  /  TBili  0.4  /  DBili  x   /  AST  16  /  ALT  9<L>  /  AlkPhos  78  01-03        ABG - ( 03 Jan 2022 03:27 )  pH: 7.45  /  pCO2: 47    /  pO2: 121   / HCO3: 33    / Base Excess: 7.5   /  SaO2: 99                CULTURES:   .Sputum Sputum  12-29 @ 18:28   Normal Respiratory Karla present  --    Numerous polymorphonuclear leukocytes per low power field  No squamous epithelial cells per low power field  Few Gram positive cocci in pairs per oil power field      Clean Catch Clean Catch (Midstream)  12-21 @ 04:42   No growth  --  --      .Sputum Sputum  11-08 @ 10:53   Few Mycobacterium avium complex  --  --      .Sputum Sputum  11-06 @ 19:34   No acid fast bacilli isolated after 6 weeks.  --  --      .Sputum Sputum  11-05 @ 23:15   Growth of acid fast bacilli detected in broth,  Mycobacterium avium complex by Dna Probe  --  --      .Blood Blood  11-04 @ 11:23   No Growth Final  --  --      .Blood Blood  11-04 @ 10:50   No Growth Final  --  --      Clean Catch Clean Catch (Midstream)  11-03 @ 17:00   <10,000 CFU/mL Normal Urogenital Karla  --  --        RADIOLOGY:  no new studies

## 2022-01-03 NOTE — CHART NOTE - NSCHARTNOTEFT_GEN_A_CORE
Assessment:   Patient is a 76y old  Male who presents with a chief complaint of cardiac arrest (2022 11:35) Intubated. TF ordered has been on 30 ml/hr, generally, with some hours not given. Pt noted for trach/ PEG 1/5, palliative consult.      Diet Prescription: Diet, NPO with Tube Feed:   Tube Feeding Modality: Nasogastric  Glucerna 1.5 Dhruv  Total Volume for 24 Hours (mL): 720  Continuous  Starting Tube Feed Rate {mL per Hour}: 10  Increase Tube Feed Rate by (mL): 10     Every 4 hours  Until Goal Tube Feed Rate (mL per Hour): 30  Tube Feed Duration (in Hours): 24  Tube Feed Start Time: 10:00  No Carb Prosource (1pkg = 15gms Protein)     Qty per Day:  2  No Carb Prosource TF     Qty per Day:  2 (22 @ 12:16)    Intake: TF order provides: 720 ml Glucerna 1.5, 1080 kcals, 59 gm protein.2 Prosource add 30 gm protein, 120 kcals.    Daily     Daily Weight in k.5 (2022 07:00)  Weight in k.4 (2022 07:00)  Weight in k.1 (2022 07:00)  Weight in k.4 (31 Dec 2021 07:00)  Weight in k.2 (30 Dec 2021 07:00)  Weight in k.9 (28 Dec 2021 08:00)  Weight in k.4 (27 Dec 2021 08:00)  Weight in k.5 (26 Dec 2021 08:00)  Weight in k.4 (24 Dec 2021 08:00)  Weight in k.7 (23 Dec 2021 07:00)  Weight in k.1 (22 Dec 2021 07:00)  Weight in k.9 (21 Dec 2021 08:00)    % Weight Change: 1+ generalized edema noted    Pertinent Medications: MEDICATIONS  (STANDING):  ALBUTerol    90 MICROgram(s) HFA Inhaler 2 Puff(s) Inhalation every 6 hours  chlorhexidine 2% Cloths 1 Application(s) Topical <User Schedule>  enoxaparin Injectable 40 milliGRAM(s) SubCutaneous daily  insulin lispro (ADMELOG) corrective regimen sliding scale   SubCutaneous every 6 hours  pantoprazole   Suspension 40 milliGRAM(s) Oral daily  piperacillin/tazobactam IVPB.. 3.375 Gram(s) IV Intermittent every 8 hours    MEDICATIONS  (PRN):  acetaminophen    Suspension .. 650 milliGRAM(s) Oral every 6 hours PRN Temp greater or equal to 38C (100.4F)  bisacodyl Suppository 10 milliGRAM(s) Rectal daily PRN Constipation    Pertinent Labs:  Na143 mmol/L Glu 152 mg/dL<H> K+ 3.8 mmol/L Cr  0.62 mg/dL BUN 21 mg/dL<H>  Phos 2.5 mg/dL  Alb 1.3 g/dL<L>  PAB 8 mg/dL<L>  Chol --    LDL --    HDL --    Trig 168 mg/dL<H>     CAPILLARY BLOOD GLUCOSE      POCT Blood Glucose.: 147 mg/dL (2022 11:11)  POCT Blood Glucose.: 146 mg/dL (2022 05:06)  POCT Blood Glucose.: 164 mg/dL (2022 23:40)        Previous Nutrition Diagnosis:   [ ] Altered GI function  [ ]Inadequate Oral Intake [ ] Swallowing Difficulty   [ ] Altered nutrition related labs [ ] Increased Nutrient Needs [ ] Overweight/Obesity   [ ] Unintended Weight Loss [ ] Food & Nutrition Related Knowledge Deficit [x ] Malnutrition (severe PCM)  [ ] Other:     Nutrition Diagnosis is [x ] ongoing  [ ] resolved [ ] not applicable         Interventions:   Recommend  [ ] Change Diet To:  [ ] Nutrition Supplement  [x ] Nutrition Support: May maintain current TF order, as tolerated, with further eval , as ordered or as s/p PEG.  [x ] Other: MD to monitor. RD available.     Monitoring and Evaluation: [ x ] follow up per protocol  [ ] other:

## 2022-01-03 NOTE — PROGRESS NOTE ADULT - SUBJECTIVE AND OBJECTIVE BOX
INTERVAL HPI/OVERNIGHT EVENTS: ***    PRESSORS: [ ] YES [ ] NO    Antimicrobial:  piperacillin/tazobactam IVPB.. 3.375 Gram(s) IV Intermittent every 8 hours    Cardiovascular:    Pulmonary:  ALBUTerol    90 MICROgram(s) HFA Inhaler 2 Puff(s) Inhalation every 6 hours    Hematologic:    Other:  acetaminophen    Suspension .. 650 milliGRAM(s) Oral every 6 hours PRN  bisacodyl Suppository 10 milliGRAM(s) Rectal daily PRN  chlorhexidine 2% Cloths 1 Application(s) Topical <User Schedule>  fentaNYL    Injectable 25 MICROGram(s) IV Push every 6 hours PRN  glycopyrrolate Injectable 0.1 milliGRAM(s) IV Push every 6 hours PRN  insulin lispro (ADMELOG) corrective regimen sliding scale   SubCutaneous every 6 hours  pantoprazole   Suspension 40 milliGRAM(s) Oral daily    acetaminophen    Suspension .. 650 milliGRAM(s) Oral every 6 hours PRN  ALBUTerol    90 MICROgram(s) HFA Inhaler 2 Puff(s) Inhalation every 6 hours  bisacodyl Suppository 10 milliGRAM(s) Rectal daily PRN  chlorhexidine 2% Cloths 1 Application(s) Topical <User Schedule>  fentaNYL    Injectable 25 MICROGram(s) IV Push every 6 hours PRN  glycopyrrolate Injectable 0.1 milliGRAM(s) IV Push every 6 hours PRN  insulin lispro (ADMELOG) corrective regimen sliding scale   SubCutaneous every 6 hours  pantoprazole   Suspension 40 milliGRAM(s) Oral daily  piperacillin/tazobactam IVPB.. 3.375 Gram(s) IV Intermittent every 8 hours    Drug Dosing Weight  Height (cm): 175.3 (20 Dec 2021 02:21)  Weight (kg): 72.5 (20 Dec 2021 06:30)  BMI (kg/m2): 23.6 (20 Dec 2021 06:30)  BSA (m2): 1.88 (20 Dec 2021 06:30)    CENTRAL LINE: [ ] YES [ ] NO  LOCATION:   DATE INSERTED:  REMOVE: [ ] YES [ ] NO  EXPLAIN:    HUTCHINS: [ ] YES [ ] NO    DATE INSERTED:  REMOVE:  [ ] YES [ ] NO  EXPLAIN:    A-LINE:  [ ] YES [ ] NO  LOCATION:   DATE INSERTED:  REMOVE:  [ ] YES [ ] NO  EXPLAIN:    PMH -reviewed admission note, no change since admission      ABG - ( 03 Jan 2022 03:27 )  pH, Arterial: 7.45  pH, Blood: x     /  pCO2: 47    /  pO2: 121   / HCO3: 33    / Base Excess: 7.5   /  SaO2: 99                    01-02 @ 07:01  -  01-03 @ 07:00  --------------------------------------------------------  IN: 730 mL / OUT: 1400 mL / NET: -670 mL        Mode: AC/ CMV (Assist Control/ Continuous Mandatory Ventilation)  RR (machine): 12  TV (machine): 450  FiO2: 40  PEEP: 5  ITime: 1  MAP: 10  PIP: 29      PHYSICAL EXAM:    GENERAL: NAD, well-groomed, well-developed  HEAD:  Atraumatic, Normocephalic  EYES: EOMI, PERRLA, conjunctiva and sclera clear  ENMT: No tonsillar erythema, exudates, or enlargement; Moist mucous membranes, Good dentition, [ ]No lesions  NECK: Supple, normal appearance, No JVD; Normal thyroid; Trachea midline  NERVOUS SYSTEM:  Alert & Oriented X3, Good concentration; Motor Strength 5/5 B/L upper and lower extremities; DTRs 2+ intact and symmetric  CHEST/LUNG: No chest deformity; Normal percussion bilaterally; No rales, rhonchi, wheezing   HEART: Regular rate and rhythm; No murmurs, rubs, or gallops  ABDOMEN: Soft, Nontender, Nondistended;Bowel sounds present  EXTREMITIES:  2+ Peripheral Pulses, No clubbing, cyanosis, or edema  LYMPH: No lymphadenopathy noted  SKIN: No rashes or lesions; Good capillary refill      LABS:  CBC Full  -  ( 03 Jan 2022 03:24 )  WBC Count : 8.21 K/uL  RBC Count : 2.87 M/uL  Hemoglobin : 8.2 g/dL  Hematocrit : 26.0 %  Platelet Count - Automated : 309 K/uL  Mean Cell Volume : 90.6 fl  Mean Cell Hemoglobin : 28.6 pg  Mean Cell Hemoglobin Concentration : 31.5 gm/dL  Auto Neutrophil # : 6.61 K/uL  Auto Lymphocyte # : 0.71 K/uL  Auto Monocyte # : 0.56 K/uL  Auto Eosinophil # : 0.21 K/uL  Auto Basophil # : 0.03 K/uL  Auto Neutrophil % : 80.5 %  Auto Lymphocyte % : 8.6 %  Auto Monocyte % : 6.8 %  Auto Eosinophil % : 2.6 %  Auto Basophil % : 0.4 %    01-03    143  |  107  |  21<H>  ----------------------------<  152<H>  3.8   |  31  |  0.62    Ca    9.4      03 Jan 2022 03:24  Phos  2.5     01-03  Mg     2.1     01-03    TPro  5.6<L>  /  Alb  1.3<L>  /  TBili  0.4  /  DBili  x   /  AST  16  /  ALT  9<L>  /  AlkPhos  78  01-03    PT/INR - ( 03 Jan 2022 03:24 )   PT: 12.2 sec;   INR: 1.03 ratio         PTT - ( 03 Jan 2022 03:24 )  PTT:31.3 sec        RADIOLOGY & ADDITIONAL STUDIES REVIEWED:  ***    [ ]GOALS OF CARE DISCUSSION WITH PATIENT/FAMILY/PROXY:    CRITICAL CARE TIME SPENT: 35 minutes INTERVAL HPI/OVERNIGHT EVENTS: Pending trach / PEG 1/5. No overnight events.    PRESSORS: [ ] YES [ x] NO    Antimicrobial:  piperacillin/tazobactam IVPB.. 3.375 Gram(s) IV Intermittent every 8 hours    Cardiovascular:    Pulmonary:  ALBUTerol    90 MICROgram(s) HFA Inhaler 2 Puff(s) Inhalation every 6 hours    Hematologic:    Other:  acetaminophen    Suspension .. 650 milliGRAM(s) Oral every 6 hours PRN  bisacodyl Suppository 10 milliGRAM(s) Rectal daily PRN  chlorhexidine 2% Cloths 1 Application(s) Topical <User Schedule>  fentaNYL    Injectable 25 MICROGram(s) IV Push every 6 hours PRN  glycopyrrolate Injectable 0.1 milliGRAM(s) IV Push every 6 hours PRN  insulin lispro (ADMELOG) corrective regimen sliding scale   SubCutaneous every 6 hours  pantoprazole   Suspension 40 milliGRAM(s) Oral daily    acetaminophen    Suspension .. 650 milliGRAM(s) Oral every 6 hours PRN  ALBUTerol    90 MICROgram(s) HFA Inhaler 2 Puff(s) Inhalation every 6 hours  bisacodyl Suppository 10 milliGRAM(s) Rectal daily PRN  chlorhexidine 2% Cloths 1 Application(s) Topical <User Schedule>  fentaNYL    Injectable 25 MICROGram(s) IV Push every 6 hours PRN  glycopyrrolate Injectable 0.1 milliGRAM(s) IV Push every 6 hours PRN  insulin lispro (ADMELOG) corrective regimen sliding scale   SubCutaneous every 6 hours  pantoprazole   Suspension 40 milliGRAM(s) Oral daily  piperacillin/tazobactam IVPB.. 3.375 Gram(s) IV Intermittent every 8 hours    Drug Dosing Weight  Height (cm): 175.3 (20 Dec 2021 02:21)  Weight (kg): 72.5 (20 Dec 2021 06:30)  BMI (kg/m2): 23.6 (20 Dec 2021 06:30)  BSA (m2): 1.88 (20 Dec 2021 06:30)    CENTRAL LINE: [ ] YES [ x] NO  LOCATION:   DATE INSERTED:  REMOVE: [ ] YES [ ] NO  EXPLAIN:    HUTCHINS: [ x] YES [ ] NO    DATE INSERTED:  REMOVE:  [ ] YES [ ] NO  EXPLAIN:    A-LINE:  [ ] YES [x ] NO  LOCATION:   DATE INSERTED:  REMOVE:  [ ] YES [ ] NO  EXPLAIN:    PMH -reviewed admission note, no change since admission    ABG - ( 03 Jan 2022 03:27 )  pH, Arterial: 7.45  pH, Blood: x     /  pCO2: 47    /  pO2: 121   / HCO3: 33    / Base Excess: 7.5   /  SaO2: 99          01-02 @ 07:01  -  01-03 @ 07:00  --------------------------------------------------------  IN: 730 mL / OUT: 1400 mL / NET: -670 mL        Mode: AC/ CMV (Assist Control/ Continuous Mandatory Ventilation)  RR (machine): 12  TV (machine): 450  FiO2: 40  PEEP: 5  ITime: 1  MAP: 10  PIP: 29      PHYSICAL EXAM:    GENERAL: NAD, well-groomed, well-developed  HEAD:  Atraumatic, Normocephalic  EYES: EOMI, PERRLA, conjunctiva and sclera clear  ENMT: No tonsillar erythema, exudates, or enlargement; Moist mucous membranes, Good dentition, [ ]No lesions  NECK: Supple, normal appearance, No JVD; Normal thyroid; Trachea midline  NERVOUS SYSTEM:  Alert & Oriented X3, Good concentration; Motor Strength 5/5 B/L upper and lower extremities; DTRs 2+ intact and symmetric  CHEST/LUNG: No chest deformity; Normal percussion bilaterally; No rales, rhonchi, wheezing   HEART: Regular rate and rhythm; No murmurs, rubs, or gallops  ABDOMEN: Soft, Nontender, Nondistended;Bowel sounds present  EXTREMITIES:  2+ Peripheral Pulses, No clubbing, cyanosis, or edema  LYMPH: No lymphadenopathy noted  SKIN: No rashes or lesions; Good capillary refill      LABS:  CBC Full  -  ( 03 Jan 2022 03:24 )  WBC Count : 8.21 K/uL  RBC Count : 2.87 M/uL  Hemoglobin : 8.2 g/dL  Hematocrit : 26.0 %  Platelet Count - Automated : 309 K/uL  Mean Cell Volume : 90.6 fl  Mean Cell Hemoglobin : 28.6 pg  Mean Cell Hemoglobin Concentration : 31.5 gm/dL  Auto Neutrophil # : 6.61 K/uL  Auto Lymphocyte # : 0.71 K/uL  Auto Monocyte # : 0.56 K/uL  Auto Eosinophil # : 0.21 K/uL  Auto Basophil # : 0.03 K/uL  Auto Neutrophil % : 80.5 %  Auto Lymphocyte % : 8.6 %  Auto Monocyte % : 6.8 %  Auto Eosinophil % : 2.6 %  Auto Basophil % : 0.4 %    01-03    143  |  107  |  21<H>  ----------------------------<  152<H>  3.8   |  31  |  0.62    Ca    9.4      03 Jan 2022 03:24  Phos  2.5     01-03  Mg     2.1     01-03    TPro  5.6<L>  /  Alb  1.3<L>  /  TBili  0.4  /  DBili  x   /  AST  16  /  ALT  9<L>  /  AlkPhos  78  01-03    PT/INR - ( 03 Jan 2022 03:24 )   PT: 12.2 sec;   INR: 1.03 ratio    PTT - ( 03 Jan 2022 03:24 )  PTT:31.3 sec    RADIOLOGY & ADDITIONAL STUDIES REVIEWED:  yes    [ ]GOALS OF CARE DISCUSSION WITH PATIENT/FAMILY/PROXY:    CRITICAL CARE TIME SPENT: 35 minutes INTERVAL HPI/OVERNIGHT EVENTS: Pending trach / PEG 1/5. No overnight events.    PRESSORS: [ ] YES [ x] NO    Antimicrobial:  piperacillin/tazobactam IVPB.. 3.375 Gram(s) IV Intermittent every 8 hours    Cardiovascular:    Pulmonary:  ALBUTerol    90 MICROgram(s) HFA Inhaler 2 Puff(s) Inhalation every 6 hours    Hematologic:    Other:  acetaminophen    Suspension .. 650 milliGRAM(s) Oral every 6 hours PRN  bisacodyl Suppository 10 milliGRAM(s) Rectal daily PRN  chlorhexidine 2% Cloths 1 Application(s) Topical <User Schedule>  fentaNYL    Injectable 25 MICROGram(s) IV Push every 6 hours PRN  glycopyrrolate Injectable 0.1 milliGRAM(s) IV Push every 6 hours PRN  insulin lispro (ADMELOG) corrective regimen sliding scale   SubCutaneous every 6 hours  pantoprazole   Suspension 40 milliGRAM(s) Oral daily    acetaminophen    Suspension .. 650 milliGRAM(s) Oral every 6 hours PRN  ALBUTerol    90 MICROgram(s) HFA Inhaler 2 Puff(s) Inhalation every 6 hours  bisacodyl Suppository 10 milliGRAM(s) Rectal daily PRN  chlorhexidine 2% Cloths 1 Application(s) Topical <User Schedule>  fentaNYL    Injectable 25 MICROGram(s) IV Push every 6 hours PRN  glycopyrrolate Injectable 0.1 milliGRAM(s) IV Push every 6 hours PRN  insulin lispro (ADMELOG) corrective regimen sliding scale   SubCutaneous every 6 hours  pantoprazole   Suspension 40 milliGRAM(s) Oral daily  piperacillin/tazobactam IVPB.. 3.375 Gram(s) IV Intermittent every 8 hours    Drug Dosing Weight  Height (cm): 175.3 (20 Dec 2021 02:21)  Weight (kg): 72.5 (20 Dec 2021 06:30)  BMI (kg/m2): 23.6 (20 Dec 2021 06:30)  BSA (m2): 1.88 (20 Dec 2021 06:30)    CENTRAL LINE: [ ] YES [ x] NO  LOCATION:   DATE INSERTED:  REMOVE: [ ] YES [ ] NO  EXPLAIN:    HUTCHINS: [ x] YES [ ] NO    DATE INSERTED:  REMOVE:  [ ] YES [ ] NO  EXPLAIN:    A-LINE:  [ ] YES [x ] NO  LOCATION:   DATE INSERTED:  REMOVE:  [ ] YES [ ] NO  EXPLAIN:    PMH -reviewed admission note, no change since admission    ABG - ( 03 Jan 2022 03:27 )  pH, Arterial: 7.45  pH, Blood: x     /  pCO2: 47    /  pO2: 121   / HCO3: 33    / Base Excess: 7.5   /  SaO2: 99          01-02 @ 07:01  -  01-03 @ 07:00  --------------------------------------------------------  IN: 730 mL / OUT: 1400 mL / NET: -670 mL        Mode: AC/ CMV (Assist Control/ Continuous Mandatory Ventilation)  RR (machine): 12  TV (machine): 450  FiO2: 40  PEEP: 5  ITime: 1  MAP: 10  PIP: 29      PHYSICAL EXAM:    GENERAL: NAD, well-groomed, well-developed intubated   HEAD:  Atraumatic, Normocephalic  EYES: EOMI, PERRLA, conjunctiva and sclera clear  ENMT: No tonsillar erythema, exudates, or enlargement; Moist mucous membranes, Good dentition, [ ]No lesions  NECK: Supple, normal appearance, No JVD; Normal thyroid; Trachea midline  NERVOUS SYSTEM:  sedated   CHEST/LUNG: No chest deformity; Normal percussion bilaterally; No rales, rhonchi, wheezing  + left chest tube   HEART: Regular rate and rhythm; No murmurs, rubs, or gallops  ABDOMEN: Soft, Nontender, Nondistended;Bowel sounds present  EXTREMITIES:  2+ Peripheral Pulses, No clubbing, cyanosis, or edema  LYMPH: No lymphadenopathy noted  SKIN: No rashes or lesions; Good capillary refill      LABS:  CBC Full  -  ( 03 Jan 2022 03:24 )  WBC Count : 8.21 K/uL  RBC Count : 2.87 M/uL  Hemoglobin : 8.2 g/dL  Hematocrit : 26.0 %  Platelet Count - Automated : 309 K/uL  Mean Cell Volume : 90.6 fl  Mean Cell Hemoglobin : 28.6 pg  Mean Cell Hemoglobin Concentration : 31.5 gm/dL  Auto Neutrophil # : 6.61 K/uL  Auto Lymphocyte # : 0.71 K/uL  Auto Monocyte # : 0.56 K/uL  Auto Eosinophil # : 0.21 K/uL  Auto Basophil # : 0.03 K/uL  Auto Neutrophil % : 80.5 %  Auto Lymphocyte % : 8.6 %  Auto Monocyte % : 6.8 %  Auto Eosinophil % : 2.6 %  Auto Basophil % : 0.4 %    01-03    143  |  107  |  21<H>  ----------------------------<  152<H>  3.8   |  31  |  0.62    Ca    9.4      03 Jan 2022 03:24  Phos  2.5     01-03  Mg     2.1     01-03    TPro  5.6<L>  /  Alb  1.3<L>  /  TBili  0.4  /  DBili  x   /  AST  16  /  ALT  9<L>  /  AlkPhos  78  01-03    PT/INR - ( 03 Jan 2022 03:24 )   PT: 12.2 sec;   INR: 1.03 ratio    PTT - ( 03 Jan 2022 03:24 )  PTT:31.3 sec    RADIOLOGY & ADDITIONAL STUDIES REVIEWED:  yes    [ ]GOALS OF CARE DISCUSSION WITH PATIENT/FAMILY/PROXY:    CRITICAL CARE TIME SPENT: 35 minutes

## 2022-01-03 NOTE — PROGRESS NOTE ADULT - NUTRITIONAL ASSESSMENT
This patient has been assessed with a concern for Malnutrition and has been determined to have a diagnosis/diagnoses of Severe protein-calorie malnutrition.    This patient is being managed with:   Diet NPO after Midnight-     NPO Start Date: 02-Jan-2022   NPO Start Time: 23:59  Entered: Jan 2 2022  2:23PM    Diet NPO with Tube Feed-  Tube Feeding Modality: Nasogastric  Total Volume for 24 Hours (mL): 720  Continuous  Starting Tube Feed Rate {mL per Hour}: 10  Increase Tube Feed Rate by (mL): 10     Every 4 hours  Until Goal Tube Feed Rate (mL per Hour): 30  Tube Feed Duration (in Hours): 24  Tube Feed Start Time: 10:00  No Carb Prosource TF     Qty per Day:  2  Entered: Jan 2 2022  8:08AM    Diet NPO-  NPO for Procedure/Test     NPO Start Date: 03-Jan-2022   NPO Start Time: 00:00  Entered: Dec 31 2021  1:08PM

## 2022-01-03 NOTE — PROGRESS NOTE ADULT - ASSESSMENT
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 Patient had cardiac arrest in ED , was intubated CPR was initiated and subsequently ROSC was achieved after 20 minutes. Patient is admitted to ICU.    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  - CAD s/p stent  - Diabetes    Plan  ====Neuro====  #baseline mentation: AAOx3  #intubated  - Extubated on 12/27  - re-intubated on 12/28  - OFF propofol and pressors  - PRN fent pushes    ====CVS====  #Cardiac arrest   12/20 on arrival to ED  - ROSC after 20min  - trop trended down  - started norepi 12/28 post intubation, tapered OFF  - c/w midodrine 5mg TID     ====Pulm====  #Acute hypoxic respiratory failure  - has Hx of chronic PTX on LEFT  - likely 2/2 PTX vs aspiration PNA  - SBT (12/21, 22, 24)  - extubated on 12/22  - re-intubated on 12/23  - s/p solumedrol  - s/p cefepime 2g q12 (12/20-12/28)  - extubated again on 12/27; re-intubated on 12/28  - CXR w/ improvement, stable chronic LEFT PTX  - thoracic surgery consulted for trach/PEG  - plan for CT - with IV contrast shows interval worsening  - on zosyn Abx for asp PNA coverage    #PTX (acute/on/chronic)  - s/p pigtail in ED 12/20  - chest tube to water seal- serosanginous exudate  - CXR interval improvement    ====GI====  #no active issues  #diet: NPO w/ tube feeding + free water 350 q 6 + prosource  #bowel regimen: none    ====nephro====  #hypernatremia  serum Na 153 >>> improving 146 >  - c/w free water 250 q 4    ====ID====  #fever  started 12/29 after intubation  wbc trending back down  - sputum culture no bacterial growth   - started Zosyn 12/29    #leukocytosis  - likely 2/2 aspiration PNA vs reactive process  - Ucx neg  - Scx MAC  - s/p cefepime 2g q 12 (12/20-12/28)  - started Zosyn 12/29    ====Heme/onc====  #Metastatic SCC of tongue base  - s/p resection, chemo/radiation, and immunotherapy (10/2021)  - plan for experimental drug as outpatient.  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 Patient had cardiac arrest in ED , was intubated CPR was initiated and subsequently ROSC was achieved after 20 minutes. Patient is admitted to ICU.    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  - CAD s/p stent  - Diabetes    Plan  ====Neuro====  #baseline mentation: AAOx3  #intubated  - Extubated on 12/27  - re-intubated on 12/28  - OFF propofol and pressors  - PRN fent pushes    ====CVS====  #Cardiac arrest   12/20 on arrival to ED  - ROSC after 20min  - trop trended down  - started norepi 12/28 post intubation, tapered OFF  - s/p midodrine 5mg TID     ====Pulm====  #Acute hypoxic respiratory failure  - has Hx of chronic PTX on LEFT  - likely 2/2 PTX vs aspiration PNA  - SBT (12/21, 22, 24)  - extubated on 12/22  - re-intubated on 12/23  - s/p solumedrol  - s/p cefepime 2g q12 (12/20-12/28)  - extubated again on 12/27; re-intubated on 12/28  - CXR w/ improvement, stable chronic LEFT PTX  - thoracic surgery consulted for trach/PEG  - plan for CT - with IV contrast shows interval worsening  - on zosyn Abx for asp PNA coverage    #PTX (acute/on/chronic)  - s/p pigtail in ED 12/20  - chest tube to water seal- serosanginous exudate  - CXR interval improvement  - plan for re-positioning of pigtail during trach    ====GI====  #no active issues  #diet: NPO w/ tube feeding   s/p free water 350 q 6 + pro-source    #bowel regimen: none    ====nephro====  #hypernatremia  serum Na 153 >>> improving 146 > 143  - s/p free water 250 q 4    ====ID====  #fever  started 12/29 after intubation  wbc trending back down  - sputum culture no bacterial growth   - started Zosyn 12/29    #leukocytosis  - likely 2/2 aspiration PNA vs reactive process  - Ucx neg  - Scx MAC  - s/p cefepime 2g q 12 (12/20-12/28)  - started Zosyn 12/29    ====Heme/onc====  #Metastatic SCC of tongue base  - s/p resection, chemo/radiation, and immunotherapy (10/2021)  - plan for experimental drug as outpatient.  - heme/onc as o/p   - palliative chemo, post trach/PEG will not be candidate for chemo  - palliative on board, discussion to be had today regarding trach PEG    ====Endo====  #DM  - a1c 6.3  - c/w sliding scale    ====Ppx====  #DVT: Lovenox  #GI: PPI

## 2022-01-03 NOTE — PROGRESS NOTE ADULT - ASSESSMENT
Pneumonia - likely aspiration   Leukocytosis - improving  Fevers - improving    Plan - Cont Zosyn 3.375 gms iv q8hrs D#6  going for Trach and Peg today  Time spent : 33 minutes       Pneumonia - likely aspiration   Leukocytosis - improving  Fevers - improving    Plan - Cont Zosyn 3.375 gms iv q8hrs D#6  going for Trach and Peg on Wednesday    Time spent : 33 minutes       Pneumonia - likely aspiration   Leukocytosis - improving  Fevers - improving    Plan - Cont Zosyn 3.375 gms iv q8hrs D#6  going for Trach and Peg on Wednesday    Time spent : 33 minutes    I agree with above

## 2022-01-04 NOTE — PROGRESS NOTE ADULT - NUTRITIONAL ASSESSMENT
This patient has been assessed with a concern for Malnutrition and has been determined to have a diagnosis/diagnoses of Severe protein-calorie malnutrition.    This patient is being managed with:   Diet NPO with Tube Feed-  Tube Feeding Modality: Nasogastric  Glucerna 1.5 Dhruv  Total Volume for 24 Hours (mL): 960  Continuous  Starting Tube Feed Rate {mL per Hour}: 10  Increase Tube Feed Rate by (mL): 10     Every 4 hours  Until Goal Tube Feed Rate (mL per Hour): 40  Tube Feed Duration (in Hours): 24  Tube Feed Start Time: 10:00  No Carb Prosource (1pkg = 15gms Protein)     Qty per Day:  2  No Carb Prosource TF     Qty per Day:  2  Entered: Neftali  3 2022  9:11PM

## 2022-01-04 NOTE — PROGRESS NOTE ADULT - SUBJECTIVE AND OBJECTIVE BOX
Surgery Pre-op Note    Preoperative Diagnosis: SCC of the tongue    Planned Procedure: tracheostomy and PEG                           8.8    8.83  )-----------( 379      ( 04 Jan 2022 04:03 )             28.3     01-04    144  |  109<H>  |  15  ----------------------------<  137<H>  3.7   |  29  |  0.54    Ca    10.2      04 Jan 2022 04:03  Phos  2.4     01-04  Mg     2.2     01-04    TPro  5.9<L>  /  Alb  1.4<L>  /  TBili  0.4  /  DBili  x   /  AST  20  /  ALT  9<L>  /  AlkPhos  86  01-04    LIVER FUNCTIONS - ( 04 Jan 2022 04:03 )  Alb: 1.4 g/dL / Pro: 5.9 g/dL / ALK PHOS: 86 U/L / ALT: 9 U/L DA / AST: 20 U/L / GGT: x           PT/INR - ( 03 Jan 2022 03:24 )   PT: 12.2 sec;   INR: 1.03 ratio       PTT - ( 03 Jan 2022 03:24 )  PTT:31.3 sec    Type & Screen: ABO RH Interpretation: A POS (01.03.22 @ 03:24)    EKG: < from: 12 Lead ECG (12.20.21 @ 10:42) >  Diagnosis Line Sinus rhythm with 1st degree A-V block  Left axis deviation  Nonspecific T wave abnormality  Abnormal ECG    Confirmed by ZA OLMOS, [facility] ([id number]) on 12/22/2021 7:57:46 AM    < end of copied text >    Echo: < from: Transthoracic Echocardiogram (12.20.21 @ 09:22) >  CONCLUSIONS:  1. Normal mitral valve.  2. Calcified trileaflet aortic valve with normal opening.  3. Aortic Root: 3.7 cm.  4. Normal left atrium.  LA volume index = 34 cc/m2.  5. Normal left ventricular internal dimensions and wall  thicknesses.  6. Endocardium not well visualized; grossly normal left  ventricular systolic function.  7. Normal diastolic function.  8. Normal right atrium.  9. Normal right ventricular size and systolic function  (TAPSE  2.8cm).  10. RV systolic pressure is moderately increased at  48 mm  Hg.  11. There is mild tricuspid regurgitation.  12. Pulmonic valve not well seen.  13. Normal pericardium with no pericardial effusion.    ------------------------------------------------------------------------  Confirmed on  12/21/2021 - 07:12:01 by Jeanette Cordova MD  ------------------------------------------------------------------------    < end of copied text >    CXR: < from: Xray Chest 1 View- PORTABLE-Urgent (Xray Chest 1 View- PORTABLE-Urgent .) (01.03.22 @ 19:20) >  IMPRESSION:  Lines and tubes in satisfactory position with no pneumothorax.    Effusion/atelectasis/possible consolidation at the leftbase, new.    Perihilar interstitial/alveolar infiltrates, grossly unchanged    --- End of Report ---    MARISA BRO DO; Attending Radiologist  This document has been electronically signed. Jan 4 2022  9:29AM    < end of copied text >

## 2022-01-04 NOTE — PROGRESS NOTE ADULT - ASSESSMENT
76 year old male with SCC of the tongue    plan for tracheostomy and PEG 1/5  NPO after midnight  preop labs  chlorhexidine  consent to be obtained

## 2022-01-04 NOTE — CHART NOTE - NSCHARTNOTEFT_GEN_A_CORE
Andrew Lopes was called [phone] and given daily update. Informed of continued plan for Trach and PEG tomorrow. All questions answered.

## 2022-01-04 NOTE — PROGRESS NOTE ADULT - SUBJECTIVE AND OBJECTIVE BOX
INTERVAL HPI/OVERNIGHT EVENTS: ***    PRESSORS: [ ] YES [ ] NO    Antimicrobial:  piperacillin/tazobactam IVPB.. 3.375 Gram(s) IV Intermittent every 8 hours    Cardiovascular:    Pulmonary:  ALBUTerol    90 MICROgram(s) HFA Inhaler 2 Puff(s) Inhalation every 6 hours    Hematologic:  enoxaparin Injectable 40 milliGRAM(s) SubCutaneous daily    Other:  acetaminophen    Suspension .. 650 milliGRAM(s) Oral every 6 hours PRN  bisacodyl Suppository 10 milliGRAM(s) Rectal daily PRN  chlorhexidine 2% Cloths 1 Application(s) Topical <User Schedule>  insulin lispro (ADMELOG) corrective regimen sliding scale   SubCutaneous every 6 hours  pantoprazole   Suspension 40 milliGRAM(s) Oral daily    acetaminophen    Suspension .. 650 milliGRAM(s) Oral every 6 hours PRN  ALBUTerol    90 MICROgram(s) HFA Inhaler 2 Puff(s) Inhalation every 6 hours  bisacodyl Suppository 10 milliGRAM(s) Rectal daily PRN  chlorhexidine 2% Cloths 1 Application(s) Topical <User Schedule>  enoxaparin Injectable 40 milliGRAM(s) SubCutaneous daily  insulin lispro (ADMELOG) corrective regimen sliding scale   SubCutaneous every 6 hours  pantoprazole   Suspension 40 milliGRAM(s) Oral daily  piperacillin/tazobactam IVPB.. 3.375 Gram(s) IV Intermittent every 8 hours    Drug Dosing Weight  Height (cm): 175.3 (20 Dec 2021 02:21)  Weight (kg): 72.5 (20 Dec 2021 06:30)  BMI (kg/m2): 23.6 (20 Dec 2021 06:30)  BSA (m2): 1.88 (20 Dec 2021 06:30)    CENTRAL LINE: [ ] YES [ ] NO  LOCATION:   DATE INSERTED:  REMOVE: [ ] YES [ ] NO  EXPLAIN:    HUTCHINS: [ ] YES [ ] NO    DATE INSERTED:  REMOVE:  [ ] YES [ ] NO  EXPLAIN:    A-LINE:  [ ] YES [ ] NO  LOCATION:   DATE INSERTED:  REMOVE:  [ ] YES [ ] NO  EXPLAIN:    PMH -reviewed admission note, no change since admission      ABG - ( 03 Jan 2022 03:27 )  pH, Arterial: 7.45  pH, Blood: x     /  pCO2: 47    /  pO2: 121   / HCO3: 33    / Base Excess: 7.5   /  SaO2: 99                    01-03 @ 07:01  -  01-04 @ 07:00  --------------------------------------------------------  IN: 1022.5 mL / OUT: 1745 mL / NET: -722.5 mL        Mode: AC/ CMV (Assist Control/ Continuous Mandatory Ventilation)  RR (machine): 12  TV (machine): 450  FiO2: 40  PEEP: 5  ITime: 1  MAP: 12  PIP: 29      PHYSICAL EXAM:    GENERAL: NAD, well-groomed, well-developed  HEAD:  Atraumatic, Normocephalic  EYES: EOMI, PERRLA, conjunctiva and sclera clear  ENMT: No tonsillar erythema, exudates, or enlargement; Moist mucous membranes, Good dentition, [ ]No lesions  NECK: Supple, normal appearance, No JVD; Normal thyroid; Trachea midline  NERVOUS SYSTEM:  Alert & Oriented X3, Good concentration; Motor Strength 5/5 B/L upper and lower extremities; DTRs 2+ intact and symmetric  CHEST/LUNG: No chest deformity; Normal percussion bilaterally; No rales, rhonchi, wheezing   HEART: Regular rate and rhythm; No murmurs, rubs, or gallops  ABDOMEN: Soft, Nontender, Nondistended;Bowel sounds present  EXTREMITIES:  2+ Peripheral Pulses, No clubbing, cyanosis, or edema  LYMPH: No lymphadenopathy noted  SKIN: No rashes or lesions; Good capillary refill      LABS:  CBC Full  -  ( 04 Jan 2022 04:03 )  WBC Count : 8.83 K/uL  RBC Count : 3.12 M/uL  Hemoglobin : 8.8 g/dL  Hematocrit : 28.3 %  Platelet Count - Automated : 379 K/uL  Mean Cell Volume : 90.7 fl  Mean Cell Hemoglobin : 28.2 pg  Mean Cell Hemoglobin Concentration : 31.1 gm/dL  Auto Neutrophil # : 7.23 K/uL  Auto Lymphocyte # : 0.65 K/uL  Auto Monocyte # : 0.68 K/uL  Auto Eosinophil # : 0.16 K/uL  Auto Basophil # : 0.02 K/uL  Auto Neutrophil % : 81.9 %  Auto Lymphocyte % : 7.4 %  Auto Monocyte % : 7.7 %  Auto Eosinophil % : 1.8 %  Auto Basophil % : 0.2 %    01-04    144  |  109<H>  |  15  ----------------------------<  137<H>  3.7   |  29  |  0.54    Ca    10.2      04 Jan 2022 04:03  Phos  2.4     01-04  Mg     2.2     01-04    TPro  5.9<L>  /  Alb  1.4<L>  /  TBili  0.4  /  DBili  x   /  AST  20  /  ALT  9<L>  /  AlkPhos  86  01-04    PT/INR - ( 03 Jan 2022 03:24 )   PT: 12.2 sec;   INR: 1.03 ratio         PTT - ( 03 Jan 2022 03:24 )  PTT:31.3 sec        RADIOLOGY & ADDITIONAL STUDIES REVIEWED:  ***    [ ]GOALS OF CARE DISCUSSION WITH PATIENT/FAMILY/PROXY:    CRITICAL CARE TIME SPENT: 35 minutes INTERVAL HPI/OVERNIGHT EVENTS: New NGT placed, good positioning. TF incr to 40. Pending Trach and PEG tmw.    PRESSORS: [ ] YES [ x] NO    Antimicrobial:  piperacillin/tazobactam IVPB.. 3.375 Gram(s) IV Intermittent every 8 hours    Cardiovascular:    Pulmonary:  ALBUTerol    90 MICROgram(s) HFA Inhaler 2 Puff(s) Inhalation every 6 hours    Hematologic:  enoxaparin Injectable 40 milliGRAM(s) SubCutaneous daily    Other:  acetaminophen    Suspension .. 650 milliGRAM(s) Oral every 6 hours PRN  bisacodyl Suppository 10 milliGRAM(s) Rectal daily PRN  chlorhexidine 2% Cloths 1 Application(s) Topical <User Schedule>  insulin lispro (ADMELOG) corrective regimen sliding scale   SubCutaneous every 6 hours  pantoprazole   Suspension 40 milliGRAM(s) Oral daily    acetaminophen    Suspension .. 650 milliGRAM(s) Oral every 6 hours PRN  ALBUTerol    90 MICROgram(s) HFA Inhaler 2 Puff(s) Inhalation every 6 hours  bisacodyl Suppository 10 milliGRAM(s) Rectal daily PRN  chlorhexidine 2% Cloths 1 Application(s) Topical <User Schedule>  enoxaparin Injectable 40 milliGRAM(s) SubCutaneous daily  insulin lispro (ADMELOG) corrective regimen sliding scale   SubCutaneous every 6 hours  pantoprazole   Suspension 40 milliGRAM(s) Oral daily  piperacillin/tazobactam IVPB.. 3.375 Gram(s) IV Intermittent every 8 hours    Drug Dosing Weight  Height (cm): 175.3 (20 Dec 2021 02:21)  Weight (kg): 72.5 (20 Dec 2021 06:30)  BMI (kg/m2): 23.6 (20 Dec 2021 06:30)  BSA (m2): 1.88 (20 Dec 2021 06:30)    CENTRAL LINE: [ ] YES [ ] NO  LOCATION:   DATE INSERTED:  REMOVE: [ ] YES [ ] NO  EXPLAIN:    HUTCHINS: [ ] YES [ ] NO    DATE INSERTED:  REMOVE:  [ ] YES [ ] NO  EXPLAIN:    A-LINE:  [ ] YES [ ] NO  LOCATION:   DATE INSERTED:  REMOVE:  [ ] YES [ ] NO  EXPLAIN:    PMH -reviewed admission note, no change since admission      ABG - ( 03 Jan 2022 03:27 )  pH, Arterial: 7.45  pH, Blood: x     /  pCO2: 47    /  pO2: 121   / HCO3: 33    / Base Excess: 7.5   /  SaO2: 99                    01-03 @ 07:01  -  01-04 @ 07:00  --------------------------------------------------------  IN: 1022.5 mL / OUT: 1745 mL / NET: -722.5 mL        Mode: AC/ CMV (Assist Control/ Continuous Mandatory Ventilation)  RR (machine): 12  TV (machine): 450  FiO2: 40  PEEP: 5  ITime: 1  MAP: 12  PIP: 29      PHYSICAL EXAM:    GENERAL: NAD, well-groomed, well-developed  HEAD:  Atraumatic, Normocephalic  EYES: EOMI, PERRLA, conjunctiva and sclera clear  ENMT: No tonsillar erythema, exudates, or enlargement; Moist mucous membranes, Good dentition, [ ]No lesions  NECK: Supple, normal appearance, No JVD; Normal thyroid; Trachea midline  NERVOUS SYSTEM:  Alert & Oriented X3, Good concentration; Motor Strength 5/5 B/L upper and lower extremities; DTRs 2+ intact and symmetric  CHEST/LUNG: No chest deformity; Normal percussion bilaterally; No rales, rhonchi, wheezing   HEART: Regular rate and rhythm; No murmurs, rubs, or gallops  ABDOMEN: Soft, Nontender, Nondistended;Bowel sounds present  EXTREMITIES:  2+ Peripheral Pulses, No clubbing, cyanosis, or edema  LYMPH: No lymphadenopathy noted  SKIN: No rashes or lesions; Good capillary refill      LABS:  CBC Full  -  ( 04 Jan 2022 04:03 )  WBC Count : 8.83 K/uL  RBC Count : 3.12 M/uL  Hemoglobin : 8.8 g/dL  Hematocrit : 28.3 %  Platelet Count - Automated : 379 K/uL  Mean Cell Volume : 90.7 fl  Mean Cell Hemoglobin : 28.2 pg  Mean Cell Hemoglobin Concentration : 31.1 gm/dL  Auto Neutrophil # : 7.23 K/uL  Auto Lymphocyte # : 0.65 K/uL  Auto Monocyte # : 0.68 K/uL  Auto Eosinophil # : 0.16 K/uL  Auto Basophil # : 0.02 K/uL  Auto Neutrophil % : 81.9 %  Auto Lymphocyte % : 7.4 %  Auto Monocyte % : 7.7 %  Auto Eosinophil % : 1.8 %  Auto Basophil % : 0.2 %    01-04    144  |  109<H>  |  15  ----------------------------<  137<H>  3.7   |  29  |  0.54    Ca    10.2      04 Jan 2022 04:03  Phos  2.4     01-04  Mg     2.2     01-04    TPro  5.9<L>  /  Alb  1.4<L>  /  TBili  0.4  /  DBili  x   /  AST  20  /  ALT  9<L>  /  AlkPhos  86  01-04    PT/INR - ( 03 Jan 2022 03:24 )   PT: 12.2 sec;   INR: 1.03 ratio         PTT - ( 03 Jan 2022 03:24 )  PTT:31.3 sec        RADIOLOGY & ADDITIONAL STUDIES REVIEWED:  ***    [ ]GOALS OF CARE DISCUSSION WITH PATIENT/FAMILY/PROXY:    CRITICAL CARE TIME SPENT: 35 minutes INTERVAL HPI/OVERNIGHT EVENTS: New NGT placed, good positioning. TF incr to 40. Pending Trach and PEG tmw.    PRESSORS: [ ] YES [ x] NO    Antimicrobial:  piperacillin/tazobactam IVPB.. 3.375 Gram(s) IV Intermittent every 8 hours    Cardiovascular:    Pulmonary:  ALBUTerol    90 MICROgram(s) HFA Inhaler 2 Puff(s) Inhalation every 6 hours    Hematologic:  enoxaparin Injectable 40 milliGRAM(s) SubCutaneous daily    Other:  acetaminophen    Suspension .. 650 milliGRAM(s) Oral every 6 hours PRN  bisacodyl Suppository 10 milliGRAM(s) Rectal daily PRN  chlorhexidine 2% Cloths 1 Application(s) Topical <User Schedule>  insulin lispro (ADMELOG) corrective regimen sliding scale   SubCutaneous every 6 hours  pantoprazole   Suspension 40 milliGRAM(s) Oral daily    acetaminophen    Suspension .. 650 milliGRAM(s) Oral every 6 hours PRN  ALBUTerol    90 MICROgram(s) HFA Inhaler 2 Puff(s) Inhalation every 6 hours  bisacodyl Suppository 10 milliGRAM(s) Rectal daily PRN  chlorhexidine 2% Cloths 1 Application(s) Topical <User Schedule>  enoxaparin Injectable 40 milliGRAM(s) SubCutaneous daily  insulin lispro (ADMELOG) corrective regimen sliding scale   SubCutaneous every 6 hours  pantoprazole   Suspension 40 milliGRAM(s) Oral daily  piperacillin/tazobactam IVPB.. 3.375 Gram(s) IV Intermittent every 8 hours    Drug Dosing Weight  Height (cm): 175.3 (20 Dec 2021 02:21)  Weight (kg): 72.5 (20 Dec 2021 06:30)  BMI (kg/m2): 23.6 (20 Dec 2021 06:30)  BSA (m2): 1.88 (20 Dec 2021 06:30)    CENTRAL LINE: [ ] YES [ ] NO  LOCATION:   DATE INSERTED:  REMOVE: [ ] YES [ ] NO  EXPLAIN:    HUTCHINS: [ ] YES [ ] NO    DATE INSERTED:  REMOVE:  [ ] YES [ ] NO  EXPLAIN:    A-LINE:  [ ] YES [ ] NO  LOCATION:   DATE INSERTED:  REMOVE:  [ ] YES [ ] NO  EXPLAIN:    PMH -reviewed admission note, no change since admission      ABG - ( 03 Jan 2022 03:27 )  pH, Arterial: 7.45  pH, Blood: x     /  pCO2: 47    /  pO2: 121   / HCO3: 33    / Base Excess: 7.5   /  SaO2: 99                    01-03 @ 07:01  -  01-04 @ 07:00  --------------------------------------------------------  IN: 1022.5 mL / OUT: 1745 mL / NET: -722.5 mL        Mode: AC/ CMV (Assist Control/ Continuous Mandatory Ventilation)  RR (machine): 12  TV (machine): 450  FiO2: 40  PEEP: 5  ITime: 1  MAP: 12  PIP: 29      PHYSICAL EXAM:    GENERAL: NAD, well-groomed, well-developed + oral intubation   HEAD:  Atraumatic, Normocephalic  EYES: EOMI, PERRLA, conjunctiva and sclera clear  ENMT: No tonsillar erythema, exudates, or enlargement; Moist mucous membranes, Good dentition, [ ]No lesions  NECK: Supple, normal appearance, No JVD; Normal thyroid; Trachea midline  NERVOUS SYSTEM:  awake and follow simple commands   CHEST/LUNG: No chest deformity; Normal percussion bilaterally; No rales, rhonchi, wheezing  + left cheat tube   HEART: Regular rate and rhythm; No murmurs, rubs, or gallops  ABDOMEN: Soft, Nontender, Nondistended;Bowel sounds present  EXTREMITIES:  2+ Peripheral Pulses, No clubbing, cyanosis, or edema  LYMPH: No lymphadenopathy noted  SKIN: No rashes or lesions; Good capillary refill      LABS:  CBC Full  -  ( 04 Jan 2022 04:03 )  WBC Count : 8.83 K/uL  RBC Count : 3.12 M/uL  Hemoglobin : 8.8 g/dL  Hematocrit : 28.3 %  Platelet Count - Automated : 379 K/uL  Mean Cell Volume : 90.7 fl  Mean Cell Hemoglobin : 28.2 pg  Mean Cell Hemoglobin Concentration : 31.1 gm/dL  Auto Neutrophil # : 7.23 K/uL  Auto Lymphocyte # : 0.65 K/uL  Auto Monocyte # : 0.68 K/uL  Auto Eosinophil # : 0.16 K/uL  Auto Basophil # : 0.02 K/uL  Auto Neutrophil % : 81.9 %  Auto Lymphocyte % : 7.4 %  Auto Monocyte % : 7.7 %  Auto Eosinophil % : 1.8 %  Auto Basophil % : 0.2 %    01-04    144  |  109<H>  |  15  ----------------------------<  137<H>  3.7   |  29  |  0.54    Ca    10.2      04 Jan 2022 04:03  Phos  2.4     01-04  Mg     2.2     01-04    TPro  5.9<L>  /  Alb  1.4<L>  /  TBili  0.4  /  DBili  x   /  AST  20  /  ALT  9<L>  /  AlkPhos  86  01-04    PT/INR - ( 03 Jan 2022 03:24 )   PT: 12.2 sec;   INR: 1.03 ratio         PTT - ( 03 Jan 2022 03:24 )  PTT:31.3 sec        RADIOLOGY & ADDITIONAL STUDIES REVIEWED:  ***    CXR:  < from: Xray Chest 1 View- PORTABLE-Urgent (Xray Chest 1 View- PORTABLE-Urgent .) (01.03.22 @ 19:20) >    ACC: [id number] EXAM:  XR CHEST PORTABLE URGENT 1V                          PROCEDURE DATE:  01/03/2022          INTERPRETATION:  Clinical history: 76 year old male, check NG tube   placement.    Portable view of the chest is compared to 12 hours prior and demonstrates   an NG tube with the tip in the stomach, ET tube with the tip 4.5 cm above   the roberto and left chest tube in place with no pneumothorax.    Mild to moderate perihilar alveolar/interstitial infiltrates, unchanged.    Opacity of the left base which obscures the hemidiaphragm consistent with   effusion/atelectasis/possible consolidation, new.    IMPRESSION:  Lines and tubes in satisfactory position with no pneumothorax.    Effusion/atelectasis/possible consolidation at the leftbase, new.    Perihilar interstitial/alveolar infiltrates, grossly unchanged    --- End of Report ---            MARISA BRO DO; Attending Radiologist  This document has been electronically signed. Jan 4 2022  9:29AM    < end of copied text >  [ ]GOALS OF CARE DISCUSSION WITH PATIENT/FAMILY/PROXY:    CRITICAL CARE TIME SPENT: 35 minutes

## 2022-01-04 NOTE — PROGRESS NOTE ADULT - ASSESSMENT
Pneumonia - likely aspiration   Leukocytosis - resolved  Fevers - low grade    Plan - Cont Zosyn 3.375 gms iv q8hrs D#7  going for Trach and Peg on Wednesday    Time spent : 32 minutes             Pneumonia - likely aspiration   Leukocytosis - resolved  Fevers - low grade    Plan - Cont Zosyn 3.375 gms iv q8hrs D#7  going for Trach and Peg on Wednesday    Time spent : 32 minutes    I agree with above

## 2022-01-04 NOTE — PROGRESS NOTE ADULT - SUBJECTIVE AND OBJECTIVE BOX
ICU visit:  76y Male is under our care for    REVIEW OF SYSTEMS:  [  ] Not able to elicit  General:	  Chest:	  GI:	  :  Skin:	  Musculoskeletal:	  Neuro:	    MEDS:  piperacillin/tazobactam IVPB.. 3.375 Gram(s) IV Intermittent every 8 hours    ALLERGIES: Allergies    No Known Allergies    Intolerances        VITALS:  ICU Vital Signs Last 24 Hrs  T(C): 37.3 (04 Jan 2022 11:00), Max: 37.9 (03 Jan 2022 23:57)  T(F): 99.1 (04 Jan 2022 11:00), Max: 100.2 (03 Jan 2022 23:57)  HR: 81 (04 Jan 2022 11:50) (69 - 92)  BP: 129/63 (04 Jan 2022 11:00) (108/91 - 153/72)  BP(mean): 78 (04 Jan 2022 11:00) (77 - 109)  ABP: --  ABP(mean): --  RR: 14 (04 Jan 2022 11:00) (14 - 28)  SpO2: 99% (04 Jan 2022 11:50) (92% - 100%)      PHYSICAL EXAM:  HEENT:  Neck:  Respiratory:  Cardiovascular:  Gastrointestinal:  Extremities:  Skin:  Ortho:  Neuro:    LABS/DIAGNOSTIC TESTS:                        8.8    8.83  )-----------( 379      ( 04 Jan 2022 04:03 )             28.3     WBC Count: 8.83 K/uL (01-04 @ 04:03)  WBC Count: 8.21 K/uL (01-03 @ 03:24)  WBC Count: 6.96 K/uL (01-02 @ 04:21)  WBC Count: 8.06 K/uL (01-01 @ 05:00)  WBC Count: 11.66 K/uL (12-31 @ 05:45)    01-04    144  |  109<H>  |  15  ----------------------------<  137<H>  3.7   |  29  |  0.54    Ca    10.2      04 Jan 2022 04:03  Phos  2.4     01-04  Mg     2.2     01-04    TPro  5.9<L>  /  Alb  1.4<L>  /  TBili  0.4  /  DBili  x   /  AST  20  /  ALT  9<L>  /  AlkPhos  86  01-04        ABG - ( 03 Jan 2022 03:27 )  pH: 7.45  /  pCO2: 47    /  pO2: 121   / HCO3: 33    / Base Excess: 7.5   /  SaO2: 99                CULTURES:   .Sputum Sputum  12-29 @ 18:28   Normal Respiratory Karla present  --    Numerous polymorphonuclear leukocytes per low power field  No squamous epithelial cells per low power field  Few Gram positive cocci in pairs per oil power field      Clean Catch Clean Catch (Midstream)  12-21 @ 04:42   No growth  --  --      .Sputum Sputum  11-08 @ 10:53   Few Mycobacterium avium complex  --  --      .Sputum Sputum  11-06 @ 19:34   No acid fast bacilli isolated after 6 weeks.  --  --      .Sputum Sputum  11-05 @ 23:15   Growth of acid fast bacilli detected in broth,  Mycobacterium avium complex by Dna Probe  --  --      .Blood Blood  11-04 @ 11:23   No Growth Final  --  --      .Blood Blood  11-04 @ 10:50   No Growth Final  --  --      Clean Catch Clean Catch (Midstream)  11-03 @ 17:00   <10,000 CFU/mL Normal Urogenital Karla  --  --        RADIOLOGY:  < from: Xray Chest 1 View- PORTABLE-Urgent (Xray Chest 1 View- PORTABLE-Urgent .) (01.03.22 @ 19:20) >    ACC: [id number] EXAM:  XR CHEST PORTABLE URGENT 1V                          PROCEDURE DATE:  01/03/2022          INTERPRETATION:  Clinical history: 76 year old male, check NG tube   placement.    Portable view of the chest is compared to 12 hours prior and demonstrates   an NG tube with the tip in the stomach, ET tube with the tip 4.5 cm above   the roberto and left chest tube in place with no pneumothorax.    Mild to moderate perihilar alveolar/interstitial infiltrates, unchanged.    Opacity of the left base which obscures the hemidiaphragm consistent with   effusion/atelectasis/possible consolidation, new.    IMPRESSION:  Lines and tubes in satisfactory position with no pneumothorax.    Effusion/atelectasis/possible consolidation at the leftbase, new.    Perihilar interstitial/alveolar infiltrates, grossly unchanged    --- End of Report ---              < end of copied text >   ICU visit:  76y Male is under our care for pneumonia.  Patient was seen in the ICU intubated and vent dependent with an FIO2 of 40% and an oxygen saturation of 99%.  Patient was seen with bilateral hand mittens as he pulled out his NG yesterday.  He had a low grade fever of 100.2 early this morning and is scheduled for trach/peg tomorrow.    REVIEW OF SYSTEMS:  [ x ] Not able to elicit      MEDS:  piperacillin/tazobactam IVPB.. 3.375 Gram(s) IV Intermittent every 8 hours    ALLERGIES: Allergies    No Known Allergies    Intolerances        VITALS:  ICU Vital Signs Last 24 Hrs  T(C): 37.3 (04 Jan 2022 11:00), Max: 37.9 (03 Jan 2022 23:57)  T(F): 99.1 (04 Jan 2022 11:00), Max: 100.2 (03 Jan 2022 23:57)  HR: 81 (04 Jan 2022 11:50) (69 - 92)  BP: 129/63 (04 Jan 2022 11:00) (108/91 - 153/72)  BP(mean): 78 (04 Jan 2022 11:00) (77 - 109)  ABP: --  ABP(mean): --  RR: 14 (04 Jan 2022 11:00) (14 - 28)  SpO2: 99% (04 Jan 2022 11:50) (92% - 100%)      PHYSICAL EXAM:  HEENT: ETT, left NG  Neck: supple no LN's   Respiratory: Bilateral rhonchi  Cardiovascular: S1 S2 reg no murmurs  Gastrointestinal: +BS with soft, nondistended abdomen; nontender  Extremities: no edema  Skin: no rashes  Ortho: n/a  Neuro: Awake and alert    LABS/DIAGNOSTIC TESTS:                        8.8    8.83  )-----------( 379      ( 04 Jan 2022 04:03 )             28.3     WBC Count: 8.83 K/uL (01-04 @ 04:03)  WBC Count: 8.21 K/uL (01-03 @ 03:24)  WBC Count: 6.96 K/uL (01-02 @ 04:21)  WBC Count: 8.06 K/uL (01-01 @ 05:00)  WBC Count: 11.66 K/uL (12-31 @ 05:45)    01-04    144  |  109<H>  |  15  ----------------------------<  137<H>  3.7   |  29  |  0.54    Ca    10.2      04 Jan 2022 04:03  Phos  2.4     01-04  Mg     2.2     01-04    TPro  5.9<L>  /  Alb  1.4<L>  /  TBili  0.4  /  DBili  x   /  AST  20  /  ALT  9<L>  /  AlkPhos  86  01-04        ABG - ( 03 Jan 2022 03:27 )  pH: 7.45  /  pCO2: 47    /  pO2: 121   / HCO3: 33    / Base Excess: 7.5   /  SaO2: 99                CULTURES:   .Sputum Sputum  12-29 @ 18:28   Normal Respiratory Karla present  --    Numerous polymorphonuclear leukocytes per low power field  No squamous epithelial cells per low power field  Few Gram positive cocci in pairs per oil power field      Clean Catch Clean Catch (Midstream)  12-21 @ 04:42   No growth  --  --      .Sputum Sputum  11-08 @ 10:53   Few Mycobacterium avium complex  --  --      .Sputum Sputum  11-06 @ 19:34   No acid fast bacilli isolated after 6 weeks.  --  --      .Sputum Sputum  11-05 @ 23:15   Growth of acid fast bacilli detected in broth,  Mycobacterium avium complex by Dna Probe  --  --      .Blood Blood  11-04 @ 11:23   No Growth Final  --  --      .Blood Blood  11-04 @ 10:50   No Growth Final  --  --      Clean Catch Clean Catch (Midstream)  11-03 @ 17:00   <10,000 CFU/mL Normal Urogenital Karla  --  --        RADIOLOGY:  < from: Xray Chest 1 View- PORTABLE-Urgent (Xray Chest 1 View- PORTABLE-Urgent .) (01.03.22 @ 19:20) >    ACC: [id number] EXAM:  XR CHEST PORTABLE URGENT 1V                          PROCEDURE DATE:  01/03/2022          INTERPRETATION:  Clinical history: 76 year old male, check NG tube   placement.    Portable view of the chest is compared to 12 hours prior and demonstrates   an NG tube with the tip in the stomach, ET tube with the tip 4.5 cm above   the roberto and left chest tube in place with no pneumothorax.    Mild to moderate perihilar alveolar/interstitial infiltrates, unchanged.    Opacity of the left base which obscures the hemidiaphragm consistent with   effusion/atelectasis/possible consolidation, new.    IMPRESSION:  Lines and tubes in satisfactory position with no pneumothorax.    Effusion/atelectasis/possible consolidation at the leftbase, new.    Perihilar interstitial/alveolar infiltrates, grossly unchanged    --- End of Report ---              < end of copied text >

## 2022-01-05 NOTE — PROGRESS NOTE ADULT - SUBJECTIVE AND OBJECTIVE BOX
ICU visit:  76y Male is under our care for    REVIEW OF SYSTEMS:  [  ] Not able to elicit  General:	  Chest:	  GI:	  :  Skin:	  Musculoskeletal:	  Neuro:	    MEDS:  piperacillin/tazobactam IVPB.. 3.375 Gram(s) IV Intermittent every 8 hours    ALLERGIES: Allergies    No Known Allergies    Intolerances        VITALS:  ICU Vital Signs Last 24 Hrs  T(C): 37.2 (05 Jan 2022 11:00), Max: 37.7 (04 Jan 2022 16:00)  T(F): 99 (05 Jan 2022 11:00), Max: 99.9 (04 Jan 2022 16:00)  HR: 78 (05 Jan 2022 11:00) (77 - 91)  BP: 143/71 (05 Jan 2022 11:00) (118/56 - 159/68)  BP(mean): 86 (05 Jan 2022 11:00) (70 - 110)  ABP: --  ABP(mean): --  RR: 14 (05 Jan 2022 11:00) (1 - 25)  SpO2: 99% (05 Jan 2022 11:00) (98% - 100%)      PHYSICAL EXAM:  HEENT:  Neck:  Respiratory:  Cardiovascular:  Gastrointestinal:  Extremities:  Skin:  Ortho:  Neuro:    LABS/DIAGNOSTIC TESTS:                        8.6    7.49  )-----------( 402      ( 05 Jan 2022 04:21 )             28.0     WBC Count: 7.49 K/uL (01-05 @ 04:21)  WBC Count: 8.83 K/uL (01-04 @ 04:03)  WBC Count: 8.21 K/uL (01-03 @ 03:24)  WBC Count: 6.96 K/uL (01-02 @ 04:21)  WBC Count: 8.06 K/uL (01-01 @ 05:00)    01-05    145  |  112<H>  |  19<H>  ----------------------------<  169<H>  3.8   |  31  |  0.57    Ca    10.0      05 Jan 2022 04:21  Phos  2.0     01-05  Mg     2.2     01-05    TPro  5.9<L>  /  Alb  1.6<L>  /  TBili  0.3  /  DBili  x   /  AST  21  /  ALT  9<L>  /  AlkPhos  86  01-05          CULTURES:   .Sputum Sputum  12-29 @ 18:28   Normal Respiratory Karla present  --    Numerous polymorphonuclear leukocytes per low power field  No squamous epithelial cells per low power field  Few Gram positive cocci in pairs per oil power field      Clean Catch Clean Catch (Midstream)  12-21 @ 04:42   No growth  --  --      .Sputum Sputum  11-08 @ 10:53   Few Mycobacterium avium complex  --  --      .Sputum Sputum  11-06 @ 19:34   No acid fast bacilli isolated after 6 weeks.  --  --      .Sputum Sputum  11-05 @ 23:15   Growth of acid fast bacilli detected in broth,  Mycobacterium avium complex by Dna Probe  --  --      .Blood Blood  11-04 @ 11:23   No Growth Final  --  --      .Blood Blood  11-04 @ 10:50   No Growth Final  --  --      Clean Catch Clean Catch (Midstream)  11-03 @ 17:00   <10,000 CFU/mL Normal Urogenital Karla  --  --        RADIOLOGY:    < from: Xray Chest 1 View- PORTABLE-Urgent (Xray Chest 1 View- PORTABLE-Urgent .) (01.04.22 @ 10:13) >    ACC: [id number] EXAM:  XR CHEST PORTABLE URGENT 1V                          PROCEDURE DATE:  01/04/2022          INTERPRETATION:  HISTORY: Left pneumothorax    TECHNIQUE: A single AP view of the chest was obtained.    COMPARISON: 1/3/2022    FINDINGS:  The cardiac silhouette is normal in size. There is an   endotracheal tube the tip above the roberto. There is a nasogastric tube   with its tip below the level of this film. There is a left-sided pigtail   catheter in place. There are patchy bilateral airspace opacities, not   significantly changed. No pneumothorax is seen.    IMPRESSION: Patchy airspace opacities, unchanged. No pneumothorax is seen.    --- End of Report ---      < end of copied text >   ICU visit:  76y Male is under our care for pneumonia.  Patient was seen in the ICU intubated and vent dependent with an FIO2 of 40%.  He is pending trach and peg placement today, remains afebrile and WBC count is WNL.    REVIEW OF SYSTEMS:  [ x ] Not able to elicit    MEDS:  piperacillin/tazobactam IVPB.. 3.375 Gram(s) IV Intermittent every 8 hours    ALLERGIES: Allergies    No Known Allergies    Intolerances        VITALS:  ICU Vital Signs Last 24 Hrs  T(C): 37.2 (05 Jan 2022 11:00), Max: 37.7 (04 Jan 2022 16:00)  T(F): 99 (05 Jan 2022 11:00), Max: 99.9 (04 Jan 2022 16:00)  HR: 78 (05 Jan 2022 11:00) (77 - 91)  BP: 143/71 (05 Jan 2022 11:00) (118/56 - 159/68)  BP(mean): 86 (05 Jan 2022 11:00) (70 - 110)  ABP: --  ABP(mean): --  RR: 14 (05 Jan 2022 11:00) (1 - 25)  SpO2: 99% (05 Jan 2022 11:00) (98% - 100%)      PHYSICAL EXAM:  HEENT: ETT, left NG  Neck: supple no LN's   Respiratory: Bilateral rhonchi  Cardiovascular: S1 S2 reg no murmurs  Gastrointestinal: +BS with soft, nondistended abdomen; nontender  Extremities: no edema  Skin: no rashes  Ortho: n/a  Neuro: Awake and alert    LABS/DIAGNOSTIC TESTS:                        8.6    7.49  )-----------( 402      ( 05 Jan 2022 04:21 )             28.0     WBC Count: 7.49 K/uL (01-05 @ 04:21)  WBC Count: 8.83 K/uL (01-04 @ 04:03)  WBC Count: 8.21 K/uL (01-03 @ 03:24)  WBC Count: 6.96 K/uL (01-02 @ 04:21)  WBC Count: 8.06 K/uL (01-01 @ 05:00)    01-05    145  |  112<H>  |  19<H>  ----------------------------<  169<H>  3.8   |  31  |  0.57    Ca    10.0      05 Jan 2022 04:21  Phos  2.0     01-05  Mg     2.2     01-05    TPro  5.9<L>  /  Alb  1.6<L>  /  TBili  0.3  /  DBili  x   /  AST  21  /  ALT  9<L>  /  AlkPhos  86  01-05          CULTURES:   .Sputum Sputum  12-29 @ 18:28   Normal Respiratory Karla present  --    Numerous polymorphonuclear leukocytes per low power field  No squamous epithelial cells per low power field  Few Gram positive cocci in pairs per oil power field      Clean Catch Clean Catch (Midstream)  12-21 @ 04:42   No growth  --  --      .Sputum Sputum  11-08 @ 10:53   Few Mycobacterium avium complex  --  --      .Sputum Sputum  11-06 @ 19:34   No acid fast bacilli isolated after 6 weeks.  --  --      .Sputum Sputum  11-05 @ 23:15   Growth of acid fast bacilli detected in broth,  Mycobacterium avium complex by Dna Probe  --  --      .Blood Blood  11-04 @ 11:23   No Growth Final  --  --      .Blood Blood  11-04 @ 10:50   No Growth Final  --  --      Clean Catch Clean Catch (Midstream)  11-03 @ 17:00   <10,000 CFU/mL Normal Urogenital Karla  --  --        RADIOLOGY:    < from: Xray Chest 1 View- PORTABLE-Urgent (Xray Chest 1 View- PORTABLE-Urgent .) (01.04.22 @ 10:13) >    ACC: [id number] EXAM:  XR CHEST PORTABLE URGENT 1V                          PROCEDURE DATE:  01/04/2022          INTERPRETATION:  HISTORY: Left pneumothorax    TECHNIQUE: A single AP view of the chest was obtained.    COMPARISON: 1/3/2022    FINDINGS:  The cardiac silhouette is normal in size. There is an   endotracheal tube the tip above the roberto. There is a nasogastric tube   with its tip below the level of this film. There is a left-sided pigtail   catheter in place. There are patchy bilateral airspace opacities, not   significantly changed. No pneumothorax is seen.    IMPRESSION: Patchy airspace opacities, unchanged. No pneumothorax is seen.    --- End of Report ---      < end of copied text >

## 2022-01-05 NOTE — CHART NOTE - NSCHARTNOTEFT_GEN_A_CORE
Tracheostomy and PEG rescheduled for 1/6  Keep patient NPO after midnight  repeat PREOP labs  repeat COVID swab, order placed  chlorhexidine wash   consent to be obtained  medical/ICU optimization

## 2022-01-05 NOTE — PROGRESS NOTE ADULT - NUTRITIONAL ASSESSMENT
This patient has been assessed with a concern for Malnutrition and has been determined to have a diagnosis/diagnoses of Severe protein-calorie malnutrition.    This patient is being managed with:   Diet NPO after Midnight-     NPO Start Date: 04-Jan-2022   NPO Start Time: 23:59  Entered: Jan 4 2022  8:17AM    Diet NPO with Tube Feed-  Tube Feeding Modality: Nasogastric  Glucerna 1.5 Dhruv  Total Volume for 24 Hours (mL): 960  Continuous  Starting Tube Feed Rate {mL per Hour}: 10  Increase Tube Feed Rate by (mL): 10     Every 4 hours  Until Goal Tube Feed Rate (mL per Hour): 40  Tube Feed Duration (in Hours): 24  Tube Feed Start Time: 10:00  No Carb Prosource (1pkg = 15gms Protein)     Qty per Day:  2  No Carb Prosource TF     Qty per Day:  2  Entered: Neftali  3 2022  9:11PM

## 2022-01-05 NOTE — PROGRESS NOTE ADULT - ASSESSMENT
Pneumonia - likely aspiration   Leukocytosis - resolved  Fevers - low grade    Plan - Cont Zosyn 3.375 gms iv q8hrs D#8  Scheduled for Trach and Peg today    Time spent : 33 minutes                   Pneumonia - likely aspiration   Leukocytosis - resolved  Fevers - improving    Plan - Cont Zosyn 3.375 gms iv q8hrs D#8  Scheduled for Trach and Peg today    Time spent : 33 minutes                   Pneumonia - likely aspiration   Leukocytosis - resolved  Fevers - improving    Plan - Cont Zosyn 3.375 gms iv q8hrs D#8 for a total of 10 days  Scheduled for Trach and Peg today    Time spent : 33 minutes                   Pneumonia - likely aspiration   Leukocytosis - resolved  Fevers - improving    Plan - Cont Zosyn 3.375 gms iv q8hrs D#8 for a total of 10 days  Scheduled for Trach and Peg today    Time spent : 33 minutes    I agree with above

## 2022-01-05 NOTE — CHART NOTE - NSCHARTNOTEFT_GEN_A_CORE
Daughter called and updated on fathers condition and plan for trach and PEG. Informed that OR has him on the schedule for 4:30pm. Will call postoperatively when returns to floor.

## 2022-01-05 NOTE — PROGRESS NOTE ADULT - ASSESSMENT
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 Patient had cardiac arrest in ED , was intubated CPR was initiated and subsequently ROSC was achieved after 20 minutes. Patient is admitted to ICU.    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  - CAD s/p stent  - Diabetes    Plan  ====Neuro====  #baseline mentation: AAOx3  #intubated  - Extubated on 12/27  - re-intubated on 12/28  - OFF propofol and pressors  - PRN fent pushes    ====CVS====  #Cardiac arrest   12/20 on arrival to ED  - ROSC after 20min  - trop trended down  - started norepi 12/28 post intubation, tapered OFF  - s/p midodrine 5mg TID     ====Pulm====  #Acute hypoxic respiratory failure  - has Hx of chronic PTX on LEFT  - likely 2/2 PTX vs aspiration PNA  - SBT (12/21, 22, 24)  - extubated on 12/22  - re-intubated on 12/23  - s/p solumedrol  - s/p cefepime 2g q12 (12/20-12/28)  - extubated again on 12/27; re-intubated on 12/28  - CXR w/ improvement, stable chronic LEFT PTX  - thoracic surgery consulted for trach/PEG  - plan for CT - with IV contrast shows interval worsening  - on zosyn Abx for asp PNA coverage    #PTX (acute/on/chronic)  - s/p pigtail in ED 12/20  - chest tube to water seal- serosanginous exudate  - CXR interval improvement  - plan for re-positioning of pigtail during trach    ====GI====  #no active issues  #diet: NPO w/ tube feeding   s/p free water 350 q 6 + pro-source    #bowel regimen: none    ====nephro====  #hypernatremia  serum Na 153 >>> improving 146 > 143  - s/p free water 250 q 4    ====ID====  #fever  started 12/29 after intubation  wbc trending back down  - sputum culture no bacterial growth   - started Zosyn 12/29    #leukocytosis  - likely 2/2 aspiration PNA vs reactive process  - Ucx neg  - Scx MAC  - s/p cefepime 2g q 12 (12/20-12/28)  - started Zosyn 12/29    ====Heme/onc====  #Metastatic SCC of tongue base  - s/p resection, chemo/radiation, and immunotherapy (10/2021)  - plan for experimental drug as outpatient.  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 Patient had cardiac arrest in ED , was intubated CPR was initiated and subsequently ROSC was achieved after 20 minutes. Patient is admitted to ICU.    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  - CAD s/p stent  - Diabetes    Plan  ====Neuro====  #baseline mentation: AAOx3  #intubated  - Extubated on 12/27  - re-intubated on 12/28  - OFF propofol and pressors  - PRN fent pushes  - start standing pain meds post surgery    ====CVS====  #Cardiac arrest   12/20 on arrival to ED  - ROSC after 20min  - trop trended down  - started norepi 12/28 post intubation, tapered OFF  - s/p midodrine 5mg TID OFF    ====Pulm====  #Acute hypoxic respiratory failure  - has Hx of chronic PTX on LEFT  - likely 2/2 PTX vs aspiration PNA  - SBT (12/21, 22, 24)  - extubated on 12/22  - re-intubated on 12/23  - s/p solumedrol  - s/p cefepime 2g q12 (12/20-12/28)  - extubated again on 12/27; re-intubated on 12/28  - CXR w/ improvement, stable chronic LEFT PTX  - thoracic surgery consulted for trach/PEG  - plan for CT - with IV contrast shows interval worsening  - on zosyn Abx for asp PNA coverage    #PTX (acute/on/chronic)  - s/p pigtail in ED 12/20  - chest tube to water seal- serosanginous exudate  - CXR interval improvement  - plan for re-positioning of pigtail during trach ?    ====GI====  #no active issues  #diet: NPO w/ tube feeding + prosource  s/p free water 350 q 6     #bowel regimen: none    ====nephro====  #hypernatremia  serum Na 153 >>> improving 146 > 143 > 145  - s/p free water 250 q 4    ====ID====  #fever  started 12/29 after intubation  wbc trending back down  - sputum culture no bacterial growth   - started Zosyn 12/29    #leukocytosis  - likely 2/2 aspiration PNA vs reactive process  - Ucx neg  - Scx MAC  - s/p cefepime 2g q 12 (12/20-12/28)  - started Zosyn 12/29    ====Heme/onc====  #Metastatic SCC of tongue base  - s/p resection, chemo/radiation, and immunotherapy (10/2021)  - plan for experimental drug as outpatient.  - heme/onc as o/p   - palliative chemo, post trach/PEG will NOT be candidate for chemo  - palliative on board, discussion to be had today regarding trach PEG    ====Endo====  #DM  - a1c 6.3  - c/w sliding scale    ====Ppx====  #DVT: Lovenox HOLDing pre op  #GI: PPI

## 2022-01-05 NOTE — PROGRESS NOTE ADULT - SUBJECTIVE AND OBJECTIVE BOX
INTERVAL HPI/OVERNIGHT EVENTS: ***    PRESSORS: [ ] YES [ ] NO    Antimicrobial:  piperacillin/tazobactam IVPB.. 3.375 Gram(s) IV Intermittent every 8 hours    Cardiovascular:    Pulmonary:  ALBUTerol    90 MICROgram(s) HFA Inhaler 2 Puff(s) Inhalation every 6 hours    Hematologic:    Other:  acetaminophen    Suspension .. 650 milliGRAM(s) Oral every 6 hours PRN  bisacodyl Suppository 10 milliGRAM(s) Rectal daily PRN  chlorhexidine 2% Cloths 1 Application(s) Topical <User Schedule>  insulin lispro (ADMELOG) corrective regimen sliding scale   SubCutaneous every 6 hours  pantoprazole   Suspension 40 milliGRAM(s) Oral daily    acetaminophen    Suspension .. 650 milliGRAM(s) Oral every 6 hours PRN  ALBUTerol    90 MICROgram(s) HFA Inhaler 2 Puff(s) Inhalation every 6 hours  bisacodyl Suppository 10 milliGRAM(s) Rectal daily PRN  chlorhexidine 2% Cloths 1 Application(s) Topical <User Schedule>  insulin lispro (ADMELOG) corrective regimen sliding scale   SubCutaneous every 6 hours  pantoprazole   Suspension 40 milliGRAM(s) Oral daily  piperacillin/tazobactam IVPB.. 3.375 Gram(s) IV Intermittent every 8 hours    Drug Dosing Weight  Height (cm): 175.3 (20 Dec 2021 02:21)  Weight (kg): 72.5 (20 Dec 2021 06:30)  BMI (kg/m2): 23.6 (20 Dec 2021 06:30)  BSA (m2): 1.88 (20 Dec 2021 06:30)    CENTRAL LINE: [ ] YES [ ] NO  LOCATION:   DATE INSERTED:  REMOVE: [ ] YES [ ] NO  EXPLAIN:    LISET: [ ] YES [ ] NO    DATE INSERTED:  REMOVE:  [ ] YES [ ] NO  EXPLAIN:    A-LINE:  [ ] YES [ ] NO  LOCATION:   DATE INSERTED:  REMOVE:  [ ] YES [ ] NO  EXPLAIN:    [facility] -reviewed admission note, no change since admission            01-04 @ 07:01  -  01-05 @ 07:00  --------------------------------------------------------  IN: 1102.5 mL / OUT: 1310 mL / NET: -207.5 mL        Mode: AC/ CMV (Assist Control/ Continuous Mandatory Ventilation)  RR (machine): 12  TV (machine): 450  FiO2: 40  PEEP: 5  ITime: 0.9  MAP: 9  PIP: 24      PHYSICAL EXAM:    GENERAL: NAD, well-groomed, well-developed  HEAD:  Atraumatic, Normocephalic  EYES: EOMI, PERRLA, conjunctiva and sclera clear  ENMT: No tonsillar erythema, exudates, or enlargement; Moist mucous membranes, Good dentition, [ ]No lesions  NECK: Supple, normal appearance, No JVD; Normal thyroid; Trachea midline  NERVOUS SYSTEM:  Alert & Oriented X3, Good concentration; Motor Strength 5/5 B/L upper and lower extremities; DTRs 2+ intact and symmetric  CHEST/LUNG: No chest deformity; Normal percussion bilaterally; No rales, rhonchi, wheezing   HEART: Regular rate and rhythm; No murmurs, rubs, or gallops  ABDOMEN: Soft, Nontender, Nondistended;Bowel sounds present  EXTREMITIES:  2+ Peripheral Pulses, No clubbing, cyanosis, or edema  LYMPH: No lymphadenopathy noted  SKIN: No rashes or lesions; Good capillary refill      LABS:  CBC Full  -  ( 05 Jan 2022 04:21 )  WBC Count : 7.49 K/uL  RBC Count : 3.06 M/uL  Hemoglobin : 8.6 g/dL  Hematocrit : 28.0 %  Platelet Count - Automated : 402 K/uL  Mean Cell Volume : 91.5 fl  Mean Cell Hemoglobin : 28.1 pg  Mean Cell Hemoglobin Concentration : 30.7 gm/dL  Auto Neutrophil # : 5.98 K/uL  Auto Lymphocyte # : 0.59 K/uL  Auto Monocyte # : 0.65 K/uL  Auto Eosinophil # : 0.19 K/uL  Auto Basophil # : 0.02 K/uL  Auto Neutrophil % : 79.8 %  Auto Lymphocyte % : 7.9 %  Auto Monocyte % : 8.7 %  Auto Eosinophil % : 2.5 %  Auto Basophil % : 0.3 %    01-05    145  |  112<H>  |  19<H>  ----------------------------<  169<H>  3.8   |  31  |  0.57    Ca    10.0      05 Jan 2022 04:21  Phos  2.0     01-05  Mg     2.2     01-05    TPro  5.9<L>  /  Alb  1.6<L>  /  TBili  0.3  /  DBili  x   /  AST  21  /  ALT  9<L>  /  AlkPhos  86  01-05    PT/INR - ( 05 Jan 2022 04:21 )   PT: 12.6 sec;   INR: 1.06 ratio         PTT - ( 05 Jan 2022 04:21 )  PTT:29.6 sec        RADIOLOGY & ADDITIONAL STUDIES REVIEWED:  ***    [ ]GOALS OF CARE DISCUSSION WITH PATIENT/FAMILY/PROXY:    CRITICAL CARE TIME SPENT: 35 minutes INTERVAL HPI/OVERNIGHT EVENTS: Pending OR today for trach/PEG. Exposed to roomate who converted to COVID +. Vent settings remain the same 12| 450| 40%| 5. O2sat 100%. WIll obtain CXR and ABG post OP.    PRESSORS: [ ] YES [x] NO    Antimicrobial:  piperacillin/tazobactam IVPB.. 3.375 Gram(s) IV Intermittent every 8 hours    Cardiovascular:    Pulmonary:  ALBUTerol    90 MICROgram(s) HFA Inhaler 2 Puff(s) Inhalation every 6 hours    Hematologic:    Other:  acetaminophen    Suspension .. 650 milliGRAM(s) Oral every 6 hours PRN  bisacodyl Suppository 10 milliGRAM(s) Rectal daily PRN  chlorhexidine 2% Cloths 1 Application(s) Topical <User Schedule>  insulin lispro (ADMELOG) corrective regimen sliding scale   SubCutaneous every 6 hours  pantoprazole   Suspension 40 milliGRAM(s) Oral daily    acetaminophen    Suspension .. 650 milliGRAM(s) Oral every 6 hours PRN  ALBUTerol    90 MICROgram(s) HFA Inhaler 2 Puff(s) Inhalation every 6 hours  bisacodyl Suppository 10 milliGRAM(s) Rectal daily PRN  chlorhexidine 2% Cloths 1 Application(s) Topical <User Schedule>  insulin lispro (ADMELOG) corrective regimen sliding scale   SubCutaneous every 6 hours  pantoprazole   Suspension 40 milliGRAM(s) Oral daily  piperacillin/tazobactam IVPB.. 3.375 Gram(s) IV Intermittent every 8 hours    Drug Dosing Weight  Height (cm): 175.3 (20 Dec 2021 02:21)  Weight (kg): 72.5 (20 Dec 2021 06:30)  BMI (kg/m2): 23.6 (20 Dec 2021 06:30)  BSA (m2): 1.88 (20 Dec 2021 06:30)    CENTRAL LINE: [ ] YES [ ] NO  LOCATION:   DATE INSERTED:  REMOVE: [ ] YES [ ] NO  EXPLAIN:    HUTCHINS: [ ] YES [ ] NO    DATE INSERTED:  REMOVE:  [ ] YES [ ] NO  EXPLAIN:    A-LINE:  [ ] YES [ ] NO  LOCATION:   DATE INSERTED:  REMOVE:  [ ] YES [ ] NO  EXPLAIN:    [facility] -reviewed admission note, no change since admission    01-04 @ 07:01  -  01-05 @ 07:00  --------------------------------------------------------  IN: 1102.5 mL / OUT: 1310 mL / NET: -207.5 mL    Mode: AC/ CMV (Assist Control/ Continuous Mandatory Ventilation)  RR (machine): 12  TV (machine): 450  FiO2: 40  PEEP: 5  ITime: 0.9  MAP: 9  PIP: 24      PHYSICAL EXAM:  GENERAL: NAD, well-groomed, well-developed intubated   HEAD:  Atraumatic, Normocephalic  EYES: EOMI, PERRLA, conjunctiva and sclera clear  ENMT: intubated  NECK: Supple, normal appearance, No JVD; Normal thyroid; Trachea midline  NERVOUS SYSTEM:  sedated   CHEST/LUNG: No chest deformity; Normal percussion bilaterally; No rales, rhonchi, wheezing  + left chest tube to suction  HEART: Regular rate and rhythm; No murmurs, rubs, or gallops  ABDOMEN: Soft, Nontender, Nondistended; Bowel sounds present  EXTREMITIES:  2+ Peripheral Pulses, No clubbing, cyanosis, or edema  LYMPH: No lymphadenopathy noted  SKIN: No rashes or lesions; Good capillary refill    LABS:  CBC Full  -  ( 05 Jan 2022 04:21 )  WBC Count : 7.49 K/uL  RBC Count : 3.06 M/uL  Hemoglobin : 8.6 g/dL  Hematocrit : 28.0 %  Platelet Count - Automated : 402 K/uL  Mean Cell Volume : 91.5 fl  Mean Cell Hemoglobin : 28.1 pg  Mean Cell Hemoglobin Concentration : 30.7 gm/dL  Auto Neutrophil # : 5.98 K/uL  Auto Lymphocyte # : 0.59 K/uL  Auto Monocyte # : 0.65 K/uL  Auto Eosinophil # : 0.19 K/uL  Auto Basophil # : 0.02 K/uL  Auto Neutrophil % : 79.8 %  Auto Lymphocyte % : 7.9 %  Auto Monocyte % : 8.7 %  Auto Eosinophil % : 2.5 %  Auto Basophil % : 0.3 %    01-05    145  |  112<H>  |  19<H>  ----------------------------<  169<H>  3.8   |  31  |  0.57    Ca    10.0      05 Jan 2022 04:21  Phos  2.0     01-05  Mg     2.2     01-05    TPro  5.9<L>  /  Alb  1.6<L>  /  TBili  0.3  /  DBili  x   /  AST  21  /  ALT  9<L>  /  AlkPhos  86  01-05    PT/INR - ( 05 Jan 2022 04:21 )   PT: 12.6 sec;   INR: 1.06 ratio      PTT - ( 05 Jan 2022 04:21 )  PTT:29.6 sec    RADIOLOGY & ADDITIONAL STUDIES REVIEWED: yes    [ ]GOALS OF CARE DISCUSSION WITH PATIENT/FAMILY/PROXY:    CRITICAL CARE TIME SPENT: 35 minutes

## 2022-01-06 NOTE — BRIEF OPERATIVE NOTE - NSICDXBRIEFPREOP_GEN_ALL_CORE_FT
PRE-OP DIAGNOSIS:  Oral cancer 06-Jan-2022 10:49:27  Tammy Mckeon  Oral cancer 06-Jan-2022 10:49:31  Tammy Mckeon

## 2022-01-06 NOTE — PROGRESS NOTE ADULT - SUBJECTIVE AND OBJECTIVE BOX
ICU visit:  76y Male is under our care for    REVIEW OF SYSTEMS:  [  ] Not able to elicit  General:	  Chest:	  GI:	  :  Skin:	  Musculoskeletal:	  Neuro:	    MEDS:  piperacillin/tazobactam IVPB.. 3.375 Gram(s) IV Intermittent every 8 hours    ALLERGIES: Allergies    No Known Allergies    Intolerances        VITALS:  ICU Vital Signs Last 24 Hrs  T(C): 37 (06 Jan 2022 08:00), Max: 37.3 (06 Jan 2022 00:00)  T(F): 98.6 (06 Jan 2022 08:00), Max: 99.1 (06 Jan 2022 00:00)  HR: 96 (06 Jan 2022 10:40) (78 - 103)  BP: 123/55 (06 Jan 2022 07:00) (116/55 - 169/74)  BP(mean): 71 (06 Jan 2022 07:00) (67 - 119)  ABP: --  ABP(mean): --  RR: 20 (06 Jan 2022 08:00) (12 - 31)  SpO2: 100% (06 Jan 2022 10:40) (98% - 100%)      PHYSICAL EXAM:  HEENT:  Neck:  Respiratory:  Cardiovascular:  Gastrointestinal:  Extremities:  Skin:  Ortho:  Neuro:    LABS/DIAGNOSTIC TESTS:                        9.2    8.22  )-----------( 443      ( 06 Jan 2022 03:50 )             29.4     WBC Count: 8.22 K/uL (01-06 @ 03:50)  WBC Count: 7.49 K/uL (01-05 @ 04:21)  WBC Count: 8.83 K/uL (01-04 @ 04:03)  WBC Count: 8.21 K/uL (01-03 @ 03:24)  WBC Count: 6.96 K/uL (01-02 @ 04:21)    01-06    144  |  111<H>  |  15  ----------------------------<  151<H>  4.0   |  28  |  0.62    Ca    10.7<H>      06 Jan 2022 03:50  Phos  2.2     01-06  Mg     2.1     01-06    TPro  6.5  /  Alb  1.8<L>  /  TBili  0.5  /  DBili  x   /  AST  21  /  ALT  8<L>  /  AlkPhos  88  01-06          CULTURES:   .Sputum Sputum  12-29 @ 18:28   Normal Respiratory Karla present  --    Numerous polymorphonuclear leukocytes per low power field  No squamous epithelial cells per low power field  Few Gram positive cocci in pairs per oil power field      Clean Catch Clean Catch (Midstream)  12-21 @ 04:42   No growth  --  --      .Sputum Sputum  11-08 @ 10:53   Few Mycobacterium avium complex  --  --      .Sputum Sputum  11-06 @ 19:34   No acid fast bacilli isolated after 6 weeks.  --  --      .Sputum Sputum  11-05 @ 23:15   Growth of acid fast bacilli detected in broth,  Mycobacterium avium complex by Dna Probe  --  --      .Blood Blood  11-04 @ 11:23   No Growth Final  --  --      .Blood Blood  11-04 @ 10:50   No Growth Final  --  --      Clean Catch Clean Catch (Midstream)  11-03 @ 17:00   <10,000 CFU/mL Normal Urogenital Karla  --  --        RADIOLOGY:  no new studies ICU visit:  76y Male is under our care for pneumonia.  He was seen in the ICU laying comfortably in bed with no acute distress.  Patient is s/p trach today, remains afebrile and WBC count is WNL.    REVIEW OF SYSTEMS:  [ x ] Not able to elicit      MEDS:  piperacillin/tazobactam IVPB.. 3.375 Gram(s) IV Intermittent every 8 hours    ALLERGIES: Allergies    No Known Allergies    Intolerances        VITALS:  ICU Vital Signs Last 24 Hrs  T(C): 37 (06 Jan 2022 08:00), Max: 37.3 (06 Jan 2022 00:00)  T(F): 98.6 (06 Jan 2022 08:00), Max: 99.1 (06 Jan 2022 00:00)  HR: 96 (06 Jan 2022 10:40) (78 - 103)  BP: 123/55 (06 Jan 2022 07:00) (116/55 - 169/74)  BP(mean): 71 (06 Jan 2022 07:00) (67 - 119)  ABP: --  ABP(mean): --  RR: 20 (06 Jan 2022 08:00) (12 - 31)  SpO2: 100% (06 Jan 2022 10:40) (98% - 100%)      PHYSICAL EXAM:  HEENT: trach +  Neck: supple no LN's   Respiratory: scattered bilateral rhonchi  Cardiovascular: S1 S2 reg no murmurs  Gastrointestinal: +BS with soft, nondistended abdomen; nontender  Extremities: no edema  Skin: no rashes  Ortho: n/a  Neuro: Awake and alert    LABS/DIAGNOSTIC TESTS:                        9.2    8.22  )-----------( 443      ( 06 Jan 2022 03:50 )             29.4     WBC Count: 8.22 K/uL (01-06 @ 03:50)  WBC Count: 7.49 K/uL (01-05 @ 04:21)  WBC Count: 8.83 K/uL (01-04 @ 04:03)  WBC Count: 8.21 K/uL (01-03 @ 03:24)  WBC Count: 6.96 K/uL (01-02 @ 04:21)    01-06    144  |  111<H>  |  15  ----------------------------<  151<H>  4.0   |  28  |  0.62    Ca    10.7<H>      06 Jan 2022 03:50  Phos  2.2     01-06  Mg     2.1     01-06    TPro  6.5  /  Alb  1.8<L>  /  TBili  0.5  /  DBili  x   /  AST  21  /  ALT  8<L>  /  AlkPhos  88  01-06          CULTURES:   .Sputum Sputum  12-29 @ 18:28   Normal Respiratory Karla present  --    Numerous polymorphonuclear leukocytes per low power field  No squamous epithelial cells per low power field  Few Gram positive cocci in pairs per oil power field      Clean Catch Clean Catch (Midstream)  12-21 @ 04:42   No growth  --  --      .Sputum Sputum  11-08 @ 10:53   Few Mycobacterium avium complex  --  --      .Sputum Sputum  11-06 @ 19:34   No acid fast bacilli isolated after 6 weeks.  --  --      .Sputum Sputum  11-05 @ 23:15   Growth of acid fast bacilli detected in broth,  Mycobacterium avium complex by Dna Probe  --  --      .Blood Blood  11-04 @ 11:23   No Growth Final  --  --      .Blood Blood  11-04 @ 10:50   No Growth Final  --  --      Clean Catch Clean Catch (Midstream)  11-03 @ 17:00   <10,000 CFU/mL Normal Urogenital Karla  --  --        RADIOLOGY:  no new studies

## 2022-01-06 NOTE — CHART NOTE - NSCHARTNOTEFT_GEN_A_CORE
S/P open tracheostomy POD#0  76y old Male seen and examined at bedside in ICU     Vital Signs Last 24 Hrs  T(F): 98.6 (01-06-22 @ 16:00), Max: 99.1 (01-06-22 @ 00:00)  HR: 86 (01-06-22 @ 16:38)  BP: 135/66 (01-06-22 @ 16:00)  RR: 20 (01-06-22 @ 16:00)  SpO2: 95% (01-06-22 @ 16:38)  POCT Blood Glucose.: 153 mg/dL (06 Jan 2022 17:10)    GENERAL: Alert  CHEST/LUNG: Tracheostomy site with Surgicel in place, some dried blood noted around the tracheostomy, no active bleeding noted. Trach to vent.   HEART: S1S2, Regular rate and rhythm      76 year old male s/p open tracheostomy POD#0    - will need GI consult for PEG placement at bedside in ICU   - vent setting per ICU   - will remove surgicel in AM   - continue care per ICU

## 2022-01-06 NOTE — PROGRESS NOTE ADULT - ASSESSMENT
Pneumonia - likely aspiration   Leukocytosis - resolved  Fevers - improving    Plan - Cont Zosyn 3.375 gms iv q8hrs D#9 for a total of 10 days  s/p trach today    Time spent : 32 minutes                       Pneumonia - likely aspiration   Leukocytosis - resolved  Fevers - improving    Plan - Cont Zosyn 3.375 gms iv q8hrs D#9 for a total of 10 days  s/p trach today    Time spent : 32 minutes    I agree with above

## 2022-01-06 NOTE — PROGRESS NOTE ADULT - SUBJECTIVE AND OBJECTIVE BOX
76 year old male with metastatic ca, DNR s/p partial  glossectomy, s/p cardia arrest now in respiratory failure.  VSS on Vent

## 2022-01-06 NOTE — PROGRESS NOTE ADULT - ASSESSMENT
76 year old male with metastatic ca, DNR s/p partial  glossectomy, s/p cardia arrest now in respiratory failure. Pt for Tracheostomy and PEG placement today. The risks, benefits and alternatives of the procedure were explained to the patient's son including but not limited to bleeding, infection, tracheal stenosis, TE fistula, TI fistula and loss of airway for the tracheostomy and Bleeding, infection, damage or perforation of the stomach or esophagus, damage of intraabdominal organs and dislodgment of the tube for the PEG.  All of the patient's son's questions were answered, he demonstrated understanding and freely consented to the procedure.

## 2022-01-06 NOTE — PROGRESS NOTE ADULT - NUTRITIONAL ASSESSMENT
This patient has been assessed with a concern for Malnutrition and has been determined to have a diagnosis/diagnoses of Severe protein-calorie malnutrition.    This patient is being managed with:   Diet NPO after Midnight-     NPO Start Date: 05-Jan-2022   NPO Start Time: 23:59  Entered: Jan 5 2022  3:22PM    Diet NPO with Tube Feed-  Tube Feeding Modality: Nasogastric  Glucerna 1.5 Dhruv  Total Volume for 24 Hours (mL): 960  Continuous  Starting Tube Feed Rate {mL per Hour}: 10  Increase Tube Feed Rate by (mL): 10     Every 4 hours  Until Goal Tube Feed Rate (mL per Hour): 40  Tube Feed Duration (in Hours): 24  Tube Feed Start Time: 10:00  No Carb Prosource (1pkg = 15gms Protein)     Qty per Day:  2  No Carb Prosource TF     Qty per Day:  2  Entered: Neftali  3 2022  9:11PM

## 2022-01-06 NOTE — PROGRESS NOTE ADULT - SUBJECTIVE AND OBJECTIVE BOX
INTERVAL HPI/OVERNIGHT EVENTS: ***    PRESSORS: [ ] YES [ ] NO    Antimicrobial:  piperacillin/tazobactam IVPB.. 3.375 Gram(s) IV Intermittent every 8 hours    Cardiovascular:    Pulmonary:  ALBUTerol    90 MICROgram(s) HFA Inhaler 2 Puff(s) Inhalation every 6 hours    Hematologic:    Other:  acetaminophen    Suspension .. 650 milliGRAM(s) Oral every 6 hours PRN  bisacodyl Suppository 10 milliGRAM(s) Rectal daily PRN  chlorhexidine 2% Cloths 1 Application(s) Topical <User Schedule>  insulin lispro (ADMELOG) corrective regimen sliding scale   SubCutaneous every 6 hours  pantoprazole   Suspension 40 milliGRAM(s) Oral daily    acetaminophen    Suspension .. 650 milliGRAM(s) Oral every 6 hours PRN  ALBUTerol    90 MICROgram(s) HFA Inhaler 2 Puff(s) Inhalation every 6 hours  bisacodyl Suppository 10 milliGRAM(s) Rectal daily PRN  chlorhexidine 2% Cloths 1 Application(s) Topical <User Schedule>  insulin lispro (ADMELOG) corrective regimen sliding scale   SubCutaneous every 6 hours  pantoprazole   Suspension 40 milliGRAM(s) Oral daily  piperacillin/tazobactam IVPB.. 3.375 Gram(s) IV Intermittent every 8 hours    Drug Dosing Weight  Height (cm): 175.3 (20 Dec 2021 02:21)  Weight (kg): 72.5 (20 Dec 2021 06:30)  BMI (kg/m2): 23.6 (20 Dec 2021 06:30)  BSA (m2): 1.88 (20 Dec 2021 06:30)    CENTRAL LINE: [ ] YES [ ] NO  LOCATION:   DATE INSERTED:  REMOVE: [ ] YES [ ] NO  EXPLAIN:    LISET: [ ] YES [ ] NO    DATE INSERTED:  REMOVE:  [ ] YES [ ] NO  EXPLAIN:    A-LINE:  [ ] YES [ ] NO  LOCATION:   DATE INSERTED:  REMOVE:  [ ] YES [ ] NO  EXPLAIN:    [facility] -reviewed admission note, no change since admission            01-05 @ 07:01  -  01-06 @ 07:00  --------------------------------------------------------  IN: 512.5 mL / OUT: 1550 mL / NET: -1037.5 mL        Mode: AC/ CMV (Assist Control/ Continuous Mandatory Ventilation)  RR (machine): 12  TV (machine): 450  FiO2: 40  PEEP: 5  ITime: 0.9  MAP: 10  PIP: 22      PHYSICAL EXAM:    GENERAL: NAD, well-groomed, well-developed  HEAD:  Atraumatic, Normocephalic  EYES: EOMI, PERRLA, conjunctiva and sclera clear  ENMT: No tonsillar erythema, exudates, or enlargement; Moist mucous membranes, Good dentition, [ ]No lesions  NECK: Supple, normal appearance, No JVD; Normal thyroid; Trachea midline  NERVOUS SYSTEM:  Alert & Oriented X3, Good concentration; Motor Strength 5/5 B/L upper and lower extremities; DTRs 2+ intact and symmetric  CHEST/LUNG: No chest deformity; Normal percussion bilaterally; No rales, rhonchi, wheezing   HEART: Regular rate and rhythm; No murmurs, rubs, or gallops  ABDOMEN: Soft, Nontender, Nondistended;Bowel sounds present  EXTREMITIES:  2+ Peripheral Pulses, No clubbing, cyanosis, or edema  LYMPH: No lymphadenopathy noted  SKIN: No rashes or lesions; Good capillary refill      LABS:  CBC Full  -  ( 06 Jan 2022 03:50 )  WBC Count : 8.22 K/uL  RBC Count : 3.24 M/uL  Hemoglobin : 9.2 g/dL  Hematocrit : 29.4 %  Platelet Count - Automated : 443 K/uL  Mean Cell Volume : 90.7 fl  Mean Cell Hemoglobin : 28.4 pg  Mean Cell Hemoglobin Concentration : 31.3 gm/dL  Auto Neutrophil # : 6.62 K/uL  Auto Lymphocyte # : 0.65 K/uL  Auto Monocyte # : 0.67 K/uL  Auto Eosinophil # : 0.17 K/uL  Auto Basophil # : 0.04 K/uL  Auto Neutrophil % : 80.4 %  Auto Lymphocyte % : 7.9 %  Auto Monocyte % : 8.2 %  Auto Eosinophil % : 2.1 %  Auto Basophil % : 0.5 %    01-06    144  |  111<H>  |  15  ----------------------------<  151<H>  4.0   |  28  |  0.62    Ca    10.7<H>      06 Jan 2022 03:50  Phos  2.2     01-06  Mg     2.1     01-06    TPro  6.5  /  Alb  1.8<L>  /  TBili  0.5  /  DBili  x   /  AST  21  /  ALT  8<L>  /  AlkPhos  88  01-06    PT/INR - ( 06 Jan 2022 03:50 )   PT: 12.4 sec;   INR: 1.04 ratio         PTT - ( 06 Jan 2022 03:50 )  PTT:31.1 sec        RADIOLOGY & ADDITIONAL STUDIES REVIEWED:  ***    [ ]GOALS OF CARE DISCUSSION WITH PATIENT/FAMILY/PROXY:    CRITICAL CARE TIME SPENT: 35 minutes INTERVAL HPI/OVERNIGHT EVENTS: POD 0. post trach. Pending PEG tmw, will do at bedside. Continue zosyn till 1/7.    PRESSORS: [ ] YES [x ] NO    Antimicrobial:  piperacillin/tazobactam IVPB.. 3.375 Gram(s) IV Intermittent every 8 hours    Cardiovascular:    Pulmonary:  ALBUTerol    90 MICROgram(s) HFA Inhaler 2 Puff(s) Inhalation every 6 hours    Hematologic:    Other:  acetaminophen    Suspension .. 650 milliGRAM(s) Oral every 6 hours PRN  bisacodyl Suppository 10 milliGRAM(s) Rectal daily PRN  chlorhexidine 2% Cloths 1 Application(s) Topical <User Schedule>  insulin lispro (ADMELOG) corrective regimen sliding scale   SubCutaneous every 6 hours  pantoprazole   Suspension 40 milliGRAM(s) Oral daily    acetaminophen    Suspension .. 650 milliGRAM(s) Oral every 6 hours PRN  ALBUTerol    90 MICROgram(s) HFA Inhaler 2 Puff(s) Inhalation every 6 hours  bisacodyl Suppository 10 milliGRAM(s) Rectal daily PRN  chlorhexidine 2% Cloths 1 Application(s) Topical <User Schedule>  insulin lispro (ADMELOG) corrective regimen sliding scale   SubCutaneous every 6 hours  pantoprazole   Suspension 40 milliGRAM(s) Oral daily  piperacillin/tazobactam IVPB.. 3.375 Gram(s) IV Intermittent every 8 hours    Drug Dosing Weight  Height (cm): 175.3 (20 Dec 2021 02:21)  Weight (kg): 72.5 (20 Dec 2021 06:30)  BMI (kg/m2): 23.6 (20 Dec 2021 06:30)  BSA (m2): 1.88 (20 Dec 2021 06:30)    CENTRAL LINE: [ ] YES [x ] NO  LOCATION:   DATE INSERTED:  REMOVE: [ ] YES [ ] NO  EXPLAIN:    HUTCHINS: [ x] YES [ ] NO    DATE INSERTED:12/20  REMOVE:  [ ] YES [ ] NO  EXPLAIN:    L CHEST TUBE : 12/20    A-LINE:  [ ] YES [ x] NO  LOCATION:   DATE INSERTED:  REMOVE:  [ ] YES [ ] NO  EXPLAIN:    [facility] -reviewed admission note, no change since admission    01-05 @ 07:01  -  01-06 @ 07:00  --------------------------------------------------------  IN: 512.5 mL / OUT: 1550 mL / NET: -1037.5 mL    Mode: AC/ CMV (Assist Control/ Continuous Mandatory Ventilation)  RR (machine): 12  TV (machine): 450  FiO2: 40  PEEP: 5  ITime: 0.9  MAP: 10  PIP: 22      PHYSICAL EXAM:  GENERAL: NAD, well-groomed, well-developed post trach  HEAD:  Atraumatic, Normocephalic  EYES: EOMI, PERRL, conjunctiva and sclera clear  ENMT: tracheostomy - vented  NECK: Supple, normal appearance, No JVD; Normal thyroid; Trachea midline  NERVOUS SYSTEM:  sedated   CHEST/LUNG: No chest deformity; Normal percussion bilaterally; No rales, rhonchi, wheezing  + left chest tube to suction  HEART: Regular rate and rhythm; No murmurs, rubs, or gallops  ABDOMEN: Soft, Nontender, Nondistended; Bowel sounds present  EXTREMITIES:  2+ Peripheral Pulses, No clubbing, cyanosis, or edema  LYMPH: No lymphadenopathy noted  SKIN: No rashes or lesions; Good capillary refill    LABS:  CBC Full  -  ( 06 Jan 2022 03:50 )  WBC Count : 8.22 K/uL  RBC Count : 3.24 M/uL  Hemoglobin : 9.2 g/dL  Hematocrit : 29.4 %  Platelet Count - Automated : 443 K/uL  Mean Cell Volume : 90.7 fl  Mean Cell Hemoglobin : 28.4 pg  Mean Cell Hemoglobin Concentration : 31.3 gm/dL  Auto Neutrophil # : 6.62 K/uL  Auto Lymphocyte # : 0.65 K/uL  Auto Monocyte # : 0.67 K/uL  Auto Eosinophil # : 0.17 K/uL  Auto Basophil # : 0.04 K/uL  Auto Neutrophil % : 80.4 %  Auto Lymphocyte % : 7.9 %  Auto Monocyte % : 8.2 %  Auto Eosinophil % : 2.1 %  Auto Basophil % : 0.5 %    01-06    144  |  111<H>  |  15  ----------------------------<  151<H>  4.0   |  28  |  0.62    Ca    10.7<H>      06 Jan 2022 03:50  Phos  2.2     01-06  Mg     2.1     01-06    TPro  6.5  /  Alb  1.8<L>  /  TBili  0.5  /  DBili  x   /  AST  21  /  ALT  8<L>  /  AlkPhos  88  01-06    PT/INR - ( 06 Jan 2022 03:50 )   PT: 12.4 sec;   INR: 1.04 ratio    PTT - ( 06 Jan 2022 03:50 )  PTT:31.1 sec      RADIOLOGY & ADDITIONAL STUDIES REVIEWED:      < from: Xray Chest 1 View- PORTABLE-Urgent (Xray Chest 1 View- PORTABLE-Urgent .) (01.04.22 @ 10:13) >    ACC: [id number] EXAM:  XR CHEST PORTABLE URGENT 1V                          PROCEDURE DATE:  01/04/2022          INTERPRETATION:  HISTORY: Left pneumothorax    TECHNIQUE: A single AP view of the chest was obtained.    COMPARISON: 1/3/2022    FINDINGS:  The cardiac silhouette is normal in size. There is an   endotracheal tube the tip above the roberto. There is a nasogastric tube   with its tip below the level of this film. There is a left-sided pigtail   catheter in place. There are patchy bilateral airspace opacities, not   significantly changed. No pneumothorax is seen.    IMPRESSION: Patchy airspace opacities, unchanged. No pneumothorax is seen.    --- End of Report ---      TERRI INGRAM MD; Attending Radiologist  This document hasbeen electronically signed. Jan 4 2022  8:03PM    < end of copied text >      [ ]GOALS OF CARE DISCUSSION WITH PATIENT/FAMILY/PROXY:    CRITICAL CARE TIME SPENT: 35 minutes

## 2022-01-06 NOTE — PROGRESS NOTE ADULT - ASSESSMENT
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 Patient had cardiac arrest in ED , was intubated CPR was initiated and subsequently ROSC was achieved after 20 minutes. Patient is admitted to ICU.    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  - CAD s/p stent  - Diabetes    Plan  ====Neuro====  #baseline mentation: AAOx3  #intubated  - Extubated on 12/27  - re-intubated on 12/28  - OFF propofol and pressors  - PRN fent pushes  - start standing pain meds post surgery    ====CVS====  #Cardiac arrest   12/20 on arrival to ED  - ROSC after 20min  - trop trended down  - started norepi 12/28 post intubation, tapered OFF  - s/p midodrine 5mg TID OFF    ====Pulm====  #Acute hypoxic respiratory failure  - has Hx of chronic PTX on LEFT  - likely 2/2 PTX vs aspiration PNA  - SBT (12/21, 22, 24)  - extubated on 12/22  - re-intubated on 12/23  - s/p solumedrol  - s/p cefepime 2g q12 (12/20-12/28)  - extubated again on 12/27; re-intubated on 12/28  - CXR w/ improvement, stable chronic LEFT PTX  - thoracic surgery consulted for trach/PEG  - plan for CT - with IV contrast shows interval worsening  - on zosyn Abx for asp PNA coverage    #PTX (acute/on/chronic)  - s/p pigtail in ED 12/20  - chest tube to water seal- serosanginous exudate  - CXR interval improvement  - plan for re-positioning of pigtail during trach ?    ====GI====  #no active issues  #diet: NPO w/ tube feeding + prosource  s/p free water 350 q 6     #bowel regimen: none    ====nephro====  #hypernatremia  serum Na 153 >>> improving 146 > 143 > 145  - s/p free water 250 q 4    ====ID====  #fever  started 12/29 after intubation  wbc trending back down  - sputum culture no bacterial growth   - started Zosyn 12/29    #leukocytosis  - likely 2/2 aspiration PNA vs reactive process  - Ucx neg  - Scx MAC  - s/p cefepime 2g q 12 (12/20-12/28)  - started Zosyn 12/29    ====Heme/onc====  #Metastatic SCC of tongue base  - s/p resection, chemo/radiation, and immunotherapy (10/2021)  - plan for experimental drug as outpatient.  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 Patient had cardiac arrest in ED , was intubated CPR was initiated and subsequently ROSC was achieved after 20 minutes. Patient is admitted to ICU.    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  - CAD s/p stent  - Diabetes    Plan  ====Neuro====  #baseline mentation: AAOx3  #intubated  - Extubated on 12/27  - re-intubated on 12/28  - OFF propofol and pressors  - PRN fent pushes post surgery    ====CVS====  #Cardiac arrest   12/20 on arrival to ED  - ROSC after 20min  - trop trended down  - started norepi 12/28 post intubation, tapered OFF  - s/p midodrine OFF    ====Pulm====  #Acute hypoxic respiratory failure  - has Hx of chronic PTX on LEFT  - likely 2/2 PTX vs aspiration PNA  - SBT (12/21, 22, 24)  - extubated on 12/22  - re-intubated on 12/23  - s/p solumedrol  - s/p cefepime 2g q12 (12/20-12/28)  - extubated again on 12/27; re-intubated on 12/28  - CXR w/ improvement, stable chronic LEFT PTX  - thoracic surgery consulted for trach/PEG  - plan for CT - with IV contrast shows interval worsening  - on zosyn Abx till 1/7 for asp PNA coverage  - post OP CXR and ABG    #PTX (acute/on/chronic)  - s/p pigtail in ED 12/20  - chest tube to water seal- serosanginous exudate  - CXR interval improvement  - plan for re-positioning of pigtail during trach ?    ====GI====  #no active issues  #diet: NPO w/ tube feeding + prosource  IVF D5 0.45% at 60 while NPO    #bowel regimen: none    ====nephro====  #hypernatremia  serum Na 153 >>> improving 146 > 143 > 145  - s/p free water 250 q 4    ====ID====  #fever  started 12/29 after intubation  wbc trending back down  - sputum culture no bacterial growth   - started Zosyn 12/29 - 1/7    #leukocytosis  - likely 2/2 aspiration PNA vs reactive process  - Ucx neg  - Scx MAC  - s/p cefepime 2g q 12 (12/20-12/28)  - started Zosyn 12/29 - 1/7    ====Heme/onc====  #Metastatic SCC of tongue base  - s/p resection, chemo/radiation, and immunotherapy (10/2021)  - plan for experimental drug as outpatient.  - heme/onc as o/p   - palliative chemo, post trach/PEG will NOT be candidate for chemo  - palliative on board, discussion to be had today regarding trach PEG  - PEG for 1/7    ====Endo====  #DM  - a1c 6.3  - c/w sliding scale    ====Ppx====  #DVT: Lovenox HOLDing pre op  #GI: PPI

## 2022-01-07 NOTE — PROGRESS NOTE ADULT - ASSESSMENT
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 Patient had cardiac arrest in ED , was intubated CPR was initiated and subsequently ROSC was achieved after 20 minutes. Patient is admitted to ICU.    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  - CAD s/p stent  - Diabetes    Plan  ====Neuro====  #baseline mentation: AAOx3  #intubated  - Extubated on 12/27  - re-intubated on 12/28  - OFF propofol and pressors  - PRN fent pushes post surgery    ====CVS====  #Cardiac arrest   12/20 on arrival to ED  - ROSC after 20min  - trop trended down  - started norepi 12/28 post intubation, tapered OFF  - s/p midodrine OFF    ====Pulm====  #Acute hypoxic respiratory failure  - has Hx of chronic PTX on LEFT  - likely 2/2 PTX vs aspiration PNA  - SBT (12/21, 22, 24)  - extubated on 12/22  - re-intubated on 12/23  - s/p solumedrol  - s/p cefepime 2g q12 (12/20-12/28)  - extubated again on 12/27; re-intubated on 12/28  - CXR w/ improvement, stable chronic LEFT PTX  - thoracic surgery consulted for trach/PEG  - plan for CT - with IV contrast shows interval worsening  - on zosyn Abx till 1/7 for asp PNA coverage  - post OP CXR and ABG    #PTX (acute/on/chronic)  - s/p pigtail in ED 12/20  - chest tube to water seal- serosanginous exudate  - CXR interval improvement  - plan for re-positioning of pigtail during trach ?    ====GI====  #no active issues  #diet: NPO w/ tube feeding + prosource  IVF D5 0.45% at 60 while NPO    #bowel regimen: none    ====nephro====  #hypernatremia  serum Na 153 >>> improving 146 > 143 > 145  - s/p free water 250 q 4    ====ID====  #fever  started 12/29 after intubation  wbc trending back down  - sputum culture no bacterial growth   - started Zosyn 12/29 - 1/7    #leukocytosis  - likely 2/2 aspiration PNA vs reactive process  - Ucx neg  - Scx MAC  - s/p cefepime 2g q 12 (12/20-12/28)  - started Zosyn 12/29 - 1/7    ====Heme/onc====  #Metastatic SCC of tongue base  - s/p resection, chemo/radiation, and immunotherapy (10/2021)  - plan for experimental drug as outpatient.  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 Patient had cardiac arrest in ED , was intubated CPR was initiated and subsequently ROSC was achieved after 20 minutes. Patient is admitted to ICU.    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  - CAD s/p stent  - Diabetes    Plan  ====Neuro====  #baseline mentation: AAOx3  #intubated  - Extubated on 12/27  - re-intubated on 12/28  - OFF propofol and pressors  - PRN fent pushes post surgery    ====CVS====  #Cardiac arrest   12/20 on arrival to ED  - ROSC after 20min  - trop trended down  - started norepi 12/28 post intubation, tapered OFF  - s/p midodrine OFF    ====Pulm====  #Acute hypoxic respiratory failure  - has Hx of chronic PTX on LEFT  - likely 2/2 PTX vs aspiration PNA  - SBT (12/21, 22, 24)  - extubated on 12/22  - re-intubated on 12/23  - s/p solumedrol  - s/p cefepime 2g q12 (12/20-12/28)  - extubated again on 12/27; re-intubated on 12/28  - CXR w/ improvement, stable chronic LEFT PTX  - thoracic surgery consulted for trach/PEG  - plan for CT - with IV contrast shows interval worsening  - on zosyn Abx till 1/7 for asp PNA coverage  - post OP CXR and ABG    #PTX (acute/on/chronic)  - s/p pigtail in ED 12/20  - chest tube to water seal- serosanginous exudate  - CXR interval improvement  - plan for re-positioning of pigtail during trach ?  - chest tube to water seal, plan to DC 1/8 after confirmation XR    ====GI====  #no active issues  #diet: NPO w/ tube feeding + prosource  IVF D5 0.45% at 60 while NPO  resume post PEG on 1/8    #bowel regimen: none    ====nephro====  #hypernatremia  serum Na 153 >>> improving 146 > 143 > 145  - s/p free water 250 q 4  - continue D5 1/2 NS till 1/7 PM    ====ID====  #fever  started 12/29 after intubation  wbc trending back down  - sputum culture no bacterial growth   - started Zosyn 12/29 - 1/7    #leukocytosis  - likely 2/2 aspiration PNA vs reactive process  - Ucx neg  - Scx MAC  - s/p cefepime 2g q 12 (12/20-12/28)  - started Zosyn 12/29 - 1/7    ====Heme/onc====  #Metastatic SCC of tongue base  - s/p resection, chemo/radiation, and immunotherapy (10/2021)  - plan for experimental drug as outpatient.  - heme/onc as o/p   - palliative chemo, post trach/PEG will NOT be candidate for chemo  - palliative on board, discussion to be had today regarding trach PEG  - PEG for 1/7    ====Endo====  #DM  - a1c 6.3  - c/w sliding scale    ====Ppx====  #DVT: Lovenox HOLDing pre op  #GI: PPI

## 2022-01-07 NOTE — CHART NOTE - NSCHARTNOTEFT_GEN_A_CORE
Daughter Sandi called and updated regarding fathers condition. Informed that PEG was unable to be placed by GI as no operative window was found. Will consult IR for PEG. All questions answered.

## 2022-01-07 NOTE — PROGRESS NOTE ADULT - SUBJECTIVE AND OBJECTIVE BOX
INTERVAL HPI/OVERNIGHT EVENTS: ***    PRESSORS: [ ] YES [ ] NO    Antimicrobial:  piperacillin/tazobactam IVPB.. 3.375 Gram(s) IV Intermittent every 8 hours    Cardiovascular:    Pulmonary:  ALBUTerol    90 MICROgram(s) HFA Inhaler 2 Puff(s) Inhalation every 6 hours    Hematologic:    Other:  acetaminophen    Suspension .. 650 milliGRAM(s) Oral every 6 hours PRN  bisacodyl Suppository 10 milliGRAM(s) Rectal daily PRN  chlorhexidine 2% Cloths 1 Application(s) Topical <User Schedule>  dextrose 5% + sodium chloride 0.45%. 1000 milliLiter(s) IV Continuous <Continuous>  insulin lispro (ADMELOG) corrective regimen sliding scale   SubCutaneous every 6 hours  pantoprazole  Injectable 40 milliGRAM(s) IV Push daily    acetaminophen    Suspension .. 650 milliGRAM(s) Oral every 6 hours PRN  ALBUTerol    90 MICROgram(s) HFA Inhaler 2 Puff(s) Inhalation every 6 hours  bisacodyl Suppository 10 milliGRAM(s) Rectal daily PRN  chlorhexidine 2% Cloths 1 Application(s) Topical <User Schedule>  dextrose 5% + sodium chloride 0.45%. 1000 milliLiter(s) IV Continuous <Continuous>  insulin lispro (ADMELOG) corrective regimen sliding scale   SubCutaneous every 6 hours  pantoprazole  Injectable 40 milliGRAM(s) IV Push daily  piperacillin/tazobactam IVPB.. 3.375 Gram(s) IV Intermittent every 8 hours    Drug Dosing Weight  Height (cm): 175.3 (20 Dec 2021 02:21)  Weight (kg): 72.5 (20 Dec 2021 06:30)  BMI (kg/m2): 23.6 (20 Dec 2021 06:30)  BSA (m2): 1.88 (20 Dec 2021 06:30)    CENTRAL LINE: [ ] YES [ ] NO  LOCATION:   DATE INSERTED:  REMOVE: [ ] YES [ ] NO  EXPLAIN:    HUTCHINS: [ ] YES [ ] NO    DATE INSERTED:  REMOVE:  [ ] YES [ ] NO  EXPLAIN:    A-LINE:  [ ] YES [ ] NO  LOCATION:   DATE INSERTED:  REMOVE:  [ ] YES [ ] NO  EXPLAIN:    PMH -reviewed admission note, no change since admission      ABG - ( 07 Jan 2022 03:39 )  pH, Arterial: 7.47  pH, Blood: x     /  pCO2: 38    /  pO2: 82    / HCO3: 28    / Base Excess: 3.9   /  SaO2: 98                    01-06 @ 07:01  -  01-07 @ 07:00  --------------------------------------------------------  IN: 1125 mL / OUT: 1180 mL / NET: -55 mL        Mode: AC/ CMV (Assist Control/ Continuous Mandatory Ventilation)  RR (machine): 14  TV (machine): 450  FiO2: 40  PEEP: 5  ITime: 0.9  MAP: 12  PIP: 31      PHYSICAL EXAM:    GENERAL: NAD, well-groomed, well-developed  HEAD:  Atraumatic, Normocephalic  EYES: EOMI, PERRLA, conjunctiva and sclera clear  ENMT: No tonsillar erythema, exudates, or enlargement; Moist mucous membranes, Good dentition, [ ]No lesions  NECK: Supple, normal appearance, No JVD; Normal thyroid; Trachea midline  NERVOUS SYSTEM:  Alert & Oriented X3, Good concentration; Motor Strength 5/5 B/L upper and lower extremities; DTRs 2+ intact and symmetric  CHEST/LUNG: No chest deformity; Normal percussion bilaterally; No rales, rhonchi, wheezing   HEART: Regular rate and rhythm; No murmurs, rubs, or gallops  ABDOMEN: Soft, Nontender, Nondistended;Bowel sounds present  EXTREMITIES:  2+ Peripheral Pulses, No clubbing, cyanosis, or edema  LYMPH: No lymphadenopathy noted  SKIN: No rashes or lesions; Good capillary refill      LABS:  CBC Full  -  ( 07 Jan 2022 04:19 )  WBC Count : 7.69 K/uL  RBC Count : 3.03 M/uL  Hemoglobin : 8.5 g/dL  Hematocrit : 27.8 %  Platelet Count - Automated : 434 K/uL  Mean Cell Volume : 91.7 fl  Mean Cell Hemoglobin : 28.1 pg  Mean Cell Hemoglobin Concentration : 30.6 gm/dL  Auto Neutrophil # : 6.24 K/uL  Auto Lymphocyte # : 0.55 K/uL  Auto Monocyte # : 0.62 K/uL  Auto Eosinophil # : 0.19 K/uL  Auto Basophil # : 0.04 K/uL  Auto Neutrophil % : 81.0 %  Auto Lymphocyte % : 7.2 %  Auto Monocyte % : 8.1 %  Auto Eosinophil % : 2.5 %  Auto Basophil % : 0.5 %    01-07    146<H>  |  113<H>  |  11  ----------------------------<  168<H>  3.5   |  30  |  0.57    Ca    10.9<H>      07 Jan 2022 04:19  Phos  2.2     01-07  Mg     2.2     01-07    TPro  5.8<L>  /  Alb  1.6<L>  /  TBili  0.5  /  DBili  x   /  AST  22  /  ALT  7<L>  /  AlkPhos  77  01-07    PT/INR - ( 06 Jan 2022 03:50 )   PT: 12.4 sec;   INR: 1.04 ratio         PTT - ( 06 Jan 2022 03:50 )  PTT:31.1 sec        RADIOLOGY & ADDITIONAL STUDIES REVIEWED:  ***    [ ]GOALS OF CARE DISCUSSION WITH PATIENT/FAMILY/PROXY:    CRITICAL CARE TIME SPENT: 35 minutes INTERVAL HPI/OVERNIGHT EVENTS: No overnight events. Plan for PEG today at bedside with surgical team. Placing chest tube to water seal. Will repeat CXR in PM then clamp at 6pm.    PRESSORS: [ ] YES [ x] NO    Antimicrobial:  piperacillin/tazobactam IVPB.. 3.375 Gram(s) IV Intermittent every 8 hours    Cardiovascular:    Pulmonary:  ALBUTerol    90 MICROgram(s) HFA Inhaler 2 Puff(s) Inhalation every 6 hours    Hematologic:    Other:  acetaminophen    Suspension .. 650 milliGRAM(s) Oral every 6 hours PRN  bisacodyl Suppository 10 milliGRAM(s) Rectal daily PRN  chlorhexidine 2% Cloths 1 Application(s) Topical <User Schedule>  dextrose 5% + sodium chloride 0.45%. 1000 milliLiter(s) IV Continuous <Continuous>  insulin lispro (ADMELOG) corrective regimen sliding scale   SubCutaneous every 6 hours  pantoprazole  Injectable 40 milliGRAM(s) IV Push daily    acetaminophen    Suspension .. 650 milliGRAM(s) Oral every 6 hours PRN  ALBUTerol    90 MICROgram(s) HFA Inhaler 2 Puff(s) Inhalation every 6 hours  bisacodyl Suppository 10 milliGRAM(s) Rectal daily PRN  chlorhexidine 2% Cloths 1 Application(s) Topical <User Schedule>  dextrose 5% + sodium chloride 0.45%. 1000 milliLiter(s) IV Continuous <Continuous>  insulin lispro (ADMELOG) corrective regimen sliding scale   SubCutaneous every 6 hours  pantoprazole  Injectable 40 milliGRAM(s) IV Push daily  piperacillin/tazobactam IVPB.. 3.375 Gram(s) IV Intermittent every 8 hours    Drug Dosing Weight  Height (cm): 175.3 (20 Dec 2021 02:21)  Weight (kg): 72.5 (20 Dec 2021 06:30)  BMI (kg/m2): 23.6 (20 Dec 2021 06:30)  BSA (m2): 1.88 (20 Dec 2021 06:30)    CENTRAL LINE: [ ] YES [x ] NO  LOCATION:   DATE INSERTED:  REMOVE: [ ] YES [ ] NO  EXPLAIN:    HUTCHINS: [ x] YES [ ] NO    DATE INSERTED:12/20  REMOVE:  [ ] YES [ ] NO  EXPLAIN:    L CHEST TUBE : 12/20    A-LINE:  [ ] YES [ x] NO  LOCATION:   DATE INSERTED:  REMOVE:  [ ] YES [ ] NO  EXPLAIN:    PMH -reviewed admission note, no change since admission      ABG - ( 07 Jan 2022 03:39 )  pH, Arterial: 7.47  pH, Blood: x     /  pCO2: 38    /  pO2: 82    / HCO3: 28    / Base Excess: 3.9   /  SaO2: 98          01-06 @ 07:01  -  01-07 @ 07:00  --------------------------------------------------------  IN: 1125 mL / OUT: 1180 mL / NET: -55 mL      Mode: AC/ CMV (Assist Control/ Continuous Mandatory Ventilation)  RR (machine): 14  TV (machine): 450  FiO2: 40  PEEP: 5  ITime: 0.9  MAP: 12  PIP: 31    PHYSICAL EXAM:  GENERAL: NAD, well-groomed, well-developed post trach  HEAD:  Atraumatic, Normocephalic  EYES: EOMI, PERRL, conjunctiva and sclera clear  ENMT: tracheostomy - vented  NECK: Supple, normal appearance, No JVD; Normal thyroid; Trachea midline  NERVOUS SYSTEM:  sedated   CHEST/LUNG: No chest deformity; Normal percussion bilaterally; No rales, rhonchi, wheezing  + left chest tube to suction  HEART: Regular rate and rhythm; No murmurs, rubs, or gallops  ABDOMEN: Soft, Nontender, Nondistended; Bowel sounds present  EXTREMITIES:  2+ Peripheral Pulses, No clubbing, cyanosis, or edema  LYMPH: No lymphadenopathy noted  SKIN: No rashes or lesions; Good capillary refill    LABS:  CBC Full  -  ( 07 Jan 2022 04:19 )  WBC Count : 7.69 K/uL  RBC Count : 3.03 M/uL  Hemoglobin : 8.5 g/dL  Hematocrit : 27.8 %  Platelet Count - Automated : 434 K/uL  Mean Cell Volume : 91.7 fl  Mean Cell Hemoglobin : 28.1 pg  Mean Cell Hemoglobin Concentration : 30.6 gm/dL  Auto Neutrophil # : 6.24 K/uL  Auto Lymphocyte # : 0.55 K/uL  Auto Monocyte # : 0.62 K/uL  Auto Eosinophil # : 0.19 K/uL  Auto Basophil # : 0.04 K/uL  Auto Neutrophil % : 81.0 %  Auto Lymphocyte % : 7.2 %  Auto Monocyte % : 8.1 %  Auto Eosinophil % : 2.5 %  Auto Basophil % : 0.5 %    01-07    146<H>  |  113<H>  |  11  ----------------------------<  168<H>  3.5   |  30  |  0.57    Ca    10.9<H>      07 Jan 2022 04:19  Phos  2.2     01-07  Mg     2.2     01-07    TPro  5.8<L>  /  Alb  1.6<L>  /  TBili  0.5  /  DBili  x   /  AST  22  /  ALT  7<L>  /  AlkPhos  77  01-07    PT/INR - ( 06 Jan 2022 03:50 )   PT: 12.4 sec;   INR: 1.04 ratio    PTT - ( 06 Jan 2022 03:50 )  PTT:31.1 sec    RADIOLOGY & ADDITIONAL STUDIES REVIEWED:  yes    [ ]GOALS OF CARE DISCUSSION WITH PATIENT/FAMILY/PROXY:    CRITICAL CARE TIME SPENT: 35 minutes

## 2022-01-07 NOTE — PROGRESS NOTE ADULT - ASSESSMENT
Pneumonia - likely aspiration   Leukocytosis - resolved  Fevers - improving    Plan - Can DC Zosyn after 2PM today  s/p trach yesterday, planned for peg today    Time spent : 33 minutes                       Pneumonia - likely aspiration   Leukocytosis - resolved  Fevers - improving    Plan - Can DC Zosyn after 2PM today  s/p trach yesterday, planned for peg today  Reconsult prn    Time spent : 33 minutes                       Pneumonia - likely aspiration   Leukocytosis - resolved  Fevers - improving    Plan - Can DC Zosyn after 2PM today  s/p trach yesterday, planned for peg today  Reconsult prn    Time spent : 33 minutes    I agree with above

## 2022-01-07 NOTE — CONSULT NOTE ADULT - SUBJECTIVE AND OBJECTIVE BOX
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 Patient was intubated on arival to ED, he had a cardiac arrest and achieved ROSC 20 minutes later. History was obtained with son at bed side , he states that patient Squamous cell cancer was metastasized to lungs and he was getting immunotherapy. They noticed patient O2 saturation was low so decided to bring hi to ED,       On arrival to ED patient was intubated, and he lost the pulse. high quality CPR was started , patient achieved Rosc after 7 cycles of CPR. Post intubation CXR showed large left sided pneumothorax. pigtail was placed by ED physician. emergent IO acces was done during the CPR. Patient was started on levophed and propofol for sedation. (20 Dec 2021 03:52)    PMH/PSH:  PAST MEDICAL & SURGICAL HISTORY:  Hypertension    Diabetes    High cholesterol    Primary osteoarthritis of both knees    Coronary artery disease of native artery of native heart with stable angina pectoris    Allergic bronchitis    Diabetic retinopathy    Oral cancer    SCC (squamous cell carcinoma)    Metastatic cancer    Recurrent disease    Edema of extremity    Hyponatremia    Microalbuminuria    Neuralgia    Obstructive sleep apnea, adult    Vitamin D deficiency    S/P knee replacement  b/l    S/P hernia repair  b/l    Status post cataract extraction and insertion of intraocular lens, unspecified laterality  b/l    History of surgery  On 9/10/20 he underwent right hemiglossectomy and partial neck dissection with Dr. Darinel Servin at [facility].      FH:  FAMILY HISTORY:  Family history of acute myocardial infarction (Sibling)        MEDS:  MEDICATIONS  (STANDING):  ALBUTerol    90 MICROgram(s) HFA Inhaler 2 Puff(s) Inhalation every 6 hours  chlorhexidine 2% Cloths 1 Application(s) Topical <User Schedule>  dextrose 5% + sodium chloride 0.45%. 1000 milliLiter(s) (100 mL/Hr) IV Continuous <Continuous>  insulin lispro (ADMELOG) corrective regimen sliding scale   SubCutaneous every 6 hours  pantoprazole  Injectable 40 milliGRAM(s) IV Push daily  piperacillin/tazobactam IVPB.. 3.375 Gram(s) IV Intermittent every 8 hours    MEDICATIONS  (PRN):  acetaminophen    Suspension .. 650 milliGRAM(s) Oral every 6 hours PRN Temp greater or equal to 38C (100.4F)  bisacodyl Suppository 10 milliGRAM(s) Rectal daily PRN Constipation    Allergies    No Known Allergies    Intolerances   Review of System  Limited due to patient is trached  CONSTITUTIONAL:  weakness or fatigue.  HEENT:  Eyes:  No visual loss, blurred vision, double vision or yellow sclerae. Ears, Nose, Throat:  No hearing loss, sneezing, congestion, runny nose or sore throat.  SKIN:  No rash or itching.  CARDIOVASCULAR:  No chest pain, chest pressure or chest discomfort. No palpitations or edema.  RESPIRATORY:  No shortness of breath, cough or sputum.  GASTROINTESTINAL:  SEE HPI  GENITOURINARY:  No dysuria, hematuria, urinary frequency  NEUROLOGICAL:  No headache, dizziness, syncope, paralysis, ataxia, numbness or tingling in the extremities. No change in bowel or bladder control.  MUSCULOSKELETAL:  No muscle, back pain, joint pain or stiffness.        ______________________________________________________________________  PHYSICAL EXAM:  T(C): 37.3 (01-07-22 @ 09:00), Max: 37.5 (01-06-22 @ 19:00)  HR: 73 (01-07-22 @ 09:00)  BP: 129/62 (01-07-22 @ 09:00)  RR: 18 (01-07-22 @ 09:00)  SpO2: 100% (01-07-22 @ 09:00)  Wt(kg): --    01-06 - 01-07  --------------------------------------------------------  IN:    dextrose 5% + sodium chloride 0.45%: 1080 mL    IV PiggyBack: 300 mL    IV PiggyBack: 125 mL  Total IN: 1505 mL    OUT:    Chest Tube (mL): 19 mL    Indwelling Catheter - Urethral (mL): 1545 mL  Total OUT: 1564 mL    Total NET: -59 mL          GEN: NAD, normocephalic  CVS: S1S2+  CHEST: clear to auscultation  ABD: soft , nontender, nondistended, bowel sounds present  EXTR: no cyanosis, no clubbing, no edema  NEURO: Awake and alert; oriented 1-2  SKIN:  warm;  non icteric    ______________________________________________________________________  LABS:                        8.5    7.69  )-----------( 434      ( 07 Jan 2022 04:19 )             27.8     01-07    146<H>  |  113<H>  |  11  ----------------------------<  168<H>  3.5   |  30  |  0.57    Ca    10.9<H>      07 Jan 2022 04:19  Phos  2.2     01-07  Mg     2.2     01-07    TPro  5.8<L>  /  Alb  1.6<L>  /  TBili  0.5  /  DBili  x   /  AST  22  /  ALT  7<L>  /  AlkPhos  77  01-07    LIVER FUNCTIONS - ( 07 Jan 2022 04:19 )  Alb: 1.6 g/dL / Pro: 5.8 g/dL / ALK PHOS: 77 U/L / ALT: 7 U/L DA / AST: 22 U/L / GGT: x           PT/INR - ( 06 Jan 2022 03:50 )   PT: 12.4 sec;   INR: 1.04 ratio         PTT - ( 06 Jan 2022 03:50 )  PTT:31.1 sec  ____________________________________________  < from: CT Chest w/ IV Cont (12.30.21 @ 12:53) >  ACC: [id number] EXAM:  CT CHEST IC                          PROCEDURE DATE:  12/30/2021          INTERPRETATION:  INDICATION: Squamous cell carcinoma of the tongue    TECHNIQUE: Volumetric images of the chest after the administration of 40   mL of Omnipaque 350. Maximum intensity projection images were generated.    COMPARISON: CT chest 11/4/2021.    FINDINGS:    LUNGS/AIRWAYS/PLEURA: New occlusion of the left lower lobe bronchus and   associated left lower lobe collapse. Heterogeneous enhancement of left   lower lobe atelectasis, suggesting a superimposed process. New   consolidation in the right lower lobe and lingula, and peribronchial   nodules in all lobes of the right lung.    Larger bilateral cavitary masses, for example, 4.6 cm in themiddle lobe   (measured on 3-90), prior 2.1 cm. Other unchanged solid nodules, for   example, 1 cm in the left apex (3-34).    Endotracheal tube tip in the mid trachea. Stable mild fibrosis in the   anterior upper lobes.    Slightly increased moderate left pneumothorax. Left pleural pigtail   catheter in the left posterior hemithorax. Trace left basilar pleural   effusion. New right pleural thickening in the right cardiophrenic angle.    LYMPH NODES/MEDIASTINUM: Partially included necrotic right   supraclavicular lymphadenopathy. Slightly larger right paratracheal lymph   nodes up to 1.1 cm (3-37). Calcified lymph nodes, likely prior   granulomatous disease.    HEART/VASCULATURE: Normal heart size. Small pericardial effusion. Heavily   calcified coronary arteries. Normal caliber aorta and main pulmonary   artery.    UPPER ABDOMEN:NG tube tip in the gastric fundus.    BONES/SOFT TISSUES: New non and mildly displaced fractures of the right   anterior third through sixth ribs, and mildly displaced fracture of the   left anterior fifth rib.      IMPRESSION:    New multilobar consolidation, including likely within a collapsed left   lower lobe, and peribronchial nodules in all lobes of the right lung,   favored to be infection.    New occlusion of the left lower lobe bronchus, possibly by mucus, and   associated left lower lobe collapse.    Larger bilateral cavitary metastases.    Increased moderate left pneumothorax. New right pleural thickening in the   right cardiophrenic angle.    Mildly increased mediastinal lymphadenopathy. Partially included large   right supraclavicular lymphadenopathy.    Bilateral acute anterior rib fractures.    --- End of Report ---    < end of copied text >

## 2022-01-07 NOTE — PROGRESS NOTE ADULT - ASSESSMENT
s/p open tracheostomy 1/6  1. Keep gauze under tracheostomy tube  2. GI consult for PEG placement  3. Care as per ICU  4. Will d/c sutures on 1/16

## 2022-01-07 NOTE — PROGRESS NOTE ADULT - SUBJECTIVE AND OBJECTIVE BOX
Post Operative Day #: 1    SUBJECTIVE: 76y Male s/p open tracheostomy 1/6    INTERVAL HPI/OVERNIGHT EVENTS:    No overnight events      MEDICATIONS  (STANDING):  ALBUTerol    90 MICROgram(s) HFA Inhaler 2 Puff(s) Inhalation every 6 hours  chlorhexidine 2% Cloths 1 Application(s) Topical <User Schedule>  dextrose 5% + sodium chloride 0.45%. 1000 milliLiter(s) (60 mL/Hr) IV Continuous <Continuous>  insulin lispro (ADMELOG) corrective regimen sliding scale   SubCutaneous every 6 hours  pantoprazole  Injectable 40 milliGRAM(s) IV Push daily  piperacillin/tazobactam IVPB.. 3.375 Gram(s) IV Intermittent every 8 hours    MEDICATIONS  (PRN):  acetaminophen    Suspension .. 650 milliGRAM(s) Oral every 6 hours PRN Temp greater or equal to 38C (100.4F)  bisacodyl Suppository 10 milliGRAM(s) Rectal daily PRN Constipation      OBJECTIVE:  Vital Signs Last 24 Hrs  T(C): 37.3 (07 Jan 2022 09:00), Max: 37.5 (06 Jan 2022 19:00)  T(F): 99.1 (07 Jan 2022 09:00), Max: 99.5 (06 Jan 2022 19:00)  HR: 73 (07 Jan 2022 09:00) (73 - 96)  BP: 129/62 (07 Jan 2022 09:00) (111/57 - 162/76)  BP(mean): 79 (07 Jan 2022 09:00) (70 - 97)  RR: 18 (07 Jan 2022 09:00) (10 - 21)  SpO2: 100% (07 Jan 2022 09:00) (92% - 100%) vented    Physical Exam:    Gen: vented  HEENT: anicteric, trach in place, surgicel removed. no bleeding noted.  Guaze placed under tracheostomy tube  Chest: equal chest rise    I&O's Detail    06 Jan 2022 07:01  -  07 Jan 2022 07:00  --------------------------------------------------------  IN:    dextrose 5% + sodium chloride 0.45%: 1080 mL    IV PiggyBack: 300 mL    IV PiggyBack: 125 mL  Total IN: 1505 mL    OUT:    Chest Tube (mL): 19 mL    Indwelling Catheter - Urethral (mL): 1545 mL  Total OUT: 1564 mL    Total NET: -59 mL          Labs:                          8.5    7.69  )-----------( 434      ( 07 Jan 2022 04:19 )             27.8     01-07    146<H>  |  113<H>  |  11  ----------------------------<  168<H>  3.5   |  30  |  0.57    Ca    10.9<H>      07 Jan 2022 04:19  Phos  2.2     01-07  Mg     2.2     01-07    TPro  5.8<L>  /  Alb  1.6<L>  /  TBili  0.5  /  DBili  x   /  AST  22  /  ALT  7<L>  /  AlkPhos  77  01-07    PT/INR - ( 06 Jan 2022 03:50 )   PT: 12.4 sec;   INR: 1.04 ratio         PTT - ( 06 Jan 2022 03:50 )  PTT:31.1 sec

## 2022-01-07 NOTE — PROGRESS NOTE ADULT - SUBJECTIVE AND OBJECTIVE BOX
76y Male is under our care for     REVIEW OF SYSTEMS:  [  ] Not able to elicit  General:	  Chest:	  GI:	  :  Skin:	  Musculoskeletal:	  Neuro:	    MEDS:  piperacillin/tazobactam IVPB.. 3.375 Gram(s) IV Intermittent every 8 hours    ALLERGIES: Allergies    No Known Allergies    Intolerances        VITALS:  Vital Signs Last 24 Hrs  T(C): 37.3 (07 Jan 2022 09:00), Max: 37.5 (06 Jan 2022 19:00)  T(F): 99.1 (07 Jan 2022 09:00), Max: 99.5 (06 Jan 2022 19:00)  HR: 73 (07 Jan 2022 09:00) (73 - 91)  BP: 129/62 (07 Jan 2022 09:00) (111/57 - 149/78)  BP(mean): 79 (07 Jan 2022 09:00) (70 - 96)  RR: 18 (07 Jan 2022 09:00) (10 - 21)  SpO2: 100% (07 Jan 2022 09:00) (92% - 100%)      PHYSICAL EXAM:  HEENT:  Neck:  Respiratory:  Cardiovascular:  Gastrointestinal:  Extremities:  Skin:  Ortho:  Neuro:    LABS/DIAGNOSTIC TESTS:                        8.5    7.69  )-----------( 434      ( 07 Jan 2022 04:19 )             27.8     WBC Count: 7.69 K/uL (01-07 @ 04:19)  WBC Count: 8.22 K/uL (01-06 @ 03:50)  WBC Count: 7.49 K/uL (01-05 @ 04:21)  WBC Count: 8.83 K/uL (01-04 @ 04:03)  WBC Count: 8.21 K/uL (01-03 @ 03:24)    01-07    146<H>  |  113<H>  |  11  ----------------------------<  168<H>  3.5   |  30  |  0.57    Ca    10.9<H>      07 Jan 2022 04:19  Phos  2.2     01-07  Mg     2.2     01-07    TPro  5.8<L>  /  Alb  1.6<L>  /  TBili  0.5  /  DBili  x   /  AST  22  /  ALT  7<L>  /  AlkPhos  77  01-07      CULTURES:   .Sputum Sputum  12-29 @ 18:28   Normal Respiratory Karla present  --    Numerous polymorphonuclear leukocytes per low power field  No squamous epithelial cells per low power field  Few Gram positive cocci in pairs per oil power field      Clean Catch Clean Catch (Midstream)  12-21 @ 04:42   No growth  --  --      .Sputum Sputum  11-08 @ 10:53   Few Mycobacterium avium complex  --  --      .Sputum Sputum  11-06 @ 19:34   No acid fast bacilli isolated after 6 weeks.  --  --      .Sputum Sputum  11-05 @ 23:15   Growth of acid fast bacilli detected in broth,  Mycobacterium avium complex by Dna Probe  --  --      .Blood Blood  11-04 @ 11:23   No Growth Final  --  --      .Blood Blood  11-04 @ 10:50   No Growth Final  --  --      Clean Catch Clean Catch (Midstream)  11-03 @ 17:00   <10,000 CFU/mL Normal Urogenital Karla  --  --        RADIOLOGY:  no new studies 76y Male is under our care for pneumonia.  Patient was seen in the ICU laying comfortably in bed with no acute distress.  He is s/p trach yesterday and is pending peg tube placement today.  Patient remains afebrile and WBC count is WNL.    REVIEW OF SYSTEMS:  [ x ] Not able to elicit      MEDS:  piperacillin/tazobactam IVPB.. 3.375 Gram(s) IV Intermittent every 8 hours    ALLERGIES: Allergies    No Known Allergies    Intolerances        VITALS:  Vital Signs Last 24 Hrs  T(C): 37.3 (07 Jan 2022 09:00), Max: 37.5 (06 Jan 2022 19:00)  T(F): 99.1 (07 Jan 2022 09:00), Max: 99.5 (06 Jan 2022 19:00)  HR: 73 (07 Jan 2022 09:00) (73 - 91)  BP: 129/62 (07 Jan 2022 09:00) (111/57 - 149/78)  BP(mean): 79 (07 Jan 2022 09:00) (70 - 96)  RR: 18 (07 Jan 2022 09:00) (10 - 21)  SpO2: 100% (07 Jan 2022 09:00) (92% - 100%)      PHYSICAL EXAM:  HEENT: trach +  Neck: supple no LN's   Respiratory: scattered bilateral rhonchi, left CT  Cardiovascular: S1 S2 reg no murmurs  Gastrointestinal: +BS with soft, nondistended abdomen; nontender  Extremities: no edema  Skin: no rashes  Ortho: n/a  Neuro: Awake and alert    LABS/DIAGNOSTIC TESTS:                        8.5    7.69  )-----------( 434      ( 07 Jan 2022 04:19 )             27.8     WBC Count: 7.69 K/uL (01-07 @ 04:19)  WBC Count: 8.22 K/uL (01-06 @ 03:50)  WBC Count: 7.49 K/uL (01-05 @ 04:21)  WBC Count: 8.83 K/uL (01-04 @ 04:03)  WBC Count: 8.21 K/uL (01-03 @ 03:24)    01-07    146<H>  |  113<H>  |  11  ----------------------------<  168<H>  3.5   |  30  |  0.57    Ca    10.9<H>      07 Jan 2022 04:19  Phos  2.2     01-07  Mg     2.2     01-07    TPro  5.8<L>  /  Alb  1.6<L>  /  TBili  0.5  /  DBili  x   /  AST  22  /  ALT  7<L>  /  AlkPhos  77  01-07      CULTURES:   .Sputum Sputum  12-29 @ 18:28   Normal Respiratory Karla present  --    Numerous polymorphonuclear leukocytes per low power field  No squamous epithelial cells per low power field  Few Gram positive cocci in pairs per oil power field      Clean Catch Clean Catch (Midstream)  12-21 @ 04:42   No growth  --  --      .Sputum Sputum  11-08 @ 10:53   Few Mycobacterium avium complex  --  --      .Sputum Sputum  11-06 @ 19:34   No acid fast bacilli isolated after 6 weeks.  --  --      .Sputum Sputum  11-05 @ 23:15   Growth of acid fast bacilli detected in broth,  Mycobacterium avium complex by Dna Probe  --  --      .Blood Blood  11-04 @ 11:23   No Growth Final  --  --      .Blood Blood  11-04 @ 10:50   No Growth Final  --  --      Clean Catch Clean Catch (Midstream)  11-03 @ 17:00   <10,000 CFU/mL Normal Urogenital Karla  --  --        RADIOLOGY:  no new studies

## 2022-01-07 NOTE — CONSULT NOTE ADULT - PROBLEM SELECTOR RECOMMENDATION 9
Patient had a complicated hospital stay. He is currently trached and off sedation. Albumin 1.6 and trending down. Family wishes are to place an PEG. Spoke with daughter Sandi Amaya about medical wishes of the patient. As per daughter she wants PEG placement for nutritional support and medication administration.   -PEG placement 1/7/22  -hold all AC  -NPO
s/p cardiac arrest, w L pneumothorax, s/p L pigtail catheter. Reintubated x2 on this admission 2/2 thick secretions. Family considering trach/PEG. Currently w fevers, workup per ICU. DNR on file.

## 2022-01-08 NOTE — PROGRESS NOTE ADULT - PROBLEM SELECTOR PLAN 1
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

## 2022-01-08 NOTE — PROGRESS NOTE ADULT - ASSESSMENT
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 Patient had cardiac arrest in ED , was intubated CPR was initiated and subsequently ROSC was achieved after 20 minutes. Patient is admitted to ICU.    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  - CAD s/p stent  - Diabetes    Plan  ====Neuro====  #baseline mentation: AAOx3  #intubated  - Extubated on 12/27  - re-intubated on 12/28  - OFF propofol and pressors  - PRN fent pushes post surgery    ====CVS====  #Cardiac arrest   12/20 on arrival to ED  - ROSC after 20min  - trop trended down  - started norepi 12/28 post intubation, tapered OFF  - s/p midodrine OFF    ====Pulm====  #Acute hypoxic respiratory failure  - has Hx of chronic PTX on LEFT  - likely 2/2 PTX vs aspiration PNA  - SBT (12/21, 22, 24)  - extubated on 12/22  - re-intubated on 12/23  - s/p solumedrol  - s/p cefepime 2g q12 (12/20-12/28)  - extubated again on 12/27; re-intubated on 12/28  - CXR w/ improvement, stable chronic LEFT PTX  - thoracic surgery consulted for trach/PEG  - plan for CT - with IV contrast shows interval worsening  - on zosyn Abx till 1/7 for asp PNA coverage  - post OP CXR and ABG    #PTX (acute/on/chronic)  - s/p pigtail in ED 12/20  - chest tube to water seal- serosanginous exudate  - CXR interval improvement  - plan for re-positioning of pigtail during trach ?  - chest tube to water seal, plan to DC 1/8 after confirmation XR    ====GI====  #no active issues  #diet: NPO w/ tube feeding + prosource  IVF D5 0.45% at 60 while NPO  s/p EGD 1/7/21 with gastric ulcer w/ clipping, no window for PEG. IR consultation for G tube placement on Monday . PPI BID    #bowel regimen: none    ====nephro====  #hypernatremia  serum Na 153 >>> improving 146 > 143 > 145>146  - s/p free water 250 q 4    ====ID====  #fever  started 12/29 after intubation  wbc trending back down  - sputum culture no bacterial growth   - started Zosyn 12/29 - 1/7    #leukocytosis  - likely 2/2 aspiration PNA vs reactive process  - Ucx neg  - Scx MAC  - s/p cefepime 2g q 12 (12/20-12/28)  - started Zosyn 12/29 - 1/7    ====Heme/onc====  #Metastatic SCC of tongue base  - s/p resection, chemo/radiation, and immunotherapy (10/2021)  - plan for experimental drug as outpatient.  - heme/onc as o/p   - palliative chemo, post trach/PEG will NOT be candidate for chemo  - palliative on board, discussion to be had today regarding trach PEG  - s/p EGD 1/7/21 with gastric ulcer w/ clipping, no window for PEG. IR consultation for G tube placement on Monday . PPI BID    ====Endo====  #DM  - a1c 6.3  - c/w sliding scale    ====Ppx====  #DVT: Lovenox HOLDing pre op  #GI: PPI

## 2022-01-08 NOTE — CHART NOTE - NSCHARTNOTEFT_GEN_A_CORE
I spoke with the patient's daughter Kirstin Amaya via telephone this afternoon updating her on the patient's current condition and plan of care. She had no further questions at the end of our discussion. He

## 2022-01-08 NOTE — PROGRESS NOTE ADULT - SUBJECTIVE AND OBJECTIVE BOX
MELISSA JOHNS    SCU progress note    INTERVAL HPI/OVERNIGHT EVENTS: Transferred to SCU/AI overnight from ICU for ongoing care. S/P trach 1/7/22. Pending PEG placement    DNR [ ]   DNI  [  ]    Covid - 19 PCR: COVID-19 PCR: Khushbu (08 Jan 2022 08:23)    The 4Ms    What Matters Most: see GOC  Age appropriate Medications/Screen for High Risk Medication: Yes  Mentation: see CAM below  Mobility: defer to physical exam    The Confusion Assessment Method (CAM) Diagnostic Algorithm     1: Acute Onset or Fluctuating Course  - Is there evidence of an acute change in mental status from the patient’s baseline? Did the (abnormal) behavior  fluctuate during the day, that is, tend to come and go, or increase and decrease in severity?  [ ] YES [ ] NO     2: Inattention  - Did the patient have difficulty focusing attention, being easily distractible, or having difficulty keeping track of what was being said?  [ ] YES [ ] NO     3: Disorganized thinking  -Was the patient’s thinking disorganized or incoherent, such as rambling or irrelevant conversation, unclear or illogical flow of ideas, or unpredictable switching from subject to subject?  [ ] YES [ ] NO    4: Altered Level of consciousness?  [ ] YES [ ] NO    The diagnosis of delirium by CAM requires the presence of features 1 and 2 and either 3 or 4.    PRESSORS: [ ] YES [ ] NO    Cardiovascular:  Heart Failure  Acute   Acute on Chronic  Chronic         Pulmonary:    Hematalogic:    Other:  acetaminophen    Suspension .. 650 milliGRAM(s) Oral every 6 hours PRN  bisacodyl Suppository 10 milliGRAM(s) Rectal daily PRN  chlorhexidine 2% Cloths 1 Application(s) Topical <User Schedule>  insulin lispro (ADMELOG) corrective regimen sliding scale   SubCutaneous every 6 hours  pantoprazole  Injectable 40 milliGRAM(s) IV Push two times a day    acetaminophen    Suspension .. 650 milliGRAM(s) Oral every 6 hours PRN  bisacodyl Suppository 10 milliGRAM(s) Rectal daily PRN  chlorhexidine 2% Cloths 1 Application(s) Topical <User Schedule>  insulin lispro (ADMELOG) corrective regimen sliding scale   SubCutaneous every 6 hours  pantoprazole  Injectable 40 milliGRAM(s) IV Push two times a day    Drug Dosing Weight  Height (cm): 175.3 (20 Dec 2021 02:21)  Weight (kg): 72.5 (20 Dec 2021 06:30)  BMI (kg/m2): 23.6 (20 Dec 2021 06:30)  BSA (m2): 1.88 (20 Dec 2021 06:30)    CENTRAL LINE: [ ] YES [ ] NO  LOCATION:   DATE INSERTED:  REMOVE: [ ] YES [ ] NO  EXPLAIN:    HUTCHINS: [ ] YES [ ] NO    DATE INSERTED:  REMOVE:  [ ] YES [ ] NO  EXPLAIN:    PAST MEDICAL & SURGICAL HISTORY:  Hypertension    Diabetes    High cholesterol    Primary osteoarthritis of both knees    Coronary artery disease of native artery of native heart with stable angina pectoris    Allergic bronchitis    Diabetic retinopathy    Oral cancer    SCC (squamous cell carcinoma)    Metastatic cancer    Recurrent disease    Edema of extremity    Hyponatremia    Microalbuminuria    Neuralgia    Obstructive sleep apnea, adult    Vitamin D deficiency    S/P knee replacement  b/l    S/P hernia repair  b/l    Status post cataract extraction and insertion of intraocular lens, unspecified laterality  b/l    History of surgery  On 9/10/20 he underwent right hemiglossectomy and partial neck dissection with Dr. Darinel Servin at [facility].          ABG - ( 08 Jan 2022 04:10 )  pH, Arterial: 7.47  pH, Blood: x     /  pCO2: 38    /  pO2: 169   / HCO3: 28    / Base Excess: 3.9   /  SaO2: 100                   01-07 @ 07:01  -  01-08 @ 07:00  --------------------------------------------------------  IN: 1205 mL / OUT: 1250 mL / NET: -45 mL        Mode: AC/ CMV (Assist Control/ Continuous Mandatory Ventilation)  RR (machine): 14  TV (machine): 450  FiO2: 40  PEEP: 5  ITime: 1  MAP: 11  PIP: 28      PHYSICAL EXAM:    GENERAL: NAD, well-groomed, well-developed  HEAD:  Atraumatic, Normocephalic  EYES: EOMI, PERRLA, conjunctiva and sclera clear  ENMT: No tonsillar erythema, exudates, or enlargement; Moist mucous membranes, Good dentition, No lesions  NECK: Supple, No JVD, Normal thyroid  NERVOUS SYSTEM:  Alert & Oriented X3, Good concentration; Motor Strength 5/5 B/L upper and lower extremities; DTRs 2+ intact and symmetric  CHEST/LUNG: Clear to percussion bilaterally; No rales, rhonchi, wheezing, or rubs  HEART: Regular rate and rhythm; No murmurs, rubs, or gallops  ABDOMEN: Soft, Nontender, Nondistended; Bowel sounds present  EXTREMITIES:  2+ Peripheral Pulses, No clubbing, cyanosis, or edema  LYMPH: No lymphadenopathy noted  SKIN: No rashes or lesions      LABS:  CBC Full  -  ( 08 Jan 2022 04:51 )  WBC Count : 7.83 K/uL  RBC Count : 2.90 M/uL  Hemoglobin : 8.3 g/dL  Hematocrit : 26.8 %  Platelet Count - Automated : 401 K/uL  Mean Cell Volume : 92.4 fl  Mean Cell Hemoglobin : 28.6 pg  Mean Cell Hemoglobin Concentration : 31.0 gm/dL  Auto Neutrophil # : 6.17 K/uL  Auto Lymphocyte # : 0.60 K/uL  Auto Monocyte # : 0.75 K/uL  Auto Eosinophil # : 0.24 K/uL  Auto Basophil # : 0.03 K/uL  Auto Neutrophil % : 78.7 %  Auto Lymphocyte % : 7.7 %  Auto Monocyte % : 9.6 %  Auto Eosinophil % : 3.1 %  Auto Basophil % : 0.4 %    01-08    147<H>  |  115<H>  |  10  ----------------------------<  151<H>  3.3<L>   |  27  |  0.52    Ca    10.4      08 Jan 2022 04:51  Phos  2.4     01-08  Mg     2.1     01-08    TPro  5.5<L>  /  Alb  1.4<L>  /  TBili  0.5  /  DBili  x   /  AST  19  /  ALT  7<L>  /  AlkPhos  75  01-08        [  ]  DVT Prophylaxis - held post trach placement  [  ]  Nutrition, Brand Glucerna 1.5, Rate 40ml/hr         Goal Rate 40ml/hr        Abnormal Nutritional Status -  Malnutrition   Cachexia      Morbid Obesity BMI >/=40    RADIOLOGY & ADDITIONAL STUDIES:  CXR 1/8/22 pending  < from: Xray Chest 1 View- PORTABLE-Urgent (Xray Chest 1 View- PORTABLE-Urgent .) (01.07.22 @ 19:05) >  FINDINGS:    Appropriate course of tracheostomy cannula and nasogastric tube. Unchanged left chest tube. Stable cardiomediastinal silhouette. Stable   left lower lobe mass/consolidation and ill-defined opacities in the right lower lung. No pneumothorax or pleural effusion.    IMPRESSION:    Appropriate course of new nasogastric tube. No other change.    *on personal review - L pigtail pleural catheter in stable position, appears to have increased atelectasis/consolidation of LLL however this is affected by patient's body positioning, no PTX identified.    Goals of Care Discussion with Family/Proxy/Other   - see note from 1/2/22 MELISSA JOHNS    SCU progress note    INTERVAL HPI/OVERNIGHT EVENTS: Transferred to SCU/AI overnight from ICU for ongoing care. S/P trach 1/6/22. 1/7 on EGD for PEG found to have gastric ulcer which was clipped. Pending G-tube placement by IR. Low grade fever overnight, COVID PCR sent, (-)    DNR [ ]   DNI  [  ]    Covid - 19 PCR: COVID-19 PCR: Dean (08 Jan 2022 08:23)    The 4Ms    What Matters Most: see GOC  Age appropriate Medications/Screen for High Risk Medication: Yes  Mentation: see CAM below  Mobility: defer to physical exam    The Confusion Assessment Method (CAM) Diagnostic Algorithm     1: Acute Onset or Fluctuating Course  - Is there evidence of an acute change in mental status from the patient’s baseline? Did the (abnormal) behavior  fluctuate during the day, that is, tend to come and go, or increase and decrease in severity?  [ ] YES [X ] NO     2: Inattention  - Did the patient have difficulty focusing attention, being easily distractible, or having difficulty keeping track of what was being said?  [ ] YES [X ] NO     3: Disorganized thinking  -Was the patient’s thinking disorganized or incoherent, such as rambling or irrelevant conversation, unclear or illogical flow of ideas, or unpredictable switching from subject to subject?  [ ] YES [X ] NO    4: Altered Level of consciousness?  [ ] YES [X ] NO    The diagnosis of delirium by CAM requires the presence of features 1 and 2 and either 3 or 4.    PRESSORS: [ ] YES [ X] NO    Cardiovascular:  Heart Failure  Acute   Acute on Chronic  Chronic         Pulmonary:    Hematalogic:    Other:  acetaminophen    Suspension .. 650 milliGRAM(s) Oral every 6 hours PRN  bisacodyl Suppository 10 milliGRAM(s) Rectal daily PRN  chlorhexidine 2% Cloths 1 Application(s) Topical <User Schedule>  insulin lispro (ADMELOG) corrective regimen sliding scale   SubCutaneous every 6 hours  pantoprazole  Injectable 40 milliGRAM(s) IV Push two times a day    acetaminophen    Suspension .. 650 milliGRAM(s) Oral every 6 hours PRN  bisacodyl Suppository 10 milliGRAM(s) Rectal daily PRN  chlorhexidine 2% Cloths 1 Application(s) Topical <User Schedule>  insulin lispro (ADMELOG) corrective regimen sliding scale   SubCutaneous every 6 hours  pantoprazole  Injectable 40 milliGRAM(s) IV Push two times a day    Drug Dosing Weight  Height (cm): 175.3 (20 Dec 2021 02:21)  Weight (kg): 72.5 (20 Dec 2021 06:30)  BMI (kg/m2): 23.6 (20 Dec 2021 06:30)  BSA (m2): 1.88 (20 Dec 2021 06:30)    CENTRAL LINE: [ ] YES [ ] NO  LOCATION:   DATE INSERTED:  REMOVE: [ ] YES [ ] NO  EXPLAIN:    HUTCHINS: [ ] YES [ ] NO    DATE INSERTED:  REMOVE:  [ ] YES [ ] NO  EXPLAIN:    PAST MEDICAL & SURGICAL HISTORY:  Hypertension    Diabetes    High cholesterol    Primary osteoarthritis of both knees    Coronary artery disease of native artery of native heart with stable angina pectoris    Allergic bronchitis    Diabetic retinopathy    Oral cancer    SCC (squamous cell carcinoma)    Metastatic cancer    Recurrent disease    Edema of extremity    Hyponatremia    Microalbuminuria    Neuralgia    Obstructive sleep apnea, adult    Vitamin D deficiency    S/P knee replacement  b/l    S/P hernia repair  b/l    Status post cataract extraction and insertion of intraocular lens, unspecified laterality  b/l    History of surgery  On 9/10/20 he underwent right hemiglossectomy and partial neck dissection with Dr. Darinel Servin at [facility].          ABG - ( 08 Jan 2022 04:10 )  pH, Arterial: 7.47  pH, Blood: x     /  pCO2: 38    /  pO2: 169   / HCO3: 28    / Base Excess: 3.9   /  SaO2: 100                   01-07 @ 07:01  -  01-08 @ 07:00  --------------------------------------------------------  IN: 1205 mL / OUT: 1250 mL / NET: -45 mL        Mode: AC/ CMV (Assist Control/ Continuous Mandatory Ventilation)  RR (machine): 14  TV (machine): 450  FiO2: 40  PEEP: 5  ITime: 1  MAP: 11  PIP: 28      PHYSICAL EXAM:    GENERAL: NAD, well-groomed, well-developed  HEAD:  Atraumatic, Normocephalic  EYES: EOMI, PERRL, conjunctiva and sclera clear  ENMT: + swelling/mass on right side of face/neck; Moist mucous membranes  NECK: Supple, No JVD,   NERVOUS SYSTEM:  Alert & Oriented X3, CARR bilat  CHEST/LUNG: vent assisted, non-labored. coarse breath sounds bilaterally, diminished on left. No rales, rhonchi, wheezing, or rubs. Left pigtail pleural catheter clamped  HEART: Regular rate and rhythm; No murmurs, rubs, or gallops  ABDOMEN: Soft, Nontender, Nondistended; Bowel sounds present  EXTREMITIES:  2+ Peripheral Pulses, No clubbing, cyanosis, or edema  SKIN: No rashes or lesions      LABS:  CBC Full  -  ( 08 Jan 2022 04:51 )  WBC Count : 7.83 K/uL  RBC Count : 2.90 M/uL  Hemoglobin : 8.3 g/dL  Hematocrit : 26.8 %  Platelet Count - Automated : 401 K/uL  Mean Cell Volume : 92.4 fl  Mean Cell Hemoglobin : 28.6 pg  Mean Cell Hemoglobin Concentration : 31.0 gm/dL  Auto Neutrophil # : 6.17 K/uL  Auto Lymphocyte # : 0.60 K/uL  Auto Monocyte # : 0.75 K/uL  Auto Eosinophil # : 0.24 K/uL  Auto Basophil # : 0.03 K/uL  Auto Neutrophil % : 78.7 %  Auto Lymphocyte % : 7.7 %  Auto Monocyte % : 9.6 %  Auto Eosinophil % : 3.1 %  Auto Basophil % : 0.4 %    01-08    147<H>  |  115<H>  |  10  ----------------------------<  151<H>  3.3<L>   |  27  |  0.52    Ca    10.4      08 Jan 2022 04:51  Phos  2.4     01-08  Mg     2.1     01-08    TPro  5.5<L>  /  Alb  1.4<L>  /  TBili  0.5  /  DBili  x   /  AST  19  /  ALT  7<L>  /  AlkPhos  75  01-08        [  ]  DVT Prophylaxis - held post trach placement  [  ]  Nutrition, Brand Glucerna 1.5, Rate 40ml/hr         Goal Rate 40ml/hr        Abnormal Nutritional Status -  Malnutrition   Cachexia      Morbid Obesity BMI >/=40    RADIOLOGY & ADDITIONAL STUDIES:  CXR 1/8/22 pending  < from: Xray Chest 1 View- PORTABLE-Urgent (Xray Chest 1 View- PORTABLE-Urgent .) (01.07.22 @ 19:05) >  FINDINGS:    Appropriate course of tracheostomy cannula and nasogastric tube. Unchanged left chest tube. Stable cardiomediastinal silhouette. Stable   left lower lobe mass/consolidation and ill-defined opacities in the right lower lung. No pneumothorax or pleural effusion.    IMPRESSION:    Appropriate course of new nasogastric tube. No other change.    *on personal review - L pigtail pleural catheter in stable position, appears to have increased atelectasis/consolidation of LLL however this is affected by patient's body positioning, no PTX identified.    Goals of Care Discussion with Family/Proxy/Other   - see note from 1/2/22

## 2022-01-08 NOTE — CHART NOTE - NSCHARTNOTEFT_GEN_A_CORE
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 Patient had cardiac arrest in ED , was intubated CPR was initiated and subsequently ROSC was acieved after 20 minutes. Patient is admitted to ICU post-intubation. Pigtail was also placed in ED for left pneumothorax, which found to be chronic pneumothorax. Patient was able to be extubated after passing SBT, however, he was re-intubated within 24hours. Patient continues to pass SBT, but unlikely a good candidate for extubation due to increased thick sputum production. Failed repeat attempt at extubation on 12/28 and re-intubated. Palliative care was consulted. Tx w/ Cefepime for possible aspiration. Spiking fevers post intubation 12/28, given Tylenol and started on zosyn till 1/7 for asp PNA coverage.. CXR shows improvement in lung volumes, likely mucous plugged.  CT - with IV contrast shows interval worsening. Plan for trach and PEG to manage excess secretions. Trach on 1/8/21. s/p EGD 1/7/21 with gastric ulcer w/ clipping, no window for PEG. IR consultation for G tube placement on Monday . PPI BID. chest tube to water seal- serosanginous exudate  - CXR interval improvement  - plan for re-positioning of pigtail during trach ?  - chest tube to water seal, plan to DC 1/8 after confirmation XR      as Hx of chronic PTX on LEFT  - likely 2/2 PTX vs aspiration PNA  - SBT (12/21, 22, 24)  - extubated on 12/22  - re-intubated on 12/23  - s/p solumedrol  - s/p cefepime 2g q12 (12/20-12/28)  - extubated again on 12/27; re-intubated on 12/28  - CXR w/ improvement, stable chronic LEFT PTX  - thoracic surgery consulted for trach/PEG  - plan for CT - with IV contrast shows interval worsening  - on zosyn Abx till 1/7 for asp PNA coverage  - post OP CXR and ABG      #PTX (acute/on/chronic)  - s/p pigtail in ED 12/20  - chest tube to water seal- serosanginous exudate  - CXR interval improvement  - plan for re-positioning of pigtail during trach ?  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 Patient had cardiac arrest in ED , was intubated CPR was initiated and subsequently ROSC was acieved after 20 minutes. Patient is admitted to ICU post-intubation. Pigtail was also placed in ED for left pneumothorax, which found to be chronic pneumothorax. Patient was able to be extubated after passing SBT, however, he was re-intubated within 24hours. Patient continues to pass SBT, but unlikely a good candidate for extubation due to increased thick sputum production. Failed repeat attempt at extubation on 12/28 and re-intubated. Palliative care was consulted. Tx w/ Cefepime for possible aspiration. Spiking fevers post intubation 12/28, given Tylenol and started on zosyn till 1/7 for asp PNA coverage.. CXR shows improvement in lung volumes, likely mucous plugged.  CT - with IV contrast shows interval worsening. Trach on 1/8/21. s/p EGD 1/7/21 with gastric ulcer w/ clipping, no window for PEG. IR consultation for G tube placement on Monday . PPI BID. chest tube to water seal- serosanguinous exudate. CXR interval improvement. Chest xray on 1/7/21 wo pneumothorax> clamped, plan to DC 1/8 after confirmation XR. Serum Na 153 >>> improving 146 > 143 > 145>146. s/p free water 250 q 4. Free water added on 1/8/21 400cc q6hr.  Pt does understand and follows simple commands. 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 Patient had cardiac arrest in ED , was intubated CPR was initiated and subsequently ROSC was acieved after 20 minutes. Patient is admitted to ICU post-intubation. Pigtail was also placed in ED for left pneumothorax, which found to be chronic pneumothorax. Patient was able to be extubated after passing SBT, however, he was re-intubated within 24hours. Patient continues to pass SBT, but unlikely a good candidate for extubation due to increased thick sputum production. Failed repeat attempt at extubation on 12/28 and re-intubated. Palliative care was consulted. Tx w/ Cefepime for possible aspiration. Spiking fevers post intubation 12/28, given Tylenol and started on zosyn till 1/7 for asp PNA coverage.. CXR shows improvement in lung volumes, likely mucous plugged.  CT - with IV contrast shows interval worsening. Trach on 1/8/21. s/p EGD 1/7/21 with gastric ulcer w/ clipping, no window for PEG. IR consultation for G tube placement on Monday . PPI BID. NG tube placed on 1/7/21. chest tube to water seal- serosanguinous exudate. CXR interval improvement. Chest xray on 1/7/21 wo pneumothorax> clamped, plan to DC 1/8 after confirmation XR. Serum Na 153 >>> improving 146 > 143 > 145>146. s/p free water 250 q 4. Free water added on 1/8/21 400cc q6hr.  Pt does understand and follows simple commands.    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 Patient had cardiac arrest in ED , was intubated CPR was initiated and subsequently ROSC was achieved after 20 minutes. Patient is admitted to ICU post-intubation. Pigtail was also placed in ED for left pneumothorax, which found to be chronic pneumothorax. Patient was able to be extubated after passing SBT, however, he was re-intubated within 24hours. Patient continues to pass SBT, but unlikely a good candidate for extubation due to increased thick sputum production. Failed repeat attempt at extubation on 12/28 and re-intubated. Palliative care was consulted. Tx w/ Cefepime for possible aspiration. Spiking fevers post intubation 12/28, given Tylenol and started on zosyn till 1/7 for asp PNA coverage.. CXR shows improvement in lung volumes, likely mucous plugged.  CT - with IV contrast shows interval worsening. Trach on 1/8/21. s/p EGD 1/7/21 with gastric ulcer w/ clipping, no window for PEG. IR consultation for G tube placement on Monday . PPI BID. NG tube placed on 1/7/21. chest tube to water seal- serosanguinous exudate. CXR interval improvement. Chest x-ray on 1/7/21 wo pneumothorax> clamped, plan to DC 1/8 after confirmation XR. Serum Na 153 >>> improving 146 > 143 > 145>146. s/p free water 250 q 4. Free water added on 1/8/21 400cc q6hr.  Pt had covid exposure on 1/5/21. last covid neg 1/6/21. Spiked fever 100.4 1/7/21. Pt does understand and follows simple commands.    Things to Follow:  IR consult on Monday 1/10 for PEG tube   Titrate free water depend on sodium daily   F/U CXR in the morning if no pneumothorax noted on L side, remove Chest Tube   PT eval   f/U Surgery when to resume DVT ppx  f/u COVID swab

## 2022-01-08 NOTE — PROGRESS NOTE ADULT - PROBLEM SELECTOR PLAN 3
s/p EGD 1/7/21 with gastric ulcer w/ clipping, no window for PEG. IR consultation for G tube placement on Monday   PPI BID  Send H. pylori stool antigen

## 2022-01-08 NOTE — PROGRESS NOTE ADULT - NUTRITIONAL ASSESSMENT
This patient has been assessed with a concern for Malnutrition and has been determined to have a diagnosis/diagnoses of Severe protein-calorie malnutrition.    This patient is being managed with:   Diet NPO with Tube Feed-  Tube Feeding Modality: Nasogastric  Glucerna 1.5 Dhruv  Total Volume for 24 Hours (mL): 960  Continuous  Starting Tube Feed Rate {mL per Hour}: 10  Increase Tube Feed Rate by (mL): 10     Every 4 hours  Until Goal Tube Feed Rate (mL per Hour): 40  Tube Feed Duration (in Hours): 24  Tube Feed Start Time: 10:00  No Carb Prosource (1pkg = 15gms Protein)     Qty per Day:  2  No Carb Prosource TF     Qty per Day:  2  Entered: Jan 7 2022  8:17PM

## 2022-01-08 NOTE — PHYSICAL THERAPY INITIAL EVALUATION ADULT - DIAGNOSIS, PT EVAL
Overall decline in functional mobility due to generalized weakness, decreased pulmonary capacity, strength and endurance.

## 2022-01-08 NOTE — PROGRESS NOTE ADULT - ASSESSMENT
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 Patient had cardiac arrest in ED , was intubated CPR was initiated and subsequently ROSC was achieved after 20 minutes. Patient admitted to the ICU 12/20-1/7/22 for post cardiac arrest management in the setting of acute hypoxic respiratory failure due to left pneumothorax and likely aspiration pneumonia and septic shock. He was treated with a course of cefepime (12/20-12/28) and then another course of abx with Zosyn (12/29-1/7). He failed extubation x2 (12/23, 12/27) and ultimately underwent Tracheostomy placement (1/7/22). He is pending PEG placement, plan for IR consult Monday 1/10/22 and possible removal of L pigtail pleural catheter 1/8 pending results of repeat CXR.    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  - CAD s/p stent  - Diabetes    ====Pulm====  #Acute hypoxic respiratory failure  - has Hx of chronic PTX on LEFT  - likely 2/2 PTX vs aspiration PNA  - SBT (12/21, 22, 24)  - extubated on 12/22  - re-intubated on 12/23  - s/p solumedrol  - s/p cefepime 2g q12 (12/20-12/28)  - extubated again on 12/27; re-intubated on 12/28  - CXR w/ improvement, stable chronic LEFT PTX  - thoracic surgery consulted for trach/PEG  - plan for CT - with IV contrast shows interval worsening  - on zosyn Abx till 1/7 for asp PNA coverage  - post OP CXR and ABG    #PTX (acute/on/chronic)  - s/p pigtail in ED 12/20  - chest tube to water seal- serosanginous exudate  - CXR interval improvement  - plan for re-positioning of pigtail during trach ?  - chest tube to water seal, plan to DC 1/8 after confirmation XR    ====GI====  #no active issues  #diet: NPO w/ tube feeding + prosource  IVF D5 0.45% at 60 while NPO  s/p EGD 1/7/21 with gastric ulcer w/ clipping, no window for PEG. IR consultation for G tube placement on Monday . PPI BID    #bowel regimen: none    ====nephro====  #hypernatremia  serum Na 153 >>> improving 146 > 143 > 145>146  - s/p free water 250 q 4    ====ID====  #fever  started 12/29 after intubation  wbc trending back down  - sputum culture no bacterial growth   - started Zosyn 12/29 - 1/7    #leukocytosis  - likely 2/2 aspiration PNA vs reactive process  - Ucx neg  - Scx MAC  - s/p cefepime 2g q 12 (12/20-12/28)  - started Zosyn 12/29 - 1/7    ====Heme/onc====  #Metastatic SCC of tongue base  - s/p resection, chemo/radiation, and immunotherapy (10/2021)  - plan for experimental drug as outpatient.  - heme/onc as o/p   - palliative chemo, post trach/PEG will NOT be candidate for chemo  - palliative on board, discussion to be had today regarding trach PEG  - s/p EGD 1/7/21 with gastric ulcer w/ clipping, no window for PEG. IR consultation for G tube placement on Monday . 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 Patient had cardiac arrest in ED , was intubated CPR was initiated and subsequently ROSC was achieved after 20 minutes. Patient admitted to the ICU 12/20-1/7/22 for post cardiac arrest management in the setting of acute hypoxic respiratory failure due to left pneumothorax and likely aspiration pneumonia and septic shock. He was treated with a course of cefepime (12/20-12/28) and then another course of abx with Zosyn (12/29-1/7). He failed extubation x2 (12/23, 12/27) and ultimately underwent Tracheostomy placement (1/6/22). Underwent EGD 1/7 for PEG placement, found to have a gastric ulcer which was clipped and he was initiated on BID PPI. He is pending PEG placement, plan for IR consult Monday 1/10/22 and possible removal of L pigtail pleural catheter 1/8 pending results of repeat CXR. His ICU course was further s/f hypernatremia being managed with free water boluses and Palliative Care was consulted as patient is not a candidate for further cancer directed treatment once his is s/p trach/PEG. He was transferred to the [facility] on 1/8 for further care.

## 2022-01-08 NOTE — PHYSICAL THERAPY INITIAL EVALUATION ADULT - GENERAL OBSERVATIONS, REHAB EVAL
Pt. received supine in bed, on trach to vent, has NG tube feeding, chest tube / pigtail, domingo catheter, SCD's and bilateral limb protectors.  Pt. is alert and cooperative during eval. He can follow verbal commands.

## 2022-01-08 NOTE — PROGRESS NOTE ADULT - SUBJECTIVE AND OBJECTIVE BOX
INTERVAL HPI/OVERNIGHT EVENTS: No event overnight    PRESSORS: [ ] YES [x ] NO  WHICH:    ANTIBIOTICS:                      Antimicrobial:    Cardiovascular:    Pulmonary:  ALBUTerol    90 MICROgram(s) HFA Inhaler 2 Puff(s) Inhalation every 6 hours    Hematalogic:    Other:  acetaminophen    Suspension .. 650 milliGRAM(s) Oral every 6 hours PRN  bisacodyl Suppository 10 milliGRAM(s) Rectal daily PRN  chlorhexidine 2% Cloths 1 Application(s) Topical <User Schedule>  insulin lispro (ADMELOG) corrective regimen sliding scale   SubCutaneous every 6 hours  pantoprazole  Injectable 40 milliGRAM(s) IV Push two times a day    acetaminophen    Suspension .. 650 milliGRAM(s) Oral every 6 hours PRN  ALBUTerol    90 MICROgram(s) HFA Inhaler 2 Puff(s) Inhalation every 6 hours  bisacodyl Suppository 10 milliGRAM(s) Rectal daily PRN  chlorhexidine 2% Cloths 1 Application(s) Topical <User Schedule>  insulin lispro (ADMELOG) corrective regimen sliding scale   SubCutaneous every 6 hours  pantoprazole  Injectable 40 milliGRAM(s) IV Push two times a day    Drug Dosing Weight  Height (cm): 175.3 (20 Dec 2021 02:21)  Weight (kg): 72.5 (20 Dec 2021 06:30)  BMI (kg/m2): 23.6 (20 Dec 2021 06:30)  BSA (m2): 1.88 (20 Dec 2021 06:30)    CENTRAL LINE: [x ] YES [ ] NO  LOCATION:   DATE INSERTED:  REMOVE: [ ] YES [ ] NO  EXPLAIN:    HUTCHINS: [x ] YES [ ] NO    DATE INSERTED:  REMOVE:  [ ] YES [ ] NO  EXPLAIN:    A-LINE:  [ ] YES [ x] NO  LOCATION:   DATE INSERTED:  REMOVE:  [ ] YES [ ] NO  EXPLAIN:    PMH -reviewed admission note, no change since admission    ICU Vital Signs Last 24 Hrs  T(C): 37.8 (08 Jan 2022 02:00), Max: 38.1 (07 Jan 2022 23:22)  T(F): 100 (08 Jan 2022 02:00), Max: 100.6 (07 Jan 2022 23:22)  HR: 79 (08 Jan 2022 02:00) (70 - 95)  BP: 123/70 (08 Jan 2022 02:00) (120/65 - 160/78)  BP(mean): 83 (08 Jan 2022 02:00) (75 - 98)  ABP: --  ABP(mean): --  RR: 10 (08 Jan 2022 02:00) (10 - 26)  SpO2: 100% (08 Jan 2022 02:00) (90% - 100%)      ABG - ( 07 Jan 2022 03:39 )  pH, Arterial: 7.47  pH, Blood: x     /  pCO2: 38    /  pO2: 82    / HCO3: 28    / Base Excess: 3.9   /  SaO2: 98                    01-06 @ 07:01  -  01-07 @ 07:00  --------------------------------------------------------  IN: 1505 mL / OUT: 1614 mL / NET: -109 mL        Mode: AC/ CMV (Assist Control/ Continuous Mandatory Ventilation)  RR (machine): 14  TV (machine): 450  FiO2: 45  PEEP: 5  ITime: 1  MAP: 10  PIP: 29      PHYSICAL EXAM:    GENERAL: NAD, well-groomed, well-developed post trach  HEAD:  Atraumatic, Normocephalic  EYES: EOMI, PERRL, conjunctiva and sclera clear  ENMT: tracheostomy - vented  NECK: Supple, normal appearance, No JVD; Normal thyroid; Trachea midline  NERVOUS SYSTEM:  sedated  CHEST/LUNG: No chest deformity; Normal percussion bilaterally; No rales, rhonchi, wheezing  + left chest tube to suction  HEART: Regular rate and rhythm; No murmurs, rubs, or gallops  ABDOMEN: Soft, Nontender, Nondistended; Bowel sounds present  EXTREMITIES:  2+ Peripheral Pulses, No clubbing, cyanosis, or edema  LYMPH: No lymphadenopathy noted  SKIN: No rashes or lesions; Good capillary refill    LABS:  CBC Full  -  ( 07 Jan 2022 04:19 )  WBC Count : 7.69 K/uL  RBC Count : 3.03 M/uL  Hemoglobin : 8.5 g/dL  Hematocrit : 27.8 %  Platelet Count - Automated : 434 K/uL  Mean Cell Volume : 91.7 fl  Mean Cell Hemoglobin : 28.1 pg  Mean Cell Hemoglobin Concentration : 30.6 gm/dL  Auto Neutrophil # : 6.24 K/uL  Auto Lymphocyte # : 0.55 K/uL  Auto Monocyte # : 0.62 K/uL  Auto Eosinophil # : 0.19 K/uL  Auto Basophil # : 0.04 K/uL  Auto Neutrophil % : 81.0 %  Auto Lymphocyte % : 7.2 %  Auto Monocyte % : 8.1 %  Auto Eosinophil % : 2.5 %  Auto Basophil % : 0.5 %    01-07    146<H>  |  113<H>  |  11  ----------------------------<  168<H>  3.5   |  30  |  0.57    Ca    10.9<H>      07 Jan 2022 04:19  Phos  2.2     01-07  Mg     2.2     01-07    TPro  5.8<L>  /  Alb  1.6<L>  /  TBili  0.5  /  DBili  x   /  AST  22  /  ALT  7<L>  /  AlkPhos  77  01-07    PT/INR - ( 06 Jan 2022 03:50 )   PT: 12.4 sec;   INR: 1.04 ratio         PTT - ( 06 Jan 2022 03:50 )  PTT:31.1 sec        RADIOLOGY & ADDITIONAL STUDIES REVIEWED:  ***    GOALS OF CARE DISCUSSION WITH PATIENT/FAMILY/PROXY:    CRITICAL CARE TIME SPENT: 35 minutes

## 2022-01-08 NOTE — PROGRESS NOTE ADULT - PROBLEM SELECTOR PLAN 2
#PTX (acute/on/chronic)  - s/p pigtail in ED 12/20  - CT clamped 1/7 overnight, pending f/u CXR today 1/8 for possible removal

## 2022-01-08 NOTE — PHYSICAL THERAPY INITIAL EVALUATION ADULT - PERTINENT HX OF CURRENT PROBLEM, REHAB EVAL
Pt. was BIBEMS for hypoxia. Patient had cardiac arrest in ED , was intubated, CPR was initiated and subsequently ROSC was achieved after 20 minutes. Patient admitted to ICU post-intubation.

## 2022-01-09 NOTE — CHART NOTE - NSCHARTNOTEFT_GEN_A_CORE
Contacted pt's son Sandi Amaya at [phone], and updated on pt's clinical condition and plan of care.   All questions and concerns addressed.     Roxane Sheehan NP Medicine/[facility]  [phone]

## 2022-01-09 NOTE — PROGRESS NOTE ADULT - NUTRITIONAL ASSESSMENT
This patient has been assessed with a concern for Malnutrition and has been determined to have a diagnosis/diagnoses of Severe protein-calorie malnutrition.    This patient is being managed with:   Diet NPO after Midnight-     NPO Start Date: 09-Jan-2022   NPO Start Time: 23:59  Entered: Jan 9 2022  7:19AM    Diet NPO with Tube Feed-  Tube Feeding Modality: Nasogastric  Glucerna 1.5 Dhruv  Total Volume for 24 Hours (mL): 960  Continuous  Starting Tube Feed Rate {mL per Hour}: 10  Increase Tube Feed Rate by (mL): 10     Every 4 hours  Until Goal Tube Feed Rate (mL per Hour): 40  Tube Feed Duration (in Hours): 24  Tube Feed Start Time: 10:00  No Carb Prosource (1pkg = 15gms Protein)     Qty per Day:  2  No Carb Prosource TF     Qty per Day:  2  Entered: Jan 7 2022  8:17PM

## 2022-01-09 NOTE — CHART NOTE - NSCHARTNOTEFT_GEN_A_CORE
Called to evaluate for Left chest pigtail removal  Per chart review, pigtail clamped on 1/7    CXR reviewed. Lung appears up  Pt s/p open tracheostomy on 1/6; remains on vent    D/w Thoracic Surgery attending  Recommend to removal pigtail in AM    Will follow

## 2022-01-09 NOTE — PROGRESS NOTE ADULT - ASSESSMENT
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 Patient had cardiac arrest in ED , was intubated CPR was initiated and subsequently ROSC was achieved after 20 minutes. Patient admitted to the ICU 12/20-1/7/22 for post cardiac arrest management in the setting of acute hypoxic respiratory failure due to left pneumothorax and likely aspiration pneumonia and septic shock. He was treated with a course of cefepime (12/20-12/28) and then another course of abx with Zosyn (12/29-1/7). He failed extubation x2 (12/23, 12/27) and ultimately underwent Tracheostomy placement (1/6/22). Underwent EGD 1/7 for PEG placement, found to have a gastric ulcer which was clipped and he was initiated on BID PPI. He is pending PEG placement, plan for IR consult Monday 1/10/22 and possible removal of L pigtail pleural catheter 1/8 pending results of repeat CXR. His ICU course was further s/f hypernatremia being managed with free water boluses and Palliative Care was consulted as patient is not a candidate for further cancer directed treatment once his is s/p trach/PEG. He was transferred to the SCU/AI on 1/8 for further care.     1/9 Febrile, Tmax 101.4F. F/u CXR. TOV. NPO after midnight and hold lovenox for GT placement in IR in am.

## 2022-01-09 NOTE — PROGRESS NOTE ADULT - SUBJECTIVE AND OBJECTIVE BOX
MELISSA JOHNS    SCU progress note    INTERVAL HPI/OVERNIGHT EVENTS: ***    DNR [ ]   DNI  [  ]    Covid - 19 PCR: COVID-19 PCR: NotDetec (08 Jan 2022 08:23)  COVID-19 PCR: NotDetec (06 Jan 2022 17:58)        The 4Ms    What Matters Most: see GOC  Age appropriate Medications/Screen for High Risk Medication: Yes  Mentation: see CAM below  Mobility: defer to physical exam    The Confusion Assessment Method (CAM) Diagnostic Algorithm     1: Acute Onset or Fluctuating Course  - Is there evidence of an acute change in mental status from the patient’s baseline? Did the (abnormal) behavior  fluctuate during the day, that is, tend to come and go, or increase and decrease in severity?  [ ] YES [ ] NO     2: Inattention  - Did the patient have difficulty focusing attention, being easily distractible, or having difficulty keeping track of what was being said?  [ ] YES [ ] NO     3: Disorganized thinking  -Was the patient’s thinking disorganized or incoherent, such as rambling or irrelevant conversation, unclear or illogical flow of ideas, or unpredictable switching from subject to subject?  [ ] YES [ ] NO    4: Altered Level of consciousness?  [ ] YES [ ] NO    The diagnosis of delirium by CAM requires the presence of features 1 and 2 and either 3 or 4.    PRESSORS: [ ] YES [ ] NO    Cardiovascular:  Heart Failure  Acute   Acute on Chronic  Chronic         Pulmonary:    Hematalogic:    Other:  acetaminophen  Suppository .. 650 milliGRAM(s) Rectal every 6 hours PRN  bisacodyl Suppository 10 milliGRAM(s) Rectal daily PRN  chlorhexidine 2% Cloths 1 Application(s) Topical <User Schedule>  insulin lispro (ADMELOG) corrective regimen sliding scale   SubCutaneous every 6 hours  pantoprazole  Injectable 40 milliGRAM(s) IV Push two times a day    acetaminophen  Suppository .. 650 milliGRAM(s) Rectal every 6 hours PRN  bisacodyl Suppository 10 milliGRAM(s) Rectal daily PRN  chlorhexidine 2% Cloths 1 Application(s) Topical <User Schedule>  insulin lispro (ADMELOG) corrective regimen sliding scale   SubCutaneous every 6 hours  pantoprazole  Injectable 40 milliGRAM(s) IV Push two times a day    Drug Dosing Weight  Height (cm): 175.3 (20 Dec 2021 02:21)  Weight (kg): 72.5 (20 Dec 2021 06:30)  BMI (kg/m2): 23.6 (20 Dec 2021 06:30)  BSA (m2): 1.88 (20 Dec 2021 06:30)    CENTRAL LINE: [ ] YES [ ] NO  LOCATION:   DATE INSERTED:  REMOVE: [ ] YES [ ] NO  EXPLAIN:    HUTCHINS: [ ] YES [ ] NO    DATE INSERTED:  REMOVE:  [ ] YES [ ] NO  EXPLAIN:    PAST MEDICAL & SURGICAL HISTORY:  Hypertension    Diabetes    High cholesterol    Primary osteoarthritis of both knees    Coronary artery disease of native artery of native heart with stable angina pectoris    Allergic bronchitis    Diabetic retinopathy    Oral cancer    SCC (squamous cell carcinoma)    Metastatic cancer    Recurrent disease    Edema of extremity    Hyponatremia    Microalbuminuria    Neuralgia    Obstructive sleep apnea, adult    Vitamin D deficiency    S/P knee replacement  b/l    S/P hernia repair  b/l    Status post cataract extraction and insertion of intraocular lens, unspecified laterality  b/l    History of surgery  On 9/10/20 he underwent right hemiglossectomy and partial neck dissection with Dr. Darinel Servin at [facility].          ABG - ( 08 Jan 2022 04:10 )  pH, Arterial: 7.47  pH, Blood: x     /  pCO2: 38    /  pO2: 169   / HCO3: 28    / Base Excess: 3.9   /  SaO2: 100                   01-08 @ 07:01  -  01-09 @ 07:00  --------------------------------------------------------  IN: 0 mL / OUT: 1500 mL / NET: -1500 mL        Mode: AC/ CMV (Assist Control/ Continuous Mandatory Ventilation)  RR (machine): 14  TV (machine): 450  FiO2: 40  PEEP: 5  ITime: 0.9  MAP: 11  PIP: 31      PHYSICAL EXAM:    GENERAL: NAD  HEAD:  Atraumatic, Normocephalic  EYES: EOMI, PERRLA, conjunctiva and sclera clear  ENMT: No tonsillar erythema, exudates, or enlargement; Moist mucous membranes, Good dentition, No lesions  NECK: Supple, No JVD, Normal thyroid  NERVOUS SYSTEM:  Alert   CHEST/LUNG:  HEART: Regular rate and rhythm; No murmurs, rubs, or gallops  ABDOMEN: Soft, Nontender, Nondistended; Bowel sounds present  EXTREMITIES:  2+ Peripheral Pulses, No clubbing, cyanosis, or edema  LYMPH: No lymphadenopathy noted  SKIN: No rashes or lesions      LABS:  CBC Full  -  ( 09 Jan 2022 08:58 )  WBC Count : 10.56 K/uL  RBC Count : 3.09 M/uL  Hemoglobin : 8.6 g/dL  Hematocrit : 28.4 %  Platelet Count - Automated : 407 K/uL  Mean Cell Volume : 91.9 fl  Mean Cell Hemoglobin : 27.8 pg  Mean Cell Hemoglobin Concentration : 30.3 gm/dL  Auto Neutrophil # : x  Auto Lymphocyte # : x  Auto Monocyte # : x  Auto Eosinophil # : x  Auto Basophil # : x  Auto Neutrophil % : x  Auto Lymphocyte % : x  Auto Monocyte % : x  Auto Eosinophil % : x  Auto Basophil % : x    01-09    143  |  111<H>  |  14  ----------------------------<  158<H>  3.8   |  28  |  0.70    Ca    10.2      09 Jan 2022 08:58  Phos  1.8     01-09  Mg     2.2     01-09    TPro  5.9<L>  /  Alb  1.6<L>  /  TBili  0.4  /  DBili  x   /  AST  21  /  ALT  6<L>  /  AlkPhos  81  01-09              [  ]  DVT Prophylaxis  [  ]   Abnormal Nutritional Status -  Malnutrition   Cachexia      Morbid Obesity BMI >/=40  Diet, NPO after Midnight:      NPO Start Date: 09-Jan-2022,   NPO Start Time: 23:59 (01-09-22 @ 07:20) [Active]  Diet, NPO with Tube Feed:   Tube Feeding Modality: Nasogastric  Glucerna 1.5 Dhruv  Total Volume for 24 Hours (mL): 960  Continuous  Starting Tube Feed Rate mL per Hour: 10  Increase Tube Feed Rate by (mL): 10     Every 4 hours  Until Goal Tube Feed Rate (mL per Hour): 40  Tube Feed Duration (in Hours): 24  Tube Feed Start Time: 10:00  No Carb Prosource (1pkg = 15gms Protein)     Qty per Day:  2  No Carb Prosource TF     Qty per Day:  2 (01-07-22 @ 20:16) [Active]          RADIOLOGY & ADDITIONAL STUDIES:  ***    Goals of Care Discussion with Family/Proxy/Other   - see note from/family meeting set up for...     MELISSA JOHNS    SCU progress note    INTERVAL HPI/OVERNIGHT EVENTS: ***Febrile , Tmax 101.4F last night. Recently completed Zosyn (12/29-1/7/22) and Cefepime (12/20-12/27/21). F/u CXR . NPO after midnight tonight and hold Lovenox today for GT placement in IR in am.     DNR [x ]   DNI  [  ]``    Covid - 19 PCR: COVID-19 PCR: NotDetec (08 Jan 2022 08:23)  COVID-19 PCR: NotDetec (06 Jan 2022 17:58)        The 4Ms    What Matters Most: see GOC  Age appropriate Medications/Screen for High Risk Medication: Yes  Mentation: see CAM below  Mobility: defer to physical exam    The Confusion Assessment Method (CAM) Diagnostic Algorithm     1: Acute Onset or Fluctuating Course  - Is there evidence of an acute change in mental status from the patient’s baseline? Did the (abnormal) behavior  fluctuate during the day, that is, tend to come and go, or increase and decrease in severity?  [ ] YES [x ] NO     2: Inattention  - Did the patient have difficulty focusing attention, being easily distractible, or having difficulty keeping track of what was being said?  [ ] YES [ ] NO   ( x) unable to assess     3: Disorganized thinking  -Was the patient’s thinking disorganized or incoherent, such as rambling or irrelevant conversation, unclear or illogical flow of ideas, or unpredictable switching from subject to subject?  [ ] YES [ ] NO  ( x) unable to assess    4: Altered Level of consciousness?  [ ] YES [ x] NO    The diagnosis of delirium by CAM requires the presence of features 1 and 2 and either 3 or 4.    PRESSORS: [ ] YES [x ] NO    Cardiovascular:    Pulmonary:    Hematalogic:    Other:  acetaminophen  Suppository .. 650 milliGRAM(s) Rectal every 6 hours PRN  bisacodyl Suppository 10 milliGRAM(s) Rectal daily PRN  chlorhexidine 2% Cloths 1 Application(s) Topical <User Schedule>  insulin lispro (ADMELOG) corrective regimen sliding scale   SubCutaneous every 6 hours  pantoprazole  Injectable 40 milliGRAM(s) IV Push two times a day    acetaminophen  Suppository .. 650 milliGRAM(s) Rectal every 6 hours PRN  bisacodyl Suppository 10 milliGRAM(s) Rectal daily PRN  chlorhexidine 2% Cloths 1 Application(s) Topical <User Schedule>  insulin lispro (ADMELOG) corrective regimen sliding scale   SubCutaneous every 6 hours  pantoprazole  Injectable 40 milliGRAM(s) IV Push two times a day    Drug Dosing Weight  Height (cm): 175.3 (20 Dec 2021 02:21)  Weight (kg): 72.5 (20 Dec 2021 06:30)  BMI (kg/m2): 23.6 (20 Dec 2021 06:30)  BSA (m2): 1.88 (20 Dec 2021 06:30)    CENTRAL LINE: [ ] YES [x ] NO  LOCATION:   DATE INSERTED:  REMOVE: [ ] YES [ ] NO  EXPLAIN:    HUTCHINS: [ x] YES [ ] NO    DATE INSERTED: 12/20/21  REMOVE:  [x ] YES [ ] NO  EXPLAIN:    PAST MEDICAL & SURGICAL HISTORY:  Hypertension    Diabetes    High cholesterol    Primary osteoarthritis of both knees    Coronary artery disease of native artery of native heart with stable angina pectoris    Allergic bronchitis    Diabetic retinopathy    Oral cancer    SCC (squamous cell carcinoma)    Metastatic cancer    Recurrent disease    Edema of extremity    Hyponatremia    Microalbuminuria    Neuralgia    Obstructive sleep apnea, adult    Vitamin D deficiency    S/P knee replacement  b/l    S/P hernia repair  b/l    Status post cataract extraction and insertion of intraocular lens, unspecified laterality  b/l    History of surgery  On 9/10/20 he underwent right hemiglossectomy and partial neck dissection with Dr. Darinel Servin at [facility].          ABG - ( 08 Jan 2022 04:10 )  pH, Arterial: 7.47  pH, Blood: x     /  pCO2: 38    /  pO2: 169   / HCO3: 28    / Base Excess: 3.9   /  SaO2: 100         01-08 @ 07:01  -  01-09 @ 07:00  --------------------------------------------------------  IN: 0 mL / OUT: 1500 mL / NET: -1500 mL      Mode: AC/ CMV (Assist Control/ Continuous Mandatory Ventilation)  RR (machine): 14  TV (machine): 450  FiO2: 40  PEEP: 5  ITime: 0.9  MAP: 11  PIP: 31      PHYSICAL EXAM:    GENERAL: NAD  HEAD:  Atraumatic, Normocephalic  EYES: EOMI, PERRLA, conjunctiva and sclera clear  ENMT: No tonsillar erythema, exudates, or enlargement; Moist mucous membranes, Good dentition, No lesions  NECK: Trach tube intact. Supple, No JVD,  NERVOUS SYSTEM:  Awake/Alert , moves all extremities weakly. Does not follow commands.   CHEST/LUNG:Trach to vent. Clear upper airway, decreased at bases. Left chest tube pigtail intact, to Pleuravac, to waterseal. No airleak noted. small amt serous yellow fluid drainage noted .   HEART: Regular rate and rhythm; No murmurs, rubs, or gallops  ABDOMEN: Soft, Nontender, Nondistended; Bowel sounds present  EXTREMITIES:  2+ Peripheral Pulses, No clubbing, cyanosis, or edema  LYMPH: No lymphadenopathy noted  SKIN: No rashes or lesions      LABS:  CBC Full  -  ( 09 Jan 2022 08:58 )  WBC Count : 10.56 K/uL  RBC Count : 3.09 M/uL  Hemoglobin : 8.6 g/dL  Hematocrit : 28.4 %  Platelet Count - Automated : 407 K/uL  Mean Cell Volume : 91.9 fl  Mean Cell Hemoglobin : 27.8 pg  Mean Cell Hemoglobin Concentration : 30.3 gm/dL  Auto Neutrophil # : x  Auto Lymphocyte # : x  Auto Monocyte # : x  Auto Eosinophil # : x  Auto Basophil # : x  Auto Neutrophil % : x  Auto Lymphocyte % : x  Auto Monocyte % : x  Auto Eosinophil % : x  Auto Basophil % : x    01-09    143  |  111<H>  |  14  ----------------------------<  158<H>  3.8   |  28  |  0.70    Ca    10.2      09 Jan 2022 08:58  Phos  1.8     01-09  Mg     2.2     01-09    TPro  5.9<L>  /  Alb  1.6<L>  /  TBili  0.4  /  DBili  x   /  AST  21  /  ALT  6<L>  /  AlkPhos  81  01-09  ----------  Culture - Sputum . (12.29.21 @ 18:28)   Gram Stain:   Numerous polymorphonuclear leukocytes per low power field   No squamous epithelial cells per low power field   Few Gram positive cocci in pairs per oil power field   Specimen Source: .Sputum Sputum   Culture Results:   Normal Respiratory Karla present Culture - Urine (12.21.21 @ 04:42)   Specimen Source: Clean Catch Clean Catch (Midstream)   Culture Results:   No growth     [  ]  DVT Prophylaxis  [  ]   Abnormal Nutritional Status -  Malnutrition   Cachexia        Diet, NPO after Midnight:      NPO Start Date: 09-Jan-2022,   NPO Start Time: 23:59 (01-09-22 @ 07:20) [Active]  Diet, NPO with Tube Feed:   Tube Feeding Modality: Nasogastric  Glucerna 1.5 Dhruv  Total Volume for 24 Hours (mL): 960  Continuous  Starting Tube Feed Rate mL per Hour: 10  Increase Tube Feed Rate by (mL): 10     Every 4 hours  Until Goal Tube Feed Rate (mL per Hour): 40  Tube Feed Duration (in Hours): 24  Tube Feed Start Time: 10:00  No Carb Prosource (1pkg = 15gms Protein)     Qty per Day:  2  No Carb Prosource TF     Qty per Day:  2 (01-07-22 @ 20:16) [Active]      RADIOLOGY & ADDITIONAL STUDIES:  ***  `< from: Xray Chest 1 View- PORTABLE-Urgent (Xray Chest 1 View- PORTABLE-Urgent .) (01.07.22 @ 19:05) >  FINDINGS:    Appropriate course of tracheostomy cannula and nasogastric tube.   Unchanged left chest tube. Stable cardiomediastinal silhouette. Stable   left lower lobe mass/consolidation and ill-defined opacities in the right   lower lung. No pneumothorax or pleural effusion.      IMPRESSION:    Appropriate course of new nasogastric tube. No other change.    < end of copied text >  < from: Xray Chest 1 View- PORTABLE-Routine (Xray Chest 1 View- PORTABLE-Routine in AM.) (01.07.22 @ 11:45) >    AP chest. Prior 1/6/2022.    IMPRESSION: Tracheostomy cannula left chest tube reidentified in   position.No pneumothorax or other gross interval change heart and lungs.    < end of copied text >        Goals of Care Discussion with Family/Proxy/Other   - see note from 1/2/22

## 2022-01-09 NOTE — PROGRESS NOTE ADULT - PROBLEM SELECTOR PLAN 2
#PTX (acute/on/chronic)  - s/p pigtail in ED 12/20  - CT clamped 1/7 overnight, CXR as above.   Continue CT to waterseal .   Thoracic following.

## 2022-01-10 NOTE — ADVANCED PRACTICE NURSE CONSULT - RECOMMEDATIONS
-Moisturize the patients skin, including the Ears  -Apply a Foam dressing to the Coccyx area Q 72hrs PRN, for protection  -Elevate/float the patients heels using heel protectors and reposition the patient Q 2hrs using wedges or pillows

## 2022-01-10 NOTE — CONSULT NOTE ADULT - ATTENDING COMMENTS
Plan for IR to attempt percutaneous gastrostomy before discussing surgical options.
Patient seen and examined agree with above note as modified, where appropriate, by me. Metastatic CA, s/p partial glossectomy, s/p cardiac arrest now in respiratory failure. Will plan for Tracheostomy and PEG placement.
- Protein calorie malnutrition.  - Anemia, stable.    Delayed note, s/p EGD today with gastric ulcer w/ clipping, no window for PEG. IR consultation for G tube placement. PPI BID. Check stool H. pylori, treat if positive.

## 2022-01-10 NOTE — CONSULT NOTE ADULT - ASSESSMENT
75 y/o male s/p open trach 1/6, family wishes for PEG     -Recommend IR for PEG placement  -Medical optimization   -Care as per medical team   -Discussed with Dr. Rodgers 
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 Patient was intubated on arival to ED, he had a cardiac arrest and achieved ROSC 20 minutes later. History was obtained with son at bed side , he states that patient Squamous cell cancer was metastasized to lungs and he was getting immunotherapy. They noticed patient O2 saturation was low so decided to bring him to ED,  On arrival to ED patient was intubated, and he lost the pulse. High quality CPR was started , patient achieved Rosc after 7 cycles of CPR. Post intubation CXR showed large left sided pneumothorax. pigtail was placed by ED physician. Emergent IO acces was done during the CPR. Patient was started on levophed and propofol for sedation. GI consult for PEG placement.   Unable to wean off intubation and patient had a trach placed on 1/6/22. Patient of sedation.  GI consult for PEG placement. Albumin 1.6. Patient with complicated hospital stay, LOS of 18 days. Blood culture negative. On zosyn empirically. Called daughter Holly Amaya numerous times but no answer. Spoke with daughter Sandi Amaya about medical wishes of the patient. As per daughter she wants PEG placement for nutritional support and medication administration.  Informed daugter during the procedure the DNR was to be rescinded.       
63 y.o. seen for tracheostomy and PEG tube placement     - OR for tracheostomy and PEG placement tentatively planned for 1/3/21  - please have primary team document medical clearance for the OR  - please have negative covid, type and screen and coags within 72 hrs of OR   - chlorhexidine wipes on day of OR  - NPO from midnight on day of OR   - continue care as per primary team   - discussed and agreed with Dr. Rios
Pneumonia - likely aspiration   Leukocytosis   Fevers    Plan - Cont Zosyn 3.375 gms iv q8hrs.

## 2022-01-10 NOTE — PROGRESS NOTE ADULT - PROBLEM SELECTOR PLAN 3
Chest tube placed 12/20 in ED  Continue left chest tube to water-seal.  Thoracic f/u for chest tube removal.  CXR pending.

## 2022-01-10 NOTE — CONSULT NOTE ADULT - SUBJECTIVE AND OBJECTIVE BOX
Dr. 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 Patient was intubated on arival to ED, he had a cardiac arrest and achieved ROSC 20 minutes later. History was obtained with son at bed side , he states that patient Squamous cell cancer was metastasized to lungs and he was getting immunotherapy. They noticed patient O2 saturation was low so decided to bring him to ED,  On arrival to ED patient was intubated, and he lost the pulse. High quality CPR was started , patient achieved Rosc after 7 cycles of CPR. Post intubation CXR showed large left sided pneumothorax. pigtail was placed by ED physician. Emergent IO acces was done during the CPR. Patient was started on levophed and propofol for sedation. GI consult for PEG placement.   Unable to wean off intubation and patient had a trach placed on 1/6/22. Patient of sedation.  GI consult for PEG placement. Albumin 1.6. Patient with complicated hospital stay, LOS of 18 days. Blood culture negative. On zosyn empirically. Called daughter Holly Amaya numerous times but no answer. Spoke with daughter Sandi Amaya about medical wishes of the patient. As per daughter she wants PEG placement for nutritional support and medication administration.  Informed daugter during the procedure the DNR was to be rescinded.     General surgery consulted on a 75 y/o male with complicated hospital course. Pt is s/p open trach on 1/6, t pntx s/p ED CT 12/20. Gen surg consulted for open G tube placement, had planned for trach/PEG placement on 1/6, PEG was aborted due to equipment issues. GI was consulted for placement, pt is s/p EGD with gastric ulcer clipping, as per GI unable to do PEG. Pt seen and examined at bedside, trach to vent, resting comfortably.       PAST MEDICAL & SURGICAL HISTORY:  Hypertension    Diabetes    High cholesterol    Primary osteoarthritis of both knees    Coronary artery disease of native artery of native heart with stable angina pectoris    Allergic bronchitis    Diabetic retinopathy    Oral cancer    SCC (squamous cell carcinoma)    Metastatic cancer    Recurrent disease    Edema of extremity    Hyponatremia    Microalbuminuria    Neuralgia    Obstructive sleep apnea, adult    Vitamin D deficiency    S/P knee replacement  b/l    S/P hernia repair  b/l    Status post cataract extraction and insertion of intraocular lens, unspecified laterality  b/l    History of surgery  On 9/10/20 he underwent right hemiglossectomy and partial neck dissection with Dr. Dairnel Servin at [facility].        Review of Systems:    I have reviewed 9 systems with the patient and the only positive findings were     MEDICATIONS  (STANDING):  chlorhexidine 2% Cloths 1 Application(s) Topical <User Schedule>  insulin lispro (ADMELOG) corrective regimen sliding scale   SubCutaneous every 6 hours  pantoprazole  Injectable 40 milliGRAM(s) IV Push two times a day  tamsulosin 0.4 milliGRAM(s) Oral at bedtime    MEDICATIONS  (PRN):  acetaminophen  Suppository .. 650 milliGRAM(s) Rectal every 6 hours PRN Temp greater or equal to 38C (100.4F)  bisacodyl Suppository 10 milliGRAM(s) Rectal daily PRN Constipation      Allergies    No Known Allergies    Intolerances        SOCIAL HISTORY          Smoking: Yes [ ]  No [ ]   ______pk yrs          ETOH  Yes [ ]  No [ ]  Social [ ]          DRUGS:  Yes [ ]  No [ ]  if so what______________    FAMILY HISTORY:  Family history of acute myocardial infarction (Sibling)        Vital Signs Last 24 Hrs  T(C): 37.7 (10 Neftali 2022 13:17), Max: 38 (10 Neftali 2022 05:10)  T(F): 99.8 (10 Neftali 2022 13:17), Max: 100.4 (10 Neftali 2022 05:10)  HR: 90 (10 Neftali 2022 13:17) (89 - 98)  BP: 120/67 (10 Neftali 2022 13:17) (120/67 - 138/72)  BP(mean): --  RR: 23 (10 Neftali 2022 13:17) (17 - 23)  SpO2: 100% (10 Neftali 2022 13:17) (98% - 100%)    Physical Exam:    General:  NAD   Eyes : EOMI   Respirations: Trach to vent   Abdomen: Soft, nondistended, nontender, no scars appreciated     LABS:                        8.5    10.33 )-----------( 377      ( 10 Neftali 2022 06:46 )             27.5     01-10    140  |  106  |  16  ----------------------------<  118<H>  3.8   |  27  |  0.56    Ca    10.2      10 Neftali 2022 06:46  Phos  2.4     01-10  Mg     2.1     01-10    TPro  5.9<L>  /  Alb  1.6<L>  /  TBili  0.5  /  DBili  x   /  AST  24  /  ALT  7<L>  /  AlkPhos  77  01-10

## 2022-01-10 NOTE — ADVANCED PRACTICE NURSE CONSULT - ASSESSMENT
This is a 76yr old male patient admitted for Cardiac Arrest, presenting with dried scabbed skin to the R. Ear with stable eschar and no drainage

## 2022-01-10 NOTE — CONSULT NOTE ADULT - CONSULT REASON
Tracheostomy and PEG tube placement
PEG placement
respiratory failure
open g tube
Pneumonia
metastatic tongue CA, acute hypoxic resp failure req recurrent intubations,  GOC

## 2022-01-10 NOTE — PROGRESS NOTE ADULT - SUBJECTIVE AND OBJECTIVE BOX
MELISSA JOHNS    SCU progress note    INTERVAL HPI/OVERNIGHT EVENTS: ***No overnight events   Tmax 100.4    DNR [x ]   DNI  [  ]    Covid - 19 PCR: Negative 1/8    The 4Ms    What Matters Most: see [facility]  Age appropriate Medications/Screen for High Risk Medication: Yes  Mentation: see CAM below  Mobility: defer to physical exam    The Confusion Assessment Method (CAM) Diagnostic Algorithm     1: Acute Onset or Fluctuating Course  - Is there evidence of an acute change in mental status from the patient’s baseline? Did the (abnormal) behavior  fluctuate during the day, that is, tend to come and go, or increase and decrease in severity?  [ ] YES [x ] NO     2: Inattention  - Did the patient have difficulty focusing attention, being easily distractible, or having difficulty keeping track of what was being said?  [ ] YES [ ] NO  Unable to access     3: Disorganized thinking  -Was the patient’s thinking disorganized or incoherent, such as rambling or irrelevant conversation, unclear or illogical flow of ideas, or unpredictable switching from subject to subject?  [ ] YES [ ] NO   unable to access    4: Altered Level of consciousness?  [ ] YES [x ] NO    The diagnosis of delirium by CAM requires the presence of features 1 and 2 and either 3 or 4.    PRESSORS: [ ] YES [x ] NO    Cardiovascular:  Heart Failure  Acute   Acute on Chronic  Chronic       tamsulosin 0.4 milliGRAM(s) Oral at bedtime    Pulmonary:    Hematalogic:    Other:  acetaminophen  Suppository .. 650 milliGRAM(s) Rectal every 6 hours PRN  bisacodyl Suppository 10 milliGRAM(s) Rectal daily PRN  chlorhexidine 2% Cloths 1 Application(s) Topical <User Schedule>  insulin lispro (ADMELOG) corrective regimen sliding scale   SubCutaneous every 6 hours  pantoprazole  Injectable 40 milliGRAM(s) IV Push two times a day    acetaminophen  Suppository .. 650 milliGRAM(s) Rectal every 6 hours PRN  bisacodyl Suppository 10 milliGRAM(s) Rectal daily PRN  chlorhexidine 2% Cloths 1 Application(s) Topical <User Schedule>  insulin lispro (ADMELOG) corrective regimen sliding scale   SubCutaneous every 6 hours  pantoprazole  Injectable 40 milliGRAM(s) IV Push two times a day  tamsulosin 0.4 milliGRAM(s) Oral at bedtime    Drug Dosing Weight  Height (cm): 175.3 (20 Dec 2021 02:21)  Weight (kg): 72.5 (20 Dec 2021 06:30)  BMI (kg/m2): 23.6 (20 Dec 2021 06:30)  BSA (m2): 1.88 (20 Dec 2021 06:30)    CENTRAL LINE: [ ] YES [x ] NO  LOCATION:   DATE INSERTED:  REMOVE: [ ] YES [ ] NO  EXPLAIN:    HUTCHINS: [ ] YES [ ] NO    DATE INSERTED:  REMOVE:  [ ] YES [ ] NO  EXPLAIN:    PAST MEDICAL & SURGICAL HISTORY:  Hypertension    Diabetes    High cholesterol    Primary osteoarthritis of both knees    Coronary artery disease of native artery of native heart with stable angina pectoris    Allergic bronchitis    Diabetic retinopathy    Oral cancer    SCC (squamous cell carcinoma)    Metastatic cancer    Recurrent disease    Edema of extremity    Hyponatremia    Microalbuminuria    Neuralgia    Obstructive sleep apnea, adult    Vitamin D deficiency    S/P knee replacement  b/l    S/P hernia repair  b/l    Status post cataract extraction and insertion of intraocular lens, unspecified laterality  b/l    History of surgery  On 9/10/20 he underwent right hemiglossectomy and partial neck dissection with Dr. Darinel Servin at [facility].                  Mode: AC/ CMV (Assist Control/ Continuous Mandatory Ventilation)  RR (machine): 14  TV (machine): 450  FiO2: 40  PEEP: 5  ITime: 1  MAP: 10  PIP: 26      PHYSICAL EXAM:    GENERAL: NAD, thin +temporal waisting. +muscle waisting  HEAD:  Atraumatic, Normocephalic  EYES: EOMI, PERRLA, conjunctiva and sclera clear  ENMT: No tonsillar erythema, exudates. Nasal feeding tube  NECK: Supple, No JVD, tracheostomy intact  NERVOUS SYSTEM:  Awake and alert. Nonverbal at baseline.   CHEST/LUNG: Diminished breath sounds bilateral bases. L>R. Left chest tube to water- seal.  HEART: Regular rate and rhythm; No murmurs, rubs, or gallops  ABDOMEN: Soft, Nontender, Nondistended; Bowel sounds present  EXTREMITIES: +Muscle waisting 2+ Peripheral Pulses, No clubbing, cyanosis, or edema  LYMPH: No lymphadenopathy noted  SKIN: No rashes or lesions      LABS:  CBC Full  -  ( 10 Neftali 2022 06:46 )  WBC Count : 10.33 K/uL  RBC Count : 3.03 M/uL  Hemoglobin : 8.5 g/dL  Hematocrit : 27.5 %  Platelet Count - Automated : 377 K/uL  Mean Cell Volume : 90.8 fl  Mean Cell Hemoglobin : 28.1 pg  Mean Cell Hemoglobin Concentration : 30.9 gm/dL  Auto Neutrophil # : x  Auto Lymphocyte # : x  Auto Monocyte # : x  Auto Eosinophil # : x  Auto Basophil # : x  Auto Neutrophil % : x  Auto Lymphocyte % : x  Auto Monocyte % : x  Auto Eosinophil % : x  Auto Basophil % : x    01-10    140  |  106  |  16  ----------------------------<  118<H>  3.8   |  27  |  0.56    Ca    10.2      10 Neftali 2022 06:46  Phos  2.4     01-10  Mg     2.1     01-10    TPro  5.9<L>  /  Alb  1.6<L>  /  TBili  0.5  /  DBili  x   /  AST  24  /  ALT  7<L>  /  AlkPhos  77  01-10              [  ]  DVT Prophylaxis  [  ]  Nutrition, Brand, Rate         Goal Rate        Abnormal Nutritional Status -  Malnutrition   Cachexia          RADIOLOGY & ADDITIONAL STUDIES:  ***  < from: Xray Chest 1 View- PORTABLE-Urgent (Xray Chest 1 View- PORTABLE-Urgent .) (01.08.22 @ 17:40) >  FINDINGS:    12:54 PM:    Appropriate course of tracheostomy cannula and nasogastric tube.   Unchanged left chest tube. Unremarkable cardiomediastinal silhouette.   Stable left pleural effusion and likely a combination of atelectasis and   pneumonia in the mid to lower lungs. No pneumothorax.    5:31 PM:    Stable findings.    IMPRESSION:    No significant change from one day prior.    < end of copied text >  < from: CT Chest w/ IV Cont (12.30.21 @ 12:53) >  FINDINGS:    LUNGS/AIRWAYS/PLEURA: New occlusion of the left lower lobe bronchus and   associated left lower lobe collapse. Heterogeneous enhancement of left   lower lobe atelectasis, suggesting a superimposed process. New   consolidation in the right lower lobe and lingula, and peribronchial   nodules in all lobes of the right lung.    Larger bilateral cavitary masses, for example, 4.6 cm in themiddle lobe   (measured on 3-90), prior 2.1 cm. Other unchanged solid nodules, for   example, 1 cm in the left apex (3-34).    Endotracheal tube tip in the mid trachea. Stable mild fibrosis in the   anterior upper lobes.    Slightly increased moderate left pneumothorax. Left pleural pigtail   catheter in the left posterior hemithorax. Trace left basilar pleural   effusion. New right pleural thickening in the right cardiophrenic angle.    LYMPH NODES/MEDIASTINUM: Partially included necrotic right   supraclavicular lymphadenopathy. Slightly larger right paratracheal lymph   nodes up to 1.1 cm (3-37). Calcified lymph nodes, likely prior   granulomatous disease.    HEART/VASCULATURE: Normal heart size. Small pericardial effusion. Heavily   calcified coronary arteries. Normal caliber aorta and main pulmonary   artery.    UPPER ABDOMEN:NG tube tip in the gastric fundus.    BONES/SOFT TISSUES: New non and mildly displaced fractures of the right   anterior third through sixth ribs, and mildly displaced fracture of the   left anterior fifth rib.      IMPRESSION:    New multilobar consolidation, including likely within a collapsed left   lower lobe, and peribronchial nodules in all lobes of the right lung,   favored to be infection.    New occlusion of the left lower lobe bronchus, possibly by mucus, and   associated left lower lobe collapse.    Larger bilateral cavitary metastases.    Increased moderate left pneumothorax. New right pleural thickening in the   right cardiophrenic angle.    Mildly increased mediastinal lymphadenopathy. Partially included large   right supraclavicular lymphadenopathy.    Bilateral acute anterior rib fractures.      < end of copied text >    Goals of Care Discussion with Family/Proxy/Other   - see note from 12/30

## 2022-01-10 NOTE — PROGRESS NOTE ADULT - PROBLEM SELECTOR PLAN 1
Continue mechanical ventilation with daily SBT.   Tolerated 3.5 hours today  Monitor oxygen saturation.  Will need vent facility upon discharge.  Serial CXRs.  failed extubation 12/23, reintub 12/23; again failed 12/27, reintub 12/28; s/p trach 1/6/22  Increased infiltrate/atelectasis but no PTX on CXR 1/7, pending repeat CXR 1/8

## 2022-01-10 NOTE — CONSULT NOTE ADULT - CONSULT REQUESTED DATE/TIME
10-Neftali-2022 16:25
28-Dec-2021 17:00
30-Dec-2021 18:50
23-Dec-2021 03:43
29-Dec-2021 14:57
07-Jan-2022 11:45

## 2022-01-10 NOTE — PROGRESS NOTE ADULT - NUTRITIONAL ASSESSMENT
This patient has been assessed with a concern for Malnutrition and has been determined to have a diagnosis/diagnoses of Severe protein-calorie malnutrition.  +Temporal waisting  +Muscle waisting  Protein  5.9   Albumin  1.6    This patient is being managed with:   Diet NPO after Midnight-     NPO Start Date: 09-Jan-2022   NPO Start Time: 23:59  Entered: Jan 9 2022  7:19AM    Diet NPO with Tube Feed-  Tube Feeding Modality: Nasogastric  Glucerna 1.5 Dhruv  Total Volume for 24 Hours (mL): 960  Continuous  Starting Tube Feed Rate {mL per Hour}: 10  Increase Tube Feed Rate by (mL): 10     Every 4 hours  Until Goal Tube Feed Rate (mL per Hour): 40  Tube Feed Duration (in Hours): 24  Tube Feed Start Time: 10:00  No Carb Prosource (1pkg = 15gms Protein)     Qty per Day:  2  No Carb Prosource TF     Qty per Day:  2  Entered: Jan 7 2022  8:17PM

## 2022-01-11 NOTE — PROGRESS NOTE ADULT - PROBLEM SELECTOR PLAN 9
DVT and GI prophylaxis.  F/U Thoracic surgery for chest tube removal.  Surgery consult appreciated.  Either surgery or IR to place Peg.  Serial CXR. Chest PT  Will need vent facility upon discharge.

## 2022-01-11 NOTE — PROGRESS NOTE ADULT - PROBLEM SELECTOR PLAN 1
Continue mechanical ventilation with daily SBT.   Tolerated 3.5 hours today  Monitor oxygen saturation.  Will need vent facility upon discharge.  Serial CXRs.  failed extubation 12/23, reintub 12/23; again failed 12/27, reintub 12/28; s/p trach 1/6/22  Increased infiltrate/atelectasis but no PTX on CXR 1/7, pending repeat CXR

## 2022-01-11 NOTE — PROGRESS NOTE ADULT - ASSESSMENT
76y.o. Male s/p open tracheostomy 1/6  chest xray reviewed with attending   case discussed with attending, Dr. Moura     - recommend AM chest xray   - chest tube to water seal   - keep gauze under tracheostomy tube   - will d/c tracheostomy sutures on 1/16  - care as per ICU   - case discussed and agreed with Dr. Rios    76y.o. Male s/p open tracheostomy 1/6  chest xray reviewed with attending   case discussed with attending, Dr. Moura     - recommend AM chest xray   - chest tube to water seal   - recommend pulmonology consult   - keep gauze under tracheostomy tube   - will d/c tracheostomy sutures on 1/16  - care as per ICU   - case discussed and agreed with Dr. Rios

## 2022-01-11 NOTE — PROGRESS NOTE ADULT - PROBLEM SELECTOR PLAN 2
CXR 1/11 shows near complete atelectasis of left lung.  Position patient on right side with left side up.  Chest PT

## 2022-01-11 NOTE — PROGRESS NOTE ADULT - SUBJECTIVE AND OBJECTIVE BOX
INTERVAL HPI/OVERNIGHT EVENTS:  Patient seen and examined at bedside.     MEDICATIONS  (STANDING):  chlorhexidine 2% Cloths 1 Application(s) Topical <User Schedule>  insulin lispro (ADMELOG) corrective regimen sliding scale   SubCutaneous every 6 hours  pantoprazole  Injectable 40 milliGRAM(s) IV Push two times a day  tamsulosin 0.4 milliGRAM(s) Oral at bedtime    MEDICATIONS  (PRN):  acetaminophen    Suspension .. 650 milliGRAM(s) Oral every 6 hours PRN Temp greater or equal to 38C (100.4F), Mild Pain (1 - 3)  acetaminophen  Suppository .. 650 milliGRAM(s) Rectal every 6 hours PRN Temp greater or equal to 38C (100.4F)  bisacodyl Suppository 10 milliGRAM(s) Rectal daily PRN Constipation      Vital Signs Last 24 Hrs  T(C): 37.4 (11 Jan 2022 14:21), Max: 37.4 (11 Jan 2022 14:21)  T(F): 99.4 (11 Jan 2022 14:21), Max: 99.4 (11 Jan 2022 14:21)  HR: 90 (11 Jan 2022 16:05) (80 - 99)  BP: 116/65 (11 Jan 2022 14:21) (116/65 - 157/78)    RR: 18 (11 Jan 2022 14:21) (14 - 18)  SpO2: 98% (11 Jan 2022 16:05) (96% - 98%)    Physical:  General: vented   HEENT: anticteric, trach in place  Chest: chest tube in place, gauze dressing overlying       I&O's Detail      LABS:                        8.4    7.92  )-----------( 405      ( 11 Jan 2022 09:08 )             27.1             01-11    141  |  108  |  14  ----------------------------<  103<H>  3.6   |  27  |  0.50    Ca    10.4      11 Jan 2022 09:08  Phos  2.7     01-11  Mg     2.1     01-11    TPro  5.8<L>  /  Alb  1.6<L>  /  TBili  0.7  /  DBili  x   /  AST  24  /  ALT  7<L>  /  AlkPhos  79  01-11

## 2022-01-11 NOTE — PROGRESS NOTE ADULT - NUTRITIONAL ASSESSMENT
This patient has been assessed with a concern for Malnutrition and has been determined to have a diagnosis/diagnoses of Severe protein-calorie malnutrition.    This patient is being managed with:   Diet NPO with Tube Feed-  Tube Feeding Modality: Nasogastric  Glucerna 1.5 Dhruv  Total Volume for 24 Hours (mL): 960  Continuous  Starting Tube Feed Rate {mL per Hour}: 10  Increase Tube Feed Rate by (mL): 10     Every 4 hours  Until Goal Tube Feed Rate (mL per Hour): 40  Tube Feed Duration (in Hours): 24  Tube Feed Start Time: 10:00  No Carb Prosource (1pkg = 15gms Protein)     Qty per Day:  2  No Carb Prosource TF     Qty per Day:  2  Entered: Jan 7 2022  8:17PM     This patient has been assessed with a concern for Malnutrition and has been determined to have a diagnosis/diagnoses of Severe protein-calorie malnutrition.  +Temporal waisting  +Muscle waisting  Protein  5.8   Albumin  1.6    This patient is being managed with:   Diet NPO with Tube Feed-  Tube Feeding Modality: Nasogastric  Glucerna 1.5 Dhruv  Total Volume for 24 Hours (mL): 960  Continuous  Starting Tube Feed Rate {mL per Hour}: 10  Increase Tube Feed Rate by (mL): 10     Every 4 hours  Until Goal Tube Feed Rate (mL per Hour): 40  Tube Feed Duration (in Hours): 24  Tube Feed Start Time: 10:00  No Carb Prosource (1pkg = 15gms Protein)     Qty per Day:  2  No Carb Prosource TF     Qty per Day:  2  Entered: Jan 7 2022  8:17PM

## 2022-01-11 NOTE — CHART NOTE - NSCHARTNOTEFT_GEN_A_CORE
Assessment:   76yMalePatient is a 76y old  Male who presents with a chief complaint of cardiac arrest (11 Jan 2022 13:29) . Pt  Visited. Pt is On Vent via  Trach. Pt with Mittens on Bilateral Hands, Pt DG from ICU to 4 N on 1/8.  Plan is for PEG by IR on 1/12. Bed scale  140 lb. P pulled out NG tube today. Labs noted . Pt with Chest Tube.  D/W NP.       Factors impacting intake: [ ] none [ ] nausea  [ ] vomiting [ ] diarrhea [ ] constipation  [ ]chewing problems [x ] swallowing issues  [ ] other:     Diet Prescription:  Diet, NPO with Tube Feed:   Tube Feeding Modality: Nasogastric  Glucerna 1.5 Dhruv  Total Volume for 24 Hours (mL): 960  Continuous  Starting Tube Feed Rate {mL per Hour}: 10  Increase Tube Feed Rate by (mL): 10     Every 4 hours  Until Goal Tube Feed Rate (mL per Hour): 40  Tube Feed Duration (in Hours): 24  Tube Feed Start Time: 10:00  No Carb Prosource (1pkg = 15gms Protein)     Qty per Day:  2  No Carb Prosource TF     Qty per Day:  2 (01-07-22 @ 20:16)    Intake: NPO     Current Weight:   % Weight Change  140 lb bed  scale woth out SCD Device, Blue Boots    Pertinent Medications: MEDICATIONS  (STANDING):  chlorhexidine 2% Cloths 1 Application(s) Topical <User Schedule>  insulin lispro (ADMELOG) corrective regimen sliding scale   SubCutaneous every 6 hours  pantoprazole  Injectable 40 milliGRAM(s) IV Push two times a day  tamsulosin 0.4 milliGRAM(s) Oral at bedtime    MEDICATIONS  (PRN):  acetaminophen    Suspension .. 650 milliGRAM(s) Oral every 6 hours PRN Temp greater or equal to 38C (100.4F), Mild Pain (1 - 3)  acetaminophen  Suppository .. 650 milliGRAM(s) Rectal every 6 hours PRN Temp greater or equal to 38C (100.4F)  bisacodyl Suppository 10 milliGRAM(s) Rectal daily PRN Constipation    Pertinent Labs: 01-11 Na141 mmol/L Glu 103 mg/dL<H> K+ 3.6 mmol/L Cr  0.50 mg/dL BUN 14 mg/dL 01-11 Phos 2.7 mg/dL 01-11 Alb 1.6 g/dL<L> 01-02 PAB 8 mg/dL<L> 12-30 Chol --    LDL --    HDL --    Trig 168 mg/dL<H>     CAPILLARY BLOOD GLUCOSE      POCT Blood Glucose.: 92 mg/dL (11 Jan 2022 11:30)  POCT Blood Glucose.: 108 mg/dL (11 Jan 2022 05:59)  POCT Blood Glucose.: 121 mg/dL (11 Jan 2022 00:11)  POCT Blood Glucose.: 121 mg/dL (10 Neftali 2022 16:43)    Skin:     Estimated Needs:   [ ] no change since previous assessment  [ ] recalculated:     Previous Nutrition Diagnosis:   [ ] Inadequate Energy Intake [ ]Inadequate Oral Intake [ ] Excessive Energy Intake   [ ] Underweight [ ] Increased Nutrient Needs [ ] Overweight/Obesity   [ ] Altered GI Function [ ] Unintended Weight Loss [ ] Food & Nutrition Related Knowledge Deficit [x ] Malnutrition Severe    Nutrition Diagnosis is [ x] ongoing  [ ] resolved [ ] not applicable     New Nutrition Diagnosis: [ ] not applicable       Interventions:   Recommend  [ ] Change Diet To:  [ ] Nutrition Supplement  [ x] Nutrition Support   When TF started Suggest Glucerna 1.5 Initial Goal rate @ 40 ml /hr x 24 hr as tolerated to Provide 960 ml,1440 Kcal , Protein 79 gm protein, Free water 729 ml /day)    (x) Prosource 1 pKt BID to provide additional 120 Kcal, 30 Gms of protein   [ ] Other:     Monitoring and Evaluation:   [ ] PO intake [ x ] Tolerance to diet prescription [ x ] weights [ x ] labs[ x ] follow up per protocol  [ ] other:

## 2022-01-11 NOTE — PROGRESS NOTE ADULT - SUBJECTIVE AND OBJECTIVE BOX
MELISSA JOHNS    SCU progress note    INTERVAL HPI/OVERNIGHT EVENTS: ***No overnight events    DNR [x ]   DNI  [  ]    Covid - 19 PCR: negative 1/8    The 4Ms    What Matters Most: see GOC  Age appropriate Medications/Screen for High Risk Medication: Yes  Mentation: see CAM below  Mobility: defer to physical exam    The Confusion Assessment Method (CAM) Diagnostic Algorithm     1: Acute Onset or Fluctuating Course  - Is there evidence of an acute change in mental status from the patient’s baseline? Did the (abnormal) behavior  fluctuate during the day, that is, tend to come and go, or increase and decrease in severity?  [ ] YES [x ] NO     2: Inattention  - Did the patient have difficulty focusing attention, being easily distractible, or having difficulty keeping track of what was being said?  [ ] YES [x ] NO     3: Disorganized thinking  -Was the patient’s thinking disorganized or incoherent, such as rambling or irrelevant conversation, unclear or illogical flow of ideas, or unpredictable switching from subject to subject?  [ ] YES [x ] NO    4: Altered Level of consciousness?  [ ] YES [x ] NO    The diagnosis of delirium by CAM requires the presence of features 1 and 2 and either 3 or 4.    PRESSORS: [ ] YES [x ] NO    Cardiovascular:  Heart Failure  Acute   Acute on Chronic  Chronic       tamsulosin 0.4 milliGRAM(s) Oral at bedtime    Pulmonary:    Hematalogic:    Other:  acetaminophen    Suspension .. 650 milliGRAM(s) Oral every 6 hours PRN  acetaminophen  Suppository .. 650 milliGRAM(s) Rectal every 6 hours PRN  bisacodyl Suppository 10 milliGRAM(s) Rectal daily PRN  chlorhexidine 2% Cloths 1 Application(s) Topical <User Schedule>  insulin lispro (ADMELOG) corrective regimen sliding scale   SubCutaneous every 6 hours  pantoprazole  Injectable 40 milliGRAM(s) IV Push two times a day    acetaminophen    Suspension .. 650 milliGRAM(s) Oral every 6 hours PRN  acetaminophen  Suppository .. 650 milliGRAM(s) Rectal every 6 hours PRN  bisacodyl Suppository 10 milliGRAM(s) Rectal daily PRN  chlorhexidine 2% Cloths 1 Application(s) Topical <User Schedule>  insulin lispro (ADMELOG) corrective regimen sliding scale   SubCutaneous every 6 hours  pantoprazole  Injectable 40 milliGRAM(s) IV Push two times a day  tamsulosin 0.4 milliGRAM(s) Oral at bedtime    Drug Dosing Weight  Height (cm): 175.3 (20 Dec 2021 02:21)  Weight (kg): 72.5 (20 Dec 2021 06:30)  BMI (kg/m2): 23.6 (20 Dec 2021 06:30)  BSA (m2): 1.88 (20 Dec 2021 06:30)    CENTRAL LINE: [ ] YES [x ] NO  LOCATION:   DATE INSERTED:  REMOVE: [ ] YES [ ] NO  EXPLAIN:    HUTCHINS: [ ] YES [ ] NO    DATE INSERTED:  REMOVE:  [ ] YES [ ] NO  EXPLAIN:    PAST MEDICAL & SURGICAL HISTORY:  Hypertension    Diabetes    High cholesterol    Primary osteoarthritis of both knees    Coronary artery disease of native artery of native heart with stable angina pectoris    Allergic bronchitis    Diabetic retinopathy    Oral cancer    SCC (squamous cell carcinoma)    Metastatic cancer    Recurrent disease    Edema of extremity    Hyponatremia    Microalbuminuria    Neuralgia    Obstructive sleep apnea, adult    Vitamin D deficiency    S/P knee replacement  b/l    S/P hernia repair  b/l    Status post cataract extraction and insertion of intraocular lens, unspecified laterality  b/l    History of surgery  On 9/10/20 he underwent right hemiglossectomy and partial neck dissection with Dr. Darinel Servin at [facility].                  Mode: AC/ CMV (Assist Control/ Continuous Mandatory Ventilation)  RR (machine): 14  TV (machine): 450  FiO2: 40  PEEP: 5  ITime: 0.9  MAP: 10  PIP: 28      PHYSICAL EXAM:    GENERAL: NAD, well-groomed, well-developed  HEAD:  Atraumatic, Normocephalic  EYES: EOMI, PERRLA, conjunctiva and sclera clear  ENMT: No tonsillar erythema, exudates, or enlargement; Moist mucous membranes, Good dentition, No lesions  NECK: Supple, No JVD, Normal thyroid  NERVOUS SYSTEM:  Alert & Oriented X3, Good concentration; Motor Strength 5/5 B/L upper and lower extremities; DTRs 2+ intact and symmetric  CHEST/LUNG: Clear to percussion bilaterally; No rales, rhonchi, wheezing, or rubs  HEART: Regular rate and rhythm; No murmurs, rubs, or gallops  ABDOMEN: Soft, Nontender, Nondistended; Bowel sounds present  EXTREMITIES:  2+ Peripheral Pulses, No clubbing, cyanosis, or edema  LYMPH: No lymphadenopathy noted  SKIN: No rashes or lesions      LABS:  CBC Full  -  ( 10 Neftali 2022 06:46 )  WBC Count : 10.33 K/uL  RBC Count : 3.03 M/uL  Hemoglobin : 8.5 g/dL  Hematocrit : 27.5 %  Platelet Count - Automated : 377 K/uL  Mean Cell Volume : 90.8 fl  Mean Cell Hemoglobin : 28.1 pg  Mean Cell Hemoglobin Concentration : 30.9 gm/dL  Auto Neutrophil # : x  Auto Lymphocyte # : x  Auto Monocyte # : x  Auto Eosinophil # : x  Auto Basophil # : x  Auto Neutrophil % : x  Auto Lymphocyte % : x  Auto Monocyte % : x  Auto Eosinophil % : x  Auto Basophil % : x    01-10    140  |  106  |  16  ----------------------------<  118<H>  3.8   |  27  |  0.56    Ca    10.2      10 Neftali 2022 06:46  Phos  2.4     01-10  Mg     2.1     01-10    TPro  5.9<L>  /  Alb  1.6<L>  /  TBili  0.5  /  DBili  x   /  AST  24  /  ALT  7<L>  /  AlkPhos  77  01-10              [  ]  DVT Prophylaxis  [  ]  Nutrition, Brand, Rate         Goal Rate        Abnormal Nutritional Status -  Malnutrition   Cachexia      Morbid Obesity BMI >/=40    RADIOLOGY & ADDITIONAL STUDIES:  ***    Goals of Care Discussion with Family/Proxy/Other   - see note from/family meeting set up for...     MELISSA JOHNS    SCU progress note    INTERVAL HPI/OVERNIGHT EVENTS: **No overnight events    DNR [x ]   DNI  [  ]    Covid - 19 PCR: negative 1/8    The 4Ms    What Matters Most: see GOC  Age appropriate Medications/Screen for High Risk Medication: Yes  Mentation: see CAM below  Mobility: defer to physical exam    The Confusion Assessment Method (CAM) Diagnostic Algorithm     1: Acute Onset or Fluctuating Course  - Is there evidence of an acute change in mental status from the patient’s baseline? Did the (abnormal) behavior  fluctuate during the day, that is, tend to come and go, or increase and decrease in severity?  [ ] YES [x ] NO     2: Inattention  - Did the patient have difficulty focusing attention, being easily distractible, or having difficulty keeping track of what was being said?  [ ] YES [x ] NO     3: Disorganized thinking  -Was the patient’s thinking disorganized or incoherent, such as rambling or irrelevant conversation, unclear or illogical flow of ideas, or unpredictable switching from subject to subject?  [ ] YES [x ] NO    4: Altered Level of consciousness?  [ ] YES [x ] NO    The diagnosis of delirium by CAM requires the presence of features 1 and 2 and either 3 or 4.    PRESSORS: [ ] YES [x ] NO    Cardiovascular:  Heart Failure  Acute   Acute on Chronic  Chronic       tamsulosin 0.4 milliGRAM(s) Oral at bedtime    Pulmonary:    Hematalogic:    Other:  acetaminophen    Suspension .. 650 milliGRAM(s) Oral every 6 hours PRN  acetaminophen  Suppository .. 650 milliGRAM(s) Rectal every 6 hours PRN  bisacodyl Suppository 10 milliGRAM(s) Rectal daily PRN  chlorhexidine 2% Cloths 1 Application(s) Topical <User Schedule>  insulin lispro (ADMELOG) corrective regimen sliding scale   SubCutaneous every 6 hours  pantoprazole  Injectable 40 milliGRAM(s) IV Push two times a day    acetaminophen    Suspension .. 650 milliGRAM(s) Oral every 6 hours PRN  acetaminophen  Suppository .. 650 milliGRAM(s) Rectal every 6 hours PRN  bisacodyl Suppository 10 milliGRAM(s) Rectal daily PRN  chlorhexidine 2% Cloths 1 Application(s) Topical <User Schedule>  insulin lispro (ADMELOG) corrective regimen sliding scale   SubCutaneous every 6 hours  pantoprazole  Injectable 40 milliGRAM(s) IV Push two times a day  tamsulosin 0.4 milliGRAM(s) Oral at bedtime    Drug Dosing Weight  Height (cm): 175.3 (20 Dec 2021 02:21)  Weight (kg): 72.5 (20 Dec 2021 06:30)  BMI (kg/m2): 23.6 (20 Dec 2021 06:30)  BSA (m2): 1.88 (20 Dec 2021 06:30)    CENTRAL LINE: [ ] YES [x ] NO  LOCATION:   DATE INSERTED:  REMOVE: [ ] YES [ ] NO  EXPLAIN:    HUTCHINS: [ ] YES [ ] NO    DATE INSERTED:  REMOVE:  [ ] YES [ ] NO  EXPLAIN:    PAST MEDICAL & SURGICAL HISTORY:  Hypertension    Diabetes    High cholesterol    Primary osteoarthritis of both knees    Coronary artery disease of native artery of native heart with stable angina pectoris    Allergic bronchitis    Diabetic retinopathy    Oral cancer    SCC (squamous cell carcinoma)    Metastatic cancer    Recurrent disease    Edema of extremity    Hyponatremia    Microalbuminuria    Neuralgia    Obstructive sleep apnea, adult    Vitamin D deficiency    S/P knee replacement  b/l    S/P hernia repair  b/l    Status post cataract extraction and insertion of intraocular lens, unspecified laterality  b/l    History of surgery  On 9/10/20 he underwent right hemiglossectomy and partial neck dissection with Dr. Darinel Servin at [facility].                  Mode: AC/ CMV (Assist Control/ Continuous Mandatory Ventilation)  RR (machine): 14  TV (machine): 450  FiO2: 40  PEEP: 5  ITime: 0.9  MAP: 10  PIP: 28      PHYSICAL EXAM:    GENERAL: NAD, thin. +temporal waisting. +Muscle waisting  HEAD:  Atraumatic, Normocephalic  EYES: EOMI, PERRLA, conjunctiva and sclera clear  ENMT: No tonsillar erythema, exudates. Nasal feeding tube  NECK: Supple, No JVD, tracheostomy intact  NERVOUS SYSTEM:  Alert & Oriented X3, Follows commands, moving all extremities  CHEST/LUNG: Very diminished breath sounds left lung. Left chest tube to waterseal  HEART: Regular rate and rhythm; No murmurs, rubs, or gallops  ABDOMEN: Soft, Nontender, Nondistended; Bowel sounds present  EXTREMITIES:  +Muscle waisting 2+ Peripheral Pulses, No clubbing, cyanosis, or edema  LYMPH: No lymphadenopathy noted  SKIN: No rashes or lesions      LABS:  CBC Full  -  ( 10 Neftali 2022 06:46 )  WBC Count : 10.33 K/uL  RBC Count : 3.03 M/uL  Hemoglobin : 8.5 g/dL  Hematocrit : 27.5 %  Platelet Count - Automated : 377 K/uL  Mean Cell Volume : 90.8 fl  Mean Cell Hemoglobin : 28.1 pg  Mean Cell Hemoglobin Concentration : 30.9 gm/dL  Auto Neutrophil # : x  Auto Lymphocyte # : x  Auto Monocyte # : x  Auto Eosinophil # : x  Auto Basophil # : x  Auto Neutrophil % : x  Auto Lymphocyte % : x  Auto Monocyte % : x  Auto Eosinophil % : x  Auto Basophil % : x    01-10    140  |  106  |  16  ----------------------------<  118<H>  3.8   |  27  |  0.56    Ca    10.2      10 Neftali 2022 06:46  Phos  2.4     01-10  Mg     2.1     01-10    TPro  5.9<L>  /  Alb  1.6<L>  /  TBili  0.5  /  DBili  x   /  AST  24  /  ALT  7<L>  /  AlkPhos  77  01-10              [  ]  DVT Prophylaxis  [  ]  Nutrition, Brand, Rate         Goal Rate        Abnormal Nutritional Status -  Malnutrition   Cachexia          RADIOLOGY & ADDITIONAL STUDIES:  ***  < from: Xray Chest 1 View- PORTABLE-Routine (Xray Chest 1 View- PORTABLE-Routine in AM.) (01.09.22 @ 11:02) >  COMPARISON: 1/8/2022 chest available for review.    FINDINGS: Tracheostomy tube in place.  NG tube tip beyond GE junction.   LEFT multi-sidehole pigtail chest tube catheter in place..  Bilateral mild perihilar/bibasilar diffuse airspace disease. No   pneumothorax..   No pneumothorax.    Mild cardiomegaly.    Visualized osseous structures are intact.    IMPRESSION:   Similar bilateral mild perihilar/basilar diffuse airspace   disease.   LEFT multi-sidehole pigtail chest tube catheter in place..   No   pneumothorax.      < end of copied text >  < from: CT Chest w/ IV Cont (12.30.21 @ 12:53) >  FINDINGS:    LUNGS/AIRWAYS/PLEURA: New occlusion of the left lower lobe bronchus and   associated left lower lobe collapse. Heterogeneous enhancement of left   lower lobe atelectasis, suggesting a superimposed process. New   consolidation in the right lower lobe and lingula, and peribronchial   nodules in all lobes of the right lung.    Larger bilateral cavitary masses, for example, 4.6 cm in themiddle lobe   (measured on 3-90), prior 2.1 cm. Other unchanged solid nodules, for   example, 1 cm in the left apex (3-34).    Endotracheal tube tip in the mid trachea. Stable mild fibrosis in the   anterior upper lobes.    Slightly increased moderate left pneumothorax. Left pleural pigtail   catheter in the left posterior hemithorax. Trace left basilar pleural   effusion. New right pleural thickening in the right cardiophrenic angle.    LYMPH NODES/MEDIASTINUM: Partially included necrotic right   supraclavicular lymphadenopathy. Slightly larger right paratracheal lymph   nodes up to 1.1 cm (3-37). Calcified lymph nodes, likely prior   granulomatous disease.    HEART/VASCULATURE: Normal heart size. Small pericardial effusion. Heavily   calcified coronary arteries. Normal caliber aorta and main pulmonary   artery.    UPPER ABDOMEN:NG tube tip in the gastric fundus.    BONES/SOFT TISSUES: New non and mildly displaced fractures of the right   anterior third through sixth ribs, and mildly displaced fracture of the   left anterior fifth rib.      IMPRESSION:    New multilobar consolidation, including likely within a collapsed left   lower lobe, and peribronchial nodules in all lobes of the right lung,   favored to be infection.    New occlusion of the left lower lobe bronchus, possibly by mucus, and   associated left lower lobe collapse.    Larger bilateral cavitary metastases.    Increased moderate left pneumothorax. New right pleural thickening in the   right cardiophrenic angle.    Mildly increased mediastinal lymphadenopathy. Partially included large   right supraclavicular lymphadenopathy.    Bilateral acute anterior rib fractures.      < end of copied text >    Goals of Care Discussion with Family/Proxy/Other   - see note from

## 2022-01-11 NOTE — CHART NOTE - NSCHARTNOTEFT_GEN_A_CORE
Son and daughter updated on patient's tests results.  Chest xray results explained. Explained treatment plan for lung re-expansion.  Abdominal CT results explained.  Patient will have Peg placed by IR tomorrow.  All questions answered.

## 2022-01-11 NOTE — PROGRESS NOTE ADULT - SUBJECTIVE AND OBJECTIVE BOX
Patient for probable placement of a G-Tube in IR tomorrow.  Please keep patient NPO, send early AM labs including CBC, BMP, and PT, INR / PTT.  Hold all anticoagulation, NSAIDS, and antiplatelet medication.   Patient for probable placement of a G-Tube in IR tomorrow.  Please keep patient NPO, send early AM labs including CBC, BMP, and PT, INR / PTT.  Hold all anticoagulation, NSAIDS, and antiplatelet medication.  Please advance or reinsert NG tube, it is necessary for the procedure.     Patient for probable placement of a G-Tube in IR tomorrow.  Please keep patient NPO, send early AM labs including CBC, BMP, and PT, INR / PTT.  Hold all anticoagulation, NSAIDS, and antiplatelet medication.  Please reinsert NG tube, it is necessary for the procedure.

## 2022-01-12 NOTE — PRE PROCEDURE NOTE - PRE PROCEDURE EVALUATION
Interventional Radiology Pre-Procedure Note    Diagnosis/Indication: Patient is a 76y old Male who presented with cardiac arrest s/p resuscitation, intubation and subsequent tracheostomy tube and NGT placement. Patient is s/p unsuccessful attempt at PEG placement. Image guided percutaneous gastrostomy tube placement was requested.    PAST MEDICAL & SURGICAL HISTORY:  Hypertension    Diabetes    High cholesterol    Primary osteoarthritis of both knees    Coronary artery disease of native artery of native heart with stable angina pectoris    Allergic bronchitis    Diabetic retinopathy    Oral cancer    SCC (squamous cell carcinoma)    Metastatic cancer    Recurrent disease    Edema of extremity    Hyponatremia    Microalbuminuria    Neuralgia    Obstructive sleep apnea, adult    Vitamin D deficiency    S/P knee replacement  b/l    S/P hernia repair  b/l    Status post cataract extraction and insertion of intraocular lens, unspecified laterality  b/l    History of surgery  On 9/10/20 he underwent right hemiglossectomy and partial neck dissection with Dr. Darinel Servin at [facility].    Allergies: No Known Allergies    LABS:  CBC Full  -  ( 12 Jan 2022 08:17 )  WBC Count : 8.36 K/uL  RBC Count : 3.23 M/uL  Hemoglobin : 9.0 g/dL  Hematocrit : 29.3 %  Platelet Count - Automated : 416 K/uL  Mean Cell Volume : 90.7 fl  Mean Cell Hemoglobin : 27.9 pg  Mean Cell Hemoglobin Concentration : 30.7 gm/dL    01-12    144  |  110<H>  |  14  ----------------------------<  112<H>  3.7   |  25  |  0.52    Ca    11.8<H>      12 Jan 2022 08:17  Phos  2.7     01-12  Mg     2.3     01-12    TPro  6.2  /  Alb  1.7<L>  /  TBili  0.6  /  DBili  x   /  AST  22  /  ALT  8<L>  /  AlkPhos  86  01-12    PT/INR - ( 12 Jan 2022 08:17 )   PT: 13.5 sec;   INR: 1.14 ratio      Procedure/ risks/ benefits/ alternatives were discussed with the patient's daughter (Sandi Perez, [phone]), who verbalizes understanding, and witnessed informed consent was obtained.

## 2022-01-12 NOTE — PROGRESS NOTE ADULT - PROBLEM SELECTOR PLAN 1
Continue mechanical ventilation with daily SBT.   Tolerated 3.5 hours today  Monitor oxygen saturation.  Will need vent facility upon discharge.  Serial CXRs.  failed extubation 12/23, reintub 12/23; again failed 12/27, reintub 12/28; s/p trach 1/6/22  Increased infiltrate/atelectasis   Small left pneumothorax noted on Abdominal CT

## 2022-01-12 NOTE — PROCEDURE NOTE - PROCEDURE FINDINGS AND DETAILS
Stomach insufflated via indwelling NGT. 16 Fr gastrostomy tube placed percutaneously with fluoroscopic guidance. Gastrostomy tube left to gravity. NGT removed.

## 2022-01-12 NOTE — PROGRESS NOTE ADULT - PROBLEM SELECTOR PLAN 9
DVT and GI prophylaxis.  Surgery consult appreciated.  IR to place Peg today.  Serial CXR. Gentle Chest PT  Position with left side up.  Will need vent facility upon discharge.

## 2022-01-12 NOTE — PROGRESS NOTE ADULT - SUBJECTIVE AND OBJECTIVE BOX
MELISSA JOHNS    SCU progress note    INTERVAL HPI/OVERNIGHT EVENTS: ***Scheduled for IR Peg placement today    DNR [x]   DNI  [  ]    Covid - 19 PCR: Negative 1/8    The 4Ms    What Matters Most: see GOC  Age appropriate Medications/Screen for High Risk Medication: Yes  Mentation: see CAM below  Mobility: defer to physical exam    The Confusion Assessment Method (CAM) Diagnostic Algorithm     1: Acute Onset or Fluctuating Course  - Is there evidence of an acute change in mental status from the patient’s baseline? Did the (abnormal) behavior  fluctuate during the day, that is, tend to come and go, or increase and decrease in severity?  [ ] YES [x ] NO     2: Inattention  - Did the patient have difficulty focusing attention, being easily distractible, or having difficulty keeping track of what was being said?  [ ] YES [x ] NO     3: Disorganized thinking  -Was the patient’s thinking disorganized or incoherent, such as rambling or irrelevant conversation, unclear or illogical flow of ideas, or unpredictable switching from subject to subject?  [ ] YES [x ] NO    4: Altered Level of consciousness?  [ ] YES [x ] NO    The diagnosis of delirium by CAM requires the presence of features 1 and 2 and either 3 or 4.    PRESSORS: [ ] YES [x ] NO    Cardiovascular:  Heart Failure  Acute   Acute on Chronic  Chronic       tamsulosin 0.4 milliGRAM(s) Oral at bedtime    Pulmonary:    Hematalogic:    Other:  acetaminophen    Suspension .. 650 milliGRAM(s) Oral every 6 hours PRN  acetaminophen  Suppository .. 650 milliGRAM(s) Rectal every 6 hours PRN  bisacodyl Suppository 10 milliGRAM(s) Rectal daily PRN  chlorhexidine 2% Cloths 1 Application(s) Topical <User Schedule>  insulin lispro (ADMELOG) corrective regimen sliding scale   SubCutaneous every 6 hours  pantoprazole  Injectable 40 milliGRAM(s) IV Push two times a day    acetaminophen    Suspension .. 650 milliGRAM(s) Oral every 6 hours PRN  acetaminophen  Suppository .. 650 milliGRAM(s) Rectal every 6 hours PRN  bisacodyl Suppository 10 milliGRAM(s) Rectal daily PRN  chlorhexidine 2% Cloths 1 Application(s) Topical <User Schedule>  insulin lispro (ADMELOG) corrective regimen sliding scale   SubCutaneous every 6 hours  pantoprazole  Injectable 40 milliGRAM(s) IV Push two times a day  tamsulosin 0.4 milliGRAM(s) Oral at bedtime    Drug Dosing Weight  Height (cm): 175.3 (20 Dec 2021 02:21)  Weight (kg): 72.5 (20 Dec 2021 06:30)  BMI (kg/m2): 23.6 (20 Dec 2021 06:30)  BSA (m2): 1.88 (20 Dec 2021 06:30)    CENTRAL LINE: [ ] YES [x ] NO  LOCATION:   DATE INSERTED:  REMOVE: [ ] YES [ ] NO  EXPLAIN:    HUTCHINS: [ ] YES [x ] NO    DATE INSERTED:  REMOVE:  [ ] YES [ ] NO  EXPLAIN:    PAST MEDICAL & SURGICAL HISTORY:  Hypertension    Diabetes    High cholesterol    Primary osteoarthritis of both knees    Coronary artery disease of native artery of native heart with stable angina pectoris    Allergic bronchitis    Diabetic retinopathy    Oral cancer    SCC (squamous cell carcinoma)    Metastatic cancer    Recurrent disease    Edema of extremity    Hyponatremia    Microalbuminuria    Neuralgia    Obstructive sleep apnea, adult    Vitamin D deficiency    S/P knee replacement  b/l    S/P hernia repair  b/l    Status post cataract extraction and insertion of intraocular lens, unspecified laterality  b/l    History of surgery  On 9/10/20 he underwent right hemiglossectomy and partial neck dissection with Dr. Darinel Servin at [facility].                  Mode: AC/ CMV (Assist Control/ Continuous Mandatory Ventilation)  RR (machine): 14  TV (machine): 450  FiO2: 40  PEEP: 5  ITime: 0.9  MAP: 9  PIP: 27      PHYSICAL EXAM:    GENERAL: NAD, Cachectic. +temporal and muscle waisting  HEAD:  Atraumatic, Normocephalic  EYES: EOMI, PERRLA, conjunctiva and sclera clear  ENMT: No tonsillar erythema, exudates. Nasal feeding tube intactc  NECK: Supple, No JVD, tracheostomy intact  NERVOUS SYSTEM:  Awake and alert. Follows simple commands. Moving all extremities  CHEST/LUNG: Diminished breath sounds left side. Left chest tube to water-seal  HEART: Regular rate and rhythm; No murmurs, rubs, or gallops  ABDOMEN: Soft, Nontender, Nondistended; Bowel sounds present  EXTREMITIES:  +Muscle waisting 2+ Peripheral Pulses, No clubbing, cyanosis, or edema  LYMPH: No lymphadenopathy noted  SKIN: No rashes or lesions      LABS:  CBC Full  -  ( 12 Jan 2022 08:17 )  WBC Count : 8.36 K/uL  RBC Count : 3.23 M/uL  Hemoglobin : 9.0 g/dL  Hematocrit : 29.3 %  Platelet Count - Automated : 416 K/uL  Mean Cell Volume : 90.7 fl  Mean Cell Hemoglobin : 27.9 pg  Mean Cell Hemoglobin Concentration : 30.7 gm/dL  Auto Neutrophil # : x  Auto Lymphocyte # : x  Auto Monocyte # : x  Auto Eosinophil # : x  Auto Basophil # : x  Auto Neutrophil % : x  Auto Lymphocyte % : x  Auto Monocyte % : x  Auto Eosinophil % : x  Auto Basophil % : x    01-12    144  |  110<H>  |  14  ----------------------------<  112<H>  3.7   |  25  |  0.52    Ca    11.8<H>      12 Jan 2022 08:17  Phos  2.7     01-12  Mg     2.3     01-12    TPro  6.2  /  Alb  1.7<L>  /  TBili  0.6  /  DBili  x   /  AST  22  /  ALT  8<L>  /  AlkPhos  86  01-12    PT/INR - ( 12 Jan 2022 08:17 )   PT: 13.5 sec;   INR: 1.14 ratio                   [  ]  DVT Prophylaxis  [  ]  Nutrition, Brand, Rate         Goal Rate        Abnormal Nutritional Status -  Malnutrition   Cachexia         RADIOLOGY & ADDITIONAL STUDIES:  ***  < from: Xray Chest 1 View- PORTABLE-Routine (Xray Chest 1 View- PORTABLE-Routine in AM.) (01.11.22 @ 11:15) >    FINDINGS:   LEFT multi-sidehole pigtail chest tube catheter in place..   Tracheostomy tube in place.  NG tube tip in body of stomach.      Increased opacification of LEFT thorax with residual lung air lucency   within the LEFT apex. No pneumothorax.  RIGHT lung shows small    RIGHT basilar airspace disease and/or atelectasis.      The heart size cannot be assessed due to left-sided mediastinal shift and   adjacent opacified lung.    Visualized osseous structures are intact.    IMPRESSION:   Increased opacification of LEFT hemithorax/increasing   airspace consolidation and/or effusion...    A FOLLOW-UP AP PORTABLE CHEST RADIOGRAPH 1/11/2022 AT 9:50 AM:  There is COMPLETE OPACIFICATION OF LEFT HEMITHORAX.  RIGHT lung shows pulmonary vascular congestion and mild RIGHT basilar   diffuse airspace disease.   LEFT multi-sidehole pigtail chest tube catheter in place..  Tracheostomy tube and NG tube in place.    < end of copied text >  < from: CT Abdomen and Pelvis No Cont (01.11.22 @ 14:55) >  PROCEDURE:  CT of the Abdomen and Pelvis was performed.  Sagittal and coronal reformats were performed.    FINDINGS:    LOWER CHEST: Since prior chest CT 12/30/2021, there is interval increased   opacification of the left hemithorax and volume loss. Prominent   left-sided central airway secretions noted. Findings of the left  hemithorax may represent combination of atelectasis and infection. A   left-sided chest tube and small left hydropneumothorax are partially   imaged. Distal extent of the chest tube is not imaged.    Multifocal right-sided lung parenchymal opacities are redemonstrated.   Multifocal infection is again the primary consideration. In particular,   there is a cavitary mass of the right middle lobe measuring 4.6 cm, with   internal nodularity, again concerning for metastatic disease in the   setting of known squamous cell malignancy. Mediastinal adenopathy also   partially imaged.    Coronary artery calcification and/or stenting. Small pericardial fluid   and stranding along the anterior mediastinum partially imaged.    Solid organ evaluation is limited due to lack of IV contrast.    LIVER: Liver size within normal limits.  BILE DUCTS: No biliary distention.  GALLBLADDER: Gallbladder distention and probable layering sludge.   Correlate with LFTs.  SPLEEN: Normal size of the spleen.  PANCREAS:No main pancreatic ductal dilatation.  ADRENALS: Unremarkable.  KIDNEYS/URETERS: No hydronephrosis.    BLADDER: Mildly distended.  REPRODUCTIVE ORGANS: Prostate size within normal limits.    BOWEL: Stomach is distended with air. Antrum is closely opposed to the   anterior abdominal wall without intervening bowel loop. Surgical clip is   seen at the gastric fundal region. Correlate with endoscopic history. No   small bowel distention. Contrast opacifies the colon. Mild stool burden   of the colon limits evaluation of the colonic mucosa. The appendix is   nondistended.  PERITONEUM: No ascites.  VESSELS: No aneurysm of the abdominal aorta. Aortoiliac atheromatous   changes.  RETROPERITONEUM/LYMPH NODES: Small volume nodes.  ABDOMINAL WALL: Soft tissue edema. Tiny fat-containing left inguinal   hernia.  BONES: Multilevel degenerative changes of the bones. Please note that   central canal and neural foramina are not adequately assessed the study.    IMPRESSION:    Stomach is distended with air. Antrum is closely opposed to the anterior   abdominal wall without intervening bowel loop. Surgical clip is seen at   the gastric fundal region. Correlate with endoscopic history.    Since prior chest CT from 12/30/2021, there is interval increased   opacification of the left hemithorax and volume loss. Prominent   left-sided central airway secretions noted. Findings of the left   hemithorax may represent combination of atelectasis and infection. A   left-sided chest tube and small left hydropneumothorax are partially   imaged. Distal extent of the chest tube is not imaged.    Multifocal right-sided lung parenchymal opacities are redemonstrated.   Multifocal infection is again the primary consideration. In particular,   there is a cavitary mass of the right middle lobe measuring 4.6 cm, with   internal nodularity, again concerning for metastatic disease in the   setting of known squamous cell malignancy. Mediastinal adenopathy also   partially imaged.    < end of copied text >  < from: CT Chest w/ IV Cont (12.30.21 @ 12:53) >  FINDINGS:    LUNGS/AIRWAYS/PLEURA: New occlusion of the left lower lobe bronchus and   associated left lower lobe collapse. Heterogeneous enhancement of left   lower lobe atelectasis, suggesting a superimposed process. New   consolidation in the right lower lobe and lingula, and peribronchial   nodules in all lobes of the right lung.    Larger bilateral cavitary masses, for example, 4.6 cm in themiddle lobe   (measured on 3-90), prior 2.1 cm. Other unchanged solid nodules, for   example, 1 cm in the left apex (3-34).    Endotracheal tube tip in the mid trachea. Stable mild fibrosis in the   anterior upper lobes.    Slightly increased moderate left pneumothorax. Left pleural pigtail   catheter in the left posterior hemithorax. Trace left basilar pleural   effusion. New right pleural thickening in the right cardiophrenic angle.    LYMPH NODES/MEDIASTINUM: Partially included necrotic right   supraclavicular lymphadenopathy. Slightly larger right paratracheal lymph   nodes up to 1.1 cm (3-37). Calcified lymph nodes, likely prior   granulomatous disease.    HEART/VASCULATURE: Normal heart size. Small pericardial effusion. Heavily   calcified coronary arteries. Normal caliber aorta and main pulmonary   artery.    UPPER ABDOMEN:NG tube tip in the gastric fundus.    BONES/SOFT TISSUES: New non and mildly displaced fractures of the right   anterior third through sixth ribs, and mildly displaced fracture of the   left anterior fifth rib.      IMPRESSION:    New multilobar consolidation, including likely within a collapsed left   lower lobe, and peribronchial nodules in all lobes of the right lung,   favored to be infection.    New occlusion of the left lower lobe bronchus, possibly by mucus, and   associated left lower lobe collapse.    Larger bilateral cavitary metastases.    Increased moderate left pneumothorax. New right pleural thickening in the   right cardiophrenic angle.    Mildly increased mediastinal lymphadenopathy. Partially included large   right supraclavicular lymphadenopathy.    Bilateral acute anterior rib fractures.    --- End of Report ---      < end of copied text >    Goals of Care Discussion with Family/Proxy/Other   - see note from 01/02/22

## 2022-01-12 NOTE — PROGRESS NOTE ADULT - NUTRITIONAL ASSESSMENT
This patient has been assessed with a concern for Malnutrition and has been determined to have a diagnosis/diagnoses of Severe protein-calorie malnutrition.  +Temporal waisting  +Muscle waisting  Protein  6.2   Albumin  1.7    This patient is being managed with:   Diet NPO after Midnight-     NPO Start Date: 11-Jan-2022   NPO Start Time: 23:59  Entered: Jan 11 2022  6:40PM    Diet NPO with Tube Feed-  Tube Feeding Modality: Nasogastric  Glucerna 1.5 Dhruv  Total Volume for 24 Hours (mL): 960  Continuous  Starting Tube Feed Rate {mL per Hour}: 10  Increase Tube Feed Rate by (mL): 10     Every 4 hours  Until Goal Tube Feed Rate (mL per Hour): 40  Tube Feed Duration (in Hours): 24  Tube Feed Start Time: 10:00  No Carb Prosource (1pkg = 15gms Protein)     Qty per Day:  2  No Carb Prosource TF     Qty per Day:  2  Entered: Jan 7 2022  8:17PM

## 2022-01-12 NOTE — PROGRESS NOTE ADULT - PROBLEM SELECTOR PLAN 2
CXR 1/11 shows near complete atelectasis of left lung.  Position patient on right side with left side up.  [facility] PT.

## 2022-01-12 NOTE — PROCEDURE NOTE - PLAN
- Gastrostomy tube to gravity x24 hours.  - Saline infusion via G-tube (60cc/hr x 6 hours) after 24 hours.  - If patient tolerates saline infusion, may start using G-tube for feedings, as per clinical service.  - Call IR with any questions/concerns regarding procedure.    Official report to follow.

## 2022-01-13 NOTE — PROGRESS NOTE ADULT - PROBLEM SELECTOR PLAN 2
CXR 1/11 shows near complete atelectasis of left lung.  Position patient on right side with left side up.  [facility] PT.  Repeat CXR shows improvement left lung. No pneumothorax visualized  CXR in am. Thoracic to remove chest tube after xray.

## 2022-01-13 NOTE — PROGRESS NOTE ADULT - PROBLEM SELECTOR PLAN 1
Continue mechanical ventilation with daily SBT.    Monitor oxygen saturation.  Will need vent facility upon discharge.  Serial CXRs.  failed extubation 12/23, reintub 12/23; again failed 12/27, reintub 12/28; s/p trach 1/6/22  Increased infiltrate/atelectasis

## 2022-01-13 NOTE — PROGRESS NOTE ADULT - SUBJECTIVE AND OBJECTIVE BOX
MELISSA JOHNS    SCU progress note    INTERVAL HPI/OVERNIGHT EVENTS: ***S/P IR Peg placement yesterday. To start Saline thru Peg today at 1500.    DNR [x ]   DNI  [  ]    Covid - 19 PCR: Negative 1/8    The 4Ms    What Matters Most: see [facility]  Age appropriate Medications/Screen for High Risk Medication: Yes  Mentation: see CAM below  Mobility: defer to physical exam    The Confusion Assessment Method (CAM) Diagnostic Algorithm     1: Acute Onset or Fluctuating Course  - Is there evidence of an acute change in mental status from the patient’s baseline? Did the (abnormal) behavior  fluctuate during the day, that is, tend to come and go, or increase and decrease in severity?  [ ] YES [x ] NO     2: Inattention  - Did the patient have difficulty focusing attention, being easily distractible, or having difficulty keeping track of what was being said?  [ ] YES [x ] NO     3: Disorganized thinking  -Was the patient’s thinking disorganized or incoherent, such as rambling or irrelevant conversation, unclear or illogical flow of ideas, or unpredictable switching from subject to subject?  [ ] YES [x ] NO    4: Altered Level of consciousness?  [ ] YES [x ] NO    The diagnosis of delirium by CAM requires the presence of features 1 and 2 and either 3 or 4.    PRESSORS: [ ] YES [x ] NO    Cardiovascular:  Heart Failure  Acute   Acute on Chronic  Chronic       tamsulosin 0.4 milliGRAM(s) Oral at bedtime    Pulmonary:    Hematalogic:    Other:  acetaminophen    Suspension .. 650 milliGRAM(s) Oral every 6 hours PRN  acetaminophen  Suppository .. 650 milliGRAM(s) Rectal every 6 hours PRN  bisacodyl Suppository 10 milliGRAM(s) Rectal daily PRN  chlorhexidine 2% Cloths 1 Application(s) Topical <User Schedule>  glucagon  Injectable 0.5 milliGRAM(s) IV Push once  insulin lispro (ADMELOG) corrective regimen sliding scale   SubCutaneous every 6 hours  pantoprazole  Injectable 40 milliGRAM(s) IV Push two times a day    acetaminophen    Suspension .. 650 milliGRAM(s) Oral every 6 hours PRN  acetaminophen  Suppository .. 650 milliGRAM(s) Rectal every 6 hours PRN  bisacodyl Suppository 10 milliGRAM(s) Rectal daily PRN  chlorhexidine 2% Cloths 1 Application(s) Topical <User Schedule>  glucagon  Injectable 0.5 milliGRAM(s) IV Push once  insulin lispro (ADMELOG) corrective regimen sliding scale   SubCutaneous every 6 hours  pantoprazole  Injectable 40 milliGRAM(s) IV Push two times a day  tamsulosin 0.4 milliGRAM(s) Oral at bedtime    Drug Dosing Weight  Height (cm): 175.3 (20 Dec 2021 02:21)  Weight (kg): 72.5 (20 Dec 2021 06:30)  BMI (kg/m2): 23.6 (20 Dec 2021 06:30)  BSA (m2): 1.88 (20 Dec 2021 06:30)    CENTRAL LINE: [ ] YES [x ] NO  LOCATION:   DATE INSERTED:  REMOVE: [ ] YES [ ] NO  EXPLAIN:    HUTCHINS: [ ] YES [x ] NO    DATE INSERTED:  REMOVE:  [ ] YES [ ] NO  EXPLAIN:    PAST MEDICAL & SURGICAL HISTORY:  Hypertension    Diabetes    High cholesterol    Primary osteoarthritis of both knees    Coronary artery disease of native artery of native heart with stable angina pectoris    Allergic bronchitis    Diabetic retinopathy    Oral cancer    SCC (squamous cell carcinoma)    Metastatic cancer    Recurrent disease    Edema of extremity    Hyponatremia    Microalbuminuria    Neuralgia    Obstructive sleep apnea, adult    Vitamin D deficiency    S/P knee replacement  b/l    S/P hernia repair  b/l    Status post cataract extraction and insertion of intraocular lens, unspecified laterality  b/l    History of surgery  On 9/10/20 he underwent right hemiglossectomy and partial neck dissection with Dr. Darinel Servin at [facility].                01-12 @ 07:01  -  01-13 @ 07:00  --------------------------------------------------------  IN: 0 mL / OUT: 0 mL / NET: 0 mL        Mode: AC/ CMV (Assist Control/ Continuous Mandatory Ventilation)  RR (machine): 14  TV (machine): 450  FiO2: 40  PEEP: 5  ITime: 1.3  MAP: 11  PIP: 29      PHYSICAL EXAM:    GENERAL: NAD, cachectic, +temporal and muscle waisting  HEAD:  Atraumatic, Normocephalic  EYES: EOMI, PERRLA, conjunctiva and sclera clear  ENMT: No tonsillar erythema, exudates  NECK: Supple, No JVD, tracheostomy intact  NERVOUS SYSTEM:  Awake and alert. Can follow simple commands. Moving all extremities.  CHEST/LUNG: diminished breath sounds left side. Left chest tube intact.  HEART: Regular rate and rhythm; No murmurs, rubs, or gallops  ABDOMEN: Soft, Nontender, Nondistended; Bowel sounds present; Peg intact  EXTREMITIES:  +Muscle waisting  2+ Peripheral Pulses, No clubbing, cyanosis, or edema  LYMPH: No lymphadenopathy noted  SKIN: No rashes or lesions      LABS:  CBC Full  -  ( 13 Jan 2022 10:56 )  WBC Count : 8.91 K/uL  RBC Count : 3.07 M/uL  Hemoglobin : 8.8 g/dL  Hematocrit : 27.9 %  Platelet Count - Automated : 380 K/uL  Mean Cell Volume : 90.9 fl  Mean Cell Hemoglobin : 28.7 pg  Mean Cell Hemoglobin Concentration : 31.5 gm/dL  Auto Neutrophil # : x  Auto Lymphocyte # : x  Auto Monocyte # : x  Auto Eosinophil # : x  Auto Basophil # : x  Auto Neutrophil % : x  Auto Lymphocyte % : x  Auto Monocyte % : x  Auto Eosinophil % : x  Auto Basophil % : x    01-13    147<H>  |  113<H>  |  18  ----------------------------<  112<H>  3.8   |  26  |  0.61    Ca    11.6<H>      13 Jan 2022 10:56  Phos  2.4     01-13  Mg     2.3     01-13    TPro  5.9<L>  /  Alb  1.7<L>  /  TBili  0.6  /  DBili  x   /  AST  18  /  ALT  7<L>  /  AlkPhos  83  01-13    PT/INR - ( 12 Jan 2022 08:17 )   PT: 13.5 sec;   INR: 1.14 ratio                   [  ]  DVT Prophylaxis  [  ]  Nutrition, Brand, Rate         Goal Rate        Abnormal Nutritional Status -  Malnutrition   Cachexia         RADIOLOGY & ADDITIONAL STUDIES:  ***  < from: Xray Chest 1 View- PORTABLE-Urgent (Xray Chest 1 View- PORTABLE-Urgent .) (01.12.22 @ 11:55) >  ET tube, feeding tube and left chest tube again noted. No gross left   pneumothorax.    Opacities left lung slightly improving.    Heart size within normal limits.    < end of copied text >  < from: CT Abdomen and Pelvis No Cont (01.11.22 @ 14:55) >  PROCEDURE:  CT of the Abdomen and Pelvis was performed.  Sagittal and coronal reformats were performed.    FINDINGS:    LOWER CHEST: Since prior chest CT 12/30/2021, there is interval increased   opacification of the left hemithorax and volume loss. Prominent   left-sided central airway secretions noted. Findings of the left  hemithorax may represent combination of atelectasis and infection. A   left-sided chest tube and small left hydropneumothorax are partially   imaged. Distal extent of the chest tube is not imaged.    Multifocal right-sided lung parenchymal opacities are redemonstrated.   Multifocal infection is again the primary consideration. In particular,   there is a cavitary mass of the right middle lobe measuring 4.6 cm, with   internal nodularity, again concerning for metastatic disease in the   setting of known squamous cell malignancy. Mediastinal adenopathy also   partially imaged.    Coronary artery calcification and/or stenting. Small pericardial fluid   and stranding along the anterior mediastinum partially imaged.    Solid organ evaluation is limited due to lack of IV contrast.    LIVER: Liver size within normal limits.  BILE DUCTS: No biliary distention.  GALLBLADDER: Gallbladder distention and probable layering sludge.   Correlate with LFTs.  SPLEEN: Normal size of the spleen.  PANCREAS:No main pancreatic ductal dilatation.  ADRENALS: Unremarkable.  KIDNEYS/URETERS: No hydronephrosis.    BLADDER: Mildly distended.  REPRODUCTIVE ORGANS: Prostate size within normal limits.    BOWEL: Stomach is distended with air. Antrum is closely opposed to the   anterior abdominal wall without intervening bowel loop. Surgical clip is   seen at the gastric fundal region. Correlate with endoscopic history. No   small bowel distention. Contrast opacifies the colon. Mild stool burden   of the colon limits evaluation of the colonic mucosa. The appendix is   nondistended.  PERITONEUM: No ascites.  VESSELS: No aneurysm of the abdominal aorta. Aortoiliac atheromatous   changes.  RETROPERITONEUM/LYMPH NODES: Small volume nodes.  ABDOMINAL WALL: Soft tissue edema. Tiny fat-containing left inguinal   hernia.  BONES: Multilevel degenerative changes of the bones. Please note that   central canal and neural foramina are not adequately assessed the study.    IMPRESSION:    Stomach is distended with air. Antrum is closely opposed to the anterior   abdominal wall without intervening bowel loop. Surgical clip is seen at   the gastric fundal region. Correlate with endoscopic history.    Since prior chest CT from 12/30/2021, there is interval increased   opacification of the left hemithorax and volume loss. Prominent   left-sided central airway secretions noted. Findings of the left   hemithorax may represent combination of atelectasis and infection. A   left-sided chest tube and small left hydropneumothorax are partially   imaged. Distal extent of the chest tube is not imaged.    Multifocal right-sided lung parenchymal opacities are redemonstrated.   Multifocal infection is again the primary consideration. In particular,   there is a cavitary mass of the right middle lobe measuring 4.6 cm, with   internal nodularity, again concerning for metastatic disease in the   setting of known squamous cell malignancy. Mediastinal adenopathy also   partially imaged.      < end of copied text >  < from: CT Chest w/ IV Cont (12.30.21 @ 12:53) >  FINDINGS:    LUNGS/AIRWAYS/PLEURA: New occlusion of the left lower lobe bronchus and   associated left lower lobe collapse. Heterogeneous enhancement of left   lower lobe atelectasis, suggesting a superimposed process. New   consolidation in the right lower lobe and lingula, and peribronchial   nodules in all lobes of the right lung.    Larger bilateral cavitary masses, for example, 4.6 cm in themiddle lobe   (measured on 3-90), prior 2.1 cm. Other unchanged solid nodules, for   example, 1 cm in the left apex (3-34).    Endotracheal tube tip in the mid trachea. Stable mild fibrosis in the   anterior upper lobes.    Slightly increased moderate left pneumothorax. Left pleural pigtail   catheter in the left posterior hemithorax. Trace left basilar pleural   effusion. New right pleural thickening in the right cardiophrenic angle.    LYMPH NODES/MEDIASTINUM: Partially included necrotic right   supraclavicular lymphadenopathy. Slightly larger right paratracheal lymph   nodes up to 1.1 cm (3-37). Calcified lymph nodes, likely prior   granulomatous disease.    HEART/VASCULATURE: Normal heart size. Small pericardial effusion. Heavily   calcified coronary arteries. Normal caliber aorta and main pulmonary   artery.    UPPER ABDOMEN:NG tube tip in the gastric fundus.    BONES/SOFT TISSUES: New non and mildly displaced fractures of the right   anterior third through sixth ribs, and mildly displaced fracture of the   left anterior fifth rib.      IMPRESSION:    New multilobar consolidation, including likely within a collapsed left   lower lobe, and peribronchial nodules in all lobes of the right lung,   favored to be infection.    New occlusion of the left lower lobe bronchus, possibly by mucus, and   associated left lower lobe collapse.    Larger bilateral cavitary metastases.    Increased moderate left pneumothorax. New right pleural thickening in the   right cardiophrenic angle.    Mildly increased mediastinal lymphadenopathy. Partially included large   right supraclavicular lymphadenopathy.    Bilateral acute anterior rib fractures.    < end of copied text >    Goals of Care Discussion with Family/Proxy/Other   - see note from 1/02/22

## 2022-01-13 NOTE — PROGRESS NOTE ADULT - NUTRITIONAL ASSESSMENT
This patient has been assessed with a concern for Malnutrition and has been determined to have a diagnosis/diagnoses of Severe protein-calorie malnutrition.  +Temporal waisting  +Muscle waisting  Protein  6.2   Albumin  1.7    This patient is being managed with:   Diet NPO-  Entered: Jan 12 2022  5:27PM

## 2022-01-13 NOTE — CHART NOTE - NSCHARTNOTEFT_GEN_A_CORE
Assessment:   76yMalePatient is a 76y old  Male who presents with a chief complaint of cardiac arrest (13 Jan 2022 11:31) Pt visited. Pt is on Vent via Trach.  S/P PEG by IR on 01/12. Pt with chest tube. +Mittens on Both hands. D/W NP. PER IR START Infusing saline @ 60 cc /hrx  6 hrs @ 1500, before starting Peg     Factors impacting intake: [ ] none [ ] nausea  [ ] vomiting [ ] diarrhea [ ] constipation  [ ]chewing problems [ ] swallowing issues  [ ] other:     Diet Prescription: Diet, NPO (01-12-22 @ 17:31)    Intake:  NPO     Current Weight:   % Weight Change    Pertinent Medications: MEDICATIONS  (STANDING):  chlorhexidine 2% Cloths 1 Application(s) Topical <User Schedule>  glucagon  Injectable 0.5 milliGRAM(s) IV Push once  insulin lispro (ADMELOG) corrective regimen sliding scale   SubCutaneous every 6 hours  pantoprazole  Injectable 40 milliGRAM(s) IV Push two times a day  tamsulosin 0.4 milliGRAM(s) Oral at bedtime    MEDICATIONS  (PRN):  acetaminophen    Suspension .. 650 milliGRAM(s) Oral every 6 hours PRN Temp greater or equal to 38C (100.4F), Mild Pain (1 - 3)  acetaminophen  Suppository .. 650 milliGRAM(s) Rectal every 6 hours PRN Temp greater or equal to 38C (100.4F)  bisacodyl Suppository 10 milliGRAM(s) Rectal daily PRN Constipation    Pertinent Labs: 01-13 Na147 mmol/L<H> Glu 112 mg/dL<H> K+ 3.8 mmol/L Cr  0.61 mg/dL BUN 18 mg/dL 01-13 Phos 2.4 mg/dL<L> 01-13 Alb 1.7 g/dL<L> 01-02 PAB 8 mg/dL<L> 12-30 Chol --    LDL --    HDL --    Trig 168 mg/dL<H>     CAPILLARY BLOOD GLUCOSE      POCT Blood Glucose.: 107 mg/dL (13 Jan 2022 11:39)  POCT Blood Glucose.: 108 mg/dL (13 Jan 2022 05:33)  POCT Blood Glucose.: 111 mg/dL (12 Jan 2022 23:41)  POCT Blood Glucose.: 111 mg/dL (12 Jan 2022 17:29)    Skin:     Estimated Needs:   [ ] no change since previous assessment  [ ] recalculated:     Previous Nutrition Diagnosis:   [ ] Inadequate Energy Intake [ ]Inadequate Oral Intake [ ] Excessive Energy Intake   [ ] Underweight [ ] Increased Nutrient Needs [ ] Overweight/Obesity   [ ] Altered GI Function [ ] Unintended Weight Loss [ ] Food & Nutrition Related Knowledge Deficit [x ] Malnutrition Severe    Nutrition Diagnosis is [x ] ongoing  [ ] resolved [ ] not applicable     New Nutrition Diagnosis: [ ] not applicable       Interventions:   Recommend  [ ] Change Diet To:  [ ] Nutrition Supplement  [x ] Nutrition Support    When TF started Suggest Glucerna 1.5 initial Goal rate @ 40 ml /hr x 24 hr as tolerated   [x ] Other: Prosource 1 PKt Via TF BID to provide additionally 30 gms of protein, 120 Kcal )  (X) D/W NP.    Monitoring and Evaluation:   [ ] PO intake [ x ] Tolerance to diet prescription [ x ] weights [ x ] labs[ x ] follow up per protocol  [ ] other:

## 2022-01-13 NOTE — PROGRESS NOTE ADULT - PROBLEM SELECTOR PLAN 9
DVT and GI prophylaxis.  Surgery consult appreciated.  S/P peg placement yesterday.  Tube feeds to start at midnight if patient tolerates saline infusion.  Repeat CXR in am.  Thoracic surgery to remove chest tube tomorrow pending CXR.  Will need vent facility upon discharge. Discussed with son and daughter.

## 2022-01-13 NOTE — CHART NOTE - NSCHARTNOTEFT_GEN_A_CORE
Called by medical team to assess pigtail catheter and if it can be removed.   Patient seen and examined at bedside. Pleura-vac with only 20cc in cannister, unclear when it was changed and no output documents the past couple of days.   Pigtail is clamped at the time of exam, tube unclmaped and flushed gently with 8cc of normal saline to clear pigtail tubing.   CXR appears improved from previous imaging this week.  Will keep left pigtail to water seal overnight. (DO NOT RECLAMP)  If low output and AM CXR stable will plan to remove pigtail.   Discussed with Dr. Rios

## 2022-01-14 NOTE — PROGRESS NOTE ADULT - PROBLEM SELECTOR PLAN 10
DVT and GI prophylaxis.  Surgery consult appreciated.  S/P peg placement   Will need vent facility upon discharge. Discussed with son and daughter.

## 2022-01-14 NOTE — PROGRESS NOTE ADULT - ASSESSMENT
76 year old male S/p Tracheostomy 1/6, PEG tube by IR on 1/12. Left pigtail chest tube by ED on 12/20. Febrile    - AM CXR to be done, will follow up results  - pending CXR results possible removal of pigtail today (please have CXR done early)  - trach sutures to be removed on POD#10  - follow up with IR regarding initiating tube feeds via PEG tube  - medical management regarding fever  - continue medical management  - will discuss with Dr. Rios

## 2022-01-14 NOTE — PROGRESS NOTE ADULT - PROBLEM SELECTOR PLAN 9
S/P Peg placement by IR 1/12.  had saline infusion 60cc/hr for 6 hours   tolerated infusion hence TF will be initiated today

## 2022-01-14 NOTE — PROGRESS NOTE ADULT - SUBJECTIVE AND OBJECTIVE BOX
MELISSA JOHNS    SCU progress note    INTERVAL HPI/OVERNIGHT EVENTS: continues to remove medical devices (Hiflo, NRM) when wrist restraints not well secured, low grade temp overnight, WBC normal; peg placed 1/12 and NS was initiated by primary team yesterday x 24 hrs, pt tolerated.    DNR [x]   DNI  [  ]    Covid - 19 PCR: Negative 1/8 and today    The 4Ms    What Matters Most: see GOC  Age appropriate Medications/Screen for High Risk Medication: Yes  Mentation: see CAM below  Mobility: defer to physical exam    The Confusion Assessment Method (CAM) Diagnostic Algorithm     1: Acute Onset or Fluctuating Course  - Is there evidence of an acute change in mental status from the patient’s baseline? Did the (abnormal) behavior  fluctuate during the day, that is, tend to come and go, or increase and decrease in severity?  [ ] YES [x] NO     2: Inattention  - Did the patient have difficulty focusing attention, being easily distractible, or having difficulty keeping track of what was being said?  [ ] YES [x] NO     3: Disorganized thinking  -Was the patient’s thinking disorganized or incoherent, such as rambling or irrelevant conversation, unclear or illogical flow of ideas, or unpredictable switching from subject to subject?  [ ] YES [x ] NO    4: Altered Level of consciousness?  [ ] YES [x ] NO    The diagnosis of delirium by CAM requires the presence of features 1 and 2 and either 3 or 4.    PRESSORS: [ ] YES [x ] NO    Cardiovascular:  Heart Failure  Acute   Acute on Chronic  Chronic       tamsulosin 0.4 milliGRAM(s) Oral at bedtime    Pulmonary:    Hematalogic:    Other:  acetaminophen    Suspension .. 650 milliGRAM(s) Oral every 6 hours PRN  acetaminophen  Suppository .. 650 milliGRAM(s) Rectal every 6 hours PRN  bisacodyl Suppository 10 milliGRAM(s) Rectal daily PRN  chlorhexidine 2% Cloths 1 Application(s) Topical <User Schedule>  glucagon  Injectable 0.5 milliGRAM(s) IV Push once  insulin lispro (ADMELOG) corrective regimen sliding scale   SubCutaneous every 6 hours  pantoprazole  Injectable 40 milliGRAM(s) IV Push two times a day    acetaminophen    Suspension .. 650 milliGRAM(s) Oral every 6 hours PRN  acetaminophen  Suppository .. 650 milliGRAM(s) Rectal every 6 hours PRN  bisacodyl Suppository 10 milliGRAM(s) Rectal daily PRN  chlorhexidine 2% Cloths 1 Application(s) Topical <User Schedule>  glucagon  Injectable 0.5 milliGRAM(s) IV Push once  insulin lispro (ADMELOG) corrective regimen sliding scale   SubCutaneous every 6 hours  pantoprazole  Injectable 40 milliGRAM(s) IV Push two times a day  tamsulosin 0.4 milliGRAM(s) Oral at bedtime    Drug Dosing Weight  Height (cm): 175.3 (20 Dec 2021 02:21)  Weight (kg): 72.5 (20 Dec 2021 06:30)  BMI (kg/m2): 23.6 (20 Dec 2021 06:30)  BSA (m2): 1.88 (20 Dec 2021 06:30)    CENTRAL LINE: [ ] YES [x] NO  LOCATION:   DATE INSERTED:  REMOVE: [ ] YES [ ] NO  EXPLAIN:    HUTCHINS: [ ] YES [x] NO    DATE INSERTED:    PAST MEDICAL & SURGICAL HISTORY:  Hypertension    Diabetes    High cholesterol    Primary osteoarthritis of both knees    Coronary artery disease of native artery of native heart with stable angina pectoris    Allergic bronchitis    Diabetic retinopathy    Oral cancer    SCC (squamous cell carcinoma)    Metastatic cancer    Recurrent disease    Edema of extremity    Hyponatremia    Microalbuminuria    Neuralgia    Obstructive sleep apnea, adult    Vitamin D deficiency    S/P knee replacement  b/l    S/P hernia repair  b/l    Status post cataract extraction and insertion of intraocular lens, unspecified laterality  b/l    History of surgery  On 9/10/20 he underwent right hemiglossectomy and partial neck dissection with Dr. Darinel Servin at [facility].    01-13 @ 07:01  -  01-14 @ 07:00  --------------------------------------------------------  IN: 0 mL / OUT: 300 mL / NET: -300 mL        Mode: AC/ CMV (Assist Control/ Continuous Mandatory Ventilation)  RR (machine): 14  TV (machine): 450  FiO2: 40  PEEP: 5  ITime: 1  MAP: 9  PIP: 28      PHYSICAL EXAM:  GENERAL: NAD, cachectic, +temporal and muscle waisting  HEAD:  Atraumatic, Normocephalic  EYES: pupils equal round; conjunctiva and sclera non-icteric  ENMT: no discharges  NECK: No JVD, tracheostomy intact  NERVOUS SYSTEM:  Awake and alert. Can follow simple commands. Moving all extremities.  CHEST/LUNG: diminished breath sounds left side. Left chest tube intact.  HEART: Regular rate and rhythm; No murmurs, rubs, or gallops  ABDOMEN: Soft, Nondistended, no indication of tenderness; Bowel sounds present; Peg intact  EXTREMITIES:  +Muscle waisting  2+ Peripheral Pulses, No clubbing, cyanosis, or edema  LYMPH: No lymphadenopathy noted  SKIN: No rashes or lesions    LABS:  CBC Full  -  ( 14 Jan 2022 07:13 )  WBC Count : 10.05 K/uL  RBC Count : 3.34 M/uL  Hemoglobin : 9.5 g/dL  Hematocrit : 31.0 %  Platelet Count - Automated : 375 K/uL  Mean Cell Volume : 92.8 fl  Mean Cell Hemoglobin : 28.4 pg  Mean Cell Hemoglobin Concentration : 30.6 gm/dL  Auto Neutrophil # : x  Auto Lymphocyte # : x  Auto Monocyte # : x  Auto Eosinophil # : x  Auto Basophil # : x  Auto Neutrophil % : x  Auto Lymphocyte % : x  Auto Monocyte % : x  Auto Eosinophil % : x  Auto Basophil % : x    01-14    150<H>  |  115<H>  |  22<H>  ----------------------------<  121<H>  3.6   |  25  |  0.71    Ca    12.0<H>      14 Jan 2022 07:13  Phos  2.3     01-14  Mg     2.3     01-14    TPro  6.1  /  Alb  1.6<L>  /  TBili  0.8  /  DBili  x   /  AST  29  /  ALT  8<L>  /  AlkPhos  86  01-14    [x ]  DVT Prophylaxis  [x]  Nutrition, Brand, Rate --- see diet order         Goal Rate        Abnormal Nutritional Status -  Malnutrition   Cachexia      Morbid Obesity BMI >/=40    RADIOLOGY & ADDITIONAL STUDIES:  reviewed  < from: CT Abdomen and Pelvis No Cont (01.11.22 @ 14:55) >  IMPRESSION:    Stomach is distended with air. Antrum is closely opposed to the anterior   abdominal wall without intervening bowel loop. Surgical clip is seen at   the gastric fundal region. Correlate with endoscopic history.    Since prior chest CT from 12/30/2021, there is interval increased   opacification of the left hemithorax and volume loss. Prominent   left-sided central airway secretions noted. Findings of the left   hemithorax may represent combination of atelectasis and infection. A   left-sided chest tube and small left hydropneumothorax are partially   imaged. Distal extent of the chest tube is not imaged.    Multifocal right-sided lung parenchymal opacities are redemonstrated.   Multifocal infection is again the primary consideration. In particular,   there is a cavitary mass of the right middle lobe measuring 4.6 cm, with   internal nodularity, again concerning for metastatic disease in the   setting of known squamous cell malignancy. Mediastinal adenopathy also   partially imaged.    < end of copied text >    < from: Xray Chest 1 View- PORTABLE-Routine (Xray Chest 1 View- PORTABLE-Routine in AM.) (01.13.22 @ 10:00) >    Frontal expiratory view of the chest shows the heart to be similar in   size. Tracheostomy tube and left pigtail catheter are present. Gastric   tube is partially seen.    The lungs show slight clearing of the lower left lung with similar small   left effusion and there is no evidence of pneumothorax nor right pleural   effusion.    IMPRESSION:  Partial clearing, left lung.      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 Patient had cardiac arrest in ED , was intubated CPR was initiated and subsequently ROSC was achieved after 20 minutes. Patient admitted to the ICU 12/20-1/7/22 for post cardiac arrest management in the setting of acute hypoxic respiratory failure due to left pneumothorax and likely aspiration pneumonia and septic shock. He was treated with a course of cefepime (12/20-12/28) and then another course of abx with Zosyn (12/29-1/7). He failed extubation x2 (12/23, 12/27) and ultimately underwent Tracheostomy placement (1/6/22). Underwent EGD 1/7 for PEG placement, found to have a gastric ulcer which was clipped and he was initiated on BID PPI. He is pending PEG placement, plan for IR consult Monday 1/10/22 and possible removal of L pigtail pleural catheter 1/8 pending results of repeat CXR. His ICU course was further s/f hypernatremia being managed with free water boluses and Palliative Care was consulted as patient is not a candidate for further cancer directed treatment once his is s/p trach/PEG. He was transferred to the [facility] on 1/8 for further care.    INTERVAL HPI/OVERNIGHT EVENTS: continues to remove medical devices when wrist restraints not well secured, low grade temp overnight, WBC normal; peg placed 1/12 and NS was initiated by primary team yesterday x 24 hrs, pt tolerated.    DNR [x]   DNI  [  ]    Covid - 19 PCR: Negative 1/8 and today    The 4Ms    What Matters Most: see GOC  Age appropriate Medications/Screen for High Risk Medication: Yes  Mentation: see CAM below  Mobility: defer to physical exam    The Confusion Assessment Method (CAM) Diagnostic Algorithm     1: Acute Onset or Fluctuating Course  - Is there evidence of an acute change in mental status from the patient’s baseline? Did the (abnormal) behavior  fluctuate during the day, that is, tend to come and go, or increase and decrease in severity?  [ ] YES [x] NO     2: Inattention  - Did the patient have difficulty focusing attention, being easily distractible, or having difficulty keeping track of what was being said?  [ ] YES [x] NO     3: Disorganized thinking  -Was the patient’s thinking disorganized or incoherent, such as rambling or irrelevant conversation, unclear or illogical flow of ideas, or unpredictable switching from subject to subject?  [ ] YES [x ] NO    4: Altered Level of consciousness?  [ ] YES [x ] NO    The diagnosis of delirium by CAM requires the presence of features 1 and 2 and either 3 or 4.    PRESSORS: [ ] YES [x ] NO    Cardiovascular:  Heart Failure  Acute   Acute on Chronic  Chronic       tamsulosin 0.4 milliGRAM(s) Oral at bedtime    Pulmonary:    Hematalogic:    Other:  acetaminophen    Suspension .. 650 milliGRAM(s) Oral every 6 hours PRN  acetaminophen  Suppository .. 650 milliGRAM(s) Rectal every 6 hours PRN  bisacodyl Suppository 10 milliGRAM(s) Rectal daily PRN  chlorhexidine 2% Cloths 1 Application(s) Topical <User Schedule>  glucagon  Injectable 0.5 milliGRAM(s) IV Push once  insulin lispro (ADMELOG) corrective regimen sliding scale   SubCutaneous every 6 hours  pantoprazole  Injectable 40 milliGRAM(s) IV Push two times a day    acetaminophen    Suspension .. 650 milliGRAM(s) Oral every 6 hours PRN  acetaminophen  Suppository .. 650 milliGRAM(s) Rectal every 6 hours PRN  bisacodyl Suppository 10 milliGRAM(s) Rectal daily PRN  chlorhexidine 2% Cloths 1 Application(s) Topical <User Schedule>  glucagon  Injectable 0.5 milliGRAM(s) IV Push once  insulin lispro (ADMELOG) corrective regimen sliding scale   SubCutaneous every 6 hours  pantoprazole  Injectable 40 milliGRAM(s) IV Push two times a day  tamsulosin 0.4 milliGRAM(s) Oral at bedtime    Drug Dosing Weight  Height (cm): 175.3 (20 Dec 2021 02:21)  Weight (kg): 72.5 (20 Dec 2021 06:30)  BMI (kg/m2): 23.6 (20 Dec 2021 06:30)  BSA (m2): 1.88 (20 Dec 2021 06:30)    CENTRAL LINE: [ ] YES [x] NO  LOCATION:   DATE INSERTED:  REMOVE: [ ] YES [ ] NO  EXPLAIN:    HUTCHINS: [ ] YES [x] NO    DATE INSERTED:    PAST MEDICAL & SURGICAL HISTORY:  Hypertension    Diabetes    High cholesterol    Primary osteoarthritis of both knees    Coronary artery disease of native artery of native heart with stable angina pectoris    Allergic bronchitis    Diabetic retinopathy    Oral cancer    SCC (squamous cell carcinoma)    Metastatic cancer    Recurrent disease    Edema of extremity    Hyponatremia    Microalbuminuria    Neuralgia    Obstructive sleep apnea, adult    Vitamin D deficiency    S/P knee replacement  b/l    S/P hernia repair  b/l    Status post cataract extraction and insertion of intraocular lens, unspecified laterality  b/l    History of surgery  On 9/10/20 he underwent right hemiglossectomy and partial neck dissection with Dr. Darinel Servin at [facility].    01-13 @ 07:01  -  01-14 @ 07:00  --------------------------------------------------------  IN: 0 mL / OUT: 300 mL / NET: -300 mL        Mode: AC/ CMV (Assist Control/ Continuous Mandatory Ventilation)  RR (machine): 14  TV (machine): 450  FiO2: 40  PEEP: 5  ITime: 1  MAP: 9  PIP: 28      PHYSICAL EXAM:  GENERAL: NAD, cachectic, +temporal and muscle waisting  HEAD:  Atraumatic, Normocephalic  EYES: pupils equal round; conjunctiva and sclera non-icteric  ENMT: no discharges  NECK: No JVD, tracheostomy intact  NERVOUS SYSTEM:  Awake and alert. Can follow simple commands. Moving all extremities.  CHEST/LUNG: diminished breath sounds left side. Left chest tube intact.  HEART: Regular rate and rhythm; No murmurs, rubs, or gallops  ABDOMEN: Soft, Nondistended, no indication of tenderness; Bowel sounds present; Peg intact  EXTREMITIES:  +Muscle waisting  2+ Peripheral Pulses, No clubbing, cyanosis, or edema  LYMPH: No lymphadenopathy noted  SKIN: No rashes or lesions    LABS:  CBC Full  -  ( 14 Jan 2022 07:13 )  WBC Count : 10.05 K/uL  RBC Count : 3.34 M/uL  Hemoglobin : 9.5 g/dL  Hematocrit : 31.0 %  Platelet Count - Automated : 375 K/uL  Mean Cell Volume : 92.8 fl  Mean Cell Hemoglobin : 28.4 pg  Mean Cell Hemoglobin Concentration : 30.6 gm/dL  Auto Neutrophil # : x  Auto Lymphocyte # : x  Auto Monocyte # : x  Auto Eosinophil # : x  Auto Basophil # : x  Auto Neutrophil % : x  Auto Lymphocyte % : x  Auto Monocyte % : x  Auto Eosinophil % : x  Auto Basophil % : x    01-14    150<H>  |  115<H>  |  22<H>  ----------------------------<  121<H>  3.6   |  25  |  0.71    Ca    12.0<H>      14 Jan 2022 07:13  Phos  2.3     01-14  Mg     2.3     01-14    TPro  6.1  /  Alb  1.6<L>  /  TBili  0.8  /  DBili  x   /  AST  29  /  ALT  8<L>  /  AlkPhos  86  01-14    [x ]  DVT Prophylaxis  [x]  Nutrition, Brand, Rate --- see diet order         Goal Rate        Abnormal Nutritional Status -  Malnutrition   Cachexia      Morbid Obesity BMI >/=40    RADIOLOGY & ADDITIONAL STUDIES:  reviewed  < from: CT Abdomen and Pelvis No Cont (01.11.22 @ 14:55) >  IMPRESSION:    Stomach is distended with air. Antrum is closely opposed to the anterior   abdominal wall without intervening bowel loop. Surgical clip is seen at   the gastric fundal region. Correlate with endoscopic history.    Since prior chest CT from 12/30/2021, there is interval increased   opacification of the left hemithorax and volume loss. Prominent   left-sided central airway secretions noted. Findings of the left   hemithorax may represent combination of atelectasis and infection. A   left-sided chest tube and small left hydropneumothorax are partially   imaged. Distal extent of the chest tube is not imaged.    Multifocal right-sided lung parenchymal opacities are redemonstrated.   Multifocal infection is again the primary consideration. In particular,   there is a cavitary mass of the right middle lobe measuring 4.6 cm, with   internal nodularity, again concerning for metastatic disease in the   setting of known squamous cell malignancy. Mediastinal adenopathy also   partially imaged.    < end of copied text >    < from: Xray Chest 1 View- PORTABLE-Routine (Xray Chest 1 View- PORTABLE-Routine in AM.) (01.13.22 @ 10:00) >    Frontal expiratory view of the chest shows the heart to be similar in   size. Tracheostomy tube and left pigtail catheter are present. Gastric   tube is partially seen.    The lungs show slight clearing of the lower left lung with similar small   left effusion and there is no evidence of pneumothorax nor right pleural   effusion.    IMPRESSION:  Partial clearing, left lung.      < end of copied text >      Goals of Care Discussion with Family/Proxy/ IDR team members/attending

## 2022-01-14 NOTE — CHART NOTE - NSCHARTNOTEFT_GEN_A_CORE
76 year old male S/p Tracheostomy 1/6, PEG tube by IR on 1/12. Left pigtail chest tube by ED on 12/20. Febrile    -S/p L pigtail removal. Pt tolerated well without complication. Post-pull CXR reviewed with Dr. Rios.

## 2022-01-14 NOTE — PROGRESS NOTE ADULT - NUTRITIONAL ASSESSMENT
This patient has been assessed with a concern for Malnutrition and has been determined to have a diagnosis/diagnoses of Severe protein-calorie malnutrition.    This patient is being managed with:   Diet: Tube feeding as below    The following pending diet order is being considered for treatment of Severe protein-calorie malnutrition:  Diet NPO with Tube Feed-  Tube Feeding Modality: Gastrostomy  Glucerna 1.5 Dhruv  Total Volume for 24 Hours (mL): 960  Continuous  Starting Tube Feed Rate {mL per Hour}: 30  Increase Tube Feed Rate by (mL): 10     Every 6 hours  Until Goal Tube Feed Rate (mL per Hour): 40  Tube Feed Duration (in Hours): 24  Tube Feed Start Time: 10:00  No Carb Prosource TF     Qty per Day:  1 PKT BID.  Entered: Jan 14 2022  9:46AM

## 2022-01-14 NOTE — PROGRESS NOTE ADULT - PROBLEM SELECTOR PLAN 2
CXR 1/11 shows near complete atelectasis of left lung.  Position patient on right side with left side up.  Akin Chest PT.  Repeat CXR shows improvement left lung. No pneumothorax visualized  CXR done today  plan for chest tube removal today by surgery team

## 2022-01-14 NOTE — PROGRESS NOTE ADULT - SUBJECTIVE AND OBJECTIVE BOX
S/p Tracheostomy 1/6, PEG tube by IR on 1/12. Left pigtail chest tube by ED on 12/20   Patient seen and examined at bedside  Febrile x1.    Vital Signs Last 24 Hrs  T(F): 99.8 (01-14-22 @ 07:00), Max: 100.9 (01-14-22 @ 05:05)  HR: 105 (01-14-22 @ 05:05)  BP: 135/75 (01-14-22 @ 05:05)  RR: 19 (01-14-22 @ 05:05)  SpO2: 100% (01-14-22 @ 05:05)  CAPILLARY BLOOD GLUCOSE  POCT Blood Glucose.: 137 mg/dL (14 Jan 2022 05:27)    GENERAL: Alert, NAD  CHEST/LUNG: Trach to vent; left pigtail to waterseal, no drainage overnight.    ABDOMEN: PEG tube in place, capped    I&O's Detail    13 Jan 2022 07:01  -  14 Jan 2022 07:00  --------------------------------------------------------  IN:  Total IN: 0 mL    OUT:    Chest Tube (mL): 0 mL    Voided (mL): 300 mL  Total OUT: 300 mL    Total NET: -300 mL    LABS:                        9.5    10.05 )-----------( 375      ( 14 Jan 2022 07:13 )             31.0     01-14    150<H>  |  115<H>  |  22<H>  ----------------------------<  121<H>  3.6   |  25  |  0.71    Ca    12.0<H>      14 Jan 2022 07:13  Phos  2.3     01-14  Mg     2.3     01-14    TPro  6.1  /  Alb  1.6<L>  /  TBili  0.8  /  DBili  x   /  AST  29  /  ALT  8<L>  /  AlkPhos  86  01-14    RADIOLOGY & ADDITIONAL STUDIES:  AM Chest Xray pending

## 2022-01-15 NOTE — PROGRESS NOTE ADULT - PROBLEM SELECTOR PLAN 2
CXR 1/11 shows near complete atelectasis of left lung.  Position patient on right side with left side up.  Akin Chest PT.  Repeat CXR shows improvement left lung. No pneumothorax visualized  L pleural pigtail catheter d/cd 1/14/22

## 2022-01-15 NOTE — PROGRESS NOTE ADULT - PROBLEM SELECTOR PLAN 1
Continue mechanical ventilation with daily SBT - will place on SBT x4hrs today.    Monitor oxygen saturation.  Will need vent facility upon discharge.  Serial CXRs.  failed extubation 12/23, reintub 12/23; again failed 12/27, reintub 12/28; s/p trach 1/6/22  Increased infiltrate/atelectasis

## 2022-01-15 NOTE — PROGRESS NOTE ADULT - NUTRITIONAL ASSESSMENT
This patient has been assessed with a concern for Malnutrition and has been determined to have a diagnosis/diagnoses of Severe protein-calorie malnutrition.    This patient is being managed with:   Diet NPO with Tube Feed-  Tube Feeding Modality: Gastrostomy  Glucerna 1.5 Dhruv  Total Volume for 24 Hours (mL): 960  Continuous  Starting Tube Feed Rate {mL per Hour}: 30  Increase Tube Feed Rate by (mL): 10     Every 6 hours  Until Goal Tube Feed Rate (mL per Hour): 40  Tube Feed Duration (in Hours): 24  Tube Feed Start Time: 10:00  No Carb Prosource TF     Qty per Day:  1 PKT BID.  Entered: Jan 14 2022  9:46AM

## 2022-01-15 NOTE — CHART NOTE - NSCHARTNOTEFT_GEN_A_CORE
Patient's son, Moses [phone], called this afternoon for an update. I updated him regarding the patient's current clinical condition and the plan of care. We discussed that his feeding tube is blocked and arrangements will be made to replace the tube early this week. We discussed possibly replacing the NGT for feedings in the interim but that in the past the patient has pulled out his feeding tubes multiple times due to discomfort. We discussed that if there is a significant delay beyond Monday to replace the patient's gastrostomy tube, we would then replace the NGT for feedings in the interim. He verbalized understanding of the information and agreement with the plan of care. He had no further questions at the end of our discussion.

## 2022-01-15 NOTE — PROGRESS NOTE ADULT - ASSESSMENT
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 Patient had cardiac arrest in ED , was intubated CPR was initiated and subsequently ROSC was achieved after 20 minutes. Patient admitted to the ICU 12/20-1/7/22 for post cardiac arrest management in the setting of acute hypoxic respiratory failure due to left pneumothorax and likely aspiration pneumonia and septic shock. He was treated with a course of cefepime (12/20-12/28) and then another course of abx with Zosyn (12/29-1/7). He failed extubation x2 (12/23, 12/27) and ultimately underwent Tracheostomy placement (1/6/22). Underwent EGD 1/7 for PEG placement, found to have a gastric ulcer which was clipped and he was initiated on BID PPI. He is pending PEG placement, plan for IR consult Monday 1/10/22 and possible removal of L pigtail pleural catheter 1/8 pending results of repeat CXR. His ICU course was further s/f hypernatremia being managed with free water boluses and Palliative Care was consulted as patient is not a candidate for further cancer directed treatment once his is s/p trach/PEG. He was transferred to the [facility] on 1/8 for further care.     1/9 Febrile, Tmax 101.4F. F/u CXR. TOV. NPO after midnight and hold lovenox for GT placement in IR in am.   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 Patient had cardiac arrest in ED , was intubated CPR was initiated and subsequently ROSC was achieved after 20 minutes. Patient admitted to the ICU 12/20-1/7/22 for post cardiac arrest management in the setting of acute hypoxic respiratory failure due to left pneumothorax and likely aspiration pneumonia and septic shock. He was treated with a course of cefepime (12/20-12/28) and then another course of abx with Zosyn (12/29-1/7). He failed extubation x2 (12/23, 12/27) and ultimately underwent Tracheostomy placement (1/6/22). Underwent EGD 1/7 for PEG placement, found to have a gastric ulcer which was clipped and he was initiated on BID PPI. He is pending PEG placement, plan for IR consult Monday 1/10/22 and possible removal of L pigtail pleural catheter 1/8 pending results of repeat CXR. His ICU course was further s/f hypernatremia being managed with free water boluses and Palliative Care was consulted as patient is not a candidate for further cancer directed treatment once his is s/p trach/PEG. He was transferred to the [facility] on 1/8 for further care.     1/9 Febrile, Tmax 101.4F. F/u CXR. TOV. NPO after midnight and hold lovenox for GT placement in IR in am.   1/12/22 G tube placed in IR  1/14 L Pleural Pigtail catheter removed  1/15 tolerating SBT x4 hrs but with copious secretions requiring frequent suctioning; G tube clogged, initial attempts at dislodging unsuccessful in AM, noted to have a cluster of small capsule beads. Attempts to dissolve with ginger ale, unsuccessful. This evening, successfully unclogged Gastrostomy tube. TF restarted.

## 2022-01-15 NOTE — PROGRESS NOTE ADULT - PROBLEM SELECTOR PLAN 10
DVT and GI prophylaxis.  Surgery consult appreciated.  S/P peg placement   Flomax d/c'd as likely what clogged the G tube as cannot be crushed  Will need vent facility upon discharge. Discussed with son and daughter. DVT and GI prophylaxis.  Surgery consult appreciated.  S/P peg placement   Flomax d/c'd as likely what clogged the G tube as cannot be crushed/dissolved, monitor for urine retention may need to select alternative agent if needed.  Will need vent facility upon discharge. Discussed with son and daughter.

## 2022-01-15 NOTE — PROGRESS NOTE ADULT - PROBLEM SELECTOR PLAN 9
S/P Peg placement by IR 1/12; now clogged, will need IR re-evaluation/possible replacement on Monday 1/17  TF on hold as patient currently without enteral access S/P Peg placement by IR 1/12; now clogged, will need IR re-evaluation/possible replacement on Monday 1/17  TF on hold as patient currently without enteral access  ***PM Update*** G tube successfully unclogged this evening, TF restarted. Flomax was d/cd as this is the most likely cause of the clog.

## 2022-01-15 NOTE — CHART NOTE - NSCHARTNOTEFT_GEN_A_CORE
After my discussion with the patient's son, the patient's son Moses contacted the hospital over concern regarding the patient's blocked Gastrostomy tube and his father not receiving nutrition. Discussed with Dr. Moura and plan to replace NGT in the interim until the G tube could be replaced such that the patient would be able to receive nutrition. As I was about the replace the NGT, the patient's son called the hospital again with questions regarding the placement of the NGT as he did not understand our conversation from earlier in the afternoon. While staff was re-explaining the need for NGT placement for nutrition with plan to contact IR later this week, another attempt was made by the bedside RN Allison and myself to unclog the G tube. The tube was ultimately disloged (clogged with medication, most likely flomax). G tube now functioning well, flushing freely, and TF restarted with Glucerna 1.5@30ml/hr. Free water boluses also resumed thus D5W d/cd.    Upon fixing the clogged G tube, the patient's son Moses was contacted. He thanked me for the information and asked me further questions regarding the indication for the flomax. I discussed that it was likely started for urinary retention and that the patient is currently not experiencing urinary retention at this time thus it is safe to withhold the medication. The son asked me to make a notation on the chart re: the events outlined above and that the patient should not be placed on this medication to avoid clogging of the G tube again in the future. I will amend the progress note for today to reflect this information as requested.

## 2022-01-15 NOTE — PROGRESS NOTE ADULT - SUBJECTIVE AND OBJECTIVE BOX
MELISSA JOHNS    SCU progress note    INTERVAL HPI/OVERNIGHT EVENTS: G tube not flushing; likely clogged. Attempts at de-clogging G tube this AM unsuccessful. Will require IR evaluation and probable exchange. s/p removal of L pigtail catheter 1/14/22. Started on D5W@75ml/hr for worsening hyponatremia, unable to administer free water boluses via G tube    DNR [X ]   DNI  [  ]    Covid - 19 PCR: 1/14/22 COVID-19 PCR: NotDetec    The 4Ms    What Matters Most: see GOC  Age appropriate Medications/Screen for High Risk Medication: Yes  Mentation: see CAM below  Mobility: defer to physical exam    The Confusion Assessment Method (CAM) Diagnostic Algorithm     1: Acute Onset or Fluctuating Course  - Is there evidence of an acute change in mental status from the patient’s baseline? Did the (abnormal) behavior  fluctuate during the day, that is, tend to come and go, or increase and decrease in severity?  [X ] YES [ ] NO     2: Inattention  - Did the patient have difficulty focusing attention, being easily distractible, or having difficulty keeping track of what was being said?  [X ] YES [ ] NO     3: Disorganized thinking  -Was the patient’s thinking disorganized or incoherent, such as rambling or irrelevant conversation, unclear or illogical flow of ideas, or unpredictable switching from subject to subject?  [X ] YES [ ] NO    4: Altered Level of consciousness?  [X ] YES [ ] NO    The diagnosis of delirium by CAM requires the presence of features 1 and 2 and either 3 or 4.    PRESSORS: [ ] YES [X ] NO    Cardiovascular:  Heart Failure  Acute   Acute on Chronic  Chronic       tamsulosin 0.4 milliGRAM(s) Oral at bedtime    Pulmonary:    Hematalogic:    Other:  acetaminophen    Suspension .. 650 milliGRAM(s) Oral every 6 hours PRN  acetaminophen  Suppository .. 650 milliGRAM(s) Rectal every 6 hours PRN  bisacodyl Suppository 10 milliGRAM(s) Rectal daily PRN  chlorhexidine 2% Cloths 1 Application(s) Topical <User Schedule>  dextrose 5%. 1000 milliLiter(s) IV Continuous <Continuous>  glucagon  Injectable 0.5 milliGRAM(s) IV Push once  insulin lispro (ADMELOG) corrective regimen sliding scale   SubCutaneous every 6 hours  pantoprazole  Injectable 40 milliGRAM(s) IV Push two times a day  QUEtiapine 12.5 milliGRAM(s) Oral two times a day    acetaminophen    Suspension .. 650 milliGRAM(s) Oral every 6 hours PRN  acetaminophen  Suppository .. 650 milliGRAM(s) Rectal every 6 hours PRN  bisacodyl Suppository 10 milliGRAM(s) Rectal daily PRN  chlorhexidine 2% Cloths 1 Application(s) Topical <User Schedule>  dextrose 5%. 1000 milliLiter(s) IV Continuous <Continuous>  glucagon  Injectable 0.5 milliGRAM(s) IV Push once  insulin lispro (ADMELOG) corrective regimen sliding scale   SubCutaneous every 6 hours  pantoprazole  Injectable 40 milliGRAM(s) IV Push two times a day  QUEtiapine 12.5 milliGRAM(s) Oral two times a day  tamsulosin 0.4 milliGRAM(s) Oral at bedtime    Drug Dosing Weight  Height (cm): 175.3 (20 Dec 2021 02:21)  Weight (kg): 72.5 (20 Dec 2021 06:30)  BMI (kg/m2): 23.6 (20 Dec 2021 06:30)  BSA (m2): 1.88 (20 Dec 2021 06:30)    CENTRAL LINE: [ ] YES [X ] NO  LOCATION:   DATE INSERTED:  REMOVE: [ ] YES [ ] NO  EXPLAIN:    HUTCHINS: [X ] YES [ ] NO    DATE INSERTED:  REMOVE:  [ ] YES [ ] NO  EXPLAIN:    PAST MEDICAL & SURGICAL HISTORY:  Hypertension    Diabetes    High cholesterol    Primary osteoarthritis of both knees    Coronary artery disease of native artery of native heart with stable angina pectoris    Allergic bronchitis    Diabetic retinopathy    Oral cancer    SCC (squamous cell carcinoma)    Metastatic cancer    Recurrent disease    Edema of extremity    Hyponatremia    Microalbuminuria    Neuralgia    Obstructive sleep apnea, adult    Vitamin D deficiency    S/P knee replacement  b/l    S/P hernia repair  b/l    Status post cataract extraction and insertion of intraocular lens, unspecified laterality  b/l    History of surgery  On 9/10/20 he underwent right hemiglossectomy and partial neck dissection with Dr. Darinel Servin at [facility].      Mode: CPAP with PS  FiO2: 40  PEEP: 5  PS: 12  MAP: 7      PHYSICAL EXAM:    GENERAL: NAD, well-groomed, well-developed  HEAD:  Atraumatic, Normocephalic  EYES:  PERRL, conjunctiva and sclera clear  ENMT:  Moist mucous membranes, Good dentition, No lesions  NECK: Supple, No JVD  NERVOUS SYSTEM:  Awake, interactive. follows some simple commands, CAM +; CARR bilaterally, generalized weakness but globally good strength   CHEST/LUNG: vent assisted, non-labored. coarse rhonchi bilaterally, suctioned for large amt mod thick whitish/yellow secretions   HEART: Regular rate and rhythm; No murmurs, rubs, or gallops  ABDOMEN: Soft, Nontender, Nondistended; Bowel sounds present; G tube site c/d/i. G tube not flushing  EXTREMITIES:  2+ Peripheral Pulses, No clubbing, cyanosis, or edema  SKIN: No rashes or lesions      LABS:  CBC Full  -  ( 15 Neftali 2022 07:08 )  WBC Count : 12.38 K/uL  RBC Count : 3.54 M/uL  Hemoglobin : 10.1 g/dL  Hematocrit : 32.3 %  Platelet Count - Automated : 316 K/uL  Mean Cell Volume : 91.2 fl  Mean Cell Hemoglobin : 28.5 pg  Mean Cell Hemoglobin Concentration : 31.3 gm/dL  Auto Neutrophil # : 10.51 K/uL  Auto Lymphocyte # : 0.64 K/uL  Auto Monocyte # : 1.04 K/uL  Auto Eosinophil # : 0.02 K/uL  Auto Basophil # : 0.08 K/uL  Auto Neutrophil % : 84.9 %  Auto Lymphocyte % : 5.2 %  Auto Monocyte % : 8.4 %  Auto Eosinophil % : 0.2 %  Auto Basophil % : 0.6 %    01-15    151<H>  |  117<H>  |  26<H>  ----------------------------<  155<H>  3.7   |  27  |  0.63    Ca    11.7<H>      15 Neftali 2022 07:08  Phos  2.3     01-14  Mg     2.4     01-15    TPro  6.1  /  Alb  1.5<L>  /  TBili  1.0  /  DBili  x   /  AST  37  /  ALT  11  /  AlkPhos  92  01-15    [  ]  DVT Prophylaxis  [  ]  Nutrition, Brand, Rate         Goal Rate        Abnormal Nutritional Status -  Malnutrition   Cachexia      Morbid Obesity BMI >/=40    RADIOLOGY & ADDITIONAL STUDIES:   < from: Xray Abdomen 1 View Portable, IMMEDIATE (Xray Abdomen 1 View Portable, IMMEDIATE .) (01.14.22 @ 14:33) >  INTERPRETATION:  AP erect chest on January 14, 2022 at 9:44 AM. Patient   is being followed for left chest tube.    Heart magnified by technique.    Tracheostomy and catheter left chest tube remain.    Significant infiltrate in the left mid lower lung field again noted.   Milder infiltrate at right base again seen.    No pneumothorax.    Chest is similar to January 13.    Follow-up AP erect chest on January 14, 2022 at 11:34 AM. The catheter   left chest tube is been withdrawn. Otherwise no change.    Portal supine abdominal study.    NG tube remains. Left upper quadrant clips noted.    Contrast is scattered in the colon which is mildly distended with gas and   fecal content but there is no sign of obstruction.    IMPRESSION: Persistent left lung infiltrate. Catheter left chest tube     < end of copied text >      Goals of Care Discussion with Family/Proxy/Other   - see note from/family meeting set up for...     MELISSA JOHNS    SCU progress note    INTERVAL HPI/OVERNIGHT EVENTS: G tube not flushing; likely clogged. Attempts at de-clogging G tube this AM unsuccessful. Will require IR evaluation and probable exchange. s/p removal of L pigtail catheter 1/14/22. Started on D5W@75ml/hr for worsening hyponatremia, unable to administer free water boluses via G tube    DNR [X ]   DNI  [  ]    Covid - 19 PCR: 1/14/22 COVID-19 PCR: NotDetec    The 4Ms    What Matters Most: see GOC  Age appropriate Medications/Screen for High Risk Medication: Yes  Mentation: see CAM below  Mobility: defer to physical exam    The Confusion Assessment Method (CAM) Diagnostic Algorithm     1: Acute Onset or Fluctuating Course  - Is there evidence of an acute change in mental status from the patient’s baseline? Did the (abnormal) behavior  fluctuate during the day, that is, tend to come and go, or increase and decrease in severity?  [X ] YES [ ] NO     2: Inattention  - Did the patient have difficulty focusing attention, being easily distractible, or having difficulty keeping track of what was being said?  [X ] YES [ ] NO     3: Disorganized thinking  -Was the patient’s thinking disorganized or incoherent, such as rambling or irrelevant conversation, unclear or illogical flow of ideas, or unpredictable switching from subject to subject?  [X ] YES [ ] NO    4: Altered Level of consciousness?  [X ] YES [ ] NO    The diagnosis of delirium by CAM requires the presence of features 1 and 2 and either 3 or 4.    PRESSORS: [ ] YES [X ] NO    Cardiovascular:  Heart Failure  Acute   Acute on Chronic  Chronic       tamsulosin 0.4 milliGRAM(s) Oral at bedtime    Pulmonary:    Hematalogic:    Other:  acetaminophen    Suspension .. 650 milliGRAM(s) Oral every 6 hours PRN  acetaminophen  Suppository .. 650 milliGRAM(s) Rectal every 6 hours PRN  bisacodyl Suppository 10 milliGRAM(s) Rectal daily PRN  chlorhexidine 2% Cloths 1 Application(s) Topical <User Schedule>  dextrose 5%. 1000 milliLiter(s) IV Continuous <Continuous>  glucagon  Injectable 0.5 milliGRAM(s) IV Push once  insulin lispro (ADMELOG) corrective regimen sliding scale   SubCutaneous every 6 hours  pantoprazole  Injectable 40 milliGRAM(s) IV Push two times a day  QUEtiapine 12.5 milliGRAM(s) Oral two times a day    acetaminophen    Suspension .. 650 milliGRAM(s) Oral every 6 hours PRN  acetaminophen  Suppository .. 650 milliGRAM(s) Rectal every 6 hours PRN  bisacodyl Suppository 10 milliGRAM(s) Rectal daily PRN  chlorhexidine 2% Cloths 1 Application(s) Topical <User Schedule>  dextrose 5%. 1000 milliLiter(s) IV Continuous <Continuous>  glucagon  Injectable 0.5 milliGRAM(s) IV Push once  insulin lispro (ADMELOG) corrective regimen sliding scale   SubCutaneous every 6 hours  pantoprazole  Injectable 40 milliGRAM(s) IV Push two times a day  QUEtiapine 12.5 milliGRAM(s) Oral two times a day  tamsulosin 0.4 milliGRAM(s) Oral at bedtime    Drug Dosing Weight  Height (cm): 175.3 (20 Dec 2021 02:21)  Weight (kg): 72.5 (20 Dec 2021 06:30)  BMI (kg/m2): 23.6 (20 Dec 2021 06:30)  BSA (m2): 1.88 (20 Dec 2021 06:30)    CENTRAL LINE: [ ] YES [X ] NO  LOCATION:   DATE INSERTED:  REMOVE: [ ] YES [ ] NO  EXPLAIN:    HUTCHINS: [ ] YES [X ] NO    DATE INSERTED:  REMOVE:  [ ] YES [ ] NO  EXPLAIN:    PAST MEDICAL & SURGICAL HISTORY:  Hypertension    Diabetes    High cholesterol    Primary osteoarthritis of both knees    Coronary artery disease of native artery of native heart with stable angina pectoris    Allergic bronchitis    Diabetic retinopathy    Oral cancer    SCC (squamous cell carcinoma)    Metastatic cancer    Recurrent disease    Edema of extremity    Hyponatremia    Microalbuminuria    Neuralgia    Obstructive sleep apnea, adult    Vitamin D deficiency    S/P knee replacement  b/l    S/P hernia repair  b/l    Status post cataract extraction and insertion of intraocular lens, unspecified laterality  b/l    History of surgery  On 9/10/20 he underwent right hemiglossectomy and partial neck dissection with Dr. Darinel Servin at [facility].      Mode: CPAP with PS  FiO2: 40  PEEP: 5  PS: 12  MAP: 7      PHYSICAL EXAM:    GENERAL: NAD, well-groomed, well-developed  HEAD:  Atraumatic, Normocephalic  EYES:  PERRL, conjunctiva and sclera clear  ENMT:  Moist mucous membranes, Good dentition, No lesions  NECK: Supple, No JVD  NERVOUS SYSTEM:  Awake, interactive. follows some simple commands, CAM +; CARR bilaterally, generalized weakness but globally good strength   CHEST/LUNG: vent assisted, non-labored. coarse rhonchi bilaterally, suctioned for large amt mod thick whitish/yellow secretions   HEART: Regular rate and rhythm; No murmurs, rubs, or gallops  ABDOMEN: Soft, Nontender, Nondistended; Bowel sounds present; G tube site c/d/i. G tube not flushing  EXTREMITIES:  2+ Peripheral Pulses, No clubbing, cyanosis, or edema  SKIN: No rashes or lesions      LABS:  CBC Full  -  ( 15 Neftali 2022 07:08 )  WBC Count : 12.38 K/uL  RBC Count : 3.54 M/uL  Hemoglobin : 10.1 g/dL  Hematocrit : 32.3 %  Platelet Count - Automated : 316 K/uL  Mean Cell Volume : 91.2 fl  Mean Cell Hemoglobin : 28.5 pg  Mean Cell Hemoglobin Concentration : 31.3 gm/dL  Auto Neutrophil # : 10.51 K/uL  Auto Lymphocyte # : 0.64 K/uL  Auto Monocyte # : 1.04 K/uL  Auto Eosinophil # : 0.02 K/uL  Auto Basophil # : 0.08 K/uL  Auto Neutrophil % : 84.9 %  Auto Lymphocyte % : 5.2 %  Auto Monocyte % : 8.4 %  Auto Eosinophil % : 0.2 %  Auto Basophil % : 0.6 %    01-15    151<H>  |  117<H>  |  26<H>  ----------------------------<  155<H>  3.7   |  27  |  0.63    Ca    11.7<H>      15 Neftali 2022 07:08  Phos  2.3     01-14  Mg     2.4     01-15    TPro  6.1  /  Alb  1.5<L>  /  TBili  1.0  /  DBili  x   /  AST  37  /  ALT  11  /  AlkPhos  92  01-15    [  ]  DVT Prophylaxis  [  ]  Nutrition, Brand, Rate         Goal Rate        Abnormal Nutritional Status -  Malnutrition   Cachexia      Morbid Obesity BMI >/=40    RADIOLOGY & ADDITIONAL STUDIES:   < from: Xray Abdomen 1 View Portable, IMMEDIATE (Xray Abdomen 1 View Portable, IMMEDIATE .) (01.14.22 @ 14:33) >  INTERPRETATION:  AP erect chest on January 14, 2022 at 9:44 AM. Patient   is being followed for left chest tube.    Heart magnified by technique.    Tracheostomy and catheter left chest tube remain.    Significant infiltrate in the left mid lower lung field again noted.   Milder infiltrate at right base again seen.    No pneumothorax.    Chest is similar to January 13.    Follow-up AP erect chest on January 14, 2022 at 11:34 AM. The catheter   left chest tube is been withdrawn. Otherwise no change.    Portal supine abdominal study.    NG tube remains. Left upper quadrant clips noted.    Contrast is scattered in the colon which is mildly distended with gas and   fecal content but there is no sign of obstruction.    IMPRESSION: Persistent left lung infiltrate. Catheter left chest tube     < end of copied text >      Goals of Care Discussion with Family/Proxy/Other   - see note from/family meeting set up for...     MELISSA JOHNS    SCU progress note    INTERVAL HPI/OVERNIGHT EVENTS: G tube not flushing; likely clogged. Attempts at de-clogging G tube this AM unsuccessful. Will require IR evaluation and probable exchange. s/p removal of L pigtail catheter 1/14/22. Started on D5W@75ml/hr for worsening hyponatremia, unable to administer free water boluses via G tube    DNR [X ]   DNI  [  ]    Covid - 19 PCR: 1/14/22 COVID-19 PCR: NotDetec    The 4Ms    What Matters Most: see GOC  Age appropriate Medications/Screen for High Risk Medication: Yes  Mentation: see CAM below  Mobility: defer to physical exam    The Confusion Assessment Method (CAM) Diagnostic Algorithm     1: Acute Onset or Fluctuating Course  - Is there evidence of an acute change in mental status from the patient’s baseline? Did the (abnormal) behavior  fluctuate during the day, that is, tend to come and go, or increase and decrease in severity?  [X ] YES [ ] NO     2: Inattention  - Did the patient have difficulty focusing attention, being easily distractible, or having difficulty keeping track of what was being said?  [X ] YES [ ] NO     3: Disorganized thinking  -Was the patient’s thinking disorganized or incoherent, such as rambling or irrelevant conversation, unclear or illogical flow of ideas, or unpredictable switching from subject to subject?  [ ] YES [ ] NO unable to assess    4: Altered Level of consciousness?  [X ] YES [ ] NO    The diagnosis of delirium by CAM requires the presence of features 1 and 2 and either 3 or 4.    PRESSORS: [ ] YES [X ] NO    Cardiovascular:  Heart Failure  Acute   Acute on Chronic  Chronic       tamsulosin 0.4 milliGRAM(s) Oral at bedtime    Pulmonary:    Hematalogic:    Other:  acetaminophen    Suspension .. 650 milliGRAM(s) Oral every 6 hours PRN  acetaminophen  Suppository .. 650 milliGRAM(s) Rectal every 6 hours PRN  bisacodyl Suppository 10 milliGRAM(s) Rectal daily PRN  chlorhexidine 2% Cloths 1 Application(s) Topical <User Schedule>  dextrose 5%. 1000 milliLiter(s) IV Continuous <Continuous>  glucagon  Injectable 0.5 milliGRAM(s) IV Push once  insulin lispro (ADMELOG) corrective regimen sliding scale   SubCutaneous every 6 hours  pantoprazole  Injectable 40 milliGRAM(s) IV Push two times a day  QUEtiapine 12.5 milliGRAM(s) Oral two times a day    acetaminophen    Suspension .. 650 milliGRAM(s) Oral every 6 hours PRN  acetaminophen  Suppository .. 650 milliGRAM(s) Rectal every 6 hours PRN  bisacodyl Suppository 10 milliGRAM(s) Rectal daily PRN  chlorhexidine 2% Cloths 1 Application(s) Topical <User Schedule>  dextrose 5%. 1000 milliLiter(s) IV Continuous <Continuous>  glucagon  Injectable 0.5 milliGRAM(s) IV Push once  insulin lispro (ADMELOG) corrective regimen sliding scale   SubCutaneous every 6 hours  pantoprazole  Injectable 40 milliGRAM(s) IV Push two times a day  QUEtiapine 12.5 milliGRAM(s) Oral two times a day  tamsulosin 0.4 milliGRAM(s) Oral at bedtime    Drug Dosing Weight  Height (cm): 175.3 (20 Dec 2021 02:21)  Weight (kg): 72.5 (20 Dec 2021 06:30)  BMI (kg/m2): 23.6 (20 Dec 2021 06:30)  BSA (m2): 1.88 (20 Dec 2021 06:30)    CENTRAL LINE: [ ] YES [X ] NO  LOCATION:   DATE INSERTED:  REMOVE: [ ] YES [ ] NO  EXPLAIN:    HUTCHINS: [ ] YES [X ] NO    DATE INSERTED:  REMOVE:  [ ] YES [ ] NO  EXPLAIN:    PAST MEDICAL & SURGICAL HISTORY:  Hypertension    Diabetes    High cholesterol    Primary osteoarthritis of both knees    Coronary artery disease of native artery of native heart with stable angina pectoris    Allergic bronchitis    Diabetic retinopathy    Oral cancer    SCC (squamous cell carcinoma)    Metastatic cancer    Recurrent disease    Edema of extremity    Hyponatremia    Microalbuminuria    Neuralgia    Obstructive sleep apnea, adult    Vitamin D deficiency    S/P knee replacement  b/l    S/P hernia repair  b/l    Status post cataract extraction and insertion of intraocular lens, unspecified laterality  b/l    History of surgery  On 9/10/20 he underwent right hemiglossectomy and partial neck dissection with Dr. Darinel Servin at [facility].      Mode: CPAP with PS  FiO2: 40  PEEP: 5  PS: 12  MAP: 7      PHYSICAL EXAM:    GENERAL: NAD, well-groomed, well-developed  HEAD:  Atraumatic, Normocephalic  EYES:  PERRL, conjunctiva and sclera clear  ENMT:  Moist mucous membranes, Good dentition, No lesions  NECK: Supple, No JVD  NERVOUS SYSTEM:  Awake, interactive. follows some simple commands, CAM +; CARR bilaterally, generalized weakness but globally good strength   CHEST/LUNG: vent assisted, non-labored. coarse rhonchi bilaterally, suctioned for large amt mod thick whitish/yellow secretions   HEART: Regular rate and rhythm; No murmurs, rubs, or gallops  ABDOMEN: Soft, Nontender, Nondistended; Bowel sounds present; G tube site c/d/i. G tube not flushing  EXTREMITIES:  2+ Peripheral Pulses, No clubbing, cyanosis, or edema  SKIN: No rashes or lesions      LABS:  CBC Full  -  ( 15 Neftali 2022 07:08 )  WBC Count : 12.38 K/uL  RBC Count : 3.54 M/uL  Hemoglobin : 10.1 g/dL  Hematocrit : 32.3 %  Platelet Count - Automated : 316 K/uL  Mean Cell Volume : 91.2 fl  Mean Cell Hemoglobin : 28.5 pg  Mean Cell Hemoglobin Concentration : 31.3 gm/dL  Auto Neutrophil # : 10.51 K/uL  Auto Lymphocyte # : 0.64 K/uL  Auto Monocyte # : 1.04 K/uL  Auto Eosinophil # : 0.02 K/uL  Auto Basophil # : 0.08 K/uL  Auto Neutrophil % : 84.9 %  Auto Lymphocyte % : 5.2 %  Auto Monocyte % : 8.4 %  Auto Eosinophil % : 0.2 %  Auto Basophil % : 0.6 %    01-15    151<H>  |  117<H>  |  26<H>  ----------------------------<  155<H>  3.7   |  27  |  0.63    Ca    11.7<H>      15 Neftali 2022 07:08  Phos  2.3     01-14  Mg     2.4     01-15    TPro  6.1  /  Alb  1.5<L>  /  TBili  1.0  /  DBili  x   /  AST  37  /  ALT  11  /  AlkPhos  92  01-15    [  ]  DVT Prophylaxis  [  ]  Nutrition, Brand, Rate         Goal Rate        Abnormal Nutritional Status -  Malnutrition   Cachexia      Morbid Obesity BMI >/=40    RADIOLOGY & ADDITIONAL STUDIES:   < from: Xray Abdomen 1 View Portable, IMMEDIATE (Xray Abdomen 1 View Portable, IMMEDIATE .) (01.14.22 @ 14:33) >  INTERPRETATION:  AP erect chest on January 14, 2022 at 9:44 AM. Patient   is being followed for left chest tube.    Heart magnified by technique.    Tracheostomy and catheter left chest tube remain.    Significant infiltrate in the left mid lower lung field again noted.   Milder infiltrate at right base again seen.    No pneumothorax.    Chest is similar to January 13.    Follow-up AP erect chest on January 14, 2022 at 11:34 AM. The catheter   left chest tube is been withdrawn. Otherwise no change.    Portal supine abdominal study.    NG tube remains. Left upper quadrant clips noted.    Contrast is scattered in the colon which is mildly distended with gas and   fecal content but there is no sign of obstruction.    IMPRESSION: Persistent left lung infiltrate. Catheter left chest tube     < end of copied text >      Goals of Care Discussion with Family/Proxy/Other   - see note from/family meeting set up for...

## 2022-01-16 NOTE — PROGRESS NOTE ADULT - SUBJECTIVE AND OBJECTIVE BOX
MELISSA JOHNS    SCU progress note    INTERVAL HPI/OVERNIGHT EVENTS: No acute overnight events. Patient seen and examined at bedside. Open eyes to name. Do not follow any commands .      DNR [x ]   DNI  [  ]     Covid - 19 PCR: COVID-19 PCR: NotDetec (14 Jan 2022 06:44)  COVID-19 PCR: NotDetec (08 Jan 2022 08:23)  COVID-19 PCR: NotDetec (06 Jan 2022 17:58)      The 4Ms    What Matters Most: see GOC  Age appropriate Medications/Screen for High Risk Medication: Yes  Mentation: see CAM below  Mobility: defer to physical exam    The Confusion Assessment Method (CAM) Diagnostic Algorithm     1: Acute Onset or Fluctuating Course  - Is there evidence of an acute change in mental status from the patient’s baseline? Did the (abnormal) behavior  fluctuate during the day, that is, tend to come and go, or increase and decrease in severity?  [ ] YES [x ] NO     2: Inattention  - Did the patient have difficulty focusing attention, being easily distractible, or having difficulty keeping track of what was being said?  [ ] YES [ ] NO Unable to fully assess     3: Disorganized thinking  -Was the patient’s thinking disorganized or incoherent, such as rambling or irrelevant conversation, unclear or illogical flow of ideas, or unpredictable switching from subject to subject?  [ ] YES [ ] NO Unable to fully assess    4: Altered Level of consciousness?  [ ] YES [x ] NO    The diagnosis of delirium by CAM requires the presence of features 1 and 2 and either 3 or 4.    PRESSORS: [ ] YES [x ] NO    Cardiovascular:  Heart Failure  Acute   Acute on Chronic  Chronic         Pulmonary:    Hematalogic:  enoxaparin Injectable 40 milliGRAM(s) SubCutaneous daily    Other:  acetaminophen    Suspension .. 650 milliGRAM(s) Oral every 6 hours PRN  acetaminophen  Suppository .. 650 milliGRAM(s) Rectal every 6 hours PRN  bisacodyl Suppository 10 milliGRAM(s) Rectal daily PRN  chlorhexidine 2% Cloths 1 Application(s) Topical <User Schedule>  glucagon  Injectable 0.5 milliGRAM(s) IV Push once  insulin lispro (ADMELOG) corrective regimen sliding scale   SubCutaneous every 6 hours  pantoprazole  Injectable 40 milliGRAM(s) IV Push two times a day  QUEtiapine 12.5 milliGRAM(s) Oral at bedtime    acetaminophen    Suspension .. 650 milliGRAM(s) Oral every 6 hours PRN  acetaminophen  Suppository .. 650 milliGRAM(s) Rectal every 6 hours PRN  bisacodyl Suppository 10 milliGRAM(s) Rectal daily PRN  chlorhexidine 2% Cloths 1 Application(s) Topical <User Schedule>  enoxaparin Injectable 40 milliGRAM(s) SubCutaneous daily  glucagon  Injectable 0.5 milliGRAM(s) IV Push once  insulin lispro (ADMELOG) corrective regimen sliding scale   SubCutaneous every 6 hours  pantoprazole  Injectable 40 milliGRAM(s) IV Push two times a day  QUEtiapine 12.5 milliGRAM(s) Oral at bedtime    Drug Dosing Weight  Height (cm): 175.3 (20 Dec 2021 02:21)  Weight (kg): 72.5 (20 Dec 2021 06:30)  BMI (kg/m2): 23.6 (20 Dec 2021 06:30)  BSA (m2): 1.88 (20 Dec 2021 06:30)    CENTRAL LINE: [ ] YES [x ] NO  LOCATION:   DATE INSERTED:  REMOVE: [ ] YES [ ] NO  EXPLAIN:    HUTCHINS: [ ] YES [x ] NO    DATE INSERTED:  REMOVE:  [ ] YES [ ] NO  EXPLAIN:    PAST MEDICAL & SURGICAL HISTORY:  Hypertension    Diabetes    High cholesterol    Primary osteoarthritis of both knees    Coronary artery disease of native artery of native heart with stable angina pectoris    Allergic bronchitis    Diabetic retinopathy    Oral cancer    SCC (squamous cell carcinoma)    Metastatic cancer    Recurrent disease    Edema of extremity    Hyponatremia    Microalbuminuria    Neuralgia    Obstructive sleep apnea, adult    Vitamin D deficiency    S/P knee replacement  b/l    S/P hernia repair  b/l    Status post cataract extraction and insertion of intraocular lens, unspecified laterality  b/l    History of surgery  On 9/10/20 he underwent right hemiglossectomy and partial neck dissection with Dr. Darinel Servin at [facility].                  Mode: AC/ CMV (Assist Control/ Continuous Mandatory Ventilation)  RR (machine): 14  TV (machine): 450  FiO2: 40  PEEP: 5  ITime: 0.9  MAP: 11  PIP: 31      PHYSICAL EXAM:    GENERAL: NAD, cachectic, +temporal and muscle waisting  HEAD:  Atraumatic, Normocephalic  EYES: EOMI, PERRLA, conjunctiva and sclera clear  ENMT: No tonsillar erythema, exudates  NECK: Supple, No JVD, tracheostomy intact  NERVOUS SYSTEM:  Open eyes to voice , do not follow any commands , moves all extremities  CHEST/LUNG: diminished breath sounds left > right   HEART: Regular rate and rhythm; No murmurs, rubs, or gallops  ABDOMEN: Soft, Nontender, Nondistended; Bowel sounds present; Peg intact  EXTREMITIES:  +Muscle waisting  2+ Peripheral Pulses, No clubbing, cyanosis, or edema  LYMPH: No lymphadenopathy noted  SKIN: No rashes or lesions        LABS:  CBC Full  -  ( 16 Jan 2022 06:37 )  WBC Count : 9.59 K/uL  RBC Count : 3.04 M/uL  Hemoglobin : 8.6 g/dL  Hematocrit : 28.1 %  Platelet Count - Automated : 250 K/uL  Mean Cell Volume : 92.4 fl  Mean Cell Hemoglobin : 28.3 pg  Mean Cell Hemoglobin Concentration : 30.6 gm/dL  Auto Neutrophil # : 7.60 K/uL  Auto Lymphocyte # : 0.68 K/uL  Auto Monocyte # : 0.95 K/uL  Auto Eosinophil # : 0.24 K/uL  Auto Basophil # : 0.04 K/uL  Auto Neutrophil % : 79.3 %  Auto Lymphocyte % : 7.1 %  Auto Monocyte % : 9.9 %  Auto Eosinophil % : 2.5 %  Auto Basophil % : 0.4 %    01-16    146<H>  |  114<H>  |  23<H>  ----------------------------<  198<H>  3.5   |  29  |  0.59    Ca    10.9<H>      16 Jan 2022 06:37  Phos  1.7     01-16  Mg     2.2     01-16    TPro  5.6<L>  /  Alb  1.3<L>  /  TBili  0.7  /  DBili  x   /  AST  23  /  ALT  9<L>  /  AlkPhos  87  01-16              [ x ]  DVT Prophylaxis- Lovenox   [ x ]  Nutrition - Diet, NPO with Tube Feed:   Tube Feeding Modality: Gastrostomy  Glucerna 1.5 Dhruv  Total Volume for 24 Hours (mL): 960  Continuous  Starting Tube Feed Rate mL per Hour: 30  Increase Tube Feed Rate by (mL): 10     Every 6 hours  Until Goal Tube Feed Rate (mL per Hour): 40  Tube Feed Duration (in Hours): 24  Tube Feed Start Time: 10:00  No Carb Prosource TF     Qty per Day:  1 PKT BID. (01-14-22 @ 09:46) [Active]          RADIOLOGY & ADDITIONAL STUDIES:      Patient name: MELISSA JOHNS  YOB: 1945   Age: 76 (M)   MR#: [id number]  Study Date: 12/20/2021  Location: [place] [id number]: [facility]  Study quality: Technically difficult  Referring Physician:  BESSIE CAGLE MD  Blood Pressure: 98/48 mmHg  Height: 175 cm  Weight: 73 kg  BSA: 1.9 m2  ------------------------------------------------------------------------    PROCEDURE: Transthoracic echocardiogram with 2-D, M-Mode  and complete spectral and color flow Doppler.  INDICATION: Abnormal electrocardiogram [ECG] [EKG] (R94.31)  HISTORY:  ------------------------------------------------------------------------  DIMENSIONS:  Dimensions:     Normal Values:  LA:     3.5 cm    2.0 - 4.0 cm  Ao:     3.7 cm    2.0 - 3.8 cm  SEPTUM: 0.9 cm    0.6 - 1.2 cm  PWT:    0.9 cm    0.6 - 1.1 cm  LVIDd:  4.0 cm    3.0 - 5.6 cm  LVIDs:  3.0 cm    1.8 - 4.0 cm      Derived Variables:  LVMI: 58 g/m2  RWT: 0.45  Ejection Fraction Visual Estimate: 55 %    ------------------------------------------------------------------------  OBSERVATIONS:  Mitral Valve: Normal mitral valve.  Aortic Root: Aortic Root: 3.7 cm.    Aortic Valve: Calcified trileaflet aortic valve with normal  opening.  Left Atrium: Normal left atrium.  LA volume index =34  cc/m2.  Left Ventricle: Endocardium not well visualized; grossly  normal left ventricular systolic function. Normal left  ventricular internal dimensions and wall thicknesses.  Normal diastolic function.  Right Heart: Normal right atrium. Normal right ventricular  size and systolic function (TAPSE  2.8cm). There is mild  tricuspid regurgitation. Pulmonic valve not well seen.  Pericardium/PleuraNormal pericardium with no pericardial  effusion.  Hemodynamic: RV systolic pressure is moderately increased  at  48 mm Hg.  ------------------------------------------------------------------------  CONCLUSIONS:  1. Normal mitral valve.  2. Calcified trileaflet aortic valve with normal opening.  3. Aortic Root: 3.7 cm.  4. Normal left atrium.  LA volume index = 34 cc/m2.  5. Normal left ventricular internal dimensions and wall  thicknesses.  6. Endocardium not well visualized; grossly normal left  ventricular systolic function.  7. Normal diastolic function.  8. Normal right atrium.  9. Normal right ventricular size and systolic function  (TAPSE  2.8cm).  10. RV systolic pressure is moderately increased at  48 mm  Hg.  11. There is mild tricuspid regurgitation.  12. Pulmonic valve not well seen.  13. Normal pericardium with no pericardial effusion.    PROCEDURE DATE:  01/14/2022        INTERPRETATION:  AP erect chest on January 14, 2022 at 9:44 AM. Patient   is being followed for left chest tube.    Heart magnified by technique.    Tracheostomy and catheter left chest tube remain.    Significant infiltrate in the left mid lower lung field again noted.   Milder infiltrate at right base again seen.    No pneumothorax.    Chest is similar to January 13.    Follow-up AP erect chest on January 14, 2022 at 11:34 AM. The catheter   left chest tube is been withdrawn. Otherwise no change.    Portal supine abdominal study.    NG tube remains. Left upper quadrant clips noted.    Contrast is scattered in the colon which is mildly distended with gas and   fecal content but there is no sign of obstruction.    IMPRESSION: Persistent left lung infiltrate. Catheter left chest tube   removed without incident. No gross obstruction in the abdomen.      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 Patient had cardiac arrest in ED , was intubated CPR was initiated and subsequently ROSC was achieved after 20 minutes. Patient admitted to the ICU 12/20-1/7/22 for post cardiac arrest management in the setting of acute hypoxic respiratory failure due to left pneumothorax and likely aspiration pneumonia and septic shock. He was treated with a course of cefepime (12/20-12/28) and then another course of abx with Zosyn (12/29-1/7). He failed extubation x2 (12/23, 12/27) and ultimately underwent Tracheostomy placement (1/6/22). Underwent EGD 1/7 for PEG placement, found to have a gastric ulcer which was clipped and he was initiated on BID PPI. He is pending PEG placement, plan for IR consult Monday 1/10/22 and possible removal of L pigtail pleural catheter 1/8 pending results of repeat CXR. His ICU course was further s/f hypernatremia being managed with free water boluses and Palliative Care was consulted as patient is not a candidate for further cancer directed treatment once his is s/p trach/PEG. He was transferred to the SCU/AI on 1/8 for further care.     1/9 Febrile, Tmax 101.4F. F/u CXR. TOV. NPO after midnight and hold lovenox for GT placement in IR in am.   1/12/22 G tube placed in IR  1/14 L Pleural Pigtail catheter removed  1/15 tolerating SBT x4 hrs but with copious secretions requiring frequent suctioning; G tube clogged, initial attempts at dislodging unsuccessful in AM, noted to have a cluster of small capsule beads. Attempts to dissolve with ginger ale, unsuccessful. This evening, successfully unclogged Gastrostomy tube. TF restarted.  1/16- No acute overnight events     MELISSA JOHNS    SCU progress note    INTERVAL HPI/OVERNIGHT EVENTS: No acute overnight events. Patient seen and examined at bedside. Open eyes to name. Do not follow any commands . Towards the end of the day patient more awake and alert, able to follow simple commands ( show 2 fingers, squeeze examiners hand), when asked if he is ok , nodded appropriately saying "yes".      DNR [x ]   DNI  [  ]     Covid - 19 PCR: COVID-19 PCR: NotDetec (14 Jan 2022 06:44)  COVID-19 PCR: NotDetec (08 Jan 2022 08:23)  COVID-19 PCR: NotDetec (06 Jan 2022 17:58)      The 4Ms    What Matters Most: see GOC  Age appropriate Medications/Screen for High Risk Medication: Yes  Mentation: see CAM below  Mobility: defer to physical exam    The Confusion Assessment Method (CAM) Diagnostic Algorithm     1: Acute Onset or Fluctuating Course  - Is there evidence of an acute change in mental status from the patient’s baseline? Did the (abnormal) behavior  fluctuate during the day, that is, tend to come and go, or increase and decrease in severity?  [ ] YES [x ] NO     2: Inattention  - Did the patient have difficulty focusing attention, being easily distractible, or having difficulty keeping track of what was being said?  [ ] YES [ ] NO Unable to fully assess     3: Disorganized thinking  -Was the patient’s thinking disorganized or incoherent, such as rambling or irrelevant conversation, unclear or illogical flow of ideas, or unpredictable switching from subject to subject?  [ ] YES [ ] NO Unable to fully assess    4: Altered Level of consciousness?  [ ] YES [x ] NO    The diagnosis of delirium by CAM requires the presence of features 1 and 2 and either 3 or 4.    PRESSORS: [ ] YES [x ] NO    Cardiovascular:  Heart Failure  Acute   Acute on Chronic  Chronic         Pulmonary:    Hematalogic:  enoxaparin Injectable 40 milliGRAM(s) SubCutaneous daily    Other:  acetaminophen    Suspension .. 650 milliGRAM(s) Oral every 6 hours PRN  acetaminophen  Suppository .. 650 milliGRAM(s) Rectal every 6 hours PRN  bisacodyl Suppository 10 milliGRAM(s) Rectal daily PRN  chlorhexidine 2% Cloths 1 Application(s) Topical <User Schedule>  glucagon  Injectable 0.5 milliGRAM(s) IV Push once  insulin lispro (ADMELOG) corrective regimen sliding scale   SubCutaneous every 6 hours  pantoprazole  Injectable 40 milliGRAM(s) IV Push two times a day  QUEtiapine 12.5 milliGRAM(s) Oral at bedtime    acetaminophen    Suspension .. 650 milliGRAM(s) Oral every 6 hours PRN  acetaminophen  Suppository .. 650 milliGRAM(s) Rectal every 6 hours PRN  bisacodyl Suppository 10 milliGRAM(s) Rectal daily PRN  chlorhexidine 2% Cloths 1 Application(s) Topical <User Schedule>  enoxaparin Injectable 40 milliGRAM(s) SubCutaneous daily  glucagon  Injectable 0.5 milliGRAM(s) IV Push once  insulin lispro (ADMELOG) corrective regimen sliding scale   SubCutaneous every 6 hours  pantoprazole  Injectable 40 milliGRAM(s) IV Push two times a day  QUEtiapine 12.5 milliGRAM(s) Oral at bedtime    Drug Dosing Weight  Height (cm): 175.3 (20 Dec 2021 02:21)  Weight (kg): 72.5 (20 Dec 2021 06:30)  BMI (kg/m2): 23.6 (20 Dec 2021 06:30)  BSA (m2): 1.88 (20 Dec 2021 06:30)    CENTRAL LINE: [ ] YES [x ] NO  LOCATION:   DATE INSERTED:  REMOVE: [ ] YES [ ] NO  EXPLAIN:    HUTCHINS: [ ] YES [x ] NO    DATE INSERTED:  REMOVE:  [ ] YES [ ] NO  EXPLAIN:    PAST MEDICAL & SURGICAL HISTORY:  Hypertension    Diabetes    High cholesterol    Primary osteoarthritis of both knees    Coronary artery disease of native artery of native heart with stable angina pectoris    Allergic bronchitis    Diabetic retinopathy    Oral cancer    SCC (squamous cell carcinoma)    Metastatic cancer    Recurrent disease    Edema of extremity    Hyponatremia    Microalbuminuria    Neuralgia    Obstructive sleep apnea, adult    Vitamin D deficiency    S/P knee replacement  b/l    S/P hernia repair  b/l    Status post cataract extraction and insertion of intraocular lens, unspecified laterality  b/l    History of surgery  On 9/10/20 he underwent right hemiglossectomy and partial neck dissection with Dr. Darinel Servin at [facility].                  Mode: AC/ CMV (Assist Control/ Continuous Mandatory Ventilation)  RR (machine): 14  TV (machine): 450  FiO2: 40  PEEP: 5  ITime: 0.9  MAP: 11  PIP: 31      PHYSICAL EXAM:    GENERAL: NAD, cachectic, +temporal and muscle waisting  HEAD:  Atraumatic, Normocephalic  EYES: EOMI, PERRLA, conjunctiva and sclera clear  ENMT: No tonsillar erythema, exudates  NECK: Supple, No JVD, tracheostomy intact  NERVOUS SYSTEM:  Open eyes to voice , do not follow any commands , moves all extremities  CHEST/LUNG: diminished breath sounds left > right   HEART: Regular rate and rhythm; No murmurs, rubs, or gallops  ABDOMEN: Soft, Nontender, Nondistended; Bowel sounds present; Peg intact  EXTREMITIES:  +Muscle waisting  2+ Peripheral Pulses, No clubbing, cyanosis, or edema  LYMPH: No lymphadenopathy noted  SKIN: No rashes or lesions        LABS:  CBC Full  -  ( 16 Jan 2022 06:37 )  WBC Count : 9.59 K/uL  RBC Count : 3.04 M/uL  Hemoglobin : 8.6 g/dL  Hematocrit : 28.1 %  Platelet Count - Automated : 250 K/uL  Mean Cell Volume : 92.4 fl  Mean Cell Hemoglobin : 28.3 pg  Mean Cell Hemoglobin Concentration : 30.6 gm/dL  Auto Neutrophil # : 7.60 K/uL  Auto Lymphocyte # : 0.68 K/uL  Auto Monocyte # : 0.95 K/uL  Auto Eosinophil # : 0.24 K/uL  Auto Basophil # : 0.04 K/uL  Auto Neutrophil % : 79.3 %  Auto Lymphocyte % : 7.1 %  Auto Monocyte % : 9.9 %  Auto Eosinophil % : 2.5 %  Auto Basophil % : 0.4 %    01-16    146<H>  |  114<H>  |  23<H>  ----------------------------<  198<H>  3.5   |  29  |  0.59    Ca    10.9<H>      16 Jan 2022 06:37  Phos  1.7     01-16  Mg     2.2     01-16    TPro  5.6<L>  /  Alb  1.3<L>  /  TBili  0.7  /  DBili  x   /  AST  23  /  ALT  9<L>  /  AlkPhos  87  01-16              [ x ]  DVT Prophylaxis- Lovenox   [ x ]  Nutrition - Diet, NPO with Tube Feed:   Tube Feeding Modality: Gastrostomy  Glucerna 1.5 Dhruv  Total Volume for 24 Hours (mL): 960  Continuous  Starting Tube Feed Rate mL per Hour: 30  Increase Tube Feed Rate by (mL): 10     Every 6 hours  Until Goal Tube Feed Rate (mL per Hour): 40  Tube Feed Duration (in Hours): 24  Tube Feed Start Time: 10:00  No Carb Prosource TF     Qty per Day:  1 PKT BID. (01-14-22 @ 09:46) [Active]          RADIOLOGY & ADDITIONAL STUDIES:      Patient name: MELISSA JOHNS  YOB: 1945   Age: 76 (M)   MR#: [id number]  Study Date: 12/20/2021  Location: [place] [id number]: [facility]  Study quality: Technically difficult  Referring Physician:  BESSIE CAGLE MD  Blood Pressure: 98/48 mmHg  Height: 175 cm  Weight: 73 kg  BSA: 1.9 m2  ------------------------------------------------------------------------    PROCEDURE: Transthoracic echocardiogram with 2-D, M-Mode  and complete spectral and color flow Doppler.  INDICATION: Abnormal electrocardiogram [ECG] [EKG] (R94.31)  HISTORY:  ------------------------------------------------------------------------  DIMENSIONS:  Dimensions:     Normal Values:  LA:     3.5 cm    2.0 - 4.0 cm  Ao:     3.7 cm    2.0 - 3.8 cm  SEPTUM: 0.9 cm    0.6 - 1.2 cm  PWT:    0.9 cm    0.6 - 1.1 cm  LVIDd:  4.0 cm    3.0 - 5.6 cm  LVIDs:  3.0 cm    1.8 - 4.0 cm      Derived Variables:  LVMI: 58 g/m2  RWT: 0.45  Ejection Fraction Visual Estimate: 55 %    ------------------------------------------------------------------------  OBSERVATIONS:  Mitral Valve: Normal mitral valve.  Aortic Root: Aortic Root: 3.7 cm.    Aortic Valve: Calcified trileaflet aortic valve with normal  opening.  Left Atrium: Normal left atrium.  LA volume index =34  cc/m2.  Left Ventricle: Endocardium not well visualized; grossly  normal left ventricular systolic function. Normal left  ventricular internal dimensions and wall thicknesses.  Normal diastolic function.  Right Heart: Normal right atrium. Normal right ventricular  size and systolic function (TAPSE  2.8cm). There is mild  tricuspid regurgitation. Pulmonic valve not well seen.  Pericardium/PleuraNormal pericardium with no pericardial  effusion.  Hemodynamic: RV systolic pressure is moderately increased  at  48 mm Hg.  ------------------------------------------------------------------------  CONCLUSIONS:  1. Normal mitral valve.  2. Calcified trileaflet aortic valve with normal opening.  3. Aortic Root: 3.7 cm.  4. Normal left atrium.  LA volume index = 34 cc/m2.  5. Normal left ventricular internal dimensions and wall  thicknesses.  6. Endocardium not well visualized; grossly normal left  ventricular systolic function.  7. Normal diastolic function.  8. Normal right atrium.  9. Normal right ventricular size and systolic function  (TAPSE  2.8cm).  10. RV systolic pressure is moderately increased at  48 mm  Hg.  11. There is mild tricuspid regurgitation.  12. Pulmonic valve not well seen.  13. Normal pericardium with no pericardial effusion.    PROCEDURE DATE:  01/14/2022        INTERPRETATION:  AP erect chest on January 14, 2022 at 9:44 AM. Patient   is being followed for left chest tube.    Heart magnified by technique.    Tracheostomy and catheter left chest tube remain.    Significant infiltrate in the left mid lower lung field again noted.   Milder infiltrate at right base again seen.    No pneumothorax.    Chest is similar to January 13.    Follow-up AP erect chest on January 14, 2022 at 11:34 AM. The catheter   left chest tube is been withdrawn. Otherwise no change.    Portal supine abdominal study.    NG tube remains. Left upper quadrant clips noted.    Contrast is scattered in the colon which is mildly distended with gas and   fecal content but there is no sign of obstruction.    IMPRESSION: Persistent left lung infiltrate. Catheter left chest tube   removed without incident. No gross obstruction in the abdomen.      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 Patient had cardiac arrest in ED , was intubated CPR was initiated and subsequently ROSC was achieved after 20 minutes. Patient admitted to the ICU 12/20-1/7/22 for post cardiac arrest management in the setting of acute hypoxic respiratory failure due to left pneumothorax and likely aspiration pneumonia and septic shock. He was treated with a course of cefepime (12/20-12/28) and then another course of abx with Zosyn (12/29-1/7). He failed extubation x2 (12/23, 12/27) and ultimately underwent Tracheostomy placement (1/6/22). Underwent EGD 1/7 for PEG placement, found to have a gastric ulcer which was clipped and he was initiated on BID PPI. He is pending PEG placement, plan for IR consult Monday 1/10/22 and possible removal of L pigtail pleural catheter 1/8 pending results of repeat CXR. His ICU course was further s/f hypernatremia being managed with free water boluses and Palliative Care was consulted as patient is not a candidate for further cancer directed treatment once his is s/p trach/PEG. He was transferred to the SCU/AI on 1/8 for further care.     1/9 Febrile, Tmax 101.4F. F/u CXR. TOV. NPO after midnight and hold lovenox for GT placement in IR in am.   1/12/22 G tube placed in IR  1/14 L Pleural Pigtail catheter removed  1/15 tolerating SBT x4 hrs but with copious secretions requiring frequent suctioning; G tube clogged, initial attempts at dislodging unsuccessful in AM, noted to have a cluster of small capsule beads. Attempts to dissolve with ginger ale, unsuccessful. This evening, successfully unclogged Gastrostomy tube. TF restarted.  1/16- No acute overnight events

## 2022-01-16 NOTE — PROGRESS NOTE ADULT - PROBLEM SELECTOR PLAN 10
Continue DVT and GI prophylaxis.  Continue TF  Continue SBT trial daily  Discussed plan with daughter Sandi today, Son Jimena could not be reached with no option to leave voice mail  Will need vent facility upon discharge. Discussed with son and daughter.

## 2022-01-16 NOTE — CHART NOTE - NSCHARTNOTEFT_GEN_A_CORE
Tracheostomy sutures removed at bedside; pt tolerated well.   Please reconsult thoracic surgery as needed

## 2022-01-16 NOTE — PROGRESS NOTE ADULT - PROBLEM SELECTOR PLAN 1
failed extubation 12/23, reintub 12/23; again failed 12/27, reintub 12/28; s/p trach 1/6/22  Increased infiltrate/atelectasis  Continue mechanical ventilation with daily SBT.  Tolerated today for   Monitor oxygen saturation.  Will need vent facility upon discharge.  Serial CXRs - Last Chest X ray on 1/14 - REsults as above

## 2022-01-16 NOTE — PROGRESS NOTE ADULT - PROBLEM SELECTOR PLAN 2
CXR on 1/11 showed complete atelectasis of left lung - Repeat CXR shows improvement left lung. No pneumothorax visualized  Left Pig tail catheter removed on 1/14/2022 .  Chest X ray on 1/14/2022 showed persistent left lung infiltrate.  Position patient on right side with left side up.

## 2022-01-16 NOTE — CHART NOTE - NSCHARTNOTEFT_GEN_A_CORE
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 Patient had cardiac arrest in ED , was intubated CPR was initiated and subsequently ROSC was achieved after 20 minutes. Patient admitted to the ICU 12/20-1/7/22 for post cardiac arrest management in the setting of acute hypoxic respiratory failure due to left pneumothorax and likely aspiration pneumonia and septic shock. He was treated with a course of cefepime (12/20-12/28) and then another course of abx with Zosyn (12/29-1/7). He failed extubation x2 (12/23, 12/27) and ultimately underwent Tracheostomy placement (1/6/22). Underwent EGD 1/7 for PEG placement, found to have a gastric ulcer which was clipped and he was initiated on BID PPI. He is pending PEG placement, plan for IR consult Monday 1/10/22 and possible removal of L pigtail pleural catheter 1/8 pending results of repeat CXR. His ICU course was further s/f hypernatremia being managed with free water boluses and Palliative Care was consulted as patient is not a candidate for further cancer directed treatment once his is s/p trach/PEG. He was transferred to the SCU/AI on 1/8 for further care.   1/9 Febrile, Tmax 101.4F. F/u CXR. TOV. NPO after midnight and hold Lovenox for GT placement in IR in am.   1/12/22 G tube placed in IR  1/14 L Pleural Pigtail catheter removed  1/15 tolerating SBT x4 hrs but with copious secretions requiring frequent suctioning; G tube clogged, initial attempts at dislodging unsuccessful in AM, noted to have a cluster of small capsule beads. Attempts to dissolve with ginger ale, unsuccessful. This evening, successfully unclogged Gastrostomy tube. TF restarted.  1/16- No acute overnight events. Temp tonight.    OBJECTIVE:  Vital Signs Last 24 Hrs  T(C): 38.4 (16 Jan 2022 21:14), Max: 38.4 (16 Jan 2022 21:14)  T(F): 101.1 (16 Jan 2022 21:14), Max: 101.1 (16 Jan 2022 21:14)  HR: 91 (16 Jan 2022 16:10) (68 - 98)  BP: 105/63 (16 Jan 2022 21:14) (105/63 - 129/75)  BP(mean): --  RR: 18 (16 Jan 2022 21:14) (16 - 18)  SpO2: 95% (16 Jan 2022 21:14) (95% - 100%)    Chest X ray on 1/14/2022 showed persistent left lung infiltrate.    FOCUSED PHYSICAL EXAM:  NEURO: Awake,   RESP: Trach to vent  CV: S1 S 2    LABS:                        8.6    9.59  )-----------( 250      ( 16 Jan 2022 06:37 )             28.1     01-16    146<H>  |  114<H>  |  23<H>  ----------------------------<  198<H>  3.5   |  29  |  0.59    Ca    10.9<H>      16 Jan 2022 06:37  Phos  1.7     01-16  Mg     2.2     01-16    TPro  5.6<L>  /  Alb  1.3<L>  /  TBili  0.7  /  DBili  x   /  AST  23  /  ALT  9<L>  /  AlkPhos  87  01-16    IMAGING:  ACC: [id number] EXAM:  XR CHEST PORTABLE ROUTINE 1V                        ACC: [id number] EXAM:  XR CHEST PORTABLE URGENT 1V                        ACC: [id number] EXAM:  XR ABDOMEN PORTABLE IMMED 1V                        PROCEDURE DATE:  01/14/2022  INTERPRETATION:  AP erect chest on January 14, 2022 at 9:44 AM. Patient   is being followed for left chest tube.  Heart magnified by technique.  Tracheostomy and catheter left chest tube remain.  Significant infiltrate in the left mid lower lung field again noted.   Milder infiltrate at right base again seen.  No pneumothorax.  Chest is similar to January 13.  Follow-up AP erect chest on January 14, 2022 at 11:34 AM. The catheter   left chest tube is been withdrawn. Otherwise no change.  Portal supine abdominal study.  NG tube remains. Left upper quadrant clips noted.  Contrast is scattered in the colon which is mildly distended with gas and   fecal content but there is no sign of obstruction.  IMPRESSION: Persistent left lung infiltrate. Catheter left chest tube   removed without incident. No gross obstruction in the abdomen.    PROBLEM: SIR probably due to Persistent left lung infiltrate.  PLAN:   1. Acetaminophen  Suppository 650 felisha GRAM(s) Rectal every 6 hours PRN Temp greater or equal to 38C (100.4F)    FOLLOW UP / RESULT: trend temp

## 2022-01-17 NOTE — SEPSIS NOTE - NSSUBJFT_GEN_A_CORE
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 Patient had cardiac arrest in ED , was intubated CPR was initiated and subsequently ROSC was achieved after 20 minutes. Patient was admitted to ICU, had 2 re intubations, alter got trach and was down graded to AI.   Code sepsis was called as patient was spiking temp with elevated HR and low BP.

## 2022-01-17 NOTE — CHART NOTE - NSCHARTNOTEFT_GEN_A_CORE
Returned call to pt's family Jimena [phone] who teleconferenced call with his two sisters.   Family updated on pt's condition and plan of care, febrile overnight, abx resumed, f/u ID .   All questions and concerns addressed .   Jimena requesting earlier call for update.     Roxane Sheehan NP Medicine/SCU  [phone]

## 2022-01-17 NOTE — SEPSIS NOTE - ASSESSMENT
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     Sepsis:   Code sepsis was called as patient was spiking tempratures. temp was noted to be 100.4, with HR > 100 and found to be hypotensive.,  on chart review it was noted that patient already on Zosyn for possible pneumonia. CXr  2 sets of blood cultures, lactate, urinalysis and CXR ordered.   2.1 lr bolus NS ordered 30cc/kg.   Will give one dose of 1gm of vancomycin.  monitor blood pressure.   Follow blood cx and UA.

## 2022-01-17 NOTE — PROGRESS NOTE ADULT - SUBJECTIVE AND OBJECTIVE BOX
MELISSA JOHNS    SCU progress note    INTERVAL HPI/OVERNIGHT EVENTS: ***    DNR [ ]   DNI  [  ]    Covid - 19 PCR: COVID-19 PCR: NotDetec (14 Jan 2022 06:44)  COVID-19 PCR: NotDetec (08 Jan 2022 08:23)  COVID-19 PCR: NotDetec (06 Jan 2022 17:58)  COVID-19 PCR: NotDetec (05 Jan 2022 16:16)  COVID-19 PCR: NotDetec (02 Jan 2022 08:34)  COVID-19 PCR: NotDetec (29 Dec 2021 09:35)  COVID-19 PCR: NotDetec (27 Dec 2021 05:30)  COVID-19 PCR: NotDetec (20 Dec 2021 02:56)  COVID-19 PCR: NotDetec (11 Nov 2021 09:08)  SARS-CoV-2: NotDetec (04 Nov 2021 03:35)      The 4Ms    What Matters Most: see GOC  Age appropriate Medications/Screen for High Risk Medication: Yes  Mentation: see CAM below  Mobility: defer to physical exam    The Confusion Assessment Method (CAM) Diagnostic Algorithm     1: Acute Onset or Fluctuating Course  - Is there evidence of an acute change in mental status from the patient’s baseline? Did the (abnormal) behavior  fluctuate during the day, that is, tend to come and go, or increase and decrease in severity?  [ ] YES [ ] NO     2: Inattention  - Did the patient have difficulty focusing attention, being easily distractible, or having difficulty keeping track of what was being said?  [ ] YES [ ] NO     3: Disorganized thinking  -Was the patient’s thinking disorganized or incoherent, such as rambling or irrelevant conversation, unclear or illogical flow of ideas, or unpredictable switching from subject to subject?  [ ] YES [ ] NO    4: Altered Level of consciousness?  [ ] YES [ ] NO    The diagnosis of delirium by CAM requires the presence of features 1 and 2 and either 3 or 4.    PRESSORS: [ ] YES [ ] NO    Cardiovascular:  Heart Failure  Acute   Acute on Chronic  Chronic         Pulmonary:    Hematalogic:  enoxaparin Injectable 40 milliGRAM(s) SubCutaneous daily    Other:  acetaminophen    Suspension .. 650 milliGRAM(s) Oral every 6 hours PRN  acetaminophen  Suppository .. 650 milliGRAM(s) Rectal every 6 hours PRN  bisacodyl Suppository 10 milliGRAM(s) Rectal daily PRN  chlorhexidine 2% Cloths 1 Application(s) Topical <User Schedule>  glucagon  Injectable 0.5 milliGRAM(s) IV Push once  insulin lispro (ADMELOG) corrective regimen sliding scale   SubCutaneous every 6 hours  pantoprazole  Injectable 40 milliGRAM(s) IV Push two times a day  potassium phosphate IVPB 30 milliMole(s) IV Intermittent once  QUEtiapine 12.5 milliGRAM(s) Oral at bedtime    acetaminophen    Suspension .. 650 milliGRAM(s) Oral every 6 hours PRN  acetaminophen  Suppository .. 650 milliGRAM(s) Rectal every 6 hours PRN  bisacodyl Suppository 10 milliGRAM(s) Rectal daily PRN  chlorhexidine 2% Cloths 1 Application(s) Topical <User Schedule>  enoxaparin Injectable 40 milliGRAM(s) SubCutaneous daily  glucagon  Injectable 0.5 milliGRAM(s) IV Push once  insulin lispro (ADMELOG) corrective regimen sliding scale   SubCutaneous every 6 hours  pantoprazole  Injectable 40 milliGRAM(s) IV Push two times a day  potassium phosphate IVPB 30 milliMole(s) IV Intermittent once  QUEtiapine 12.5 milliGRAM(s) Oral at bedtime    Drug Dosing Weight  Height (cm): 175.3 (20 Dec 2021 02:21)  Weight (kg): 72.5 (20 Dec 2021 06:30)  BMI (kg/m2): 23.6 (20 Dec 2021 06:30)  BSA (m2): 1.88 (20 Dec 2021 06:30)    CENTRAL LINE: [ ] YES [ ] NO  LOCATION:   DATE INSERTED:  REMOVE: [ ] YES [ ] NO  EXPLAIN:    HUTCHINS: [ ] YES [ ] NO    DATE INSERTED:  REMOVE:  [ ] YES [ ] NO  EXPLAIN:    PAST MEDICAL & SURGICAL HISTORY:  Hypertension    Diabetes    High cholesterol    Primary osteoarthritis of both knees    Coronary artery disease of native artery of native heart with stable angina pectoris    Allergic bronchitis    Diabetic retinopathy    Oral cancer    SCC (squamous cell carcinoma)    Metastatic cancer    Recurrent disease    Edema of extremity    Hyponatremia    Microalbuminuria    Neuralgia    Obstructive sleep apnea, adult    Vitamin D deficiency    S/P knee replacement  b/l    S/P hernia repair  b/l    Status post cataract extraction and insertion of intraocular lens, unspecified laterality  b/l    History of surgery  On 9/10/20 he underwent right hemiglossectomy and partial neck dissection with Dr. Darinel Servin at [facility].                  Mode: AC/ CMV (Assist Control/ Continuous Mandatory Ventilation)  RR (machine): 14  TV (machine): 450  FiO2: 40  PEEP: 5  ITime: 0.9  MAP: 10  PIP: 26      PHYSICAL EXAM:        LABS:  CBC Full  -  ( 17 Jan 2022 07:09 )  WBC Count : 12.40 K/uL  RBC Count : 3.08 M/uL  Hemoglobin : 8.8 g/dL  Hematocrit : 27.9 %  Platelet Count - Automated : 213 K/uL  Mean Cell Volume : 90.6 fl  Mean Cell Hemoglobin : 28.6 pg  Mean Cell Hemoglobin Concentration : 31.5 gm/dL  Auto Neutrophil # : 10.58 K/uL  Auto Lymphocyte # : 0.74 K/uL  Auto Monocyte # : 0.85 K/uL  Auto Eosinophil # : 0.10 K/uL  Auto Basophil # : 0.04 K/uL  Auto Neutrophil % : 85.3 %  Auto Lymphocyte % : 6.0 %  Auto Monocyte % : 6.9 %  Auto Eosinophil % : 0.8 %  Auto Basophil % : 0.3 %    01-17    143  |  107  |  19<H>  ----------------------------<  194<H>  3.7   |  28  |  0.59    Ca    10.9<H>      17 Jan 2022 07:09  Phos  1.8     01-17  Mg     2.1     01-17    TPro  5.3<L>  /  Alb  1.2<L>  /  TBili  0.5  /  DBili  x   /  AST  26  /  ALT  11  /  AlkPhos  85  01-17              [  ]  DVT Prophylaxis  [  ]   Abnormal Nutritional Status -  Malnutrition   Cachexia      Morbid Obesity BMI >/=40  Diet, NPO with Tube Feed:   Tube Feeding Modality: Gastrostomy  Glucerna 1.5 Dhruv  Total Volume for 24 Hours (mL): 960  Continuous  Starting Tube Feed Rate mL per Hour: 30  Increase Tube Feed Rate by (mL): 10     Every 6 hours  Until Goal Tube Feed Rate (mL per Hour): 40  Tube Feed Duration (in Hours): 24  Tube Feed Start Time: 10:00  No Carb Prosource TF     Qty per Day:  1 PKT BID. (01-14-22 @ 09:46) [Active]          RADIOLOGY & ADDITIONAL STUDIES:  ***    Goals of Care Discussion with Family/Proxy/Other   - see note from/family meeting set up for...     MELISSA JOHNS    SCU progress note    INTERVAL HPI/OVERNIGHT EVENTS: ***Febrile overnight , Tmax 101.1F last night. Pt seen and examined this morning. Pt awake, nontoxic appearing, shakes his hand in response to ROS . Pt denies SOB, chest discomfort, generalized discomfort, abdominal discomfort. Trach to vent. Copious thick green/gray secretions suctioned per trach.     DNR [x]   DNI  [  ]     Covid - 19 PCR: COVID-19 PCR: NotDetec (14 Jan 2022 06:44)  COVID-19 PCR: NotDetec (08 Jan 2022 08:23)      The 4Ms    What Matters Most: see GOC  Age appropriate Medications/Screen for High Risk Medication: Yes  Mentation: see CAM below  Mobility: defer to physical exam    The Confusion Assessment Method (CAM) Diagnostic Algorithm     1: Acute Onset or Fluctuating Course  - Is there evidence of an acute change in mental status from the patient’s baseline? Did the (abnormal) behavior  fluctuate during the day, that is, tend to come and go, or increase and decrease in severity?  [ ] YES [x] NO     2: Inattention  - Did the patient have difficulty focusing attention, being easily distractible, or having difficulty keeping track of what was being said?  [ ] YES [ ] NO  (x) unable to assess      3: Disorganized thinking  -Was the patient’s thinking disorganized or incoherent, such as rambling or irrelevant conversation, unclear or illogical flow of ideas, or unpredictable switching from subject to subject?  [ ] YES [ ] NO  (x) unable to assess     4: Altered Level of consciousness?  [ ] YES [x] NO    The diagnosis of delirium by CAM requires the presence of features 1 and 2 and either 3 or 4.    PRESSORS: [ ] YES [ x NO    Cardiovascular:    Pulmonary:    Hematalogic:  enoxaparin Injectable 40 milliGRAM(s) SubCutaneous daily    Other:  acetaminophen    Suspension .. 650 milliGRAM(s) Oral every 6 hours PRN  acetaminophen  Suppository .. 650 milliGRAM(s) Rectal every 6 hours PRN  bisacodyl Suppository 10 milliGRAM(s) Rectal daily PRN  chlorhexidine 2% Cloths 1 Application(s) Topical <User Schedule>  glucagon  Injectable 0.5 milliGRAM(s) IV Push once  insulin lispro (ADMELOG) corrective regimen sliding scale   SubCutaneous every 6 hours  pantoprazole  Injectable 40 milliGRAM(s) IV Push two times a day  potassium phosphate IVPB 30 milliMole(s) IV Intermittent once  QUEtiapine 12.5 milliGRAM(s) Oral at bedtime    acetaminophen    Suspension .. 650 milliGRAM(s) Oral every 6 hours PRN  acetaminophen  Suppository .. 650 milliGRAM(s) Rectal every 6 hours PRN  bisacodyl Suppository 10 milliGRAM(s) Rectal daily PRN  chlorhexidine 2% Cloths 1 Application(s) Topical <User Schedule>  enoxaparin Injectable 40 milliGRAM(s) SubCutaneous daily  glucagon  Injectable 0.5 milliGRAM(s) IV Push once  insulin lispro (ADMELOG) corrective regimen sliding scale   SubCutaneous every 6 hours  pantoprazole  Injectable 40 milliGRAM(s) IV Push two times a day  potassium phosphate IVPB 30 milliMole(s) IV Intermittent once  QUEtiapine 12.5 milliGRAM(s) Oral at bedtime    Drug Dosing Weight  Height (cm): 175.3 (20 Dec 2021 02:21)  Weight (kg): 72.5 (20 Dec 2021 06:30)  BMI (kg/m2): 23.6 (20 Dec 2021 06:30)  BSA (m2): 1.88 (20 Dec 2021 06:30)    CENTRAL LINE: [ ] YES [ x NO  LOCATION:   DATE INSERTED:  REMOVE: [ ] YES [ ] NO  EXPLAIN:    HUTCHINS: [  YES [ x NO    DATE INSERTED:  REMOVE:  [ ] YES [  NO  EXPLAIN:    PAST MEDICAL & SURGICAL HISTORY:  Hypertension    Diabetes    High cholesterol    Primary osteoarthritis of both knees    Coronary artery disease of native artery of native heart with stable angina pectoris    Allergic bronchitis    Diabetic retinopathy    Oral cancer    SCC (squamous cell carcinoma)    Metastatic cancer    Recurrent disease    Edema of extremity    Hyponatremia    Microalbuminuria    Neuralgia    Obstructive sleep apnea, adult    Vitamin D deficiency    S/P knee replacement  b/l    S/P hernia repair  b/l    Status post cataract extraction and insertion of intraocular lens, unspecified laterality  b/l    History of surgery  On 9/10/20 he underwent right hemiglossectomy and partial neck dissection with Dr. Darinel Servin at [facility].          Mode: AC/ CMV (Assist Control/ Continuous Mandatory Ventilation)  RR (machine): 14  TV (machine): 450  FiO2: 40  PEEP: 5  ITime: 0.9  MAP: 10  PIP: 26      PHYSICAL EXAM:    GENERAL: non-toxic appearing. Cachectic.   HEAD: atraumatic, normocephalic  EYES: EOMI, PERRLA, conjunctiva and sclera clear  ENMT: No tonsillar erythema, exudates, or enlargement; Moist mucous membranes  NECK: Trach tube intact. Supple, No JVD  NERVOUS SYSTEM:  Awake/Alert,  makes gestures with RUE/nods /shakes head  in response to questions. Moves all extremities.   CHEST/LUNG: Trach to vent. Scattered rhonchi. Suctioning copious thick green/gray secretions per trach.   HEART: Regular rate and rhythm; No murmurs, rubs, or gallops  ABDOMEN: Soft, Nontender, Nondistended; Bowel sounds present. PEG tube intact.   EXTREMITIES:Muscle wasting .   2+ Peripheral Pulses, No clubbing, cyanosis, or edema  LYMPH: No lymphadenopathy noted  SKIN: No rashes or lesions      LABS:  CBC Full  -  ( 17 Jan 2022 07:09 )  WBC Count : 12.40 K/uL  RBC Count : 3.08 M/uL  Hemoglobin : 8.8 g/dL  Hematocrit : 27.9 %  Platelet Count - Automated : 213 K/uL  Mean Cell Volume : 90.6 fl  Mean Cell Hemoglobin : 28.6 pg  Mean Cell Hemoglobin Concentration : 31.5 gm/dL  Auto Neutrophil # : 10.58 K/uL  Auto Lymphocyte # : 0.74 K/uL  Auto Monocyte # : 0.85 K/uL  Auto Eosinophil # : 0.10 K/uL  Auto Basophil # : 0.04 K/uL  Auto Neutrophil % : 85.3 %  Auto Lymphocyte % : 6.0 %  Auto Monocyte % : 6.9 %  Auto Eosinophil % : 0.8 %  Auto Basophil % : 0.3 %    01-17    143  |  107  |  19<H>  ----------------------------<  194<H>  3.7   |  28  |  0.59    Ca    10.9<H>      17 Jan 2022 07:09  Phos  1.8     01-17  Mg     2.1     01-17    TPro  5.3<L>  /  Alb  1.2<L>  /  TBili  0.5  /  DBili  x   /  AST  26  /  ALT  11  /  AlkPhos  85  01-17        [  ]  DVT Prophylaxis  [  ]   Abnormal Nutritional Status -  Malnutrition   Cachexia      Diet, NPO with Tube Feed:   Tube Feeding Modality: Gastrostomy  Glucerna 1.5 Dhruv  Total Volume for 24 Hours (mL): 960  Continuous  Starting Tube Feed Rate mL per Hour: 30  Increase Tube Feed Rate by (mL): 10     Every 6 hours  Until Goal Tube Feed Rate (mL per Hour): 40  Tube Feed Duration (in Hours): 24  Tube Feed Start Time: 10:00  No Carb Prosource TF     Qty per Day:  1 PKT BID. (01-14-22 @ 09:46) [Active]      RADIOLOGY & ADDITIONAL STUDIES:  ***  `< from: Xray Chest 1 View- PORTABLE-Urgent (Xray Chest 1 View- PORTABLE-Urgent .) (01.17.22 @ 12:38) >  Heart and possibly enlarged. Tracheostomy again noted.    Significant left mid to lower lung field infiltrate and slight right base   infiltrate again noted.    Chest is similar to January 14.    IMPRESSION: Unchanged chest as above.    < end of copied text >    < from: Xray Abdomen 1 View Portable, IMMEDIATE (Xray Abdomen 1 View Portable, IMMEDIATE .) (01.14.22 @ 14:33) >    INTERPRETATION:  AP erect chest on January 14, 2022 at 9:44 AM. Patient   is being followed for left chest tube.    Heart magnified by technique.    Tracheostomy and catheter left chest tube remain.    Significant infiltrate in the left mid lower lung field again noted.   Milder infiltrate at right base again seen.    No pneumothorax.    Chest is similar to January 13.    Follow-up AP erect chest on January 14, 2022 at 11:34 AM. The catheter   left chest tube is been withdrawn. Otherwise no change.    Portal supine abdominal study.    NG tube remains. Left upper quadrant clips noted.    Contrast is scattered in the colon which is mildly distended with gas and   fecal content but there is no sign of obstruction.    IMPRESSION: Persistent left lung infiltrate. Catheter left chest tube   removed without incident. No gross obstruction in the abdomen.    < end of copied text >      Goals of Care Discussion with Family/Proxy/Other   - see note from 12/20/21

## 2022-01-17 NOTE — PROGRESS NOTE ADULT - PROBLEM SELECTOR PLAN 9
Improved.   Patient calm and not agitated at this time  Will try to taper seroquel 12.5 mg only at bedtime starting today

## 2022-01-17 NOTE — PROGRESS NOTE ADULT - PROBLEM SELECTOR PLAN 2
Febrile , mild leukocytosis  Resume Zosyn for possible PNA   CXR results as above, unchanged from previous, shows significant left mid to lower lung field infiltrate and slight right base infiltrate.  Monitor oxygen saturation.  ID following

## 2022-01-17 NOTE — PROGRESS NOTE ADULT - PROBLEM SELECTOR PLAN 1
failed extubation 12/23, reintub 12/23; again failed 12/27, reintub 12/28; s/p trach 1/6/22  Increased infiltrate/atelectasis  Febrile , mild leukocytosis  Resume Zosyn for possible PNA   CXR results as above, unchanged from previous, shows significant left mid to lower lung field infiltrate and slight right base infiltrate.   Continue mechanical ventilation with daily SBT as tolerated.   Monitor oxygen saturation.  Will need vent facility upon discharge.  Serial CXRs

## 2022-01-18 NOTE — PROCEDURE NOTE - NSPOSTPRCRAD_GEN_A_CORE
feeding tube in correct gastric position/post-procedure radiography performed
central line located in the superior vena cava/post procedure radiography not performed
central line located in the

## 2022-01-18 NOTE — PROGRESS NOTE ADULT - ASSESSMENT
77 yo M with complicated hospital course with recent code sepsis,  hypotensive, hypoxia now with SBO on CT scan. On pressors x 2.   1. Recommend place PEG tube to gravity to decompress small bowel  2. Pt current clinical condition puts him at high risk for any surgical intervention  3. Serial abd exams  4. AXR in AM  5. Will follow  6. D/w Dr. Aguero and agreed

## 2022-01-18 NOTE — PROGRESS NOTE ADULT - NUTRITIONAL ASSESSMENT
This patient has been assessed with a concern for Malnutrition and has been determined to have a diagnosis/diagnoses of Severe protein-calorie malnutrition.  +Temporal waisting  +Muscle waisting    This patient is being managed with:   Diet NPO with Tube Feed-  Tube Feeding Modality: Gastrostomy  Glucerna 1.5 Dhruv  Total Volume for 24 Hours (mL): 960  Continuous  Starting Tube Feed Rate {mL per Hour}: 30  Increase Tube Feed Rate by (mL): 10     Every 6 hours  Until Goal Tube Feed Rate (mL per Hour): 40  Tube Feed Duration (in Hours): 24  Tube Feed Start Time: 10:00  No Carb Prosource TF     Qty per Day:  1 PKT BID.  Entered: Jan 14 2022  9:46AM This patient has been assessed with a concern for Malnutrition and has been determined to have a diagnosis/diagnoses of Severe protein-calorie malnutrition.  +Temporal waisting  +Muscle waisting    Currently NPO

## 2022-01-18 NOTE — PROCEDURE NOTE - NSINFORMCONSENT_GEN_A_CORE
Benefits, risks, and possible complications of procedure explained to patient/caregiver who verbalized understanding and gave verbal consent.
Benefits, risks, and possible complications of procedure explained to patient/caregiver who verbalized understanding and gave verbal consent.
Benefits, risks, and possible complications of procedure explained to patient/caregiver who verbalized understanding and gave written consent.
patient's daughter/Benefits, risks, and possible complications of procedure explained to patient/caregiver who verbalized understanding and gave verbal consent.

## 2022-01-18 NOTE — CHART NOTE - NSCHARTNOTEFT_GEN_A_CORE
Son and daughter updated on patient's status  Informed that pt is septic. Abdominal / Pelvis CT ordered.  Pt to be transferred to ICU after CT for closer monitoring.  All questions  answered.

## 2022-01-18 NOTE — PROGRESS NOTE ADULT - ASSESSMENT
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 Patient had cardiac arrest in ED , was intubated CPR was initiated and subsequently ROSC was achieved after 20 minutes. Patient admitted to the ICU 12/20-1/7/22 for post cardiac arrest management in the setting of acute hypoxic respiratory failure due to left pneumothorax and likely aspiration pneumonia and septic shock. He was treated with a course of cefepime (12/20-12/28) and then another course of abx with Zosyn (12/29-1/7). He failed extubation x2 (12/23, 12/27) and ultimately underwent Tracheostomy placement (1/6/22). Underwent EGD 1/7 for PEG placement, found to have a gastric ulcer which was clipped and he was initiated on BID PPI. He is pending PEG placement, plan for IR consult Monday 1/10/22 and possible removal of L pigtail pleural catheter 1/8 pending results of repeat CXR. His ICU course was further s/f hypernatremia being managed with free water boluses and Palliative Care was consulted as patient is not a candidate for further cancer directed treatment once his is s/p trach/PEG. He was transferred to the [facility] on 1/8 for further care.     1/9 Febrile, Tmax 101.4F. F/u CXR. TOV. NPO after midnight and hold lovenox for GT placement in IR in am.   1/12/22 G tube placed in IR  1/14 L Pleural Pigtail catheter removed  1/15 tolerating SBT x4 hrs but with copious secretions requiring frequent suctioning; G tube clogged, initial attempts at dislodging unsuccessful in AM, noted to have a cluster of small capsule beads. Attempts to dissolve with ginger ale, unsuccessful. This evening, successfully unclogged Gastrostomy tube. TF restarted.  1/16- No acute overnight events  1/17  Code sepsis  Lactate 5.3 received 2.1 liter of NS. Repeat lactate 3.2 Blood and urine cultures sent. 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 Patient had cardiac arrest in ED , was intubated CPR was initiated and subsequently ROSC was achieved after 20 minutes. Patient admitted to the ICU 12/20-1/7/22 for post cardiac arrest management in the setting of acute hypoxic respiratory failure due to left pneumothorax and likely aspiration pneumonia and septic shock. He was treated with a course of cefepime (12/20-12/28) and then another course of abx with Zosyn (12/29-1/7). He failed extubation x2 (12/23, 12/27) and ultimately underwent Tracheostomy placement (1/6/22). Underwent EGD 1/7 for PEG placement, found to have a gastric ulcer which was clipped and he was initiated on BID PPI. He is pending PEG placement, plan for IR consult Monday 1/10/22 and possible removal of L pigtail pleural catheter 1/8 pending results of repeat CXR. His ICU course was further s/f hypernatremia being managed with free water boluses and Palliative Care was consulted as patient is not a candidate for further cancer directed treatment once his is s/p trach/PEG. He was transferred to the SCU/AI on 1/8 for further care.     1/9 Febrile, Tmax 101.4F. F/u CXR. TOV. NPO after midnight and hold lovenox for GT placement in IR in am.   1/12/22 G tube placed in IR  1/14 L Pleural Pigtail catheter removed  1/15 tolerating SBT x4 hrs but with copious secretions requiring frequent suctioning; G tube clogged, initial attempts at dislodging unsuccessful in AM, noted to have a cluster of small capsule beads. Attempts to dissolve with ginger ale, unsuccessful. This evening, successfully unclogged Gastrostomy tube. TF restarted.  1/16- No acute overnight events  1/17  Code sepsis  Lactate 5.3 received 2.1 liter of NS. Repeat lactate 3.2 Blood and urine cultures sent.  1/18  Lactate 6.1  IVF increased to 125cc/hr. Abdominal / Pelvis CT with contrast. B/P low started on midodrine. Pt to be transferred to ICU after CT scan

## 2022-01-18 NOTE — CHART NOTE - NSCHARTNOTEFT_GEN_A_CORE
EVENT:   1/17/22, 9:45pm, code sepsis was called. Patient's T - 100.4F, HR - 119, BP - 70/40, RR - 27, O2sat - 86%. Tylenol 650 mg supp. -given. Hypothermia blanket - ordered. CBC, Blood culture x 2 sets, urinalysis - sent.  Chest x ray - done. Serum lactate - 5.3 (10:16pm). Vancomycin 1000 mg IV PB x 1 dose  and Zosyn 3.375 IV PB - given. NS 2100 ml IV PB boluses given.  1/17/22, 11 pm, T - 101.4F, HR - 107, BP - 91/49, RR -24, O2sat - 100%. Family (Janis Amaya) was called and informed regarding patient's status. Acetaminophen (Ofirmev) 1000 mg IV PB x 1 dose for fever - provided. Stat serum lactate - sent.   1/18/22, 12:20am, T - 100.2F. Continued monitoring.     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 Patient was intubated on arrival to ED, he had a cardiac arrest and achieved ROSC 20 minutes later. History was obtained with son at bed side , he states that patient Squamous cell cancer was metastasized to lungs and he was getting immunotherapy. They noticed patient O2 saturation was low so decided to bring him to ED. On arrival to ED patient was intubated, and he lost the pulse. high quality CPR was started , patient achieved Rosc after 7 cycles of CPR. Post intubation CXR showed large left sided pneumothorax. pigtail was placed by ED physician. emergent IO acces was done during the CPR. Patient was started on levophed and propofol for sedation. (20 Dec 2021 03:52)    OBJECTIVE:  Vital Signs Last 24 Hrs  T(C): 38.6 (17 Jan 2022 22:54), Max: 39.8 (17 Jan 2022 19:34)  T(F): 101.4 (17 Jan 2022 22:54), Max: 103.6 (17 Jan 2022 19:34)  HR: 107 (17 Jan 2022 22:54) (75 - 119)  BP: 91/49 (17 Jan 2022 22:54) (70/40 - 125/69)  BP(mean): 52 (17 Jan 2022 21:24) (52 - 52)  RR: 24 (17 Jan 2022 22:54) (18 - 27)  SpO2: 100% (17 Jan 2022 22:54) (86% - 100%)    FOCUSED PHYSICAL EXAM:    LABS:                        10.1   12.65 )-----------( 233      ( 17 Jan 2022 22:16 )             33.0     01-17    143  |  107  |  19<H>  ----------------------------<  194<H>  3.7   |  28  |  0.59    Ca    10.9<H>      17 Jan 2022 07:09  Phos  1.8     01-17  Mg     2.1     01-17    TPro  5.3<L>  /  Alb  1.2<L>  /  TBili  0.5  /  DBili  x   /  AST  26  /  ALT  11  /  AlkPhos  85  01-17    PLAN:     FOLLOW UP / RESULT: EVENT:   1/17/22, 9:45pm, code sepsis was called. Patient's T - 100.4F, HR - 119, BP - 70/40, RR - 27, O2sat - 86%. Tylenol 650 mg supp. -given. Hypothermia blanket - ordered. CBC, Blood culture x 2 sets, urinalysis - sent.  Chest x ray - done. Serum lactate - 5.3 (10:16pm). Vancomycin 1000 mg IV PB x 1 dose  and Zosyn 3.375 IV PB - given. NS 2100 ml IV PB boluses given. WBC - 12.40 .  1/17/22, 11 pm, T - 101.4F, HR - 107, BP - 91/49, RR -24, O2sat - 100%. Family (Jimena Amaya) was called and informed regarding patient's status. Acetaminophen (Ofirmev) 1000 mg IV PB x 1 dose for fever - provided. Stat serum lactate - sent.   1/18/22, 12:20am, T - 100.2F. Continued monitoring.     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 Patient was intubated on arrival to ED, he had a cardiac arrest and achieved ROSC 20 minutes later. History was obtained with son at bed side , he states that patient Squamous cell cancer was metastasized to lungs and he was getting immunotherapy. They noticed patient O2 saturation was low so decided to bring him to ED. On arrival to ED patient was intubated, and he lost the pulse. high quality CPR was started , patient achieved Rosc after 7 cycles of CPR. Post intubation CXR showed large left sided pneumothorax. pigtail was placed by ED physician. emergent IO acces was done during the CPR. Patient was started on levophed and propofol for sedation. (20 Dec 2021 03:52)    OBJECTIVE:  Vital Signs Last 24 Hrs  T(C): 38.6 (17 Jan 2022 22:54), Max: 39.8 (17 Jan 2022 19:34)  T(F): 101.4 (17 Jan 2022 22:54), Max: 103.6 (17 Jan 2022 19:34)  HR: 107 (17 Jan 2022 22:54) (75 - 119)  BP: 91/49 (17 Jan 2022 22:54) (70/40 - 125/69)  BP(mean): 52 (17 Jan 2022 21:24) (52 - 52)  RR: 24 (17 Jan 2022 22:54) (18 - 27)  SpO2: 100% (17 Jan 2022 22:54) (86% - 100%)    FOCUSED PHYSICAL EXAM:    LABS:                        10.1   12.65 )-----------( 233      ( 17 Jan 2022 22:16 )             33.0     01-17    143  |  107  |  19<H>  ----------------------------<  194<H>  3.7   |  28  |  0.59    Ca    10.9<H>      17 Jan 2022 07:09  Phos  1.8     01-17  Mg     2.1     01-17    TPro  5.3<L>  /  Alb  1.2<L>  /  TBili  0.5  /  DBili  x   /  AST  26  /  ALT  11  /  AlkPhos  85  01-17    PLAN:     FOLLOW UP / RESULT: EVENT:   1/17/22, 9:45pm, code sepsis was called. Patient's T - 100.4F, HR - 119, BP - 70/40, RR - 27, O2sat - 86%. Tylenol 650 mg supp. -given. Hypothermia blanket - ordered. CBC, Blood culture x 2 sets, urinalysis - sent.  Chest x ray - done. Serum lactate - 5.3 (10:16pm). Vancomycin 1000 mg IV PB x 1 dose  and Zosyn 3.375 IV PB - given. NS 2100 ml IV PB boluses given. WBC - 12.40 .  1/17/22, 11 pm, T - 101.4F, HR - 107, BP - 91/49, RR -24, O2sat - 100%. Family (Jimena Amaya) was called and informed regarding patient's status. Acetaminophen (Ofirmev) 1000 mg IV PB x 1 dose for fever - provided. Stat serum lactate - sent.   1/18/22, 12:20am, T - 100.2F. Continued monitoring. S. lactate - 3.2.    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 Patient was intubated on arrival to ED, he had a cardiac arrest and achieved ROSC 20 minutes later. History was obtained with son at bed side , he states that patient Squamous cell cancer was metastasized to lungs and he was getting immunotherapy. They noticed patient O2 saturation was low so decided to bring him to ED. On arrival to ED patient was intubated, and he lost the pulse. high quality CPR was started , patient achieved Rosc after 7 cycles of CPR. Post intubation CXR showed large left sided pneumothorax. pigtail was placed by ED physician. emergent IO acces was done during the CPR. Patient was started on levophed and propofol for sedation. (20 Dec 2021 03:52)    OBJECTIVE:  Vital Signs Last 24 Hrs  T(C): 38.6 (17 Jan 2022 22:54), Max: 39.8 (17 Jan 2022 19:34)  T(F): 101.4 (17 Jan 2022 22:54), Max: 103.6 (17 Jan 2022 19:34)  HR: 107 (17 Jan 2022 22:54) (75 - 119)  BP: 91/49 (17 Jan 2022 22:54) (70/40 - 125/69)  BP(mean): 52 (17 Jan 2022 21:24) (52 - 52)  RR: 24 (17 Jan 2022 22:54) (18 - 27)  SpO2: 100% (17 Jan 2022 22:54) (86% - 100%)    FOCUSED PHYSICAL EXAM:    LABS:                        10.1   12.65 )-----------( 233      ( 17 Jan 2022 22:16 )             33.0     01-17    143  |  107  |  19<H>  ----------------------------<  194<H>  3.7   |  28  |  0.59    Ca    10.9<H>      17 Jan 2022 07:09  Phos  1.8     01-17  Mg     2.1     01-17    TPro  5.3<L>  /  Alb  1.2<L>  /  TBili  0.5  /  DBili  x   /  AST  26  /  ALT  11  /  AlkPhos  85  01-17    PLAN:   - Normal saline @ 75 ml/hrs ( 12 hrs),   - s. lactate in am.     FOLLOW UP / RESULT:  - Follow-up morning labs,   - Safety measurement maintain. EVENT:   1/17/22, 9:45pm, code sepsis was called. Patient's T - 100.4F, HR - 119, BP - 70/40, RR - 27, O2sat - 86%. Tylenol 650 mg supp. -given. Hypothermia blanket - ordered. CBC, Blood culture x 2 sets, urinalysis - sent.  Chest x ray - done. Serum lactate - 5.3 (10:16pm). Vancomycin 1000 mg IV PB x 1 dose  and Zosyn 3.375 IV PB - given. NS 2100 ml IV PB boluses given. WBC - 12.40 .  1/17/22, 11 pm, T - 101.4F, HR - 107, BP - 91/49, RR -24, O2sat - 100%. Family (Jimena Amaya) was called and informed regarding patient's status. Acetaminophen (Ofirmev) 1000 mg IV PB x 1 dose for fever - provided. Stat serum lactate - sent.   1/18/22, 12:20am, T - 100.2F. Continued monitoring. S. lactate - 3.2.  1/18/22, 4:04am, T - 99.1, HR - 95, BP - 84/51, RR - 24, O2sat - 98%.     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 Patient was intubated on arrival to ED, he had a cardiac arrest and achieved ROSC 20 minutes later. History was obtained with son at bed side , he states that patient Squamous cell cancer was metastasized to lungs and he was getting immunotherapy. They noticed patient O2 saturation was low so decided to bring him to ED. On arrival to ED patient was intubated, and he lost the pulse. high quality CPR was started , patient achieved Rosc after 7 cycles of CPR. Post intubation CXR showed large left sided pneumothorax. pigtail was placed by ED physician. emergent IO acces was done during the CPR. Patient was started on levophed and propofol for sedation. (20 Dec 2021 03:52)    OBJECTIVE:  Vital Signs Last 24 Hrs  T(C): 38.6 (17 Jan 2022 22:54), Max: 39.8 (17 Jan 2022 19:34)  T(F): 101.4 (17 Jan 2022 22:54), Max: 103.6 (17 Jan 2022 19:34)  HR: 107 (17 Jan 2022 22:54) (75 - 119)  BP: 91/49 (17 Jan 2022 22:54) (70/40 - 125/69)  BP(mean): 52 (17 Jan 2022 21:24) (52 - 52)  RR: 24 (17 Jan 2022 22:54) (18 - 27)  SpO2: 100% (17 Jan 2022 22:54) (86% - 100%)    FOCUSED PHYSICAL EXAM:    LABS:                        10.1   12.65 )-----------( 233      ( 17 Jan 2022 22:16 )             33.0     01-17    143  |  107  |  19<H>  ----------------------------<  194<H>  3.7   |  28  |  0.59    Ca    10.9<H>      17 Jan 2022 07:09  Phos  1.8     01-17  Mg     2.1     01-17    TPro  5.3<L>  /  Alb  1.2<L>  /  TBili  0.5  /  DBili  x   /  AST  26  /  ALT  11  /  AlkPhos  85  01-17    PLAN:   - Normal saline @ 75 ml/hrs ( 12 hrs),   - s. lactate in am,   - Midodrine 2.5 mg po TID.     FOLLOW UP / RESULT:  - Monitor VS,   - Follow-up morning labs,   - Safety measurement maintain.

## 2022-01-18 NOTE — CHART NOTE - NSCHARTNOTEFT_GEN_A_CORE
Son and daughter called and updated on patient's CT  Results explained, patient transferred to ICU for closer monitoring.  Test results reviewed. All questions answered.

## 2022-01-18 NOTE — CHART NOTE - NSCHARTNOTEFT_GEN_A_CORE
Assessment:   76yMalePatient is a 76y old  Male who presents with a chief complaint of cardiac arrest (18 Jan 2022 08:16). Pt Visited. Pt is On vent via Trach.  Pt w [facility] [place] . S/P Code sepsis. Chest Tube D/cd on 1/14. D/W NP. TF on hold  till CT of abdomen .Pt being Transferred to ICU.        Factors impacting intake: [ ] none [ ] nausea  [ ] vomiting [ ] diarrhea [ ] constipation  [ ]chewing problems [x ] swallowing issues  [ ] other:     Diet Prescription: Diet, NPO with Tube Feed:   Tube Feeding Modality: Gastrostomy  Glucerna 1.5 Dhruv  Total Volume for 24 Hours (mL): 960  Continuous  Starting Tube Feed Rate {mL per Hour}: 30  Increase Tube Feed Rate by (mL): 10     Every 6 hours  Until Goal Tube Feed Rate (mL per Hour): 40  Tube Feed Duration (in Hours): 24  Tube Feed Start Time: 10:00  No Carb Prosource TF     Qty per Day:  1 PKT BID. (01-14-22 @ 09:46)    Intake: NPO     Current Weight:   % Weight Change    Pertinent Medications: MEDICATIONS  (STANDING):  chlorhexidine 2% Cloths 1 Application(s) Topical <User Schedule>  enoxaparin Injectable 40 milliGRAM(s) SubCutaneous daily  glucagon  Injectable 0.5 milliGRAM(s) IV Push once  insulin lispro (ADMELOG) corrective regimen sliding scale   SubCutaneous every 6 hours  midodrine. 2.5 milliGRAM(s) Oral three times a day  pantoprazole  Injectable 40 milliGRAM(s) IV Push two times a day  piperacillin/tazobactam IVPB.. 3.375 Gram(s) IV Intermittent every 8 hours  QUEtiapine 12.5 milliGRAM(s) Oral at bedtime  sodium chloride 0.9%. 1000 milliLiter(s) (125 mL/Hr) IV Continuous <Continuous>    MEDICATIONS  (PRN):  acetaminophen    Suspension .. 650 milliGRAM(s) Oral every 6 hours PRN Temp greater or equal to 38C (100.4F), Mild Pain (1 - 3)  acetaminophen  Suppository .. 650 milliGRAM(s) Rectal every 6 hours PRN Temp greater or equal to 38C (100.4F)  bisacodyl Suppository 10 milliGRAM(s) Rectal daily PRN Constipation    Pertinent Labs: 01-18 Na136 mmol/L Glu 259 mg/dL<H> K+ 5.0 mmol/L Cr  1.28 mg/dL BUN 29 mg/dL<H> 01-18 Phos 3.9 mg/dL 01-18 Alb 1.1 g/dL<L> 01-02 PAB 8 mg/dL<L> 12-30 Chol --    LDL --    HDL --    Trig 168 mg/dL<H>     CAPILLARY BLOOD GLUCOSE      POCT Blood Glucose.: 276 mg/dL (18 Jan 2022 11:27)  POCT Blood Glucose.: 266 mg/dL (18 Jan 2022 05:29)  POCT Blood Glucose.: 195 mg/dL (17 Jan 2022 23:58)  POCT Blood Glucose.: 172 mg/dL (17 Jan 2022 21:26)  POCT Blood Glucose.: 134 mg/dL (17 Jan 2022 17:01)    Skin: NO DTI     Estimated Needs:   [ ] no change since previous assessment  [ ] recalculated:     Previous Nutrition Diagnosis:   [ ] Inadequate Energy Intake [ ]Inadequate Oral Intake [ ] Excessive Energy Intake   [ ] Underweight [ ] Increased Nutrient Needs [ ] Overweight/Obesity   [ ] Altered GI Function [ ] Unintended Weight Loss [ ] Food & Nutrition Related Knowledge Deficit [x ] Malnutrition Severe     Nutrition Diagnosis is [ x] ongoing  [ ] resolved [ ] not applicable     New Nutrition Diagnosis: [ ] not applicable       Interventions:   Recommend  [ ] Change Diet To:  [ ] Nutrition Supplement  [x ] Nutrition Support : When TF re started Suggest Glucerna 1.5 initial Goal rate @ 40 ml /hr x 24 hr as tolerated to provide 960 ml,  1440 kcal, Protein 79 gm's, free water 729 ml / day  + Prosource 1 PKt  BID ( 120 kcal, Protein 30 gms of protein ) .   [ ] Other:       Monitoring and Evaluation:   [ ] PO intake [ x ] Tolerance to diet prescription [ x ] weights [ x ] labs[ x ] follow up per protocol  [ ] other:

## 2022-01-18 NOTE — PROGRESS NOTE ADULT - ASSESSMENT
Septic Shock  Pneumonia/ lung abscess vs Empyema of left lung  Fevers  Leukocytosis    Plan - Cont Zosyn 3.375 gms iv q8hrs  might need a chest tube if he has Empyema  Time spent - 31 mins.

## 2022-01-18 NOTE — PROGRESS NOTE ADULT - PROBLEM SELECTOR PLAN 10
Continue DVT and GI prophylaxis.  Continue TF  Continue SBT trial daily  F/U cultures  Repeat lactate  ID f/u.  Will need vent facility upon discharge. Discussed with son and daughter. Continue DVT and GI prophylaxis.  Continue TF  SBT on hold until sepsis resolved.  F/U cultures  Repeat lactate  6.1. Pt to be transferred to ICU  ID f/u.  Will need vent facility upon discharge.

## 2022-01-18 NOTE — PROGRESS NOTE ADULT - SUBJECTIVE AND OBJECTIVE BOX
INTERVAL HPI/OVERNIGHT EVENTS: Code sepsis last evening. Tmax 103.6. Lactate 5.3 and then 3.2  Received 2.1 liters normal saline bolus.  Blood cultures and urine culture sent.  Continues to be hypotensive. Midodrine 2.5 mg every 8 hours started. Repeat lactate 6.1 IVF NS 125cc per hour. Abdominal/ Pelvis CT was done, patient was brought up to ICU.     PRESSORS: [x ] YES [ ] NO  WHICH: [place]    ANTIBIOTICS:                      Antimicrobial:  piperacillin/tazobactam IVPB.. 3.375 Gram(s) IV Intermittent every 8 hours    Cardiovascular:  midodrine. 2.5 milliGRAM(s) Oral three times a day    Pulmonary:    Hematalogic:  enoxaparin Injectable 40 milliGRAM(s) SubCutaneous daily    Other:  acetaminophen    Suspension .. 650 milliGRAM(s) Oral every 6 hours PRN  acetaminophen  Suppository .. 650 milliGRAM(s) Rectal every 6 hours PRN  bisacodyl Suppository 10 milliGRAM(s) Rectal daily PRN  chlorhexidine 2% Cloths 1 Application(s) Topical <User Schedule>  glucagon  Injectable 0.5 milliGRAM(s) IV Push once  insulin lispro (ADMELOG) corrective regimen sliding scale   SubCutaneous every 6 hours  pantoprazole  Injectable 40 milliGRAM(s) IV Push two times a day  QUEtiapine 12.5 milliGRAM(s) Oral at bedtime  sodium chloride 0.9%. 1000 milliLiter(s) IV Continuous <Continuous>    acetaminophen    Suspension .. 650 milliGRAM(s) Oral every 6 hours PRN  acetaminophen  Suppository .. 650 milliGRAM(s) Rectal every 6 hours PRN  bisacodyl Suppository 10 milliGRAM(s) Rectal daily PRN  chlorhexidine 2% Cloths 1 Application(s) Topical <User Schedule>  enoxaparin Injectable 40 milliGRAM(s) SubCutaneous daily  glucagon  Injectable 0.5 milliGRAM(s) IV Push once  insulin lispro (ADMELOG) corrective regimen sliding scale   SubCutaneous every 6 hours  midodrine. 2.5 milliGRAM(s) Oral three times a day  pantoprazole  Injectable 40 milliGRAM(s) IV Push two times a day  piperacillin/tazobactam IVPB.. 3.375 Gram(s) IV Intermittent every 8 hours  QUEtiapine 12.5 milliGRAM(s) Oral at bedtime  sodium chloride 0.9%. 1000 milliLiter(s) IV Continuous <Continuous>    Drug Dosing Weight  Height (cm): 175.3 (20 Dec 2021 02:21)  Weight (kg): 72.5 (20 Dec 2021 06:30)  BMI (kg/m2): 23.6 (20 Dec 2021 06:30)  BSA (m2): 1.88 (20 Dec 2021 06:30)    CENTRAL LINE: [ ] YES [x ] NO  LOCATION:   DATE INSERTED:  REMOVE: [ ] YES [ ] NO  EXPLAIN:    H[place]: [ ] YES [x ] NO    DATE INSERTED:  REMOVE:  [ ] YES [ ] NO  EXPLAIN:    A-LINE:  [ ] YES [x ] NO  LOCATION:   DATE INSERTED:  REMOVE:  [ ] YES [ ] NO  EXPLAIN:    PMH -reviewed admission note, no change since admission    ICU Vital Signs Last 24 Hrs  T(C): 39.1 (2022 16:55), Max: 39.8 (2022 19:34)  T(F): 102.4 (2022 16:55), Max: 103.6 (2022 19:34)  HR: 109 (2022 16:55) (90 - 119)  BP: 71/55 (2022 16:48) (70/40 - 146/113)  BP(mean): 59 (2022 16:48) (52 - 121)  ABP: --  ABP(mean): --  RR: 19 (2022 16:55) (18 - 27)  SpO2: 98% (2022 16:48) (86% - 100%)      Mode: AC/ CMV (Assist Control/ Continuous Mandatory Ventilation)  RR (machine): 14  TV (machine): 450  FiO2: 40  PEEP: 5  ITime: 0.9  MAP: 11  PIP: 26      PHYSICAL EXAM:    GENERAL: lethargic  NECK:  contracted  NERVOUS SYSTEM:  Alert & Oriented 1, opening eyes only to verbal and tactile stimulus  CHEST/LUNG: No chest deformity; Normal percussion bilaterally; No rales, rhonchi, wheezing   HEART: Regular rate and rhythm; No murmurs, rubs, or gallops  ABDOMEN: Soft, Nontender, Nondistended; Bowel sounds present  EXTREMITIES:  2+ Peripheral Pulses, No clubbing, cyanosis, or edema  LYMPH: No lymphadenopathy noted  SKIN: No rashes or lesions; Good capillary refill      LABS:  CBC Full  -  ( 2022 09:30 )  WBC Count : 19.08 K/uL  RBC Count : 4.04 M/uL  Hemoglobin : 11.3 g/dL  Hematocrit : 36.8 %  Platelet Count - Automated : 211 K/uL  Mean Cell Volume : 91.1 fl  Mean Cell Hemoglobin : 28.0 pg  Mean Cell Hemoglobin Concentration : 30.7 gm/dL  Auto Neutrophil # : 17.52 K/uL  Auto Lymphocyte # : 0.79 K/uL  Auto Monocyte # : 0.43 K/uL  Auto Eosinophil # : 0.04 K/uL  Auto Basophil # : 0.13 K/uL  Auto Neutrophil % : 91.8 %  Auto Lymphocyte % : 4.1 %  Auto Monocyte % : 2.3 %  Auto Eosinophil % : 0.2 %  Auto Basophil % : 0.7 %        136  |  104  |  29<H>  ----------------------------<  259<H>  5.0   |  20<L>  |  1.28    Ca    9.4      2022 09:30  Phos  3.9       Mg     2.3         TPro  5.7<L>  /  Alb  1.1<L>  /  TBili  0.6  /  DBili  x   /  AST  54<H>  /  ALT  13  /  AlkPhos  133<H>        Urinalysis Basic - ( 2022 10:46 )    Color: Yellow / Appearance: Clear / S.015 / pH: x  Gluc: x / Ketone: Negative  / Bili: Small / Urobili: 4   Blood: x / Protein: 30 mg/dL / Nitrite: Negative   Leuk Esterase: Trace / RBC: 0-2 /HPF / WBC 11-25 /HPF   Sq Epi: x / Non Sq Epi: Few /HPF / Bacteria: See Note /HPF      GOALS OF CARE DISCUSSION WITH PATIENT/FAMILY/PROXY: DNR    CRITICAL CARE TIME SPENT: 35 minutes

## 2022-01-18 NOTE — PROGRESS NOTE ADULT - SUBJECTIVE AND OBJECTIVE BOX
76y Male    Meds:  piperacillin/tazobactam IVPB.. 3.375 Gram(s) IV Intermittent every 8 hours    Allergies    No Known Allergies    Intolerances        VITALS:  Vital Signs Last 24 Hrs  T(C): 38.5 (18 Jan 2022 18:30), Max: 39.8 (17 Jan 2022 19:34)  T(F): 101.3 (18 Jan 2022 18:30), Max: 103.6 (17 Jan 2022 19:34)  HR: 100 (18 Jan 2022 18:30) (90 - 119)  BP: 89/62 (18 Jan 2022 18:30) (70/40 - 146/113)  BP(mean): 69 (18 Jan 2022 18:30) (52 - 121)  RR: 20 (18 Jan 2022 18:30) (18 - 27)  SpO2: 98% (18 Jan 2022 18:30) (86% - 100%)    LABS/DIAGNOSTIC TESTS:                          11.3   19.08 )-----------( 211      ( 18 Jan 2022 09:30 )             36.8     Lactate, Blood: 6.1 mmol/L (01-18 @ 09:33)  Lactate, Blood: 3.2 mmol/L (01-18 @ 00:18)  Lactate, Blood: 5.3 mmol/L (01-17 @ 22:16)      01-18    136  |  104  |  29<H>  ----------------------------<  259<H>  5.0   |  20<L>  |  1.28    Ca    9.4      18 Jan 2022 09:30  Phos  3.9     01-18  Mg     2.3     01-18    TPro  5.7<L>  /  Alb  1.1<L>  /  TBili  0.6  /  DBili  x   /  AST  54<H>  /  ALT  13  /  AlkPhos  133<H>  01-18      LIVER FUNCTIONS - ( 18 Jan 2022 09:30 )  Alb: 1.1 g/dL / Pro: 5.7 g/dL / ALK PHOS: 133 U/L / ALT: 13 U/L DA / AST: 54 U/L / GGT: x             CULTURES: .Sputum Sputum  12-29 @ 18:28   Normal Respiratory Karla present  --    Numerous polymorphonuclear leukocytes per low power field  No squamous epithelial cells per low power field  Few Gram positive cocci in pairs per oil power field      Clean Catch Clean Catch (Midstream)  12-21 @ 04:42   No growth  --  --              RADIOLOGY:< from: CT Abdomen and Pelvis w/ IV Cont (01.18.22 @ 16:12) >  ACC: [id number] EXAM:  CT ABDOMEN AND PELVIS IC                          PROCEDURE DATE:  01/18/2022          INTERPRETATION:  CLINICAL INFORMATION: Cardiac arrest. Respiratory   failure. Sepsis. Status post PEG tube placement.    COMPARISON: CT abdomen pelvis 1/11/2022.    CONTRAST/COMPLICATIONS:  IV Contrast: Omnipaque 350  90 cc administered   10 cc discarded  Oral Contrast: NONE  Complications: None reported at time of study completion    PROCEDURE:  CT of the Abdomen and Pelvis was performed.  Sagittal and coronal reformats were performed.    FINDINGS:  LOWER CHEST: Redemonstration of a cavitary lesion in the right middle   lobe. Redemonstration of secretions in the left lower lobe bronchi with   complete collapse of the left lower lobe with heterogeneous   opacification. Partially imaged 4.3 x 2.8 cm air-fluid collection in the   left lower lobe. There is a small loculated rim-enhancing pleural   effusion.    LIVER: Within normal limits.  BILE DUCTS: Normal caliber.  GALLBLADDER: Within normal limits.  SPLEEN: Within normal limits.  PANCREAS: Within normal limits.  ADRENALS: Within normal limits.  KIDNEYS/URETERS: Within normal limits.    BLADDER: Within normal limits.  REPRODUCTIVE ORGANS: Prostate within normal limits.    BOWEL: PEG tube in the stomach. Several dilated air-fluid filled loops of   small bowel with transition point to collapsed bowel in the right lower   quadrant (series 2, image 83). Mild wall thickening of the small bowel   loops proximal to the transitionpoint with evidence of focal pneumatosis   intestinalis (2, 86). There is also gastric pneumatosis and mild portal   venous gas.  PERITONEUM: Small volume ascites and mesenteric edema.  VESSELS: Atherosclerotic changes.  RETROPERITONEUM/LYMPH NODES: No lymphadenopathy.  ABDOMINAL WALL: Subcutaneous edema.  BONES: Degenerative changes.    IMPRESSION:  Small bowel obstruction with transition point in the right lower   quadrant, with additional findings highly suspicious for bowel ischemia.    Partially imaged 4.3 cm air-fluid collection in the left lower lobe and   adjacent rim-enhancing pleural effusion, suspicious for lung   abscess/empyema. Dedicated chest CT can be performed for further   evaluation.    Findings were discussed with ALIZE LANDAVERDE on  1/18/2022 4:47 PM by Dr. Armtsrong with read back confirmation.    --- End of Report ---            JUSTIN ARMSTRONG MD; Attending Radiologist  This document has been electronically signed. Jan 18 2022  5:20PM    < end of copied text >  ---------------------------------------------------------------------------------------------------------------------------------------------------------------------------------------------------------------------------------------------------------  ACC: [id number] EXAM:  XR CHEST PORTABLE IMMED 1V                          PROCEDURE DATE:  01/17/2022          INTERPRETATION:  Chest one view    HISTORY: Sepsis    COMPARISON STUDY: Earlier the same day    Frontal expiratory view of the chest showsthe heart to be similar in   size. Tracheostomy tube, gastric tube and upper abdominal clips are again   noted.    The lungs show similar left lung infiltrates with questionable small left   effusion and there is no evidence of pneumothorax nor rightpleural   effusion.    IMPRESSION:  Similar infiltrates.        Thank you for the courtesy of this referral.    --- End of Report ---            RUDDY FLOWERS MD; Attending Interventional Radiologist    < end of copied text >        ROS:  [  ] UNABLE TO ELICIT ICU VISIT  76y Male who I was asked to re-eval as he developed fevers up to 103.6    Meds:  piperacillin/tazobactam IVPB.. 3.375 Gram(s) IV Intermittent every 8 hours    Allergies    No Known Allergies    Intolerances        VITALS:  Vital Signs Last 24 Hrs  T(C): 38.5 (18 Jan 2022 18:30), Max: 39.8 (17 Jan 2022 19:34)  T(F): 101.3 (18 Jan 2022 18:30), Max: 103.6 (17 Jan 2022 19:34)  HR: 100 (18 Jan 2022 18:30) (90 - 119)  BP: 89/62 (18 Jan 2022 18:30) (70/40 - 146/113)  BP(mean): 69 (18 Jan 2022 18:30) (52 - 121)  RR: 20 (18 Jan 2022 18:30) (18 - 27)  SpO2: 98% (18 Jan 2022 18:30) (86% - 100%)    LABS/DIAGNOSTIC TESTS:                          11.3   19.08 )-----------( 211      ( 18 Jan 2022 09:30 )             36.8     Lactate, Blood: 6.1 mmol/L (01-18 @ 09:33)  Lactate, Blood: 3.2 mmol/L (01-18 @ 00:18)  Lactate, Blood: 5.3 mmol/L (01-17 @ 22:16)      01-18    136  |  104  |  29<H>  ----------------------------<  259<H>  5.0   |  20<L>  |  1.28    Ca    9.4      18 Jan 2022 09:30  Phos  3.9     01-18  Mg     2.3     01-18    TPro  5.7<L>  /  Alb  1.1<L>  /  TBili  0.6  /  DBili  x   /  AST  54<H>  /  ALT  13  /  AlkPhos  133<H>  01-18      LIVER FUNCTIONS - ( 18 Jan 2022 09:30 )  Alb: 1.1 g/dL / Pro: 5.7 g/dL / ALK PHOS: 133 U/L / ALT: 13 U/L DA / AST: 54 U/L / GGT: x             CULTURES: .Sputum Sputum  12-29 @ 18:28   Normal Respiratory Karla present  --    Numerous polymorphonuclear leukocytes per low power field  No squamous epithelial cells per low power field  Few Gram positive cocci in pairs per oil power field      Clean Catch Clean Catch (Midstream)  12-21 @ 04:42   No growth  --  --              RADIOLOGY:< from: CT Abdomen and Pelvis w/ IV Cont (01.18.22 @ 16:12) >  ACC: [id number] EXAM:  CT ABDOMEN AND PELVIS IC                          PROCEDURE DATE:  01/18/2022          INTERPRETATION:  CLINICAL INFORMATION: Cardiac arrest. Respiratory   failure. Sepsis. Status post PEG tube placement.    COMPARISON: CT abdomen pelvis 1/11/2022.    CONTRAST/COMPLICATIONS:  IV Contrast: Omnipaque 350  90 cc administered   10 cc discarded  Oral Contrast: NONE  Complications: None reported at time of study completion    PROCEDURE:  CT of the Abdomen and Pelvis was performed.  Sagittal and coronal reformats were performed.    FINDINGS:  LOWER CHEST: Redemonstration of a cavitary lesion in the right middle   lobe. Redemonstration of secretions in the left lower lobe bronchi with   complete collapse of the left lower lobe with heterogeneous   opacification. Partially imaged 4.3 x 2.8 cm air-fluid collection in the   left lower lobe. There is a small loculated rim-enhancing pleural   effusion.    LIVER: Within normal limits.  BILE DUCTS: Normal caliber.  GALLBLADDER: Within normal limits.  SPLEEN: Within normal limits.  PANCREAS: Within normal limits.  ADRENALS: Within normal limits.  KIDNEYS/URETERS: Within normal limits.    BLADDER: Within normal limits.  REPRODUCTIVE ORGANS: Prostate within normal limits.    BOWEL: PEG tube in the stomach. Several dilated air-fluid filled loops of   small bowel with transition point to collapsed bowel in the right lower   quadrant (series 2, image 83). Mild wall thickening of the small bowel   loops proximal to the transitionpoint with evidence of focal pneumatosis   intestinalis (2, 86). There is also gastric pneumatosis and mild portal   venous gas.  PERITONEUM: Small volume ascites and mesenteric edema.  VESSELS: Atherosclerotic changes.  RETROPERITONEUM/LYMPH NODES: No lymphadenopathy.  ABDOMINAL WALL: Subcutaneous edema.  BONES: Degenerative changes.    IMPRESSION:  Small bowel obstruction with transition point in the right lower   quadrant, with additional findings highly suspicious for bowel ischemia.    Partially imaged 4.3 cm air-fluid collection in the left lower lobe and   adjacent rim-enhancing pleural effusion, suspicious for lung   abscess/empyema. Dedicated chest CT can be performed for further   evaluation.    Findings were discussed with ALIZE LANDAVERDE on  1/18/2022 4:47 PM by Dr. Armstrong with read back confirmation.    --- End of Report ---            JUSTIN ARMSTRONG MD; Attending Radiologist  This document has been electronically signed. Jan 18 2022  5:20PM    < end of copied text >  ---------------------------------------------------------------------------------------------------------------------------------------------------------------------------------------------------------------------------------------------------------  ACC: [id number] EXAM:  XR CHEST PORTABLE IMMED 1V                          PROCEDURE DATE:  01/17/2022          INTERPRETATION:  Chest one view    HISTORY: Sepsis    COMPARISON STUDY: Earlier the same day    Frontal expiratory view of the chest showsthe heart to be similar in   size. Tracheostomy tube, gastric tube and upper abdominal clips are again   noted.    The lungs show similar left lung infiltrates with questionable small left   effusion and there is no evidence of pneumothorax nor rightpleural   effusion.    IMPRESSION:  Similar infiltrates.        Thank you for the courtesy of this referral.    --- End of Report ---            RUDDY FLOWERS MD; Attending Interventional Radiologist    < end of copied text >        ROS:  [  ] UNABLE TO ELICIT ICU VISIT  76y Male who I was asked to re-eval as he developed fevers up to 103.6 and an elevated WBC count, he was on the floor but has been moved up to the ICU as he was hypotensive and requires a pressor. He remains vent dependent on FIO2 of 40% and PEEP of 5. He has thick brownish secretions coming from his tracheostomy.    Meds:  piperacillin/tazobactam IVPB.. 3.375 Gram(s) IV Intermittent every 8 hours    Allergies    No Known Allergies    Intolerances        VITALS:  Vital Signs Last 24 Hrs  T(C): 38.5 (18 Jan 2022 18:30), Max: 39.8 (17 Jan 2022 19:34)  T(F): 101.3 (18 Jan 2022 18:30), Max: 103.6 (17 Jan 2022 19:34)  HR: 100 (18 Jan 2022 18:30) (90 - 119)  BP: 89/62 (18 Jan 2022 18:30) (70/40 - 146/113)  BP(mean): 69 (18 Jan 2022 18:30) (52 - 121)  RR: 20 (18 Jan 2022 18:30) (18 - 27)  SpO2: 98% (18 Jan 2022 18:30) (86% - 100%)    LABS/DIAGNOSTIC TESTS:                          11.3   19.08 )-----------( 211      ( 18 Jan 2022 09:30 )             36.8     Lactate, Blood: 6.1 mmol/L (01-18 @ 09:33)  Lactate, Blood: 3.2 mmol/L (01-18 @ 00:18)  Lactate, Blood: 5.3 mmol/L (01-17 @ 22:16)      01-18    136  |  104  |  29<H>  ----------------------------<  259<H>  5.0   |  20<L>  |  1.28    Ca    9.4      18 Jan 2022 09:30  Phos  3.9     01-18  Mg     2.3     01-18    TPro  5.7<L>  /  Alb  1.1<L>  /  TBili  0.6  /  DBili  x   /  AST  54<H>  /  ALT  13  /  AlkPhos  133<H>  01-18      LIVER FUNCTIONS - ( 18 Jan 2022 09:30 )  Alb: 1.1 g/dL / Pro: 5.7 g/dL / ALK PHOS: 133 U/L / ALT: 13 U/L DA / AST: 54 U/L / GGT: x             CULTURES: .Sputum Sputum  12-29 @ 18:28   Normal Respiratory Karla present  --    Numerous polymorphonuclear leukocytes per low power field  No squamous epithelial cells per low power field  Few Gram positive cocci in pairs per oil power field      Clean Catch Clean Catch (Midstream)  12-21 @ 04:42   No growth  --  --              RADIOLOGY:< from: CT Abdomen and Pelvis w/ IV Cont (01.18.22 @ 16:12) >  ACC: [id number] EXAM:  CT ABDOMEN AND PELVIS IC                          PROCEDURE DATE:  01/18/2022          INTERPRETATION:  CLINICAL INFORMATION: Cardiac arrest. Respiratory   failure. Sepsis. Status post PEG tube placement.    COMPARISON: CT abdomen pelvis 1/11/2022.    CONTRAST/COMPLICATIONS:  IV Contrast: Omnipaque 350  90 cc administered   10 cc discarded  Oral Contrast: NONE  Complications: None reported at time of study completion    PROCEDURE:  CT of the Abdomen and Pelvis was performed.  Sagittal and coronal reformats were performed.    FINDINGS:  LOWER CHEST: Redemonstration of a cavitary lesion in the right middle   lobe. Redemonstration of secretions in the left lower lobe bronchi with   complete collapse of the left lower lobe with heterogeneous   opacification. Partially imaged 4.3 x 2.8 cm air-fluid collection in the   left lower lobe. There is a small loculated rim-enhancing pleural   effusion.    LIVER: Within normal limits.  BILE DUCTS: Normal caliber.  GALLBLADDER: Within normal limits.  SPLEEN: Within normal limits.  PANCREAS: Within normal limits.  ADRENALS: Within normal limits.  KIDNEYS/URETERS: Within normal limits.    BLADDER: Within normal limits.  REPRODUCTIVE ORGANS: Prostate within normal limits.    BOWEL: PEG tube in the stomach. Several dilated air-fluid filled loops of   small bowel with transition point to collapsed bowel in the right lower   quadrant (series 2, image 83). Mild wall thickening of the small bowel   loops proximal to the transitionpoint with evidence of focal pneumatosis   intestinalis (2, 86). There is also gastric pneumatosis and mild portal   venous gas.  PERITONEUM: Small volume ascites and mesenteric edema.  VESSELS: Atherosclerotic changes.  RETROPERITONEUM/LYMPH NODES: No lymphadenopathy.  ABDOMINAL WALL: Subcutaneous edema.  BONES: Degenerative changes.    IMPRESSION:  Small bowel obstruction with transition point in the right lower   quadrant, with additional findings highly suspicious for bowel ischemia.    Partially imaged 4.3 cm air-fluid collection in the left lower lobe and   adjacent rim-enhancing pleural effusion, suspicious for lung   abscess/empyema. Dedicated chest CT can be performed for further   evaluation.    Findings were discussed with ALIZE LANDAVERDE on  1/18/2022 4:47 PM by Dr. Armstrong with read back confirmation.    --- End of Report ---            JUSTIN ARMSTRONG MD; Attending Radiologist  This document has been electronically signed. Jan 18 2022  5:20PM    < end of copied text >  ---------------------------------------------------------------------------------------------------------------------------------------------------------------------------------------------------------------------------------------------------------  ACC: [id number] EXAM:  XR CHEST PORTABLE IMMED 1V                          PROCEDURE DATE:  01/17/2022          INTERPRETATION:  Chest one view    HISTORY: Sepsis    COMPARISON STUDY: Earlier the same day    Frontal expiratory view of the chest showsthe heart to be similar in   size. Tracheostomy tube, gastric tube and upper abdominal clips are again   noted.    The lungs show similar left lung infiltrates with questionable small left   effusion and there is no evidence of pneumothorax nor rightpleural   effusion.    IMPRESSION:  Similar infiltrates.        Thank you for the courtesy of this referral.    --- End of Report ---            RUDDY FLOWERS MD; Attending Interventional Radiologist    < end of copied text >        ROS:  [  ] UNABLE TO ELICIT ICU VISIT  76y Male who I was asked to re-eval as he developed fevers up to 103.6 and an elevated WBC count, he was on the floor but has been moved up to the ICU as he was hypotensive and requires a pressor. He remains vent dependent on FIO2 of 40% and PEEP of 5. He has thick brownish secretions coming from his tracheostomy. Hence patient started on Zosyn. On CT abdomen / pelvis he was found to have a left lung abscess or Empyema.     Meds:  piperacillin/tazobactam IVPB.. 3.375 Gram(s) IV Intermittent every 8 hours    Allergies    No Known Allergies    Intolerances        VITALS:  Vital Signs Last 24 Hrs  T(C): 38.5 (18 Jan 2022 18:30), Max: 39.8 (17 Jan 2022 19:34)  T(F): 101.3 (18 Jan 2022 18:30), Max: 103.6 (17 Jan 2022 19:34)  HR: 100 (18 Jan 2022 18:30) (90 - 119)  BP: 89/62 (18 Jan 2022 18:30) (70/40 - 146/113)  BP(mean): 69 (18 Jan 2022 18:30) (52 - 121)  RR: 20 (18 Jan 2022 18:30) (18 - 27)  SpO2: 98% (18 Jan 2022 18:30) (86% - 100%)    LABS/DIAGNOSTIC TESTS:                          11.3   19.08 )-----------( 211      ( 18 Jan 2022 09:30 )             36.8     Lactate, Blood: 6.1 mmol/L (01-18 @ 09:33)  Lactate, Blood: 3.2 mmol/L (01-18 @ 00:18)  Lactate, Blood: 5.3 mmol/L (01-17 @ 22:16)      01-18    136  |  104  |  29<H>  ----------------------------<  259<H>  5.0   |  20<L>  |  1.28    Ca    9.4      18 Jan 2022 09:30  Phos  3.9     01-18  Mg     2.3     01-18    TPro  5.7<L>  /  Alb  1.1<L>  /  TBili  0.6  /  DBili  x   /  AST  54<H>  /  ALT  13  /  AlkPhos  133<H>  01-18      LIVER FUNCTIONS - ( 18 Jan 2022 09:30 )  Alb: 1.1 g/dL / Pro: 5.7 g/dL / ALK PHOS: 133 U/L / ALT: 13 U/L DA / AST: 54 U/L / GGT: x             CULTURES: .Sputum Sputum  12-29 @ 18:28   Normal Respiratory Karla present  --    Numerous polymorphonuclear leukocytes per low power field  No squamous epithelial cells per low power field  Few Gram positive cocci in pairs per oil power field      Clean Catch Clean Catch (Midstream)  12-21 @ 04:42   No growth  --  --              RADIOLOGY:< from: CT Abdomen and Pelvis w/ IV Cont (01.18.22 @ 16:12) >  ACC: [id number] EXAM:  CT ABDOMEN AND PELVIS IC                          PROCEDURE DATE:  01/18/2022          INTERPRETATION:  CLINICAL INFORMATION: Cardiac arrest. Respiratory   failure. Sepsis. Status post PEG tube placement.    COMPARISON: CT abdomen pelvis 1/11/2022.    CONTRAST/COMPLICATIONS:  IV Contrast: Omnipaque 350  90 cc administered   10 cc discarded  Oral Contrast: NONE  Complications: None reported at time of study completion    PROCEDURE:  CT of the Abdomen and Pelvis was performed.  Sagittal and coronal reformats were performed.    FINDINGS:  LOWER CHEST: Redemonstration of a cavitary lesion in the right middle   lobe. Redemonstration of secretions in the left lower lobe bronchi with   complete collapse of the left lower lobe with heterogeneous   opacification. Partially imaged 4.3 x 2.8 cm air-fluid collection in the   left lower lobe. There is a small loculated rim-enhancing pleural   effusion.    LIVER: Within normal limits.  BILE DUCTS: Normal caliber.  GALLBLADDER: Within normal limits.  SPLEEN: Within normal limits.  PANCREAS: Within normal limits.  ADRENALS: Within normal limits.  KIDNEYS/URETERS: Within normal limits.    BLADDER: Within normal limits.  REPRODUCTIVE ORGANS: Prostate within normal limits.    BOWEL: PEG tube in the stomach. Several dilated air-fluid filled loops of   small bowel with transition point to collapsed bowel in the right lower   quadrant (series 2, image 83). Mild wall thickening of the small bowel   loops proximal to the transitionpoint with evidence of focal pneumatosis   intestinalis (2, 86). There is also gastric pneumatosis and mild portal   venous gas.  PERITONEUM: Small volume ascites and mesenteric edema.  VESSELS: Atherosclerotic changes.  RETROPERITONEUM/LYMPH NODES: No lymphadenopathy.  ABDOMINAL WALL: Subcutaneous edema.  BONES: Degenerative changes.    IMPRESSION:  Small bowel obstruction with transition point in the right lower   quadrant, with additional findings highly suspicious for bowel ischemia.    Partially imaged 4.3 cm air-fluid collection in the left lower lobe and   adjacent rim-enhancing pleural effusion, suspicious for lung   abscess/empyema. Dedicated chest CT can be performed for further   evaluation.    Findings were discussed with ALIZE LANDAVERDE on  1/18/2022 4:47 PM by Dr. Armstrong with read back confirmation.    --- End of Report ---            JUSTIN ARMSTRONG MD; Attending Radiologist  This document has been electronically signed. Jan 18 2022  5:20PM    < end of copied text >  ---------------------------------------------------------------------------------------------------------------------------------------------------------------------------------------------------------------------------------------------------------  ACC: [id number] EXAM:  XR CHEST PORTABLE IMMED 1V                          PROCEDURE DATE:  01/17/2022          INTERPRETATION:  Chest one view    HISTORY: Sepsis    COMPARISON STUDY: Earlier the same day    Frontal expiratory view of the chest showsthe heart to be similar in   size. Tracheostomy tube, gastric tube and upper abdominal clips are again   noted.    The lungs show similar left lung infiltrates with questionable small left   effusion and there is no evidence of pneumothorax nor rightpleural   effusion.    IMPRESSION:  Similar infiltrates.        Thank you for the courtesy of this referral.    --- End of Report ---            RUDDY FLOWERS MD; Attending Interventional Radiologist    < end of copied text >        ROS:  [ x ] UNABLE TO ELICIT

## 2022-01-18 NOTE — PROGRESS NOTE ADULT - SUBJECTIVE AND OBJECTIVE BOX
MELISSA JOHNS    SCU progress note    INTERVAL HPI/OVERNIGHT EVENTS: ***Code sepsis last evening. Tmax 103.6. Lactate 5.3 and then 3.2  Received 2.1 liters normal saline bolus.  Blood cultures and urine culture sent.  Continues to be hypotensive. Midodrine 2.5 mg every 8 hours started.    DNR [x ]   DNI  [  ]    Covid - 19 PCR: COVID 1/14    The 4Ms    What Matters Most: see GOC  Age appropriate Medications/Screen for High Risk Medication: Yes  Mentation: see CAM below  Mobility: defer to physical exam    The Confusion Assessment Method (CAM) Diagnostic Algorithm     1: Acute Onset or Fluctuating Course  - Is there evidence of an acute change in mental status from the patient’s baseline? Did the (abnormal) behavior  fluctuate during the day, that is, tend to come and go, or increase and decrease in severity?  [ ] YES [x ] NO     2: Inattention  - Did the patient have difficulty focusing attention, being easily distractible, or having difficulty keeping track of what was being said?  [ ] YES [x ] NO     3: Disorganized thinking  -Was the patient’s thinking disorganized or incoherent, such as rambling or irrelevant conversation, unclear or illogical flow of ideas, or unpredictable switching from subject to subject?  [ ] YES [ ] NO  Unable to access    4: Altered Level of consciousness?  [ ] YES [x ] NO    The diagnosis of delirium by CAM requires the presence of features 1 and 2 and either 3 or 4.    PRESSORS: [ ] YES [x ] NO  piperacillin/tazobactam IVPB.. 3.375 Gram(s) IV Intermittent every 8 hours    Cardiovascular:  Heart Failure  Acute   Acute on Chronic  Chronic       midodrine. 2.5 milliGRAM(s) Oral three times a day    Pulmonary:    Hematalogic:  enoxaparin Injectable 40 milliGRAM(s) SubCutaneous daily    Other:  acetaminophen    Suspension .. 650 milliGRAM(s) Oral every 6 hours PRN  acetaminophen  Suppository .. 650 milliGRAM(s) Rectal every 6 hours PRN  bisacodyl Suppository 10 milliGRAM(s) Rectal daily PRN  chlorhexidine 2% Cloths 1 Application(s) Topical <User Schedule>  glucagon  Injectable 0.5 milliGRAM(s) IV Push once  insulin lispro (ADMELOG) corrective regimen sliding scale   SubCutaneous every 6 hours  pantoprazole  Injectable 40 milliGRAM(s) IV Push two times a day  QUEtiapine 12.5 milliGRAM(s) Oral at bedtime  sodium chloride 0.9%. 1000 milliLiter(s) IV Continuous <Continuous>    acetaminophen    Suspension .. 650 milliGRAM(s) Oral every 6 hours PRN  acetaminophen  Suppository .. 650 milliGRAM(s) Rectal every 6 hours PRN  bisacodyl Suppository 10 milliGRAM(s) Rectal daily PRN  chlorhexidine 2% Cloths 1 Application(s) Topical <User Schedule>  enoxaparin Injectable 40 milliGRAM(s) SubCutaneous daily  glucagon  Injectable 0.5 milliGRAM(s) IV Push once  insulin lispro (ADMELOG) corrective regimen sliding scale   SubCutaneous every 6 hours  midodrine. 2.5 milliGRAM(s) Oral three times a day  pantoprazole  Injectable 40 milliGRAM(s) IV Push two times a day  piperacillin/tazobactam IVPB.. 3.375 Gram(s) IV Intermittent every 8 hours  QUEtiapine 12.5 milliGRAM(s) Oral at bedtime  sodium chloride 0.9%. 1000 milliLiter(s) IV Continuous <Continuous>    Drug Dosing Weight  Height (cm): 175.3 (20 Dec 2021 02:21)  Weight (kg): 72.5 (20 Dec 2021 06:30)  BMI (kg/m2): 23.6 (20 Dec 2021 06:30)  BSA (m2): 1.88 (20 Dec 2021 06:30)    CENTRAL LINE: [ ] YES [x ] NO  LOCATION:   DATE INSERTED:  REMOVE: [ ] YES [ ] NO  EXPLAIN:    HUTCHINS: [ ] YES [x ] NO    DATE INSERTED:  REMOVE:  [ ] YES [ ] NO  EXPLAIN:    PAST MEDICAL & SURGICAL HISTORY:  Hypertension    Diabetes    High cholesterol    Primary osteoarthritis of both knees    Coronary artery disease of native artery of native heart with stable angina pectoris    Allergic bronchitis    Diabetic retinopathy    Oral cancer    SCC (squamous cell carcinoma)    Metastatic cancer    Recurrent disease    Edema of extremity    Hyponatremia    Microalbuminuria    Neuralgia    Obstructive sleep apnea, adult    Vitamin D deficiency    S/P knee replacement  b/l    S/P hernia repair  b/l    Status post cataract extraction and insertion of intraocular lens, unspecified laterality  b/l    History of surgery  On 9/10/20 he underwent right hemiglossectomy and partial neck dissection with Dr. Darinel Servin at [facility].                  Mode: AC/ CMV (Assist Control/ Continuous Mandatory Ventilation)  RR (machine): 14  TV (machine): 450  FiO2: 40  PEEP: 5  ITime: 0.9  MAP: 12  PIP: 28      PHYSICAL EXAM:    GENERAL: NAD, cachectic with temporal waisting.  HEAD:  Atraumatic, Normocephalic. +temporal waisting  EYES: EOMI, PERRLA, conjunctiva and sclera clear  ENMT: No tonsillar erythema, exudates  NECK: Supple, No JVD, tracheostomy intact  NERVOUS SYSTEM:  Awake. Can nod head to simple questions.  CHEST/LUNG: Diminished breath sounds bilateral bases  HEART: Regular rate and rhythm; No murmurs, rubs, or gallops  ABDOMEN: Soft, Nontender, Nondistended; Bowel sounds present; Peg intact  EXTREMITIES:  +Muscle waisting. 2+ Peripheral Pulses, No clubbing, cyanosis, or edema  LYMPH: No lymphadenopathy noted  SKIN: No rashes or lesions      LABS:  CBC Full  -  ( 17 Jan 2022 22:16 )  WBC Count : 12.65 K/uL  RBC Count : 3.61 M/uL  Hemoglobin : 10.1 g/dL  Hematocrit : 33.0 %  Platelet Count - Automated : 233 K/uL  Mean Cell Volume : 91.4 fl  Mean Cell Hemoglobin : 28.0 pg  Mean Cell Hemoglobin Concentration : 30.6 gm/dL  Auto Neutrophil # : 11.39 K/uL  Auto Lymphocyte # : 0.89 K/uL  Auto Monocyte # : 0.38 K/uL  Auto Eosinophil # : 0.00 K/uL  Auto Basophil # : 0.00 K/uL  Auto Neutrophil % : 90.0 %  Auto Lymphocyte % : 7.0 %  Auto Monocyte % : 3.0 %  Auto Eosinophil % : 0.0 %  Auto Basophil % : 0.0 %    01-17    143  |  107  |  19<H>  ----------------------------<  194<H>  3.7   |  28  |  0.59    Ca    10.9<H>      17 Jan 2022 07:09  Phos  1.8     01-17  Mg     2.1     01-17    TPro  5.3<L>  /  Alb  1.2<L>  /  TBili  0.5  /  DBili  x   /  AST  26  /  ALT  11  /  AlkPhos  85  01-17              [  ]  DVT Prophylaxis  [  ]  Nutrition, Brand, Rate         Goal Rate        Abnormal Nutritional Status -  Malnutrition   Cachexia        RADIOLOGY & ADDITIONAL STUDIES:  ***  < from: Xray Chest 1 View- PORTABLE-Urgent (Xray Chest 1 View- PORTABLE-Urgent .) (01.17.22 @ 12:38) >  Heart and possibly enlarged. Tracheostomy again noted.    Significant left mid to lower lung field infiltrate and slight right base   infiltrate again noted.    Chest is similar to January 14.    IMPRESSION: Unchanged chest as above.    < end of copied text >  < from: CT Abdomen and Pelvis No Cont (01.11.22 @ 14:55) >  LOWER CHEST: Since prior chest CT 12/30/2021, there is interval increased   opacification of the left hemithorax and volume loss. Prominent   left-sided central airway secretions noted. Findings of the left  hemithorax may represent combination of atelectasis and infection. A   left-sided chest tube and small left hydropneumothorax are partially   imaged. Distal extent of the chest tube is not imaged.    Multifocal right-sided lung parenchymal opacities are redemonstrated.   Multifocal infection is again the primary consideration. In particular,   there is a cavitary mass of the right middle lobe measuring 4.6 cm, with   internal nodularity, again concerning for metastatic disease in the   setting of known squamous cell malignancy. Mediastinal adenopathy also   partially imaged.    Coronary artery calcification and/or stenting. Small pericardial fluid   and stranding along the anterior mediastinum partially imaged.    Solid organ evaluation is limited due to lack of IV contrast.    LIVER: Liver size within normal limits.  BILE DUCTS: No biliary distention.  GALLBLADDER: Gallbladder distention and probable layering sludge.   Correlate with LFTs.  SPLEEN: Normal size of the spleen.  PANCREAS:No main pancreatic ductal dilatation.  ADRENALS: Unremarkable.  KIDNEYS/URETERS: No hydronephrosis.    BLADDER: Mildly distended.  REPRODUCTIVE ORGANS: Prostate size within normal limits.    BOWEL: Stomach is distended with air. Antrum is closely opposed to the   anterior abdominal wall without intervening bowel loop. Surgical clip is   seen at the gastric fundal region. Correlate with endoscopic history. No   small bowel distention. Contrast opacifies the colon. Mild stool burden   of the colon limits evaluation of the colonic mucosa. The appendix is   nondistended.  PERITONEUM: No ascites.  VESSELS: No aneurysm of the abdominal aorta. Aortoiliac atheromatous   changes.  RETROPERITONEUM/LYMPH NODES: Small volume nodes.  ABDOMINAL WALL: Soft tissue edema. Tiny fat-containing left inguinal   hernia.  BONES: Multilevel degenerative changes of the bones. Please note that   central canal and neural foramina are not adequately assessed the study.    IMPRESSION:    Stomach is distended with air. Antrum is closely opposed to the anterior   abdominal wall without intervening bowel loop. Surgical clip is seen at   the gastric fundal region. Correlate with endoscopic history.    Since prior chest CT from 12/30/2021, there is interval increased   opacification of the left hemithorax and volume loss. Prominent   left-sided central airway secretions noted. Findings of the left   hemithorax may represent combination of atelectasis and infection. A   left-sided chest tube and small left hydropneumothorax are partially   imaged. Distal extent of the chest tube is not imaged.    Multifocal right-sided lung parenchymal opacities are redemonstrated.   Multifocal infection is again the primary consideration. In particular,   there is a cavitary mass of the right middle lobe measuring 4.6 cm, with   internal nodularity, again concerning for metastatic disease in the   setting of known squamous cell malignancy. Mediastinal adenopathy also   partially imaged.    < end of copied text >    Goals of Care Discussion with Family/Proxy/Other   - see note from  1/02/22     MELISSA JOHNS    SCU progress note    INTERVAL HPI/OVERNIGHT EVENTS: ***Code sepsis last evening. Tmax 103.6. Lactate 5.3 and then 3.2  Received 2.1 liters normal saline bolus.  Blood cultures and urine culture sent.  Continues to be hypotensive. Midodrine 2.5 mg every 8 hours started. Repeat lactate 6.1 IVF NS 125cc per hour. Abdominal/ Pelvis CT ordered. Patient to be transferred to ICU after CT    DNR [x ]   DNI  [  ]    Covid - 19 PCR: COVID 1/14    The 4Ms    What Matters Most: see GOC  Age appropriate Medications/Screen for High Risk Medication: Yes  Mentation: see CAM below  Mobility: defer to physical exam    The Confusion Assessment Method (CAM) Diagnostic Algorithm     1: Acute Onset or Fluctuating Course  - Is there evidence of an acute change in mental status from the patient’s baseline? Did the (abnormal) behavior  fluctuate during the day, that is, tend to come and go, or increase and decrease in severity?  [ ] YES [x ] NO     2: Inattention  - Did the patient have difficulty focusing attention, being easily distractible, or having difficulty keeping track of what was being said?  [ ] YES [x ] NO     3: Disorganized thinking  -Was the patient’s thinking disorganized or incoherent, such as rambling or irrelevant conversation, unclear or illogical flow of ideas, or unpredictable switching from subject to subject?  [ ] YES [ ] NO  Unable to access    4: Altered Level of consciousness?  [ ] YES [x ] NO    The diagnosis of delirium by CAM requires the presence of features 1 and 2 and either 3 or 4.    PRESSORS: [ ] YES [x ] NO  piperacillin/tazobactam IVPB.. 3.375 Gram(s) IV Intermittent every 8 hours    Cardiovascular:  Heart Failure  Acute   Acute on Chronic  Chronic       midodrine. 2.5 milliGRAM(s) Oral three times a day    Pulmonary:    Hematalogic:  enoxaparin Injectable 40 milliGRAM(s) SubCutaneous daily    Other:  acetaminophen    Suspension .. 650 milliGRAM(s) Oral every 6 hours PRN  acetaminophen  Suppository .. 650 milliGRAM(s) Rectal every 6 hours PRN  bisacodyl Suppository 10 milliGRAM(s) Rectal daily PRN  chlorhexidine 2% Cloths 1 Application(s) Topical <User Schedule>  glucagon  Injectable 0.5 milliGRAM(s) IV Push once  insulin lispro (ADMELOG) corrective regimen sliding scale   SubCutaneous every 6 hours  pantoprazole  Injectable 40 milliGRAM(s) IV Push two times a day  QUEtiapine 12.5 milliGRAM(s) Oral at bedtime  sodium chloride 0.9%. 1000 milliLiter(s) IV Continuous <Continuous>    acetaminophen    Suspension .. 650 milliGRAM(s) Oral every 6 hours PRN  acetaminophen  Suppository .. 650 milliGRAM(s) Rectal every 6 hours PRN  bisacodyl Suppository 10 milliGRAM(s) Rectal daily PRN  chlorhexidine 2% Cloths 1 Application(s) Topical <User Schedule>  enoxaparin Injectable 40 milliGRAM(s) SubCutaneous daily  glucagon  Injectable 0.5 milliGRAM(s) IV Push once  insulin lispro (ADMELOG) corrective regimen sliding scale   SubCutaneous every 6 hours  midodrine. 2.5 milliGRAM(s) Oral three times a day  pantoprazole  Injectable 40 milliGRAM(s) IV Push two times a day  piperacillin/tazobactam IVPB.. 3.375 Gram(s) IV Intermittent every 8 hours  QUEtiapine 12.5 milliGRAM(s) Oral at bedtime  sodium chloride 0.9%. 1000 milliLiter(s) IV Continuous <Continuous>    Drug Dosing Weight  Height (cm): 175.3 (20 Dec 2021 02:21)  Weight (kg): 72.5 (20 Dec 2021 06:30)  BMI (kg/m2): 23.6 (20 Dec 2021 06:30)  BSA (m2): 1.88 (20 Dec 2021 06:30)    CENTRAL LINE: [ ] YES [x ] NO  LOCATION:   DATE INSERTED:  REMOVE: [ ] YES [ ] NO  EXPLAIN:    HUTCHINS: [ ] YES [x ] NO    DATE INSERTED:  REMOVE:  [ ] YES [ ] NO  EXPLAIN:    PAST MEDICAL & SURGICAL HISTORY:  Hypertension    Diabetes    High cholesterol    Primary osteoarthritis of both knees    Coronary artery disease of native artery of native heart with stable angina pectoris    Allergic bronchitis    Diabetic retinopathy    Oral cancer    SCC (squamous cell carcinoma)    Metastatic cancer    Recurrent disease    Edema of extremity    Hyponatremia    Microalbuminuria    Neuralgia    Obstructive sleep apnea, adult    Vitamin D deficiency    S/P knee replacement  b/l    S/P hernia repair  b/l    Status post cataract extraction and insertion of intraocular lens, unspecified laterality  b/l    History of surgery  On 9/10/20 he underwent right hemiglossectomy and partial neck dissection with Dr. Darinel Servin at [facility].                  Mode: AC/ CMV (Assist Control/ Continuous Mandatory Ventilation)  RR (machine): 14  TV (machine): 450  FiO2: 40  PEEP: 5  ITime: 0.9  MAP: 12  PIP: 28      PHYSICAL EXAM:    GENERAL: NAD, cachectic with temporal waisting.  HEAD:  Atraumatic, Normocephalic. +temporal waisting  EYES: EOMI, PERRLA, conjunctiva and sclera clear  ENMT: No tonsillar erythema, exudates  NECK: Supple, No JVD, tracheostomy intact  NERVOUS SYSTEM:  Awake. Can nod head to simple questions.  CHEST/LUNG: Diminished breath sounds bilateral bases  HEART: Regular rate and rhythm; No murmurs, rubs, or gallops  ABDOMEN: Soft, Nontender, Nondistended; Bowel sounds present; Peg intact  EXTREMITIES:  +Muscle waisting. 2+ Peripheral Pulses, No clubbing, cyanosis, or edema  LYMPH: No lymphadenopathy noted  SKIN: No rashes or lesions      LABS:  CBC Full  -  ( 17 Jan 2022 22:16 )  WBC Count : 12.65 K/uL  RBC Count : 3.61 M/uL  Hemoglobin : 10.1 g/dL  Hematocrit : 33.0 %  Platelet Count - Automated : 233 K/uL  Mean Cell Volume : 91.4 fl  Mean Cell Hemoglobin : 28.0 pg  Mean Cell Hemoglobin Concentration : 30.6 gm/dL  Auto Neutrophil # : 11.39 K/uL  Auto Lymphocyte # : 0.89 K/uL  Auto Monocyte # : 0.38 K/uL  Auto Eosinophil # : 0.00 K/uL  Auto Basophil # : 0.00 K/uL  Auto Neutrophil % : 90.0 %  Auto Lymphocyte % : 7.0 %  Auto Monocyte % : 3.0 %  Auto Eosinophil % : 0.0 %  Auto Basophil % : 0.0 %    01-17    143  |  107  |  19<H>  ----------------------------<  194<H>  3.7   |  28  |  0.59    Ca    10.9<H>      17 Jan 2022 07:09  Phos  1.8     01-17  Mg     2.1     01-17    TPro  5.3<L>  /  Alb  1.2<L>  /  TBili  0.5  /  DBili  x   /  AST  26  /  ALT  11  /  AlkPhos  85  01-17              [  ]  DVT Prophylaxis  [  ]  Nutrition, Brand, Rate         Goal Rate        Abnormal Nutritional Status -  Malnutrition   Cachexia        RADIOLOGY & ADDITIONAL STUDIES:  ***  < from: Xray Chest 1 View- PORTABLE-Urgent (Xray Chest 1 View- PORTABLE-Urgent .) (01.17.22 @ 12:38) >  Heart and possibly enlarged. Tracheostomy again noted.    Significant left mid to lower lung field infiltrate and slight right base   infiltrate again noted.    Chest is similar to January 14.    IMPRESSION: Unchanged chest as above.    < end of copied text >  < from: CT Abdomen and Pelvis No Cont (01.11.22 @ 14:55) >  LOWER CHEST: Since prior chest CT 12/30/2021, there is interval increased   opacification of the left hemithorax and volume loss. Prominent   left-sided central airway secretions noted. Findings of the left  hemithorax may represent combination of atelectasis and infection. A   left-sided chest tube and small left hydropneumothorax are partially   imaged. Distal extent of the chest tube is not imaged.    Multifocal right-sided lung parenchymal opacities are redemonstrated.   Multifocal infection is again the primary consideration. In particular,   there is a cavitary mass of the right middle lobe measuring 4.6 cm, with   internal nodularity, again concerning for metastatic disease in the   setting of known squamous cell malignancy. Mediastinal adenopathy also   partially imaged.    Coronary artery calcification and/or stenting. Small pericardial fluid   and stranding along the anterior mediastinum partially imaged.    Solid organ evaluation is limited due to lack of IV contrast.    LIVER: Liver size within normal limits.  BILE DUCTS: No biliary distention.  GALLBLADDER: Gallbladder distention and probable layering sludge.   Correlate with LFTs.  SPLEEN: Normal size of the spleen.  PANCREAS:No main pancreatic ductal dilatation.  ADRENALS: Unremarkable.  KIDNEYS/URETERS: No hydronephrosis.    BLADDER: Mildly distended.  REPRODUCTIVE ORGANS: Prostate size within normal limits.    BOWEL: Stomach is distended with air. Antrum is closely opposed to the   anterior abdominal wall without intervening bowel loop. Surgical clip is   seen at the gastric fundal region. Correlate with endoscopic history. No   small bowel distention. Contrast opacifies the colon. Mild stool burden   of the colon limits evaluation of the colonic mucosa. The appendix is   nondistended.  PERITONEUM: No ascites.  VESSELS: No aneurysm of the abdominal aorta. Aortoiliac atheromatous   changes.  RETROPERITONEUM/LYMPH NODES: Small volume nodes.  ABDOMINAL WALL: Soft tissue edema. Tiny fat-containing left inguinal   hernia.  BONES: Multilevel degenerative changes of the bones. Please note that   central canal and neural foramina are not adequately assessed the study.    IMPRESSION:    Stomach is distended with air. Antrum is closely opposed to the anterior   abdominal wall without intervening bowel loop. Surgical clip is seen at   the gastric fundal region. Correlate with endoscopic history.    Since prior chest CT from 12/30/2021, there is interval increased   opacification of the left hemithorax and volume loss. Prominent   left-sided central airway secretions noted. Findings of the left   hemithorax may represent combination of atelectasis and infection. A   left-sided chest tube and small left hydropneumothorax are partially   imaged. Distal extent of the chest tube is not imaged.    Multifocal right-sided lung parenchymal opacities are redemonstrated.   Multifocal infection is again the primary consideration. In particular,   there is a cavitary mass of the right middle lobe measuring 4.6 cm, with   internal nodularity, again concerning for metastatic disease in the   setting of known squamous cell malignancy. Mediastinal adenopathy also   partially imaged.    < end of copied text >    Goals of Care Discussion with Family/Proxy/Other   - see note from  1/02/22

## 2022-01-18 NOTE — PROGRESS NOTE ADULT - PROBLEM SELECTOR PLAN 2
Failed extubation 12/23, reintub 12/23; again failed 12/27, reintub 12/28; s/p trach 1/6/22  Increased infiltrate/atelectasis  Continue mechanical ventilation with daily SBT.    Monitor oxygen saturation.  Will need vent facility upon discharge.  Serial CXRs - Last Chest X ray on 1/14 - Results as above. Failed extubation 12/23, reintub 12/23; again failed 12/27, reintub 12/28; s/p trach 1/6/22  Increased infiltrate/atelectasis  Continue mechanical ventilation. SBT on hold while septic.  Monitor oxygen saturation.  Will need vent facility upon discharge.  Serial CXRs - Last Chest X ray on 1/14 - Results as above.

## 2022-01-18 NOTE — PROGRESS NOTE ADULT - SUBJECTIVE AND OBJECTIVE BOX
Pt seen at bedside  Patient is a 76y old  Male who presents with a chief complaint of cardiac arrest (18 Jan 2022 19:13)      INTERVAL HPI/OVERNIGHT EVENTS:  Called to re-evaluate patient for finding of SBO on CT scan  Pt is s/p code sepsis yesterday and was transferred to ICU yesterday  Pt is on pressors x 2   Pt is currently unable to communicate or give history  Per RN, pt had bowel movement today    Vital Signs Last 24 Hrs  T(C): 38.3 (18 Jan 2022 22:15), Max: 39.1 (18 Jan 2022 16:45)  T(F): 100.9 (18 Jan 2022 22:15), Max: 102.4 (18 Jan 2022 16:45)  HR: 110 (18 Jan 2022 22:15) (90 - 119)  BP: 103/69 (18 Jan 2022 22:15) (71/55 - 146/113)  BP(mean): 77 (18 Jan 2022 22:15) (57 - 121)  RR: 26 (18 Jan 2022 22:15) (18 - 36)  SpO2: 97% (18 Jan 2022 22:15) (92% - 100%)    Physical Exam:    Gen: sedated on vent  HEENT:  trach in place  Chest:  equal chest rise  Abd: PEG tube in place, soft, distended, tympanic, + tenderness throughout  Pelvic: Freedman catheter in place with clear urine  Ext:  warm to touch no c/c/e      MEDICATIONS  (STANDING):  acetaminophen   IVPB .. 1000 milliGRAM(s) IV Intermittent once  chlorhexidine 2% Cloths 1 Application(s) Topical <User Schedule>  enoxaparin Injectable 40 milliGRAM(s) SubCutaneous daily  glucagon  Injectable 0.5 milliGRAM(s) IV Push once  insulin lispro (ADMELOG) corrective regimen sliding scale   SubCutaneous every 6 hours  midodrine. 2.5 milliGRAM(s) Oral three times a day  norepinephrine Infusion 0.05 MICROgram(s)/kG/Min (3.38 mL/Hr) IV Continuous <Continuous>  pantoprazole  Injectable 40 milliGRAM(s) IV Push two times a day  phenylephrine    Infusion 0.1 MICROgram(s)/kG/Min (2.71 mL/Hr) IV Continuous <Continuous>  piperacillin/tazobactam IVPB.. 3.375 Gram(s) IV Intermittent every 8 hours  QUEtiapine 12.5 milliGRAM(s) Oral at bedtime  sodium chloride 0.9%. 1000 milliLiter(s) (125 mL/Hr) IV Continuous <Continuous>    MEDICATIONS  (PRN):  acetaminophen    Suspension .. 650 milliGRAM(s) Oral every 6 hours PRN Temp greater or equal to 38C (100.4F), Mild Pain (1 - 3)  acetaminophen  Suppository .. 650 milliGRAM(s) Rectal every 6 hours PRN Temp greater or equal to 38C (100.4F)  bisacodyl Suppository 10 milliGRAM(s) Rectal daily PRN Constipation      Labs:                          11.3   19.08 )-----------( 211      ( 18 Jan 2022 09:30 )             36.8     01-18    136  |  104  |  29<H>  ----------------------------<  259<H>  5.0   |  20<L>  |  1.28    Ca    9.4      18 Jan 2022 09:30  Phos  3.9     01-18  Mg     2.3     01-18    TPro  5.7<L>  /  Alb  1.1<L>  /  TBili  0.6  /  DBili  x   /  AST  54<H>  /  ALT  13  /  AlkPhos  133<H>  01-18        I&O's Detail    18 Jan 2022 07:01  -  18 Jan 2022 23:24  --------------------------------------------------------  IN:    IV PiggyBack: 100 mL    IV PiggyBack: 100 mL    Phenylephrine: 89 mL    sodium chloride 0.9%: 875 mL  Total IN: 1164 mL    OUT:    Voided (mL): 225 mL  Total OUT: 225 mL    Total NET: 939 mL    Radiology:  < from: CT Abdomen and Pelvis w/ IV Cont (01.18.22 @ 16:12) >  IMPRESSION:  Small bowel obstruction with transition point in the right lower   quadrant, with additional findings highly suspicious for bowel ischemia.    Partially imaged 4.3 cm air-fluid collection in the left lower lobe and   adjacent rim-enhancing pleural effusion, suspicious for lung   abscess/empyema. Dedicated chest CT can be performed for further   evaluation.    < end of copied text >   home

## 2022-01-18 NOTE — PROGRESS NOTE ADULT - ASSESSMENT
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 Patient was       Plan  ====Neuro====      ====CVS====  #Cardiac arrest   12/20 on arrival to ED  - ROSC after 20min  - trop trended down  - started norepi 12/28 post intubation, tapered OFF  - s/p midodrine OFF    ====Pulm====  #Acute hypoxic respiratory failure  - has Hx of chronic PTX on LEFT  - likely 2/2 PTX vs aspiration PNA  - SBT (12/21, 22, 24)  - extubated on 12/22  - re-intubated on 12/23  - s/p solumedrol  - s/p cefepime 2g q12 (12/20-12/28)  - extubated again on 12/27; re-intubated on 12/28  - CXR w/ improvement, stable chronic LEFT PTX  - thoracic surgery consulted for trach/PEG  - plan for CT - with IV contrast shows interval worsening  - on zosyn Abx till 1/7 for asp PNA coverage  - post OP CXR and ABG    #PTX (acute/on/chronic)  - s/p pigtail in ED 12/20  - chest tube to water seal- serosanginous exudate  - CXR interval improvement  - plan for re-positioning of pigtail during trach ?  - chest tube to water seal, plan to DC 1/8 after confirmation XR    ====GI====  #no active issues  #diet: NPO w/ tube feeding + prosource  IVF D5 0.45% at 60 while NPO  s/p EGD 1/7/21 with gastric ulcer w/ clipping, no window for PEG. IR consultation for G tube placement on Monday . PPI BID    #bowel regimen: none    ====nephro====  #hypernatremia  serum Na 153 >>> improving 146 > 143 > 145>146  - s/p free water 250 q 4    ====ID====  #fever  started 12/29 after intubation  wbc trending back down  - sputum culture no bacterial growth   - started Zosyn 12/29 - 1/7    #leukocytosis  - likely 2/2 aspiration PNA vs reactive process  - Ucx neg  - Scx MAC  - s/p cefepime 2g q 12 (12/20-12/28)  - started Zosyn 12/29 - 1/7    ====Heme/onc====  #Metastatic SCC of tongue base  - s/p resection, chemo/radiation, and immunotherapy (10/2021)  - plan for experimental drug as outpatient.  - heme/onc as o/p   - palliative chemo, post trach/PEG will NOT be candidate for chemo  - palliative on board, discussion to be had today regarding trach PEG  - s/p EGD 1/7/21 with gastric ulcer w/ clipping, no window for PEG. IR consultation for G tube placement on Monday . 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 Patient was       #Septic shock  #SBO  #Lung abscess  #Encephalopathy  # AHRF s/p trach and PEg    ====CVS====  #Septic shock   c/w pressors   maintain MAp>65    ====Pulm====  #Acute hypoxic respiratory failure  -1/9 Febrile, Tmax 101.4F. F/u CXR. TOV. NPO after midnight and hold lovenox for GT placement in IR in am.   1/12/22 G tube placed in IR  1/14 L Pleural Pigtail catheter removed  1/15 tolerating SBT x4 hrs but with copious secretions requiring frequent suctioning; G tube clogged, initial attempts at dislodging unsuccessful in AM, noted to have a cluster of small capsule beads. Attempts to dissolve with ginger ale, unsuccessful. This evening, successfully unclogged Gastrostomy tube. TF restarted.  1/16- No acute overnight events  1/17  Code sepsis  Lactate 5.3 received 2.1 liter of NS. Repeat lactate 3.2 Blood and urine cultures sent.  1/18  Lactate 6.1  IVF increased to 125cc/hr. Abdominal / Pelvis CT with contrast. B/P low started on midodrine. Pt to be transferred to ICU after CT scan  Started on pressors and c/w zosyn    #Lung abscess  Patient was found yo have b/l lung abscess on CT   c/ zosyn  f/u cx    ====GI====  SBO  patient was found to have SBo on CT abdomen  NPO for now  Surgery consult for SBO  c/w Zosyn      ====nephro====  Lactic acidosis 2/2 sepsis  Monitor lactate    ====ID====  Septic shock  Likely 2/2 lung abscess on CT   Small bowel obstruction with transition point in the right lower   quadrant, with additional findings highly suspicious for bowel ischemia.  c/w Zosyn  f/u culture  Will start on central pressors   Monitor Vitals, maintain MAp>65  lactate was elevated to 6.1  s/p 3 L bolus  f/u repeat lactate      ====Heme/onc====  #Metastatic SCC of tongue base  - s/p resection, chemo/radiation, and immunotherapy (10/2021)  - palliative, s/p trach and peg    ====Endo====  #DM  - a1c 6.3  - c/w sliding scale    ====Ppx====  #DVT: Lovenox HOLDing pre op  #GI: PPI

## 2022-01-18 NOTE — PROGRESS NOTE ADULT - PROBLEM SELECTOR PLAN 1
S/P Code Sepsis 1/17  Lactate 5.3 received 2.1 liters of NS. Repeat lactate 3.2   Blood and urine cultures sents.  F/U cultures and repeat lactate.  Continue zosyn. ID f/u S/P Code Sepsis 1/17  Lactate 5.3 received 2.1 liters of NS. Repeat lactate 3.2   Blood and urine cultures sent.  F/U cultures and repeat lactate.  Repeat lactate 6.1 Start IVF NS at 125cc/hr  Abdominal /Pelvis CT with IV contrast ordered.  To be transferred to ICU after CT  Continue midodrine.   Continue zosyn. ID f/u

## 2022-01-19 NOTE — PROGRESS NOTE ADULT - SUBJECTIVE AND OBJECTIVE BOX
INTERVAL HPI/OVERNIGHT EVENTS: Code sepsis last evening. Tmax 103.6. Lactate 5.3 and then 3.2  Received 2.1 liters normal saline bolus.  Blood cultures and urine culture sent.  Continues to be hypotensive. Midodrine 2.5 mg every 8 hours started. Repeat lactate 6.1 IVF NS 125cc per hour. Abdominal/ Pelvis CT was done, patient was brought up to ICU.     PRESSORS: [x ] YES [ ] NO  WHICH: Pheny    ANTIBIOTICS:                      Antimicrobial:  piperacillin/tazobactam IVPB.. 3.375 Gram(s) IV Intermittent every 8 hours    Cardiovascular:  midodrine. 2.5 milliGRAM(s) Oral three times a day    Pulmonary:    Hematalogic:  enoxaparin Injectable 40 milliGRAM(s) SubCutaneous daily    Other:  acetaminophen    Suspension .. 650 milliGRAM(s) Oral every 6 hours PRN  acetaminophen  Suppository .. 650 milliGRAM(s) Rectal every 6 hours PRN  bisacodyl Suppository 10 milliGRAM(s) Rectal daily PRN  chlorhexidine 2% Cloths 1 Application(s) Topical <User Schedule>  glucagon  Injectable 0.5 milliGRAM(s) IV Push once  insulin lispro (ADMELOG) corrective regimen sliding scale   SubCutaneous every 6 hours  pantoprazole  Injectable 40 milliGRAM(s) IV Push two times a day  QUEtiapine 12.5 milliGRAM(s) Oral at bedtime  sodium chloride 0.9%. 1000 milliLiter(s) IV Continuous <Continuous>    acetaminophen    Suspension .. 650 milliGRAM(s) Oral every 6 hours PRN  acetaminophen  Suppository .. 650 milliGRAM(s) Rectal every 6 hours PRN  bisacodyl Suppository 10 milliGRAM(s) Rectal daily PRN  chlorhexidine 2% Cloths 1 Application(s) Topical <User Schedule>  enoxaparin Injectable 40 milliGRAM(s) SubCutaneous daily  glucagon  Injectable 0.5 milliGRAM(s) IV Push once  insulin lispro (ADMELOG) corrective regimen sliding scale   SubCutaneous every 6 hours  midodrine. 2.5 milliGRAM(s) Oral three times a day  pantoprazole  Injectable 40 milliGRAM(s) IV Push two times a day  piperacillin/tazobactam IVPB.. 3.375 Gram(s) IV Intermittent every 8 hours  QUEtiapine 12.5 milliGRAM(s) Oral at bedtime  sodium chloride 0.9%. 1000 milliLiter(s) IV Continuous <Continuous>    Drug Dosing Weight  Height (cm): 175.3 (20 Dec 2021 02:21)  Weight (kg): 72.5 (20 Dec 2021 06:30)  BMI (kg/m2): 23.6 (20 Dec 2021 06:30)  BSA (m2): 1.88 (20 Dec 2021 06:30)    CENTRAL LINE: [ ] YES [x ] NO  LOCATION:   DATE INSERTED:  REMOVE: [ ] YES [ ] NO  EXPLAIN:    HUTCHINS: [ ] YES [x ] NO    DATE INSERTED:  REMOVE:  [ ] YES [ ] NO  EXPLAIN:    A-LINE:  [ ] YES [x ] NO  LOCATION:   DATE INSERTED:  REMOVE:  [ ] YES [ ] NO  EXPLAIN:    PMH -reviewed admission note, no change since admission    ICU Vital Signs Last 24 Hrs  T(C): 39.1 (2022 16:55), Max: 39.8 (2022 19:34)  T(F): 102.4 (2022 16:55), Max: 103.6 (2022 19:34)  HR: 109 (2022 16:55) (90 - 119)  BP: 71/55 (2022 16:48) (70/40 - 146/113)  BP(mean): 59 (2022 16:48) (52 - 121)  ABP: --  ABP(mean): --  RR: 19 (2022 16:55) (18 - 27)  SpO2: 98% (2022 16:48) (86% - 100%)      Mode: AC/ CMV (Assist Control/ Continuous Mandatory Ventilation)  RR (machine): 14  TV (machine): 450  FiO2: 40  PEEP: 5  ITime: 0.9  MAP: 11  PIP: 26      PHYSICAL EXAM:    GENERAL: lethargic  NECK:  contracted  NERVOUS SYSTEM:  Alert & Oriented 1, opening eyes only to verbal and tactile stimulus  CHEST/LUNG: No chest deformity; Normal percussion bilaterally; No rales, rhonchi, wheezing   HEART: Regular rate and rhythm; No murmurs, rubs, or gallops  ABDOMEN: Soft, Nontender, Nondistended; Bowel sounds present  EXTREMITIES:  2+ Peripheral Pulses, No clubbing, cyanosis, or edema  LYMPH: No lymphadenopathy noted  SKIN: No rashes or lesions; Good capillary refill      LABS:  CBC Full  -  ( 2022 09:30 )  WBC Count : 19.08 K/uL  RBC Count : 4.04 M/uL  Hemoglobin : 11.3 g/dL  Hematocrit : 36.8 %  Platelet Count - Automated : 211 K/uL  Mean Cell Volume : 91.1 fl  Mean Cell Hemoglobin : 28.0 pg  Mean Cell Hemoglobin Concentration : 30.7 gm/dL  Auto Neutrophil # : 17.52 K/uL  Auto Lymphocyte # : 0.79 K/uL  Auto Monocyte # : 0.43 K/uL  Auto Eosinophil # : 0.04 K/uL  Auto Basophil # : 0.13 K/uL  Auto Neutrophil % : 91.8 %  Auto Lymphocyte % : 4.1 %  Auto Monocyte % : 2.3 %  Auto Eosinophil % : 0.2 %  Auto Basophil % : 0.7 %        136  |  104  |  29<H>  ----------------------------<  259<H>  5.0   |  20<L>  |  1.28    Ca    9.4      2022 09:30  Phos  3.9       Mg     2.3         TPro  5.7<L>  /  Alb  1.1<L>  /  TBili  0.6  /  DBili  x   /  AST  54<H>  /  ALT  13  /  AlkPhos  133<H>        Urinalysis Basic - ( 2022 10:46 )    Color: Yellow / Appearance: Clear / S.015 / pH: x  Gluc: x / Ketone: Negative  / Bili: Small / Urobili: 4   Blood: x / Protein: 30 mg/dL / Nitrite: Negative   Leuk Esterase: Trace / RBC: 0-2 /HPF / WBC 11-25 /HPF   Sq Epi: x / Non Sq Epi: Few /HPF / Bacteria: See Note /HPF      GOALS OF CARE DISCUSSION WITH PATIENT/FAMILY/PROXY: DNR    CRITICAL CARE TIME SPENT: 35 minutes INTERVAL HPI/OVERNIGHT EVENTS: Pt was seen and assessed at bedside. Pt's lactate is trending downwards from 6.1 > 3 >2.8. Source is either from lung abscess or SBO.    PRESSORS: [x ] YES [ ] NO  WHICH: Pheny    ANTIBIOTICS:                      Antimicrobial:  piperacillin/tazobactam IVPB.. 3.375 Gram(s) IV Intermittent every 8 hours    Cardiovascular:  midodrine. 2.5 milliGRAM(s) Oral three times a day    Pulmonary:    Hematalogic:  enoxaparin Injectable 40 milliGRAM(s) SubCutaneous daily    Other:  acetaminophen    Suspension .. 650 milliGRAM(s) Oral every 6 hours PRN  acetaminophen  Suppository .. 650 milliGRAM(s) Rectal every 6 hours PRN  bisacodyl Suppository 10 milliGRAM(s) Rectal daily PRN  chlorhexidine 2% Cloths 1 Application(s) Topical <User Schedule>  glucagon  Injectable 0.5 milliGRAM(s) IV Push once  insulin lispro (ADMELOG) corrective regimen sliding scale   SubCutaneous every 6 hours  pantoprazole  Injectable 40 milliGRAM(s) IV Push two times a day  QUEtiapine 12.5 milliGRAM(s) Oral at bedtime  sodium chloride 0.9%. 1000 milliLiter(s) IV Continuous <Continuous>    acetaminophen    Suspension .. 650 milliGRAM(s) Oral every 6 hours PRN  acetaminophen  Suppository .. 650 milliGRAM(s) Rectal every 6 hours PRN  bisacodyl Suppository 10 milliGRAM(s) Rectal daily PRN  chlorhexidine 2% Cloths 1 Application(s) Topical <User Schedule>  enoxaparin Injectable 40 milliGRAM(s) SubCutaneous daily  glucagon  Injectable 0.5 milliGRAM(s) IV Push once  insulin lispro (ADMELOG) corrective regimen sliding scale   SubCutaneous every 6 hours  midodrine. 2.5 milliGRAM(s) Oral three times a day  pantoprazole  Injectable 40 milliGRAM(s) IV Push two times a day  piperacillin/tazobactam IVPB.. 3.375 Gram(s) IV Intermittent every 8 hours  QUEtiapine 12.5 milliGRAM(s) Oral at bedtime  sodium chloride 0.9%. 1000 milliLiter(s) IV Continuous <Continuous>    Drug Dosing Weight  Height (cm): 175.3 (20 Dec 2021 02:21)  Weight (kg): 72.5 (20 Dec 2021 06:30)  BMI (kg/m2): 23.6 (20 Dec 2021 06:30)  BSA (m2): 1.88 (20 Dec 2021 06:30)    CENTRAL LINE: [ ] YES [x ] NO  LOCATION:   DATE INSERTED:  REMOVE: [ ] YES [ ] NO  EXPLAIN:    HUTCHINS: [ ] YES [x ] NO    DATE INSERTED:  REMOVE:  [ ] YES [ ] NO  EXPLAIN:    A-LINE:  [ ] YES [x ] NO  LOCATION:   DATE INSERTED:  REMOVE:  [ ] YES [ ] NO  EXPLAIN:    PMH -reviewed admission note, no change since admission    ICU Vital Signs Last 24 Hrs  T(C): 39.1 (2022 16:55), Max: 39.8 (2022 19:34)  T(F): 102.4 (2022 16:55), Max: 103.6 (2022 19:34)  HR: 109 (2022 16:55) (90 - 119)  BP: 71/55 (2022 16:48) (70/40 - 146/113)  BP(mean): 59 (2022 16:48) (52 - 121)  ABP: --  ABP(mean): --  RR: 19 (2022 16:55) (18 - 27)  SpO2: 98% (2022 16:48) (86% - 100%)      Mode: AC/ CMV (Assist Control/ Continuous Mandatory Ventilation)  RR (machine): 14  TV (machine): 450  FiO2: 40  PEEP: 5  ITime: 0.9  MAP: 11  PIP: 26      PHYSICAL EXAM:    GENERAL: lethargic  NECK:  contracted  NERVOUS SYSTEM:  Alert & Oriented 1, opening eyes only to verbal and tactile stimulus  CHEST/LUNG: No chest deformity; Normal percussion bilaterally; No rales, rhonchi, wheezing   HEART: Regular rate and rhythm; No murmurs, rubs, or gallops  ABDOMEN: Soft, Nontender, Nondistended; Bowel sounds present  EXTREMITIES:  2+ Peripheral Pulses, No clubbing, cyanosis, or edema  LYMPH: No lymphadenopathy noted  SKIN: No rashes or lesions; Good capillary refill      LABS:  CBC Full  -  ( 2022 09:30 )  WBC Count : 19.08 K/uL  RBC Count : 4.04 M/uL  Hemoglobin : 11.3 g/dL  Hematocrit : 36.8 %  Platelet Count - Automated : 211 K/uL  Mean Cell Volume : 91.1 fl  Mean Cell Hemoglobin : 28.0 pg  Mean Cell Hemoglobin Concentration : 30.7 gm/dL  Auto Neutrophil # : 17.52 K/uL  Auto Lymphocyte # : 0.79 K/uL  Auto Monocyte # : 0.43 K/uL  Auto Eosinophil # : 0.04 K/uL  Auto Basophil # : 0.13 K/uL  Auto Neutrophil % : 91.8 %  Auto Lymphocyte % : 4.1 %  Auto Monocyte % : 2.3 %  Auto Eosinophil % : 0.2 %  Auto Basophil % : 0.7 %        136  |  104  |  29<H>  ----------------------------<  259<H>  5.0   |  20<L>  |  1.28    Ca    9.4      2022 09:30  Phos  3.9       Mg     2.3         TPro  5.7<L>  /  Alb  1.1<L>  /  TBili  0.6  /  DBili  x   /  AST  54<H>  /  ALT  13  /  AlkPhos  133<H>        Urinalysis Basic - ( 2022 10:46 )    Color: Yellow / Appearance: Clear / S.015 / pH: x  Gluc: x / Ketone: Negative  / Bili: Small / Urobili: 4   Blood: x / Protein: 30 mg/dL / Nitrite: Negative   Leuk Esterase: Trace / RBC: 0-2 /HPF / WBC 11-25 /HPF   Sq Epi: x / Non Sq Epi: Few /HPF / Bacteria: See Note /HPF      GOALS OF CARE DISCUSSION WITH PATIENT/FAMILY/PROXY: DNR    CRITICAL CARE TIME SPENT: 35 minutes INTERVAL HPI/OVERNIGHT EVENTS: Pt was seen and assessed at bedside. Pt's lactate is trending downwards from 6.1 > 3 >2.8. Source is either from lung abscess or SBO. Pt is at high risk for any surgical intervention per surgery. PEG placed to gravity to decompress small bowel.    PRESSORS: [x ] YES [ ] NO  WHICH: Pheny    ANTIBIOTICS:                      Antimicrobial:  piperacillin/tazobactam IVPB.. 3.375 Gram(s) IV Intermittent every 8 hours    Cardiovascular:  midodrine. 2.5 milliGRAM(s) Oral three times a day    Pulmonary:    Hematalogic:  enoxaparin Injectable 40 milliGRAM(s) SubCutaneous daily    Other:  acetaminophen    Suspension .. 650 milliGRAM(s) Oral every 6 hours PRN  acetaminophen  Suppository .. 650 milliGRAM(s) Rectal every 6 hours PRN  bisacodyl Suppository 10 milliGRAM(s) Rectal daily PRN  chlorhexidine 2% Cloths 1 Application(s) Topical <User Schedule>  glucagon  Injectable 0.5 milliGRAM(s) IV Push once  insulin lispro (ADMELOG) corrective regimen sliding scale   SubCutaneous every 6 hours  pantoprazole  Injectable 40 milliGRAM(s) IV Push two times a day  QUEtiapine 12.5 milliGRAM(s) Oral at bedtime  sodium chloride 0.9%. 1000 milliLiter(s) IV Continuous <Continuous>    acetaminophen    Suspension .. 650 milliGRAM(s) Oral every 6 hours PRN  acetaminophen  Suppository .. 650 milliGRAM(s) Rectal every 6 hours PRN  bisacodyl Suppository 10 milliGRAM(s) Rectal daily PRN  chlorhexidine 2% Cloths 1 Application(s) Topical <User Schedule>  enoxaparin Injectable 40 milliGRAM(s) SubCutaneous daily  glucagon  Injectable 0.5 milliGRAM(s) IV Push once  insulin lispro (ADMELOG) corrective regimen sliding scale   SubCutaneous every 6 hours  midodrine. 2.5 milliGRAM(s) Oral three times a day  pantoprazole  Injectable 40 milliGRAM(s) IV Push two times a day  piperacillin/tazobactam IVPB.. 3.375 Gram(s) IV Intermittent every 8 hours  QUEtiapine 12.5 milliGRAM(s) Oral at bedtime  sodium chloride 0.9%. 1000 milliLiter(s) IV Continuous <Continuous>    Drug Dosing Weight  Height (cm): 175.3 (20 Dec 2021 02:21)  Weight (kg): 72.5 (20 Dec 2021 06:30)  BMI (kg/m2): 23.6 (20 Dec 2021 06:30)  BSA (m2): 1.88 (20 Dec 2021 06:30)    CENTRAL LINE: [ ] YES [x ] NO  LOCATION:   DATE INSERTED:  REMOVE: [ ] YES [ ] NO  EXPLAIN:    HUTCHINS: [ ] YES [x ] NO    DATE INSERTED:  REMOVE:  [ ] YES [ ] NO  EXPLAIN:    A-LINE:  [ ] YES [x ] NO  LOCATION:   DATE INSERTED:  REMOVE:  [ ] YES [ ] NO  EXPLAIN:    PMH -reviewed admission note, no change since admission    ICU Vital Signs Last 24 Hrs  T(C): 39.1 (2022 16:55), Max: 39.8 (2022 19:34)  T(F): 102.4 (2022 16:55), Max: 103.6 (2022 19:34)  HR: 109 (2022 16:55) (90 - 119)  BP: 71/55 (2022 16:48) (70/40 - 146/113)  BP(mean): 59 (2022 16:48) (52 - 121)  ABP: --  ABP(mean): --  RR: 19 (2022 16:55) (18 - 27)  SpO2: 98% (2022 16:48) (86% - 100%)      Mode: AC/ CMV (Assist Control/ Continuous Mandatory Ventilation)  RR (machine): 14  TV (machine): 450  FiO2: 40  PEEP: 5  ITime: 0.9  MAP: 11  PIP: 26      PHYSICAL EXAM:    GENERAL: lethargic  NECK:  contracted  NERVOUS SYSTEM:  Alert & Oriented 1, opening eyes only to verbal and tactile stimulus  CHEST/LUNG: No chest deformity; Normal percussion bilaterally; No rales, rhonchi, wheezing   HEART: Regular rate and rhythm; No murmurs, rubs, or gallops  ABDOMEN: abdomen tender on minimal palpation  EXTREMITIES:  2+ Peripheral Pulses, No clubbing, cyanosis, or edema  LYMPH: No lymphadenopathy noted  SKIN: No rashes or lesions; Good capillary refill      LABS:  CBC Full  -  ( 2022 09:30 )  WBC Count : 19.08 K/uL  RBC Count : 4.04 M/uL  Hemoglobin : 11.3 g/dL  Hematocrit : 36.8 %  Platelet Count - Automated : 211 K/uL  Mean Cell Volume : 91.1 fl  Mean Cell Hemoglobin : 28.0 pg  Mean Cell Hemoglobin Concentration : 30.7 gm/dL  Auto Neutrophil # : 17.52 K/uL  Auto Lymphocyte # : 0.79 K/uL  Auto Monocyte # : 0.43 K/uL  Auto Eosinophil # : 0.04 K/uL  Auto Basophil # : 0.13 K/uL  Auto Neutrophil % : 91.8 %  Auto Lymphocyte % : 4.1 %  Auto Monocyte % : 2.3 %  Auto Eosinophil % : 0.2 %  Auto Basophil % : 0.7 %        136  |  104  |  29<H>  ----------------------------<  259<H>  5.0   |  20<L>  |  1.28    Ca    9.4      2022 09:30  Phos  3.9       Mg     2.3         TPro  5.7<L>  /  Alb  1.1<L>  /  TBili  0.6  /  DBili  x   /  AST  54<H>  /  ALT  13  /  AlkPhos  133<H>        Urinalysis Basic - ( 2022 10:46 )    Color: Yellow / Appearance: Clear / S.015 / pH: x  Gluc: x / Ketone: Negative  / Bili: Small / Urobili: 4   Blood: x / Protein: 30 mg/dL / Nitrite: Negative   Leuk Esterase: Trace / RBC: 0-2 /HPF / WBC 11-25 /HPF   Sq Epi: x / Non Sq Epi: Few /HPF / Bacteria: See Note /HPF      GOALS OF CARE DISCUSSION WITH PATIENT/FAMILY/PROXY: DNR    CRITICAL CARE TIME SPENT: 35 minutes INTERVAL HPI/OVERNIGHT EVENTS: Pt was seen and assessed at bedside. Pt's lactate is trending downwards from 6.1 > 3 >2.8. Source is either from lung abscess or SBO. Pt is at high risk for any surgical intervention per surgery. PEG placed to gravity to decompress small bowel.     PRESSORS: [x ] YES [ ] NO  WHICH: Pheny    ANTIBIOTICS:                      Antimicrobial:  piperacillin/tazobactam IVPB.. 3.375 Gram(s) IV Intermittent every 8 hours    Cardiovascular:  midodrine. 2.5 milliGRAM(s) Oral three times a day    Pulmonary:    Hematalogic:  enoxaparin Injectable 40 milliGRAM(s) SubCutaneous daily    Other:  acetaminophen    Suspension .. 650 milliGRAM(s) Oral every 6 hours PRN  acetaminophen  Suppository .. 650 milliGRAM(s) Rectal every 6 hours PRN  bisacodyl Suppository 10 milliGRAM(s) Rectal daily PRN  chlorhexidine 2% Cloths 1 Application(s) Topical <User Schedule>  glucagon  Injectable 0.5 milliGRAM(s) IV Push once  insulin lispro (ADMELOG) corrective regimen sliding scale   SubCutaneous every 6 hours  pantoprazole  Injectable 40 milliGRAM(s) IV Push two times a day  QUEtiapine 12.5 milliGRAM(s) Oral at bedtime  sodium chloride 0.9%. 1000 milliLiter(s) IV Continuous <Continuous>    acetaminophen    Suspension .. 650 milliGRAM(s) Oral every 6 hours PRN  acetaminophen  Suppository .. 650 milliGRAM(s) Rectal every 6 hours PRN  bisacodyl Suppository 10 milliGRAM(s) Rectal daily PRN  chlorhexidine 2% Cloths 1 Application(s) Topical <User Schedule>  enoxaparin Injectable 40 milliGRAM(s) SubCutaneous daily  glucagon  Injectable 0.5 milliGRAM(s) IV Push once  insulin lispro (ADMELOG) corrective regimen sliding scale   SubCutaneous every 6 hours  midodrine. 2.5 milliGRAM(s) Oral three times a day  pantoprazole  Injectable 40 milliGRAM(s) IV Push two times a day  piperacillin/tazobactam IVPB.. 3.375 Gram(s) IV Intermittent every 8 hours  QUEtiapine 12.5 milliGRAM(s) Oral at bedtime  sodium chloride 0.9%. 1000 milliLiter(s) IV Continuous <Continuous>    Drug Dosing Weight  Height (cm): 175.3 (20 Dec 2021 02:21)  Weight (kg): 72.5 (20 Dec 2021 06:30)  BMI (kg/m2): 23.6 (20 Dec 2021 06:30)  BSA (m2): 1.88 (20 Dec 2021 06:30)    CENTRAL LINE: [ ] YES [x ] NO  LOCATION:   DATE INSERTED:  REMOVE: [ ] YES [ ] NO  EXPLAIN:    HUTCHINS: [ ] YES [x ] NO    DATE INSERTED:  REMOVE:  [ ] YES [ ] NO  EXPLAIN:    A-LINE:  [ ] YES [x ] NO  LOCATION:   DATE INSERTED:  REMOVE:  [ ] YES [ ] NO  EXPLAIN:    PMH -reviewed admission note, no change since admission    ICU Vital Signs Last 24 Hrs  T(C): 39.1 (2022 16:55), Max: 39.8 (2022 19:34)  T(F): 102.4 (2022 16:55), Max: 103.6 (2022 19:34)  HR: 109 (2022 16:55) (90 - 119)  BP: 71/55 (2022 16:48) (70/40 - 146/113)  BP(mean): 59 (2022 16:48) (52 - 121)  ABP: --  ABP(mean): --  RR: 19 (2022 16:55) (18 - 27)  SpO2: 98% (2022 16:48) (86% - 100%)      Mode: AC/ CMV (Assist Control/ Continuous Mandatory Ventilation)  RR (machine): 14  TV (machine): 450  FiO2: 40  PEEP: 5  ITime: 0.9  MAP: 11  PIP: 26      PHYSICAL EXAM:    GENERAL: lethargic  NECK:  contracted  NERVOUS SYSTEM:  Alert & Oriented 1, opening eyes only to verbal and tactile stimulus  CHEST/LUNG: No chest deformity; Normal percussion bilaterally; No rales, rhonchi, wheezing   HEART: Regular rate and rhythm; No murmurs, rubs, or gallops  ABDOMEN: abdomen tender on minimal palpation  EXTREMITIES:  2+ Peripheral Pulses, No clubbing, cyanosis, or edema  LYMPH: No lymphadenopathy noted  SKIN: No rashes or lesions; Good capillary refill      LABS:  CBC Full  -  ( 2022 09:30 )  WBC Count : 19.08 K/uL  RBC Count : 4.04 M/uL  Hemoglobin : 11.3 g/dL  Hematocrit : 36.8 %  Platelet Count - Automated : 211 K/uL  Mean Cell Volume : 91.1 fl  Mean Cell Hemoglobin : 28.0 pg  Mean Cell Hemoglobin Concentration : 30.7 gm/dL  Auto Neutrophil # : 17.52 K/uL  Auto Lymphocyte # : 0.79 K/uL  Auto Monocyte # : 0.43 K/uL  Auto Eosinophil # : 0.04 K/uL  Auto Basophil # : 0.13 K/uL  Auto Neutrophil % : 91.8 %  Auto Lymphocyte % : 4.1 %  Auto Monocyte % : 2.3 %  Auto Eosinophil % : 0.2 %  Auto Basophil % : 0.7 %        136  |  104  |  29<H>  ----------------------------<  259<H>  5.0   |  20<L>  |  1.28    Ca    9.4      2022 09:30  Phos  3.9       Mg     2.3         TPro  5.7<L>  /  Alb  1.1<L>  /  TBili  0.6  /  DBili  x   /  AST  54<H>  /  ALT  13  /  AlkPhos  133<H>        Urinalysis Basic - ( 2022 10:46 )    Color: Yellow / Appearance: Clear / S.015 / pH: x  Gluc: x / Ketone: Negative  / Bili: Small / Urobili: 4   Blood: x / Protein: 30 mg/dL / Nitrite: Negative   Leuk Esterase: Trace / RBC: 0-2 /HPF / WBC 11-25 /HPF   Sq Epi: x / Non Sq Epi: Few /HPF / Bacteria: See Note /HPF      GOALS OF CARE DISCUSSION WITH PATIENT/FAMILY/PROXY: DNR    CRITICAL CARE TIME SPENT: 35 minutes INTERVAL HPI/OVERNIGHT EVENTS: Pt was seen and assessed at bedside. Pt's lactate is trending downwards from 6.1 > 3 >2.8. Source is either from lung abscess or SBO. Pt is at high risk for any surgical intervention per surgery. PEG placed to gravity to decompress small bowel.     PRESSORS: [x ] YES [ ] NO  WHICH: Pheny    ANTIBIOTICS:                      Antimicrobial:  piperacillin/tazobactam IVPB.. 3.375 Gram(s) IV Intermittent every 8 hours    Cardiovascular:  midodrine. 2.5 milliGRAM(s) Oral three times a day    Pulmonary:    Hematalogic:  enoxaparin Injectable 40 milliGRAM(s) SubCutaneous daily    Other:  acetaminophen    Suspension .. 650 milliGRAM(s) Oral every 6 hours PRN  acetaminophen  Suppository .. 650 milliGRAM(s) Rectal every 6 hours PRN  bisacodyl Suppository 10 milliGRAM(s) Rectal daily PRN  chlorhexidine 2% Cloths 1 Application(s) Topical <User Schedule>  glucagon  Injectable 0.5 milliGRAM(s) IV Push once  insulin lispro (ADMELOG) corrective regimen sliding scale   SubCutaneous every 6 hours  pantoprazole  Injectable 40 milliGRAM(s) IV Push two times a day  QUEtiapine 12.5 milliGRAM(s) Oral at bedtime  sodium chloride 0.9%. 1000 milliLiter(s) IV Continuous <Continuous>    acetaminophen    Suspension .. 650 milliGRAM(s) Oral every 6 hours PRN  acetaminophen  Suppository .. 650 milliGRAM(s) Rectal every 6 hours PRN  bisacodyl Suppository 10 milliGRAM(s) Rectal daily PRN  chlorhexidine 2% Cloths 1 Application(s) Topical <User Schedule>  enoxaparin Injectable 40 milliGRAM(s) SubCutaneous daily  glucagon  Injectable 0.5 milliGRAM(s) IV Push once  insulin lispro (ADMELOG) corrective regimen sliding scale   SubCutaneous every 6 hours  midodrine. 2.5 milliGRAM(s) Oral three times a day  pantoprazole  Injectable 40 milliGRAM(s) IV Push two times a day  piperacillin/tazobactam IVPB.. 3.375 Gram(s) IV Intermittent every 8 hours  QUEtiapine 12.5 milliGRAM(s) Oral at bedtime  sodium chloride 0.9%. 1000 milliLiter(s) IV Continuous <Continuous>    Drug Dosing Weight  Height (cm): 175.3 (20 Dec 2021 02:21)  Weight (kg): 72.5 (20 Dec 2021 06:30)  BMI (kg/m2): 23.6 (20 Dec 2021 06:30)  BSA (m2): 1.88 (20 Dec 2021 06:30)    CENTRAL LINE: [ ] YES [x ] NO  LOCATION:   DATE INSERTED:  REMOVE: [ ] YES [ ] NO  EXPLAIN:    HUTCHINS: [ ] YES [x ] NO    DATE INSERTED:  REMOVE:  [ ] YES [ ] NO  EXPLAIN:    A-LINE:  [ ] YES [x ] NO  LOCATION:   DATE INSERTED:  REMOVE:  [ ] YES [ ] NO  EXPLAIN:    PMH -reviewed admission note, no change since admission    ICU Vital Signs Last 24 Hrs  T(C): 39.1 (2022 16:55), Max: 39.8 (2022 19:34)  T(F): 102.4 (2022 16:55), Max: 103.6 (2022 19:34)  HR: 109 (2022 16:55) (90 - 119)  BP: 71/55 (2022 16:48) (70/40 - 146/113)  BP(mean): 59 (2022 16:48) (52 - 121)  ABP: --  ABP(mean): --  RR: 19 (2022 16:55) (18 - 27)  SpO2: 98% (2022 16:48) (86% - 100%)      Mode: AC/ CMV (Assist Control/ Continuous Mandatory Ventilation)  RR (machine): 14  TV (machine): 450  FiO2: 40  PEEP: 5  ITime: 0.9  MAP: 11  PIP: 26      PHYSICAL EXAM:    GENERAL: lethargic  NECK:  contracted  NERVOUS SYSTEM:  Alert & Oriented 1, opening eyes only to verbal and tactile stimulus  CHEST/LUNG: No chest deformity; Normal percussion bilaterally; No rales, rhonchi, wheezing   HEART: Regular rate and rhythm; No murmurs, rubs, or gallops  ABDOMEN: abdomen tender on minimal palpation  EXTREMITIES:  2+ Peripheral Pulses, No clubbing, cyanosis, or edema  LYMPH: No lymphadenopathy noted  SKIN: No rashes or lesions; Good capillary refill      LABS:  CBC Full  -  ( 2022 09:30 )  WBC Count : 19.08 K/uL  RBC Count : 4.04 M/uL  Hemoglobin : 11.3 g/dL  Hematocrit : 36.8 %  Platelet Count - Automated : 211 K/uL  Mean Cell Volume : 91.1 fl  Mean Cell Hemoglobin : 28.0 pg  Mean Cell Hemoglobin Concentration : 30.7 gm/dL  Auto Neutrophil # : 17.52 K/uL  Auto Lymphocyte # : 0.79 K/uL  Auto Monocyte # : 0.43 K/uL  Auto Eosinophil # : 0.04 K/uL  Auto Basophil # : 0.13 K/uL  Auto Neutrophil % : 91.8 %  Auto Lymphocyte % : 4.1 %  Auto Monocyte % : 2.3 %  Auto Eosinophil % : 0.2 %  Auto Basophil % : 0.7 %        136  |  104  |  29<H>  ----------------------------<  259<H>  5.0   |  20<L>  |  1.28    Ca    9.4      2022 09:30  Phos  3.9       Mg     2.3         TPro  5.7<L>  /  Alb  1.1<L>  /  TBili  0.6  /  DBili  x   /  AST  54<H>  /  ALT  13  /  AlkPhos  133<H>      Ct abd / pelvis:< from: CT Abdomen and Pelvis w/ IV Cont (22 @ 16:12) >    ACC: [id number] EXAM:  CT ABDOMEN AND PELVIS IC                          PROCEDURE DATE:  2022          INTERPRETATION:  CLINICAL INFORMATION: Cardiac arrest. Respiratory   failure. Sepsis. Status post PEG tube placement.    COMPARISON: CT abdomen pelvis 2022.    CONTRAST/COMPLICATIONS:  IV Contrast: Omnipaque 350  90 cc administered   10 cc discarded  Oral Contrast: NONE  Complications: None reported at time of study completion    PROCEDURE:  CT of the Abdomen and Pelvis was performed.  Sagittal and coronal reformats were performed.    FINDINGS:  LOWER CHEST: Redemonstration of a cavitary lesion in the right middle   lobe. Redemonstration of secretions in the left lower lobe bronchi with   complete collapse of the left lower lobe with heterogeneous   opacification. Partially imaged 4.3 x 2.8 cm air-fluid collection in the   left lower lobe. There is a small loculated rim-enhancing pleural   effusion.    LIVER: Within normal limits.  BILE DUCTS: Normal caliber.  GALLBLADDER: Within normal limits.  SPLEEN: Within normal limits.  PANCREAS: Within normal limits.  ADRENALS: Within normal limits.  KIDNEYS/URETERS: Within normal limits.    BLADDER: Within normal limits.  REPRODUCTIVE ORGANS: Prostate within normal limits.    BOWEL: PEG tube in the stomach. Several dilated air-fluid filled loops of   small bowel with transition point to collapsed bowel in the right lower   quadrant (series 2, image 83). Mild wall thickening of the small bowel   loops proximal to the transitionpoint with evidence of focal pneumatosis   intestinalis (2, 86). There is also gastric pneumatosis and mild portal   venous gas.  PERITONEUM: Small volume ascites and mesenteric edema.  VESSELS: Atherosclerotic changes.  RETROPERITONEUM/LYMPH NODES: No lymphadenopathy.  ABDOMINAL WALL: Subcutaneous edema.  BONES: Degenerative changes.    IMPRESSION:  Small bowel obstruction with transition point in the right lower   quadrant, with additional findings highly suspicious for bowel ischemia.    Partially imaged 4.3 cm air-fluid collection in the left lower lobe and   adjacent rim-enhancing pleural effusion, suspicious for lung   abscess/empyema. Dedicated chest CT can be performed for further   evaluation.    Findings were discussed with ALIZE LANDAVERDE on  2022 4:47 PM by Dr. Tamayo with read back confirmation.    --- End of Report ---            JUSTIN TAMAYO MD; Attending Radiologist  This document has been electronically signed. 2022  5:20PM    < end of copied text >      Urinalysis Basic - ( 2022 10:46 )    Color: Yellow / Appearance: Clear / S.015 / pH: x  Gluc: x / Ketone: Negative  / Bili: Small / Urobili: 4   Blood: x / Protein: 30 mg/dL / Nitrite: Negative   Leuk Esterase: Trace / RBC: 0-2 /HPF / WBC 11-25 /HPF   Sq Epi: x / Non Sq Epi: Few /HPF / Bacteria: See Note /HPF      GOALS OF CARE DISCUSSION WITH PATIENT/FAMILY/PROXY: DNR    CRITICAL CARE TIME SPENT: 35 minutes

## 2022-01-19 NOTE — PROGRESS NOTE ADULT - ASSESSMENT
Septic Shock  Pneumonia/ lung abscess vs Empyema of left lung  Fevers  Leukocytosis - improving    Plan - Cont Zosyn 3.375 gms iv q8hrs  might need a chest tube if he has Empyema  Time spent - 33 mins. Septic Shock  Pneumonia/ lung abscess vs Empyema of left lung  Fevers  Leukocytosis - improving    Plan - Cont Zosyn 3.375 gms iv q8hrs  might need a chest tube if he has Empyema  Time spent - 33 mins.    I agree with above

## 2022-01-19 NOTE — PROGRESS NOTE ADULT - SUBJECTIVE AND OBJECTIVE BOX
INTERVAL HPI/OVERNIGHT EVENTS:    Pt seen and examined at bedside. No events overnight. Remains intubated. On pressor support.       Vital Signs Last 24 Hrs  T(C): 37.3 (19 Jan 2022 07:00), Max: 39.1 (18 Jan 2022 16:45)  T(F): 99.2 (19 Jan 2022 07:00), Max: 102.4 (18 Jan 2022 16:45)  HR: 93 (19 Jan 2022 08:45) (93 - 119)  BP: 120/72 (19 Jan 2022 08:45) (71/55 - 146/113)  BP(mean): 84 (19 Jan 2022 08:45) (54 - 121)  RR: 23 (19 Jan 2022 08:45) (18 - 38)  SpO2: 99% (19 Jan 2022 08:45) (92% - 100%)  I&O's Detail    18 Jan 2022 07:01  -  19 Jan 2022 07:00  --------------------------------------------------------  IN:    IV PiggyBack: 100 mL    IV PiggyBack: 200 mL    Norepinephrine: 29.1 mL    Norepinephrine: 27 mL    Phenylephrine: 186.2 mL    sodium chloride 0.9%: 1875 mL  Total IN: 2417.3 mL    OUT:    PEG (Percutaneous Endoscopic Gastrostomy) Tube (mL): 0 mL    Voided (mL): 500 mL  Total OUT: 500 mL    Total NET: 1917.3 mL      19 Jan 2022 07:01  -  19 Jan 2022 10:02  --------------------------------------------------------  IN:    Norepinephrine: 16.2 mL    sodium chloride 0.9%: 250 mL  Total IN: 266.2 mL    OUT:  Total OUT: 0 mL    Total NET: 266.2 mL        bisacodyl Suppository 10 milliGRAM(s) Rectal daily PRN  pantoprazole  Injectable 40 milliGRAM(s) IV Push two times a day  piperacillin/tazobactam IVPB.. 3.375 Gram(s) IV Intermittent every 8 hours      Physical Exam  General: Alert, intubated/ trach  Abdomen: soft, distended, general TTP, no rebound tenderness, no guarding, no palpable masses, PEG tube in place to gravity  : Freedman catheter in place with clear urine  Ext: Warm to touch no c/c/e        Labs:                        9.9    12.28 )-----------( 234      ( 19 Jan 2022 04:02 )             31.7     01-19    136  |  105  |  45<H>  ----------------------------<  134<H>  4.7   |  21<L>  |  1.33<H>    Ca    8.6      19 Jan 2022 04:02  Phos  4.5     01-19  Mg     2.2     01-19    TPro  5.0<L>  /  Alb  0.9<L>  /  TBili  0.6  /  DBili  x   /  AST  42<H>  /  ALT  13  /  AlkPhos  100  01-19

## 2022-01-19 NOTE — PROGRESS NOTE ADULT - ASSESSMENT
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 Patient was       #Septic shock  #SBO  #Lung abscess  #Encephalopathy  # AHRF s/p trach and PEg    ====CVS====  #Septic shock   c/w pressors   maintain MAp>65    ====Pulm====  #Acute hypoxic respiratory failure  -1/9 Febrile, Tmax 101.4F. F/u CXR. TOV. NPO after midnight and hold lovenox for GT placement in IR in am.   1/12/22 G tube placed in IR  1/14 L Pleural Pigtail catheter removed  1/15 tolerating SBT x4 hrs but with copious secretions requiring frequent suctioning; G tube clogged, initial attempts at dislodging unsuccessful in AM, noted to have a cluster of small capsule beads. Attempts to dissolve with ginger ale, unsuccessful. This evening, successfully unclogged Gastrostomy tube. TF restarted.  1/16- No acute overnight events  1/17  Code sepsis  Lactate 5.3 received 2.1 liter of NS. Repeat lactate 3.2 Blood and urine cultures sent.  1/18  Lactate 6.1  IVF increased to 125cc/hr. Abdominal / Pelvis CT with contrast. B/P low started on midodrine. Pt to be transferred to ICU after CT scan  Started on pressors and c/w zosyn    #Lung abscess  Patient was found yo have b/l lung abscess on CT   c/ zosyn  f/u cx    ====GI====  SBO  patient was found to have SBo on CT abdomen  NPO for now  Surgery consult for SBO  c/w Zosyn      ====nephro====  Lactic acidosis 2/2 sepsis  Monitor lactate    ====ID====  Septic shock  Likely 2/2 lung abscess on CT   Small bowel obstruction with transition point in the right lower   quadrant, with additional findings highly suspicious for bowel ischemia.  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 Patient was       #Septic shock  #SBO  #Lung abscess  #Encephalopathy  # AHRF s/p trach and PEG    ====CVS====  #Septic shock   c/w pressors   maintain MAp>65    ====Pulm====  #Acute hypoxic respiratory failure  -1/9 Febrile, Tmax 101.4F. F/u CXR. TOV. NPO after midnight and hold lovenox for GT placement in IR in am.   1/12/22 G tube placed in IR  1/14 L Pleural Pigtail catheter removed  1/15 tolerating SBT x4 hrs but with copious secretions requiring frequent suctioning; G tube clogged, initial attempts at dislodging unsuccessful in AM, noted to have a cluster of small capsule beads. Attempts to dissolve with ginger ale, unsuccessful. This evening, successfully unclogged Gastrostomy tube. TF restarted.  1/16- No acute overnight events  1/17  Code sepsis  Lactate 5.3 received 2.1 liter of NS. Repeat lactate 3.2 Blood and urine cultures sent.  1/18  Lactate 6.1  IVF increased to 125cc/hr. Abdominal / Pelvis CT with contrast. B/P low started on midodrine. Pt to be transferred to ICU after CT scan  Started on pressors and c/w zosyn    #Lung abscess  Patient was found yo have b/l lung abscess on CT   c/ zosyn  f/u cx    ====GI====  SBO  patient was found to have SBo on CT abdomen  NPO for now  Surgery consult for SBO  c/w Zosyn      ====nephro====  Lactic acidosis 2/2 sepsis  Monitor lactate    ====ID====  Septic shock  Likely 2/2 lung abscess on CT   Small bowel obstruction with transition point in the right lower   quadrant, with additional findings highly suspicious for bowel ischemia.  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 Patient was       #Septic shock  #SBO  #Lung abscess  #Encephalopathy  # AHRF s/p trach and PEG    ====CVS====  #Septic shock   c/w pressors   maintain MAP>65  Lactate trending down 6.1 > 3 > 2.8  Levo requirements decreased from 0.18 > 0.12    ====Pulm====  #Acute hypoxic respiratory failure  -1/9 Febrile, Tmax 101.4F. F/u CXR. TOV. NPO after midnight and hold lovenox for GT placement in IR in am.   1/12/22 G tube placed in IR  1/14 L Pleural Pigtail catheter removed  1/15 tolerating SBT x4 hrs but with copious secretions requiring frequent suctioning; G tube clogged, initial attempts at dislodging unsuccessful in AM, noted to have a cluster of small capsule beads. Attempts to dissolve with ginger ale, unsuccessful. This evening, successfully unclogged Gastrostomy tube. TF restarted.  1/16- No acute overnight events  1/17  Code sepsis  Lactate 5.3 received 2.1 liter of NS. Repeat lactate 3.2 Blood and urine cultures sent.  1/18  Lactate 6.1  IVF increased to 125cc/hr. Abdominal / Pelvis CT with contrast. B/P low started on midodrine. Pt to be transferred to ICU after CT scan  Started on pressors and c/w zosyn  Pt has possible lung abscess.  Pt requiring volume assisted control for breathing    #Lung abscess  Patient was found to have b/l lung abscess on CT   c/ zosyn  f/u cx    ====GI====  SBO  patient was found to have SBo on CT abdomen  NPO for now  Surgery consult for SBO  c/w Zosyn      ====nephro====  Lactic acidosis 2/2 sepsis  Monitor lactate    ====ID====  Septic shock  Likely 2/2 lung abscess on CT   Small bowel obstruction with transition point in the right lower   quadrant, with additional findings highly suspicious for bowel ischemia.  c/w Zosyn  f/u culture  Will start on central pressors   Monitor Vitals, maintain MAp>65  lactate was elevated to 6.1 s/p 3 bolus > 3 > 2.8  s/p 3 L bolus  Monitor Urine output        ====Heme/onc====  #Metastatic SCC of tongue base  - s/p resection, chemo/radiation, and immunotherapy (10/2021)  - palliative, s/p trach and peg    ====Endo====  #DM  - a1c 6.3  - c/w sliding scale    ====Ppx====  #DVT: Lovenox HOLDing pre op  #GI: PPI

## 2022-01-19 NOTE — PROGRESS NOTE ADULT - SUBJECTIVE AND OBJECTIVE BOX
ICU visit:  76y Male is under our care for    REVIEW OF SYSTEMS:  [  ] Not able to elicit  General:	  Chest:	  GI:	  :  Skin:	  Musculoskeletal:	  Neuro:	    MEDS:  piperacillin/tazobactam IVPB.. 3.375 Gram(s) IV Intermittent every 8 hours    ALLERGIES: Allergies    No Known Allergies    Intolerances        VITALS:  ICU Vital Signs Last 24 Hrs  T(C): 36.5 (19 Jan 2022 12:00), Max: 39.1 (18 Jan 2022 16:45)  T(F): 97.7 (19 Jan 2022 12:00), Max: 102.4 (18 Jan 2022 16:45)  HR: 97 (19 Jan 2022 12:30) (88 - 117)  BP: 124/71 (19 Jan 2022 12:30) (71/55 - 146/113)  BP(mean): 81 (19 Jan 2022 12:30) (54 - 121)  ABP: --  ABP(mean): --  RR: 25 (19 Jan 2022 12:30) (18 - 38)  SpO2: 100% (19 Jan 2022 12:30) (92% - 100%)      PHYSICAL EXAM:  HEENT:  Neck:  Respiratory:  Cardiovascular:  Gastrointestinal:  Extremities:  Skin:  Ortho:  Neuro:    LABS/DIAGNOSTIC TESTS:                        9.9    12.28 )-----------( 234      ( 19 Jan 2022 04:02 )             31.7     WBC Count: 12.28 K/uL (01-19 @ 04:02)  WBC Count: 19.08 K/uL (01-18 @ 09:30)  WBC Count: 12.65 K/uL (01-17 @ 22:16)  WBC Count: 12.40 K/uL (01-17 @ 07:09)  WBC Count: 9.59 K/uL (01-16 @ 06:37)    01-19    136  |  105  |  45<H>  ----------------------------<  134<H>  4.7   |  21<L>  |  1.33<H>    Ca    8.6      19 Jan 2022 04:02  Phos  4.5     01-19  Mg     2.2     01-19    TPro  5.0<L>  /  Alb  0.9<L>  /  TBili  0.6  /  DBili  x   /  AST  42<H>  /  ALT  13  /  AlkPhos  100  01-19    Lactate, Blood: 2.8 mmol/L (01-19 @ 04:45)  Lactate, Blood: 3.0 mmol/L (01-18 @ 19:09)    Lactate, Blood: 2.8 mmol/L (01-19 @ 04:45)  Lactate, Blood: 3.0 mmol/L (01-18 @ 19:09)      CULTURES:   .Blood Blood  01-18 @ 02:51   No growth to date.  --  --      .Blood Blood  01-18 @ 02:50   No growth to date.  --  --      .Sputum Sputum  12-29 @ 18:28   Normal Respiratory Karla present  --    Numerous polymorphonuclear leukocytes per low power field  No squamous epithelial cells per low power field  Few Gram positive cocci in pairs per oil power field      Clean Catch Clean Catch (Midstream)  12-21 @ 04:42   No growth  --  --      .Sputum Sputum  11-08 @ 10:53   Few Mycobacterium avium complex  --  --      .Sputum Sputum  11-06 @ 19:34   No acid fast bacilli isolated after 6 weeks.  --  --      .Sputum Sputum  11-05 @ 23:15   Growth of acid fast bacilli detected in broth,  Mycobacterium avium complex by Dna Probe  --  --      .Blood Blood  11-04 @ 11:23   No Growth Final  --  --      .Blood Blood  11-04 @ 10:50   No Growth Final  --  --      Clean Catch Clean Catch (Midstream)  11-03 @ 17:00   <10,000 CFU/mL Normal Urogenital Karla  --  --        RADIOLOGY:    < from: CT Abdomen and Pelvis w/ IV Cont (01.18.22 @ 16:12) >    ACC: [id number] EXAM:  CT ABDOMEN AND PELVIS IC                          PROCEDURE DATE:  01/18/2022          INTERPRETATION:  CLINICAL INFORMATION: Cardiac arrest. Respiratory   failure. Sepsis. Status post PEG tube placement.    COMPARISON: CT abdomen pelvis 1/11/2022.    CONTRAST/COMPLICATIONS:  IV Contrast: Omnipaque 350  90 cc administered   10 cc discarded  Oral Contrast: NONE  Complications: None reported at time of study completion    PROCEDURE:  CT of the Abdomen and Pelvis was performed.  Sagittal and coronal reformats were performed.    FINDINGS:  LOWER CHEST: Redemonstration of a cavitary lesion in the right middle   lobe. Redemonstration of secretions in the left lower lobe bronchi with   complete collapse of the left lower lobe with heterogeneous   opacification. Partially imaged 4.3 x 2.8 cm air-fluid collection in the   left lower lobe. There is a small loculated rim-enhancing pleural   effusion.    LIVER: Within normal limits.  BILE DUCTS: Normal caliber.  GALLBLADDER: Within normal limits.  SPLEEN: Within normal limits.  PANCREAS: Within normal limits.  ADRENALS: Within normal limits.  KIDNEYS/URETERS: Within normal limits.    BLADDER: Within normal limits.  REPRODUCTIVE ORGANS: Prostate within normal limits.    BOWEL: PEG tube in the stomach. Several dilated air-fluid filled loops of   small bowel with transition point to collapsed bowel in the right lower   quadrant (series 2, image 83). Mild wall thickening of the small bowel   loops proximal to the transitionpoint with evidence of focal pneumatosis   intestinalis (2, 86). There is also gastric pneumatosis and mild portal   venous gas.  PERITONEUM: Small volume ascites and mesenteric edema.  VESSELS: Atherosclerotic changes.  RETROPERITONEUM/LYMPH NODES: No lymphadenopathy.  ABDOMINAL WALL: Subcutaneous edema.  BONES: Degenerative changes.    IMPRESSION:  Small bowel obstruction with transition point in the right lower   quadrant, with additional findings highly suspicious for bowel ischemia.    Partially imaged 4.3 cm air-fluid collection in the left lower lobe and   adjacent rim-enhancing pleural effusion, suspicious for lung   abscess/empyema. Dedicated chest CT can be performed for further   evaluation.    Findings were discussed with ALIZE LANDAVERDE on  1/18/2022 4:47 PM by Dr. Armstrong with read back confirmation.    --- End of Report ---            JUSTIN ARMSTRONG MD; Attending Radiologist  This document has been electronically signed. Jan 18 2022  5:20PM    < end of copied text >   ICU visit:  76y Male is under our care for fevers.  Patient was seen in the ICU laying comfortably in bed with no acute distress.  He remains on one pressor with a blood pressure of 104/68.  He was febrile throughout the day yesterday and today with downtrending WBC count.    REVIEW OF SYSTEMS:  [ x ] Not able to elicit      MEDS:  piperacillin/tazobactam IVPB.. 3.375 Gram(s) IV Intermittent every 8 hours    ALLERGIES: Allergies    No Known Allergies    Intolerances        VITALS:  ICU Vital Signs Last 24 Hrs  T(C): 36.5 (19 Jan 2022 12:00), Max: 39.1 (18 Jan 2022 16:45)  T(F): 97.7 (19 Jan 2022 12:00), Max: 102.4 (18 Jan 2022 16:45)  HR: 97 (19 Jan 2022 12:30) (88 - 117)  BP: 124/71 (19 Jan 2022 12:30) (71/55 - 146/113)  BP(mean): 81 (19 Jan 2022 12:30) (54 - 121)  ABP: --  ABP(mean): --  RR: 25 (19 Jan 2022 12:30) (18 - 38)  SpO2: 100% (19 Jan 2022 12:30) (92% - 100%)      PHYSICAL EXAM:  HEENT: trach+  Neck: supple no LN's   Respiratory: lungs clear no rales  Cardiovascular: S1 S2 reg no murmurs  Gastrointestinal: +BS with soft, nondistended abdomen; nontender, peg tube in place  Extremities: no edema, left fem line +  Skin: no rashes  Ortho: n/a  Neuro: Awake and alert      LABS/DIAGNOSTIC TESTS:                        9.9    12.28 )-----------( 234      ( 19 Jan 2022 04:02 )             31.7     WBC Count: 12.28 K/uL (01-19 @ 04:02)  WBC Count: 19.08 K/uL (01-18 @ 09:30)  WBC Count: 12.65 K/uL (01-17 @ 22:16)  WBC Count: 12.40 K/uL (01-17 @ 07:09)  WBC Count: 9.59 K/uL (01-16 @ 06:37)    01-19    136  |  105  |  45<H>  ----------------------------<  134<H>  4.7   |  21<L>  |  1.33<H>    Ca    8.6      19 Jan 2022 04:02  Phos  4.5     01-19  Mg     2.2     01-19    TPro  5.0<L>  /  Alb  0.9<L>  /  TBili  0.6  /  DBili  x   /  AST  42<H>  /  ALT  13  /  AlkPhos  100  01-19    Lactate, Blood: 2.8 mmol/L (01-19 @ 04:45)  Lactate, Blood: 3.0 mmol/L (01-18 @ 19:09)    Lactate, Blood: 2.8 mmol/L (01-19 @ 04:45)  Lactate, Blood: 3.0 mmol/L (01-18 @ 19:09)      CULTURES:   .Blood Blood  01-18 @ 02:51   No growth to date.  --  --      .Blood Blood  01-18 @ 02:50   No growth to date.  --  --      .Sputum Sputum  12-29 @ 18:28   Normal Respiratory Karla present  --    Numerous polymorphonuclear leukocytes per low power field  No squamous epithelial cells per low power field  Few Gram positive cocci in pairs per oil power field      Clean Catch Clean Catch (Midstream)  12-21 @ 04:42   No growth  --  --      .Sputum Sputum  11-08 @ 10:53   Few Mycobacterium avium complex  --  --      .Sputum Sputum  11-06 @ 19:34   No acid fast bacilli isolated after 6 weeks.  --  --      .Sputum Sputum  11-05 @ 23:15   Growth of acid fast bacilli detected in broth,  Mycobacterium avium complex by Dna Probe  --  --      .Blood Blood  11-04 @ 11:23   No Growth Final  --  --      .Blood Blood  11-04 @ 10:50   No Growth Final  --  --      Clean Catch Clean Catch (Midstream)  11-03 @ 17:00   <10,000 CFU/mL Normal Urogenital Karla  --  --        RADIOLOGY:    < from: CT Abdomen and Pelvis w/ IV Cont (01.18.22 @ 16:12) >    ACC: [id number] EXAM:  CT ABDOMEN AND PELVIS IC                          PROCEDURE DATE:  01/18/2022          INTERPRETATION:  CLINICAL INFORMATION: Cardiac arrest. Respiratory   failure. Sepsis. Status post PEG tube placement.    COMPARISON: CT abdomen pelvis 1/11/2022.    CONTRAST/COMPLICATIONS:  IV Contrast: Omnipaque 350  90 cc administered   10 cc discarded  Oral Contrast: NONE  Complications: None reported at time of study completion    PROCEDURE:  CT of the Abdomen and Pelvis was performed.  Sagittal and coronal reformats were performed.    FINDINGS:  LOWER CHEST: Redemonstration of a cavitary lesion in the right middle   lobe. Redemonstration of secretions in the left lower lobe bronchi with   complete collapse of the left lower lobe with heterogeneous   opacification. Partially imaged 4.3 x 2.8 cm air-fluid collection in the   left lower lobe. There is a small loculated rim-enhancing pleural   effusion.    LIVER: Within normal limits.  BILE DUCTS: Normal caliber.  GALLBLADDER: Within normal limits.  SPLEEN: Within normal limits.  PANCREAS: Within normal limits.  ADRENALS: Within normal limits.  KIDNEYS/URETERS: Within normal limits.    BLADDER: Within normal limits.  REPRODUCTIVE ORGANS: Prostate within normal limits.    BOWEL: PEG tube in the stomach. Several dilated air-fluid filled loops of   small bowel with transition point to collapsed bowel in the right lower   quadrant (series 2, image 83). Mild wall thickening of the small bowel   loops proximal to the transitionpoint with evidence of focal pneumatosis   intestinalis (2, 86). There is also gastric pneumatosis and mild portal   venous gas.  PERITONEUM: Small volume ascites and mesenteric edema.  VESSELS: Atherosclerotic changes.  RETROPERITONEUM/LYMPH NODES: No lymphadenopathy.  ABDOMINAL WALL: Subcutaneous edema.  BONES: Degenerative changes.    IMPRESSION:  Small bowel obstruction with transition point in the right lower   quadrant, with additional findings highly suspicious for bowel ischemia.    Partially imaged 4.3 cm air-fluid collection in the left lower lobe and   adjacent rim-enhancing pleural effusion, suspicious for lung   abscess/empyema. Dedicated chest CT can be performed for further   evaluation.    Findings were discussed with ALIZE LANDAVERDE on  1/18/2022 4:47 PM by Dr. Armstrong with read back confirmation.    --- End of Report ---            JUSTIN ARMSTRONG MD; Attending Radiologist  This document has been electronically signed. Jan 18 2022  5:20PM    < end of copied text >

## 2022-01-19 NOTE — PROGRESS NOTE ADULT - NUTRITIONAL ASSESSMENT
This patient has been assessed with a concern for Malnutrition and has been determined to have a diagnosis/diagnoses of Severe protein-calorie malnutrition.    This patient is being managed with:   Diet NPO-  Entered: Jan 18 2022  5:37PM

## 2022-01-19 NOTE — PROGRESS NOTE ADULT - ASSESSMENT
77 yo M with complicated hospital course with recent code sepsis,  hypotensive, hypoxia now with SBO on CT scan. On pressors x 2.     -PEG tube to gravity to decompress small bowel  -Pt at high risk for any surgical intervention  -Serial abd exams  -F/u AXR in AM  -Will follow

## 2022-01-20 NOTE — PROGRESS NOTE ADULT - SUBJECTIVE AND OBJECTIVE BOX
INTERVAL HPI/OVERNIGHT EVENTS: Pt was seen and assessed at bedside. Pt's lactate is trending downwards from 6.1 > 3 >2.8. Source is either from lung abscess or SBO. Pt is at high risk for any surgical intervention per surgery. PEG placed to gravity to decompress small bowel.     PRESSORS: [x ] YES [ ] NO  WHICH: Pheny    ANTIBIOTICS:                      Antimicrobial:  piperacillin/tazobactam IVPB.. 3.375 Gram(s) IV Intermittent every 8 hours    Cardiovascular:  midodrine. 2.5 milliGRAM(s) Oral three times a day    Pulmonary:    Hematalogic:  enoxaparin Injectable 40 milliGRAM(s) SubCutaneous daily    Other:  acetaminophen    Suspension .. 650 milliGRAM(s) Oral every 6 hours PRN  acetaminophen  Suppository .. 650 milliGRAM(s) Rectal every 6 hours PRN  bisacodyl Suppository 10 milliGRAM(s) Rectal daily PRN  chlorhexidine 2% Cloths 1 Application(s) Topical <User Schedule>  glucagon  Injectable 0.5 milliGRAM(s) IV Push once  insulin lispro (ADMELOG) corrective regimen sliding scale   SubCutaneous every 6 hours  pantoprazole  Injectable 40 milliGRAM(s) IV Push two times a day  QUEtiapine 12.5 milliGRAM(s) Oral at bedtime  sodium chloride 0.9%. 1000 milliLiter(s) IV Continuous <Continuous>    acetaminophen    Suspension .. 650 milliGRAM(s) Oral every 6 hours PRN  acetaminophen  Suppository .. 650 milliGRAM(s) Rectal every 6 hours PRN  bisacodyl Suppository 10 milliGRAM(s) Rectal daily PRN  chlorhexidine 2% Cloths 1 Application(s) Topical <User Schedule>  enoxaparin Injectable 40 milliGRAM(s) SubCutaneous daily  glucagon  Injectable 0.5 milliGRAM(s) IV Push once  insulin lispro (ADMELOG) corrective regimen sliding scale   SubCutaneous every 6 hours  midodrine. 2.5 milliGRAM(s) Oral three times a day  pantoprazole  Injectable 40 milliGRAM(s) IV Push two times a day  piperacillin/tazobactam IVPB.. 3.375 Gram(s) IV Intermittent every 8 hours  QUEtiapine 12.5 milliGRAM(s) Oral at bedtime  sodium chloride 0.9%. 1000 milliLiter(s) IV Continuous <Continuous>    Drug Dosing Weight  Height (cm): 175.3 (20 Dec 2021 02:21)  Weight (kg): 72.5 (20 Dec 2021 06:30)  BMI (kg/m2): 23.6 (20 Dec 2021 06:30)  BSA (m2): 1.88 (20 Dec 2021 06:30)    CENTRAL LINE: [ ] YES [x ] NO  LOCATION:   DATE INSERTED:  REMOVE: [ ] YES [ ] NO  EXPLAIN:    HUTCHINS: [ ] YES [x ] NO    DATE INSERTED:  REMOVE:  [ ] YES [ ] NO  EXPLAIN:    A-LINE:  [ ] YES [x ] NO  LOCATION:   DATE INSERTED:  REMOVE:  [ ] YES [ ] NO  EXPLAIN:    PMH -reviewed admission note, no change since admission    ICU Vital Signs Last 24 Hrs  T(C): 39.1 (2022 16:55), Max: 39.8 (2022 19:34)  T(F): 102.4 (2022 16:55), Max: 103.6 (2022 19:34)  HR: 109 (2022 16:55) (90 - 119)  BP: 71/55 (2022 16:48) (70/40 - 146/113)  BP(mean): 59 (2022 16:48) (52 - 121)  ABP: --  ABP(mean): --  RR: 19 (2022 16:55) (18 - 27)  SpO2: 98% (2022 16:48) (86% - 100%)      Mode: AC/ CMV (Assist Control/ Continuous Mandatory Ventilation)  RR (machine): 14  TV (machine): 450  FiO2: 40  PEEP: 5  ITime: 0.9  MAP: 11  PIP: 26      PHYSICAL EXAM:    GENERAL: lethargic  NECK:  contracted  NERVOUS SYSTEM:  Alert & Oriented 1, opening eyes only to verbal and tactile stimulus  CHEST/LUNG: No chest deformity; Normal percussion bilaterally; No rales, rhonchi, wheezing   HEART: Regular rate and rhythm; No murmurs, rubs, or gallops  ABDOMEN: abdomen tender on minimal palpation  EXTREMITIES:  2+ Peripheral Pulses, No clubbing, cyanosis, or edema  LYMPH: No lymphadenopathy noted  SKIN: No rashes or lesions; Good capillary refill      LABS:  CBC Full  -  ( 2022 09:30 )  WBC Count : 19.08 K/uL  RBC Count : 4.04 M/uL  Hemoglobin : 11.3 g/dL  Hematocrit : 36.8 %  Platelet Count - Automated : 211 K/uL  Mean Cell Volume : 91.1 fl  Mean Cell Hemoglobin : 28.0 pg  Mean Cell Hemoglobin Concentration : 30.7 gm/dL  Auto Neutrophil # : 17.52 K/uL  Auto Lymphocyte # : 0.79 K/uL  Auto Monocyte # : 0.43 K/uL  Auto Eosinophil # : 0.04 K/uL  Auto Basophil # : 0.13 K/uL  Auto Neutrophil % : 91.8 %  Auto Lymphocyte % : 4.1 %  Auto Monocyte % : 2.3 %  Auto Eosinophil % : 0.2 %  Auto Basophil % : 0.7 %        136  |  104  |  29<H>  ----------------------------<  259<H>  5.0   |  20<L>  |  1.28    Ca    9.4      2022 09:30  Phos  3.9       Mg     2.3         TPro  5.7<L>  /  Alb  1.1<L>  /  TBili  0.6  /  DBili  x   /  AST  54<H>  /  ALT  13  /  AlkPhos  133<H>      Ct abd / pelvis:< from: CT Abdomen and Pelvis w/ IV Cont (22 @ 16:12) >    ACC: [id number] EXAM:  CT ABDOMEN AND PELVIS IC                          PROCEDURE DATE:  2022          INTERPRETATION:  CLINICAL INFORMATION: Cardiac arrest. Respiratory   failure. Sepsis. Status post PEG tube placement.    COMPARISON: CT abdomen pelvis 2022.    CONTRAST/COMPLICATIONS:  IV Contrast: Omnipaque 350  90 cc administered   10 cc discarded  Oral Contrast: NONE  Complications: None reported at time of study completion    PROCEDURE:  CT of the Abdomen and Pelvis was performed.  Sagittal and coronal reformats were performed.    FINDINGS:  LOWER CHEST: Redemonstration of a cavitary lesion in the right middle   lobe. Redemonstration of secretions in the left lower lobe bronchi with   complete collapse of the left lower lobe with heterogeneous   opacification. Partially imaged 4.3 x 2.8 cm air-fluid collection in the   left lower lobe. There is a small loculated rim-enhancing pleural   effusion.    LIVER: Within normal limits.  BILE DUCTS: Normal caliber.  GALLBLADDER: Within normal limits.  SPLEEN: Within normal limits.  PANCREAS: Within normal limits.  ADRENALS: Within normal limits.  KIDNEYS/URETERS: Within normal limits.    BLADDER: Within normal limits.  REPRODUCTIVE ORGANS: Prostate within normal limits.    BOWEL: PEG tube in the stomach. Several dilated air-fluid filled loops of   small bowel with transition point to collapsed bowel in the right lower   quadrant (series 2, image 83). Mild wall thickening of the small bowel   loops proximal to the transitionpoint with evidence of focal pneumatosis   intestinalis (2, 86). There is also gastric pneumatosis and mild portal   venous gas.  PERITONEUM: Small volume ascites and mesenteric edema.  VESSELS: Atherosclerotic changes.  RETROPERITONEUM/LYMPH NODES: No lymphadenopathy.  ABDOMINAL WALL: Subcutaneous edema.  BONES: Degenerative changes.    IMPRESSION:  Small bowel obstruction with transition point in the right lower   quadrant, with additional findings highly suspicious for bowel ischemia.    Partially imaged 4.3 cm air-fluid collection in the left lower lobe and   adjacent rim-enhancing pleural effusion, suspicious for lung   abscess/empyema. Dedicated chest CT can be performed for further   evaluation.    Findings were discussed with ALIZE LANDAVERDE on  2022 4:47 PM by Dr. Tamayo with read back confirmation.    --- End of Report ---            JUSTIN TAMAYO MD; Attending Radiologist  This document has been electronically signed. 2022  5:20PM    < end of copied text >      Urinalysis Basic - ( 2022 10:46 )    Color: Yellow / Appearance: Clear / S.015 / pH: x  Gluc: x / Ketone: Negative  / Bili: Small / Urobili: 4   Blood: x / Protein: 30 mg/dL / Nitrite: Negative   Leuk Esterase: Trace / RBC: 0-2 /HPF / WBC 11-25 /HPF   Sq Epi: x / Non Sq Epi: Few /HPF / Bacteria: See Note /HPF      GOALS OF CARE DISCUSSION WITH PATIENT/FAMILY/PROXY: DNR    CRITICAL CARE TIME SPENT: 35 minutes INTERVAL HPI/OVERNIGHT EVENTS: Pt was seen and assessed at bedside. Pt had no events overnight. Will Levophed requirements are decreasing. Thoracic surgery was consulted for possible intervention.    PRESSORS: [x ] YES [ ] NO  WHICH: Pheny    ANTIBIOTICS:                      Antimicrobial:  piperacillin/tazobactam IVPB.. 3.375 Gram(s) IV Intermittent every 8 hours    Cardiovascular:  midodrine. 2.5 milliGRAM(s) Oral three times a day    Pulmonary:    Hematalogic:  enoxaparin Injectable 40 milliGRAM(s) SubCutaneous daily    Other:  acetaminophen    Suspension .. 650 milliGRAM(s) Oral every 6 hours PRN  acetaminophen  Suppository .. 650 milliGRAM(s) Rectal every 6 hours PRN  bisacodyl Suppository 10 milliGRAM(s) Rectal daily PRN  chlorhexidine 2% Cloths 1 Application(s) Topical <User Schedule>  glucagon  Injectable 0.5 milliGRAM(s) IV Push once  insulin lispro (ADMELOG) corrective regimen sliding scale   SubCutaneous every 6 hours  pantoprazole  Injectable 40 milliGRAM(s) IV Push two times a day  QUEtiapine 12.5 milliGRAM(s) Oral at bedtime  sodium chloride 0.9%. 1000 milliLiter(s) IV Continuous <Continuous>    acetaminophen    Suspension .. 650 milliGRAM(s) Oral every 6 hours PRN  acetaminophen  Suppository .. 650 milliGRAM(s) Rectal every 6 hours PRN  bisacodyl Suppository 10 milliGRAM(s) Rectal daily PRN  chlorhexidine 2% Cloths 1 Application(s) Topical <User Schedule>  enoxaparin Injectable 40 milliGRAM(s) SubCutaneous daily  glucagon  Injectable 0.5 milliGRAM(s) IV Push once  insulin lispro (ADMELOG) corrective regimen sliding scale   SubCutaneous every 6 hours  midodrine. 2.5 milliGRAM(s) Oral three times a day  pantoprazole  Injectable 40 milliGRAM(s) IV Push two times a day  piperacillin/tazobactam IVPB.. 3.375 Gram(s) IV Intermittent every 8 hours  QUEtiapine 12.5 milliGRAM(s) Oral at bedtime  sodium chloride 0.9%. 1000 milliLiter(s) IV Continuous <Continuous>    Drug Dosing Weight  Height (cm): 175.3 (20 Dec 2021 02:21)  Weight (kg): 72.5 (20 Dec 2021 06:30)  BMI (kg/m2): 23.6 (20 Dec 2021 06:30)  BSA (m2): 1.88 (20 Dec 2021 06:30)    CENTRAL LINE: [ ] YES [x ] NO  LOCATION:   DATE INSERTED:  REMOVE: [ ] YES [ ] NO  EXPLAIN:    HUTCHINS: [ ] YES [x ] NO    DATE INSERTED:  REMOVE:  [ ] YES [ ] NO  EXPLAIN:    A-LINE:  [ ] YES [x ] NO  LOCATION:   DATE INSERTED:  REMOVE:  [ ] YES [ ] NO  EXPLAIN:    PMH -reviewed admission note, no change since admission    ICU Vital Signs Last 24 Hrs  T(C): 39.1 (2022 16:55), Max: 39.8 (2022 19:34)  T(F): 102.4 (2022 16:55), Max: 103.6 (2022 19:34)  HR: 109 (2022 16:55) (90 - 119)  BP: 71/55 (2022 16:48) (70/40 - 146/113)  BP(mean): 59 (2022 16:48) (52 - 121)  ABP: --  ABP(mean): --  RR: 19 (2022 16:55) (18 - 27)  SpO2: 98% (2022 16:48) (86% - 100%)      Mode: AC/ CMV (Assist Control/ Continuous Mandatory Ventilation)  RR (machine): 14  TV (machine): 450  FiO2: 40  PEEP: 5  ITime: 0.9  MAP: 11  PIP: 26      PHYSICAL EXAM:    GENERAL: lethargic  NECK:  contracted  NERVOUS SYSTEM:  Alert & Oriented 1, opening eyes only to verbal and tactile stimulus  CHEST/LUNG: No chest deformity; Normal percussion bilaterally; No rales, rhonchi, wheezing   HEART: Regular rate and rhythm; No murmurs, rubs, or gallops  ABDOMEN: abdomen tender on minimal palpation  EXTREMITIES:  2+ Peripheral Pulses, No clubbing, cyanosis, or edema  LYMPH: No lymphadenopathy noted  SKIN: No rashes or lesions; Good capillary refill      LABS:  CBC Full  -  ( 2022 09:30 )  WBC Count : 19.08 K/uL  RBC Count : 4.04 M/uL  Hemoglobin : 11.3 g/dL  Hematocrit : 36.8 %  Platelet Count - Automated : 211 K/uL  Mean Cell Volume : 91.1 fl  Mean Cell Hemoglobin : 28.0 pg  Mean Cell Hemoglobin Concentration : 30.7 gm/dL  Auto Neutrophil # : 17.52 K/uL  Auto Lymphocyte # : 0.79 K/uL  Auto Monocyte # : 0.43 K/uL  Auto Eosinophil # : 0.04 K/uL  Auto Basophil # : 0.13 K/uL  Auto Neutrophil % : 91.8 %  Auto Lymphocyte % : 4.1 %  Auto Monocyte % : 2.3 %  Auto Eosinophil % : 0.2 %  Auto Basophil % : 0.7 %        136  |  104  |  29<H>  ----------------------------<  259<H>  5.0   |  20<L>  |  1.28    Ca    9.4      2022 09:30  Phos  3.9       Mg     2.3         TPro  5.7<L>  /  Alb  1.1<L>  /  TBili  0.6  /  DBili  x   /  AST  54<H>  /  ALT  13  /  AlkPhos  133<H>      Ct abd / pelvis:< from: CT Abdomen and Pelvis w/ IV Cont (22 @ 16:12) >    ACC: [id number] EXAM:  CT ABDOMEN AND PELVIS IC                          PROCEDURE DATE:  2022          INTERPRETATION:  CLINICAL INFORMATION: Cardiac arrest. Respiratory   failure. Sepsis. Status post PEG tube placement.    COMPARISON: CT abdomen pelvis 2022.    CONTRAST/COMPLICATIONS:  IV Contrast: Omnipaque 350  90 cc administered   10 cc discarded  Oral Contrast: NONE  Complications: None reported at time of study completion    PROCEDURE:  CT of the Abdomen and Pelvis was performed.  Sagittal and coronal reformats were performed.    FINDINGS:  LOWER CHEST: Redemonstration of a cavitary lesion in the right middle   lobe. Redemonstration of secretions in the left lower lobe bronchi with   complete collapse of the left lower lobe with heterogeneous   opacification. Partially imaged 4.3 x 2.8 cm air-fluid collection in the   left lower lobe. There is a small loculated rim-enhancing pleural   effusion.    LIVER: Within normal limits.  BILE DUCTS: Normal caliber.  GALLBLADDER: Within normal limits.  SPLEEN: Within normal limits.  PANCREAS: Within normal limits.  ADRENALS: Within normal limits.  KIDNEYS/URETERS: Within normal limits.    BLADDER: Within normal limits.  REPRODUCTIVE ORGANS: Prostate within normal limits.    BOWEL: PEG tube in the stomach. Several dilated air-fluid filled loops of   small bowel with transition point to collapsed bowel in the right lower   quadrant (series 2, image 83). Mild wall thickening of the small bowel   loops proximal to the transitionpoint with evidence of focal pneumatosis   intestinalis (2, 86). There is also gastric pneumatosis and mild portal   venous gas.  PERITONEUM: Small volume ascites and mesenteric edema.  VESSELS: Atherosclerotic changes.  RETROPERITONEUM/LYMPH NODES: No lymphadenopathy.  ABDOMINAL WALL: Subcutaneous edema.  BONES: Degenerative changes.    IMPRESSION:  Small bowel obstruction with transition point in the right lower   quadrant, with additional findings highly suspicious for bowel ischemia.    Partially imaged 4.3 cm air-fluid collection in the left lower lobe and   adjacent rim-enhancing pleural effusion, suspicious for lung   abscess/empyema. Dedicated chest CT can be performed for further   evaluation.    Findings were discussed with ALIZE LANDAVERDE on  2022 4:47 PM by Dr. Tamayo with read back confirmation.    --- End of Report ---            JUSTIN TAMAYO MD; Attending Radiologist  This document has been electronically signed. 2022  5:20PM    < end of copied text >      Urinalysis Basic - ( 2022 10:46 )    Color: Yellow / Appearance: Clear / S.015 / pH: x  Gluc: x / Ketone: Negative  / Bili: Small / Urobili: 4   Blood: x / Protein: 30 mg/dL / Nitrite: Negative   Leuk Esterase: Trace / RBC: 0-2 /HPF / WBC 11-25 /HPF   Sq Epi: x / Non Sq Epi: Few /HPF / Bacteria: See Note /HPF      GOALS OF CARE DISCUSSION WITH PATIENT/FAMILY/PROXY: DNR    CRITICAL CARE TIME SPENT: 35 minutes

## 2022-01-20 NOTE — PROGRESS NOTE ADULT - ASSESSMENT
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 Patient was       #Septic shock  #SBO  #Lung abscess  #Encephalopathy  # AHRF s/p trach and PEG    ====CVS====  #Septic shock   c/w pressors   maintain MAP>65  Lactate trending down 6.1 > 3 > 2.8  Levo requirements decreased from 0.18 > 0.12    ====Pulm====  #Acute hypoxic respiratory failure  -1/9 Febrile, Tmax 101.4F. F/u CXR. TOV. NPO after midnight and hold lovenox for GT placement in IR in am.   1/12/22 G tube placed in IR  1/14 L Pleural Pigtail catheter removed  1/15 tolerating SBT x4 hrs but with copious secretions requiring frequent suctioning; G tube clogged, initial attempts at dislodging unsuccessful in AM, noted to have a cluster of small capsule beads. Attempts to dissolve with ginger ale, unsuccessful. This evening, successfully unclogged Gastrostomy tube. TF restarted.  1/16- No acute overnight events  1/17  Code sepsis  Lactate 5.3 received 2.1 liter of NS. Repeat lactate 3.2 Blood and urine cultures sent.  1/18  Lactate 6.1  IVF increased to 125cc/hr. Abdominal / Pelvis CT with contrast. B/P low started on midodrine. Pt to be transferred to ICU after CT scan  Started on pressors and c/w zosyn  Pt has possible lung abscess.  Pt requiring volume assisted control for breathing    #Lung abscess  Patient was found to have b/l lung abscess on CT   c/ zosyn  f/u cx    ====GI====  SBO  patient was found to have SBo on CT abdomen  NPO for now  Surgery consult for SBO  c/w Zosyn      ====nephro====  Lactic acidosis 2/2 sepsis  Monitor lactate    ====ID====  Septic shock  Likely 2/2 lung abscess on CT   Small bowel obstruction with transition point in the right lower   quadrant, with additional findings highly suspicious for bowel ischemia.  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 Patient was       #Septic shock  #SBO  #Lung abscess  #Encephalopathy  # AHRF s/p trach and PEG    ====CVS====  #Septic shock   c/w pressors   maintain MAP>65  Lactate trending down 6.1 > 3 > 2.8  Levo requirements decreased from 0.18 > 0.12    ====Pulm====  #Acute hypoxic respiratory failure  -1/9 Febrile, Tmax 101.4F. F/u CXR. TOV. NPO after midnight and hold lovenox for GT placement in IR in am.   1/12/22 G tube placed in IR  1/14 L Pleural Pigtail catheter removed  1/15 tolerating SBT x4 hrs but with copious secretions requiring frequent suctioning; G tube clogged, initial attempts at dislodging unsuccessful in AM, noted to have a cluster of small capsule beads. Attempts to dissolve with ginger ale, unsuccessful. This evening, successfully unclogged Gastrostomy tube. TF restarted.  1/16- No acute overnight events  1/17  Code sepsis  Lactate 5.3 received 2.1 liter of NS. Repeat lactate 3.2 Blood and urine cultures sent.  1/18  Lactate 6.1  IVF increased to 125cc/hr. Abdominal / Pelvis CT with contrast. B/P low started on midodrine. Pt to be transferred to ICU after CT scan  Started on pressors and c/w zosyn  Pt has possible lung abscess.  Pt requiring volume assisted control for breathing  Will attempt to place on pressure support     #Lung abscess  Patient was found to have b/l lung abscess on CT   c/ zosyn  f/u cx    ====GI====  SBO  patient was found to have SBO on CT abdomen  NPO for now  Surgery consult for SBO  c/w Zosyn      ====nephro====  Lactic acidosis 2/2 sepsis  Monitor lactate    ====ID====  Septic shock  Likely 2/2 lung abscess on CT   Small bowel obstruction with transition point in the right lower   quadrant, with additional findings highly suspicious for bowel ischemia.  c/w Zosyn  f/u culture  Will start on central pressors   Monitor Vitals, maintain MAp>65  lactate was elevated to 6.1 s/p 3 bolus > 3 > 2.8  s/p 3 L bolus  Monitor Urine output        ====Heme/onc====  #Metastatic SCC of tongue base  - s/p resection, chemo/radiation, and immunotherapy (10/2021)  - palliative, s/p trach and peg    ====Endo====  #DM  - a1c 6.3  - c/w sliding scale    ====Ppx====  #DVT: Lovenox HOLDing pre op  #GI: PPI

## 2022-01-20 NOTE — PROGRESS NOTE ADULT - SUBJECTIVE AND OBJECTIVE BOX
ICU visit:  76y Male is under our care for    REVIEW OF SYSTEMS:  [  ] Not able to elicit  General:	  Chest:	  GI:	  :  Skin:	  Musculoskeletal:	  Neuro:	    MEDS:  piperacillin/tazobactam IVPB.. 3.375 Gram(s) IV Intermittent every 8 hours    ALLERGIES: Allergies    No Known Allergies    Intolerances        VITALS:  ICU Vital Signs Last 24 Hrs  T(C): 37.2 (20 Jan 2022 12:15), Max: 37.2 (20 Jan 2022 11:30)  T(F): 99 (20 Jan 2022 12:15), Max: 99 (20 Jan 2022 11:30)  HR: 93 (20 Jan 2022 12:15) (72 - 103)  BP: 111/58 (20 Jan 2022 12:15) (78/52 - 130/89)  BP(mean): 70 (20 Jan 2022 12:15) (58 - 106)  ABP: --  ABP(mean): --  RR: 29 (20 Jan 2022 12:15) (14 - 32)  SpO2: 100% (20 Jan 2022 12:15) (96% - 100%)      PHYSICAL EXAM:  HEENT:  Neck:  Respiratory:  Cardiovascular:  Gastrointestinal:  Extremities:  Skin:  Ortho:  Neuro:    LABS/DIAGNOSTIC TESTS:                        8.3    10.88 )-----------( 176      ( 20 Jan 2022 04:27 )             25.7     WBC Count: 10.88 K/uL (01-20 @ 04:27)  WBC Count: 12.28 K/uL (01-19 @ 04:02)  WBC Count: 19.08 K/uL (01-18 @ 09:30)  WBC Count: 12.65 K/uL (01-17 @ 22:16)  WBC Count: 12.40 K/uL (01-17 @ 07:09)    01-20    139  |  110<H>  |  46<H>  ----------------------------<  116<H>  3.9   |  20<L>  |  1.09    Ca    8.4      20 Jan 2022 04:27  Phos  4.3     01-20  Mg     2.1     01-20    TPro  4.3<L>  /  Alb  0.8<L>  /  TBili  0.4  /  DBili  x   /  AST  26  /  ALT  9<L>  /  AlkPhos  84  01-20          CULTURES:   .Blood Blood  01-18 @ 02:51   No growth to date.  --  --      .Blood Blood  01-18 @ 02:50   No growth to date.  --  --      .Sputum Sputum  12-29 @ 18:28   Normal Respiratory Karla present  --    Numerous polymorphonuclear leukocytes per low power field  No squamous epithelial cells per low power field  Few Gram positive cocci in pairs per oil power field      Clean Catch Clean Catch (Midstream)  12-21 @ 04:42   No growth  --  --      .Sputum Sputum  11-08 @ 10:53   Few Mycobacterium avium complex  --  --      .Sputum Sputum  11-06 @ 19:34   No acid fast bacilli isolated after 6 weeks.  --  --      .Sputum Sputum  11-05 @ 23:15   Growth of acid fast bacilli detected in broth,  Mycobacterium avium complex by Dna Probe  --  --      .Blood Blood  11-04 @ 11:23   No Growth Final  --  --      .Blood Blood  11-04 @ 10:50   No Growth Final  --  --      Clean Catch Clean Catch (Midstream)  11-03 @ 17:00   <10,000 CFU/mL Normal Urogenital Karla  --  --        RADIOLOGY:    < from: Xray Abdomen 1 View PORTABLE -Routine (Xray Abdomen 1 View PORTABLE -Routine in AM.) (01.20.22 @ 09:07) >    ACC: [id number] EXAM:  XR ABDOMEN PORTABLE ROUTINE 1V                        ACC: [id number] EXAM:  XR ABDOMEN PORTABLE ROUTINE 1V                          PROCEDURE DATE:  01/19/2022          INTERPRETATION:  KUB: AP    COMPARISON: 10/14/2022 [facility].    CLINICAL INFORMATION: SBO. Follow-up.    FINDINGS:  PEG feeding tube overlies LEFT upper quadrant  Contrast within transverse and LEFT colon. Residual of air distended   small bowel LEFT upper quadrant and LEFT midabdomen concerning for   incomplete small bowel obstruction.    No free intra-abdominal air.  No obvious intra-abdominal masses.  No abnormal calcifications.  The osseous structures are intact.    IMPRESSION:    Findings concerning for incomplete small bowel obstruction.    FOLLOW-UP ABDOMINAL RADIOGRAPH 1/20/2022 AT 8:37 AM:  No significant interval change.    --- End of Report ---            MARIA DEL CARMEN LEUNG MD; Attending Radiologist  This document has been electronically signed. Jan 20 2022 10:00AM    < end of copied text >   ICU visit:  76y Male is under our care for Pneumonia/ lung abscess vs Empyema of left lung.  Patient was seen in the ICU laying comfortably in bed with no acute distress.  He remains on one pressor with a blood pressure of 117/60.  Abdominal x-ray show SBO, patient remains afebrile and WBC count is downtrending.    REVIEW OF SYSTEMS:  [ x ] Not able to elicit    MEDS:  piperacillin/tazobactam IVPB.. 3.375 Gram(s) IV Intermittent every 8 hours    ALLERGIES: Allergies    No Known Allergies    Intolerances        VITALS:  ICU Vital Signs Last 24 Hrs  T(C): 37.2 (20 Jan 2022 12:15), Max: 37.2 (20 Jan 2022 11:30)  T(F): 99 (20 Jan 2022 12:15), Max: 99 (20 Jan 2022 11:30)  HR: 93 (20 Jan 2022 12:15) (72 - 103)  BP: 111/58 (20 Jan 2022 12:15) (78/52 - 130/89)  BP(mean): 70 (20 Jan 2022 12:15) (58 - 106)  ABP: --  ABP(mean): --  RR: 29 (20 Jan 2022 12:15) (14 - 32)  SpO2: 100% (20 Jan 2022 12:15) (96% - 100%)      PHYSICAL EXAM:  HEENT: trach+  Neck: supple no LN's   Respiratory: lungs clear no rales  Cardiovascular: S1 S2 reg no murmurs  Gastrointestinal: +BS with soft, nondistended abdomen; nontender, peg tube in place  : Freedman catheter in place with yellow urine  Extremities: Right hand edema +2 , left fem line +  Skin: no rashes  Ortho: n/a  Neuro: Awake and alert    LABS/DIAGNOSTIC TESTS:                        8.3    10.88 )-----------( 176      ( 20 Jan 2022 04:27 )             25.7     WBC Count: 10.88 K/uL (01-20 @ 04:27)  WBC Count: 12.28 K/uL (01-19 @ 04:02)  WBC Count: 19.08 K/uL (01-18 @ 09:30)  WBC Count: 12.65 K/uL (01-17 @ 22:16)  WBC Count: 12.40 K/uL (01-17 @ 07:09)    01-20    139  |  110<H>  |  46<H>  ----------------------------<  116<H>  3.9   |  20<L>  |  1.09    Ca    8.4      20 Jan 2022 04:27  Phos  4.3     01-20  Mg     2.1     01-20    TPro  4.3<L>  /  Alb  0.8<L>  /  TBili  0.4  /  DBili  x   /  AST  26  /  ALT  9<L>  /  AlkPhos  84  01-20          CULTURES:   .Blood Blood  01-18 @ 02:51   No growth to date.  --  --      .Blood Blood  01-18 @ 02:50   No growth to date.  --  --      .Sputum Sputum  12-29 @ 18:28   Normal Respiratory Karla present  --    Numerous polymorphonuclear leukocytes per low power field  No squamous epithelial cells per low power field  Few Gram positive cocci in pairs per oil power field      Clean Catch Clean Catch (Midstream)  12-21 @ 04:42   No growth  --  --      .Sputum Sputum  11-08 @ 10:53   Few Mycobacterium avium complex  --  --      .Sputum Sputum  11-06 @ 19:34   No acid fast bacilli isolated after 6 weeks.  --  --      .Sputum Sputum  11-05 @ 23:15   Growth of acid fast bacilli detected in broth,  Mycobacterium avium complex by Dna Probe  --  --      .Blood Blood  11-04 @ 11:23   No Growth Final  --  --      .Blood Blood  11-04 @ 10:50   No Growth Final  --  --      Clean Catch Clean Catch (Midstream)  11-03 @ 17:00   <10,000 CFU/mL Normal Urogenital Karla  --  --        RADIOLOGY:    < from: Xray Abdomen 1 View PORTABLE -Routine (Xray Abdomen 1 View PORTABLE -Routine in AM.) (01.20.22 @ 09:07) >    ACC: [id number] EXAM:  XR ABDOMEN PORTABLE ROUTINE 1V                        ACC: [id number] EXAM:  XR ABDOMEN PORTABLE ROUTINE 1V                          PROCEDURE DATE:  01/19/2022          INTERPRETATION:  KUB: AP    COMPARISON: 10/14/2022 [facility]ograph.    CLINICAL INFORMATION: SBO. Follow-up.    FINDINGS:  PEG feeding tube overlies LEFT upper quadrant  Contrast within transverse and LEFT colon. Residual of air distended   small bowel LEFT upper quadrant and LEFT midabdomen concerning for   incomplete small bowel obstruction.    No free intra-abdominal air.  No obvious intra-abdominal masses.  No abnormal calcifications.  The osseous structures are intact.    IMPRESSION:    Findings concerning for incomplete small bowel obstruction.    FOLLOW-UP ABDOMINAL RADIOGRAPH 1/20/2022 AT 8:37 AM:  No significant interval change.    --- End of Report ---            MARIA DEL CARMEN LEUNG MD; Attending Radiologist  This document has been electronically signed. Jan 20 2022 10:00AM    < end of copied text >

## 2022-01-20 NOTE — CHART NOTE - NSCHARTNOTEFT_GEN_A_CORE
Assessment:   Patient is a 76y old  Male who presents with a chief complaint of cardiac arrest (2022 12:37). Pt transferred to ICU . Intubated. NPO day #3, SBO noted. (of note: pt s/p trach/ PEG)      Daily     Daily Weight in k.8 (2022 07:15)  Weight in k.9 (2022 07:15)  Weight in k.8 (2022 07:15)    % Weight Change: 1+ generalized edema, I>O    Pertinent Medications: MEDICATIONS  (STANDING):  chlorhexidine 2% Cloths 1 Application(s) Topical <User Schedule>  enoxaparin Injectable 40 milliGRAM(s) SubCutaneous daily  glucagon  Injectable 0.5 milliGRAM(s) IV Push once  insulin lispro (ADMELOG) corrective regimen sliding scale   SubCutaneous every 6 hours  midodrine. 2.5 milliGRAM(s) Oral three times a day  norepinephrine Infusion 0.22 MICROgram(s)/kG/Min (14.9 mL/Hr) IV Continuous <Continuous>  pantoprazole  Injectable 40 milliGRAM(s) IV Push two times a day  piperacillin/tazobactam IVPB.. 3.375 Gram(s) IV Intermittent every 8 hours  QUEtiapine 12.5 milliGRAM(s) Oral at bedtime  sodium chloride 0.9%. 1000 milliLiter(s) (125 mL/Hr) IV Continuous <Continuous>    MEDICATIONS  (PRN):  acetaminophen  Suppository .. 650 milliGRAM(s) Rectal every 6 hours PRN Temp greater or equal to 38C (100.4F)  bisacodyl Suppository 10 milliGRAM(s) Rectal daily PRN Constipation    Pertinent Labs:  Na139 mmol/L Glu 116 mg/dL<H> K+ 3.9 mmol/L Cr  1.09 mg/dL BUN 46 mg/dL<H>  Phos 4.3 mg/dL  Alb 0.8 g/dL<L>  PAB 8 mg/dL<L>  Chol --    LDL --    HDL --    Trig 168 mg/dL<H>     CAPILLARY BLOOD GLUCOSE      POCT Blood Glucose.: 100 mg/dL (2022 16:14)  POCT Blood Glucose.: 103 mg/dL (2022 10:13)  POCT Blood Glucose.: 120 mg/dL (2022 05:41)  POCT Blood Glucose.: 150 mg/dL (2022 23:55)          Previous Nutrition Diagnosis:   [ ] Altered GI function  [ ]Inadequate Oral Intake [ ] Swallowing Difficulty   [ ] Altered nutrition related labs [ ] Increased Nutrient Needs [ ] Overweight/Obesity   [ ] Unintended Weight Loss [ ] Food & Nutrition Related Knowledge Deficit [x ] Malnutrition (severe PCM)  [ ] Other:     Nutrition Diagnosis is [x ] ongoing  [ ] resolved [ ] not applicable     Interventions:   Recommend  [ ] Change Diet To:  [ ] Nutrition Supplement  [ ] Nutrition Support  [x ] Other: Diet advancement per MD. MD to monitor. RD available.     Monitoring and Evaluation: [ x ] follow up per protocol  [ ] other:

## 2022-01-20 NOTE — CHART NOTE - NSCHARTNOTEFT_GEN_A_CORE
Pt's children were contacted and updated about their father's condition. All questions were answered

## 2022-01-20 NOTE — PROGRESS NOTE ADULT - ASSESSMENT
Septic Shock  Pneumonia/ lung abscess vs Empyema of left lung  Fevers  Leukocytosis - improving    Plan - Cont Zosyn 3.375 gms iv q8hrs  might need a chest tube if he has Empyema  Time spent - 32 mins. Septic Shock  Pneumonia/ lung abscess vs Empyema of left lung  Fevers  Leukocytosis - improving    Plan - Cont Zosyn 3.375 gms iv q8hrs  might need a chest tube if he has Empyema    Time spent - 32 mins. Septic Shock  Pneumonia/ lung abscess vs Empyema of left lung  Fevers  Leukocytosis - improving    Plan - Cont Zosyn 3.375 gms iv q8hrs  might need a chest tube if he has Empyema    Time spent - 32 mins.    I agree with above

## 2022-01-20 NOTE — PROGRESS NOTE ADULT - SUBJECTIVE AND OBJECTIVE BOX
INTERVAL HPI/OVERNIGHT EVENTS:  pt seen and examined in ICU  intubated, on pressors  as per RN had large bowel movement last night    MEDICATIONS  (STANDING):  chlorhexidine 2% Cloths 1 Application(s) Topical <User Schedule>  enoxaparin Injectable 40 milliGRAM(s) SubCutaneous daily  glucagon  Injectable 0.5 milliGRAM(s) IV Push once  insulin lispro (ADMELOG) corrective regimen sliding scale   SubCutaneous every 6 hours  midodrine. 2.5 milliGRAM(s) Oral three times a day  norepinephrine Infusion 0.22 MICROgram(s)/kG/Min (14.9 mL/Hr) IV Continuous <Continuous>  pantoprazole  Injectable 40 milliGRAM(s) IV Push two times a day  piperacillin/tazobactam IVPB.. 3.375 Gram(s) IV Intermittent every 8 hours  QUEtiapine 12.5 milliGRAM(s) Oral at bedtime  sodium chloride 0.9%. 1000 milliLiter(s) (125 mL/Hr) IV Continuous <Continuous>    MEDICATIONS  (PRN):  acetaminophen  Suppository .. 650 milliGRAM(s) Rectal every 6 hours PRN Temp greater or equal to 38C (100.4F)  bisacodyl Suppository 10 milliGRAM(s) Rectal daily PRN Constipation      Vital Signs Last 24 Hrs  T(C): 37.2 (20 Jan 2022 11:45), Max: 37.2 (20 Jan 2022 11:30)  T(F): 99 (20 Jan 2022 11:45), Max: 99 (20 Jan 2022 11:30)  HR: 93 (20 Jan 2022 11:45) (72 - 103)  BP: 112/59 (20 Jan 2022 11:45) (78/52 - 137/78)  BP(mean): 70 (20 Jan 2022 11:45) (58 - 106)  RR: 30 (20 Jan 2022 11:45) (14 - 32)  SpO2: 100% (20 Jan 2022 11:45) (96% - 100%)    Physical:  General: intubated, sedated, NAD  Chest: symmetrical chest rise  Abdomen: Soft nondistended, grimacing noted upon diffuse palpation. peg tube to gravity    I&O's Detail    19 Jan 2022 07:01  -  20 Jan 2022 07:00  --------------------------------------------------------  IN:    IV PiggyBack: 275 mL    Norepinephrine: 125 mL    sodium chloride 0.9%: 3000 mL  Total IN: 3400 mL    OUT:    Incontinent per Condom Catheter (mL): 875 mL    PEG (Percutaneous Endoscopic Gastrostomy) Tube (mL): 290 mL  Total OUT: 1165 mL    Total NET: 2235 mL      20 Jan 2022 07:01  -  20 Jan 2022 12:03  --------------------------------------------------------  IN:    IV PiggyBack: 50 mL    Norepinephrine: 25 mL    sodium chloride 0.9%: 625 mL  Total IN: 700 mL    OUT:  Total OUT: 0 mL    Total NET: 700 mL      LABS:                        8.3    10.88 )-----------( 176      ( 20 Jan 2022 04:27 )             25.7             01-20    139  |  110<H>  |  46<H>  ----------------------------<  116<H>  3.9   |  20<L>  |  1.09    Ca    8.4      20 Jan 2022 04:27  Phos  4.3     01-20  Mg     2.1     01-20    TPro  4.3<L>  /  Alb  0.8<L>  /  TBili  0.4  /  DBili  x   /  AST  26  /  ALT  9<L>  /  AlkPhos  84  01-20

## 2022-01-20 NOTE — PROGRESS NOTE ADULT - ASSESSMENT
77 yo M with complicated hospital course with recent code sepsis, hypotensive, hypoxia now with SBO on CT scan. On pressors    -PEG tube to gravity to decompress small bowel  -Pt at high risk for any surgical intervention  -Serial abd exams  -Remainder of care per primary team

## 2022-01-21 NOTE — PROGRESS NOTE ADULT - ASSESSMENT
Septic Shock  Pneumonia/ lung abscess vs Empyema of left lung  Fevers  Leukocytosis - improving    Plan - Cont Zosyn 3.375 gms iv q8hrs  might need a chest tube if he has Empyema    Time spent - 31 mins.       Septic Shock  Pneumonia/ lung abscess vs Empyema of left lung  Fevers  Leukocytosis - improving    Plan - Cont Zosyn 3.375 gms iv q8hrs  might need a chest tube if he has Empyema    Time spent - 31 mins.    I agree with above

## 2022-01-21 NOTE — PROGRESS NOTE ADULT - SUBJECTIVE AND OBJECTIVE BOX
INTERVAL HPI/OVERNIGHT EVENTS: Pt was seen and assessed at bedside. Pt had no events overnight. Will Levophed requirements are decreasing. Thoracic surgery was consulted for possible intervention.    PRESSORS: [x ] YES [ ] NO  WHICH: Pheny    ANTIBIOTICS:                      Antimicrobial:  piperacillin/tazobactam IVPB.. 3.375 Gram(s) IV Intermittent every 8 hours    Cardiovascular:  midodrine. 2.5 milliGRAM(s) Oral three times a day    Pulmonary:    Hematalogic:  enoxaparin Injectable 40 milliGRAM(s) SubCutaneous daily    Other:  acetaminophen    Suspension .. 650 milliGRAM(s) Oral every 6 hours PRN  acetaminophen  Suppository .. 650 milliGRAM(s) Rectal every 6 hours PRN  bisacodyl Suppository 10 milliGRAM(s) Rectal daily PRN  chlorhexidine 2% Cloths 1 Application(s) Topical <User Schedule>  glucagon  Injectable 0.5 milliGRAM(s) IV Push once  insulin lispro (ADMELOG) corrective regimen sliding scale   SubCutaneous every 6 hours  pantoprazole  Injectable 40 milliGRAM(s) IV Push two times a day  QUEtiapine 12.5 milliGRAM(s) Oral at bedtime  sodium chloride 0.9%. 1000 milliLiter(s) IV Continuous <Continuous>    acetaminophen    Suspension .. 650 milliGRAM(s) Oral every 6 hours PRN  acetaminophen  Suppository .. 650 milliGRAM(s) Rectal every 6 hours PRN  bisacodyl Suppository 10 milliGRAM(s) Rectal daily PRN  chlorhexidine 2% Cloths 1 Application(s) Topical <User Schedule>  enoxaparin Injectable 40 milliGRAM(s) SubCutaneous daily  glucagon  Injectable 0.5 milliGRAM(s) IV Push once  insulin lispro (ADMELOG) corrective regimen sliding scale   SubCutaneous every 6 hours  midodrine. 2.5 milliGRAM(s) Oral three times a day  pantoprazole  Injectable 40 milliGRAM(s) IV Push two times a day  piperacillin/tazobactam IVPB.. 3.375 Gram(s) IV Intermittent every 8 hours  QUEtiapine 12.5 milliGRAM(s) Oral at bedtime  sodium chloride 0.9%. 1000 milliLiter(s) IV Continuous <Continuous>    Drug Dosing Weight  Height (cm): 175.3 (20 Dec 2021 02:21)  Weight (kg): 72.5 (20 Dec 2021 06:30)  BMI (kg/m2): 23.6 (20 Dec 2021 06:30)  BSA (m2): 1.88 (20 Dec 2021 06:30)    CENTRAL LINE: [ ] YES [x ] NO  LOCATION:   DATE INSERTED:  REMOVE: [ ] YES [ ] NO  EXPLAIN:    HUTCHINS: [ ] YES [x ] NO    DATE INSERTED:  REMOVE:  [ ] YES [ ] NO  EXPLAIN:    A-LINE:  [ ] YES [x ] NO  LOCATION:   DATE INSERTED:  REMOVE:  [ ] YES [ ] NO  EXPLAIN:    PMH -reviewed admission note, no change since admission    ICU Vital Signs Last 24 Hrs  T(C): 39.1 (2022 16:55), Max: 39.8 (2022 19:34)  T(F): 102.4 (2022 16:55), Max: 103.6 (2022 19:34)  HR: 109 (2022 16:55) (90 - 119)  BP: 71/55 (2022 16:48) (70/40 - 146/113)  BP(mean): 59 (2022 16:48) (52 - 121)  ABP: --  ABP(mean): --  RR: 19 (2022 16:55) (18 - 27)  SpO2: 98% (2022 16:48) (86% - 100%)      Mode: AC/ CMV (Assist Control/ Continuous Mandatory Ventilation)  RR (machine): 14  TV (machine): 450  FiO2: 40  PEEP: 5  ITime: 0.9  MAP: 11  PIP: 26      PHYSICAL EXAM:    GENERAL: lethargic  NECK:  contracted  NERVOUS SYSTEM:  Alert & Oriented 1, opening eyes only to verbal and tactile stimulus  CHEST/LUNG: No chest deformity; Normal percussion bilaterally; No rales, rhonchi, wheezing   HEART: Regular rate and rhythm; No murmurs, rubs, or gallops  ABDOMEN: abdomen tender on minimal palpation  EXTREMITIES:  2+ Peripheral Pulses, No clubbing, cyanosis, or edema  LYMPH: No lymphadenopathy noted  SKIN: No rashes or lesions; Good capillary refill      LABS:  CBC Full  -  ( 2022 09:30 )  WBC Count : 19.08 K/uL  RBC Count : 4.04 M/uL  Hemoglobin : 11.3 g/dL  Hematocrit : 36.8 %  Platelet Count - Automated : 211 K/uL  Mean Cell Volume : 91.1 fl  Mean Cell Hemoglobin : 28.0 pg  Mean Cell Hemoglobin Concentration : 30.7 gm/dL  Auto Neutrophil # : 17.52 K/uL  Auto Lymphocyte # : 0.79 K/uL  Auto Monocyte # : 0.43 K/uL  Auto Eosinophil # : 0.04 K/uL  Auto Basophil # : 0.13 K/uL  Auto Neutrophil % : 91.8 %  Auto Lymphocyte % : 4.1 %  Auto Monocyte % : 2.3 %  Auto Eosinophil % : 0.2 %  Auto Basophil % : 0.7 %        136  |  104  |  29<H>  ----------------------------<  259<H>  5.0   |  20<L>  |  1.28    Ca    9.4      2022 09:30  Phos  3.9       Mg     2.3         TPro  5.7<L>  /  Alb  1.1<L>  /  TBili  0.6  /  DBili  x   /  AST  54<H>  /  ALT  13  /  AlkPhos  133<H>      Ct abd / pelvis:< from: CT Abdomen and Pelvis w/ IV Cont (22 @ 16:12) >    ACC: [id number] EXAM:  CT ABDOMEN AND PELVIS IC                          PROCEDURE DATE:  2022          INTERPRETATION:  CLINICAL INFORMATION: Cardiac arrest. Respiratory   failure. Sepsis. Status post PEG tube placement.    COMPARISON: CT abdomen pelvis 2022.    CONTRAST/COMPLICATIONS:  IV Contrast: Omnipaque 350  90 cc administered   10 cc discarded  Oral Contrast: NONE  Complications: None reported at time of study completion    PROCEDURE:  CT of the Abdomen and Pelvis was performed.  Sagittal and coronal reformats were performed.    FINDINGS:  LOWER CHEST: Redemonstration of a cavitary lesion in the right middle   lobe. Redemonstration of secretions in the left lower lobe bronchi with   complete collapse of the left lower lobe with heterogeneous   opacification. Partially imaged 4.3 x 2.8 cm air-fluid collection in the   left lower lobe. There is a small loculated rim-enhancing pleural   effusion.    LIVER: Within normal limits.  BILE DUCTS: Normal caliber.  GALLBLADDER: Within normal limits.  SPLEEN: Within normal limits.  PANCREAS: Within normal limits.  ADRENALS: Within normal limits.  KIDNEYS/URETERS: Within normal limits.    BLADDER: Within normal limits.  REPRODUCTIVE ORGANS: Prostate within normal limits.    BOWEL: PEG tube in the stomach. Several dilated air-fluid filled loops of   small bowel with transition point to collapsed bowel in the right lower   quadrant (series 2, image 83). Mild wall thickening of the small bowel   loops proximal to the transitionpoint with evidence of focal pneumatosis   intestinalis (2, 86). There is also gastric pneumatosis and mild portal   venous gas.  PERITONEUM: Small volume ascites and mesenteric edema.  VESSELS: Atherosclerotic changes.  RETROPERITONEUM/LYMPH NODES: No lymphadenopathy.  ABDOMINAL WALL: Subcutaneous edema.  BONES: Degenerative changes.    IMPRESSION:  Small bowel obstruction with transition point in the right lower   quadrant, with additional findings highly suspicious for bowel ischemia.    Partially imaged 4.3 cm air-fluid collection in the left lower lobe and   adjacent rim-enhancing pleural effusion, suspicious for lung   abscess/empyema. Dedicated chest CT can be performed for further   evaluation.    Findings were discussed with ALIZE LANDAVERDE on  2022 4:47 PM by Dr. Tamayo with read back confirmation.    --- End of Report ---            JUSTIN TAMAYO MD; Attending Radiologist  This document has been electronically signed. 2022  5:20PM    < end of copied text >      Urinalysis Basic - ( 2022 10:46 )    Color: Yellow / Appearance: Clear / S.015 / pH: x  Gluc: x / Ketone: Negative  / Bili: Small / Urobili: 4   Blood: x / Protein: 30 mg/dL / Nitrite: Negative   Leuk Esterase: Trace / RBC: 0-2 /HPF / WBC 11-25 /HPF   Sq Epi: x / Non Sq Epi: Few /HPF / Bacteria: See Note /HPF      GOALS OF CARE DISCUSSION WITH PATIENT/FAMILY/PROXY: DNR    CRITICAL CARE TIME SPENT: 35 minutes INTERVAL HPI/OVERNIGHT EVENTS: Pt was seen and assessed at bedside. Pt returned on . Will Levophed requirements are decreasing. Thoracic surgery was consulted for possible intervention.    PRESSORS: [x ] YES [ ] NO  WHICH: Pheny    ANTIBIOTICS:                      Antimicrobial:  piperacillin/tazobactam IVPB.. 3.375 Gram(s) IV Intermittent every 8 hours    Cardiovascular:  midodrine. 2.5 milliGRAM(s) Oral three times a day    Pulmonary:    Hematalogic:  enoxaparin Injectable 40 milliGRAM(s) SubCutaneous daily    Other:  acetaminophen    Suspension .. 650 milliGRAM(s) Oral every 6 hours PRN  acetaminophen  Suppository .. 650 milliGRAM(s) Rectal every 6 hours PRN  bisacodyl Suppository 10 milliGRAM(s) Rectal daily PRN  chlorhexidine 2% Cloths 1 Application(s) Topical <User Schedule>  glucagon  Injectable 0.5 milliGRAM(s) IV Push once  insulin lispro (ADMELOG) corrective regimen sliding scale   SubCutaneous every 6 hours  pantoprazole  Injectable 40 milliGRAM(s) IV Push two times a day  QUEtiapine 12.5 milliGRAM(s) Oral at bedtime  sodium chloride 0.9%. 1000 milliLiter(s) IV Continuous <Continuous>    acetaminophen    Suspension .. 650 milliGRAM(s) Oral every 6 hours PRN  acetaminophen  Suppository .. 650 milliGRAM(s) Rectal every 6 hours PRN  bisacodyl Suppository 10 milliGRAM(s) Rectal daily PRN  chlorhexidine 2% Cloths 1 Application(s) Topical <User Schedule>  enoxaparin Injectable 40 milliGRAM(s) SubCutaneous daily  glucagon  Injectable 0.5 milliGRAM(s) IV Push once  insulin lispro (ADMELOG) corrective regimen sliding scale   SubCutaneous every 6 hours  midodrine. 2.5 milliGRAM(s) Oral three times a day  pantoprazole  Injectable 40 milliGRAM(s) IV Push two times a day  piperacillin/tazobactam IVPB.. 3.375 Gram(s) IV Intermittent every 8 hours  QUEtiapine 12.5 milliGRAM(s) Oral at bedtime  sodium chloride 0.9%. 1000 milliLiter(s) IV Continuous <Continuous>    Drug Dosing Weight  Height (cm): 175.3 (20 Dec 2021 02:21)  Weight (kg): 72.5 (20 Dec 2021 06:30)  BMI (kg/m2): 23.6 (20 Dec 2021 06:30)  BSA (m2): 1.88 (20 Dec 2021 06:30)    CENTRAL LINE: [ ] YES [x ] NO  LOCATION:   DATE INSERTED:  REMOVE: [ ] YES [ ] NO  EXPLAIN:    HUTCHINS: [ ] YES [x ] NO    DATE INSERTED:  REMOVE:  [ ] YES [ ] NO  EXPLAIN:    A-LINE:  [ ] YES [x ] NO  LOCATION:   DATE INSERTED:  REMOVE:  [ ] YES [ ] NO  EXPLAIN:    PMH -reviewed admission note, no change since admission    ICU Vital Signs Last 24 Hrs  T(C): 39.1 (2022 16:55), Max: 39.8 (2022 19:34)  T(F): 102.4 (2022 16:55), Max: 103.6 (2022 19:34)  HR: 109 (2022 16:55) (90 - 119)  BP: 71/55 (2022 16:48) (70/40 - 146/113)  BP(mean): 59 (2022 16:48) (52 - 121)  ABP: --  ABP(mean): --  RR: 19 (2022 16:55) (18 - 27)  SpO2: 98% (2022 16:48) (86% - 100%)      Mode: AC/ CMV (Assist Control/ Continuous Mandatory Ventilation)  RR (machine): 14  TV (machine): 450  FiO2: 40  PEEP: 5  ITime: 0.9  MAP: 11  PIP: 26      PHYSICAL EXAM:    GENERAL: lethargic  NECK:  contracted  NERVOUS SYSTEM:  Alert & Oriented 1, opening eyes only to verbal and tactile stimulus  CHEST/LUNG: No chest deformity; Normal percussion bilaterally; No rales, rhonchi, wheezing   HEART: Regular rate and rhythm; No murmurs, rubs, or gallops  ABDOMEN: abdomen tender on minimal palpation  EXTREMITIES:  2+ Peripheral Pulses, No clubbing, cyanosis, or edema  LYMPH: No lymphadenopathy noted  SKIN: No rashes or lesions; Good capillary refill      LABS:  CBC Full  -  ( 2022 09:30 )  WBC Count : 19.08 K/uL  RBC Count : 4.04 M/uL  Hemoglobin : 11.3 g/dL  Hematocrit : 36.8 %  Platelet Count - Automated : 211 K/uL  Mean Cell Volume : 91.1 fl  Mean Cell Hemoglobin : 28.0 pg  Mean Cell Hemoglobin Concentration : 30.7 gm/dL  Auto Neutrophil # : 17.52 K/uL  Auto Lymphocyte # : 0.79 K/uL  Auto Monocyte # : 0.43 K/uL  Auto Eosinophil # : 0.04 K/uL  Auto Basophil # : 0.13 K/uL  Auto Neutrophil % : 91.8 %  Auto Lymphocyte % : 4.1 %  Auto Monocyte % : 2.3 %  Auto Eosinophil % : 0.2 %  Auto Basophil % : 0.7 %        136  |  104  |  29<H>  ----------------------------<  259<H>  5.0   |  20<L>  |  1.28    Ca    9.4      2022 09:30  Phos  3.9       Mg     2.3         TPro  5.7<L>  /  Alb  1.1<L>  /  TBili  0.6  /  DBili  x   /  AST  54<H>  /  ALT  13  /  AlkPhos  133<H>      Ct abd / pelvis:< from: CT Abdomen and Pelvis w/ IV Cont (22 @ 16:12) >    ACC: [id number] EXAM:  CT ABDOMEN AND PELVIS IC                          PROCEDURE DATE:  2022          INTERPRETATION:  CLINICAL INFORMATION: Cardiac arrest. Respiratory   failure. Sepsis. Status post PEG tube placement.    COMPARISON: CT abdomen pelvis 2022.    CONTRAST/COMPLICATIONS:  IV Contrast: Omnipaque 350  90 cc administered   10 cc discarded  Oral Contrast: NONE  Complications: None reported at time of study completion    PROCEDURE:  CT of the Abdomen and Pelvis was performed.  Sagittal and coronal reformats were performed.    FINDINGS:  LOWER CHEST: Redemonstration of a cavitary lesion in the right middle   lobe. Redemonstration of secretions in the left lower lobe bronchi with   complete collapse of the left lower lobe with heterogeneous   opacification. Partially imaged 4.3 x 2.8 cm air-fluid collection in the   left lower lobe. There is a small loculated rim-enhancing pleural   effusion.    LIVER: Within normal limits.  BILE DUCTS: Normal caliber.  GALLBLADDER: Within normal limits.  SPLEEN: Within normal limits.  PANCREAS: Within normal limits.  ADRENALS: Within normal limits.  KIDNEYS/URETERS: Within normal limits.    BLADDER: Within normal limits.  REPRODUCTIVE ORGANS: Prostate within normal limits.    BOWEL: PEG tube in the stomach. Several dilated air-fluid filled loops of   small bowel with transition point to collapsed bowel in the right lower   quadrant (series 2, image 83). Mild wall thickening of the small bowel   loops proximal to the transitionpoint with evidence of focal pneumatosis   intestinalis (2, 86). There is also gastric pneumatosis and mild portal   venous gas.  PERITONEUM: Small volume ascites and mesenteric edema.  VESSELS: Atherosclerotic changes.  RETROPERITONEUM/LYMPH NODES: No lymphadenopathy.  ABDOMINAL WALL: Subcutaneous edema.  BONES: Degenerative changes.    IMPRESSION:  Small bowel obstruction with transition point in the right lower   quadrant, with additional findings highly suspicious for bowel ischemia.    Partially imaged 4.3 cm air-fluid collection in the left lower lobe and   adjacent rim-enhancing pleural effusion, suspicious for lung   abscess/empyema. Dedicated chest CT can be performed for further   evaluation.    Findings were discussed with ALIZE LANDAVERDE on  2022 4:47 PM by Dr. Tamayo with read back confirmation.    --- End of Report ---            JUSTIN TAMAYO MD; Attending Radiologist  This document has been electronically signed. 2022  5:20PM    < end of copied text >      Urinalysis Basic - ( 2022 10:46 )    Color: Yellow / Appearance: Clear / S.015 / pH: x  Gluc: x / Ketone: Negative  / Bili: Small / Urobili: 4   Blood: x / Protein: 30 mg/dL / Nitrite: Negative   Leuk Esterase: Trace / RBC: 0-2 /HPF / WBC 11-25 /HPF   Sq Epi: x / Non Sq Epi: Few /HPF / Bacteria: See Note /HPF      GOALS OF CARE DISCUSSION WITH PATIENT/FAMILY/PROXY: DNR    CRITICAL CARE TIME SPENT: 35 minutes INTERVAL HPI/OVERNIGHT EVENTS: Pt was seen and assessed at bedside. Pt is placed on 4L O2 through trach collar. Levophed requirements are increasing again. Thoracic surgery was consulted for possible intervention.    PRESSORS: [x ] YES [ ] NO  WHICH: Pheny    ANTIBIOTICS:                      Antimicrobial:  piperacillin/tazobactam IVPB.. 3.375 Gram(s) IV Intermittent every 8 hours    Cardiovascular:  midodrine. 2.5 milliGRAM(s) Oral three times a day    Pulmonary:    Hematalogic:  enoxaparin Injectable 40 milliGRAM(s) SubCutaneous daily    Other:  acetaminophen    Suspension .. 650 milliGRAM(s) Oral every 6 hours PRN  acetaminophen  Suppository .. 650 milliGRAM(s) Rectal every 6 hours PRN  bisacodyl Suppository 10 milliGRAM(s) Rectal daily PRN  chlorhexidine 2% Cloths 1 Application(s) Topical <User Schedule>  glucagon  Injectable 0.5 milliGRAM(s) IV Push once  insulin lispro (ADMELOG) corrective regimen sliding scale   SubCutaneous every 6 hours  pantoprazole  Injectable 40 milliGRAM(s) IV Push two times a day  QUEtiapine 12.5 milliGRAM(s) Oral at bedtime  sodium chloride 0.9%. 1000 milliLiter(s) IV Continuous <Continuous>    acetaminophen    Suspension .. 650 milliGRAM(s) Oral every 6 hours PRN  acetaminophen  Suppository .. 650 milliGRAM(s) Rectal every 6 hours PRN  bisacodyl Suppository 10 milliGRAM(s) Rectal daily PRN  chlorhexidine 2% Cloths 1 Application(s) Topical <User Schedule>  enoxaparin Injectable 40 milliGRAM(s) SubCutaneous daily  glucagon  Injectable 0.5 milliGRAM(s) IV Push once  insulin lispro (ADMELOG) corrective regimen sliding scale   SubCutaneous every 6 hours  midodrine. 2.5 milliGRAM(s) Oral three times a day  pantoprazole  Injectable 40 milliGRAM(s) IV Push two times a day  piperacillin/tazobactam IVPB.. 3.375 Gram(s) IV Intermittent every 8 hours  QUEtiapine 12.5 milliGRAM(s) Oral at bedtime  sodium chloride 0.9%. 1000 milliLiter(s) IV Continuous <Continuous>    Drug Dosing Weight  Height (cm): 175.3 (20 Dec 2021 02:21)  Weight (kg): 72.5 (20 Dec 2021 06:30)  BMI (kg/m2): 23.6 (20 Dec 2021 06:30)  BSA (m2): 1.88 (20 Dec 2021 06:30)    CENTRAL LINE: [ ] YES [x ] NO  LOCATION:   DATE INSERTED:  REMOVE: [ ] YES [ ] NO  EXPLAIN:    HUTCHINS: [ ] YES [x ] NO    DATE INSERTED:  REMOVE:  [ ] YES [ ] NO  EXPLAIN:    A-LINE:  [ ] YES [x ] NO  LOCATION:   DATE INSERTED:  REMOVE:  [ ] YES [ ] NO  EXPLAIN:    PMH -reviewed admission note, no change since admission    ICU Vital Signs Last 24 Hrs  T(C): 39.1 (2022 16:55), Max: 39.8 (2022 19:34)  T(F): 102.4 (2022 16:55), Max: 103.6 (2022 19:34)  HR: 109 (2022 16:55) (90 - 119)  BP: 71/55 (2022 16:48) (70/40 - 146/113)  BP(mean): 59 (2022 16:48) (52 - 121)  ABP: --  ABP(mean): --  RR: 19 (2022 16:55) (18 - 27)  SpO2: 98% (2022 16:48) (86% - 100%)      Mode: AC/ CMV (Assist Control/ Continuous Mandatory Ventilation)  RR (machine): 14  TV (machine): 450  FiO2: 40  PEEP: 5  ITime: 0.9  MAP: 11  PIP: 26      PHYSICAL EXAM:    GENERAL: lethargic  NECK:  contracted  NERVOUS SYSTEM:  Alert & Oriented 1, opening eyes only to verbal and tactile stimulus  CHEST/LUNG: No chest deformity; Normal percussion bilaterally; No rales, rhonchi, wheezing   HEART: Regular rate and rhythm; No murmurs, rubs, or gallops  ABDOMEN: abdomen tender on minimal palpation  EXTREMITIES:  2+ Peripheral Pulses, No clubbing, cyanosis, or edema  LYMPH: No lymphadenopathy noted  SKIN: No rashes or lesions; Good capillary refill      LABS:  CBC Full  -  ( 2022 09:30 )  WBC Count : 19.08 K/uL  RBC Count : 4.04 M/uL  Hemoglobin : 11.3 g/dL  Hematocrit : 36.8 %  Platelet Count - Automated : 211 K/uL  Mean Cell Volume : 91.1 fl  Mean Cell Hemoglobin : 28.0 pg  Mean Cell Hemoglobin Concentration : 30.7 gm/dL  Auto Neutrophil # : 17.52 K/uL  Auto Lymphocyte # : 0.79 K/uL  Auto Monocyte # : 0.43 K/uL  Auto Eosinophil # : 0.04 K/uL  Auto Basophil # : 0.13 K/uL  Auto Neutrophil % : 91.8 %  Auto Lymphocyte % : 4.1 %  Auto Monocyte % : 2.3 %  Auto Eosinophil % : 0.2 %  Auto Basophil % : 0.7 %        136  |  104  |  29<H>  ----------------------------<  259<H>  5.0   |  20<L>  |  1.28    Ca    9.4      2022 09:30  Phos  3.9       Mg     2.3         TPro  5.7<L>  /  Alb  1.1<L>  /  TBili  0.6  /  DBili  x   /  AST  54<H>  /  ALT  13  /  AlkPhos  133<H>      Ct abd / pelvis:< from: CT Abdomen and Pelvis w/ IV Cont (22 @ 16:12) >    ACC: [id number] EXAM:  CT ABDOMEN AND PELVIS IC                          PROCEDURE DATE:  2022          INTERPRETATION:  CLINICAL INFORMATION: Cardiac arrest. Respiratory   failure. Sepsis. Status post PEG tube placement.    COMPARISON: CT abdomen pelvis 2022.    CONTRAST/COMPLICATIONS:  IV Contrast: Omnipaque 350  90 cc administered   10 cc discarded  Oral Contrast: NONE  Complications: None reported at time of study completion    PROCEDURE:  CT of the Abdomen and Pelvis was performed.  Sagittal and coronal reformats were performed.    FINDINGS:  LOWER CHEST: Redemonstration of a cavitary lesion in the right middle   lobe. Redemonstration of secretions in the left lower lobe bronchi with   complete collapse of the left lower lobe with heterogeneous   opacification. Partially imaged 4.3 x 2.8 cm air-fluid collection in the   left lower lobe. There is a small loculated rim-enhancing pleural   effusion.    LIVER: Within normal limits.  BILE DUCTS: Normal caliber.  GALLBLADDER: Within normal limits.  SPLEEN: Within normal limits.  PANCREAS: Within normal limits.  ADRENALS: Within normal limits.  KIDNEYS/URETERS: Within normal limits.    BLADDER: Within normal limits.  REPRODUCTIVE ORGANS: Prostate within normal limits.    BOWEL: PEG tube in the stomach. Several dilated air-fluid filled loops of   small bowel with transition point to collapsed bowel in the right lower   quadrant (series 2, image 83). Mild wall thickening of the small bowel   loops proximal to the transitionpoint with evidence of focal pneumatosis   intestinalis (2, 86). There is also gastric pneumatosis and mild portal   venous gas.  PERITONEUM: Small volume ascites and mesenteric edema.  VESSELS: Atherosclerotic changes.  RETROPERITONEUM/LYMPH NODES: No lymphadenopathy.  ABDOMINAL WALL: Subcutaneous edema.  BONES: Degenerative changes.    IMPRESSION:  Small bowel obstruction with transition point in the right lower   quadrant, with additional findings highly suspicious for bowel ischemia.    Partially imaged 4.3 cm air-fluid collection in the left lower lobe and   adjacent rim-enhancing pleural effusion, suspicious for lung   abscess/empyema. Dedicated chest CT can be performed for further   evaluation.    Findings were discussed with ALIZE LANDAVERDE on  2022 4:47 PM by Dr. Tamayo with read back confirmation.    --- End of Report ---            JUSTIN TAMYAO MD; Attending Radiologist  This document has been electronically signed. 2022  5:20PM    < end of copied text >      Urinalysis Basic - ( 2022 10:46 )    Color: Yellow / Appearance: Clear / S.015 / pH: x  Gluc: x / Ketone: Negative  / Bili: Small / Urobili: 4   Blood: x / Protein: 30 mg/dL / Nitrite: Negative   Leuk Esterase: Trace / RBC: 0-2 /HPF / WBC 11-25 /HPF   Sq Epi: x / Non Sq Epi: Few /HPF / Bacteria: See Note /HPF      GOALS OF CARE DISCUSSION WITH PATIENT/FAMILY/PROXY: DNR    CRITICAL CARE TIME SPENT: 35 minutes INTERVAL HPI/OVERNIGHT EVENTS: Pt was seen and assessed at bedside. Pt is placed on 4L O2 through trach collar. Levophed requirements are increasing again. Thoracic surgery was consulted for possible intervention.    PRESSORS: [x ] YES [ ] NO  WHICH: Pheny    ANTIBIOTICS:                      Antimicrobial:  piperacillin/tazobactam IVPB.. 3.375 Gram(s) IV Intermittent every 8 hours    Cardiovascular:  midodrine. 2.5 milliGRAM(s) Oral three times a day    Pulmonary:    Hematalogic:  enoxaparin Injectable 40 milliGRAM(s) SubCutaneous daily    Other:  acetaminophen    Suspension .. 650 milliGRAM(s) Oral every 6 hours PRN  acetaminophen  Suppository .. 650 milliGRAM(s) Rectal every 6 hours PRN  bisacodyl Suppository 10 milliGRAM(s) Rectal daily PRN  chlorhexidine 2% Cloths 1 Application(s) Topical <User Schedule>  glucagon  Injectable 0.5 milliGRAM(s) IV Push once  insulin lispro (ADMELOG) corrective regimen sliding scale   SubCutaneous every 6 hours  pantoprazole  Injectable 40 milliGRAM(s) IV Push two times a day  QUEtiapine 12.5 milliGRAM(s) Oral at bedtime  sodium chloride 0.9%. 1000 milliLiter(s) IV Continuous <Continuous>    acetaminophen    Suspension .. 650 milliGRAM(s) Oral every 6 hours PRN  acetaminophen  Suppository .. 650 milliGRAM(s) Rectal every 6 hours PRN  bisacodyl Suppository 10 milliGRAM(s) Rectal daily PRN  chlorhexidine 2% Cloths 1 Application(s) Topical <User Schedule>  enoxaparin Injectable 40 milliGRAM(s) SubCutaneous daily  glucagon  Injectable 0.5 milliGRAM(s) IV Push once  insulin lispro (ADMELOG) corrective regimen sliding scale   SubCutaneous every 6 hours  midodrine. 2.5 milliGRAM(s) Oral three times a day  pantoprazole  Injectable 40 milliGRAM(s) IV Push two times a day  piperacillin/tazobactam IVPB.. 3.375 Gram(s) IV Intermittent every 8 hours  QUEtiapine 12.5 milliGRAM(s) Oral at bedtime  sodium chloride 0.9%. 1000 milliLiter(s) IV Continuous <Continuous>    Drug Dosing Weight  Height (cm): 175.3 (20 Dec 2021 02:21)  Weight (kg): 72.5 (20 Dec 2021 06:30)  BMI (kg/m2): 23.6 (20 Dec 2021 06:30)  BSA (m2): 1.88 (20 Dec 2021 06:30)    CENTRAL LINE: [ ] YES [x ] NO  LOCATION:   DATE INSERTED:  REMOVE: [ ] YES [ ] NO  EXPLAIN:    HUTCHINS: [ ] YES [x ] NO    DATE INSERTED:  REMOVE:  [ ] YES [ ] NO  EXPLAIN:    A-LINE:  [ ] YES [x ] NO  LOCATION:   DATE INSERTED:  REMOVE:  [ ] YES [ ] NO  EXPLAIN:    PMH -reviewed admission note, no change since admission    ICU Vital Signs Last 24 Hrs  T(C): 39.1 (2022 16:55), Max: 39.8 (2022 19:34)  T(F): 102.4 (2022 16:55), Max: 103.6 (2022 19:34)  HR: 109 (2022 16:55) (90 - 119)  BP: 71/55 (2022 16:48) (70/40 - 146/113)  BP(mean): 59 (2022 16:48) (52 - 121)  ABP: --  ABP(mean): --  RR: 19 (2022 16:55) (18 - 27)  SpO2: 98% (2022 16:48) (86% - 100%)      Mode: AC/ CMV (Assist Control/ Continuous Mandatory Ventilation)  RR (machine): 14  TV (machine): 450  FiO2: 40  PEEP: 5  ITime: 0.9  MAP: 11  PIP: 26      PHYSICAL EXAM:    GENERAL: lethargic + trach   NECK:  contracted  NERVOUS SYSTEM:  Alert & Oriented 1, opening eyes only to verbal and tactile stimulus  CHEST/LUNG: No chest deformity; Normal percussion bilaterally; No rales, rhonchi, wheezing   HEART: Regular rate and rhythm; No murmurs, rubs, or gallops  ABDOMEN: abdomen tender on minimal palpation + PEG  EXTREMITIES:  2+ Peripheral Pulses, No clubbing, cyanosis, or edema  LYMPH: No lymphadenopathy noted  SKIN: No rashes or lesions; Good capillary refill      LABS:  CBC Full  -  ( 2022 09:30 )  WBC Count : 19.08 K/uL  RBC Count : 4.04 M/uL  Hemoglobin : 11.3 g/dL  Hematocrit : 36.8 %  Platelet Count - Automated : 211 K/uL  Mean Cell Volume : 91.1 fl  Mean Cell Hemoglobin : 28.0 pg  Mean Cell Hemoglobin Concentration : 30.7 gm/dL  Auto Neutrophil # : 17.52 K/uL  Auto Lymphocyte # : 0.79 K/uL  Auto Monocyte # : 0.43 K/uL  Auto Eosinophil # : 0.04 K/uL  Auto Basophil # : 0.13 K/uL  Auto Neutrophil % : 91.8 %  Auto Lymphocyte % : 4.1 %  Auto Monocyte % : 2.3 %  Auto Eosinophil % : 0.2 %  Auto Basophil % : 0.7 %        136  |  104  |  29<H>  ----------------------------<  259<H>  5.0   |  20<L>  |  1.28    Ca    9.4      2022 09:30  Phos  3.9       Mg     2.3         TPro  5.7<L>  /  Alb  1.1<L>  /  TBili  0.6  /  DBili  x   /  AST  54<H>  /  ALT  13  /  AlkPhos  133<H>      Ct abd / pelvis:< from: CT Abdomen and Pelvis w/ IV Cont (22 @ 16:12) >    ACC: [id number] EXAM:  CT ABDOMEN AND PELVIS IC                          PROCEDURE DATE:  2022          INTERPRETATION:  CLINICAL INFORMATION: Cardiac arrest. Respiratory   failure. Sepsis. Status post PEG tube placement.    COMPARISON: CT abdomen pelvis 2022.    CONTRAST/COMPLICATIONS:  IV Contrast: Omnipaque 350  90 cc administered   10 cc discarded  Oral Contrast: NONE  Complications: None reported at time of study completion    PROCEDURE:  CT of the Abdomen and Pelvis was performed.  Sagittal and coronal reformats were performed.    FINDINGS:  LOWER CHEST: Redemonstration of a cavitary lesion in the right middle   lobe. Redemonstration of secretions in the left lower lobe bronchi with   complete collapse of the left lower lobe with heterogeneous   opacification. Partially imaged 4.3 x 2.8 cm air-fluid collection in the   left lower lobe. There is a small loculated rim-enhancing pleural   effusion.    LIVER: Within normal limits.  BILE DUCTS: Normal caliber.  GALLBLADDER: Within normal limits.  SPLEEN: Within normal limits.  PANCREAS: Within normal limits.  ADRENALS: Within normal limits.  KIDNEYS/URETERS: Within normal limits.    BLADDER: Within normal limits.  REPRODUCTIVE ORGANS: Prostate within normal limits.    BOWEL: PEG tube in the stomach. Several dilated air-fluid filled loops of   small bowel with transition point to collapsed bowel in the right lower   quadrant (series 2, image 83). Mild wall thickening of the small bowel   loops proximal to the transitionpoint with evidence of focal pneumatosis   intestinalis (2, 86). There is also gastric pneumatosis and mild portal   venous gas.  PERITONEUM: Small volume ascites and mesenteric edema.  VESSELS: Atherosclerotic changes.  RETROPERITONEUM/LYMPH NODES: No lymphadenopathy.  ABDOMINAL WALL: Subcutaneous edema.  BONES: Degenerative changes.    IMPRESSION:  Small bowel obstruction with transition point in the right lower   quadrant, with additional findings highly suspicious for bowel ischemia.    Partially imaged 4.3 cm air-fluid collection in the left lower lobe and   adjacent rim-enhancing pleural effusion, suspicious for lung   abscess/empyema. Dedicated chest CT can be performed for further   evaluation.    Findings were discussed with ALIZE LANDAVERDE on  2022 4:47 PM by Dr. Tamayo with read back confirmation.    --- End of Report ---            JUSTIN TAMAYO MD; Attending Radiologist  This document has been electronically signed. 2022  5:20PM    < end of copied text >      Urinalysis Basic - ( 2022 10:46 )    Color: Yellow / Appearance: Clear / S.015 / pH: x  Gluc: x / Ketone: Negative  / Bili: Small / Urobili: 4   Blood: x / Protein: 30 mg/dL / Nitrite: Negative   Leuk Esterase: Trace / RBC: 0-2 /HPF / WBC 11-25 /HPF   Sq Epi: x / Non Sq Epi: Few /HPF / Bacteria: See Note /HPF    CXR:    GOALS OF CARE DISCUSSION WITH PATIENT/FAMILY/PROXY: DNR    CRITICAL CARE TIME SPENT: 35 minutes

## 2022-01-21 NOTE — PROGRESS NOTE ADULT - SUBJECTIVE AND OBJECTIVE BOX
ICU VISIT  76y Male is under our care for     REVIEW OF SYSTEMS:  [  ] Not able to elicit  General:	  Chest:	  GI:	  :  Skin:	  Musculoskeletal:	  Neuro:	    MEDS:  piperacillin/tazobactam IVPB.. 3.375 Gram(s) IV Intermittent every 8 hours    ALLERGIES: Allergies    No Known Allergies    Intolerances        VITALS:  Vital Signs Last 24 Hrs  T(C): 36.8 (21 Jan 2022 10:45), Max: 37.7 (20 Jan 2022 16:45)  T(F): 98.2 (21 Jan 2022 10:45), Max: 99.9 (20 Jan 2022 16:45)  HR: 101 (21 Jan 2022 10:45) (74 - 126)  BP: 105/57 (21 Jan 2022 10:45) (80/53 - 154/64)  BP(mean): 68 (21 Jan 2022 10:45) (57 - 94)  RR: 31 (21 Jan 2022 10:45) (16 - 34)  SpO2: 98% (21 Jan 2022 10:45) (96% - 100%)      PHYSICAL EXAM:  HEENT:  Neck:  Respiratory:  Cardiovascular:  Gastrointestinal:  Extremities:  Skin:  Ortho:  Neuro:    LABS/DIAGNOSTIC TESTS:                        8.2    11.59 )-----------( 171      ( 21 Jan 2022 04:16 )             24.4     WBC Count: 11.59 K/uL (01-21 @ 04:16)  WBC Count: 10.88 K/uL (01-20 @ 04:27)  WBC Count: 12.28 K/uL (01-19 @ 04:02)  WBC Count: 19.08 K/uL (01-18 @ 09:30)  WBC Count: 12.65 K/uL (01-17 @ 22:16)    01-21    143  |  114<H>  |  40<H>  ----------------------------<  92  3.5   |  21<L>  |  0.82    Ca    8.7      21 Jan 2022 04:16  Phos  3.6     01-21  Mg     1.9     01-21    TPro  4.2<L>  /  Alb  0.8<L>  /  TBili  0.4  /  DBili  x   /  AST  19  /  ALT  8<L>  /  AlkPhos  86  01-21      CULTURES:   .Blood Blood  01-18 @ 02:51   No growth to date.  --  --      .Blood Blood  01-18 @ 02:50   No growth to date.  --  --      .Sputum Sputum  12-29 @ 18:28   Normal Respiratory Karla present  --    Numerous polymorphonuclear leukocytes per low power field  No squamous epithelial cells per low power field  Few Gram positive cocci in pairs per oil power field      Clean Catch Clean Catch (Midstream)  12-21 @ 04:42   No growth  --  --      .Sputum Sputum  11-08 @ 10:53   Few Mycobacterium avium complex  --  --      .Sputum Sputum  11-06 @ 19:34   No acid fast bacilli isolated after 6 weeks.  --  --      .Sputum Sputum  11-05 @ 23:15   Growth of acid fast bacilli detected in broth,  Mycobacterium avium complex by Dna Probe  --  --      .Blood Blood  11-04 @ 11:23   No Growth Final  --  --      .Blood Blood  11-04 @ 10:50   No Growth Final  --  --      Clean Catch Clean Catch (Midstream)  11-03 @ 17:00   <10,000 CFU/mL Normal Urogenital Karla  --  --        RADIOLOGY:  no new studies ICU VISIT  76y Male is under our care for fevers.  Patient was seen in the ICU laying comfortably in bed with no acute distress.  He remains on one pressor with a blood pressure of 115/61.  Patient remains afebrile with slightly elevated WBC count.    REVIEW OF SYSTEMS:  [ x ] Not able to elicit      MEDS:  piperacillin/tazobactam IVPB.. 3.375 Gram(s) IV Intermittent every 8 hours    ALLERGIES: Allergies    No Known Allergies    Intolerances        VITALS:  Vital Signs Last 24 Hrs  T(C): 36.8 (21 Jan 2022 10:45), Max: 37.7 (20 Jan 2022 16:45)  T(F): 98.2 (21 Jan 2022 10:45), Max: 99.9 (20 Jan 2022 16:45)  HR: 101 (21 Jan 2022 10:45) (74 - 126)  BP: 105/57 (21 Jan 2022 10:45) (80/53 - 154/64)  BP(mean): 68 (21 Jan 2022 10:45) (57 - 94)  RR: 31 (21 Jan 2022 10:45) (16 - 34)  SpO2: 98% (21 Jan 2022 10:45) (96% - 100%)      PHYSICAL EXAM:  HEENT: trach+  Neck: supple no LN's   Respiratory: lungs clear no rales  Cardiovascular: S1 S2 reg no murmurs  Gastrointestinal: +BS with soft, nondistended abdomen; nontender, peg tube in place  : Freedman catheter in place with yellow urine  Extremities: Right hand edema +2 , left fem line +  Skin: no rashes  Ortho: n/a  Neuro: Awake and alert    LABS/DIAGNOSTIC TESTS:                        8.2    11.59 )-----------( 171      ( 21 Jan 2022 04:16 )             24.4     WBC Count: 11.59 K/uL (01-21 @ 04:16)  WBC Count: 10.88 K/uL (01-20 @ 04:27)  WBC Count: 12.28 K/uL (01-19 @ 04:02)  WBC Count: 19.08 K/uL (01-18 @ 09:30)  WBC Count: 12.65 K/uL (01-17 @ 22:16)    01-21    143  |  114<H>  |  40<H>  ----------------------------<  92  3.5   |  21<L>  |  0.82    Ca    8.7      21 Jan 2022 04:16  Phos  3.6     01-21  Mg     1.9     01-21    TPro  4.2<L>  /  Alb  0.8<L>  /  TBili  0.4  /  DBili  x   /  AST  19  /  ALT  8<L>  /  AlkPhos  86  01-21      CULTURES:   .Blood Blood  01-18 @ 02:51   No growth to date.  --  --      .Blood Blood  01-18 @ 02:50   No growth to date.  --  --      .Sputum Sputum  12-29 @ 18:28   Normal Respiratory Karla present  --    Numerous polymorphonuclear leukocytes per low power field  No squamous epithelial cells per low power field  Few Gram positive cocci in pairs per oil power field      Clean Catch Clean Catch (Midstream)  12-21 @ 04:42   No growth  --  --      .Sputum Sputum  11-08 @ 10:53   Few Mycobacterium avium complex  --  --      .Sputum Sputum  11-06 @ 19:34   No acid fast bacilli isolated after 6 weeks.  --  --      .Sputum Sputum  11-05 @ 23:15   Growth of acid fast bacilli detected in broth,  Mycobacterium avium complex by Dna Probe  --  --      .Blood Blood  11-04 @ 11:23   No Growth Final  --  --      .Blood Blood  11-04 @ 10:50   No Growth Final  --  --      Clean Catch Clean Catch (Midstream)  11-03 @ 17:00   <10,000 CFU/mL Normal Urogenital Karla  --  --        RADIOLOGY:  no new studies

## 2022-01-21 NOTE — PROGRESS NOTE ADULT - ASSESSMENT
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 Patient was       #Septic shock  #SBO  #Lung abscess  #Encephalopathy  # AHRF s/p trach and PEG    ====CVS====  #Septic shock   c/w pressors   maintain MAP>65  Lactate trending down 6.1 > 3 > 2.8  Levo requirements decreased from 0.18 > 0.12    ====Pulm====  #Acute hypoxic respiratory failure  -1/9 Febrile, Tmax 101.4F. F/u CXR. TOV. NPO after midnight and hold lovenox for GT placement in IR in am.   1/12/22 G tube placed in IR  1/14 L Pleural Pigtail catheter removed  1/15 tolerating SBT x4 hrs but with copious secretions requiring frequent suctioning; G tube clogged, initial attempts at dislodging unsuccessful in AM, noted to have a cluster of small capsule beads. Attempts to dissolve with ginger ale, unsuccessful. This evening, successfully unclogged Gastrostomy tube. TF restarted.  1/16- No acute overnight events  1/17  Code sepsis  Lactate 5.3 received 2.1 liter of NS. Repeat lactate 3.2 Blood and urine cultures sent.  1/18  Lactate 6.1  IVF increased to 125cc/hr. Abdominal / Pelvis CT with contrast. B/P low started on midodrine. Pt to be transferred to ICU after CT scan  Started on pressors and c/w zosyn  Pt has possible lung abscess.  Pt requiring volume assisted control for breathing  Will attempt to place on pressure support     #Lung abscess  Patient was found to have b/l lung abscess on CT   c/ zosyn  f/u cx    ====GI====  SBO  patient was found to have SBO on CT abdomen  NPO for now  Surgery consult for SBO  c/w Zosyn      ====nephro====  Lactic acidosis 2/2 sepsis  Monitor lactate    ====ID====  Septic shock  Likely 2/2 lung abscess on CT   Small bowel obstruction with transition point in the right lower   quadrant, with additional findings highly suspicious for bowel ischemia.  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 Patient was       #Septic shock  #SBO  #Lung abscess  #Encephalopathy  # AHRF s/p trach and PEG    ====CVS====  #Septic shock   c/w pressors   maintain MAP>65  Lactate trending down 6.1 > 3 > 2.8  Levo requirements increasing    ====Pulm====  #Acute hypoxic respiratory failure  -1/9 Febrile, Tmax 101.4F. F/u CXR. TOV. NPO after midnight and hold lovenox for GT placement in IR in am.   1/12/22 G tube placed in IR  1/14 L Pleural Pigtail catheter removed  1/15 tolerating SBT x4 hrs but with copious secretions requiring frequent suctioning; G tube clogged, initial attempts at dislodging unsuccessful in AM, noted to have a cluster of small capsule beads. Attempts to dissolve with ginger ale, unsuccessful. This evening, successfully unclogged Gastrostomy tube. TF restarted.  1/16- No acute overnight events  1/17  Code sepsis  Lactate 5.3 received 2.1 liter of NS. Repeat lactate 3.2 Blood and urine cultures sent.  1/18  Lactate 6.1  IVF increased to 125cc/hr. Abdominal / Pelvis CT with contrast. B/P low started on midodrine. Pt to be transferred to ICU after CT scan  Started on pressors and c/w zosyn  Pt has possible lung abscess.  Pt requiring volume assisted control for breathing  Will attempt to place on pressure support   Dr Rios thoracic surgery consulted    #Lung abscess  Patient was found to have b/l lung abscess on CT   c/ zosyn  f/u cx    ====GI====  SBO  patient was found to have SBO on CT abdomen  NPO for now  Surgery consult for SBO  c/w Zosyn      ====nephro====  Lactic acidosis 2/2 sepsis  Monitor lactate    ====ID====  Septic shock  Likely 2/2 lung abscess on CT   Small bowel obstruction with transition point in the right lower   quadrant, with additional findings highly suspicious for bowel ischemia.  c/w Zosyn  f/u culture  Will start on central pressors   Monitor Vitals, maintain MAp>65  lactate was elevated to 6.1 s/p 3 bolus > 3 > 2.8  s/p 3 L bolus  Monitor Urine output        ====Heme/onc====  #Metastatic SCC of tongue base  - s/p resection, chemo/radiation, and immunotherapy (10/2021)  - palliative, s/p trach and peg    ====Endo====  #DM  - a1c 6.3  - c/w sliding scale    ====Ppx====  #DVT: Lovenox HOLDing pre op  #GI: PPI

## 2022-01-21 NOTE — PROGRESS NOTE ADULT - ASSESSMENT
77 yo M with complicated hospital course with recent code sepsis, hypotensive, hypoxia now with resolving SBO. On pressors  abdominal exam benign     -PEG tube to gravity  -no surgical intervention warranted at this time  -Serial abd exams  -monitor bowel function  -Remainder of care per primary team

## 2022-01-21 NOTE — PROGRESS NOTE ADULT - SUBJECTIVE AND OBJECTIVE BOX
INTERVAL HPI/OVERNIGHT EVENT  Pt seen and examined at bedside  resting comfortably  peg to gravity  on pressors    MEDICATIONS  (STANDING):  chlorhexidine 2% Cloths 1 Application(s) Topical <User Schedule>  enoxaparin Injectable 40 milliGRAM(s) SubCutaneous daily  glucagon  Injectable 0.5 milliGRAM(s) IV Push once  insulin lispro (ADMELOG) corrective regimen sliding scale   SubCutaneous every 6 hours  midodrine. 2.5 milliGRAM(s) Oral three times a day  norepinephrine Infusion 0.22 MICROgram(s)/kG/Min (14.9 mL/Hr) IV Continuous <Continuous>  pantoprazole  Injectable 40 milliGRAM(s) IV Push two times a day  piperacillin/tazobactam IVPB.. 3.375 Gram(s) IV Intermittent every 8 hours  potassium chloride  20 mEq/100 mL IVPB 20 milliEquivalent(s) IV Intermittent every 2 hours  QUEtiapine 12.5 milliGRAM(s) Oral at bedtime  sodium chloride 0.9%. 1000 milliLiter(s) (125 mL/Hr) IV Continuous <Continuous>    MEDICATIONS  (PRN):  acetaminophen  Suppository .. 650 milliGRAM(s) Rectal every 6 hours PRN Temp greater or equal to 38C (100.4F)  bisacodyl Suppository 10 milliGRAM(s) Rectal daily PRN Constipation    Vital Signs Last 24 Hrs  T(C): 36.7 (21 Jan 2022 08:15), Max: 37.7 (20 Jan 2022 16:45)  T(F): 98.1 (21 Jan 2022 08:15), Max: 99.9 (20 Jan 2022 16:45)  HR: 87 (21 Jan 2022 08:20) (74 - 126)  BP: 112/59 (21 Jan 2022 08:15) (80/53 - 134/60)  BP(mean): 70 (21 Jan 2022 08:15) (57 - 94)  RR: 18 (21 Jan 2022 08:15) (16 - 34)  SpO2: 100% (21 Jan 2022 08:20) (96% - 100%)    Physical:  General: awake, alert. NAD  Abdomen: Soft nondistended, nontender. peg to gravity     I&O's Detail    20 Jan 2022 07:01  -  21 Jan 2022 07:00  --------------------------------------------------------  IN:    IV PiggyBack: 300 mL    Norepinephrine: 126.5 mL    sodium chloride 0.9%: 2875 mL  Total IN: 3301.5 mL    OUT:    Incontinent per Condom Catheter (mL): 1600 mL    PEG (Percutaneous Endoscopic Gastrostomy) Tube (mL): 115 mL  Total OUT: 1715 mL    Total NET: 1586.5 mL    LABS:                        8.2    11.59 )-----------( 171      ( 21 Jan 2022 04:16 )             24.4             01-21    143  |  114<H>  |  40<H>  ----------------------------<  92  3.5   |  21<L>  |  0.82    Ca    8.7      21 Jan 2022 04:16  Phos  3.6     01-21  Mg     1.9     01-21    TPro  4.2<L>  /  Alb  0.8<L>  /  TBili  0.4  /  DBili  x   /  AST  19  /  ALT  8<L>  /  AlkPhos  86  01-21

## 2022-01-22 NOTE — PROGRESS NOTE ADULT - SUBJECTIVE AND OBJECTIVE BOX
INTERVAL HPI/OVERNIGHT EVENTS:  Pt seen and examined at bedside  resting comfortably  peg to gravity  on pressors    MEDICATIONS  (STANDING):  chlorhexidine 2% Cloths 1 Application(s) Topical <User Schedule>  dextrose 5% + sodium chloride 0.9%. 1000 milliLiter(s) (75 mL/Hr) IV Continuous <Continuous>  enoxaparin Injectable 40 milliGRAM(s) SubCutaneous daily  glucagon  Injectable 0.5 milliGRAM(s) IV Push once  insulin lispro (ADMELOG) corrective regimen sliding scale   SubCutaneous every 6 hours  midodrine. 5 milliGRAM(s) Oral three times a day  norepinephrine Infusion 0.22 MICROgram(s)/kG/Min (14.9 mL/Hr) IV Continuous <Continuous>  pantoprazole  Injectable 40 milliGRAM(s) IV Push two times a day  piperacillin/tazobactam IVPB.. 3.375 Gram(s) IV Intermittent every 8 hours  QUEtiapine 12.5 milliGRAM(s) Oral at bedtime  sodium chloride 0.9%. 1000 milliLiter(s) (125 mL/Hr) IV Continuous <Continuous>    MEDICATIONS  (PRN):  acetaminophen  Suppository .. 650 milliGRAM(s) Rectal every 6 hours PRN Temp greater or equal to 38C (100.4F)  bisacodyl Suppository 10 milliGRAM(s) Rectal daily PRN Constipation      Vital Signs Last 24 Hrs  T(C): 36.9 (22 Jan 2022 08:45), Max: 37.3 (21 Jan 2022 15:15)  T(F): 98.4 (22 Jan 2022 08:45), Max: 99.1 (21 Jan 2022 15:15)  HR: 88 (22 Jan 2022 08:49) (81 - 115)  BP: 115/66 (22 Jan 2022 08:45) (85/60 - 129/79)  BP(mean): 79 (22 Jan 2022 08:45) (60 - 92)  RR: 20 (22 Jan 2022 08:45) (17 - 34)  SpO2: 99% (22 Jan 2022 08:49) (74% - 100%)    Physical:  General: awake, alert. NAD  Abdomen: Soft nondistended, nontender. peg to gravity     I&O's Detail    21 Jan 2022 07:01  -  22 Jan 2022 07:00  --------------------------------------------------------  IN:    dextrose 5% + sodium chloride 0.9%: 675 mL    IV PiggyBack: 250 mL    IV PiggyBack: 300 mL    Norepinephrine: 101.2 mL    sodium chloride 0.9%: 1875 mL  Total IN: 3201.2 mL    OUT:    Incontinent per Condom Catheter (mL): 1175 mL    PEG (Percutaneous Endoscopic Gastrostomy) Tube (mL): 12 mL  Total OUT: 1187 mL    Total NET: 2014.2 mL      LABS:                        8.3    9.23  )-----------( 170      ( 22 Jan 2022 05:52 )             24.7             01-22    145  |  116<H>  |  36<H>  ----------------------------<  105<H>  3.8   |  20<L>  |  0.81    Ca    8.8      22 Jan 2022 05:52  Phos  3.1     01-22  Mg     1.9     01-22    TPro  4.4<L>  /  Alb  0.9<L>  /  TBili  0.5  /  DBili  x   /  AST  15  /  ALT  <6<L>  /  AlkPhos  86  01-22

## 2022-01-22 NOTE — PROGRESS NOTE ADULT - ASSESSMENT
75 yo M with complicated hospital course with recent code sepsis, hypotensive, hypoxia now with resolving SBO. On pressors  abdominal exam benign     -PEG tube to gravity  -no surgical intervention warranted at this time  -Remainder of care per primary team

## 2022-01-22 NOTE — PROGRESS NOTE ADULT - SUBJECTIVE AND OBJECTIVE BOX
INTERVAL HPI/OVERNIGHT EVENTS: No overnight events.    PRESSORS: [x] YES [ ] NO  WHICH: levophed    Antimicrobial:  piperacillin/tazobactam IVPB.. 3.375 Gram(s) IV Intermittent every 8 hours    Cardiovascular:  midodrine. 2.5 milliGRAM(s) Oral three times a day  norepinephrine Infusion 0.22 MICROgram(s)/kG/Min IV Continuous <Continuous>    Pulmonary:    Hematalogic:  enoxaparin Injectable 40 milliGRAM(s) SubCutaneous daily    Other:  acetaminophen  Suppository .. 650 milliGRAM(s) Rectal every 6 hours PRN  bisacodyl Suppository 10 milliGRAM(s) Rectal daily PRN  chlorhexidine 2% Cloths 1 Application(s) Topical <User Schedule>  dextrose 5% + sodium chloride 0.9%. 1000 milliLiter(s) IV Continuous <Continuous>  dextrose 50% Injectable 50 milliLiter(s) IV Push once  glucagon  Injectable 0.5 milliGRAM(s) IV Push once  insulin lispro (ADMELOG) corrective regimen sliding scale   SubCutaneous every 6 hours  pantoprazole  Injectable 40 milliGRAM(s) IV Push two times a day  QUEtiapine 12.5 milliGRAM(s) Oral at bedtime  sodium chloride 0.9%. 1000 milliLiter(s) IV Continuous <Continuous>    acetaminophen  Suppository .. 650 milliGRAM(s) Rectal every 6 hours PRN  bisacodyl Suppository 10 milliGRAM(s) Rectal daily PRN  chlorhexidine 2% Cloths 1 Application(s) Topical <User Schedule>  dextrose 5% + sodium chloride 0.9%. 1000 milliLiter(s) IV Continuous <Continuous>  dextrose 50% Injectable 50 milliLiter(s) IV Push once  enoxaparin Injectable 40 milliGRAM(s) SubCutaneous daily  glucagon  Injectable 0.5 milliGRAM(s) IV Push once  insulin lispro (ADMELOG) corrective regimen sliding scale   SubCutaneous every 6 hours  midodrine. 2.5 milliGRAM(s) Oral three times a day  norepinephrine Infusion 0.22 MICROgram(s)/kG/Min IV Continuous <Continuous>  pantoprazole  Injectable 40 milliGRAM(s) IV Push two times a day  piperacillin/tazobactam IVPB.. 3.375 Gram(s) IV Intermittent every 8 hours  QUEtiapine 12.5 milliGRAM(s) Oral at bedtime  sodium chloride 0.9%. 1000 milliLiter(s) IV Continuous <Continuous>    Drug Dosing Weight  Height (cm): 175.3 (20 Dec 2021 02:21)  Weight (kg): 72.2 (18 Jan 2022 17:55)  BMI (kg/m2): 23.5 (18 Jan 2022 17:55)  BSA (m2): 1.87 (18 Jan 2022 17:55)    CENTRAL LINE: [ ] YES [x ] NO  LOCATION:   DATE INSERTED:  REMOVE: [ ] YES [ ] NO  EXPLAIN:    HUTCHINS: [ ] YES [x ] NO    DATE INSERTED:  REMOVE:  [ ] YES [ ] NO  EXPLAIN:    A-LINE:  [ ] YES [x ] NO  LOCATION:   DATE INSERTED:  REMOVE:  [ ] YES [ ] NO  EXPLAIN:    ICU Vital Signs Last 24 Hrs  T(C): 36.6 (22 Jan 2022 03:00), Max: 37.3 (21 Jan 2022 15:15)  T(F): 97.9 (22 Jan 2022 03:00), Max: 99.1 (21 Jan 2022 15:15)  HR: 86 (22 Jan 2022 03:00) (74 - 119)  BP: 114/59 (22 Jan 2022 03:00) (83/50 - 154/64)  BP(mean): 72 (22 Jan 2022 03:00) (57 - 92)  ABP: --  ABP(mean): --  RR: 21 (22 Jan 2022 03:00) (16 - 34)  SpO2: 100% (22 Jan 2022 03:00) (74% - 100%)      01-20 @ 07:01  -  01-21 @ 07:00  --------------------------------------------------------  IN: 3301.5 mL / OUT: 1715 mL / NET: 1586.5 mL        Mode: AC/ CMV (Assist Control/ Continuous Mandatory Ventilation)  RR (machine): 14  TV (machine): 450  FiO2: 40  PEEP: 5  ITime: 0.9  MAP: 13  PIP: 24        PHYSICAL EXAM:  GENERAL: lethargic + trach   NECK:  contracted  NERVOUS SYSTEM:  Alert & Oriented 1, opening eyes only to verbal and tactile stimulus  CHEST/LUNG: No chest deformity; Normal percussion bilaterally; No rales, rhonchi, wheezing   HEART: Regular rate and rhythm; No murmurs, rubs, or gallops  ABDOMEN: abdomen tender on minimal palpation + PEG  EXTREMITIES:  2+ Peripheral Pulses, No clubbing, cyanosis, or edema  LYMPH: No lymphadenopathy noted  SKIN: No rashes or lesions; Good capillary refill      LABS:  CBC Full  -  ( 21 Jan 2022 04:16 )  WBC Count : 11.59 K/uL  RBC Count : 2.88 M/uL  Hemoglobin : 8.2 g/dL  Hematocrit : 24.4 %  Platelet Count - Automated : 171 K/uL  Mean Cell Volume : 84.7 fl  Mean Cell Hemoglobin : 28.5 pg  Mean Cell Hemoglobin Concentration : 33.6 gm/dL  Auto Neutrophil # : 10.76 K/uL  Auto Lymphocyte # : 0.32 K/uL  Auto Monocyte # : 0.16 K/uL  Auto Eosinophil # : 0.21 K/uL  Auto Basophil # : 0.05 K/uL  Auto Neutrophil % : 92.8 %  Auto Lymphocyte % : 2.8 %  Auto Monocyte % : 1.4 %  Auto Eosinophil % : 1.8 %  Auto Basophil % : 0.4 %    01-21    143  |  114<H>  |  40<H>  ----------------------------<  92  3.5   |  21<L>  |  0.82    Ca    8.7      21 Jan 2022 04:16  Phos  3.6     01-21  Mg     1.9     01-21    TPro  4.2<L>  /  Alb  0.8<L>  /  TBili  0.4  /  DBili  x   /  AST  19  /  ALT  8<L>  /  AlkPhos  86  01-21            RADIOLOGY & ADDITIONAL STUDIES REVIEWED:  ***    [ ]GOALS OF CARE DISCUSSION WITH PATIENT/FAMILY/PROXY:    CRITICAL CARE TIME SPENT: 35 minutes

## 2022-01-22 NOTE — PROGRESS NOTE ADULT - ASSESSMENT
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 Patient was       #Septic shock  #SBO  #Lung abscess  #Encephalopathy  # AHRF s/p trach and PEG    ====CVS====  #Septic shock   c/w pressors   maintain MAP>65  Lactate trending down 6.1 > 3 > 2.8  Levo requirements increasing    ====Pulm====  #Acute hypoxic respiratory failure  -1/9 Febrile, Tmax 101.4F. F/u CXR. TOV. NPO after midnight and hold lovenox for GT placement in IR in am.   1/12/22 G tube placed in IR  1/14 L Pleural Pigtail catheter removed  1/15 tolerating SBT x4 hrs but with copious secretions requiring frequent suctioning; G tube clogged, initial attempts at dislodging unsuccessful in AM, noted to have a cluster of small capsule beads. Attempts to dissolve with ginger ale, unsuccessful. This evening, successfully unclogged Gastrostomy tube. TF restarted.  1/16- No acute overnight events  1/17  Code sepsis  Lactate 5.3 received 2.1 liter of NS. Repeat lactate 3.2 Blood and urine cultures sent.  1/18  Lactate 6.1  IVF increased to 125cc/hr. Abdominal / Pelvis CT with contrast. B/P low started on midodrine. Pt to be transferred to ICU after CT scan  Started on pressors and c/w zosyn  Pt has possible lung abscess.  Pt requiring volume assisted control for breathing  Will attempt to place on pressure support   Dr Rios thoracic surgery consulted    #Lung abscess  Patient was found to have b/l lung abscess on CT   c/ zosyn  f/u cx    ====GI====  SBO  patient was found to have SBO on CT abdomen  NPO for now  Surgery consult for SBO  c/w Zosyn      ====nephro====  Lactic acidosis 2/2 sepsis  Monitor lactate    ====ID====  Septic shock  Likely 2/2 lung abscess on CT   Small bowel obstruction with transition point in the right lower   quadrant, with additional findings highly suspicious for bowel ischemia.  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 Patient was       #Septic shock  #SBO  #Lung abscess  #Encephalopathy  # AHRF s/p trach and PEG    ====CVS====  #Septic shock   c/w pressors   maintain MAP>65  Lactate trending down   midodrine 5mg q8; tapered off levo    ====Pulm====  #Acute hypoxic respiratory failure  -1/9 Febrile, Tmax 101.4F. F/u CXR. TOV. NPO after midnight and hold lovenox for GT placement in IR in am.   1/12/22 G tube placed in IR  1/14 L Pleural Pigtail catheter removed  1/15 tolerating SBT x4 hrs but with copious secretions requiring frequent suctioning; G tube clogged, initial attempts at dislodging unsuccessful in AM, noted to have a cluster of small capsule beads. Attempts to dissolve with ginger ale, unsuccessful. This evening, successfully unclogged Gastrostomy tube. TF restarted.  1/16- No acute overnight events  1/17  Code sepsis  Lactate 5.3 received 2.1 liter of NS. Repeat lactate 3.2 Blood and urine cultures sent.  1/18  Lactate 6.1  IVF increased to 125cc/hr. Abdominal / Pelvis CT with contrast. B/P low started on midodrine. Pt to be transferred to ICU after CT scan   c/w zosyn  Pt has possible lung abscess.  Dr Rios thoracic surgery consulted    #Lung abscess  Patient was found to have b/l lung abscess on CT   c/w zosyn  f/u cx    ====GI====  SBO  patient was found to have SBO on CT abdomen  NPO for now  Surgery consult for SBO  c/w Zosyn      ====nephro====  Lactic acidosis 2/2 sepsis  Monitor lactate    ====ID====  Septic shock  Likely 2/2 lung abscess on CT   Small bowel obstruction with transition point in the right lower   quadrant, with additional findings highly suspicious for bowel ischemia.  c/w Zosyn  f/u culture  Will start on central pressors   Monitor Vitals, maintain MAp>65  monitor lactate  Monitor Urine output      ====Heme/onc====  #Metastatic SCC of tongue base  - s/p resection, chemo/radiation, and immunotherapy (10/2021)  - palliative, s/p trach and peg    ====Endo====  #DM  - a1c 6.3  - c/w sliding scale    ====Ppx====  #DVT: Lovenox HOLDing pre op  #GI: PPI

## 2022-01-23 NOTE — PROGRESS NOTE ADULT - ASSESSMENT
Septic Shock - resolved  Pneumonia/ lung abscess vs Empyema of left lung  Fevers - resolved  Leukocytosis - normalized    Plan - Cont Zosyn 3.375 gms iv q8hrs  might need a chest tube if he has Empyema    Time spent - 31 mins.

## 2022-01-23 NOTE — PROGRESS NOTE ADULT - SUBJECTIVE AND OBJECTIVE BOX
76y Male    Meds:  piperacillin/tazobactam IVPB.. 3.375 Gram(s) IV Intermittent every 8 hours    Allergies    No Known Allergies    Intolerances        VITALS:  Vital Signs Last 24 Hrs  T(C): 36.8 (23 Jan 2022 19:00), Max: 37.2 (23 Jan 2022 09:00)  T(F): 98.2 (23 Jan 2022 19:00), Max: 99 (23 Jan 2022 09:00)  HR: 85 (23 Jan 2022 19:00) (81 - 100)  BP: 116/63 (23 Jan 2022 19:00) (104/59 - 131/65)  BP(mean): 75 (23 Jan 2022 19:00) (69 - 85)  RR: 17 (23 Jan 2022 19:00) (15 - 30)  SpO2: 100% (23 Jan 2022 19:00) (98% - 100%)    LABS/DIAGNOSTIC TESTS:                          7.9    6.93  )-----------( 163      ( 23 Jan 2022 05:55 )             25.3         01-23    148<H>  |  121<H>  |  29<H>  ----------------------------<  133<H>  3.5   |  21<L>  |  0.78    Ca    9.4      23 Jan 2022 18:05  Phos  2.7     01-23  Mg     1.9     01-23    TPro  4.4<L>  /  Alb  0.9<L>  /  TBili  0.5  /  DBili  x   /  AST  13  /  ALT  <6<L>  /  AlkPhos  79  01-23      LIVER FUNCTIONS - ( 23 Jan 2022 05:55 )  Alb: 0.9 g/dL / Pro: 4.4 g/dL / ALK PHOS: 79 U/L / ALT: <6 U/L DA / AST: 13 U/L / GGT: x             CULTURES: .Blood Blood  01-18 @ 02:51   No Growth Final  --  --      .Blood Blood  01-18 @ 02:50   No Growth Final  --  --              RADIOLOGY:      ROS:  [  ] UNABLE TO ELICIT ICU VISIT  76y Male who remains in the ICU but is doing better clinically , he is off pressors currently, he opens his eyes to tactile stimuli. He is vent dependent on a FIO2 of 40% and PEEP of 5. He has no fevers or diarrhea. He no longer has purulent drainage from tracheostomy site.    Meds:  piperacillin/tazobactam IVPB.. 3.375 Gram(s) IV Intermittent every 8 hours    Allergies    No Known Allergies    Intolerances        VITALS:  Vital Signs Last 24 Hrs  T(C): 36.8 (23 Jan 2022 19:00), Max: 37.2 (23 Jan 2022 09:00)  T(F): 98.2 (23 Jan 2022 19:00), Max: 99 (23 Jan 2022 09:00)  HR: 85 (23 Jan 2022 19:00) (81 - 100)  BP: 116/63 (23 Jan 2022 19:00) (104/59 - 131/65)  BP(mean): 75 (23 Jan 2022 19:00) (69 - 85)  RR: 17 (23 Jan 2022 19:00) (15 - 30)  SpO2: 100% (23 Jan 2022 19:00) (98% - 100%)    LABS/DIAGNOSTIC TESTS:                          7.9    6.93  )-----------( 163      ( 23 Jan 2022 05:55 )             25.3         01-23    148<H>  |  121<H>  |  29<H>  ----------------------------<  133<H>  3.5   |  21<L>  |  0.78    Ca    9.4      23 Jan 2022 18:05  Phos  2.7     01-23  Mg     1.9     01-23    TPro  4.4<L>  /  Alb  0.9<L>  /  TBili  0.5  /  DBili  x   /  AST  13  /  ALT  <6<L>  /  AlkPhos  79  01-23      LIVER FUNCTIONS - ( 23 Jan 2022 05:55 )  Alb: 0.9 g/dL / Pro: 4.4 g/dL / ALK PHOS: 79 U/L / ALT: <6 U/L DA / AST: 13 U/L / GGT: x             CULTURES: .Blood Blood  01-18 @ 02:51   No Growth Final  --  --      .Blood Blood  01-18 @ 02:50   No Growth Final  --  --              RADIOLOGY:      ROS:  [ x ] UNABLE TO ELICIT

## 2022-01-23 NOTE — CHART NOTE - NSCHARTNOTEFT_GEN_A_CORE
PT clinically improving  Exam is benign  BM yesterday  SB distention on CT scan but contrast noted in colon     D/W attending, Ok to start trickle feeds via PEG tube  Cont monitor exam and bowel function  Will follow

## 2022-01-23 NOTE — PROGRESS NOTE ADULT - ASSESSMENT
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 Patient was       #Septic shock  #SBO  #Lung abscess  #Encephalopathy  # AHRF s/p trach and PEG    ====CVS====  #Septic shock   c/w pressors   maintain MAP>65  Lactate trending down   midodrine 5mg q8; tapered off levo    ====Pulm====  #Acute hypoxic respiratory failure  -1/9 Febrile, Tmax 101.4F. F/u CXR. TOV. NPO after midnight and hold lovenox for GT placement in IR in am.   1/12/22 G tube placed in IR  1/14 L Pleural Pigtail catheter removed  1/15 tolerating SBT x4 hrs but with copious secretions requiring frequent suctioning; G tube clogged, initial attempts at dislodging unsuccessful in AM, noted to have a cluster of small capsule beads. Attempts to dissolve with ginger ale, unsuccessful. This evening, successfully unclogged Gastrostomy tube. TF restarted.  1/16- No acute overnight events  1/17  Code sepsis  Lactate 5.3 received 2.1 liter of NS. Repeat lactate 3.2 Blood and urine cultures sent.  1/18  Lactate 6.1  IVF increased to 125cc/hr. Abdominal / Pelvis CT with contrast. B/P low started on midodrine. Pt to be transferred to ICU after CT scan   c/w zosyn  Pt has possible lung abscess.  Dr Rios thoracic surgery consulted    #Lung abscess  Patient was found to have b/l lung abscess on CT   c/w zosyn  f/u cx    ====GI====  SBO  patient was found to have SBO on CT abdomen  NPO for now  Surgery consult for SBO  c/w Zosyn      ====nephro====  Lactic acidosis 2/2 sepsis  Monitor lactate    ====ID====  Septic shock  Likely 2/2 lung abscess on CT   Small bowel obstruction with transition point in the right lower   quadrant, with additional findings highly suspicious for bowel ischemia.  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 Patient was       #Septic shock  #SBO  #Lung abscess  #Encephalopathy  # AHRF s/p trach and PEG    ====CVS====  #Septic shock   c/w pressors   maintain MAP>65  Lactate trending down   midodrine 5mg q8; tapered off levo    ====Pulm====  #Acute hypoxic respiratory failure  -1/9 Febrile, Tmax 101.4F. F/u CXR. TOV. NPO after midnight and hold lovenox for GT placement in IR in am.   1/12/22 G tube placed in IR  1/14 L Pleural Pigtail catheter removed  1/15 tolerating SBT x4 hrs but with copious secretions requiring frequent suctioning; G tube clogged, initial attempts at dislodging unsuccessful in AM, noted to have a cluster of small capsule beads. Attempts to dissolve with ginger ale, unsuccessful. This evening, successfully unclogged Gastrostomy tube. TF restarted.  1/16- No acute overnight events  1/17  Code sepsis  Lactate 5.3 received 2.1 liter of NS. Repeat lactate 3.2 Blood and urine cultures sent.  1/18  Lactate 6.1  IVF increased to 125cc/hr. Abdominal / Pelvis CT with contrast. B/P low started on midodrine. Pt to be transferred to ICU after CT scan   c/w zosyn  Pt has possible lung abscess.  Dr Rios thoracic surgery consulted    #Lung abscess  Patient was found to have b/l lung abscess on CT   c/w zosyn  f/u cx    ====GI====  SBO  patient was found to have SBO on CT abdomen  Surgery consult for SBO  c/w Zosyn  Abdominal X-ray still shows evidence of SBO, although decreasing  Will keep NPO per surgery      ====nephro====  Lactic acidosis 2/2 sepsis  Monitor lactate    ====ID====  Septic shock  Likely 2/2 lung abscess on CT   Small bowel obstruction with transition point in the right lower   quadrant, with additional findings highly suspicious for bowel ischemia.  c/w Zosyn  f/u culture  Will start on central pressors   Monitor Vitals, maintain MAp>65  monitor lactate  Monitor Urine output      ====Heme/onc====  #Metastatic SCC of tongue base  - s/p resection, chemo/radiation, and immunotherapy (10/2021)  - palliative, s/p trach and peg    ====Endo====  #DM  - a1c 6.3  - c/w sliding scale    ====Ppx====  #DVT: Lovenox HOLDing pre op  #GI: PPI

## 2022-01-23 NOTE — CHART NOTE - NSCHARTNOTEFT_GEN_A_CORE
12/20: cardiac arrest on arrival to ED; intubated, txf to ICU, [facility] vent support, vasopressors  12/22 failed extubation, re-intub 12/23 12/27: failed extubation, re- intub 12/28 1/6: s/p trach  1/7: EGD for PEG -> (+) gastric ulcer -> clipped. PPI BID, plan for IR G tube consult Mon 1/10  1/8: Transferred to SCU/AI overnight; PS trial w/ PS 12 - tolerated ~4hrs, resting on AC/PC overnight  1/12: G tube placed in IR  1/15: G tube blocked by Flomax; medication d/cd (was started on 1/9 for urinary retention)  Zosyn resumed 1/17 due to recurrent fevers   Pt admitted back to ICU on 1/18 due to septic shock. Central line was placed in left femoral vein, for pressor use. Pt was placed on levophed, which was titrated down until it was not required. CT abdomen indicated presence of small bowel obstruction. Surgery stated pt is a poor surgical candidate, thus was treated conservatively by draining PEG tube to gravity. Bowel has been progressively decompressing and diet was started on 1/23. BCx neg 1/18    Things to follow:  Follow with ID for abx   F/U Na in am    Signed out to ALIZE Kitchen covering SCU. 12/20: cardiac arrest on arrival to ED; intubated, txf to ICU, [facility] vent support, vasopressors  12/22 failed extubation, re-intub 12/23 12/27: failed extubation, re- intub 12/28 1/6: s/p trach  1/7: EGD for PEG -> (+) gastric ulcer -> clipped. PPI BID, plan for IR G tube consult Mon 1/10  1/8: Transferred to SCU/AI overnight; PS trial w/ PS 12 - tolerated ~4hrs, resting on AC/PC overnight  1/12: G tube placed in IR  1/15: G tube blocked by Flomax; medication d/cd (was started on 1/9 for urinary retention)  Zosyn resumed 1/17 due to recurrent fevers   Pt admitted back to ICU on 1/18 due to septic shock. Central line was placed in left femoral vein, for pressor use. Pt was placed on levophed, which was titrated down until it was not required. CT abdomen indicated presence of small bowel obstruction. Surgery stated pt is a poor surgical candidate, thus was treated conservatively by draining PEG tube to gravity. Bowel has been progressively decompressing and diet was started on 1/23. BCx neg 1/18. Pt stable to DG to AI.     Things to follow:  Follow with ID for abx   F/U Na in am    Signed out to ALIZE Mckeon covering SCU.

## 2022-01-23 NOTE — PROGRESS NOTE ADULT - SUBJECTIVE AND OBJECTIVE BOX
INTERVAL HPI/OVERNIGHT EVENTS: No overnight events     PRESSORS: [x ] YES [ ] NO  WHICH: levo    Antimicrobial:  piperacillin/tazobactam IVPB.. 3.375 Gram(s) IV Intermittent every 8 hours    Cardiovascular:  midodrine. 5 milliGRAM(s) Oral three times a day  norepinephrine Infusion 0.22 MICROgram(s)/kG/Min IV Continuous <Continuous>    Pulmonary:    Hematalogic:  enoxaparin Injectable 40 milliGRAM(s) SubCutaneous daily    Other:  acetaminophen  Suppository .. 650 milliGRAM(s) Rectal every 6 hours PRN  bisacodyl Suppository 10 milliGRAM(s) Rectal daily PRN  chlorhexidine 2% Cloths 1 Application(s) Topical <User Schedule>  dextrose 5% + sodium chloride 0.9%. 1000 milliLiter(s) IV Continuous <Continuous>  glucagon  Injectable 0.5 milliGRAM(s) IV Push once  insulin lispro (ADMELOG) corrective regimen sliding scale   SubCutaneous every 6 hours  pantoprazole  Injectable 40 milliGRAM(s) IV Push two times a day  QUEtiapine 12.5 milliGRAM(s) Oral at bedtime    acetaminophen  Suppository .. 650 milliGRAM(s) Rectal every 6 hours PRN  bisacodyl Suppository 10 milliGRAM(s) Rectal daily PRN  chlorhexidine 2% Cloths 1 Application(s) Topical <User Schedule>  dextrose 5% + sodium chloride 0.9%. 1000 milliLiter(s) IV Continuous <Continuous>  enoxaparin Injectable 40 milliGRAM(s) SubCutaneous daily  glucagon  Injectable 0.5 milliGRAM(s) IV Push once  insulin lispro (ADMELOG) corrective regimen sliding scale   SubCutaneous every 6 hours  midodrine. 5 milliGRAM(s) Oral three times a day  norepinephrine Infusion 0.22 MICROgram(s)/kG/Min IV Continuous <Continuous>  pantoprazole  Injectable 40 milliGRAM(s) IV Push two times a day  piperacillin/tazobactam IVPB.. 3.375 Gram(s) IV Intermittent every 8 hours  QUEtiapine 12.5 milliGRAM(s) Oral at bedtime    Drug Dosing Weight  Height (cm): 175.3 (20 Dec 2021 02:21)  Weight (kg): 72.2 (18 Jan 2022 17:55)  BMI (kg/m2): 23.5 (18 Jan 2022 17:55)  BSA (m2): 1.87 (18 Jan 2022 17:55)    CENTRAL LINE: [ ] YES [ ] NO  LOCATION:         HUTCHINS: [ ] YES [ ] NO          A-LINE:  [ ] YES [ ] NO  LOCATION:             ICU Vital Signs Last 24 Hrs  T(C): 36.7 (23 Jan 2022 04:00), Max: 37.1 (22 Jan 2022 11:00)  T(F): 98.1 (23 Jan 2022 04:00), Max: 98.8 (22 Jan 2022 11:00)  HR: 81 (23 Jan 2022 04:07) (81 - 95)  BP: 118/66 (23 Jan 2022 04:00) (97/57 - 128/71)  BP(mean): 79 (23 Jan 2022 04:00) (67 - 84)  ABP: --  ABP(mean): --  RR: 17 (23 Jan 2022 04:00) (16 - 25)  SpO2: 100% (23 Jan 2022 04:07) (97% - 100%)            01-21 @ 07:01  -  01-22 @ 07:00  --------------------------------------------------------  IN: 3201.2 mL / OUT: 1187 mL / NET: 2014.2 mL        Mode: AC/ CMV (Assist Control/ Continuous Mandatory Ventilation)  RR (machine): 14  TV (machine): 450  FiO2: 40  PEEP: 5  ITime: 0.9  MAP: 11  PIP: 24        PHYSICAL EXAM:    GENERAL: NAD, trach   EYES: EOMI, PERRLA  NECK: Supple, No JVD; Normal thyroid; Trachea midline: No LAD   NERVOUS SYSTEM:  Alert   CHEST/LUNG: No rales, rhonchi, wheezing, breath sounds present bilaterally  HEART: Regular rate and rhythm; No murmurs, no gallops  ABDOMEN: Soft, Nontender, Nondistended; Bowel sounds present, no pain or masses on palpation, PEG   : voiding   EXTREMITIES:  2+ Peripheral Pulses, No clubbing, cyanosis, or edema  SKIN: warm, intact, no lesions         LABS:  CBC Full  -  ( 22 Jan 2022 05:52 )  WBC Count : 9.23 K/uL  RBC Count : 2.90 M/uL  Hemoglobin : 8.3 g/dL  Hematocrit : 24.7 %  Platelet Count - Automated : 170 K/uL  Mean Cell Volume : 85.2 fl  Mean Cell Hemoglobin : 28.6 pg  Mean Cell Hemoglobin Concentration : 33.6 gm/dL  Auto Neutrophil # : 8.55 K/uL  Auto Lymphocyte # : 0.35 K/uL  Auto Monocyte # : 0.11 K/uL  Auto Eosinophil # : 0.10 K/uL  Auto Basophil # : 0.04 K/uL  Auto Neutrophil % : 92.6 %  Auto Lymphocyte % : 3.8 %  Auto Monocyte % : 1.2 %  Auto Eosinophil % : 1.1 %  Auto Basophil % : 0.4 %    01-22    145  |  116<H>  |  36<H>  ----------------------------<  105<H>  3.8   |  20<L>  |  0.81    Ca    8.8      22 Jan 2022 05:52  Phos  3.1     01-22  Mg     1.9     01-22    TPro  4.4<L>  /  Alb  0.9<L>  /  TBili  0.5  /  DBili  x   /  AST  15  /  ALT  <6<L>  /  AlkPhos  86  01-22            RADIOLOGY & ADDITIONAL STUDIES REVIEWED:  ***    [ ]GOALS OF CARE DISCUSSION WITH PATIENT/FAMILY/PROXY:    CRITICAL CARE TIME SPENT: 35 minutes   INTERVAL HPI/OVERNIGHT EVENTS: No overnight events. Pt has been placed on pressure support and is tolerating it well.     PRESSORS: [x ] YES [ ] NO  WHICH: levo    Antimicrobial:  piperacillin/tazobactam IVPB.. 3.375 Gram(s) IV Intermittent every 8 hours    Cardiovascular:  midodrine. 5 milliGRAM(s) Oral three times a day  norepinephrine Infusion 0.22 MICROgram(s)/kG/Min IV Continuous <Continuous>    Pulmonary:    Hematalogic:  enoxaparin Injectable 40 milliGRAM(s) SubCutaneous daily    Other:  acetaminophen  Suppository .. 650 milliGRAM(s) Rectal every 6 hours PRN  bisacodyl Suppository 10 milliGRAM(s) Rectal daily PRN  chlorhexidine 2% Cloths 1 Application(s) Topical <User Schedule>  dextrose 5% + sodium chloride 0.9%. 1000 milliLiter(s) IV Continuous <Continuous>  glucagon  Injectable 0.5 milliGRAM(s) IV Push once  insulin lispro (ADMELOG) corrective regimen sliding scale   SubCutaneous every 6 hours  pantoprazole  Injectable 40 milliGRAM(s) IV Push two times a day  QUEtiapine 12.5 milliGRAM(s) Oral at bedtime    acetaminophen  Suppository .. 650 milliGRAM(s) Rectal every 6 hours PRN  bisacodyl Suppository 10 milliGRAM(s) Rectal daily PRN  chlorhexidine 2% Cloths 1 Application(s) Topical <User Schedule>  dextrose 5% + sodium chloride 0.9%. 1000 milliLiter(s) IV Continuous <Continuous>  enoxaparin Injectable 40 milliGRAM(s) SubCutaneous daily  glucagon  Injectable 0.5 milliGRAM(s) IV Push once  insulin lispro (ADMELOG) corrective regimen sliding scale   SubCutaneous every 6 hours  midodrine. 5 milliGRAM(s) Oral three times a day  norepinephrine Infusion 0.22 MICROgram(s)/kG/Min IV Continuous <Continuous>  pantoprazole  Injectable 40 milliGRAM(s) IV Push two times a day  piperacillin/tazobactam IVPB.. 3.375 Gram(s) IV Intermittent every 8 hours  QUEtiapine 12.5 milliGRAM(s) Oral at bedtime    Drug Dosing Weight  Height (cm): 175.3 (20 Dec 2021 02:21)  Weight (kg): 72.2 (18 Jan 2022 17:55)  BMI (kg/m2): 23.5 (18 Jan 2022 17:55)  BSA (m2): 1.87 (18 Jan 2022 17:55)    CENTRAL LINE: [ ] YES [ ] NO  LOCATION:         HUTCHINS: [ ] YES [ ] NO          A-LINE:  [ ] YES [ ] NO  LOCATION:             ICU Vital Signs Last 24 Hrs  T(C): 36.7 (23 Jan 2022 04:00), Max: 37.1 (22 Jan 2022 11:00)  T(F): 98.1 (23 Jan 2022 04:00), Max: 98.8 (22 Jan 2022 11:00)  HR: 81 (23 Jan 2022 04:07) (81 - 95)  BP: 118/66 (23 Jan 2022 04:00) (97/57 - 128/71)  BP(mean): 79 (23 Jan 2022 04:00) (67 - 84)  ABP: --  ABP(mean): --  RR: 17 (23 Jan 2022 04:00) (16 - 25)  SpO2: 100% (23 Jan 2022 04:07) (97% - 100%)            01-21 @ 07:01  -  01-22 @ 07:00  --------------------------------------------------------  IN: 3201.2 mL / OUT: 1187 mL / NET: 2014.2 mL        Mode: AC/ CMV (Assist Control/ Continuous Mandatory Ventilation)  RR (machine): 14  TV (machine): 450  FiO2: 40  PEEP: 5  ITime: 0.9  MAP: 11  PIP: 24        PHYSICAL EXAM:    GENERAL: lethargic + trach   NECK:  contracted  NERVOUS SYSTEM:  Alert & Oriented 1, opening eyes only to verbal and tactile stimulus  CHEST/LUNG: No chest deformity; Normal percussion bilaterally; No rales, rhonchi, wheezing   HEART: Regular rate and rhythm; No murmurs, rubs, or gallops  ABDOMEN: abdomen minimally tender, + PEG  EXTREMITIES:  2+ Peripheral Pulses, No clubbing, cyanosis, or edema  LYMPH: No lymphadenopathy noted  SKIN: No rashes or lesions; Good capillary refill        LABS:  CBC Full  -  ( 22 Jan 2022 05:52 )  WBC Count : 9.23 K/uL  RBC Count : 2.90 M/uL  Hemoglobin : 8.3 g/dL  Hematocrit : 24.7 %  Platelet Count - Automated : 170 K/uL  Mean Cell Volume : 85.2 fl  Mean Cell Hemoglobin : 28.6 pg  Mean Cell Hemoglobin Concentration : 33.6 gm/dL  Auto Neutrophil # : 8.55 K/uL  Auto Lymphocyte # : 0.35 K/uL  Auto Monocyte # : 0.11 K/uL  Auto Eosinophil # : 0.10 K/uL  Auto Basophil # : 0.04 K/uL  Auto Neutrophil % : 92.6 %  Auto Lymphocyte % : 3.8 %  Auto Monocyte % : 1.2 %  Auto Eosinophil % : 1.1 %  Auto Basophil % : 0.4 %    01-22    145  |  116<H>  |  36<H>  ----------------------------<  105<H>  3.8   |  20<L>  |  0.81    Ca    8.8      22 Jan 2022 05:52  Phos  3.1     01-22  Mg     1.9     01-22    TPro  4.4<L>  /  Alb  0.9<L>  /  TBili  0.5  /  DBili  x   /  AST  15  /  ALT  <6<L>  /  AlkPhos  86  01-22            RADIOLOGY & ADDITIONAL STUDIES REVIEWED:  ***    [ ]GOALS OF CARE DISCUSSION WITH PATIENT/FAMILY/PROXY:    CRITICAL CARE TIME SPENT: 35 minutes

## 2022-01-23 NOTE — PROGRESS NOTE ADULT - ASSESSMENT
75 yo M with complicated hospital course with recent code sepsis, hypotensive, hypoxia now with resolving SBO. On pressors  abdominal exam benign     -PEG tube to gravity  -no surgical intervention warranted at this time  -Remainder of care as per ICU team   -Discussed with attending

## 2022-01-24 NOTE — CHART NOTE - NSCHARTNOTEFT_GEN_A_CORE
Assessment:   76yMalePatient is a 76y old  Male who presents with a chief complaint of cardiac arrest (24 Jan 2022 17:52) Pt visited. Pt is on Vent via Trach. Pt  with SCD device, Pt is DG from ICU to 1/23. Pt with Partial SBO. D/W RN  Pt is on TF 3 hrs on  and 3 hrs Off. At present TF off.  Unable to meet his nutrient needs D/T  current Medical status. D/W NP. Pt with Anasarca. Pt is On IV albumin q 6 hrs. Alb 0.9.        Factors impacting intake: [ ] none [ ] nausea  [ ] vomiting [ ] diarrhea [ ] constipation  [ ]chewing problems [ ] swallowing issues  [ ] other:     Diet Presciption: Diet, NPO with Tube Feed:   Tube Feeding Modality: Gastrostomy  Glucerna 1.5 Dhruv  Total Volume for 24 Hours (mL): 480  Intermittent  Starting Tube Feed Rate {mL per Hour}: 10  Increase Tube Feed Rate by (mL): 10  Until Goal Tube Feed Rate (mL per Hour): 40  Tube Feeding Hours ON: 3  Tube Feeding OFF (Hours): 3  Tube Feed Start Time: 13:30  No Carb Prosource TF     Qty per Day:  1 PKT BID. (01-23-22 @ 13:21)    Intake: NPO w TF     Current Weight: Weight (kg): 72.2 (01-18 @ 17:55)  % Weight Change    Pertinent Medications: MEDICATIONS  (STANDING):  albumin human 25% IVPB 50 milliLiter(s) IV Intermittent every 6 hours  chlorhexidine 2% Cloths 1 Application(s) Topical <User Schedule>  dextrose 5% + sodium chloride 0.9%. 1000 milliLiter(s) (30 mL/Hr) IV Continuous <Continuous>  enoxaparin Injectable 40 milliGRAM(s) SubCutaneous daily  furosemide   Injectable 20 milliGRAM(s) IV Push every 6 hours  glucagon  Injectable 0.5 milliGRAM(s) IV Push once  insulin lispro (ADMELOG) corrective regimen sliding scale   SubCutaneous every 6 hours  midodrine. 5 milliGRAM(s) Oral three times a day  pantoprazole  Injectable 40 milliGRAM(s) IV Push two times a day    MEDICATIONS  (PRN):  acetaminophen  Suppository .. 650 milliGRAM(s) Rectal every 6 hours PRN Temp greater or equal to 38C (100.4F)  bisacodyl Suppository 10 milliGRAM(s) Rectal daily PRN Constipation    Pertinent Labs: 01-24 Na146 mmol/L<H> Glu 135 mg/dL<H> K+ 4.1 mmol/L Cr  0.79 mg/dL BUN 28 mg/dL<H> 01-24 Phos 2.8 mg/dL 01-24 Alb 0.9 g/dL<L> 01-02 PAB 8 mg/dL<L> 12-30 Chol --    LDL --    HDL --    Trig 168 mg/dL<H>     CAPILLARY BLOOD GLUCOSE      POCT Blood Glucose.: 147 mg/dL (24 Jan 2022 17:03)  POCT Blood Glucose.: 135 mg/dL (24 Jan 2022 11:19)  POCT Blood Glucose.: 152 mg/dL (24 Jan 2022 05:15)  POCT Blood Glucose.: 123 mg/dL (23 Jan 2022 23:16)  POCT Blood Glucose.: 126 mg/dL (23 Jan 2022 22:08)    Skin: R ear Unstageable     Estimated Needs:   [ ] no change since previous assessment  [ ] recalculated:     Previous Nutrition Diagnosis:   [ ] Inadequate Energy Intake [ ]Inadequate Oral Intake [ ] Excessive Energy Intake   [ ] Underweight [ ] Increased Nutrient Needs [ ] Overweight/Obesity   [ ] Altered GI Function [ ] Unintended Weight Loss [ ] Food & Nutrition Related Knowledge Deficit [ x] Malnutrition Severe    Nutrition Diagnosis is [x ] ongoing  [ ] resolved [ ] not applicable     New Nutrition Diagnosis: [ ] not applicable       Interventions:   Recommend  [ ] Change Diet To:  [ ] Nutrition Supplement  [x ] Nutrition Support Continue with current TF RX  [ ] Other:     Monitoring and Evaluation:   [ ] PO intake [ x ] Tolerance to diet prescription [ x ] weights [ x ] labs[ x ] follow up per protocol  [ ] other:

## 2022-01-24 NOTE — CHART NOTE - NSCHARTNOTEFT_GEN_A_CORE
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 Pt admitted back to ICU on 1/18 due to septic shock. CT abdomen indicated presence of small bowel obstruction. Surgery stated pt is a poor surgical candidate, thus was treated conservatively by draining PEG tube to gravity. Bowel has been progressively decompressing and tube feed 1/23. Downgraded again to AI on 1/23.    OBJECTIVE:  Vital Signs Last 24 Hrs  T(C): 36.2 (23 Jan 2022 23:35), Max: 37.2 (23 Jan 2022 09:00)  T(F): 97.1 (23 Jan 2022 23:35), Max: 99 (23 Jan 2022 09:00)  HR: 88 (23 Jan 2022 23:35) (81 - 100)  BP: 120/74 (23 Jan 2022 23:35) (105/63 - 131/65)  BP(mean): 83 (23 Jan 2022 22:00) (72 - 86)  RR: 18 (23 Jan 2022 23:35) (15 - 30)  SpO2: 100% (23 Jan 2022 23:35) (98% - 100%)    FOCUSED PHYSICAL EXAM:  RESP: trach to vent  CV: S1, S2  : Scrotum swollen    LABS:                        7.9    6.93  )-----------( 163      ( 23 Jan 2022 05:55 )             25.3     01-23    148<H>  |  121<H>  |  29<H>  ----------------------------<  133<H>  3.5   |  21<L>  |  0.78    Ca    9.4      23 Jan 2022 18:05  Phos  2.7     01-23  Mg     1.9     01-23    TPro  4.4<L>  /  Alb  0.9<L>  /  TBili  0.5  /  DBili  x   /  AST  13  /  ALT  <6<L>  /  AlkPhos  79  01-23    PLAN:   1. Continue current management  2. Follow up Dr Nunez for continuation of zosyn  3. F/u sodium         Things to follow:  Follow with ID for abx   F/U Na in am    FOLLOW UP / RESULT:

## 2022-01-24 NOTE — PROGRESS NOTE ADULT - SUBJECTIVE AND OBJECTIVE BOX
MELISSA JOHNS    SCU progress note    INTERVAL HPI/OVERNIGHT EVENTS: ***Pt transferred from ICU to SCU late last night.  ICU course: Pt admitted back to ICU on 1/18 due to septic shock. Central line was placed in left femoral vein, for pressor use. Pt was placed on levophed, which was titrated down until it was not required. CT abdomen indicated presence of small bowel obstruction. Surgery stated pt is a poor surgical candidate, thus was treated conservatively by draining PEG tube to gravity. Bowel has been progressively decompressing and diet was started on 1/23. BCx neg 1/18.     DNR [x ]   DNI  [ x]    Covid - 19 PCR: Negative 1/18    The 4Ms    What Matters Most: see GOC  Age appropriate Medications/Screen for High Risk Medication: Yes  Mentation: see CAM below  Mobility: defer to physical exam    The Confusion Assessment Method (CAM) Diagnostic Algorithm     1: Acute Onset or Fluctuating Course  - Is there evidence of an acute change in mental status from the patient’s baseline? Did the (abnormal) behavior  fluctuate during the day, that is, tend to come and go, or increase and decrease in severity?  [ ] YES [x ] NO     2: Inattention  - Did the patient have difficulty focusing attention, being easily distractible, or having difficulty keeping track of what was being said?  [ ] YES [ ] NO  unable to access     3: Disorganized thinking  -Was the patient’s thinking disorganized or incoherent, such as rambling or irrelevant conversation, unclear or illogical flow of ideas, or unpredictable switching from subject to subject?  [ ] YES [ ] NO   unable to access    4: Altered Level of consciousness?  [ ] YES [ ] NO    The diagnosis of delirium by CAM requires the presence of features 1 and 2 and either 3 or 4.    PRESSORS: [ ] YES [x ] NO    Cardiovascular:  Heart Failure  Acute   Acute on Chronic  Chronic       furosemide   Injectable 20 milliGRAM(s) IV Push every 6 hours  midodrine. 5 milliGRAM(s) Oral three times a day    Pulmonary:    Hematalogic:  enoxaparin Injectable 40 milliGRAM(s) SubCutaneous daily    Other:  acetaminophen  Suppository .. 650 milliGRAM(s) Rectal every 6 hours PRN  albumin human 25% IVPB 50 milliLiter(s) IV Intermittent every 6 hours  bisacodyl Suppository 10 milliGRAM(s) Rectal daily PRN  chlorhexidine 2% Cloths 1 Application(s) Topical <User Schedule>  dextrose 5% + sodium chloride 0.9%. 1000 milliLiter(s) IV Continuous <Continuous>  glucagon  Injectable 0.5 milliGRAM(s) IV Push once  insulin lispro (ADMELOG) corrective regimen sliding scale   SubCutaneous every 6 hours  pantoprazole  Injectable 40 milliGRAM(s) IV Push two times a day  QUEtiapine 12.5 milliGRAM(s) Oral at bedtime    acetaminophen  Suppository .. 650 milliGRAM(s) Rectal every 6 hours PRN  albumin human 25% IVPB 50 milliLiter(s) IV Intermittent every 6 hours  bisacodyl Suppository 10 milliGRAM(s) Rectal daily PRN  chlorhexidine 2% Cloths 1 Application(s) Topical <User Schedule>  dextrose 5% + sodium chloride 0.9%. 1000 milliLiter(s) IV Continuous <Continuous>  enoxaparin Injectable 40 milliGRAM(s) SubCutaneous daily  furosemide   Injectable 20 milliGRAM(s) IV Push every 6 hours  glucagon  Injectable 0.5 milliGRAM(s) IV Push once  insulin lispro (ADMELOG) corrective regimen sliding scale   SubCutaneous every 6 hours  midodrine. 5 milliGRAM(s) Oral three times a day  pantoprazole  Injectable 40 milliGRAM(s) IV Push two times a day  QUEtiapine 12.5 milliGRAM(s) Oral at bedtime    Drug Dosing Weight  Height (cm): 175.3 (20 Dec 2021 02:21)  Weight (kg): 72.2 (18 Jan 2022 17:55)  BMI (kg/m2): 23.5 (18 Jan 2022 17:55)  BSA (m2): 1.87 (18 Jan 2022 17:55)    CENTRAL LINE: [ x] YES [ ] NO  LOCATION: left femoral line.   DATE INSERTED:  1/18/22  REMOVE: [ ] YES [x ] NO  EXPLAIN:    HUTCHINS: [ ] YES [x ] NO    DATE INSERTED:  REMOVE:  [ ] YES [ ] NO  EXPLAIN:    PAST MEDICAL & SURGICAL HISTORY:  Hypertension    Diabetes    High cholesterol    Primary osteoarthritis of both knees    Coronary artery disease of native artery of native heart with stable angina pectoris    Allergic bronchitis    Diabetic retinopathy    Oral cancer    SCC (squamous cell carcinoma)    Metastatic cancer    Recurrent disease    Edema of extremity    Hyponatremia    Microalbuminuria    Neuralgia    Obstructive sleep apnea, adult    Vitamin D deficiency    S/P knee replacement  b/l    S/P hernia repair  b/l    Status post cataract extraction and insertion of intraocular lens, unspecified laterality  b/l    History of surgery  On 9/10/20 he underwent right hemiglossectomy and partial neck dissection with Dr. Darinel Servin at [facility].                01-23 @ 07:01  -  01-24 @ 07:00  --------------------------------------------------------  IN: 1335 mL / OUT: 740 mL / NET: 595 mL        Mode: PS (Pressure Support)/ Spontaneous  FiO2: 40  PEEP: 5  PS: 10  ITime: 0.7  MAP: 9  PIP: 16      PHYSICAL EXAM:    GENERAL: NAD, cachectic. +Temporal waisting and muscle waisting  HEAD:  Atraumatic, Normocephalic  EYES: EOMI, PERRLA, conjunctiva and sclera clear  ENMT: No tonsillar erythema, exudates  NECK: Supple, No JVD, tracheostomy intact  NERVOUS SYSTEM:  Awake and alert. Follows commands.  CHEST/LUNG: Diminished breath sounds bilateral bases  HEART: Regular rate and rhythm; No murmurs, rubs, or gallops  ABDOMEN: Soft, Nontender, Nondistended; Bowel sounds present; Peg intact  EXTREMITIES: Left femoral line intact. +Muscle waisting 2+ Peripheral Pulses, No clubbing, cyanosis, or edema  LYMPH: No lymphadenopathy noted  SKIN: Dried scab to right ear with eschar no drainage.      LABS:  CBC Full  -  ( 24 Jan 2022 06:32 )  WBC Count : 13.03 K/uL  RBC Count : 3.33 M/uL  Hemoglobin : 9.3 g/dL  Hematocrit : 29.4 %  Platelet Count - Automated : 162 K/uL  Mean Cell Volume : 88.3 fl  Mean Cell Hemoglobin : 27.9 pg  Mean Cell Hemoglobin Concentration : 31.6 gm/dL  Auto Neutrophil # : x  Auto Lymphocyte # : x  Auto Monocyte # : x  Auto Eosinophil # : x  Auto Basophil # : x  Auto Neutrophil % : x  Auto Lymphocyte % : x  Auto Monocyte % : x  Auto Eosinophil % : x  Auto Basophil % : x    01-24    146<H>  |  121<H>  |  28<H>  ----------------------------<  135<H>  4.1   |  18<L>  |  0.79    Ca    10.2      24 Jan 2022 06:32  Phos  2.8     01-24  Mg     2.6     01-24    TPro  5.0<L>  /  Alb  0.9<L>  /  TBili  0.7  /  DBili  x   /  AST  27  /  ALT  <6<L>  /  AlkPhos  91  01-24              [  ]  DVT Prophylaxis  [  ]  Nutrition, Brand, Rate         Goal Rate        Abnormal Nutritional Status -  Malnutrition   Cachexia         RADIOLOGY & ADDITIONAL STUDIES:  ***  < from: Xray Abdomen 1 View PORTABLE -Routine (Xray Abdomen 1 View PORTABLE -Routine in AM.) (01.21.22 @ 10:10) >  FINDINGS: Gastrostomy tube overlies LEFT upper quadrant.  Residual air distended small bowel mid abdomen, maximal luminal diameter   measuring 5.3 cm consistent with incomplete small bowel obstruction.   Contrast within nonobstructed large bowel.    No free intra-abdominal air.  No obvious intra-abdominal masses.  No abnormal calcifications.  The osseous structures are intact.    IMPRESSION:    Incomplete small bowel obstruction.    < end of copied text >  < from: CT Abdomen and Pelvis w/ IV Cont (01.18.22 @ 16:12) >  PROCEDURE:  CT of the Abdomen and Pelvis was performed.  Sagittal and coronal reformats were performed.    FINDINGS:  LOWER CHEST: Redemonstration of a cavitary lesion in the right middle   lobe. Redemonstration of secretions in the left lower lobe bronchi with   complete collapse of the left lower lobe with heterogeneous   opacification. Partially imaged 4.3 x 2.8 cm air-fluid collection in the   left lower lobe. There is a small loculated rim-enhancing pleural   effusion.    LIVER: Within normal limits.  BILE DUCTS: Normal caliber.  GALLBLADDER: Within normal limits.  SPLEEN: Within normal limits.  PANCREAS: Within normal limits.  ADRENALS: Within normal limits.  KIDNEYS/URETERS: Within normal limits.    BLADDER: Within normal limits.  REPRODUCTIVE ORGANS: Prostate within normal limits.    BOWEL: PEG tube in the stomach. Several dilated air-fluid filled loops of   small bowel with transition point to collapsed bowel in the right lower   quadrant (series 2, image 83). Mild wall thickening of the small bowel   loops proximal to the transitionpoint with evidence of focal pneumatosis   intestinalis (2, 86). There is also gastric pneumatosis and mild portal   venous gas.  PERITONEUM: Small volume ascites and mesenteric edema.  VESSELS: Atherosclerotic changes.  RETROPERITONEUM/LYMPH NODES: No lymphadenopathy.  ABDOMINAL WALL: Subcutaneous edema.  BONES: Degenerative changes.    IMPRESSION:  Small bowel obstruction with transition point in the right lower   quadrant, with additional findings highly suspicious for bowel ischemia.    Partially imaged 4.3 cm air-fluid collection in the left lower lobe and   adjacent rim-enhancing pleural effusion, suspicious for lung   abscess/empyema. Dedicated chest CT can be performed for further   evaluation.    Findings were discussed with ALIZE LANDAVERDE on  1/18/2022 4:47 PM by Dr. Armstrong with read back confirmation.    --- End of Report ---    < end of copied text >  < from: Xray Chest 1 View-PORTABLE IMMEDIATE (Xray Chest 1 View-PORTABLE IMMEDIATE .) (01.17.22 @ 21:39) >  COMPARISON STUDY: Earlier the same day    Frontal expiratory view of the chest showsthe heart to be similar in   size. Tracheostomy tube, gastric tube and upper abdominal clips are again   noted.    The lungs show similar left lung infiltrates with questionable small left   effusion and there is no evidence of pneumothorax nor rightpleural   effusion.    IMPRESSION:  Similar infiltrates.    < end of copied text >    Goals of Care Discussion with Family/Proxy/Other   - see note from 1/02/22

## 2022-01-24 NOTE — PROGRESS NOTE ADULT - NECK DETAILS
Tracheostomy site clean/supple
Trach in place but has thick brownish secretions from around the trach./supple
Tracheostomy in place, site is clean/supple
supple/no JVD
supple/no JVD

## 2022-01-24 NOTE — PROGRESS NOTE ADULT - ASSESSMENT
Septic Shock - resolved  Pneumonia/ lung abscess vs Empyema of left lung  Leukocytosis     Plan - Cont Zosyn 3.375 gms iv q8hrs for possibly 2 weeks more.  might need a chest tube if he has Empyema

## 2022-01-24 NOTE — PROGRESS NOTE ADULT - PROBLEM SELECTOR PLAN 2
Failed extubation 12/23, reintub 12/23; again failed 12/27, reintub 12/28; s/p trach 1/6/22  Increased infiltrate/atelectasis  Continue mechanical ventilation with daily SBT trails.  Must be vented at night. Will need vent facility

## 2022-01-24 NOTE — PROGRESS NOTE ADULT - MS EXT PE MLT D E PC
no cyanosis/pedal edema
no clubbing/no cyanosis/pedal edema
no cyanosis/pedal edema

## 2022-01-24 NOTE — PROGRESS NOTE ADULT - PROBLEM SELECTOR PLAN 10
Continue DVT and GI prophylaxis.  Serial abdominal xrays for partial SBO  Surgery F/U  Continue mechanical ventilation with daily SBT  Will need vent facility upon discharge.  Overall prognosis is poor.

## 2022-01-24 NOTE — PROGRESS NOTE ADULT - RS GEN PE MLT RESP DETAILS PC
airway patent/breath sounds equal/good air movement/clear to auscultation bilaterally/no rales/no rhonchi/no wheezes
airway patent/breath sounds equal/good air movement/no rales/no wheezes/rhonchi
airway patent/breath sounds equal/good air movement/no rhonchi/no wheezes/rales
breath sounds equal/good air movement/no rales/no wheezes/rhonchi
airway patent/breath sounds equal/good air movement/no rhonchi/no wheezes/rales

## 2022-01-24 NOTE — PROGRESS NOTE ADULT - PROBLEM SELECTOR PLAN 9
S/P peg placement on 1/12/22  Can only tolerate trickle Peg feeds for 3 hours at a time.  Followed by 3 hours NPO.

## 2022-01-24 NOTE — CHART NOTE - NSCHARTNOTEFT_GEN_A_CORE
Left femoral central line removed with aseptic technique.  Pressure applied at least 8 minutes.  Dry sterile dressing applied.  Patient tolerated procedure well.

## 2022-01-24 NOTE — PROGRESS NOTE ADULT - GASTROINTESTINAL DETAILS
soft/nontender/no distention/bowel sounds normal/no guarding/no rigidity
soft/no distention/bowel sounds normal/no guarding/no rigidity

## 2022-01-24 NOTE — PROGRESS NOTE ADULT - ASSESSMENT
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 Patient had cardiac arrest in ED , was intubated CPR was initiated and subsequently ROSC was achieved after 20 minutes. Patient admitted to the ICU 12/20-1/7/22 for post cardiac arrest management in the setting of acute hypoxic respiratory failure due to left pneumothorax and likely aspiration pneumonia and septic shock. He was treated with a course of cefepime (12/20-12/28) and then another course of abx with Zosyn (12/29-1/7). He failed extubation x2 (12/23, 12/27) and ultimately underwent Tracheostomy placement (1/6/22). Underwent EGD 1/7 for PEG placement, found to have a gastric ulcer which was clipped and he was initiated on BID PPI. He is pending PEG placement, plan for IR consult Monday 1/10/22 and possible removal of L pigtail pleural catheter 1/8 pending results of repeat CXR. His ICU course was further s/f hypernatremia being managed with free water boluses and Palliative Care was consulted as patient is not a candidate for further cancer directed treatment once his is s/p trach/PEG. He was transferred to the SCU/AI on 1/8 for further care.   1/9 Febrile, Tmax 101.4F. F/u CXR. TOV. NPO after midnight and hold lovenox for GT placement in IR in am.   1/12/22 G tube placed in IR  1/14 L Pleural Pigtail catheter removed  1/15 tolerating SBT x4 hrs but with copious secretions requiring frequent suctioning; G tube clogged, initial attempts at dislodging unsuccessful in AM, noted to have a cluster of small capsule beads. Attempts to dissolve with ginger ale, unsuccessful. This evening, successfully unclogged Gastrostomy tube. TF restarted.  1/16- No acute overnight events  1/17  Code sepsis  Lactate 5.3 received 2.1 liter of NS. Repeat lactate 3.2 Blood and urine cultures sent.  1/18  Lactate 6.1  IVF increased to 125cc/hr. Abdominal / Pelvis CT with contrast. B/P low started on midodrine. Pt to be transferred to ICU after CT scan  Pt admitted back to ICU on 1/18 due to septic shock. Central line was placed in left femoral vein, for pressor use. Pt was placed on levophed, which was titrated down until it was not required. CT abdomen indicated presence of small bowel obstruction. Surgery stated pt is a poor surgical candidate, thus was treated conservatively by draining PEG tube to gravity. Bowel has been progressively decompressing and diet was started on 1/23. BCx neg 1/18.

## 2022-01-24 NOTE — PROGRESS NOTE ADULT - NUTRITIONAL ASSESSMENT
This patient has been assessed with a concern for Malnutrition and has been determined to have a diagnosis/diagnoses of Severe protein-calorie malnutrition.  +Temporal waisting  +Muscle waisting    This patient is being managed with:   Diet NPO with Tube Feed-  Tube Feeding Modality: Gastrostomy  Glucerna 1.5 Dhruv  Total Volume for 24 Hours (mL): 480  Intermittent  Starting Tube Feed Rate {mL per Hour}: 10  Increase Tube Feed Rate by (mL): 10  Until Goal Tube Feed Rate (mL per Hour): 40  Tube Feeding Hours ON: 3  Tube Feeding OFF (Hours): 3  Tube Feed Start Time: 13:30  No Carb Prosource TF     Qty per Day:  1 PKT BID.  Entered: Jan 23 2022  1:19PM

## 2022-01-24 NOTE — PROGRESS NOTE ADULT - PROBLEM SELECTOR PLAN 1
Partial SBO. Surgery following  Continue tube feeds for 3 hours follow by 3 hours of NPO  Serial abdominal x-rays  Surgery f/u

## 2022-01-24 NOTE — PROGRESS NOTE ADULT - SUBJECTIVE AND OBJECTIVE BOX
76y Male    Meds:    Allergies    No Known Allergies    Intolerances        VITALS:  Vital Signs Last 24 Hrs  T(C): 36.7 (24 Jan 2022 13:24), Max: 36.9 (23 Jan 2022 18:00)  T(F): 98.1 (24 Jan 2022 13:24), Max: 98.4 (23 Jan 2022 18:00)  HR: 89 (24 Jan 2022 13:24) (81 - 98)  BP: 117/65 (24 Jan 2022 13:24) (116/63 - 123/70)  BP(mean): 83 (23 Jan 2022 22:00) (75 - 86)  RR: 18 (24 Jan 2022 13:24) (16 - 20)  SpO2: 100% (24 Jan 2022 13:24) (99% - 100%)    LABS/DIAGNOSTIC TESTS:                          9.3    13.03 )-----------( 162      ( 24 Jan 2022 06:32 )             29.4         01-24    146<H>  |  121<H>  |  28<H>  ----------------------------<  135<H>  4.1   |  18<L>  |  0.79    Ca    10.2      24 Jan 2022 06:32  Phos  2.8     01-24  Mg     2.6     01-24    TPro  5.0<L>  /  Alb  0.9<L>  /  TBili  0.7  /  DBili  x   /  AST  27  /  ALT  <6<L>  /  AlkPhos  91  01-24      LIVER FUNCTIONS - ( 24 Jan 2022 06:32 )  Alb: 0.9 g/dL / Pro: 5.0 g/dL / ALK PHOS: 91 U/L / ALT: <6 U/L DA / AST: 27 U/L / GGT: x             CULTURES: .Blood Blood  01-18 @ 02:51   No Growth Final  --  --      .Blood Blood  01-18 @ 02:50   No Growth Final  --  --                    RADIOLOGY:      ROS:  [  ] UNABLE TO ELICIT 76y Male who is now in the AI unit, as he was downgraded from the ICU. He is afebrile but is a little lethargic though arousable to light touch.    Meds:    Allergies    No Known Allergies    Intolerances        VITALS:  Vital Signs Last 24 Hrs  T(C): 36.7 (24 Jan 2022 13:24), Max: 36.9 (23 Jan 2022 18:00)  T(F): 98.1 (24 Jan 2022 13:24), Max: 98.4 (23 Jan 2022 18:00)  HR: 89 (24 Jan 2022 13:24) (81 - 98)  BP: 117/65 (24 Jan 2022 13:24) (116/63 - 123/70)  BP(mean): 83 (23 Jan 2022 22:00) (75 - 86)  RR: 18 (24 Jan 2022 13:24) (16 - 20)  SpO2: 100% (24 Jan 2022 13:24) (99% - 100%)    LABS/DIAGNOSTIC TESTS:                          9.3    13.03 )-----------( 162      ( 24 Jan 2022 06:32 )             29.4         01-24    146<H>  |  121<H>  |  28<H>  ----------------------------<  135<H>  4.1   |  18<L>  |  0.79    Ca    10.2      24 Jan 2022 06:32  Phos  2.8     01-24  Mg     2.6     01-24    TPro  5.0<L>  /  Alb  0.9<L>  /  TBili  0.7  /  DBili  x   /  AST  27  /  ALT  <6<L>  /  AlkPhos  91  01-24      LIVER FUNCTIONS - ( 24 Jan 2022 06:32 )  Alb: 0.9 g/dL / Pro: 5.0 g/dL / ALK PHOS: 91 U/L / ALT: <6 U/L DA / AST: 27 U/L / GGT: x             CULTURES: .Blood Blood  01-18 @ 02:51   No Growth Final  --  --      .Blood Blood  01-18 @ 02:50   No Growth Final  --  --                    RADIOLOGY:      ROS:  [  ] UNABLE TO ELICIT 76y Male who is now in the AI unit, as he was downgraded from the ICU. He is afebrile but is a little lethargic though arousable to light touch. He completed 7 days of zosyn ( today) but given he developed a hospital acquired pneumonia/ possible empyema or lung abscess, will extend his abx treatment to 3 weeks total.    Meds:    Allergies    No Known Allergies    Intolerances        VITALS:  Vital Signs Last 24 Hrs  T(C): 36.7 (24 Jan 2022 13:24), Max: 36.9 (23 Jan 2022 18:00)  T(F): 98.1 (24 Jan 2022 13:24), Max: 98.4 (23 Jan 2022 18:00)  HR: 89 (24 Jan 2022 13:24) (81 - 98)  BP: 117/65 (24 Jan 2022 13:24) (116/63 - 123/70)  BP(mean): 83 (23 Jan 2022 22:00) (75 - 86)  RR: 18 (24 Jan 2022 13:24) (16 - 20)  SpO2: 100% (24 Jan 2022 13:24) (99% - 100%)    LABS/DIAGNOSTIC TESTS:                          9.3    13.03 )-----------( 162      ( 24 Jan 2022 06:32 )             29.4         01-24    146<H>  |  121<H>  |  28<H>  ----------------------------<  135<H>  4.1   |  18<L>  |  0.79    Ca    10.2      24 Jan 2022 06:32  Phos  2.8     01-24  Mg     2.6     01-24    TPro  5.0<L>  /  Alb  0.9<L>  /  TBili  0.7  /  DBili  x   /  AST  27  /  ALT  <6<L>  /  AlkPhos  91  01-24      LIVER FUNCTIONS - ( 24 Jan 2022 06:32 )  Alb: 0.9 g/dL / Pro: 5.0 g/dL / ALK PHOS: 91 U/L / ALT: <6 U/L DA / AST: 27 U/L / GGT: x             CULTURES: .Blood Blood  01-18 @ 02:51   No Growth Final  --  --      .Blood Blood  01-18 @ 02:50   No Growth Final  --  --                    RADIOLOGY:      ROS:  [ x ] UNABLE TO ELICIT

## 2022-01-24 NOTE — PROGRESS NOTE ADULT - GENITOURINARY COMMENTS
domingo in place with clear urine in it
has a condom catheter on
domingo in place with clear urine in it

## 2022-01-25 NOTE — PROGRESS NOTE ADULT - SUBJECTIVE AND OBJECTIVE BOX
76y Male is under our care for     REVIEW OF SYSTEMS:  [  ] Not able to elicit  General:	  Chest:	  GI:	  :  Skin:	  Musculoskeletal:	  Neuro:	    MEDS:  piperacillin/tazobactam IVPB.. 3.375 Gram(s) IV Intermittent every 8 hours    ALLERGIES: Allergies    No Known Allergies    Intolerances        VITALS:  Vital Signs Last 24 Hrs  T(C): 36 (25 Jan 2022 11:59), Max: 36.7 (24 Jan 2022 13:24)  T(F): 96.8 (25 Jan 2022 11:59), Max: 98.1 (24 Jan 2022 13:24)  HR: 92 (25 Jan 2022 11:59) (89 - 95)  BP: 138/82 (25 Jan 2022 11:59) (117/65 - 138/82)  BP(mean): --  RR: 20 (25 Jan 2022 11:59) (18 - 20)  SpO2: 100% (25 Jan 2022 11:59) (100% - 100%)      PHYSICAL EXAM:  HEENT:  Neck:  Respiratory:  Cardiovascular:  Gastrointestinal:  Extremities:  Skin:  Ortho:  Neuro:    LABS/DIAGNOSTIC TESTS:                        8.0    5.03  )-----------( 112      ( 25 Jan 2022 07:43 )             25.2     WBC Count: 5.03 K/uL (01-25 @ 07:43)  WBC Count: 13.03 K/uL (01-24 @ 06:32)  WBC Count: 6.93 K/uL (01-23 @ 05:55)  WBC Count: 9.23 K/uL (01-22 @ 05:52)  WBC Count: 11.59 K/uL (01-21 @ 04:16)    01-25    149<H>  |  120<H>  |  31<H>  ----------------------------<  164<H>  3.3<L>   |  22  |  0.79    Ca    10.8<H>      25 Jan 2022 07:43  Phos  2.9     01-25  Mg     1.9     01-25    TPro  5.0<L>  /  Alb  1.8<L>  /  TBili  0.8  /  DBili  x   /  AST  8<L>  /  ALT  <6<L>  /  AlkPhos  64  01-25      CULTURES:   .Blood Blood  01-18 @ 02:51   No Growth Final  --  --      .Blood Blood  01-18 @ 02:50   No Growth Final  --  --      .Sputum Sputum  12-29 @ 18:28   Normal Respiratory Karla present  --    Numerous polymorphonuclear leukocytes per low power field  No squamous epithelial cells per low power field  Few Gram positive cocci in pairs per oil power field      Clean Catch Clean Catch (Midstream)  12-21 @ 04:42   No growth  --  --      .Sputum Sputum  11-08 @ 10:53   Few Mycobacterium avium complex  --  --      .Sputum Sputum  11-06 @ 19:34   No acid fast bacilli isolated after 6 weeks.  --  --      .Sputum Sputum  11-05 @ 23:15   Growth of acid fast bacilli detected in broth,  Mycobacterium avium complex by Dna Probe  --  --      .Blood Blood  11-04 @ 11:23   No Growth Final  --  --      .Blood Blood  11-04 @ 10:50   No Growth Final  --  --      Clean Catch Clean Catch (Midstream)  11-03 @ 17:00   <10,000 CFU/mL Normal Urogenital Karla  --  --        RADIOLOGY:  no new studies 76y Male is under our care for pneumonia and leukocytosis.  Patient was seen laying comfortably in bed with no acute distress.  He remains afebrile and WBC count has normalized.    REVIEW OF SYSTEMS:  [ x ] Not able to elicit    MEDS:  piperacillin/tazobactam IVPB.. 3.375 Gram(s) IV Intermittent every 8 hours    ALLERGIES: Allergies    No Known Allergies    Intolerances        VITALS:  Vital Signs Last 24 Hrs  T(C): 36 (25 Jan 2022 11:59), Max: 36.7 (24 Jan 2022 13:24)  T(F): 96.8 (25 Jan 2022 11:59), Max: 98.1 (24 Jan 2022 13:24)  HR: 92 (25 Jan 2022 11:59) (89 - 95)  BP: 138/82 (25 Jan 2022 11:59) (117/65 - 138/82)  BP(mean): --  RR: 20 (25 Jan 2022 11:59) (18 - 20)  SpO2: 100% (25 Jan 2022 11:59) (100% - 100%)      PHYSICAL EXAM:  HEENT: trach+  Neck: supple no LN's   Respiratory: lungs clear no rales  Cardiovascular: S1 S2 reg no murmurs  Gastrointestinal: +BS with soft, nondistended abdomen; nontender, peg tube in place  : Freedman catheter in place with yclear urine  Extremities: Right hand edema +2  Skin: no rashes  Ortho: n/a  Neuro: Awake and alert    LABS/DIAGNOSTIC TESTS:                        8.0    5.03  )-----------( 112      ( 25 Jan 2022 07:43 )             25.2     WBC Count: 5.03 K/uL (01-25 @ 07:43)  WBC Count: 13.03 K/uL (01-24 @ 06:32)  WBC Count: 6.93 K/uL (01-23 @ 05:55)  WBC Count: 9.23 K/uL (01-22 @ 05:52)  WBC Count: 11.59 K/uL (01-21 @ 04:16)    01-25    149<H>  |  120<H>  |  31<H>  ----------------------------<  164<H>  3.3<L>   |  22  |  0.79    Ca    10.8<H>      25 Jan 2022 07:43  Phos  2.9     01-25  Mg     1.9     01-25    TPro  5.0<L>  /  Alb  1.8<L>  /  TBili  0.8  /  DBili  x   /  AST  8<L>  /  ALT  <6<L>  /  AlkPhos  64  01-25      CULTURES:   .Blood Blood  01-18 @ 02:51   No Growth Final  --  --      .Blood Blood  01-18 @ 02:50   No Growth Final  --  --      .Sputum Sputum  12-29 @ 18:28   Normal Respiratory Karla present  --    Numerous polymorphonuclear leukocytes per low power field  No squamous epithelial cells per low power field  Few Gram positive cocci in pairs per oil power field      Clean Catch Clean Catch (Midstream)  12-21 @ 04:42   No growth  --  --      .Sputum Sputum  11-08 @ 10:53   Few Mycobacterium avium complex  --  --      .Sputum Sputum  11-06 @ 19:34   No acid fast bacilli isolated after 6 weeks.  --  --      .Sputum Sputum  11-05 @ 23:15   Growth of acid fast bacilli detected in broth,  Mycobacterium avium complex by Dna Probe  --  --      .Blood Blood  11-04 @ 11:23   No Growth Final  --  --      .Blood Blood  11-04 @ 10:50   No Growth Final  --  --      Clean Catch Clean Catch (Midstream)  11-03 @ 17:00   <10,000 CFU/mL Normal Urogenital Karla  --  --        RADIOLOGY:  no new studies

## 2022-01-25 NOTE — PROGRESS NOTE ADULT - PROBLEM SELECTOR PLAN 1
Partial SBO. Surgery following  Continue tube feeds for 3 hours follow by 3 hours of NPO  Serial abdominal x-rays  Surgery f/u.

## 2022-01-25 NOTE — PROGRESS NOTE ADULT - ASSESSMENT
Septic Shock - resolved  Pneumonia/ lung abscess vs Empyema of left lung  Leukocytosis - improving    Plan - Cont Zosyn 3.375 gms iv q8hrs for possibly 2 weeks more.  might need a chest tube if he has Empyema                 Septic Shock - resolved  Pneumonia/ lung abscess vs Empyema of left lung  Leukocytosis - improving    Plan - Cont Zosyn 3.375 gms iv q8hrs for possibly 2 weeks more.  might need a chest tube if he has Empyema( pt is a high risk for procedure, hence not done)    I agree with above

## 2022-01-25 NOTE — PROGRESS NOTE ADULT - SUBJECTIVE AND OBJECTIVE BOX
MELISSA JOHNS    SCU progress note    INTERVAL HPI/OVERNIGHT EVENTS: ***Received IV tylenol for pain during the night.    DNR [x ]   DNI  [x ]    Covid - 19 PCR: Negative 1/18    The 4Ms    What Matters Most: see GOC  Age appropriate Medications/Screen for High Risk Medication: Yes  Mentation: see CAM below  Mobility: defer to physical exam    The Confusion Assessment Method (CAM) Diagnostic Algorithm     1: Acute Onset or Fluctuating Course  - Is there evidence of an acute change in mental status from the patient’s baseline? Did the (abnormal) behavior  fluctuate during the day, that is, tend to come and go, or increase and decrease in severity?  [ ] YES [x ] NO     2: Inattention  - Did the patient have difficulty focusing attention, being easily distractible, or having difficulty keeping track of what was being said?  [ ] YES [x ] NO     3: Disorganized thinking  -Was the patient’s thinking disorganized or incoherent, such as rambling or irrelevant conversation, unclear or illogical flow of ideas, or unpredictable switching from subject to subject?  [ ] YES [ ] NO  unable to access    4: Altered Level of consciousness?  [ ] YES [x ] NO    The diagnosis of delirium by CAM requires the presence of features 1 and 2 and either 3 or 4.    PRESSORS: [ ] YES [x ] NO  piperacillin/tazobactam IVPB.. 3.375 Gram(s) IV Intermittent every 8 hours    Cardiovascular:  Heart Failure  Acute   Acute on Chronic  Chronic       furosemide   Injectable 20 milliGRAM(s) IV Push every 6 hours  midodrine. 5 milliGRAM(s) Oral three times a day    Pulmonary:    Hematalogic:  enoxaparin Injectable 40 milliGRAM(s) SubCutaneous daily    Other:  acetaminophen     Tablet .. 650 milliGRAM(s) Oral every 6 hours PRN  acetaminophen  Suppository .. 650 milliGRAM(s) Rectal every 6 hours PRN  albumin human 25% IVPB 50 milliLiter(s) IV Intermittent every 6 hours  bisacodyl Suppository 10 milliGRAM(s) Rectal daily PRN  chlorhexidine 2% Cloths 1 Application(s) Topical <User Schedule>  dextrose 5% + sodium chloride 0.9%. 1000 milliLiter(s) IV Continuous <Continuous>  glucagon  Injectable 0.5 milliGRAM(s) IV Push once  insulin lispro (ADMELOG) corrective regimen sliding scale   SubCutaneous every 6 hours  pantoprazole  Injectable 40 milliGRAM(s) IV Push two times a day    acetaminophen     Tablet .. 650 milliGRAM(s) Oral every 6 hours PRN  acetaminophen  Suppository .. 650 milliGRAM(s) Rectal every 6 hours PRN  albumin human 25% IVPB 50 milliLiter(s) IV Intermittent every 6 hours  bisacodyl Suppository 10 milliGRAM(s) Rectal daily PRN  chlorhexidine 2% Cloths 1 Application(s) Topical <User Schedule>  dextrose 5% + sodium chloride 0.9%. 1000 milliLiter(s) IV Continuous <Continuous>  enoxaparin Injectable 40 milliGRAM(s) SubCutaneous daily  furosemide   Injectable 20 milliGRAM(s) IV Push every 6 hours  glucagon  Injectable 0.5 milliGRAM(s) IV Push once  insulin lispro (ADMELOG) corrective regimen sliding scale   SubCutaneous every 6 hours  midodrine. 5 milliGRAM(s) Oral three times a day  pantoprazole  Injectable 40 milliGRAM(s) IV Push two times a day  piperacillin/tazobactam IVPB.. 3.375 Gram(s) IV Intermittent every 8 hours    Drug Dosing Weight  Height (cm): 175.3 (20 Dec 2021 02:21)  Weight (kg): 72.2 (18 Jan 2022 17:55)  BMI (kg/m2): 23.5 (18 Jan 2022 17:55)  BSA (m2): 1.87 (18 Jan 2022 17:55)    CENTRAL LINE: [ ] YES [x ] NO  LOCATION:   DATE INSERTED:  REMOVE: [ ] YES [ ] NO  EXPLAIN:    HUTCHINS: [ ] YES [x ] NO    DATE INSERTED:  REMOVE:  [ ] YES [ ] NO  EXPLAIN:    PAST MEDICAL & SURGICAL HISTORY:  Hypertension    Diabetes    High cholesterol    Primary osteoarthritis of both knees    Coronary artery disease of native artery of native heart with stable angina pectoris    Allergic bronchitis    Diabetic retinopathy    Oral cancer    SCC (squamous cell carcinoma)    Metastatic cancer    Recurrent disease    Edema of extremity    Hyponatremia    Microalbuminuria    Neuralgia    Obstructive sleep apnea, adult    Vitamin D deficiency    S/P knee replacement  b/l    S/P hernia repair  b/l    Status post cataract extraction and insertion of intraocular lens, unspecified laterality  b/l    History of surgery  On 9/10/20 he underwent right hemiglossectomy and partial neck dissection with Dr. Darinel Servin at [facility].                01-24 @ 07:01 - 01-25 @ 07:00  --------------------------------------------------------  IN: 0 mL / OUT: 1150 mL / NET: -1150 mL        Mode: AC/ CMV (Assist Control/ Continuous Mandatory Ventilation)  RR (machine): 14  TV (machine): 450  FiO2: 40  PEEP: 5  ITime: 0.9  MAP: 11  PIP: 28      PHYSICAL EXAM:    GENERAL: NAD, cachectic +temporal and muscle waisting  HEAD:  Atraumatic, Normocephalic  EYES: EOMI, PERRLA, conjunctiva and sclera clear  ENMT: No tonsillar erythema, exudates  NECK: Supple, No JVD, tracheostomy intact  NERVOUS SYSTEM:  Alert and awake.  CHEST/LUNG: Diminished breath sounds bilateral bases  HEART: Regular rate and rhythm; No murmurs, rubs, or gallops  ABDOMEN: Soft, Nontender, Nondistended; Bowel sounds present; Peg intact  EXTREMITIES: +3 edema all extremities; +muscle waisting  2+ Peripheral Pulses, No clubbing, cyanosis  LYMPH: No lymphadenopathy noted  SKIN: Dry scab right ear with eschar no drainage.      LABS:  CBC Full  -  ( 25 Jan 2022 07:43 )  WBC Count : 5.03 K/uL  RBC Count : 2.91 M/uL  Hemoglobin : 8.0 g/dL  Hematocrit : 25.2 %  Platelet Count - Automated : 112 K/uL  Mean Cell Volume : 86.6 fl  Mean Cell Hemoglobin : 27.5 pg  Mean Cell Hemoglobin Concentration : 31.7 gm/dL  Auto Neutrophil # : x  Auto Lymphocyte # : x  Auto Monocyte # : x  Auto Eosinophil # : x  Auto Basophil # : x  Auto Neutrophil % : x  Auto Lymphocyte % : x  Auto Monocyte % : x  Auto Eosinophil % : x  Auto Basophil % : x    01-25    149<H>  |  120<H>  |  31<H>  ----------------------------<  164<H>  3.3<L>   |  22  |  0.79    Ca    10.8<H>      25 Jan 2022 07:43  Phos  2.9     01-25  Mg     1.9     01-25    TPro  5.0<L>  /  Alb  1.8<L>  /  TBili  0.8  /  DBili  x   /  AST  8<L>  /  ALT  <6<L>  /  AlkPhos  64  01-25              [  ]  DVT Prophylaxis  [  ]  Nutrition, Brand, Rate         Goal Rate        Abnormal Nutritional Status -  Malnutrition   Cachexia          RADIOLOGY & ADDITIONAL STUDIES:  ***  < from: Xray Chest 1 View-PORTABLE IMMEDIATE (Xray Chest 1 View-PORTABLE IMMEDIATE .) (01.17.22 @ 21:39) >  Frontal expiratory view of the chest showsthe heart to be similar in   size. Tracheostomy tube, gastric tube and upper abdominal clips are again   noted.    The lungs show similar left lung infiltrates with questionable small left   effusion and there is no evidence of pneumothorax nor rightpleural   effusion.    IMPRESSION:  Similar infiltrates.      < end of copied text >  < from: CT Abdomen and Pelvis w/ IV Cont (01.18.22 @ 16:12) >  PROCEDURE:  CT of the Abdomen and Pelvis was performed.  Sagittal and coronal reformats were performed.    FINDINGS:  LOWER CHEST: Redemonstration of a cavitary lesion in the right middle   lobe. Redemonstration of secretions in the left lower lobe bronchi with   complete collapse of the left lower lobe with heterogeneous   opacification. Partially imaged 4.3 x 2.8 cm air-fluid collection in the   left lower lobe. There is a small loculated rim-enhancing pleural   effusion.    LIVER: Within normal limits.  BILE DUCTS: Normal caliber.  GALLBLADDER: Within normal limits.  SPLEEN: Within normal limits.  PANCREAS: Within normal limits.  ADRENALS: Within normal limits.  KIDNEYS/URETERS: Within normal limits.    BLADDER: Within normal limits.  REPRODUCTIVE ORGANS: Prostate within normal limits.    BOWEL: PEG tube in the stomach. Several dilated air-fluid filled loops of   small bowel with transition point to collapsed bowel in the right lower   quadrant (series 2, image 83). Mild wall thickening of the small bowel   loops proximal to the transitionpoint with evidence of focal pneumatosis   intestinalis (2, 86). There is also gastric pneumatosis and mild portal   venous gas.  PERITONEUM: Small volume ascites and mesenteric edema.  VESSELS: Atherosclerotic changes.  RETROPERITONEUM/LYMPH NODES: No lymphadenopathy.  ABDOMINAL WALL: Subcutaneous edema.  BONES: Degenerative changes.    IMPRESSION:  Small bowel obstruction with transition point in the right lower   quadrant, with additional findings highly suspicious for bowel ischemia.    Partially imaged 4.3 cm air-fluid collection in the left lower lobe and   adjacent rim-enhancing pleural effusion, suspicious for lung   abscess/empyema. Dedicated chest CT can be performed for further   evaluation.    Findings were discussed with ALIZE LANDAVERDE on  1/18/2022 4:47 PM by Dr. Armstrong with read back confirmation.    < end of copied text >  < from: CT Chest w/ IV Cont (12.30.21 @ 12:53) >  LUNGS/AIRWAYS/PLEURA: New occlusion of the left lower lobe bronchus and   associated left lower lobe collapse. Heterogeneous enhancement of left   lower lobe atelectasis, suggesting a superimposed process. New   consolidation in the right lower lobe and lingula, and peribronchial   nodules in all lobes of the right lung.    Larger bilateral cavitary masses, for example, 4.6 cm in themiddle lobe   (measured on 3-90), prior 2.1 cm. Other unchanged solid nodules, for   example, 1 cm in the left apex (3-34).    Endotracheal tube tip in the mid trachea. Stable mild fibrosis in the   anterior upper lobes.    Slightly increased moderate left pneumothorax. Left pleural pigtail   catheter in the left posterior hemithorax. Trace left basilar pleural   effusion. New right pleural thickening in the right cardiophrenic angle.    LYMPH NODES/MEDIASTINUM: Partially included necrotic right   supraclavicular lymphadenopathy. Slightly larger right paratracheal lymph   nodes up to 1.1 cm (3-37). Calcified lymph nodes, likely prior   granulomatous disease.    HEART/VASCULATURE: Normal heart size. Small pericardial effusion. Heavily   calcified coronary arteries. Normal caliber aorta and main pulmonary   artery.    UPPER ABDOMEN:NG tube tip in the gastric fundus.    BONES/SOFT TISSUES: New non and mildly displaced fractures of the right   anterior third through sixth ribs, and mildly displaced fracture of the   left anterior fifth rib.      IMPRESSION:    New multilobar consolidation, including likely within a collapsed left   lower lobe, and peribronchial nodules in all lobes of the right lung,   favored to be infection.    New occlusion of the left lower lobe bronchus, possibly by mucus, and   associated left lower lobe collapse.    Larger bilateral cavitary metastases.    Increased moderate left pneumothorax. New right pleural thickening in the   right cardiophrenic angle.    Mildly increased mediastinal lymphadenopathy. Partially included large   right supraclavicular lymphadenopathy.    Bilateral acute anterior rib fractures.    < end of copied text >    Goals of Care Discussion with Family/Proxy/Other   - see note from 1/02/22

## 2022-01-25 NOTE — PROGRESS NOTE ADULT - PROBLEM SELECTOR PLAN 3
[FreeTextEntry1] : Overnight oximetry test reveals all is normal.  Secondary to partial SBO, bilateral lung abscess  Antibiotics completed  Monitor off antibiotics.

## 2022-01-25 NOTE — PROGRESS NOTE ADULT - PROBLEM SELECTOR PLAN 2
Failed extubation 12/23, reintub 12/23; again failed 12/27, reintub 12/28; s/p trach 1/6/22  Increased infiltrate/atelectasis  Continue mechanical ventilation with daily SBT trails.  Must be vented at night. Will need vent facility.

## 2022-01-25 NOTE — CHART NOTE - NSCHARTNOTEFT_GEN_A_CORE
Patient is a 76y old  Male who presents with a chief complaint of cardiac arrest. Called by nursing staff to observe patient with spontaneous right facial twitch. Patient status post resection to right side of toungue and interior cheek lining. patient given tylenol for pain and observed twitch resolved. Patient has tylenol order PRN for pain.       Vital Signs Last 24 Hrs  T(C): 36.1 (25 Jan 2022 04:41), Max: 36.7 (24 Jan 2022 13:24)  T(F): 97 (25 Jan 2022 04:41), Max: 98.1 (24 Jan 2022 13:24)  HR: 90 (25 Jan 2022 04:41) (89 - 95)  BP: 123/74 (25 Jan 2022 04:41) (117/65 - 132/77)  BP(mean): --  RR: 19 (25 Jan 2022 04:41) (18 - 19)  SpO2: 100% (25 Jan 2022 04:41) (100% - 100%)

## 2022-01-26 NOTE — PROGRESS NOTE ADULT - ASSESSMENT
Septic Shock - resolved  Pneumonia/ lung abscess vs Empyema of left lung  Leukocytosis - improving    Plan - Cont Zosyn 3.375 gms iv q8hrs for possibly 2 weeks more.  might need a chest tube if he has Empyema( pt is a high risk for procedure, hence not done)                       Septic Shock - resolved  Pneumonia/ lung abscess vs Empyema of left lung  Leukocytosis - improving    Plan - Cont Zosyn 3.375 gms iv q8hrs for possibly 2 weeks more.  might need a chest tube if he has Empyema( pt is a high risk for procedure, hence not done)    I agree with above

## 2022-01-26 NOTE — PROGRESS NOTE ADULT - SUBJECTIVE AND OBJECTIVE BOX
76y Male is under our care for     REVIEW OF SYSTEMS:  [  ] Not able to elicit  General:	  Chest:	  GI:	  :  Skin:	  Musculoskeletal:	  Neuro:	    MEDS:  piperacillin/tazobactam IVPB.. 3.375 Gram(s) IV Intermittent every 8 hours    ALLERGIES: Allergies    No Known Allergies    Intolerances        VITALS:  Vital Signs Last 24 Hrs  T(C): 36.6 (26 Jan 2022 05:00), Max: 36.6 (26 Jan 2022 05:00)  T(F): 97.9 (26 Jan 2022 05:00), Max: 97.9 (26 Jan 2022 05:00)  HR: 88 (26 Jan 2022 08:23) (80 - 92)  BP: 125/74 (26 Jan 2022 05:00) (119/65 - 126/65)  BP(mean): --  RR: 17 (26 Jan 2022 05:00) (17 - 20)  SpO2: 99% (26 Jan 2022 08:23) (97% - 100%)      PHYSICAL EXAM:  HEENT:  Neck:  Respiratory:  Cardiovascular:  Gastrointestinal:  Extremities:  Skin:  Ortho:  Neuro:    LABS/DIAGNOSTIC TESTS:                        8.0    4.46  )-----------( 102      ( 26 Jan 2022 10:36 )             24.6     WBC Count: 4.46 K/uL (01-26 @ 10:36)  WBC Count: 5.03 K/uL (01-25 @ 07:43)  WBC Count: 13.03 K/uL (01-24 @ 06:32)  WBC Count: 6.93 K/uL (01-23 @ 05:55)  WBC Count: 9.23 K/uL (01-22 @ 05:52)    01-26    151<H>  |  117<H>  |  29<H>  ----------------------------<  160<H>  2.5<LL>   |  28  |  0.75    Ca    10.5      26 Jan 2022 07:18  Phos  1.9     01-26  Mg     1.7     01-26    TPro  5.1<L>  /  Alb  2.2<L>  /  TBili  0.7  /  DBili  x   /  AST  11  /  ALT  <6<L>  /  AlkPhos  58  01-26      CULTURES:   .Blood Blood  01-18 @ 02:51   No Growth Final  --  --      .Blood Blood  01-18 @ 02:50   No Growth Final  --  --      .Sputum Sputum  12-29 @ 18:28   Normal Respiratory Karla present  --    Numerous polymorphonuclear leukocytes per low power field  No squamous epithelial cells per low power field  Few Gram positive cocci in pairs per oil power field      Clean Catch Clean Catch (Midstream)  12-21 @ 04:42   No growth  --  --      .Sputum Sputum  11-08 @ 10:53   Few Mycobacterium avium complex  --  --      .Sputum Sputum  11-06 @ 19:34   No acid fast bacilli isolated after 6 weeks.  --  --      .Sputum Sputum  11-05 @ 23:15   Growth of acid fast bacilli detected in broth,  Mycobacterium avium complex by Dna Probe  --  --      .Blood Blood  11-04 @ 11:23   No Growth Final  --  --      .Blood Blood  11-04 @ 10:50   No Growth Final  --  --      Clean Catch Clean Catch (Midstream)  11-03 @ 17:00   <10,000 CFU/mL Normal Urogenital Karla  --  --        RADIOLOGY:  no new studies 76y Male is under our care for pneumonia.  Patient was seen laying comfortably in bed with no acute distress.  He remains afebrile and WBC count is WNL.    REVIEW OF SYSTEMS:  [ x ] Not able to elicit      MEDS:  piperacillin/tazobactam IVPB.. 3.375 Gram(s) IV Intermittent every 8 hours    ALLERGIES: Allergies    No Known Allergies    Intolerances        VITALS:  Vital Signs Last 24 Hrs  T(C): 36.6 (26 Jan 2022 05:00), Max: 36.6 (26 Jan 2022 05:00)  T(F): 97.9 (26 Jan 2022 05:00), Max: 97.9 (26 Jan 2022 05:00)  HR: 88 (26 Jan 2022 08:23) (80 - 92)  BP: 125/74 (26 Jan 2022 05:00) (119/65 - 126/65)  BP(mean): --  RR: 17 (26 Jan 2022 05:00) (17 - 20)  SpO2: 99% (26 Jan 2022 08:23) (97% - 100%)      PHYSICAL EXAM:  HEENT: trach+  Neck: supple no LN's   Respiratory: lungs clear no rales  Cardiovascular: S1 S2 reg no murmurs  Gastrointestinal: +BS with soft, nondistended abdomen; nontender, peg tube in place  : Freedman catheter in place with clear urine  Extremities: Right hand edema +2  Skin: no rashes  Ortho: n/a  Neuro: Awake and alert    LABS/DIAGNOSTIC TESTS:                        8.0    4.46  )-----------( 102      ( 26 Jan 2022 10:36 )             24.6     WBC Count: 4.46 K/uL (01-26 @ 10:36)  WBC Count: 5.03 K/uL (01-25 @ 07:43)  WBC Count: 13.03 K/uL (01-24 @ 06:32)  WBC Count: 6.93 K/uL (01-23 @ 05:55)  WBC Count: 9.23 K/uL (01-22 @ 05:52)    01-26    151<H>  |  117<H>  |  29<H>  ----------------------------<  160<H>  2.5<LL>   |  28  |  0.75    Ca    10.5      26 Jan 2022 07:18  Phos  1.9     01-26  Mg     1.7     01-26    TPro  5.1<L>  /  Alb  2.2<L>  /  TBili  0.7  /  DBili  x   /  AST  11  /  ALT  <6<L>  /  AlkPhos  58  01-26      CULTURES:   .Blood Blood  01-18 @ 02:51   No Growth Final  --  --      .Blood Blood  01-18 @ 02:50   No Growth Final  --  --      .Sputum Sputum  12-29 @ 18:28   Normal Respiratory Karla present  --    Numerous polymorphonuclear leukocytes per low power field  No squamous epithelial cells per low power field  Few Gram positive cocci in pairs per oil power field      Clean Catch Clean Catch (Midstream)  12-21 @ 04:42   No growth  --  --      .Sputum Sputum  11-08 @ 10:53   Few Mycobacterium avium complex  --  --      .Sputum Sputum  11-06 @ 19:34   No acid fast bacilli isolated after 6 weeks.  --  --      .Sputum Sputum  11-05 @ 23:15   Growth of acid fast bacilli detected in broth,  Mycobacterium avium complex by Dna Probe  --  --      .Blood Blood  11-04 @ 11:23   No Growth Final  --  --      .Blood Blood  11-04 @ 10:50   No Growth Final  --  --      Clean Catch Clean Catch (Midstream)  11-03 @ 17:00   <10,000 CFU/mL Normal Urogenital Karla  --  --        RADIOLOGY:  no new studies

## 2022-01-26 NOTE — PROGRESS NOTE ADULT - PROBLEM SELECTOR PLAN 1
Partial SBO. Surgery following  Increased tube feed from 30ml to 35 ml if tolerating then can increase feeding 4 hours followed by 2 hrs NPO  Serial abdominal x-rays

## 2022-01-26 NOTE — PROGRESS NOTE ADULT - PROBLEM SELECTOR PLAN 10
Continue DVT and GI prophylaxis.  Serial abdominal xrays for partial SBO  Continue mechanical ventilation with daily SBT  Will need vent facility upon discharge.  Overall prognosis is poor.  Must tolerate 40ml/hr for 24 hours

## 2022-01-26 NOTE — PROGRESS NOTE ADULT - SUBJECTIVE AND OBJECTIVE BOX
MELISSA JOHNS    SCU progress note    INTERVAL HPI/OVERNIGHT EVENTS: Patient seen and examined at bedside.    DNR [x ]   DNI  [x  ]    Covid - 19 PCR: 1/26/22 negative    The 4Ms    What Matters Most: see [facility]  Age appropriate Medications/Screen for High Risk Medication: Yes  Mentation: see CAM below  Mobility: defer to physical exam    The Confusion Assessment Method (CAM) Diagnostic Algorithm     1: Acute Onset or Fluctuating Course  - Is there evidence of an acute change in mental status from the patient’s baseline? Did the (abnormal) behavior  fluctuate during the day, that is, tend to come and go, or increase and decrease in severity?  [ ] YES [x ] NO     2: Inattention  - Did the patient have difficulty focusing attention, being easily distractible, or having difficulty keeping track of what was being said?  [ ] YES [ ] NO unable to assess     3: Disorganized thinking  -Was the patient’s thinking disorganized or incoherent, such as rambling or irrelevant conversation, unclear or illogical flow of ideas, or unpredictable switching from subject to subject?  [ ] YES [ ] NO unable to assess    4: Altered Level of consciousness?  [ ] YES [ ] NO unable to assess    The diagnosis of delirium by CAM requires the presence of features 1 and 2 and either 3 or 4.    PRESSORS: [ ] YES [x ] NO  piperacillin/tazobactam IVPB.. 3.375 Gram(s) IV Intermittent every 8 hours    Cardiovascular:  Heart Failure  Acute   Acute on Chronic  Chronic       midodrine. 5 milliGRAM(s) Oral three times a day    Pulmonary:    Hematalogic:  enoxaparin Injectable 40 milliGRAM(s) SubCutaneous daily    Other:  acetaminophen     Tablet .. 650 milliGRAM(s) Oral every 6 hours PRN  acetaminophen  Suppository .. 650 milliGRAM(s) Rectal every 6 hours PRN  bisacodyl Suppository 10 milliGRAM(s) Rectal daily PRN  chlorhexidine 2% Cloths 1 Application(s) Topical <User Schedule>  dextrose 5% + sodium chloride 0.9%. 1000 milliLiter(s) IV Continuous <Continuous>  glucagon  Injectable 0.5 milliGRAM(s) IV Push once  insulin lispro (ADMELOG) corrective regimen sliding scale   SubCutaneous every 6 hours  pantoprazole  Injectable 40 milliGRAM(s) IV Push two times a day    acetaminophen     Tablet .. 650 milliGRAM(s) Oral every 6 hours PRN  acetaminophen  Suppository .. 650 milliGRAM(s) Rectal every 6 hours PRN  bisacodyl Suppository 10 milliGRAM(s) Rectal daily PRN  chlorhexidine 2% Cloths 1 Application(s) Topical <User Schedule>  dextrose 5% + sodium chloride 0.9%. 1000 milliLiter(s) IV Continuous <Continuous>  enoxaparin Injectable 40 milliGRAM(s) SubCutaneous daily  glucagon  Injectable 0.5 milliGRAM(s) IV Push once  insulin lispro (ADMELOG) corrective regimen sliding scale   SubCutaneous every 6 hours  midodrine. 5 milliGRAM(s) Oral three times a day  pantoprazole  Injectable 40 milliGRAM(s) IV Push two times a day  piperacillin/tazobactam IVPB.. 3.375 Gram(s) IV Intermittent every 8 hours    Drug Dosing Weight  Height (cm): 175.3 (20 Dec 2021 02:21)  Weight (kg): 72.2 (18 Jan 2022 17:55)  BMI (kg/m2): 23.5 (18 Jan 2022 17:55)  BSA (m2): 1.87 (18 Jan 2022 17:55)    CENTRAL LINE: [ ] YES [ x] NO  LOCATION:   DATE INSERTED:  REMOVE: [ ] YES [ ] NO  EXPLAIN:    LISET: [ ] YES [x ] NO    DATE INSERTED:  REMOVE:  [ ] YES [ ] NO  EXPLAIN:    PAST MEDICAL & SURGICAL HISTORY:  Hypertension    Diabetes    High cholesterol    Primary osteoarthritis of both knees    Coronary artery disease of native artery of native heart with stable angina pectoris    Allergic bronchitis    Diabetic retinopathy    Oral cancer    SCC (squamous cell carcinoma)    Metastatic cancer    Recurrent disease    Edema of extremity    Hyponatremia    Microalbuminuria    Neuralgia    Obstructive sleep apnea, adult    Vitamin D deficiency    S/P knee replacement  b/l    S/P hernia repair  b/l    Status post cataract extraction and insertion of intraocular lens, unspecified laterality  b/l    History of surgery  On 9/10/20 he underwent right hemiglossectomy and partial neck dissection with Dr. Darinel Servin at [facility].        01-25 @ 07:01  -  01-26 @ 07:00  --------------------------------------------------------  IN: 0 mL / OUT: 6250 mL / NET: -6250 mL        Mode: AC/ CMV (Assist Control/ Continuous Mandatory Ventilation)  RR (machine): 14  TV (machine): 450  FiO2: 40  PEEP: 5  ITime: 0.9  MAP: 7  PIP: 16      PHYSICAL EXAM:      GENERAL: NAD, cachectic +temporal and muscle waisting  HEAD:  Atraumatic, Normocephalic  EYES: EOMI, PERRLA, conjunctiva and sclera clear  ENMT: No tonsillar erythema, exudates  NECK: Supple, No JVD, tracheostomy intact  NERVOUS SYSTEM:  Alert and awake.  CHEST/LUNG: Diminished breath sounds bilateral bases  HEART: Regular rate and rhythm; No murmurs, rubs, or gallops  ABDOMEN: Soft, Nontender, Nondistended; Bowel sounds present; Peg intact  EXTREMITIES: +3 edema all extremities; +muscle waisting  2+ Peripheral Pulses, No clubbing, cyanosis  LYMPH: No lymphadenopathy noted  SKIN: Dry scab right ear with eschar no drainage.      LABS:  CBC Full  -  ( 26 Jan 2022 10:36 )  WBC Count : 4.46 K/uL  RBC Count : 2.84 M/uL  Hemoglobin : 8.0 g/dL  Hematocrit : 24.6 %  Platelet Count - Automated : 102 K/uL  Mean Cell Volume : 86.6 fl  Mean Cell Hemoglobin : 28.2 pg  Mean Cell Hemoglobin Concentration : 32.5 gm/dL  Auto Neutrophil # : x  Auto Lymphocyte # : x  Auto Monocyte # : x  Auto Eosinophil # : x  Auto Basophil # : x  Auto Neutrophil % : x  Auto Lymphocyte % : x  Auto Monocyte % : x  Auto Eosinophil % : x  Auto Basophil % : x    01-26    151<H>  |  117<H>  |  29<H>  ----------------------------<  160<H>  2.5<LL>   |  28  |  0.75    Ca    10.5      26 Jan 2022 07:18  Phos  1.9     01-26  Mg     1.7     01-26    TPro  5.1<L>  /  Alb  2.2<L>  /  TBili  0.7  /  DBili  x   /  AST  11  /  ALT  <6<L>  /  AlkPhos  58  01-26      [x  ]  DVT Prophylaxis  [  ]  Nutrition, Brand, Rate         Goal Rate        Abnormal Nutritional Status -  Malnutrition   Cachexia          RADIOLOGY & ADDITIONAL STUDIES:  ***    < from: Xray Chest 1 View-PORTABLE IMMEDIATE (Xray Chest 1 View-PORTABLE IMMEDIATE .) (01.17.22 @ 21:39) >  Frontal expiratory view of the chest showsthe heart to be similar in   size. Tracheostomy tube, gastric tube and upper abdominal clips are again   noted.    The lungs show similar left lung infiltrates with questionable small left   effusion and there is no evidence of pneumothorax nor rightpleural   effusion.    IMPRESSION:  Similar infiltrates.      < end of copied text >  < from: CT Abdomen and Pelvis w/ IV Cont (01.18.22 @ 16:12) >  PROCEDURE:  CT of the Abdomen and Pelvis was performed.  Sagittal and coronal reformats were performed.    FINDINGS:  LOWER CHEST: Redemonstration of a cavitary lesion in the right middle   lobe. Redemonstration of secretions in the left lower lobe bronchi with   complete collapse of the left lower lobe with heterogeneous   opacification. Partially imaged 4.3 x 2.8 cm air-fluid collection in the   left lower lobe. There is a small loculated rim-enhancing pleural   effusion.    LIVER: Within normal limits.  BILE DUCTS: Normal caliber.  GALLBLADDER: Within normal limits.  SPLEEN: Within normal limits.  PANCREAS: Within normal limits.  ADRENALS: Within normal limits.  KIDNEYS/URETERS: Within normal limits.    BLADDER: Within normal limits.  REPRODUCTIVE ORGANS: Prostate within normal limits.    BOWEL: PEG tube in the stomach. Several dilated air-fluid filled loops of   small bowel with transition point to collapsed bowel in the right lower   quadrant (series 2, image 83). Mild wall thickening of the small bowel   loops proximal to the transitionpoint with evidence of focal pneumatosis   intestinalis (2, 86). There is also gastric pneumatosis and mild portal   venous gas.  PERITONEUM: Small volume ascites and mesenteric edema.  VESSELS: Atherosclerotic changes.  RETROPERITONEUM/LYMPH NODES: No lymphadenopathy.  ABDOMINAL WALL: Subcutaneous edema.  BONES: Degenerative changes.    IMPRESSION:  Small bowel obstruction with transition point in the right lower   quadrant, with additional findings highly suspicious for bowel ischemia.    Partially imaged 4.3 cm air-fluid collection in the left lower lobe and   adjacent rim-enhancing pleural effusion, suspicious for lung   abscess/empyema. Dedicated chest CT can be performed for further   evaluation.    Findings were discussed with ALIZE LANDAVERDE on  1/18/2022 4:47 PM by Dr. Armstrong with read back confirmation.    < end of copied text >  < from: CT Chest w/ IV Cont (12.30.21 @ 12:53) >  LUNGS/AIRWAYS/PLEURA: New occlusion of the left lower lobe bronchus and   associated left lower lobe collapse. Heterogeneous enhancement of left   lower lobe atelectasis, suggesting a superimposed process. New   consolidation in the right lower lobe and lingula, and peribronchial   nodules in all lobes of the right lung.    Larger bilateral cavitary masses, for example, 4.6 cm in themiddle lobe   (measured on 3-90), prior 2.1 cm. Other unchanged solid nodules, for   example, 1 cm in the left apex (3-34).    Endotracheal tube tip in the mid trachea. Stable mild fibrosis in the   anterior upper lobes.    Slightly increased moderate left pneumothorax. Left pleural pigtail   catheter in the left posterior hemithorax. Trace left basilar pleural   effusion. New right pleural thickening in the right cardiophrenic angle.    LYMPH NODES/MEDIASTINUM: Partially included necrotic right   supraclavicular lymphadenopathy. Slightly larger right paratracheal lymph   nodes up to 1.1 cm (3-37). Calcified lymph nodes, likely prior   granulomatous disease.    HEART/VASCULATURE: Normal heart size. Small pericardial effusion. Heavily   calcified coronary arteries. Normal caliber aorta and main pulmonary   artery.    UPPER ABDOMEN:NG tube tip in the gastric fundus.    BONES/SOFT TISSUES: New non and mildly displaced fractures of the right   anterior third through sixth ribs, and mildly displaced fracture of the   left anterior fifth rib.      IMPRESSION:    New multilobar consolidation, including likely within a collapsed left   lower lobe, and peribronchial nodules in all lobes of the right lung,   favored to be infection.    New occlusion of the left lower lobe bronchus, possibly by mucus, and   associated left lower lobe collapse.    Larger bilateral cavitary metastases.    Increased moderate left pneumothorax. New right pleural thickening in the   right cardiophrenic angle.    Mildly increased mediastinal lymphadenopathy. Partially included large   right supraclavicular lymphadenopathy.    Bilateral acute anterior rib fractures.    < end of copied text >    Goals of Care Discussion with Family/Proxy/Other   - see note from 1/02/22

## 2022-01-26 NOTE — CHART NOTE - NSCHARTNOTEFT_GEN_A_CORE
spoke with daughter Sandi Amaya. Explained current condition. Encouraged family to address concerns and emotional support given, all concerns and questions addressed.

## 2022-01-26 NOTE — PROGRESS NOTE ADULT - NUTRITIONAL ASSESSMENT
This patient has been assessed with a concern for Malnutrition and has been determined to have a diagnosis/diagnoses of Severe protein-calorie malnutrition.    This patient is being managed with:   Diet NPO with Tube Feed-  Tube Feeding Modality: Gastrostomy  Glucerna 1.5 Dhruv  Total Volume for 24 Hours (mL): 480  Intermittent  Starting Tube Feed Rate {mL per Hour}: 10  Increase Tube Feed Rate by (mL): 10  Until Goal Tube Feed Rate (mL per Hour): 40  Tube Feeding Hours ON: 3  Tube Feeding OFF (Hours): 3  Tube Feed Start Time: 13:30  No Carb Prosource TF     Qty per Day:  1 PKT BID.  Entered: Jan 23 2022  1:19PM

## 2022-01-27 NOTE — DISCHARGE NOTE PROVIDER - NSDCMRMEDTOKEN_GEN_ALL_CORE_FT
amLODIPine 5 mg oral tablet: 1 tab(s) orally once a day  aspirin 81 mg oral delayed release tablet: 1 tab(s) orally once a day  bisacodyl 5 mg oral delayed release tablet: 2 tab(s) orally once a day (at bedtime)  carvedilol 25 mg oral tablet: 0.5 tab(s) orally 2 times a day  doxazosin 2 mg oral tablet: 1 tab(s) orally once a day  gabapentin 300 mg oral capsule: orally 3 times a day  metFORMIN 500 mg oral tablet: 1 tab(s) orally 2 times a day  methadone 10 mg/5 mL oral solution: 0.63 milliliter(s) orally 2 times a day MDD:2.5 mg  oxyCODONE 5 mg/5 mL oral solution: 5 milliliter(s) orally every 4 hours, As needed, Moderate Pain (4 - 6) MDD:30 mg  Physical Therapy : 3 times a week   polyethylene glycol 3350 oral powder for reconstitution: 17 gram(s) orally 2 times a day  senna oral tablet: 2 tab(s) orally once a day (at bedtime)  simvastatin 20 mg oral tablet: 1 tab(s) orally once a day (at bedtime)  Speech Therapy for Dysphagia : 2 times a week    Admelog 100 units/mL injectable solution: 2 Unit(s) if Glucose 151 - 200  4 Unit(s) if Glucose 201 - 250  6 Unit(s) if Glucose 251 - 300  8 Unit(s) if Glucose 301 - 350  10 Unit(s) if Glucose 351 - 400  12 Unit(s) if Glucose Greater Than 400  aspirin 81 mg oral tablet, chewable: 1 tab(s) by gastrostomy tube once a day   enoxaparin: 40 milligram(s) subcutaneously once a day  lactulose 10 g/15 mL oral syrup: 15 milliliter(s) by gastrostomy tube once a day  midodrine 5 mg oral tablet: 1 tab(s) by gastrostomy tube 3 times a day  pantoprazole 40 mg oral granule, delayed release: 1 each by gastrostomy tube once a day  Physical Therapy : 3 times a week   piperacillin-tazobactam: 3.375 milligram(s) intravenously every 8 hours  simvastatin 20 mg oral tablet: 1 tab(s) by gastrostomy tube once a day (at bedtime)  Speech Therapy for Dysphagia : 2 times a week

## 2022-01-27 NOTE — PROGRESS NOTE ADULT - NUTRITIONAL ASSESSMENT
This patient has been assessed with a concern for Malnutrition and has been determined to have a diagnosis/diagnoses of Severe protein-calorie malnutrition.    This patient is being managed with:   Diet NPO with Tube Feed-  Tube Feeding Modality: Gastrostomy  Glucerna 1.5 Dhruv  Total Volume for 24 Hours (mL): 560  Intermittent  Starting Tube Feed Rate {mL per Hour}: 10  Increase Tube Feed Rate by (mL): 10  Until Goal Tube Feed Rate (mL per Hour): 35  Tube Feeding Hours ON: 4  Tube Feeding OFF (Hours): 2  Tube Feed Start Time: 13:30  No Carb Prosource TF     Qty per Day:  1 PKT BID.  Entered: Jan 26 2022  6:28PM

## 2022-01-27 NOTE — DISCHARGE NOTE PROVIDER - HOSPITAL COURSE
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 Patient had cardiac arrest in ED , was intubated CPR was initiated and subsequently ROSC was achieved after 20 minutes. Patient admitted to the ICU 12/20-1/7/22 for post cardiac arrest management in the setting of acute hypoxic respiratory failure due to left pneumothorax and likely aspiration pneumonia and septic shock. He was treated with a course of cefepime (12/20-12/28) and then another course of abx with Zosyn (12/29-1/7). He failed extubation x2 (12/23, 12/27) and ultimately underwent Tracheostomy placement (1/6/22). Underwent EGD 1/7 for PEG placement, found to have a gastric ulcer which was clipped and he was initiated on BID PPI. He is pending PEG placement, plan for IR consult Monday 1/10/22 and possible removal of L pigtail pleural catheter 1/8 pending results of repeat CXR. His ICU course was further s/f hypernatremia being managed with free water boluses and Palliative Care was consulted as patient is not a candidate for further cancer directed treatment once his is s/p trach/PEG. He was transferred to the SCU/AI on 1/8 for further care.   1/9 Febrile, Tmax 101.4F. F/u CXR. TOV. NPO after midnight and hold lovenox for GT placement in IR in am.   1/12/22 G tube placed in IR  1/14 L Pleural Pigtail catheter removed  1/15 tolerating SBT x4 hrs but with copious secretions requiring frequent suctioning; G tube clogged, initial attempts at dislodging unsuccessful in AM, noted to have a cluster of small capsule beads. Attempts to dissolve with ginger ale, unsuccessful. This evening, successfully unclogged Gastrostomy tube. TF restarted.  1/16- No acute overnight events  1/17  Code sepsis  Lactate 5.3 received 2.1 liter of NS. Repeat lactate 3.2 Blood and urine cultures sent.  1/18  Lactate 6.1  IVF increased to 125cc/hr. Abdominal / Pelvis CT with contrast. B/P low started on midodrine. Pt to be transferred to ICU after CT scan  Pt admitted back to ICU on 1/18 due to septic shock. Central line was placed in left femoral vein, for pressor use. Pt was placed on levophed, which was titrated down until it was not required. CT abdomen indicated presence of small bowel obstruction. Surgery stated pt is a poor surgical candidate, thus was treated conservatively by draining PEG tube to gravity. Bowel has been progressively decompressing and diet was started on 1/23. BCx neg 1/18.   1/27 Patient tolerated 3 hours of SBT yesterday. Will attempt daily SBT. Patient increased PEG tube feeding yesterday. 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 Patient had cardiac arrest in ED , was intubated CPR was initiated and subsequently ROSC was achieved after 20 minutes. Patient admitted to the ICU 12/20-1/7/22 for post cardiac arrest management in the setting of acute hypoxic respiratory failure due to left pneumothorax and likely aspiration pneumonia and septic shock. He was treated with a course of cefepime (12/20-12/28) and then another course of abx with Zosyn (12/29-1/7). He failed extubation x2 (12/23, 12/27) and ultimately underwent Tracheostomy placement (1/6/22). Underwent EGD 1/7 for PEG placement, found to have a gastric ulcer which was clipped and he was initiated on BID PPI. He is pending PEG placement, plan for IR consult Monday 1/10/22 and possible removal of L pigtail pleural catheter 1/8 pending results of repeat CXR. His ICU course was further s/f hypernatremia being managed with free water boluses and Palliative Care was consulted as patient is not a candidate for further cancer directed treatment once his is s/p trach/PEG. He was transferred to the SCU/AI on 1/8 for further care.   1/9 Febrile, Tmax 101.4F. F/u CXR. TOV. NPO after midnight and hold lovenox for GT placement in IR in am.   1/12/22 G tube placed in IR  1/14 L Pleural Pigtail catheter removed  1/15 tolerating SBT x4 hrs but with copious secretions requiring frequent suctioning; G tube clogged, initial attempts at dislodging unsuccessful in AM, noted to have a cluster of small capsule beads. Attempts to dissolve with ginger ale, unsuccessful. This evening, successfully unclogged Gastrostomy tube. TF restarted.  1/16- No acute overnight events  1/17  Code sepsis  Lactate 5.3 received 2.1 liter of NS. Repeat lactate 3.2 Blood and urine cultures sent.  1/18  Lactate 6.1  IVF increased to 125cc/hr. Abdominal / Pelvis CT with contrast. B/P low started on midodrine. Pt to be transferred to ICU after CT scan  Pt admitted back to ICU on 1/18 due to septic shock. Central line was placed in left femoral vein, for pressor use. Pt was placed on levophed, which was titrated down until it was not required. CT abdomen indicated presence of small bowel obstruction. Surgery stated pt is a poor surgical candidate, thus was treated conservatively by draining PEG tube to gravity. Bowel has been progressively decompressing and diet was started on 1/23. BCx neg 1/18.   1/27 Patient tolerated 3 hours of SBT yesterday. Will attempt daily SBT. Patient increased PEG tube feeding yesterday. resolving SBO with BM today, no residual in feeds  LABS:  CBC Full  -  ( 28 Jan 2022 06:45 )  WBC Count : 4.96 K/uL  RBC Count : 2.91 M/uL  Hemoglobin : 8.1 g/dL  Hematocrit : 25.7 %  Platelet Count - Automated : 153 K/uL  Mean Cell Volume : 88.3 fl  Mean Cell Hemoglobin : 27.8 pg  Mean Cell Hemoglobin Concentration : 31.5 gm/dL  Auto Neutrophil # : x  Auto Lymphocyte # : x  Auto Monocyte # : x  Auto Eosinophil # : x  Auto Basophil # : x  Auto Neutrophil % : x  Auto Lymphocyte % : x  Auto Monocyte % : x  Auto Eosinophil % : x  Auto Basophil % : x    01-28    152<H>  |  115<H>  |  27<H>  ----------------------------<  150<H>  3.3<L>   |  31  |  0.63    Ca    10.3      28 Jan 2022 06:45  Phos  2.0     01-28  Mg     2.0     01-28    TPro  4.9<L>  /  Alb  1.7<L>  /  TBili  0.4  /  DBili  x   /  AST  10  /  ALT  <6<L>  /  AlkPhos  69  01-28   76 year old male with medical history of HTN, HLD, DM2, CAD s/p stents, MAC pneumonia, 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 Patient had cardiac arrest in ED , was intubated CPR was initiated and subsequently ROSC was achieved after 20 minutes. Patient admitted to the ICU 12/20-1/7/22 for post cardiac arrest management in the setting of acute hypoxic respiratory failure due to left pneumothorax and likely aspiration pneumonia and septic shock. He was treated with a course of cefepime (12/20-12/28) and then another course of abx with Zosyn (12/29-1/7). He failed extubation x2 (12/23, 12/27) and ultimately underwent Tracheostomy placement (1/6/22). Underwent EGD 1/7 for PEG placement, found to have a gastric ulcer which was clipped and he was initiated on BID PPI. He is pending PEG placement, plan for IR consult Monday 1/10/22 and possible removal of L pigtail pleural catheter 1/8 pending results of repeat CXR. His ICU course was further s/f hypernatremia being managed with free water boluses and Palliative Care was consulted as patient is not a candidate for further cancer directed treatment once his is s/p trach/PEG. He was transferred to the SCU/AI on 1/8 for further care.   1/9 Febrile, Tmax 101.4F. F/u CXR. TOV. NPO after midnight and hold lovenox for GT placement in IR in am.      1/12/22 G tube placed in IR  1/14 L Pleural Pigtail catheter removed  1/15 tolerating SBT x4 hrs but with copious secretions requiring frequent suctioning; G tube clogged, initial attempts at dislodging unsuccessful in AM, noted to have a cluster of small capsule beads. Attempts to dissolve with ginger ale, unsuccessful. This evening, successfully unclogged Gastrostomy tube. TF restarted.  1/16- No acute overnight events  1/17  Code sepsis  Lactate 5.3 received 2.1 liter of NS. Repeat lactate 3.2 Blood and urine cultures sent.  1/18  Lactate 6.1  IVF increased to 125cc/hr. Abdominal / Pelvis CT with contrast. B/P low started on midodrine. Pt to be transferred to ICU after CT scan  Pt admitted back to ICU on 1/18 due to septic shock. Central line was placed in left femoral vein, for pressor use. Pt was placed on levophed, which was titrated down until it was not required. CT abdomen indicated presence of small bowel obstruction. Surgery stated pt is a poor surgical candidate, thus was treated conservatively by draining PEG tube to gravity. Bowel has been progressively decompressing and diet was started on 1/23. BCx neg 1/18.   1/27 Patient tolerated 3 hours of SBT yesterday. Will attempt daily SBT. Patient increased PEG tube feeding yesterday. resolving SBO with BM today, no residual in feeds.   LABS:  CBC Full  -  ( 28 Jan 2022 06:45 )  WBC Count : 4.96 K/uL  RBC Count : 2.91 M/uL  Hemoglobin : 8.1 g/dL  Hematocrit : 25.7 %  Platelet Count - Automated : 153 K/uL  Mean Cell Volume : 88.3 fl  Mean Cell Hemoglobin : 27.8 pg  Mean Cell Hemoglobin Concentration : 31.5 gm/dL  Auto Neutrophil # : x  Auto Lymphocyte # : x  Auto Monocyte # : x  Auto Eosinophil # : x  Auto Basophil # : x  Auto Neutrophil % : x  Auto Lymphocyte % : x  Auto Monocyte % : x  Auto Eosinophil % : x  Auto Basophil % : x    01-28    152<H>  |  115<H>  |  27<H>  ----------------------------<  150<H>  3.3<L>   |  31  |  0.63    Ca    10.3      28 Jan 2022 06:45  Phos  2.0     01-28  Mg     2.0     01-28    TPro  4.9<L>  /  Alb  1.7<L>  /  TBili  0.4  /  DBili  x   /  AST  10  /  ALT  <6<L>  /  AlkPhos  69  01-28

## 2022-01-27 NOTE — PROGRESS NOTE ADULT - ASSESSMENT
Septic Shock - resolved  Pneumonia/ lung abscess vs Empyema of left lung  Leukocytosis - normalized    Plan - Cont Zosyn 3.375 gms iv q8hrs for possibly 2 weeks more.  might need a chest tube if he has Empyema( pt is a high risk for procedure, hence not done)                             Septic Shock - resolved  Pneumonia/ lung abscess vs Empyema of left lung  Leukocytosis - normalized    Plan - Cont Zosyn 3.375 gms iv q8hrs for possibly 2 weeks more.  might need a chest tube if he has Empyema( pt is a high risk for procedure, hence not done)    I agree with above

## 2022-01-27 NOTE — CHART NOTE - NSCHARTNOTEFT_GEN_A_CORE
Assessment:   76yMalePatient is a 76y old  Male who presents with a chief complaint of cardiac arrest (27 Jan 2022 12:22). Pt visited. Pt is on vent via Trach. TF On Hold at present.  D/W NP. Pt had BM.  Plan is to start TF  Glucerna 1.5 40 ml /hr x 24 hr . Bed scale 147Lb with out SCD device. Labs noted. Alb 2.2,       Factors impacting intake: [ ] none [ ] nausea  [ ] vomiting [ ] diarrhea [ ] constipation  [ ]chewing problems [ ] swallowing issues  [ ] other:     Diet Prescription: Diet, NPO with Tube Feed:   Tube Feeding Modality: Gastrostomy  Glucerna 1.5 Dhruv  Total Volume for 24 Hours (mL): 560  Intermittent  Starting Tube Feed Rate {mL per Hour}: 10  Increase Tube Feed Rate by (mL): 10  Until Goal Tube Feed Rate (mL per Hour): 35  Tube Feeding Hours ON: 4  Tube Feeding OFF (Hours): 2  Tube Feed Start Time: 13:30  No Carb Prosource TF     Qty per Day:  1 PKT BID. (01-26-22 @ 18:28)    Intake:  NPO w TF     Current Weight:    % Weight Change Bed scale 147 lb with out SCCD device and Blue Boots    Pertinent Medications: MEDICATIONS  (STANDING):  chlorhexidine 2% Cloths 1 Application(s) Topical <User Schedule>  enoxaparin Injectable 40 milliGRAM(s) SubCutaneous daily  glucagon  Injectable 0.5 milliGRAM(s) IV Push once  insulin lispro (ADMELOG) corrective regimen sliding scale   SubCutaneous every 6 hours  midodrine. 5 milliGRAM(s) Oral three times a day  pantoprazole  Injectable 40 milliGRAM(s) IV Push two times a day  piperacillin/tazobactam IVPB.. 3.375 Gram(s) IV Intermittent every 8 hours    MEDICATIONS  (PRN):  acetaminophen     Tablet .. 650 milliGRAM(s) Oral every 6 hours PRN Mild Pain (1 - 3)  acetaminophen  Suppository .. 650 milliGRAM(s) Rectal every 6 hours PRN Temp greater or equal to 38C (100.4F)  bisacodyl Suppository 10 milliGRAM(s) Rectal daily PRN Constipation    Pertinent Labs: 01-27 Na153 mmol/L<H> Glu 146 mg/dL<H> K+ 3.3 mmol/L<L> Cr  0.63 mg/dL BUN 23 mg/dL<H> 01-27 Phos 1.9 mg/dL<L> 01-26 Alb 2.2 g/dL<L> 01-02 PAB 8 mg/dL<L> 12-30 Chol --    LDL --    HDL --    Trig 168 mg/dL<H>     CAPILLARY BLOOD GLUCOSE      POCT Blood Glucose.: 166 mg/dL (27 Jan 2022 11:08)  POCT Blood Glucose.: 134 mg/dL (27 Jan 2022 05:14)  POCT Blood Glucose.: 174 mg/dL (26 Jan 2022 23:29)  POCT Blood Glucose.: 130 mg/dL (26 Jan 2022 16:58)    Skin:     Estimated Needs:   [ ] no change since previous assessment  [ ] recalculated:     Previous Nutrition Diagnosis:   [ ] Inadequate Energy Intake [ ]Inadequate Oral Intake [ ] Excessive Energy Intake   [ ] Underweight [ ] Increased Nutrient Needs [ ] Overweight/Obesity   [ ] Altered GI Function [ ] Unintended Weight Loss [ ] Food & Nutrition Related Knowledge Deficit [x ] Malnutrition severe    Nutrition Diagnosis is [x ] ongoing  [ ] resolved [ ] not applicable     New Nutrition Diagnosis: [ ] not applicable       Interventions:   Recommend  [ ] Change Diet To:  [ ] Nutrition Supplement  [ x] Nutrition Support   Glucerna 1.5 40 ml /hr x 24 hr as tolerated to Provide (960 ml,1440 Kcal, Protein 79 gms, free water 729 ml/day)  [ ] Other:      Monitoring and Evaluation:   [ ] PO intake [ x ] Tolerance to diet prescription [ x ] weights [ x ] labs[ x ] follow up per protocol  [ ] other:

## 2022-01-27 NOTE — PROGRESS NOTE ADULT - PROBLEM SELECTOR PLAN 1
Partial SBO. Surgery following  Increased tube feed from 30ml to 35 ml if tolerating then can increase feeding 4 hours followed by 2 hrs NPO  Serial abdominal x-rays  pending surgery follow up regarding PEG tube feeding and SBO resolution Partial SBO.   Surgery recommending no intervention at this time  Increased tube feed from 35ml to 40ml, no PEG tube residual. BMx1 today  started on 40ml continuous

## 2022-01-27 NOTE — PROGRESS NOTE ADULT - SUBJECTIVE AND OBJECTIVE BOX
MELISSA JOHNS    SCU progress note    INTERVAL HPI/OVERNIGHT EVENTS: *** Patient seen and examined at bedside.     DNR [x ]   DNI  [x  ]    Covid - 19 PCR: 1/26/22 negative    The 4Ms    What Matters Most: see [facility]  Age appropriate Medications/Screen for High Risk Medication: Yes  Mentation: see CAM below  Mobility: defer to physical exam    The Confusion Assessment Method (CAM) Diagnostic Algorithm     1: Acute Onset or Fluctuating Course  - Is there evidence of an acute change in mental status from the patient’s baseline? Did the (abnormal) behavior  fluctuate during the day, that is, tend to come and go, or increase and decrease in severity?  [ ] YES [x ] NO     2: Inattention  - Did the patient have difficulty focusing attention, being easily distractible, or having difficulty keeping track of what was being said?  [ ] YES [ ] NO unable to assess     3: Disorganized thinking  -Was the patient’s thinking disorganized or incoherent, such as rambling or irrelevant conversation, unclear or illogical flow of ideas, or unpredictable switching from subject to subject?  [ ] YES [ ] NO unable to assess    4: Altered Level of consciousness?  [ ] YES [ ] NO unable to assess    The diagnosis of delirium by CAM requires the presence of features 1 and 2 and either 3 or 4.    PRESSORS: [ ] YES [x ] NO    piperacillin/tazobactam IVPB.. 3.375 Gram(s) IV Intermittent every 8 hours    Cardiovascular:  Heart Failure  Acute   Acute on Chronic  Chronic       midodrine. 5 milliGRAM(s) Oral three times a day    Pulmonary:    Hematalogic:  enoxaparin Injectable 40 milliGRAM(s) SubCutaneous daily    Other:  acetaminophen     Tablet .. 650 milliGRAM(s) Oral every 6 hours PRN  acetaminophen  Suppository .. 650 milliGRAM(s) Rectal every 6 hours PRN  bisacodyl Suppository 10 milliGRAM(s) Rectal daily PRN  chlorhexidine 2% Cloths 1 Application(s) Topical <User Schedule>  glucagon  Injectable 0.5 milliGRAM(s) IV Push once  insulin lispro (ADMELOG) corrective regimen sliding scale   SubCutaneous every 6 hours  pantoprazole  Injectable 40 milliGRAM(s) IV Push two times a day  potassium chloride   Powder 40 milliEquivalent(s) Oral once  potassium phosphate IVPB 15 milliMole(s) IV Intermittent once    acetaminophen     Tablet .. 650 milliGRAM(s) Oral every 6 hours PRN  acetaminophen  Suppository .. 650 milliGRAM(s) Rectal every 6 hours PRN  bisacodyl Suppository 10 milliGRAM(s) Rectal daily PRN  chlorhexidine 2% Cloths 1 Application(s) Topical <User Schedule>  enoxaparin Injectable 40 milliGRAM(s) SubCutaneous daily  glucagon  Injectable 0.5 milliGRAM(s) IV Push once  insulin lispro (ADMELOG) corrective regimen sliding scale   SubCutaneous every 6 hours  midodrine. 5 milliGRAM(s) Oral three times a day  pantoprazole  Injectable 40 milliGRAM(s) IV Push two times a day  piperacillin/tazobactam IVPB.. 3.375 Gram(s) IV Intermittent every 8 hours  potassium chloride   Powder 40 milliEquivalent(s) Oral once  potassium phosphate IVPB 15 milliMole(s) IV Intermittent once    Drug Dosing Weight  Height (cm): 175.3 (20 Dec 2021 02:21)  Weight (kg): 72.2 (18 Jan 2022 17:55)  BMI (kg/m2): 23.5 (18 Jan 2022 17:55)  BSA (m2): 1.87 (18 Jan 2022 17:55)    CENTRAL LINE: [ ] YES [x ] NO  LOCATION:   DATE INSERTED:  REMOVE: [ ] YES [ ] NO  EXPLAIN:    LISET: [ ] YES [x ] NO    DATE INSERTED:  REMOVE:  [ ] YES [ ] NO  EXPLAIN:    PAST MEDICAL & SURGICAL HISTORY:  Hypertension    Diabetes    High cholesterol    Primary osteoarthritis of both knees    Coronary artery disease of native artery of native heart with stable angina pectoris    Allergic bronchitis    Diabetic retinopathy    Oral cancer    SCC (squamous cell carcinoma)    Metastatic cancer    Recurrent disease    Edema of extremity    Hyponatremia    Microalbuminuria    Neuralgia    Obstructive sleep apnea, adult    Vitamin D deficiency    S/P knee replacement  b/l    S/P hernia repair  b/l    Status post cataract extraction and insertion of intraocular lens, unspecified laterality  b/l    History of surgery  On 9/10/20 he underwent right hemiglossectomy and partial neck dissection with Dr. Darinel Servin at [facility].      01-26 @ 07:01  -  01-27 @ 07:00  --------------------------------------------------------  IN: 0 mL / OUT: 2200 mL / NET: -2200 mL    Mode: AC/ CMV (Assist Control/ Continuous Mandatory Ventilation)  RR (machine): 14  TV (machine): 450  FiO2: 40  PEEP: 5  PS: 7  ITime: 0.9  MAP: 8  PIP: 12    PHYSICAL EXAM:    GENERAL: NAD, cachectic +temporal and muscle waisting  HEAD:  Atraumatic, Normocephalic  EYES: EOMI, PERRLA, conjunctiva and sclera clear  ENMT: No tonsillar erythema, exudates  NECK: Supple, No JVD, tracheostomy intact  NERVOUS SYSTEM:  Alert and awake.  CHEST/LUNG: Diminished breath sounds bilateral bases  HEART: Regular rate and rhythm; No murmurs, rubs, or gallops  ABDOMEN: Soft, Nontender, Nondistended; Bowel sounds present; Peg intact  EXTREMITIES: +3 edema all extremities; +muscle waisting  2+ Peripheral Pulses, No clubbing, cyanosis  LYMPH: No lymphadenopathy noted  SKIN: Dry scab right ear with eschar no drainage.      LABS:  CBC Full  -  ( 27 Jan 2022 07:14 )  WBC Count : 4.89 K/uL  RBC Count : 2.99 M/uL  Hemoglobin : 8.5 g/dL  Hematocrit : 26.5 %  Platelet Count - Automated : 125 K/uL  Mean Cell Volume : 88.6 fl  Mean Cell Hemoglobin : 28.4 pg  Mean Cell Hemoglobin Concentration : 32.1 gm/dL  Auto Neutrophil # : x  Auto Lymphocyte # : x  Auto Monocyte # : x  Auto Eosinophil # : x  Auto Basophil # : x  Auto Neutrophil % : x  Auto Lymphocyte % : x  Auto Monocyte % : x  Auto Eosinophil % : x  Auto Basophil % : x    01-27    153<H>  |  115<H>  |  23<H>  ----------------------------<  146<H>  3.3<L>   |  33<H>  |  0.63    Ca    10.9<H>      27 Jan 2022 07:14  Phos  1.9     01-27  Mg     1.9     01-27    TPro  5.1<L>  /  Alb  2.2<L>  /  TBili  0.7  /  DBili  x   /  AST  11  /  ALT  <6<L>  /  AlkPhos  58  01-26      [ x]  DVT Prophylaxis  [  ]  Nutrition, Brand, Rate         Goal Rate        Abnormal Nutritional Status -  Malnutrition   Cachexia      Morbid Obesity BMI >/=40    RADIOLOGY & ADDITIONAL STUDIES:  ***    < from: Xray Chest 1 View-PORTABLE IMMEDIATE (Xray Chest 1 View-PORTABLE IMMEDIATE .) (01.17.22 @ 21:39) >  Frontal expiratory view of the chest showsthe heart to be similar in   size. Tracheostomy tube, gastric tube and upper abdominal clips are again   noted.    The lungs show similar left lung infiltrates with questionable small left   effusion and there is no evidence of pneumothorax nor rightpleural   effusion.    IMPRESSION:  Similar infiltrates.      < end of copied text >  < from: CT Abdomen and Pelvis w/ IV Cont (01.18.22 @ 16:12) >  PROCEDURE:  CT of the Abdomen and Pelvis was performed.  Sagittal and coronal reformats were performed.    FINDINGS:  LOWER CHEST: Redemonstration of a cavitary lesion in the right middle   lobe. Redemonstration of secretions in the left lower lobe bronchi with   complete collapse of the left lower lobe with heterogeneous   opacification. Partially imaged 4.3 x 2.8 cm air-fluid collection in the   left lower lobe. There is a small loculated rim-enhancing pleural   effusion.    LIVER: Within normal limits.  BILE DUCTS: Normal caliber.  GALLBLADDER: Within normal limits.  SPLEEN: Within normal limits.  PANCREAS: Within normal limits.  ADRENALS: Within normal limits.  KIDNEYS/URETERS: Within normal limits.    BLADDER: Within normal limits.  REPRODUCTIVE ORGANS: Prostate within normal limits.    BOWEL: PEG tube in the stomach. Several dilated air-fluid filled loops of   small bowel with transition point to collapsed bowel in the right lower   quadrant (series 2, image 83). Mild wall thickening of the small bowel   loops proximal to the transitionpoint with evidence of focal pneumatosis   intestinalis (2, 86). There is also gastric pneumatosis and mild portal   venous gas.  PERITONEUM: Small volume ascites and mesenteric edema.  VESSELS: Atherosclerotic changes.  RETROPERITONEUM/LYMPH NODES: No lymphadenopathy.  ABDOMINAL WALL: Subcutaneous edema.  BONES: Degenerative changes.    IMPRESSION:  Small bowel obstruction with transition point in the right lower   quadrant, with additional findings highly suspicious for bowel ischemia.    Partially imaged 4.3 cm air-fluid collection in the left lower lobe and   adjacent rim-enhancing pleural effusion, suspicious for lung   abscess/empyema. Dedicated chest CT can be performed for further   evaluation.    Findings were discussed with ALIZE LANDAVERDE on  1/18/2022 4:47 PM by Dr. Armstrong with read back confirmation.    < end of copied text >  < from: CT Chest w/ IV Cont (12.30.21 @ 12:53) >  LUNGS/AIRWAYS/PLEURA: New occlusion of the left lower lobe bronchus and   associated left lower lobe collapse. Heterogeneous enhancement of left   lower lobe atelectasis, suggesting a superimposed process. New   consolidation in the right lower lobe and lingula, and peribronchial   nodules in all lobes of the right lung.    Larger bilateral cavitary masses, for example, 4.6 cm in themiddle lobe   (measured on 3-90), prior 2.1 cm. Other unchanged solid nodules, for   example, 1 cm in the left apex (3-34).    Endotracheal tube tip in the mid trachea. Stable mild fibrosis in the   anterior upper lobes.    Slightly increased moderate left pneumothorax. Left pleural pigtail   catheter in the left posterior hemithorax. Trace left basilar pleural   effusion. New right pleural thickening in the right cardiophrenic angle.    LYMPH NODES/MEDIASTINUM: Partially included necrotic right   supraclavicular lymphadenopathy. Slightly larger right paratracheal lymph   nodes up to 1.1 cm (3-37). Calcified lymph nodes, likely prior   granulomatous disease.    HEART/VASCULATURE: Normal heart size. Small pericardial effusion. Heavily   calcified coronary arteries. Normal caliber aorta and main pulmonary   artery.    UPPER ABDOMEN:NG tube tip in the gastric fundus.    BONES/SOFT TISSUES: New non and mildly displaced fractures of the right   anterior third through sixth ribs, and mildly displaced fracture of the   left anterior fifth rib.      IMPRESSION:    New multilobar consolidation, including likely within a collapsed left   lower lobe, and peribronchial nodules in all lobes of the right lung,   favored to be infection.    New occlusion of the left lower lobe bronchus, possibly by mucus, and   associated left lower lobe collapse.    Larger bilateral cavitary metastases.    Increased moderate left pneumothorax. New right pleural thickening in the   right cardiophrenic angle.    Mildly increased mediastinal lymphadenopathy. Partially included large   right supraclavicular lymphadenopathy.    Bilateral acute anterior rib fractures.    < end of copied text >    Goals of Care Discussion with Family/Proxy/Other   - see note from 1/02/22

## 2022-01-27 NOTE — CHART NOTE - NSCHARTNOTEFT_GEN_A_CORE
Contacted and spoke with deven Lopes [phone]. Explained current condition. Encouraged family to address concerns and emotional support given, all concerns and questions addressed.

## 2022-01-27 NOTE — DISCHARGE NOTE PROVIDER - NSDCCPCAREPLAN_GEN_ALL_CORE_FT
PRINCIPAL DISCHARGE DIAGNOSIS  Diagnosis: Acute respiratory failure with hypoxia  Assessment and Plan of Treatment: Continue mechanical ventilation with daily SBT Current vent settings [id number] +5PEEP  SBT with PS 10.      SECONDARY DISCHARGE DIAGNOSES  Diagnosis: Metastatic cancer  Assessment and Plan of Treatment: Follow up with oncologist when released from rehab.    Diagnosis: Severe protein-calorie malnutrition  Assessment and Plan of Treatment: Malnutrition occurs when you do not get enough calories or nutrients to keep you healthy. Nutrients include protein, fat, carbohydrates, vitamins, and minerals.  Call your local emergency number ([phone]) for any of the following:  You have pain in your chest, back, neck, jaw, stomach, or down one of both arms.  You have shortness of breath.  Call your doctor if:  You lose a large amount of weight within a short amount of time.  You feel depressed, confused, tired, irritable, and you do not feel like eating.  You have questions or concerns about your condition or care.  Self-care:  Increase calories and nutrients. A dietitian may help you plan larger, healthy meals. If you have trouble eating larger meals, eat small meals throughout the day. You may need to include snacks between meals. You may need to eat or drink a nutrition supplement if you have trouble eating the right kinds and amounts of food.  Find support. If you cannot buy or prepare the right kinds of foods, talk to your healthcare provider. Ask for information about community programs that can help you.      Diagnosis: Acute hypernatremia  Assessment and Plan of Treatment: Continue Free water via Peg at 40ml per hour. Monitor sodium while in rehab.    Diagnosis: Pneumothorax, left  Assessment and Plan of Treatment: You had a chest tube tube inserted for a collapse left lung. The tube was removed when your chest re-expanded.    Diagnosis: Cardiac arrest  Assessment and Plan of Treatment: Your heart had stopped after your were intubated. Your heart was restarted in 2 minutes. Continue current medications.    Diagnosis: Small bowel obstruction, partial  Assessment and Plan of Treatment: Please advance feeding as tolerable.  Currently tolerating 40ml per hour of Glucerna 1.5 with 2 packets of Prosource daily. Keep head of bed elevated during feeds.

## 2022-01-27 NOTE — PROGRESS NOTE ADULT - PROBLEM SELECTOR PLAN 10
Continue DVT and GI prophylaxis.  Serial abdominal xrays for partial SBO  Continue mechanical ventilation with daily SBT  Will need vent facility upon discharge.  Overall prognosis is poor.  Must tolerate 40ml/hr for 24 hours Continue DVT and GI prophylaxis.  Serial abdominal xrays for partial SBO  Continue mechanical ventilation with daily SBT  Will need vent facility upon discharge.  Overall prognosis is poor.  Must tolerate 40ml/hr for 24 hours  f/u CT head for anoxic brain injury

## 2022-01-27 NOTE — PROGRESS NOTE ADULT - SUBJECTIVE AND OBJECTIVE BOX
76y Male is under our care for     REVIEW OF SYSTEMS:  [  ] Not able to elicit  General:	  Chest:	  GI:	  :  Skin:	  Musculoskeletal:	  Neuro:	    MEDS:  piperacillin/tazobactam IVPB.. 3.375 Gram(s) IV Intermittent every 8 hours    ALLERGIES: Allergies    No Known Allergies    Intolerances        VITALS:  Vital Signs Last 24 Hrs  T(C): 36.2 (27 Jan 2022 11:35), Max: 36.4 (26 Jan 2022 13:53)  T(F): 97.1 (27 Jan 2022 11:35), Max: 97.6 (26 Jan 2022 13:53)  HR: 94 (27 Jan 2022 11:35) (82 - 98)  BP: 131/72 (27 Jan 2022 11:35) (110/58 - 135/74)  BP(mean): --  RR: 20 (27 Jan 2022 11:35) (18 - 24)  SpO2: 100% (27 Jan 2022 11:35) (97% - 100%)      PHYSICAL EXAM:  HEENT:  Neck:  Respiratory:  Cardiovascular:  Gastrointestinal:  Extremities:  Skin:  Ortho:  Neuro:    LABS/DIAGNOSTIC TESTS:                        8.5    4.89  )-----------( 125      ( 27 Jan 2022 07:14 )             26.5     WBC Count: 4.89 K/uL (01-27 @ 07:14)  WBC Count: 4.46 K/uL (01-26 @ 10:36)  WBC Count: 5.03 K/uL (01-25 @ 07:43)  WBC Count: 13.03 K/uL (01-24 @ 06:32)  WBC Count: 6.93 K/uL (01-23 @ 05:55)    01-27    153<H>  |  115<H>  |  23<H>  ----------------------------<  146<H>  3.3<L>   |  33<H>  |  0.63    Ca    10.9<H>      27 Jan 2022 07:14  Phos  1.9     01-27  Mg     1.9     01-27    TPro  5.1<L>  /  Alb  2.2<L>  /  TBili  0.7  /  DBili  x   /  AST  11  /  ALT  <6<L>  /  AlkPhos  58  01-26      CULTURES:   .Blood Blood  01-18 @ 02:51   No Growth Final  --  --      .Blood Blood  01-18 @ 02:50   No Growth Final  --  --      .Sputum Sputum  12-29 @ 18:28   Normal Respiratory Karla present  --    Numerous polymorphonuclear leukocytes per low power field  No squamous epithelial cells per low power field  Few Gram positive cocci in pairs per oil power field      Clean Catch Clean Catch (Midstream)  12-21 @ 04:42   No growth  --  --      .Sputum Sputum  11-08 @ 10:53   Few Mycobacterium avium complex  --  --      .Sputum Sputum  11-06 @ 19:34   No acid fast bacilli isolated after 6 weeks.  --  --      .Sputum Sputum  11-05 @ 23:15   Growth of acid fast bacilli detected in broth,  Mycobacterium avium complex by Dna Probe  --  --      .Blood Blood  11-04 @ 11:23   No Growth Final  --  --      .Blood Blood  11-04 @ 10:50   No Growth Final  --  --      Clean Catch Clean Catch (Midstream)  11-03 @ 17:00   <10,000 CFU/mL Normal Urogenital Karla  --  --        RADIOLOGY:  no new studies 76y Male is under our care for pneumonia.  Patient was seen laying comfortably in bed with no acute distress and patient remains afebrile.    REVIEW OF SYSTEMS:  [ x ] Not able to elicit    MEDS:  piperacillin/tazobactam IVPB.. 3.375 Gram(s) IV Intermittent every 8 hours    ALLERGIES: Allergies    No Known Allergies    Intolerances        VITALS:  Vital Signs Last 24 Hrs  T(C): 36.2 (27 Jan 2022 11:35), Max: 36.4 (26 Jan 2022 13:53)  T(F): 97.1 (27 Jan 2022 11:35), Max: 97.6 (26 Jan 2022 13:53)  HR: 94 (27 Jan 2022 11:35) (82 - 98)  BP: 131/72 (27 Jan 2022 11:35) (110/58 - 135/74)  BP(mean): --  RR: 20 (27 Jan 2022 11:35) (18 - 24)  SpO2: 100% (27 Jan 2022 11:35) (97% - 100%)      PHYSICAL EXAM:  HEENT: trach+  Neck: supple no LN's   Respiratory: lungs clear no rales  Cardiovascular: S1 S2 reg no murmurs  Gastrointestinal: +BS with soft, nondistended abdomen; nontender, peg tube in place  : Freedman catheter in place with clear urine  Extremities: Right hand edema +2  Skin: no rashes  Ortho: n/a  Neuro: Awake and alert    LABS/DIAGNOSTIC TESTS:                        8.5    4.89  )-----------( 125      ( 27 Jan 2022 07:14 )             26.5     WBC Count: 4.89 K/uL (01-27 @ 07:14)  WBC Count: 4.46 K/uL (01-26 @ 10:36)  WBC Count: 5.03 K/uL (01-25 @ 07:43)  WBC Count: 13.03 K/uL (01-24 @ 06:32)  WBC Count: 6.93 K/uL (01-23 @ 05:55)    01-27    153<H>  |  115<H>  |  23<H>  ----------------------------<  146<H>  3.3<L>   |  33<H>  |  0.63    Ca    10.9<H>      27 Jan 2022 07:14  Phos  1.9     01-27  Mg     1.9     01-27    TPro  5.1<L>  /  Alb  2.2<L>  /  TBili  0.7  /  DBili  x   /  AST  11  /  ALT  <6<L>  /  AlkPhos  58  01-26      CULTURES:   .Blood Blood  01-18 @ 02:51   No Growth Final  --  --      .Blood Blood  01-18 @ 02:50   No Growth Final  --  --      .Sputum Sputum  12-29 @ 18:28   Normal Respiratory Karla present  --    Numerous polymorphonuclear leukocytes per low power field  No squamous epithelial cells per low power field  Few Gram positive cocci in pairs per oil power field      Clean Catch Clean Catch (Midstream)  12-21 @ 04:42   No growth  --  --      .Sputum Sputum  11-08 @ 10:53   Few Mycobacterium avium complex  --  --      .Sputum Sputum  11-06 @ 19:34   No acid fast bacilli isolated after 6 weeks.  --  --      .Sputum Sputum  11-05 @ 23:15   Growth of acid fast bacilli detected in broth,  Mycobacterium avium complex by Dna Probe  --  --      .Blood Blood  11-04 @ 11:23   No Growth Final  --  --      .Blood Blood  11-04 @ 10:50   No Growth Final  --  --      Clean Catch Clean Catch (Midstream)  11-03 @ 17:00   <10,000 CFU/mL Normal Urogenital Karla  --  --        RADIOLOGY:  < from: Xray Abdomen 1 View PORTABLE -Routine (Xray Abdomen 1 View PORTABLE -Routine .) (01.26.22 @ 12:42) >    ACC: [id number] EXAM:  XR ABDOMEN PORTABLE ROUTINE 1V                          PROCEDURE DATE:  01/26/2022          INTERPRETATION:  KUB: AP    COMPARISON: 1/24/2022 chest.    CLINICAL INFORMATION: SBO.    FINDINGS:  PEG feeding tube overlies LEFT upper quadrant  Persistent and incomplete small bowel obstruction pattern with air   distended small bowel throughout the abdomen. Maximal small bowel luminal   diameter measures 4.4 cm. Large bowel decompressed with contrast within   sigmoid and rectum.  No free intra-abdominal air.  No obvious intra-abdominal masses.  No abnormal calcifications.  The osseous structures are intact.    IMPRESSION:    Incomplete small bowel obstruction..    --- End of Report ---            MARIA DEL CARMEN LEUNG MD; Attending Radiologist  This document has been electronically signed. Jan 27 2022 10:55AM    < end of copied text >

## 2022-01-27 NOTE — PROGRESS NOTE ADULT - ASSESSMENT
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 Patient had cardiac arrest in ED , was intubated CPR was initiated and subsequently ROSC was achieved after 20 minutes. Patient admitted to the ICU 12/20-1/7/22 for post cardiac arrest management in the setting of acute hypoxic respiratory failure due to left pneumothorax and likely aspiration pneumonia and septic shock. He was treated with a course of cefepime (12/20-12/28) and then another course of abx with Zosyn (12/29-1/7). He failed extubation x2 (12/23, 12/27) and ultimately underwent Tracheostomy placement (1/6/22). Underwent EGD 1/7 for PEG placement, found to have a gastric ulcer which was clipped and he was initiated on BID PPI. He is pending PEG placement, plan for IR consult Monday 1/10/22 and possible removal of L pigtail pleural catheter 1/8 pending results of repeat CXR. His ICU course was further s/f hypernatremia being managed with free water boluses and Palliative Care was consulted as patient is not a candidate for further cancer directed treatment once his is s/p trach/PEG. He was transferred to the [facility] on 1/8 for further care.   1/9 Febrile, Tmax 101.4F. F/u CXR. TOV. NPO after midnight and hold lovenox for GT placement in IR in am.   1/12/22 G tube placed in IR  1/14 L Pleural Pigtail catheter removed  1/15 tolerating SBT x4 hrs but with copious secretions requiring frequent suctioning; G tube clogged, initial attempts at dislodging unsuccessful in AM, noted to have a cluster of small capsule beads. Attempts to dissolve with ginger ale, unsuccessful. This evening, successfully unclogged Gastrostomy tube. TF restarted.  1/16- No acute overnight events  1/17  Code sepsis  Lactate 5.3 received 2.1 liter of NS. Repeat lactate 3.2 Blood and urine cultures sent.  1/18  Lactate 6.1  IVF increased to 125cc/hr. Abdominal / Pelvis CT with contrast. B/P low started on midodrine. Pt to be transferred to ICU after CT scan  Pt admitted back to ICU on 1/18 due to septic shock. Central line was placed in left femoral vein, for pressor use. Pt was placed on levophed, which was titrated down until it was not required. CT abdomen indicated presence of small bowel obstruction. Surgery stated pt is a poor surgical candidate, thus was treated conservatively by draining PEG tube to gravity. Bowel has been progressively decompressing and diet was started on 1/23. BCx neg 1/18.   1/27 Patient tolerated 3 hours of SBT yesterday. Will attempt daily SBT. Patient increased PEG tube feeding yesterday. Surgery to follow up regarding feeding advancement as patient needs higher nutritional calories.    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 Patient had cardiac arrest in ED , was intubated CPR was initiated and subsequently ROSC was achieved after 20 minutes. Patient admitted to the ICU 12/20-1/7/22 for post cardiac arrest management in the setting of acute hypoxic respiratory failure due to left pneumothorax and likely aspiration pneumonia and septic shock. He was treated with a course of cefepime (12/20-12/28) and then another course of abx with Zosyn (12/29-1/7). He failed extubation x2 (12/23, 12/27) and ultimately underwent Tracheostomy placement (1/6/22). Underwent EGD 1/7 for PEG placement, found to have a gastric ulcer which was clipped and he was initiated on BID PPI. He is pending PEG placement, plan for IR consult Monday 1/10/22 and possible removal of L pigtail pleural catheter 1/8 pending results of repeat CXR. His ICU course was further s/f hypernatremia being managed with free water boluses and Palliative Care was consulted as patient is not a candidate for further cancer directed treatment once his is s/p trach/PEG. He was transferred to the [facility] on 1/8 for further care.   1/9 Febrile, Tmax 101.4F. F/u CXR. TOV. NPO after midnight and hold lovenox for GT placement in IR in am.   1/12/22 G tube placed in IR  1/14 L Pleural Pigtail catheter removed  1/15 tolerating SBT x4 hrs but with copious secretions requiring frequent suctioning; G tube clogged, initial attempts at dislodging unsuccessful in AM, noted to have a cluster of small capsule beads. Attempts to dissolve with ginger ale, unsuccessful. This evening, successfully unclogged Gastrostomy tube. TF restarted.  1/16- No acute overnight events  1/17  Code sepsis  Lactate 5.3 received 2.1 liter of NS. Repeat lactate 3.2 Blood and urine cultures sent.  1/18  Lactate 6.1  IVF increased to 125cc/hr. Abdominal / Pelvis CT with contrast. B/P low started on midodrine. Pt to be transferred to ICU after CT scan  Pt admitted back to ICU on 1/18 due to septic shock. Central line was placed in left femoral vein, for pressor use. Pt was placed on levophed, which was titrated down until it was not required. CT abdomen indicated presence of small bowel obstruction. Surgery stated pt is a poor surgical candidate, thus was treated conservatively by draining PEG tube to gravity. Bowel has been progressively decompressing and diet was started on 1/23. BCx neg 1/18.   1/27 Patient tolerated 3 hours of SBT yesterday. Will attempt daily SBT. Patient increased PEG tube feeding yesterday. resolving SBO with BM today, no residual in feeds

## 2022-01-27 NOTE — DISCHARGE NOTE PROVIDER - NSDCFUSCHEDAPPT_GEN_ALL_CORE_FT
MELISSA JOHNS P ; 02/04/2022 ; [facility] [id number]  MELISSA JOHNS P ; 03/01/2022 ; [facility]

## 2022-01-28 NOTE — PROGRESS NOTE ADULT - PROBLEM SELECTOR PLAN 10
Continue DVT and GI prophylaxis.  Serial abdominal xrays for partial SBO  Continue mechanical ventilation with daily SBT  Will need vent facility upon discharge.  Overall prognosis is poor.  CT head as above  Tolerating tube feeds at goal rate.  Medically stable for discharge to vent facility.

## 2022-01-28 NOTE — PROGRESS NOTE ADULT - PROBLEM SELECTOR PROBLEM 6
Hyperglycemia due to diabetes mellitus
Metastatic cancer
Metastatic cancer
Hyperglycemia due to diabetes mellitus
Anemia of chronic disease
Metastatic cancer
Metastatic cancer
Anemia of chronic disease
Hyperglycemia due to diabetes mellitus
Anemia of chronic disease
Metastatic cancer
Anemia of chronic disease
Anemia of chronic disease
Metastatic cancer

## 2022-01-28 NOTE — PROGRESS NOTE ADULT - SUBJECTIVE AND OBJECTIVE BOX
MELISSA JOHNS    SCU progress note    INTERVAL HPI/OVERNIGHT EVENTS: ***No overnight events.    DNR [x ]   DNI  [  ]    Covid - 19 PCR: Negative 1/26    The 4Ms    What Matters Most: see GOC  Age appropriate Medications/Screen for High Risk Medication: Yes  Mentation: see CAM below  Mobility: defer to physical exam    The Confusion Assessment Method (CAM) Diagnostic Algorithm     1: Acute Onset or Fluctuating Course  - Is there evidence of an acute change in mental status from the patient’s baseline? Did the (abnormal) behavior  fluctuate during the day, that is, tend to come and go, or increase and decrease in severity?  [ ] YES [x ] NO     2: Inattention  - Did the patient have difficulty focusing attention, being easily distractible, or having difficulty keeping track of what was being said?  [ ] YES [ x] NO     3: Disorganized thinking  -Was the patient’s thinking disorganized or incoherent, such as rambling or irrelevant conversation, unclear or illogical flow of ideas, or unpredictable switching from subject to subject?  [ ] YES [ ] NO  Unable to access      4: Altered Level of consciousness?  [ ] YES [x ] NO    The diagnosis of delirium by CAM requires the presence of features 1 and 2 and either 3 or 4.    PRESSORS: [ ] YES [ ] NO  piperacillin/tazobactam IVPB.. 3.375 Gram(s) IV Intermittent every 8 hours    Cardiovascular:  Heart Failure  Acute   Acute on Chronic  Chronic       midodrine. 5 milliGRAM(s) Oral three times a day    Pulmonary:    Hematalogic:  enoxaparin Injectable 40 milliGRAM(s) SubCutaneous daily    Other:  acetaminophen     Tablet .. 650 milliGRAM(s) Oral every 6 hours PRN  acetaminophen  Suppository .. 650 milliGRAM(s) Rectal every 6 hours PRN  bisacodyl Suppository 10 milliGRAM(s) Rectal daily PRN  calcitonin Injectable 290 International Unit(s) IntraMuscular every 12 hours  chlorhexidine 2% Cloths 1 Application(s) Topical <User Schedule>  glucagon  Injectable 0.5 milliGRAM(s) IV Push once  insulin lispro (ADMELOG) corrective regimen sliding scale   SubCutaneous every 6 hours  pantoprazole  Injectable 40 milliGRAM(s) IV Push two times a day  potassium chloride   Powder 40 milliEquivalent(s) Oral once  potassium phosphate / sodium phosphate Powder (PHOS-NaK) 1 Packet(s) Oral once    acetaminophen     Tablet .. 650 milliGRAM(s) Oral every 6 hours PRN  acetaminophen  Suppository .. 650 milliGRAM(s) Rectal every 6 hours PRN  bisacodyl Suppository 10 milliGRAM(s) Rectal daily PRN  calcitonin Injectable 290 International Unit(s) IntraMuscular every 12 hours  chlorhexidine 2% Cloths 1 Application(s) Topical <User Schedule>  enoxaparin Injectable 40 milliGRAM(s) SubCutaneous daily  glucagon  Injectable 0.5 milliGRAM(s) IV Push once  insulin lispro (ADMELOG) corrective regimen sliding scale   SubCutaneous every 6 hours  midodrine. 5 milliGRAM(s) Oral three times a day  pantoprazole  Injectable 40 milliGRAM(s) IV Push two times a day  piperacillin/tazobactam IVPB.. 3.375 Gram(s) IV Intermittent every 8 hours  potassium chloride   Powder 40 milliEquivalent(s) Oral once  potassium phosphate / sodium phosphate Powder (PHOS-NaK) 1 Packet(s) Oral once    Drug Dosing Weight  Height (cm): 175.3 (20 Dec 2021 02:21)  Weight (kg): 72.2 (18 Jan 2022 17:55)  BMI (kg/m2): 23.5 (18 Jan 2022 17:55)  BSA (m2): 1.87 (18 Jan 2022 17:55)    CENTRAL LINE: [ ] YES [x ] NO  LOCATION:   DATE INSERTED:  REMOVE: [ ] YES [ ] NO  EXPLAIN:    HUTCHINS: [ ] YES [ ] NO    DATE INSERTED:  REMOVE:  [ ] YES [ ] NO  EXPLAIN:    PAST MEDICAL & SURGICAL HISTORY:  Hypertension    Diabetes    High cholesterol    Primary osteoarthritis of both knees    Coronary artery disease of native artery of native heart with stable angina pectoris    Allergic bronchitis    Diabetic retinopathy    Oral cancer    SCC (squamous cell carcinoma)    Metastatic cancer    Recurrent disease    Edema of extremity    Hyponatremia    Microalbuminuria    Neuralgia    Obstructive sleep apnea, adult    Vitamin D deficiency    S/P knee replacement  b/l    S/P hernia repair  b/l    Status post cataract extraction and insertion of intraocular lens, unspecified laterality  b/l    History of surgery  On 9/10/20 he underwent right hemiglossectomy and partial neck dissection with Dr. Darinel Servin at [facility].                01-27 @ 07:01  -  01-28 @ 07:00  --------------------------------------------------------  IN: 0 mL / OUT: 450 mL / NET: -450 mL        Mode: CPAP with PS  FiO2: 40  PEEP: 5  PS: 7  ITime: 0.8  MAP: 8  PIP: 12      PHYSICAL EXAM:    GENERAL: NAD, cachectic, +temporal waisting.  HEAD:  Atraumatic, Normocephalic  EYES: EOMI, PERRLA, conjunctiva and sclera clear  ENMT: No tonsillar erythema, exudates, or enlargement; Moist mucous membranes, Good dentition, No lesions  NECK: Supple, No JVD, Normal thyroid  NERVOUS SYSTEM:  Alert & Oriented X3, Good concentration; Motor Strength 5/5 B/L upper and lower extremities; DTRs 2+ intact and symmetric  CHEST/LUNG: Clear to percussion bilaterally; No rales, rhonchi, wheezing, or rubs  HEART: Regular rate and rhythm; No murmurs, rubs, or gallops  ABDOMEN: Soft, Nontender, Nondistended; Bowel sounds present  EXTREMITIES:  2+ Peripheral Pulses, No clubbing, cyanosis, or edema  LYMPH: No lymphadenopathy noted  SKIN: No rashes or lesions      LABS:  CBC Full  -  ( 28 Jan 2022 06:45 )  WBC Count : 4.96 K/uL  RBC Count : 2.91 M/uL  Hemoglobin : 8.1 g/dL  Hematocrit : 25.7 %  Platelet Count - Automated : 153 K/uL  Mean Cell Volume : 88.3 fl  Mean Cell Hemoglobin : 27.8 pg  Mean Cell Hemoglobin Concentration : 31.5 gm/dL  Auto Neutrophil # : x  Auto Lymphocyte # : x  Auto Monocyte # : x  Auto Eosinophil # : x  Auto Basophil # : x  Auto Neutrophil % : x  Auto Lymphocyte % : x  Auto Monocyte % : x  Auto Eosinophil % : x  Auto Basophil % : x    01-28    152<H>  |  115<H>  |  27<H>  ----------------------------<  150<H>  3.3<L>   |  31  |  0.63    Ca    10.3      28 Jan 2022 06:45  Phos  2.0     01-28  Mg     2.0     01-28    TPro  4.9<L>  /  Alb  1.7<L>  /  TBili  0.4  /  DBili  x   /  AST  10  /  ALT  <6<L>  /  AlkPhos  69  01-28              [  ]  DVT Prophylaxis  [  ]  Nutrition, Brand, Rate         Goal Rate        Abnormal Nutritional Status -  Malnutrition   Cachexia      Morbid Obesity BMI >/=40    RADIOLOGY & ADDITIONAL STUDIES:  ***    Goals of Care Discussion with Family/Proxy/Other   - see note from/family meeting set up for...     MELISSA JOHNS    SCU progress note    INTERVAL HPI/OVERNIGHT EVENTS: ***No overnight events.    DNR [x ]   DNI  [  ]    Covid - 19 PCR: Negative 1/26    The 4Ms    What Matters Most: see GOC  Age appropriate Medications/Screen for High Risk Medication: Yes  Mentation: see CAM below  Mobility: defer to physical exam    The Confusion Assessment Method (CAM) Diagnostic Algorithm     1: Acute Onset or Fluctuating Course  - Is there evidence of an acute change in mental status from the patient’s baseline? Did the (abnormal) behavior  fluctuate during the day, that is, tend to come and go, or increase and decrease in severity?  [ ] YES [x ] NO     2: Inattention  - Did the patient have difficulty focusing attention, being easily distractible, or having difficulty keeping track of what was being said?  [ ] YES [ x] NO     3: Disorganized thinking  -Was the patient’s thinking disorganized or incoherent, such as rambling or irrelevant conversation, unclear or illogical flow of ideas, or unpredictable switching from subject to subject?  [ ] YES [ ] NO  Unable to access      4: Altered Level of consciousness?  [ ] YES [x ] NO    The diagnosis of delirium by CAM requires the presence of features 1 and 2 and either 3 or 4.    PRESSORS: [ ] YES [ ] NO  piperacillin/tazobactam IVPB.. 3.375 Gram(s) IV Intermittent every 8 hours    Cardiovascular:  Heart Failure  Acute   Acute on Chronic  Chronic       midodrine. 5 milliGRAM(s) Oral three times a day    Pulmonary:    Hematalogic:  enoxaparin Injectable 40 milliGRAM(s) SubCutaneous daily    Other:  acetaminophen     Tablet .. 650 milliGRAM(s) Oral every 6 hours PRN  acetaminophen  Suppository .. 650 milliGRAM(s) Rectal every 6 hours PRN  bisacodyl Suppository 10 milliGRAM(s) Rectal daily PRN  calcitonin Injectable 290 International Unit(s) IntraMuscular every 12 hours  chlorhexidine 2% Cloths 1 Application(s) Topical <User Schedule>  glucagon  Injectable 0.5 milliGRAM(s) IV Push once  insulin lispro (ADMELOG) corrective regimen sliding scale   SubCutaneous every 6 hours  pantoprazole  Injectable 40 milliGRAM(s) IV Push two times a day  potassium chloride   Powder 40 milliEquivalent(s) Oral once  potassium phosphate / sodium phosphate Powder (PHOS-NaK) 1 Packet(s) Oral once    acetaminophen     Tablet .. 650 milliGRAM(s) Oral every 6 hours PRN  acetaminophen  Suppository .. 650 milliGRAM(s) Rectal every 6 hours PRN  bisacodyl Suppository 10 milliGRAM(s) Rectal daily PRN  calcitonin Injectable 290 International Unit(s) IntraMuscular every 12 hours  chlorhexidine 2% Cloths 1 Application(s) Topical <User Schedule>  enoxaparin Injectable 40 milliGRAM(s) SubCutaneous daily  glucagon  Injectable 0.5 milliGRAM(s) IV Push once  insulin lispro (ADMELOG) corrective regimen sliding scale   SubCutaneous every 6 hours  midodrine. 5 milliGRAM(s) Oral three times a day  pantoprazole  Injectable 40 milliGRAM(s) IV Push two times a day  piperacillin/tazobactam IVPB.. 3.375 Gram(s) IV Intermittent every 8 hours  potassium chloride   Powder 40 milliEquivalent(s) Oral once  potassium phosphate / sodium phosphate Powder (PHOS-NaK) 1 Packet(s) Oral once    Drug Dosing Weight  Height (cm): 175.3 (20 Dec 2021 02:21)  Weight (kg): 72.2 (18 Jan 2022 17:55)  BMI (kg/m2): 23.5 (18 Jan 2022 17:55)  BSA (m2): 1.87 (18 Jan 2022 17:55)    CENTRAL LINE: [ ] YES [x ] NO  LOCATION:   DATE INSERTED:  REMOVE: [ ] YES [ ] NO  EXPLAIN:    HUTCHINS: [ ] YES [ ] NO    DATE INSERTED:  REMOVE:  [ ] YES [ ] NO  EXPLAIN:    PAST MEDICAL & SURGICAL HISTORY:  Hypertension    Diabetes    High cholesterol    Primary osteoarthritis of both knees    Coronary artery disease of native artery of native heart with stable angina pectoris    Allergic bronchitis    Diabetic retinopathy    Oral cancer    SCC (squamous cell carcinoma)    Metastatic cancer    Recurrent disease    Edema of extremity    Hyponatremia    Microalbuminuria    Neuralgia    Obstructive sleep apnea, adult    Vitamin D deficiency    S/P knee replacement  b/l    S/P hernia repair  b/l    Status post cataract extraction and insertion of intraocular lens, unspecified laterality  b/l    History of surgery  On 9/10/20 he underwent right hemiglossectomy and partial neck dissection with Dr. Darinel Servin at [facility].                01-27 @ 07:01  -  01-28 @ 07:00  --------------------------------------------------------  IN: 0 mL / OUT: 450 mL / NET: -450 mL        Mode: CPAP with PS  FiO2: 40  PEEP: 5  PS: 7  ITime: 0.8  MAP: 8  PIP: 12      PHYSICAL EXAM:    GENERAL: NAD, cachectic, +temporal waisting.  HEAD:  Atraumatic, Normocephalic  EYES: EOMI, PERRLA, conjunctiva and sclera clear  ENMT: No tonsillar erythema, exudates  NECK: Supple, No JVD, tracheostomy intact  NERVOUS SYSTEM:  Awake and alert. Can answer simple questions  CHEST/LUNG: Diminished breath sounds bilateral bases.  HEART: Regular rate and rhythm; No murmurs, rubs, or gallops  ABDOMEN: Soft, Nontender, Nondistended; Bowel sounds present; Peg intact  EXTREMITIES: +3 Edema all extremities. +Muscle waisting.  2+ Peripheral Pulses, No clubbing, cyanosis  LYMPH: No lymphadenopathy noted  SKIN: Dry scab right ear with eschar, no drainage      LABS:  CBC Full  -  ( 28 Jan 2022 06:45 )  WBC Count : 4.96 K/uL  RBC Count : 2.91 M/uL  Hemoglobin : 8.1 g/dL  Hematocrit : 25.7 %  Platelet Count - Automated : 153 K/uL  Mean Cell Volume : 88.3 fl  Mean Cell Hemoglobin : 27.8 pg  Mean Cell Hemoglobin Concentration : 31.5 gm/dL  Auto Neutrophil # : x  Auto Lymphocyte # : x  Auto Monocyte # : x  Auto Eosinophil # : x  Auto Basophil # : x  Auto Neutrophil % : x  Auto Lymphocyte % : x  Auto Monocyte % : x  Auto Eosinophil % : x  Auto Basophil % : x    01-28    152<H>  |  115<H>  |  27<H>  ----------------------------<  150<H>  3.3<L>   |  31  |  0.63    Ca    10.3      28 Jan 2022 06:45  Phos  2.0     01-28  Mg     2.0     01-28    TPro  4.9<L>  /  Alb  1.7<L>  /  TBili  0.4  /  DBili  x   /  AST  10  /  ALT  <6<L>  /  AlkPhos  69  01-28              [  ]  DVT Prophylaxis  [  ]  Nutrition, Brand, Rate         Goal Rate        Abnormal Nutritional Status -  Malnutrition   Cachexia      Morbid Obesity BMI >/=40    RADIOLOGY & ADDITIONAL STUDIES:  ***  < from: Xray Chest 1 View-PORTABLE IMMEDIATE (Xray Chest 1 View-PORTABLE IMMEDIATE .) (01.17.22 @ 21:39) >  Frontal expiratory view of the chest showsthe heart to be similar in   size. Tracheostomy tube, gastric tube and upper abdominal clips are again   noted.    The lungs show similar left lung infiltrates with questionable small left   effusion and there is no evidence of pneumothorax nor rightpleural   effusion.    IMPRESSION:  Similar infiltrates.    < end of copied text >  < from: CT Head No Cont (01.28.22 @ 09:57) >  FINDINGS:    No hydrocephalus, mass effect, midline shift, acute intracranial   hemorrhage, or brain edema.    Moderate luminal white matter microvascular ischemic disease.    Extensive bilateral mastoid air cell effusions. Right sphenoid sinus   mucosal thickening.    IMPRESSION:    No hydrocephalus, acute intracranial hemorrhage, mass effect, or brain   edema.    Extensive bilateral mastoid air cell effusions. Correlate for the   presence of mastoiditis.    < end of copied text >  < from: CT Head No Cont (01.28.22 @ 09:57) >  COMPARISON: CT brain 11/3/2021    FINDINGS:    No hydrocephalus, mass effect, midline shift, acute intracranial   hemorrhage, or brain edema.    Moderate luminal white matter microvascular ischemic disease.    Extensive bilateral mastoid air cell effusions. Right sphenoid sinus   mucosal thickening.    IMPRESSION:    No hydrocephalus, acute intracranial hemorrhage, mass effect, or brain   edema.    Extensive bilateral mastoid air cell effusions. Correlate for the   presence of mastoiditis.    < end of copied text >    Goals of Care Discussion with Family/Proxy/Other   - see note from 1/02/22

## 2022-01-28 NOTE — PROGRESS NOTE ADULT - REASON FOR ADMISSION
cardiac arrest

## 2022-01-28 NOTE — PROGRESS NOTE ADULT - ASSESSMENT
Septic Shock - resolved  Pneumonia/ lung abscess vs Empyema of left lung  Leukocytosis - normalized    Plan - Cont Zosyn 3.375 gms iv q8hrs for possibly 2 weeks more.  might need a chest tube if he has Empyema( pt is a high risk for procedure, hence not done)                                 Septic Shock - resolved  Pneumonia/ lung abscess vs Empyema of left lung  Leukocytosis - normalized    Plan - Cont Zosyn 3.375 gms iv q8hrs for 10 more days  might need a chest tube if he has Empyema( pt is a high risk for procedure, hence not done)                                 Septic Shock - resolved  Pneumonia/ lung abscess vs Empyema of left lung  Leukocytosis - normalized    Plan - Cont Zosyn 3.375 gms iv q8hrs for 10 more days  DC planning     I agree with above

## 2022-01-28 NOTE — PROGRESS NOTE ADULT - PROVIDER SPECIALTY LIST ADULT
Critical Care
Infectious Disease
Infectious Disease
Critical Care
Infectious Disease
Surgery
Surgery
Thoracic Surgery
Thoracic Surgery
Critical Care
Infectious Disease
Intervent Radiology
Surgery
Surgery
Thoracic Surgery
Critical Care
Infectious Disease
Surgery
Surgery
Critical Care
Palliative Care
Critical Care

## 2022-01-28 NOTE — PROGRESS NOTE ADULT - ATTENDING COMMENTS
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 Patient had cardiac arrest in ED , was intubated CPR was initiated and subsequently ROSC was achieved after 20 minutes. Patient is admitted to ICU.    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 thoracic surgery and GI unable to place due to anatomy and equipment malfx
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 Patient had cardiac arrest in ED , was intubated CPR was initiated and subsequently ROSC was achieved after 20 minutes. Patient admitted to the ICU 12/20-1/7/22 for post cardiac arrest management in the setting of acute hypoxic respiratory failure due to left pneumothorax and likely aspiration pneumonia and septic shock. He was treated with a course of cefepime (12/20-12/28) and then another course of abx with Zosyn (12/29-1/7). He failed extubation x2 (12/23, 12/27) and ultimately underwent Tracheostomy placement (1/6/22). Underwent EGD 1/7 for PEG placement, found to have a gastric ulcer which was clipped and he was initiated on BID PPI. He is pending PEG placement, plan for IR consult Monday 1/10/22 and possible removal of L pigtail pleural catheter 1/8 pending results of repeat CXR. His ICU course was further s/f hypernatremia being managed with free water boluses and Palliative Care was consulted as patient is not a candidate for further cancer directed treatment once his is s/p trach/PEG. He was transferred to the [facility] on 1/8 for further care.     1/9 Febrile, Tmax 101.4F. F/u CXR. TOV. NPO after midnight and hold lovenox for GT placement in IR in am.   1/12/22 G tube placed in IR  1/14 L Pleural Pigtail catheter removed  1/15 tolerating SBT x4 hrs but with copious secretions requiring frequent suctioning; G tube clogged, initial attempts at dislodging unsuccessful in AM, noted to have a cluster of small capsule beads. Attempts to dissolve with ginger ale, unsuccessful. This evening, successfully unclogged Gastrostomy tube. TF restarted.  1/16- No acute overnight events  1/17  Code sepsis  Lactate 5.3 received 2.1 liter of NS. Repeat lactate 3.2 Blood and urine cultures sent.  1/18  Lactate 6.1  IVF increased to 125cc/hr. Abdominal / Pelvis CT with contrast. B/P low started on midodrine. Pt to be transferred to ICU after CT scan      Plan   - Daily SBT to TC   - Biomarkers trending down   - Surgical f/u noted ; .. PEG to gravity   - IVF   - Hemodynamic support   - Vasopressors as needed to maintain MAP>65  - Increase midodrine 5 q8h   - Broad spec antibx   - TLC  - CT abd / pelvis noted   - POCUS chest .. very small pocket of fluid .. too small for drain   - Will f/u with thoracic   - PEG feed   - Continue Vent support   - Not a candidate for decannulation due to tongue ca  - DVT GI prophy.
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 He is s/p left pigtail insertion with expansion of left lung. ROSC is approx 20mins. Also with obstructive shock and on vasopressor support. Overall prognosis is poor.     - extubated 12/22, reintubated 12h later due to mucus plugging  - empiric abx for thick secretions and aspiration  - awake but continues to have copious thick secretions  - chest tube on suction for now  - DVT prophy  - glycemia control  - address GOC with son/family  - maintain MAP>65  - sedation with Precedex for vent synchrony  - standard vent bundle  discussed with family over phone
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 He is s/p left pigtail insertion with expansion of left lung. ROSC is approx 20mins. Also with obstructive shock and on vasopressor support. Overall prognosis is poor.     - extubated 12/22, reintubated 12h later due to mucus plugging  - empiric abx for thick secretions and aspiration  - chest tube on suction for now  - DVT prophy  - glycemia control  - address GOC with son/family  - maintain MAP>65  - sedation with Precedex for vent synchrony  - standard vent bundle
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 Patient had cardiac arrest in ED , was intubated CPR was initiated and subsequently ROSC was achieved after 20 minutes. Patient admitted to the ICU 12/20-1/7/22 for post cardiac arrest management in the setting of acute hypoxic respiratory failure due to left pneumothorax and likely aspiration pneumonia and septic shock. He was treated with a course of cefepime (12/20-12/28) and then another course of abx with Zosyn (12/29-1/7). He failed extubation x2 (12/23, 12/27) and ultimately underwent Tracheostomy placement (1/6/22). Underwent EGD 1/7 for PEG placement, found to have a gastric ulcer which was clipped and he was initiated on BID PPI. He is pending PEG placement, plan for IR consult Monday 1/10/22 and possible removal of L pigtail pleural catheter 1/8 pending results of repeat CXR. His ICU course was further s/f hypernatremia being managed with free water boluses and Palliative Care was consulted as patient is not a candidate for further cancer directed treatment once his is s/p trach/PEG. He was transferred to the [facility] on 1/8 for further care.     1/9 Febrile, Tmax 101.4F. F/u CXR. TOV. NPO after midnight and hold lovenox for GT placement in IR in am.   1/12/22 G tube placed in IR  1/14 L Pleural Pigtail catheter removed  1/15 tolerating SBT x4 hrs but with copious secretions requiring frequent suctioning; G tube clogged, initial attempts at dislodging unsuccessful in AM, noted to have a cluster of small capsule beads. Attempts to dissolve with ginger ale, unsuccessful. This evening, successfully unclogged Gastrostomy tube. TF restarted.  1/16- No acute overnight events  1/17  Code sepsis  Lactate 5.3 received 2.1 liter of NS. Repeat lactate 3.2 Blood and urine cultures sent.  1/18  Lactate 6.1  IVF increased to 125cc/hr. Abdominal / Pelvis CT with contrast. B/P low started on midodrine. Pt to be transferred to ICU after CT scan      Plan   - Daily SBT to TC   - Biomarkers trending down   - Surgical f/u noted ; .. PEG to gravity   - IVF   - Hemodynamic support   - Vasopressors off   - Increase midodrine 5 q8h   - Broad spec antibx ending 1/24  - TLC  - CT abd / pelvis noted   - POCUS chest .. very small pocket of fluid .. too small for drain   - PEG  to gravity   - F/U A x-ray report   - Continue Vent support at night   - Not a candidate for decannulation due to tongue ca  - DVT GI prophy.
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 Patient had cardiac arrest in ED , was intubated CPR was initiated and subsequently ROSC was achieved after 20 minutes. Patient admitted to the ICU 12/20-1/7/22 for post cardiac arrest management in the setting of acute hypoxic respiratory failure due to left pneumothorax and likely aspiration pneumonia and septic shock. He was treated with a course of cefepime (12/20-12/28) and then another course of abx with Zosyn (12/29-1/7). He failed extubation x2 (12/23, 12/27) and ultimately underwent Tracheostomy placement (1/6/22). Underwent EGD 1/7 for PEG placement, found to have a gastric ulcer which was clipped and he was initiated on BID PPI. He is pending PEG placement, plan for IR consult Monday 1/10/22 and possible removal of L pigtail pleural catheter 1/8 pending results of repeat CXR. His ICU course was further s/f hypernatremia being managed with free water boluses and Palliative Care was consulted as patient is not a candidate for further cancer directed treatment once his is s/p trach/PEG. He was transferred to the [facility] on 1/8 for further care.     1/9 Febrile, Tmax 101.4F. F/u CXR. TOV. NPO after midnight and hold lovenox for GT placement in IR in am.   1/12/22 G tube placed in IR  1/14 L Pleural Pigtail catheter removed  1/15 tolerating SBT x4 hrs but with copious secretions requiring frequent suctioning; G tube clogged, initial attempts at dislodging unsuccessful in AM, noted to have a cluster of small capsule beads. Attempts to dissolve with ginger ale, unsuccessful. This evening, successfully unclogged Gastrostomy tube. TF restarted.  1/16- No acute overnight events  1/17  Code sepsis  Lactate 5.3 received 2.1 liter of NS. Repeat lactate 3.2 Blood and urine cultures sent.  1/18  Lactate 6.1  IVF increased to 125cc/hr. Abdominal / Pelvis CT with contrast. B/P low started on midodrine. Pt to be transferred to ICU after CT scan      Plan   - Up grade to icu   - IVF   - Hemodynamic support   - Start vasopressors as needed to maintain MAP>65  - Broad spec antibx   - TLC  - CT abd / pelvis   - PEG feed   - Continue Vent support   - Not a candidate for decannulation due to tongue ca  - DVT GI prophy
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 He is s/p left pigtail insertion with expansion of left lung. ROSC is approx 20mins. Also with obstructive shock and on vasopressor support. Overall prognosis is poor.     Assessment:  1. Cardiac arrest  2. Acute respiratory failure  3. Left sided pneumothorax  4. 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 CAD   6. Diabetes mellitus       Plan:  - Awake this morning, CXR is improved, + Airleak on cuff deflation, minimal secretions to ET tube suctioning  - Tolerating PS, will extubate and monitor respiratory status  - empiric abx for aspiration pneumonia coverage will complete   - chest tube on suction for now, will maintain   - NGT tube feedings held given plan for extubation  - Aspiration precautions  - DVT prophy  - Monitor respiratory status   - Cont. care in ICU for today
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 He is s/p left pigtail insertion with expansion of left lung. ROSC is approx 20mins. Also with obstructive shock and on vasopressor support. Overall prognosis is poor.     Assessment:  1. Cardiac arrest  2. Acute respiratory failure  3. Left sided pneumothorax  4. 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 CAD   6. Diabetes mellitus       Plan:  - Reintubated 12/28  - Cont. mechanical ventilation   - Daily weaning trials with SBT  - chest tube to water seal, suspect trapped lung due to malignancy  - NGT tube feedings  - Aspiration precautions  - Cont Zosyn for aspiration pneumonia coverage  - Chest PT and suctioning   - DVT prophy  - Monitor respiratory status   - Palliative care consultation  - May need tracheostomy and peg placement, scheduled for next week  - Cont. care in ICU for today
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 He is s/p left pigtail insertion with expansion of left lung. ROSC is approx 20mins. Also with obstructive shock and on vasopressor support. Overall prognosis is poor.     Assessment:  1. Cardiac arrest  2. Acute respiratory failure  3. Left sided pneumothorax  4. 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 CAD   6. Diabetes mellitus uncontrol       Plan:  - Reintubated 12/28  - Cont. mechanical ventilation   - Daily weaning trials with SBT  - chest tube to suction , suspect trapped lung due to malignancy  - NGT tube feedings  - Aspiration precautions  - Cont Zosyn for aspiration pneumonia coverage  - Chest PT and suctioning   - DVT prophy  - Monitor respiratory status   - Palliative care consultation  - Schedule for  tracheostomy and peg placement, scheduled for 1/5 .
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 He is s/p left pigtail insertion with expansion of left lung. ROSC is approx 20mins. Also with obstructive shock and on vasopressor support. Overall prognosis is poor.     Assessment:  1. Cardiac arrest  2. Acute respiratory failure  3. Left sided pneumothorax  4. 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 CAD   6. Diabetes mellitus uncontrol       Plan:  - Reintubated 12/28  - Trached 1/6  - Cont. mechanical ventilation   -  Post trach ABG , adjust vent as needed   - CXR post trach noted , f/u official report   - Daily weaning trials with SBT  - Chest tube to suction , suspect trapped lung due to malignancy  - Will d/c chest tube since lung has not fully re-expanded   - NGT tube feedings  - Aspiration precautions  - Cont Zosyn for aspiration pneumonia coverage  - Chest PT and suctioning   - DVT prophy  - Monitor respiratory status   - Palliative care consultation  - Schedule for  tracheostomy and peg placement, scheduled for 1/5 .
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 He is s/p left pigtail insertion with expansion of left lung. ROSC is approx 20mins. Also with obstructive shock and on vasopressor support. Overall prognosis is poor.     - Cont mechanical vent support  - pressor support to MAP >65  - empiric abx for thick secretions and aspiration  - chest tube to suction  - SAT/SBT in am  - keep off sedation  - DVT prophy  - glycemia control  - enteral feeding    Discussed with son at bedside
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 He is s/p left pigtail insertion with expansion of left lung. ROSC is approx 20mins. Also with obstructive shock and on vasopressor support. Overall prognosis is poor.     - extubated 12/22, reintubated 12h later due to mucus plugging  - empiric abx for thick secretions and aspiration  - chest tube on suction for now  - DVT prophy  - glycemia control  - address GOC with son/family  - maintain MAP>65  - sedation for vent synchrony  standard vent bundle    Discussed with son at bedside
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 He is s/p left pigtail insertion with expansion of left lung. ROSC is approx 20mins. Also with obstructive shock and on vasopressor support. Overall prognosis is poor.     Assessment:  1. Cardiac arrest  2. Acute respiratory failure  3. Left sided pneumothorax  4. 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 CAD   6. Diabetes mellitus       Plan:  - Reintubated 12/28  - Cont. mechanical ventilation   - Daily weaning trials with SBT  - chest tube to water seal, suspect trapped lung due to malignancy  - NGT tube feedings  - Aspiration precautions  - Cont Zosyn for aspiration pneumonia coverage  - Chest PT and suctioning   - DVT prophy  - Monitor respiratory status   - Palliative care consultation  - May need tracheostomy and peg placement, scheduled for tomorrow, though now family is changing mind as patient does not want tracheostomy placement.  - Cont. care in ICU for today
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 He is s/p left pigtail insertion with expansion of left lung. ROSC is approx 20mins. Also with obstructive shock and on vasopressor support. Overall prognosis is poor.     Assessment:  1. Cardiac arrest  2. Acute respiratory failure  3. Left sided pneumothorax  4. 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 CAD   6. Diabetes mellitus       Plan:  - Reintubated 12/28  - Cont. mechanical ventilation   - chest tube on suction for now, will maintain   - repeat CT scan of the chest to evaluate for evolution of mets  - NGT tube feedings  - Aspiration precautions  - F/u sputum cultures  - Cont Zosyn for aspiration pneumonia coverage  - DVT prophy  - Monitor respiratory status   - Palliative care consultation  - May need tracheostomy and peg placement   - Cont. care in ICU for today
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 He is s/p left pigtail insertion with expansion of left lung. ROSC is approx 20mins. Also with obstructive shock and on vasopressor support. Overall prognosis is poor.     Assessment:  1. Cardiac arrest  2. Acute respiratory failure  3. Left sided pneumothorax  4. 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 CAD   6. Diabetes mellitus uncontrol       Plan:  - Reintubated 12/28  - Cont. mechanical ventilation   - Daily weaning trials with SBT  - chest tube to suction , suspect trapped lung due to malignancy  - NGT tube feedings  - Aspiration precautions  - Cont Zosyn for aspiration pneumonia coverage  - Chest PT and suctioning   - DVT prophy  - Monitor respiratory status   - Palliative care consultation  - Schedule for  tracheostomy and peg placement, scheduled for 1/5
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 He is s/p left pigtail insertion with expansion of left lung. ROSC is approx 20mins. Also with obstructive shock and on vasopressor support. Overall prognosis is poor.     Assessment:  1. Cardiac arrest  2. Acute respiratory failure  3. Left sided pneumothorax  4. 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 CAD   6. Diabetes mellitus uncontrol       Plan:  - Reintubated 12/28  - Trached 1/6  - For PEG 1/7  - Cont. mechanical ventilation   - Daily weaning trials with SBT  - Chest tube to water seal , will d/c in AM after PEG placement   - Will d/c chest tube since lung has not fully re-expanded ( trapped lung )  - NGT tube feedings  - Aspiration precautions  - Cont Zosyn for aspiration pneumonia coverage  - Chest PT and suctioning   - DVT prophy  - Monitor respiratory status   - Palliative care consultation  - Schedule for  tracheostomy and peg placement, scheduled for 1/5 .
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 He is s/p left pigtail insertion with expansion of left lung. ROSC is approx 20mins. Also with obstructive shock and on vasopressor support. Overall prognosis is poor.     - extubated 12/22  - empiric abx for thick secretions and aspiration  - chest tube on waterseal  - DVT prophy  - glycemia control  - will need swallow eval, for now NPO  - PT consult    Discussed with son at bedside
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 He is s/p left pigtail insertion with expansion of left lung. ROSC is approx 20mins. Also with obstructive shock and on vasopressor support. Overall prognosis is poor. No targeted temperature management offered in view of poor prognosis and son made patient DNR.    - Cont mechanical vent support  - pressor support to MAP >65  - empiric abx for thick secretions and aspiration  - chest tube to suction  - CT head to assess for anoxia  - keep off sedation  - DVT prophy  - glycemia control    Discussed with son at bedside
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 Patient had cardiac arrest in ED , was intubated CPR was initiated and subsequently ROSC was achieved after 20 minutes. Patient admitted to the ICU 12/20-1/7/22 for post cardiac arrest management in the setting of acute hypoxic respiratory failure due to left pneumothorax and likely aspiration pneumonia and septic shock. He was treated with a course of cefepime (12/20-12/28) and then another course of abx with Zosyn (12/29-1/7). He failed extubation x2 (12/23, 12/27) and ultimately underwent Tracheostomy placement (1/6/22). Underwent EGD 1/7 for PEG placement, found to have a gastric ulcer which was clipped and he was initiated on BID PPI. He is pending PEG placement, plan for IR consult Monday 1/10/22 and possible removal of L pigtail pleural catheter 1/8 pending results of repeat CXR. His ICU course was further s/f hypernatremia being managed with free water boluses and Palliative Care was consulted as patient is not a candidate for further cancer directed treatment once his is s/p trach/PEG. He was transferred to the [facility] on 1/8 for further care.     1/9 Febrile, Tmax 101.4F. F/u CXR. TOV. NPO after midnight and hold lovenox for GT placement in IR in am.   1/12/22 G tube placed in IR  1/14 L Pleural Pigtail catheter removed  1/15 tolerating SBT x4 hrs but with copious secretions requiring frequent suctioning; G tube clogged, initial attempts at dislodging unsuccessful in AM, noted to have a cluster of small capsule beads. Attempts to dissolve with ginger ale, unsuccessful. This evening, successfully unclogged Gastrostomy tube. TF restarted.  1/16- No acute overnight events  1/17  Code sepsis  Lactate 5.3 received 2.1 liter of NS. Repeat lactate 3.2 Blood and urine cultures sent.  1/18  Lactate 6.1  IVF increased to 125cc/hr. Abdominal / Pelvis CT with contrast. B/P low started on midodrine. Pt to be transferred to ICU after CT scan      Plan     - Biomarkers trending down   - Surgical f/u noted ; .. PEG to gravity   - IVF   - Hemodynamic support   - Start vasopressors as needed to maintain MAP>65  - Broad spec antibx   - TLC  - CT abd / pelvis noted   - POCUS chest .. very small pocket of fluid .. too small for drain   - Will f/u with thoracic   - PEG feed   - Continue Vent support   - Not a candidate for decannulation due to tongue ca  - DVT GI prophy.
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 He is s/p left pigtail insertion with expansion of left lung. ROSC is approx 20mins. Also with obstructive shock and on vasopressor support. Overall prognosis is poor.     Assessment:  1. Cardiac arrest  2. Acute respiratory failure  3. Left sided pneumothorax  4. 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 CAD   6. Diabetes mellitus       Plan:  - Reintubated 12/28  - Cont. mechanical ventilation   - chest tube on suction for now, will maintain   - NGT tube feedings  - Aspiration precautions  - Obtain sputum cultures  - Start Zosyn for aspiration pneumonia coverage  - DVT prophy  - Monitor respiratory status   - Palliative care consultation  - May need tracheostomy and peg placement  - Cont. care in ICU for today  - Message left for ENT Dr. Soto, awaiting call back
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 He is s/p left pigtail insertion with expansion of left lung. ROSC is approx 20mins. Also with obstructive shock and on vasopressor support. Overall prognosis is poor.     Assessment:  1. Cardiac arrest  2. Acute respiratory failure  3. Left sided pneumothorax  4. 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 CAD   6. Diabetes mellitus       Plan:  - Reintubated this morning as had respiratory distress, thick secretions suctioned upon intubation   - Place on mechanical ventilation   - chest tube on suction for now, will maintain   - NGT tube feedings  - Aspiration precautions  - DVT prophy  - Monitor respiratory status   - Palliative care consultation  - May need tracheostomy and peg placement  - Cont. care in ICU for today
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 He is s/p left pigtail insertion with expansion of left lung. ROSC is approx 20mins. Also with obstructive shock and on vasopressor support. Overall prognosis is poor.     - extubated 12/22, reintubated 12h later due to mucus plugging  - empiric abx for thick secretions and aspiration  - awake but continues to have copious thick secretions  - chest tube on suction for now  - DVT prophy  - glycemia control  - address GOC with son/family  - maintain MAP>65  - sedation with Precedex for vent synchrony  - standard vent bundle  discussed with family over phone
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 Patient had cardiac arrest in ED , was intubated CPR was initiated and subsequently ROSC was achieved after 20 minutes. Patient admitted to the ICU 12/20-1/7/22 for post cardiac arrest management in the setting of acute hypoxic respiratory failure due to left pneumothorax and likely aspiration pneumonia and septic shock. He was treated with a course of cefepime (12/20-12/28) and then another course of abx with Zosyn (12/29-1/7). He failed extubation x2 (12/23, 12/27) and ultimately underwent Tracheostomy placement (1/6/22). Underwent EGD 1/7 for PEG placement, found to have a gastric ulcer which was clipped and he was initiated on BID PPI. He is pending PEG placement, plan for IR consult Monday 1/10/22 and possible removal of L pigtail pleural catheter 1/8 pending results of repeat CXR. His ICU course was further s/f hypernatremia being managed with free water boluses and Palliative Care was consulted as patient is not a candidate for further cancer directed treatment once his is s/p trach/PEG. He was transferred to the [facility] on 1/8 for further care.     1/9 Febrile, Tmax 101.4F. F/u CXR. TOV. NPO after midnight and hold lovenox for GT placement in IR in am.   1/12/22 G tube placed in IR  1/14 L Pleural Pigtail catheter removed  1/15 tolerating SBT x4 hrs but with copious secretions requiring frequent suctioning; G tube clogged, initial attempts at dislodging unsuccessful in AM, noted to have a cluster of small capsule beads. Attempts to dissolve with ginger ale, unsuccessful. This evening, successfully unclogged Gastrostomy tube. TF restarted.  1/16- No acute overnight events  1/17  Code sepsis  Lactate 5.3 received 2.1 liter of NS. Repeat lactate 3.2 Blood and urine cultures sent.  1/18  Lactate 6.1  IVF increased to 125cc/hr. Abdominal / Pelvis CT with contrast. B/P low started on midodrine. Pt to be transferred to ICU after CT scan      Plan   - Daily SBT to TC   - Biomarkers trending down   - Surgical f/u noted ; .. PEG to gravity   - IVF   - Hemodynamic support   - Vasopressors as needed to maintain MAP>65  - Broad spec antibx   - TLC  - CT abd / pelvis noted   - POCUS chest .. very small pocket of fluid .. too small for drain   - Will f/u with thoracic   - PEG feed   - Continue Vent support   - Not a candidate for decannulation due to tongue ca  - DVT GI prophy.
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 He is s/p left pigtail insertion with expansion of left lung. ROSC is approx 20mins. Also with obstructive shock and on vasopressor support. Overall prognosis is poor.     Assessment:  1. Cardiac arrest  2. Acute respiratory failure  3. Left sided pneumothorax  4. 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 CAD   6. Diabetes mellitus       Plan:  - Reintubated 12/28  - Cont. mechanical ventilation   - Daily weaning trials with SBT  - chest tube to water seal, suspect trapped lung due to malignancy  - NGT tube feedings  - Aspiration precautions  - Cont Zosyn for aspiration pneumonia coverage  - DVT prophy  - Monitor respiratory status   - Palliative care consultation  - May need tracheostomy and peg placement, scheduled for next week  - Cont. care in ICU for today
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 He is s/p left pigtail insertion with expansion of left lung. ROSC is approx 20mins. Also with obstructive shock and on vasopressor support. Overall prognosis is poor.     Assessment:  1. Cardiac arrest  2. Acute respiratory failure  3. Left sided pneumothorax  4. 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 CAD   6. Diabetes mellitus uncontrol       Plan:  - Reintubated 12/28  - Cont. mechanical ventilation   - Daily weaning trials with SBT  - chest tube to suction , suspect trapped lung due to malignancy  - NGT tube feedings  - Aspiration precautions  - Cont Zosyn for aspiration pneumonia coverage  - Chest PT and suctioning   - DVT prophy  - Monitor respiratory status   - Palliative care consultation  - Schedule for  tracheostomy and peg placement, scheduled for 1/5   - Cont. care in ICU for today .
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 Patient had cardiac arrest in ED , was intubated CPR was initiated and subsequently ROSC was achieved after 20 minutes. Patient admitted to the ICU 12/20-1/7/22 for post cardiac arrest management in the setting of acute hypoxic respiratory failure due to left pneumothorax and likely aspiration pneumonia and septic shock. He was treated with a course of cefepime (12/20-12/28) and then another course of abx with Zosyn (12/29-1/7). He failed extubation x2 (12/23, 12/27) and ultimately underwent Tracheostomy placement (1/6/22). Underwent EGD 1/7 for PEG placement, found to have a gastric ulcer which was clipped and he was initiated on BID PPI. He is pending PEG placement, plan for IR consult Monday 1/10/22 and possible removal of L pigtail pleural catheter 1/8 pending results of repeat CXR. His ICU course was further s/f hypernatremia being managed with free water boluses and Palliative Care was consulted as patient is not a candidate for further cancer directed treatment once his is s/p trach/PEG. He was transferred to the [facility] on 1/8 for further care.     1/9 Febrile, Tmax 101.4F. F/u CXR. TOV. NPO after midnight and hold lovenox for GT placement in IR in am.   1/12/22 G tube placed in IR  1/14 L Pleural Pigtail catheter removed  1/15 tolerating SBT x4 hrs but with copious secretions requiring frequent suctioning; G tube clogged, initial attempts at dislodging unsuccessful in AM, noted to have a cluster of small capsule beads. Attempts to dissolve with ginger ale, unsuccessful. This evening, successfully unclogged Gastrostomy tube. TF restarted.  1/16- No acute overnight events  1/17  Code sepsis  Lactate 5.3 received 2.1 liter of NS. Repeat lactate 3.2 Blood and urine cultures sent.  1/18  Lactate 6.1  IVF increased to 125cc/hr. Abdominal / Pelvis CT with contrast. B/P low started on midodrine. Pt to be transferred to ICU after CT scan      Plan     - Biomarkers trending down   - Surgical f/u noted ; .. PEG to gravity   - IVF   - Hemodynamic support   - Start vasopressors as needed to maintain MAP>65  - Broad spec antibx   - TLC  - CT abd / pelvis noted   - POCUS chest .. very small pocket of fluid .. too small for drain   - Will f/u with thoracic   - PEG feed   - Continue Vent support   - Not a candidate for decannulation due to tongue ca  - DVT GI prophy.
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
Problem/Plan - 1:  ·  Problem: Acute respiratory failure with hypoxia.   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     Problem/Plan - 2:  ·  Problem: Pneumothorax, left.   ·  Plan: #PTX (acute/on/chronic)  - s/p pigtail in ED 12/20  - CT clamped 1/7 overnight, CXR as above.   Continue CT to [facility] .   Thoracic following.     Problem/Plan - 3:  ·  Problem: Gastric ulcer.   ·  Plan: s/p EGD 1/7/21 with gastric ulcer w/ clipping, no window for PEG. IR consultation for G tube placement on Monday   PPI BID  Send H. pylori stool antigen.     Problem/Plan - 4:  ·  Problem: Severe protein-calorie malnutrition.   ·  Plan: TF at goal w/ glucerna per dietician recs  IR consult Monday 1/10 for G tube placement.     Problem/Plan - 5:  ·  Problem: Acute hypernatremia.   ·  Plan: Free water deficit 2.2L, increase free water boluses to 500mL via NGT q6h; titrate PRN  Resolved  Follow daily Na levels.
CXR improved with suctioning and positioning   Chest tube management per surgery   Plan for PEG placement today
Repeat CXR in AM   Chest tube management per surgery  PEG tube feedings once ok by IR  Dispo planning to vent capable facility
Likely chronic post obstructive pneumonia due to collapsed lung   Chest PT, place left side positioned up wards  Suctioning as needed   Discussed with IR attending, requesting CT scan of the abdomen to evaluate anatomy further
PEG functional now, tolerating tube feedings  Low dose Seroquel as patient with episodes of hyperactive delirium, taper down to bed time only  Dispo planning to vent capable facility  DVT prophylaxis
Restarted on tube feedings now  Monitor for tolerance of tube feedings  Surgery follow up for small bowel obstruction   Advance malignancy   Hold Seroquel   Will plan for diuresis and aim for euvolemic volume status   Hypoalbuminemic, will infuse albumin   Prognosis is poor given debility, advance malignancy
Tolerating tube feedings  Diuresis with albumin and lasix aim for euvolemic status   Monitor for tolerance of tube feedings  Surgery follow up for small bowel obstruction   Advance malignancy   Prognosis is poor given debility, advance malignancy
Tolerating tube feedings  Supplement electrolytes aggressively   Monitor for tolerance of tube feedings  Surgery follow up for small bowel obstruction   Advance malignancy   Prognosis is poor given debility, advance malignancy  Discharge planning to vent capable facility
Hypercalcemia, likely due to malignancy, Subcutaneous calcitonin   Supplement electrolytes aggressively as hypokalemic   Cont. IV antibiotics will complete per ID recs  CT head negative   Overall prognosis is poor as patient has severe debility and hypoactive delirium  PT follow up requested    Dispo planning to vent capable facility  Son at bedside. Explained current plan. At this time plan for discharge to facility, he is to complete antibiotics there. Son understands the patient remains at risk of infections.
Hypercalcemia, likely due to malignancy  Will give enteric fluids  Subcutaneous calcitonin   Supplement electrolytes aggressively  Cont. IV antibiotics   Family requesting CT scan of the head   Overall prognosis is poor as patient has severe debility and hypoactive delirium  PT follow up requested    Dispo planning to vent capable facility
Chest tube removed today   PEG tube feedings  Free water enterally  Low dose Seroquel as patient with episodes of hyperactive delirium  Dispo planning to vent capable facility
Problem/Plan - 1:  ·  Problem: Acute respiratory failure with hypoxia.   ·  Plan: failed extubation 12/23, reintub 12/23; again failed 12/27, reintub 12/28; s/p trach 1/6/22  Increased infiltrate/atelectasis  Febrile , mild leukocytosis  Resume Zosyn for possible PNA   CXR results as above, unchanged from previous, shows significant left mid to lower lung field infiltrate and slight right base infiltrate.   Continue mechanical ventilation with daily SBT as tolerated.   Monitor oxygen saturation.  Will need vent facility upon discharge.  Serial CXRs.     Problem/Plan - 2:  ·  Problem: Pneumonia.   ·  Plan: Febrile , mild leukocytosis  Resume Zosyn for possible PNA   CXR results as above, unchanged from previous, shows significant left mid to lower lung field infiltrate and slight right base infiltrate.  Monitor oxygen saturation.  ID following.     Problem/Plan - 3:  ·  Problem: Atelectasis, left.   ·  Plan: CXR on 1/11 showed complete atelectasis of left lung - Repeat CXR shows improvement left lung. No pneumothorax visualized  Left Pig tail catheter removed on 1/14/2022 .  Chest X ray on 1/14/2022 showed persistent left lung infiltrate.  Position patient on right side with left side up.     Problem/Plan - 4:  ·  Problem: Cardiac arrest.   ·  Plan: Cardiac arrest in ED   ROSC achieved after 20 minutes.  Continue current medications.     Problem/Plan - 5:  ·  Problem: Pneumothorax, left.   ·  Plan: Chest tube placed 12/20 in ED  Small left pneumothorax visualized on abdominal CT  Pig tail catheter removed on 1/14/2022 1/14/2022 Chest X ray results as above - No pneumothorax noted.
Reported with PEG tube blockage   D5W infusion  Low dose Seroquel as patient with episodes of hyperactive delirium  Dispo planning to vent capable facility  Restart DVT prophylaxis
refer to icu progress note from today

## 2022-01-28 NOTE — PROGRESS NOTE ADULT - PROBLEM SELECTOR PLAN 7
Continue sliding scale coverage.  Monitor glucose.
Metastatic SCC of tongue base  s/p resection, chemo/radiation, and immunotherapy (10/2021)  Possible Plan for experimental drug as outpatient.  Continue follow up with Hem/oNc as an outpatient once discharged  Not a candidate for palliative chemo.
Continue sliding scale coverage.  Monitor Fingerstick glucose
Metastatic SCC of tongue base  s/p resection, chemo/radiation, and immunotherapy (10/2021)  Continue follow up with Hem/oNc as an outpatient once discharged  Not a candidate for palliative chemo.
Metastatic SCC of tongue base  s/p resection, chemo/radiation, and immunotherapy (10/2021)  ? Plan for experimental drug as outpatient.  Continue follow up with Hem/oNc as an outpatient once discharged  Not a candidate for palliative chemo.
Maintain NPO status for now  Pending Surgical consult for Peg placement.  IR not able to place Peg.
Metastatic SCC of tongue base  - s/p resection, chemo/radiation, and immunotherapy (10/2021)  - plan for experimental drug as outpatient.  - heme/onc as o/p   - palliative chemo, post trach/PEG will NOT be candidate for chemo  - palliative on board
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
Metastatic SCC of tongue base  s/p resection, chemo/radiation, and immunotherapy (10/2021)  ? Plan for experimental drug as outpatient.  Continue follow up with Hem/oNc as an outpatient once discharged  Not a candidate for palliative chemo.
Metastatic SCC of tongue base  s/p resection, chemo/radiation, and immunotherapy (10/2021)  ? Plan for experimental drug as outpatient.  Continue follow up with Hem/oNc as an outpatient once discharged  Not a candidate for palliative chemo.
Continue sliding scale coverage.  Monitor glucose.
Metastatic SCC of tongue base  s/p resection, chemo/radiation, and immunotherapy (10/2021)  ? Plan for experimental drug as outpatient.  Continue follow up with Hem/oNc as an outpatient once discharged  Not a candidate for palliative chemo.
Metastatic SCC of tongue base  s/p resection, chemo/radiation, and immunotherapy (10/2021)  Continue follow up with Hem/oNc as an outpatient once discharged  Not a candidate for palliative chemo.
Continue sliding scale coverage.  Monitor glucose.
Metastatic SCC of tongue base  - s/p resection, chemo/radiation, and immunotherapy (10/2021)  - plan for experimental drug as outpatient.  - heme/onc as o/p   - palliative chemo, post trach/PEG will NOT be candidate for chemo  - palliative on board
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

## 2022-01-28 NOTE — PROGRESS NOTE ADULT - PROBLEM SELECTOR PLAN 4
S/P cardiac arrest upon admission.  ROSC in 20 minutes.  Continue cardiac medications.
Chest tube placed 12/20 in ED  Small left pneumothorax visualized on abdominal CT  Pig tail catheter removed on 1/14/2022 1/14/2022 Chest X ray results as above - No pneumothorax noted
H&H low but stable.  Continue to monitor daily.
Chest tube placed 12/20 in ED  Continue left chest tube to water-seal.  Thoracic f/u for chest tube removal.  Serial CXRs
S/P cardiac arrest upon admission.  ROSC in 20 minutes.  Continue cardiac medications.
Chest tube placed 12/20 in ED  Small left pneumothorax visualized on abdominal CT  Continue chest tube to water-seal as per thoracic surgery.  Chest tube to be removed today then f/u CXR
TF at goal w/ glucerna per dietician recs  IR consult Monday 1/10 for G tube placement
Chest tube placed 12/20 in ED  Small left pneumothorax visualized on abdominal CT  Continue chest tube to water-seal as per thoracic surgery.  CXR pending
Cardiac arrest in ED   ROSC achieved after 20 minutes.  Continue current medications.
S/P cardiac arrest upon admission.  ROSC in 2 minutes.  Continue cardiac medications.
Cardiac arrest in ED   ROSC achieved after 20 minutes.  Continue current medications.
Chest tube placed 12/20 in ED  Small left pneumothorax visualized on abdominal CT  L pleural pigtail catheter removed 1/14/22
TF at goal w/ glucerna per dietician recs  IR consult Monday 1/10 for G tube placement
S/P cardiac arrest upon admission.  ROSC in 2 minutes.  Continue cardiac medications.
see GOC note, family to decide on proceeding with trach on Monday vs palliative wean and natural death likely as inpatient hospice
Chest tube placed 12/20 in ED  Small left pneumothorax visualized on abdominal CT  Continue chest tube to water-seal as per thoracic surgery.  Chest tube to be removed tomorrow pending on CXR in am.
S/P cardiac arrest upon admission.  ROSC in 20 minutes.  Continue cardiac medications.

## 2022-01-28 NOTE — PROGRESS NOTE ADULT - PROBLEM SELECTOR PLAN 5
Continue sliding scale.  Monitor glucose.
Free water deficit 2.2L, increase free water boluses to 500mL via NGT q6h; titrate PRN  Follow daily Na levels
H&H low but stable.  Continue to monitor daily.
Continue sliding scale.  Monitor glucose.
Chest tube placed 12/20 in ED  Small left pneumothorax visualized on abdominal CT  Pig tail catheter removed on 1/14/2022 1/14/2022 Chest X ray results as above - No pneumothorax noted.
Chest tube placed 12/20 in ED  Small left pneumothorax visualized on abdominal CT  Pig tail catheter removed on 1/14/2022 1/14/2022 Chest X ray results as above - No pneumothorax noted
H&H low but stable - Hgb- 8.6  Continue to monitor CBC daily.
H/H stable  Continue to monitor daily.
Free water deficit 2.2L, increase free water boluses to 500mL via NGT q6h; titrate PRN  Resolved  Follow daily Na levels
Metastatic SCC of tongue base  s/p resection, chemo/radiation, and immunotherapy (10/2021)  Plan for experimental drug as outpatient.  F/U heme/onc as o/p   Not a candidate for palliative chemo
H&H low but stable.  Continue to monitor daily.
H&H low but stable.  Continue to monitor daily.
H/H stable  Continue to monitor daily.

## 2022-01-28 NOTE — PROGRESS NOTE ADULT - PROBLEM SELECTOR PLAN 1
Partial SBO.   Surgery recommending no intervention at this time  Increased tube feed from 35ml to 40ml, no PEG tube residual.   started on 40ml continuous.

## 2022-01-28 NOTE — PROGRESS NOTE ADULT - PROBLEM SELECTOR PROBLEM 5
Anemia of chronic disease
Hyperglycemia due to diabetes mellitus
Hyperglycemia due to diabetes mellitus
Acute hypernatremia
Pneumothorax, left
Anemia of chronic disease
Hyperglycemia due to diabetes mellitus
Acute hypernatremia
Metastatic cancer
Anemia of chronic disease
Hyperglycemia due to diabetes mellitus
Pneumothorax, left
Hyperglycemia due to diabetes mellitus
Anemia of chronic disease

## 2022-01-28 NOTE — PROGRESS NOTE ADULT - SUBJECTIVE AND OBJECTIVE BOX
76y Male is under our care for     REVIEW OF SYSTEMS:  [  ] Not able to elicit  General:	  Chest:	  GI:	  :  Skin:	  Musculoskeletal:	  Neuro:	    MEDS:  piperacillin/tazobactam IVPB.. 3.375 Gram(s) IV Intermittent every 8 hours    ALLERGIES: Allergies    No Known Allergies    Intolerances        VITALS:  Vital Signs Last 24 Hrs  T(C): 36.5 (28 Jan 2022 05:11), Max: 36.5 (28 Jan 2022 05:11)  T(F): 97.7 (28 Jan 2022 05:11), Max: 97.7 (28 Jan 2022 05:11)  HR: 94 (28 Jan 2022 10:07) (73 - 96)  BP: 122/63 (28 Jan 2022 05:11) (112/61 - 122/63)  BP(mean): --  RR: 22 (28 Jan 2022 05:11) (20 - 22)  SpO2: 96% (28 Jan 2022 10:07) (96% - 100%)      PHYSICAL EXAM:  HEENT:  Neck:  Respiratory:  Cardiovascular:  Gastrointestinal:  Extremities:  Skin:  Ortho:  Neuro:    LABS/DIAGNOSTIC TESTS:                        8.1    4.96  )-----------( 153      ( 28 Jan 2022 06:45 )             25.7     WBC Count: 4.96 K/uL (01-28 @ 06:45)  WBC Count: 4.89 K/uL (01-27 @ 07:14)  WBC Count: 4.46 K/uL (01-26 @ 10:36)  WBC Count: 5.03 K/uL (01-25 @ 07:43)  WBC Count: 13.03 K/uL (01-24 @ 06:32)    01-28    152<H>  |  115<H>  |  27<H>  ----------------------------<  150<H>  3.3<L>   |  31  |  0.63    Ca    10.3      28 Jan 2022 06:45  Phos  2.0     01-28  Mg     2.0     01-28    TPro  4.9<L>  /  Alb  1.7<L>  /  TBili  0.4  /  DBili  x   /  AST  10  /  ALT  <6<L>  /  AlkPhos  69  01-28      CULTURES:   .Blood Blood  01-18 @ 02:51   No Growth Final  --  --      .Blood Blood  01-18 @ 02:50   No Growth Final  --  --      .Sputum Sputum  12-29 @ 18:28   Normal Respiratory Karla present  --    Numerous polymorphonuclear leukocytes per low power field  No squamous epithelial cells per low power field  Few Gram positive cocci in pairs per oil power field      Clean Catch Clean Catch (Midstream)  12-21 @ 04:42   No growth  --  --      .Sputum Sputum  11-08 @ 10:53   Few Mycobacterium avium complex  --  --      .Sputum Sputum  11-06 @ 19:34   No acid fast bacilli isolated after 6 weeks.  --  --      .Sputum Sputum  11-05 @ 23:15   Growth of acid fast bacilli detected in broth,  Mycobacterium avium complex by Dna Probe  --  --      .Blood Blood  11-04 @ 11:23   No Growth Final  --  --      .Blood Blood  11-04 @ 10:50   No Growth Final  --  --      Clean Catch Clean Catch (Midstream)  11-03 @ 17:00   <10,000 CFU/mL Normal Urogenital Karla  --  --        RADIOLOGY:    < from: CT Head No Cont (01.28.22 @ 09:57) >    ACC: [id number] EXAM:  CT BRAIN                          PROCEDURE DATE:  01/28/2022          INTERPRETATION:  Noncontrast CT of the brain.    CLINICAL INDICATION:  Encephalopathy    TECHNIQUE : Axial CT scanning of the brain was obtained from the skull   base to the vertex without the administration of intravenous contrast.   Sagittal and coronal reformats were provided.    COMPARISON: CT brain 11/3/2021    FINDINGS:    No hydrocephalus, mass effect, midline shift, acute intracranial   hemorrhage, or brain edema.    Moderate luminal white matter microvascular ischemic disease.    Extensive bilateral mastoid air cell effusions. Right sphenoid sinus   mucosal thickening.    IMPRESSION:    No hydrocephalus, acute intracranial hemorrhage, mass effect, or brain   edema.    Extensive bilateral mastoid air cell effusions. Correlate for the   presence of mastoiditis.    --- End of Report ---            SHEEBA FERGUSON MD; Attending Radiologist  This document has been electronically signed. Jan 28 2022 10:02AM    < end of copied text >   76y Male is under our care for pneumonia.  Patient was seen laying comfortably in bed with no acute distress.  Patient remains afebrile and is pending [facility].     REVIEW OF SYSTEMS:  [ x ] Not able to elicit    MEDS:  piperacillin/tazobactam IVPB.. 3.375 Gram(s) IV Intermittent every 8 hours    ALLERGIES: Allergies    No Known Allergies    Intolerances        VITALS:  Vital Signs Last 24 Hrs  T(C): 36.5 (28 Jan 2022 05:11), Max: 36.5 (28 Jan 2022 05:11)  T(F): 97.7 (28 Jan 2022 05:11), Max: 97.7 (28 Jan 2022 05:11)  HR: 94 (28 Jan 2022 10:07) (73 - 96)  BP: 122/63 (28 Jan 2022 05:11) (112/61 - 122/63)  BP(mean): --  RR: 22 (28 Jan 2022 05:11) (20 - 22)  SpO2: 96% (28 Jan 2022 10:07) (96% - 100%)      PHYSICAL EXAM:  HEENT: trach+  Neck: supple no LN's   Respiratory: lungs clear no rales  Cardiovascular: S1 S2 reg no murmurs  Gastrointestinal: +BS with soft, nondistended abdomen; nontender, peg tube in place  : Freedman catheter in place with clear urine  Extremities: Right hand edema +2  Skin: no rashes, no erythema or tenderness noted on mastoid  Ortho: n/a  Neuro: Awake and alert    LABS/DIAGNOSTIC TESTS:                        8.1    4.96  )-----------( 153      ( 28 Jan 2022 06:45 )             25.7     WBC Count: 4.96 K/uL (01-28 @ 06:45)  WBC Count: 4.89 K/uL (01-27 @ 07:14)  WBC Count: 4.46 K/uL (01-26 @ 10:36)  WBC Count: 5.03 K/uL (01-25 @ 07:43)  WBC Count: 13.03 K/uL (01-24 @ 06:32)    01-28    152<H>  |  115<H>  |  27<H>  ----------------------------<  150<H>  3.3<L>   |  31  |  0.63    Ca    10.3      28 Jan 2022 06:45  Phos  2.0     01-28  Mg     2.0     01-28    TPro  4.9<L>  /  Alb  1.7<L>  /  TBili  0.4  /  DBili  x   /  AST  10  /  ALT  <6<L>  /  AlkPhos  69  01-28      CULTURES:   .Blood Blood  01-18 @ 02:51   No Growth Final  --  --      .Blood Blood  01-18 @ 02:50   No Growth Final  --  --      .Sputum Sputum  12-29 @ 18:28   Normal Respiratory Karla present  --    Numerous polymorphonuclear leukocytes per low power field  No squamous epithelial cells per low power field  Few Gram positive cocci in pairs per oil power field      Clean Catch Clean Catch (Midstream)  12-21 @ 04:42   No growth  --  --      .Sputum Sputum  11-08 @ 10:53   Few Mycobacterium avium complex  --  --      .Sputum Sputum  11-06 @ 19:34   No acid fast bacilli isolated after 6 weeks.  --  --      .Sputum Sputum  11-05 @ 23:15   Growth of acid fast bacilli detected in broth,  Mycobacterium avium complex by Dna Probe  --  --      .Blood Blood  11-04 @ 11:23   No Growth Final  --  --      .Blood Blood  11-04 @ 10:50   No Growth Final  --  --      Clean Catch Clean Catch (Midstream)  11-03 @ 17:00   <10,000 CFU/mL Normal Urogenital Karla  --  --        RADIOLOGY:    < from: CT Head No Cont (01.28.22 @ 09:57) >    ACC: [id number] EXAM:  CT BRAIN                          PROCEDURE DATE:  01/28/2022          INTERPRETATION:  Noncontrast CT of the brain.    CLINICAL INDICATION:  Encephalopathy    TECHNIQUE : Axial CT scanning of the brain was obtained from the skull   base to the vertex without the administration of intravenous contrast.   Sagittal and coronal reformats were provided.    COMPARISON: CT brain 11/3/2021    FINDINGS:    No hydrocephalus, mass effect, midline shift, acute intracranial   hemorrhage, or brain edema.    Moderate luminal white matter microvascular ischemic disease.    Extensive bilateral mastoid air cell effusions. Right sphenoid sinus   mucosal thickening.    IMPRESSION:    No hydrocephalus, acute intracranial hemorrhage, mass effect, or brain   edema.    Extensive bilateral mastoid air cell effusions. Correlate for the   presence of mastoiditis.    --- End of Report ---            SHEEBA FERGUSON MD; Attending Radiologist  This document has been electronically signed. Jan 28 2022 10:02AM    < end of copied text >

## 2022-01-28 NOTE — PROGRESS NOTE ADULT - PROBLEM SELECTOR PROBLEM 9
Advance care planning
Agitation
Severe protein-calorie malnutrition
Advance care planning
Severe protein-calorie malnutrition
Advance care planning
Severe protein-calorie malnutrition

## 2022-01-28 NOTE — PROGRESS NOTE ADULT - ASSESSMENT
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 Patient had cardiac arrest in ED , was intubated CPR was initiated and subsequently ROSC was achieved after 20 minutes. Patient admitted to the ICU 12/20-1/7/22 for post cardiac arrest management in the setting of acute hypoxic respiratory failure due to left pneumothorax and likely aspiration pneumonia and septic shock. He was treated with a course of cefepime (12/20-12/28) and then another course of abx with Zosyn (12/29-1/7). He failed extubation x2 (12/23, 12/27) and ultimately underwent Tracheostomy placement (1/6/22). Underwent EGD 1/7 for PEG placement, found to have a gastric ulcer which was clipped and he was initiated on BID PPI. He is pending PEG placement, plan for IR consult Monday 1/10/22 and possible removal of L pigtail pleural catheter 1/8 pending results of repeat CXR. His ICU course was further s/f hypernatremia being managed with free water boluses and Palliative Care was consulted as patient is not a candidate for further cancer directed treatment once his is s/p trach/PEG. He was transferred to the [facility] on 1/8 for further care.   1/9 Febrile, Tmax 101.4F. F/u CXR. TOV. NPO after midnight and hold lovenox for GT placement in IR in am.   1/12/22 G tube placed in IR  1/14 L Pleural Pigtail catheter removed  1/15 tolerating SBT x4 hrs but with copious secretions requiring frequent suctioning; G tube clogged, initial attempts at dislodging unsuccessful in AM, noted to have a cluster of small capsule beads. Attempts to dissolve with ginger ale, unsuccessful. This evening, successfully unclogged Gastrostomy tube. TF restarted.  1/16- No acute overnight events  1/17  Code sepsis  Lactate 5.3 received 2.1 liter of NS. Repeat lactate 3.2 Blood and urine cultures sent.  1/18  Lactate 6.1  IVF increased to 125cc/hr. Abdominal / Pelvis CT with contrast. B/P low started on midodrine. Pt to be transferred to ICU after CT scan  Pt admitted back to ICU on 1/18 due to septic shock. Central line was placed in left femoral vein, for pressor use. Pt was placed on levophed, which was titrated down until it was not required. CT abdomen indicated presence of small bowel obstruction. Surgery stated pt is a poor surgical candidate, thus was treated conservatively by draining PEG tube to gravity. Bowel has been progressively decompressing and diet was started on 1/23. BCx neg 1/18.   1/27 Patient tolerated 3 hours of SBT yesterday. Will attempt daily SBT. Patient increased PEG tube feeding yesterday. resolving SBO with BM today, no residual in feeds

## 2022-01-28 NOTE — PROGRESS NOTE ADULT - PROBLEM SELECTOR PROBLEM 2
Atelectasis, left
Atelectasis, left
Pneumothorax, left
Pneumonia
Acute respiratory failure with hypoxia
Atelectasis, left
Acute respiratory failure with hypoxia
Atelectasis, left
Pneumothorax, left
Metastatic cancer
Cardiac arrest
Atelectasis, left
Acute respiratory failure with hypoxia
Atelectasis, left
Acute respiratory failure with hypoxia

## 2022-01-28 NOTE — PROGRESS NOTE ADULT - ATTENDING SUPERVISION STATEMENT
Resident
Resident
ACP
ACP/Resident
Resident
Resident/Fellow
ACP/Resident/Fellow
Resident
Resident
ACP/Resident
Resident
Resident/Fellow
Resident
Resident
ACP/Resident/Fellow
ACP

## 2022-01-28 NOTE — PROGRESS NOTE ADULT - PROBLEM SELECTOR PLAN 6
H&H low but stable  transfuse for Hgb less than 7.0  Serial H&H.
H&H low but stable - Hgb- 8.6  Continue to monitor CBC daily.
H&H low but stable  transfuse for Hgb less than 7.0  Serial H&H.
Metastatic SCC of tongue base  s/p resection, chemo/radiation, and immunotherapy (10/2021)  ? Plan for experimental drug as outpatient.  Continue follow up with Hem/oNc as an outpatient once discharged  Not a candidate for palliative chemo.
H&H low but stable  transfuse for Hgb less than 7.0  Serial H&H
Hyperglycemia in setting of TF and h/o T2DM  - continue Glucerna 1.5  - Insulin sliding scale  - a1c 6.3
Metastatic SCC of tongue base  s/p resection, chemo/radiation, and immunotherapy (10/2021)  Plan for experimental drug as outpatient.  F/U heme/onc as o/p   Not a candidate for palliative chemo.
H&H low but stable  transfuse for Hgb less than 7.0  Serial H&H.
H&H low but stable , >8.0  Continue to monitor CBC daily.
Continue sliding scale coverage.  Monitor glucose.
Metastatic SCC of tongue base  s/p resection, chemo/radiation, and immunotherapy (10/2021)  Plan for experimental drug as outpatient.  F/U heme/onc as o/p   Not a candidate for palliative chemo.
Hyperglycemia in setting of TF and h/o T2DM  - continue Glucerna 1.5  - Insulin sliding scale  - a1c 6.3
Metastatic SCC of tongue base  s/p resection, chemo/radiation, and immunotherapy (10/2021)  Plan for experimental drug as outpatient.  F/U heme/onc as o/p   Not a candidate for palliative chemo.
Metastatic SCC of tongue base  s/p resection, chemo/radiation, and immunotherapy (10/2021)  Plan for experimental drug as outpatient.  F/U heme/onc as o/p   Not a candidate for palliative chemo.
H&H low but stable  transfuse for Hgb less than 7.0  Serial H&H.
Metastatic SCC of tongue base  s/p resection, chemo/radiation, and immunotherapy (10/2021)  Plan for experimental drug as outpatient.  F/U heme/onc as o/p   Not a candidate for palliative chemo.

## 2022-01-28 NOTE — PROGRESS NOTE ADULT - PROBLEM SELECTOR PROBLEM 4
Anemia of chronic disease
Pneumothorax, left
Cardiac arrest
Pneumothorax, left
Severe protein-calorie malnutrition
Pneumothorax, left
Cardiac arrest
Pneumothorax, left
Severe protein-calorie malnutrition
Palliative care encounter
Cardiac arrest
Pneumothorax, left
Pneumothorax, left

## 2022-01-28 NOTE — CHART NOTE - NSCHARTNOTESELECT_GEN_ALL_CORE
ENT info/Event Note
Event Note
FAMILY UPDATE/Event Note
Family Update Note/Event Note
Family Update/Event Note
Nutrition Services
Thoracic PA/Event Note
Thoracic PA/Event Note
Transfer Note
Central line removal/Event Note
Event Note
FAMILY UPDATE/Event Note
Family Update Note/Event Note
Family Update/Event Note
Family update/Event Note
Family/Event Note
Gastrostomy Tube/Event Note
ICU downgrade 1/23/Event Note
Nutrition Services
Temp spike 38.4/Event Note
Thoracic PA/Event Note
Transfer Note
family/Event Note
pigtail removal/Event Note
thoracic

## 2022-01-28 NOTE — DISCHARGE NOTE NURSING/CASE MANAGEMENT/SOCIAL WORK - NSDCPEFALRISK_GEN_ALL_CORE
For information on Fall & Injury Prevention, visit: [email or web address] OR  [email or web address] OR  https://www.cdc.gov/steadi/patient.html

## 2022-01-28 NOTE — PROGRESS NOTE ADULT - PROBLEM SELECTOR PROBLEM 10
Advance care planning
Severe protein-calorie malnutrition
Advance care planning

## 2022-01-28 NOTE — PROGRESS NOTE ADULT - PROBLEM SELECTOR PLAN 3
Secondary to partial SBO, bilateral lung abscess  Antibiotics completed  Monitor off antibiotics. Secondary to partial SBO, bilateral lung abscess  Antibiotics to be completed per ID recs

## 2022-01-28 NOTE — PROGRESS NOTE ADULT - PROBLEM SELECTOR PROBLEM 7
Hyperglycemia due to diabetes mellitus
Agitation
Metastatic cancer
Agitation
Metastatic cancer
Metastatic cancer
Hyperglycemia due to diabetes mellitus
Metastatic cancer
Metastatic cancer
Severe protein-calorie malnutrition
Metastatic cancer
Hyperglycemia due to diabetes mellitus
Metastatic cancer
Metastatic cancer
Hyperglycemia due to diabetes mellitus
Metastatic cancer

## 2022-01-28 NOTE — PROGRESS NOTE ADULT - PROBLEM SELECTOR PLAN 8
DVT and GI prophylaxis.  F/U Thoracic surgery for chest tube removal.  Surgical consult pending/ Peg placement.  Will need vent facility upon discharge.
Resolved.  Chest tube placed 12/20 in ED  Small left pneumothorax visualized on abdominal CT  Pig tail catheter removed on 1/14/2022 1/14/2022 Chest X ray results as above - No pneumothorax noted.
Continue sliding scale coverage.  Monitor Fingerstick glucose
PPX regimen: on BID PPI given gastric ulcer, Hold  Enoxaparin for VTE ppx today in prep for GT placement in IR tomorrow  SCDs for mechanical VTE ppx  Will need case management/SW for assistance with discharge planning once gastrostomy tube is placed, will need d/c to vent facility
Resolved.  Chest tube placed 12/20 in ED  Small left pneumothorax visualized on abdominal CT  Pig tail catheter removed on 1/14/2022 1/14/2022 Chest X ray results as above - No pneumothorax noted.
Maintain NPO status until after CT abdomen.  IR or surgery will place Peg tube  Will resume TF after CT abdomen
Resolved.  Chest tube placed 12/20 in ED  Small left pneumothorax visualized on abdominal CT  Pig tail catheter removed on 1/14/2022 1/14/2022 Chest X ray results as above - No pneumothorax noted.
Maintain NPO status.  Scheduled for IR Peg placement today
Resolved.  Chest tube placed 12/20 in ED  Small left pneumothorax visualized on abdominal CT  Pig tail catheter removed on 1/14/2022 1/14/2022 Chest X ray results as above - No pneumothorax noted.
Continue sliding scale coverage.  Monitor Fingerstick glucose.
PPX regimen: on BID PPI given gastric ulcer, resume Enoxaparin for VTE ppx  SCDs for mechanical VTE ppx  Will need case management/SW for assistance with discharge planning once gastrostomy tube is placed, will need d/c to vent facility
On wrist restraints sec to pulling out lines  Patient calm and not agitated at this time  Will try to taper seroquel 12.5 mg only at bedtime starting today
Resolved.  Chest tube placed 12/20 in ED  Small left pneumothorax visualized on abdominal CT  Pig tail catheter removed on 1/14/2022 1/14/2022 Chest X ray results as above - No pneumothorax noted.
S/P Peg placement by IR yesterday.  To have saline infusion 60cc/hr for 6 hours starting at 1500  If saline infusion is tolerated will start TF
Continue sliding scale coverage.  Monitor glucose.
Continue sliding scale coverage.  Monitor glucose.

## 2022-01-28 NOTE — PROGRESS NOTE ADULT - PROBLEM SELECTOR PROBLEM 8
Severe protein-calorie malnutrition
Hyperglycemia due to diabetes mellitus
Hyperglycemia due to diabetes mellitus
Agitation
Hyperglycemia due to diabetes mellitus
Pneumothorax, left
Severe protein-calorie malnutrition
Pneumothorax, left
Advance care planning
Pneumothorax, left
Advanced care planning/counseling discussion
Hyperglycemia due to diabetes mellitus
Advanced care planning/counseling discussion
Pneumothorax, left
Severe protein-calorie malnutrition
Pneumothorax, left

## 2022-01-28 NOTE — DISCHARGE NOTE NURSING/CASE MANAGEMENT/SOCIAL WORK - PATIENT PORTAL LINK FT
You can access the FollowMyHealth Patient Portal offered by [facility] by registering at the following website: [email or web address]. By joining [facility]’s FollowMyHealth portal, you will also be able to view your health information using other applications (apps) compatible with our system.

## 2022-02-11 NOTE — H&P ADULT - NSICDXFAMILYHX_GEN_ALL_CORE_FT
Found paperwork later in the day. It was left in a spot that isn't looked at frequently. I called the patient and explained that we do have her information that she can  at anytime. I have put the information in the [employer] psr drawer marked patient .    FAMILY HISTORY:  Sibling  Still living? Unknown  Family history of acute myocardial infarction, Age at diagnosis: Age Unknown     Posture, length, shape and position symmetric and appropriate for age; movement patterns with normal strength and range of motion; hips without evidence of dislocation on Adan and Ortalani maneuvers and by gluteal fold patterns.

## 2022-02-13 NOTE — H&P ADULT - ATTENDING COMMENTS
Patient is a 75 y/o male known to the ICU ( recent admission ), h/o metastatic base of tongue cancer s/p partial resection late 2020, with multiple nodules metastatic to the lungs, admitted from a skilled nursing facility after having tracheostomy during the last hospitalization. He also has h/o HTN, CAD, DM and gastric ulcer. The chief complaint was of bloody bowel movement x1. HGB 6.9 mg/dl. Stool now is brown but occult blood +. The patient had been on antibiotics when discharged from [facility] for pneumonia. He is COVID negative. He is DNR. Will transfuse 1 unit PRBCS, treat with IV PPI, and coordinate a GI consult. Admit to AI. Dx- upper GI bleed, likely due to gastric ulcer but also need to consider gastritis, esophagitis, small bowel ulcer. I reviewed all laboratory and roentgenographic data and any previous medical record from [facility] that existed. I also discussed the case with the ED attending or referring physician.

## 2022-02-13 NOTE — H&P ADULT - ASSESSMENT
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  . Pt is awake, but non verbal with the tracheostomy. Medical history obtained from son bedside and ED physicin.   Per EMS, pt appeared short of breath, hypoxic to 80s, was bagged and suctioned out blood/clots, and oxygenation improved.   Per Son, Patient currently being treated for pneumonia wth Vanc/ZOsyn in the NH.  Pt was admitted here at [facility] in Dec and also had a cardiac arrest during the hospital stay, Pt was later noted to have Hypoxia from Aspiration Pna and was Intubated and Trach as placed in Jan 2022. Pt now is DNR/DNI    In the ED,   Pt appears comfortable, awake, alert, non verbal, able to follow minimal commands   /86, hr 110, SPO2 100%  CXR-   L sided plueral effusion and RLL infiltrate   hb 6.9, FOBT+  1UPRBC ordered

## 2022-02-13 NOTE — H&P ADULT - PROBLEM SELECTOR PLAN 3
Pt has pmh of HTN   /86   holding off all anti htn meds in view of possible GI bleed  monitors vitals

## 2022-02-13 NOTE — ED PROVIDER NOTE - NSICDXPASTSURGICALHX_GEN_ALL_CORE_FT
PAST SURGICAL HISTORY:  History of surgery On 9/10/20 he underwent right hemiglossectomy and partial neck dissection with Dr. Darinel Servin at [facility].    S/P hernia repair b/l    S/P knee replacement b/l    Status post cataract extraction and insertion of intraocular lens, unspecified laterality b/l

## 2022-02-13 NOTE — H&P ADULT - PROBLEM SELECTOR PLAN 2
Pt is s.p Trach and PEG  Pt was started on IV abx Zosyn and vancomycin in the NH for concerns of aspiration Pna   Pt was noted to be midly hypoxic in mid 80's by the EMS and spo2 improved after suctioning   few blood clots retrieved from the tracheastomy tube suction   c/w zosyn and vancomycin   ID- Dr. Nunez

## 2022-02-13 NOTE — H&P ADULT - NSHPLABSRESULTS_GEN_ALL_CORE
6.9    10.77 )-----------( 586      ( 13 Feb 2022 18:50 )             23.3       02-13    141  |  101  |  42<H>  ----------------------------<  198<H>  4.3   |  36<H>  |  0.55    Ca    9.2      13 Feb 2022 18:50    TPro  4.9<L>  /  Alb  1.1<L>  /  TBili  0.3  /  DBili  x   /  AST  27  /  ALT  11  /  AlkPhos  120  02-13                  PT/INR - ( 13 Feb 2022 18:50 )   PT: 12.6 sec;   INR: 1.06 ratio         PTT - ( 13 Feb 2022 18:50 )  PTT:29.9 sec    Lactate Trend            CAPILLARY BLOOD GLUCOSE      POCT Blood Glucose.: 222 mg/dL (13 Feb 2022 18:39)        Culture Results:   No Growth Final (01-18 @ 02:51)  Culture Results:   No Growth Final (01-18 @ 02:50)

## 2022-02-13 NOTE — ED PROVIDER NOTE - OBJECTIVE STATEMENT
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 1 episode of bloody stools. Per EMS, pt appeared short of breath, hypoxic to 80s, was bagged and suctioned out blood/clots, and oxygenation improved. Patient currently being treated for pneumonia [facility] Vanc/ZOsyn per paperwork.     History and ROS limited by mental status.

## 2022-02-13 NOTE — H&P ADULT - PROBLEM SELECTOR PLAN 1
Pt sent from NH for 1 episode of blood clots in stool   Pt had 2 episodes of brown stools in ed with no blood clots   FOBT+   Hb 6.9,   Pt was started on 1u PRBC  F/U CBC post transfusion   to be started on protonox inj 40mg BD IV   GI consult- Dr. Atkinson   Holding AC

## 2022-02-13 NOTE — H&P ADULT - HISTORY OF PRESENT ILLNESS
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 1 episode of bloody stools. Per EMS, pt appeared short of breath, hypoxic to 80s, was bagged and suctioned out blood/clots, and oxygenation improved. Patient currently being treated for pneumonia [facility] Vanc/ZOsyn per paperwork.    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  . Pt is awake, but non verbal with the tracheostomy. Medical history obtained from son bedside and ED physicin.   Per EMS, pt appeared short of breath, hypoxic to 80s, was bagged and suctioned out blood/clots, and oxygenation improved.   Per Son, Patient currently being treated for pneumonia wth Vanc/ZOsyn in the NH.  Pt was admitted here at [facility] in Dec and also had a cardiac arrest during the hospital stay, Pt was later noted to have Hypoxia from Aspiration Pna and was Intubated and Trach as placed in Jan 2022. 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  . Pt is awake, but non verbal with the tracheostomy. Medical history obtained from son bedside and ED physicin.   Per EMS, pt appeared short of breath, hypoxic to 80s, was bagged and suctioned out blood/clots, and oxygenation improved.   Per Son, Patient currently being treated for pneumonia wth Vanc/ZOsyn in the NH.  Pt was admitted here at [facility] in Dec and also had a cardiac arrest during the hospital stay, Pt was later noted to have Hypoxia from Aspiration Pna and was Intubated and Trach as placed in Jan 2022. Pt now is DNR/DNI    In the ED,   Pt appears comfortable, awake, alert, non verbal, able to follow minimal commands   /86, hr 110, SPO2 100%  CXR-   L sided plueral effusion and RLL infiltrate   hb 6.9, FOBT+  1UPRBC ordered

## 2022-02-13 NOTE — H&P ADULT - NSICDXPASTSURGICALHX_GEN_ALL_CORE_FT
PAST SURGICAL HISTORY:  History of surgery On 9/10/20 he underwent right hemiglossectomy and partial neck dissection with Dr. Darinel Servin at [facility].    S/P hernia repair b/l    S/P knee replacement b/l    Status post cataract extraction and insertion of intraocular lens, unspecified laterality b/l

## 2022-02-13 NOTE — ED PROVIDER NOTE - CLINICAL SUMMARY MEDICAL DECISION MAKING FREE TEXT BOX
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 1 episode of bloody stools. Labs with Hgb 6.9 down from 8.1. Brown stool, no tacos blood, FOBT + for blood. IV pantoprazole and PRBC ordered. Otherwise no acute findings. Pt on normal vent settings. DNR per son. ICU consulted - will admit to AI. Defer GI consult to inpatient team. Demond/Zosyn written.

## 2022-02-13 NOTE — H&P ADULT - PROBLEM SELECTOR PLAN 5
Pt has h/o SCC of tongue with mets to lung   s/p resection, chemo/radiation, and immunotherapy (10/2021)  Continue follow up with Hem/oNc as an outpatient once discharged  Not a candidate for palliative chemo.

## 2022-02-13 NOTE — ED ADULT NURSE NOTE - NSICDXPASTSURGICALHX_GEN_ALL_CORE_FT
PAST SURGICAL HISTORY:  History of surgery On 9/10/20 he underwent right hemiglossectomy and partial neck dissection with Dr. Darinel Servin at [facility].    S/P hernia repair b/l    S/P knee replacement b/l    Status post cataract extraction and insertion of intraocular lens, unspecified laterality b/l

## 2022-02-13 NOTE — ED ADULT NURSE NOTE - OBJECTIVE STATEMENT
pt BIBEMS from NH as notification c/o of blood clot noted in trach during suction and bloody stool x1 episode today

## 2022-02-13 NOTE — ED ADULT NURSE REASSESSMENT NOTE - NS ED NURSE REASSESS COMMENT FT1
Pt is awake. Tracheostomy noted. Pt is ventilated. No distress noted. Pt was incontinent of brown stool. No blood noted. PM care done. Linen changed. Healed ulcer noted to left posterior knee.  Stage 4 pressure ulcer noted to sacrum. Allevyn applied. Scab noted to left shoulder. Edema  noted to bilateral upper extremities. Trach suction provided by RT. White secretion noted. Pt is awake. Tracheostomy noted. Pt is ventilated. No distress noted. Pt was incontinent of brown stool. No blood noted. PM care done. Linen changed. Healed ulcer noted to left posterior knee.  Unstageable pressure ulcer noted to sacrum and left buttock. Allevyn applied. Scab noted to left shoulder. PEG tube noted. Edema  noted to bilateral upper extremities. Trach suction provided by RT. White secretion noted. Safety precaution maintained.

## 2022-02-14 NOTE — CHART NOTE - NSCHARTNOTEFT_GEN_A_CORE
EVENT: Pt received from ED with trach to vent. NPO for now due to GI bleed, Repeat CBC as pt had another dark stool per rectum upon arrival in room. F/u GI recs.     OBJECTIVE:  Vital Signs Last 24 Hrs  T(C): 36.4 (14 Feb 2022 14:50), Max: 36.9 (14 Feb 2022 13:07)  T(F): 97.5 (14 Feb 2022 14:50), Max: 98.4 (14 Feb 2022 13:07)  HR: 109 (14 Feb 2022 14:50) (88 - 117)  BP: 110/68 (14 Feb 2022 14:50) (110/68 - 127/79)  BP(mean): --  RR: 18 (14 Feb 2022 14:50) (16 - 20)  SpO2: 100% (14 Feb 2022 14:50) (99% - 100%)    FOCUSED PHYSICAL EXAM: NAD, on vent, breathing comfortably, Peg feed on hold for now. 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  . Pt is awake, but non verbal with the tracheostomy. Medical history obtained from son bedside and ED physicin.   Per EMS, pt appeared short of breath, hypoxic to 80s, was bagged and suctioned out blood/clots, and oxygenation improved.   Per Son, Patient currently being treated for pneumonia wth Vanc/ZOsyn in the NH.  Pt was admitted here at [facility] in Dec and also had a cardiac arrest during the hospital stay, Pt was later noted to have Hypoxia from Aspiration Pna and was Intubated and Trach as placed in Jan 2022. Pt now is DNR/DNI    PLAN:   F/u GI recs  F/u CBC  Transfuse if less than 7

## 2022-02-14 NOTE — PROGRESS NOTE ADULT - ASSESSMENT
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  . Pt is awake, but non verbal with the tracheostomy. Medical history obtained from son bedside and ED physicin.   Per EMS, pt appeared short of breath, hypoxic to 80s, was bagged and suctioned out blood/clots, and oxygenation improved.   Per Son, Patient currently being treated for pneumonia wth Vanc/ZOsyn in the NH.  Pt was admitted here at [facility] in Dec and also had a cardiac arrest during the hospital stay, Pt was later noted to have Hypoxia from Aspiration Pna and was Intubated and Trach as placed in Jan 2022. Pt now is DNR/DNI    In the ED,   Pt appears comfortable, awake, alert, non verbal, able to follow minimal commands   /86, hr 110, SPO2 100%  CXR-   L sided plueral effusion and RLL infiltrate   hb 6.9, FOBT+  1UPRBC ordered

## 2022-02-14 NOTE — CONSULT NOTE ADULT - SUBJECTIVE AND OBJECTIVE BOX
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  . Pt is awake, but non verbal with the tracheostomy. Medical history obtained from son bedside and ED physicin.   Per EMS, pt appeared short of breath, hypoxic to 80s, was bagged and suctioned out blood/clots, and oxygenation improved.   Per Son, Patient currently being treated for pneumonia wth Vanc/ZOsyn in the NH.  Pt was admitted here at [facility] in Dec and also had a cardiac arrest during the hospital stay, Pt was later noted to have Hypoxia from Aspiration Pna and was Intubated and Trach as placed in Jan 2022. Pt now is DNR/DNI    In the ED,   Pt appears comfortable, awake, alert, non verbal, able to follow minimal commands   /86, hr 110, SPO2 100%  CXR-   L sided plueral effusion and RLL infiltrate   hb 6.9, FOBT+  1UPRBC ordered    (13 Feb 2022 21:02)    GI HPI (Pt's history obtained from chart due to patient's mental status):  GI consulted for GI bleed. Pt sent from NH for 1 episode of blood clots in stool. Pt had 2 episodes of brown stools in ED with no blood clots. Pt with one episode of dark stool on the floor per nursing. Pt with trach and peg noted.     PMH/PSH:  PAST MEDICAL & SURGICAL HISTORY:  Hypertension    Diabetes    High cholesterol    Primary osteoarthritis of both knees    Coronary artery disease of native artery of native heart with stable angina pectoris    Allergic bronchitis    Diabetic retinopathy    Oral cancer    SCC (squamous cell carcinoma)    Metastatic cancer    Recurrent disease    Edema of extremity    Hyponatremia    Microalbuminuria    Neuralgia    Obstructive sleep apnea, adult    Vitamin D deficiency    S/P knee replacement  b/l    S/P hernia repair  b/l    Status post cataract extraction and insertion of intraocular lens, unspecified laterality  b/l    History of surgery  On 9/10/20 he underwent right hemiglossectomy and partial neck dissection with Dr. Darinel Servin at [facility].      FH:  FAMILY HISTORY:   Family history of acute myocardial infarction (Sibling)    Social: Unable to obtain due to patient's mental status        MEDS:  MEDICATIONS  (STANDING):  albumin human 25% IVPB 50 milliLiter(s) IV Intermittent every 6 hours  atorvastatin 40 milliGRAM(s) Oral at bedtime  chlorhexidine 0.12% Liquid 15 milliLiter(s) Oral Mucosa every 12 hours  chlorhexidine 2% Cloths 1 Application(s) Topical <User Schedule>  glucagon  Injectable 1 milliGRAM(s) IntraMuscular once  insulin lispro (ADMELOG) corrective regimen sliding scale   SubCutaneous every 6 hours  midodrine. 5 milliGRAM(s) Oral three times a day  pantoprazole  Injectable 40 milliGRAM(s) IV Push every 12 hours    MEDICATIONS  (PRN):  acetaminophen    Suspension .. 650 milliGRAM(s) Oral every 6 hours PRN Moderate Pain (4 - 6)    Allergies    No Known Allergies    Intolerances    REVIEW OF SYSTEM: Unable to obtain due to patient's mental status       ______________________________________________________________________  PHYSICAL EXAM:  T(C): 36.4 (02-14-22 @ 14:50), Max: 36.9 (02-14-22 @ 13:07)  HR: 109 (02-14-22 @ 14:50)  BP: 110/68 (02-14-22 @ 14:50)  RR: 18 (02-14-22 @ 14:50)  SpO2: 100% (02-14-22 @ 14:50)  Wt(kg): --      GEN: NAD, normocephalic  HEENT: Trach present  CVS: S1S2+  CHEST: diminished breath sounds on bases to auscultation   ABD: soft, nontender, nondistended, bowel sounds present, G tube present   EXTR: no cyanosis, no clubbing, no edema  NEURO: Awake and nonverbal  SKIN:  warm;  non icteric    ______________________________________________________________________  LABS:                        7.5    8.85  )-----------( 471      ( 14 Feb 2022 13:46 )             23.7     02-14    143  |  102  |  40<H>  ----------------------------<  158<H>  4.2   |  37<H>  |  0.47<L>    Ca    9.4      14 Feb 2022 05:28  Phos  2.9     02-14  Mg     2.3     02-14    TPro  4.6<L>  /  Alb  1.0<L>  /  TBili  0.3  /  DBili  x   /  AST  20  /  ALT  8<L>  /  AlkPhos  100  02-14    LIVER FUNCTIONS - ( 14 Feb 2022 05:28 )  Alb: 1.0 g/dL / Pro: 4.6 g/dL / ALK PHOS: 100 U/L / ALT: 8 U/L DA / AST: 20 U/L / GGT: x           PT/INR - ( 13 Feb 2022 18:50 )   PT: 12.6 sec;   INR: 1.06 ratio         PTT - ( 13 Feb 2022 18:50 )  PTT:29.9 sec  ____________________________________________    IMAGING:    < from: Xray Chest 1 View-PORTABLE IMMEDIATE (02.13.22 @ 19:16) >  ACC: [id number] EXAM:  XR CHEST PORTABLE IMMED 1V                          PROCEDURE DATE:  02/13/2022          INTERPRETATION:  INDICATION: SOB.    COMPARISON: 1/17/22 plain chest; 1/18/22 CT scan of the abdomen and   pelvis.    FINDINGS:  Tracheostomy tube and NG tube are again noted.  Heart/Vascular: Heart is top normal in size.  Pulmonary: Redemonstration of similar left lung infiltrates and small   left pleural effusion. Redemonstration of a cavitary lesion in the right   middle lobe. New, trace right pleural effusion.  No pneumothorax.  Bones: Degenerative changes of the thoracic spine.    Impression:  No significant interval change in the left chest pleuroparenchymal   pattern.  Redemonstration of right middle lobe posterior lesion.  New trace right pleural effusion.             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  . Pt is awake, but non verbal with the tracheostomy. Medical history obtained from son bedside and ED physicin.   Per EMS, pt appeared short of breath, hypoxic to 80s, was bagged and suctioned out blood/clots, and oxygenation improved.   Per Son, Patient currently being treated for pneumonia wth Vanc/ZOsyn in the NH.  Pt was admitted here at [facility] in Dec and also had a cardiac arrest during the hospital stay, Pt was later noted to have Hypoxia from Aspiration Pna and was Intubated and Trach as placed in Jan 2022. Pt now is DNR/DNI    In the ED,   Pt appears comfortable, awake, alert, non verbal, able to follow minimal commands   /86, hr 110, SPO2 100%  CXR-   L sided plueral effusion and RLL infiltrate   hb 6.9, FOBT+  1UPRBC ordered    (13 Feb 2022 21:02)    GI HPI (Pt's history obtained from chart due to patient's mental status):  GI consulted for GI bleed. Pt sent from NH for 1 episode of blood clots in stool. Pt had 2 episodes of brown stools in ED with no blood clots. Pt with one episode of dark stool on the floor per nursing. Pt with trach and peg noted.     PMH/PSH:  PAST MEDICAL & SURGICAL HISTORY:  Hypertension    Diabetes    High cholesterol    Primary osteoarthritis of both knees    Coronary artery disease of native artery of native heart with stable angina pectoris    Allergic bronchitis    Diabetic retinopathy    Oral cancer    SCC (squamous cell carcinoma)    Metastatic cancer    Recurrent disease    Edema of extremity    Hyponatremia    Microalbuminuria    Neuralgia    Obstructive sleep apnea, adult    Vitamin D deficiency    S/P knee replacement  b/l    S/P hernia repair  b/l    Status post cataract extraction and insertion of intraocular lens, unspecified laterality  b/l    History of surgery  On 9/10/20 he underwent right hemiglossectomy and partial neck dissection with Dr. Darinel Servin at [facility].      FH:  FAMILY HISTORY:   Family history of acute myocardial infarction (Sibling)    Social: Unable to obtain due to patient's mental status        MEDS:  MEDICATIONS  (STANDING):  albumin human 25% IVPB 50 milliLiter(s) IV Intermittent every 6 hours  atorvastatin 40 milliGRAM(s) Oral at bedtime  chlorhexidine 0.12% Liquid 15 milliLiter(s) Oral Mucosa every 12 hours  chlorhexidine 2% Cloths 1 Application(s) Topical <User Schedule>  glucagon  Injectable 1 milliGRAM(s) IntraMuscular once  insulin lispro (ADMELOG) corrective regimen sliding scale   SubCutaneous every 6 hours  midodrine. 5 milliGRAM(s) Oral three times a day  pantoprazole  Injectable 40 milliGRAM(s) IV Push every 12 hours    MEDICATIONS  (PRN):  acetaminophen    Suspension .. 650 milliGRAM(s) Oral every 6 hours PRN Moderate Pain (4 - 6)    Allergies    No Known Allergies    Intolerances    REVIEW OF SYSTEM: Unable to obtain due to patient's mental status       ______________________________________________________________________  PHYSICAL EXAM:  T(C): 36.4 (02-14-22 @ 14:50), Max: 36.9 (02-14-22 @ 13:07)  HR: 109 (02-14-22 @ 14:50)  BP: 110/68 (02-14-22 @ 14:50)  RR: 18 (02-14-22 @ 14:50)  SpO2: 100% (02-14-22 @ 14:50)  Wt(kg): --      GEN: NAD, normocephalic  HEENT: Trach present  CVS: S1S2+  CHEST: diminished sounds on bases to auscultation   ABD: soft, nontender, nondistended, bowel sounds present, G tube present   EXTR: no cyanosis, no clubbing, no edema  NEURO: Awake and nonverbal  SKIN:  warm;  non icteric    ______________________________________________________________________  LABS:                        7.5    8.85  )-----------( 471      ( 14 Feb 2022 13:46 )             23.7     02-14    143  |  102  |  40<H>  ----------------------------<  158<H>  4.2   |  37<H>  |  0.47<L>    Ca    9.4      14 Feb 2022 05:28  Phos  2.9     02-14  Mg     2.3     02-14    TPro  4.6<L>  /  Alb  1.0<L>  /  TBili  0.3  /  DBili  x   /  AST  20  /  ALT  8<L>  /  AlkPhos  100  02-14    LIVER FUNCTIONS - ( 14 Feb 2022 05:28 )  Alb: 1.0 g/dL / Pro: 4.6 g/dL / ALK PHOS: 100 U/L / ALT: 8 U/L DA / AST: 20 U/L / GGT: x           PT/INR - ( 13 Feb 2022 18:50 )   PT: 12.6 sec;   INR: 1.06 ratio         PTT - ( 13 Feb 2022 18:50 )  PTT:29.9 sec  ____________________________________________    IMAGING:    < from: Xray Chest 1 View-PORTABLE IMMEDIATE (02.13.22 @ 19:16) >  ACC: [id number] EXAM:  XR CHEST PORTABLE IMMED 1V                          PROCEDURE DATE:  02/13/2022          INTERPRETATION:  INDICATION: SOB.    COMPARISON: 1/17/22 plain chest; 1/18/22 CT scan of the abdomen and   pelvis.    FINDINGS:  Tracheostomy tube and NG tube are again noted.  Heart/Vascular: Heart is top normal in size.  Pulmonary: Redemonstration of similar left lung infiltrates and small   left pleural effusion. Redemonstration of a cavitary lesion in the right   middle lobe. New, trace right pleural effusion.  No pneumothorax.  Bones: Degenerative changes of the thoracic spine.    Impression:  No significant interval change in the left chest pleuroparenchymal   pattern.  Redemonstration of right middle lobe posterior lesion.  New trace right pleural effusion.

## 2022-02-14 NOTE — PROGRESS NOTE ADULT - PROBLEM SELECTOR PLAN 3
Pt has pmh of HTN   /86   holding off all anti htn meds in view of possible GI bleed  monitors vitals Pt has pmh of HTN   Maintaining BP  holding off all anti htn meds in view of possible GI bleed  monitors vitals

## 2022-02-14 NOTE — CONSULT NOTE ADULT - PROBLEM SELECTOR RECOMMENDATION 9
- Pt had 2 episodes of brown stools in ED with no blood clots. Pt with one episode of dark stool on the floor.  - s/p 1 unit of RBC, hgb 6.9->8.4->7.5  - continue protonix 40mg IV BID   - hold AC   - transfuse if hgb <7 - Pt had 2 episodes of brown stools in ED with no blood clots. Pt with one episode of dark stool on the floor.  - s/p 1 unit of RBC, hgb 6.9->8.4->7.5  - continue protonix 40mg IV BID   - continue to monitor patient's output   - hold AC   - transfuse if hgb <7  - recommend palliative consultation to discuss GOC - Pt had 2 episodes of brown stools in ED with no blood clots. Pt with one episode of dark stool on the floor.  - s/p 1 unit of RBC, hgb 6.9->8.4->7.5  - continue protonix 40mg IV BID   - continue to monitor patient's output   - hold AC   - transfuse if hgb <7  - patient high risk for GI procedure. Recommend palliative consultation to discuss GOC

## 2022-02-14 NOTE — PROGRESS NOTE ADULT - SUBJECTIVE AND OBJECTIVE BOX
INTERVAL HPI/OVERNIGHT EVENTS: Patient admitted over night for blood per rectum.     PRESSORS: [ ] YES [ x] NO  WHICH:    ANTIBIOTICS:                  DATE STARTED:  ANTIBIOTICS:                  DATE STARTED:  ANTIBIOTICS:                  DATE STARTED:    Antimicrobial:  piperacillin/tazobactam IVPB.. 3.375 Gram(s) IV Intermittent every 8 hours  vancomycin  IVPB 1000 milliGRAM(s) IV Intermittent every 12 hours    Cardiovascular:  midodrine. 5 milliGRAM(s) Oral three times a day    Pulmonary:    Hematalogic:    Other:  acetaminophen    Suspension .. 650 milliGRAM(s) Oral every 6 hours PRN  atorvastatin 40 milliGRAM(s) Oral at bedtime  chlorhexidine 0.12% Liquid 15 milliLiter(s) Oral Mucosa every 12 hours  chlorhexidine 2% Cloths 1 Application(s) Topical <User Schedule>  glucagon  Injectable 1 milliGRAM(s) IntraMuscular once  insulin lispro (ADMELOG) corrective regimen sliding scale   SubCutaneous every 6 hours  pantoprazole  Injectable 40 milliGRAM(s) IV Push every 12 hours    acetaminophen    Suspension .. 650 milliGRAM(s) Oral every 6 hours PRN  atorvastatin 40 milliGRAM(s) Oral at bedtime  chlorhexidine 0.12% Liquid 15 milliLiter(s) Oral Mucosa every 12 hours  chlorhexidine 2% Cloths 1 Application(s) Topical <User Schedule>  glucagon  Injectable 1 milliGRAM(s) IntraMuscular once  insulin lispro (ADMELOG) corrective regimen sliding scale   SubCutaneous every 6 hours  midodrine. 5 milliGRAM(s) Oral three times a day  pantoprazole  Injectable 40 milliGRAM(s) IV Push every 12 hours  piperacillin/tazobactam IVPB.. 3.375 Gram(s) IV Intermittent every 8 hours  vancomycin  IVPB 1000 milliGRAM(s) IV Intermittent every 12 hours    Drug Dosing Weight  Height (cm): 175.3 (13 Feb 2022 18:39)  Weight (kg): 78 (13 Feb 2022 18:39)  BMI (kg/m2): 25.4 (13 Feb 2022 18:39)  BSA (m2): 1.94 (13 Feb 2022 18:39)      PMH -reviewed admission note, no change since admission  PAST MEDICAL & SURGICAL HISTORY:  Hypertension    Diabetes    High cholesterol    Primary osteoarthritis of both knees    Coronary artery disease of native artery of native heart with stable angina pectoris    Allergic bronchitis    Diabetic retinopathy    Oral cancer    SCC (squamous cell carcinoma)    Metastatic cancer    Recurrent disease    Edema of extremity    Hyponatremia    Microalbuminuria    Neuralgia    Obstructive sleep apnea, adult    Vitamin D deficiency    S/P knee replacement  b/l    S/P hernia repair  b/l    Status post cataract extraction and insertion of intraocular lens, unspecified laterality  b/l    History of surgery  On 9/10/20 he underwent right hemiglossectomy and partial neck dissection with Dr. Darinel Servin at [facility].        ICU Vital Signs Last 24 Hrs  T(C): 36.8 (14 Feb 2022 07:31), Max: 36.8 (13 Feb 2022 19:02)  T(F): 98.2 (14 Feb 2022 07:31), Max: 98.3 (13 Feb 2022 19:02)  HR: 104 (14 Feb 2022 08:28) (88 - 109)  BP: 119/74 (14 Feb 2022 07:31) (112/60 - 127/79)  RR: 17 (14 Feb 2022 07:31) (16 - 20)  SpO2: 100% (14 Feb 2022 08:28) (99% - 100%)        Mode: AC/ CMV (Assist Control/ Continuous Mandatory Ventilation)  RR (machine): 14  TV (machine): 450  FiO2: 40  PEEP: 5  ITime: 1  MAP: 9  PIP: 26      PHYSICAL EXAM:  GENERAL: Patient has tracheostomy tube,   HEAD:  Atraumatic, Normocephalic  EYES: EOMI, PERRLA, conjunctiva and sclera clear  NECK: Supple, No JVD  CHEST/LUNG: Clear to auscultation bilaterally; No wheeze; No crackles; No accessory muscles used  HEART: Regular rate and rhythm; No murmurs;   ABDOMEN: Soft, Nontender, Nondistended; Bowel sounds present; No guarding  EXTREMITIES:  2+ Peripheral Pulses, No cyanosis or edema  PSYCH: AAOx3  NEUROLOGY: non-focal  SKIN: No rashes or lesions    LABS:  CBC Full  -  ( 14 Feb 2022 05:28 )  WBC Count : 9.09 K/uL  RBC Count : 2.91 M/uL  Hemoglobin : 8.4 g/dL  Hematocrit : 25.6 %  Platelet Count - Automated : 479 K/uL  Mean Cell Volume : 88.0 fl  Mean Cell Hemoglobin : 28.9 pg  Mean Cell Hemoglobin Concentration : 32.8 gm/dL  Auto Neutrophil # : 7.47 K/uL  Auto Lymphocyte # : 0.68 K/uL  Auto Monocyte # : 0.69 K/uL  Auto Eosinophil # : 0.07 K/uL  Auto Basophil # : 0.06 K/uL  Auto Neutrophil % : 82.1 %  Auto Lymphocyte % : 7.5 %  Auto Monocyte % : 7.6 %  Auto Eosinophil % : 0.8 %  Auto Basophil % : 0.7 %    02-14    143  |  102  |  40<H>  ----------------------------<  158<H>  4.2   |  37<H>  |  0.47<L>    Ca    9.4      14 Feb 2022 05:28  Phos  2.9     02-14  Mg     2.3     02-14    TPro  4.6<L>  /  Alb  1.0<L>  /  TBili  0.3  /  DBili  x   /  AST  20  /  ALT  8<L>  /  AlkPhos  100  02-14    PT/INR - ( 13 Feb 2022 18:50 )   PT: 12.6 sec;   INR: 1.06 ratio         PTT - ( 13 Feb 2022 18:50 )  PTT:29.9 sec        RADIOLOGY & ADDITIONAL STUDIES REVIEWED:  yes    [ ]GOALS OF CARE DISCUSSION WITH PATIENT/FAMILY/PROXY:    CRITICAL CARE TIME SPENT: 35 minutes INTERVAL HPI/OVERNIGHT EVENTS: Patient admitted over night for blood per rectum. admitted to  as patient has tracheostomy connected to ventilator     PRESSORS: [ ] YES [ x] NO  WHICH:    ANTIBIOTICS:                  DATE STARTED:  ANTIBIOTICS:                  DATE STARTED:  ANTIBIOTICS:                  DATE STARTED:    Antimicrobial:  piperacillin/tazobactam IVPB.. 3.375 Gram(s) IV Intermittent every 8 hours  vancomycin  IVPB 1000 milliGRAM(s) IV Intermittent every 12 hours    Cardiovascular:  midodrine. 5 milliGRAM(s) Oral three times a day    Pulmonary:    Hematalogic:    Other:  acetaminophen    Suspension .. 650 milliGRAM(s) Oral every 6 hours PRN  atorvastatin 40 milliGRAM(s) Oral at bedtime  chlorhexidine 0.12% Liquid 15 milliLiter(s) Oral Mucosa every 12 hours  chlorhexidine 2% Cloths 1 Application(s) Topical <User Schedule>  glucagon  Injectable 1 milliGRAM(s) IntraMuscular once  insulin lispro (ADMELOG) corrective regimen sliding scale   SubCutaneous every 6 hours  pantoprazole  Injectable 40 milliGRAM(s) IV Push every 12 hours    acetaminophen    Suspension .. 650 milliGRAM(s) Oral every 6 hours PRN  atorvastatin 40 milliGRAM(s) Oral at bedtime  chlorhexidine 0.12% Liquid 15 milliLiter(s) Oral Mucosa every 12 hours  chlorhexidine 2% Cloths 1 Application(s) Topical <User Schedule>  glucagon  Injectable 1 milliGRAM(s) IntraMuscular once  insulin lispro (ADMELOG) corrective regimen sliding scale   SubCutaneous every 6 hours  midodrine. 5 milliGRAM(s) Oral three times a day  pantoprazole  Injectable 40 milliGRAM(s) IV Push every 12 hours  piperacillin/tazobactam IVPB.. 3.375 Gram(s) IV Intermittent every 8 hours  vancomycin  IVPB 1000 milliGRAM(s) IV Intermittent every 12 hours    Drug Dosing Weight  Height (cm): 175.3 (13 Feb 2022 18:39)  Weight (kg): 78 (13 Feb 2022 18:39)  BMI (kg/m2): 25.4 (13 Feb 2022 18:39)  BSA (m2): 1.94 (13 Feb 2022 18:39)      PMH -reviewed admission note, no change since admission  PAST MEDICAL & SURGICAL HISTORY:  Hypertension    Diabetes    High cholesterol    Primary osteoarthritis of both knees    Coronary artery disease of native artery of native heart with stable angina pectoris    Allergic bronchitis    Diabetic retinopathy    Oral cancer    SCC (squamous cell carcinoma)    Metastatic cancer    Recurrent disease    Edema of extremity    Hyponatremia    Microalbuminuria    Neuralgia    Obstructive sleep apnea, adult    Vitamin D deficiency    S/P knee replacement  b/l    S/P hernia repair  b/l    Status post cataract extraction and insertion of intraocular lens, unspecified laterality  b/l    History of surgery  On 9/10/20 he underwent right hemiglossectomy and partial neck dissection with Dr. Darinel Servin at [facility].        ICU Vital Signs Last 24 Hrs  T(C): 36.8 (14 Feb 2022 07:31), Max: 36.8 (13 Feb 2022 19:02)  T(F): 98.2 (14 Feb 2022 07:31), Max: 98.3 (13 Feb 2022 19:02)  HR: 104 (14 Feb 2022 08:28) (88 - 109)  BP: 119/74 (14 Feb 2022 07:31) (112/60 - 127/79)  RR: 17 (14 Feb 2022 07:31) (16 - 20)  SpO2: 100% (14 Feb 2022 08:28) (99% - 100%)        Mode: AC/ CMV (Assist Control/ Continuous Mandatory Ventilation)  RR (machine): 14  TV (machine): 450  FiO2: 40  PEEP: 5  ITime: 1  MAP: 9  PIP: 26      PHYSICAL EXAM:  GENERAL: Patient has tracheostomy tube, Non verbal   HEAD:  Atraumatic  EYES: EOMI, PERRLA, conjunctiva and sclera clear  NECK: Supple, No JVD  CHEST/LUNG: B/L crackles , normal air entry   HEART: Regular rate and rhythm; No murmurs;   ABDOMEN: Soft, Nontender, Nondistended; Bowel sounds present; No guarding, PEG tube in place  EXTREMITIES:  1+ Peripheral Pulses, Anasarca.   PSYCH: AAOx1, non verbal, unable to follow commands   NEUROLOGY: unable to follow commands   SKIN: Unstagable sacral ulcers,     LABS:  CBC Full  -  ( 14 Feb 2022 05:28 )  WBC Count : 9.09 K/uL  RBC Count : 2.91 M/uL  Hemoglobin : 8.4 g/dL  Hematocrit : 25.6 %  Platelet Count - Automated : 479 K/uL  Mean Cell Volume : 88.0 fl  Mean Cell Hemoglobin : 28.9 pg  Mean Cell Hemoglobin Concentration : 32.8 gm/dL  Auto Neutrophil # : 7.47 K/uL  Auto Lymphocyte # : 0.68 K/uL  Auto Monocyte # : 0.69 K/uL  Auto Eosinophil # : 0.07 K/uL  Auto Basophil # : 0.06 K/uL  Auto Neutrophil % : 82.1 %  Auto Lymphocyte % : 7.5 %  Auto Monocyte % : 7.6 %  Auto Eosinophil % : 0.8 %  Auto Basophil % : 0.7 %    02-14    143  |  102  |  40<H>  ----------------------------<  158<H>  4.2   |  37<H>  |  0.47<L>    Ca    9.4      14 Feb 2022 05:28  Phos  2.9     02-14  Mg     2.3     02-14    TPro  4.6<L>  /  Alb  1.0<L>  /  TBili  0.3  /  DBili  x   /  AST  20  /  ALT  8<L>  /  AlkPhos  100  02-14    PT/INR - ( 13 Feb 2022 18:50 )   PT: 12.6 sec;   INR: 1.06 ratio         PTT - ( 13 Feb 2022 18:50 )  PTT:29.9 sec        RADIOLOGY & ADDITIONAL STUDIES REVIEWED:  yes    [ ]GOALS OF CARE DISCUSSION WITH PATIENT/FAMILY/PROXY:    CRITICAL CARE TIME SPENT: 35 minutes

## 2022-02-14 NOTE — PATIENT PROFILE ADULT - NSTRANSFERBELONGINGSDISPO_GEN_A_NUR
Chief Complaint   Patient presents with   • Blood Pressure     Follow up     Denies known Latex allergy or symptoms of Latex sensitivity.  Medications reviewed and updated.  Health Maintenance Due   Topic Date Due   • Pneumococcal Vaccine 0-64 (1 of 1 - PPSV23) 12/06/1974   • Shingles Vaccine (1 of 2) 12/06/2018   • Influenza Vaccine (1) 09/01/2019   • Depression Screening  06/21/2020       Patient is due for topics as listed above but is not proceeding with Immunization(s) Influenza at this time.            no belonging/not applicable

## 2022-02-14 NOTE — PATIENT PROFILE ADULT - FUNCTIONAL ASSESSMENT - DAILY ACTIVITY 3.
[FreeTextEntry1] : # Symptomatic normocytic anemia secondary to chronic kidney disease and iron deficiency (history of GI bleed in early 2020)\par \par PLAN:\par -- CBC reviewed: Hgb: 10.9 MCV 87.7; mild leukopenia and thrombocytopenia \par -- On retacrit 10,000 units every 2 weeks In light of improving Hgb and elevated BP we will change Hgb to goal of 10. Will hold retacrit today \par - Will check repeat iron studies with ferritin.S/p venofer x5.Patient requires premedication with solu-cortef and tylenol.\par -- Abdominal cramping and diarrhea; maybe secondary to venofer. Advised to take Imodium as needed \par -- Capsule endoscopy completed 1 year ago was unremarkable. Ferritin continues to tip down maybe be due to obscure bleed. Advised to follow up with GI for eval and further imaging\par -- RTC in 1 month\par \par Patient seen and examined with Dr. Gaston, who agreed with the above plan of care.\par 
1 = Total assistance

## 2022-02-14 NOTE — PROGRESS NOTE ADULT - PROBLEM SELECTOR PLAN 2
Pt is s.p Trach and PEG  Pt was started on IV abx Zosyn and vancomycin in the NH for concerns of aspiration Pna   Pt was noted to be midly hypoxic in mid 80's by the EMS and spo2 improved after suctioning   few blood clots retrieved from the tracheastomy tube suction   c/w zosyn and vancomycin   ID- Dr. Nunez Pt is s.p Trach and PEG  Pt was started on IV abx Zosyn and vancomycin in the NH for concerns of aspiration Pna   Pt was noted to be midly hypoxic in mid 80's by the EMS and spo2 improved after suctioning   few blood clots retrieved from the tracheastomy tube suction , patient needs good suction  No indication of Antibiotics as present , No fever, No WBC,   CXR grossly same as before  Will D/C Abx  ID- Dr. Nunez Pt is s.p Trach and PEG  Cont. mechanical ventilation   Pt was started on IV abx Zosyn and vancomycin in the NH for concerns of aspiration Pna   Pt was noted to be midly hypoxic in mid 80's by the EMS and spo2 improved after suctioning   few blood clots retrieved from the tracheastomy tube suction , patient needs good suction  No indication of Antibiotics as present , No fever, No WBC,   CXR grossly same as before  Will D/C Abx  ID- Dr. Nunez

## 2022-02-14 NOTE — ED ADULT NURSE REASSESSMENT NOTE - NS ED NURSE REASSESS COMMENT FT1
REceived pt 7am alert and non verbal. Breathing even and unlabored, trach to vent in place. Cardiac monitor in place. Awaiting orders from ICU team, keon OLMOS, awaiting call back.

## 2022-02-14 NOTE — PATIENT PROFILE ADULT - FALL HARM RISK - HARM RISK INTERVENTIONS

## 2022-02-14 NOTE — PROGRESS NOTE ADULT - PROBLEM SELECTOR PLAN 7
S/P peg placement on 1/12/22  continue tube feeding plan. S/P peg placement on 1/12/22  NPo for now   Start albumin for 24 hours, Albumin only 1

## 2022-02-14 NOTE — CONSULT NOTE ADULT - ASSESSMENT
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

## 2022-02-14 NOTE — PROGRESS NOTE ADULT - PROBLEM SELECTOR PLAN 1
Pt sent from NH for 1 episode of blood clots in stool   Pt had 2 episodes of brown stools in ed with no blood clots   FOBT+   Hb 6.9,   Pt was started on 1u PRBC  F/U CBC post transfusion   to be started on protonox inj 40mg BD IV   GI consult- Dr. Atkinson   Holding AC Pt sent from NH for 1 episode of blood clots in stool   Pt had 2 episodes of brown stools in ed with no blood clots   FOBT+   Hb 6.9, s/p 1PRBC, Hb 8.4 now  Continue protonox IV 40mg BD   NPO for now   Aspiration precautions  GI consult- Dr. Atkinson   Holding AC

## 2022-02-15 NOTE — PROGRESS NOTE ADULT - NSPROGADDITIONALINFOA_GEN_ALL_CORE
BP 80s/50s despite PRBC infusing. Plan for ICU level care, LMOM for GI NP Diana. BP 80s/50sdespite PRBC infusing, pt more lethargic on exam. Plan for ICU level care per Dr Patel, SHANNON NP notified and family updated (see GOC note for further detail)

## 2022-02-15 NOTE — PROGRESS NOTE ADULT - PROBLEM SELECTOR PLAN 1
- Pt had 2 episodes of brown stools in ED with no blood clots. Pt with one episode of dark "black" stool on the floor yesterday.   - s/p 2 units of RBC, hgb 6.9->8.4->7.5->7.9->6.7->7.7   - continue protonix 40mg IV BID   - continue to monitor patient's output   - hold AC   - transfuse if hgb <7  - plan for bedside colonoscopy without anesthesia (due to pt's current status) tomorrow 2/16  - administer Golytely via G tube for bowel prep

## 2022-02-15 NOTE — PROGRESS NOTE ADULT - NUTRITIONAL ASSESSMENT
severe protein calorie malnutrition with muscle wasting severe protein calorie malnutrition with muscle wasting; current diet NPO in setting of GI bleed

## 2022-02-15 NOTE — PROGRESS NOTE ADULT - PROBLEM SELECTOR PLAN 1
- trend CBC, transfuse if Hgb <7; AM CBC pending, stable x3 yesterday (1U PRBC 2/13)  - monitor for further episodes of melena  - continue BID PPI  - appreciate GI recommendation - trend CBC, transfuse if Hgb <7  - 1UPRBC this AM, PM CBC pending  - monitor for further episodes of melena  - maintain 2 large bore IVs  - continue BID PPI  - appreciate GI recommendation

## 2022-02-15 NOTE — CHART NOTE - NSCHARTNOTEFT_GEN_A_CORE
Spoke with the patient's son Mr Moses Amaya and daughter Sandi over phone. All the information was given in detail. They endorsed the understanding

## 2022-02-15 NOTE — PROGRESS NOTE ADULT - ASSESSMENT
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

## 2022-02-15 NOTE — PROGRESS NOTE ADULT - SUBJECTIVE AND OBJECTIVE BOX
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  . Pt is awake, but non verbal with the tracheostomy. Medical history obtained from son bedside and ED physicin.   Per EMS, pt appeared short of breath, hypoxic to 80s, was bagged and suctioned out blood/clots, and oxygenation improved.   Per Son, Patient currently being treated for pneumonia wth Vanc/ZOsyn in the NH.  Pt was admitted here at [facility] in Dec and also had a cardiac arrest during the hospital stay, Pt was later noted to have Hypoxia from Aspiration Pna and was Intubated and Trach as placed in Jan 2022. Pt now is DNR/DNI    In the ED,   Pt appears comfortable, awake, alert, non verbal, able to follow minimal commands   /86, hr 110, SPO2 100%  CXR-   L sided plueral effusion and RLL infiltrate   hb 6.9, FOBT+  1UPRBC ordered    (13 Feb 2022 21:02)          ______________________________________________________________________  PMH/PSH:  PAST MEDICAL & SURGICAL HISTORY:  Hypertension    Diabetes    High cholesterol    Primary osteoarthritis of both knees    Coronary artery disease of native artery of native heart with stable angina pectoris    Allergic bronchitis    Diabetic retinopathy    Oral cancer    SCC (squamous cell carcinoma)    Metastatic cancer    Recurrent disease    Edema of extremity    Hyponatremia    Microalbuminuria    Neuralgia    Obstructive sleep apnea, adult    Vitamin D deficiency    S/P knee replacement  b/l    S/P hernia repair  b/l    Status post cataract extraction and insertion of intraocular lens, unspecified laterality  b/l    History of surgery  On 9/10/20 he underwent right hemiglossectomy and partial neck dissection with Dr. Darinel Servin at [facility].      ______________________________________________________________________  MEDS:  MEDICATIONS  (STANDING):  albumin human 25% IVPB 50 milliLiter(s) IV Intermittent every 6 hours  atorvastatin 40 milliGRAM(s) Oral at bedtime  chlorhexidine 0.12% Liquid 15 milliLiter(s) Oral Mucosa every 12 hours  chlorhexidine 2% Cloths 1 Application(s) Topical <User Schedule>  dextrose 5% + sodium chloride 0.45%. 1000 milliLiter(s) (55 mL/Hr) IV Continuous <Continuous>  glucagon  Injectable 1 milliGRAM(s) IntraMuscular once  insulin lispro (ADMELOG) corrective regimen sliding scale   SubCutaneous every 6 hours  midodrine. 5 milliGRAM(s) Oral three times a day  pantoprazole  Injectable 40 milliGRAM(s) IV Push every 12 hours  potassium chloride  10 mEq/100 mL IVPB 10 milliEquivalent(s) IV Intermittent every 1 hour    MEDICATIONS  (PRN):  acetaminophen    Suspension .. 650 milliGRAM(s) Oral every 6 hours PRN Moderate Pain (4 - 6)    ______________________________________________________________________  ALL:   Allergies    No Known Allergies    Intolerances      ______________________________________________________________________  SH: Social History:  Pt stays at the NH (13 Feb 2022 21:02)    ______________________________________________________________________  FH:  FAMILY HISTORY:  Family history of acute myocardial infarction (Sibling)      ______________________________________________________________________  ROS:    CONSTITUTIONAL:  No weight loss, fever, chills, weakness or fatigue.    HEENT:  Eyes:  No visual loss, blurred vision, double vision or yellow sclerae. Ears, Nose, Throat:  No hearing loss, sneezing, congestion, runny nose or sore throat.    SKIN:  No rash or itching.    CARDIOVASCULAR:  No chest pain, chest pressure or chest discomfort. No palpitations or edema.    RESPIRATORY:  No shortness of breath, cough or sputum.    GASTROINTESTINAL:  SEE HPI    GENITOURINARY:  No dysuria, hematuria, urinary frequency    NEUROLOGICAL:  No headache, dizziness, syncope, paralysis, ataxia, numbness or tingling in the extremities. No change in bowel or bladder control.    MUSCULOSKELETAL:  No muscle, back pain, joint pain or stiffness.    HEMATOLOGIC:  No anemia, bleeding or bruising.    LYMPHATICS:  No enlarged nodes. No history of splenectomy.    PSYCHIATRIC:  No history of depression or anxiety.    ENDOCRINOLOGIC:  No reports of sweating, cold or heat intolerance. No polyuria or polydipsia.    ALLERGIES:  No history of asthma, hives, eczema or rhinitis.  ______________________________________________________________________  PHYSICAL EXAM:  T(C): 36.4 (02-15-22 @ 09:55), Max: 37.5 (02-15-22 @ 05:02)  HR: 65 (02-15-22 @ 09:55)  BP: 81/42 (02-15-22 @ 09:55)  RR: 19 (02-15-22 @ 09:55)  SpO2: 100% (02-15-22 @ 09:55)  Wt(kg): --    02-15  -  02-15  --------------------------------------------------------  IN:  Total IN: 0 mL    OUT:    Voided (mL): 300 mL  Total OUT: 300 mL    Total NET: -300 mL          GEN: NAD   HEENT: trach noted  CVS- S1 S2  ABD: soft, nontender, non distended, bowel sounds+, G tube present  LUNGS: clear to auscultation  NEURO: noin focal neuro exam; alert and occasionally follows commands  Extremities: no cyanosis, no calf tenderness, no clubbing      ______________________________________________________________________  LABS:                        6.7    7.82  )-----------( 418      ( 15 Feb 2022 08:29 )             21.6     02-15    142  |  104  |  28<H>  ----------------------------<  133<H>  2.8<LL>   |  33<H>  |  0.44<L>    Ca    10.0      15 Feb 2022 08:29  Phos  2.9     02-14  Mg     2.3     02-14    TPro  4.7<L>  /  Alb  1.8<L>  /  TBili  0.4  /  DBili  x   /  AST  21  /  ALT  8<L>  /  AlkPhos  82  02-15    LIVER FUNCTIONS - ( 15 Feb 2022 08:29 )  Alb: 1.8 g/dL / Pro: 4.7 g/dL / ALK PHOS: 82 U/L / ALT: 8 U/L DA / AST: 21 U/L / GGT: x              GI PROGRESS NOTE    Patient is a 76y old  Male who presents with a chief complaint of blood in stool (15 Feb 2022 08:23)      HPI:  76-year-old male hx of HTN, HLD, DM2, CAD s/p stents, MAC pneumonia, 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  . Pt is awake, but non verbal with the tracheostomy. Medical history obtained from son bedside and ED physicin.   Per EMS, pt appeared short of breath, hypoxic to 80s, was bagged and suctioned out blood/clots, and oxygenation improved.   Per Son, Patient currently being treated for pneumonia wth Vanc/ZOsyn in the NH.  Pt was admitted here at [facility] in Dec and also had a cardiac arrest during the hospital stay, Pt was later noted to have Hypoxia from Aspiration Pna and was Intubated and Trach as placed in Jan 2022. Pt now is DNR/DNI    In the ED,   Pt appears comfortable, awake, alert, non verbal, able to follow minimal commands   /86, hr 110, SPO2 100%  CXR-   L sided plueral effusion and RLL infiltrate   hb 6.9, FOBT+  1UPRBC ordered    (13 Feb 2022 21:02)    GI HPI:  Pt with "black" moderate amount of stools last night per nursing documentation. No melena or hematochezia noted today. Pt's G tube was flushed with 60ml of sterile water and flushed well without difficulty (with no return).   Pt's Hgb dropped to 6.7 this am; became hypotensive 81/45 and tachycardic ~100.       ______________________________________________________________________  PMH/PSH:  PAST MEDICAL & SURGICAL HISTORY:  Hypertension    Diabetes    High cholesterol    Primary osteoarthritis of both knees    Coronary artery disease of native artery of native heart with stable angina pectoris    Allergic bronchitis    Diabetic retinopathy    Oral cancer    SCC (squamous cell carcinoma)    Metastatic cancer    Recurrent disease    Edema of extremity    Hyponatremia    Microalbuminuria    Neuralgia    Obstructive sleep apnea, adult    Vitamin D deficiency    S/P knee replacement  b/l    S/P hernia repair  b/l    Status post cataract extraction and insertion of intraocular lens, unspecified laterality  b/l    History of surgery  On 9/10/20 he underwent right hemiglossectomy and partial neck dissection with Dr. Darinel Servin at [facility].      ______________________________________________________________________  MEDS:  MEDICATIONS  (STANDING):  albumin human 25% IVPB 50 milliLiter(s) IV Intermittent every 6 hours  atorvastatin 40 milliGRAM(s) Oral at bedtime  chlorhexidine 0.12% Liquid 15 milliLiter(s) Oral Mucosa every 12 hours  chlorhexidine 2% Cloths 1 Application(s) Topical <User Schedule>  dextrose 5% + sodium chloride 0.45%. 1000 milliLiter(s) (55 mL/Hr) IV Continuous <Continuous>  glucagon  Injectable 1 milliGRAM(s) IntraMuscular once  insulin lispro (ADMELOG) corrective regimen sliding scale   SubCutaneous every 6 hours  midodrine. 5 milliGRAM(s) Oral three times a day  pantoprazole  Injectable 40 milliGRAM(s) IV Push every 12 hours  potassium chloride  10 mEq/100 mL IVPB 10 milliEquivalent(s) IV Intermittent every 1 hour    MEDICATIONS  (PRN):  acetaminophen    Suspension .. 650 milliGRAM(s) Oral every 6 hours PRN Moderate Pain (4 - 6)    ______________________________________________________________________  ALL:   Allergies    No Known Allergies    Intolerances      ______________________________________________________________________  SH: Social History:  Pt stays at the NH (13 Feb 2022 21:02)    ______________________________________________________________________  FH:  FAMILY HISTORY:  Family history of acute myocardial infarction (Sibling)      ______________________________________________________________________  ROS:    CONSTITUTIONAL:  No weight loss, fever, chills, weakness or fatigue.    HEENT:  Eyes:  No visual loss, blurred vision, double vision or yellow sclerae. Ears, Nose, Throat:  No hearing loss, sneezing, congestion, runny nose or sore throat.    SKIN:  No rash or itching.    CARDIOVASCULAR:  No chest pain, chest pressure or chest discomfort. No palpitations or edema.    RESPIRATORY:  No shortness of breath, cough or sputum.    GASTROINTESTINAL:  SEE HPI    GENITOURINARY:  No dysuria, hematuria, urinary frequency    NEUROLOGICAL:  No headache, dizziness, syncope, paralysis, ataxia, numbness or tingling in the extremities. No change in bowel or bladder control.    MUSCULOSKELETAL:  No muscle, back pain, joint pain or stiffness.    HEMATOLOGIC:  No anemia, bleeding or bruising.    LYMPHATICS:  No enlarged nodes. No history of splenectomy.    PSYCHIATRIC:  No history of depression or anxiety.    ENDOCRINOLOGIC:  No reports of sweating, cold or heat intolerance. No polyuria or polydipsia.    ALLERGIES:  No history of asthma, hives, eczema or rhinitis.  ______________________________________________________________________  PHYSICAL EXAM:  T(C): 36.4 (02-15-22 @ 09:55), Max: 37.5 (02-15-22 @ 05:02)  HR: 65 (02-15-22 @ 09:55)  BP: 81/42 (02-15-22 @ 09:55)  RR: 19 (02-15-22 @ 09:55)  SpO2: 100% (02-15-22 @ 09:55)  Wt(kg): --    02-15  -  02-15  --------------------------------------------------------  IN:  Total IN: 0 mL    OUT:    Voided (mL): 300 mL  Total OUT: 300 mL    Total NET: -300 mL          GEN: NAD   HEENT: trach noted  CVS- S1 S2  ABD: soft, nontender, non distended, bowel sounds+, G tube present  LUNGS: clear to auscultation  NEURO: noin focal neuro exam; alert and occasionally follows commands  Extremities: no cyanosis, no calf tenderness, no clubbing      ______________________________________________________________________  LABS:                        6.7    7.82  )-----------( 418      ( 15 Feb 2022 08:29 )             21.6     02-15    142  |  104  |  28<H>  ----------------------------<  133<H>  2.8<LL>   |  33<H>  |  0.44<L>    Ca    10.0      15 Feb 2022 08:29  Phos  2.9     02-14  Mg     2.3     02-14    TPro  4.7<L>  /  Alb  1.8<L>  /  TBili  0.4  /  DBili  x   /  AST  21  /  ALT  8<L>  /  AlkPhos  82  02-15    LIVER FUNCTIONS - ( 15 Feb 2022 08:29 )  Alb: 1.8 g/dL / Pro: 4.7 g/dL / ALK PHOS: 82 U/L / ALT: 8 U/L DA / AST: 21 U/L / GGT: x              GI PROGRESS NOTE    Patient is a 76y old  Male who presents with a chief complaint of blood in stool (15 Feb 2022 08:23)      HPI:  76-year-old male hx of HTN, HLD, DM2, CAD s/p stents, MAC pneumonia, 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  . Pt is awake, but non verbal with the tracheostomy. Medical history obtained from son bedside and ED physicin.   Per EMS, pt appeared short of breath, hypoxic to 80s, was bagged and suctioned out blood/clots, and oxygenation improved.   Per Son, Patient currently being treated for pneumonia wth Vanc/ZOsyn in the NH.  Pt was admitted here at [facility] in Dec and also had a cardiac arrest during the hospital stay, Pt was later noted to have Hypoxia from Aspiration Pna and was Intubated and Trach as placed in Jan 2022. Pt now is DNR/DNI    In the ED,   Pt appears comfortable, awake, alert, non verbal, able to follow minimal commands   /86, hr 110, SPO2 100%  CXR-   L sided plueral effusion and RLL infiltrate   hb 6.9, FOBT+  1UPRBC ordered    (13 Feb 2022 21:02)    GI HPI:  Pt with "black" moderate amount of stools last night per nursing documentation. No melena or hematochezia noted today. Pt's G tube was flushed with 60ml of sterile water and flushed well without difficulty (with no return).   Pt's Hgb dropped to 6.7 this am; became hypotensive 81/45 and tachycardic ~100. Pt transferred to ICU for close monitoring.       ______________________________________________________________________  PMH/PSH:  PAST MEDICAL & SURGICAL HISTORY:  Hypertension    Diabetes    High cholesterol    Primary osteoarthritis of both knees    Coronary artery disease of native artery of native heart with stable angina pectoris    Allergic bronchitis    Diabetic retinopathy    Oral cancer    SCC (squamous cell carcinoma)    Metastatic cancer    Recurrent disease    Edema of extremity    Hyponatremia    Microalbuminuria    Neuralgia    Obstructive sleep apnea, adult    Vitamin D deficiency    S/P knee replacement  b/l    S/P hernia repair  b/l    Status post cataract extraction and insertion of intraocular lens, unspecified laterality  b/l    History of surgery  On 9/10/20 he underwent right hemiglossectomy and partial neck dissection with Dr. Darinel Servin at [facility].      ______________________________________________________________________  MEDS:  MEDICATIONS  (STANDING):  albumin human 25% IVPB 50 milliLiter(s) IV Intermittent every 6 hours  atorvastatin 40 milliGRAM(s) Oral at bedtime  chlorhexidine 0.12% Liquid 15 milliLiter(s) Oral Mucosa every 12 hours  chlorhexidine 2% Cloths 1 Application(s) Topical <User Schedule>  dextrose 5% + sodium chloride 0.45%. 1000 milliLiter(s) (55 mL/Hr) IV Continuous <Continuous>  glucagon  Injectable 1 milliGRAM(s) IntraMuscular once  insulin lispro (ADMELOG) corrective regimen sliding scale   SubCutaneous every 6 hours  midodrine. 5 milliGRAM(s) Oral three times a day  pantoprazole  Injectable 40 milliGRAM(s) IV Push every 12 hours  potassium chloride  10 mEq/100 mL IVPB 10 milliEquivalent(s) IV Intermittent every 1 hour    MEDICATIONS  (PRN):  acetaminophen    Suspension .. 650 milliGRAM(s) Oral every 6 hours PRN Moderate Pain (4 - 6)    ______________________________________________________________________  ALL:   Allergies    No Known Allergies    Intolerances      ______________________________________________________________________  SH: Social History:  Pt stays at the NH (13 Feb 2022 21:02)    ______________________________________________________________________  FH:  FAMILY HISTORY:  Family history of acute myocardial infarction (Sibling)      ______________________________________________________________________  ROS:    CONSTITUTIONAL:  No weight loss, fever, chills, weakness or fatigue.    HEENT:  Eyes:  No visual loss, blurred vision, double vision or yellow sclerae. Ears, Nose, Throat:  No hearing loss, sneezing, congestion, runny nose or sore throat.    SKIN:  No rash or itching.    CARDIOVASCULAR:  No chest pain, chest pressure or chest discomfort. No palpitations or edema.    RESPIRATORY:  No shortness of breath, cough or sputum.    GASTROINTESTINAL:  SEE HPI    GENITOURINARY:  No dysuria, hematuria, urinary frequency    NEUROLOGICAL:  No headache, dizziness, syncope, paralysis, ataxia, numbness or tingling in the extremities. No change in bowel or bladder control.    MUSCULOSKELETAL:  No muscle, back pain, joint pain or stiffness.    HEMATOLOGIC:  No anemia, bleeding or bruising.    LYMPHATICS:  No enlarged nodes. No history of splenectomy.    PSYCHIATRIC:  No history of depression or anxiety.    ENDOCRINOLOGIC:  No reports of sweating, cold or heat intolerance. No polyuria or polydipsia.    ALLERGIES:  No history of asthma, hives, eczema or rhinitis.  ______________________________________________________________________  PHYSICAL EXAM:  T(C): 36.4 (02-15-22 @ 09:55), Max: 37.5 (02-15-22 @ 05:02)  HR: 65 (02-15-22 @ 09:55)  BP: 81/42 (02-15-22 @ 09:55)  RR: 19 (02-15-22 @ 09:55)  SpO2: 100% (02-15-22 @ 09:55)  Wt(kg): --    02-15  -  02-15  --------------------------------------------------------  IN:  Total IN: 0 mL    OUT:    Voided (mL): 300 mL  Total OUT: 300 mL    Total NET: -300 mL          GEN: NAD   HEENT: trach noted  CVS- S1 S2  ABD: soft, nontender, non distended, bowel sounds+, G tube present  LUNGS: clear to auscultation  NEURO: noin focal neuro exam; alert and occasionally follows commands  Extremities: no cyanosis, no calf tenderness, no clubbing      ______________________________________________________________________  LABS:                        6.7    7.82  )-----------( 418      ( 15 Feb 2022 08:29 )             21.6     02-15    142  |  104  |  28<H>  ----------------------------<  133<H>  2.8<LL>   |  33<H>  |  0.44<L>    Ca    10.0      15 Feb 2022 08:29  Phos  2.9     02-14  Mg     2.3     02-14    TPro  4.7<L>  /  Alb  1.8<L>  /  TBili  0.4  /  DBili  x   /  AST  21  /  ALT  8<L>  /  AlkPhos  82  02-15    LIVER FUNCTIONS - ( 15 Feb 2022 08:29 )  Alb: 1.8 g/dL / Pro: 4.7 g/dL / ALK PHOS: 82 U/L / ALT: 8 U/L DA / AST: 21 U/L / GGT: x              GI PROGRESS NOTE    Patient is a 76y old  Male who presents with a chief complaint of blood in stool (15 Feb 2022 08:23)      HPI:  76-year-old male hx of HTN, HLD, DM2, CAD s/p stents, MAC pneumonia, 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  . Pt is awake, but non verbal with the tracheostomy. Medical history obtained from son bedside and ED physicin.   Per EMS, pt appeared short of breath, hypoxic to 80s, was bagged and suctioned out blood/clots, and oxygenation improved.   Per Son, Patient currently being treated for pneumonia wth Vanc/ZOsyn in the NH.  Pt was admitted here at [facility] in Dec and also had a cardiac arrest during the hospital stay, Pt was later noted to have Hypoxia from Aspiration Pna and was Intubated and Trach as placed in Jan 2022. Pt now is DNR/DNI    In the ED,   Pt appears comfortable, awake, alert, non verbal, able to follow minimal commands   /86, hr 110, SPO2 100%  CXR-   L sided plueral effusion and RLL infiltrate   hb 6.9, FOBT+  1UPRBC ordered    (13 Feb 2022 21:02)    GI HPI:  Pt with "black" moderate amount of stools last night per nursing documentation. No melena or hematochezia noted today. Pt's G tube was flushed with water and flushed well without difficulty (with no bile or blood noted on return).   Pt's Hgb dropped to 6.7 this am; became hypotensive 81/45 and tachycardic ~100. Pt transferred to ICU for close monitoring. Bright red blood noted on rectal exam.       ______________________________________________________________________  PMH/PSH:  PAST MEDICAL & SURGICAL HISTORY:  Hypertension    Diabetes    High cholesterol    Primary osteoarthritis of both knees    Coronary artery disease of native artery of native heart with stable angina pectoris    Allergic bronchitis    Diabetic retinopathy    Oral cancer    SCC (squamous cell carcinoma)    Metastatic cancer    Recurrent disease    Edema of extremity    Hyponatremia    Microalbuminuria    Neuralgia    Obstructive sleep apnea, adult    Vitamin D deficiency    S/P knee replacement  b/l    S/P hernia repair  b/l    Status post cataract extraction and insertion of intraocular lens, unspecified laterality  b/l    History of surgery  On 9/10/20 he underwent right hemiglossectomy and partial neck dissection with Dr. Darinel Servin at [facility].      ______________________________________________________________________  MEDS:  MEDICATIONS  (STANDING):  albumin human 25% IVPB 50 milliLiter(s) IV Intermittent every 6 hours  atorvastatin 40 milliGRAM(s) Oral at bedtime  chlorhexidine 0.12% Liquid 15 milliLiter(s) Oral Mucosa every 12 hours  chlorhexidine 2% Cloths 1 Application(s) Topical <User Schedule>  dextrose 5% + sodium chloride 0.45%. 1000 milliLiter(s) (55 mL/Hr) IV Continuous <Continuous>  glucagon  Injectable 1 milliGRAM(s) IntraMuscular once  insulin lispro (ADMELOG) corrective regimen sliding scale   SubCutaneous every 6 hours  midodrine. 5 milliGRAM(s) Oral three times a day  pantoprazole  Injectable 40 milliGRAM(s) IV Push every 12 hours  potassium chloride  10 mEq/100 mL IVPB 10 milliEquivalent(s) IV Intermittent every 1 hour    MEDICATIONS  (PRN):  acetaminophen    Suspension .. 650 milliGRAM(s) Oral every 6 hours PRN Moderate Pain (4 - 6)    ______________________________________________________________________  ALL:   Allergies    No Known Allergies    Intolerances      ______________________________________________________________________  SH: Social History:  Pt stays at the NH (13 Feb 2022 21:02)    ______________________________________________________________________  FH:  FAMILY HISTORY:  Family history of acute myocardial infarction (Sibling)      ______________________________________________________________________  ROS:    CONSTITUTIONAL:  No weight loss, fever, chills, weakness or fatigue.    HEENT:  Eyes:  No visual loss, blurred vision, double vision or yellow sclerae. Ears, Nose, Throat:  No hearing loss, sneezing, congestion, runny nose or sore throat.    SKIN:  No rash or itching.    CARDIOVASCULAR:  No chest pain, chest pressure or chest discomfort. No palpitations or edema.    RESPIRATORY:  No shortness of breath, cough or sputum.    GASTROINTESTINAL:  SEE HPI    GENITOURINARY:  No dysuria, hematuria, urinary frequency    NEUROLOGICAL:  No headache, dizziness, syncope, paralysis, ataxia, numbness or tingling in the extremities. No change in bowel or bladder control.    MUSCULOSKELETAL:  No muscle, back pain, joint pain or stiffness.    HEMATOLOGIC:  No anemia, bleeding or bruising.    LYMPHATICS:  No enlarged nodes. No history of splenectomy.    PSYCHIATRIC:  No history of depression or anxiety.    ENDOCRINOLOGIC:  No reports of sweating, cold or heat intolerance. No polyuria or polydipsia.    ALLERGIES:  No history of asthma, hives, eczema or rhinitis.  ______________________________________________________________________  PHYSICAL EXAM:  T(C): 36.4 (02-15-22 @ 09:55), Max: 37.5 (02-15-22 @ 05:02)  HR: 65 (02-15-22 @ 09:55)  BP: 81/42 (02-15-22 @ 09:55)  RR: 19 (02-15-22 @ 09:55)  SpO2: 100% (02-15-22 @ 09:55)  Wt(kg): --    02-15  -  02-15  --------------------------------------------------------  IN:  Total IN: 0 mL    OUT:    Voided (mL): 300 mL  Total OUT: 300 mL    Total NET: -300 mL          GEN: NAD   HEENT: trach noted  CVS- S1 S2 tachycardic   ABD: soft, nontender, non distended, bowel sounds+, G tube present  LUNGS: clear to auscultation  NEURO: noin focal neuro exam; alert and awake  Extremities: no cyanosis, no calf tenderness, no clubbing      ______________________________________________________________________  LABS:                        6.7    7.82  )-----------( 418      ( 15 Feb 2022 08:29 )             21.6     02-15    142  |  104  |  28<H>  ----------------------------<  133<H>  2.8<LL>   |  33<H>  |  0.44<L>    Ca    10.0      15 Feb 2022 08:29  Phos  2.9     02-14  Mg     2.3     02-14    TPro  4.7<L>  /  Alb  1.8<L>  /  TBili  0.4  /  DBili  x   /  AST  21  /  ALT  8<L>  /  AlkPhos  82  02-15    LIVER FUNCTIONS - ( 15 Feb 2022 08:29 )  Alb: 1.8 g/dL / Pro: 4.7 g/dL / ALK PHOS: 82 U/L / ALT: 8 U/L DA / AST: 21 U/L / GGT: x

## 2022-02-15 NOTE — ADVANCED PRACTICE NURSE CONSULT - RECOMMEDATIONS
-Clean all wounds with normal saline and apply skin prep to the surrounding skin  -Moisturize the patients skin, including the Ears  -Apply Collagenase ointment to the slough areas of the Trach and Coccyx wounds, apply saline moistened gauze to the wound bed, and cover with a Foam dressing Daily PRN  -Elevate/float the patients heels using heel protectors and reposition the patient Q 2hrs using wedges or pillows

## 2022-02-15 NOTE — PROGRESS NOTE ADULT - SUBJECTIVE AND OBJECTIVE BOX
MELISSA JOHNS    SCU progress note    INTERVAL HPI/OVERNIGHT EVENTS: no acute events overnight    DNR [ ]   DNI  [  ]    Covid - 19 PCR: negative    The 4Ms    What Matters Most: see GOC  Age appropriate Medications/Screen for High Risk Medication: Yes  Mentation: see CAM below  Mobility: defer to physical exam    The Confusion Assessment Method (CAM) Diagnostic Algorithm     1: Acute Onset or Fluctuating Course  - Is there evidence of an acute change in mental status from the patient’s baseline? Did the (abnormal) behavior  fluctuate during the day, that is, tend to come and go, or increase and decrease in severity?  [ ] YES [ ] NO     2: Inattention  - Did the patient have difficulty focusing attention, being easily distractible, or having difficulty keeping track of what was being said?  [ ] YES [ ] NO     3: Disorganized thinking  -Was the patient’s thinking disorganized or incoherent, such as rambling or irrelevant conversation, unclear or illogical flow of ideas, or unpredictable switching from subject to subject?  [ ] YES [ ] NO    4: Altered Level of consciousness?  [ ] YES [ ] NO    The diagnosis of delirium by CAM requires the presence of features 1 and 2 and either 3 or 4.    PRESSORS: [ ] YES [x ] NO    Cardiovascular:  Heart Failure  Acute   Acute on Chronic  Chronic       midodrine. 5 milliGRAM(s) Oral three times a day    Pulmonary:    Hematalogic:    Other:  acetaminophen    Suspension .. 650 milliGRAM(s) Oral every 6 hours PRN  albumin human 25% IVPB 50 milliLiter(s) IV Intermittent every 6 hours  atorvastatin 40 milliGRAM(s) Oral at bedtime  chlorhexidine 0.12% Liquid 15 milliLiter(s) Oral Mucosa every 12 hours  chlorhexidine 2% Cloths 1 Application(s) Topical <User Schedule>  dextrose 5% + sodium chloride 0.45%. 1000 milliLiter(s) IV Continuous <Continuous>  glucagon  Injectable 1 milliGRAM(s) IntraMuscular once  insulin lispro (ADMELOG) corrective regimen sliding scale   SubCutaneous every 6 hours  pantoprazole  Injectable 40 milliGRAM(s) IV Push every 12 hours    acetaminophen    Suspension .. 650 milliGRAM(s) Oral every 6 hours PRN  albumin human 25% IVPB 50 milliLiter(s) IV Intermittent every 6 hours  atorvastatin 40 milliGRAM(s) Oral at bedtime  chlorhexidine 0.12% Liquid 15 milliLiter(s) Oral Mucosa every 12 hours  chlorhexidine 2% Cloths 1 Application(s) Topical <User Schedule>  dextrose 5% + sodium chloride 0.45%. 1000 milliLiter(s) IV Continuous <Continuous>  glucagon  Injectable 1 milliGRAM(s) IntraMuscular once  insulin lispro (ADMELOG) corrective regimen sliding scale   SubCutaneous every 6 hours  midodrine. 5 milliGRAM(s) Oral three times a day  pantoprazole  Injectable 40 milliGRAM(s) IV Push every 12 hours    Drug Dosing Weight  Height (cm): 175.3 (13 Feb 2022 18:39)  Weight (kg): 78 (13 Feb 2022 18:39)  BMI (kg/m2): 25.4 (13 Feb 2022 18:39)  BSA (m2): 1.94 (13 Feb 2022 18:39)    CENTRAL LINE: [ ] YES [x ] NO  LOCATION:   DATE INSERTED:  REMOVE: [ ] YES [ ] NO  EXPLAIN:    HUTCHINS: [ ] YES [ ] NO    DATE INSERTED:  REMOVE:  [ ] YES [ ] NO  EXPLAIN:    PAST MEDICAL & SURGICAL HISTORY:  Hypertension    Diabetes    High cholesterol    Primary osteoarthritis of both knees    Coronary artery disease of native artery of native heart with stable angina pectoris    Allergic bronchitis    Diabetic retinopathy    Oral cancer    SCC (squamous cell carcinoma)    Metastatic cancer    Recurrent disease    Edema of extremity    Hyponatremia    Microalbuminuria    Neuralgia    Obstructive sleep apnea, adult    Vitamin D deficiency    S/P knee replacement  b/l    S/P hernia repair  b/l    Status post cataract extraction and insertion of intraocular lens, unspecified laterality  b/l    History of surgery  On 9/10/20 he underwent right hemiglossectomy and partial neck dissection with Dr. Darinel Servin at [facility].                    Mode: AC/ CMV (Assist Control/ Continuous Mandatory Ventilation)  RR (machine): 14  TV (machine): 450  FiO2: 40  PEEP: 5  ITime: 0.9  MAP: 11  PIP: 28      PHYSICAL EXAM:    GENERAL: NAD, chronically ill appearing male  HEAD:  Atraumatic, Normocephalic  EYES: conjunctiva and sclera clear  ENMT:  Moist mucous membranes   NECK: Supple, trach intact with collar   NERVOUS SYSTEM:  Alert & Oriented, intermittently follows commands  CHEST/LUNG: Clear to auscultation bilaterally, diminished bibasilar   HEART: Regular rate and rhythm, +edema x4 extremities  ABDOMEN: Soft, Nontender, Nondistended; Bowel sounds present  EXTREMITIES:  2+ Peripheral Pulses  SKIN: see wound care note       LABS: pending today  CBC Full  -  ( 14 Feb 2022 16:14 )  WBC Count : 10.04 K/uL  RBC Count : 2.76 M/uL  Hemoglobin : 7.9 g/dL  Hematocrit : 24.0 %  Platelet Count - Automated : 500 K/uL  Mean Cell Volume : 87.0 fl  Mean Cell Hemoglobin : 28.6 pg  Mean Cell Hemoglobin Concentration : 32.9 gm/dL  Auto Neutrophil # : x  Auto Lymphocyte # : x  Auto Monocyte # : x  Auto Eosinophil # : x  Auto Basophil # : x  Auto Neutrophil % : x  Auto Lymphocyte % : x  Auto Monocyte % : x  Auto Eosinophil % : x  Auto Basophil % : x    02-14    143  |  102  |  40<H>  ----------------------------<  158<H>  4.2   |  37<H>  |  0.47<L>    Ca    9.4      14 Feb 2022 05:28  Phos  2.9     02-14  Mg     2.3     02-14    TPro  4.6<L>  /  Alb  1.0<L>  /  TBili  0.3  /  DBili  x   /  AST  20  /  ALT  8<L>  /  AlkPhos  100  02-14    PT/INR - ( 13 Feb 2022 18:50 )   PT: 12.6 sec;   INR: 1.06 ratio         PTT - ( 13 Feb 2022 18:50 )  PTT:29.9 sec          [  ]  DVT Prophylaxis  [  ]  Nutrition, Brand, Rate         Goal Rate        Abnormal Nutritional Status -  Malnutrition   Cachexia      Morbid Obesity BMI >/=40    RADIOLOGY & ADDITIONAL STUDIES:      ACC: [id number] EXAM: XR CHEST PORTABLE IMMED 1V    PROCEDURE DATE: 02/13/2022    Impression:  No significant interval change in the left chest pleuroparenchymal pattern.  Redemonstration of right middle lobe posterior lesion.  New trace right pleural effusion.      Goals of Care Discussion with Family/Proxy/Other   - see note from/family meeting set up for...     MELISSA JOHNS    SCU progress note    INTERVAL HPI/OVERNIGHT EVENTS: no acute events overnight    DNR [x]   DNI  [  ]    Covid - 19 PCR: negative    The 4Ms    What Matters Most: see GOC  Age appropriate Medications/Screen for High Risk Medication: Yes  Mentation: see CAM below  Mobility: defer to physical exam    The Confusion Assessment Method (CAM) Diagnostic Algorithm     1: Acute Onset or Fluctuating Course  - Is there evidence of an acute change in mental status from the patient’s baseline? Did the (abnormal) behavior  fluctuate during the day, that is, tend to come and go, or increase and decrease in severity?  [x] YES [ ] NO     2: Inattention  - Did the patient have difficulty focusing attention, being easily distractible, or having difficulty keeping track of what was being said?  [x] YES [ ] NO     3: Disorganized thinking  -Was the patient’s thinking disorganized or incoherent, such as rambling or irrelevant conversation, unclear or illogical flow of ideas, or unpredictable switching from subject to subject?  [ ] YES [x ] NO    4: Altered Level of consciousness?  [x ] YES [ ] NO    The diagnosis of delirium by CAM requires the presence of features 1 and 2 and either 3 or 4.    PRESSORS: [ ] YES [x ] NO    Cardiovascular:  Heart Failure Chronic       midodrine. 5 milliGRAM(s) Oral three times a day    Pulmonary:    Hematalogic:    Other:  acetaminophen    Suspension .. 650 milliGRAM(s) Oral every 6 hours PRN  albumin human 25% IVPB 50 milliLiter(s) IV Intermittent every 6 hours  atorvastatin 40 milliGRAM(s) Oral at bedtime  chlorhexidine 0.12% Liquid 15 milliLiter(s) Oral Mucosa every 12 hours  chlorhexidine 2% Cloths 1 Application(s) Topical <User Schedule>  dextrose 5% + sodium chloride 0.45%. 1000 milliLiter(s) IV Continuous <Continuous>  glucagon  Injectable 1 milliGRAM(s) IntraMuscular once  insulin lispro (ADMELOG) corrective regimen sliding scale   SubCutaneous every 6 hours  pantoprazole  Injectable 40 milliGRAM(s) IV Push every 12 hours    acetaminophen    Suspension .. 650 milliGRAM(s) Oral every 6 hours PRN  albumin human 25% IVPB 50 milliLiter(s) IV Intermittent every 6 hours  atorvastatin 40 milliGRAM(s) Oral at bedtime  chlorhexidine 0.12% Liquid 15 milliLiter(s) Oral Mucosa every 12 hours  chlorhexidine 2% Cloths 1 Application(s) Topical <User Schedule>  dextrose 5% + sodium chloride 0.45%. 1000 milliLiter(s) IV Continuous <Continuous>  glucagon  Injectable 1 milliGRAM(s) IntraMuscular once  insulin lispro (ADMELOG) corrective regimen sliding scale   SubCutaneous every 6 hours  midodrine. 5 milliGRAM(s) Oral three times a day  pantoprazole  Injectable 40 milliGRAM(s) IV Push every 12 hours    Drug Dosing Weight  Height (cm): 175.3 (13 Feb 2022 18:39)  Weight (kg): 78 (13 Feb 2022 18:39)  BMI (kg/m2): 25.4 (13 Feb 2022 18:39)  BSA (m2): 1.94 (13 Feb 2022 18:39)    CENTRAL LINE: [ ] YES [x ] NO  LOCATION:   DATE INSERTED:  REMOVE: [ ] YES [ ] NO  EXPLAIN:    HUTCHINS: [ ] YES [x] NO    DATE INSERTED:  REMOVE:  [ ] YES [ ] NO  EXPLAIN:    PAST MEDICAL & SURGICAL HISTORY:  Hypertension    Diabetes    High cholesterol    Primary osteoarthritis of both knees    Coronary artery disease of native artery of native heart with stable angina pectoris    Allergic bronchitis    Diabetic retinopathy    Oral cancer    SCC (squamous cell carcinoma)    Metastatic cancer    Recurrent disease    Edema of extremity    Hyponatremia    Microalbuminuria    Neuralgia    Obstructive sleep apnea, adult    Vitamin D deficiency    S/P knee replacement  b/l    S/P hernia repair  b/l    Status post cataract extraction and insertion of intraocular lens, unspecified laterality  b/l    History of surgery  On 9/10/20 he underwent right hemiglossectomy and partial neck dissection with Dr. Darinel Servin at [facility].                    Mode: AC/ CMV (Assist Control/ Continuous Mandatory Ventilation)  RR (machine): 14  TV (machine): 450  FiO2: 40  PEEP: 5  ITime: 0.9  MAP: 11  PIP: 28      PHYSICAL EXAM:    GENERAL: NAD, chronically ill appearing male  HEAD:  Atraumatic, Normocephalic  EYES: conjunctiva and sclera clear  ENMT:  Moist mucous membranes   NECK: Supple, trach intact with collar   NERVOUS SYSTEM: awake and alert, intermittently follows commands  CHEST/LUNG: Clear to auscultation bilaterally, diminished bibasilar   HEART: Regular rate and rhythm, +edema x4 extremities  ABDOMEN: Soft, Nontender, Nondistended; Bowel sounds present  EXTREMITIES:  2+ Peripheral Pulses  SKIN: see wound care note       LABS: pending today  CBC Full  -  ( 14 Feb 2022 16:14 )  WBC Count : 10.04 K/uL  RBC Count : 2.76 M/uL  Hemoglobin : 7.9 g/dL  Hematocrit : 24.0 %  Platelet Count - Automated : 500 K/uL  Mean Cell Volume : 87.0 fl  Mean Cell Hemoglobin : 28.6 pg  Mean Cell Hemoglobin Concentration : 32.9 gm/dL  Auto Neutrophil # : x  Auto Lymphocyte # : x  Auto Monocyte # : x  Auto Eosinophil # : x  Auto Basophil # : x  Auto Neutrophil % : x  Auto Lymphocyte % : x  Auto Monocyte % : x  Auto Eosinophil % : x  Auto Basophil % : x    02-14    143  |  102  |  40<H>  ----------------------------<  158<H>  4.2   |  37<H>  |  0.47<L>    Ca    9.4      14 Feb 2022 05:28  Phos  2.9     02-14  Mg     2.3     02-14    TPro  4.6<L>  /  Alb  1.0<L>  /  TBili  0.3  /  DBili  x   /  AST  20  /  ALT  8<L>  /  AlkPhos  100  02-14    PT/INR - ( 13 Feb 2022 18:50 )   PT: 12.6 sec;   INR: 1.06 ratio         PTT - ( 13 Feb 2022 18:50 )  PTT:29.9 sec          [  ]  DVT Prophylaxis - holding in setting of GI bleed  [  ]  Nutrition, Brand, Rate         Goal Rate        Abnormal Nutritional Status -  Malnutrition   Cachexia      Morbid Obesity BMI >/=40    RADIOLOGY & ADDITIONAL STUDIES:      ACC: [id number] EXAM: XR CHEST PORTABLE IMMED 1V    PROCEDURE DATE: 02/13/2022    Impression:  No significant interval change in the left chest pleuroparenchymal pattern.  Redemonstration of right middle lobe posterior lesion.  New trace right pleural effusion.      Goals of Care Discussion with Family/Proxy/Other   - see note from/family meeting set up for...

## 2022-02-15 NOTE — PROGRESS NOTE ADULT - PROBLEM SELECTOR PLAN 6
- sent by EMS from [facility] ongoing with family - sent by EMS from [facility]

## 2022-02-15 NOTE — ADVANCED PRACTICE NURSE CONSULT - ASSESSMENT
This is a 76yr old male patient admitted for GI Hemorrhage, presenting with the following:  -There is a healed wound to the R. Ear  -There is a Stage 1 Pressure Injury to the Bilateral Heels, as evident by non-blanchable erythema  -There is an Unstageable Pressure Injury to the Coccyx (5cm x 9cm x 2cm) with slough, pink tissue, and drainage  -There is an Unstageable Pressure Injury to the area under the patients Trach with slough, pink tissue, and scant drainage

## 2022-02-15 NOTE — PROGRESS NOTE ADULT - PROBLEM SELECTOR PLAN 4
- Tube feed via PEG on hold in setting of GI bleed - Tube feed via PEG on hold in setting of GI bleed  - continue albumin IV for hypoalbuminemia

## 2022-02-15 NOTE — CHART NOTE - NSCHARTNOTEFT_GEN_A_CORE
Son updated on test results and treatment plan.  Informed patient getting transfused another unit of PRBCs.  Will transfer pt to ICU for closer management.  All questions answered.

## 2022-02-15 NOTE — PROGRESS NOTE ADULT - ASSESSMENT
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 Recent hospitalization (12/2021- 1/28/22) s/p cardiac arrest, aspiration pna, gastric ulcer s/p clipping with ventilator dependent respiratory failure s/p Trach and PEG ( Jan 22) discharged to NH. BIBEMS from NH (2/13/22) for bloody stools and blood in trach noted at the facility with ED course notable for anemia (Hgb 6.8) s/p 1UPRBC, on BID PPI with GI following and stable CBC 2/14/22.    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 Recent hospitalization (12/20/2021- 1/28/22) notable for cardiac arrest, aspiration pna, gastric ulcer s/p clipping and ventilator dependent respiratory failure s/p Trach/ PEG discharged to NH. BIBEMS from NH (2/13/22) for bloody stools and blood in trach noted at the facility with ED course notable for anemia (Hgb 6.8) s/p 1UPRBC, on BID PPI with GI following and stable CBC 2/14/22.

## 2022-02-16 NOTE — CHART NOTE - NSCHARTNOTEFT_GEN_A_CORE
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  . Pt is awake, but non verbal with the tracheostomy. Medical history obtained from son bedside and ED physicin.   Per EMS, pt appeared short of breath, hypoxic to 80s, was bagged and suctioned out blood/clots, and oxygenation improved.   Per Son, Patient currently being treated for pneumonia wth Vanc/ZOsyn in the NH.  Pt was admitted here at [facility] in Dec and also had a cardiac arrest during the hospital stay, Pt was later noted to have Hypoxia from Aspiration Pna and was Intubated and Trach as placed in Jan 2022. Pt now is DNR/DNI. Adm for bloody stool and blood in the trach site    ED Course:  Pt was comfortable, awake, alert, non verbal, able to follow minimal commands   /86, hr 110, SPO2 100%  CXR-   L sided plueral effusion and RLL infiltrate   hb 6.9, FOBT+  1pRBC ordered   Patient placed NPO  PPI G    ICU Course:  Patient with HGB 6.7, received second pRBC. GI Gross consulted. Patient was prepped overnight and received EGD+ colonoscopy which showed no active bleeding. Tube feeds started. Patient did not require pressors. Patient had WBC of 11k and fever to 100.4 prior to downgrade   Patient medically stable for continued care on regular medicine floor     Patient is stable for downgrade to floor under care of  ------------- for further management , covering resident ---------- was informed.      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  . Pt is awake, but non verbal with the tracheostomy. Medical history obtained from son bedside and ED physicin.   Per EMS, pt appeared short of breath, hypoxic to 80s, was bagged and suctioned out blood/clots, and oxygenation improved.   Per Son, Patient currently being treated for pneumonia wth Vanc/ZOsyn in the NH.  Pt was admitted here at [facility] in Dec and also had a cardiac arrest during the hospital stay, Pt was later noted to have Hypoxia from Aspiration Pna and was Intubated and Trach as placed in Jan 2022. Pt now is DNR/DNI. Adm for bloody stool and blood in the trach site    ED Course:  Pt was comfortable, awake, alert, non verbal, able to follow minimal commands   /86, hr 110, SPO2 100%  CXR-   L sided plueral effusion and RLL infiltrate   hb 6.9, FOBT+  1pRBC ordered   Patient placed NPO  PPI G    ICU Course:  Patient with HGB 6.7, received second pRBC. GI Gross consulted. Patient was prepped overnight and received EGD+ colonoscopy which showed no active bleeding. Tube feeds started. Patient did not require pressors. Patient had WBC of 11k and fever to 100.4 prior to downgrade   Patient medically stable for continued care on regular medicine floor     Patient is stable for downgrade to  for further management , covering NP _____ was informed.      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  . Pt is awake, but non verbal with the tracheostomy. Medical history obtained from son bedside and ED physicin.   Per EMS, pt appeared short of breath, hypoxic to 80s, was bagged and suctioned out blood/clots, and oxygenation improved.   Per Son, Patient currently being treated for pneumonia wth Vanc/ZOsyn in the NH.  Pt was admitted here at [facility] in Dec and also had a cardiac arrest during the hospital stay, Pt was later noted to have Hypoxia from Aspiration Pna and was Intubated and Trach as placed in Jan 2022. Pt now is DNR/DNI. Adm for bloody stool and blood in the trach site    ED Course:  Pt was comfortable, awake, alert, non verbal, able to follow minimal commands   /86, hr 110, SPO2 100%  CXR-   L sided plueral effusion and RLL infiltrate   hb 6.9, FOBT+  1pRBC ordered   Patient placed NPO  PPI G    ICU Course:  Patient with HGB 6.7, received second pRBC. GI Gross consulted. Patient was prepped overnight and received EGD+ colonoscopy which showed no active bleeding. Tube feeds started. Patient did not require pressors. Patient had WBC of 11k and fever to 100.4 prior to downgrade   Patient medically stable for continued care on regular medicine floor     Patient is stable for downgrade to  for further management , covering NP Pat was informed.      <Things to follow>    -f/u CBC 9:00pm  -monitor H&H  -consider infectious workup for fever and white count if worsening  -GI f/u

## 2022-02-16 NOTE — DIETITIAN INITIAL EVALUATION ADULT. - PERTINENT LABORATORY DATA
02-16 Na145 mmol/L Glu 99 mg/dL K+ 3.9 mmol/L Cr  0.42 mg/dL<L> BUN 18 mg/dL   02-16 Phos 3.8 mg/dL   02-16 Alb 1.7 g/dL<L>     02-14 Chol 88 mg/dL LDL --    HDL 20 mg/dL<L> Trig 183 mg/dL<H>    02-14-22 @ 09:43 HgbA1C 6.1

## 2022-02-16 NOTE — PROGRESS NOTE ADULT - SUBJECTIVE AND OBJECTIVE BOX
INTERVAL HPI/OVERNIGHT EVENTS: ***    PRESSORS: [ ] YES [ ] NO  WHICH:    MEDICATIONS:     Antimicrobial:    Cardiovascular:  midodrine. 5 milliGRAM(s) Oral three times a day    Pulmonary:    Hematalogic:    Other:  acetaminophen    Suspension .. 650 milliGRAM(s) Oral every 6 hours PRN  atorvastatin 40 milliGRAM(s) Oral at bedtime  chlorhexidine 0.12% Liquid 15 milliLiter(s) Oral Mucosa every 12 hours  chlorhexidine 2% Cloths 1 Application(s) Topical <User Schedule>  glucagon  Injectable 1 milliGRAM(s) IntraMuscular once  insulin lispro (ADMELOG) corrective regimen sliding scale   SubCutaneous every 6 hours  magnesium citrate Oral Solution 1 Bottle Oral once  pantoprazole  Injectable 40 milliGRAM(s) IV Push every 12 hours      Drug Dosing Weight  Height (cm): 175.3 (13 Feb 2022 18:39)  Weight (kg): 78 (13 Feb 2022 18:39)  BMI (kg/m2): 25.4 (13 Feb 2022 18:39)  BSA (m2): 1.94 (13 Feb 2022 18:39)    INTUBATED: [ ] YES [ ] NO   DATE:     CENTRAL LINE: [ ] YES [ ] NO  LOCATION:       HUTCHINS: [ ] YES [ ] NO        A-LINE:  [ ] YES [ ] NO  LOCATION:       ICU Vital Signs Last 24 Hrs  T(C): 38 (16 Feb 2022 08:00), Max: 38 (16 Feb 2022 08:00)  T(F): 100.4 (16 Feb 2022 08:00), Max: 100.4 (16 Feb 2022 08:00)  HR: 96 (16 Feb 2022 08:00) (92 - 107)  BP: 115/61 (16 Feb 2022 08:00) (98/46 - 122/68)  BP(mean): 75 (16 Feb 2022 08:00) (55 - 81)  ABP: --  ABP(mean): --  RR: 16 (16 Feb 2022 08:00) (14 - 21)  SpO2: 100% (16 Feb 2022 07:45) (89% - 100%)            02-15 @ 07:01  -  02-16 @ 07:00  --------------------------------------------------------  IN: 450 mL / OUT: 4400 mL / NET: -3950 mL        Mode: AC/ CMV (Assist Control/ Continuous Mandatory Ventilation)  RR (machine): 14  TV (machine): 450  FiO2: 40  PEEP: 5  ITime: 0.9  MAP: 9  PIP: 27      REVIEW OF SYSTEMS:    CONSTITUTIONAL: No fever, chills, weight loss, or fatigue  RESPIRATORY: No cough, wheezing, or hemoptysis; No shortness of breath  CARDIOVASCULAR: No chest pain, palpitations, dizziness, or leg swelling  GASTROINTESTINAL: No abdominal pain. No nausea, vomiting, or hematemesis; No diarrhea or constipation. No melena or hematochezia.  GENITOURINARY: No dysuria, hematuria, or urinary frequency  NEUROLOGICAL: No headaches, memory loss, loss of strength, numbness, or tremors  MSK: No muscle or joint pain  SKIN: No itching, burning, rashes, or lesions      PHYSICAL EXAM:    GENERAL: NAD, well-groomed, well-developed  HEAD:  Atraumatic, Normocephalic  EYES: EOMI, PERRLA, conjunctiva and sclera clear  ENMT: No tonsillar erythema, exudates, or enlargement; Moist mucous membranes, Good dentition, No lesions  NECK: Supple, normal appearance, No JVD; Normal thyroid; Trachea midline  NERVOUS SYSTEM:  Alert & Oriented X3, Good concentration; Motor Strength 5/5 B/L upper and lower extremities; DTRs 2+ intact and symmetric  CHEST/LUNG: No chest deformity; Normal percussion bilaterally; No rales, rhonchi, wheezing   HEART: Regular rate and rhythm; Clear S1 and S2, No murmurs, rubs, or gallops  ABDOMEN: Soft, Nontender, Nondistended; Bowel sounds present  EXTREMITIES:  2+ Peripheral Pulses, No clubbing, cyanosis, or edema  LYMPH: No lymphadenopathy noted  SKIN: No rashes or lesions; Good capillary refill      LABS:  CBC Full  -  ( 16 Feb 2022 05:04 )  WBC Count : 9.16 K/uL  RBC Count : 3.15 M/uL  Hemoglobin : 9.0 g/dL  Hematocrit : 28.1 %  Platelet Count - Automated : 400 K/uL  Mean Cell Volume : 89.2 fl  Mean Cell Hemoglobin : 28.6 pg  Mean Cell Hemoglobin Concentration : 32.0 gm/dL  Auto Neutrophil # : 7.55 K/uL  Auto Lymphocyte # : 0.69 K/uL  Auto Monocyte # : 0.75 K/uL  Auto Eosinophil # : 0.03 K/uL  Auto Basophil # : 0.02 K/uL  Auto Neutrophil % : 82.5 %  Auto Lymphocyte % : 7.5 %  Auto Monocyte % : 8.2 %  Auto Eosinophil % : 0.3 %  Auto Basophil % : 0.2 %    02-16    145  |  108  |  18  ----------------------------<  99  3.9   |  34<H>  |  0.42<L>    Ca    10.2      16 Feb 2022 05:04  Phos  3.8     02-16  Mg     2.3     02-16    TPro  5.2<L>  /  Alb  1.7<L>  /  TBili  0.4  /  DBili  x   /  AST  30  /  ALT  10  /  AlkPhos  102  02-16    PT/INR - ( 16 Feb 2022 05:04 )   PT: 11.9 sec;   INR: 1.00 ratio                 RADIOLOGY & ADDITIONAL STUDIES REVIEWED:  ***    [ ]GOALS OF CARE DISCUSSION WITH PATIENT/FAMILY/PROXY:   INTERVAL HPI/OVERNIGHT EVENTS:   Patient seen and examined at bedside. Patient at baseline. Overnight prepped for colonoscopy today.     PRESSORS: [ ] YES [x ] NO  WHICH:    MEDICATIONS:     Antimicrobial:    Cardiovascular:  midodrine. 5 milliGRAM(s) Oral three times a day    Pulmonary:    Hematalogic:    Other:  acetaminophen    Suspension .. 650 milliGRAM(s) Oral every 6 hours PRN  atorvastatin 40 milliGRAM(s) Oral at bedtime  chlorhexidine 0.12% Liquid 15 milliLiter(s) Oral Mucosa every 12 hours  chlorhexidine 2% Cloths 1 Application(s) Topical <User Schedule>  glucagon  Injectable 1 milliGRAM(s) IntraMuscular once  insulin lispro (ADMELOG) corrective regimen sliding scale   SubCutaneous every 6 hours  magnesium citrate Oral Solution 1 Bottle Oral once  pantoprazole  Injectable 40 milliGRAM(s) IV Push every 12 hours      Drug Dosing Weight  Height (cm): 175.3 (13 Feb 2022 18:39)  Weight (kg): 78 (13 Feb 2022 18:39)  BMI (kg/m2): 25.4 (13 Feb 2022 18:39)  BSA (m2): 1.94 (13 Feb 2022 18:39)    INTUBATED: [ ] YES [ ] NO   DATE:     CENTRAL LINE: [ ] YES [ ] NO  LOCATION:       HUTCHINS: [ ] YES [ ] NO        A-LINE:  [ ] YES [ ] NO  LOCATION:       ICU Vital Signs Last 24 Hrs  T(C): 38 (16 Feb 2022 08:00), Max: 38 (16 Feb 2022 08:00)  T(F): 100.4 (16 Feb 2022 08:00), Max: 100.4 (16 Feb 2022 08:00)  HR: 96 (16 Feb 2022 08:00) (92 - 107)  BP: 115/61 (16 Feb 2022 08:00) (98/46 - 122/68)  BP(mean): 75 (16 Feb 2022 08:00) (55 - 81)  ABP: --  ABP(mean): --  RR: 16 (16 Feb 2022 08:00) (14 - 21)  SpO2: 100% (16 Feb 2022 07:45) (89% - 100%)            02-15 @ 07:01  -  02-16 @ 07:00  --------------------------------------------------------  IN: 450 mL / OUT: 4400 mL / NET: -3950 mL        Mode: AC/ CMV (Assist Control/ Continuous Mandatory Ventilation)  RR (machine): 14  TV (machine): 450  FiO2: 40  PEEP: 5  ITime: 0.9  MAP: 9  PIP: 27      REVIEW OF SYSTEMS: (unable to assess d/t baseline mental status)     PHYSICAL EXAM:    GENERAL: Ill-appearing man, trach collar, NAD  HEAD:  Atraumatic, Normocephalic  EYES: EOMI, PERRLA, conjunctiva and sclera clear  ENMT: No tonsillar erythema, exudates, or enlargement; Moist mucous membranesr  NECK: No JVD; Normal thyroid; Trachea midline,  +trach collar  NERVOUS SYSTEM:  Alert & Oriented X0, non-focal  CHEST/LUNG: equal breath sounds b/l No rales, rhonchi, wheezing   HEART: Regular rate and rhythm; Clear S1 and S2, No murmurs, rubs, or gallops  ABDOMEN: Soft, Nontender, Nondistended; Bowel sounds present  EXTREMITIES:  2+ Peripheral Pulses, No clubbing, cyanosis, or edema  LYMPH: No lymphadenopathy noted  SKIN: No rashes or lesions; Good capillary refill      LABS:  CBC Full  -  ( 16 Feb 2022 05:04 )  WBC Count : 9.16 K/uL  RBC Count : 3.15 M/uL  Hemoglobin : 9.0 g/dL  Hematocrit : 28.1 %  Platelet Count - Automated : 400 K/uL  Mean Cell Volume : 89.2 fl  Mean Cell Hemoglobin : 28.6 pg  Mean Cell Hemoglobin Concentration : 32.0 gm/dL  Auto Neutrophil # : 7.55 K/uL  Auto Lymphocyte # : 0.69 K/uL  Auto Monocyte # : 0.75 K/uL  Auto Eosinophil # : 0.03 K/uL  Auto Basophil # : 0.02 K/uL  Auto Neutrophil % : 82.5 %  Auto Lymphocyte % : 7.5 %  Auto Monocyte % : 8.2 %  Auto Eosinophil % : 0.3 %  Auto Basophil % : 0.2 %    02-16    145  |  108  |  18  ----------------------------<  99  3.9   |  34<H>  |  0.42<L>    Ca    10.2      16 Feb 2022 05:04  Phos  3.8     02-16  Mg     2.3     02-16    TPro  5.2<L>  /  Alb  1.7<L>  /  TBili  0.4  /  DBili  x   /  AST  30  /  ALT  10  /  AlkPhos  102  02-16    PT/INR - ( 16 Feb 2022 05:04 )   PT: 11.9 sec;   INR: 1.00 ratio          RADIOLOGY & ADDITIONAL STUDIES REVIEWED:  ***    [ ]GOALS OF CARE DISCUSSION WITH PATIENT/FAMILY/PROXY:

## 2022-02-16 NOTE — DIETITIAN INITIAL EVALUATION ADULT. - ADD RECOMMEND
PCM (severe). G1.5 40x24 provides: 960 ml, 1440 kcals, 79 gm protein. As tolerated, may increase to 45x24 + 1 Prosource/ day (1080 ml, 1620 kcals, 89 gm protein) + 15 gm protein (60 kcals). MD to monitor. RD available.

## 2022-02-16 NOTE — DIETITIAN INITIAL EVALUATION ADULT. - OTHER INFO
Pt s/p trach/ PEG 1/12/22. Recent/ previous admission seen by RD (ÁNGEL Mar) 12/21 and identified with severe PCM. Pt noted to have unstageable pressure injury to coccyx. Discussed TF order with PGY 1. # ((4/21) noted.

## 2022-02-16 NOTE — PROGRESS NOTE ADULT - ASSESSMENT
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 Recent hospitalization (12/20/2021- 1/28/22) notable for cardiac arrest, aspiration pna, gastric ulcer s/p clipping and ventilator dependent respiratory failure s/p Trach/ PEG discharged to NH. BIBEMS from NH (2/13/22) for bloody stools and blood in trach noted at the facility Adm to ICU for hypovolemic shock 2/2 acute anemia 2/2 GIB     Assessment:  1. Hypovolemic shock  2. Gastrointestinal bleeding  3. Acute blood loss anemia  4. Chronic respiratory failure with hypoxia   5. Metastatic lung cancer   6. Diabetes Mellitus     Plan:     Neuro  -At baseline     Cardio  #Hypovolemic shock  -in setting of acute anemia from GIB  -s/p 2 pRBC  - patient not requiring pressors  -Monitor BP    Resp  #Tracheostomy  -c/w mechanical ventilation     #Metastic Lung cancer  - Worsening per CT scan  - palliative consulted on prev adm  - care as per Heme/Onc.    GI  #GI bleed  - likely lower  - no coffee ground emesis, hematemesis. 1 reported event of melena   - MARIN+ BRBPR  - plan for colonscopy today  - GI Gross consulted  - NPO (peg tube)   - PPI BID    #Severe protein-energy malnutrition  - hold Tube feed via PEG on hold in setting of GI bleed    Renal  #no issues    Heme/onc  #Acute anemia d/t blood loss (GIB)  - Hgb 9.0 this AM (s/p 2 pRBCs in total this adm)   - monitor H&H   - plan as above for GIB    Endo  #diabetes mellitus  - continue ISS; maintain euglycemia  - Hgb A1C 6.1.    Infectious Disease  #no issues    Skin/Lines  -as above    ppx  Hold DVT ppx in setting of GIB  PPI for GI     Dispo  - ICU    Prognosis  - guarded

## 2022-02-16 NOTE — CHART NOTE - NSCHARTNOTEFT_GEN_A_CORE
Intern spoke to patient's son Moses over the phone. Intern discussed today's events including EGD+colonoscopy (namely findings of no active bleeding) and plans for further management. Intern also discussed patient's low grade fever and white count this afternoon which will be monitored and addressed with infectious workup+antibiotics if indicated. Also discussed plan to downgrade to medical floor as patient no longer requiring ICU monitoring. All questions addressed and support provided.

## 2022-02-16 NOTE — DIETITIAN INITIAL EVALUATION ADULT. - PERTINENT MEDS FT
MEDICATIONS  (STANDING):  atorvastatin 40 milliGRAM(s) Oral at bedtime  chlorhexidine 0.12% Liquid 15 milliLiter(s) Oral Mucosa every 12 hours  chlorhexidine 2% Cloths 1 Application(s) Topical <User Schedule>  glucagon  Injectable 1 milliGRAM(s) IntraMuscular once  insulin lispro (ADMELOG) corrective regimen sliding scale   SubCutaneous every 6 hours  midodrine. 5 milliGRAM(s) Oral three times a day  pantoprazole  Injectable 40 milliGRAM(s) IV Push every 12 hours    MEDICATIONS  (PRN):  acetaminophen    Suspension .. 650 milliGRAM(s) Oral every 6 hours PRN Moderate Pain (4 - 6)

## 2022-02-17 NOTE — PROGRESS NOTE ADULT - PROBLEM SELECTOR PLAN 3
- s/p 2 untis of PRBCs  - s/p egd. colonoscopy 2/16, no active bleeding  - G  Gross  - trend CBC - s/p 2 untis of PRBCs  - trend CBC  - Rest plan as above

## 2022-02-17 NOTE — PROGRESS NOTE ADULT - PROBLEM SELECTOR PLAN 6
#diabetes mellitus  - continue ISS; maintain euglycemia  - Hgb A1C 6.1. - controlled   - continue ISS; maintain euglycemia  - Hgb A1C 6.1.

## 2022-02-17 NOTE — PROGRESS NOTE ADULT - SUBJECTIVE AND OBJECTIVE BOX
MELISSA JOHNS    SCU progress note    INTERVAL HPI/OVERNIGHT EVENTS: NO acute events overnight    DNR [ ]   DNI  [  ]    Covid - 19 PCR: negative    The 4Ms    What Matters Most: see GOC  Age appropriate Medications/Screen for High Risk Medication: Yes  Mentation: see CAM below  Mobility: defer to physical exam    The Confusion Assessment Method (CAM) Diagnostic Algorithm     1: Acute Onset or Fluctuating Course  - Is there evidence of an acute change in mental status from the patient’s baseline? Did the (abnormal) behavior  fluctuate during the day, that is, tend to come and go, or increase and decrease in severity?  [ ] YES [x ] NO     2: Inattention  - Did the patient have difficulty focusing attention, being easily distractible, or having difficulty keeping track of what was being said?  [x ] YES [ ] NO     3: Disorganized thinking  -Was the patient’s thinking disorganized or incoherent, such as rambling or irrelevant conversation, unclear or illogical flow of ideas, or unpredictable switching from subject to subject?  [x ] YES [ ] NO    4: Altered Level of consciousness?  [x ] YES [ ] NO    The diagnosis of delirium by CAM requires the presence of features 1 and 2 and either 3 or 4.    PRESSORS: [ ] YES [ x] NO    Cardiovascular:  Heart Failure  Acute   Acute on Chronic  Chronic       midodrine. 5 milliGRAM(s) Oral three times a day    Pulmonary:    Hematalogic:    Other:  acetaminophen    Suspension .. 650 milliGRAM(s) Oral every 6 hours PRN  atorvastatin 40 milliGRAM(s) Oral at bedtime  chlorhexidine 0.12% Liquid 15 milliLiter(s) Oral Mucosa every 12 hours  glucagon  Injectable 1 milliGRAM(s) IntraMuscular once  insulin lispro (ADMELOG) corrective regimen sliding scale   SubCutaneous every 6 hours  pantoprazole  Injectable 40 milliGRAM(s) IV Push every 12 hours    acetaminophen    Suspension .. 650 milliGRAM(s) Oral every 6 hours PRN  atorvastatin 40 milliGRAM(s) Oral at bedtime  chlorhexidine 0.12% Liquid 15 milliLiter(s) Oral Mucosa every 12 hours  glucagon  Injectable 1 milliGRAM(s) IntraMuscular once  insulin lispro (ADMELOG) corrective regimen sliding scale   SubCutaneous every 6 hours  midodrine. 5 milliGRAM(s) Oral three times a day  pantoprazole  Injectable 40 milliGRAM(s) IV Push every 12 hours    Drug Dosing Weight  Height (cm): 175.2 (16 Feb 2022 15:23)  Weight (kg): 78 (13 Feb 2022 18:39)  BMI (kg/m2): 25.4 (16 Feb 2022 15:23)  BSA (m2): 1.94 (16 Feb 2022 15:23)    CENTRAL LINE: [ ] YES [ x] NO  LOCATION:   DATE INSERTED:  REMOVE: [ ] YES [ ] NO  EXPLAIN:    HUTCHINS: [ ] YES [ x] NO    DATE INSERTED:  REMOVE:  [ ] YES [ ] NO  EXPLAIN:    PAST MEDICAL & SURGICAL HISTORY:  Hypertension    Diabetes    High cholesterol    Primary osteoarthritis of both knees    Coronary artery disease of native artery of native heart with stable angina pectoris    Allergic bronchitis    Diabetic retinopathy    Oral cancer    SCC (squamous cell carcinoma)    Metastatic cancer    Recurrent disease    Edema of extremity    Hyponatremia    Microalbuminuria    Neuralgia    Obstructive sleep apnea, adult    Vitamin D deficiency    S/P knee replacement  b/l    S/P hernia repair  b/l    Status post cataract extraction and insertion of intraocular lens, unspecified laterality  b/l    History of surgery  On 9/10/20 he underwent right hemiglossectomy and partial neck dissection with Dr. Darinel Servin at [facility].      02-16 @ 07:01 - 02-17 @ 07:00  --------------------------------------------------------  IN: 60 mL / OUT: 700 mL / NET: -640 mL        Mode: AC/ CMV (Assist Control/ Continuous Mandatory Ventilation)  RR (machine): 14  TV (machine): 450  FiO2: 40  PEEP: 5  ITime: 1  MAP: 9  PIP: 28      PHYSICAL EXAM:    GENERAL: NAD,   HEAD:  Atraumatic, Normocephalic  EYES: EOMI, PERRLA, conjunctiva and sclera clear  NECK: Supple, No JVD, trach site dressing CDI  NERVOUS SYSTEM:  Alert & awake  CHEST/LUNG: Diminished Breath sounds  HEART: Regular rate and rhythm; No murmurs, rubs, or gallops  ABDOMEN: Soft, Nontender, Nondistended; Peg site dressig CDI  EXTREMITIES:  2+ Peripheral Pulses, No clubbing, cyanosis, or edema  SKIN: R ear lobe, sacral wound, trach lower site with indentation      LABS:  CBC Full  -  ( 17 Feb 2022 07:26 )  WBC Count : 12.50 K/uL  RBC Count : 3.03 M/uL  Hemoglobin : 8.6 g/dL  Hematocrit : 27.7 %  Platelet Count - Automated : 349 K/uL  Mean Cell Volume : 91.4 fl  Mean Cell Hemoglobin : 28.4 pg  Mean Cell Hemoglobin Concentration : 31.0 gm/dL  Auto Neutrophil # : x  Auto Lymphocyte # : x  Auto Monocyte # : x  Auto Eosinophil # : x  Auto Basophil # : x  Auto Neutrophil % : x  Auto Lymphocyte % : x  Auto Monocyte % : x  Auto Eosinophil % : x  Auto Basophil % : x    02-17    148<H>  |  114<H>  |  15  ----------------------------<  152<H>  3.6   |  30  |  0.44<L>    Ca    9.9      17 Feb 2022 07:26  Phos  2.9     02-17  Mg     2.7     02-17    TPro  4.6<L>  /  Alb  1.4<L>  /  TBili  0.3  /  DBili  x   /  AST  36  /  ALT  11  /  AlkPhos  133<H>  02-17    PT/INR - ( 16 Feb 2022 05:04 )   PT: 11.9 sec;   INR: 1.00 ratio        [ x ]  DVT Prophylaxis  [  ]  Nutrition, Brand, Rate Glucerna @ 40ml/hr         Goal Rate        Abnormal Nutritional Status -  Malnutrition   Cachexia      Morbid Obesity BMI >/=40    RADIOLOGY & ADDITIONAL STUDIES:    < from: CT Angio Abdomen and Pelvis w/ IV Cont (02.15.22 @ 15:58) >  PROCEDURE:  CT of the Abdomen and Pelvis was performed.  Precontrast, Arterial and Delayed phases were performed.  Sagittal and coronal reformats were performed.    FINDINGS:  LOWER CHEST: Previously seen right middle lobe cavity now measures up to   5.2 cm (previously 4.8 cm) and contains a new air-fluid level and mural   nodularity. Left lower lobe cavity partially imaged measuring   approximately 4.4 cm with air-fluid level, unchanged. Dense left lower   lobe consolidation unchanged. Increasing nodular right lower lobe   infiltrate. Developing right pleural effusion. Reidentified left pleural   effusion withsurrounding nodular pleural thickening area Dense coronary   atherosclerosis reidentified.    LIVER: Steatosis.  BILE DUCTS: Normal caliber.  GALLBLADDER: Mildly distended.  SPLEEN: Within normal limits.  PANCREAS: Within normal limits.  ADRENALS: Within normal limits.  KIDNEYS/URETERS: Within normal limits.    BLADDER: Layering high density in the bladder.  REPRODUCTIVE ORGANS: Prostate within normal limits.    BOWEL: No bowel obstruction. Appendix is not visualized and cannot be   assessed. Percutaneous gastrostomy tube in satisfactory position. Prior   small bowel obstruction has resolved. Metallic structure in the   descending colon (5:59), likely hemostatic clip. Similar structure in the   gastric fundus. No intraluminal contrast extravasation. No pneumatosis.  PERITONEUM: Small ascites. No pneumoperitoneum.  VESSELS: Atherosclerotic changes. No portal venous gas.  RETROPERITONEUM/LYMPH NODES: No lymphadenopathy.  ABDOMINAL WALL: Marked anasarca, increased from prior.  BONES: Degenerative changes.    IMPRESSION:    No CT evidence of active GI bleed.    Increasing right middle lobe lung cavity now with air-fluid level. Left   lower lobe cavity unchanged. Dense left lower lobe infiltrate unchanged.   Small but complex left pleural effusion, possibly empyema, unchanged.   Increasing right pleural effusion.    Prior small bowel obstruction has resolved.    Probable hemostatic clips in the stomach and descending colon.    Layering high density material in the bladder, new comparedwith prior.        --- End of Report ---      BEATRICE RODRÍGUEZ MD; Attending Radiologist  This document has been electronically signed. Feb 15 2022  4:26PM    < end of copied text >      Goals of Care Discussion with Family/Proxy/Other   - see note from/family meeting set up for...

## 2022-02-17 NOTE — PROGRESS NOTE ADULT - PROBLEM SELECTOR PLAN 2
GI bleed  - likely lower  - no coffee ground emesis, hematemesis. 1 reported event of melena   - MARIN+ BRBPR  - plan for colonscopy today  - GI Gross consulted  - NPO (peg tube)   - PPI BID - likely lower  - no coffee ground emesis, hematemesis, overnight  - S/p EGD/colonoscopy on 16th, no acute bleeding  - C/w PPI BID  - GI Dr Gross

## 2022-02-17 NOTE — PROGRESS NOTE ADULT - ASSESSMENT
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 Recent hospitalization (12/20/2021- 1/28/22) notable for cardiac arrest, aspiration pna, gastric ulcer s/p clipping and ventilator dependent respiratory failure s/p Trach/ PEG discharged to NH. BIBEMS from NH (2/13/22) for bloody stools and blood in trach noted at the facility Adm to  for hypovolemic shock 2/2 acute anemia 2/2 GIB . s/p 2 untis PRBCS. Pt as sent to ICU for hypotension requiring pressors, however was dowgraded to  for HD stable not requinring pressor. 2/16 s/p EGD and colonoscoy. no acuitign bleeding      ICU Course:  Patient with HGB 6.7, received second pRBC. GI [place] consulted. Patient was prepped overnight and received EGD+ colonoscopy which showed no active bleeding. Tube feeds started. Patient did not require pressors. Patient had WBC of 11k and fever to 100.4 prior to downgrade   Patient medically stable for continued care on regular medicine floor     Assessment:  1. Hypovolemic shock  2. Gastrointestinal bleeding  3. Acute blood loss anemia  4. Chronic respiratory failure with hypoxia   5. Metastatic lung cancer   6. Diabetes Mellitus     Plan:     Neuro  -At baseline     Cardio    Resp  #Tracheostomy  -c/w mechanical ventilation     #Metastic Lung cancer  - Worsening per CT scan  - palliative consulted on prev adm  - care as per Heme/Onc.    GI  #GI bleed  - likely lower  - no coffee ground emesis, hematemesis. 1 reported event of melena   - MARIN+ BRBPR  - plan for colonscopy today  - SHANNON Núñez consulted  - NPO (peg tube)   - PPI BID    #Severe protein-energy malnutrition  - hold Tube feed via PEG on hold in setting of GI bleed    Renal  #no issues    Heme/onc  #Acute anemia d/t blood loss (GIB)  - Hgb 9.0 this AM (s/p 2 pRBCs in total this adm)   - monitor H&H   - plan as above for GIB    Endo  #diabetes mellitus  - continue ISS; maintain euglycemia  - Hgb A1C 6.1.    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 Recent hospitalization (12/20/2021- 1/28/22) notable for cardiac arrest, aspiration pna, gastric ulcer s/p clipping and ventilator dependent respiratory failure s/p Trach/ PEG discharged to NH. BIBEMS from NH (2/13/22) for bloody stools and blood in trach noted at the facility Adm to  for hypovolemic shock 2/2 acute anemia 2/2 GIB . s/p 2 untis PRBCS. Pt  sent to ICU for hypotension requiring pressors, however was dowgraded to  for HD stable not requiring pressor. 2/16 s/p EGD and colonoscopy no active bleeding noted.     ICU Course:  Patient with HGB 6.7, received second pRBC. GI Gross consulted. Patient was prepped overnight and received EGD+ colonoscopy which showed no active bleeding. Tube feeds started. Patient did not require pressors. Patient had WBC of 11k and fever to 100.4 prior to downgrade     2/16. downgrade to

## 2022-02-17 NOTE — PROGRESS NOTE ADULT - SUBJECTIVE AND OBJECTIVE BOX
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  . Pt is awake, but non verbal with the tracheostomy. Medical history obtained from son bedside and ED physicin.   Per EMS, pt appeared short of breath, hypoxic to 80s, was bagged and suctioned out blood/clots, and oxygenation improved.   Per Son, Patient currently being treated for pneumonia wth Vanc/ZOsyn in the NH.  Pt was admitted here at [facility] in Dec and also had a cardiac arrest during the hospital stay, Pt was later noted to have Hypoxia from Aspiration Pna and was Intubated and Trach as placed in Jan 2022. Pt now is DNR/DNI    In the ED,   Pt appears comfortable, awake, alert, non verbal, able to follow minimal commands   /86, hr 110, SPO2 100%  CXR-   L sided plueral effusion and RLL infiltrate   hb 6.9, FOBT+  1UPRBC ordered    (13 Feb 2022 21:02)   Patient seen and examined.     PAST MEDICAL & SURGICAL HISTORY:  Hypertension    Diabetes    High cholesterol    Primary osteoarthritis of both knees    Coronary artery disease of native artery of native heart with stable angina pectoris    Allergic bronchitis    Diabetic retinopathy    Oral cancer    SCC (squamous cell carcinoma)    Metastatic cancer    Recurrent disease    Edema of extremity    Hyponatremia    Microalbuminuria    Neuralgia    Obstructive sleep apnea, adult    Vitamin D deficiency    S/P knee replacement  b/l    S/P hernia repair  b/l    Status post cataract extraction and insertion of intraocular lens, unspecified laterality  b/l    History of surgery  On 9/10/20 he underwent right hemiglossectomy and partial neck dissection with Dr. Darinel Servin at [facility].        Allergies    No Known Allergies    Intolerances        MEDICATIONS  (STANDING):  atorvastatin 40 milliGRAM(s) Oral at bedtime  chlorhexidine 0.12% Liquid 15 milliLiter(s) Oral Mucosa every 12 hours  collagenase Ointment 1 Application(s) Topical daily  glucagon  Injectable 1 milliGRAM(s) IntraMuscular once  insulin lispro (ADMELOG) corrective regimen sliding scale   SubCutaneous every 6 hours  midodrine. 5 milliGRAM(s) Oral three times a day  pantoprazole  Injectable 40 milliGRAM(s) IV Push every 12 hours      MEDICATIONS  (PRN):  acetaminophen    Suspension .. 650 milliGRAM(s) Oral every 6 hours PRN Moderate Pain (4 - 6)   Medications up to date at time of exam.    Medications up to date at time of exam.    FAMILY HISTORY:  Family history of acute myocardial infarction (Sibling)        SOCIAL HISTORY  Smoking History: [   ] smoking/smoke exposure, [ x  ] former smoker, 3 Packs quit 1972.     REVIEW OF SYSTEMS:    CONSTITUTIONAL:  No staff report of  fevers, chills on exam.     HEENT:  No runny nose. Has trach.     CARDIOVASCULAR:  No facial grimace of chest discomfort .     RESPIRATORY:  No s/sx of SOB on Vent support.     GASTROINTESTINAL:  No facial grimace of abdominal pain. No vomiting nor diarrhea on exam .    GENITOURINARY: No hematuria on exam. .    NEUROLOGIC:  No tremors. No seizures .    PSYCHIATRIC:  No emotional distress on exam.       PHYSICAL EXAMINATION:    GENERAL: Responsive to tactile stimuli . No acute distress.     Vital Signs Last 24 Hrs  T(C): 38.3 (17 Feb 2022 11:30), Max: 38.3 (17 Feb 2022 11:30)  T(F): 101 (17 Feb 2022 11:30), Max: 101 (17 Feb 2022 11:30)  HR: 110 (17 Feb 2022 12:47) (99 - 111)  BP: 119/64 (17 Feb 2022 12:47) (113/70 - 134/71)  BP(mean): 85 (16 Feb 2022 20:00) (74 - 85)  RR: 19 (17 Feb 2022 12:47) (17 - 23)  SpO2: 100% (17 Feb 2022 12:47) (98% - 100%)  Mode: AC/ CMV (Assist Control/ Continuous Mandatory Ventilation)  RR (machine): 14  TV (machine): 450  FiO2: 40  PEEP: 5  ITime: 0.9  MAP: 9  PIP: 28   (if applicable)      HEENT: Head is normocephalic and atraumatic. No nasal tenderness . Mucous membranes are moist.     NECK: Supple, no palpable adenopathy.    LUNGS: Non labored. No wheezing . No use of accessory muscle.     HEART: S1 S2 Regular rate and no click/ rub.     ABDOMEN: Soft, nontender, and nondistended.  No hepatosplenomegaly is noted. Active bowel sounds. Has Peg.     : No bladder distention and tenderness .      LABS:                        8.6    12.50 )-----------( 349      ( 17 Feb 2022 07:26 )             27.7     02-17    148<H>  |  114<H>  |  15  ----------------------------<  152<H>  3.6   |  30  |  0.44<L>    Ca    9.9      17 Feb 2022 07:26  Phos  2.9     02-17  Mg     2.7     02-17    TPro  4.6<L>  /  Alb  1.4<L>  /  TBili  0.3  /  DBili  x   /  AST  36  /  ALT  11  /  AlkPhos  133<H>  02-17    PT/INR - ( 16 Feb 2022 05:04 )   PT: 11.9 sec;   INR: 1.00 ratio                             MICROBIOLOGY: (if applicable)    RADIOLOGY & ADDITIONAL STUDIES:  EKG:   CXR:  ECHO:    IMPRESSION: 76y Male PAST MEDICAL & SURGICAL HISTORY:  Hypertension    Diabetes    High cholesterol    Primary osteoarthritis of both knees    Coronary artery disease of native artery of native heart with stable angina pectoris    Allergic bronchitis    Diabetic retinopathy    Oral cancer    SCC (squamous cell carcinoma)    Metastatic cancer    Recurrent disease    Edema of extremity    Hyponatremia    Microalbuminuria    Neuralgia    Obstructive sleep apnea, adult    Vitamin D deficiency    S/P knee replacement  b/l    S/P hernia repair  b/l    Status post cataract extraction and insertion of intraocular lens, unspecified laterality  b/l    History of surgery  On 9/10/20 he underwent right hemiglossectomy and partial neck dissection with Dr. Darinel Servin at [facility].     Impression; This is a 75 Y/O male from [facility] , was sent on 02-13-22 due to Bloody Stools and blood in trach at the Facility . Admitted with Hypovolemic shock due to Acute Anemia secondary to GIB. 02-16-22 s/p EGD and Colonoscopy with no active bleeding. Had hx of MAC Pneumonia, Metastatic SCC Base of Tongue  with s/p Right hemiglosectomy and s/p Chemo/ Radiation. On vent support due to Chronic Hypoxic Respiratory Failure .     Suggestion:  O2 saturation 96% . Continue Vent setting AC 14  FIO2 40% Peep 5. Not a candidate for decannulation at present time.   Oral care with Chlorhexidine , trach care, suction .      Aspiration precautions with HOB elevation especially during Peg feeding.   On Protonix 40 mg IVP Q 12 Hours . No staff report of bleeding today.     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  . Pt is awake, but non verbal with the tracheostomy. Medical history obtained from son bedside and ED physicin.   Per EMS, pt appeared short of breath, hypoxic to 80s, was bagged and suctioned out blood/clots, and oxygenation improved.   Per Son, Patient currently being treated for pneumonia wth Vanc/ZOsyn in the NH.  Pt was admitted here at [facility] in Dec and also had a cardiac arrest during the hospital stay, Pt was later noted to have Hypoxia from Aspiration Pna and was Intubated and Trach as placed in Jan 2022. Pt now is DNR/DNI    In the ED,   Pt appears comfortable, awake, alert, non verbal, able to follow minimal commands   /86, hr 110, SPO2 100%  CXR-   L sided plueral effusion and RLL infiltrate   hb 6.9, FOBT+  1UPRBC ordered    (13 Feb 2022 21:02)   Patient seen and examined.     PAST MEDICAL & SURGICAL HISTORY:  Hypertension    Diabetes    High cholesterol    Primary osteoarthritis of both knees    Coronary artery disease of native artery of native heart with stable angina pectoris    Allergic bronchitis    Diabetic retinopathy    Oral cancer    SCC (squamous cell carcinoma)    Metastatic cancer    Recurrent disease    Edema of extremity    Hyponatremia    Microalbuminuria    Neuralgia    Obstructive sleep apnea, adult    Vitamin D deficiency    S/P knee replacement  b/l    S/P hernia repair  b/l    Status post cataract extraction and insertion of intraocular lens, unspecified laterality  b/l    History of surgery  On 9/10/20 he underwent right hemiglossectomy and partial neck dissection with Dr. Darinel Servin at [facility].        Allergies    No Known Allergies    Intolerances        MEDICATIONS  (STANDING):  atorvastatin 40 milliGRAM(s) Oral at bedtime  chlorhexidine 0.12% Liquid 15 milliLiter(s) Oral Mucosa every 12 hours  collagenase Ointment 1 Application(s) Topical daily  glucagon  Injectable 1 milliGRAM(s) IntraMuscular once  insulin lispro (ADMELOG) corrective regimen sliding scale   SubCutaneous every 6 hours  midodrine. 5 milliGRAM(s) Oral three times a day  pantoprazole  Injectable 40 milliGRAM(s) IV Push every 12 hours      MEDICATIONS  (PRN):  acetaminophen    Suspension .. 650 milliGRAM(s) Oral every 6 hours PRN Moderate Pain (4 - 6)   Medications up to date at time of exam.    Medications up to date at time of exam.    FAMILY HISTORY:  Family history of acute myocardial infarction (Sibling)        SOCIAL HISTORY  Smoking History: [   ] smoking/smoke exposure, [ x  ] former smoker, 3 Packs quit 1972.     REVIEW OF SYSTEMS:    CONSTITUTIONAL:  No staff report of  fevers, chills on exam.     HEENT:  No runny nose. Has trach.     CARDIOVASCULAR:  No facial grimace of chest discomfort .     RESPIRATORY:  No s/sx of SOB on Vent support.     GASTROINTESTINAL:  No facial grimace of abdominal pain. No vomiting nor diarrhea on exam .    GENITOURINARY: No hematuria on exam. .    NEUROLOGIC:  No tremors. No seizures .    PSYCHIATRIC:  No emotional distress on exam.       PHYSICAL EXAMINATION:    GENERAL: Responsive to tactile stimuli . No acute distress.     Vital Signs Last 24 Hrs  T(C): 38.3 (17 Feb 2022 11:30), Max: 38.3 (17 Feb 2022 11:30)  T(F): 101 (17 Feb 2022 11:30), Max: 101 (17 Feb 2022 11:30)  HR: 110 (17 Feb 2022 12:47) (99 - 111)  BP: 119/64 (17 Feb 2022 12:47) (113/70 - 134/71)  BP(mean): 85 (16 Feb 2022 20:00) (74 - 85)  RR: 19 (17 Feb 2022 12:47) (17 - 23)  SpO2: 100% (17 Feb 2022 12:47) (98% - 100%)  Mode: AC/ CMV (Assist Control/ Continuous Mandatory Ventilation)  RR (machine): 14  TV (machine): 450  FiO2: 40  PEEP: 5  ITime: 0.9  MAP: 9  PIP: 28   (if applicable)      HEENT: Head is normocephalic and atraumatic. No nasal tenderness . Mucous membranes are moist.     NECK: Supple, no palpable adenopathy.    LUNGS: Non labored. No wheezing . No use of accessory muscle.     HEART: S1 S2 Regular rate and no click/ rub.     ABDOMEN: Soft, nontender, and nondistended.  No hepatosplenomegaly is noted. Active bowel sounds. Has Peg.     : No bladder distention and tenderness .      LABS:                        8.6    12.50 )-----------( 349      ( 17 Feb 2022 07:26 )             27.7     02-17    148<H>  |  114<H>  |  15  ----------------------------<  152<H>  3.6   |  30  |  0.44<L>    Ca    9.9      17 Feb 2022 07:26  Phos  2.9     02-17  Mg     2.7     02-17    TPro  4.6<L>  /  Alb  1.4<L>  /  TBili  0.3  /  DBili  x   /  AST  36  /  ALT  11  /  AlkPhos  133<H>  02-17    PT/INR - ( 16 Feb 2022 05:04 )   PT: 11.9 sec;   INR: 1.00 ratio                             MICROBIOLOGY: (if applicable)    RADIOLOGY & ADDITIONAL STUDIES:  EKG:   CXR:  ECHO:    IMPRESSION: 76y Male PAST MEDICAL & SURGICAL HISTORY:  Hypertension    Diabetes    High cholesterol    Primary osteoarthritis of both knees    Coronary artery disease of native artery of native heart with stable angina pectoris    Allergic bronchitis    Diabetic retinopathy    Oral cancer    SCC (squamous cell carcinoma)    Metastatic cancer    Recurrent disease    Edema of extremity    Hyponatremia    Microalbuminuria    Neuralgia    Obstructive sleep apnea, adult    Vitamin D deficiency    S/P knee replacement  b/l    S/P hernia repair  b/l    Status post cataract extraction and insertion of intraocular lens, unspecified laterality  b/l    History of surgery  On 9/10/20 he underwent right hemiglossectomy and partial neck dissection with Dr. Darinel Servin at [facility].     Impression; This is a 77 Y/O male from [facility] , was sent on 02-13-22 due to Bloody Stools and blood in trach at the Facility . Admitted with Hypovolemic shock due to Acute Anemia secondary to GIB. 02-16-22 s/p EGD and Colonoscopy with no active bleeding. Had hx of MAC Pneumonia, Metastatic SCC Base of Tongue  with s/p Right hemiglosectomy and s/p Chemo/ Radiation. On vent support due to Chronic Hypoxic Respiratory Failure .     Suggestion:  O2 saturation 96% . Continue Vent setting AC 14  FIO2 40% Peep 5. Not a candidate for decannulation at present time.   Oral care with Chlorhexidine , trach care, suction .      Aspiration precautions with HOB elevation especially during Peg feeding.   On Protonix 40 mg IVP Q 12 Hours . No staff report of bleeding today.        Agree with above assessment and plan as transcribed.

## 2022-02-17 NOTE — PROGRESS NOTE ADULT - PROBLEM SELECTOR PLAN 1
- s/p 2 units of RBC, hgb stable with ~8-9   - continue protonix 40mg IV BID   - continue to monitor patient's output   - transfuse if hgb <7  - CT angio A/P: no active GI bleed  - EGD: ?small diverticulum mid esophagus. Small area of shallow ulceration adjacent to clip (prior clip in fundus). Stomach surgiaclly altered in distal end  - Colonoscopy: Some red tinged fluid distally but none seen at transverse level. High amount of brown fluid encountered in transverse. No active bleeding seen. Small Hemorrhoids seen.

## 2022-02-17 NOTE — PROGRESS NOTE ADULT - NUTRITIONAL ASSESSMENT
This patient has been assessed with a concern for Malnutrition and has been determined to have a diagnosis/diagnoses of Severe protein-calorie malnutrition.    This patient is being managed with:   Diet NPO with Tube Feed-  Tube Feeding Modality: Gastrostomy  Glucerna 1.5 Dhruv  Total Volume for 24 Hours (mL): 960  Continuous  Starting Tube Feed Rate {mL per Hour}: 10  Increase Tube Feed Rate by (mL): 10     Every hour  Until Goal Tube Feed Rate (mL per Hour): 40  Tube Feed Duration (in Hours): 24  Tube Feed Start Time: 13:10  Entered: Feb 16 2022  1:26PM

## 2022-02-17 NOTE — CHART NOTE - NSCHARTNOTEFT_GEN_A_CORE
called deven Moses over phone. updated regarding pt condition, and plan for discharge. all question answered.

## 2022-02-17 NOTE — PROGRESS NOTE ADULT - PROBLEM SELECTOR PLAN 1
-in setting of acute anemia from GIB  -s/p 2 pRBC  -HD stable, not requiring pressors, was sent to ICU  -Monitor BP -in setting of acute anemia from GIB  -s/p 2 pRBC  -HD stable, not requiring pressors, was sent to ICU due to hypotension,   -Monitor BP

## 2022-02-17 NOTE — PROGRESS NOTE ADULT - PROBLEM SELECTOR PLAN 8
Severe protein-energy malnutrition  - hold Tube feed via PEG on hold in setting of GI bleed Severe protein-energy malnutrition  - C/w tube feed via peg

## 2022-02-17 NOTE — PROGRESS NOTE ADULT - SUBJECTIVE AND OBJECTIVE BOX
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  . Pt is awake, but non verbal with the tracheostomy. Medical history obtained from son bedside and ED physicin.   Per EMS, pt appeared short of breath, hypoxic to 80s, was bagged and suctioned out blood/clots, and oxygenation improved.   Per Son, Patient currently being treated for pneumonia wth Vanc/ZOsyn in the NH.  Pt was admitted here at [facility] in Dec and also had a cardiac arrest during the hospital stay, Pt was later noted to have Hypoxia from Aspiration Pna and was Intubated and Trach as placed in Jan 2022. Pt now is DNR/DNI    In the ED,   Pt appears comfortable, awake, alert, non verbal, able to follow minimal commands   /86, hr 110, SPO2 100%  CXR-   L sided plueral effusion and RLL infiltrate   hb 6.9, FOBT+  1UPRBC ordered    (13 Feb 2022 21:02)    GI HPI:  Pt s/p EGD/colonoscopy yesterday. Pt transferred to SCU from ICU yesterday. Pt with no melena or hematochezia. Pt with dark brown stools x2 yesterday per nursing.         ______________________________________________________________________  PMH/PSH:  PAST MEDICAL & SURGICAL HISTORY:  Hypertension    Diabetes    High cholesterol    Primary osteoarthritis of both knees    Coronary artery disease of native artery of native heart with stable angina pectoris    Allergic bronchitis    Diabetic retinopathy    Oral cancer    SCC (squamous cell carcinoma)    Metastatic cancer    Recurrent disease    Edema of extremity    Hyponatremia    Microalbuminuria    Neuralgia    Obstructive sleep apnea, adult    Vitamin D deficiency    S/P knee replacement  b/l    S/P hernia repair  b/l    Status post cataract extraction and insertion of intraocular lens, unspecified laterality  b/l    History of surgery  On 9/10/20 he underwent right hemiglossectomy and partial neck dissection with Dr. Darinel Servin at [facility].      ______________________________________________________________________  MEDS:  MEDICATIONS  (STANDING):  atorvastatin 40 milliGRAM(s) Oral at bedtime  chlorhexidine 0.12% Liquid 15 milliLiter(s) Oral Mucosa every 12 hours  collagenase Ointment 1 Application(s) Topical daily  glucagon  Injectable 1 milliGRAM(s) IntraMuscular once  insulin lispro (ADMELOG) corrective regimen sliding scale   SubCutaneous every 6 hours  midodrine. 5 milliGRAM(s) Oral three times a day  pantoprazole  Injectable 40 milliGRAM(s) IV Push every 12 hours    MEDICATIONS  (PRN):  acetaminophen    Suspension .. 650 milliGRAM(s) Oral every 6 hours PRN Moderate Pain (4 - 6)    ______________________________________________________________________  ALL:   Allergies    No Known Allergies    Intolerances      ______________________________________________________________________  SH: Social History:  Pt stays at the NH (13 Feb 2022 21:02)    ______________________________________________________________________  FH:  FAMILY HISTORY:  Family history of acute myocardial infarction (Sibling)      ______________________________________________________________________  ROS:    CONSTITUTIONAL:  No weight loss, fever, chills, weakness or fatigue.    HEENT:  Eyes:  No visual loss, blurred vision, double vision or yellow sclerae. Ears, Nose, Throat:  No hearing loss, sneezing, congestion, runny nose or sore throat.    SKIN:  No rash or itching.    CARDIOVASCULAR:  No chest pain, chest pressure or chest discomfort. No palpitations or edema.    RESPIRATORY:  No shortness of breath, cough or sputum.    GASTROINTESTINAL:  SEE HPI    GENITOURINARY:  No dysuria, hematuria, urinary frequency    NEUROLOGICAL:  No headache, dizziness, syncope, paralysis, ataxia, numbness or tingling in the extremities. No change in bowel or bladder control.    MUSCULOSKELETAL:  No muscle, back pain, joint pain or stiffness.    HEMATOLOGIC:  No anemia, bleeding or bruising.    LYMPHATICS:  No enlarged nodes. No history of splenectomy.    PSYCHIATRIC:  No history of depression or anxiety.    ENDOCRINOLOGIC:  No reports of sweating, cold or heat intolerance. No polyuria or polydipsia.    ALLERGIES:  No history of asthma, hives, eczema or rhinitis.  ______________________________________________________________________  PHYSICAL EXAM:  T(C): 38.3 (02-17-22 @ 11:30), Max: 38.3 (02-17-22 @ 11:30)  HR: 111 (02-17-22 @ 04:55)  BP: 134/71 (02-17-22 @ 04:55)  RR: 19 (02-17-22 @ 04:55)  SpO2: 99% (02-17-22 @ 04:55)  Wt(kg): --    02-16 - 02-17  --------------------------------------------------------  IN:    Glucerna 1.5: 60 mL  Total IN: 60 mL    OUT:    Rectal Tube (mL): 700 mL  Total OUT: 700 mL    Total NET: -640 mL          GEN: NAD   HEENT: trach noted   CVS- S1 S2  ABD: soft, nontender, non distended, bowel sounds+, g tube present (dry and intact)  NEURO: alert and awake  Extremities: no cyanosis, no calf tenderness, no edema, no clubbing      ______________________________________________________________________  LABS:                        8.6    12.50 )-----------( 349      ( 17 Feb 2022 07:26 )             27.7     02-17    148<H>  |  114<H>  |  15  ----------------------------<  152<H>  3.6   |  30  |  0.44<L>    Ca    9.9      17 Feb 2022 07:26  Phos  2.9     02-17  Mg     2.7     02-17    TPro  4.6<L>  /  Alb  1.4<L>  /  TBili  0.3  /  DBili  x   /  AST  36  /  ALT  11  /  AlkPhos  133<H>  02-17    LIVER FUNCTIONS - ( 17 Feb 2022 07:26 )  Alb: 1.4 g/dL / Pro: 4.6 g/dL / ALK PHOS: 133 U/L / ALT: 11 U/L DA / AST: 36 U/L / GGT: x           ______________________________________________________________________  IMAGING:    < from: CT Angio Abdomen and Pelvis w/ IV Cont (02.15.22 @ 15:58) >    ACC: [id number] EXAM:  CT ANGIO ABD PELV (W)AW IC                          PROCEDURE DATE:  02/15/2022      INTERPRETATION:  CLINICAL INFORMATION: 76 years  Male with R/o GI bleed.    COMPARISON: 1/18/2022    CONTRAST/COMPLICATIONS:  IV Contrast: Omnipaque 350  90 cc administered   10 cc discarded  Oral Contrast: NONE  Complications: None reported at time of study completion    PROCEDURE:  CT of the Abdomen and Pelvis was performed.  Precontrast, Arterial and Delayed phases were performed.  Sagittal and coronal reformats were performed.    FINDINGS:  LOWER CHEST: Previously seen right middle lobe cavity now measures up to   5.2 cm (previously 4.8 cm) and contains a new air-fluid level and mural   nodularity. Left lower lobe cavity partially imaged measuring   approximately 4.4 cm with air-fluid level, unchanged. Dense left lower   lobe consolidation unchanged. Increasing nodular right lower lobe   infiltrate. Developing right pleural effusion. Reidentified left pleural   effusion with surrounding nodular pleural thickening area Dense coronary   atherosclerosis reidentified.    LIVER: Steatosis.  BILE DUCTS: Normal caliber.  GALLBLADDER: Mildly distended.  SPLEEN: Within normal limits.  PANCREAS: Within normal limits.  ADRENALS: Within normal limits.  KIDNEYS/URETERS: Within normal limits.    BLADDER: Layering high density in the bladder.  REPRODUCTIVE ORGANS: Prostate within normal limits.    BOWEL: No bowel obstruction. Appendix is not visualized and cannot be   assessed. Percutaneous gastrostomy tube in satisfactory position. Prior   small bowel obstruction has resolved. Metallic structure in the   descending colon (5:59), likely hemostatic clip. Similar structure in the   gastric fundus. No intraluminal contrast extravasation. No pneumatosis.  PERITONEUM: Small ascites. No pneumoperitoneum.  VESSELS: Atherosclerotic changes. No portal venous gas.  RETROPERITONEUM/LYMPH NODES: No lymphadenopathy.  ABDOMINAL WALL: Marked anasarca, increased from prior.  BONES: Degenerative changes.    IMPRESSION:    No CT evidence of active GI bleed.    Increasing right middle lobe lung cavity now with air-fluid level. Left   lower lobe cavity unchanged. Dense left lower lobe infiltrate unchanged.   Small but complex left pleural effusion, possibly empyema, unchanged.   Increasing right pleural effusion.    Prior small bowel obstruction has resolved.    Probable hemostatic clips in the stomach and descending colon.    Layering high density material in the bladder, new compared with prior.

## 2022-02-17 NOTE — PROGRESS NOTE ADULT - PROBLEM SELECTOR PLAN 7
Metastic Lung cancer  - Worsening per CT scan  - palliative consulted on prev adm  - care as per Heme/Onc. - Worsening per CT scan  - palliative consulted on prev adm  - care as per Heme/Onc.

## 2022-02-18 NOTE — PROGRESS NOTE ADULT - PROBLEM SELECTOR PLAN 2
- likely lower  - no coffee ground emesis, hematemesis, overnight  - S/p EGD/colonoscopy on 16th, no acute bleeding  - C/w PPI BID  - GI Dr Gross

## 2022-02-18 NOTE — PROGRESS NOTE ADULT - PROBLEM SELECTOR PLAN 5
- Resumed Abx for Vanc and Cefepime day 1  -CXR w/ increased infiltrates  - C/w asp precaution   - Cont HOB

## 2022-02-18 NOTE — PROGRESS NOTE ADULT - PROBLEM SELECTOR PLAN 1
-in setting of acute anemia from GIB  -s/p 2 pRBC  -HD stable, not requiring pressors, was sent to ICU due to hypotension,   -Monitor BP

## 2022-02-18 NOTE — PROGRESS NOTE ADULT - ASSESSMENT
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 Recent hospitalization (12/20/2021- 1/28/22) notable for cardiac arrest, aspiration pna, gastric ulcer s/p clipping and ventilator dependent respiratory failure s/p Trach/ PEG discharged to NH. BIBEMS from NH (2/13/22) for bloody stools and blood in trach noted at the facility Adm to  for hypovolemic shock 2/2 acute anemia 2/2 GIB . s/p 2 untis PRBCS. Pt  sent to ICU for hypotension requiring pressors, however was dowgraded to  for HD stable not requiring pressor. 2/16 s/p EGD and colonoscopy no active bleeding noted.     ICU Course:  Patient with HGB 6.7, received second pRBC. GI Gross consulted. Patient was prepped overnight and received EGD+ colonoscopy which showed no active bleeding. Tube feeds started. Patient did not require pressors. Patient had WBC of 11k and fever to 100.4 prior to downgrade     2/16. downgrade to   2/18- Febrile overnight- T-max 101.5, lactate 2.2.  CXR w/ increased infiltrates.  Pan cx including sputum, IV hydration, Vanc and Cefepime for PNA.

## 2022-02-18 NOTE — PROGRESS NOTE ADULT - SUBJECTIVE AND OBJECTIVE BOX
Patient is a 76y old  Male who presents with a chief complaint of GIB/blood transfusion/esophagus ulcer, continue PPI IVP bid, monitor H/H transfusion if <7, GI follow up      INTERVAL HPI/OVERNIGHT EVENTS:  T(C): 37.9 (02-18-22 @ 07:50), Max: 38.6 (02-18-22 @ 05:57)  HR: 83 (02-18-22 @ 08:25) (83 - 110)  BP: 105/63 (02-18-22 @ 04:50) (105/63 - 124/69)  RR: 18 (02-18-22 @ 04:50) (18 - 20)  SpO2: 97% (02-18-22 @ 08:25) (97% - 100%)  Wt(kg): --    LABS:                        8.5    11.64 )-----------( 300      ( 18 Feb 2022 07:04 )             26.7     02-18    148<H>  |  114<H>  |  20<H>  ----------------------------<  186<H>  4.1   |  30  |  0.54    Ca    9.7      18 Feb 2022 07:04  Phos  2.9     02-17  Mg     2.7     02-17    TPro  4.5<L>  /  Alb  1.2<L>  /  TBili  0.3  /  DBili  x   /  AST  27  /  ALT  10  /  AlkPhos  136<H>  02-18        CAPILLARY BLOOD GLUCOSE      POCT Blood Glucose.: 182 mg/dL (18 Feb 2022 06:09)  POCT Blood Glucose.: 189 mg/dL (17 Feb 2022 23:43)  POCT Blood Glucose.: 150 mg/dL (17 Feb 2022 17:13)  POCT Blood Glucose.: 210 mg/dL (17 Feb 2022 13:08)  POCT Blood Glucose.: 195 mg/dL (17 Feb 2022 11:46)        RADIOLOGY & ADDITIONAL TESTS:    Consultant(s) Notes Reviewed:  [x ] YES  [ ] NO    PHYSICAL EXAM:  GENERAL: well built, well nourished  HEAD:  Atraumatic, Normocephalic  EYES: EOMI, PERRLA, conjunctiva and sclera clear  ENT: No tonsillar erythema, exudates, or enlargement; Moist mucous membranes, Good dentition, No lesions  NECK: Supple, No JVD, Normal thyroid, no enlarged nodes, trach  NERVOUS SYSTEM:  awake  CHEST/LUNG: B/L good air entry; No rales, rhonchi, or wheezing  HEART: S1S2 normal, no S3, Regular rate and rhythm; No murmurs, rubs, or gallops  ABDOMEN: Soft, Nontender, Nondistended; Bowel sounds present, GT in place  EXTREMITIES:  2+ Peripheral Pulses, No clubbing, cyanosis, B/L arm edema  LYMPH: No lymphadenopathy noted  SKIN: multiple open wound    Care Discussed with Consultants/Other Providers [ x] YES  [ ] NO

## 2022-02-18 NOTE — PROGRESS NOTE ADULT - ASSESSMENT
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 Recent hospitalization (12/20/2021- 1/28/22) notable for cardiac arrest, aspiration pna, gastric ulcer s/p clipping and ventilator dependent respiratory failure s/p Trach/ PEG discharged to NH. BIBEMS from NH (2/13/22) for bloody stools and blood in trach noted at the facility Adm to ICU for hypovolemic shock 2/2 acute anemia 2/2 GIB, S/P ICU care, S/P blood transfusion, PPI, EGD shoed esophagus ulcer, no active bleeding, S/P colonoscopy , no active bleeding, continue PPI, suction prn, GI follow up, transfusion if H <7. DVT prophylaxis.

## 2022-02-18 NOTE — PROGRESS NOTE ADULT - SUBJECTIVE AND OBJECTIVE BOX
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  . Pt is awake, but non verbal with the tracheostomy. Medical history obtained from son bedside and ED physicin.   Per EMS, pt appeared short of breath, hypoxic to 80s, was bagged and suctioned out blood/clots, and oxygenation improved.   Per Son, Patient currently being treated for pneumonia wth Vanc/ZOsyn in the NH.  Pt was admitted here at [facility] in Dec and also had a cardiac arrest during the hospital stay, Pt was later noted to have Hypoxia from Aspiration Pna and was Intubated and Trach as placed in Jan 2022. Pt now is DNR/DNI    In the ED,   Pt appears comfortable, awake, alert, non verbal, able to follow minimal commands   /86, hr 110, SPO2 100%  CXR-   L sided plueral effusion and RLL infiltrate   hb 6.9, FOBT+  1UPRBC ordered    (13 Feb 2022 21:02)          ______________________________________________________________________  PMH/PSH:  PAST MEDICAL & SURGICAL HISTORY:  Hypertension    Diabetes    High cholesterol    Primary osteoarthritis of both knees    Coronary artery disease of native artery of native heart with stable angina pectoris    Allergic bronchitis    Diabetic retinopathy    Oral cancer    SCC (squamous cell carcinoma)    Metastatic cancer    Recurrent disease    Edema of extremity    Hyponatremia    Microalbuminuria    Neuralgia    Obstructive sleep apnea, adult    Vitamin D deficiency    S/P knee replacement  b/l    S/P hernia repair  b/l    Status post cataract extraction and insertion of intraocular lens, unspecified laterality  b/l    History of surgery  On 9/10/20 he underwent right hemiglossectomy and partial neck dissection with Dr. Darinel Servin at [facility].      ______________________________________________________________________  MEDS:  MEDICATIONS  (STANDING):  atorvastatin 40 milliGRAM(s) Oral at bedtime  chlorhexidine 0.12% Liquid 15 milliLiter(s) Oral Mucosa every 12 hours  collagenase Ointment 1 Application(s) Topical daily  glucagon  Injectable 1 milliGRAM(s) IntraMuscular once  insulin lispro (ADMELOG) corrective regimen sliding scale   SubCutaneous every 6 hours  lactated ringers. 500 milliLiter(s) (500 mL/Hr) IV Continuous <Continuous>  midodrine. 5 milliGRAM(s) Oral three times a day  pantoprazole  Injectable 40 milliGRAM(s) IV Push every 12 hours    MEDICATIONS  (PRN):  acetaminophen    Suspension .. 650 milliGRAM(s) Oral every 6 hours PRN Temp greater or equal to 38C (100.4F), Mild Pain (1 - 3)    ______________________________________________________________________  ALL:   Allergies    No Known Allergies    Intolerances      ______________________________________________________________________  SH: Social History:  Pt stays at the NH (13 Feb 2022 21:02)    ______________________________________________________________________  FH:  FAMILY HISTORY:  Family history of acute myocardial infarction (Sibling)      ______________________________________________________________________  ROS:    Unable to assess due to mental status  ______________________________________________________________________  PHYSICAL EXAM:  T(C): 37.9 (02-18-22 @ 07:50), Max: 38.6 (02-18-22 @ 05:57)  HR: 83 (02-18-22 @ 08:25)  BP: 105/63 (02-18-22 @ 04:50)  RR: 18 (02-18-22 @ 04:50)  SpO2: 97% (02-18-22 @ 08:25)  Wt(kg): --      GEN: NAD   CVS- S1 S2  ABD: soft nontender, non distended, bowel sounds+. PEG site D/C/I  LUNGS: clear to auscultation. trach  NEURO: alert. Unable to fully assess due to mental status   Extremities: no cyanosis, no calf tenderness, no edema, no clubbing      ______________________________________________________________________  LABS:                        8.5    11.64 )-----------( 300      ( 18 Feb 2022 07:04 )             26.7     02-18    148<H>  |  114<H>  |  20<H>  ----------------------------<  186<H>  4.1   |  30  |  0.54    Ca    9.7      18 Feb 2022 07:04  Phos  2.9     02-17  Mg     2.7     02-17    TPro  4.5<L>  /  Alb  1.2<L>  /  TBili  0.3  /  DBili  x   /  AST  27  /  ALT  10  /  AlkPhos  136<H>  02-18    LIVER FUNCTIONS - ( 18 Feb 2022 07:04 )  Alb: 1.2 g/dL / Pro: 4.5 g/dL / ALK PHOS: 136 U/L / ALT: 10 U/L DA / AST: 27 U/L / GGT: x           ______________________________________________________________________  IMAGING:    ______________________________________________________________________  < from: CT Angio Abdomen and Pelvis w/ IV Cont (02.15.22 @ 15:58) >  ACC: [id number] EXAM:  CT ANGIO ABD PELV (W)AW IC                          PROCEDURE DATE:  02/15/2022          INTERPRETATION:  CLINICAL INFORMATION: 76 years  Male with R/o GI bleed.    COMPARISON: 1/18/2022    CONTRAST/COMPLICATIONS:  IV Contrast:Omnipaque 350  90 cc administered   10 cc discarded  Oral Contrast: NONE  Complications: None reported at time of study completion    PROCEDURE:  CT of the Abdomen and Pelvis was performed.  Precontrast, Arterial and Delayed phases were performed.  Sagittal and coronal reformats were performed.    FINDINGS:  LOWER CHEST: Previously seen right middle lobe cavity now measures up to   5.2 cm (previously 4.8 cm) and contains a new air-fluid level and mural   nodularity. Left lower lobe cavity partially imaged measuring   approximately 4.4 cm with air-fluid level, unchanged. Dense left lower   lobe consolidation unchanged. Increasing nodular right lower lobe   infiltrate. Developing right pleural effusion. Reidentified left pleural   effusion withsurrounding nodular pleural thickening area Dense coronary   atherosclerosis reidentified.    LIVER: Steatosis.  BILE DUCTS: Normal caliber.  GALLBLADDER: Mildly distended.  SPLEEN: Within normal limits.  PANCREAS: Within normal limits.  ADRENALS: Within normal limits.  KIDNEYS/URETERS: Within normal limits.    BLADDER: Layering high density in the bladder.  REPRODUCTIVE ORGANS: Prostate within normal limits.    BOWEL: No bowel obstruction. Appendix is not visualized and cannot be   assessed. Percutaneous gastrostomy tube in satisfactory position. Prior   small bowel obstruction has resolved. Metallic structure in the   descending colon (5:59), likely hemostatic clip. Similar structure in the   gastric fundus. No intraluminal contrast extravasation. No pneumatosis.  PERITONEUM: Small ascites. No pneumoperitoneum.  VESSELS: Atherosclerotic changes. No portal venous gas.  RETROPERITONEUM/LYMPH NODES: No lymphadenopathy.  ABDOMINAL WALL: Marked anasarca, increased from prior.  BONES: Degenerative changes.    IMPRESSION:    No CT evidence of active GI bleed.    Increasing right middle lobe lung cavity now with air-fluid level. Left   lower lobe cavity unchanged. Dense left lower lobe infiltrate unchanged.   Small but complex left pleural effusion, possibly empyema, unchanged.   Increasing right pleural effusion.    Prior small bowel obstruction has resolved.    Probable hemostatic clips in the stomach and descending colon.    Layering high density material in the bladder, new comparedwith prior.        --- End of Report ---      < end of copied text >

## 2022-02-18 NOTE — CHART NOTE - NSCHARTNOTEFT_GEN_A_CORE
Case discussed with pt's daughter Sandi and provided her updates regarding pt's current medical condition.  Plan of care discussed and all questions answered.

## 2022-02-18 NOTE — PROGRESS NOTE ADULT - SUBJECTIVE AND OBJECTIVE BOX
MELISSA JOHNS    SCU progress note    INTERVAL HPI/OVERNIGHT EVENTS: Febrile overnight- T-max 101.5    DNR [X ]   DNI  [  ]- Do everything except for chest compression.    Covid - 19 PCR:     The 4Ms    What Matters Most: see GOC  Age appropriate Medications/Screen for High Risk Medication: Yes  Mentation: see CAM below  Mobility: defer to physical exam    The Confusion Assessment Method (CAM) Diagnostic Algorithm     1: Acute Onset or Fluctuating Course  - Is there evidence of an acute change in mental status from the patient’s baseline? Did the (abnormal) behavior  fluctuate during the day, that is, tend to come and go, or increase and decrease in severity?  [ ] YES [x ] NO     2: Inattention  - Did the patient have difficulty focusing attention, being easily distractible, or having difficulty keeping track of what was being said?  [ ] YES [x ] NO     3: Disorganized thinking  -Was the patient’s thinking disorganized or incoherent, such as rambling or irrelevant conversation, unclear or illogical flow of ideas, or unpredictable switching from subject to subject?  [ ] YES [ ] NO- Unable to assess    4: Altered Level of consciousness?  [ ] YES [ ] NO- Unable to assess    The diagnosis of delirium by CAM requires the presence of features 1 and 2 and either 3 or 4.    PRESSORS: [ ] YES [x ] NO  cefepime   IVPB      cefepime   IVPB 2000 milliGRAM(s) IV Intermittent every 8 hours  vancomycin  IVPB      vancomycin  IVPB 1000 milliGRAM(s) IV Intermittent every 12 hours    Cardiovascular:  Heart Failure  Acute   Acute on Chronic  Chronic       midodrine. 5 milliGRAM(s) Oral three times a day    Pulmonary:    Hematalogic:    Other:  acetaminophen    Suspension .. 650 milliGRAM(s) Oral every 6 hours PRN  atorvastatin 40 milliGRAM(s) Oral at bedtime  chlorhexidine 0.12% Liquid 15 milliLiter(s) Oral Mucosa every 12 hours  collagenase Ointment 1 Application(s) Topical daily  glucagon  Injectable 1 milliGRAM(s) IntraMuscular once  insulin lispro (ADMELOG) corrective regimen sliding scale   SubCutaneous every 6 hours  lactated ringers. 500 milliLiter(s) IV Continuous <Continuous>  pantoprazole  Injectable 40 milliGRAM(s) IV Push every 12 hours    acetaminophen    Suspension .. 650 milliGRAM(s) Oral every 6 hours PRN  atorvastatin 40 milliGRAM(s) Oral at bedtime  cefepime   IVPB      cefepime   IVPB 2000 milliGRAM(s) IV Intermittent every 8 hours  chlorhexidine 0.12% Liquid 15 milliLiter(s) Oral Mucosa every 12 hours  collagenase Ointment 1 Application(s) Topical daily  glucagon  Injectable 1 milliGRAM(s) IntraMuscular once  insulin lispro (ADMELOG) corrective regimen sliding scale   SubCutaneous every 6 hours  lactated ringers. 500 milliLiter(s) IV Continuous <Continuous>  midodrine. 5 milliGRAM(s) Oral three times a day  pantoprazole  Injectable 40 milliGRAM(s) IV Push every 12 hours  vancomycin  IVPB      vancomycin  IVPB 1000 milliGRAM(s) IV Intermittent every 12 hours    Drug Dosing Weight  Height (cm): 175.2 (2022 15:23)  Weight (kg): 78 (2022 18:39)  BMI (kg/m2): 25.4 (2022 15:23)  BSA (m2): 1.94 (2022 15:23)    CENTRAL LINE: [ ] YES [x ] NO  LOCATION:   DATE INSERTED:  REMOVE: [ ] YES [ ] NO  EXPLAIN:    HUTCHINS: [ ] YES [x ] NO    DATE INSERTED:  REMOVE:  [ ] YES [ ] NO  EXPLAIN:    PAST MEDICAL & SURGICAL HISTORY:  Hypertension    Diabetes    High cholesterol    Primary osteoarthritis of both knees    Coronary artery disease of native artery of native heart with stable angina pectoris    Allergic bronchitis    Diabetic retinopathy    Oral cancer    SCC (squamous cell carcinoma)    Metastatic cancer    Recurrent disease    Edema of extremity    Hyponatremia    Microalbuminuria    Neuralgia    Obstructive sleep apnea, adult    Vitamin D deficiency    S/P knee replacement  b/l    S/P hernia repair  b/l    Status post cataract extraction and insertion of intraocular lens, unspecified laterality  b/l    History of surgery  On 9/10/20 he underwent right hemiglossectomy and partial neck dissection with Dr. Darinel Servin at [facility].                  Mode: AC/ CMV (Assist Control/ Continuous Mandatory Ventilation)  RR (machine): 14  TV (machine): 450  FiO2: 40  PEEP: 5  ITime: 0.8  MAP: 9  PIP: 27      PHYSICAL EXAM:    GENERAL: NAD, well-groomed, well-developed  HEAD:  Atraumatic, Normocephalic  EYES: PERRLA, conjunctiva and sclera clear  ENMT: No tonsillar erythema, exudates, or enlargement; Moist mucous membranes, Good dentition, No lesions  NECK: + trach, Supple, No JVD, Normal thyroid  NERVOUS SYSTEM:  Alert & awake, following commands, all extremities w/ equal strength   CHEST/LUNG: B/l lungs w/ rhonchi, lft lung>rt, no increase WOB,   HEART: Regular rate and rhythm; No murmurs, rubs, or gallops  ABDOMEN: peg intact, Soft, Nontender, Nondistended; Bowel sounds present  EXTREMITIES:  anasarca, 2+ Peripheral Pulses, No clubbing, cyanosis,  LYMPH: No lymphadenopathy noted  SKIN: healed rt ear wound w/ scab, unstageable sacral wound        LABS:  CBC Full  -  ( 2022 07:04 )  WBC Count : 11.64 K/uL  RBC Count : 2.92 M/uL  Hemoglobin : 8.5 g/dL  Hematocrit : 26.7 %  Platelet Count - Automated : 300 K/uL  Mean Cell Volume : 91.4 fl  Mean Cell Hemoglobin : 29.1 pg  Mean Cell Hemoglobin Concentration : 31.8 gm/dL  Auto Neutrophil # : x  Auto Lymphocyte # : x  Auto Monocyte # : x  Auto Eosinophil # : x  Auto Basophil # : x  Auto Neutrophil % : x  Auto Lymphocyte % : x  Auto Monocyte % : x  Auto Eosinophil % : x  Auto Basophil % : x    02-18    148<H>  |  114<H>  |  20<H>  ----------------------------<  186<H>  4.1   |  30  |  0.54    Ca    9.7      2022 07:04  Phos  2.9     02-17  Mg     2.7     02-17    TPro  4.5<L>  /  Alb  1.2<L>  /  TBili  0.3  /  DBili  x   /  AST  27  /  ALT  10  /  AlkPhos  136<H>  02-18      Urinalysis Basic - ( 2022 12:00 )    Color: Yellow / Appearance: Clear / S.015 / pH: x  Gluc: x / Ketone: Trace  / Bili: Negative / Urobili: 1   Blood: x / Protein: 30 mg/dL / Nitrite: Negative   Leuk Esterase: Negative / RBC: Negative /HPF / WBC 0-2 /HPF   Sq Epi: x / Non Sq Epi: Occasional /HPF / Bacteria: Negative /HPF            [  ]  DVT Prophylaxis  [  ]  Nutrition, Brand, Rate         Goal Rate        Abnormal Nutritional Status -  Malnutrition   Cachexia      Morbid Obesity BMI >/=40    RADIOLOGY & ADDITIONAL STUDIES:  ***    Goals of Care Discussion with Family/Proxy/Other   - see note from/family meeting set up for...

## 2022-02-19 NOTE — PROGRESS NOTE ADULT - SUBJECTIVE AND OBJECTIVE BOX
MELISSA JOHNS    SCU progress note    INTERVAL HPI/OVERNIGHT EVENTS: ***Febrile Tmax 101.5.  Blood cultures and urine culture sent    DNR [x ]   DNI  [  ]    Covid - 19 PCR:     The 4Ms    What Matters Most: see GOC  Age appropriate Medications/Screen for High Risk Medication: Yes  Mentation: see CAM below  Mobility: defer to physical exam    The Confusion Assessment Method (CAM) Diagnostic Algorithm     1: Acute Onset or Fluctuating Course  - Is there evidence of an acute change in mental status from the patient’s baseline? Did the (abnormal) behavior  fluctuate during the day, that is, tend to come and go, or increase and decrease in severity?  [ ] YES [x ] NO     2: Inattention  - Did the patient have difficulty focusing attention, being easily distractible, or having difficulty keeping track of what was being said?  [ ] YES [x ] NO     3: Disorganized thinking  -Was the patient’s thinking disorganized or incoherent, such as rambling or irrelevant conversation, unclear or illogical flow of ideas, or unpredictable switching from subject to subject?  [ ] YES [x ] NO    4: Altered Level of consciousness?  [ ] YES [ x] NO    The diagnosis of delirium by CAM requires the presence of features 1 and 2 and either 3 or 4.    PRESSORS: [ ] YES [x ] NO  cefepime   IVPB      cefepime   IVPB 2000 milliGRAM(s) IV Intermittent every 8 hours  vancomycin  IVPB 1000 milliGRAM(s) IV Intermittent every 12 hours  vancomycin  IVPB        Cardiovascular:  Heart Failure  Acute   Acute on Chronic  Chronic       furosemide   Injectable 20 milliGRAM(s) IV Push every 6 hours  midodrine. 5 milliGRAM(s) Oral three times a day    Pulmonary:    Hematalogic:    Other:  acetaminophen    Suspension .. 650 milliGRAM(s) Oral every 6 hours PRN  albumin human 25% IVPB 50 milliLiter(s) IV Intermittent every 6 hours  atorvastatin 40 milliGRAM(s) Oral at bedtime  chlorhexidine 0.12% Liquid 15 milliLiter(s) Oral Mucosa every 12 hours  collagenase Ointment 1 Application(s) Topical daily  glucagon  Injectable 1 milliGRAM(s) IntraMuscular once  insulin lispro (ADMELOG) corrective regimen sliding scale   SubCutaneous every 6 hours  lactated ringers. 500 milliLiter(s) IV Continuous <Continuous>  pantoprazole  Injectable 40 milliGRAM(s) IV Push every 12 hours  potassium phosphate / sodium phosphate Powder (PHOS-NaK) 1 Packet(s) Oral once    acetaminophen    Suspension .. 650 milliGRAM(s) Oral every 6 hours PRN  albumin human 25% IVPB 50 milliLiter(s) IV Intermittent every 6 hours  atorvastatin 40 milliGRAM(s) Oral at bedtime  cefepime   IVPB      cefepime   IVPB 2000 milliGRAM(s) IV Intermittent every 8 hours  chlorhexidine 0.12% Liquid 15 milliLiter(s) Oral Mucosa every 12 hours  collagenase Ointment 1 Application(s) Topical daily  furosemide   Injectable 20 milliGRAM(s) IV Push every 6 hours  glucagon  Injectable 1 milliGRAM(s) IntraMuscular once  insulin lispro (ADMELOG) corrective regimen sliding scale   SubCutaneous every 6 hours  lactated ringers. 500 milliLiter(s) IV Continuous <Continuous>  midodrine. 5 milliGRAM(s) Oral three times a day  pantoprazole  Injectable 40 milliGRAM(s) IV Push every 12 hours  potassium phosphate / sodium phosphate Powder (PHOS-NaK) 1 Packet(s) Oral once  vancomycin  IVPB 1000 milliGRAM(s) IV Intermittent every 12 hours  vancomycin  IVPB        Drug Dosing Weight  Height (cm): 175.2 (2022 15:23)  Weight (kg): 78 (2022 18:39)  BMI (kg/m2): 25.4 (2022 15:23)  BSA (m2): 1.94 (2022 15:23)    CENTRAL LINE: [ ] YES [x ] NO  LOCATION:   DATE INSERTED:  REMOVE: [ ] YES [ ] NO  EXPLAIN:    HUTCHINS: [ ] YES [x ] NO    DATE INSERTED:  REMOVE:  [ ] YES [ ] NO  EXPLAIN:    PAST MEDICAL & SURGICAL HISTORY:  Hypertension    Diabetes    High cholesterol    Primary osteoarthritis of both knees    Coronary artery disease of native artery of native heart with stable angina pectoris    Allergic bronchitis    Diabetic retinopathy    Oral cancer    SCC (squamous cell carcinoma)    Metastatic cancer    Recurrent disease    Edema of extremity    Hyponatremia    Microalbuminuria    Neuralgia    Obstructive sleep apnea, adult    Vitamin D deficiency    S/P knee replacement  b/l    S/P hernia repair  b/l    Status post cataract extraction and insertion of intraocular lens, unspecified laterality  b/l    History of surgery  On 9/10/20 he underwent right hemiglossectomy and partial neck dissection with Dr. Darinel Servin at [facility].                 @ 07:01  -   @ 07:00  --------------------------------------------------------  IN: 0 mL / OUT: 0 mL / NET: 0 mL        Mode: AC/ CMV (Assist Control/ Continuous Mandatory Ventilation)  RR (machine): 14  TV (machine): 450  FiO2: 40  PEEP: 5  ITime: 0.8  MAP: 10  PIP: 29      PHYSICAL EXAM:    GENERAL: NAD, cachectic with temporal and muscle waisting  HEAD:  Atraumatic, Normocephalic  EYES: EOMI, PERRLA, conjunctiva and sclera clear  ENMT: No tonsillar erythema, exudates  NECK: Supple, No JVD, tracheostomy intact  NERVOUS SYSTEM:  Awake and alert. Able to answer simple question   CHEST/LUNG: Diminished breath sounds bilaterally with scattered rhonchi notes  HEART: Regular rate and rhythm; No murmurs, rubs, or gallops  ABDOMEN: Soft, Nontender, Nondistended; Bowel sounds present; Peg intact  EXTREMITIES: +Muscle waisting +3 pitting edema all extremities  2+ Peripheral Pulses, No clubbing or cyanosis  LYMPH: No lymphadenopathy noted  SKIN: Healed right ear wound; Unstageable sacral wound.      LABS:  CBC Full  -  ( 2022 06:47 )  WBC Count : 12.75 K/uL  RBC Count : 2.90 M/uL  Hemoglobin : 8.1 g/dL  Hematocrit : 27.3 %  Platelet Count - Automated : 267 K/uL  Mean Cell Volume : 94.1 fl  Mean Cell Hemoglobin : 27.9 pg  Mean Cell Hemoglobin Concentration : 29.7 gm/dL  Auto Neutrophil # : x  Auto Lymphocyte # : x  Auto Monocyte # : x  Auto Eosinophil # : x  Auto Basophil # : x  Auto Neutrophil % : x  Auto Lymphocyte % : x  Auto Monocyte % : x  Auto Eosinophil % : x  Auto Basophil % : x        147<H>  |  114<H>  |  23<H>  ----------------------------<  212<H>  4.3   |  31  |  0.53    Ca    9.7      2022 06:47  Phos  2.1       Mg     2.4         TPro  4.5<L>  /  Alb  1.1<L>  /  TBili  0.2  /  DBili  x   /  AST  32  /  ALT  10  /  AlkPhos  138<H>        Urinalysis Basic - ( 2022 12:00 )    Color: Yellow / Appearance: Clear / S.015 / pH: x  Gluc: x / Ketone: Trace  / Bili: Negative / Urobili: 1   Blood: x / Protein: 30 mg/dL / Nitrite: Negative   Leuk Esterase: Negative / RBC: Negative /HPF / WBC 0-2 /HPF   Sq Epi: x / Non Sq Epi: Occasional /HPF / Bacteria: Negative /HPF            [  ]  DVT Prophylaxis  [  ]  Nutrition, Brand, Rate         Goal Rate        Abnormal Nutritional Status -  Malnutrition   Cachexia         RADIOLOGY & ADDITIONAL STUDIES:  ***  < from: Xray Chest 1 View- PORTABLE-Urgent (Xray Chest 1 View- PORTABLE-Urgent .) (22 @ 10:58) >  Elevated diaphragms crowds the chest. Heart magnified by technique.   Tracheostomy remains.    Present film shows scattered mid lower lung field infiltrates increased   on the left and essentially new on the right compared to .    IMPRESSION: Increasing infiltrates.    < end of copied text >  < from: CT Angio Abdomen and Pelvis w/ IV Cont (02.15.22 @ 15:58) >  PROCEDURE:  CT of the Abdomen and Pelvis was performed.  Precontrast, Arterial and Delayed phases were performed.  Sagittal and coronal reformats were performed.    FINDINGS:  LOWER CHEST: Previously seen right middle lobe cavity now measures up to   5.2 cm (previously 4.8 cm) and contains a new air-fluid level and mural   nodularity. Left lower lobe cavity partially imaged measuring   approximately 4.4 cm with air-fluid level, unchanged. Dense left lower   lobe consolidation unchanged. Increasing nodular right lower lobe   infiltrate. Developing right pleural effusion. Reidentified left pleural   effusion withsurrounding nodular pleural thickening area Dense coronary   atherosclerosis reidentified.    LIVER: Steatosis.  BILE DUCTS: Normal caliber.  GALLBLADDER: Mildly distended.  SPLEEN: Within normal limits.  PANCREAS: Within normal limits.  ADRENALS: Within normal limits.  KIDNEYS/URETERS: Within normal limits.    BLADDER: Layering high density in the bladder.  REPRODUCTIVE ORGANS: Prostate within normal limits.    BOWEL: No bowel obstruction. Appendix is not visualized and cannot be   assessed. Percutaneous gastrostomy tube in satisfactory position. Prior   small bowel obstruction has resolved. Metallic structure in the   descending colon (5:59), likely hemostatic clip. Similar structure in the   gastric fundus. No intraluminal contrast extravasation. No pneumatosis.  PERITONEUM: Small ascites. No pneumoperitoneum.  VESSELS: Atherosclerotic changes. No portal venous gas.  RETROPERITONEUM/LYMPH NODES: No lymphadenopathy.  ABDOMINAL WALL: Marked anasarca, increased from prior.  BONES: Degenerative changes.    IMPRESSION:    No CT evidence of active GI bleed.    Increasing right middle lobe lung cavity now with air-fluid level. Left   lower lobe cavity unchanged. Dense left lower lobe infiltrate unchanged.   Small but complex left pleural effusion, possibly empyema, unchanged.   Increasing right pleural effusion.    Prior small bowel obstruction has resolved.    Probable hemostatic clips in the stomach and descending colon.    Layering high density material in the bladder, new comparedwith prior.    < end of copied text >    Goals of Care Discussion with Family/Proxy/Other   - see note from 2/15     MELISSA JOHNS    SCU progress note    INTERVAL HPI/OVERNIGHT EVENTS: ***Febrile Tmax 101.5.  Blood cultures and urine culture sent    DNR [x ]   DNI  [  ]    Covid - 19 PCR:     The 4Ms    What Matters Most: see GOC  Age appropriate Medications/Screen for High Risk Medication: Yes  Mentation: see CAM below  Mobility: defer to physical exam    The Confusion Assessment Method (CAM) Diagnostic Algorithm     1: Acute Onset or Fluctuating Course  - Is there evidence of an acute change in mental status from the patient’s baseline? Did the (abnormal) behavior  fluctuate during the day, that is, tend to come and go, or increase and decrease in severity?  [ ] YES [x ] NO     2: Inattention  - Did the patient have difficulty focusing attention, being easily distractible, or having difficulty keeping track of what was being said?  [ ] YES [x ] NO     3: Disorganized thinking  -Was the patient’s thinking disorganized or incoherent, such as rambling or irrelevant conversation, unclear or illogical flow of ideas, or unpredictable switching from subject to subject?  [ ] YES [x ] NO    4: Altered Level of consciousness?  [ ] YES [ x] NO    The diagnosis of delirium by CAM requires the presence of features 1 and 2 and either 3 or 4.    PRESSORS: [ ] YES [x ] NO  cefepime   IVPB      cefepime   IVPB 2000 milliGRAM(s) IV Intermittent every 8 hours  vancomycin  IVPB 1000 milliGRAM(s) IV Intermittent every 12 hours  vancomycin  IVPB        Cardiovascular:  Heart Failure  Acute   Acute on Chronic  Chronic       furosemide   Injectable 20 milliGRAM(s) IV Push every 6 hours  midodrine. 5 milliGRAM(s) Oral three times a day    Pulmonary:    Hematalogic:    Other:  acetaminophen    Suspension .. 650 milliGRAM(s) Oral every 6 hours PRN  albumin human 25% IVPB 50 milliLiter(s) IV Intermittent every 6 hours  atorvastatin 40 milliGRAM(s) Oral at bedtime  chlorhexidine 0.12% Liquid 15 milliLiter(s) Oral Mucosa every 12 hours  collagenase Ointment 1 Application(s) Topical daily  glucagon  Injectable 1 milliGRAM(s) IntraMuscular once  insulin lispro (ADMELOG) corrective regimen sliding scale   SubCutaneous every 6 hours  lactated ringers. 500 milliLiter(s) IV Continuous <Continuous>  pantoprazole  Injectable 40 milliGRAM(s) IV Push every 12 hours  potassium phosphate / sodium phosphate Powder (PHOS-NaK) 1 Packet(s) Oral once    acetaminophen    Suspension .. 650 milliGRAM(s) Oral every 6 hours PRN  albumin human 25% IVPB 50 milliLiter(s) IV Intermittent every 6 hours  atorvastatin 40 milliGRAM(s) Oral at bedtime  cefepime   IVPB      cefepime   IVPB 2000 milliGRAM(s) IV Intermittent every 8 hours  chlorhexidine 0.12% Liquid 15 milliLiter(s) Oral Mucosa every 12 hours  collagenase Ointment 1 Application(s) Topical daily  furosemide   Injectable 20 milliGRAM(s) IV Push every 6 hours  glucagon  Injectable 1 milliGRAM(s) IntraMuscular once  insulin lispro (ADMELOG) corrective regimen sliding scale   SubCutaneous every 6 hours  lactated ringers. 500 milliLiter(s) IV Continuous <Continuous>  midodrine. 5 milliGRAM(s) Oral three times a day  pantoprazole  Injectable 40 milliGRAM(s) IV Push every 12 hours  potassium phosphate / sodium phosphate Powder (PHOS-NaK) 1 Packet(s) Oral once  vancomycin  IVPB 1000 milliGRAM(s) IV Intermittent every 12 hours  vancomycin  IVPB        Drug Dosing Weight  Height (cm): 175.2 (2022 15:23)  Weight (kg): 78 (2022 18:39)  BMI (kg/m2): 25.4 (2022 15:23)  BSA (m2): 1.94 (2022 15:23)    CENTRAL LINE: [ ] YES [x ] NO  LOCATION:   DATE INSERTED:  REMOVE: [ ] YES [ ] NO  EXPLAIN:    HUTCHINS: [ ] YES [x ] NO    DATE INSERTED:  REMOVE:  [ ] YES [ ] NO  EXPLAIN:    PAST MEDICAL & SURGICAL HISTORY:  Hypertension    Diabetes    High cholesterol    Primary osteoarthritis of both knees    Coronary artery disease of native artery of native heart with stable angina pectoris    Allergic bronchitis    Diabetic retinopathy    Oral cancer    SCC (squamous cell carcinoma)    Metastatic cancer    Recurrent disease    Edema of extremity    Hyponatremia    Microalbuminuria    Neuralgia    Obstructive sleep apnea, adult    Vitamin D deficiency    S/P knee replacement  b/l    S/P hernia repair  b/l    Status post cataract extraction and insertion of intraocular lens, unspecified laterality  b/l    History of surgery  On 9/10/20 he underwent right hemiglossectomy and partial neck dissection with Dr. Darinel Servin at [facility].                 @ 07:01  -   @ 07:00  --------------------------------------------------------  IN: 0 mL / OUT: 0 mL / NET: 0 mL        Mode: AC/ CMV (Assist Control/ Continuous Mandatory Ventilation)  RR (machine): 14  TV (machine): 450  FiO2: 40  PEEP: 5  ITime: 0.8  MAP: 10  PIP: 29      PHYSICAL EXAM:    GENERAL: NAD, cachectic with temporal and muscle waisting  HEAD:  Atraumatic, Normocephalic  EYES: EOMI, PERRLA, conjunctiva and sclera clear  ENMT: No tonsillar erythema, exudates  NECK: Supple, No JVD, tracheostomy intact  NERVOUS SYSTEM:  Awake and alert. Able to answer simple question   CHEST/LUNG: Diminished breath sounds bilaterally with scattered rhonchi notes  HEART: Regular rate and rhythm; No murmurs, rubs, or gallops  ABDOMEN: Soft, Nontender, Nondistended; Bowel sounds present; Peg intact  EXTREMITIES: +Muscle waisting +3 pitting edema all extremities  2+ Peripheral Pulses, No clubbing or cyanosis  LYMPH: No lymphadenopathy noted  SKIN: Healed right ear wound; Unstageable sacral wound with foul odor.      LABS:  CBC Full  -  ( 2022 06:47 )  WBC Count : 12.75 K/uL  RBC Count : 2.90 M/uL  Hemoglobin : 8.1 g/dL  Hematocrit : 27.3 %  Platelet Count - Automated : 267 K/uL  Mean Cell Volume : 94.1 fl  Mean Cell Hemoglobin : 27.9 pg  Mean Cell Hemoglobin Concentration : 29.7 gm/dL  Auto Neutrophil # : x  Auto Lymphocyte # : x  Auto Monocyte # : x  Auto Eosinophil # : x  Auto Basophil # : x  Auto Neutrophil % : x  Auto Lymphocyte % : x  Auto Monocyte % : x  Auto Eosinophil % : x  Auto Basophil % : x        147<H>  |  114<H>  |  23<H>  ----------------------------<  212<H>  4.3   |  31  |  0.53    Ca    9.7      2022 06:47  Phos  2.1     02-19  Mg     2.4         TPro  4.5<L>  /  Alb  1.1<L>  /  TBili  0.2  /  DBili  x   /  AST  32  /  ALT  10  /  AlkPhos  138<H>        Urinalysis Basic - ( 2022 12:00 )    Color: Yellow / Appearance: Clear / S.015 / pH: x  Gluc: x / Ketone: Trace  / Bili: Negative / Urobili: 1   Blood: x / Protein: 30 mg/dL / Nitrite: Negative   Leuk Esterase: Negative / RBC: Negative /HPF / WBC 0-2 /HPF   Sq Epi: x / Non Sq Epi: Occasional /HPF / Bacteria: Negative /HPF            [  ]  DVT Prophylaxis  [  ]  Nutrition, Brand, Rate         Goal Rate        Abnormal Nutritional Status -  Malnutrition   Cachexia         RADIOLOGY & ADDITIONAL STUDIES:  ***  < from: Xray Chest 1 View- PORTABLE-Urgent (Xray Chest 1 View- PORTABLE-Urgent .) (22 @ 10:58) >  Elevated diaphragms crowds the chest. Heart magnified by technique.   Tracheostomy remains.    Present film shows scattered mid lower lung field infiltrates increased   on the left and essentially new on the right compared to .    IMPRESSION: Increasing infiltrates.    < end of copied text >  < from: CT Angio Abdomen and Pelvis w/ IV Cont (02.15.22 @ 15:58) >  PROCEDURE:  CT of the Abdomen and Pelvis was performed.  Precontrast, Arterial and Delayed phases were performed.  Sagittal and coronal reformats were performed.    FINDINGS:  LOWER CHEST: Previously seen right middle lobe cavity now measures up to   5.2 cm (previously 4.8 cm) and contains a new air-fluid level and mural   nodularity. Left lower lobe cavity partially imaged measuring   approximately 4.4 cm with air-fluid level, unchanged. Dense left lower   lobe consolidation unchanged. Increasing nodular right lower lobe   infiltrate. Developing right pleural effusion. Reidentified left pleural   effusion withsurrounding nodular pleural thickening area Dense coronary   atherosclerosis reidentified.    LIVER: Steatosis.  BILE DUCTS: Normal caliber.  GALLBLADDER: Mildly distended.  SPLEEN: Within normal limits.  PANCREAS: Within normal limits.  ADRENALS: Within normal limits.  KIDNEYS/URETERS: Within normal limits.    BLADDER: Layering high density in the bladder.  REPRODUCTIVE ORGANS: Prostate within normal limits.    BOWEL: No bowel obstruction. Appendix is not visualized and cannot be   assessed. Percutaneous gastrostomy tube in satisfactory position. Prior   small bowel obstruction has resolved. Metallic structure in the   descending colon (5:59), likely hemostatic clip. Similar structure in the   gastric fundus. No intraluminal contrast extravasation. No pneumatosis.  PERITONEUM: Small ascites. No pneumoperitoneum.  VESSELS: Atherosclerotic changes. No portal venous gas.  RETROPERITONEUM/LYMPH NODES: No lymphadenopathy.  ABDOMINAL WALL: Marked anasarca, increased from prior.  BONES: Degenerative changes.    IMPRESSION:    No CT evidence of active GI bleed.    Increasing right middle lobe lung cavity now with air-fluid level. Left   lower lobe cavity unchanged. Dense left lower lobe infiltrate unchanged.   Small but complex left pleural effusion, possibly empyema, unchanged.   Increasing right pleural effusion.    Prior small bowel obstruction has resolved.    Probable hemostatic clips in the stomach and descending colon.    Layering high density material in the bladder, new comparedwith prior.    < end of copied text >    Goals of Care Discussion with Family/Proxy/Other   - see note from 2/15     MELISSA JOHNS    SCU progress note    INTERVAL HPI/OVERNIGHT EVENTS: ***Febrile Tmax 101.5.  Blood cultures and urine culture sent    DNR [x ]   DNI  [  ]    Covid - 19 PCR:     The 4Ms    What Matters Most: see GOC  Age appropriate Medications/Screen for High Risk Medication: Yes  Mentation: see CAM below  Mobility: defer to physical exam    The Confusion Assessment Method (CAM) Diagnostic Algorithm     1: Acute Onset or Fluctuating Course  - Is there evidence of an acute change in mental status from the patient’s baseline? Did the (abnormal) behavior  fluctuate during the day, that is, tend to come and go, or increase and decrease in severity?  [ ] YES [x ] NO     2: Inattention  - Did the patient have difficulty focusing attention, being easily distractible, or having difficulty keeping track of what was being said?  [ ] YES [x ] NO     3: Disorganized thinking  -Was the patient’s thinking disorganized or incoherent, such as rambling or irrelevant conversation, unclear or illogical flow of ideas, or unpredictable switching from subject to subject?  [ ] YES [x ] NO    4: Altered Level of consciousness?  [ ] YES [ x] NO    The diagnosis of delirium by CAM requires the presence of features 1 and 2 and either 3 or 4.    PRESSORS: [ ] YES [x ] NO  cefepime   IVPB      cefepime   IVPB 2000 milliGRAM(s) IV Intermittent every 8 hours  vancomycin  IVPB 1000 milliGRAM(s) IV Intermittent every 12 hours  vancomycin  IVPB        Cardiovascular:  Heart Failure  Acute   Acute on Chronic  Chronic       furosemide   Injectable 20 milliGRAM(s) IV Push every 6 hours  midodrine. 5 milliGRAM(s) Oral three times a day    Pulmonary:    Hematalogic:    Other:  acetaminophen    Suspension .. 650 milliGRAM(s) Oral every 6 hours PRN  albumin human 25% IVPB 50 milliLiter(s) IV Intermittent every 6 hours  atorvastatin 40 milliGRAM(s) Oral at bedtime  chlorhexidine 0.12% Liquid 15 milliLiter(s) Oral Mucosa every 12 hours  collagenase Ointment 1 Application(s) Topical daily  glucagon  Injectable 1 milliGRAM(s) IntraMuscular once  insulin lispro (ADMELOG) corrective regimen sliding scale   SubCutaneous every 6 hours  lactated ringers. 500 milliLiter(s) IV Continuous <Continuous>  pantoprazole  Injectable 40 milliGRAM(s) IV Push every 12 hours  potassium phosphate / sodium phosphate Powder (PHOS-NaK) 1 Packet(s) Oral once    acetaminophen    Suspension .. 650 milliGRAM(s) Oral every 6 hours PRN  albumin human 25% IVPB 50 milliLiter(s) IV Intermittent every 6 hours  atorvastatin 40 milliGRAM(s) Oral at bedtime  cefepime   IVPB      cefepime   IVPB 2000 milliGRAM(s) IV Intermittent every 8 hours  chlorhexidine 0.12% Liquid 15 milliLiter(s) Oral Mucosa every 12 hours  collagenase Ointment 1 Application(s) Topical daily  furosemide   Injectable 20 milliGRAM(s) IV Push every 6 hours  glucagon  Injectable 1 milliGRAM(s) IntraMuscular once  insulin lispro (ADMELOG) corrective regimen sliding scale   SubCutaneous every 6 hours  lactated ringers. 500 milliLiter(s) IV Continuous <Continuous>  midodrine. 5 milliGRAM(s) Oral three times a day  pantoprazole  Injectable 40 milliGRAM(s) IV Push every 12 hours  potassium phosphate / sodium phosphate Powder (PHOS-NaK) 1 Packet(s) Oral once  vancomycin  IVPB 1000 milliGRAM(s) IV Intermittent every 12 hours  vancomycin  IVPB        Drug Dosing Weight  Height (cm): 175.2 (2022 15:23)  Weight (kg): 78 (2022 18:39)  BMI (kg/m2): 25.4 (2022 15:23)  BSA (m2): 1.94 (2022 15:23)    CENTRAL LINE: [ ] YES [x ] NO  LOCATION:   DATE INSERTED:  REMOVE: [ ] YES [ ] NO  EXPLAIN:    HUTCHINS: [ ] YES [x ] NO    DATE INSERTED:  REMOVE:  [ ] YES [ ] NO  EXPLAIN:    PAST MEDICAL & SURGICAL HISTORY:  Hypertension    Diabetes    High cholesterol    Primary osteoarthritis of both knees    Coronary artery disease of native artery of native heart with stable angina pectoris    Allergic bronchitis    Diabetic retinopathy    Oral cancer    SCC (squamous cell carcinoma)    Metastatic cancer    Recurrent disease    Edema of extremity    Hyponatremia    Microalbuminuria    Neuralgia    Obstructive sleep apnea, adult    Vitamin D deficiency    S/P knee replacement  b/l    S/P hernia repair  b/l    Status post cataract extraction and insertion of intraocular lens, unspecified laterality  b/l    History of surgery  On 9/10/20 he underwent right hemiglossectomy and partial neck dissection with Dr. Darinel Servin at [facility].                 @ 07:01  -   @ 07:00  --------------------------------------------------------  IN: 0 mL / OUT: 0 mL / NET: 0 mL        Mode: AC/ CMV (Assist Control/ Continuous Mandatory Ventilation)  RR (machine): 14  TV (machine): 450  FiO2: 40  PEEP: 5  ITime: 0.8  MAP: 10  PIP: 29      PHYSICAL EXAM:    GENERAL: NAD, cachectic with temporal and muscle waisting  HEAD:  Atraumatic, Normocephalic  EYES: EOMI, PERRLA, conjunctiva and sclera clear  ENMT: No tonsillar erythema, exudates  NECK: Supple, No JVD, tracheostomy intact  NERVOUS SYSTEM:  Awake and alert. Able to answer simple question   CHEST/LUNG: Diminished breath sounds bilaterally with scattered rhonchi notes  HEART: Regular rate and rhythm; No murmurs, rubs, or gallops  ABDOMEN: Soft, Nontender, Nondistended; Bowel sounds present; Peg intact  EXTREMITIES: +Muscle waisting +3 pitting edema all extremities  2+ Peripheral Pulses, No clubbing or cyanosis  LYMPH: No lymphadenopathy noted  SKIN: Stage 1 Pressure Injury to the Bilateral Heels, as evident by non-blanchable erythema  Unstageable Pressure Injury to the Coccyx (5cm x 9cm x 2cm) with slough, pink tissue, and drainage Malordorous.  Unstageable Pressure Injury to the area under the patients Trach with slough, pink tissue, and scant drainage        LABS:  CBC Full  -  ( 2022 06:47 )  WBC Count : 12.75 K/uL  RBC Count : 2.90 M/uL  Hemoglobin : 8.1 g/dL  Hematocrit : 27.3 %  Platelet Count - Automated : 267 K/uL  Mean Cell Volume : 94.1 fl  Mean Cell Hemoglobin : 27.9 pg  Mean Cell Hemoglobin Concentration : 29.7 gm/dL  Auto Neutrophil # : x  Auto Lymphocyte # : x  Auto Monocyte # : x  Auto Eosinophil # : x  Auto Basophil # : x  Auto Neutrophil % : x  Auto Lymphocyte % : x  Auto Monocyte % : x  Auto Eosinophil % : x  Auto Basophil % : x        147<H>  |  114<H>  |  23<H>  ----------------------------<  212<H>  4.3   |  31  |  0.53    Ca    9.7      2022 06:47  Phos  2.1       Mg     2.4         TPro  4.5<L>  /  Alb  1.1<L>  /  TBili  0.2  /  DBili  x   /  AST  32  /  ALT  10  /  AlkPhos  138<H>        Urinalysis Basic - ( 2022 12:00 )    Color: Yellow / Appearance: Clear / S.015 / pH: x  Gluc: x / Ketone: Trace  / Bili: Negative / Urobili: 1   Blood: x / Protein: 30 mg/dL / Nitrite: Negative   Leuk Esterase: Negative / RBC: Negative /HPF / WBC 0-2 /HPF   Sq Epi: x / Non Sq Epi: Occasional /HPF / Bacteria: Negative /HPF            [  ]  DVT Prophylaxis  [  ]  Nutrition, Brand, Rate         Goal Rate        Abnormal Nutritional Status -  Malnutrition   Cachexia         RADIOLOGY & ADDITIONAL STUDIES:  ***  < from: Xray Chest 1 View- PORTABLE-Urgent (Xray Chest 1 View- PORTABLE-Urgent .) (22 @ 10:58) >  Elevated diaphragms crowds the chest. Heart magnified by technique.   Tracheostomy remains.    Present film shows scattered mid lower lung field infiltrates increased   on the left and essentially new on the right compared to .    IMPRESSION: Increasing infiltrates.    < end of copied text >  < from: CT Angio Abdomen and Pelvis w/ IV Cont (02.15.22 @ 15:58) >  PROCEDURE:  CT of the Abdomen and Pelvis was performed.  Precontrast, Arterial and Delayed phases were performed.  Sagittal and coronal reformats were performed.    FINDINGS:  LOWER CHEST: Previously seen right middle lobe cavity now measures up to   5.2 cm (previously 4.8 cm) and contains a new air-fluid level and mural   nodularity. Left lower lobe cavity partially imaged measuring   approximately 4.4 cm with air-fluid level, unchanged. Dense left lower   lobe consolidation unchanged. Increasing nodular right lower lobe   infiltrate. Developing right pleural effusion. Reidentified left pleural   effusion withsurrounding nodular pleural thickening area Dense coronary   atherosclerosis reidentified.    LIVER: Steatosis.  BILE DUCTS: Normal caliber.  GALLBLADDER: Mildly distended.  SPLEEN: Within normal limits.  PANCREAS: Within normal limits.  ADRENALS: Within normal limits.  KIDNEYS/URETERS: Within normal limits.    BLADDER: Layering high density in the bladder.  REPRODUCTIVE ORGANS: Prostate within normal limits.    BOWEL: No bowel obstruction. Appendix is not visualized and cannot be   assessed. Percutaneous gastrostomy tube in satisfactory position. Prior   small bowel obstruction has resolved. Metallic structure in the   descending colon (5:59), likely hemostatic clip. Similar structure in the   gastric fundus. No intraluminal contrast extravasation. No pneumatosis.  PERITONEUM: Small ascites. No pneumoperitoneum.  VESSELS: Atherosclerotic changes. No portal venous gas.  RETROPERITONEUM/LYMPH NODES: No lymphadenopathy.  ABDOMINAL WALL: Marked anasarca, increased from prior.  BONES: Degenerative changes.    IMPRESSION:    No CT evidence of active GI bleed.    Increasing right middle lobe lung cavity now with air-fluid level. Left   lower lobe cavity unchanged. Dense left lower lobe infiltrate unchanged.   Small but complex left pleural effusion, possibly empyema, unchanged.   Increasing right pleural effusion.    Prior small bowel obstruction has resolved.    Probable hemostatic clips in the stomach and descending colon.    Layering high density material in the bladder, new comparedwith prior.    < end of copied text >    Goals of Care Discussion with Family/Proxy/Other   - see note from 2/15

## 2022-02-19 NOTE — PROGRESS NOTE ADULT - PROBLEM SELECTOR PLAN 2
Likely lower GIB  No signs of bleeding overnight  S/P EGD and colonoscopy 2/16. No active bleeding noted  Continue PPI BID  GI Dr Núñez following.

## 2022-02-19 NOTE — PROGRESS NOTE ADULT - PROBLEM SELECTOR PLAN 9
Passive and active ROM exercises daily  Turn and position every 2 hours.  Keep head of bed elevated during feeds

## 2022-02-19 NOTE — PROGRESS NOTE ADULT - PROBLEM SELECTOR PLAN 5
Continue antibiotics Vanco and cefepime  CXR with increased infiltrates.  Keep head of bed elevated during feeds

## 2022-02-19 NOTE — PROGRESS NOTE ADULT - PROBLEM SELECTOR PLAN 12
SCB for DVT prophylaxis.  Continue PPI BID  25% Albumin followed by lasix every 6 hours for 3 days.  F/U cultures  DNR  MOLST on chart  Overall prognosis is poor.

## 2022-02-19 NOTE — PROGRESS NOTE ADULT - NUTRITIONAL ASSESSMENT
This patient has been assessed with a concern for Malnutrition and has been determined to have a diagnosis/diagnoses of Severe protein-calorie malnutrition.  +Severe temporal waisting  +Muscle waisting all extremities  Protein 4.5   Albumin 1.1    This patient is being managed with:   Diet NPO with Tube Feed-  Tube Feeding Modality: Gastrostomy  Glucerna 1.5 Dhruv  Total Volume for 24 Hours (mL): 960  Continuous  Starting Tube Feed Rate {mL per Hour}: 10  Increase Tube Feed Rate by (mL): 10     Every hour  Until Goal Tube Feed Rate (mL per Hour): 40  Tube Feed Duration (in Hours): 24  Tube Feed Start Time: 13:10  Entered: Feb 16 2022  1:26PM

## 2022-02-20 NOTE — PHYSICAL THERAPY INITIAL EVALUATION ADULT - DIAGNOSIS, PT EVAL
Patient presented with generalized weakness, impaired AROM on R UE, R LE, impaired PROM on L knee flexion, PROM to L UE/L LE WFL'S, unable to perform any functional transfers

## 2022-02-20 NOTE — PHYSICAL THERAPY INITIAL EVALUATION ADULT - ADDITIONAL COMMENTS
[facility] papers stated that patient has been lizzie lift transfers from bed to chair. Spoke with pt's dtr at bed-side and was stated that patient was Independent in ambulation until december when he became hospitalized and intubated; then transferred to rehab

## 2022-02-20 NOTE — PROGRESS NOTE ADULT - ASSESSMENT
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 Recent hospitalization (12/20/2021- 1/28/22) notable for cardiac arrest, aspiration pna, gastric ulcer s/p clipping and ventilator dependent respiratory failure s/p Trach/ PEG discharged to NH. BIBEMS from NH (2/13/22) for bloody stools and blood in trach noted at the facility Adm to ICU for hypovolemic shock 2/2 acute anemia 2/2 GIB, S/P ICU care, S/P blood transfusion, PPI, EGD shoed esophagus ulcer, no active bleeding, S/P colonoscopy , no active bleeding, continue PPI bid, blood culture negative Kleb sputum/GNR PNA, continue vanco/maxipien  suction prn, GI follow up, transfusion if H <7. DVT prophylaxis. H/H stable, D/C planning.

## 2022-02-20 NOTE — PROGRESS NOTE ADULT - SUBJECTIVE AND OBJECTIVE BOX
Patient is a 76y old  Male who presents with a chief complaint of hypovolemic shock/GIB/severe anemia/blood transfusion/sepsis/GNR PNA(kleb), continue IV Vanco/maxipine, PPI bid, H/H stable      INTERVAL HPI/OVERNIGHT EVENTS:  T(C): 36.9 (22 @ 05:37), Max: 36.9 (22 @ 05:37)  HR: 110 (22 @ 05:37) (90 - 110)  BP: 113/66 (22 @ 05:37) (113/66 - 121/70)  RR: 16 (22 @ 05:37) (16 - 20)  SpO2: 97% (22 @ 05:37) (96% - 100%)  Wt(kg): --    LABS:                        8.2    13.49 )-----------( 257      ( 2022 06:33 )             25.7         144  |  109<H>  |  23<H>  ----------------------------<  163<H>  3.7   |  31  |  0.53    Ca    10.8<H>      2022 06:33  Phos  2.1       Mg     4.6         TPro  5.2<L>  /  Alb  1.7<L>  /  TBili  0.3  /  DBili  x   /  AST  46<H>  /  ALT  14  /  AlkPhos  147<H>        Urinalysis Basic - ( 2022 12:00 )    Color: Yellow / Appearance: Clear / S.015 / pH: x  Gluc: x / Ketone: Trace  / Bili: Negative / Urobili: 1   Blood: x / Protein: 30 mg/dL / Nitrite: Negative   Leuk Esterase: Negative / RBC: Negative /HPF / WBC 0-2 /HPF   Sq Epi: x / Non Sq Epi: Occasional /HPF / Bacteria: Negative /HPF      CAPILLARY BLOOD GLUCOSE      POCT Blood Glucose.: 136 mg/dL (2022 05:47)  POCT Blood Glucose.: 194 mg/dL (2022 00:00)  POCT Blood Glucose.: 213 mg/dL (2022 22:03)  POCT Blood Glucose.: 229 mg/dL (2022 16:42)  POCT Blood Glucose.: 201 mg/dL (2022 11:28)        RADIOLOGY & ADDITIONAL TESTS:    Consultant(s) Notes Reviewed:  [x ] YES  [ ] NO    PHYSICAL EXAM:  GENERAL: well built, well nourished  HEAD:  Atraumatic, Normocephalic  EYES: EOMI, PERRLA, conjunctiva and sclera clear  ENT: No tonsillar erythema, exudates, or enlargement; Moist mucous membranes, Good dentition, No lesions  NECK: Supple, No JVD, Normal thyroid, no enlarged nodes, swelling improved, trach  NERVOUS SYSTEM:  Awake  CHEST/LUNG: B/L good air entry; No rales, rhonchi, or wheezing  HEART: S1S2 normal, no S3, Regular rate and rhythm; No murmurs, rubs, or gallops  ABDOMEN: Soft, Nontender, Nondistended; Bowel sounds present, GT  EXTREMITIES:  2+ Peripheral Pulses, No clubbing, cyanosis, positive  edema B/L UE  LYMPH: No lymphadenopathy noted  SKIN: No rashes or lesions    Care Discussed with Consultants/Other Providers [ x] YES  [ ] NO

## 2022-02-20 NOTE — PROGRESS NOTE ADULT - SUBJECTIVE AND OBJECTIVE BOX
MELISSA JOHNS    SCU progress note    INTERVAL HPI/OVERNIGHT EVENTS: No acute events onvernight    DNR [ ]   DNI  [  ]  DNR    Covid - 19 PCR: negative    The 4Ms    What Matters Most: see GOC  Age appropriate Medications/Screen for High Risk Medication: Yes  Mentation: see CAM below  Mobility: defer to physical exam    The Confusion Assessment Method (CAM) Diagnostic Algorithm     1: Acute Onset or Fluctuating Course  - Is there evidence of an acute change in mental status from the patient’s baseline? Did the (abnormal) behavior  fluctuate during the day, that is, tend to come and go, or increase and decrease in severity?  [ ] YES [x ] NO     2: Inattention  - Did the patient have difficulty focusing attention, being easily distractible, or having difficulty keeping track of what was being said?  [ ] YES [ x] NO     3: Disorganized thinking  -Was the patient’s thinking disorganized or incoherent, such as rambling or irrelevant conversation, unclear or illogical flow of ideas, or unpredictable switching from subject to subject?  [ ] YES [x ] NO    4: Altered Level of consciousness?  [ ] YES [x ] NO    The diagnosis of delirium by CAM requires the presence of features 1 and 2 and either 3 or 4.    PRESSORS: [ ] YES [ x] NO  cefepime   IVPB      cefepime   IVPB 2000 milliGRAM(s) IV Intermittent every 8 hours  vancomycin  IVPB 1000 milliGRAM(s) IV Intermittent every 12 hours  vancomycin  IVPB        Cardiovascular:  Heart Failure  Acute   Acute on Chronic  Chronic       furosemide   Injectable 20 milliGRAM(s) IV Push every 6 hours  midodrine. 5 milliGRAM(s) Oral three times a day    Pulmonary:    Hematalogic:    Other:  acetaminophen    Suspension .. 650 milliGRAM(s) Oral every 6 hours PRN  albumin human 25% IVPB 50 milliLiter(s) IV Intermittent every 6 hours  atorvastatin 40 milliGRAM(s) Oral at bedtime  chlorhexidine 0.12% Liquid 15 milliLiter(s) Oral Mucosa every 12 hours  collagenase Ointment 1 Application(s) Topical daily  glucagon  Injectable 1 milliGRAM(s) IntraMuscular once  insulin lispro (ADMELOG) corrective regimen sliding scale   SubCutaneous every 6 hours  lactated ringers. 500 milliLiter(s) IV Continuous <Continuous>  pantoprazole  Injectable 40 milliGRAM(s) IV Push every 12 hours    acetaminophen    Suspension .. 650 milliGRAM(s) Oral every 6 hours PRN  albumin human 25% IVPB 50 milliLiter(s) IV Intermittent every 6 hours  atorvastatin 40 milliGRAM(s) Oral at bedtime  cefepime   IVPB      cefepime   IVPB 2000 milliGRAM(s) IV Intermittent every 8 hours  chlorhexidine 0.12% Liquid 15 milliLiter(s) Oral Mucosa every 12 hours  collagenase Ointment 1 Application(s) Topical daily  furosemide   Injectable 20 milliGRAM(s) IV Push every 6 hours  glucagon  Injectable 1 milliGRAM(s) IntraMuscular once  insulin lispro (ADMELOG) corrective regimen sliding scale   SubCutaneous every 6 hours  lactated ringers. 500 milliLiter(s) IV Continuous <Continuous>  midodrine. 5 milliGRAM(s) Oral three times a day  pantoprazole  Injectable 40 milliGRAM(s) IV Push every 12 hours  vancomycin  IVPB      vancomycin  IVPB 1000 milliGRAM(s) IV Intermittent every 12 hours    Drug Dosing Weight  Height (cm): 175.2 (2022 15:23)  Weight (kg): 78 (2022 18:39)  BMI (kg/m2): 25.4 (2022 15:23)  BSA (m2): 1.94 (2022 15:23)    CENTRAL LINE: [ ] YES [ ] NO  LOCATION:   DATE INSERTED:  REMOVE: [ ] YES [ ] NO  EXPLAIN:    HUTCHINS: [ ] YES [ ] NO    DATE INSERTED:  REMOVE:  [ ] YES [ ] NO  EXPLAIN:    PAST MEDICAL & SURGICAL HISTORY:  Hypertension    Diabetes    High cholesterol    Primary osteoarthritis of both knees    Coronary artery disease of native artery of native heart with stable angina pectoris    Allergic bronchitis    Diabetic retinopathy    Oral cancer    SCC (squamous cell carcinoma)    Metastatic cancer    Recurrent disease    Edema of extremity    Hyponatremia    Microalbuminuria    Neuralgia    Obstructive sleep apnea, adult    Vitamin D deficiency    S/P knee replacement  b/l    S/P hernia repair  b/l    Status post cataract extraction and insertion of intraocular lens, unspecified laterality  b/l    History of surgery  On 9/10/20 he underwent right hemiglossectomy and partial neck dissection with Dr. Darinel Servin at [facility].      Mode: AC/ CMV (Assist Control/ Continuous Mandatory Ventilation)  RR (machine): 14  TV (machine): 450  FiO2: 40  PEEP: 5  ITime: 1  MAP: 14  PIP: 39      PHYSICAL EXAM:    GENERAL: NAD,   HEAD:  Atraumatic, Normocephalic  EYES: EOMI, PERRLA, conjunctiva and sclera clear  ENMT: No tonsillar erythema, exudates, or enlargement; Moist mucous membranes, Good dentition, No lesions  NECK: Supple, No JVD, trach site CDI  NERVOUS SYSTEM:  Alert & awake  CHEST/LUNG: deminished breath sounds bilat  HEART: Regular rate, s1 s2  ABDOMEN: Soft, Nontender, Nondistended; peg site CDi  EXTREMITIES:  2+ Peripheral Pulses,   SKIN: sacral, R ear lobe abd trach site wound      LABS:  CBC Full  -  ( 2022 06:33 )  WBC Count : 13.49 K/uL  RBC Count : 2.82 M/uL  Hemoglobin : 8.2 g/dL  Hematocrit : 25.7 %  Platelet Count - Automated : 257 K/uL  Mean Cell Volume : 91.1 fl  Mean Cell Hemoglobin : 29.1 pg  Mean Cell Hemoglobin Concentration : 31.9 gm/dL  Auto Neutrophil # : x  Auto Lymphocyte # : x  Auto Monocyte # : x  Auto Eosinophil # : x  Auto Basophil # : x  Auto Neutrophil % : x  Auto Lymphocyte % : x  Auto Monocyte % : x  Auto Eosinophil % : x  Auto Basophil % : x    02-    144  |  109<H>  |  23<H>  ----------------------------<  163<H>  3.7   |  31  |  0.53    Ca    10.8<H>      2022 06:33  Phos  2.1     -  Mg     4.6     -20    TPro  5.2<L>  /  Alb  1.7<L>  /  TBili  0.3  /  DBili  x   /  AST  46<H>  /  ALT  14  /  AlkPhos  147<H>  02-20      Urinalysis Basic - ( 2022 12:00 )    Color: Yellow / Appearance: Clear / S.015 / pH: x  Gluc: x / Ketone: Trace  / Bili: Negative / Urobili: 1   Blood: x / Protein: 30 mg/dL / Nitrite: Negative   Leuk Esterase: Negative / RBC: Negative /HPF / WBC 0-2 /HPF   Sq Epi: x / Non Sq Epi: Occasional /HPF / Bacteria: Negative /HPF    [ x ]  DVT Prophylaxis  [  ]  Nutrition, Brand, Rate Glucerna at 50ml/hr         Goal Rate        Abnormal Nutritional Status -  Malnutrition   Cachexia      Morbid Obesity BMI >/=40    RADIOLOGY & ADDITIONAL STUDIES:    < from: Xray Chest 1 View- PORTABLE-Urgent (Xray Chest 1 View- PORTABLE-Urgent .) (22 @ 10:58) >    ACC: [id number] EXAM:  XR CHEST PORTABLE URGENT 1V                          PROCEDURE DATE:  2022          INTERPRETATION:  AP semierect chest on 2022 at 9:54 AM.   Patient has fever and requires tracheostomy. There is history of   hypertension and coronary stent.    Elevated diaphragms crowds the chest. Heart magnified by technique.   Tracheostomy remains.    Present film shows scattered mid lower lung field infiltrates increased   on the left and essentially new on the right compared to .    IMPRESSION: Increasing infiltrates.    --- End of Report ---            MARIA DEL CARMEN BURKS MD; Attending Radiologist  This document has been electronically signed. 2022 11:01AM    < end of copied text >      Goals of Care Discussion with Family/Proxy/Other   - see note from/family meeting set up for...

## 2022-02-20 NOTE — PHYSICAL THERAPY INITIAL EVALUATION ADULT - PATIENT/FAMILY/SIGNIFICANT OTHER GOALS STATEMENT, PT EVAL
Patient could not express goals at this time. Patient's wife and dtr at bed-side very involved and wants patient to be able to ambulate again

## 2022-02-20 NOTE — PROGRESS NOTE ADULT - PROBLEM SELECTOR PLAN 5
Sputum Cx Klebsiella pneumoniae.  blood culture and urine culture negative  Continue antibiotics Vanco and cefepime  CXR with increased infiltrates.  Keep head of bed elevated during feeds

## 2022-02-21 NOTE — PROGRESS NOTE ADULT - SUBJECTIVE AND OBJECTIVE BOX
Patient is a 76y old  Male who presents with a chief complaint of blood in stool (20 Feb 2022 09:59)/GIB/anemia/blood transfusion/Kleb PNA, suction, IV ABS , H/H stable, continue PPI bid      INTERVAL HPI/OVERNIGHT EVENTS:  T(C): 36.8 (02-21-22 @ 05:15), Max: 37.3 (02-20-22 @ 21:26)  HR: 110 (02-21-22 @ 05:15) (103 - 110)  BP: 111/61 (02-21-22 @ 05:15) (107/56 - 127/82)  RR: 18 (02-21-22 @ 05:15) (16 - 18)  SpO2: 99% (02-21-22 @ 05:15) (99% - 100%)  Wt(kg): --    LABS:                        7.5    11.33 )-----------( 243      ( 21 Feb 2022 06:12 )             24.2     02-21    144  |  108  |  24<H>  ----------------------------<  152<H>  3.1<L>   |  34<H>  |  0.50    Ca    10.8<H>      21 Feb 2022 06:12  Phos  2.1     02-21  Mg     2.3     02-21    TPro  5.2<L>  /  Alb  2.1<L>  /  TBili  0.5  /  DBili  x   /  AST  26  /  ALT  12  /  AlkPhos  121<H>  02-21        CAPILLARY BLOOD GLUCOSE      POCT Blood Glucose.: 172 mg/dL (21 Feb 2022 06:04)  POCT Blood Glucose.: 165 mg/dL (20 Feb 2022 23:56)  POCT Blood Glucose.: 155 mg/dL (20 Feb 2022 17:14)  POCT Blood Glucose.: 202 mg/dL (20 Feb 2022 11:43)        RADIOLOGY & ADDITIONAL TESTS:    Consultant(s) Notes Reviewed:  [x ] YES  [ ] NO    PHYSICAL EXAM:  GENERAL: well built, well nourished  HEAD:  Atraumatic, Normocephalic  EYES: EOMI, PERRLA, conjunctiva and sclera clear  ENT: No tonsillar erythema, exudates, or enlargement; Moist mucous membranes, Good dentition, No lesions  NECK: Supple, No JVD, Normal thyroid, no enlarged nodes, swelling trach  NERVOUS SYSTEM:  Alert & Oriented X3, Good concentration; Motor Strength 5/5 B/L upper and lower extremities; DTRs 2+ intact and symmetric, sensory intact  CHEST/LUNG: B/L good air entry; No rales, positive rhonchi, or wheezing  HEART: S1S2 irregular mild tachy  ABDOMEN: Soft, Nontender, Nondistended; Bowel sounds present  EXTREMITIES:  2+ Peripheral Pulses, No clubbing, cyanosis, B/L arm edema  LYMPH: No lymphadenopathy noted  SKIN: No rashes or lesions    Care Discussed with Consultants/Other Providers [ x] YES  [ ] NO

## 2022-02-21 NOTE — PROGRESS NOTE ADULT - PROBLEM SELECTOR PLAN 5
Sputum Cx Klebsiella pneumoniae.- ESBL  blood culture and urine culture negative  started on Meropenem  CXR with increased infiltrates.  Keep head of bed elevated during feeds

## 2022-02-21 NOTE — CHART NOTE - NSCHARTNOTEFT_GEN_A_CORE
Spoke with daughter at bedside. Explained current condition. Encouraged family to address concerns and emotional support given, all concerns and questions addressed.

## 2022-02-21 NOTE — PROGRESS NOTE ADULT - SUBJECTIVE AND OBJECTIVE BOX
MELISSA JOHNS    SCU progress note    INTERVAL HPI/OVERNIGHT EVENTS: *** Patient seen and examined at bedside.     DNR [ x]   DNI  [  ]    Covid - 19 PCR: 2/18 negative    The 4Ms    What Matters Most: see GOC  Age appropriate Medications/Screen for High Risk Medication: Yes  Mentation: see CAM below  Mobility: defer to physical exam    The Confusion Assessment Method (CAM) Diagnostic Algorithm     1: Acute Onset or Fluctuating Course  - Is there evidence of an acute change in mental status from the patient’s baseline? Did the (abnormal) behavior  fluctuate during the day, that is, tend to come and go, or increase and decrease in severity?  [ ] YES [ x] NO     2: Inattention  - Did the patient have difficulty focusing attention, being easily distractible, or having difficulty keeping track of what was being said?  [ ] YES [ ] NO unable to assess     3: Disorganized thinking  -Was the patient’s thinking disorganized or incoherent, such as rambling or irrelevant conversation, unclear or illogical flow of ideas, or unpredictable switching from subject to subject?  [ ] YES [ ] NO unable to assess    4: Altered Level of consciousness?  [ ] YES [ ] NO unable to assess     The diagnosis of delirium by CAM requires the presence of features 1 and 2 and either 3 or 4.    PRESSORS: [ ] YES [x ] NO  meropenem  IVPB 1000 milliGRAM(s) IV Intermittent every 8 hours    Cardiovascular:  Heart Failure  Acute   Acute on Chronic  Chronic       furosemide   Injectable 20 milliGRAM(s) IV Push every 6 hours  midodrine. 5 milliGRAM(s) Oral three times a day    Pulmonary:    Hematalogic:    Other:  acetaminophen    Suspension .. 650 milliGRAM(s) Oral every 6 hours PRN  albumin human 25% IVPB 50 milliLiter(s) IV Intermittent every 6 hours  atorvastatin 40 milliGRAM(s) Oral at bedtime  chlorhexidine 0.12% Liquid 15 milliLiter(s) Oral Mucosa every 12 hours  collagenase Ointment 1 Application(s) Topical daily  glucagon  Injectable 1 milliGRAM(s) IntraMuscular once  insulin lispro (ADMELOG) corrective regimen sliding scale   SubCutaneous every 6 hours  lactated ringers. 500 milliLiter(s) IV Continuous <Continuous>  pantoprazole  Injectable 40 milliGRAM(s) IV Push every 12 hours    acetaminophen    Suspension .. 650 milliGRAM(s) Oral every 6 hours PRN  albumin human 25% IVPB 50 milliLiter(s) IV Intermittent every 6 hours  atorvastatin 40 milliGRAM(s) Oral at bedtime  chlorhexidine 0.12% Liquid 15 milliLiter(s) Oral Mucosa every 12 hours  collagenase Ointment 1 Application(s) Topical daily  furosemide   Injectable 20 milliGRAM(s) IV Push every 6 hours  glucagon  Injectable 1 milliGRAM(s) IntraMuscular once  insulin lispro (ADMELOG) corrective regimen sliding scale   SubCutaneous every 6 hours  lactated ringers. 500 milliLiter(s) IV Continuous <Continuous>  meropenem  IVPB 1000 milliGRAM(s) IV Intermittent every 8 hours  midodrine. 5 milliGRAM(s) Oral three times a day  pantoprazole  Injectable 40 milliGRAM(s) IV Push every 12 hours    Drug Dosing Weight  Height (cm): 175.2 (16 Feb 2022 15:23)  Weight (kg): 78 (13 Feb 2022 18:39)  BMI (kg/m2): 25.4 (16 Feb 2022 15:23)  BSA (m2): 1.94 (16 Feb 2022 15:23)    CENTRAL LINE: [ ] YES [x ] NO  LOCATION:   DATE INSERTED:  REMOVE: [ ] YES [ ] NO  EXPLAIN:    HUTCHINS: [ ] YES [ x] NO    DATE INSERTED:  REMOVE:  [ ] YES [ ] NO  EXPLAIN:    PAST MEDICAL & SURGICAL HISTORY:  Hypertension    Diabetes    High cholesterol    Primary osteoarthritis of both knees    Coronary artery disease of native artery of native heart with stable angina pectoris    Allergic bronchitis    Diabetic retinopathy    Oral cancer    SCC (squamous cell carcinoma)    Metastatic cancer    Recurrent disease    Edema of extremity    Hyponatremia    Microalbuminuria    Neuralgia    Obstructive sleep apnea, adult    Vitamin D deficiency    S/P knee replacement  b/l    S/P hernia repair  b/l    Status post cataract extraction and insertion of intraocular lens, unspecified laterality  b/l    History of surgery  On 9/10/20 he underwent right hemiglossectomy and partial neck dissection with Dr. Darinel Servin at [facility].      02-20 @ 07:01  -  02-21 @ 07:00  --------------------------------------------------------  IN: 500 mL / OUT: 0 mL / NET: 500 mL      Mode: AC/ CMV (Assist Control/ Continuous Mandatory Ventilation)  RR (machine): 14  TV (machine): 450  FiO2: 40  PEEP: 5  ITime: 1  MAP: 13  PIP: 28      PHYSICAL EXAM:    GENERAL: NAD,   HEAD:  Atraumatic, Normocephalic  EYES: EOMI, PERRLA, conjunctiva and sclera clear  ENMT; tracheostomy  NECK: Supple, No JVD  NERVOUS SYSTEM:  Alert & awake  CHEST/LUNG: diminished   HEART: Regular rate, s1 s2  ABDOMEN: PEG tube, Soft, Nontender, Nondistended  EXTREMITIES:  2+ Peripheral Pulses,   SKIN: sacral, R ear lobe abd trach site wound, temporal and clavicular muscle wasting      LABS:  CBC Full  -  ( 21 Feb 2022 06:12 )  WBC Count : 11.33 K/uL  RBC Count : 2.61 M/uL  Hemoglobin : 7.5 g/dL  Hematocrit : 24.2 %  Platelet Count - Automated : 243 K/uL  Mean Cell Volume : 92.7 fl  Mean Cell Hemoglobin : 28.7 pg  Mean Cell Hemoglobin Concentration : 31.0 gm/dL  Auto Neutrophil # : x  Auto Lymphocyte # : x  Auto Monocyte # : x  Auto Eosinophil # : x  Auto Basophil # : x  Auto Neutrophil % : x  Auto Lymphocyte % : x  Auto Monocyte % : x  Auto Eosinophil % : x  Auto Basophil % : x    02-21    144  |  108  |  24<H>  ----------------------------<  152<H>  3.1<L>   |  34<H>  |  0.50    Ca    10.8<H>      21 Feb 2022 06:12  Phos  2.1     02-21  Mg     2.3     02-21    TPro  5.2<L>  /  Alb  2.1<L>  /  TBili  0.5  /  DBili  x   /  AST  26  /  ALT  12  /  AlkPhos  121<H>  02-21    [  ]  DVT Prophylaxis  [  ]  Nutrition, Brand, Rate         Goal Rate        Abnormal Nutritional Status -  Malnutrition   Cachexia      Morbid Obesity BMI >/=40    RADIOLOGY & ADDITIONAL STUDIES:  ***  < from: Xray Chest 1 View- PORTABLE-Urgent (Xray Chest 1 View- PORTABLE-Urgent .) (02.18.22 @ 10:58) >    ACC: [id number] EXAM:  XR CHEST PORTABLE URGENT 1V                          PROCEDURE DATE:  02/18/2022          INTERPRETATION:  AP semierect chest on February 18, 2022 at 9:54 AM.   Patient has fever and requires tracheostomy. There is history of   hypertension and coronary stent.    Elevated diaphragms crowds the chest. Heart magnified by technique.   Tracheostomy remains.    Present film shows scattered mid lower lung field infiltrates increased   on the left and essentially new on the right compared to January 17.    IMPRESSION: Increasing infiltrates.    --- End of Report ---    < end of copied text >  < from: CT Angio Abdomen and Pelvis w/ IV Cont (02.15.22 @ 15:58) >  FINDINGS:  LOWER CHEST: Previously seen right middle lobe cavity now measures up to   5.2 cm (previously 4.8 cm) and contains a new air-fluid level and mural   nodularity. Left lower lobe cavity partially imaged measuring   approximately 4.4 cm with air-fluid level, unchanged. Dense left lower   lobe consolidation unchanged. Increasing nodular right lower lobe   infiltrate. Developing right pleural effusion. Reidentified left pleural   effusion withsurrounding nodular pleural thickening area Dense coronary   atherosclerosis reidentified.    LIVER: Steatosis.  BILE DUCTS: Normal caliber.  GALLBLADDER: Mildly distended.  SPLEEN: Within normal limits.  PANCREAS: Within normal limits.  ADRENALS: Within normal limits.  KIDNEYS/URETERS: Within normal limits.    BLADDER: Layering high density in the bladder.  REPRODUCTIVE ORGANS: Prostate within normal limits.    BOWEL: No bowel obstruction. Appendix is not visualized and cannot be   assessed. Percutaneous gastrostomy tube in satisfactory position. Prior   small bowel obstruction has resolved. Metallic structure in the   descending colon (5:59), likely hemostatic clip. Similar structure in the   gastric fundus. No intraluminal contrast extravasation. No pneumatosis.  PERITONEUM: Small ascites. No pneumoperitoneum.  VESSELS: Atherosclerotic changes. No portal venous gas.  RETROPERITONEUM/LYMPH NODES: No lymphadenopathy.  ABDOMINAL WALL: Marked anasarca, increased from prior.  BONES: Degenerative changes.    IMPRESSION:    No CT evidence of active GI bleed.    Increasing right middle lobe lung cavity now with air-fluid level. Left   lower lobe cavity unchanged. Dense left lower lobe infiltrate unchanged.   Small but complex left pleural effusion, possibly empyema, unchanged.   Increasing right pleural effusion.    Prior small bowel obstruction has resolved.    Probable hemostatic clips in the stomach and descending colon.    Layering high density material in the bladder, new comparedwith prior.        --- End of Report ---    < end of copied text >  < from: EGD-Colonoscopy (02.16.22 @ 10:15) >  Colonoscopy Findings:     Additional findings Upper gastroscope advanced to mid transverse colon. Some red    tinged fluid distally but none seen at transverse level. High amount of brown    fluid encountered in transverse. No active bleeding seen. Small Hemorrhoids    seen..         Colonoscopy Impressions:      Upper gastroscope advanced to mid transverse colon. Some red tinged fluid    distally but none seen at transverse level. High amount of brown fluid    encountered in transverse. No active bleeding seen. Small Hemorrhoids seen. .       < end of copied text >  < from: EGD-Colonoscopy (02.16.22 @ 10:15) >  EGD Findings:     Esophagus Mucosa Normal mucosa was noted in the whole esophagus.     Normal mucosa was noted in the whole esophagus.     Additional esophagus findings ? SMALL DIVERTICULUM MID ESOPHAGUS.     Stomach Additional stomachfindings Prior clip in fundus seen. Small area of    shallow ulceration adjacent to clip but no stigmata noted. no Bleeding seen..     Stomach surgically altered in distal end..     PEG tube noted.     Duodenum Mucosa Normal mucosa was noted in the whole examined duodenum.         EGD Impressions:      ? SMALL DIVERTICULUM MID ESOPHAGUS.      Normal mucosa in the gastroesophageal junction.      Normal mucosa in the whole esophagus.      Prior clip in fundus seen. Small area of shallow ulceration adjacent to clip    but no stigmata noted. No Bleeding seen. .      Stomach surgically altered in distal end. .      PEG tube noted.      Normal mucosa in the whole examined duodenum.     < end of copied text >    Goals of Care Discussion with Family/Proxy/Other   - see note from/family meeting set up for...

## 2022-02-21 NOTE — PROGRESS NOTE ADULT - PROBLEM SELECTOR PLAN 12
Tracheostomy dependent on mechanical ventilation  PEG tube feeding  25% Albumin followed by lasix every 6 hours for 3 days.  DNR  MOLST on chart  Overall prognosis is poor.

## 2022-02-21 NOTE — PROGRESS NOTE ADULT - ASSESSMENT
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 Recent hospitalization (12/20/2021- 1/28/22) notable for cardiac arrest, aspiration pna, gastric ulcer s/p clipping and ventilator dependent respiratory failure s/p Trach/ PEG discharged to NH. BIBEMS from NH (2/13/22) for bloody stools and blood in trach noted at the facility. Patient admitted to  for hypovolemic shock 2/2 acute anemia 2/2 GIB . s/p 2 untis PRBCS. Patient requiring higher level of care and sent to ICU for hypotension requiring pressors. Patient down graded to  for further management once hemodynamically stable. Patient underwent a EGD and colonoscopy no active bleeding noted.     2/18- Febrile overnight- T-max 101.5, lactate 2.2.  CXR w/ increased infiltrates.  Pan cx including sputum, IV hydration, Vanc and Cefepime for PNA.  2/21 Klebsiella PNA- ESBL sensitive to Meropenem, no active bleeding

## 2022-02-22 NOTE — DISCHARGE NOTE PROVIDER - CARE PROVIDER_API CALL
Ira Curry)  Internal Medicine  [address] [place] [address], [facility] [address]  [place], [place] [id number]  Phone: [phone]  Fax: [phone]  Follow Up Time:

## 2022-02-22 NOTE — PROGRESS NOTE ADULT - PROBLEM SELECTOR PLAN 8
Pt made aware of abnormal lab results and medication called into pharmacy.     Continue midodrine 5mg every 8 hours  Monitor B/P

## 2022-02-22 NOTE — PROGRESS NOTE ADULT - PROBLEM SELECTOR PLAN 2
Likely lower GIB  No signs of bleeding overnight  S/P EGD and colonoscopy 2/16  Continue PPI BID  GI Dr Núñez

## 2022-02-22 NOTE — PROGRESS NOTE ADULT - PROBLEM SELECTOR PLAN 5
Sputum Cx Klebsiella pneumoniae.- ESBL  blood culture and urine culture negative  continue Meropenem 2/7 days  CXR with increased infiltrates.  Keep head of bed elevated during feeds Sputum Cx Klebsiella pneumoniae.- ESBL  blood culture and urine culture negative  continue Meropenem 1000mg IVPB q8h total 7days, until 2/28, 2/7 days  CXR with increased infiltrates.  Keep head of bed elevated during feeds

## 2022-02-22 NOTE — PROGRESS NOTE ADULT - SUBJECTIVE AND OBJECTIVE BOX
MELISSA JOHNS    SCU progress note    INTERVAL HPI/OVERNIGHT EVENTS: *** Patient seen and examined at bedside.     DNR [x ]   DNI  [  ]    Covid - 19 PCR: 2/18 negative    The 4Ms    What Matters Most: see GOC  Age appropriate Medications/Screen for High Risk Medication: Yes  Mentation: see CAM below  Mobility: defer to physical exam    The Confusion Assessment Method (CAM) Diagnostic Algorithm     1: Acute Onset or Fluctuating Course  - Is there evidence of an acute change in mental status from the patient’s baseline? Did the (abnormal) behavior  fluctuate during the day, that is, tend to come and go, or increase and decrease in severity?  [ ] YES [x ] NO     2: Inattention  - Did the patient have difficulty focusing attention, being easily distractible, or having difficulty keeping track of what was being said?  [ ] YES [ ] NO unable to assess     3: Disorganized thinking  -Was the patient’s thinking disorganized or incoherent, such as rambling or irrelevant conversation, unclear or illogical flow of ideas, or unpredictable switching from subject to subject?  [ ] YES [ ] NO unable to assess    4: Altered Level of consciousness?  [ ] YES [ ] NO unable to assess    The diagnosis of delirium by CAM requires the presence of features 1 and 2 and either 3 or 4.    PRESSORS: [ ] YES [x ] NO  meropenem  IVPB 1000 milliGRAM(s) IV Intermittent every 8 hours    Cardiovascular:  Heart Failure  Acute   Acute on Chronic  Chronic       midodrine. 5 milliGRAM(s) Oral three times a day    Pulmonary:    Hematalogic:    Other:  acetaminophen    Suspension .. 650 milliGRAM(s) Oral every 6 hours PRN  atorvastatin 40 milliGRAM(s) Oral at bedtime  chlorhexidine 0.12% Liquid 15 milliLiter(s) Oral Mucosa every 12 hours  collagenase Ointment 1 Application(s) Topical daily  glucagon  Injectable 1 milliGRAM(s) IntraMuscular once  insulin lispro (ADMELOG) corrective regimen sliding scale   SubCutaneous every 6 hours  lactated ringers. 500 milliLiter(s) IV Continuous <Continuous>  pantoprazole  Injectable 40 milliGRAM(s) IV Push every 12 hours  potassium phosphate / sodium phosphate Tablet (K-PHOS No. 2) 1 Tablet(s) Oral four times a day with meals    acetaminophen    Suspension .. 650 milliGRAM(s) Oral every 6 hours PRN  atorvastatin 40 milliGRAM(s) Oral at bedtime  chlorhexidine 0.12% Liquid 15 milliLiter(s) Oral Mucosa every 12 hours  collagenase Ointment 1 Application(s) Topical daily  glucagon  Injectable 1 milliGRAM(s) IntraMuscular once  insulin lispro (ADMELOG) corrective regimen sliding scale   SubCutaneous every 6 hours  lactated ringers. 500 milliLiter(s) IV Continuous <Continuous>  meropenem  IVPB 1000 milliGRAM(s) IV Intermittent every 8 hours  midodrine. 5 milliGRAM(s) Oral three times a day  pantoprazole  Injectable 40 milliGRAM(s) IV Push every 12 hours  potassium phosphate / sodium phosphate Tablet (K-PHOS No. 2) 1 Tablet(s) Oral four times a day with meals    Drug Dosing Weight  Height (cm): 175.2 (16 Feb 2022 15:23)  Weight (kg): 78 (13 Feb 2022 18:39)  BMI (kg/m2): 25.4 (16 Feb 2022 15:23)  BSA (m2): 1.94 (16 Feb 2022 15:23)    CENTRAL LINE: [ ] YES [x ] NO  LOCATION:   DATE INSERTED:  REMOVE: [ ] YES [ ] NO  EXPLAIN:    HUTCHINS: [ ] YES [ x] NO    DATE INSERTED:  REMOVE:  [ ] YES [ ] NO  EXPLAIN:    PAST MEDICAL & SURGICAL HISTORY:  Hypertension    Diabetes    High cholesterol    Primary osteoarthritis of both knees    Coronary artery disease of native artery of native heart with stable angina pectoris    Allergic bronchitis    Diabetic retinopathy    Oral cancer    SCC (squamous cell carcinoma)    Metastatic cancer    Recurrent disease    Edema of extremity    Hyponatremia    Microalbuminuria    Neuralgia    Obstructive sleep apnea, adult    Vitamin D deficiency    S/P knee replacement  b/l    S/P hernia repair  b/l    Status post cataract extraction and insertion of intraocular lens, unspecified laterality  b/l    History of surgery  On 9/10/20 he underwent right hemiglossectomy and partial neck dissection with Dr. Darinel Servin at [facility].      02-21 @ 07:01  -  02-22 @ 07:00  --------------------------------------------------------  IN: 0 mL / OUT: 1 mL / NET: -1 mL      Mode: AC/ CMV (Assist Control/ Continuous Mandatory Ventilation)  RR (machine): 14  TV (machine): 450  FiO2: 40  PEEP: 5  ITime: 0.9  MAP: 10  PIP: 26      PHYSICAL EXAM:    GENERAL: NAD,   HEAD:  Atraumatic, Normocephalic  EYES: EOMI, PERRLA, conjunctiva and sclera clear  ENMT; tracheostomy  NECK: Supple, No JVD  NERVOUS SYSTEM:  Alert & awake  CHEST/LUNG: diminished, improved   HEART: Regular rate, s1 s2  ABDOMEN: PEG tube, Soft, Nontender, Nondistended  EXTREMITIES:  2+ Peripheral Pulses,   SKIN: sacral, R ear lobe abd trach site wound, temporal and clavicular muscle wasting      LABS:  CBC Full  -  ( 22 Feb 2022 06:54 )  WBC Count : 14.40 K/uL  RBC Count : 2.59 M/uL  Hemoglobin : 7.3 g/dL  Hematocrit : 24.4 %  Platelet Count - Automated : 254 K/uL  Mean Cell Volume : 94.2 fl  Mean Cell Hemoglobin : 28.2 pg  Mean Cell Hemoglobin Concentration : 29.9 gm/dL  Auto Neutrophil # : x  Auto Lymphocyte # : x  Auto Monocyte # : x  Auto Eosinophil # : x  Auto Basophil # : x  Auto Neutrophil % : x  Auto Lymphocyte % : x  Auto Monocyte % : x  Auto Eosinophil % : x  Auto Basophil % : x    02-22    145  |  106  |  29<H>  ----------------------------<  187<H>  3.4<L>   |  31  |  0.61    Ca    11.3      22 Feb 2022 06:54  Phos  1.9     02-22  Mg     2.3     02-22    TPro  5.3<L>  /  Alb  2.3<L>  /  TBili  0.5  /  DBili  x   /  AST  27  /  ALT  11  /  AlkPhos  112  02-22    [  ]  DVT Prophylaxis  [  ]  Nutrition, Brand, Rate         Goal Rate        Abnormal Nutritional Status -  Malnutrition   Cachexia      Morbid Obesity BMI >/=40    RADIOLOGY & ADDITIONAL STUDIES:  ***  < from: Xray Chest 1 View- PORTABLE-Urgent (Xray Chest 1 View- PORTABLE-Urgent .) (02.18.22 @ 10:58) >    ACC: [id number] EXAM:  XR CHEST PORTABLE URGENT 1V                          PROCEDURE DATE:  02/18/2022          INTERPRETATION:  AP semierect chest on February 18, 2022 at 9:54 AM.   Patient has fever and requires tracheostomy. There is history of   hypertension and coronary stent.    Elevated diaphragms crowds the chest. Heart magnified by technique.   Tracheostomy remains.    Present film shows scattered mid lower lung field infiltrates increased   on the left and essentially new on the right compared to January 17.    IMPRESSION: Increasing infiltrates.    --- End of Report ---    < end of copied text >  < from: CT Angio Abdomen and Pelvis w/ IV Cont (02.15.22 @ 15:58) >  FINDINGS:  LOWER CHEST: Previously seen right middle lobe cavity now measures up to   5.2 cm (previously 4.8 cm) and contains a new air-fluid level and mural   nodularity. Left lower lobe cavity partially imaged measuring   approximately 4.4 cm with air-fluid level, unchanged. Dense left lower   lobe consolidation unchanged. Increasing nodular right lower lobe   infiltrate. Developing right pleural effusion. Reidentified left pleural   effusion withsurrounding nodular pleural thickening area Dense coronary   atherosclerosis reidentified.    LIVER: Steatosis.  BILE DUCTS: Normal caliber.  GALLBLADDER: Mildly distended.  SPLEEN: Within normal limits.  PANCREAS: Within normal limits.  ADRENALS: Within normal limits.  KIDNEYS/URETERS: Within normal limits.    BLADDER: Layering high density in the bladder.  REPRODUCTIVE ORGANS: Prostate within normal limits.    BOWEL: No bowel obstruction. Appendix is not visualized and cannot be   assessed. Percutaneous gastrostomy tube in satisfactory position. Prior   small bowel obstruction has resolved. Metallic structure in the   descending colon (5:59), likely hemostatic clip. Similar structure in the   gastric fundus. No intraluminal contrast extravasation. No pneumatosis.  PERITONEUM: Small ascites. No pneumoperitoneum.  VESSELS: Atherosclerotic changes. No portal venous gas.  RETROPERITONEUM/LYMPH NODES: No lymphadenopathy.  ABDOMINAL WALL: Marked anasarca, increased from prior.  BONES: Degenerative changes.    IMPRESSION:    No CT evidence of active GI bleed.    Increasing right middle lobe lung cavity now with air-fluid level. Left   lower lobe cavity unchanged. Dense left lower lobe infiltrate unchanged.   Small but complex left pleural effusion, possibly empyema, unchanged.   Increasing right pleural effusion.    Prior small bowel obstruction has resolved.    Probable hemostatic clips in the stomach and descending colon.    Layering high density material in the bladder, new comparedwith prior.        --- End of Report ---    < end of copied text >  < from: EGD-Colonoscopy (02.16.22 @ 10:15) >  Colonoscopy Findings:     Additional findings Upper gastroscope advanced to mid transverse colon. Some red    tinged fluid distally but none seen at transverse level. High amount of brown    fluid encountered in transverse. No active bleeding seen. Small Hemorrhoids    seen..         Colonoscopy Impressions:      Upper gastroscope advanced to mid transverse colon. Some red tinged fluid    distally but none seen at transverse level. High amount of brown fluid    encountered in transverse. No active bleeding seen. Small Hemorrhoids seen. .       < end of copied text >  < from: EGD-Colonoscopy (02.16.22 @ 10:15) >  EGD Findings:     Esophagus Mucosa Normal mucosa was noted in the whole esophagus.     Normal mucosa was noted in the whole esophagus.     Additional esophagus findings ? SMALL DIVERTICULUM MID ESOPHAGUS.     Stomach Additional stomachfindings Prior clip in fundus seen. Small area of    shallow ulceration adjacent to clip but no stigmata noted. no Bleeding seen..     Stomach surgically altered in distal end..     PEG tube noted.     Duodenum Mucosa Normal mucosa was noted in the whole examined duodenum.         EGD Impressions:      ? SMALL DIVERTICULUM MID ESOPHAGUS.      Normal mucosa in the gastroesophageal junction.      Normal mucosa in the whole esophagus.      Prior clip in fundus seen. Small area of shallow ulceration adjacent to clip    but no stigmata noted. No Bleeding seen. .      Stomach surgically altered in distal end. .      PEG tube noted.      Normal mucosa in the whole examined duodenum.     < end of copied text >    Goals of Care Discussion with Family/Proxy/Other   - see note from/family meeting set up for...

## 2022-02-22 NOTE — CHART NOTE - NSCHARTNOTEFT_GEN_A_CORE
Contacted and spoke with sonMoses. Explained current condition. Encouraged family to address concerns and emotional support given, all concerns and questions addressed.

## 2022-02-22 NOTE — DISCHARGE NOTE PROVIDER - NSDCCPCAREPLAN_GEN_ALL_CORE_FT
PRINCIPAL DISCHARGE DIAGNOSIS  Diagnosis: GI bleed  Assessment and Plan of Treatment: You has a EGD amd colonoscopy done that resulted, no active bleeding  You have received 3 units of PRBC. Please folow up with your Gastroeneterologist and Primary Care Physician for further amangement.  Please continue taking your medication as prescribed. If you have any questions or concerns about your medication please direct them to your prescribing Healthcare Provider.        SECONDARY DISCHARGE DIAGNOSES  Diagnosis: Pneumonia, aspiration  Assessment and Plan of Treatment: Please continue taking your medication as prescribed. If you have any questions or concerns about your medication please direct them to your prescribing Healthcare Provider.      Diagnosis: HTN (hypertension)  Assessment and Plan of Treatment: You are not on high blood pressure medications. Please follow up with your Primary Care Physician and Cardiologist for restarting as appropriate. Please hold for now, as you have low blood pressure.    Diagnosis: Severe protein-calorie malnutrition  Assessment and Plan of Treatment: Malnutrition occurs when you do not get enough calories or nutrients to keep you healthy. Nutrients include protein, fat, carbohydrates, vitamins, and minerals.  Call your local emergency number (911) for any of the following:  You have pain in your chest, back, neck, jaw, stomach, or down one of both arms.  You have shortness of breath.  Call your doctor if:  You lose a large amount of weight within a short amount of time.  You feel depressed, confused, tired, irritable, and you do not feel like eating.  You have questions or concerns about your condition or care.  Self-care:  Increase calories and nutrients. A dietitian may help you plan larger, healthy meals. If you have trouble eating larger meals, eat small meals throughout the day. You may need to include snacks between meals. You may need to eat or drink a nutrition supplement if you have trouble eating the right kinds and amounts of food.  Find support. If you cannot buy or prepare the right kinds of foods, talk to your healthcare provider. Ask for information about community programs that can help you.      Diagnosis: Metastatic cancer  Assessment and Plan of Treatment: Please follow up with your Hema Onc specialt for further managment.

## 2022-02-22 NOTE — DISCHARGE NOTE PROVIDER - HOSPITAL COURSE
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 Recent hospitalization (12/20/2021- 1/28/22) notable for cardiac arrest, aspiration pna, gastric ulcer s/p clipping and ventilator dependent respiratory failure s/p Trach/ PEG discharged to NH. BIBEMS from NH (2/13/22) for bloody stools and blood in trach noted at the facility. Patient admitted to  for hypovolemic shock 2/2 acute anemia 2/2 GIB . s/p 2 untis PRBCS. Patient requiring higher level of care and sent to ICU for hypotension requiring pressors. Patient down graded to  for further management once hemodynamically stable. Patient underwent a EGD and colonoscopy no active bleeding noted.     2/18- Febrile overnight- T-max 101.5, lactate 2.2.  CXR w/ increased infiltrates.  Pan cx including sputum, IV hydration, Vanc and Cefepime for PNA.  2/21 Klebsiella PNA- ESBL sensitive to Meropenem, no active bleeding  2/22 Hemoglobin 7.3, afebrile, transfusing 1 unit PRBC today.    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 Recent hospitalization (12/20/2021- 1/28/22) notable for cardiac arrest, aspiration pna, gastric ulcer s/p clipping and ventilator dependent respiratory failure s/p Trach/ PEG discharged to NH. BIBEMS from NH (2/13/22) for bloody stools and blood in trach noted at the facility. Patient admitted to  for hypovolemic shock 2/2 acute anemia 2/2 GIB . s/p 2 untis PRBCS. Patient requiring higher level of care and sent to ICU for hypotension requiring pressors. Patient down graded to  for further management once hemodynamically stable. Patient underwent a EGD and colonoscopy no active bleeding noted.     2/18- Febrile overnight- T-max 101.5, lactate 2.2.  CXR w/ increased infiltrates.  Pan cx including sputum, IV hydration, Vanc and Cefepime for PNA.  2/21 Klebsiella PNA- ESBL sensitive to Meropenem, no active bleeding  2/22 Hemoglobin 7.3, afebrile, transfusing 1 unit PRBC today.     for a full account of hospital course please refer to actual medical records for this is a brief summery

## 2022-02-22 NOTE — PROGRESS NOTE ADULT - ASSESSMENT
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 Recent hospitalization (12/20/2021- 1/28/22) notable for cardiac arrest, aspiration pna, gastric ulcer s/p clipping and ventilator dependent respiratory failure s/p Trach/ PEG discharged to NH. BIBEMS from NH (2/13/22) for bloody stools and blood in trach noted at the facility. Patient admitted to  for hypovolemic shock 2/2 acute anemia 2/2 GIB . s/p 2 untis PRBCS. Patient requiring higher level of care and sent to ICU for hypotension requiring pressors. Patient down graded to  for further management once hemodynamically stable. Patient underwent a EGD and colonoscopy no active bleeding noted.     2/18- Febrile overnight- T-max 101.5, lactate 2.2.  CXR w/ increased infiltrates.  Pan cx including sputum, IV hydration, Vanc and Cefepime for PNA.  2/21 Klebsiella PNA- ESBL sensitive to Meropenem, no active bleeding  2/22 Hemoglobin 7.3, afebrile, transfusing 1 unit PRBC today.

## 2022-02-22 NOTE — CHART NOTE - NSCHARTNOTEFT_GEN_A_CORE
Assessment:   76yMalePatient is a 76y old  Male who presents with a chief complaint of blood in stool (21 Feb 2022 16:45). Pt visited. Pt is on vent via Trach. TF Glucerna 1.5 infusing @ 40 ml /hr x 24 hr to  Provide 960 ml , 1440 Kcal, Protein 79 gms, Free water 729 ml /day. D/W RN TF tolerated. Labs Noted . P{t DG from ICU to  4 N  on 2/16. . Pt with Pressure Ulcer Unstageable  and Stage 1 's.  Will suggest  adding Prosource 1  PKT  once a day via TF  to provide additional  60 kcal, Protein 15 gms /day.      Factors impacting intake: [ ] none [ ] nausea  [ ] vomiting [ ] diarrhea [ ] constipation  [ ]chewing problems [x ] swallowing issues  [ ] other:     Diet Prescription: Diet, NPO with Tube Feed:   Tube Feeding Modality: Gastrostomy  Glucerna 1.5 Dhruv  Total Volume for 24 Hours (mL): 960  Continuous  Starting Tube Feed Rate {mL per Hour}: 10  Increase Tube Feed Rate by (mL): 10     Every hour  Until Goal Tube Feed Rate (mL per Hour): 40  Tube Feed Duration (in Hours): 24  Tube Feed Start Time: 13:10 (02-16-22 @ 13:25)    Intake: NPO w TF     Current Weight:   % Weight Change Bed scale 148 lb with out SCD Device.     Pertinent Medications: MEDICATIONS  (STANDING):  atorvastatin 40 milliGRAM(s) Oral at bedtime  chlorhexidine 0.12% Liquid 15 milliLiter(s) Oral Mucosa every 12 hours  collagenase Ointment 1 Application(s) Topical daily  glucagon  Injectable 1 milliGRAM(s) IntraMuscular once  insulin lispro (ADMELOG) corrective regimen sliding scale   SubCutaneous every 6 hours  lactated ringers. 500 milliLiter(s) (500 mL/Hr) IV Continuous <Continuous>  meropenem  IVPB 1000 milliGRAM(s) IV Intermittent every 8 hours  midodrine. 5 milliGRAM(s) Oral three times a day  pantoprazole  Injectable 40 milliGRAM(s) IV Push every 12 hours    MEDICATIONS  (PRN):  acetaminophen    Suspension .. 650 milliGRAM(s) Oral every 6 hours PRN Temp greater or equal to 38C (100.4F), Mild Pain (1 - 3)    Pertinent Labs: 02-22 Na145 mmol/L Glu 187 mg/dL<H> K+ 3.4 mmol/L<L> Cr  0.61 mg/dL BUN 29 mg/dL<H> 02-22 Phos 1.9 mg/dL<L> 02-22 Alb 2.3 g/dL<L> 02-14 Chol 88 mg/dL LDL --    HDL 20 mg/dL<L> Trig 183 mg/dL<H>     CAPILLARY BLOOD GLUCOSE      POCT Blood Glucose.: 206 mg/dL (22 Feb 2022 05:42)  POCT Blood Glucose.: 178 mg/dL (21 Feb 2022 23:28)  POCT Blood Glucose.: 162 mg/dL (21 Feb 2022 16:33)  POCT Blood Glucose.: 182 mg/dL (21 Feb 2022 11:37)    Skin: Unstageble @ coccyx, stage 1 @ Bilateral Heels.     Estimated Needs:   [ ] no change since previous assessment  [ ] recalculated:     Previous Nutrition Diagnosis:   [ ] Inadequate Energy Intake [ ]Inadequate Oral Intake [ ] Excessive Energy Intake   [ ] Underweight [ ] Increased Nutrient Needs [ ] Overweight/Obesity   [ ] Altered GI Function [ ] Unintended Weight Loss [ ] Food & Nutrition Related Knowledge Deficit [x ] Malnutrition Severe    Nutrition Diagnosis is [ x] ongoing  [ ] resolved [ ] not applicable     New Nutrition Diagnosis: [ ] not applicable       Interventions:   Recommend  [ ] Change Diet To:  [ ] Nutrition Supplement  [x ] Nutrition Support      Continue with Glucerna 1.5 @ 40 ml /hr x 24 hr as tolerated   [x ] Other: Add Prosource 1 PKT Once  a day to provide additional 15 gms of  Protein , 60 calories.   (x) D/W NP. Suggest Phos supplement     Monitoring and Evaluation:   [ ] PO intake [ x ] Tolerance to diet prescription [ x ] weights [ x ] labs[ x ] follow up per protocol  [ ] other:

## 2022-02-22 NOTE — PROGRESS NOTE ADULT - PROBLEM SELECTOR PLAN 3
Secondary to GIB  Serial CBCs  not actively bleeding  Hemoglobin 7.3 today  transfuse 1 unit PRBC- therapeutic transfusion

## 2022-02-22 NOTE — PROGRESS NOTE ADULT - SUBJECTIVE AND OBJECTIVE BOX
Patient is a 76y old  Male who presents with a chief complaint of blood in stool (21 Feb 2022 16:45)/hypovolemic shock/GIB/blood transfusion/gastric ulcer/Kleb PNA, continue IV ABS, PPI suction      INTERVAL HPI/OVERNIGHT EVENTS:  T(C): 36.9 (02-22-22 @ 04:55), Max: 38.2 (02-21-22 @ 20:10)  HR: 105 (02-22-22 @ 04:55) (105 - 112)  BP: 107/58 (02-22-22 @ 06:32) (100/57 - 115/62)  RR: 20 (02-22-22 @ 04:55) (18 - 20)  SpO2: 97% (02-22-22 @ 04:55) (97% - 99%)  Wt(kg): --    LABS:                        7.3    14.40 )-----------( 254      ( 22 Feb 2022 06:54 )             24.4     02-22    145  |  106  |  29<H>  ----------------------------<  187<H>  3.4<L>   |  31  |  0.61    Ca    11.3      22 Feb 2022 06:54  Phos  1.9     02-22  Mg     2.3     02-22    TPro  5.3<L>  /  Alb  2.3<L>  /  TBili  0.5  /  DBili  x   /  AST  27  /  ALT  11  /  AlkPhos  112  02-22        CAPILLARY BLOOD GLUCOSE      POCT Blood Glucose.: 206 mg/dL (22 Feb 2022 05:42)  POCT Blood Glucose.: 178 mg/dL (21 Feb 2022 23:28)  POCT Blood Glucose.: 162 mg/dL (21 Feb 2022 16:33)  POCT Blood Glucose.: 182 mg/dL (21 Feb 2022 11:37)        RADIOLOGY & ADDITIONAL TESTS:    Consultant(s) Notes Reviewed:  [x ] YES  [ ] NO    PHYSICAL EXAM:  GENERAL: well built, well nourished  HEAD:  Atraumatic, Normocephalic  EYES: EOMI, PERRLA, conjunctiva and sclera clear  ENT: No tonsillar erythema, exudates, or enlargement; Moist mucous membranes, Good dentition, No lesions  NECK: Supple, No JVD, Normal thyroid, no enlarged nodes, trach  NERVOUS SYSTEM:  Alert & Oriented X3, Good concentration; Motor Strength 5/5 B/L upper and lower extremities; DTRs 2+ intact and symmetric, sensory intact  CHEST/LUNG: B/L good air entry; positive  rales, positive rhonchi, or wheezing  HEART: S1S2 normal, no S3, Regular rate and rhythm; No murmurs, rubs, or gallops  ABDOMEN: Soft, Nontender, Nondistended; Bowel sounds present  EXTREMITIES:  2+ Peripheral Pulses, No clubbing, cyanosis, B/L arm edema  LYMPH: No lymphadenopathy noted  SKIN: No rashes or lesions    Care Discussed with Consultants/Other Providers [ x] YES  [ ] NO

## 2022-02-22 NOTE — PROGRESS NOTE ADULT - PROBLEM SELECTOR PLAN 11
+3 pitting edema all extremities- resolved  25% Albumin followed by lasix 20 every 6 hours for 3 days- completed

## 2022-02-22 NOTE — PROGRESS NOTE ADULT - PROBLEM SELECTOR PLAN 4
Continue mechanical ventilation  Not a candidate for weaning.  Continue to monitor.  SBP tolerated only 3 minutes

## 2022-02-22 NOTE — PROGRESS NOTE ADULT - NUTRITIONAL ASSESSMENT
This patient has been assessed with a concern for Malnutrition and has been determined to have a diagnosis/diagnoses of Severe protein-calorie malnutrition.    This patient is being managed with:   Diet NPO with Tube Feed-  Tube Feeding Modality: Gastrostomy  Glucerna 1.5 Dhruv  Total Volume for 24 Hours (mL): 960  Continuous  Starting Tube Feed Rate {mL per Hour}: 30  Increase Tube Feed Rate by (mL): 10     Every 6 hours  Until Goal Tube Feed Rate (mL per Hour): 40  Tube Feed Duration (in Hours): 24  Tube Feed Start Time: 11:00  No Carb Prosource TF     Qty per Day:  1 pack /day  Entered: Feb 22 2022 10:57AM    Diet NPO with Tube Feed-  Tube Feeding Modality: Gastrostomy  Glucerna 1.5 Dhruv  Total Volume for 24 Hours (mL): 960  Continuous  Starting Tube Feed Rate {mL per Hour}: 10  Increase Tube Feed Rate by (mL): 10     Every hour  Until Goal Tube Feed Rate (mL per Hour): 40  Tube Feed Duration (in Hours): 24  Tube Feed Start Time: 13:10  Entered: Feb 16 2022  1:26PM    The following pending diet order is being considered for treatment of Severe protein-calorie malnutrition:null

## 2022-02-22 NOTE — DISCHARGE NOTE PROVIDER - NSDCMRMEDTOKEN_GEN_ALL_CORE_FT
Admelog 100 units/mL injectable solution: 2 Unit(s) if Glucose 151 - 200  4 Unit(s) if Glucose 201 - 250  6 Unit(s) if Glucose 251 - 300  8 Unit(s) if Glucose 301 - 350  10 Unit(s) if Glucose 351 - 400  12 Unit(s) if Glucose Greater Than 400  aspirin 81 mg oral tablet, chewable: 1 tab(s) by gastrostomy tube once a day   enoxaparin: 40 milligram(s) subcutaneously once a day  lactulose 10 g/15 mL oral syrup: 15 milliliter(s) by gastrostomy tube once a day  midodrine 5 mg oral tablet: 1 tab(s) by gastrostomy tube 3 times a day  pantoprazole 40 mg oral granule, delayed release: 1 each by gastrostomy tube once a day  Physical Therapy : 3 times a week   piperacillin-tazobactam: 3.375 milligram(s) intravenously every 8 hours  simvastatin 20 mg oral tablet: 1 tab(s) by gastrostomy tube once a day (at bedtime)  Speech Therapy for Dysphagia : 2 times a week

## 2022-02-23 NOTE — CHART NOTE - NSCHARTNOTEFT_GEN_A_CORE
Son updated on test results and treatment plan.  Patient has been accepted at [facility].  If patient is afebrile he will be discharged tomorrow.  All questions answered.

## 2022-02-23 NOTE — PROGRESS NOTE ADULT - PROBLEM SELECTOR PLAN 4
Continue mechanical ventilation  Not a candidate for weaning.    Only tolerating SBT for a few minutes at a time with PS 12. Low tidal volumes on SBT  Continue to monitor.

## 2022-02-23 NOTE — PROGRESS NOTE ADULT - PROBLEM SELECTOR PLAN 12
Anasarca Improved. Completed 3 days of Albumin and lasix.  Edema improved.  Tracheostomy dependent on mechanical ventilation  PEG tube feeding  DNR  MOLST on chart  Overall prognosis is poor.

## 2022-02-23 NOTE — PROGRESS NOTE ADULT - PROBLEM SELECTOR PLAN 1
Secondary to acute GIB  initially received 2U PRBC.  Transfused another unit of PRBC yesterday.  Continue midodrine for pressure support.

## 2022-02-23 NOTE — PROGRESS NOTE ADULT - NUTRITIONAL ASSESSMENT
This patient has been assessed with a concern for Malnutrition and has been determined to have a diagnosis/diagnoses of Severe protein-calorie malnutrition.    This patient is being managed with:   Diet NPO with Tube Feed-  Tube Feeding Modality: Gastrostomy  Glucerna 1.5 Dhruv  Total Volume for 24 Hours (mL): 960  Continuous  Starting Tube Feed Rate {mL per Hour}: 30  Increase Tube Feed Rate by (mL): 10     Every 6 hours  Until Goal Tube Feed Rate (mL per Hour): 40  Tube Feed Duration (in Hours): 24  Tube Feed Start Time: 11:00  No Carb Prosource TF     Qty per Day:  1 pack /day  Entered: Feb 22 2022 10:57AM    Diet NPO with Tube Feed-  Tube Feeding Modality: Gastrostomy  Glucerna 1.5 Dhruv  Total Volume for 24 Hours (mL): 960  Continuous  Starting Tube Feed Rate {mL per Hour}: 10  Increase Tube Feed Rate by (mL): 10     Every hour  Until Goal Tube Feed Rate (mL per Hour): 40  Tube Feed Duration (in Hours): 24  Tube Feed Start Time: 13:10  Entered: Feb 16 2022  1:26PM    The following pending diet order is being considered for treatment of Severe protein-calorie malnutrition:null This patient has been assessed with a concern for Malnutrition and has been determined to have a diagnosis/diagnoses of Severe protein-calorie malnutrition.  +Temporal waisting  +Muscle waisting    This patient is being managed with:   Diet NPO with Tube Feed-  Tube Feeding Modality: Gastrostomy  Glucerna 1.5 Dhruv  Total Volume for 24 Hours (mL): 960  Continuous  Starting Tube Feed Rate {mL per Hour}: 30  Increase Tube Feed Rate by (mL): 10     Every 6 hours  Until Goal Tube Feed Rate (mL per Hour): 40  Tube Feed Duration (in Hours): 24  Tube Feed Start Time: 11:00  No Carb Prosource TF     Qty per Day:  1 pack /day  Entered: Feb 22 2022 10:57AM    Diet NPO with Tube Feed-  Tube Feeding Modality: Gastrostomy  Glucerna 1.5 Dhruv  Total Volume for 24 Hours (mL): 960  Continuous  Starting Tube Feed Rate {mL per Hour}: 10  Increase Tube Feed Rate by (mL): 10     Every hour  Until Goal Tube Feed Rate (mL per Hour): 40  Tube Feed Duration (in Hours): 24  Tube Feed Start Time: 13:10  Entered: Feb 16 2022  1:26PM    The following pending diet order is being considered for treatment of Severe protein-calorie malnutrition:null

## 2022-02-23 NOTE — PROGRESS NOTE ADULT - SUBJECTIVE AND OBJECTIVE BOX
Patient is a 76y old  Male who presents with a chief complaint of blood in stool (22 Feb 2022 16:47)/shock/GIB/blood transfusion/sepsis/Kleb PNA      INTERVAL HPI/OVERNIGHT EVENTS:  T(C): 37.3 (02-23-22 @ 05:24), Max: 38.6 (02-22-22 @ 21:20)  HR: 109 (02-23-22 @ 08:49) (100 - 117)  BP: 96/56 (02-23-22 @ 05:24) (96/56 - 130/68)  RR: 19 (02-23-22 @ 05:24) (18 - 20)  SpO2: 99% (02-23-22 @ 08:49) (96% - 100%)  Wt(kg): --    LABS:                        9.1    13.99 )-----------( 269      ( 23 Feb 2022 07:02 )             28.8     02-23    145  |  109<H>  |  32<H>  ----------------------------<  197<H>  4.2   |  34<H>  |  0.71    Ca    12.3<H>      23 Feb 2022 07:02  Phos  2.7     02-23  Mg     2.7     02-23    TPro  5.3<L>  /  Alb  2.3<L>  /  TBili  0.5  /  DBili  x   /  AST  27  /  ALT  11  /  AlkPhos  112  02-22        CAPILLARY BLOOD GLUCOSE      POCT Blood Glucose.: 199 mg/dL (23 Feb 2022 06:03)  POCT Blood Glucose.: 185 mg/dL (22 Feb 2022 23:26)  POCT Blood Glucose.: 185 mg/dL (22 Feb 2022 16:53)  POCT Blood Glucose.: 188 mg/dL (22 Feb 2022 11:52)        RADIOLOGY & ADDITIONAL TESTS:    Consultant(s) Notes Reviewed:  [x ] YES  [ ] NO    PHYSICAL EXAM:  GENERAL: well built, well nourished  HEAD:  Atraumatic, Normocephalic  EYES: EOMI, PERRLA, conjunctiva and sclera clear  ENT: No tonsillar erythema, exudates, or enlargement; Moist mucous membranes, Good dentition, No lesions  NECK: Supple, No JVD, Normal thyroid, no enlarged nodes, trach  NERVOUS SYSTEM:  Alert & Oriented X3, Good concentration; Motor Strength 5/5 B/L upper and lower extremities; DTRs 2+ intact and symmetric, sensory intact  CHEST/LUNG: B/L good air entry; positive  rales, rhonchi, or wheezing  HEART: S1S2   ABDOMEN: Soft, Nontender, Nondistended; Bowel sounds present, GT in place  EXTREMITIES:  2+ Peripheral Pulses, No clubbing, cyanosis, B/L arm edema  LYMPH: No lymphadenopathy noted  SKIN: sacrum open wound    Care Discussed with Consultants/Other Providers [ x] YES  [ ] NO

## 2022-02-23 NOTE — PROGRESS NOTE ADULT - PROBLEM SELECTOR PLAN 11
Likely secondary to metastatic cancer as well as bed bound status.  Monitor Calcium and albumin levels daily.

## 2022-02-23 NOTE — PROGRESS NOTE ADULT - PROBLEM SELECTOR PLAN 9
Passive and active ROM exercises daily  Turn and position every 2 hours.  Keep head of bed elevated during feeds.

## 2022-02-23 NOTE — PROGRESS NOTE ADULT - SUBJECTIVE AND OBJECTIVE BOX
MELISSA JOHNS    SCU progress note    INTERVAL HPI/OVERNIGHT EVENTS: ***Elevated temperature 101.4 last evening. Afebrile today.    DNR [x ]   DNI  [  ]    Covid - 19 PCR: Negative 2/22    The 4Ms    What Matters Most: see GO  Age appropriate Medications/Screen for High Risk Medication: Yes  Mentation: see CAM below  Mobility: defer to physical exam    The Confusion Assessment Method (CAM) Diagnostic Algorithm     1: Acute Onset or Fluctuating Course  - Is there evidence of an acute change in mental status from the patient’s baseline? Did the (abnormal) behavior  fluctuate during the day, that is, tend to come and go, or increase and decrease in severity?  [ ] YES [x ] NO     2: Inattention  - Did the patient have difficulty focusing attention, being easily distractible, or having difficulty keeping track of what was being said?  [ ] YES [x ] NO     3: Disorganized thinking  -Was the patient’s thinking disorganized or incoherent, such as rambling or irrelevant conversation, unclear or illogical flow of ideas, or unpredictable switching from subject to subject?  [ ] YES [x ] NO    4: Altered Level of consciousness?  [ ] YES [ ] NO    The diagnosis of delirium by CAM requires the presence of features 1 and 2 and either 3 or 4.    PRESSORS: [ ] YES [x ] NO  meropenem  IVPB 1000 milliGRAM(s) IV Intermittent every 8 hours    Cardiovascular:  Heart Failure  Acute   Acute on Chronic  Chronic       midodrine. 5 milliGRAM(s) Oral three times a day    Pulmonary:    Hematalogic:    Other:  acetaminophen    Suspension .. 650 milliGRAM(s) Oral every 6 hours PRN  atorvastatin 40 milliGRAM(s) Oral at bedtime  chlorhexidine 0.12% Liquid 15 milliLiter(s) Oral Mucosa every 12 hours  collagenase Ointment 1 Application(s) Topical daily  glucagon  Injectable 1 milliGRAM(s) IntraMuscular once  insulin lispro (ADMELOG) corrective regimen sliding scale   SubCutaneous every 6 hours  lactated ringers. 500 milliLiter(s) IV Continuous <Continuous>  pantoprazole  Injectable 40 milliGRAM(s) IV Push every 12 hours  potassium phosphate / sodium phosphate Tablet (K-PHOS No. 2) 1 Tablet(s) Oral four times a day with meals    acetaminophen    Suspension .. 650 milliGRAM(s) Oral every 6 hours PRN  atorvastatin 40 milliGRAM(s) Oral at bedtime  chlorhexidine 0.12% Liquid 15 milliLiter(s) Oral Mucosa every 12 hours  collagenase Ointment 1 Application(s) Topical daily  glucagon  Injectable 1 milliGRAM(s) IntraMuscular once  insulin lispro (ADMELOG) corrective regimen sliding scale   SubCutaneous every 6 hours  lactated ringers. 500 milliLiter(s) IV Continuous <Continuous>  meropenem  IVPB 1000 milliGRAM(s) IV Intermittent every 8 hours  midodrine. 5 milliGRAM(s) Oral three times a day  pantoprazole  Injectable 40 milliGRAM(s) IV Push every 12 hours  potassium phosphate / sodium phosphate Tablet (K-PHOS No. 2) 1 Tablet(s) Oral four times a day with meals    Drug Dosing Weight  Height (cm): 175.2 (16 Feb 2022 15:23)  Weight (kg): 78 (13 Feb 2022 18:39)  BMI (kg/m2): 25.4 (16 Feb 2022 15:23)  BSA (m2): 1.94 (16 Feb 2022 15:23)    CENTRAL LINE: [ ] YES [x ] NO  LOCATION:   DATE INSERTED:  REMOVE: [ ] YES [ ] NO  EXPLAIN:    HUTCHINS: [ ] YES [x ] NO    DATE INSERTED:  REMOVE:  [ ] YES [ ] NO  EXPLAIN:    PAST MEDICAL & SURGICAL HISTORY:  Hypertension    Diabetes    High cholesterol    Primary osteoarthritis of both knees    Coronary artery disease of native artery of native heart with stable angina pectoris    Allergic bronchitis    Diabetic retinopathy    Oral cancer    SCC (squamous cell carcinoma)    Metastatic cancer    Recurrent disease    Edema of extremity    Hyponatremia    Microalbuminuria    Neuralgia    Obstructive sleep apnea, adult    Vitamin D deficiency    S/P knee replacement  b/l    S/P hernia repair  b/l    Status post cataract extraction and insertion of intraocular lens, unspecified laterality  b/l    History of surgery  On 9/10/20 he underwent right hemiglossectomy and partial neck dissection with Dr. Darinel Servin at [facility].                  Mode: AC/ CMV (Assist Control/ Continuous Mandatory Ventilation)  RR (machine): 14  TV (machine): 450  FiO2: 40  PEEP: 5  ITime: 1  MAP: 10  PIP: 25      PHYSICAL EXAM:    GENERAL: NAD, cachectic  HEAD:  Atraumatic, Normocephalic  EYES: EOMI, PERRLA, conjunctiva and sclera clear  ENMT: No tonsillar erythema, exudates  NECK: Supple, No JVD, tracheostomy intact  NERVOUS SYSTEM:  Awake. Can answer simple questions. Follows simple commands  CHEST/LUNG: Clear to percussion bilaterally; No rales, rhonchi, wheezing, or rubs  HEART: Regular rate and rhythm; No murmurs, rubs, or gallops  ABDOMEN: Soft, Nontender, Nondistended; Bowel sounds present  EXTREMITIES:  2+ Peripheral Pulses, No clubbing, cyanosis, or edema  LYMPH: No lymphadenopathy noted  SKIN: No rashes or lesions      LABS:  CBC Full  -  ( 23 Feb 2022 07:02 )  WBC Count : 13.99 K/uL  RBC Count : 3.14 M/uL  Hemoglobin : 9.1 g/dL  Hematocrit : 28.8 %  Platelet Count - Automated : 269 K/uL  Mean Cell Volume : 91.7 fl  Mean Cell Hemoglobin : 29.0 pg  Mean Cell Hemoglobin Concentration : 31.6 gm/dL  Auto Neutrophil # : x  Auto Lymphocyte # : x  Auto Monocyte # : x  Auto Eosinophil # : x  Auto Basophil # : x  Auto Neutrophil % : x  Auto Lymphocyte % : x  Auto Monocyte % : x  Auto Eosinophil % : x  Auto Basophil % : x    02-23    145  |  109<H>  |  32<H>  ----------------------------<  197<H>  4.2   |  34<H>  |  0.71    Ca    12.3<H>      23 Feb 2022 07:02  Phos  2.7     02-23  Mg     2.7     02-23    TPro  5.3<L>  /  Alb  2.3<L>  /  TBili  0.5  /  DBili  x   /  AST  27  /  ALT  11  /  AlkPhos  112  02-22              [  ]  DVT Prophylaxis  [  ]  Nutrition, Brand, Rate         Goal Rate        Abnormal Nutritional Status -  Malnutrition   Cachexia      Morbid Obesity BMI >/=40    RADIOLOGY & ADDITIONAL STUDIES:  ***    Goals of Care Discussion with Family/Proxy/Other   - see note from/family meeting set up for...     MELISSA JOHNS    SCU progress note    INTERVAL HPI/OVERNIGHT EVENTS: ***Elevated temperature 101.4 last evening. Afebrile today.    DNR [x ]   DNI  [  ]    Covid - 19 PCR: Negative 2/22    The 4Ms    What Matters Most: see GO  Age appropriate Medications/Screen for High Risk Medication: Yes  Mentation: see CAM below  Mobility: defer to physical exam    The Confusion Assessment Method (CAM) Diagnostic Algorithm     1: Acute Onset or Fluctuating Course  - Is there evidence of an acute change in mental status from the patient’s baseline? Did the (abnormal) behavior  fluctuate during the day, that is, tend to come and go, or increase and decrease in severity?  [ ] YES [x ] NO     2: Inattention  - Did the patient have difficulty focusing attention, being easily distractible, or having difficulty keeping track of what was being said?  [ ] YES [x ] NO     3: Disorganized thinking  -Was the patient’s thinking disorganized or incoherent, such as rambling or irrelevant conversation, unclear or illogical flow of ideas, or unpredictable switching from subject to subject?  [ ] YES [x ] NO    4: Altered Level of consciousness?  [ ] YES [ ] NO    The diagnosis of delirium by CAM requires the presence of features 1 and 2 and either 3 or 4.    PRESSORS: [ ] YES [x ] NO  meropenem  IVPB 1000 milliGRAM(s) IV Intermittent every 8 hours    Cardiovascular:  Heart Failure  Acute   Acute on Chronic  Chronic       midodrine. 5 milliGRAM(s) Oral three times a day    Pulmonary:    Hematalogic:    Other:  acetaminophen    Suspension .. 650 milliGRAM(s) Oral every 6 hours PRN  atorvastatin 40 milliGRAM(s) Oral at bedtime  chlorhexidine 0.12% Liquid 15 milliLiter(s) Oral Mucosa every 12 hours  collagenase Ointment 1 Application(s) Topical daily  glucagon  Injectable 1 milliGRAM(s) IntraMuscular once  insulin lispro (ADMELOG) corrective regimen sliding scale   SubCutaneous every 6 hours  lactated ringers. 500 milliLiter(s) IV Continuous <Continuous>  pantoprazole  Injectable 40 milliGRAM(s) IV Push every 12 hours  potassium phosphate / sodium phosphate Tablet (K-PHOS No. 2) 1 Tablet(s) Oral four times a day with meals    acetaminophen    Suspension .. 650 milliGRAM(s) Oral every 6 hours PRN  atorvastatin 40 milliGRAM(s) Oral at bedtime  chlorhexidine 0.12% Liquid 15 milliLiter(s) Oral Mucosa every 12 hours  collagenase Ointment 1 Application(s) Topical daily  glucagon  Injectable 1 milliGRAM(s) IntraMuscular once  insulin lispro (ADMELOG) corrective regimen sliding scale   SubCutaneous every 6 hours  lactated ringers. 500 milliLiter(s) IV Continuous <Continuous>  meropenem  IVPB 1000 milliGRAM(s) IV Intermittent every 8 hours  midodrine. 5 milliGRAM(s) Oral three times a day  pantoprazole  Injectable 40 milliGRAM(s) IV Push every 12 hours  potassium phosphate / sodium phosphate Tablet (K-PHOS No. 2) 1 Tablet(s) Oral four times a day with meals    Drug Dosing Weight  Height (cm): 175.2 (16 Feb 2022 15:23)  Weight (kg): 78 (13 Feb 2022 18:39)  BMI (kg/m2): 25.4 (16 Feb 2022 15:23)  BSA (m2): 1.94 (16 Feb 2022 15:23)    CENTRAL LINE: [ ] YES [x ] NO  LOCATION:   DATE INSERTED:  REMOVE: [ ] YES [ ] NO  EXPLAIN:    HUTCHINS: [ ] YES [x ] NO    DATE INSERTED:  REMOVE:  [ ] YES [ ] NO  EXPLAIN:    PAST MEDICAL & SURGICAL HISTORY:  Hypertension    Diabetes    High cholesterol    Primary osteoarthritis of both knees    Coronary artery disease of native artery of native heart with stable angina pectoris    Allergic bronchitis    Diabetic retinopathy    Oral cancer    SCC (squamous cell carcinoma)    Metastatic cancer    Recurrent disease    Edema of extremity    Hyponatremia    Microalbuminuria    Neuralgia    Obstructive sleep apnea, adult    Vitamin D deficiency    S/P knee replacement  b/l    S/P hernia repair  b/l    Status post cataract extraction and insertion of intraocular lens, unspecified laterality  b/l    History of surgery  On 9/10/20 he underwent right hemiglossectomy and partial neck dissection with Dr. Darinel Servin at [facility].                  Mode: AC/ CMV (Assist Control/ Continuous Mandatory Ventilation)  RR (machine): 14  TV (machine): 450  FiO2: 40  PEEP: 5  ITime: 1  MAP: 10  PIP: 25      PHYSICAL EXAM:    GENERAL: NAD, cachectic. +Temporal and muscle waisting  HEAD:  Atraumatic, Normocephalic  EYES: EOMI, PERRLA, conjunctiva and sclera clear  ENMT: No tonsillar erythema, exudates  NECK: Supple, No JVD, tracheostomy intact  NERVOUS SYSTEM:  Awake. Can answer simple questions. Follows simple commands  CHEST/LUNG: Diminished breath sounds bilaterally  HEART: Regular rate and rhythm; No murmurs, rubs, or gallops  ABDOMEN: Soft, Nontender, Nondistended; Bowel sounds present; Peg intact  EXTREMITIES:  Trace edema bilateral lower extremities. +Muscle waisting 2+ Peripheral Pulses, No clubbing or cyanosis  LYMPH: No lymphadenopathy noted  SKIN: Stage 1 Pressure Injury to the Bilateral Heels, as evident by non-blanchable erythema  Unstageable Pressure Injury to the Coccyx (5cm x 9cm x 2cm) with slough, pink tissue, and drainage Malodorous  Unstageable Pressure Injury to the area under the patients Trach with slough, pink tissue, and scant drainage        LABS:  CBC Full  -  ( 23 Feb 2022 07:02 )  WBC Count : 13.99 K/uL  RBC Count : 3.14 M/uL  Hemoglobin : 9.1 g/dL  Hematocrit : 28.8 %  Platelet Count - Automated : 269 K/uL  Mean Cell Volume : 91.7 fl  Mean Cell Hemoglobin : 29.0 pg  Mean Cell Hemoglobin Concentration : 31.6 gm/dL  Auto Neutrophil # : x  Auto Lymphocyte # : x  Auto Monocyte # : x  Auto Eosinophil # : x  Auto Basophil # : x  Auto Neutrophil % : x  Auto Lymphocyte % : x  Auto Monocyte % : x  Auto Eosinophil % : x  Auto Basophil % : x    02-23    145  |  109<H>  |  32<H>  ----------------------------<  197<H>  4.2   |  34<H>  |  0.71    Ca    12.3<H>      23 Feb 2022 07:02  Phos  2.7     02-23  Mg     2.7     02-23    TPro  5.3<L>  /  Alb  2.3<L>  /  TBili  0.5  /  DBili  x   /  AST  27  /  ALT  11  /  AlkPhos  112  02-22              [  ]  DVT Prophylaxis  [  ]  Nutrition, Brand, Rate         Goal Rate        Abnormal Nutritional Status -  Malnutrition   Cachexia          RADIOLOGY & ADDITIONAL STUDIES:  ***  < from: Xray Chest 1 View- PORTABLE-Urgent (Xray Chest 1 View- PORTABLE-Urgent .) (02.18.22 @ 10:58) >  Elevated diaphragms crowds the chest. Heart magnified by technique.   Tracheostomy remains.    Present film shows scattered mid lower lung field infiltrates increased   on the left and essentially new on the right compared to January 17.    IMPRESSION: Increasing infiltrates.      < end of copied text >  < from: CT Angio Abdomen and Pelvis w/ IV Cont (02.15.22 @ 15:58) >  FINDINGS:  LOWER CHEST: Previously seen right middle lobe cavity now measures up to   5.2 cm (previously 4.8 cm) and contains a new air-fluid level and mural   nodularity. Left lower lobe cavity partially imaged measuring   approximately 4.4 cm with air-fluid level, unchanged. Dense left lower   lobe consolidation unchanged. Increasing nodular right lower lobe   infiltrate. Developing right pleural effusion. Reidentified left pleural   effusion withsurrounding nodular pleural thickening area Dense coronary   atherosclerosis reidentified.    LIVER: Steatosis.  BILE DUCTS: Normal caliber.  GALLBLADDER: Mildly distended.  SPLEEN: Within normal limits.  PANCREAS: Within normal limits.  ADRENALS: Within normal limits.  KIDNEYS/URETERS: Within normal limits.    BLADDER: Layering high density in the bladder.  REPRODUCTIVE ORGANS: Prostate within normal limits.    BOWEL: No bowel obstruction. Appendix is not visualized and cannot be   assessed. Percutaneous gastrostomy tube in satisfactory position. Prior   small bowel obstruction has resolved. Metallic structure in the   descending colon (5:59), likely hemostatic clip. Similar structure in the   gastric fundus. No intraluminal contrast extravasation. No pneumatosis.  PERITONEUM: Small ascites. No pneumoperitoneum.  VESSELS: Atherosclerotic changes. No portal venous gas.  RETROPERITONEUM/LYMPH NODES: No lymphadenopathy.  ABDOMINAL WALL: Marked anasarca, increased from prior.  BONES: Degenerative changes.    IMPRESSION:    No CT evidence of active GI bleed.    Increasing right middle lobe lung cavity now with air-fluid level. Left   lower lobe cavity unchanged. Dense left lower lobe infiltrate unchanged.   Small but complex left pleural effusion, possibly empyema, unchanged.   Increasing right pleural effusion.    Prior small bowel obstruction has resolved.    Probable hemostatic clips in the stomach and descending colon.    Layering high density material in the bladder, new comparedwith prior.    < end of copied text >    Goals of Care Discussion with Family/Proxy/Other   - see note from 2/15/22

## 2022-02-23 NOTE — PROGRESS NOTE ADULT - ASSESSMENT
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 Recent hospitalization (12/20/2021- 1/28/22) notable for cardiac arrest, aspiration pna, gastric ulcer s/p clipping and ventilator dependent respiratory failure s/p Trach/ PEG discharged to NH. BIBEMS from NH (2/13/22) for bloody stools and blood in trach noted at the facility. Patient admitted to  for hypovolemic shock 2/2 acute anemia 2/2 GIB . s/p 2 untis PRBCS. Patient requiring higher level of care and sent to ICU for hypotension requiring pressors. Patient down graded to  for further management once hemodynamically stable. Patient underwent a EGD and colonoscopy no active bleeding noted.     2/18- Febrile overnight- T-max 101.5, lactate 2.2.  CXR w/ increased infiltrates.  Pan cx including sputum, IV hydration, Vanc and Cefepime for PNA.  2/21 Klebsiella PNA- ESBL sensitive to Meropenem, no active bleeding  2/22 Hemoglobin 7.3, afebrile, transfusing 1 unit PRBC today.

## 2022-02-23 NOTE — PROGRESS NOTE ADULT - PROBLEM SELECTOR PLAN 2
Likely lower GIB  No signs of bleeding overnight  S/P EGD and colonoscopy 2/16  Continue PPI BID  GI Dr Núñez.

## 2022-02-23 NOTE — PROGRESS NOTE ADULT - PROBLEM SELECTOR PLAN 5
Sputum Cx Klebsiella pneumoniae.- ESBL  blood culture and urine culture negative  continue Meropenem 1000mg IVPB q8h total 7days, until 2/28, 3/7 days  CXR with increased infiltrates.  Keep head of bed elevated during feeds.

## 2022-02-24 NOTE — PROGRESS NOTE ADULT - SUBJECTIVE AND OBJECTIVE BOX
Patient is a 76y old  Male who presents with a chief complaint of blood in stool (24 Feb 2022 07:51)/hypovolemic shock/GIB/blood transfusion/hypoxic respiratory failure/ESBL PNA/hypercalcemia, spiked fever 101.7F, on IV meropenum      INTERVAL HPI/OVERNIGHT EVENTS:  T(C): 37.5 (02-24-22 @ 08:22), Max: 38.7 (02-24-22 @ 06:39)  HR: 110 (02-24-22 @ 08:35) (93 - 124)  BP: 131/79 (02-24-22 @ 05:19) (103/74 - 131/79)  RR: 20 (02-24-22 @ 05:19) (18 - 22)  SpO2: 95% (02-24-22 @ 08:35) (95% - 98%)  Wt(kg): --    LABS:                        10.3   13.95 )-----------( 293      ( 24 Feb 2022 06:07 )             33.3     02-24    146<H>  |  110<H>  |  33<H>  ----------------------------<  164<H>  4.1   |  32<H>  |  0.73    Ca    12.8<H>      24 Feb 2022 06:07  Phos  3.5     02-24  Mg     2.7     02-24    TPro  5.9<L>  /  Alb  1.9<L>  /  TBili  0.5  /  DBili  x   /  AST  35  /  ALT  13  /  AlkPhos  176<H>  02-24        CAPILLARY BLOOD GLUCOSE      POCT Blood Glucose.: 151 mg/dL (24 Feb 2022 05:45)  POCT Blood Glucose.: 216 mg/dL (23 Feb 2022 23:53)  POCT Blood Glucose.: 199 mg/dL (23 Feb 2022 17:53)  POCT Blood Glucose.: 220 mg/dL (23 Feb 2022 11:36)        RADIOLOGY & ADDITIONAL TESTS:    Consultant(s) Notes Reviewed:  [x ] YES  [ ] NO    PHYSICAL EXAM:  GENERAL: well built, well nourished  HEAD:  Atraumatic, Normocephalic  EYES: EOMI, PERRLA, conjunctiva and sclera clear  ENT: No tonsillar erythema, exudates, or enlargement; Moist mucous membranes, Good dentition, No lesions  NECK: Supple, No JVD, Normal thyroid, no enlarged nodes, trach  NERVOUS SYSTEM:  Alert & Oriented X3, minimal movement leg  CHEST/LUNG: B/L good air entry; positive  rales, rhonchi, or wheezing  HEART: S1S2 normal, no S3, Regular rate and rhythm; No murmurs, rubs, or gallops  ABDOMEN: Soft, Nontender, Nondistended; Bowel sounds present, GT in palce  EXTREMITIES:  2+ Peripheral Pulses, No clubbing, cyanosis, B/L arm edema  LYMPH: No lymphadenopathy noted  SKIN: No rashes or lesions    Care Discussed with Consultants/Other Providers [ x] YES  [ ] NO

## 2022-02-24 NOTE — PROGRESS NOTE ADULT - SUBJECTIVE AND OBJECTIVE BOX
MELISSA JOHNS    SCU progress note    INTERVAL HPI/OVERNIGHT EVENTS: ***Febrile 101.7     DNR [x ]   DNI  [  ]    Covid - 19 PCR: Negative 2/22    The 4Ms    What Matters Most: see GOC  Age appropriate Medications/Screen for High Risk Medication: Yes  Mentation: see CAM below  Mobility: defer to physical exam    The Confusion Assessment Method (CAM) Diagnostic Algorithm     1: Acute Onset or Fluctuating Course  - Is there evidence of an acute change in mental status from the patient’s baseline? Did the (abnormal) behavior  fluctuate during the day, that is, tend to come and go, or increase and decrease in severity?  [ ] YES [x ] NO     2: Inattention  - Did the patient have difficulty focusing attention, being easily distractible, or having difficulty keeping track of what was being said?  [ ] YES [ x] NO     3: Disorganized thinking  -Was the patient’s thinking disorganized or incoherent, such as rambling or irrelevant conversation, unclear or illogical flow of ideas, or unpredictable switching from subject to subject?  [ ] YES [x ] NO    4: Altered Level of consciousness?  [ ] YES [x ] NO    The diagnosis of delirium by CAM requires the presence of features 1 and 2 and either 3 or 4.    PRESSORS: [ ] YES [x ] NO  meropenem  IVPB 1000 milliGRAM(s) IV Intermittent every 8 hours    Cardiovascular:  Heart Failure  Acute   Acute on Chronic  Chronic       midodrine. 5 milliGRAM(s) Oral three times a day    Pulmonary:    Hematalogic:    Other:  acetaminophen    Suspension .. 650 milliGRAM(s) Oral every 6 hours PRN  atorvastatin 40 milliGRAM(s) Oral at bedtime  chlorhexidine 0.12% Liquid 15 milliLiter(s) Oral Mucosa every 12 hours  collagenase Ointment 1 Application(s) Topical daily  glucagon  Injectable 1 milliGRAM(s) IntraMuscular once  insulin lispro (ADMELOG) corrective regimen sliding scale   SubCutaneous every 6 hours  lactated ringers. 500 milliLiter(s) IV Continuous <Continuous>  pantoprazole  Injectable 40 milliGRAM(s) IV Push every 12 hours  potassium phosphate / sodium phosphate Tablet (K-PHOS No. 2) 1 Tablet(s) Oral four times a day with meals    acetaminophen    Suspension .. 650 milliGRAM(s) Oral every 6 hours PRN  atorvastatin 40 milliGRAM(s) Oral at bedtime  chlorhexidine 0.12% Liquid 15 milliLiter(s) Oral Mucosa every 12 hours  collagenase Ointment 1 Application(s) Topical daily  glucagon  Injectable 1 milliGRAM(s) IntraMuscular once  insulin lispro (ADMELOG) corrective regimen sliding scale   SubCutaneous every 6 hours  lactated ringers. 500 milliLiter(s) IV Continuous <Continuous>  meropenem  IVPB 1000 milliGRAM(s) IV Intermittent every 8 hours  midodrine. 5 milliGRAM(s) Oral three times a day  pantoprazole  Injectable 40 milliGRAM(s) IV Push every 12 hours  potassium phosphate / sodium phosphate Tablet (K-PHOS No. 2) 1 Tablet(s) Oral four times a day with meals    Drug Dosing Weight  Height (cm): 175.2 (16 Feb 2022 15:23)  Weight (kg): 78 (13 Feb 2022 18:39)  BMI (kg/m2): 25.4 (16 Feb 2022 15:23)  BSA (m2): 1.94 (16 Feb 2022 15:23)    CENTRAL LINE: [ ] YES [x ] NO  LOCATION:   DATE INSERTED:  REMOVE: [ ] YES [ ] NO  EXPLAIN:    HUTCHINS: [ ] YES [x ] NO    DATE INSERTED:  REMOVE:  [ ] YES [ ] NO  EXPLAIN:    PAST MEDICAL & SURGICAL HISTORY:  Hypertension    Diabetes    High cholesterol    Primary osteoarthritis of both knees    Coronary artery disease of native artery of native heart with stable angina pectoris    Allergic bronchitis    Diabetic retinopathy    Oral cancer    SCC (squamous cell carcinoma)    Metastatic cancer    Recurrent disease    Edema of extremity    Hyponatremia    Microalbuminuria    Neuralgia    Obstructive sleep apnea, adult    Vitamin D deficiency    S/P knee replacement  b/l    S/P hernia repair  b/l    Status post cataract extraction and insertion of intraocular lens, unspecified laterality  b/l    History of surgery  On 9/10/20 he underwent right hemiglossectomy and partial neck dissection with Dr. Darinel Servin at [facility].                  Mode: AC/ CMV (Assist Control/ Continuous Mandatory Ventilation)  RR (machine): 14  TV (machine): 450  FiO2: 40  PEEP: 5  ITime: 0.9  MAP: 10  PIP: 28      PHYSICAL EXAM:    GENERAL: NAD, cachectic. +temporal and muscle waisting. Febrile 101.7  HEAD:  Atraumatic, Normocephalic  EYES: EOMI, PERRLA, conjunctiva and sclera clear  ENMT: No tonsillar erythema, exudates  NECK: Supple, No JVD, tracheostomy intact  NERVOUS SYSTEM:  Awake and alert. Follows simple commands. Nonverbal at baseline  CHEST/LUNG: Diminished beath sounds bilateral bases  HEART: Regular rate and rhythm; No murmurs, rubs, or gallops  ABDOMEN: Soft, Nontender, Nondistended; Bowel sounds present; Peg intact  EXTREMITIES:  2+ Peripheral Pulses, No clubbing, cyanosis, or edema  LYMPH: No lymphadenopathy noted  SKIN: Stage 1 Pressure Injury to the Bilateral Heels, as evident by non-blanchable erythema  Unstageable Pressure Injury to the Coccyx (5cm x 9cm x 2cm) with slough, pink tissue, and drainage Malodorous  Unstageable Pressure Injury to the area under the patients Trach with slough, pink tissue, and scant drainage        LABS:  CBC Full  -  ( 24 Feb 2022 06:07 )  WBC Count : 13.95 K/uL  RBC Count : 3.59 M/uL  Hemoglobin : 10.3 g/dL  Hematocrit : 33.3 %  Platelet Count - Automated : 293 K/uL  Mean Cell Volume : 92.8 fl  Mean Cell Hemoglobin : 28.7 pg  Mean Cell Hemoglobin Concentration : 30.9 gm/dL  Auto Neutrophil # : x  Auto Lymphocyte # : x  Auto Monocyte # : x  Auto Eosinophil # : x  Auto Basophil # : x  Auto Neutrophil % : x  Auto Lymphocyte % : x  Auto Monocyte % : x  Auto Eosinophil % : x  Auto Basophil % : x    02-24    146<H>  |  110<H>  |  33<H>  ----------------------------<  164<H>  4.1   |  32<H>  |  0.73    Ca    12.8<H>      24 Feb 2022 06:07  Phos  3.5     02-24  Mg     2.7     02-24    TPro  5.9<L>  /  Alb  1.9<L>  /  TBili  0.5  /  DBili  x   /  AST  35  /  ALT  13  /  AlkPhos  176<H>  02-24              [  ]  DVT Prophylaxis  [  ]  Nutrition, Brand, Rate         Goal Rate        Abnormal Nutritional Status -  Malnutrition   Cachexia         RADIOLOGY & ADDITIONAL STUDIES:  ***  < from: Xray Chest 1 View- PORTABLE-Urgent (Xray Chest 1 View- PORTABLE-Urgent .) (02.18.22 @ 10:58) >  Elevated diaphragms crowds the chest. Heart magnified by technique.   Tracheostomy remains.    Present film shows scattered mid lower lung field infiltrates increased   on the left and essentially new on the right compared to January 17.    IMPRESSION: Increasing infiltrates.    < end of copied text >  < from: CT Angio Abdomen and Pelvis w/ IV Cont (02.15.22 @ 15:58) >  PROCEDURE:  CT of the Abdomen and Pelvis was performed.  Precontrast, Arterial and Delayed phases were performed.  Sagittal and coronal reformats were performed.    FINDINGS:  LOWER CHEST: Previously seen right middle lobe cavity now measures up to   5.2 cm (previously 4.8 cm) and contains a new air-fluid level and mural   nodularity. Left lower lobe cavity partially imaged measuring   approximately 4.4 cm with air-fluid level, unchanged. Dense left lower   lobe consolidation unchanged. Increasing nodular right lower lobe   infiltrate. Developing right pleural effusion. Reidentified left pleural   effusion withsurrounding nodular pleural thickening area Dense coronary   atherosclerosis reidentified.    LIVER: Steatosis.  BILE DUCTS: Normal caliber.  GALLBLADDER: Mildly distended.  SPLEEN: Within normal limits.  PANCREAS: Within normal limits.  ADRENALS: Within normal limits.  KIDNEYS/URETERS: Within normal limits.    BLADDER: Layering high density in the bladder.  REPRODUCTIVE ORGANS: Prostate within normal limits.    BOWEL: No bowel obstruction. Appendix is not visualized and cannot be   assessed. Percutaneous gastrostomy tube in satisfactory position. Prior   small bowel obstruction has resolved. Metallic structure in the   descending colon (5:59), likely hemostatic clip. Similar structure in the   gastric fundus. No intraluminal contrast extravasation. No pneumatosis.  PERITONEUM: Small ascites. No pneumoperitoneum.  VESSELS: Atherosclerotic changes. No portal venous gas.  RETROPERITONEUM/LYMPH NODES: No lymphadenopathy.  ABDOMINAL WALL: Marked anasarca, increased from prior.  BONES: Degenerative changes.    IMPRESSION:    No CT evidence of active GI bleed.    Increasing right middle lobe lung cavity now with air-fluid level. Left   lower lobe cavity unchanged. Dense left lower lobe infiltrate unchanged.   Small but complex left pleural effusion, possibly empyema, unchanged.   Increasing right pleural effusion.    Prior small bowel obstruction has resolved.    Probable hemostatic clips in the stomach and descending colon.    Layering high density material in the bladder, new comparedwith prior.    < end of copied text >    Goals of Care Discussion with Family/Proxy/Other   - see note from 2/15/22

## 2022-02-24 NOTE — PROGRESS NOTE ADULT - NUTRITIONAL ASSESSMENT
This patient has been assessed with a concern for Malnutrition and has been determined to have a diagnosis/diagnoses of Severe protein-calorie malnutrition.  +Temporal waisting  +Muscle waisting    This patient is being managed with:   Diet NPO with Tube Feed-  Tube Feeding Modality: Gastrostomy  Glucerna 1.5 Dhruv  Total Volume for 24 Hours (mL): 960  Continuous  Starting Tube Feed Rate {mL per Hour}: 30  Increase Tube Feed Rate by (mL): 10     Every 6 hours  Until Goal Tube Feed Rate (mL per Hour): 40  Tube Feed Duration (in Hours): 24  Tube Feed Start Time: 11:00  No Carb Prosource TF     Qty per Day:  1 pack /day  Entered: Feb 22 2022 10:57AM    Diet NPO with Tube Feed-  Tube Feeding Modality: Gastrostomy  Glucerna 1.5 Dhruv  Total Volume for 24 Hours (mL): 960  Continuous  Starting Tube Feed Rate {mL per Hour}: 10  Increase Tube Feed Rate by (mL): 10     Every hour  Until Goal Tube Feed Rate (mL per Hour): 40  Tube Feed Duration (in Hours): 24  Tube Feed Start Time: 13:10  Entered: Feb 16 2022  1:26PM    The following pending diet order is being considered for treatment of Severe protein-calorie malnutrition:null

## 2022-02-24 NOTE — PROGRESS NOTE ADULT - PROBLEM SELECTOR PLAN 5
Sputum Cx Klebsiella pneumoniae.- ESBL  blood culture and urine culture negative  continue Meropenem 1000mg IVPB q8h total 7days, until 2/28, 3/7 days  CXR with increased infiltrates.  repeat pending  Keep head of bed elevated during feeds.  Febrile today. 101.7

## 2022-02-24 NOTE — CHART NOTE - NSCHARTNOTEFT_GEN_A_CORE
Daughter updated on patient's test results and treatment plan.  Chest xray worse.  Discharge for today cancelled because of fever.  All questions answered.

## 2022-02-24 NOTE — PROGRESS NOTE ADULT - ASSESSMENT
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 Recent hospitalization (12/20/2021- 1/28/22) notable for cardiac arrest, aspiration pna, gastric ulcer s/p clipping and ventilator dependent respiratory failure s/p Trach/ PEG discharged to NH. BIBEMS from NH (2/13/22) for bloody stools and blood in trach noted at the facility. Patient admitted to  for hypovolemic shock 2/2 acute anemia 2/2 GIB . s/p 2 untis PRBCS. Patient requiring higher level of care and sent to ICU for hypotension requiring pressors. Patient down graded to  for further management once hemodynamically stable. Patient underwent a EGD and colonoscopy no active bleeding noted.     2/18- Febrile overnight- T-max 101.5, lactate 2.2.  CXR w/ increased infiltrates.  Pan cx including sputum, IV hydration, Vanc and Cefepime for PNA.  2/21 Klebsiella PNA- ESBL sensitive to Meropenem, no active bleeding  2/22 Hemoglobin 7.3, afebrile, transfusing 1 unit PRBC today.   2/24  Febrile Tmax 101.7

## 2022-02-24 NOTE — PROGRESS NOTE ADULT - PROBLEM SELECTOR PLAN 9
Likely secondary to metastatic cancer as well as bed bound status  Corrected [id number] today.  Start Free water via Peg at 35ml per hour.  Monitor calcium daily.

## 2022-02-24 NOTE — CHART NOTE - NSCHARTNOTEFT_GEN_A_CORE
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 Recent hospitalization (12/20/2021- 1/28/22) notable for cardiac arrest, aspiration pna, gastric ulcer s/p clipping and ventilator dependent respiratory failure s/p Trach/ PEG discharged to NH. BIBEMS from NH (2/13/22) for bloody stools and blood in trach noted at the facility. Patient admitted to  for hypovolemic shock 2/2 acute anemia 2/2 GIB . s/p 2 untis PRBCS. Patient requiring higher level of care and sent to ICU for hypotension requiring pressors. Patient down graded to  for further management once hemodynamically stable. Patient underwent a EGD and colonoscopy no active bleeding noted.   2/24  Febrile Tmax 101.7. Now continue with temp.    OBJECTIVE:  Vital Signs Last 24 Hrs  T(C): 37.9 (24 Feb 2022 21:09), Max: 38.7 (24 Feb 2022 06:39)  T(F): 100.3 (24 Feb 2022 21:09), Max: 101.7 (24 Feb 2022 06:39)  HR: 110 (24 Feb 2022 21:09) (93 - 118)  BP: 106/63 (24 Feb 2022 21:09) (106/63 - 131/79)  BP(mean): --  RR: 18 (24 Feb 2022 21:09) (18 - 20)  SpO2: 95% (24 Feb 2022 21:09) (95% - 99%)    FOCUSED PHYSICAL EXAM:  SKIN: Stage 1 Pressure Injury to the Bilateral Heels, as evident by non-blanchable erythema, Unstageable Pressure Injury to the Coccyx (5cm x 9cm x 2cm) with slough, pink tissue, and drainage Malodorous, Unstageable Pressure Injury to the area under the patients Trach with slough, pink tissue, and scant drainage  RESP: Trach to vent  NEURO: Non interactive    LABS:                        10.3   13.95 )-----------( 293      ( 24 Feb 2022 06:07 )             33.3     02-24    146<H>  |  110<H>  |  33<H>  ----------------------------<  164<H>  4.1   |  32<H>  |  0.73    Ca    12.8<H>      24 Feb 2022 06:07  Phos  3.5     02-24  Mg     2.7     02-24    TPro  5.9<L>  /  Alb  1.9<L>  /  TBili  0.5  /  DBili  x   /  AST  35  /  ALT  13  /  AlkPhos  176<H>  02-24    IMAGING:  ACC: [id number] EXAM:  XR CHEST PORTABLE URGENT 1V                        PROCEDURE DATE:  02/24/2022    INTERPRETATION:  AP erect chest on February 24, 2022 at 8:22 AM. Patient   has fever patient has history of cancer and metastatic diseaseto the   lungs.  Heart magnified by technique. Tracheostomy remains.  On February 18 there are significant scattered mid and lower lung field   infiltrates.  These have increased on the present study.  IMPRESSION: Increasing bilateral infiltrates.    PROBLEM: Sirs probably due to increasing bilateral infiltrates Vs decubiti ulcers  PLAN:   1. Meropenem  IVPB 1000 felisha GRAM(s) IV Intermittent every 8 hours  2. Cont collagenase Ointment 1 Application(s) Topical daily  3. Cont acetaminophen    Suspension 650 felisha GRAM(s) Oral every 6 hours PRN Temp greater or equal to 38C (100.4F)    FOLLOW UP / RESULT: trend temp, f/u w ID

## 2022-02-24 NOTE — PROGRESS NOTE ADULT - ASSESSMENT
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 Recent hospitalization (12/20/2021- 1/28/22) notable for cardiac arrest, aspiration pna, gastric ulcer s/p clipping and ventilator dependent respiratory failure s/p Trach/ PEG discharged to NH. BIBEMS from NH (2/13/22) for bloody stools and blood in trach noted at the facility Adm to ICU for hypovolemic shock 2/2 acute anemia 2/2 GIB, S/P ICU care, S/P blood transfusion, PPI, EGD shoed esophagus ulcer, no active bleeding, S/P colonoscopy , no active bleeding, continue PPI bid, blood culture negative ESBL /GNR PNA, continue IV meropenum until 2/28,  suction prn, GI follow up, transfusion if H <7. DVT prophylaxis. H/H stable, spiked fever 101.7F today, hypercalcemia, most likely due to metastatic CA. IV hydration.

## 2022-02-25 NOTE — PROGRESS NOTE ADULT - ASSESSMENT
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 Recent hospitalization (12/20/2021- 1/28/22) notable for cardiac arrest, aspiration pna, gastric ulcer s/p clipping and ventilator dependent respiratory failure s/p Trach/ PEG discharged to NH. BIBEMS from NH (2/13/22) for bloody stools and blood in trach noted at the facility Adm to ICU for hypovolemic shock 2/2 acute anemia 2/2 GIB, S/P ICU care, S/P blood transfusion, PPI, EGD shoed esophagus ulcer, no active bleeding, S/P colonoscopy , no active bleeding, continue PPI bid, blood culture negative ESBL /GNR PNA, continue IV meropenum until 2/28,  suction prn, GI follow up, transfusion if H <7. DVT prophylaxis. H/H stable, spiked fever 100.3F today, hypercalcemia, most likely due to metastatic CA. IV hydration. Suction.

## 2022-02-25 NOTE — CHART NOTE - NSCHARTNOTEFT_GEN_A_CORE
Son updated on test results and treatment plan.  Discussed low albumin and changing TF formula.  All questions answered.

## 2022-02-25 NOTE — PROGRESS NOTE ADULT - NUTRITIONAL ASSESSMENT
This patient has been assessed with a concern for Malnutrition and has been determined to have a diagnosis/diagnoses of Severe protein-calorie malnutrition.  +Moderate to severe temporal waisting  +Muscle waisting    This patient is being managed with:   Diet NPO with Tube Feed-  Tube Feeding Modality: Gastrostomy  Glucerna 1.5 Dhruv  Total Volume for 24 Hours (mL): 960  Continuous  Starting Tube Feed Rate {mL per Hour}: 30  Increase Tube Feed Rate by (mL): 10     Every 6 hours  Until Goal Tube Feed Rate (mL per Hour): 40  Tube Feed Duration (in Hours): 24  Tube Feed Start Time: 11:00  No Carb Prosource TF     Qty per Day:  1 pack /day  Entered: Feb 22 2022 10:57AM    Diet NPO with Tube Feed-  Tube Feeding Modality: Gastrostomy  Glucerna 1.5 Dhruv  Total Volume for 24 Hours (mL): 960  Continuous  Starting Tube Feed Rate {mL per Hour}: 10  Increase Tube Feed Rate by (mL): 10     Every hour  Until Goal Tube Feed Rate (mL per Hour): 40  Tube Feed Duration (in Hours): 24  Tube Feed Start Time: 13:10  Entered: Feb 16 2022  1:26PM    The following pending diet order is being considered for treatment of Severe protein-calorie malnutrition:null

## 2022-02-25 NOTE — PROGRESS NOTE ADULT - SUBJECTIVE AND OBJECTIVE BOX
MELISSA JOHNS    SCU progress note    INTERVAL HPI/OVERNIGHT EVENTS: ***Tmax 100.3    DNR [x ]   DNI  [  ]    Covid - 19 PCR: Negative 2/22    The 4Ms    What Matters Most: see GOC  Age appropriate Medications/Screen for High Risk Medication: Yes  Mentation: see CAM below  Mobility: defer to physical exam    The Confusion Assessment Method (CAM) Diagnostic Algorithm     1: Acute Onset or Fluctuating Course  - Is there evidence of an acute change in mental status from the patient’s baseline? Did the (abnormal) behavior  fluctuate during the day, that is, tend to come and go, or increase and decrease in severity?  [ ] YES [x ] NO     2: Inattention  - Did the patient have difficulty focusing attention, being easily distractible, or having difficulty keeping track of what was being said?  [ ] YES [x ] NO     3: Disorganized thinking  -Was the patient’s thinking disorganized or incoherent, such as rambling or irrelevant conversation, unclear or illogical flow of ideas, or unpredictable switching from subject to subject?  [ ] YES [ ] NO   Unable to sccess    4: Altered Level of consciousness?  [ ] YES [x ] NO    The diagnosis of delirium by CAM requires the presence of features 1 and 2 and either 3 or 4.    PRESSORS: [ ] YES [x ] NO  meropenem  IVPB 1000 milliGRAM(s) IV Intermittent every 8 hours    Cardiovascular:  Heart Failure  Acute   Acute on Chronic  Chronic       midodrine. 5 milliGRAM(s) Oral three times a day    Pulmonary:    Hematalogic:    Other:  acetaminophen    Suspension .. 650 milliGRAM(s) Oral every 6 hours PRN  atorvastatin 40 milliGRAM(s) Oral at bedtime  chlorhexidine 0.12% Liquid 15 milliLiter(s) Oral Mucosa every 12 hours  collagenase Ointment 1 Application(s) Topical daily  glucagon  Injectable 1 milliGRAM(s) IntraMuscular once  insulin lispro (ADMELOG) corrective regimen sliding scale   SubCutaneous every 6 hours  lactated ringers. 500 milliLiter(s) IV Continuous <Continuous>  pantoprazole  Injectable 40 milliGRAM(s) IV Push every 12 hours    acetaminophen    Suspension .. 650 milliGRAM(s) Oral every 6 hours PRN  atorvastatin 40 milliGRAM(s) Oral at bedtime  chlorhexidine 0.12% Liquid 15 milliLiter(s) Oral Mucosa every 12 hours  collagenase Ointment 1 Application(s) Topical daily  glucagon  Injectable 1 milliGRAM(s) IntraMuscular once  insulin lispro (ADMELOG) corrective regimen sliding scale   SubCutaneous every 6 hours  lactated ringers. 500 milliLiter(s) IV Continuous <Continuous>  meropenem  IVPB 1000 milliGRAM(s) IV Intermittent every 8 hours  midodrine. 5 milliGRAM(s) Oral three times a day  pantoprazole  Injectable 40 milliGRAM(s) IV Push every 12 hours    Drug Dosing Weight  Height (cm): 175.2 (16 Feb 2022 15:23)  Weight (kg): 78 (13 Feb 2022 18:39)  BMI (kg/m2): 25.4 (16 Feb 2022 15:23)  BSA (m2): 1.94 (16 Feb 2022 15:23)    CENTRAL LINE: [ ] YES [x ] NO  LOCATION:   DATE INSERTED:  REMOVE: [ ] YES [ ] NO  EXPLAIN:    HUTCHINS: [ ] YES [x ] NO    DATE INSERTED:  REMOVE:  [ ] YES [ ] NO  EXPLAIN:    PAST MEDICAL & SURGICAL HISTORY:  Hypertension    Diabetes    High cholesterol    Primary osteoarthritis of both knees    Coronary artery disease of native artery of native heart with stable angina pectoris    Allergic bronchitis    Diabetic retinopathy    Oral cancer    SCC (squamous cell carcinoma)    Metastatic cancer    Recurrent disease    Edema of extremity    Hyponatremia    Microalbuminuria    Neuralgia    Obstructive sleep apnea, adult    Vitamin D deficiency    S/P knee replacement  b/l    S/P hernia repair  b/l    Status post cataract extraction and insertion of intraocular lens, unspecified laterality  b/l    History of surgery  On 9/10/20 he underwent right hemiglossectomy and partial neck dissection with Dr. Darinel Servin at [facility].                02-24 @ 07:01 - 02-25 @ 07:00  --------------------------------------------------------  IN: 950 mL / OUT: 657 mL / NET: 293 mL        Mode: AC/ CMV (Assist Control/ Continuous Mandatory Ventilation)  RR (machine): 14  TV (machine): 450  FiO2: 40  PEEP: 5  ITime: 0.9  MAP: 10  PIP: 28      PHYSICAL EXAM:    GENERAL: NAD, cachectic with moderate temporal waisting an +muscle waisting all extremities  HEAD:  Atraumatic, Normocephalic  EYES: EOMI, PERRLA, conjunctiva and sclera clear  ENMT: No tonsillar erythema, exudates  NECK: Supple, No JVD, tracheostomy intact  NERVOUS SYSTEM:  Awake and alert. Able to follow simple commands. Nonverbal at baseline.  CHEST/LUNG: Diminished breath sounds bilateral bases  HEART: Regular rate and rhythm; No murmurs, rubs, or gallops  ABDOMEN: Soft, Nontender, Nondistended; Bowel sounds present; Peg intact  EXTREMITIES: +Muscle waisting  2+ Peripheral Pulses, No clubbing, cyanosis, or edema  LYMPH: No lymphadenopathy noted  SKIN: Stage 1 Pressure Injury to the Bilateral Heels, as evident by non-blanchable erythema  Unstageable Pressure Injury to the Coccyx (5cm x 9cm x 2cm) with slough, pink tissue, and drainage Malodorous  Unstageable Pressure Injury to the area under the patients Trach with slough, pink tissue, and scant drainage        LABS:  CBC Full  -  ( 24 Feb 2022 06:07 )  WBC Count : 13.95 K/uL  RBC Count : 3.59 M/uL  Hemoglobin : 10.3 g/dL  Hematocrit : 33.3 %  Platelet Count - Automated : 293 K/uL  Mean Cell Volume : 92.8 fl  Mean Cell Hemoglobin : 28.7 pg  Mean Cell Hemoglobin Concentration : 30.9 gm/dL  Auto Neutrophil # : x  Auto Lymphocyte # : x  Auto Monocyte # : x  Auto Eosinophil # : x  Auto Basophil # : x  Auto Neutrophil % : x  Auto Lymphocyte % : x  Auto Monocyte % : x  Auto Eosinophil % : x  Auto Basophil % : x    02-24    146<H>  |  110<H>  |  33<H>  ----------------------------<  164<H>  4.1   |  32<H>  |  0.73    Ca    12.8<H>      24 Feb 2022 06:07  Phos  3.5     02-24  Mg     2.7     02-24    TPro  5.9<L>  /  Alb  1.9<L>  /  TBili  0.5  /  DBili  x   /  AST  35  /  ALT  13  /  AlkPhos  176<H>  02-24              [  ]  DVT Prophylaxis  [  ]  Nutrition, Brand, Rate         Goal Rate        Abnormal Nutritional Status -  Malnutrition   Cachexia          RADIOLOGY & ADDITIONAL STUDIES:  ***  < from: Xray Chest 1 View- PORTABLE-Urgent (Xray Chest 1 View- PORTABLE-Urgent .) (02.24.22 @ 08:31) >    INTERPRETATION:  AP erect chest on February 24, 2022 at 8:22 AM. Patient   has fever patient has history of cancer and metastatic diseaseto the   lungs.    Heart magnified by technique. Tracheostomy remains.    On February 18 there are significant scattered mid and lower lung field   infiltrates.    These have increased on the present study.    IMPRESSION: Increasing bilateral infiltrates.      < end of copied text >  < from: CT Angio Abdomen and Pelvis w/ IV Cont (02.15.22 @ 15:58) >  INTERPRETATION:  CLINICAL INFORMATION: 76 years  Male with R/o GI bleed.    COMPARISON: 1/18/2022    CONTRAST/COMPLICATIONS:  IV Contrast:Omnipaque 350  90 cc administered   10 cc discarded  Oral Contrast: NONE  Complications: None reported at time of study completion    PROCEDURE:  CT of the Abdomen and Pelvis was performed.  Precontrast, Arterial and Delayed phases were performed.  Sagittal and coronal reformats were performed.    FINDINGS:  LOWER CHEST: Previously seen right middle lobe cavity now measures up to   5.2 cm (previously 4.8 cm) and contains a new air-fluid level and mural   nodularity. Left lower lobe cavity partially imaged measuring   approximately 4.4 cm with air-fluid level, unchanged. Dense left lower   lobe consolidation unchanged. Increasing nodular right lower lobe   infiltrate. Developing right pleural effusion. Reidentified left pleural   effusion withsurrounding nodular pleural thickening area Dense coronary   atherosclerosis reidentified.    LIVER: Steatosis.  BILE DUCTS: Normal caliber.  GALLBLADDER: Mildly distended.  SPLEEN: Within normal limits.  PANCREAS: Within normal limits.  ADRENALS: Within normal limits.  KIDNEYS/URETERS: Within normal limits.    BLADDER: Layering high density in the bladder.  REPRODUCTIVE ORGANS: Prostate within normal limits.    BOWEL: No bowel obstruction. Appendix is not visualized and cannot be   assessed. Percutaneous gastrostomy tube in satisfactory position. Prior   small bowel obstruction has resolved. Metallic structure in the   descending colon (5:59), likely hemostatic clip. Similar structure in the   gastric fundus. No intraluminal contrast extravasation. No pneumatosis.  PERITONEUM: Small ascites. No pneumoperitoneum.  VESSELS: Atherosclerotic changes. No portal venous gas.  RETROPERITONEUM/LYMPH NODES: No lymphadenopathy.  ABDOMINAL WALL: Marked anasarca, increased from prior.  BONES: Degenerative changes.    IMPRESSION:    No CT evidence of active GI bleed.    Increasing right middle lobe lung cavity now with air-fluid level. Left   lower lobe cavity unchanged. Dense left lower lobe infiltrate unchanged.   Small but complex left pleural effusion, possibly empyema, unchanged.   Increasing right pleural effusion.    Prior small bowel obstruction has resolved.    Probable hemostatic clips in the stomach and descending colon.    Layering high density material in the bladder, new comparedwith prior.    < end of copied text >    Goals of Care Discussion with Family/Proxy/Other   - see note from 2/15/22

## 2022-02-25 NOTE — PROGRESS NOTE ADULT - SUBJECTIVE AND OBJECTIVE BOX
Patient is a 76y old  Male who presents with a chief complaint of blood in stool (25 Feb 2022 08:16)/hypovolemic shock/GIB/blood transfusion/sepsis/ESBL PNA, spiked fever 100.3F this morning, CXR worsening infiltrate      INTERVAL HPI/OVERNIGHT EVENTS:  T(C): 37.6 (02-25-22 @ 07:30), Max: 37.9 (02-24-22 @ 21:09)  HR: 118 (02-25-22 @ 05:40) (108 - 118)  BP: 122/68 (02-25-22 @ 05:40) (106/63 - 122/68)  RR: 19 (02-25-22 @ 05:40) (18 - 19)  SpO2: 96% (02-25-22 @ 05:40) (95% - 99%)  Wt(kg): --    LABS:                        8.9    10.73 )-----------( 285      ( 25 Feb 2022 10:46 )             29.6     02-24    146<H>  |  110<H>  |  33<H>  ----------------------------<  164<H>  4.1   |  32<H>  |  0.73    Ca    12.8<H>      24 Feb 2022 06:07  Phos  3.5     02-24  Mg     2.7     02-24    TPro  5.9<L>  /  Alb  1.9<L>  /  TBili  0.5  /  DBili  x   /  AST  35  /  ALT  13  /  AlkPhos  176<H>  02-24        CAPILLARY BLOOD GLUCOSE      POCT Blood Glucose.: 213 mg/dL (25 Feb 2022 06:04)  POCT Blood Glucose.: 199 mg/dL (25 Feb 2022 06:01)  POCT Blood Glucose.: 197 mg/dL (25 Feb 2022 00:17)  POCT Blood Glucose.: 179 mg/dL (24 Feb 2022 17:42)  POCT Blood Glucose.: 217 mg/dL (24 Feb 2022 11:38)        RADIOLOGY & ADDITIONAL TESTS:    Consultant(s) Notes Reviewed:  [x ] YES  [ ] NO    PHYSICAL EXAM:  GENERAL: well built, well nourished  HEAD:  Atraumatic, Normocephalic  EYES: EOMI, PERRLA, conjunctiva and sclera clear  ENT: No tonsillar erythema, exudates, or enlargement; Moist mucous membranes, Good dentition, No lesions  NECK: Supple, No JVD, Normal thyroid, no enlarged nodes, trach  NERVOUS SYSTEM:  Alert & Oriented X3, Good concentration; Motor Strength 5/5 B/L upper and lower extremities; DTRs 2+ intact and symmetric, sensory intact  CHEST/LUNG: B/L good air entry; No rales, positive rhonchi, or wheezing  HEART: S1S2 normal, no S3, Regular rate and rhythm; No murmurs, rubs, or gallops  ABDOMEN: Soft, Nontender, Nondistended; Bowel sounds present  EXTREMITIES:  2+ Peripheral Pulses, No clubbing, cyanosis, B/L arm positive edema  LYMPH: No lymphadenopathy noted  SKIN: sacrum D/U    Care Discussed with Consultants/Other Providers [ x] YES  [ ] NO

## 2022-02-25 NOTE — CHART NOTE - NSCHARTNOTEFT_GEN_A_CORE
Assessment:   76yMalePatient is a 76y old  Male who presents with a chief complaint of blood in stool (25 Feb 2022 10:50)      Factors impacting intake: [ ] none [ ] nausea  [ ] vomiting [ ] diarrhea [ ] constipation  [ ]chewing problems [ ] swallowing issues  [x ] other: asked by NP To see patient as having diarrhea On current rate of tube feeding Of glucerna 1.5 at 40ml/hx24. TF provides: 960ml,1440kcal,79g protein, 728ml free water). which meets needs. remains ventilated via trach. just ordered for banana flakes bid. suggest To change formula To 2kcal HN at 35ml/hx24(840ml,1680kcal,70g protein, 588ml free water). if tolerated, may add 1 packet of prosource /d for an additional 15g protein.     Diet Presciption: Diet, NPO with Tube Feed:   Tube Feeding Modality: Gastrostomy  Glucerna 1.5 Dhruv  Total Volume for 24 Hours (mL): 960  Continuous  Starting Tube Feed Rate {mL per Hour}: 10  Increase Tube Feed Rate by (mL): 10     Every hour  Until Goal Tube Feed Rate (mL per Hour): 40  Tube Feed Duration (in Hours): 24  Tube Feed Start Time: 13:10 (02-16-22 @ 13:25)    Intake: meeting needs with tube feeding     Current Weight: 69.3kg(2/24)   % Weight Change    Pertinent Medications: MEDICATIONS  (STANDING):  atorvastatin 40 milliGRAM(s) Oral at bedtime  chlorhexidine 0.12% Liquid 15 milliLiter(s) Oral Mucosa every 12 hours  collagenase Ointment 1 Application(s) Topical daily  glucagon  Injectable 1 milliGRAM(s) IntraMuscular once  insulin lispro (ADMELOG) corrective regimen sliding scale   SubCutaneous every 6 hours  lactated ringers. 500 milliLiter(s) (500 mL/Hr) IV Continuous <Continuous>  meropenem  IVPB 1000 milliGRAM(s) IV Intermittent every 8 hours  midodrine. 5 milliGRAM(s) Oral three times a day  pantoprazole  Injectable 40 milliGRAM(s) IV Push every 12 hours    MEDICATIONS  (PRN):  acetaminophen    Suspension .. 650 milliGRAM(s) Oral every 6 hours PRN Temp greater or equal to 38C (100.4F), Mild Pain (1 - 3)    Pertinent Labs: 02-25 Na146 mmol/L<H> Glu 175 mg/dL<H> K+ 3.9 mmol/L Cr  0.79 mg/dL BUN 41 mg/dL<H> 02-25 Phos 3.7 mg/dL 02-25 Alb 1.7 g/dL<L> 02-14 Chol 88 mg/dL LDL --    HDL 20 mg/dL<L> Trig 183 mg/dL<H>     CAPILLARY BLOOD GLUCOSE      POCT Blood Glucose.: 204 mg/dL (25 Feb 2022 12:11)  POCT Blood Glucose.: 213 mg/dL (25 Feb 2022 06:04)  POCT Blood Glucose.: 199 mg/dL (25 Feb 2022 06:01)  POCT Blood Glucose.: 197 mg/dL (25 Feb 2022 00:17)  POCT Blood Glucose.: 179 mg/dL (24 Feb 2022 17:42)    Skin: coccyx- sacrum, unstageable, bilateral heels, stage 1, trach area,     Estimated Needs:   [x ] no change since previous assessment  [ ] recalculated:     Previous Nutrition Diagnosis:   [ ] Inadequate Energy Intake [ ]Inadequate Oral Intake [ ] Excessive Energy Intake   [ ] Underweight [ ] Increased Nutrient Needs [ ] Overweight/Obesity   [ ] Altered GI Function [ ] Unintended Weight Loss [ ] Food & Nutrition Related Knowledge Deficit [x ] Malnutrition , severe     Nutrition Diagnosis is [x ] ongoing  [ ] resolved [ ] not applicable     New Nutrition Diagnosis: [ ] not applicable       Interventions:   Recommend  [ ] Change Diet To:  [ ] Nutrition Supplement  [ ] Nutrition Support  [x ] Other: suggest To change formula To 2kcal HN at 35ml/hx24(840ml,1680kcal,70g protein, 588ml free water). if tolerated, may add 1 packet of prosource /d for an additional 15g protein. MD to monitor/assess fluid status as needed. Patient will tolerate TF with no Nausea, vomiting diarrhea or abdominal distension       Monitoring and Evaluation:   [ ] PO intake [ x ] Tolerance to diet prescription [ x ] weights [ x ] labs[ x ] follow up per protocol  [ ] other:

## 2022-02-25 NOTE — PROGRESS NOTE ADULT - PROBLEM SELECTOR PLAN 9
Likely secondary to metastatic cancer as well as bed bound status  Corrected [id number] today.  Start Free water via Peg at 35ml per hour.  Monitor calcium daily. Likely secondary to metastatic cancer as well as bed bound status  Corrected [id number] today.  Continue Free water via Peg at 35ml per hour.  Monitor calcium daily.

## 2022-02-25 NOTE — PROGRESS NOTE ADULT - PROBLEM SELECTOR PLAN 5
Sputum Cx Klebsiella pneumoniae.- ESBL  blood culture and urine culture negative  continue Meropenem 1000mg IVPB q8h total 7days, until 2/28, 3/7 days  CXR with increased infiltrates.    Keep head of bed elevated during feeds.  Febrile today. 100.3

## 2022-02-26 NOTE — CHART NOTE - NSCHARTNOTEFT_GEN_A_CORE
Patient's son and daughter updated at the bedside regarding the patient's current clinical condition and the plan of care. Their questions were answered to the best of my ability to their stated satisfaction. They had no further questions at the end of our discussion. Emotional support provided.

## 2022-02-26 NOTE — PROGRESS NOTE ADULT - PROBLEM SELECTOR PLAN 2
Secondary to acute GIB  initially received 2U PRBC, last transfused 1 unit PRBCs 2/22 with stable H/H  Continue midodrine for pressure support.

## 2022-02-26 NOTE — PROGRESS NOTE ADULT - PROBLEM SELECTOR PLAN 1
Continue mechanical ventilation  Not a candidate for weaning.    Only tolerating SBT for a few minutes at a time with PS 12. Low tidal volumes on SBT  Continue to monitor. 11-Aug-2018 20:49

## 2022-02-26 NOTE — PROGRESS NOTE ADULT - NUTRITIONAL ASSESSMENT
This patient has been assessed with a concern for Malnutrition and has been determined to have a diagnosis/diagnoses of Severe protein-calorie malnutrition.    This patient is being managed with:   Diet NPO with Tube Feed-  Tube Feeding Modality: Gastrostomy  2Kal HN  Total Volume for 24 Hours (mL): 840  Continuous  Starting Tube Feed Rate {mL per Hour}: 10  Increase Tube Feed Rate by (mL): 10     Every 3 hours  Until Goal Tube Feed Rate (mL per Hour): 35  Tube Feed Duration (in Hours): 24  Tube Feed Start Time: 19:00  No Carb Prosource TF     Qty per Day:  1  Entered: Feb 25 2022  6:40PM    Diet NPO with Tube Feed-  Tube Feeding Modality: Gastrostomy  Glucerna 1.5 Dhruv  Total Volume for 24 Hours (mL): 960  Continuous  Starting Tube Feed Rate {mL per Hour}: 30  Increase Tube Feed Rate by (mL): 10     Every 6 hours  Until Goal Tube Feed Rate (mL per Hour): 40  Tube Feed Duration (in Hours): 24  Tube Feed Start Time: 11:00  No Carb Prosource TF     Qty per Day:  1 pack /day  Entered: Feb 22 2022 10:57AM    The following pending diet order is being considered for treatment of Severe protein-calorie malnutrition:null

## 2022-02-26 NOTE — PROGRESS NOTE ADULT - PROBLEM SELECTOR PLAN 3
Likely lower GIB  No signs of bleeding overnight  S/P EGD and colonoscopy 2/16 ->small diverticulum to mid esophagus, prior clip in fundus seen, small area of shallow ulceration adjacent to clip w/o stigmata of bleeding noted. no active bleeding. Normal duodenum. Colonosocopy showed some red tinged fluid distally but none seen at transverse level. No active bleeding, small hemorrhoids.   Continue PPI BID  GI Dr Núñez.

## 2022-02-26 NOTE — PROGRESS NOTE ADULT - PROBLEM SELECTOR PLAN 5
Sputum Cx Klebsiella pneumoniae.- ESBL  blood culture and urine culture negative  continue Meropenem 1000mg IVPB q8h total 7days, until 2/28, 3/7 days  CXR with increased infiltrates.    Keep head of bed elevated during feeds.  Continues with intermittent fevers, repeat CXR 2/25 with stable bilateral infiltrates

## 2022-02-26 NOTE — PROGRESS NOTE ADULT - NUTRITIONAL ASSESSMENT
This patient has been assessed with a concern for Malnutrition and has been determined to have a diagnosis/diagnoses of Severe protein-calorie malnutrition.    This patient is being managed with:   Diet NPO with Tube Feed-  Tube Feeding Modality: Gastrostomy  2Kal HN  Total Volume for 24 Hours (mL): 840  Continuous  Starting Tube Feed Rate {mL per Hour}: 10  Increase Tube Feed Rate by (mL): 10     Every 3 hours  Until Goal Tube Feed Rate (mL per Hour): 35  Tube Feed Duration (in Hours): 24  Tube Feed Start Time: 19:00  No Carb Prosource TF     Qty per Day:  1  Entered: Feb 25 2022  6:40PM    Diet NPO with Tube Feed-  Tube Feeding Modality: Gastrostomy  Glucerna 1.5 Hdruv  Total Volume for 24 Hours (mL): 960  Continuous  Starting Tube Feed Rate {mL per Hour}: 30  Increase Tube Feed Rate by (mL): 10     Every 6 hours  Until Goal Tube Feed Rate (mL per Hour): 40  Tube Feed Duration (in Hours): 24  Tube Feed Start Time: 11:00  No Carb Prosource TF     Qty per Day:  1 pack /day  Entered: Feb 22 2022 10:57AM    The following pending diet order is being considered for treatment of Severe protein-calorie malnutrition:null

## 2022-02-26 NOTE — PROGRESS NOTE ADULT - ASSESSMENT
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 Recent hospitalization (12/20/2021- 1/28/22) notable for cardiac arrest, aspiration pna, gastric ulcer s/p clipping and ventilator dependent respiratory failure s/p Trach/ PEG discharged to NH. BIBEMS from NH (2/13/22) for bloody stools and blood in trach noted at the facility Adm to ICU for hypovolemic shock 2/2 acute anemia 2/2 GIB, S/P ICU care, S/P blood transfusion, PPI, EGD shoed esophagus ulcer, no active bleeding, S/P colonoscopy , no active bleeding, continue PPI bid, blood culture negative,  ESBL /GNR PNA, continue IV meropenum until 2/28,  suction prn, GI follow up, transfusion if H <7. DVT prophylaxis. H/H stable, T max 101.5F 2/25,  hypercalcemia, most likely due to metastatic CA. IV hydration. Suction.

## 2022-02-26 NOTE — PROGRESS NOTE ADULT - SUBJECTIVE AND OBJECTIVE BOX
[facility]    SCU progress note    INTERVAL HPI/OVERNIGHT EVENTS: ***febrile with Tm 38.3 in last 24hrs, last cultured 2/18. Sputum cx 2/18 (+ ESBL Klebs) being treated with meropenem    DNR [X ]   DNI  [  ]    Covid - 19 PCR: 2/22 (-), repeat 2/26 (P)    The 4Ms    What Matters Most: see GOC  Age appropriate Medications/Screen for High Risk Medication: Yes  Mentation: see CAM below  Mobility: defer to physical exam    The Confusion Assessment Method (CAM) Diagnostic Algorithm     1: Acute Onset or Fluctuating Course  - Is there evidence of an acute change in mental status from the patient’s baseline? Did the (abnormal) behavior  fluctuate during the day, that is, tend to come and go, or increase and decrease in severity?  [ ] YES [x ] NO     2: Inattention  - Did the patient have difficulty focusing attention, being easily distractible, or having difficulty keeping track of what was being said?  [ ] YES [x ] NO     3: Disorganized thinking  -Was the patient’s thinking disorganized or incoherent, such as rambling or irrelevant conversation, unclear or illogical flow of ideas, or unpredictable switching from subject to subject?  [ ] YES [ ] NO   Unable to sccess    4: Altered Level of consciousness?  [ ] YES [x ] NO    The diagnosis of delirium by CAM requires the presence of features 1 and 2 and either 3 or 4.    PRESSORS: [ ] YES [x ] NO  meropenem  IVPB 1000 milliGRAM(s) IV Intermittent every 8 hours    Cardiovascular:  Heart Failure  Acute   Acute on Chronic  Chronic       midodrine. 5 milliGRAM(s) Oral three times a day    Pulmonary:    Hematalogic:    Other:  acetaminophen    Suspension .. 650 milliGRAM(s) Oral every 6 hours PRN  atorvastatin 40 milliGRAM(s) Oral at bedtime  chlorhexidine 0.12% Liquid 15 milliLiter(s) Oral Mucosa every 12 hours  collagenase Ointment 1 Application(s) Topical daily  glucagon  Injectable 1 milliGRAM(s) IntraMuscular once  insulin lispro (ADMELOG) corrective regimen sliding scale   SubCutaneous every 6 hours  lactated ringers. 500 milliLiter(s) IV Continuous <Continuous>  pantoprazole  Injectable 40 milliGRAM(s) IV Push every 12 hours    acetaminophen    Suspension .. 650 milliGRAM(s) Oral every 6 hours PRN  atorvastatin 40 milliGRAM(s) Oral at bedtime  chlorhexidine 0.12% Liquid 15 milliLiter(s) Oral Mucosa every 12 hours  collagenase Ointment 1 Application(s) Topical daily  glucagon  Injectable 1 milliGRAM(s) IntraMuscular once  insulin lispro (ADMELOG) corrective regimen sliding scale   SubCutaneous every 6 hours  lactated ringers. 500 milliLiter(s) IV Continuous <Continuous>  meropenem  IVPB 1000 milliGRAM(s) IV Intermittent every 8 hours  midodrine. 5 milliGRAM(s) Oral three times a day  pantoprazole  Injectable 40 milliGRAM(s) IV Push every 12 hours    Drug Dosing Weight  Height (cm): 175.2 (16 Feb 2022 15:23)  Weight (kg): 78 (13 Feb 2022 18:39)  BMI (kg/m2): 25.4 (16 Feb 2022 15:23)  BSA (m2): 1.94 (16 Feb 2022 15:23)    CENTRAL LINE: [ ] YES [x ] NO  LOCATION:   DATE INSERTED:  REMOVE: [ ] YES [ ] NO  EXPLAIN:    LISET: [ ] YES [x ] NO    DATE INSERTED:  REMOVE:  [ ] YES [ ] NO  EXPLAIN:    PAST MEDICAL & SURGICAL HISTORY:  Hypertension    Diabetes    High cholesterol    Primary osteoarthritis of both knees    Coronary artery disease of native artery of native heart with stable angina pectoris    Allergic bronchitis    Diabetic retinopathy    Oral cancer    SCC (squamous cell carcinoma)    Metastatic cancer    Recurrent disease    Edema of extremity    Hyponatremia    Microalbuminuria    Neuralgia    Obstructive sleep apnea, adult    Vitamin D deficiency    S/P knee replacement  b/l    S/P hernia repair  b/l    Status post cataract extraction and insertion of intraocular lens, unspecified laterality  b/l    History of surgery  On 9/10/20 he underwent right hemiglossectomy and partial neck dissection with Dr. Darinel Servin at [facility].                02-25 @ 07:01  -  02-26 @ 07:00  --------------------------------------------------------  IN: 1000 mL / OUT: 1325 mL / NET: -325 mL        Mode: AC/ CMV (Assist Control/ Continuous Mandatory Ventilation)  RR (machine): 14  TV (machine): 450  FiO2: 40  PEEP: 5  ITime: 1  MAP: 12  PIP: 36      PHYSICAL EXAM:    GENERAL: NAD, cachectic with moderate temporal wasting an +muscle wasting all extremities  HEAD:  Atraumatic, Normocephalic  EYES: EOMI, PERRL, conjunctiva and sclera clear  ENMT: No tonsillar erythema, exudates  NECK: Supple, No JVD, tracheostomy intact  NERVOUS SYSTEM:  Awake and alert. Able to follow simple commands. Nonverbal at baseline.  CHEST/LUNG: vent assisted, non-labored, scattered rhonchi bilaterally, diminished breath sounds at bases.   HEART: tachycardic, regular +S1/S2, no M/R/G  ABDOMEN: Soft, Nontender, Nondistended; Bowel sounds present; Peg intact  EXTREMITIES: +Muscle wasting  2+ Peripheral Pulses, No clubbing, cyanosis, or edema  SKIN: Stage 1 Pressure Injury to the Bilateral Heels, as evident by non-blanchable erythema  Unstageable Pressure Injury to the Coccyx (5cm x 9cm x 2cm) with slough, pink tissue, and drainage Malodorous  Unstageable Pressure Injury to the area under the patients Trach with slough, pink tissue, and scant drainage        LABS:  CBC Full  -  ( 26 Feb 2022 07:53 )  WBC Count : 11.53 K/uL  RBC Count : 3.81 M/uL  Hemoglobin : 10.8 g/dL  Hematocrit : 35.3 %  Platelet Count - Automated : 323 K/uL  Mean Cell Volume : 92.7 fl  Mean Cell Hemoglobin : 28.3 pg  Mean Cell Hemoglobin Concentration : 30.6 gm/dL  Auto Neutrophil # : x  Auto Lymphocyte # : x  Auto Monocyte # : x  Auto Eosinophil # : x  Auto Basophil # : x  Auto Neutrophil % : x  Auto Lymphocyte % : x  Auto Monocyte % : x  Auto Eosinophil % : x  Auto Basophil % : x    02-26    146<H>  |  110<H>  |  43<H>  ----------------------------<  177<H>  4.2   |  32<H>  |  0.84    Ca    13.9<HH>      26 Feb 2022 07:53  Phos  3.8     02-26  Mg     2.8     02-26    TPro  6.1  /  Alb  1.8<L>  /  TBili  0.5  /  DBili  x   /  AST  63<H>  /  ALT  22  /  AlkPhos  288<H>  02-26    [  ]  DVT Prophylaxis  [  ]  Nutrition, Brand, Rate         Goal Rate        Abnormal Nutritional Status -  Malnutrition   Cachexia      Morbid Obesity BMI >/=40    RADIOLOGY & ADDITIONAL STUDIES:  ***  < from: Xray Chest 1 View- PORTABLE-Urgent (Xray Chest 1 View- PORTABLE-Urgent .) (02.24.22 @ 08:31) >    INTERPRETATION:  AP erect chest on February 24, 2022 at 8:22 AM. Patient   has fever patient has history of cancer and metastatic diseaseto the   lungs.    Heart magnified by technique. Tracheostomy remains.    On February 18 there are significant scattered mid and lower lung field   infiltrates.    These have increased on the present study.    IMPRESSION: Increasing bilateral infiltrates.      < end of copied text >  < from: CT Angio Abdomen and Pelvis w/ IV Cont (02.15.22 @ 15:58) >  INTERPRETATION:  CLINICAL INFORMATION: 76 years  Male with R/o GI bleed.    COMPARISON: 1/18/2022    CONTRAST/COMPLICATIONS:  IV Contrast:Omnipaque 350  90 cc administered   10 cc discarded  Oral Contrast: NONE  Complications: None reported at time of study completion    PROCEDURE:  CT of the Abdomen and Pelvis was performed.  Precontrast, Arterial and Delayed phases were performed.  Sagittal and coronal reformats were performed.    FINDINGS:  LOWER CHEST: Previously seen right middle lobe cavity now measures up to   5.2 cm (previously 4.8 cm) and contains a new air-fluid level and mural   nodularity. Left lower lobe cavity partially imaged measuring   approximately 4.4 cm with air-fluid level, unchanged. Dense left lower   lobe consolidation unchanged. Increasing nodular right lower lobe   infiltrate. Developing right pleural effusion. Reidentified left pleural   effusion withsurrounding nodular pleural thickening area Dense coronary   atherosclerosis reidentified.    LIVER: Steatosis.  BILE DUCTS: Normal caliber.  GALLBLADDER: Mildly distended.  SPLEEN: Within normal limits.  PANCREAS: Within normal limits.  ADRENALS: Within normal limits.  KIDNEYS/URETERS: Within normal limits.    BLADDER: Layering high density in the bladder.  REPRODUCTIVE ORGANS: Prostate within normal limits.    BOWEL: No bowel obstruction. Appendix is not visualized and cannot be   assessed. Percutaneous gastrostomy tube in satisfactory position. Prior   small bowel obstruction has resolved. Metallic structure in the   descending colon (5:59), likely hemostatic clip. Similar structure in the   gastric fundus. No intraluminal contrast extravasation. No pneumatosis.  PERITONEUM: Small ascites. No pneumoperitoneum.  VESSELS: Atherosclerotic changes. No portal venous gas.  RETROPERITONEUM/LYMPH NODES: No lymphadenopathy.  ABDOMINAL WALL: Marked anasarca, increased from prior.  BONES: Degenerative changes.    IMPRESSION:    No CT evidence of active GI bleed.    Increasing right middle lobe lung cavity now with air-fluid level. Left   lower lobe cavity unchanged. Dense left lower lobe infiltrate unchanged.   Small but complex left pleural effusion, possibly empyema, unchanged.   Increasing right pleural effusion.    Prior small bowel obstruction has resolved.    Probable hemostatic clips in the stomach and descending colon.    Layering high density material in the bladder, new comparedwith prior.    < end of copied text >    Goals of Care Discussion with Family/Proxy/Other   - see note from 2/15/22

## 2022-02-26 NOTE — PROGRESS NOTE ADULT - PROBLEM SELECTOR PLAN 9
Likely secondary to metastatic cancer as well as bed bound status  Continue Free water via Peg; will increase to 50ml/hr given rising calcium.  Monitor calcium daily. Likely secondary to metastatic cancer as well as bed bound status  Continue Free water via Peg; will increase to 50ml/hr given rising calcium.  Monitor calcium daily.  * PM addendum - patient's corrected Ca 15.7 today, continuing to uptrend. Started on IVF with LR@100ml/hr in addition to free water via PEG and ordered for Zoledronic acid 4mg IV x1 per d/w Dr. Buckner.

## 2022-02-26 NOTE — PROGRESS NOTE ADULT - SUBJECTIVE AND OBJECTIVE BOX
Patient is a 76y old  Male who presents with a chief complaint of blood in stool (2022 11:47)/hypovolemic shock/GIB/blood transfusion/sepsis/ESBL PNA/hypercalcemia, T max 101.5F , continue IV meropenum      INTERVAL HPI/OVERNIGHT EVENTS:  T(C): 36.6 (22 @ 05:05), Max: 38.6 (22 @ 20:00)  HR: 112 (22 @ 08:06) (102 - 125)  BP: 113/64 (22 @ 05:05) (96/61 - 115/71)  RR: 20 (22 @ 05:05) (20 - 24)  SpO2: 94% (22 @ 08:06) (93% - 100%)  Wt(kg): --    LABS:                        10.8   11.53 )-----------( 323      ( 2022 07:53 )             35.3         146<H>  |  110<H>  |  43<H>  ----------------------------<  177<H>  4.2   |  32<H>  |  0.84    Ca    13.9<HH>      2022 07:53  Phos  3.8       Mg     2.8         TPro  6.1  /  Alb  1.8<L>  /  TBili  0.5  /  DBili  x   /  AST  63<H>  /  ALT  22  /  AlkPhos  288<H>        Urinalysis Basic - ( 2022 11:44 )    Color: Yellow / Appearance: Clear / S.015 / pH: x  Gluc: x / Ketone: Negative  / Bili: Negative / Urobili: Negative   Blood: x / Protein: 100 / Nitrite: Negative   Leuk Esterase: Negative / RBC: x / WBC x   Sq Epi: x / Non Sq Epi: x / Bacteria: x      CAPILLARY BLOOD GLUCOSE      POCT Blood Glucose.: 223 mg/dL (2022 11:57)  POCT Blood Glucose.: 168 mg/dL (2022 05:58)  POCT Blood Glucose.: 191 mg/dL (2022 23:42)  POCT Blood Glucose.: 175 mg/dL (2022 18:16)        RADIOLOGY & ADDITIONAL TESTS:    Consultant(s) Notes Reviewed:  [x ] YES  [ ] NO    PHYSICAL EXAM:  GENERAL: well built, well nourished  HEAD:  Atraumatic, Normocephalic  EYES: EOMI, PERRLA, conjunctiva and sclera clear  ENT: No tonsillar erythema, exudates, or enlargement; Moist mucous membranes, Good dentition, No lesions  NECK: Supple, No JVD, Normal thyroid, no enlarged nodes, trach  NERVOUS SYSTEM:  Alert & Oriented X3, Good concentration; Motor Strength 5/5 B/L upper and lower extremities; DTRs 2+ intact and symmetric, sensory intact  CHEST/LUNG: B/L good air entry; No rales, rhonchi, or wheezing  HEART: S1S2 normal, no S3, Regular rate and rhythm; No murmurs, rubs, or gallops  ABDOMEN: Soft, Nontender, Nondistended; Bowel sounds present, GT in place  EXTREMITIES:  2+ Peripheral Pulses, No clubbing, cyanosis, B/L arm edema  LYMPH: No lymphadenopathy noted  SKIN: No rashes or lesions    Care Discussed with Consultants/Other Providers [ x] YES  [ ] NO

## 2022-02-27 NOTE — PROGRESS NOTE ADULT - PROBLEM SELECTOR PLAN 12
Anasarca Improved. Completed 3 days of Albumin and lasix.  Febrile - Follow Urine culture, blood culture results  Tracheostomy dependent on mechanical ventilation  PEG tube feeding  DNR  MOLST on chart  Overall prognosis is very poor.

## 2022-02-27 NOTE — PROGRESS NOTE ADULT - PROBLEM SELECTOR PLAN 4
Secondary to GIB  Serial CBCs  transfused 2U PRBCs when admitted. Received 1U PRBC 2/22 Secondary to GIB  transfused 2U PRBCs when admitted. Received 1U PRBC 2/22  Serial CBCs

## 2022-02-27 NOTE — PROGRESS NOTE ADULT - PROBLEM SELECTOR PLAN 9
Likely secondary to metastatic cancer as well as bed bound status  Continue Free water via Peg; will increase to 50ml/hr given rising calcium.  Monitor calcium daily - Continues to be Hypercalcemic - s/p  Zoledronic acid 4mg IV x1 on 2/26/2022, LR@100ml/hr in addition to free water via PEG and Likely secondary to metastatic cancer as well as bed bound status  Continue Free water via Peg; will increase to 50ml/hr given rising calcium.  Monitor calcium daily - Continues to be Hypercalcemic - s/p  Zoledronic acid 4mg IV x1 on 2/26/2022, LR@100ml/hr in addition to free water via PEG   No further intervention at this time

## 2022-02-27 NOTE — PROGRESS NOTE ADULT - ASSESSMENT
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 Recent hospitalization (12/20/2021- 1/28/22) notable for cardiac arrest, aspiration pna, gastric ulcer s/p clipping and ventilator dependent respiratory failure s/p Trach/ PEG discharged to NH. BIBEMS from NH (2/13/22) for bloody stools and blood in trach noted at the facility Adm to ICU for hypovolemic shock 2/2 acute anemia 2/2 GIB, S/P ICU care, S/P blood transfusion, PPI, EGD shoed esophagus ulcer, no active bleeding, S/P colonoscopy , no active bleeding, continue PPI bid, blood culture negative,  ESBL /GNR PNA, continue IV meropenum until 2/28,  suction prn, GI follow up, transfusion if H <7. DVT prophylaxis. H/H stable, still spiked fever T max 102.5F , F/U pan culture, hypercalcemia, most likely due to metastatic CA. IV hydration. Suction. Prognosis poor, palliative team follow up.

## 2022-02-27 NOTE — PROGRESS NOTE ADULT - PROBLEM SELECTOR PLAN 5
Sputum Cx Klebsiella pneumoniae.- ESBL  blood culture and urine culture negative  continue Meropenem 1000mg IVPB q8h total 7days, until 2/28  Keep head of bed elevated during feeds.  Continues with intermittent fevers, repeat CXR 2/25 with stable bilateral infiltrates

## 2022-02-27 NOTE — PROGRESS NOTE ADULT - SUBJECTIVE AND OBJECTIVE BOX
MELISSA JOHNS    SCU progress note    INTERVAL HPI/OVERNIGHT EVENTS: Patient seen and examined at bedside. Patient febrile overnight 101.2, Lactate 4.2, Hypercalcemic - On LR, Meropenem , BC sent     DNR [x ]   DNI  [  ]    Covid - 19 PCR: COVID-19 PCR: NotDetec (2022 11:44)  COVID-19 PCR: NotDetec (2022 11:06)  COVID-19 PCR: NotDetec (2022 12:00)  COVID-19 PCR: NotDetec (2022 18:50)        The 4Ms    What Matters Most: see GOC  Age appropriate Medications/Screen for High Risk Medication: Yes  Mentation: see CAM below  Mobility: defer to physical exam    The Confusion Assessment Method (CAM) Diagnostic Algorithm     1: Acute Onset or Fluctuating Course  - Is there evidence of an acute change in mental status from the patient’s baseline? Did the (abnormal) behavior  fluctuate during the day, that is, tend to come and go, or increase and decrease in severity?  [ ] YES [x ] NO     2: Inattention  - Did the patient have difficulty focusing attention, being easily distractible, or having difficulty keeping track of what was being said?  [ ] YES [x ] NO     3: Disorganized thinking  -Was the patient’s thinking disorganized or incoherent, such as rambling or irrelevant conversation, unclear or illogical flow of ideas, or unpredictable switching from subject to subject?  [ ] YES [x ] NO    4: Altered Level of consciousness?  [ ] YES [x ] NO    The diagnosis of delirium by CAM requires the presence of features 1 and 2 and either 3 or 4.    PRESSORS: [ ] YES [x ] NO  meropenem  IVPB 1000 milliGRAM(s) IV Intermittent every 8 hours    Cardiovascular:  Heart Failure  Acute   Acute on Chronic  Chronic       midodrine. 5 milliGRAM(s) Oral three times a day    Pulmonary:    Hematalogic:    Other:  acetaminophen    Suspension .. 650 milliGRAM(s) Oral every 6 hours PRN  atorvastatin 40 milliGRAM(s) Oral at bedtime  chlorhexidine 0.12% Liquid 15 milliLiter(s) Oral Mucosa every 12 hours  collagenase Ointment 1 Application(s) Topical daily  glucagon  Injectable 1 milliGRAM(s) IntraMuscular once  insulin lispro (ADMELOG) corrective regimen sliding scale   SubCutaneous every 6 hours  lactated ringers. 500 milliLiter(s) IV Continuous <Continuous>  lactated ringers. 1000 milliLiter(s) IV Continuous <Continuous>  pantoprazole  Injectable 40 milliGRAM(s) IV Push every 12 hours    acetaminophen    Suspension .. 650 milliGRAM(s) Oral every 6 hours PRN  atorvastatin 40 milliGRAM(s) Oral at bedtime  chlorhexidine 0.12% Liquid 15 milliLiter(s) Oral Mucosa every 12 hours  collagenase Ointment 1 Application(s) Topical daily  glucagon  Injectable 1 milliGRAM(s) IntraMuscular once  insulin lispro (ADMELOG) corrective regimen sliding scale   SubCutaneous every 6 hours  lactated ringers. 500 milliLiter(s) IV Continuous <Continuous>  lactated ringers. 1000 milliLiter(s) IV Continuous <Continuous>  meropenem  IVPB 1000 milliGRAM(s) IV Intermittent every 8 hours  midodrine. 5 milliGRAM(s) Oral three times a day  pantoprazole  Injectable 40 milliGRAM(s) IV Push every 12 hours    Drug Dosing Weight  Height (cm): 175.2 (2022 15:23)  Weight (kg): 78 (2022 18:39)  BMI (kg/m2): 25.4 (2022 15:23)  BSA (m2): 1.94 (2022 15:23)    CENTRAL LINE: [ ] YES [x ] NO  LOCATION:   DATE INSERTED:  REMOVE: [ ] YES [ ] NO  EXPLAIN:    HUTCHINS: [ ] YES [x ] NO    DATE INSERTED:  REMOVE:  [ ] YES [ ] NO  EXPLAIN:    PAST MEDICAL & SURGICAL HISTORY:  Hypertension    Diabetes    High cholesterol    Primary osteoarthritis of both knees    Coronary artery disease of native artery of native heart with stable angina pectoris    Allergic bronchitis    Diabetic retinopathy    Oral cancer    SCC (squamous cell carcinoma)    Metastatic cancer    Recurrent disease    Edema of extremity    Hyponatremia    Microalbuminuria    Neuralgia    Obstructive sleep apnea, adult    Vitamin D deficiency    S/P knee replacement  b/l    S/P hernia repair  b/l    Status post cataract extraction and insertion of intraocular lens, unspecified laterality  b/l    History of surgery  On 9/10/20 he underwent right hemiglossectomy and partial neck dissection with Dr. Darinel Servin at [facility].                 @ 07:  -   @ 07:00  --------------------------------------------------------  IN: 0 mL / OUT: 1410 mL / NET: -1410 mL        Mode: AC/ CMV (Assist Control/ Continuous Mandatory Ventilation)  RR (machine): 14  TV (machine): 450  FiO2: 40  PEEP: 5  ITime: 0.9  MAP: 10  PIP: 28      PHYSICAL EXAM:    GENERAL: NAD, well-groomed, well-developed  HEAD:  Atraumatic, Normocephalic  EYES: EOMI, PERRLA, conjunctiva and sclera clear  ENMT: No tonsillar erythema, exudates, or enlargement; Moist mucous membranes, Good dentition, No lesions  NECK: Supple, No JVD, Normal thyroid  NERVOUS SYSTEM:  Alert & Oriented X3, Good concentration; Motor Strength 5/5 B/L upper and lower extremities; DTRs 2+ intact and symmetric  CHEST/LUNG: Clear to percussion bilaterally; No rales, rhonchi, wheezing, or rubs  HEART: Regular rate and rhythm; No murmurs, rubs, or gallops  ABDOMEN: Soft, Nontender, Nondistended; Bowel sounds present  EXTREMITIES:  2+ Peripheral Pulses, No clubbing, cyanosis, or edema  LYMPH: No lymphadenopathy noted  SKIN: No rashes or lesions      LABS:  CBC Full  -  ( 2022 06:28 )  WBC Count : 10.73 K/uL  RBC Count : 3.46 M/uL  Hemoglobin : 9.5 g/dL  Hematocrit : 32.0 %  Platelet Count - Automated : 330 K/uL  Mean Cell Volume : 92.5 fl  Mean Cell Hemoglobin : 27.5 pg  Mean Cell Hemoglobin Concentration : 29.7 gm/dL        146<H>  |  110<H>  |  47<H>  ----------------------------<  189<H>  4.1   |  30  |  0.78    Ca    13.5<HH>      2022 06:28  Phos  3.2       Mg     2.6         TPro  6.1  /  Alb  1.8<L>  /  TBili  0.5  /  DBili  x   /  AST  63<H>  /  ALT  22  /  AlkPhos  288<H>        Urinalysis Basic - ( 2022 11:44 )    Color: Yellow / Appearance: Clear / S.015 / pH: x  Gluc: x / Ketone: Negative  / Bili: Negative / Urobili: Negative   Blood: x / Protein: 100 / Nitrite: Negative   Leuk Esterase: Negative / RBC: 2-5 /HPF / WBC 0-2 /HPF   Sq Epi: x / Non Sq Epi: Occasional /HPF / Bacteria: Moderate /HPF            [ x ]  DVT Prophylaxis- scd's  [x  ]  Nutrition Diet, NPO with Tube Feed:   Tube Feeding Modality: Gastrostomy  2Kal HN  Total Volume for 24 Hours (mL): 840  Continuous  Starting Tube Feed Rate mL per Hour: 10  Increase Tube Feed Rate by (mL): 10     Every 3 hours  Until Goal Tube Feed Rate (mL per Hour): 35  Tube Feed Duration (in Hours): 24  Tube Feed Start Time: 19:00  No Carb Prosource TF     Qty per Day:  1 (22 @ 18:40) [Active]  Diet, NPO with Tube Feed:   Tube Feeding Modality: Gastrostomy  Glucerna 1.5 Dhruv  Total Volume for 24 Hours (mL): 960  Continuous  Starting Tube Feed Rate mL per Hour: 30  Increase Tube Feed Rate by (mL): 10     Every 6 hours  Until Goal Tube Feed Rate (mL per Hour): 40  Tube Feed Duration (in Hours): 24  Tube Feed Start Time: 11:00  No Carb Prosource TF     Qty per Day:  1 pack /day (22 @ 10:57) [Pending Verification By Attending]          RADIOLOGY & ADDITIONAL STUDIES:     ACC: [id number] EXAM:  XR CHEST PORTABLE ROUTINE 1V                          PROCEDURE DATE:  2022          INTERPRETATION:  Portable chest radiograph    CLINICAL INFORMATION: Respiratory failure    TECHNIQUE:  Portable  AP chest radiograph.    COMPARISON: 2022 chest .    FINDINGS:  CATHETERS AND TUBES: Tracheostomy tube in place    PULMONARY: Unchanged bilateral multifocal perihilar/basilar diffuse   airspace disease and/or effusions..   No pneumothorax.    HEART/VASCULAR: The heart is mildly enlarged in transverse diameter.  .    BONES: Visualized osseous structures are intact.    IMPRESSION:   Unchanged bilateral multifocal and ill-defined   perihilar/basilar diffuse airspace disease and/or effusions.    Patient name: MELISSA JOHNS  YOB: 1945   Age: 76 (M)   MR#: [id number]  Study Date: 2021  Location: [place] [id number]: [facility]  Study quality: Technically difficult  Referring Physician:  BESSIE CAGLE MD  Blood Pressure: 98/48 mmHg  Height: 175 cm  Weight: 73 kg  BSA: 1.9 m2  ------------------------------------------------------------------------    PROCEDURE: Transthoracic echocardiogram with 2-D, M-Mode  and complete spectral and color flow Doppler.  INDICATION: Abnormal electrocardiogram [ECG] [EKG] (R94.31)  HISTORY:  ------------------------------------------------------------------------  DIMENSIONS:  Dimensions:     Normal Values:  LA:     3.5 cm    2.0 - 4.0 cm  Ao:     3.7 cm    2.0 - 3.8 cm  SEPTUM: 0.9 cm    0.6 - 1.2 cm  PWT:    0.9 cm    0.6 - 1.1 cm  LVIDd:  4.0 cm    3.0 - 5.6 cm  LVIDs:  3.0 cm    1.8 - 4.0 cm      Derived Variables:  LVMI: 58 g/m2  RWT: 0.45  Ejection Fraction Visual Estimate: 55 %    ------------------------------------------------------------------------  OBSERVATIONS:  Mitral Valve: Normal mitral valve.  Aortic Root: Aortic Root: 3.7 cm.    Aortic Valve: Calcified trileaflet aortic valve with normal  opening.  Left Atrium: Normal left atrium.  LA volume index =34  cc/m2.  Left Ventricle: Endocardium not well visualized; grossly  normal left ventricular systolic function. Normal left  ventricular internal dimensions and wall thicknesses.  Normal diastolic function.  Right Heart: Normal right atrium. Normal right ventricular  size and systolic function (TAPSE  2.8cm). There is mild  tricuspid regurgitation. Pulmonic valve not well seen.  Pericardium/PleuraNormal pericardium with no pericardial  effusion.  Hemodynamic: RV systolic pressure is moderately increased  at  48 mm Hg.  ------------------------------------------------------------------------  CONCLUSIONS:  1. Normal mitral valve.  2. Calcified trileaflet aortic valve with normal opening.  3. Aortic Root: 3.7 cm.  4. Normal left atrium.  LA volume index = 34 cc/m2.  5. Normal left ventricular internal dimensions and wall  thicknesses.  6. Endocardium not well visualized; grossly normal left  ventricular systolic function.  7. Normal diastolic function.  8. Normal right atrium.  9. Normal right ventricular size and systolic function  (TAPSE  2.8cm).  10. RV systolic pressure is moderately increased at  48 mm  Hg.  11. There is mild tricuspid regurgitation.  12. Pulmonic valve not well seen.  13. Normal pericardium with no pericardial effusion.    2022  EGD Impressions:      ? SMALL DIVERTICULUM MID ESOPHAGUS.      Normal mucosa in the gastroesophageal junction.      Normal mucosa in the whole esophagus.      Prior clip in fundus seen. Small area of shallow ulceration adjacent to clip    but no stigmata noted. No Bleeding seen. .      Stomach surgically altered in distal end. .      PEG tube noted.      Normal mucosa in the whole examined duodenum.             EGD Limitations/Complications:     There were no apparent limitations or complications        Goals of Care Discussion with Family   - see note from/family 2/15/202    ASSESSMENT  76-year-old male former smoker (3 pack year, quit ~) with PMH HTN, HLD, DM2, CAD s/p stents (most recent cardiac cath ), MAC pneumonia, metastatic SCC base of tongue 2020 s/p resection s/p chemoradiation. Recent hospitalization (2021- 22) notable for cardiac arrest, aspiration pna, gastric ulcer s/p clipping and ventilator dependent respiratory failure s/p Trach/ PEG discharged to NH. BIBEMS from NH (22) for bloody stools and blood in trach noted at the facility. Patient admitted to  for hypovolemic shock 2/2 acute anemia 2/2 GIB . s/p 2 untis PRBCS. Patient requiring higher level of care and sent to ICU for hypotension requiring pressors. Patient down graded to  for further management once hemodynamically stable. Patient underwent a EGD and colonoscopy no active bleeding noted.     - Febrile overnight- T-max 101.5, lactate 2.2.  CXR w/ increased infiltrates.  Pan cx including sputum, IV hydration, Vanc and Cefepime for PNA.   Klebsiella PNA- ESBL sensitive to Meropenem, no active bleeding   Hemoglobin 7.3, afebrile, transfusing 1 unit PRBC today.     Febrile Tmax 101.7  2022 - Febrile overnight, BC sent    MELISSA JOHNS    SCU progress note    INTERVAL HPI/OVERNIGHT EVENTS: Patient seen and examined at bedside. Patient febrile overnight 101.2, Lactate 4.2, Hypercalcemic - On LR, Meropenem , BC sent     DNR [x ]   DNI  [  ]    Covid - 19 PCR: COVID-19 PCR: NotDetec (2022 11:44)  COVID-19 PCR: NotDetec (2022 11:06)  COVID-19 PCR: NotDetec (2022 12:00)  COVID-19 PCR: NotDetec (2022 18:50)        The 4Ms    What Matters Most: see GOC  Age appropriate Medications/Screen for High Risk Medication: Yes  Mentation: see CAM below  Mobility: defer to physical exam    The Confusion Assessment Method (CAM) Diagnostic Algorithm     1: Acute Onset or Fluctuating Course  - Is there evidence of an acute change in mental status from the patient’s baseline? Did the (abnormal) behavior  fluctuate during the day, that is, tend to come and go, or increase and decrease in severity?  [ ] YES [x ] NO     2: Inattention  - Did the patient have difficulty focusing attention, being easily distractible, or having difficulty keeping track of what was being said?  [ ] YES [x ] NO     3: Disorganized thinking  -Was the patient’s thinking disorganized or incoherent, such as rambling or irrelevant conversation, unclear or illogical flow of ideas, or unpredictable switching from subject to subject?  [ ] YES [x ] NO    4: Altered Level of consciousness?  [ ] YES [x ] NO    The diagnosis of delirium by CAM requires the presence of features 1 and 2 and either 3 or 4.    PRESSORS: [ ] YES [x ] NO  meropenem  IVPB 1000 milliGRAM(s) IV Intermittent every 8 hours    Cardiovascular:  Heart Failure  Acute   Acute on Chronic  Chronic       midodrine. 5 milliGRAM(s) Oral three times a day    Pulmonary:    Hematalogic:    Other:  acetaminophen    Suspension .. 650 milliGRAM(s) Oral every 6 hours PRN  atorvastatin 40 milliGRAM(s) Oral at bedtime  chlorhexidine 0.12% Liquid 15 milliLiter(s) Oral Mucosa every 12 hours  collagenase Ointment 1 Application(s) Topical daily  glucagon  Injectable 1 milliGRAM(s) IntraMuscular once  insulin lispro (ADMELOG) corrective regimen sliding scale   SubCutaneous every 6 hours  lactated ringers. 500 milliLiter(s) IV Continuous <Continuous>  lactated ringers. 1000 milliLiter(s) IV Continuous <Continuous>  pantoprazole  Injectable 40 milliGRAM(s) IV Push every 12 hours    acetaminophen    Suspension .. 650 milliGRAM(s) Oral every 6 hours PRN  atorvastatin 40 milliGRAM(s) Oral at bedtime  chlorhexidine 0.12% Liquid 15 milliLiter(s) Oral Mucosa every 12 hours  collagenase Ointment 1 Application(s) Topical daily  glucagon  Injectable 1 milliGRAM(s) IntraMuscular once  insulin lispro (ADMELOG) corrective regimen sliding scale   SubCutaneous every 6 hours  lactated ringers. 500 milliLiter(s) IV Continuous <Continuous>  lactated ringers. 1000 milliLiter(s) IV Continuous <Continuous>  meropenem  IVPB 1000 milliGRAM(s) IV Intermittent every 8 hours  midodrine. 5 milliGRAM(s) Oral three times a day  pantoprazole  Injectable 40 milliGRAM(s) IV Push every 12 hours    Drug Dosing Weight  Height (cm): 175.2 (2022 15:23)  Weight (kg): 78 (2022 18:39)  BMI (kg/m2): 25.4 (2022 15:23)  BSA (m2): 1.94 (2022 15:23)    CENTRAL LINE: [ ] YES [x ] NO  LOCATION:   DATE INSERTED:  REMOVE: [ ] YES [ ] NO  EXPLAIN:    HUTCHINS: [ ] YES [x ] NO    DATE INSERTED:  REMOVE:  [ ] YES [ ] NO  EXPLAIN:    PAST MEDICAL & SURGICAL HISTORY:  Hypertension    Diabetes    High cholesterol    Primary osteoarthritis of both knees    Coronary artery disease of native artery of native heart with stable angina pectoris    Allergic bronchitis    Diabetic retinopathy    Oral cancer    SCC (squamous cell carcinoma)    Metastatic cancer    Recurrent disease    Edema of extremity    Hyponatremia    Microalbuminuria    Neuralgia    Obstructive sleep apnea, adult    Vitamin D deficiency    S/P knee replacement  b/l    S/P hernia repair  b/l    Status post cataract extraction and insertion of intraocular lens, unspecified laterality  b/l    History of surgery  On 9/10/20 he underwent right hemiglossectomy and partial neck dissection with Dr. Darinel Servin at [facility].                 @ 07:  -   @ 07:00  --------------------------------------------------------  IN: 0 mL / OUT: 1410 mL / NET: -1410 mL        Mode: AC/ CMV (Assist Control/ Continuous Mandatory Ventilation)  RR (machine): 14  TV (machine): 450  FiO2: 40  PEEP: 5  ITime: 0.9  MAP: 10  PIP: 28      PHYSICAL EXAM:    GENERAL: NAD, well-groomed, well-developed  HEAD:  Atraumatic, Normocephalic  EYES: EOMI, PERRLA, conjunctiva and sclera clear  ENMT: No tonsillar erythema, exudates, or enlargement; Moist mucous membranes, Good dentition, No lesions  NECK: Supple, No JVD, Normal thyroid  NERVOUS SYSTEM:  Alert & Oriented X3, Good concentration; Motor Strength 5/5 B/L upper and lower extremities; DTRs 2+ intact and symmetric  CHEST/LUNG: Clear to percussion bilaterally; No rales, rhonchi, wheezing, or rubs  HEART: Regular rate and rhythm; No murmurs, rubs, or gallops  ABDOMEN: Soft, Nontender, Nondistended; Bowel sounds present  EXTREMITIES:  2+ Peripheral Pulses, No clubbing, cyanosis, or edema  LYMPH: No lymphadenopathy noted  SKIN: No rashes or lesions      LABS:  CBC Full  -  ( 2022 06:28 )  WBC Count : 10.73 K/uL  RBC Count : 3.46 M/uL  Hemoglobin : 9.5 g/dL  Hematocrit : 32.0 %  Platelet Count - Automated : 330 K/uL  Mean Cell Volume : 92.5 fl  Mean Cell Hemoglobin : 27.5 pg  Mean Cell Hemoglobin Concentration : 29.7 gm/dL        146<H>  |  110<H>  |  47<H>  ----------------------------<  189<H>  4.1   |  30  |  0.78    Ca    13.5<HH>      2022 06:28  Phos  3.2       Mg     2.6         TPro  6.1  /  Alb  1.8<L>  /  TBili  0.5  /  DBili  x   /  AST  63<H>  /  ALT  22  /  AlkPhos  288<H>        Urinalysis Basic - ( 2022 11:44 )    Color: Yellow / Appearance: Clear / S.015 / pH: x  Gluc: x / Ketone: Negative  / Bili: Negative / Urobili: Negative   Blood: x / Protein: 100 / Nitrite: Negative   Leuk Esterase: Negative / RBC: 2-5 /HPF / WBC 0-2 /HPF   Sq Epi: x / Non Sq Epi: Occasional /HPF / Bacteria: Moderate /HPF            [ x ]  DVT Prophylaxis- scd's  [x  ]  Nutrition Diet, NPO with Tube Feed:   Tube Feeding Modality: Gastrostomy  2Kal HN  Total Volume for 24 Hours (mL): 840  Continuous  Starting Tube Feed Rate mL per Hour: 10  Increase Tube Feed Rate by (mL): 10     Every 3 hours  Until Goal Tube Feed Rate (mL per Hour): 35  Tube Feed Duration (in Hours): 24  Tube Feed Start Time: 19:00  No Carb Prosource TF     Qty per Day:  1 (22 @ 18:40) [Active]  Diet, NPO with Tube Feed:   Tube Feeding Modality: Gastrostomy  Glucerna 1.5 Dhruv  Total Volume for 24 Hours (mL): 960  Continuous  Starting Tube Feed Rate mL per Hour: 30  Increase Tube Feed Rate by (mL): 10     Every 6 hours  Until Goal Tube Feed Rate (mL per Hour): 40  Tube Feed Duration (in Hours): 24  Tube Feed Start Time: 11:00  No Carb Prosource TF     Qty per Day:  1 pack /day (22 @ 10:57) [Pending Verification By Attending]          RADIOLOGY & ADDITIONAL STUDIES:     ACC: [id number] EXAM:  XR CHEST PORTABLE ROUTINE 1V                          PROCEDURE DATE:  2022          INTERPRETATION:  Portable chest radiograph    CLINICAL INFORMATION: Respiratory failure    TECHNIQUE:  Portable  AP chest radiograph.    COMPARISON: 2022 chest .    FINDINGS:  CATHETERS AND TUBES: Tracheostomy tube in place    PULMONARY: Unchanged bilateral multifocal perihilar/basilar diffuse   airspace disease and/or effusions..   No pneumothorax.    HEART/VASCULAR: The heart is mildly enlarged in transverse diameter.  .    BONES: Visualized osseous structures are intact.    IMPRESSION:   Unchanged bilateral multifocal and ill-defined   perihilar/basilar diffuse airspace disease and/or effusions.    ACC: [id number] EXAM:  CT ANGIO ABD PELV (W)AW IC                          PROCEDURE DATE:  02/15/2022          INTERPRETATION:  CLINICAL INFORMATION: 76 years  Male with R/o GI bleed.    COMPARISON: 2022    CONTRAST/COMPLICATIONS:  IV Contrast:Omnipaque 350  90 cc administered   10 cc discarded  Oral Contrast: NONE  Complications: None reported at time of study completion    PROCEDURE:  CT of the Abdomen and Pelvis was performed.  Precontrast, Arterial and Delayed phases were performed.  Sagittal and coronal reformats were performed.    FINDINGS:  LOWER CHEST: Previously seen right middle lobe cavity now measures up to   5.2 cm (previously 4.8 cm) and contains a new air-fluid level and mural   nodularity. Left lower lobe cavity partially imaged measuring   approximately 4.4 cm with air-fluid level, unchanged. Dense left lower   lobe consolidation unchanged. Increasing nodular right lower lobe   infiltrate. Developing right pleural effusion. Reidentified left pleural   effusion withsurrounding nodular pleural thickening area Dense coronary   atherosclerosis reidentified.    LIVER: Steatosis.  BILE DUCTS: Normal caliber.  GALLBLADDER: Mildly distended.  SPLEEN: Within normal limits.  PANCREAS: Within normal limits.  ADRENALS: Within normal limits.  KIDNEYS/URETERS: Within normal limits.    BLADDER: Layering high density in the bladder.  REPRODUCTIVE ORGANS: Prostate within normal limits.    BOWEL: No bowel obstruction. Appendix is not visualized and cannot be   assessed. Percutaneous gastrostomy tube in satisfactory position. Prior   small bowel obstruction has resolved. Metallic structure in the   descending colon (5:59), likely hemostatic clip. Similar structure in the   gastric fundus. No intraluminal contrast extravasation. No pneumatosis.  PERITONEUM: Small ascites. No pneumoperitoneum.  VESSELS: Atherosclerotic changes. No portal venous gas.  RETROPERITONEUM/LYMPH NODES: No lymphadenopathy.  ABDOMINAL WALL: Marked anasarca, increased from prior.  BONES: Degenerative changes.    IMPRESSION:    No CT evidence of active GI bleed.    Increasing right middle lobe lung cavity now with air-fluid level. Left   lower lobe cavity unchanged. Dense left lower lobe infiltrate unchanged.   Small but complex left pleural effusion, possibly empyema, unchanged.   Increasing right pleural effusion.    Prior small bowel obstruction has resolved.    Probable hemostatic clips in the stomach and descending colon.    Layering high density material in the bladder, new comparedwith prior.      Patient name: MELISSA JOHNS  YOB: 1945   Age: 76 (M)   MR#: [id number]  Study Date: 2021  Location: [id number]: [facility]  Study quality: Technically difficult  Referring Physician:  BESSIE CAGLE MD  Blood Pressure: 98/48 mmHg  Height: 175 cm  Weight: 73 kg  BSA: 1.9 m2  ------------------------------------------------------------------------    PROCEDURE: Transthoracic echocardiogram with 2-D, M-Mode  and complete spectral and color flow Doppler.  INDICATION: Abnormal electrocardiogram [ECG] [EKG] (R94.31)  HISTORY:  ------------------------------------------------------------------------  DIMENSIONS:  Dimensions:     Normal Values:  LA:     3.5 cm    2.0 - 4.0 cm  Ao:     3.7 cm    2.0 - 3.8 cm  SEPTUM: 0.9 cm    0.6 - 1.2 cm  PWT:    0.9 cm    0.6 - 1.1 cm  LVIDd:  4.0 cm    3.0 - 5.6 cm  LVIDs:  3.0 cm    1.8 - 4.0 cm      Derived Variables:  LVMI: 58 g/m2  RWT: 0.45  Ejection Fraction Visual Estimate: 55 %    ------------------------------------------------------------------------  OBSERVATIONS:  Mitral Valve: Normal mitral valve.  Aortic Root: Aortic Root: 3.7 cm.    Aortic Valve: Calcified trileaflet aortic valve with normal  opening.  Left Atrium: Normal left atrium.  LA volume index =34  cc/m2.  Left Ventricle: Endocardium not well visualized; grossly  normal left ventricular systolic function. Normal left  ventricular internal dimensions and wall thicknesses.  Normal diastolic function.  Right Heart: Normal right atrium. Normal right ventricular  size and systolic function (TAPSE  2.8cm). There is mild  tricuspid regurgitation. Pulmonic valve not well seen.  Pericardium/PleuraNormal pericardium with no pericardial  effusion.  Hemodynamic: RV systolic pressure is moderately increased  at  48 mm Hg.  ------------------------------------------------------------------------  CONCLUSIONS:  1. Normal mitral valve.  2. Calcified trileaflet aortic valve with normal opening.  3. Aortic Root: 3.7 cm.  4. Normal left atrium.  LA volume index = 34 cc/m2.  5. Normal left ventricular internal dimensions and wall  thicknesses.  6. Endocardium not well visualized; grossly normal left  ventricular systolic function.  7. Normal diastolic function.  8. Normal right atrium.  9. Normal right ventricular size and systolic function  (TAPSE  2.8cm).  10. RV systolic pressure is moderately increased at  48 mm  Hg.  11. There is mild tricuspid regurgitation.  12. Pulmonic valve not well seen.  13. Normal pericardium with no pericardial effusion.    2022  EGD Impressions:      ? SMALL DIVERTICULUM MID ESOPHAGUS.      Normal mucosa in the gastroesophageal junction.      Normal mucosa in the whole esophagus.      Prior clip in fundus seen. Small area of shallow ulceration adjacent to clip    but no stigmata noted. No Bleeding seen. .      Stomach surgically altered in distal end. .      PEG tube noted.      Normal mucosa in the whole examined duodenum.             EGD Limitations/Complications:     There were no apparent limitations or complications        Goals of Care Discussion with Family   - see note from/family 2/15/202    ASSESSMENT  76-year-old male former smoker (3 pack year, quit ~) with PMH HTN, HLD, DM2, CAD s/p stents (most recent cardiac cath ), MAC pneumonia, metastatic SCC base of tongue 2020 s/p resection s/p chemoradiation. Recent hospitalization (2021- 22) notable for cardiac arrest, aspiration pna, gastric ulcer s/p clipping and ventilator dependent respiratory failure s/p Trach/ PEG discharged to NH. BIBEMS from NH (22) for bloody stools and blood in trach noted at the facility. Patient admitted to  for hypovolemic shock 2/2 acute anemia 2/2 GIB . s/p 2 untis PRBCS. Patient requiring higher level of care and sent to ICU for hypotension requiring pressors. Patient down graded to  for further management once hemodynamically stable. Patient underwent a EGD and colonoscopy no active bleeding noted.     - Febrile overnight- T-max 101.5, lactate 2.2.  CXR w/ increased infiltrates.  Pan cx including sputum, IV hydration, Vanc and Cefepime for PNA.   Klebsiella PNA- ESBL sensitive to Meropenem, no active bleeding   Hemoglobin 7.3, afebrile, transfusing 1 unit PRBC today.     Febrile Tmax 101.7  2022 - Febrile overnight, BC sent

## 2022-02-27 NOTE — PROGRESS NOTE ADULT - SUBJECTIVE AND OBJECTIVE BOX
Patient is a 76y old  Male who presents with a chief complaint of blood in stool (2022 12:45)/hypovolemic shock/GIB/blood transfusion/sepsis/ESBL PNA/hypercalcemia, Tmax 102.5F, F/U pan culture      INTERVAL HPI/OVERNIGHT EVENTS:  T(C): 37.3 (22 @ 09:24), Max: 39.3 (22 @ 13:08)  HR: 122 (22 @ 06:37) (96 - 134)  BP: 106/63 (22 @ 06:37) (106/63 - 121/67)  RR: 18 (22 @ 06:37) (18 - 20)  SpO2: 100% (22 @ 06:37) (94% - 100%)  Wt(kg): --    LABS:                        9.5    10.73 )-----------( 330      ( 2022 06:28 )             32.0         146<H>  |  110<H>  |  47<H>  ----------------------------<  189<H>  4.1   |  30  |  0.78    Ca    13.5<HH>      2022 06:28  Phos  3.2       Mg     2.6         TPro  6.1  /  Alb  1.8<L>  /  TBili  0.5  /  DBili  x   /  AST  63<H>  /  ALT  22  /  AlkPhos  288<H>        Urinalysis Basic - ( 2022 11:44 )    Color: Yellow / Appearance: Clear / S.015 / pH: x  Gluc: x / Ketone: Negative  / Bili: Negative / Urobili: Negative   Blood: x / Protein: 100 / Nitrite: Negative   Leuk Esterase: Negative / RBC: 2-5 /HPF / WBC 0-2 /HPF   Sq Epi: x / Non Sq Epi: Occasional /HPF / Bacteria: Moderate /HPF      CAPILLARY BLOOD GLUCOSE      POCT Blood Glucose.: 182 mg/dL (2022 05:55)  POCT Blood Glucose.: 220 mg/dL (2022 00:08)  POCT Blood Glucose.: 220 mg/dL (2022 17:20)  POCT Blood Glucose.: 223 mg/dL (2022 11:57)        RADIOLOGY & ADDITIONAL TESTS:    Consultant(s) Notes Reviewed:  [x ] YES  [ ] NO    PHYSICAL EXAM:  GENERAL: well built, well nourished  HEAD:  Atraumatic, Normocephalic  EYES: EOMI, PERRLA, conjunctiva and sclera clear  ENT: No tonsillar erythema, exudates, or enlargement; Moist mucous membranes, Good dentition, No lesions  NECK: Supple, No JVD, Normal thyroid, no enlarged nodes, trach  NERVOUS SYSTEM:  Alert & Oriented X3, Good concentration; Motor Strength 5/5 B/L upper and lower extremities; DTRs 2+ intact and symmetric, sensory intact  CHEST/LUNG: B/L good air entry; positive  rales, rhonchi, or wheezing  HEART: S1S2 normal, no S3, Regular rate and rhythm; No murmurs, rubs, or gallops  ABDOMEN: Soft, Nontender, Nondistended; Bowel sounds present, GT in place   EXTREMITIES:  2+ Peripheral Pulses, No clubbing, cyanosis, B/L arm positive  edema  LYMPH: No lymphadenopathy noted  SKIN: No rashes or lesions    Care Discussed with Consultants/Other Providers [ x] YES  [ ] NO

## 2022-02-28 NOTE — CONSULT NOTE ADULT - ATTENDING COMMENTS
Problem/Plan - 1:  ·  Problem: Chronic respiratory failure with hypoxia.   ·  Plan: Continue mechanical ventilation  Not a candidate for weaning.    Only tolerating SBT for a few minutes at a time with PS 12. Low tidal volumes on SBT  CXR with worsened bilateral opacities - ?fluid  trial of albumin and lasix diuresis     Problem/Plan - 2:  ·  Problem: Hypovolemic shock.   ·  Plan: Secondary to acute GIB  initially received 2U PRBC, last transfused 1 unit PRBCs 2/22 with stable H/H  Continue midodrine for pressure support.     Problem/Plan - 3:  ·  Problem: GI bleed.   ·  Plan: Likely lower GIB  No signs of bleeding overnight  S/P EGD and colonoscopy 2/16 ->small diverticulum to mid esophagus, prior clip in fundus seen, small area of shallow ulceration adjacent to clip w/o stigmata of bleeding noted. no active bleeding. Normal duodenum. Colonosocopy showed some red tinged fluid distally but none seen at transverse level. No active bleeding, small hemorrhoids.   Continue PPI BID  GI Dr Núñez.    Discussed with family consultation with palliative care, family in agreement

## 2022-02-28 NOTE — PROGRESS NOTE ADULT - SUBJECTIVE AND OBJECTIVE BOX
Time of Visit:  Patient seen and examined.     MEDICATIONS  (STANDING):  atorvastatin 40 milliGRAM(s) Oral at bedtime  chlorhexidine 0.12% Liquid 15 milliLiter(s) Oral Mucosa every 12 hours  collagenase Ointment 1 Application(s) Topical daily  glucagon  Injectable 1 milliGRAM(s) IntraMuscular once  insulin lispro (ADMELOG) corrective regimen sliding scale   SubCutaneous every 6 hours  midodrine. 5 milliGRAM(s) Oral three times a day  pantoprazole  Injectable 40 milliGRAM(s) IV Push every 12 hours      MEDICATIONS  (PRN):  acetaminophen    Suspension .. 650 milliGRAM(s) Oral every 6 hours PRN Temp greater or equal to 38C (100.4F), Mild Pain (1 - 3)       Medications up to date at time of exam.      PHYSICAL EXAMINATION:    Vital Signs Last 24 Hrs  T(C): 36 (28 Feb 2022 05:12), Max: 38.8 (27 Feb 2022 19:38)  T(F): 96.8 (28 Feb 2022 05:12), Max: 101.9 (27 Feb 2022 19:38)  HR: 102 (28 Feb 2022 08:10) (100 - 126)  BP: 116/66 (28 Feb 2022 05:12) (109/64 - 130/74)  BP(mean): --  RR: 20 (28 Feb 2022 05:12) (20 - 24)  SpO2: 92% (28 Feb 2022 08:10) (90% - 98%)  Mode: AC/ CMV (Assist Control/ Continuous Mandatory Ventilation)  RR (machine): 14  TV (machine): 450  FiO2: 60  PEEP: 5  ITime: 1  MAP: 11  PIP: 31   (if applicable)    General: Unresponsive to verbal stimuli. On Vent support .     HEENT: Head is atraumatic. No nasal tenderness . Mucous membranes are moist.     NECK: Has tracheostomy.     LUNGS: Non labored. On Vent support to AC setting. No use of accessory muscle.      HEART: S1 S2 Regular rate and no click/ rub.     ABDOMEN: Soft, nontender, and nondistended.  has Peg. Active bowel sounds.     : No bladder distention and tenderness.     EXTREMITIES: Total care. Non ambulatory.     NEUROLOGIC: Cognitive impaired.        LABS:                        8.9    13.67 )-----------( 295      ( 28 Feb 2022 06:51 )             29.9     02-28    144  |  109<H>  |  53<H>  ----------------------------<  223<H>  4.2   |  30  |  0.92    Ca    12.0<H>      28 Feb 2022 06:51  Phos  3.2     02-28  Mg     2.6     02-28    TPro  5.3<L>  /  Alb  1.3<L>  /  TBili  0.3  /  DBili  x   /  AST  52<H>  /  ALT  17  /  AlkPhos  230<H>  02-28                Serum Pro-Brain Natriuretic Peptide: 9244 pg/mL (02-28-22 @ 06:51)    Lactate, Blood: 3.5 mmol/L (02-28-22 @ 11:13)        MICROBIOLOGY: (if applicable)    RADIOLOGY & ADDITIONAL STUDIES:  EKG:   CXR:  ECHO:    IMPRESSION: 76y Male PAST MEDICAL & SURGICAL HISTORY:  Hypertension    Diabetes    High cholesterol    Primary osteoarthritis of both knees    Coronary artery disease of native artery of native heart with stable angina pectoris    Allergic bronchitis    Diabetic retinopathy    Oral cancer    SCC (squamous cell carcinoma)    Metastatic cancer    Recurrent disease    Edema of extremity    Hyponatremia    Microalbuminuria    Neuralgia    Obstructive sleep apnea, adult    Vitamin D deficiency    S/P knee replacement  b/l    S/P hernia repair  b/l    Status post cataract extraction and insertion of intraocular lens, unspecified laterality  b/l    History of surgery  On 9/10/20 he underwent right hemiglossectomy and partial neck dissection with Dr. Darinel Servin at [facility].      Impression; This is a 77 Y/O male from [facility] , was sent on 02-13-22 due to Bloody Stools and blood in trach at the Facility . Admitted with Hypovolemic shock due to Acute Anemia secondary to GIB. 02-16-22 s/p EGD and Colonoscopy with no active bleeding. Had hx of MAC Pneumonia, Metastatic SCC Base of Tongue  with s/p Right hemiglossectomy and s/p Chemo/ Radiation. On vent support due to Chronic Hypoxic Respiratory Failure . 02-26-22,02-22-22,02-18-22 Negative PCR for Covid 19.     Suggestion:  O2 saturation 92% . Continue Vent setting AC 14  FIO2 60% Peep 5. FIO2 was increased today due to desaturated on FIO2 40%.    Oral care with Chlorhexidine , trach care, suction .      Aspiration precautions with HOB elevation especially during Peg feeding.   On Protonix 40 mg IVP Q 12 Hours . No staff report of bleeding today.       Time of Visit:  Patient seen and examined.     MEDICATIONS  (STANDING):  atorvastatin 40 milliGRAM(s) Oral at bedtime  chlorhexidine 0.12% Liquid 15 milliLiter(s) Oral Mucosa every 12 hours  collagenase Ointment 1 Application(s) Topical daily  glucagon  Injectable 1 milliGRAM(s) IntraMuscular once  insulin lispro (ADMELOG) corrective regimen sliding scale   SubCutaneous every 6 hours  midodrine. 5 milliGRAM(s) Oral three times a day  pantoprazole  Injectable 40 milliGRAM(s) IV Push every 12 hours      MEDICATIONS  (PRN):  acetaminophen    Suspension .. 650 milliGRAM(s) Oral every 6 hours PRN Temp greater or equal to 38C (100.4F), Mild Pain (1 - 3)       Medications up to date at time of exam.      PHYSICAL EXAMINATION:    Vital Signs Last 24 Hrs  T(C): 36 (28 Feb 2022 05:12), Max: 38.8 (27 Feb 2022 19:38)  T(F): 96.8 (28 Feb 2022 05:12), Max: 101.9 (27 Feb 2022 19:38)  HR: 102 (28 Feb 2022 08:10) (100 - 126)  BP: 116/66 (28 Feb 2022 05:12) (109/64 - 130/74)  BP(mean): --  RR: 20 (28 Feb 2022 05:12) (20 - 24)  SpO2: 92% (28 Feb 2022 08:10) (90% - 98%)  Mode: AC/ CMV (Assist Control/ Continuous Mandatory Ventilation)  RR (machine): 14  TV (machine): 450  FiO2: 60  PEEP: 5  ITime: 1  MAP: 11  PIP: 31   (if applicable)    General: Unresponsive to verbal stimuli. On Vent support .     HEENT: Head is atraumatic. No nasal tenderness . Mucous membranes are moist.     NECK: Has tracheostomy.     LUNGS: Non labored. On Vent support to AC setting. No use of accessory muscle.      HEART: S1 S2 Regular rate and no click/ rub.     ABDOMEN: Soft, nontender, and nondistended.  has Peg. Active bowel sounds.     : No bladder distention and tenderness.     EXTREMITIES: Total care. Non ambulatory.     NEUROLOGIC: Cognitive impaired.        LABS:                        8.9    13.67 )-----------( 295      ( 28 Feb 2022 06:51 )             29.9     02-28    144  |  109<H>  |  53<H>  ----------------------------<  223<H>  4.2   |  30  |  0.92    Ca    12.0<H>      28 Feb 2022 06:51  Phos  3.2     02-28  Mg     2.6     02-28    TPro  5.3<L>  /  Alb  1.3<L>  /  TBili  0.3  /  DBili  x   /  AST  52<H>  /  ALT  17  /  AlkPhos  230<H>  02-28                Serum Pro-Brain Natriuretic Peptide: 9244 pg/mL (02-28-22 @ 06:51)    Lactate, Blood: 3.5 mmol/L (02-28-22 @ 11:13)        MICROBIOLOGY: (if applicable)    RADIOLOGY & ADDITIONAL STUDIES:  EKG:   CXR:  ECHO:    IMPRESSION: 76y Male PAST MEDICAL & SURGICAL HISTORY:  Hypertension    Diabetes    High cholesterol    Primary osteoarthritis of both knees    Coronary artery disease of native artery of native heart with stable angina pectoris    Allergic bronchitis    Diabetic retinopathy    Oral cancer    SCC (squamous cell carcinoma)    Metastatic cancer    Recurrent disease    Edema of extremity    Hyponatremia    Microalbuminuria    Neuralgia    Obstructive sleep apnea, adult    Vitamin D deficiency    S/P knee replacement  b/l    S/P hernia repair  b/l    Status post cataract extraction and insertion of intraocular lens, unspecified laterality  b/l    History of surgery  On 9/10/20 he underwent right hemiglossectomy and partial neck dissection with Dr. Darinel Servin at [facility].      Impression; This is a 75 Y/O male from [facility] , was sent on 02-13-22 due to Bloody Stools and blood in trach at the Facility . Admitted with Hypovolemic shock due to Acute Anemia secondary to GIB. 02-16-22 s/p EGD and Colonoscopy with no active bleeding. Had hx of MAC Pneumonia, Metastatic SCC Base of Tongue  with s/p Right hemiglossectomy and s/p Chemo/ Radiation. On vent support due to Chronic Hypoxic Respiratory Failure . 02-26-22,02-22-22,02-18-22 Negative PCR for Covid 19.     Suggestion:  O2 saturation 92% . Continue Vent setting AC 14  FIO2 60% Peep 5. FIO2 was increased today due to desaturated on FIO2 40%.    Oral care with Chlorhexidine , trach care, suction .      Aspiration precautions with HOB elevation especially during Peg feeding.   On Protonix 40 mg IVP Q 12 Hours . No staff report of bleeding today.          Agree with above assessment and plan as transcribed.

## 2022-02-28 NOTE — CONSULT NOTE ADULT - PROBLEM SELECTOR RECOMMENDATION 3
Clinical evidence indicates that the patient has Severe protein calorie malnutrition/ 3rd degree    In context of   Chronic Illness (>1 month)       Body Fat loss: Severe   (Cachexia, temporal wasting,   muscle atrophy)  Muscle mass loss: Severe  (Skin failure/pressure ulcers)  Fluid Accumulation: Severe (Fluid overload, ascites, pleural effusions)   Strength: weakened severe (bedbound)    Recommend:      nutritional supplements as tolerated, nutrition consult    On PEG tube feeds as tolerated

## 2022-02-28 NOTE — PROGRESS NOTE ADULT - SUBJECTIVE AND OBJECTIVE BOX
MELISSA JOHNS    SCU progress note    INTERVAL HPI/OVERNIGHT EVENTS: overnight FiO2 increased to 60% (from 40%)     DNR [x ]   DNI  [ x ]    Covid - 19 PCR: negative 2/18/22    The 4Ms    What Matters Most: see [facility]  Age appropriate Medications/Screen for High Risk Medication: Yes  Mentation: see CAM below  Mobility: defer to physical exam    The Confusion Assessment Method (CAM) Diagnostic Algorithm     1: Acute Onset or Fluctuating Course  - Is there evidence of an acute change in mental status from the patient’s baseline? Did the (abnormal) behavior  fluctuate during the day, that is, tend to come and go, or increase and decrease in severity?  [x ] YES [ ] NO     2: Inattention  - Did the patient have difficulty focusing attention, being easily distractible, or having difficulty keeping track of what was being said?  [ x] YES [ ] NO     3: Disorganized thinking  -Was the patient’s thinking disorganized or incoherent, such as rambling or irrelevant conversation, unclear or illogical flow of ideas, or unpredictable switching from subject to subject?  [ x] YES [ ] NO    4: Altered Level of consciousness?  [x ] YES [ ] NO    The diagnosis of delirium by CAM requires the presence of features 1 and 2 and either 3 or 4.    PRESSORS: [ ] YES [x ] NO    Cardiovascular:  Heart Failure  Chronic       midodrine. 5 milliGRAM(s) Oral three times a day    Pulmonary:    Mode: AC/ CMV (Assist Control/ Continuous Mandatory Ventilation)  RR (machine): 14  TV (machine): 450  FiO2: 60  PEEP: 5  ITime: 1  MAP: 12  PIP: 36    Hematalogic: CBC stable, no signs of bleeding    Other:  acetaminophen    Suspension .. 650 milliGRAM(s) Oral every 6 hours PRN  atorvastatin 40 milliGRAM(s) Oral at bedtime  chlorhexidine 0.12% Liquid 15 milliLiter(s) Oral Mucosa every 12 hours  collagenase Ointment 1 Application(s) Topical daily  glucagon  Injectable 1 milliGRAM(s) IntraMuscular once  insulin lispro (ADMELOG) corrective regimen sliding scale   SubCutaneous every 6 hours  lactated ringers. 500 milliLiter(s) IV Continuous <Continuous>  lactated ringers. 1000 milliLiter(s) IV Continuous <Continuous>  pantoprazole  Injectable 40 milliGRAM(s) IV Push every 12 hours    acetaminophen    Suspension .. 650 milliGRAM(s) Oral every 6 hours PRN  atorvastatin 40 milliGRAM(s) Oral at bedtime  chlorhexidine 0.12% Liquid 15 milliLiter(s) Oral Mucosa every 12 hours  collagenase Ointment 1 Application(s) Topical daily  glucagon  Injectable 1 milliGRAM(s) IntraMuscular once  insulin lispro (ADMELOG) corrective regimen sliding scale   SubCutaneous every 6 hours  lactated ringers. 500 milliLiter(s) IV Continuous <Continuous>  lactated ringers. 1000 milliLiter(s) IV Continuous <Continuous>  midodrine. 5 milliGRAM(s) Oral three times a day  pantoprazole  Injectable 40 milliGRAM(s) IV Push every 12 hours    Drug Dosing Weight  Height (cm): 175.2 (16 Feb 2022 15:23)  Weight (kg): 78 (13 Feb 2022 18:39)  BMI (kg/m2): 25.4 (16 Feb 2022 15:23)  BSA (m2): 1.94 (16 Feb 2022 15:23)    CENTRAL LINE: [ ] YES [ X] NO  LOCATION:   DATE INSERTED:  REMOVE: [ ] YES [ ] NO  EXPLAIN:    LISET: [X ] YES [ ] NO    DATE INSERTED:  REMOVE:  [ ] YES [ ] NO  EXPLAIN:    PAST MEDICAL & SURGICAL HISTORY:  Hypertension    Diabetes    High cholesterol    Primary osteoarthritis of both knees    Coronary artery disease of native artery of native heart with stable angina pectoris    Allergic bronchitis    Diabetic retinopathy    Oral cancer    SCC (squamous cell carcinoma)    Metastatic cancer    Recurrent disease    Edema of extremity    Hyponatremia    Microalbuminuria    Neuralgia    Obstructive sleep apnea, adult    Vitamin D deficiency    S/P knee replacement  b/l    S/P hernia repair  b/l    Status post cataract extraction and insertion of intraocular lens, unspecified laterality  b/l    History of surgery  On 9/10/20 he underwent right hemiglossectomy and partial neck dissection with Dr. Darinel Servin at [facility].                02-27 @ 07:01  -  02-28 @ 07:00  --------------------------------------------------------  IN: 1400 mL / OUT: 0 mL / NET: 1400 mL        PHYSICAL EXAM:    GENERAL: chronically ill appearing male  HEAD:   post operative changes noted on R jaw s/p surgery 2020  EYES:  conjunctiva and sclera clear  ENMT:  Moist mucous membranes   NECK: Supple,   NERVOUS SYSTEM:  opens eyes to verbal stimuli, intermittently following commands   CHEST/LUNG: diminished breath sounds throughout, breathing synchronous with ventilator, no cough  HEART: Regular rate and rhythm   ABDOMEN: Soft, Nontender Bowel sounds present  EXTREMITIES:  2+ Peripheral Pulses, +4 edema blle  SKIN: see wound note    LABS:  CBC Full  -  ( 28 Feb 2022 06:51 )  WBC Count : 13.67 K/uL  RBC Count : 3.24 M/uL  Hemoglobin : 8.9 g/dL  Hematocrit : 29.9 %  Platelet Count - Automated : 295 K/uL  Mean Cell Volume : 92.3 fl  Mean Cell Hemoglobin : 27.5 pg  Mean Cell Hemoglobin Concentration : 29.8 gm/dL  Auto Neutrophil # : 12.44 K/uL  Auto Lymphocyte # : 0.41 K/uL  Auto Monocyte # : 0.68 K/uL  Auto Eosinophil # : 0.14 K/uL  Auto Basophil # : 0.00 K/uL  Auto Neutrophil % : 91.0 %  Auto Lymphocyte % : 3.0 %  Auto Monocyte % : 5.0 %  Auto Eosinophil % : 1.0 %  Auto Basophil % : 0.0 %    02-28    144  |  109<H>  |  53<H>  ----------------------------<  223<H>  4.2   |  30  |  0.92    Ca    12.0<H>      28 Feb 2022 06:51  Phos  3.2     02-28  Mg     2.6     02-28    TPro  5.3<L>  /  Alb  1.3<L>  /  TBili  0.3  /  DBili  x   /  AST  52<H>  /  ALT  17  /  AlkPhos  230<H>  02-28            RADIOLOGY & ADDITIONAL STUDIES:     CXR today pending    X-ray Chest 1 View- PORTABLE-Urgent (Xray Chest 1 View- PORTABLE-Urgent .) (02.24.22 @ 08:31) >    INTERPRETATION:  AP erect chest on February 24, 2022 at 8:22 AM. Patient   has fever patient has history of cancer and metastatic diseaseto the   lungs.    Heart magnified by technique. Tracheostomy remains.    On February 18 there are significant scattered mid and lower lung field   infiltrates.    These have increased on the present study.    IMPRESSION: Increasing bilateral infiltrates.      < from: CT Angio Abdomen and Pelvis w/ IV Cont (02.15.22 @ 15:58) >  INTERPRETATION:  CLINICAL INFORMATION: 76 years  Male with R/o GI bleed.    IMPRESSION:    No CT evidence of active GI bleed.    Increasing right middle lobe lung cavity now with air-fluid level. Left   lower lobe cavity unchanged. Dense left lower lobe infiltrate unchanged.   Small but complex left pleural effusion, possibly empyema, unchanged.   Increasing right pleural effusion.    Prior small bowel obstruction has resolved.    Probable hemostatic clips in the stomach and descending colon.    Layering high density material in the bladder, new comparedwith prior.        Goals of Care Discussion with Family/Proxy/Other   - see note from 2/15/22

## 2022-02-28 NOTE — CONSULT NOTE ADULT - ASSESSMENT
Fevers  Bacteremia - with Enterococcus, ? real or contamination  Leukocytosis     Plan - Given that he just completed a course of antibiotics today, will not start anything new as yet  Repeat blood cultures on Wednesday and again on Thursday off antibiotics to see if Enterococcus grows out or not.  Get an ECHO to look for Vegetations  will see if her fevers persist or dissipate.  Overall prognosis is very poor and family is aware of this.

## 2022-02-28 NOTE — PROGRESS NOTE ADULT - PROBLEM SELECTOR PLAN 1
s/p PRBC (last given 2/22)  trend CBC, transfuse as needed to maintain Hgb >7  monitor for signs of bleeding  initially presented with blood in trach and stool s/p EGD and colonscopy (2/16) with no active bleeding  Continue PPI BID Continue mechanical ventilation, increased oxygen support overnight precludes SBT today  CXR pending   x1 dose lasix given today for BNP >9K  Continue meropenem for klebsiella ESBL PNA on sputum culture

## 2022-02-28 NOTE — PROGRESS NOTE ADULT - NSPROGADDITIONALINFOA_GEN_ALL_CORE
ID consulted due to +BCx, plan to monitor off antibiotics with daily blood cultures ordered until 3/3. Albumin w/ lasix q6hr due to pulmonary edema

## 2022-02-28 NOTE — PROVIDER CONTACT NOTE (CRITICAL VALUE NOTIFICATION) - DATE AND TIME:
28-Feb-2022 10:22 Please return with new or worsening symptoms.  Follow-up with primary care for reevaluation and to ensure follow-up on the lab test that were drawn today.   28-Feb-2022 10:20

## 2022-02-28 NOTE — PROGRESS NOTE ADULT - PROBLEM SELECTOR PLAN 6
Currently culture data revealing GPC in BCx (continue to follow results) precluding discharge planning  Continue mechanical ventilation via tracheostomy and TF via PEG  Prognosis very poor, GOC ongoing with family  DNR MOLST in chart Likely secondary to metastatic cancer as well as bed bound status  - Continue Free water via Peg   - s/p  Zoledronic acid 4mg IV x1 on 2/26/2022,   - discontinued LR@100ml/hr in setting of elevated BNP, increased FiO2 requirement  - Monitor calcium daily

## 2022-02-28 NOTE — CONSULT NOTE ADULT - PROBLEM SELECTOR RECOMMENDATION 2
metastatic tongue CA w lung mets, s/p partial resection,  PET from Oct 2021 showed POD. Per Palliative discussion w Dr Porras onc on last admission, pt appropriate for hospice. ECOG 4, vent dependent.

## 2022-02-28 NOTE — PROGRESS NOTE ADULT - ASSESSMENT
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 Recent hospitalization (12/20/2021- 1/28/22) notable for cardiac arrest, aspiration pna, gastric ulcer s/p clipping and ventilator dependent respiratory failure s/p Trach/ PEG discharged to NH. BIBEMS from NH (2/13/22) for bloody stools and blood in trach noted at the facility. Patient admitted to  for hypovolemic shock 2/2 acute anemia 2/2 GIB s/p transfusion support and ultimately required ICU admission for hypotension despite resuscitation requiring pressors. Patient underwent  EGD and colonoscopy no active bleeding noted. Patient down graded to  for further management 2/16. SCU course notable for fevers found to have Klebsiella ESBL PNA started on meropenem with ID consulted.

## 2022-02-28 NOTE — PROGRESS NOTE ADULT - PROBLEM SELECTOR PLAN 5
Continue mechanical ventilation, increased oxygen support overnight precludes SBT today  CXR pending   x1 dose lasix given today for BNP >9K  Continue meropenem for klebsiella ESBL PNA on sputum culture POD noted on CT chest   Family updated

## 2022-02-28 NOTE — CONSULT NOTE ADULT - PROBLEM SELECTOR RECOMMENDATION 5
GOC discussion as above. DNR on file. Remains vent dependent, on inc FiO2 requirements. Poor overall prognosis given advanced malignancy, remains at high risk for further complications and decline. Appropriate for no escalation of care. Family to discuss. Palliative will follow.

## 2022-02-28 NOTE — PROGRESS NOTE ADULT - ASSESSMENT
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 Recent hospitalization (12/20/2021- 1/28/22) notable for cardiac arrest, aspiration pna, gastric ulcer s/p clipping and ventilator dependent respiratory failure s/p Trach/ PEG discharged to NH. BIBEMS from NH (2/13/22) for bloody stools and blood in trach noted at the facility Adm to ICU for hypovolemic shock 2/2 acute anemia 2/2 GIB, S/P ICU care, S/P blood transfusion, PPI, EGD shoed esophagus ulcer, no active bleeding, S/P colonoscopy , no active bleeding, continue PPI bid, blood culture negative,  ESBL /GNR PNA, continue IV meropenum until 2/28,  suction prn, GI follow up, transfusion if H <7. DVT prophylaxis. H/H stable, still spiked fever  , F/U pan culture, hypercalcemia, most likely due to metastatic CA. IV hydration. Suction. Prognosis poor, palliative team follow up. Continue supportive care

## 2022-02-28 NOTE — PROGRESS NOTE ADULT - PROBLEM SELECTOR PLAN 3
POD noted on CT chest   Family updated s/p PRBC (last given 2/22)  trend CBC, transfuse as needed to maintain Hgb >7  monitor for signs of bleeding  initially presented with blood in trach and stool s/p EGD and colonscopy (2/16) with no active bleeding  Continue PPI BID

## 2022-02-28 NOTE — PROGRESS NOTE ADULT - PROBLEM SELECTOR PLAN 7
Likely secondary to metastatic cancer as well as bed bound status  - Continue Free water via Peg   - s/p  Zoledronic acid 4mg IV x1 on 2/26/2022,   - discontinued LR@100ml/hr i/s/o elevated BNP, increased FiO2 requirement  - Monitor calcium daily Currently culture data revealing GPC in BCx (continue to follow results) precluding discharge planning  Continue mechanical ventilation via tracheostomy and TF via PEG  Prognosis very poor, GOC ongoing with family  DNR MOLST in chart

## 2022-02-28 NOTE — CONSULT NOTE ADULT - SUBJECTIVE AND OBJECTIVE BOX
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  . Pt is awake, but non verbal with the tracheostomy. Medical history obtained from son bedside and ED physicin.   Per EMS, pt appeared short of breath, hypoxic to 80s, was bagged and suctioned out blood/clots, and oxygenation improved.   Per Son, Patient currently being treated for pneumonia wth Vanc/ZOsyn in the NH.  Pt was admitted here at [facility] in Dec and also had a cardiac arrest during the hospital stay, Pt was later noted to have Hypoxia from Aspiration Pna and was Intubated and Trach as placed in Jan 2022. Pt now is DNR/DNI    In the ED,   Pt appears comfortable, awake, alert, non verbal, able to follow minimal commands   /86, hr 110, SPO2 100%  CXR-   L sided plueral effusion and RLL infiltrate   hb 6.9, FOBT+  1UPRBC ordered    (13 Feb 2022 21:02)    Interval history: intermittent fevers, blood cx x1 2/27 growing GPC pairs. On FiO2 60% on vent. Daughter at bedside. DNR on file. Known to Palliative service from previous admission.      PAST MEDICAL & SURGICAL HISTORY:  Hypertension    Diabetes    High cholesterol    Primary osteoarthritis of both knees    Coronary artery disease of native artery of native heart with stable angina pectoris    Allergic bronchitis    Diabetic retinopathy    Oral cancer    SCC (squamous cell carcinoma)    Recurrent disease    Edema of extremity    Hyponatremia    Microalbuminuria    Neuralgia    Obstructive sleep apnea, adult    Vitamin D deficiency    Metastatic cancer    S/P knee replacement  b/l    S/P hernia repair  b/l    Status post cataract extraction and insertion of intraocular lens, unspecified laterality  b/l    History of surgery  On 9/10/20 he underwent right hemiglossectomy and partial neck dissection with Dr. Darinel Servin at [facility].        SOCIAL HISTORY:  , has children  Admitted from:   [facility]        Surrogate/HCP/Guardian:    Moses Amaya son        Phone#: [phone]    FAMILY HISTORY:  Family history of acute myocardial infarction (Sibling)      Baseline ADLs (prior to admission): bedbound    Allergies    No Known Allergies    Intolerances      Present Symptoms:          Review of Systems:   Unable to obtain due to poor mentation     MEDICATIONS  (STANDING):  albumin human 25% IVPB 50 milliLiter(s) IV Intermittent every 6 hours  atorvastatin 40 milliGRAM(s) Oral at bedtime  chlorhexidine 0.12% Liquid 15 milliLiter(s) Oral Mucosa every 12 hours  collagenase Ointment 1 Application(s) Topical daily  furosemide   Injectable 20 milliGRAM(s) IV Push every 6 hours  glucagon  Injectable 1 milliGRAM(s) IntraMuscular once  insulin lispro (ADMELOG) corrective regimen sliding scale   SubCutaneous every 6 hours  midodrine. 5 milliGRAM(s) Oral three times a day  pantoprazole  Injectable 40 milliGRAM(s) IV Push every 12 hours    MEDICATIONS  (PRN):  acetaminophen    Suspension .. 650 milliGRAM(s) Oral every 6 hours PRN Temp greater or equal to 38C (100.4F), Mild Pain (1 - 3)      PHYSICAL EXAM:    Vital Signs Last 24 Hrs  T(C): 36.7 (28 Feb 2022 15:00), Max: 38.8 (27 Feb 2022 19:38)  T(F): 98 (28 Feb 2022 15:00), Max: 101.9 (27 Feb 2022 19:38)  HR: 107 (28 Feb 2022 15:00) (100 - 126)  BP: 114/66 (28 Feb 2022 15:00) (109/64 - 130/74)  BP(mean): --  RR: 22 (28 Feb 2022 15:00) (20 - 24)  SpO2: 95% (28 Feb 2022 15:00) (90% - 98%)     Karnofsky Performance Score/Palliative Performance Status Version2: 20    %     GENERAL: lethargic, on ventilator, chronically ill appearing, cachectic, R cervical/jaw mass,   NAD  HEENT: Atraumatic, oropharynx clear, trach in place, neck supple  CHEST/LUNG: unlabored  HEART: Regular rate and rhythm    ABDOMEN: Soft, Nontender, Nondistended, PEG   MUSCULOSKELETAL:  No  edema,  bedbound  NERVOUS SYSTEM:  lethargic  SKIN: unstageable coccyx decub  noted  Oral intake: npo/TF       LABS:                        8.9    13.67 )-----------( 295      ( 28 Feb 2022 06:51 )             29.9     02-28    144  |  109<H>  |  53<H>  ----------------------------<  223<H>  4.2   |  30  |  0.92    Ca    12.0<H>      28 Feb 2022 06:51  Phos  3.2     02-28  Mg     2.6     02-28    TPro  5.3<L>  /  Alb  1.3<L>  /  TBili  0.3  /  DBili  x   /  AST  52<H>  /  ALT  17  /  AlkPhos  230<H>  02-28        RADIOLOGY & ADDITIONAL STUDIES:  < from: CT Angio Abdomen and Pelvis w/ IV Cont (02.15.22 @ 15:58) >  ACC: [id number] EXAM:  CT ANGIO ABD PELV (W)AW IC                          PROCEDURE DATE:  02/15/2022          INTERPRETATION:  CLINICAL INFORMATION: 76 years  Male with R/o GI bleed.    COMPARISON: 1/18/2022    CONTRAST/COMPLICATIONS:  IV Contrast:Omnipaque 350  90 cc administered   10 cc discarded  Oral Contrast: NONE  Complications: None reported at time of study completion    PROCEDURE:  CT of the Abdomen and Pelvis was performed.  Precontrast, Arterial and Delayed phases were performed.  Sagittal and coronal reformats were performed.    FINDINGS:  LOWER CHEST: Previously seen right middle lobe cavity now measures up to   5.2 cm (previously 4.8 cm) and contains a new air-fluid level and mural   nodularity. Left lower lobe cavity partially imaged measuring   approximately 4.4 cm with air-fluid level, unchanged. Dense left lower   lobe consolidation unchanged. Increasing nodular right lower lobe   infiltrate. Developing right pleural effusion. Reidentified left pleural   effusion withsurrounding nodular pleural thickening area Dense coronary   atherosclerosis reidentified.    LIVER: Steatosis.  BILE DUCTS: Normal caliber.  GALLBLADDER: Mildly distended.  SPLEEN: Within normal limits.  PANCREAS: Within normal limits.  ADRENALS: Within normal limits.  KIDNEYS/URETERS: Within normal limits.    BLADDER: Layering high density in the bladder.  REPRODUCTIVE ORGANS: Prostate within normal limits.    BOWEL: No bowel obstruction. Appendix is not visualized and cannot be   assessed. Percutaneous gastrostomy tube in satisfactory position. Prior   small bowel obstruction has resolved. Metallic structure in the   descending colon (5:59), likely hemostatic clip. Similar structure in the   gastric fundus. No intraluminal contrast extravasation. No pneumatosis.  PERITONEUM: Small ascites. No pneumoperitoneum.  VESSELS: Atherosclerotic changes. No portal venous gas.  RETROPERITONEUM/LYMPH NODES: No lymphadenopathy.  ABDOMINAL WALL: Marked anasarca, increased from prior.  BONES: Degenerative changes.    IMPRESSION:    No CT evidence of active GI bleed.    Increasing right middle lobe lung cavity now with air-fluid level. Left   lower lobe cavity unchanged. Dense left lower lobe infiltrate unchanged.   Small but complex left pleural effusion, possibly empyema, unchanged.   Increasing right pleural effusion.    Prior small bowel obstruction has resolved.    Probable hemostatic clips in the stomach and descending colon.    Layering high density material in the bladder, new comparedwith prior.        --- End of Report ---            BEATRICE RODRÍGUEZ MD; Attending Radiologist  This document has been electronically signed. Feb 15 2022  4:26PM    < end of copied text >      ADVANCE DIRECTIVES:

## 2022-02-28 NOTE — CONSULT NOTE ADULT - SUBJECTIVE AND OBJECTIVE BOX
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  . Pt is awake, but non verbal with the tracheostomy. Medical history obtained from son bedside and ED physicin.   Per EMS, pt appeared short of breath, hypoxic to 80s, was bagged and suctioned out blood/clots, and oxygenation improved.   Per Son, Patient currently being treated for pneumonia wth Vanc/ZOsyn in the NH.  Pt was admitted here at [facility] in Dec and also had a cardiac arrest during the hospital stay, Pt was later noted to have Hypoxia from Aspiration Pna and was Intubated and Trach as placed in Jan 2022. Pt now is DNR/DNI    In the ED,   Pt appears comfortable, awake, alert, non verbal, able to follow minimal commands   /86, hr 110, SPO2 100%  CXR-   L sided plueral effusion and RLL infiltrate   hb 6.9, FOBT+  1UPRBC ordered    (13 Feb 2022 21:02)      PAST MEDICAL & SURGICAL HISTORY:  Hypertension    Diabetes    High cholesterol    Primary osteoarthritis of both knees    Coronary artery disease of native artery of native heart with stable angina pectoris    Allergic bronchitis    Diabetic retinopathy    Oral cancer    SCC (squamous cell carcinoma)    Metastatic cancer    Recurrent disease    Edema of extremity    Hyponatremia    Microalbuminuria    Neuralgia    Obstructive sleep apnea, adult    Vitamin D deficiency    S/P knee replacement  b/l    S/P hernia repair  b/l    Status post cataract extraction and insertion of intraocular lens, unspecified laterality  b/l    History of surgery  On 9/10/20 he underwent right hemiglossectomy and partial neck dissection with Dr. Darinel Servin at [facility].        No Known Allergies      Meds:  acetaminophen    Suspension .. 650 milliGRAM(s) Oral every 6 hours PRN  albumin human 25% IVPB 50 milliLiter(s) IV Intermittent every 6 hours  atorvastatin 40 milliGRAM(s) Oral at bedtime  chlorhexidine 0.12% Liquid 15 milliLiter(s) Oral Mucosa every 12 hours  collagenase Ointment 1 Application(s) Topical daily  furosemide   Injectable 20 milliGRAM(s) IV Push every 6 hours  glucagon  Injectable 1 milliGRAM(s) IntraMuscular once  insulin lispro (ADMELOG) corrective regimen sliding scale   SubCutaneous every 6 hours  midodrine. 5 milliGRAM(s) Oral three times a day  pantoprazole  Injectable 40 milliGRAM(s) IV Push every 12 hours      SOCIAL HISTORY:  Smoker:  YES / NO        PACK YEARS:                         WHEN QUIT?  ETOH use:  YES / NO               FREQUENCY / QUANTITY:  Ilicit Drug use:  YES / NO  Occupation:  Assisted device use (Cane / Walker):  Live with:    FAMILY HISTORY:  Family history of acute myocardial infarction (Sibling)        VITALS:  Vital Signs Last 24 Hrs  T(C): 36.7 (28 Feb 2022 15:00), Max: 38.8 (27 Feb 2022 19:38)  T(F): 98 (28 Feb 2022 15:00), Max: 101.9 (27 Feb 2022 19:38)  HR: 110 (28 Feb 2022 15:51) (100 - 126)  BP: 114/66 (28 Feb 2022 15:00) (109/64 - 130/74)  BP(mean): --  RR: 22 (28 Feb 2022 15:00) (20 - 24)  SpO2: 98% (28 Feb 2022 15:51) (90% - 98%)    LABS/DIAGNOSTIC TESTS:                          8.9    13.67 )-----------( 295      ( 28 Feb 2022 06:51 )             29.9     WBC Count: 13.67 K/uL (02-28 @ 06:51)  WBC Count: 10.73 K/uL (02-27 @ 06:28)  WBC Count: 11.53 K/uL (02-26 @ 07:53)      02-28    144  |  109<H>  |  53<H>  ----------------------------<  223<H>  4.2   |  30  |  0.92    Ca    12.0<H>      28 Feb 2022 06:51  Phos  3.2     02-28  Mg     2.6     02-28    TPro  5.3<L>  /  Alb  1.3<L>  /  TBili  0.3  /  DBili  x   /  AST  52<H>  /  ALT  17  /  AlkPhos  230<H>  02-28          LIVER FUNCTIONS - ( 28 Feb 2022 06:51 )  Alb: 1.3 g/dL / Pro: 5.3 g/dL / ALK PHOS: 230 U/L / ALT: 17 U/L DA / AST: 52 U/L / GGT: x                 LACTATE:Lactate, Blood: 3.5 mmol/L (02-28 @ 11:13)      ABG -     CULTURES:   .Blood Blood-Peripheral  02-27 @ 19:04   Growth in anaerobic bottle: Gram positive cocci in pairs      .Sputum Sputum  02-18 @ 21:07   Moderate Klebsiella pneumoniae ESBL Multiple Morphological Strains  Normal Respiratory Karla absent  --  Klebsiella pneumoniae ESBL  Klebsiella pneumoniae ESBL      .Blood Blood-Peripheral  02-18 @ 18:39   No Growth Final  --  --          RADIOLOGY:      ROS  [  ] UNABLE TO ELICIT               HPI:  76-year-old male hx of HTN, HLD, DM2, CAD s/p stents, MAC pneumonia, 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  . Pt is awake, but non verbal with the tracheostomy. Medical history obtained from son bedside and ED physicin.   Per EMS, pt appeared short of breath, hypoxic to 80s, was bagged and suctioned out blood/clots, and oxygenation improved.   Per Son, Patient currently being treated for pneumonia wth Vanc/ZOsyn in the NH.  Pt was admitted here at [facility] in Dec and also had a cardiac arrest during the hospital stay, Pt was later noted to have Hypoxia from Aspiration Pna and was Intubated and Trach as placed in Jan 2022. Pt now is DNR/DNI    In the ED,   Pt appears comfortable, awake, alert, non verbal, able to follow minimal commands   /86, hr 110, SPO2 100%  CXR-   L sided plueral effusion and RLL infiltrate   hb 6.9, FOBT+  1UPRBC ordered    (13 Feb 2022 21:02)      History as above, pt whom I had seen on his last admission to this hospital just a few weeks ago. He was admitted with GI bleeding along with hypoxia and Pneumonia, he was treated with Meropenem and just completed his antibiotic course this morning. Asked to see this patient as he started having fevers 2 days ago as high as 102 and blood cultures are growing out Enterococcus         PAST MEDICAL & SURGICAL HISTORY:  Hypertension    Diabetes    High cholesterol    Primary osteoarthritis of both knees    Coronary artery disease of native artery of native heart with stable angina pectoris    Allergic bronchitis    Diabetic retinopathy    Oral cancer    SCC (squamous cell carcinoma)    Metastatic cancer    Recurrent disease    Edema of extremity    Hyponatremia    Microalbuminuria    Neuralgia    Obstructive sleep apnea, adult    Vitamin D deficiency    S/P knee replacement  b/l    S/P hernia repair  b/l    Status post cataract extraction and insertion of intraocular lens, unspecified laterality  b/l    History of surgery  On 9/10/20 he underwent right hemiglossectomy and partial neck dissection with Dr. Darinel Servin at [facility].        No Known Allergies      Meds:  acetaminophen    Suspension .. 650 milliGRAM(s) Oral every 6 hours PRN  albumin human 25% IVPB 50 milliLiter(s) IV Intermittent every 6 hours  atorvastatin 40 milliGRAM(s) Oral at bedtime  chlorhexidine 0.12% Liquid 15 milliLiter(s) Oral Mucosa every 12 hours  collagenase Ointment 1 Application(s) Topical daily  furosemide   Injectable 20 milliGRAM(s) IV Push every 6 hours  glucagon  Injectable 1 milliGRAM(s) IntraMuscular once  insulin lispro (ADMELOG) corrective regimen sliding scale   SubCutaneous every 6 hours  midodrine. 5 milliGRAM(s) Oral three times a day  pantoprazole  Injectable 40 milliGRAM(s) IV Push every 12 hours      SOCIAL HISTORY:  Smoker:  YES / NO        PACK YEARS:                         WHEN QUIT?  ETOH use:  YES / NO               FREQUENCY / QUANTITY:  Ilicit Drug use:  YES / NO  Occupation:  Assisted device use (Cane / Walker):  Live with:    FAMILY HISTORY:  Family history of acute myocardial infarction (Sibling)        VITALS:  Vital Signs Last 24 Hrs  T(C): 36.7 (28 Feb 2022 15:00), Max: 38.8 (27 Feb 2022 19:38)  T(F): 98 (28 Feb 2022 15:00), Max: 101.9 (27 Feb 2022 19:38)  HR: 110 (28 Feb 2022 15:51) (100 - 126)  BP: 114/66 (28 Feb 2022 15:00) (109/64 - 130/74)  BP(mean): --  RR: 22 (28 Feb 2022 15:00) (20 - 24)  SpO2: 98% (28 Feb 2022 15:51) (90% - 98%)    LABS/DIAGNOSTIC TESTS:                          8.9    13.67 )-----------( 295      ( 28 Feb 2022 06:51 )             29.9     WBC Count: 13.67 K/uL (02-28 @ 06:51)  WBC Count: 10.73 K/uL (02-27 @ 06:28)  WBC Count: 11.53 K/uL (02-26 @ 07:53)      02-28    144  |  109<H>  |  53<H>  ----------------------------<  223<H>  4.2   |  30  |  0.92    Ca    12.0<H>      28 Feb 2022 06:51  Phos  3.2     02-28  Mg     2.6     02-28    TPro  5.3<L>  /  Alb  1.3<L>  /  TBili  0.3  /  DBili  x   /  AST  52<H>  /  ALT  17  /  AlkPhos  230<H>  02-28          LIVER FUNCTIONS - ( 28 Feb 2022 06:51 )  Alb: 1.3 g/dL / Pro: 5.3 g/dL / ALK PHOS: 230 U/L / ALT: 17 U/L DA / AST: 52 U/L / GGT: x                 LACTATE:Lactate, Blood: 3.5 mmol/L (02-28 @ 11:13)      ABG -     CULTURES:   .Blood Blood-Peripheral  02-27 @ 19:04   Growth in anaerobic bottle: Gram positive cocci in pairs      .Sputum Sputum  02-18 @ 21:07   Moderate Klebsiella pneumoniae ESBL Multiple Morphological Strains  Normal Respiratory Karla absent  --  Klebsiella pneumoniae ESBL  Klebsiella pneumoniae ESBL      .Blood Blood-Peripheral  02-18 @ 18:39   No Growth Final  --  --          RADIOLOGY:      ROS  [  ] UNABLE TO ELICIT               HPI:  76-year-old male hx of HTN, HLD, DM2, CAD s/p stents, MAC pneumonia, 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  . Pt is awake, but non verbal with the tracheostomy. Medical history obtained from son bedside and ED physicin.   Per EMS, pt appeared short of breath, hypoxic to 80s, was bagged and suctioned out blood/clots, and oxygenation improved.   Per Son, Patient currently being treated for pneumonia wth Vanc/ZOsyn in the NH.  Pt was admitted here at [facility] in Dec and also had a cardiac arrest during the hospital stay, Pt was later noted to have Hypoxia from Aspiration Pna and was Intubated and Trach as placed in Jan 2022. Pt now is DNR/DNI    In the ED,   Pt appears comfortable, awake, alert, non verbal, able to follow minimal commands   /86, hr 110, SPO2 100%  CXR-   L sided plueral effusion and RLL infiltrate   hb 6.9, FOBT+  1UPRBC ordered    (13 Feb 2022 21:02)      History as above, pt whom I had seen on his last admission to this hospital just a few weeks ago. He was admitted with GI bleeding along with hypoxia and Pneumonia, he was treated with Meropenem and just completed his antibiotic course this morning. Asked to see this patient as he started having fevers 2 days ago as high as 102 and blood cultures are growing out Enterococcus Faecium in 1 of 4 bottles and he had negative blood and urine cultures on 2/18/22. His daughter and sister are at the bedside. He is currently vent dependent on an FIO2 of 60% and PEEP of 5. He opens his eyes to verbal and tactile stimuli but is very lethargic otherwise and unable to answer leading questions at this time.        PAST MEDICAL & SURGICAL HISTORY:  Hypertension    Diabetes    High cholesterol    Primary osteoarthritis of both knees    Coronary artery disease of native artery of native heart with stable angina pectoris    Allergic bronchitis    Diabetic retinopathy    Oral cancer    SCC (squamous cell carcinoma)    Metastatic cancer    Recurrent disease    Edema of extremity    Hyponatremia    Microalbuminuria    Neuralgia    Obstructive sleep apnea, adult    Vitamin D deficiency    S/P knee replacement  b/l    S/P hernia repair  b/l    Status post cataract extraction and insertion of intraocular lens, unspecified laterality  b/l    History of surgery  On 9/10/20 he underwent right hemiglossectomy and partial neck dissection with Dr. Darinel Servin at [facility].        No Known Allergies      Meds:  acetaminophen    Suspension .. 650 milliGRAM(s) Oral every 6 hours PRN  albumin human 25% IVPB 50 milliLiter(s) IV Intermittent every 6 hours  atorvastatin 40 milliGRAM(s) Oral at bedtime  chlorhexidine 0.12% Liquid 15 milliLiter(s) Oral Mucosa every 12 hours  collagenase Ointment 1 Application(s) Topical daily  furosemide   Injectable 20 milliGRAM(s) IV Push every 6 hours  glucagon  Injectable 1 milliGRAM(s) IntraMuscular once  insulin lispro (ADMELOG) corrective regimen sliding scale   SubCutaneous every 6 hours  midodrine. 5 milliGRAM(s) Oral three times a day  pantoprazole  Injectable 40 milliGRAM(s) IV Push every 12 hours      SOCIAL HISTORY:  Smoker:  no  ETOH use:  no      FAMILY HISTORY:  Family history of acute myocardial infarction (Sibling)        VITALS:  Vital Signs Last 24 Hrs  T(C): 36.7 (28 Feb 2022 15:00), Max: 38.8 (27 Feb 2022 19:38)  T(F): 98 (28 Feb 2022 15:00), Max: 101.9 (27 Feb 2022 19:38)  HR: 110 (28 Feb 2022 15:51) (100 - 126)  BP: 114/66 (28 Feb 2022 15:00) (109/64 - 130/74)  BP(mean): --  RR: 22 (28 Feb 2022 15:00) (20 - 24)  SpO2: 98% (28 Feb 2022 15:51) (90% - 98%)    LABS/DIAGNOSTIC TESTS:                          8.9    13.67 )-----------( 295      ( 28 Feb 2022 06:51 )             29.9     WBC Count: 13.67 K/uL (02-28 @ 06:51)  WBC Count: 10.73 K/uL (02-27 @ 06:28)  WBC Count: 11.53 K/uL (02-26 @ 07:53)      02-28    144  |  109<H>  |  53<H>  ----------------------------<  223<H>  4.2   |  30  |  0.92    Ca    12.0<H>      28 Feb 2022 06:51  Phos  3.2     02-28  Mg     2.6     02-28    TPro  5.3<L>  /  Alb  1.3<L>  /  TBili  0.3  /  DBili  x   /  AST  52<H>  /  ALT  17  /  AlkPhos  230<H>  02-28          LIVER FUNCTIONS - ( 28 Feb 2022 06:51 )  Alb: 1.3 g/dL / Pro: 5.3 g/dL / ALK PHOS: 230 U/L / ALT: 17 U/L DA / AST: 52 U/L / GGT: x                 LACTATE:Lactate, Blood: 3.5 mmol/L (02-28 @ 11:13)      ABG -     CULTURES:   .Blood Blood-Peripheral  02-27 @ 19:04   Growth in anaerobic bottle: Gram positive cocci in pairs      .Sputum Sputum  02-18 @ 21:07   Moderate Klebsiella pneumoniae ESBL Multiple Morphological Strains  Normal Respiratory Karla absent  --  Klebsiella pneumoniae ESBL  Klebsiella pneumoniae ESBL      .Blood Blood-Peripheral  02-18 @ 18:39   No Growth Final  --  --          RADIOLOGY:< from: Xray Chest 1 View- PORTABLE-Urgent (Xray Chest 1 View- PORTABLE-Urgent .) (02.28.22 @ 13:37) >  ACC: [id number] EXAM:  XR CHEST PORTABLE URGENT 1V                          PROCEDURE DATE:  02/28/2022          INTERPRETATION:  Clinical history: 76-year-old male, respiratory failure.    Portable view of the chest is compared to 2/25/2022 and demonstrates   extensive bilateral alveolar consolidation, markedly increased.    Tracheostomy tube projected in satisfactory position. No pneumothorax or   acute osseous finding.    IMPRESSION:  Extensive bilateral pneumonia, increased    --- End of Report ---            MARISA BRO DO; Attending Radiologist  This document has been electronically signed. Mar  1 2022  9:23AM    < end of copied text >        ROS  [ x ] UNABLE TO ELICIT

## 2022-02-28 NOTE — CHART NOTE - NSCHARTNOTEFT_GEN_A_CORE
EVENT:   2/28/22, 11pm, Patient's saturation dropped to 85% on FiO2: 50%. For FiO2: 60% increased  by respiratory therapist, saturation improved to 90%.   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  . Pt is awake, but non verbal with the tracheostomy. Medical history obtained from son bedside and ED MD.   Per EMS, pt appeared short of breath, hypoxic to 80s, was bagged and suctioned out blood/clots, and oxygenation improved.   Per Son, Patient currently being treated for pneumonia with Vanc/ZOSYN in the NH.  Pt was admitted here at [facility] in Dec and also had a cardiac arrest during the hospital stay, Pt was later noted to have Hypoxia from Aspiration PNA and was Intubated and Trach as placed in Jan 2022. Pt now is DNR.     OBJECTIVE:  Vital Signs Last 24 Hrs  T(C): 37.1 (27 Feb 2022 23:00), Max: 39.1 (27 Feb 2022 05:44)  T(F): 98.8 (27 Feb 2022 23:00), Max: 102.3 (27 Feb 2022 05:44)  HR: 116 (28 Feb 2022 00:25) (100 - 134)  BP: 113/59 (27 Feb 2022 23:00) (106/63 - 130/74)  BP(mean): --  RR: 22 (27 Feb 2022 23:00) (18 - 22)  SpO2: 90% (28 Feb 2022 00:25) (90% - 100%)    FOCUSED PHYSICAL EXAM:    LABS:                        9.5    10.73 )-----------( 330      ( 27 Feb 2022 06:28 )             32.0     02-27    146<H>  |  110<H>  |  47<H>  ----------------------------<  189<H>  4.1   |  30  |  0.78    Ca    13.5<HH>      27 Feb 2022 06:28  Phos  3.2     02-27  Mg     2.6     02-27    TPro  6.1  /  Alb  1.8<L>  /  TBili  0.5  /  DBili  x   /  AST  63<H>  /  ALT  22  /  AlkPhos  288<H>  02-26    PLAN:   - Monitor patient VS,   - Maintain safety and comfort,   - Continue supportive care.

## 2022-02-28 NOTE — PROGRESS NOTE ADULT - NUTRITIONAL ASSESSMENT
This patient has been assessed with a concern for Malnutrition and has been determined to have a diagnosis/diagnoses of Severe protein-calorie malnutrition.    This patient is being managed with:   Diet NPO with Tube Feed-  Tube Feeding Modality: Gastrostomy  2Kal HN  Total Volume for 24 Hours (mL): 840  Continuous  Starting Tube Feed Rate {mL per Hour}: 10  Increase Tube Feed Rate by (mL): 10     Every 3 hours  Until Goal Tube Feed Rate (mL per Hour): 35  Tube Feed Duration (in Hours): 24  Tube Feed Start Time: 19:00  No Carb Prosource TF     Qty per Day:  1  Entered: Feb 25 2022  6:40PM    Diet NPO with Tube Feed-  Tube Feeding Modality: Gastrostomy  Glucerna 1.5 Dhruv  Total Volume for 24 Hours (mL): 960  Continuous  Starting Tube Feed Rate {mL per Hour}: 30  Increase Tube Feed Rate by (mL): 10     Every 6 hours  Until Goal Tube Feed Rate (mL per Hour): 40  Tube Feed Duration (in Hours): 24  Tube Feed Start Time: 11:00  No Carb Prosource TF     Qty per Day:  1 pack /day  Entered: Feb 22 2022 10:57AM    The following pending diet order is being considered for treatment of Severe protein-calorie malnutrition

## 2022-02-28 NOTE — PROGRESS NOTE ADULT - SUBJECTIVE AND OBJECTIVE BOX
Patient is a 76y old  Male who presents with a chief complaint of blood in stool (2022 11:11)/hypovolemic shock/GIB/blood transfusion/sepsis/ESBL PNA/hypercalcemia, still spike fever, F/U pan culture      INTERVAL HPI/OVERNIGHT EVENTS:  T(C): 36 (22 @ 05:12), Max: 38.8 (22 @ 19:38)  HR: 102 (22 @ 08:10) (100 - 126)  BP: 116/66 (22 @ 05:12) (107/65 - 130/74)  RR: 20 (22 @ 05:12) (18 - 24)  SpO2: 92% (22 @ 08:10) (90% - 99%)  Wt(kg): --    LABS:                        8.9    13.67 )-----------( 295      ( 2022 06:51 )             29.9         144  |  109<H>  |  53<H>  ----------------------------<  223<H>  4.2   |  30  |  0.92    Ca    12.0<H>      2022 06:51  Phos  3.2       Mg     2.6         TPro  5.3<L>  /  Alb  1.3<L>  /  TBili  0.3  /  DBili  x   /  AST  52<H>  /  ALT  17  /  AlkPhos  230<H>        Urinalysis Basic - ( 2022 11:44 )    Color: Yellow / Appearance: Clear / S.015 / pH: x  Gluc: x / Ketone: Negative  / Bili: Negative / Urobili: Negative   Blood: x / Protein: 100 / Nitrite: Negative   Leuk Esterase: Negative / RBC: 2-5 /HPF / WBC 0-2 /HPF   Sq Epi: x / Non Sq Epi: Occasional /HPF / Bacteria: Moderate /HPF      CAPILLARY BLOOD GLUCOSE      POCT Blood Glucose.: 221 mg/dL (2022 06:06)  POCT Blood Glucose.: 270 mg/dL (2022 00:10)  POCT Blood Glucose.: 235 mg/dL (2022 17:14)  POCT Blood Glucose.: 247 mg/dL (2022 11:48)        RADIOLOGY & ADDITIONAL TESTS:    Consultant(s) Notes Reviewed:  [x ] YES  [ ] NO    PHYSICAL EXAM:  GENERAL: well built, well nourished  HEAD:  Atraumatic, Normocephalic  EYES: EOMI, PERRLA, conjunctiva and sclera clear  ENT: No tonsillar erythema, exudates, or enlargement; Moist mucous membranes, Good dentition, No lesions  NECK: Supple, No JVD, Normal thyroid, no enlarged nodes, trach  NERVOUS SYSTEM:  Alert & Oriented X3, Good concentration; Motor Strength 5/5 B/L upper and lower extremities; DTRs 2+ intact and symmetric, sensory intact  CHEST/LUNG: B/L good air entry; No rales, positive rhonchi, or wheezing  HEART: S1S2 normal, no S3, Regular rate and rhythm; No murmurs, rubs, or gallops  ABDOMEN: Soft, Nontender, Nondistended; Bowel sounds present  EXTREMITIES:  2+ Peripheral Pulses, No clubbing, cyanosis, positive  edema B/L arm  LYMPH: No lymphadenopathy noted  SKIN: sacrum D/U    Care Discussed with Consultants/Other Providers [ x] YES  [ ] NO

## 2022-02-28 NOTE — CONSULT NOTE ADULT - PROBLEM SELECTOR RECOMMENDATION 9
acute on chronic resp failure, 2/2 sepsis,  ESBL Klebsiella PNA, w intermittent fevers, blood cx x1 growing GPC chains. Antibiotics per ID. Has trach, req ventilatory support, on FiO2 60%. Mgmt per ICU team.  DNR.

## 2022-02-28 NOTE — CONSULT NOTE ADULT - CONVERSATION DETAILS
Met with patient's daughter Sandi at bedside regarding current condition, further goals of care. Pt remains at high risk for further complications/decline given advanced malignancy, debilitated state, severe PCM despite tube feeds, not clinically improving despite aggressive medical management. She verbalized understanding. Discussed option for continuing conservative mgmt but no escalation of care including no ICU or central lines/pressors if decompensates further. Encouraged family to address further goals given continued decline and will remain at risk for recurrent complications and hospitalizations even if able to stabilize on this admission because of his incurable and advanced cancer.  JERILYNSt on file for DNR only.  She will discuss w her mother and brother. They are currently unavailable as her brother took their mother to a doctor's appt, he will be in the hospital later this evening. Agreeable that I follow up with family tomorrow. Support provided.

## 2022-03-01 NOTE — PROGRESS NOTE ADULT - PROBLEM SELECTOR PLAN 5
Sputum Cx Klebsiella pneumoniae.- ESBL  Completed meropenem  Repeat BC 3/2 and 3/3  Monitor off antibiotics as per ID  CXR worsening

## 2022-03-01 NOTE — PROGRESS NOTE ADULT - PROBLEM SELECTOR PLAN 4
Secondary to GIB  transfused 2U PRBCs when admitted. Received 1U PRBC 2/22  Serial CBCs.  Continue multivit with iron.

## 2022-03-01 NOTE — PROGRESS NOTE ADULT - PROBLEM SELECTOR PLAN 1
Continue mechanical ventilation. Not a candidate for weaning.  FIO2 remains at 60%.  Continue to monitor oxygen saturation.  Repeat CXR pending.  Continue albumin 25% every 6 hours followed by lasix.

## 2022-03-01 NOTE — PROGRESS NOTE ADULT - PROBLEM SELECTOR PLAN 12
Albumin 25% every 6 hours followed by lasix restarted.  Repeat CXR pending.  Continue to monitor off antibiotics as per ID.  Follow up scheduled BC on 3/2 and 3/3  Continue mechanical ventilations. Not a candidate for weaning. FIO2 remains high 60%  DNR   MOLST on chart.  Palliative consult appreciated.  Continues to decline. Family aware.  Overall prognosis grim. Albumin 25% every 6 hours followed by lasix restarted.  Repeat CXR pending.  Continue to monitor off antibiotics as per ID.  Follow up scheduled BC on 3/2 and 3/3  Continue mechanical ventilations. Not a candidate for weaning. FIO2 remains high 60%  DNR   MOLST on chart.  Palliative consult appreciated.  Continues to decline. Family aware.  Morphine 1mg IVP prn for respiratory distress.  Overall prognosis grim.

## 2022-03-01 NOTE — PROGRESS NOTE ADULT - SUBJECTIVE AND OBJECTIVE BOX
follow up on:  complex medical decision making related to goals of care    OVERNIGHT EVENTS: tachypneic on ventilator    Present Symptoms:      Review of Systems:   Unable to obtain due to poor mentation     MEDICATIONS  (STANDING):  albumin human 25% IVPB 50 milliLiter(s) IV Intermittent every 6 hours  ascorbic acid 500 milliGRAM(s) Oral daily  atorvastatin 40 milliGRAM(s) Oral at bedtime  chlorhexidine 0.12% Liquid 15 milliLiter(s) Oral Mucosa every 12 hours  collagenase Ointment 1 Application(s) Topical daily  furosemide   Injectable 20 milliGRAM(s) IV Push every 6 hours  glucagon  Injectable 1 milliGRAM(s) IntraMuscular once  insulin lispro (ADMELOG) corrective regimen sliding scale   SubCutaneous every 6 hours  midodrine. 5 milliGRAM(s) Oral three times a day  multivitamin/minerals/iron Oral Solution (CENTRUM) 15 milliLiter(s) Oral daily  pantoprazole   Suspension 40 milliGRAM(s) Enteral Tube every 12 hours    MEDICATIONS  (PRN):  acetaminophen    Suspension .. 650 milliGRAM(s) Oral every 6 hours PRN Temp greater or equal to 38C (100.4F), Mild Pain (1 - 3)  morphine  - Injectable 1 milliGRAM(s) IV Push every 6 hours PRN Respiratory distress      PHYSICAL EXAM:  Vital Signs Last 24 Hrs  T(C): 37.7 (01 Mar 2022 09:12), Max: 37.7 (01 Mar 2022 09:12)  T(F): 99.9 (01 Mar 2022 09:12), Max: 99.9 (01 Mar 2022 09:12)  HR: 125 (01 Mar 2022 09:12) (105 - 125)  BP: 129/70 (01 Mar 2022 09:12) (113/69 - 129/70)  BP(mean): --  RR: 26 (01 Mar 2022 09:12) (17 - 26)  SpO2: 91% (01 Mar 2022 09:12) (91% - 98%)    Karnofsky Performance Score/Palliative Performance Status Version2: 20    %     GENERAL: lethargic, on ventilator, chronically ill appearing, cachectic, R cervical/jaw mass,   NAD  HEENT: Atraumatic, oropharynx clear, trach in place, neck supple  CHEST/LUNG:  labored  HEART: Regular rate and rhythm    ABDOMEN: Soft, Nontender, Nondistended, PEG   MUSCULOSKELETAL:  No  edema,  bedbound  NERVOUS SYSTEM:  lethargic  SKIN: unstageable coccyx decub  noted  Oral intake: npo/TF      LABS:                          8.9    12.68 )-----------( 313      ( 01 Mar 2022 08:13 )             30.5     03-01    148<H>  |  110<H>  |  59<H>  ----------------------------<  175<H>  4.2   |  32<H>  |  0.95    Ca    12.4<H>      01 Mar 2022 08:13  Phos  3.3     03-01  Mg     2.7     03-01    TPro  5.9<L>  /  Alb  2.3<L>  /  TBili  0.5  /  DBili  x   /  AST  57<H>  /  ALT  21  /  AlkPhos  252<H>  03-01        RADIOLOGY & ADDITIONAL STUDIES:  < from: Xray Chest 1 View- PORTABLE-Routine (Xray Chest 1 View- PORTABLE-Routine .) (03.01.22 @ 09:41) >  ACC: [id number] EXAM:  XR CHEST PORTABLE ROUTINE 1V                          PROCEDURE DATE:  03/01/2022          INTERPRETATION:  AP erect chest on March 1, 2022 at 9:20 AM. Patient has   reached heart failure and has history of cancer.    Heart size difficult to assess. Tracheostomy and left coronary stent   noted.    There are significant diffuse advanced infiltrates concentrated the mid   lower lung fields. Clips are seen in the left upper quadrant.    Chest is similar to February 28.    IMPRESSION: Unchanged advanced infiltrates.    --- End of Report ---            MARIA DEL CARMEN BURKS MD; Attending Radiologist  This document has been electronically signed. Mar  1 2022  1:02PM    < end of copied text >

## 2022-03-01 NOTE — CHART NOTE - NSCHARTNOTEFT_GEN_A_CORE
Discussed with son at length test results and treatment plan.  Discussed patient doing poorly in multisystem failure.  Will continue current treatment.  All questions answered.  Son to be here tonight.

## 2022-03-01 NOTE — PROGRESS NOTE ADULT - ASSESSMENT
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 Recent hospitalization (12/20/2021- 1/28/22) notable for cardiac arrest, aspiration pna, gastric ulcer s/p clipping and ventilator dependent respiratory failure s/p Trach/ PEG discharged to NH. BIBEMS from NH (2/13/22) for bloody stools and blood in trach noted at the facility. Patient admitted to  for hypovolemic shock 2/2 acute anemia 2/2 GIB . s/p 2 untis PRBCS. Patient requiring higher level of care and sent to ICU for hypotension requiring pressors. Patient down graded to  for further management once hemodynamically stable. Patient underwent a EGD and colonoscopy no active bleeding noted.     2/18- Febrile overnight- T-max 101.5, lactate 2.2.  CXR w/ increased infiltrates.  Pan cx including sputum, IV hydration, Vanc and Cefepime for PNA.  2/21 Klebsiella PNA- ESBL sensitive to Meropenem, no active bleeding  2/22 Hemoglobin 7.3, afebrile, transfusing 1 unit PRBC today.   2/24  Febrile Tmax 101.7  2/27   Febrile overnight. BC sent  2/28   Increasing oxygen requirements. FIO2 increased to 60% overnight.Albumin 25% followed by lasix started for 24 hours

## 2022-03-01 NOTE — PROGRESS NOTE ADULT - PROBLEM SELECTOR PLAN 1
acute on chronic resp failure, 2/2 sepsis,  ESBL Klebsiella PNA, w intermittent fevers, blood cx x1 growing VRE, abx on hold per ID pending rpt blood cx as just completed antibiotic course.    Has trach, req ventilatory support, on FiO2 60%. On albumin assisted diuresis, no significant improvement in cxr. Tachypneic on ventilator, family aware.  Mgmt per ICU team.  DNR. Family discussing further GOC.    Morphine 2 mg ivp every 4 h prn dyspnea  Vent mgmt per ICU

## 2022-03-01 NOTE — PROGRESS NOTE ADULT - SUBJECTIVE AND OBJECTIVE BOX
MELISSA JOHNS    SCU progress note    INTERVAL HPI/OVERNIGHT EVENTS: ***Afebrile. FIO2 on vent remains at 60%    DNR [x ]   DNI  [  ]    Covid - 19 PCR: Negative 2/26    The 4Ms    What Matters Most: see GOC  Age appropriate Medications/Screen for High Risk Medication: Yes  Mentation: see CAM below  Mobility: defer to physical exam    The Confusion Assessment Method (CAM) Diagnostic Algorithm     1: Acute Onset or Fluctuating Course  - Is there evidence of an acute change in mental status from the patient’s baseline? Did the (abnormal) behavior  fluctuate during the day, that is, tend to come and go, or increase and decrease in severity?  [ ] YES [x ] NO     2: Inattention  - Did the patient have difficulty focusing attention, being easily distractible, or having difficulty keeping track of what was being said?  [ ] YES [ ] NO   Unable to access     3: Disorganized thinking  -Was the patient’s thinking disorganized or incoherent, such as rambling or irrelevant conversation, unclear or illogical flow of ideas, or unpredictable switching from subject to subject?  [ ] YES [ ] NO   Unable to access    4: Altered Level of consciousness?  [ ] YES [x ] NO    The diagnosis of delirium by CAM requires the presence of features 1 and 2 and either 3 or 4.    PRESSORS: [ ] YES [x ] NO    Cardiovascular:  Heart Failure  Acute   Acute on Chronic  Chronic       furosemide   Injectable 20 milliGRAM(s) IV Push every 6 hours  midodrine. 5 milliGRAM(s) Oral three times a day    Pulmonary:    Hematalogic:    Other:  acetaminophen    Suspension .. 650 milliGRAM(s) Oral every 6 hours PRN  albumin human 25% IVPB 50 milliLiter(s) IV Intermittent every 6 hours  ascorbic acid 500 milliGRAM(s) Oral daily  atorvastatin 40 milliGRAM(s) Oral at bedtime  chlorhexidine 0.12% Liquid 15 milliLiter(s) Oral Mucosa every 12 hours  collagenase Ointment 1 Application(s) Topical daily  glucagon  Injectable 1 milliGRAM(s) IntraMuscular once  insulin lispro (ADMELOG) corrective regimen sliding scale   SubCutaneous every 6 hours  multivitamin/minerals/iron Oral Solution (CENTRUM) 15 milliLiter(s) Oral daily  pantoprazole   Suspension 40 milliGRAM(s) Enteral Tube every 12 hours    acetaminophen    Suspension .. 650 milliGRAM(s) Oral every 6 hours PRN  albumin human 25% IVPB 50 milliLiter(s) IV Intermittent every 6 hours  ascorbic acid 500 milliGRAM(s) Oral daily  atorvastatin 40 milliGRAM(s) Oral at bedtime  chlorhexidine 0.12% Liquid 15 milliLiter(s) Oral Mucosa every 12 hours  collagenase Ointment 1 Application(s) Topical daily  furosemide   Injectable 20 milliGRAM(s) IV Push every 6 hours  glucagon  Injectable 1 milliGRAM(s) IntraMuscular once  insulin lispro (ADMELOG) corrective regimen sliding scale   SubCutaneous every 6 hours  midodrine. 5 milliGRAM(s) Oral three times a day  multivitamin/minerals/iron Oral Solution (CENTRUM) 15 milliLiter(s) Oral daily  pantoprazole   Suspension 40 milliGRAM(s) Enteral Tube every 12 hours    Drug Dosing Weight  Height (cm): 175.2 (16 Feb 2022 15:23)  Weight (kg): 78 (13 Feb 2022 18:39)  BMI (kg/m2): 25.4 (16 Feb 2022 15:23)  BSA (m2): 1.94 (16 Feb 2022 15:23)    CENTRAL LINE: [ ] YES [x ] NO  LOCATION:   DATE INSERTED:  REMOVE: [ ] YES [ ] NO  EXPLAIN:    HUTCHINS: [ ] YES [x ] NO    DATE INSERTED:  REMOVE:  [ ] YES [ ] NO  EXPLAIN:    PAST MEDICAL & SURGICAL HISTORY:  Hypertension    Diabetes    High cholesterol    Primary osteoarthritis of both knees    Coronary artery disease of native artery of native heart with stable angina pectoris    Allergic bronchitis    Diabetic retinopathy    Oral cancer    SCC (squamous cell carcinoma)    Metastatic cancer    Recurrent disease    Edema of extremity    Hyponatremia    Microalbuminuria    Neuralgia    Obstructive sleep apnea, adult    Vitamin D deficiency    S/P knee replacement  b/l    S/P hernia repair  b/l    Status post cataract extraction and insertion of intraocular lens, unspecified laterality  b/l    History of surgery  On 9/10/20 he underwent right hemiglossectomy and partial neck dissection with Dr. Darinel Servin at [facility].                02-28 @ 07:01 - 03-01 @ 07:00  --------------------------------------------------------  IN: 0 mL / OUT: 850 mL / NET: -850 mL        Mode: AC/ CMV (Assist Control/ Continuous Mandatory Ventilation)  RR (machine): 14  TV (machine): 450  FiO2: 60  PEEP: 5  ITime: 0.9  MAP: 11  PIP: 27      PHYSICAL EXAM:    GENERAL: NAD, cachectic. +Severe temporal waisting. +Muscle waisting  HEAD:  Atraumatic, Normocephalic  EYES: EOMI, PERRLA, conjunctiva and sclera clear  ENMT: No tonsillar erythema, exudates  NECK: Supple, No JVD, tracheostomy intact  NERVOUS SYSTEM:  Awake and alert. Follows simple direction. Extremities weak bilaterally  CHEST/LUNG: Diminished breath sounds bilaterally with crackles 1/2 way up  HEART: Regular rate and rhythm; No murmurs, rubs, or gallops  ABDOMEN: Soft, Nontender, Nondistended; Bowel sounds present; Peg intact  EXTREMITIES:  +2 pitting edema all extremities +Muscle waisting all extremities.  2+ Peripheral Pulses, No clubbing, cyanosis  LYMPH: No lymphadenopathy noted  SKIN: Stage 1 Pressure Injury to the Bilateral Heels, as evident by non-blanchable erythema  Unstageable Pressure Injury to the Coccyx (5cm x 9cm x 2cm) with slough, pink tissue, and drainage Malodorous  Unstageable Pressure Injury to the area under the patients Trach with slough, pink tissue, and scant drainage        LABS:  CBC Full  -  ( 28 Feb 2022 06:51 )  WBC Count : 13.67 K/uL  RBC Count : 3.24 M/uL  Hemoglobin : 8.9 g/dL  Hematocrit : 29.9 %  Platelet Count - Automated : 295 K/uL  Mean Cell Volume : 92.3 fl  Mean Cell Hemoglobin : 27.5 pg  Mean Cell Hemoglobin Concentration : 29.8 gm/dL  Auto Neutrophil # : 12.44 K/uL  Auto Lymphocyte # : 0.41 K/uL  Auto Monocyte # : 0.68 K/uL  Auto Eosinophil # : 0.14 K/uL  Auto Basophil # : 0.00 K/uL  Auto Neutrophil % : 91.0 %  Auto Lymphocyte % : 3.0 %  Auto Monocyte % : 5.0 %  Auto Eosinophil % : 1.0 %  Auto Basophil % : 0.0 %    02-28    144  |  109<H>  |  53<H>  ----------------------------<  223<H>  4.2   |  30  |  0.92    Ca    12.0<H>      28 Feb 2022 06:51  Phos  3.2     02-28  Mg     2.6     02-28    TPro  5.3<L>  /  Alb  1.3<L>  /  TBili  0.3  /  DBili  x   /  AST  52<H>  /  ALT  17  /  AlkPhos  230<H>  02-28              [  ]  DVT Prophylaxis  [  ]  Nutrition, Brand, Rate         Goal Rate        Abnormal Nutritional Status -  Malnutrition   Cachexia         RADIOLOGY & ADDITIONAL STUDIES:  ***  < from: Xray Chest 1 View- PORTABLE-Routine (Xray Chest 1 View- PORTABLE-Routine .) (02.25.22 @ 10:17) >  FINDINGS:  CATHETERS AND TUBES: Tracheostomy tube in place    PULMONARY: Unchanged bilateral multifocal perihilar/basilar diffuse   airspace disease and/or effusions..   No pneumothorax.    HEART/VASCULAR: The heart is mildly enlarged in transverse diameter.  .    BONES: Visualized osseous structures are intact.    IMPRESSION:   Unchanged bilateral multifocal and ill-defined   perihilar/basilar diffuse airspace disease and/or effusions.    < end of copied text >  < from: CT Angio Abdomen and Pelvis w/ IV Cont (02.15.22 @ 15:58) >  FINDINGS:  LOWER CHEST: Previously seen right middle lobe cavity now measures up to   5.2 cm (previously 4.8 cm) and contains a new air-fluid level and mural   nodularity. Left lower lobe cavity partially imaged measuring   approximately 4.4 cm with air-fluid level, unchanged. Dense left lower   lobe consolidation unchanged. Increasing nodular right lower lobe   infiltrate. Developing right pleural effusion. Reidentified left pleural   effusion withsurrounding nodular pleural thickening area Dense coronary   atherosclerosis reidentified.    LIVER: Steatosis.  BILE DUCTS: Normal caliber.  GALLBLADDER: Mildly distended.  SPLEEN: Within normal limits.  PANCREAS: Within normal limits.  ADRENALS: Within normal limits.  KIDNEYS/URETERS: Within normal limits.    BLADDER: Layering high density in the bladder.  REPRODUCTIVE ORGANS: Prostate within normal limits.    BOWEL: No bowel obstruction. Appendix is not visualized and cannot be   assessed. Percutaneous gastrostomy tube in satisfactory position. Prior   small bowel obstruction has resolved. Metallic structure in the   descending colon (5:59), likely hemostatic clip. Similar structure in the   gastric fundus. No intraluminal contrast extravasation. No pneumatosis.  PERITONEUM: Small ascites. No pneumoperitoneum.  VESSELS: Atherosclerotic changes. No portal venous gas.  RETROPERITONEUM/LYMPH NODES: No lymphadenopathy.  ABDOMINAL WALL: Marked anasarca, increased from prior.  BONES: Degenerative changes.    IMPRESSION:    No CT evidence of active GI bleed.    Increasing right middle lobe lung cavity now with air-fluid level. Left   lower lobe cavity unchanged. Dense left lower lobe infiltrate unchanged.   Small but complex left pleural effusion, possibly empyema, unchanged.   Increasing right pleural effusion.    Prior small bowel obstruction has resolved.    Probable hemostatic clips in the stomach and descending colon.    Layering high density material in the bladder, new comparedwith prior.      < end of copied text >  < from: CT Head No Cont (01.28.22 @ 09:57) >  FINDINGS:    No hydrocephalus, mass effect, midline shift, acute intracranial   hemorrhage, or brain edema.    Moderate luminal white matter microvascular ischemic disease.    Extensive bilateral mastoid air cell effusions. Right sphenoid sinus   mucosal thickening.    IMPRESSION:    No hydrocephalus, acute intracranial hemorrhage, mass effect, or brain   edema.    Extensive bilateral mastoid air cell effusions. Correlate for the   presence of mastoiditis.    --- End of Report ---          < end of copied text >    Goals of Care Discussion with Family/Proxy/Other   - see note from 2/15/22     MELISSA JOHNS    SCU progress note    INTERVAL HPI/OVERNIGHT EVENTS: ***Afebrile. FIO2 on vent remains at 60%    DNR [x ]   DNI  [  ]    Covid - 19 PCR: Negative 2/26    The 4Ms    What Matters Most: see GOC  Age appropriate Medications/Screen for High Risk Medication: Yes  Mentation: see CAM below  Mobility: defer to physical exam    The Confusion Assessment Method (CAM) Diagnostic Algorithm     1: Acute Onset or Fluctuating Course  - Is there evidence of an acute change in mental status from the patient’s baseline? Did the (abnormal) behavior  fluctuate during the day, that is, tend to come and go, or increase and decrease in severity?  [ ] YES [x ] NO     2: Inattention  - Did the patient have difficulty focusing attention, being easily distractible, or having difficulty keeping track of what was being said?  [ ] YES [ ] NO   Unable to access     3: Disorganized thinking  -Was the patient’s thinking disorganized or incoherent, such as rambling or irrelevant conversation, unclear or illogical flow of ideas, or unpredictable switching from subject to subject?  [ ] YES [ ] NO   Unable to access    4: Altered Level of consciousness?  [ ] YES [x ] NO    The diagnosis of delirium by CAM requires the presence of features 1 and 2 and either 3 or 4.    PRESSORS: [ ] YES [x ] NO    Cardiovascular:  Heart Failure  Acute   Acute on Chronic  Chronic       furosemide   Injectable 20 milliGRAM(s) IV Push every 6 hours  midodrine. 5 milliGRAM(s) Oral three times a day    Pulmonary:    Hematalogic:    Other:  acetaminophen    Suspension .. 650 milliGRAM(s) Oral every 6 hours PRN  albumin human 25% IVPB 50 milliLiter(s) IV Intermittent every 6 hours  ascorbic acid 500 milliGRAM(s) Oral daily  atorvastatin 40 milliGRAM(s) Oral at bedtime  chlorhexidine 0.12% Liquid 15 milliLiter(s) Oral Mucosa every 12 hours  collagenase Ointment 1 Application(s) Topical daily  glucagon  Injectable 1 milliGRAM(s) IntraMuscular once  insulin lispro (ADMELOG) corrective regimen sliding scale   SubCutaneous every 6 hours  multivitamin/minerals/iron Oral Solution (CENTRUM) 15 milliLiter(s) Oral daily  pantoprazole   Suspension 40 milliGRAM(s) Enteral Tube every 12 hours    acetaminophen    Suspension .. 650 milliGRAM(s) Oral every 6 hours PRN  albumin human 25% IVPB 50 milliLiter(s) IV Intermittent every 6 hours  ascorbic acid 500 milliGRAM(s) Oral daily  atorvastatin 40 milliGRAM(s) Oral at bedtime  chlorhexidine 0.12% Liquid 15 milliLiter(s) Oral Mucosa every 12 hours  collagenase Ointment 1 Application(s) Topical daily  furosemide   Injectable 20 milliGRAM(s) IV Push every 6 hours  glucagon  Injectable 1 milliGRAM(s) IntraMuscular once  insulin lispro (ADMELOG) corrective regimen sliding scale   SubCutaneous every 6 hours  midodrine. 5 milliGRAM(s) Oral three times a day  multivitamin/minerals/iron Oral Solution (CENTRUM) 15 milliLiter(s) Oral daily  pantoprazole   Suspension 40 milliGRAM(s) Enteral Tube every 12 hours    Drug Dosing Weight  Height (cm): 175.2 (16 Feb 2022 15:23)  Weight (kg): 78 (13 Feb 2022 18:39)  BMI (kg/m2): 25.4 (16 Feb 2022 15:23)  BSA (m2): 1.94 (16 Feb 2022 15:23)    CENTRAL LINE: [ ] YES [x ] NO  LOCATION:   DATE INSERTED:  REMOVE: [ ] YES [ ] NO  EXPLAIN:    HUTCHINS: [ ] YES [x ] NO    DATE INSERTED:  REMOVE:  [ ] YES [ ] NO  EXPLAIN:    PAST MEDICAL & SURGICAL HISTORY:  Hypertension    Diabetes    High cholesterol    Primary osteoarthritis of both knees    Coronary artery disease of native artery of native heart with stable angina pectoris    Allergic bronchitis    Diabetic retinopathy    Oral cancer    SCC (squamous cell carcinoma)    Metastatic cancer    Recurrent disease    Edema of extremity    Hyponatremia    Microalbuminuria    Neuralgia    Obstructive sleep apnea, adult    Vitamin D deficiency    S/P knee replacement  b/l    S/P hernia repair  b/l    Status post cataract extraction and insertion of intraocular lens, unspecified laterality  b/l    History of surgery  On 9/10/20 he underwent right hemiglossectomy and partial neck dissection with Dr. Darinel Servin at [facility].                02-28 @ 07:01 - 03-01 @ 07:00  --------------------------------------------------------  IN: 0 mL / OUT: 850 mL / NET: -850 mL        Mode: AC/ CMV (Assist Control/ Continuous Mandatory Ventilation)  RR (machine): 14  TV (machine): 450  FiO2: 60  PEEP: 5  ITime: 0.9  MAP: 11  PIP: 27      PHYSICAL EXAM:    GENERAL: Mild respiratory distress, cachectic. +Severe temporal waisting. +Muscle waisting  HEAD:  Atraumatic, Normocephalic  EYES: EOMI, PERRLA, conjunctiva and sclera clear  ENMT: No tonsillar erythema, exudates  NECK: Supple, No JVD, tracheostomy intact  NERVOUS SYSTEM:  Awake and alert. Follows simple direction. Extremities weak bilaterally  CHEST/LUNG: Diminished breath sounds bilaterally with crackles 1/2 way up  HEART: Regular rate and rhythm; No murmurs, rubs, or gallops  ABDOMEN: Soft, Nontender, Nondistended; Bowel sounds present; Peg intact  EXTREMITIES:  +2 pitting edema all extremities +Muscle waisting all extremities.  2+ Peripheral Pulses, No clubbing, cyanosis  LYMPH: No lymphadenopathy noted  SKIN: Stage 1 Pressure Injury to the Bilateral Heels, as evident by non-blanchable erythema  Unstageable Pressure Injury to the Coccyx (5cm x 9cm x 2cm) with slough, pink tissue, and drainage Malodorous  Unstageable Pressure Injury to the area under the patients Trach with slough, pink tissue, and scant drainage        LABS:  CBC Full  -  ( 28 Feb 2022 06:51 )  WBC Count : 13.67 K/uL  RBC Count : 3.24 M/uL  Hemoglobin : 8.9 g/dL  Hematocrit : 29.9 %  Platelet Count - Automated : 295 K/uL  Mean Cell Volume : 92.3 fl  Mean Cell Hemoglobin : 27.5 pg  Mean Cell Hemoglobin Concentration : 29.8 gm/dL  Auto Neutrophil # : 12.44 K/uL  Auto Lymphocyte # : 0.41 K/uL  Auto Monocyte # : 0.68 K/uL  Auto Eosinophil # : 0.14 K/uL  Auto Basophil # : 0.00 K/uL  Auto Neutrophil % : 91.0 %  Auto Lymphocyte % : 3.0 %  Auto Monocyte % : 5.0 %  Auto Eosinophil % : 1.0 %  Auto Basophil % : 0.0 %    02-28    144  |  109<H>  |  53<H>  ----------------------------<  223<H>  4.2   |  30  |  0.92    Ca    12.0<H>      28 Feb 2022 06:51  Phos  3.2     02-28  Mg     2.6     02-28    TPro  5.3<L>  /  Alb  1.3<L>  /  TBili  0.3  /  DBili  x   /  AST  52<H>  /  ALT  17  /  AlkPhos  230<H>  02-28              [  ]  DVT Prophylaxis  [  ]  Nutrition, Brand, Rate         Goal Rate        Abnormal Nutritional Status -  Malnutrition   Cachexia         RADIOLOGY & ADDITIONAL STUDIES:  ***  < from: Xray Chest 1 View- PORTABLE-Routine (Xray Chest 1 View- PORTABLE-Routine .) (02.25.22 @ 10:17) >  FINDINGS:  CATHETERS AND TUBES: Tracheostomy tube in place    PULMONARY: Unchanged bilateral multifocal perihilar/basilar diffuse   airspace disease and/or effusions..   No pneumothorax.    HEART/VASCULAR: The heart is mildly enlarged in transverse diameter.  .    BONES: Visualized osseous structures are intact.    IMPRESSION:   Unchanged bilateral multifocal and ill-defined   perihilar/basilar diffuse airspace disease and/or effusions.    < end of copied text >  < from: CT Angio Abdomen and Pelvis w/ IV Cont (02.15.22 @ 15:58) >  FINDINGS:  LOWER CHEST: Previously seen right middle lobe cavity now measures up to   5.2 cm (previously 4.8 cm) and contains a new air-fluid level and mural   nodularity. Left lower lobe cavity partially imaged measuring   approximately 4.4 cm with air-fluid level, unchanged. Dense left lower   lobe consolidation unchanged. Increasing nodular right lower lobe   infiltrate. Developing right pleural effusion. Reidentified left pleural   effusion withsurrounding nodular pleural thickening area Dense coronary   atherosclerosis reidentified.    LIVER: Steatosis.  BILE DUCTS: Normal caliber.  GALLBLADDER: Mildly distended.  SPLEEN: Within normal limits.  PANCREAS: Within normal limits.  ADRENALS: Within normal limits.  KIDNEYS/URETERS: Within normal limits.    BLADDER: Layering high density in the bladder.  REPRODUCTIVE ORGANS: Prostate within normal limits.    BOWEL: No bowel obstruction. Appendix is not visualized and cannot be   assessed. Percutaneous gastrostomy tube in satisfactory position. Prior   small bowel obstruction has resolved. Metallic structure in the   descending colon (5:59), likely hemostatic clip. Similar structure in the   gastric fundus. No intraluminal contrast extravasation. No pneumatosis.  PERITONEUM: Small ascites. No pneumoperitoneum.  VESSELS: Atherosclerotic changes. No portal venous gas.  RETROPERITONEUM/LYMPH NODES: No lymphadenopathy.  ABDOMINAL WALL: Marked anasarca, increased from prior.  BONES: Degenerative changes.    IMPRESSION:    No CT evidence of active GI bleed.    Increasing right middle lobe lung cavity now with air-fluid level. Left   lower lobe cavity unchanged. Dense left lower lobe infiltrate unchanged.   Small but complex left pleural effusion, possibly empyema, unchanged.   Increasing right pleural effusion.    Prior small bowel obstruction has resolved.    Probable hemostatic clips in the stomach and descending colon.    Layering high density material in the bladder, new comparedwith prior.      < end of copied text >  < from: CT Head No Cont (01.28.22 @ 09:57) >  FINDINGS:    No hydrocephalus, mass effect, midline shift, acute intracranial   hemorrhage, or brain edema.    Moderate luminal white matter microvascular ischemic disease.    Extensive bilateral mastoid air cell effusions. Right sphenoid sinus   mucosal thickening.    IMPRESSION:    No hydrocephalus, acute intracranial hemorrhage, mass effect, or brain   edema.    Extensive bilateral mastoid air cell effusions. Correlate for the   presence of mastoiditis.    --- End of Report ---          < end of copied text >    Goals of Care Discussion with Family/Proxy/Other   - see note from 2/15/22

## 2022-03-01 NOTE — PROGRESS NOTE ADULT - CONVERSATION DETAILS
Spoke with patient's son Moses by phone regarding further GOC, no significant improvement w current mgmt, overall poor prognosis given advanced malignancy, severe PCM, debilitated state. Extensively discussed options including conservative med mgmt w no escalation of care, as well as strict comfort measures on or off the ventilator depending on what they feel their father's wishes would be. All questions answered. He has discussed w primary team as well and for now wishes to continue w current mgmt and he will discuss w his family tonight. Agreeable that I follow up tomorrow to see if status has improved at all. Support provided.

## 2022-03-01 NOTE — PROGRESS NOTE ADULT - ASSESSMENT
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 Recent hospitalization (12/20/2021- 1/28/22) notable for cardiac arrest, aspiration pna, gastric ulcer s/p clipping and ventilator dependent respiratory failure s/p Trach/ PEG discharged to NH. BIBEMS from NH (2/13/22) for bloody stools and blood in trach noted at the facility Adm to ICU for hypovolemic shock 2/2 acute anemia 2/2 GIB, S/P ICU care, S/P blood transfusion, PPI, EGD shoed esophagus ulcer, no active bleeding, S/P colonoscopy , no active bleeding, continue PPI bid, blood culture negative,  ESBL /GNR PNA, continue IV meropenum until 2/28,  suction prn, GI follow up, transfusion if H <7. DVT prophylaxis. H/H stable, still spiked fever  , repeat blood culture positive for VRE, ID follow up, hypercalcemia, most likely due to metastatic CA. IV hydration. Suction. Prognosis poor, palliative team follow up. Continue supportive care

## 2022-03-01 NOTE — PROGRESS NOTE ADULT - SUBJECTIVE AND OBJECTIVE BOX
Patient is a 76y old  Male who presents with a chief complaint of blood in stool (01 Mar 2022 07:47)/hypovolemic shock/GIB/blood transfusion/sepsis/ESBL PNA, blood culture positive for VRE      INTERVAL HPI/OVERNIGHT EVENTS:  T(C): 37.7 (03-01-22 @ 09:12), Max: 37.7 (03-01-22 @ 09:12)  HR: 125 (03-01-22 @ 09:12) (105 - 125)  BP: 129/70 (03-01-22 @ 09:12) (113/69 - 129/70)  RR: 26 (03-01-22 @ 09:12) (17 - 26)  SpO2: 91% (03-01-22 @ 09:12) (91% - 98%)  Wt(kg): --    LABS:                        8.9    12.68 )-----------( 313      ( 01 Mar 2022 08:13 )             30.5     03-01    148<H>  |  110<H>  |  59<H>  ----------------------------<  175<H>  4.2   |  32<H>  |  0.95    Ca    12.4<H>      01 Mar 2022 08:13  Phos  3.3     03-01  Mg     2.7     03-01    TPro  5.9<L>  /  Alb  2.3<L>  /  TBili  0.5  /  DBili  x   /  AST  57<H>  /  ALT  21  /  AlkPhos  252<H>  03-01        CAPILLARY BLOOD GLUCOSE      POCT Blood Glucose.: 159 mg/dL (01 Mar 2022 05:34)  POCT Blood Glucose.: 202 mg/dL (28 Feb 2022 23:37)  POCT Blood Glucose.: 206 mg/dL (28 Feb 2022 21:09)  POCT Blood Glucose.: 260 mg/dL (28 Feb 2022 16:59)  POCT Blood Glucose.: 219 mg/dL (28 Feb 2022 11:44)        RADIOLOGY & ADDITIONAL TESTS:    Consultant(s) Notes Reviewed:  [x ] YES  [ ] NO    PHYSICAL EXAM:  GENERAL: well built, well nourished  HEAD:  Atraumatic, Normocephalic  EYES: EOMI, PERRLA, conjunctiva and sclera clear  ENT: No tonsillar erythema, exudates, or enlargement; Moist mucous membranes, Good dentition, No lesions  NECK: Supple, No JVD, Normal thyroid, no enlarged nodes, trach  NERVOUS SYSTEM:  Alert & Oriented X3, Good concentration; Motor Strength 5/5 B/L upper and lower extremities; DTRs 2+ intact and symmetric, sensory intact  CHEST/LUNG: B/L good air entry; positive  rales, rhonchi, or wheezing  HEART: S1S2 normal, no S3, Regular rate and rhythm; No murmurs, rubs, or gallops  ABDOMEN: Soft, Nontender, Nondistended; Bowel sounds present, GT in place  EXTREMITIES:  2+ Peripheral Pulses, No clubbing, cyanosis, B/L arm edema  LYMPH: No lymphadenopathy noted  SKIN: sacrum D/U    Care Discussed with Consultants/Other Providers [ x] YES  [ ] NO

## 2022-03-01 NOTE — PROGRESS NOTE ADULT - PROBLEM SELECTOR PLAN 3
Clinical evidence indicates that the patient has Severe protein calorie malnutrition/ 3rd degree    In context of   Chronic Illness (>1 month)       Body Fat loss: Severe   (Cachexia, temporal wasting,   muscle atrophy)  Muscle mass loss: Severe  (Skin failure/pressure ulcers)  Fluid Accumulation: Severe (Fluid overload, ascites, pleural effusions)   Strength: weakened severe (bedbound)    Recommend:      nutritional supplements as tolerated, nutrition consult    On PEG tube feeds as tolerated.

## 2022-03-01 NOTE — PROGRESS NOTE ADULT - PROBLEM SELECTOR PLAN 9
Likely secondary to metastatic cancer as well as bed bound status  Continue Free water via Peg; will increase to 50ml/hr given rising calcium.  Monitor calcium daily - Continues to be Hypercalcemic - s/p  Zoledronic acid 4mg IV x1 on 2/26/2022,  Corrected calcium today is Likely secondary to metastatic cancer as well as bed bound status  Continue Free water via Peg; 50ml/hr given rising calcium.  Monitor calcium daily - Continues to be Hypercalcemic - s/p  Zoledronic acid 4mg IV x1 on 2/26/2022,  Corrected calcium today is 13.76

## 2022-03-01 NOTE — PROGRESS NOTE ADULT - PROBLEM SELECTOR PLAN 11
Continue tube feeds and supplementation.  Albumin continues to drop.  Dietary following.  Tolerating 2kal Hn with added prosource

## 2022-03-01 NOTE — PROGRESS NOTE ADULT - PROBLEM SELECTOR PLAN 5
GOC discussion as above. DNR on file. Poor overall prognosis, no significant improvement w current mgmt. Family to discuss further GOC. Palliative will follow.

## 2022-03-02 NOTE — PROGRESS NOTE ADULT - ASSESSMENT
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 Recent hospitalization (12/20/2021- 1/28/22) notable for cardiac arrest, aspiration pna, gastric ulcer s/p clipping and ventilator dependent respiratory failure s/p Trach/ PEG discharged to NH. BIBEMS from NH (2/13/22) for bloody stools and blood in trach noted at the facility. Patient admitted to  for hypovolemic shock 2/2 acute anemia 2/2 GIB . s/p 2 untis PRBCS. Patient requiring higher level of care and sent to ICU for hypotension requiring pressors. Patient down graded to  for further management once hemodynamically stable. Patient underwent a EGD and colonoscopy no active bleeding noted.     2/18- Febrile overnight- T-max 101.5, lactate 2.2.  CXR w/ increased infiltrates.  Pan cx including sputum, IV hydration, Vanc and Cefepime for PNA.  2/21 Klebsiella PNA- ESBL sensitive to Meropenem, no active bleeding  2/22 Hemoglobin 7.3, afebrile, transfusing 1 unit PRBC today.   2/24  Febrile Tmax 101.7  2/27   Febrile overnight. BC sent  2/28   Increasing oxygen requirements. FIO2 increased to 60% overnight. Albumin 25% followed by lasix started for 24 hours  3/2  Worsening CXR and increasing oxygen requirements. Febrile BC positive.

## 2022-03-02 NOTE — PROGRESS NOTE ADULT - PROBLEM SELECTOR PLAN 6
Sputum Cx Klebsiella pneumoniae.- ESBL  Completed meropenem  Repeat BC 3/2 and 3/3  Monitor off antibiotics as per ID  CXR worsening.  ID f/u

## 2022-03-02 NOTE — PROGRESS NOTE ADULT - PROBLEM SELECTOR PLAN 3
BC positive Gm+ Pairs and chains.  Will repeat BC tomorrow morning.  Mild leukocytosis, fevers.  ID f/u BC positive Gm+ Pairs and chains. VRE+ on blood culture.  Will repeat BC tomorrow morning.  Mild leukocytosis, fevers.  Zyvox 600mg IVPB every 12 hours ordered.

## 2022-03-02 NOTE — PROVIDER CONTACT NOTE (OTHER) - SITUATION
Arrived to change and clean patient when pt's PEG was noted to be out of patient.
Pt's PEG tube not flushing. Sutures not attached to patient.

## 2022-03-02 NOTE — PROGRESS NOTE ADULT - NUTRITIONAL ASSESSMENT
This patient has been assessed with a concern for Malnutrition and has been determined to have a diagnosis/diagnoses of Severe protein-calorie malnutrition.    This patient is being managed with:   Diet NPO with Tube Feed-  Tube Feeding Modality: Gastrostomy  Jevity 1.5 Dhruv  Total Volume for 24 Hours (mL): 840  Continuous  Starting Tube Feed Rate {mL per Hour}: 35  Until Goal Tube Feed Rate (mL per Hour): 35  Tube Feed Duration (in Hours): 24  Tube Feed Start Time: 11:00  No Carb Prosource TF     Qty per Day:  1  Entered: Mar  2 2022 10:47AM    Diet NPO with Tube Feed-  Tube Feeding Modality: Gastrostomy  Glucerna 1.5 Dhruv  Total Volume for 24 Hours (mL): 960  Continuous  Starting Tube Feed Rate {mL per Hour}: 30  Increase Tube Feed Rate by (mL): 10     Every 6 hours  Until Goal Tube Feed Rate (mL per Hour): 40  Tube Feed Duration (in Hours): 24  Tube Feed Start Time: 11:00  No Carb Prosource TF     Qty per Day:  1 pack /day  Entered: Feb 22 2022 10:57AM    The following pending diet order is being considered for treatment of Severe protein-calorie malnutrition:null

## 2022-03-02 NOTE — PROGRESS NOTE ADULT - PROBLEM SELECTOR PLAN 11
Continue tube feeds and supplementation.  Formula changed back to Jevity 1.5  with 1 packet prosource daily Continue tube feeds and supplementation.  Formula changed back to Jevity 1.5  with 1 packet prosource daily  Peg tube dislodged. GI Dr Atkinson called for consult.

## 2022-03-02 NOTE — PROGRESS NOTE ADULT - PROBLEM SELECTOR PLAN 4
Likely lower GIB. Now resolved.  No signs of bleeding overnight  S/P EGD and colonoscopy 2/16 ->small diverticulum to mid esophagus, prior clip in fundus seen, small area of shallow ulceration adjacent to clip w/o stigmata of bleeding noted. no active bleeding. Normal duodenum. Colonoscopy showed some red tinged fluid distally but none seen at transverse level. No active bleeding, small hemorrhoids.   Continue PPI BID  GI Dr Núñez.

## 2022-03-02 NOTE — PROGRESS NOTE ADULT - PROBLEM SELECTOR PLAN 12
Functional Quadriplegia. Passive ROM exercises daily as tolerated. Turn and postion every 2 hours.   Continue GI prophylaxis  SCB boots for DVT prophylaxis.  BC positive for Gm_ Pairs and chains.  ID f/u  Currently in multisystem failure. Palliative F/U  Overall prognosis is grim.

## 2022-03-02 NOTE — PROGRESS NOTE ADULT - NUTRITIONAL ASSESSMENT
This patient has been assessed with a concern for Malnutrition and has been determined to have a diagnosis/diagnoses of Severe protein-calorie malnutrition.  +temporal waisting  +Muscle waisting    This patient is being managed with:   Diet NPO with Tube Feed-  Tube Feeding Modality: Gastrostomy  Jevity 1.5 Dhruv  Total Volume for 24 Hours (mL): 840  Continuous  Starting Tube Feed Rate {mL per Hour}: 35  Until Goal Tube Feed Rate (mL per Hour): 35  Tube Feed Duration (in Hours): 24  Tube Feed Start Time: 11:00  No Carb Prosource TF     Qty per Day:  1  Entered: Mar  2 2022 10:47AM    Diet NPO with Tube Feed-  Tube Feeding Modality: Gastrostomy  Glucerna 1.5 Dhruv  Total Volume for 24 Hours (mL): 960  Continuous  Starting Tube Feed Rate {mL per Hour}: 30  Increase Tube Feed Rate by (mL): 10     Every 6 hours  Until Goal Tube Feed Rate (mL per Hour): 40  Tube Feed Duration (in Hours): 24  Tube Feed Start Time: 11:00  No Carb Prosource TF     Qty per Day:  1 pack /day  Entered: Feb 22 2022 10:57AM

## 2022-03-02 NOTE — CHART NOTE - NSCHARTNOTEFT_GEN_A_CORE
Assessment:   Patient is a 76y old  Male who presents with a chief complaint of blood in stool (02 Mar 2022 11:06). Diiscussed with NP 3/1 and 3/2. Pt's Tf changed back to Jevity 1.5 35 x24 + 1 Pkt Prosource/ day. NP reports pt does not tolerate rate of 40. In addition, pt noted with multi system failure and grim prognosis.      Factors impacting intake: [ ] none [ ] nausea  [ ] vomiting [ ] diarrhea [ ] constipation  [ ]chewing problems [ ] swallowing issues  [ ] other:     Diet Prescription: Diet, NPO with Tube Feed:   Tube Feeding Modality: Gastrostomy  Jevity 1.5 Dhruv  Total Volume for 24 Hours (mL): 840  Continuous  Starting Tube Feed Rate {mL per Hour}: 35  Until Goal Tube Feed Rate (mL per Hour): 35  Tube Feed Duration (in Hours): 24  Tube Feed Start Time: 11:00  No Carb Prosource TF     Qty per Day:  1 (22 @ 10:47)    Intake: Diet order provides: 840 ml,1260 kcals, 54 gm protein). 1 Pkt Prosource adds 15 gm protein, 60 kcals.    Daily     Daily Weight in k.3 (2022 06:39)  Weight in k.3 (2022 05:24)    4+ generalized edema, 3 + B/Lfeet edema noted    Pertinent Medications: MEDICATIONS  (STANDING):  albumin human 25% IVPB 50 milliLiter(s) IV Intermittent every 6 hours  ascorbic acid 500 milliGRAM(s) Oral daily  atorvastatin 40 milliGRAM(s) Oral at bedtime  chlorhexidine 0.12% Liquid 15 milliLiter(s) Oral Mucosa every 12 hours  collagenase Ointment 1 Application(s) Topical daily  furosemide   Injectable 20 milliGRAM(s) IV Push every 6 hours  glucagon  Injectable 1 milliGRAM(s) IntraMuscular once  insulin lispro (ADMELOG) corrective regimen sliding scale   SubCutaneous every 6 hours  midodrine. 5 milliGRAM(s) Oral three times a day  multivitamin/minerals/iron Oral Solution (CENTRUM) 15 milliLiter(s) Oral daily  pantoprazole   Suspension 40 milliGRAM(s) Enteral Tube every 12 hours    MEDICATIONS  (PRN):  acetaminophen    Suspension .. 650 milliGRAM(s) Oral every 6 hours PRN Temp greater or equal to 38C (100.4F), Mild Pain (1 - 3)  morphine  - Injectable 1 milliGRAM(s) IV Push every 4 hours PRN respiratory distress    Pertinent Labs:  Na144 mmol/L Glu 210 mg/dL<H> K+ 4.2 mmol/L Cr  1.04 mg/dL BUN 69 mg/dL<H>  Phos 3.7 mg/dL  Alb 2.5 g/dL<L>  Chol 88 mg/dL LDL --    HDL 20 mg/dL<L> Trig 183 mg/dL<H>     CAPILLARY BLOOD GLUCOSE      POCT Blood Glucose.: 192 mg/dL (02 Mar 2022 12:00)  POCT Blood Glucose.: 274 mg/dL (02 Mar 2022 05:12)  POCT Blood Glucose.: 288 mg/dL (01 Mar 2022 23:30)    Skin: multiple pressure injuries (including unstageable)      Previous Nutrition Diagnosis:   [ ] Altered GI function  [ ]Inadequate Oral Intake [ ] Swallowing Difficulty   [ ] Altered nutrition related labs [ ] Increased Nutrient Needs [ ] Overweight/Obesity   [ ] Unintended Weight Loss [ ] Food & Nutrition Related Knowledge Deficit [x ] Malnutrition (severe PCM)  [ ] Other:     Nutrition Diagnosis is [x ] ongoing  [ ] resolved [ ] not applicable       Interventions:   Recommend  [ ] Change Diet To:  [ ] Nutrition Supplement  [x ] Nutrition Support: TF as above  [ ] Other:     Monitoring and Evaluation: [ x ] follow up per protocol

## 2022-03-02 NOTE — PROGRESS NOTE ADULT - SUBJECTIVE AND OBJECTIVE BOX
MELISSA JOHNS    SCU progress note    INTERVAL HPI/OVERNIGHT EVENTS: ***Febrile overnight. CXR worseing. FIO2 currently at 65%    DNR [x ]   DNI  [  ]    Covid - 19 PCR: Negative 2/26    The 4Ms    What Matters Most: see [facility]  Age appropriate Medications/Screen for High Risk Medication: Yes  Mentation: see CAM below  Mobility: defer to physical exam    The Confusion Assessment Method (CAM) Diagnostic Algorithm     1: Acute Onset or Fluctuating Course  - Is there evidence of an acute change in mental status from the patient’s baseline? Did the (abnormal) behavior  fluctuate during the day, that is, tend to come and go, or increase and decrease in severity?  [x ] YES [ ] NO     2: Inattention  - Did the patient have difficulty focusing attention, being easily distractible, or having difficulty keeping track of what was being said?  [ ] YES [ ] NO   Unable to access     3: Disorganized thinking  -Was the patient’s thinking disorganized or incoherent, such as rambling or irrelevant conversation, unclear or illogical flow of ideas, or unpredictable switching from subject to subject?  [ ] YES [ ] NO   Unable to access    4: Altered Level of consciousness?  [ ] YES [ ] NO    The diagnosis of delirium by CAM requires the presence of features 1 and 2 and either 3 or 4.    PRESSORS: [ ] YES [ ] NO    Cardiovascular:  Heart Failure  Acute   Acute on Chronic  Chronic       furosemide   Injectable 20 milliGRAM(s) IV Push every 6 hours  midodrine. 5 milliGRAM(s) Oral three times a day    Pulmonary:    Hematalogic:    Other:  acetaminophen    Suspension .. 650 milliGRAM(s) Oral every 6 hours PRN  albumin human 25% IVPB 50 milliLiter(s) IV Intermittent every 6 hours  ascorbic acid 500 milliGRAM(s) Oral daily  atorvastatin 40 milliGRAM(s) Oral at bedtime  chlorhexidine 0.12% Liquid 15 milliLiter(s) Oral Mucosa every 12 hours  collagenase Ointment 1 Application(s) Topical daily  glucagon  Injectable 1 milliGRAM(s) IntraMuscular once  insulin lispro (ADMELOG) corrective regimen sliding scale   SubCutaneous every 6 hours  morphine  - Injectable 1 milliGRAM(s) IV Push every 4 hours PRN  multivitamin/minerals/iron Oral Solution (CENTRUM) 15 milliLiter(s) Oral daily  pantoprazole   Suspension 40 milliGRAM(s) Enteral Tube every 12 hours    acetaminophen    Suspension .. 650 milliGRAM(s) Oral every 6 hours PRN  albumin human 25% IVPB 50 milliLiter(s) IV Intermittent every 6 hours  ascorbic acid 500 milliGRAM(s) Oral daily  atorvastatin 40 milliGRAM(s) Oral at bedtime  chlorhexidine 0.12% Liquid 15 milliLiter(s) Oral Mucosa every 12 hours  collagenase Ointment 1 Application(s) Topical daily  furosemide   Injectable 20 milliGRAM(s) IV Push every 6 hours  glucagon  Injectable 1 milliGRAM(s) IntraMuscular once  insulin lispro (ADMELOG) corrective regimen sliding scale   SubCutaneous every 6 hours  midodrine. 5 milliGRAM(s) Oral three times a day  morphine  - Injectable 1 milliGRAM(s) IV Push every 4 hours PRN  multivitamin/minerals/iron Oral Solution (CENTRUM) 15 milliLiter(s) Oral daily  pantoprazole   Suspension 40 milliGRAM(s) Enteral Tube every 12 hours    Drug Dosing Weight  Height (cm): 175.2 (16 Feb 2022 15:23)  Weight (kg): 78 (13 Feb 2022 18:39)  BMI (kg/m2): 25.4 (16 Feb 2022 15:23)  BSA (m2): 1.94 (16 Feb 2022 15:23)    CENTRAL LINE: [ ] YES [x ] NO  LOCATION:   DATE INSERTED:  REMOVE: [ ] YES [ ] NO  EXPLAIN:    LISET: [ ] YES [x ] NO    DATE INSERTED:  REMOVE:  [ ] YES [ ] NO  EXPLAIN:    PAST MEDICAL & SURGICAL HISTORY:  Hypertension    Diabetes    High cholesterol    Primary osteoarthritis of both knees    Coronary artery disease of native artery of native heart with stable angina pectoris    Allergic bronchitis    Diabetic retinopathy    Oral cancer    SCC (squamous cell carcinoma)    Metastatic cancer    Recurrent disease    Edema of extremity    Hyponatremia    Microalbuminuria    Neuralgia    Obstructive sleep apnea, adult    Vitamin D deficiency    S/P knee replacement  b/l    S/P hernia repair  b/l    Status post cataract extraction and insertion of intraocular lens, unspecified laterality  b/l    History of surgery  On 9/10/20 he underwent right hemiglossectomy and partial neck dissection with Dr. Darinel Servin at [facility].                03-01 @ 07:01  -  03-02 @ 07:00  --------------------------------------------------------  IN: 0 mL / OUT: 800 mL / NET: -800 mL        Mode: AC/ CMV (Assist Control/ Continuous Mandatory Ventilation)  RR (machine): 14  TV (machine): 450  FiO2: 65  PEEP: 0.5  ITime: 0.9  MAP: 13  PIP: 37      PHYSICAL EXAM:    GENERAL: NAD, cachectic +temporal waisting  HEAD:  Atraumatic, Normocephalic  EYES: EOMI, PERRLA, conjunctiva and sclera clear  ENMT: No tonsillar erythema, exudates  NECK: Supple, No JVD, tracheostomy intact  NERVOUS SYSTEM:  Awake but lethargic. Intermittently following simple commands.  CHEST/LUNG: Diffusely diminished breath sounds +crackles bilaterally. Poor air movement.  HEART: Regular rate and rhythm; No murmurs, rubs, or gallops  ABDOMEN: Soft, Nontender, Nondistended; Bowel sounds present; Peg intact  EXTREMITIES:  +2 edema all extremities  2+ Peripheral Pulses, No clubbing, cyanosis  LYMPH: No lymphadenopathy noted  SKIN: Stage 1 Pressure Injury to the Bilateral Heels, as evident by non-blanchable erythema  Unstageable Pressure Injury to the Coccyx (5cm x 9cm x 2cm) with slough, pink tissue, and drainage Malodorous  Unstageable Pressure Injury to the area under the patients Trach with slough, pink tissue, and scant drainage        LABS:  CBC Full  -  ( 02 Mar 2022 09:35 )  WBC Count : 12.75 K/uL  RBC Count : 3.01 M/uL  Hemoglobin : 8.3 g/dL  Hematocrit : 28.3 %  Platelet Count - Automated : 289 K/uL  Mean Cell Volume : 94.0 fl  Mean Cell Hemoglobin : 27.6 pg  Mean Cell Hemoglobin Concentration : 29.3 gm/dL  Auto Neutrophil # : x  Auto Lymphocyte # : x  Auto Monocyte # : x  Auto Eosinophil # : x  Auto Basophil # : x  Auto Neutrophil % : x  Auto Lymphocyte % : x  Auto Monocyte % : x  Auto Eosinophil % : x  Auto Basophil % : x    03-02    144  |  107  |  69<H>  ----------------------------<  210<H>  4.2   |  31  |  1.04    Ca    11.1<H>      02 Mar 2022 09:35  Phos  3.7     03-02  Mg     2.7     03-02    TPro  6.0  /  Alb  2.5<L>  /  TBili  0.4  /  DBili  x   /  AST  65<H>  /  ALT  23  /  AlkPhos  293<H>  03-02              [  ]  DVT Prophylaxis  [  ]  Nutrition, Brand, Rate         Goal Rate        Abnormal Nutritional Status -  Malnutrition   Cachexia          RADIOLOGY & ADDITIONAL STUDIES:  ***  CXR from today significantly worsening infiltrates    < from: Xray Chest 1 View- PORTABLE-Routine (Xray Chest 1 View- PORTABLE-Routine .) (03.01.22 @ 09:41) >    Heart size difficult to assess. Tracheostomy and left coronary stent   noted.    There are significant diffuse advanced infiltrates concentrated the mid   lower lung fields. Clips are seen in the left upper quadrant.    Chest is similar to February 28.    IMPRESSION: Unchanged advanced infiltrates.    < end of copied text >  hes< from: CT Angio Abdomen and Pelvis w/ IV Cont (02.15.22 @ 15:58) >  PROCEDURE:  CT of the Abdomen and Pelvis was performed.  Precontrast, Arterial and Delayed phases were performed.  Sagittal and coronal reformats were performed.    FINDINGS:  LOWER CHEST: Previously seen right middle lobe cavity now measures up to   5.2 cm (previously 4.8 cm) and contains a new air-fluid level and mural   nodularity. Left lower lobe cavity partially imaged measuring   approximately 4.4 cm with air-fluid level, unchanged. Dense left lower   lobe consolidation unchanged. Increasing nodular right lower lobe   infiltrate. Developing right pleural effusion. Reidentified left pleural   effusion withsurrounding nodular pleural thickening area Dense coronary   atherosclerosis reidentified.    LIVER: Steatosis.  BILE DUCTS: Normal caliber.  GALLBLADDER: Mildly distended.  SPLEEN: Within normal limits.  PANCREAS: Within normal limits.  ADRENALS: Within normal limits.  KIDNEYS/URETERS: Within normal limits.    BLADDER: Layering high density in the bladder.  REPRODUCTIVE ORGANS: Prostate within normal limits.    BOWEL: No bowel obstruction. Appendix is not visualized and cannot be   assessed. Percutaneous gastrostomy tube in satisfactory position. Prior   small bowel obstruction has resolved. Metallic structure in the   descending colon (5:59), likely hemostatic clip. Similar structure in the   gastric fundus. No intraluminal contrast extravasation. No pneumatosis.  PERITONEUM: Small ascites. No pneumoperitoneum.  VESSELS: Atherosclerotic changes. No portal venous gas.  RETROPERITONEUM/LYMPH NODES: No lymphadenopathy.  ABDOMINAL WALL: Marked anasarca, increased from prior.  BONES: Degenerative changes.    IMPRESSION:    No CT evidence of active GI bleed.    Increasing right middle lobe lung cavity now with air-fluid level. Left   lower lobe cavity unchanged. Dense left lower lobe infiltrate unchanged.   Small but complex left pleural effusion, possibly empyema, unchanged.   Increasing right pleural effusion.    Prior small bowel obstruction has resolved.    Probable hemostatic clips in the stomach and descending colon.    Layering high density material in the bladder, new comparedwith prior.    < end of copied text >    Goals of Care Discussion with Family/Proxy/Other   -

## 2022-03-02 NOTE — PROGRESS NOTE ADULT - ASSESSMENT
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 Recent hospitalization (12/20/2021- 1/28/22) notable for cardiac arrest, aspiration pna, gastric ulcer s/p clipping and ventilator dependent respiratory failure s/p Trach/ PEG discharged to NH. BIBEMS from NH (2/13/22) for bloody stools and blood in trach noted at the facility Adm to ICU for hypovolemic shock 2/2 acute anemia 2/2 GIB, S/P ICU care, S/P blood transfusion, PPI, EGD shoed esophagus ulcer, no active bleeding, S/P colonoscopy , no active bleeding, continue PPI bid, blood culture negative,  ESBL /GNR PNA, continue IV meropenum until 2/28,  suction prn, GI follow up, transfusion if H <7. DVT prophylaxis. H/H stable, still spiked fever  , repeat blood culture positive for VRE, ID follow up, hypercalcemia, most likely due to metastatic CA. IV hydration. Suction. Prognosis poor, palliative team follow up. Continue supportive care.

## 2022-03-02 NOTE — GOALS OF CARE CONVERSATION - ADVANCED CARE PLANNING - CONVERSATION DETAILS
Discussed at length with son and daughter patient's current diagnosis and treatment plan. Discussed at length that the patient is currently in multi system failure and overall prognosis is poor. Family currently wants everything done up to chest compression. They do not want chest compressions. They want all other medications and treatment. Family very unrealistic about patient's prognosis.  Emotional support provided to family. MOLST reviewed.
Discussed with son in detail current diagnosis and treatment plan. MOLST discussed in detail. Son wishes to keep father DNR however, he wants all medications, lines. Everything up to chest compressions. Son made aware patient to be transferred to ICU for  GIB and persistent hypotension.  MOLST discussion done via phone secondary to Pandemic and urgency.

## 2022-03-02 NOTE — PROVIDER CONTACT NOTE (OTHER) - RECOMMENDATIONS
ORAL AGENTS   Oral Agent: Metformin  Mg at Breakfast : 500  Mg at Dinner : 1000     1. Wt. Loss goal: 2 lbs per month for a total of 6 lbs by next visit.  2. Exercise 60 minutes per day: bike, walk on days that you do not have gym.  3. Try Yasemin beverage to replace the juice sometimes: try these flavors: Limoncello, Cherry Lime, Pamplemousse.  4. Replace Sun Chips and potato chips with fresh veggies. If you need something to dip your veggies into, try Lipocalyx Classic Yogurt Ranch dressing.   5. Replace 2% with 1% milk.  6. Replace sweets with just a few dark chocolate chips - try Ghirardelli 60 % dark chocolate chips  7. Check BG 2 times daily: fasting and 2 hours after dinner.   8. Contact the diabetes team at [phone] if the BG is < 70 or > 180 mg/dL  
Notify NP.
Notify NP. Dressing placed to cover insertion site.

## 2022-03-02 NOTE — CHART NOTE - NSCHARTNOTEFT_GEN_A_CORE
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 Recent hospitalization (12/20/2021- 1/28/22) notable for cardiac arrest, aspiration pna, gastric ulcer s/p clipping and ventilator dependent respiratory failure s/p Trach/ PEG discharged to NH. BIBEMS from NH (2/13/22) for bloody stools and blood in trach noted at the facility. Patient admitted to  for hypovolemic shock 2/2 acute anemia 2/2 GIB . s/p 2 untis PRBCS. Patient requiring higher level of care and sent to ICU for hypotension requiring pressors. Patient down graded to  for further management once hemodynamically stable. Patient underwent EGD and colonoscopy on 2/18/22 by Dr Malhotra with no active bleeding noted. GI was notified today by medicine team that pt's peg tube was pulled out and feeding is temporarily discontinued. Dr Núñez placed 16 Kisw[place]ili domingo catheter through previous peg tube site. Please follow up with X-ray to confirm the peg tube placement. 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 Recent hospitalization (12/20/2021- 1/28/22) notable for cardiac arrest, aspiration pna, gastric ulcer s/p clipping and ventilator dependent respiratory failure s/p Trach/ PEG discharged to NH. BIBEMS from NH (2/13/22) for bloody stools and blood in trach noted at the facility. Patient admitted for hypovolemic shock 2/2 acute anemia 2/2 GIB. Patient requiring higher level of care and sent to ICU for hypotension requiring pressors. Patient downgraded to SCU for further management. Patient underwent EGD and colonoscopy on 2/18/22 by Dr Núñez with no active bleeding noted. GI was notified today by medicine team that pt's peg tube was pulled out and feeding is temporarily discontinued. Dr Núñez successfully placed 16 [place] domingo catheter through previous peg tube site. Please follow up with X-ray to confirm the peg tube placement.

## 2022-03-02 NOTE — GOALS OF CARE CONVERSATION - ADVANCED CARE PLANNING - TREATMENT GUIDELINES
Antibiotic trial/IV fluid trial
DNR Order/Artificial nutrition trial/Antibiotic trial/IV fluid trial

## 2022-03-02 NOTE — PROGRESS NOTE ADULT - SUBJECTIVE AND OBJECTIVE BOX
Patient is a 76y old  Male who presents with a chief complaint of blood in stool (01 Mar 2022 13:07)/hypovolemic shock/GIB/blood transfusion/sepsis/ESBL PNA/VRE bacteremia/respiratory failure, continue supportive care, prognosis poor      INTERVAL HPI/OVERNIGHT EVENTS:  T(C): 35.8 (03-02-22 @ 08:10), Max: 37.8 (03-01-22 @ 12:50)  HR: 84 (03-02-22 @ 08:14) (84 - 129)  BP: 120/69 (03-02-22 @ 08:10) (98/56 - 121/67)  RR: 15 (03-02-22 @ 08:10) (15 - 24)  SpO2: 97% (03-02-22 @ 08:14) (90% - 97%)  Wt(kg): --    LABS:                        8.3    12.75 )-----------( 289      ( 02 Mar 2022 09:35 )             28.3     03-02    144  |  107  |  69<H>  ----------------------------<  210<H>  4.2   |  31  |  1.04    Ca    11.1<H>      02 Mar 2022 09:35  Phos  3.7     03-02  Mg     2.7     03-02    TPro  6.0  /  Alb  2.5<L>  /  TBili  0.4  /  DBili  x   /  AST  65<H>  /  ALT  23  /  AlkPhos  293<H>  03-02        CAPILLARY BLOOD GLUCOSE      POCT Blood Glucose.: 274 mg/dL (02 Mar 2022 05:12)  POCT Blood Glucose.: 288 mg/dL (01 Mar 2022 23:30)  POCT Blood Glucose.: 255 mg/dL (01 Mar 2022 16:45)  POCT Blood Glucose.: 167 mg/dL (01 Mar 2022 11:16)        RADIOLOGY & ADDITIONAL TESTS:    Consultant(s) Notes Reviewed:  [x ] YES  [ ] NO    PHYSICAL EXAM:  GENERAL: well built, well nourished  HEAD:  Atraumatic, Normocephalic  EYES: EOMI, PERRLA, conjunctiva and sclera clear  ENT: No tonsillar erythema, exudates, or enlargement; Moist mucous membranes, Good dentition, No lesions  NECK: Supple, No JVD, Normal thyroid, no enlarged nodes, trach  NERVOUS SYSTEM:  lethargy  CHEST/LUNG: B/L good air entry; positive  rales, rhonchi, or wheezing  HEART: S1S2 normal, no S3, Regular rate and rhythm; No murmurs, rubs, or gallops  ABDOMEN: Soft, Nontender, Nondistended; Bowel sounds present, peg tube  EXTREMITIES:  2+ Peripheral Pulses, No clubbing, cyanosis, B/L arm edema  LYMPH: No lymphadenopathy noted  SKIN: sacrum D/U    Care Discussed with Consultants/Other Providers [ x] YES  [ ] NO

## 2022-03-02 NOTE — PROGRESS NOTE ADULT - PROBLEM SELECTOR PLAN 2
Secondary to acute GIB. Now resolved.  initially received 2U PRBC, last transfused 1 unit PRBCs 2/22 with stable H/H  Continue midodrine for pressure support.

## 2022-03-02 NOTE — GOALS OF CARE CONVERSATION - ADVANCED CARE PLANNING - TREATMENT GUIDELINE COMMENT
Long term ventilation, artificial nutrition and all medications.  NO CPR.
NO chest compressions. Can have all treatment up to chest impression. All medications, artificial nutrition, and lines.

## 2022-03-03 NOTE — PROGRESS NOTE ADULT - ASSESSMENT
Fevers  Bacteremia - with VRE  Leukocytosis   Pneumonia    Plan - Continue Zyvox 600mg iv q12  Awaiting results of repeat blood cultures  Overall prognosis is very poor and family is aware of this.  Fevers  Bacteremia - with VRE  Leukocytosis   Pneumonia    Plan - Continue Zyvox 600mg iv q12  Awaiting results of repeat blood cultures  Overall prognosis is very poor and family is aware of this.     I agree with above

## 2022-03-03 NOTE — PROGRESS NOTE ADULT - SUBJECTIVE AND OBJECTIVE BOX
76y Male is under our care for     REVIEW OF SYSTEMS:  [  ] Not able to elicit  General:	  Chest:	  GI:	  :  Skin:	  Musculoskeletal:	  Neuro:	    MEDS:  linezolid  IVPB      linezolid  IVPB 600 milliGRAM(s) IV Intermittent every 12 hours    ALLERGIES: Allergies    No Known Allergies    Intolerances        VITALS:  Vital Signs Last 24 Hrs  T(C): 35.6 (03 Mar 2022 05:16), Max: 39.5 (02 Mar 2022 18:00)  T(F): 96 (03 Mar 2022 05:16), Max: 103.1 (02 Mar 2022 18:00)  HR: 93 (03 Mar 2022 05:16) (88 - 114)  BP: 117/68 (03 Mar 2022 05:16) (109/61 - 128/71)  BP(mean): --  RR: 20 (03 Mar 2022 05:16) (14 - 24)  SpO2: 100% (03 Mar 2022 05:16) (95% - 100%)      PHYSICAL EXAM:  HEENT:  Neck:  Respiratory:  Cardiovascular:  Gastrointestinal:  Extremities:  Skin:  Ortho:  Neuro:    LABS/DIAGNOSTIC TESTS:                        8.3    12.75 )-----------( 289      ( 02 Mar 2022 09:35 )             28.3     WBC Count: 12.75 K/uL (03-02 @ 09:35)  WBC Count: 12.68 K/uL (03-01 @ 08:13)  WBC Count: 13.67 K/uL (02-28 @ 06:51)  WBC Count: 10.73 K/uL (02-27 @ 06:28)    03-02    144  |  107  |  69<H>  ----------------------------<  210<H>  4.2   |  31  |  1.04    Ca    11.1<H>      02 Mar 2022 09:35  Phos  3.7     03-02  Mg     2.7     03-02    TPro  6.0  /  Alb  2.5<L>  /  TBili  0.4  /  DBili  x   /  AST  65<H>  /  ALT  23  /  AlkPhos  293<H>  03-02      CULTURES:   .Blood Blood  03-01 @ 13:42   Growth in aerobic and anaerobic bottles: Enterococcus faecium (vancomycin  resistant)  See previous culture [id number]  --    Growth in anaerobic bottle: Gram Positive Cocci in Pairs and Chains  Growth in aerobic bottle: Gram Positive Cocci in Pairs and Chains      .Blood Blood-Peripheral  02-27 @ 19:04   No growth to date.  --  Blood Culture PCR  Enterococcus faecium (vancomycin resistant)      .Sputum Sputum  02-18 @ 21:07   Moderate Klebsiella pneumoniae ESBL Multiple Morphological Strains  Normal Respiratory Karla absent  --  Klebsiella pneumoniae ESBL  Klebsiella pneumoniae ESBL      .Blood Blood-Peripheral  02-18 @ 18:39   No Growth Final  --  --      .Blood Blood  01-18 @ 02:51   No Growth Final  --  --      .Blood Blood  01-18 @ 02:50   No Growth Final  --  --      .Sputum Sputum  12-29 @ 18:28   Normal Respiratory Karla present  --    Numerous polymorphonuclear leukocytes per low power field  No squamous epithelial cells per low power field  Few Gram positive cocci in pairs per oil power field      Clean Catch Clean Catch (Midstream)  12-21 @ 04:42   No growth  --  --        RADIOLOGY:    < from: Xray Chest 1 View- PORTABLE-Urgent (Xray Chest 1 View- PORTABLE-Urgent .) (03.02.22 @ 11:08) >    ACC: [id number] EXAM:  XR CHEST PORTABLE URGENT 1V                          PROCEDURE DATE:  03/02/2022          INTERPRETATION:  Chest one view    HISTORY: Respiratory failure and fever    COMPARISON STUDY: 3/1/2022    Frontal expiratory view of thechest shows the heart to be similar in   size. Tracheostomy tube is again noted.    The lungs show similar patchy infiltrates and bilateral pleural   effusions. There is no evidence of pneumothorax.    IMPRESSION:  Similar infiltrates and effusions.        Thank you for the courtesy of this referral.    --- End of Report ---            RUDDY FLOWERS MD; Attending Interventional Radiologist  This document has been electronically signed. Mar  2 2022 12:03PM    < end of copied text >   76y Male is under our care for pneumonia and bacteremia with VRE.  Patient was seen laying in bed with no acute distress.  Repeat blood cultures were sent this morning and patient had a fever of 101 last night.    REVIEW OF SYSTEMS:  [ x ] Not able to elicit      MEDS:  linezolid  IVPB      linezolid  IVPB 600 milliGRAM(s) IV Intermittent every 12 hours    ALLERGIES: Allergies    No Known Allergies    Intolerances        VITALS:  Vital Signs Last 24 Hrs  T(C): 35.6 (03 Mar 2022 05:16), Max: 39.5 (02 Mar 2022 18:00)  T(F): 96 (03 Mar 2022 05:16), Max: 103.1 (02 Mar 2022 18:00)  HR: 93 (03 Mar 2022 05:16) (88 - 114)  BP: 117/68 (03 Mar 2022 05:16) (109/61 - 128/71)  BP(mean): --  RR: 20 (03 Mar 2022 05:16) (14 - 24)  SpO2: 100% (03 Mar 2022 05:16) (95% - 100%)      PHYSICAL EXAM:  HEENT: trach+  Neck: supple no LN's   Respiratory: bilateral rhonchi  Cardiovascular: S1 S2 reg no murmurs  Gastrointestinal: +BS with soft, nondistended abdomen; nontender, peg tube in place  : Freedman catheter in place with minimal urine  Extremities: Right hand edema +2  Skin: right facial mass  Ortho: n/a  Neuro: Awake and alert    LABS/DIAGNOSTIC TESTS:                        8.3    12.75 )-----------( 289      ( 02 Mar 2022 09:35 )             28.3     WBC Count: 12.75 K/uL (03-02 @ 09:35)  WBC Count: 12.68 K/uL (03-01 @ 08:13)  WBC Count: 13.67 K/uL (02-28 @ 06:51)  WBC Count: 10.73 K/uL (02-27 @ 06:28)    03-02    144  |  107  |  69<H>  ----------------------------<  210<H>  4.2   |  31  |  1.04    Ca    11.1<H>      02 Mar 2022 09:35  Phos  3.7     03-02  Mg     2.7     03-02    TPro  6.0  /  Alb  2.5<L>  /  TBili  0.4  /  DBili  x   /  AST  65<H>  /  ALT  23  /  AlkPhos  293<H>  03-02      CULTURES:   .Blood Blood  03-01 @ 13:42   Growth in aerobic and anaerobic bottles: Enterococcus faecium (vancomycin  resistant)  See previous culture [id number]  --    Growth in anaerobic bottle: Gram Positive Cocci in Pairs and Chains  Growth in aerobic bottle: Gram Positive Cocci in Pairs and Chains      .Blood Blood-Peripheral  02-27 @ 19:04   No growth to date.  --  Blood Culture PCR  Enterococcus faecium (vancomycin resistant)      .Sputum Sputum  02-18 @ 21:07   Moderate Klebsiella pneumoniae ESBL Multiple Morphological Strains  Normal Respiratory Karla absent  --  Klebsiella pneumoniae ESBL  Klebsiella pneumoniae ESBL      .Blood Blood-Peripheral  02-18 @ 18:39   No Growth Final  --  --      .Blood Blood  01-18 @ 02:51   No Growth Final  --  --      .Blood Blood  01-18 @ 02:50   No Growth Final  --  --      .Sputum Sputum  12-29 @ 18:28   Normal Respiratory Karla present  --    Numerous polymorphonuclear leukocytes per low power field  No squamous epithelial cells per low power field  Few Gram positive cocci in pairs per oil power field      Clean Catch Clean Catch (Midstream)  12-21 @ 04:42   No growth  --  --        RADIOLOGY:    < from: Xray Chest 1 View- PORTABLE-Urgent (Xray Chest 1 View- PORTABLE-Urgent .) (03.02.22 @ 11:08) >    ACC: [id number] EXAM:  XR CHEST PORTABLE URGENT 1V                          PROCEDURE DATE:  03/02/2022          INTERPRETATION:  Chest one view    HISTORY: Respiratory failure and fever    COMPARISON STUDY: 3/1/2022    [facility]   [facility]. Tracheostomy tube is again noted.    The lungs show similar patchy infiltrates and bilateral pleural   effusions. There is no evidence of pneumothorax.    IMPRESSION:  Similar infiltrates and effusions.        Thank you for the courtesy of this referral.    --- End of Report ---            RUDDY FLOWERS MD; Attending Interventional Radiologist  This document has been electronically signed. Mar  2 2022 12:03PM    < end of copied text >

## 2022-03-03 NOTE — PROGRESS NOTE ADULT - PROBLEM SELECTOR PLAN 1
Continue mechanical ventilation. Not a candidate for weaning.  FIO2 remains at 60%.  Continue to monitor oxygen saturation.  Repeat CXR shows worsening infiltrates  3/2 Last day of  albumin 25% every 6 hours followed by lasix.

## 2022-03-03 NOTE — PROVIDER CONTACT NOTE (CRITICAL VALUE NOTIFICATION) - TEST AND RESULT REPORTED:
Blood culture 02/27/22 Anaerobic bottle Gram + cocci in pairs
Potassium level 2.8
lactate 2.2
H/H level 6.7/21.6
lactate 4.2
Blood culture; 1st preliminary collection; growth in anaerobic bottle. Gram positive cocci in pairs & chains.
blood culture [facility] 3/01- final growth in aerobic and anaerobic bottles, enterococcus faecium vancomycin resistant.
Blood cultures

## 2022-03-03 NOTE — PROGRESS NOTE ADULT - PROBLEM SELECTOR PLAN 6
Sputum Cx Klebsiella pneumoniae.- ESBL  Completed meropenem  Repeat BC 3/2 and 3/3  Monitor off antibiotics as per ID  CXR worsening.  ID dr. Nunez following

## 2022-03-03 NOTE — PROGRESS NOTE ADULT - ASSESSMENT
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 Recent hospitalization (12/20/2021- 1/28/22) notable for cardiac arrest, aspiration pna, gastric ulcer s/p clipping and ventilator dependent respiratory failure s/p Trach/ PEG discharged to NH. BIBEMS from NH (2/13/22) for bloody stools and blood in trach noted at the facility. Patient admitted to  for hypovolemic shock 2/2 acute anemia 2/2 GIB . s/p 2 untis PRBCS. Patient requiring higher level of care and sent to ICU for hypotension requiring pressors. Patient down graded to  for further management once hemodynamically stable. Patient underwent a EGD and colonoscopy no active bleeding noted.     2/18- Febrile overnight- T-max 101.5, lactate 2.2.  CXR w/ increased infiltrates.  Pan cx including sputum, IV hydration, Vanc and Cefepime for PNA.  2/21 Klebsiella PNA- ESBL sensitive to Meropenem, no active bleeding  2/22 Hemoglobin 7.3, afebrile, transfusing 1 unit PRBC today.   2/24  Febrile Tmax 101.7  2/27   Febrile overnight. BC sent  2/28   Increasing oxygen requirements. FIO2 increased to 60% overnight. Albumin 25% followed by lasix started for 24 hours  3/2  Worsening CXR and increasing oxygen requirements. Febrile BC positive. last dose Albumin/Lasix given.   3/3 hypothermic 96F AM, s/p hypothermic blanket, now normothermic, Bcx from 3/1 showing VRE. repeated Bcx 3/2. on Zyvox IV.   Abdominal Xray repeated for concern for leakage from yesterday, resulted and confirmed placement w/o leakage. resumed feeding.   Noted urinary retention overnight, monitoring bladder scan q6, no residual volume noted.

## 2022-03-03 NOTE — PROGRESS NOTE ADULT - ASSESSMENT
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 Recent hospitalization (12/20/2021- 1/28/22) notable for cardiac arrest, aspiration pna, gastric ulcer s/p clipping and ventilator dependent respiratory failure s/p Trach/ PEG discharged to NH. BIBEMS from NH (2/13/22) for bloody stools and blood in trach noted at the facility Adm to ICU for hypovolemic shock 2/2 acute anemia 2/2 GIB, S/P ICU care, S/P blood transfusion, PPI, EGD shoed esophagus ulcer, no active bleeding, S/P colonoscopy , no active bleeding, continue PPI bid, blood culture negative,  ESBL /GNR PNA, complete  IV meropenum course,   suction prn, GI follow up, transfusion if H <7. DVT prophylaxis. H/H stable, still spiked fever  , repeat blood culture positive for VRE, start Zyvox, ID follow up, hypercalcemia, most likely due to metastatic CA. IV hydration. Suction. Prognosis poor, palliative team follow up. Continue supportive care.

## 2022-03-03 NOTE — PROGRESS NOTE ADULT - SUBJECTIVE AND OBJECTIVE BOX
MELISSA JOHNS    SCU progress note    INTERVAL HPI/OVERNIGHT EVENTS: seen at bedside, non verbal, only response to painful stimuli.     DNR [x ]   DNI  [  ]    Covid - 19 PCR: 2/26/22    The 4Ms    What Matters Most: see [facility]  Age appropriate Medications/Screen for High Risk Medication: Yes  Mentation: see CAM below  Mobility: defer to physical exam    The Confusion Assessment Method (CAM) Diagnostic Algorithm     1: Acute Onset or Fluctuating Course  - Is there evidence of an acute change in mental status from the patient’s baseline? Did the (abnormal) behavior  fluctuate during the day, that is, tend to come and go, or increase and decrease in severity?  [ x] YES [ ] NO     2: Inattention  - Did the patient have difficulty focusing attention, being easily distractible, or having difficulty keeping track of what was being said?  [ ] YES [ ] NO     3: Disorganized thinking  -Was the patient’s thinking disorganized or incoherent, such as rambling or irrelevant conversation, unclear or illogical flow of ideas, or unpredictable switching from subject to subject?  [ ] YES [ ] NO    4: Altered Level of consciousness?  [ ] YES [ ] NO    The diagnosis of delirium by CAM requires the presence of features 1 and 2 and either 3 or 4.    PRESSORS: [ ] YES [x ] NO  linezolid  IVPB      linezolid  IVPB 600 milliGRAM(s) IV Intermittent every 12 hours    Cardiovascular:  Heart Failure  Acute   Acute on Chronic  Chronic     Other:  acetaminophen    Suspension .. 650 milliGRAM(s) Oral every 6 hours PRN  ascorbic acid 500 milliGRAM(s) Oral daily  atorvastatin 40 milliGRAM(s) Oral at bedtime  chlorhexidine 0.12% Liquid 15 milliLiter(s) Oral Mucosa every 12 hours  collagenase Ointment 1 Application(s) Topical daily  glucagon  Injectable 1 milliGRAM(s) IntraMuscular once  insulin lispro (ADMELOG) corrective regimen sliding scale   SubCutaneous every 6 hours  morphine  - Injectable 1 milliGRAM(s) IV Push every 4 hours PRN  multivitamin/minerals/iron Oral Solution (CENTRUM) 15 milliLiter(s) Oral daily  pantoprazole   Suspension 40 milliGRAM(s) Enteral Tube every 12 hours    acetaminophen    Suspension .. 650 milliGRAM(s) Oral every 6 hours PRN  ascorbic acid 500 milliGRAM(s) Oral daily  atorvastatin 40 milliGRAM(s) Oral at bedtime  chlorhexidine 0.12% Liquid 15 milliLiter(s) Oral Mucosa every 12 hours  collagenase Ointment 1 Application(s) Topical daily  glucagon  Injectable 1 milliGRAM(s) IntraMuscular once  insulin lispro (ADMELOG) corrective regimen sliding scale   SubCutaneous every 6 hours  linezolid  IVPB      linezolid  IVPB 600 milliGRAM(s) IV Intermittent every 12 hours  morphine  - Injectable 1 milliGRAM(s) IV Push every 4 hours PRN  multivitamin/minerals/iron Oral Solution (CENTRUM) 15 milliLiter(s) Oral daily  pantoprazole   Suspension 40 milliGRAM(s) Enteral Tube every 12 hours    Drug Dosing Weight  Height (cm): 175.2 (16 Feb 2022 15:23)  Weight (kg): 78 (13 Feb 2022 18:39)  BMI (kg/m2): 25.4 (16 Feb 2022 15:23)  BSA (m2): 1.94 (16 Feb 2022 15:23)    CENTRAL LINE: [ ] YES [x ] NO  LOCATION:   DATE INSERTED:  REMOVE: [ ] YES [ ] NO  EXPLAIN:    HUTCHINS: [ ] YES [x ] NO    DATE INSERTED:  REMOVE:  [ ] YES [ ] NO  EXPLAIN:    PAST MEDICAL & SURGICAL HISTORY:  Hypertension    Diabetes    High cholesterol    Primary osteoarthritis of both knees    Coronary artery disease of native artery of native heart with stable angina pectoris    Allergic bronchitis    Diabetic retinopathy    Oral cancer    SCC (squamous cell carcinoma)    Metastatic cancer    Recurrent disease    Edema of extremity    Hyponatremia    Microalbuminuria    Neuralgia    Obstructive sleep apnea, adult    Vitamin D deficiency    S/P knee replacement  b/l    S/P hernia repair  b/l    Status post cataract extraction and insertion of intraocular lens, unspecified laterality  b/l    History of surgery  On 9/10/20 he underwent right hemiglossectomy and partial neck dissection with Dr. Darinel Servin at [facility].    03-02 @ 07:01  -  03-03 @ 07:00  --------------------------------------------------------  IN: 600 mL / OUT: 375 mL / NET: 225 mL    Mode: AC/ CMV (Assist Control/ Continuous Mandatory Ventilation)  RR (machine): 14  TV (machine): 450  FiO2: 65  PEEP: 5  ITime: 0.9  MAP: 14  PIP: 40    PHYSICAL EXAM:  GENERAL: NAD, cachectic    HEAD:  Atraumatic, Normocephalic  EYES: EOMI, PERRLA, conjunctiva and sclera clear  ENMT: No tonsillar erythema, exudates  NECK: Supple, No JVD, tracheostomy intact  NERVOUS SYSTEM:  Awake, not following simple commands.   CHEST/LUNG: Diffusely diminished breath sounds, no rhonchi or wheezing. Poor air movement.  HEART: Regular rate and rhythm; No murmurs, rubs, or gallops  ABDOMEN: Soft, Nontender, Nondistended; Bowel sounds present; Peg intact  EXTREMITIES:  +2 edema all extremities  2+ Peripheral Pulses, No clubbing, cyanosis  LYMPH: No lymphadenopathy noted  SKIN: Stage 1 Pressure Injury to the Bilateral Heels, as evident by non-blanchable erythema  Unstageable Pressure Injury to the Coccyx (5cm x 9cm x 2cm) with slough, pink tissue, and drainage Malodorous  Unstageable Pressure Injury to the area under the patients Trach with slough, pink tissue, and scant drainage    LABS:  CBC Full  -  ( 02 Mar 2022 09:35 )  WBC Count : 12.75 K/uL  RBC Count : 3.01 M/uL  Hemoglobin : 8.3 g/dL  Hematocrit : 28.3 %  Platelet Count - Automated : 289 K/uL  Mean Cell Volume : 94.0 fl  Mean Cell Hemoglobin : 27.6 pg  Mean Cell Hemoglobin Concentration : 29.3 gm/dL    03-02    144  |  107  |  69<H>  ----------------------------<  210<H>  4.2   |  31  |  1.04    Ca    11.1<H>      02 Mar 2022 09:35  Phos  3.7     03-02  Mg     2.7     03-02    TPro  6.0  /  Alb  2.5<L>  /  TBili  0.4  /  DBili  x   /  AST  65<H>  /  ALT  23  /  AlkPhos  293<H>  03-02  [  ]  DVT Prophylaxis  [  ]  Nutrition, Brand, Rate         Goal Rate        Abnormal Nutritional Status -  Malnutrition   Cachexia      Morbid Obesity BMI >/=40    RADIOLOGY & ADDITIONAL STUDIES:      < from: Xray Feeding Tube Check SI (03.03.22 @ 09:57) >    ACC: [id number] EXAM:  XR FEEDING TUBE CHECK SIS                          PROCEDURE DATE:  03/03/2022      INTERPRETATION:   TECHNIQUE: A gastrostomy tube check was performed .   Overhead views were obtained following the administration of Gastroview   via gastrostomy tube.    FINDINGS:    Following contrast opacification, a gastrostomy balloon is present within   the body of stomach. There is no evidence of contrast extravasation.    IMPRESSION:  Gastrostomy tube within body of stomach . Noextravasation.    --- End of Report ---    MARIA DEL CARMEN LEUNG MD; Attending Radiologist  This document has been electronically signed. Mar  3 2022  3:25PM    < end of copied text >    Goals of Care Discussion with Family/Proxy/Other -discussed and updated to family at bedside and over the phone

## 2022-03-03 NOTE — PROGRESS NOTE ADULT - COVID-19 NEGATIVE LAB RESULT
COVID-19 ruled out

## 2022-03-03 NOTE — PROGRESS NOTE ADULT - SUBJECTIVE AND OBJECTIVE BOX
Time of Visit:  Patient seen and examined.     MEDICATIONS  (STANDING):  ascorbic acid 500 milliGRAM(s) Oral daily  atorvastatin 40 milliGRAM(s) Oral at bedtime  chlorhexidine 0.12% Liquid 15 milliLiter(s) Oral Mucosa every 12 hours  collagenase Ointment 1 Application(s) Topical daily  glucagon  Injectable 1 milliGRAM(s) IntraMuscular once  insulin lispro (ADMELOG) corrective regimen sliding scale   SubCutaneous every 6 hours  linezolid  IVPB      linezolid  IVPB 600 milliGRAM(s) IV Intermittent every 12 hours  multivitamin/minerals/iron Oral Solution (CENTRUM) 15 milliLiter(s) Oral daily  pantoprazole   Suspension 40 milliGRAM(s) Enteral Tube every 12 hours      MEDICATIONS  (PRN):  acetaminophen    Suspension .. 650 milliGRAM(s) Oral every 6 hours PRN Temp greater or equal to 38C (100.4F), Mild Pain (1 - 3)  morphine  - Injectable 1 milliGRAM(s) IV Push every 4 hours PRN respiratory distress       Medications up to date at time of exam.      PHYSICAL EXAMINATION:  Vital Signs Last 24 Hrs  T(C): 36.9 (03 Mar 2022 12:13), Max: 39.5 (02 Mar 2022 18:00)  T(F): 98.4 (03 Mar 2022 12:13), Max: 103.1 (02 Mar 2022 18:00)  HR: 91 (03 Mar 2022 12:13) (91 - 103)  BP: 121/71 (03 Mar 2022 12:13) (109/61 - 121/71)  BP(mean): --  RR: 21 (03 Mar 2022 12:13) (20 - 24)  SpO2: 98% (03 Mar 2022 12:13) (96% - 100%)  Mode: AC/ CMV (Assist Control/ Continuous Mandatory Ventilation)  RR (machine): 14  TV (machine): 450  FiO2: 65  PEEP: 5  ITime: 0.9  MAP: 14  PIP: 40   (if applicable)    General: Unresponsive to verbal stimuli. On Vent support .     HEENT: Head is atraumatic. No nasal tenderness . Mucous membranes are moist.     NECK: Has tracheostomy.     LUNGS: Non labored. On Vent support to AC setting. No use of accessory muscle.      HEART: S1 S2 Regular rate and no click/ rub.     ABDOMEN: Soft, nontender, and nondistended.  has Peg. Active bowel sounds.     : No bladder distention and tenderness.     EXTREMITIES: Total care. Non ambulatory.     NEUROLOGIC: Cognitive impaired.      LABS:                        8.3    12.75 )-----------( 289      ( 02 Mar 2022 09:35 )             28.3     03-02    144  |  107  |  69<H>  ----------------------------<  210<H>  4.2   |  31  |  1.04    Ca    11.1<H>      02 Mar 2022 09:35  Phos  3.7     03-02  Mg     2.7     03-02    TPro  6.0  /  Alb  2.5<L>  /  TBili  0.4  /  DBili  x   /  AST  65<H>  /  ALT  23  /  AlkPhos  293<H>  03-02                        MICROBIOLOGY: (if applicable)    RADIOLOGY & ADDITIONAL STUDIES:  EKG:   CXR:  ECHO:    IMPRESSION: 76y Male PAST MEDICAL & SURGICAL HISTORY:  Hypertension    Diabetes    High cholesterol    Primary osteoarthritis of both knees    Coronary artery disease of native artery of native heart with stable angina pectoris    Allergic bronchitis    Diabetic retinopathy    Oral cancer    SCC (squamous cell carcinoma)    Metastatic cancer    Recurrent disease    Edema of extremity    Hyponatremia    Microalbuminuria    Neuralgia    Obstructive sleep apnea, adult    Vitamin D deficiency    S/P knee replacement  b/l    S/P hernia repair  b/l    Status post cataract extraction and insertion of intraocular lens, unspecified laterality  b/l    History of surgery  On 9/10/20 he underwent right hemiglossectomy and partial neck dissection with Dr. Darinel Servin at [facility].      Impression; This is a 77 Y/O male from [facility] , was sent on 02-13-22 due to Bloody Stools and blood in trach at the Facility . Admitted with Hypovolemic shock due to Acute Anemia secondary to GIB. 02-16-22 s/p EGD and Colonoscopy with no active bleeding. Had hx of MAC Pneumonia, Metastatic SCC Base of Tongue  with s/p Right hemiglossectomy and s/p Chemo/ Radiation. On vent support due to Chronic Hypoxic Respiratory Failure . 02-26-22,02-22-22,02-18-22 Negative PCR for Covid 19.     Suggestion:  O2 saturation 95% on Vent support . Continue Vent setting AC 14  FIO2 65% Peep 5.     Oral care with Chlorhexidine , trach care, suction .      Aspiration precautions with HOB elevation especially during Peg feeding.   On Protonix 40 mg IVP Q 12 Hours . No staff report of bleeding today.    On Linezolid 600 mg IVPB Q 12 hours for Bacteremia, VRE, Pneumonia per ID recommendation .   Time of Visit:  Patient seen and examined.     MEDICATIONS  (STANDING):  ascorbic acid 500 milliGRAM(s) Oral daily  atorvastatin 40 milliGRAM(s) Oral at bedtime  chlorhexidine 0.12% Liquid 15 milliLiter(s) Oral Mucosa every 12 hours  collagenase Ointment 1 Application(s) Topical daily  glucagon  Injectable 1 milliGRAM(s) IntraMuscular once  insulin lispro (ADMELOG) corrective regimen sliding scale   SubCutaneous every 6 hours  linezolid  IVPB      linezolid  IVPB 600 milliGRAM(s) IV Intermittent every 12 hours  multivitamin/minerals/iron Oral Solution (CENTRUM) 15 milliLiter(s) Oral daily  pantoprazole   Suspension 40 milliGRAM(s) Enteral Tube every 12 hours      MEDICATIONS  (PRN):  acetaminophen    Suspension .. 650 milliGRAM(s) Oral every 6 hours PRN Temp greater or equal to 38C (100.4F), Mild Pain (1 - 3)  morphine  - Injectable 1 milliGRAM(s) IV Push every 4 hours PRN respiratory distress       Medications up to date at time of exam.      PHYSICAL EXAMINATION:  Vital Signs Last 24 Hrs  T(C): 36.9 (03 Mar 2022 12:13), Max: 39.5 (02 Mar 2022 18:00)  T(F): 98.4 (03 Mar 2022 12:13), Max: 103.1 (02 Mar 2022 18:00)  HR: 91 (03 Mar 2022 12:13) (91 - 103)  BP: 121/71 (03 Mar 2022 12:13) (109/61 - 121/71)  BP(mean): --  RR: 21 (03 Mar 2022 12:13) (20 - 24)  SpO2: 98% (03 Mar 2022 12:13) (96% - 100%)  Mode: AC/ CMV (Assist Control/ Continuous Mandatory Ventilation)  RR (machine): 14  TV (machine): 450  FiO2: 65  PEEP: 5  ITime: 0.9  MAP: 14  PIP: 40   (if applicable)    General: Unresponsive to verbal stimuli. On Vent support .     HEENT: Head is atraumatic. No nasal tenderness . Mucous membranes are moist.     NECK: Has tracheostomy.     LUNGS: Non labored. On Vent support to AC setting. No use of accessory muscle.      HEART: S1 S2 Regular rate and no click/ rub.     ABDOMEN: Soft, nontender, and nondistended.  has Peg. Active bowel sounds.     : No bladder distention and tenderness.     EXTREMITIES: Total care. Non ambulatory.     NEUROLOGIC: Cognitive impaired.      LABS:                        8.3    12.75 )-----------( 289      ( 02 Mar 2022 09:35 )             28.3     03-02    144  |  107  |  69<H>  ----------------------------<  210<H>  4.2   |  31  |  1.04    Ca    11.1<H>      02 Mar 2022 09:35  Phos  3.7     03-02  Mg     2.7     03-02    TPro  6.0  /  Alb  2.5<L>  /  TBili  0.4  /  DBili  x   /  AST  65<H>  /  ALT  23  /  AlkPhos  293<H>  03-02                        MICROBIOLOGY: (if applicable)    RADIOLOGY & ADDITIONAL STUDIES:  EKG:   CXR:  ECHO:    IMPRESSION: 76y Male PAST MEDICAL & SURGICAL HISTORY:  Hypertension    Diabetes    High cholesterol    Primary osteoarthritis of both knees    Coronary artery disease of native artery of native heart with stable angina pectoris    Allergic bronchitis    Diabetic retinopathy    Oral cancer    SCC (squamous cell carcinoma)    Metastatic cancer    Recurrent disease    Edema of extremity    Hyponatremia    Microalbuminuria    Neuralgia    Obstructive sleep apnea, adult    Vitamin D deficiency    S/P knee replacement  b/l    S/P hernia repair  b/l    Status post cataract extraction and insertion of intraocular lens, unspecified laterality  b/l    History of surgery  On 9/10/20 he underwent right hemiglossectomy and partial neck dissection with Dr. Darinel Servin at [facility].      Impression; This is a 75 Y/O male from [facility] , was sent on 02-13-22 due to Bloody Stools and blood in trach at the Facility . Admitted with Hypovolemic shock due to Acute Anemia secondary to GIB. 02-16-22 s/p EGD and Colonoscopy with no active bleeding. Had hx of MAC Pneumonia, Metastatic SCC Base of Tongue  with s/p Right hemiglossectomy and s/p Chemo/ Radiation. On vent support due to Chronic Hypoxic Respiratory Failure . 02-26-22,02-22-22,02-18-22 Negative PCR for Covid 19.  + VRE bacteremia and pneumonia on antibx as per ID     Suggestion:  - continue vent support   - will not recommend SBT   - Antibx as per ID       - Aspiration precautions with HOB elevation especially during Peg feeding.   - On Protonix 40 mg IVP Q 12 Hours . No staff report of bleeding today.    - On Linezolid 600 mg IVPB Q 12 hours for Bacteremia, VRE, Pneumonia per ID recommendation .      Agree with above assessment and plan as transcribed.

## 2022-03-03 NOTE — CHART NOTE - NSCHARTNOTEFT_GEN_A_CORE
discussed and updated to deven Moses Amaya- [phone]-regarding current condition, not a candidate for weaning vent, pt still requiring FIO2 65%. resumed feeding after xray confirm for placement. all questions answered. no further questions vocied this time.

## 2022-03-03 NOTE — PROGRESS NOTE ADULT - SUBJECTIVE AND OBJECTIVE BOX
Patient is a 76y old  Male who presents with a chief complaint of blood in stool (02 Mar 2022 11:06)/hypovolemic shock/GIB/sepsis/VRE bacteremia/ESBL PNA, start Zyvox , continue supportive care      INTERVAL HPI/OVERNIGHT EVENTS:  T(C): 35.6 (03-03-22 @ 05:16), Max: 39.5 (03-02-22 @ 18:00)  HR: 93 (03-03-22 @ 05:16) (88 - 114)  BP: 117/68 (03-03-22 @ 05:16) (109/61 - 128/71)  RR: 20 (03-03-22 @ 05:16) (14 - 24)  SpO2: 100% (03-03-22 @ 05:16) (95% - 100%)  Wt(kg): --    LABS:                        8.3    12.75 )-----------( 289      ( 02 Mar 2022 09:35 )             28.3     03-02    144  |  107  |  69<H>  ----------------------------<  210<H>  4.2   |  31  |  1.04    Ca    11.1<H>      02 Mar 2022 09:35  Phos  3.7     03-02  Mg     2.7     03-02    TPro  6.0  /  Alb  2.5<L>  /  TBili  0.4  /  DBili  x   /  AST  65<H>  /  ALT  23  /  AlkPhos  293<H>  03-02        CAPILLARY BLOOD GLUCOSE      POCT Blood Glucose.: 276 mg/dL (03 Mar 2022 05:54)  POCT Blood Glucose.: 225 mg/dL (02 Mar 2022 23:48)  POCT Blood Glucose.: 200 mg/dL (02 Mar 2022 20:46)  POCT Blood Glucose.: 187 mg/dL (02 Mar 2022 16:54)  POCT Blood Glucose.: 192 mg/dL (02 Mar 2022 12:00)        RADIOLOGY & ADDITIONAL TESTS:    Consultant(s) Notes Reviewed:  [x ] YES  [ ] NO    PHYSICAL EXAM:  GENERAL: well built, well nourished  HEAD:  Atraumatic, Normocephalic  EYES: EOMI, PERRLA, conjunctiva and sclera clear  ENT: No tonsillar erythema, exudates, or enlargement; Moist mucous membranes, Good dentition, No lesions  NECK: Supple, No JVD, Normal thyroid, no enlarged nodes  NERVOUS SYSTEM:  lethargy  CHEST/LUNG: B/L good air entry; positive  rales, rhonchi, or wheezing  HEART: S1S2 normal, no S3, Regular rate and rhythm; No murmurs, rubs, or gallops  ABDOMEN: Soft, Nontender, Nondistended; Bowel sounds present  EXTREMITIES:  2+ Peripheral Pulses, No clubbing, cyanosis, B/L arm  edema  LYMPH: No lymphadenopathy noted  SKIN: sacrum D/U    Care Discussed with Consultants/Other Providers [ x] YES  [ ] NO

## 2022-03-03 NOTE — CHART NOTE - NSCHARTNOTEFT_GEN_A_CORE
EVENT: Paged by RN, pt with urinary retention. Bladder scan revealed approx >450 cc of urine retained in bladder.     HPI:  76-year-old male former smoker (3 pack year, quit ~1972) with PMH HTN, HLD, DM2, CAD s/p stents (most recent cardiac cath 2019), MAC pneumonia, metastatic SCC base of tongue 8/2020 s/p resection s/p chemoradiation. Recent hospitalization (12/20/2021- 1/28/22) notable for cardiac arrest, aspiration pna, gastric ulcer s/p clipping and ventilator dependent respiratory failure s/p Trach/ PEG discharged to NH. BIBEMS from NH (2/13/22) for bloody stools and blood in trach noted at the facility. Patient admitted to  for hypovolemic shock 2/2 acute anemia 2/2 GIB . s/p 2 untis PRBCS. Patient requiring higher level of care and sent to ICU for hypotension requiring pressors. Patient down graded to  for further management once hemodynamically stable. Patient underwent a EGD and colonoscopy no active bleeding noted.     2/18- Febrile overnight- T-max 101.5, lactate 2.2.  CXR w/ increased infiltrates.  Pan cx including sputum, IV hydration, Vanc and Cefepime for PNA.  2/21 Klebsiella PNA- ESBL sensitive to Meropenem, no active bleeding  2/22 Hemoglobin 7.3, afebrile, transfusing 1 unit PRBC today.   2/24  Febrile Tmax 101.7  2/27   Febrile overnight. BC sent  2/28   Increasing oxygen requirements. FIO2 increased to 60% overnight. Albumin 25% followed by lasix started for 24 hours  3/2  Worsening CXR and increasing oxygen requirements. Febrile BC positive.    SUBJECTIVE: Unable to assess due to mental status. Trach to vent.     OBJECTIVE:  Vital Signs Last 24 Hrs  T(C): 36.7 (02 Mar 2022 21:02), Max: 39.5 (02 Mar 2022 18:00)  T(F): 98.1 (02 Mar 2022 21:02), Max: 103.1 (02 Mar 2022 18:00)  HR: 93 (03 Mar 2022 05:16) (84 - 114)  BP: 117/68 (03 Mar 2022 05:16) (109/61 - 128/71)  BP(mean): --  RR: 20 (03 Mar 2022 05:16) (14 - 24)  SpO2: 100% (03 Mar 2022 05:16) (90% - 100%)    PHYSICAL EXAM:  GENERAL: NAD, cachectic +temporal waisting  HEAD:  Atraumatic, Normocephalic  EYES: EOMI, PERRLA, conjunctiva and sclera clear  ENMT: No tonsillar erythema, exudates  NECK: Supple, No JVD, tracheostomy intact  NERVOUS SYSTEM:  Awake but lethargic. Intermittently following simple commands.  CHEST/LUNG: Diffusely diminished breath sounds +crackles bilaterally. Poor air movement.  HEART: Regular rate and rhythm; No murmurs, rubs, or gallops  ABDOMEN: Soft, Nontender, Nondistended; Bowel sounds present; Peg intact  EXTREMITIES:  +2 edema all extremities  2+ Peripheral Pulses, No clubbing, cyanosis  LYMPH: No lymphadenopathy noted  SKIN: Stage 1 Pressure Injury to the Bilateral Heels, as evident by non-blanchable erythema  Unstageable Pressure Injury to the Coccyx (5cm x 9cm x 2cm) with slough, pink tissue, and drainage Malodorous  Unstageable Pressure Injury to the area under the patients Trach with slough, pink tissue, and scant drainage    LABS:                        8.3    12.75 )-----------( 289      ( 02 Mar 2022 09:35 )             28.3     03-02    144  |  107  |  69<H>  ----------------------------<  210<H>  4.2   |  31  |  1.04    Ca    11.1<H>      02 Mar 2022 09:35  Phos  3.7     03-02  Mg     2.7     03-02    TPro  6.0  /  Alb  2.5<L>  /  TBili  0.4  /  DBili  x   /  AST  65<H>  /  ALT  23  /  AlkPhos  293<H>  03-02        EKG:   IMAGING:    ASSESSMENT/PROBLEM: Urinary retention    PLAN:     -Straight cath x 1  -Monitor UO, bladder scan q6  -Continue current/supportive care    FOLLOW UP / RESULT:     -Plan as above

## 2022-03-03 NOTE — PROGRESS NOTE ADULT - PROBLEM SELECTOR PLAN 3
BC positive Gm+ Pairs and chains. VRE+ on blood culture.  Will repeat BC tomorrow morning.  Mild leukocytosis, fevers.  Zyvox 600mg IVPB every 12 hours ordered.  Bcx 3/1-VRE  f/u repeat Bcx form 3/2.  ID dr. Nunez

## 2022-03-03 NOTE — PROGRESS NOTE ADULT - PROBLEM SELECTOR PLAN 4
Likely lower GIB. Now resolved.  No signs of bleeding overnight  S/P EGD and colonoscopy 2/16 ->small diverticulum to mid esophagus, prior clip in fundus seen, small area of shallow ulceration adjacent to clip w/o stigmata of bleeding noted. no active bleeding. Normal duodenum. Colonoscopy showed some red tinged fluid distally but none seen at transverse level. No active bleeding, small hemorrhoids.   Continue PPI BID  GI Dr Núñez following.

## 2022-03-03 NOTE — PROGRESS NOTE ADULT - NUTRITIONAL ASSESSMENT
This patient has been assessed with a concern for Malnutrition and has been determined to have a diagnosis/diagnoses of Severe protein-calorie malnutrition.    This patient is being managed with:   Diet NPO with Tube Feed-  Tube Feeding Modality: Gastrostomy  Glucerna 1.5 Dhruv  Total Volume for 24 Hours (mL): 960  Continuous  Starting Tube Feed Rate {mL per Hour}: 30  Increase Tube Feed Rate by (mL): 10     Every 6 hours  Until Goal Tube Feed Rate (mL per Hour): 40  Tube Feed Duration (in Hours): 24  Tube Feed Start Time: 11:00  No Carb Prosource TF     Qty per Day:  1 pack /day  Entered: Feb 22 2022 10:57AM

## 2022-03-03 NOTE — PROGRESS NOTE ADULT - PROBLEM SELECTOR PLAN 11
Continue tube feeds and supplementation.  Formula changed back to Jevity 1.5  with 1 packet prosource daily  Peg tube dislodged. GI Dr Atkinson called for consult.

## 2022-03-03 NOTE — PROVIDER CONTACT NOTE (CRITICAL VALUE NOTIFICATION) - PERSON GIVING RESULT:
Lab
noa Amaral
MELANI Lezama
SINAI Cobb
Fabio/ [facility] lab
Joseph
[facility]; Anneliese hercules
Lab

## 2022-03-04 NOTE — PROGRESS NOTE ADULT - PROBLEM SELECTOR PLAN 11
Continue tube feeds and supplementation.  Peg tube confirmed via imaging 3/3  Tolerating TF today, continue to monitor site   GI Dr Atkinson called for consult. Continue tube feeds and supplementation.  Peg tube confirmed via imaging 3/3  Scant leakage at PEG site this AM resolved with lower rate of TF @25ml/hr, continue to monitor site   GI Dr Atkinson called for consult.

## 2022-03-04 NOTE — PROGRESS NOTE ADULT - NSPROGADDITIONALINFOA_GEN_ALL_CORE
Freedman placed as per Dr Shore for strict I/Os. Daughter at bedside requesting approval for supplemental powder; email chain initiated by RN Manager to pharmacy and dietician; spoke with dietician Belen for confirmation and daughter updated. Freedman placed as per Dr Shore for strict I/Os. Daughter at bedside requesting approval for supplemental powder; email chain initiated by RN Manager to pharmacy and dietician; spoke with dietician Belen for confirmation plan to hold on administering at this time, daughter updated and verbalized understanding.

## 2022-03-04 NOTE — PROGRESS NOTE ADULT - PROBLEM SELECTOR PLAN 5
BC positive VRE+    follow up pending culture data    trend WBC/fever  continue Zyvox    ID dr. Nunez following

## 2022-03-04 NOTE — PROGRESS NOTE ADULT - ASSESSMENT
Fevers  Bacteremia - with VRE - repeat culture are negative  Leukocytosis   Pneumonia    Plan - Continue Zyvox 600mg iv q12  Overall prognosis is very poor and family is aware of this.    Fevers  Bacteremia - with VRE - repeat cultures are negative  Leukocytosis   Pneumonia    Plan - Continue Zyvox 600mg iv q12  Overall prognosis is very poor and family is aware of this.    Fevers  Bacteremia - with VRE - repeat cultures are negative  Leukocytosis   Pneumonia    Plan - Continue Zyvox 600mg iv q12, will need a total of 4 weeks  Overall prognosis is very poor and family is aware of this.    Fevers  Bacteremia - with VRE - repeat cultures are negative  Leukocytosis   Pneumonia    Plan - Continue Zyvox 600mg iv q12, will need a total of 4 weeks post 1st negative blood culture.  Overall prognosis is very poor and family is aware of this.     I agree with above

## 2022-03-04 NOTE — PROGRESS NOTE ADULT - ASSESSMENT
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 Recent hospitalization (12/20/2021- 1/28/22) notable for cardiac arrest, aspiration pna, gastric ulcer s/p clipping and ventilator dependent respiratory failure s/p Trach/ PEG discharged to NH. BIBEMS from NH (2/13/22) for bloody stools and blood in trach noted at the facility. Patient admitted to  for hypovolemic shock 2/2 acute anemia 2/2 GIB s/p 2 untis PRBCS. Patient requiring higher level of care and sent to ICU for hypotension requiring pressors. Patient underwent  EGD and colonoscopy no active bleeding noted. Patient down graded to  for further management 2/16. SCU course notable for fevers found to have Klebsiella ESBL PNA and VRE bacteremia with ID following started on linezolid (3/3), worsening chest xray and increasing FiO2 requirements despite diuresis.

## 2022-03-04 NOTE — PROGRESS NOTE ADULT - PROBLEM SELECTOR PLAN 2
Sputum Cx Klebsiella pneumoniae.- ESBL  Completed meropenem, now on linezolid  CXR worsening.  ID dr. Nunez following.

## 2022-03-04 NOTE — PROGRESS NOTE ADULT - SUBJECTIVE AND OBJECTIVE BOX
Patient is a 76y old  Male who presents with a chief complaint of blood in stool (04 Mar 2022 08:18)/hypovolemic shock/GIB/blood transfusion/VRE bacteremia/ESBL PNA, continue zyvox/support care      INTERVAL HPI/OVERNIGHT EVENTS:  T(C): 36.1 (03-04-22 @ 04:57), Max: 36.9 (03-03-22 @ 12:13)  HR: 107 (03-04-22 @ 04:57) (91 - 107)  BP: 113/71 (03-04-22 @ 04:57) (113/71 - 121/71)  RR: 21 (03-04-22 @ 04:57) (21 - 21)  SpO2: 94% (03-04-22 @ 08:42) (94% - 98%)  Wt(kg): --    LABS:                        8.8    14.14 )-----------( 289      ( 04 Mar 2022 07:08 )             29.5     03-04    140  |  102  |  79<H>  ----------------------------<  170<H>  4.9   |  30  |  1.37<H>    Ca    10.4      04 Mar 2022 07:08    TPro  5.8<L>  /  Alb  2.2<L>  /  TBili  0.5  /  DBili  x   /  AST  97<H>  /  ALT  28  /  AlkPhos  385<H>  03-04        CAPILLARY BLOOD GLUCOSE      POCT Blood Glucose.: 183 mg/dL (04 Mar 2022 05:49)  POCT Blood Glucose.: 212 mg/dL (03 Mar 2022 23:42)  POCT Blood Glucose.: 197 mg/dL (03 Mar 2022 16:48)  POCT Blood Glucose.: 195 mg/dL (03 Mar 2022 11:36)        RADIOLOGY & ADDITIONAL TESTS:    Consultant(s) Notes Reviewed:  [x ] YES  [ ] NO    PHYSICAL EXAM:  GENERAL: well built, well nourished  HEAD:  Atraumatic, Normocephalic  EYES: EOMI, PERRLA, conjunctiva and sclera clear  ENT: No tonsillar erythema, exudates, or enlargement; Moist mucous membranes, Good dentition, No lesions  NECK: Supple, No JVD, Normal thyroid, no enlarged nodes, trach  NERVOUS SYSTEM:  lethargy  CHEST/LUNG: B/L good air entry; No rales, positive rhonchi, or wheezing  HEART: S1S2 normal, no S3, Regular rate and rhythm; No murmurs, rubs, or gallops  ABDOMEN: Soft, Nontender, Nondistended; Bowel sounds present, peg tube in place  EXTREMITIES:  2+ Peripheral Pulses, No clubbing, cyanosis, B/L arm positive  edema  LYMPH: No lymphadenopathy noted  SKIN: sacrum D/U    Care Discussed with Consultants/Other Providers [ x] YES  [ ] NO

## 2022-03-04 NOTE — PROGRESS NOTE ADULT - PROBLEM SELECTOR PLAN 8
Controlled.  Continue sliding scale coverage.  Monitor glucose Continue sliding scale coverage.  Monitor glucose for euglycemia

## 2022-03-04 NOTE — PROGRESS NOTE ADULT - PROBLEM SELECTOR PLAN 6
Secondary to GIB  last transfused 1U PRBC 2/22  trend daily CBCs.  Continue multivit with iron, ascorbic acid

## 2022-03-04 NOTE — PROGRESS NOTE ADULT - PROBLEM SELECTOR PLAN 4
Secondary to acute GIB. Now resolved.  initially received 2U PRBC, last transfused 1 unit PRBCs 2/22 with stable H/H  BP stable off midodrine, continue to trend

## 2022-03-04 NOTE — PROGRESS NOTE ADULT - PROBLEM SELECTOR PLAN 1
Continue mechanical ventilation increasing FiO2 requirements precludes weaning  Continue to monitor oxygen saturation.  Repeat CXR shows worsening infiltrates despite albumin/lasix (LD 3/2)

## 2022-03-04 NOTE — CHART NOTE - NSCHARTNOTEFT_GEN_A_CORE
Patient son, daughter, other family members on phone conversation initiated by son updated; allowed for and answered all questions to the best of my ability. Daughter at bedside updated x2; allowed for and answered all questions to the best of my ability.

## 2022-03-04 NOTE — CHART NOTE - NSCHARTNOTEFT_GEN_A_CORE
Assessment:   76yMalePatient is a 76y old  Male who presents with a chief complaint of blood in stool (04 Mar 2022 11:30)      Factors impacting intake: [ ] none [ ] nausea  [ ] vomiting [ ] diarrhea [ ] constipation  [ ]chewing problems [ ] swallowing issues  [ x] other: asked by NP To see patient as family requesting To add MCT oil To tube feeding . Patient currently receiving glucerna 1.5 at 25ml/hx24( and No carbohydrate prosource at 1 packet/d and tolerating per RN. remains ventilator dependent via trach. tube feeding provides: (600ml,900kcal,49g  protein, 455ml free water).  provide tube feeding as per goals of care.  patient may not benefit from adding MCT oil at this time. discussed with team.     Diet Presciption: Diet, NPO with Tube Feed:   Tube Feeding Modality: Gastrostomy  Glucerna 1.5 Dhruv  Total Volume for 24 Hours (mL): 960  Continuous  Starting Tube Feed Rate {mL per Hour}: 30  Increase Tube Feed Rate by (mL): 10     Every 6 hours  Until Goal Tube Feed Rate (mL per Hour): 40  Tube Feed Duration (in Hours): 24  Tube Feed Start Time: 11:00  No Carb Prosource TF     Qty per Day:  1 pack /day (02-22-22 @ 10:57)    Intake::  meeting ~50% of  kcal needs and ~70% of protein via tube feeding     Current Weight: none   % Weight Change    Pertinent Medications: MEDICATIONS  (STANDING):  ascorbic acid 500 milliGRAM(s) Oral daily  atorvastatin 40 milliGRAM(s) Oral at bedtime  chlorhexidine 0.12% Liquid 15 milliLiter(s) Oral Mucosa every 12 hours  chlorhexidine 2% Cloths 1 Application(s) Topical <User Schedule>  collagenase Ointment 1 Application(s) Topical daily  glucagon  Injectable 1 milliGRAM(s) IntraMuscular once  insulin lispro (ADMELOG) corrective regimen sliding scale   SubCutaneous every 6 hours  linezolid  IVPB      linezolid  IVPB 600 milliGRAM(s) IV Intermittent every 12 hours  multivitamin/minerals/iron Oral Solution (CENTRUM) 15 milliLiter(s) Oral daily  pantoprazole   Suspension 40 milliGRAM(s) Enteral Tube every 12 hours    MEDICATIONS  (PRN):  acetaminophen    Suspension .. 650 milliGRAM(s) Oral every 6 hours PRN Temp greater or equal to 38C (100.4F), Mild Pain (1 - 3)  morphine  - Injectable 1 milliGRAM(s) IV Push every 4 hours PRN respiratory distress    Pertinent Labs: 03-04 Na140 mmol/L Glu 170 mg/dL<H> K+ 4.9 mmol/L Cr  1.37 mg/dL<H> BUN 79 mg/dL<H> 03-02 Phos 3.7 mg/dL 03-04 Alb 2.2 g/dL<L> 02-14 Chol 88 mg/dL LDL --    HDL 20 mg/dL<L> Trig 183 mg/dL<H>     CAPILLARY BLOOD GLUCOSE      POCT Blood Glucose.: 177 mg/dL (04 Mar 2022 11:10)  POCT Blood Glucose.: 183 mg/dL (04 Mar 2022 05:49)  POCT Blood Glucose.: 212 mg/dL (03 Mar 2022 23:42)    Skin: multiple pressure injuries     Estimated Needs:   [ x] no change since previous assessment  [ ] recalculated:     Previous Nutrition Diagnosis:   [ ] Inadequate Energy Intake [ ]Inadequate Oral Intake [ ] Excessive Energy Intake   [ ] Underweight [ ] Increased Nutrient Needs [ ] Overweight/Obesity   [ ] Altered GI Function [ ] Unintended Weight Loss [ ] Food & Nutrition Related Knowledge Deficit [x ] Malnutrition , severe     Nutrition Diagnosis is [ x] ongoing  [ ] resolved [ ] not applicable     New Nutrition Diagnosis: [ ] not applicable       Interventions:   Recommend  [ ] Change Diet To:  [ ] Nutrition Supplement  [ ] Nutrition Support  [ x] Other: provide tube feeding of glucerna 1.5 as per goals of care . continue with No carbohydrate prosource 1 packet/d as needed.     Monitoring and Evaluation:   [ ] PO intake [ x ] Tolerance to diet prescription [ x ] weights [ x ] labs[ x ] follow up per protocol  [ ] other: Assessment:   76yMalePatient is a 76y old  Male who presents with a chief complaint of blood in stool (04 Mar 2022 11:30)      Factors impacting intake: [ ] none [ ] nausea  [ ] vomiting [ ] diarrhea [ ] constipation  [ ]chewing problems [ ] swallowing issues  [ x] other: asked by NP To see patient as family requesting To add MCT oil To tube feeding . Patient currently receiving glucerna 1.5 at 25ml/hx24   and No carbohydrate prosource at 1 packet/d and tolerating per RN. remains ventilator dependent via trach. tube feeding provides: (600ml,900kcal,49g  protein, 455ml free water).  provide tube feeding as per goals of care.  patient may not benefit from adding MCT oil at this time. discussed with team.     Diet Presciption: Diet, NPO with Tube Feed:   Tube Feeding Modality: Gastrostomy  Glucerna 1.5 Dhruv  Total Volume for 24 Hours (mL): 960  Continuous  Starting Tube Feed Rate {mL per Hour}: 30  Increase Tube Feed Rate by (mL): 10     Every 6 hours  Until Goal Tube Feed Rate (mL per Hour): 40  Tube Feed Duration (in Hours): 24  Tube Feed Start Time: 11:00  No Carb Prosource TF     Qty per Day:  1 pack /day (02-22-22 @ 10:57)    Intake::  meeting ~50% of lower end of  kcal needs and ~70% of lower end of protein needs via tube feeding     Current Weight: none   % Weight Change    Pertinent Medications: MEDICATIONS  (STANDING):  ascorbic acid 500 milliGRAM(s) Oral daily  atorvastatin 40 milliGRAM(s) Oral at bedtime  chlorhexidine 0.12% Liquid 15 milliLiter(s) Oral Mucosa every 12 hours  chlorhexidine 2% Cloths 1 Application(s) Topical <User Schedule>  collagenase Ointment 1 Application(s) Topical daily  glucagon  Injectable 1 milliGRAM(s) IntraMuscular once  insulin lispro (ADMELOG) corrective regimen sliding scale   SubCutaneous every 6 hours  linezolid  IVPB      linezolid  IVPB 600 milliGRAM(s) IV Intermittent every 12 hours  multivitamin/minerals/iron Oral Solution (CENTRUM) 15 milliLiter(s) Oral daily  pantoprazole   Suspension 40 milliGRAM(s) Enteral Tube every 12 hours    MEDICATIONS  (PRN):  acetaminophen    Suspension .. 650 milliGRAM(s) Oral every 6 hours PRN Temp greater or equal to 38C (100.4F), Mild Pain (1 - 3)  morphine  - Injectable 1 milliGRAM(s) IV Push every 4 hours PRN respiratory distress    Pertinent Labs: 03-04 Na140 mmol/L Glu 170 mg/dL<H> K+ 4.9 mmol/L Cr  1.37 mg/dL<H> BUN 79 mg/dL<H> 03-02 Phos 3.7 mg/dL 03-04 Alb 2.2 g/dL<L> 02-14 Chol 88 mg/dL LDL --    HDL 20 mg/dL<L> Trig 183 mg/dL<H>     CAPILLARY BLOOD GLUCOSE      POCT Blood Glucose.: 177 mg/dL (04 Mar 2022 11:10)  POCT Blood Glucose.: 183 mg/dL (04 Mar 2022 05:49)  POCT Blood Glucose.: 212 mg/dL (03 Mar 2022 23:42)    Skin: multiple pressure injuries     Estimated Needs:   [ x] no change since previous assessment  [ ] recalculated:     Previous Nutrition Diagnosis:   [ ] Inadequate Energy Intake [ ]Inadequate Oral Intake [ ] Excessive Energy Intake   [ ] Underweight [ ] Increased Nutrient Needs [ ] Overweight/Obesity   [ ] Altered GI Function [ ] Unintended Weight Loss [ ] Food & Nutrition Related Knowledge Deficit [x ] Malnutrition , severe     Nutrition Diagnosis is [ x] ongoing  [ ] resolved [ ] not applicable     New Nutrition Diagnosis: [ ] not applicable       Interventions:   Recommend  [ ] Change Diet To:  [ ] Nutrition Supplement  [ ] Nutrition Support  [ x] Other: provide tube feeding of glucerna 1.5 as per goals of care . continue with No carbohydrate prosource 1 packet/d as needed.     Monitoring and Evaluation:   [ ] PO intake [ x ] Tolerance to diet prescription [ x ] weights [ x ] labs[ x ] follow up per protocol  [ ] other:

## 2022-03-04 NOTE — PROGRESS NOTE ADULT - SUBJECTIVE AND OBJECTIVE BOX
76y Male is under our care for     REVIEW OF SYSTEMS:  [  ] Not able to elicit  General:	  Chest:	  GI:	  :  Skin:	  Musculoskeletal:	  Neuro:	    MEDS:  linezolid  IVPB      linezolid  IVPB 600 milliGRAM(s) IV Intermittent every 12 hours    ALLERGIES: Allergies    No Known Allergies    Intolerances        VITALS:  Vital Signs Last 24 Hrs  T(C): 36.1 (04 Mar 2022 04:57), Max: 36.9 (03 Mar 2022 12:13)  T(F): 97 (04 Mar 2022 04:57), Max: 98.4 (03 Mar 2022 12:13)  HR: 107 (04 Mar 2022 04:57) (91 - 107)  BP: 113/71 (04 Mar 2022 04:57) (113/71 - 121/71)  BP(mean): --  RR: 21 (04 Mar 2022 04:57) (21 - 21)  SpO2: 94% (04 Mar 2022 08:42) (94% - 98%)      PHYSICAL EXAM:  HEENT:  Neck:  Respiratory:  Cardiovascular:  Gastrointestinal:  Extremities:  Skin:  Ortho:  Neuro:    LABS/DIAGNOSTIC TESTS:                        8.8    14.14 )-----------( 289      ( 04 Mar 2022 07:08 )             29.5     WBC Count: 14.14 K/uL (03-04 @ 07:08)  WBC Count: 12.75 K/uL (03-02 @ 09:35)  WBC Count: 12.68 K/uL (03-01 @ 08:13)  WBC Count: 13.67 K/uL (02-28 @ 06:51)    03-04    140  |  102  |  79<H>  ----------------------------<  170<H>  4.9   |  30  |  1.37<H>    Ca    10.4      04 Mar 2022 07:08    TPro  5.8<L>  /  Alb  2.2<L>  /  TBili  0.5  /  DBili  x   /  AST  97<H>  /  ALT  28  /  AlkPhos  385<H>  03-04      CULTURES:   .Blood Blood  03-03 @ 10:49   No growth to date.  --  --      .Blood Blood  03-02 @ 13:33   No growth to date.  --  --      .Blood Blood  03-01 @ 13:42   Growth in aerobic and anaerobic bottles: Enterococcus faecium (vancomycin  resistant)  See previous culture [id number]  --    Growth in anaerobic bottle: Gram Positive Cocci in Pairs and Chains  Growth in aerobic bottle: Gram Positive Cocci in Pairs and Chains      .Blood Blood-Peripheral  02-27 @ 19:04   No growth to date.  --  Blood Culture PCR  Enterococcus faecium (vancomycin resistant)      .Sputum Sputum  02-18 @ 21:07   Moderate Klebsiella pneumoniae ESBL Multiple Morphological Strains  Normal Respiratory Karla absent  --  Klebsiella pneumoniae ESBL  Klebsiella pneumoniae ESBL      .Blood Blood-Peripheral  02-18 @ 18:39   No Growth Final  --  --      .Blood Blood  01-18 @ 02:51   No Growth Final  --  --      .Blood Blood  01-18 @ 02:50   No Growth Final  --  --      .Sputum Sputum  12-29 @ 18:28   Normal Respiratory Karla present  --    Numerous polymorphonuclear leukocytes per low power field  No squamous epithelial cells per low power field  Few Gram positive cocci in pairs per oil power field      Clean Catch Clean Catch (Midstream)  12-21 @ 04:42   No growth  --  --        RADIOLOGY:  no new studies 76y Male is under our care for pneumonia and bacteremia with VRE.  Patient was seen laying in bed with no acute distress.  Patients repeat blood culture is negative x 2, he remains afebrile with slightly elevated WBC count.    REVIEW OF SYSTEMS:  [ x ] Not able to elicit      MEDS:  linezolid  IVPB      linezolid  IVPB 600 milliGRAM(s) IV Intermittent every 12 hours    ALLERGIES: Allergies    No Known Allergies    Intolerances        VITALS:  Vital Signs Last 24 Hrs  T(C): 36.1 (04 Mar 2022 04:57), Max: 36.9 (03 Mar 2022 12:13)  T(F): 97 (04 Mar 2022 04:57), Max: 98.4 (03 Mar 2022 12:13)  HR: 107 (04 Mar 2022 04:57) (91 - 107)  BP: 113/71 (04 Mar 2022 04:57) (113/71 - 121/71)  BP(mean): --  RR: 21 (04 Mar 2022 04:57) (21 - 21)  SpO2: 94% (04 Mar 2022 08:42) (94% - 98%)      PHYSICAL EXAM:  HEENT: trach+  Neck: supple no LN's   Respiratory: bilateral rhonchi  Cardiovascular: S1 S2 reg no murmurs  Gastrointestinal: +BS with soft, nondistended abdomen; nontender, peg tube in place  : Freedman catheter in place   Extremities: Right hand edema +2  Skin: right facial mass  Ortho: n/a  Neuro: Awake and alert      LABS/DIAGNOSTIC TESTS:                        8.8    14.14 )-----------( 289      ( 04 Mar 2022 07:08 )             29.5     WBC Count: 14.14 K/uL (03-04 @ 07:08)  WBC Count: 12.75 K/uL (03-02 @ 09:35)  WBC Count: 12.68 K/uL (03-01 @ 08:13)  WBC Count: 13.67 K/uL (02-28 @ 06:51)    03-04    140  |  102  |  79<H>  ----------------------------<  170<H>  4.9   |  30  |  1.37<H>    Ca    10.4      04 Mar 2022 07:08    TPro  5.8<L>  /  Alb  2.2<L>  /  TBili  0.5  /  DBili  x   /  AST  97<H>  /  ALT  28  /  AlkPhos  385<H>  03-04      CULTURES:   .Blood Blood  03-03 @ 10:49   No growth to date.  --  --      .Blood Blood  03-02 @ 13:33   No growth to date.  --  --      .Blood Blood  03-01 @ 13:42   Growth in aerobic and anaerobic bottles: Enterococcus faecium (vancomycin  resistant)  See previous culture [id number]  --    Growth in anaerobic bottle: Gram Positive Cocci in Pairs and Chains  Growth in aerobic bottle: Gram Positive Cocci in Pairs and Chains      .Blood Blood-Peripheral  02-27 @ 19:04   No growth to date.  --  Blood Culture PCR  Enterococcus faecium (vancomycin resistant)      .Sputum Sputum  02-18 @ 21:07   Moderate Klebsiella pneumoniae ESBL Multiple Morphological Strains  Normal Respiratory Karla absent  --  Klebsiella pneumoniae ESBL  Klebsiella pneumoniae ESBL      .Blood Blood-Peripheral  02-18 @ 18:39   No Growth Final  --  --      .Blood Blood  01-18 @ 02:51   No Growth Final  --  --      .Blood Blood  01-18 @ 02:50   No Growth Final  --  --      .Sputum Sputum  12-29 @ 18:28   Normal Respiratory Karla present  --    Numerous polymorphonuclear leukocytes per low power field  No squamous epithelial cells per low power field  Few Gram positive cocci in pairs per oil power field      Clean Catch Clean Catch (Midstream)  12-21 @ 04:42   No growth  --  --        RADIOLOGY:  no new studies

## 2022-03-04 NOTE — PROGRESS NOTE ADULT - PROBLEM SELECTOR PLAN 7
Likely lower GIB. Now resolved.  No signs of bleeding    S/P EGD and colonoscopy 2/16 ->small diverticulum to mid esophagus, prior clip in fundus seen, small area of shallow ulceration adjacent to clip w/o stigmata of bleeding noted. no active bleeding. Normal duodenum. Colonoscopy showed some red tinged fluid distally but none seen at transverse level. No active bleeding, small hemorrhoids.  Continue PPI BID  GI Dr Núñez following.

## 2022-03-04 NOTE — PROGRESS NOTE ADULT - ASSESSMENT
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 Recent hospitalization (12/20/2021- 1/28/22) notable for cardiac arrest, aspiration pna, gastric ulcer s/p clipping and ventilator dependent respiratory failure s/p Trach/ PEG discharged to NH. BIBEMS from NH (2/13/22) for bloody stools and blood in trach noted at the facility Adm to ICU for hypovolemic shock 2/2 acute anemia 2/2 GIB, S/P ICU care, S/P blood transfusion, PPI, EGD shoed esophagus ulcer, no active bleeding, S/P colonoscopy , no active bleeding, continue PPI bid, blood culture negative,  ESBL /GNR PNA, complete  IV meropenum course,   suction prn, GI follow up, transfusion if H <7. DVT prophylaxis. H/H stable, still spiked fever  , repeat blood culture positive for VRE, start Zyvox, ID follow up, hypercalcemia, most likely due to metastatic CA. IV hydration. Suction. Prognosis poor, palliative team follow up. Continue supportive care. Continue zyvox/vent support.

## 2022-03-04 NOTE — PROGRESS NOTE ADULT - SUBJECTIVE AND OBJECTIVE BOX
MELISSA JOHNS    SCU progress note    INTERVAL HPI/OVERNIGHT EVENTS: no acute events overnight     DNR [ ]   DNI  [  ]    Covid - 19 PCR: negative 3/4/22    The 4Ms    What Matters Most: see GOC  Age appropriate Medications/Screen for High Risk Medication: Yes  Mentation: see CAM below  Mobility: defer to physical exam    The Confusion Assessment Method (CAM) Diagnostic Algorithm     1: Acute Onset or Fluctuating Course  - Is there evidence of an acute change in mental status from the patient’s baseline? Did the (abnormal) behavior  fluctuate during the day, that is, tend to come and go, or increase and decrease in severity?  [ ] YES [x ] NO     2: Inattention  - Did the patient have difficulty focusing attention, being easily distractible, or having difficulty keeping track of what was being said?  [ ] YES [ x] NO     3: Disorganized thinking  -Was the patient’s thinking disorganized or incoherent, such as rambling or irrelevant conversation, unclear or illogical flow of ideas, or unpredictable switching from subject to subject?  [ ] YES [x ] NO    4: Altered Level of consciousness?  [x ] YES [ ] NO    The diagnosis of delirium by CAM requires the presence of features 1 and 2 and either 3 or 4.    PRESSORS: [ ] YES [ x] NO  linezolid  IVPB      linezolid  IVPB 600 milliGRAM(s) IV Intermittent every 12 hours    Cardiovascular:  Heart Failure  Acute   Acute on Chronic  Chronic         Pulmonary:    Hematalogic:    Other:  acetaminophen    Suspension .. 650 milliGRAM(s) Oral every 6 hours PRN  ascorbic acid 500 milliGRAM(s) Oral daily  atorvastatin 40 milliGRAM(s) Oral at bedtime  chlorhexidine 0.12% Liquid 15 milliLiter(s) Oral Mucosa every 12 hours  chlorhexidine 2% Cloths 1 Application(s) Topical <User Schedule>  collagenase Ointment 1 Application(s) Topical daily  glucagon  Injectable 1 milliGRAM(s) IntraMuscular once  insulin lispro (ADMELOG) corrective regimen sliding scale   SubCutaneous every 6 hours  morphine  - Injectable 1 milliGRAM(s) IV Push every 4 hours PRN  multivitamin/minerals/iron Oral Solution (CENTRUM) 15 milliLiter(s) Oral daily  pantoprazole   Suspension 40 milliGRAM(s) Enteral Tube every 12 hours    acetaminophen    Suspension .. 650 milliGRAM(s) Oral every 6 hours PRN  ascorbic acid 500 milliGRAM(s) Oral daily  atorvastatin 40 milliGRAM(s) Oral at bedtime  chlorhexidine 0.12% Liquid 15 milliLiter(s) Oral Mucosa every 12 hours  chlorhexidine 2% Cloths 1 Application(s) Topical <User Schedule>  collagenase Ointment 1 Application(s) Topical daily  glucagon  Injectable 1 milliGRAM(s) IntraMuscular once  insulin lispro (ADMELOG) corrective regimen sliding scale   SubCutaneous every 6 hours  linezolid  IVPB      linezolid  IVPB 600 milliGRAM(s) IV Intermittent every 12 hours  morphine  - Injectable 1 milliGRAM(s) IV Push every 4 hours PRN  multivitamin/minerals/iron Oral Solution (CENTRUM) 15 milliLiter(s) Oral daily  pantoprazole   Suspension 40 milliGRAM(s) Enteral Tube every 12 hours    Drug Dosing Weight  Height (cm): 175.2 (16 Feb 2022 15:23)  Weight (kg): 78 (13 Feb 2022 18:39)  BMI (kg/m2): 25.4 (16 Feb 2022 15:23)  BSA (m2): 1.94 (16 Feb 2022 15:23)    CENTRAL LINE: [ ] YES [x ] NO  LOCATION:   DATE INSERTED:  REMOVE: [ ] YES [ ] NO  EXPLAIN:    HUTCHINS: [ ] YES [x ] NO    DATE INSERTED:  REMOVE:  [ ] YES [ ] NO  EXPLAIN:    PAST MEDICAL & SURGICAL HISTORY:  Hypertension    Diabetes    High cholesterol    Primary osteoarthritis of both knees    Coronary artery disease of native artery of native heart with stable angina pectoris    Allergic bronchitis    Diabetic retinopathy    Oral cancer    SCC (squamous cell carcinoma)    Metastatic cancer    Recurrent disease    Edema of extremity    Hyponatremia    Microalbuminuria    Neuralgia    Obstructive sleep apnea, adult    Vitamin D deficiency    S/P knee replacement  b/l    S/P hernia repair  b/l    Status post cataract extraction and insertion of intraocular lens, unspecified laterality  b/l    History of surgery  On 9/10/20 he underwent right hemiglossectomy and partial neck dissection with Dr. Darinel Servin at [facility].                  Mode: AC/ CMV (Assist Control/ Continuous Mandatory Ventilation)  RR (machine): 14  TV (machine): 450  FiO2: 65  PEEP: 5  ITime: 0.9  MAP: 11  PIP: 43      PHYSICAL EXAM:    GENERAL: chronically ill appearing male   HEAD:  Atraumatic, Normocephalic  EYES:  conjunctiva and sclera clear  NECK: Supple, trach intact with trach collar   NERVOUS SYSTEM:  awake to verbal stimu;li  CHEST/LUNG: Clear to percussion bilaterally; No rales, rhonchi, wheezing, or rubs  HEART: Regular rate and rhythm; No murmurs, rubs, or gallops  ABDOMEN: Soft, Nontender, Nondistended; Bowel sounds present  EXTREMITIES:  2+ Peripheral Pulses, No clubbing, cyanosis, or edema  LYMPH: No lymphadenopathy noted  SKIN: No rashes or lesions      LABS:  CBC Full  -  ( 04 Mar 2022 07:08 )  WBC Count : 14.14 K/uL  RBC Count : 3.21 M/uL  Hemoglobin : 8.8 g/dL  Hematocrit : 29.5 %  Platelet Count - Automated : 289 K/uL  Mean Cell Volume : 91.9 fl  Mean Cell Hemoglobin : 27.4 pg  Mean Cell Hemoglobin Concentration : 29.8 gm/dL  Auto Neutrophil # : 12.71 K/uL  Auto Lymphocyte # : 0.49 K/uL  Auto Monocyte # : 0.62 K/uL  Auto Eosinophil # : 0.14 K/uL  Auto Basophil # : 0.04 K/uL  Auto Neutrophil % : 89.8 %  Auto Lymphocyte % : 3.5 %  Auto Monocyte % : 4.4 %  Auto Eosinophil % : 1.0 %  Auto Basophil % : 0.3 %    03-04    140  |  102  |  79<H>  ----------------------------<  170<H>  4.9   |  30  |  1.37<H>    Ca    10.4      04 Mar 2022 07:08    TPro  5.8<L>  /  Alb  2.2<L>  /  TBili  0.5  /  DBili  x   /  AST  97<H>  /  ALT  28  /  AlkPhos  385<H>  03-04              [  ]  DVT Prophylaxis  [  ]  Nutrition, Brand, Rate         Goal Rate        Abnormal Nutritional Status -  Malnutrition   Cachexia      Morbid Obesity BMI >/=40    RADIOLOGY & ADDITIONAL STUDIES:  ***    Goals of Care Discussion with Family/Proxy/Other   - see note from/family meeting set up for...     MELISSA JOHNS    SCU progress note    INTERVAL HPI/OVERNIGHT EVENTS: no acute events overnight, patient remains on increased ventilator support, non verbal, only responsive to tactile/painful stimuli    DNR [ ]   DNI  [  ]    Covid - 19 PCR: negative 3/4/22    The 4Ms    What Matters Most: see GO  Age appropriate Medications/Screen for High Risk Medication: Yes  Mentation: see CAM below  Mobility: defer to physical exam    The Confusion Assessment Method (CAM) Diagnostic Algorithm     1: Acute Onset or Fluctuating Course  - Is there evidence of an acute change in mental status from the patient’s baseline? Did the (abnormal) behavior  fluctuate during the day, that is, tend to come and go, or increase and decrease in severity?  [ ] YES [x ] NO     2: Inattention  - Did the patient have difficulty focusing attention, being easily distractible, or having difficulty keeping track of what was being said?  [ ] YES [ x] NO     3: Disorganized thinking  -Was the patient’s thinking disorganized or incoherent, such as rambling or irrelevant conversation, unclear or illogical flow of ideas, or unpredictable switching from subject to subject?  [ ] YES [x ] NO    4: Altered Level of consciousness?  [x ] YES [ ] NO    The diagnosis of delirium by CAM requires the presence of features 1 and 2 and either 3 or 4.    PRESSORS: [ ] YES [ x] NO  linezolid  IVPB      linezolid  IVPB 600 milliGRAM(s) IV Intermittent every 12 hours    Cardiovascular:  Heart Failure  Acute   Acute on Chronic  Chronic         Pulmonary:    Hematalogic:    Other:  acetaminophen    Suspension .. 650 milliGRAM(s) Oral every 6 hours PRN  ascorbic acid 500 milliGRAM(s) Oral daily  atorvastatin 40 milliGRAM(s) Oral at bedtime  chlorhexidine 0.12% Liquid 15 milliLiter(s) Oral Mucosa every 12 hours  chlorhexidine 2% Cloths 1 Application(s) Topical <User Schedule>  collagenase Ointment 1 Application(s) Topical daily  glucagon  Injectable 1 milliGRAM(s) IntraMuscular once  insulin lispro (ADMELOG) corrective regimen sliding scale   SubCutaneous every 6 hours  morphine  - Injectable 1 milliGRAM(s) IV Push every 4 hours PRN  multivitamin/minerals/iron Oral Solution (CENTRUM) 15 milliLiter(s) Oral daily  pantoprazole   Suspension 40 milliGRAM(s) Enteral Tube every 12 hours    acetaminophen    Suspension .. 650 milliGRAM(s) Oral every 6 hours PRN  ascorbic acid 500 milliGRAM(s) Oral daily  atorvastatin 40 milliGRAM(s) Oral at bedtime  chlorhexidine 0.12% Liquid 15 milliLiter(s) Oral Mucosa every 12 hours  chlorhexidine 2% Cloths 1 Application(s) Topical <User Schedule>  collagenase Ointment 1 Application(s) Topical daily  glucagon  Injectable 1 milliGRAM(s) IntraMuscular once  insulin lispro (ADMELOG) corrective regimen sliding scale   SubCutaneous every 6 hours  linezolid  IVPB      linezolid  IVPB 600 milliGRAM(s) IV Intermittent every 12 hours  morphine  - Injectable 1 milliGRAM(s) IV Push every 4 hours PRN  multivitamin/minerals/iron Oral Solution (CENTRUM) 15 milliLiter(s) Oral daily  pantoprazole   Suspension 40 milliGRAM(s) Enteral Tube every 12 hours    Drug Dosing Weight  Height (cm): 175.2 (16 Feb 2022 15:23)  Weight (kg): 78 (13 Feb 2022 18:39)  BMI (kg/m2): 25.4 (16 Feb 2022 15:23)  BSA (m2): 1.94 (16 Feb 2022 15:23)    CENTRAL LINE: [ ] YES [x ] NO  LOCATION:   DATE INSERTED:  REMOVE: [ ] YES [ ] NO  EXPLAIN:    HUTCHINS: [ ] YES [x ] NO    DATE INSERTED:  REMOVE:  [ ] YES [ ] NO  EXPLAIN:    PAST MEDICAL & SURGICAL HISTORY:  Hypertension    Diabetes    High cholesterol    Primary osteoarthritis of both knees    Coronary artery disease of native artery of native heart with stable angina pectoris    Allergic bronchitis    Diabetic retinopathy    Oral cancer    SCC (squamous cell carcinoma)    Metastatic cancer    Recurrent disease    Edema of extremity    Hyponatremia    Microalbuminuria    Neuralgia    Obstructive sleep apnea, adult    Vitamin D deficiency    S/P knee replacement  b/l    S/P hernia repair  b/l    Status post cataract extraction and insertion of intraocular lens, unspecified laterality  b/l    History of surgery  On 9/10/20 he underwent right hemiglossectomy and partial neck dissection with Dr. Darinel Servin at [facility].                  Mode: AC/ CMV (Assist Control/ Continuous Mandatory Ventilation)  RR (machine): 14  TV (machine): 450  FiO2: 65  PEEP: 5  ITime: 0.9  MAP: 11  PIP: 43      PHYSICAL EXAM:    GENERAL: chronically ill appearing male, NAD   HEAD:  Atraumatic, Normocephalic  EYES:  conjunctiva and sclera clear  NECK: Supple, trach intact with trach collar   NERVOUS SYSTEM:  lethargic, awakens to verbal stimuli  CHEST/LUNG: diminished breath sounds throughout with poor air movement   HEART: Regular rate and rhythm   ABDOMEN: Soft, Nontender, bowel sounds present, PEG in situ, scant TF colored drainage noted on dressing  EXTREMITIES:  2+ Peripheral Pulses, No clubbing, cyanosis, or edema  SKIN: see wound care note      LABS:  CBC Full  -  ( 04 Mar 2022 07:08 )  WBC Count : 14.14 K/uL  RBC Count : 3.21 M/uL  Hemoglobin : 8.8 g/dL  Hematocrit : 29.5 %  Platelet Count - Automated : 289 K/uL  Mean Cell Volume : 91.9 fl  Mean Cell Hemoglobin : 27.4 pg  Mean Cell Hemoglobin Concentration : 29.8 gm/dL  Auto Neutrophil # : 12.71 K/uL  Auto Lymphocyte # : 0.49 K/uL  Auto Monocyte # : 0.62 K/uL  Auto Eosinophil # : 0.14 K/uL  Auto Basophil # : 0.04 K/uL  Auto Neutrophil % : 89.8 %  Auto Lymphocyte % : 3.5 %  Auto Monocyte % : 4.4 %  Auto Eosinophil % : 1.0 %  Auto Basophil % : 0.3 %    03-04    140  |  102  |  79<H>  ----------------------------<  170<H>  4.9   |  30  |  1.37<H>    Ca    10.4      04 Mar 2022 07:08    TPro  5.8<L>  /  Alb  2.2<L>  /  TBili  0.5  /  DBili  x   /  AST  97<H>  /  ALT  28  /  AlkPhos  385<H>  03-04              [  ]  DVT Prophylaxis held in setting of admission for bleeding requiring PRBC transfusion  [ x ]  Nutrition following    RADIOLOGY & ADDITIONAL STUDIES:      ACC: [id number] EXAM:  XR FEEDING TUBE CHECK SISC                          PROCEDURE DATE:  03/03/2022      IMPRESSION:  Gastrostomy tube within body of stomach . Noextravasation.    --- End of Report ---      ACC: [id number] EXAM:  XR CHEST PORTABLE URGENT 1V                          PROCEDURE DATE:  03/02/2022      INTERPRETATION:  Chest one view    HISTORY: Respiratory failure and fever    COMPARISON STUDY: 3/1/2022    Frontal expiratory view of thechest shows the heart to be similar in   size. Tracheostomy tube is again noted.    The lungs show similar patchy infiltrates and bilateral pleural   effusions. There is no evidence of pneumothorax.    IMPRESSION:  Similar infiltrates and effusions.        Goals of Care Discussion with Family/Proxy/Other   - see note from 3/2/22, spoke with patient son Moses and daughter Sandi over phone, allowed for and answered all questions     MELISSA JOHNS    SCU progress note    INTERVAL HPI/OVERNIGHT EVENTS: no acute events overnight, patient remains on increased ventilator support precluding weaning, non verbal, only responsive to tactile/painful stimuli    DNR [ ]   DNI  [  ]    Covid - 19 PCR: negative 3/4/22    The 4Ms    What Matters Most: see GOC  Age appropriate Medications/Screen for High Risk Medication: Yes  Mentation: see CAM below  Mobility: defer to physical exam    The Confusion Assessment Method (CAM) Diagnostic Algorithm     1: Acute Onset or Fluctuating Course  - Is there evidence of an acute change in mental status from the patient’s baseline? Did the (abnormal) behavior  fluctuate during the day, that is, tend to come and go, or increase and decrease in severity?  [ ] YES [x ] NO     2: Inattention  - Did the patient have difficulty focusing attention, being easily distractible, or having difficulty keeping track of what was being said?  [ ] YES [ x] NO     3: Disorganized thinking  -Was the patient’s thinking disorganized or incoherent, such as rambling or irrelevant conversation, unclear or illogical flow of ideas, or unpredictable switching from subject to subject?  [ ] YES [x ] NO    4: Altered Level of consciousness?  [x ] YES [ ] NO    The diagnosis of delirium by CAM requires the presence of features 1 and 2 and either 3 or 4.    PRESSORS: [ ] YES [ x] NO  linezolid  IVPB      linezolid  IVPB 600 milliGRAM(s) IV Intermittent every 12 hours    Cardiovascular:  Heart Failure  Acute   Acute on Chronic  Chronic         Pulmonary:    Hematalogic:    Other:  acetaminophen    Suspension .. 650 milliGRAM(s) Oral every 6 hours PRN  ascorbic acid 500 milliGRAM(s) Oral daily  atorvastatin 40 milliGRAM(s) Oral at bedtime  chlorhexidine 0.12% Liquid 15 milliLiter(s) Oral Mucosa every 12 hours  chlorhexidine 2% Cloths 1 Application(s) Topical <User Schedule>  collagenase Ointment 1 Application(s) Topical daily  glucagon  Injectable 1 milliGRAM(s) IntraMuscular once  insulin lispro (ADMELOG) corrective regimen sliding scale   SubCutaneous every 6 hours  morphine  - Injectable 1 milliGRAM(s) IV Push every 4 hours PRN  multivitamin/minerals/iron Oral Solution (CENTRUM) 15 milliLiter(s) Oral daily  pantoprazole   Suspension 40 milliGRAM(s) Enteral Tube every 12 hours    acetaminophen    Suspension .. 650 milliGRAM(s) Oral every 6 hours PRN  ascorbic acid 500 milliGRAM(s) Oral daily  atorvastatin 40 milliGRAM(s) Oral at bedtime  chlorhexidine 0.12% Liquid 15 milliLiter(s) Oral Mucosa every 12 hours  chlorhexidine 2% Cloths 1 Application(s) Topical <User Schedule>  collagenase Ointment 1 Application(s) Topical daily  glucagon  Injectable 1 milliGRAM(s) IntraMuscular once  insulin lispro (ADMELOG) corrective regimen sliding scale   SubCutaneous every 6 hours  linezolid  IVPB      linezolid  IVPB 600 milliGRAM(s) IV Intermittent every 12 hours  morphine  - Injectable 1 milliGRAM(s) IV Push every 4 hours PRN  multivitamin/minerals/iron Oral Solution (CENTRUM) 15 milliLiter(s) Oral daily  pantoprazole   Suspension 40 milliGRAM(s) Enteral Tube every 12 hours    Drug Dosing Weight  Height (cm): 175.2 (16 Feb 2022 15:23)  Weight (kg): 78 (13 Feb 2022 18:39)  BMI (kg/m2): 25.4 (16 Feb 2022 15:23)  BSA (m2): 1.94 (16 Feb 2022 15:23)    CENTRAL LINE: [ ] YES [x ] NO  LOCATION:   DATE INSERTED:  REMOVE: [ ] YES [ ] NO  EXPLAIN:    HUTCHINS: [ ] YES [x ] NO    DATE INSERTED:  REMOVE:  [ ] YES [ ] NO  EXPLAIN:    PAST MEDICAL & SURGICAL HISTORY:  Hypertension    Diabetes    High cholesterol    Primary osteoarthritis of both knees    Coronary artery disease of native artery of native heart with stable angina pectoris    Allergic bronchitis    Diabetic retinopathy    Oral cancer    SCC (squamous cell carcinoma)    Metastatic cancer    Recurrent disease    Edema of extremity    Hyponatremia    Microalbuminuria    Neuralgia    Obstructive sleep apnea, adult    Vitamin D deficiency    S/P knee replacement  b/l    S/P hernia repair  b/l    Status post cataract extraction and insertion of intraocular lens, unspecified laterality  b/l    History of surgery  On 9/10/20 he underwent right hemiglossectomy and partial neck dissection with Dr. Darinel Servin at [facility].                  Mode: AC/ CMV (Assist Control/ Continuous Mandatory Ventilation)  RR (machine): 14  TV (machine): 450  FiO2: 65  PEEP: 5  ITime: 0.9  MAP: 11  PIP: 43      PHYSICAL EXAM:    GENERAL: chronically ill appearing male, NAD   HEAD:  Atraumatic, Normocephalic  EYES:  conjunctiva and sclera clear  NECK: Supple, trach intact with trach collar   NERVOUS SYSTEM:  lethargic, awakens to verbal stimuli  CHEST/LUNG: diminished breath sounds throughout with poor air movement   HEART: Regular rate and rhythm   ABDOMEN: Soft, Nontender, bowel sounds present, PEG in situ, scant TF colored drainage noted on dressing  EXTREMITIES:  2+ Peripheral Pulses, No clubbing, cyanosis, or edema  SKIN: see wound care note      LABS:  CBC Full  -  ( 04 Mar 2022 07:08 )  WBC Count : 14.14 K/uL  RBC Count : 3.21 M/uL  Hemoglobin : 8.8 g/dL  Hematocrit : 29.5 %  Platelet Count - Automated : 289 K/uL  Mean Cell Volume : 91.9 fl  Mean Cell Hemoglobin : 27.4 pg  Mean Cell Hemoglobin Concentration : 29.8 gm/dL  Auto Neutrophil # : 12.71 K/uL  Auto Lymphocyte # : 0.49 K/uL  Auto Monocyte # : 0.62 K/uL  Auto Eosinophil # : 0.14 K/uL  Auto Basophil # : 0.04 K/uL  Auto Neutrophil % : 89.8 %  Auto Lymphocyte % : 3.5 %  Auto Monocyte % : 4.4 %  Auto Eosinophil % : 1.0 %  Auto Basophil % : 0.3 %    03-04    140  |  102  |  79<H>  ----------------------------<  170<H>  4.9   |  30  |  1.37<H>    Ca    10.4      04 Mar 2022 07:08    TPro  5.8<L>  /  Alb  2.2<L>  /  TBili  0.5  /  DBili  x   /  AST  97<H>  /  ALT  28  /  AlkPhos  385<H>  03-04              [  ]  DVT Prophylaxis held in setting of admission for bleeding requiring PRBC transfusion  [ x ]  Nutrition following    RADIOLOGY & ADDITIONAL STUDIES:      ACC: [id number] EXAM:  XR FEEDING TUBE CHECK SISC                          PROCEDURE DATE:  03/03/2022      IMPRESSION:  Gastrostomy tube within body of stomach . Noextravasation.    --- End of Report ---      ACC: [id number] EXAM:  XR CHEST PORTABLE URGENT 1V                          PROCEDURE DATE:  03/02/2022      INTERPRETATION:  Chest one view    HISTORY: Respiratory failure and fever    COMPARISON STUDY: 3/1/2022    Frontal expiratory view of thechest shows the heart to be similar in   size. Tracheostomy tube is again noted.    The lungs show similar patchy infiltrates and bilateral pleural   effusions. There is no evidence of pneumothorax.    IMPRESSION:  Similar infiltrates and effusions.        Goals of Care Discussion with Family/Proxy/Other   - see note from 3/2/22, spoke with patient son Moses and daughter Sandi over phone, allowed for and answered all questions

## 2022-03-04 NOTE — PROGRESS NOTE ADULT - PROBLEM SELECTOR PLAN 12
Functional Quadriplegia. Passive ROM exercises daily as tolerated. Turn and position every 2 hours.  Continue GI prophylaxis  SCB boots for DVT prophylaxis (holding systemic AC in setting of admission for bleeding requiring transfusion)  BC positive for Gm_ Pairs and chains.  ID f/u  Currently in multisystem failure. Palliative F/U  Overall prognosis is grim.

## 2022-03-05 NOTE — PROGRESS NOTE ADULT - SUBJECTIVE AND OBJECTIVE BOX
76y Male is under our care for fevers, bacteremia with VRE, leukocytosis, and pneumonia. Patient is in same disposition with no overnight events. Has not had any fevers for almost 3 days and wbc count has decreased. Repeat BC are negative     MEDS:  linezolid  IVPB 600 milliGRAM(s) IV Intermittent every 12 hours    ALLERGIES: Allergies    No Known Allergies    Intolerances    REVIEW OF SYSTEMS:  [ X ] Not able to illicit  General:	  Chest:	  GI:	  :  Skin:	  Musculoskeletal:	  Neuro:	    VITALS:  Vital Signs Last 24 Hrs  T(C): 36.6 (05 Mar 2022 13:28), Max: 36.9 (05 Mar 2022 06:01)  T(F): 97.8 (05 Mar 2022 13:28), Max: 98.4 (05 Mar 2022 06:01)  HR: 98 (05 Mar 2022 13:28) (94 - 104)  BP: 124/94 (05 Mar 2022 13:28) (95/56 - 124/94)  BP(mean): --  RR: 20 (05 Mar 2022 13:28) (20 - 24)  SpO2: 98% (05 Mar 2022 13:28) (95% - 100%)    PHYSICAL EXAM:  HEENT: +vent via trach  Neck: supple no LN's   Respiratory: bilateral rhoncherous lung sounds +audible congestion  Cardiovascular: S1 S2 reg no murmurs  Gastrointestinal: +BS with soft, nondistended abdomen; nontender, +peg +domingo  : Domingo catheter in place   Extremities: Right hand edema +2  Skin: right facial mass  Ortho: n/a  Neuro: Awake and alert    LABS/DIAGNOSTIC TESTS:                        7.8    12.14 )-----------( 268      ( 05 Mar 2022 06:55 )             25.6     WBC Count: 12.14 K/uL (03-05 @ 06:55)  WBC Count: 14.14 K/uL (03-04 @ 07:08)  WBC Count: 12.75 K/uL (03-02 @ 09:35)  WBC Count: 12.68 K/uL (03-01 @ 08:13)    03-05    135  |  99  |  88<H>  ----------------------------<  228<H>  5.4<H>   |  27  |  1.64<H> 1.37 < 1.04    Ca    9.5      05 Mar 2022 06:55  Phos  4.8     03-05  Mg     2.9     03-05    TPro  5.4<L>  /  Alb  1.7<L>  /  TBili  0.5  /  DBili  x   /  AST  81<H>  /  ALT  28  /  AlkPhos  431<H>  03-05      CULTURES:   .Blood Blood  03-03 @ 10:49   No growth to date.  --  --      .Blood Blood  03-02 @ 13:33   No growth to date.  --  --      .Blood Blood  03-01 @ 13:42   Growth in aerobic and anaerobic bottles: Enterococcus faecium (vancomycin  resistant)  See previous culture [id number]  --    Growth in anaerobic bottle: Gram Positive Cocci in Pairs and Chains  Growth in aerobic bottle: Gram Positive Cocci in Pairs and Chains      .Blood Blood-Peripheral  02-27 @ 19:04   No Growth Final  --  Blood Culture PCR  Enterococcus faecium (vancomycin resistant)      .Sputum Sputum  02-18 @ 21:07   Moderate Klebsiella pneumoniae ESBL Multiple Morphological Strains  Normal Respiratory Karla absent  --  Klebsiella pneumoniae ESBL  Klebsiella pneumoniae ESBL      .Blood Blood-Peripheral  02-18 @ 18:39   No Growth Final  --  --      .Blood Blood  01-18 @ 02:51   No Growth Final  --  --      .Blood Blood  01-18 @ 02:50   No Growth Final  --  --      .Sputum Sputum  12-29 @ 18:28   Normal Respiratory Karla present  --    Numerous polymorphonuclear leukocytes per low power field  No squamous epithelial cells per low power field  Few Gram positive cocci in pairs per oil power field      Clean Catch Clean Catch (Midstream)  12-21 @ 04:42   No growth  --  --        RADIOLOGY:  no new studies

## 2022-03-05 NOTE — CHART NOTE - NSCHARTNOTEFT_GEN_A_CORE
Case discussed with pt's son Moses and daughter MRs. Perez at bedside and and provided them with update regarding pt's current medical condition.  Plan of care discussed and all questions answered.

## 2022-03-05 NOTE — PROGRESS NOTE ADULT - SUBJECTIVE AND OBJECTIVE BOX
Patient is a 76y old  Male who presents with a chief complaint of blood in stool (05 Mar 2022 10:31)      INTERVAL HPI/OVERNIGHT EVENTS:  T(C): 36.6 (03-05-22 @ 13:28), Max: 36.9 (03-05-22 @ 06:01)  HR: 98 (03-05-22 @ 13:28) (94 - 104)  BP: 124/94 (03-05-22 @ 13:28) (95/56 - 124/94)  RR: 20 (03-05-22 @ 13:28) (20 - 24)  SpO2: 98% (03-05-22 @ 13:28) (95% - 100%)  Wt(kg): --  I&O's Summary    04 Mar 2022 07:01  -  05 Mar 2022 07:00  --------------------------------------------------------  IN: 900 mL / OUT: 360 mL / NET: 540 mL    05 Mar 2022 07:01  -  05 Mar 2022 14:23  --------------------------------------------------------  IN: 0 mL / OUT: 1 mL / NET: -1 mL        LABS:                        7.8    12.14 )-----------( 268      ( 05 Mar 2022 06:55 )             25.6     03-05    135  |  99  |  88<H>  ----------------------------<  228<H>  5.4<H>   |  27  |  1.64<H>    Ca    9.5      05 Mar 2022 06:55  Phos  4.8     03-05  Mg     2.9     03-05    TPro  5.4<L>  /  Alb  1.7<L>  /  TBili  0.5  /  DBili  x   /  AST  81<H>  /  ALT  28  /  AlkPhos  431<H>  03-05        CAPILLARY BLOOD GLUCOSE      POCT Blood Glucose.: 171 mg/dL (05 Mar 2022 11:12)  POCT Blood Glucose.: 257 mg/dL (05 Mar 2022 06:20)  POCT Blood Glucose.: 193 mg/dL (04 Mar 2022 23:26)  POCT Blood Glucose.: 194 mg/dL (04 Mar 2022 17:15)        RADIOLOGY & ADDITIONAL TESTS:    Consultant(s) Notes Reviewed:  [x ] YES  [ ] NO    MEDICATIONS  (STANDING):  ascorbic acid 500 milliGRAM(s) Oral daily  atorvastatin 40 milliGRAM(s) Oral at bedtime  chlorhexidine 0.12% Liquid 15 milliLiter(s) Oral Mucosa every 12 hours  chlorhexidine 2% Cloths 1 Application(s) Topical <User Schedule>  collagenase Ointment 1 Application(s) Topical daily  glucagon  Injectable 1 milliGRAM(s) IntraMuscular once  insulin lispro (ADMELOG) corrective regimen sliding scale   SubCutaneous every 6 hours  linezolid  IVPB      linezolid  IVPB 600 milliGRAM(s) IV Intermittent every 12 hours  multivitamin/minerals/iron Oral Solution (CENTRUM) 15 milliLiter(s) Oral daily  pantoprazole   Suspension 40 milliGRAM(s) Enteral Tube every 12 hours  sodium chloride 0.9%. 1000 milliLiter(s) (70 mL/Hr) IV Continuous <Continuous>    MEDICATIONS  (PRN):  acetaminophen    Suspension .. 650 milliGRAM(s) Oral every 6 hours PRN Temp greater or equal to 38C (100.4F), Mild Pain (1 - 3)  morphine  - Injectable 1 milliGRAM(s) IV Push every 4 hours PRN respiratory distress      PHYSICAL EXAM:  GENERAL: Fairly built, Fairly nourished  HEAD:  Atraumatic, Normocephalic  EYES: EOMI, PERRLA, conjunctiva and sclera clear  ENT: Trach collar in placed  NECK: Supple, No JVD, Normal thyroid, no enlarged nodes  NERVOUS SYSTEM:  Alert & Oriented X1 Motor Strength 4/5 B/L upper and lower extremities; DTRs 2+ intact and symmetric, sensory intact  CHEST/LUNG: B/L fair air entry; No rales, rhonchi, or wheezing  HEART: S1S2 normal, no S3, Regular rate and rhythm; No murmurs, rubs, or gallops  ABDOMEN: Soft, Nontender, Nondistended; PEG in placed, Bowel sounds present  EXTREMITIES:  2+ Peripheral Pulses, No clubbing, or cyanosis, Trace edema  LYMPH: No lymphadenopathy noted  SKIN: No rashes or lesions    Care Discussed with Consultants/Other Providers [ x] YES  [ ] NO

## 2022-03-05 NOTE — PROGRESS NOTE ADULT - ASSESSMENT
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     I agree with above

## 2022-03-05 NOTE — PROGRESS NOTE ADULT - SUBJECTIVE AND OBJECTIVE BOX
MELISSA JOHNS    SCU progress note    INTERVAL HPI/OVERNIGHT EVENTS: ***    DNR [ ]   DNI  [  ]    Covid - 19 PCR:     The 4Ms    What Matters Most: see GOC  Age appropriate Medications/Screen for High Risk Medication: Yes  Mentation: see CAM below  Mobility: defer to physical exam    The Confusion Assessment Method (CAM) Diagnostic Algorithm     1: Acute Onset or Fluctuating Course  - Is there evidence of an acute change in mental status from the patient’s baseline? Did the (abnormal) behavior  fluctuate during the day, that is, tend to come and go, or increase and decrease in severity?  [ ] YES [ ] NO     2: Inattention  - Did the patient have difficulty focusing attention, being easily distractible, or having difficulty keeping track of what was being said?  [ ] YES [ ] NO     3: Disorganized thinking  -Was the patient’s thinking disorganized or incoherent, such as rambling or irrelevant conversation, unclear or illogical flow of ideas, or unpredictable switching from subject to subject?  [ ] YES [ ] NO    4: Altered Level of consciousness?  [ ] YES [ ] NO    The diagnosis of delirium by CAM requires the presence of features 1 and 2 and either 3 or 4.    PRESSORS: [ ] YES [ ] NO  linezolid  IVPB      linezolid  IVPB 600 milliGRAM(s) IV Intermittent every 12 hours    Cardiovascular:  Heart Failure  Acute   Acute on Chronic  Chronic         Pulmonary:    Hematalogic:    Other:  acetaminophen    Suspension .. 650 milliGRAM(s) Oral every 6 hours PRN  ascorbic acid 500 milliGRAM(s) Oral daily  atorvastatin 40 milliGRAM(s) Oral at bedtime  chlorhexidine 0.12% Liquid 15 milliLiter(s) Oral Mucosa every 12 hours  chlorhexidine 2% Cloths 1 Application(s) Topical <User Schedule>  collagenase Ointment 1 Application(s) Topical daily  glucagon  Injectable 1 milliGRAM(s) IntraMuscular once  insulin lispro (ADMELOG) corrective regimen sliding scale   SubCutaneous every 6 hours  morphine  - Injectable 1 milliGRAM(s) IV Push every 4 hours PRN  multivitamin/minerals/iron Oral Solution (CENTRUM) 15 milliLiter(s) Oral daily  pantoprazole   Suspension 40 milliGRAM(s) Enteral Tube every 12 hours  sodium chloride 0.9%. 1000 milliLiter(s) IV Continuous <Continuous>  sodium polystyrene sulfonate Suspension 30 Gram(s) Enteral Tube once    acetaminophen    Suspension .. 650 milliGRAM(s) Oral every 6 hours PRN  ascorbic acid 500 milliGRAM(s) Oral daily  atorvastatin 40 milliGRAM(s) Oral at bedtime  chlorhexidine 0.12% Liquid 15 milliLiter(s) Oral Mucosa every 12 hours  chlorhexidine 2% Cloths 1 Application(s) Topical <User Schedule>  collagenase Ointment 1 Application(s) Topical daily  glucagon  Injectable 1 milliGRAM(s) IntraMuscular once  insulin lispro (ADMELOG) corrective regimen sliding scale   SubCutaneous every 6 hours  linezolid  IVPB      linezolid  IVPB 600 milliGRAM(s) IV Intermittent every 12 hours  morphine  - Injectable 1 milliGRAM(s) IV Push every 4 hours PRN  multivitamin/minerals/iron Oral Solution (CENTRUM) 15 milliLiter(s) Oral daily  pantoprazole   Suspension 40 milliGRAM(s) Enteral Tube every 12 hours  sodium chloride 0.9%. 1000 milliLiter(s) IV Continuous <Continuous>  sodium polystyrene sulfonate Suspension 30 Gram(s) Enteral Tube once    Drug Dosing Weight  Height (cm): 175.2 (16 Feb 2022 15:23)  Weight (kg): 78 (13 Feb 2022 18:39)  BMI (kg/m2): 25.4 (16 Feb 2022 15:23)  BSA (m2): 1.94 (16 Feb 2022 15:23)    CENTRAL LINE: [ ] YES [ ] NO  LOCATION:   DATE INSERTED:  REMOVE: [ ] YES [ ] NO  EXPLAIN:    HUTCHINS: [ ] YES [ ] NO    DATE INSERTED:  REMOVE:  [ ] YES [ ] NO  EXPLAIN:    PAST MEDICAL & SURGICAL HISTORY:  Hypertension    Diabetes    High cholesterol    Primary osteoarthritis of both knees    Coronary artery disease of native artery of native heart with stable angina pectoris    Allergic bronchitis    Diabetic retinopathy    Oral cancer    SCC (squamous cell carcinoma)    Metastatic cancer    Recurrent disease    Edema of extremity    Hyponatremia    Microalbuminuria    Neuralgia    Obstructive sleep apnea, adult    Vitamin D deficiency    S/P knee replacement  b/l    S/P hernia repair  b/l    Status post cataract extraction and insertion of intraocular lens, unspecified laterality  b/l    History of surgery  On 9/10/20 he underwent right hemiglossectomy and partial neck dissection with Dr. Darinel Servin at [facility].                03-04 @ 07:01  -  03-05 @ 07:00  --------------------------------------------------------  IN: 900 mL / OUT: 360 mL / NET: 540 mL        Mode: AC/ CMV (Assist Control/ Continuous Mandatory Ventilation)  RR (machine): 14  TV (machine): 450  FiO2: 65  PEEP: 5  ITime: 0.9  MAP: 13  PIP: 49      PHYSICAL EXAM:    GENERAL: NAD, well-groomed, well-developed  HEAD:  Atraumatic, Normocephalic  EYES: EOMI, PERRLA, conjunctiva and sclera clear  ENMT: No tonsillar erythema, exudates, or enlargement; Moist mucous membranes, Good dentition, No lesions  NECK: Supple, No JVD, Normal thyroid  NERVOUS SYSTEM:  Alert & Oriented X3, Good concentration; Motor Strength 5/5 B/L upper and lower extremities; DTRs 2+ intact and symmetric  CHEST/LUNG: Clear to percussion bilaterally; No rales, rhonchi, wheezing, or rubs  HEART: Regular rate and rhythm; No murmurs, rubs, or gallops  ABDOMEN: Soft, Nontender, Nondistended; Bowel sounds present  EXTREMITIES:  2+ Peripheral Pulses, No clubbing, cyanosis, or edema  LYMPH: No lymphadenopathy noted  SKIN: No rashes or lesions      LABS:  CBC Full  -  ( 05 Mar 2022 06:55 )  WBC Count : 12.14 K/uL  RBC Count : 2.85 M/uL  Hemoglobin : 7.8 g/dL  Hematocrit : 25.6 %  Platelet Count - Automated : 268 K/uL  Mean Cell Volume : 89.8 fl  Mean Cell Hemoglobin : 27.4 pg  Mean Cell Hemoglobin Concentration : 30.5 gm/dL  Auto Neutrophil # : x  Auto Lymphocyte # : x  Auto Monocyte # : x  Auto Eosinophil # : x  Auto Basophil # : x  Auto Neutrophil % : x  Auto Lymphocyte % : x  Auto Monocyte % : x  Auto Eosinophil % : x  Auto Basophil % : x    03-05    135  |  99  |  88<H>  ----------------------------<  228<H>  5.4<H>   |  27  |  1.64<H>    Ca    9.5      05 Mar 2022 06:55  Phos  4.8     03-05  Mg     2.9     03-05    TPro  5.4<L>  /  Alb  1.7<L>  /  TBili  0.5  /  DBili  x   /  AST  81<H>  /  ALT  28  /  AlkPhos  431<H>  03-05              [  ]  DVT Prophylaxis  [  ]  Nutrition, Brand, Rate         Goal Rate        Abnormal Nutritional Status -  Malnutrition   Cachexia      Morbid Obesity BMI >/=40    RADIOLOGY & ADDITIONAL STUDIES:  ***    Goals of Care Discussion with Family/Proxy/Other   - see note from/family meeting set up for...     MELISSA JOHNS    SCU progress note    INTERVAL HPI/OVERNIGHT EVENTS: No acute events overnight. Labs w/ uptrend cr and K 5.4.     DNR [X ]   DNI  [  ]- Trach and Vent    Covid - 19 PCR: COVID-19 PCR: NotDetec (04 Mar 2022 04:38)  COVID-19 PCR: NotDetec (26 Feb 2022 11:44)  COVID-19 PCR: NotDetec (22 Feb 2022 11:06)  COVID-19 PCR: NotDetec (18 Feb 2022 12:00)  COVID-19 PCR: NotDetec (13 Feb 2022 18:50)  COVID-19 PCR: NotDetec (26 Jan 2022 08:14)  COVID-19 PCR: NotDetec (18 Jan 2022 10:47)  COVID-19 PCR: NotDetec (14 Jan 2022 06:44)  COVID-19 PCR: NotDetec (08 Jan 2022 08:23)  COVID-19 PCR: NotDetec (06 Jan 2022 17:58)  COVID-19 PCR: NotDetec (05 Jan 2022 16:16)  COVID-19 PCR: NotDetec (02 Jan 2022 08:34)  COVID-19 PCR: NotDetec (29 Dec 2021 09:35)  COVID-19 PCR: NotDetec (27 Dec 2021 05:30)  COVID-19 PCR: NotDetec (20 Dec 2021 02:56)  COVID-19 PCR: NotDetec (11 Nov 2021 09:08)  SARS-CoV-2: NotDetec (04 Nov 2021 03:35)      The 4Ms    What Matters Most: see GOC  Age appropriate Medications/Screen for High Risk Medication: Yes  Mentation: see CAM below  Mobility: defer to physical exam    The Confusion Assessment Method (CAM) Diagnostic Algorithm     1: Acute Onset or Fluctuating Course  - Is there evidence of an acute change in mental status from the patient’s baseline? Did the (abnormal) behavior  fluctuate during the day, that is, tend to come and go, or increase and decrease in severity?  [ ] YES [X] NO     2: Inattention  - Did the patient have difficulty focusing attention, being easily distractible, or having difficulty keeping track of what was being said?  [ ] YES [ ] NO- Unable to assess     3: Disorganized thinking  -Was the patient’s thinking disorganized or incoherent, such as rambling or irrelevant conversation, unclear or illogical flow of ideas, or unpredictable switching from subject to subject?  [ ] YES [ ] NO- Unable to assess    4: Altered Level of consciousness?  [ ] YES [ ] NO- Unable to assess    The diagnosis of delirium by CAM requires the presence of features 1 and 2 and either 3 or 4.    PRESSORS: [ ] YES [x ] NO  linezolid  IVPB      linezolid  IVPB 600 milliGRAM(s) IV Intermittent every 12 hours    Cardiovascular:  Heart Failure  Acute   Acute on Chronic  Chronic         Pulmonary:    Hematalogic:    Other:  acetaminophen    Suspension .. 650 milliGRAM(s) Oral every 6 hours PRN  ascorbic acid 500 milliGRAM(s) Oral daily  atorvastatin 40 milliGRAM(s) Oral at bedtime  chlorhexidine 0.12% Liquid 15 milliLiter(s) Oral Mucosa every 12 hours  chlorhexidine 2% Cloths 1 Application(s) Topical <User Schedule>  collagenase Ointment 1 Application(s) Topical daily  glucagon  Injectable 1 milliGRAM(s) IntraMuscular once  insulin lispro (ADMELOG) corrective regimen sliding scale   SubCutaneous every 6 hours  morphine  - Injectable 1 milliGRAM(s) IV Push every 4 hours PRN  multivitamin/minerals/iron Oral Solution (CENTRUM) 15 milliLiter(s) Oral daily  pantoprazole   Suspension 40 milliGRAM(s) Enteral Tube every 12 hours  sodium chloride 0.9%. 1000 milliLiter(s) IV Continuous <Continuous>  sodium polystyrene sulfonate Suspension 30 Gram(s) Enteral Tube once    acetaminophen    Suspension .. 650 milliGRAM(s) Oral every 6 hours PRN  ascorbic acid 500 milliGRAM(s) Oral daily  atorvastatin 40 milliGRAM(s) Oral at bedtime  chlorhexidine 0.12% Liquid 15 milliLiter(s) Oral Mucosa every 12 hours  chlorhexidine 2% Cloths 1 Application(s) Topical <User Schedule>  collagenase Ointment 1 Application(s) Topical daily  glucagon  Injectable 1 milliGRAM(s) IntraMuscular once  insulin lispro (ADMELOG) corrective regimen sliding scale   SubCutaneous every 6 hours  linezolid  IVPB      linezolid  IVPB 600 milliGRAM(s) IV Intermittent every 12 hours  morphine  - Injectable 1 milliGRAM(s) IV Push every 4 hours PRN  multivitamin/minerals/iron Oral Solution (CENTRUM) 15 milliLiter(s) Oral daily  pantoprazole   Suspension 40 milliGRAM(s) Enteral Tube every 12 hours  sodium chloride 0.9%. 1000 milliLiter(s) IV Continuous <Continuous>  sodium polystyrene sulfonate Suspension 30 Gram(s) Enteral Tube once    Drug Dosing Weight  Height (cm): 175.2 (16 Feb 2022 15:23)  Weight (kg): 78 (13 Feb 2022 18:39)  BMI (kg/m2): 25.4 (16 Feb 2022 15:23)  BSA (m2): 1.94 (16 Feb 2022 15:23)    CENTRAL LINE: [ ] YES [x ] NO  LOCATION:   DATE INSERTED:  REMOVE: [ ] YES [ ] NO  EXPLAIN:    HUTCHINS: [x ] YES [ ] NO    DATE INSERTED: 03/4/22  REMOVE:  [ ] YES [x ] NO  EXPLAIN: Monitor urine output    PAST MEDICAL & SURGICAL HISTORY:  Hypertension    Diabetes    High cholesterol    Primary osteoarthritis of both knees    Coronary artery disease of native artery of native heart with stable angina pectoris    Allergic bronchitis    Diabetic retinopathy    Oral cancer    SCC (squamous cell carcinoma)    Metastatic cancer    Recurrent disease    Edema of extremity    Hyponatremia    Microalbuminuria    Neuralgia    Obstructive sleep apnea, adult    Vitamin D deficiency    S/P knee replacement  b/l    S/P hernia repair  b/l    Status post cataract extraction and insertion of intraocular lens, unspecified laterality  b/l    History of surgery  On 9/10/20 he underwent right hemiglossectomy and partial neck dissection with Dr. Darinel Servin at [facility].                03-04 @ 07:01  -  03-05 @ 07:00  --------------------------------------------------------  IN: 900 mL / OUT: 360 mL / NET: 540 mL        Mode: AC/ CMV (Assist Control/ Continuous Mandatory Ventilation)  RR (machine): 14  TV (machine): 450  FiO2: 65  PEEP: 5  ITime: 0.9  MAP: 13  PIP: 49      PHYSICAL EXAM:    GENERAL: chronically ill appearing, trach and vent  HEAD:  Atraumatic, Normocephalic  EYES: unequal pupil, rt  conjunctiva and sclera clear  ENMT: No tonsillar erythema, exudates, or enlargement; Moist mucous membranes, Good dentition, No lesions  NECK: Supple, No JVD, Normal thyroid  NERVOUS SYSTEM:  Alert & Oriented X3, Good concentration; Motor Strength 5/5 B/L upper and lower extremities; DTRs 2+ intact and symmetric  CHEST/LUNG: Clear to percussion bilaterally; No rales, rhonchi, wheezing, or rubs  HEART: Regular rate and rhythm; No murmurs, rubs, or gallops  ABDOMEN: Soft, Nontender, Nondistended; Bowel sounds present  EXTREMITIES:  2+ Peripheral Pulses, No clubbing, cyanosis, or edema  LYMPH: No lymphadenopathy noted  SKIN: No rashes or lesions      LABS:  CBC Full  -  ( 05 Mar 2022 06:55 )  WBC Count : 12.14 K/uL  RBC Count : 2.85 M/uL  Hemoglobin : 7.8 g/dL  Hematocrit : 25.6 %  Platelet Count - Automated : 268 K/uL  Mean Cell Volume : 89.8 fl  Mean Cell Hemoglobin : 27.4 pg  Mean Cell Hemoglobin Concentration : 30.5 gm/dL  Auto Neutrophil # : x  Auto Lymphocyte # : x  Auto Monocyte # : x  Auto Eosinophil # : x  Auto Basophil # : x  Auto Neutrophil % : x  Auto Lymphocyte % : x  Auto Monocyte % : x  Auto Eosinophil % : x  Auto Basophil % : x    03-05    135  |  99  |  88<H>  ----------------------------<  228<H>  5.4<H>   |  27  |  1.64<H>    Ca    9.5      05 Mar 2022 06:55  Phos  4.8     03-05  Mg     2.9     03-05    TPro  5.4<L>  /  Alb  1.7<L>  /  TBili  0.5  /  DBili  x   /  AST  81<H>  /  ALT  28  /  AlkPhos  431<H>  03-05              [  ]  DVT Prophylaxis  [  ]  Nutrition, Brand, Rate         Goal Rate        Abnormal Nutritional Status -  Malnutrition   Cachexia      Morbid Obesity BMI >/=40    RADIOLOGY & ADDITIONAL STUDIES:  ***    Goals of Care Discussion with Family/Proxy/Other   - see note from/family meeting set up for...     MELISSA JOHNS    SCU progress note    INTERVAL HPI/OVERNIGHT EVENTS: No acute events overnight. Labs w/ uptrend cr and K 5.4.     DNR [X ]   DNI  [  ]- Trach and Vent    Covid - 19 PCR: COVID-19 PCR: NotDetec (04 Mar 2022 04:38)  COVID-19 PCR: NotDetec (26 Feb 2022 11:44)  COVID-19 PCR: NotDetec (22 Feb 2022 11:06)  COVID-19 PCR: NotDetec (18 Feb 2022 12:00)  COVID-19 PCR: NotDetec (13 Feb 2022 18:50)  COVID-19 PCR: NotDetec (26 Jan 2022 08:14)  COVID-19 PCR: NotDetec (18 Jan 2022 10:47)  COVID-19 PCR: NotDetec (14 Jan 2022 06:44)  COVID-19 PCR: NotDetec (08 Jan 2022 08:23)  COVID-19 PCR: NotDetec (06 Jan 2022 17:58)  COVID-19 PCR: NotDetec (05 Jan 2022 16:16)  COVID-19 PCR: NotDetec (02 Jan 2022 08:34)  COVID-19 PCR: NotDetec (29 Dec 2021 09:35)  COVID-19 PCR: NotDetec (27 Dec 2021 05:30)  COVID-19 PCR: NotDetec (20 Dec 2021 02:56)  COVID-19 PCR: NotDetec (11 Nov 2021 09:08)  SARS-CoV-2: NotDetec (04 Nov 2021 03:35)      The 4Ms    What Matters Most: see GOC  Age appropriate Medications/Screen for High Risk Medication: Yes  Mentation: see CAM below  Mobility: defer to physical exam    The Confusion Assessment Method (CAM) Diagnostic Algorithm     1: Acute Onset or Fluctuating Course  - Is there evidence of an acute change in mental status from the patient’s baseline? Did the (abnormal) behavior  fluctuate during the day, that is, tend to come and go, or increase and decrease in severity?  [ ] YES [X] NO     2: Inattention  - Did the patient have difficulty focusing attention, being easily distractible, or having difficulty keeping track of what was being said?  [ ] YES [ ] NO- Unable to assess     3: Disorganized thinking  -Was the patient’s thinking disorganized or incoherent, such as rambling or irrelevant conversation, unclear or illogical flow of ideas, or unpredictable switching from subject to subject?  [ ] YES [ ] NO- Unable to assess    4: Altered Level of consciousness?  [ ] YES [ ] NO- Unable to assess    The diagnosis of delirium by CAM requires the presence of features 1 and 2 and either 3 or 4.    PRESSORS: [ ] YES [x ] NO  linezolid  IVPB      linezolid  IVPB 600 milliGRAM(s) IV Intermittent every 12 hours    Cardiovascular:  Heart Failure  Acute   Acute on Chronic  Chronic         Pulmonary:    Hematalogic:    Other:  acetaminophen    Suspension .. 650 milliGRAM(s) Oral every 6 hours PRN  ascorbic acid 500 milliGRAM(s) Oral daily  atorvastatin 40 milliGRAM(s) Oral at bedtime  chlorhexidine 0.12% Liquid 15 milliLiter(s) Oral Mucosa every 12 hours  chlorhexidine 2% Cloths 1 Application(s) Topical <User Schedule>  collagenase Ointment 1 Application(s) Topical daily  glucagon  Injectable 1 milliGRAM(s) IntraMuscular once  insulin lispro (ADMELOG) corrective regimen sliding scale   SubCutaneous every 6 hours  morphine  - Injectable 1 milliGRAM(s) IV Push every 4 hours PRN  multivitamin/minerals/iron Oral Solution (CENTRUM) 15 milliLiter(s) Oral daily  pantoprazole   Suspension 40 milliGRAM(s) Enteral Tube every 12 hours  sodium chloride 0.9%. 1000 milliLiter(s) IV Continuous <Continuous>  sodium polystyrene sulfonate Suspension 30 Gram(s) Enteral Tube once    acetaminophen    Suspension .. 650 milliGRAM(s) Oral every 6 hours PRN  ascorbic acid 500 milliGRAM(s) Oral daily  atorvastatin 40 milliGRAM(s) Oral at bedtime  chlorhexidine 0.12% Liquid 15 milliLiter(s) Oral Mucosa every 12 hours  chlorhexidine 2% Cloths 1 Application(s) Topical <User Schedule>  collagenase Ointment 1 Application(s) Topical daily  glucagon  Injectable 1 milliGRAM(s) IntraMuscular once  insulin lispro (ADMELOG) corrective regimen sliding scale   SubCutaneous every 6 hours  linezolid  IVPB      linezolid  IVPB 600 milliGRAM(s) IV Intermittent every 12 hours  morphine  - Injectable 1 milliGRAM(s) IV Push every 4 hours PRN  multivitamin/minerals/iron Oral Solution (CENTRUM) 15 milliLiter(s) Oral daily  pantoprazole   Suspension 40 milliGRAM(s) Enteral Tube every 12 hours  sodium chloride 0.9%. 1000 milliLiter(s) IV Continuous <Continuous>  sodium polystyrene sulfonate Suspension 30 Gram(s) Enteral Tube once    Drug Dosing Weight  Height (cm): 175.2 (16 Feb 2022 15:23)  Weight (kg): 78 (13 Feb 2022 18:39)  BMI (kg/m2): 25.4 (16 Feb 2022 15:23)  BSA (m2): 1.94 (16 Feb 2022 15:23)    CENTRAL LINE: [ ] YES [x ] NO  LOCATION:   DATE INSERTED:  REMOVE: [ ] YES [ ] NO  EXPLAIN:    HUTCHINS: [x ] YES [ ] NO    DATE INSERTED: 03/4/22  REMOVE:  [ ] YES [x ] NO  EXPLAIN: Monitor urine output    PAST MEDICAL & SURGICAL HISTORY:  Hypertension    Diabetes    High cholesterol    Primary osteoarthritis of both knees    Coronary artery disease of native artery of native heart with stable angina pectoris    Allergic bronchitis    Diabetic retinopathy    Oral cancer    SCC (squamous cell carcinoma)    Metastatic cancer    Recurrent disease    Edema of extremity    Hyponatremia    Microalbuminuria    Neuralgia    Obstructive sleep apnea, adult    Vitamin D deficiency    S/P knee replacement  b/l    S/P hernia repair  b/l    Status post cataract extraction and insertion of intraocular lens, unspecified laterality  b/l    History of surgery  On 9/10/20 he underwent right hemiglossectomy and partial neck dissection with Dr. Darinel Servin at [facility].                03-04 @ 07:01  -  03-05 @ 07:00  --------------------------------------------------------  IN: 900 mL / OUT: 360 mL / NET: 540 mL        Mode: AC/ CMV (Assist Control/ Continuous Mandatory Ventilation)  RR (machine): 14  TV (machine): 450  FiO2: 65  PEEP: 5  ITime: 0.9  MAP: 13  PIP: 49      PHYSICAL EXAM:    GENERAL: chronically ill appearing, trach and vent  HEAD:  Atraumatic, Normocephalic  EYES: unequal pupil, rt  conjunctiva and sclera clear  ENMT: No tonsillar erythema, exudates, or enlargement; Moist mucous membranes, Good dentition, No lesions  NECK: right mandibular x 2, 2 cm collections, one firm and one fluctuant, erythematous, harden discolor Supple, No JVD, Normal thyroid  NERVOUS SYSTEM:  does not follow commands, no voluntary movements of extremities   CHEST/LUNG: Clear to percussion bilaterally; No rales, rhonchi, wheezing, or rubs  HEART: Regular rate and rhythm; No murmurs, rubs, or gallops  ABDOMEN: Soft, Nontender, Nondistended; Bowel sounds present  EXTREMITIES:  2+ Peripheral Pulses, No clubbing, cyanosis, or edema  LYMPH: No lymphadenopathy noted  SKIN: Stage 1 Pressure Injury to the Bilateral Heels, as evident by non-blanchable erythema  Unstageable Pressure Injury to the Coccyx (5cm x 9cm x 2cm) with slough, pink tissue, and drainage Malodorous  Unstageable Pressure Injury to the area under the patients Trach with slough, pink tissue, and scant drainage      LABS:  CBC Full  -  ( 05 Mar 2022 06:55 )  WBC Count : 12.14 K/uL  RBC Count : 2.85 M/uL  Hemoglobin : 7.8 g/dL  Hematocrit : 25.6 %  Platelet Count - Automated : 268 K/uL  Mean Cell Volume : 89.8 fl  Mean Cell Hemoglobin : 27.4 pg  Mean Cell Hemoglobin Concentration : 30.5 gm/dL  Auto Neutrophil # : x  Auto Lymphocyte # : x  Auto Monocyte # : x  Auto Eosinophil # : x  Auto Basophil # : x  Auto Neutrophil % : x  Auto Lymphocyte % : x  Auto Monocyte % : x  Auto Eosinophil % : x  Auto Basophil % : x    03-05    135  |  99  |  88<H>  ----------------------------<  228<H>  5.4<H>   |  27  |  1.64<H>    Ca    9.5      05 Mar 2022 06:55  Phos  4.8     03-05  Mg     2.9     03-05    TPro  5.4<L>  /  Alb  1.7<L>  /  TBili  0.5  /  DBili  x   /  AST  81<H>  /  ALT  28  /  AlkPhos  431<H>  03-05              [  ]  DVT Prophylaxis  [  ]  Nutrition, Brand, Rate         Goal Rate        Abnormal Nutritional Status -  Malnutrition   Cachexia      Morbid Obesity BMI >/=40    RADIOLOGY & ADDITIONAL STUDIES:  ***    Goals of Care Discussion with Family/Proxy/Other   - see note from/family meeting set up for...

## 2022-03-05 NOTE — PROGRESS NOTE ADULT - PROBLEM SELECTOR PLAN 11
Continue tube feeds and supplementation.  Peg tube confirmed via imaging 3/3  Scant leakage at PEG site this AM resolved with lower rate of TF @25ml/hr, continue to monitor site   GI Dr Atkinson called for consult.

## 2022-03-05 NOTE — PROGRESS NOTE ADULT - ASSESSMENT
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 Recent hospitalization (12/20/2021- 1/28/22) notable for cardiac arrest, aspiration pna, gastric ulcer s/p clipping and ventilator dependent respiratory failure s/p Trach/ PEG discharged to NH. BIBEMS from NH (2/13/22) for bloody stools and blood in trach noted at the facility. Patient admitted to  for hypovolemic shock 2/2 acute anemia 2/2 GIB s/p 2 untis PRBCS. Patient requiring higher level of care and sent to ICU for hypotension requiring pressors. Patient underwent  EGD and colonoscopy no active bleeding noted. Patient down graded to  for further management 2/16. SCU course notable for fevers found to have Klebsiella ESBL PNA and VRE bacteremia with ID following started on linezolid (3/3), worsening chest xray and increasing FiO2 requirements despite diuresis.  3/5- Uptrending cr w/ K 5.4,  likely 2/2 to over diuresing.  Gentle hydration and monitor output.  F/U BMP in AM.

## 2022-03-05 NOTE — PROGRESS NOTE ADULT - PROBLEM SELECTOR PLAN 1
Continue mechanical ventilation increasing FiO2 requirements precludes weaning  Continue to monitor oxygen saturation.  Repeat CXR shows worsening infiltrates despite albumin/lasix (LD 3/2)  Lasix d/c on 3/4

## 2022-03-06 NOTE — PROGRESS NOTE ADULT - SUBJECTIVE AND OBJECTIVE BOX
Patient is a 76y old  Male who presents with a chief complaint of blood in stool (05 Mar 2022 18:05)/hypovolemic shock/GIB/sepsis/VRE bacteremia/EVA, continue IV zyvox until 3/30, suportive care, IV hydration for EVA      INTERVAL HPI/OVERNIGHT EVENTS:  T(C): 36.7 (03-06-22 @ 05:00), Max: 36.7 (03-06-22 @ 05:00)  HR: 104 (03-06-22 @ 05:00) (98 - 104)  BP: 109/59 (03-06-22 @ 05:00) (109/59 - 124/94)  RR: 18 (03-06-22 @ 05:00) (18 - 20)  SpO2: 96% (03-06-22 @ 05:00) (96% - 98%)  Wt(kg): --    LABS:                        7.5    13.31 )-----------( 263      ( 06 Mar 2022 06:53 )             24.4     03-06    134<L>  |  98  |  96<H>  ----------------------------<  204<H>  6.0<H>   |  27  |  1.82<H>    Ca    8.5      06 Mar 2022 06:53  Phos  6.3     03-06  Mg     2.9     03-06    TPro  5.6<L>  /  Alb  1.6<L>  /  TBili  0.6  /  DBili  x   /  AST  84<H>  /  ALT  27  /  AlkPhos  398<H>  03-06        CAPILLARY BLOOD GLUCOSE      POCT Blood Glucose.: 210 mg/dL (06 Mar 2022 06:11)  POCT Blood Glucose.: 184 mg/dL (05 Mar 2022 23:30)  POCT Blood Glucose.: 205 mg/dL (05 Mar 2022 21:24)  POCT Blood Glucose.: 200 mg/dL (05 Mar 2022 16:46)  POCT Blood Glucose.: 171 mg/dL (05 Mar 2022 11:12)        RADIOLOGY & ADDITIONAL TESTS:    Consultant(s) Notes Reviewed:  [x ] YES  [ ] NO    PHYSICAL EXAM:  GENERAL: well built, well nourished  HEAD:  Atraumatic, Normocephalic  EYES: EOMI, PERRLA, conjunctiva and sclera clear  ENT: No tonsillar erythema, exudates, or enlargement; Moist mucous membranes, Good dentition, No lesions  NECK: Supple, No JVD, Normal thyroid, no enlarged nodes, trach  NERVOUS SYSTEM:  more responsive  CHEST/LUNG: B/L good air entry; No rales, rhonchi, or wheezing  HEART: S1S2 normal, no S3, Regular rate and rhythm; No murmurs, rubs, or gallops  ABDOMEN: Soft, Nontender, Nondistended; Bowel sounds present, peg tube in place  EXTREMITIES:  2+ Peripheral Pulses, No clubbing, cyanosis, B/L arm edema  LYMPH: No lymphadenopathy noted  SKIN: sacrum D/U    Care Discussed with Consultants/Other Providers [ x] YES  [ ] NO

## 2022-03-06 NOTE — CHART NOTE - NSCHARTNOTEFT_GEN_A_CORE
Spoke with daughter- youngest- at bedside. Explained current condition. Encouraged family to address concerns and emotional support given, all concerns and questions addressed.

## 2022-03-06 NOTE — CHART NOTE - NSCHARTNOTESELECT_GEN_ALL_CORE
Acceptance Note/Event Note
Event Note
Event Note
Family Update Note/Event Note
Family Update Note/Event Note
Family Update/Event Note
Family/Event Note
Nutrition Services
Transfer Note
family/Event Note
Event Note
Family Update Note/Event Note
Family Update/Event Note
Family/Event Note
Nutrition Services
Peg tube replacement/Event Note
Temp 100.3/Event Note
family update/Event Note

## 2022-03-06 NOTE — PROGRESS NOTE ADULT - SUBJECTIVE AND OBJECTIVE BOX
MELISSA JOHNS    SCU progress note    INTERVAL HPI/OVERNIGHT EVENTS: *** Patient seen and examined at bedside.    DNR [x ]   DNI  [  ] tracheostomy to ventilator    Covid - 19 PCR: 3/4/22 negative    The 4Ms    What Matters Most: see [facility]  Age appropriate Medications/Screen for High Risk Medication: Yes  Mentation: see CAM below  Mobility: defer to physical exam    The Confusion Assessment Method (CAM) Diagnostic Algorithm     1: Acute Onset or Fluctuating Course  - Is there evidence of an acute change in mental status from the patient’s baseline? Did the (abnormal) behavior  fluctuate during the day, that is, tend to come and go, or increase and decrease in severity?  [ ] YES [x ] NO     2: Inattention  - Did the patient have difficulty focusing attention, being easily distractible, or having difficulty keeping track of what was being said?  [ ] YES [ ] NO unable to assess     3: Disorganized thinking  -Was the patient’s thinking disorganized or incoherent, such as rambling or irrelevant conversation, unclear or illogical flow of ideas, or unpredictable switching from subject to subject?  [ ] YES [ ] NO unable to assess    4: Altered Level of consciousness?  [ ] YES [ ] NO unable to assess    The diagnosis of delirium by CAM requires the presence of features 1 and 2 and either 3 or 4.    PRESSORS: [ ] YES [ x] NO  linezolid  IVPB      linezolid  IVPB 600 milliGRAM(s) IV Intermittent every 12 hours    Cardiovascular:  Heart Failure  Acute   Acute on Chronic  Chronic         Pulmonary:    Hematalogic:    Other:  acetaminophen    Suspension .. 650 milliGRAM(s) Oral every 6 hours PRN  ascorbic acid 500 milliGRAM(s) Oral daily  atorvastatin 40 milliGRAM(s) Oral at bedtime  chlorhexidine 0.12% Liquid 15 milliLiter(s) Oral Mucosa every 12 hours  chlorhexidine 2% Cloths 1 Application(s) Topical <User Schedule>  collagenase Ointment 1 Application(s) Topical daily  glucagon  Injectable 1 milliGRAM(s) IntraMuscular once  insulin lispro (ADMELOG) corrective regimen sliding scale   SubCutaneous every 6 hours  morphine  - Injectable 1 milliGRAM(s) IV Push every 4 hours PRN  multivitamin/minerals/iron Oral Solution (CENTRUM) 15 milliLiter(s) Oral daily  pantoprazole   Suspension 40 milliGRAM(s) Enteral Tube every 12 hours  sodium zirconium cyclosilicate 10 Gram(s) Oral two times a day    acetaminophen    Suspension .. 650 milliGRAM(s) Oral every 6 hours PRN  ascorbic acid 500 milliGRAM(s) Oral daily  atorvastatin 40 milliGRAM(s) Oral at bedtime  chlorhexidine 0.12% Liquid 15 milliLiter(s) Oral Mucosa every 12 hours  chlorhexidine 2% Cloths 1 Application(s) Topical <User Schedule>  collagenase Ointment 1 Application(s) Topical daily  glucagon  Injectable 1 milliGRAM(s) IntraMuscular once  insulin lispro (ADMELOG) corrective regimen sliding scale   SubCutaneous every 6 hours  linezolid  IVPB      linezolid  IVPB 600 milliGRAM(s) IV Intermittent every 12 hours  morphine  - Injectable 1 milliGRAM(s) IV Push every 4 hours PRN  multivitamin/minerals/iron Oral Solution (CENTRUM) 15 milliLiter(s) Oral daily  pantoprazole   Suspension 40 milliGRAM(s) Enteral Tube every 12 hours  sodium zirconium cyclosilicate 10 Gram(s) Oral two times a day    Drug Dosing Weight  Height (cm): 175.2 (16 Feb 2022 15:23)  Weight (kg): 78 (13 Feb 2022 18:39)  BMI (kg/m2): 25.4 (16 Feb 2022 15:23)  BSA (m2): 1.94 (16 Feb 2022 15:23)    CENTRAL LINE: [ ] YES [x ] NO  LOCATION:   DATE INSERTED:  REMOVE: [ ] YES [ ] NO  EXPLAIN:    HUTCHINS: [x ] YES [ ] NO    DATE INSERTED:3/4/22  REMOVE:  [ ] YES [ x] NO  EXPLAIN: critical monitoring    PAST MEDICAL & SURGICAL HISTORY:  Hypertension    Diabetes    High cholesterol    Primary osteoarthritis of both knees    Coronary artery disease of native artery of native heart with stable angina pectoris    Allergic bronchitis    Diabetic retinopathy    Oral cancer    SCC (squamous cell carcinoma)    Metastatic cancer    Recurrent disease    Edema of extremity    Hyponatremia    Microalbuminuria    Neuralgia    Obstructive sleep apnea, adult    Vitamin D deficiency    S/P knee replacement  b/l    S/P hernia repair  b/l    Status post cataract extraction and insertion of intraocular lens, unspecified laterality  b/l    History of surgery  On 9/10/20 he underwent right hemiglossectomy and partial neck dissection with Dr. Darinel Servin at [facility].                03-05 @ 07:01  -  03-06 @ 07:00  --------------------------------------------------------  IN: 0 mL / OUT: 201 mL / NET: -201 mL        Mode: AC/ CMV (Assist Control/ Continuous Mandatory Ventilation)  RR (machine): 14  TV (machine): 450  FiO2: 65  PEEP: 5  ITime: 1  MAP: 12  PIP: 46      PHYSICAL EXAM:    GENERAL: chronically ill appearing, trach and vent  HEAD:  Atraumatic, Normocephalic  EYES: unequal pupil, rt  conjunctiva and sclera clear  ENMT: No tonsillar erythema, exudates, or enlargement; Moist mucous membranes, Good dentition, No lesions  NECK: right mandibular x 2, 2 cm collections, one firm and one fluctuant, erythematous, harden discolor Supple, No JVD, Normal thyroid  NERVOUS SYSTEM:  does not follow commands, no voluntary movements of extremities   CHEST/LUNG: Clear to percussion bilaterally; No rales, rhonchi, wheezing, or rubs  HEART: Regular rate and rhythm; No murmurs, rubs, or gallops  ABDOMEN: Soft, Nontender, Nondistended; Bowel sounds present  EXTREMITIES:  2+ Peripheral Pulses, No clubbing, cyanosis, or edema  LYMPH: No lymphadenopathy noted  SKIN: Stage 1 Pressure Injury to the Bilateral Heels, as evident by non-blanchable erythema  Unstageable Pressure Injury to the Coccyx (5cm x 9cm x 2cm) with slough, pink tissue, and drainage Malodorous  Unstageable Pressure Injury to the area under the patients Trach with slough, pink tissue, and scant drainage      LABS:  CBC Full  -  ( 06 Mar 2022 06:53 )  WBC Count : 13.31 K/uL  RBC Count : 2.69 M/uL  Hemoglobin : 7.5 g/dL  Hematocrit : 24.4 %  Platelet Count - Automated : 263 K/uL  Mean Cell Volume : 90.7 fl  Mean Cell Hemoglobin : 27.9 pg  Mean Cell Hemoglobin Concentration : 30.7 gm/dL  Auto Neutrophil # : x  Auto Lymphocyte # : x  Auto Monocyte # : x  Auto Eosinophil # : x  Auto Basophil # : x  Auto Neutrophil % : x  Auto Lymphocyte % : x  Auto Monocyte % : x  Auto Eosinophil % : x  Auto Basophil % : x    03-06    134<L>  |  98  |  96<H>  ----------------------------<  204<H>  6.0<H>   |  27  |  1.82<H>    Ca    8.5      06 Mar 2022 06:53  Phos  6.3     03-06  Mg     2.9     03-06    TPro  5.6<L>  /  Alb  1.6<L>  /  TBili  0.6  /  DBili  x   /  AST  84<H>  /  ALT  27  /  AlkPhos  398<H>  03-06              [  ]  DVT Prophylaxis  [  ]  Nutrition, Brand, Rate         Goal Rate        Abnormal Nutritional Status -  Malnutrition   Cachexia          RADIOLOGY & ADDITIONAL STUDIES:  ***  < from: Xray Feeding Tube Check SI (03.03.22 @ 09:57) >    ACC: [id number] EXAM:  XR FEEDING TUBE CHECK [facility]                          PROCEDURE DATE:  03/03/2022          INTERPRETATION:   TECHNIQUE: A gastrostomy tube check was performed .   Overhead views were obtained following the administration of Gastroview   via gastrostomy tube.    FINDINGS:    Following contrast opacification, a gastrostomy balloon is present within   the body of stomach. There is no evidence of contrast extravasation.    IMPRESSION:  Gastrostomy tube within body of stomach . Noextravasation.    --- End of Report ---    < end of copied text >  < from: CT Angio Abdomen and Pelvis w/ IV Cont (02.15.22 @ 15:58) >  FINDINGS:  LOWER CHEST: Previously seen right middle lobe cavity now measures up to   5.2 cm (previously 4.8 cm) and contains a new air-fluid level and mural   nodularity. Left lower lobe cavity partially imaged measuring   approximately 4.4 cm with air-fluid level, unchanged. Dense left lower   lobe consolidation unchanged. Increasing nodular right lower lobe   infiltrate. Developing right pleural effusion. Reidentified left pleural   effusion withsurrounding nodular pleural thickening area Dense coronary   atherosclerosis reidentified.    LIVER: Steatosis.  BILE DUCTS: Normal caliber.  GALLBLADDER: Mildly distended.  SPLEEN: Within normal limits.  PANCREAS: Within normal limits.  ADRENALS: Within normal limits.  KIDNEYS/URETERS: Within normal limits.    BLADDER: Layering high density in the bladder.  REPRODUCTIVE ORGANS: Prostate within normal limits.    BOWEL: No bowel obstruction. Appendix is not visualized and cannot be   assessed. Percutaneous gastrostomy tube in satisfactory position. Prior   small bowel obstruction has resolved. Metallic structure in the   descending colon (5:59), likely hemostatic clip. Similar structure in the   gastric fundus. No intraluminal contrast extravasation. No pneumatosis.  PERITONEUM: Small ascites. No pneumoperitoneum.  VESSELS: Atherosclerotic changes. No portal venous gas.  RETROPERITONEUM/LYMPH NODES: No lymphadenopathy.  ABDOMINAL WALL: Marked anasarca, increased from prior.  BONES: Degenerative changes.    IMPRESSION:    No CT evidence of active GI bleed.    Increasing right middle lobe lung cavity now with air-fluid level. Left   lower lobe cavity unchanged. Dense left lower lobe infiltrate unchanged.   Small but complex left pleural effusion, possibly empyema, unchanged.   Increasing right pleural effusion.    Prior small bowel obstruction has resolved.    Probable hemostatic clips in the stomach and descending colon.    Layering high density material in the bladder, new comparedwith prior.        --- End of Report ---    < end of copied text >    Goals of Care Discussion with Family/Proxy/Other   - see note from/family meeting set up for...

## 2022-03-06 NOTE — PROGRESS NOTE ADULT - ASSESSMENT
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 Recent hospitalization (12/20/2021- 1/28/22) notable for cardiac arrest, aspiration pna, gastric ulcer s/p clipping and ventilator dependent respiratory failure s/p Trach/ PEG discharged to NH. BIBEMS from NH (2/13/22) for bloody stools and blood in trach noted at the facility Adm to ICU for hypovolemic shock 2/2 acute anemia 2/2 GIB, S/P ICU care, S/P blood transfusion, PPI, EGD shoed esophagus ulcer, no active bleeding, S/P colonoscopy , no active bleeding, continue PPI bid, blood culture negative,  ESBL /GNR PNA, complete  IV meropenum course,   suction prn, GI follow up, transfusion if H <7. DVT prophylaxis. H/H stable, still spiked fever  , repeat blood culture positive for VRE, start Zyvox until 3/30, EVA, continue IV hydration,  ID follow up, hypercalcemia, most likely due to metastatic CA. Prognosis poor, palliative team follow up. Continue supportive care. Continue zyvox/vent support.

## 2022-03-06 NOTE — PROGRESS NOTE ADULT - PROBLEM SELECTOR PLAN 12
Functional Quadriplegia. Passive ROM exercises daily as tolerated. Turn and position every 2 hours.  Continue GI prophylaxis  SCB boots for DVT prophylaxis (holding systemic AC in setting of admission for bleeding requiring transfusion)  Currently in multisystem failure  Overall prognosis is grim.

## 2022-03-06 NOTE — PROGRESS NOTE ADULT - SUBJECTIVE AND OBJECTIVE BOX
76y Male is under our care for fevers, bacteremia with VRE, leukocytosis, and pneumonia. Patient is in same disposition with no overnight events. Has not had any fevers and wbc count flucuates. Renal function has been worsening.     MEDS:  linezolid  IVPB 600 milliGRAM(s) IV Intermittent every 12 hours    ALLERGIES: Allergies    No Known Allergies    Intolerances    REVIEW OF SYSTEMS:  [ X ] Not able to illicit  General:	  Chest:	  GI:	  :  Skin:	  Musculoskeletal:	  Neuro:	    VITALS:  Vital Signs Last 24 Hrs  T(C): 36.4 (03-06-22 @ 13:39), Max: 36.7 (03-06-22 @ 05:00)  T(F): 97.6 (03-06-22 @ 13:39), Max: 98.1 (03-06-22 @ 05:00)  HR: 92 (03-06-22 @ 16:01) (92 - 104)  BP: 116/61 (03-06-22 @ 13:39) (109/59 - 116/61)  BP(mean): --  RR: 20 (03-06-22 @ 13:39) (18 - 20)  SpO2: 97% (03-06-22 @ 16:01) (94% - 100%)    PHYSICAL EXAM:  HEENT: +vent via trach, enlarged right sided facial mass  Neck: supple no LN's   Respiratory: bilateral rhoncherous lung sounds +audible congestion  Cardiovascular: S1 S2 reg no murmurs  Gastrointestinal: +BS with soft, nondistended abdomen; nontender, +peg +domingo  Extremities: BUE edema   Skin: right facial mass  Ortho: n/a  Neuro: Awake and alert    LABS/DIAGNOSTIC TESTS:                          7.5    13.31 )-----------( 263      ( 06 Mar 2022 06:53 )             24.4   WBC Count: 13.31 K/uL (03-06 @ 06:53)  WBC Count: 12.14 K/uL (03-05 @ 06:55)  WBC Count: 14.14 K/uL (03-04 @ 07:08)  WBC Count: 12.75 K/uL (03-02 @ 09:35)    03-06    134<L>  |  98  |  96<H>  ----------------------------<  204<H>  6.0<H>   |  27  |  1.82<H> 1.64 < 1.37    Ca    8.5      06 Mar 2022 06:53  Phos  6.3     03-06  Mg     2.9     03-06    TPro  5.6<L>  /  Alb  1.6<L>  /  TBili  0.6  /  DBili  x   /  AST  84<H>  /  ALT  27  /  AlkPhos  398<H>  03-06        CULTURES:   .Blood Blood  03-03 @ 10:49   No growth to date.  --  --      .Blood Blood  03-02 @ 13:33   No growth to date.  --  --      .Blood Blood  03-01 @ 13:42   Growth in aerobic and anaerobic bottles: Enterococcus faecium (vancomycin  resistant)  See previous culture [id number]  --    Growth in anaerobic bottle: Gram Positive Cocci in Pairs and Chains  Growth in aerobic bottle: Gram Positive Cocci in Pairs and Chains      .Blood Blood-Peripheral  02-27 @ 19:04   No Growth Final  --  Blood Culture PCR  Enterococcus faecium (vancomycin resistant)      .Sputum Sputum  02-18 @ 21:07   Moderate Klebsiella pneumoniae ESBL Multiple Morphological Strains  Normal Respiratory Karla absent  --  Klebsiella pneumoniae ESBL  Klebsiella pneumoniae ESBL      .Blood Blood-Peripheral  02-18 @ 18:39   No Growth Final  --  --      RADIOLOGY:  no new studies 76y Male is under our care for fevers, bacteremia with VRE, leukocytosis, and pneumonia. Patient is in same disposition with no overnight events. Has not had any fevers and wbc count flucuates. Renal function has been worsening.     MEDS:  linezolid  IVPB 600 milliGRAM(s) IV Intermittent every 12 hours    ALLERGIES: Allergies    No Known Allergies    Intolerances    REVIEW OF SYSTEMS:  [ X ] Not able to illicit  General:	  Chest:	  GI:	  :  Skin:	  Musculoskeletal:	  Neuro:	    VITALS:  Vital Signs Last 24 Hrs  T(C): 36.4 (03-06-22 @ 13:39), Max: 36.7 (03-06-22 @ 05:00)  T(F): 97.6 (03-06-22 @ 13:39), Max: 98.1 (03-06-22 @ 05:00)  HR: 92 (03-06-22 @ 16:01) (92 - 104)  BP: 116/61 (03-06-22 @ 13:39) (109/59 - 116/61)  BP(mean): --  RR: 20 (03-06-22 @ 13:39) (18 - 20)  SpO2: 97% (03-06-22 @ 16:01) (94% - 100%)    PHYSICAL EXAM:  HEENT: +vent via trach, enlarged right sided facial mass  Neck: supple no LN's   Respiratory: bilateral rhoncherous lung sounds +audible congestion  Cardiovascular: S1 S2 reg no murmurs  Gastrointestinal: +BS with soft, nondistended abdomen; nontender, +peg +domingo  Extremities: BUE edema   Skin: no rashes  Ortho: n/a  Neuro: Awake and alert    LABS/DIAGNOSTIC TESTS:                          7.5    13.31 )-----------( 263      ( 06 Mar 2022 06:53 )             24.4   WBC Count: 13.31 K/uL (03-06 @ 06:53)  WBC Count: 12.14 K/uL (03-05 @ 06:55)  WBC Count: 14.14 K/uL (03-04 @ 07:08)  WBC Count: 12.75 K/uL (03-02 @ 09:35)    03-06    134<L>  |  98  |  96<H>  ----------------------------<  204<H>  6.0<H>   |  27  |  1.82<H> 1.64 < 1.37    Ca    8.5      06 Mar 2022 06:53  Phos  6.3     03-06  Mg     2.9     03-06    TPro  5.6<L>  /  Alb  1.6<L>  /  TBili  0.6  /  DBili  x   /  AST  84<H>  /  ALT  27  /  AlkPhos  398<H>  03-06        CULTURES:   .Blood Blood  03-03 @ 10:49   No growth to date.  --  --      .Blood Blood  03-02 @ 13:33   No growth to date.  --  --      .Blood Blood  03-01 @ 13:42   Growth in aerobic and anaerobic bottles: Enterococcus faecium (vancomycin  resistant)  See previous culture [id number]  --    Growth in anaerobic bottle: Gram Positive Cocci in Pairs and Chains  Growth in aerobic bottle: Gram Positive Cocci in Pairs and Chains      .Blood Blood-Peripheral  02-27 @ 19:04   No Growth Final  --  Blood Culture PCR  Enterococcus faecium (vancomycin resistant)      .Sputum Sputum  02-18 @ 21:07   Moderate Klebsiella pneumoniae ESBL Multiple Morphological Strains  Normal Respiratory Karla absent  --  Klebsiella pneumoniae ESBL  Klebsiella pneumoniae ESBL      .Blood Blood-Peripheral  02-18 @ 18:39   No Growth Final  --  --      RADIOLOGY:  no new studies

## 2022-03-06 NOTE — PROGRESS NOTE ADULT - PROBLEM SELECTOR PLAN 5
BC positive VRE+    follow up pending culture data    trend WBC/fever  continue Zyvox    ID dr. Nunez

## 2022-03-06 NOTE — PROGRESS NOTE ADULT - ASSESSMENT
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 Recent hospitalization (12/20/2021- 1/28/22) notable for cardiac arrest, aspiration pna, gastric ulcer s/p clipping and ventilator dependent respiratory failure s/p Trach/ PEG discharged to NH. BIBEMS from NH (2/13/22) for bloody stools and blood in trach noted at the facility. Patient admitted to  for hypovolemic shock 2/2 acute anemia 2/2 GIB s/p 2 untis PRBCS. Patient requiring higher level of care and sent to ICU for hypotension requiring pressors. Patient underwent  EGD and colonoscopy no active bleeding noted. Patient down graded to  for further management 2/16. SCU course notable for fevers found to have Klebsiella ESBL PNA and VRE bacteremia with ID following started on linezolid (3/3), worsening chest xray and increasing FiO2 requirements despite diuresis.  3/5- Uptrending cr w/ K 5.4,  likely 2/2 to over diuresing.  Gentle hydration and monitor output.  F/U BMP in AM.  3/6- Hyperkalemia 6, Lokelma 10mg x2; Patient going into Kidney failure, poor prognosis.

## 2022-03-06 NOTE — PROGRESS NOTE ADULT - PROBLEM SELECTOR PLAN 2
Sputum Cx Klebsiella pneumoniae.- ESBL  Completed meropenem,  CXR worsening.  ID dr. Enrique GALVIN bactremia  continue linezolid until 3/30  repeat blood culture negative  afebrile  leukocytosis

## 2022-03-07 NOTE — PROGRESS NOTE ADULT - PROBLEM SELECTOR PROBLEM 10
Severe protein-calorie malnutrition
Severe protein-calorie malnutrition
Functional quadriplegia
Hypercalcemia
Functional quadriplegia
Functional quadriplegia
Discharge planning issues
Hypercalcemia
Hypercalcemia
Severe protein-calorie malnutrition
Discharge planning issues
Hypercalcemia
Functional quadriplegia
Hypercalcemia
Severe protein-calorie malnutrition
Functional quadriplegia
Hypercalcemia
Severe protein-calorie malnutrition

## 2022-03-07 NOTE — PROGRESS NOTE ADULT - ATTENDING COMMENTS
Assessment:  1. Hypovolemic shock  2. Gastrointestinal bleeding  3. Acute blood loss anemia  4. Chronic respiratory failure with hypoxia   5. Metastatic tongue cancer with lung mets   6. Diabetes Mellitus     Plan:   Monitor for signs of bleeding  Transfuse to maintain hgb > 7   GI consult, plan for endoscopy today   Monitor hemodynamics   Establish large bore IV  PPI BID   NPO for now   Recently treated with antibiotics at NH   No leukocytosis or fevers, will monitor off antibiotics   Glucose monitoring   Non chemical DVT prophylaxis
Assessment:  1. Hypovolemic shock  2. Gastrointestinal bleeding  3. Acute blood loss anemia  4. Chronic respiratory failure with hypoxia   5. Metastatic lung cancer   6. Diabetes Mellitus     Plan:   Monitor for signs of bleeding  Transfuse to maintain hgb > 7   GI consult  Obtain CTA   Monitor hemodynamics   Establish large bore IV  PPI BID   NPO for now   Recently treated with antibiotics at NH   No leukocytosis or fevers, will monitor off antibiotics   Glucose monitoring   Non chemical DVT prophylaxis
Monitor for signs of bleeding  Trend hgb   Transfuse to maintain hgb > 7   GI consult  Monitor hemodynamics   PPI BID   NPO for now   Recently treated with antibiotics at NH   IV albumin for hypoalbuminemia   No leukocytosis or fevers, will monitor off antibiotics   Discussed with Andrew Lopes
Febrile this morning  Will work up for infection  Cultures  Start antibiotics   Cont. vent support
Acute on chronic respirator failure, trached on ventilator  SCC of tongue, metastatic  VRE bacteremia  FTT    Pt ventilator dependent, not tolerating weaning  thick copious secretions with worse CXR likely bilateral PNA +/- edema  did not respond to albumin and lasix diuresis  worsening renal failure  VRE bacteremia, source ?PNA, started on Zyvox  PEG tube reinserted and confirmed    discussed at length with family current status and prognosis  Prognosis very poor
Problem/Plan - 1:  ·  Problem: Hypovolemic shock.   ·  Plan: Secondary to acute GIB  Transfused 2U PRBCs  Continue midodrine for pressure support.     Problem/Plan - 2:  ·  Problem: GI bleed.   ·  Plan: Likely lower GIB  No signs of bleeding overnight  S/P EGD and colonoscopy 2/16. No active bleeding noted  Continue PPI BID  GI Dr Núñez following.     Problem/Plan - 3:  ·  Problem: Anemia due to acute blood loss.   ·  Plan: Secondary to GIB  S/P 2U PRBCs  Serial CBCs.
Problem/Plan - 1:  ·  Problem: Hypovolemic shock.   ·  Plan: Secondary to acute GIB  initially received 2U PRBC.  Transfused another unit of PRBC yesterday.  Continue midodrine for pressure support.     Problem/Plan - 2:  ·  Problem: GI bleed.   ·  Plan: Likely lower GIB  No signs of bleeding overnight  S/P EGD and colonoscopy 2/16  Continue PPI BID  GI Dr Núñez.     Problem/Plan - 3:  ·  Problem: Anemia due to acute blood loss.   ·  Plan: Secondary to GIB  Serial CBCs  transfused 2U PRBCs when admitted. Received 1U PRBC yesterday.
Problem/Plan - 1:  ·  Problem: Chronic respiratory failure with hypoxia.   ·  Plan: Continue mechanical ventilation  Not a candidate for weaning.    Only tolerating SBT for a few minutes at a time with PS 12. Low tidal volumes on SBT  CXR with worsened bilateral opacities - ?fluid  trial of albumin and lasix diuresis     Problem/Plan - 2:  ·  Problem: Hypovolemic shock.   ·  Plan: Secondary to acute GIB  initially received 2U PRBC, last transfused 1 unit PRBCs 2/22 with stable H/H  Continue midodrine for pressure support.     Problem/Plan - 3:  ·  Problem: GI bleed.   ·  Plan: Likely lower GIB  No signs of bleeding overnight  S/P EGD and colonoscopy 2/16 ->small diverticulum to mid esophagus, prior clip in fundus seen, small area of shallow ulceration adjacent to clip w/o stigmata of bleeding noted. no active bleeding. Normal duodenum. Colonosocopy showed some red tinged fluid distally but none seen at transverse level. No active bleeding, small hemorrhoids.   Continue PPI BID  GI Dr Núñez.    Discussed with family consultation with palliative care, family in agreement
Problem/Plan - 1:  ·  Problem: Chronic respiratory failure with hypoxia.   ·  Plan: Continue mechanical ventilation  Not a candidate for weaning.  Continue to monitor.     Problem/Plan - 2:  ·  Problem: Pneumonia, aspiration.   ·  Plan: Sputum Cx Klebsiella pneumoniae.  blood culture and urine culture negative  Continue antibiotics Vanco and cefepime  CXR with increased infiltrates.  Keep head of bed elevated during feeds.
Problem/Plan - 1:  ·  Problem: Chronic respiratory failure with hypoxia.   ·  Plan: Continue mechanical ventilation increasing FiO2 requirements precludes weaning  Continue to monitor oxygen saturation.  Repeat CXR shows worsening infiltrates despite albumin/lasix (LD 3/2)  Lasix d/c on 3/4.     Problem/Plan - 2:  ·  Problem: Pneumonia, aspiration.   ·  Plan: Sputum Cx Klebsiella pneumoniae.- ESBL  Completed meropenem,  CXR worsening.  ID dr. Enrique GALVIN bactremia  continue linezolid until 3/30  repeat blood culture negative  afebrile  leukocytosis.     Problem/Plan - 3:  ·  Problem: Orthostatic hypotension.   ·  Plan: midodrine discontinued 3/2  continue to monitor BP.
Problem/Plan - 1:  ·  Problem: Hypovolemic shock.   ·  Plan: Secondary to acute GIB  Transfused 2Units PRBCs  Continue midodrine for pressure support.     Problem/Plan - 2:  ·  Problem: GI bleed.   ·  Plan: Likely lower GIB  No signs of bleeding overnight  S/P EGD and colonoscopy 2/16  Continue PPI BID  GI Dr Núñez.     Problem/Plan - 3:  ·  Problem: Anemia due to acute blood loss.   ·  Plan: Secondary to GIB  Serial CBCs  not actively bleeding  Hemoglobin 7.3 today  transfuse 1 unit PRBC- therapeutic transfusion.     Problem/Plan - 4:  ·  Problem: Chronic respiratory failure with hypoxia.   ·  Plan: Continue mechanical ventilation  Not a candidate for weaning.  Continue to monitor.  SBP tolerated only 3 minutes.
Acute on chronic respirator failure, trached on ventilator  SCC of tongue, metastatic  VRE bacteremia  FTT    Pt ventilator dependent, not tolerating weaning  thick copious secretions with worse CXR likely bilateral PNA +/- edema  did not respond to albumin and lasix diuresis  worsening renal failure  VRE bacteremia, source ?PNA, started on Zyvox  PEG tube reinserted and confirmed    discussed at length with family current status and prognosis  Prognosis very poor
Problem/Plan - 1:  ·  Problem: Chronic respiratory failure with hypoxia.   ·  Plan: Continue mechanical ventilation  Not a candidate for weaning.    Only tolerating SBT for a few minutes at a time with PS 12. Low tidal volumes on SBT  Continue to monitor.     Problem/Plan - 2:  ·  Problem: Hypovolemic shock.   ·  Plan: Secondary to acute GIB  initially received 2U PRBC, last transfused 1 unit PRBCs 2/22 with stable H/H  Continue midodrine for pressure support.     Problem/Plan - 3:  ·  Problem: GI bleed.   ·  Plan: Likely lower GIB  No signs of bleeding overnight  S/P EGD and colonoscopy 2/16 ->small diverticulum to mid esophagus, prior clip in fundus seen, small area of shallow ulceration adjacent to clip w/o stigmata of bleeding noted. no active bleeding. Normal duodenum. Colonosocopy showed some red tinged fluid distally but none seen at transverse level. No active bleeding, small hemorrhoids.   Continue PPI BID  GI Dr Núñez.
Problem/Plan - 1:  ·  Problem: Chronic respiratory failure with hypoxia.   ·  Plan: Continue mechanical ventilation  Not a candidate for weaning.    Only tolerating SBT for a few minutes at a time with PS 12. Low tidal volumes on SBT  Continue to monitor.     Problem/Plan - 2:  ·  Problem: Hypovolemic shock.   ·  Plan: Secondary to acute GIB  initially received 2U PRBC, last transfused 1 unit PRBCs 2/22 with stable H/H  Continue midodrine for pressure support.     Problem/Plan - 3:  ·  Problem: GI bleed.   ·  Plan: Likely lower GIB  No signs of bleeding overnight  S/P EGD and colonoscopy 2/16 ->small diverticulum to mid esophagus, prior clip in fundus seen, small area of shallow ulceration adjacent to clip w/o stigmata of bleeding noted. no active bleeding. Normal duodenum. Colonosocopy showed some red tinged fluid distally but none seen at transverse level. No active bleeding, small hemorrhoids.   Continue PPI BID  GI Dr Núñez.
Problem/Plan - 1:  ·  Problem: Chronic respiratory failure with hypoxia.   ·  Plan: Continue mechanical ventilation  Not a candidate for weaning.    Only tolerating SBT for a few minutes at a time with PS 12. Low tidal volumes on SBT  Continue to monitor.     Problem/Plan - 2:  ·  Problem: Hypovolemic shock.   ·  Plan: Secondary to acute GIB  initially received 2U PRBC.  Transfused another unit of PRBC yesterday.  Continue midodrine for pressure support.     Problem/Plan - 3:  ·  Problem: GI bleed.   ·  Plan: Likely lower GIB  No signs of bleeding overnight  S/P EGD and colonoscopy 2/16  Continue PPI BID  GI Dr Núñez.     Problem/Plan - 4:  ·  Problem: Anemia due to acute blood loss.   ·  Plan: Secondary to GIB  Serial CBCs  transfused 2U PRBCs when admitted. Received 1U PRBC 2/22.
Problem/Plan - 1:  ·  Problem: Hypovolemic shock.   ·  Plan: Secondary to acute GIB  Transfused 2U PRBCs  Continue midodrine for pressure support.     Problem/Plan - 2:  ·  Problem: GI bleed.   ·  Plan: Likely lower GIB  No signs of bleeding overnight  S/P EGD and colonoscopy 2/16. No active bleeding noted  Continue PPI BID  GI Dr Núñez following.     Problem/Plan - 3:  ·  Problem: Anemia due to acute blood loss.   ·  Plan: Secondary to GIB  S/P 2U PRBCs  Serial CBCs.     Problem/Plan - 4:  ·  Problem: Chronic respiratory failure with hypoxia.   ·  Plan: Continue mechanical ventilation  Not a candidate for weaning.  Continue to monitor.     Problem/Plan - 5:  ·  Problem: Pneumonia, aspiration.   ·  Plan: Continue antibiotics Vanco and cefepime  CXR with increased infiltrates.  Keep head of bed elevated during feeds.
Problem/Plan - 1:  ·  Problem: Hypovolemic shock.   ·  Plan: Secondary to acute GIB  initially received 2U PRBC.  Transfused another unit of PRBC yesterday.  Continue midodrine for pressure support.     Problem/Plan - 2:  ·  Problem: GI bleed.   ·  Plan: Likely lower GIB  No signs of bleeding overnight  S/P EGD and colonoscopy 2/16  Continue PPI BID  GI Dr Núñez.     Problem/Plan - 3:  ·  Problem: Anemia due to acute blood loss.   ·  Plan: Secondary to GIB  Serial CBCs  transfused 2U PRBCs when admitted. Received 1U PRBC yesterday.     Problem/Plan - 4:  ·  Problem: Chronic respiratory failure with hypoxia.   ·  Plan: Continue mechanical ventilation  Not a candidate for weaning.    Only tolerating SBT for a few minutes at a time with PS 12. Low tidal volumes on SBT  Continue to monitor.     Problem/Plan - 5:  ·  Problem: Pneumonia, aspiration.   ·  Plan: Sputum Cx Klebsiella pneumoniae.- ESBL  blood culture and urine culture negative  continue Meropenem 1000mg IVPB q8h total 7days, until 2/28, 3/7 days  CXR with increased infiltrates.  Keep head of bed elevated during feeds.
Acute on chronic respirator failure, trached on ventilator  SCC of tongue, metastatic  VRE bacteremia  FTT    Pt ventilator dependent, not tolerating weaning  thick copious secretions with worse CXR likely bilateral PNA +/- edema  did not respond to albumin and lasix diuresis  VRE bacteremia, source ?PNA, started on Zyvox  PEG tube reinserted and confirmed    discussed at length with family current status and prognosis  Prognosis very poor
Acute on chronic respirator failure, trached on ventilator  SCC of tongue, metastatic  VRE bacteremia  FTT    Pt ventilator dependent, not tolerating weaning  thick copious secretions with worse CXR likely bilateral PNA +/- edema  did not respond to albumin and lasix diuresis  VRE bacteremia, source ?PNA, started on Zyvox  PEG tube displaced    discussed at length with family current status and prognosis  Prognosis very poor
Acute on chronic respirator failure, trached on ventilator  SCC of tongue, metastatic  VRE bacteremia  FTT    Pt ventilator dependent, not tolerating weaning  thick copious secretions with worse CXR likely bilateral PNA +/- edema  did not respond to albumin and lasix diuresis  VRE bacteremia, source ?PNA, started on Zyvox  PEG tube displaced    discussed at length with family current status and prognosis  Prognosis very poor
Assessment:  1. Hypovolemic shock  2. Gastrointestinal bleeding  3. Acute blood loss anemia  4. Chronic respiratory failure with hypoxia   5. Metastatic tongue cancer with lung mets   6. Diabetes Mellitus     Plan:   Hgb relatively stable   S/p EGD and Colonoscopy  Monitor hemodynamics   PPI BID   Tolerating tube feedings  No further bleeding reported  Recently treated with antibiotics at NH   No leukocytosis or fevers, will monitor off antibiotics   Glucose monitoring   Enlarging lung mass/cavity  Non chemical DVT prophylaxis  Dispo planning back to facility

## 2022-03-07 NOTE — PROGRESS NOTE ADULT - REASON FOR ADMISSION
blood in stool
blood in stool/sepsis
blood in stool
blood in stool/sepsis
blood in stool/sepsis
blood in stool
blood in stool/sepsis
blood in stool/sepsis
blood in stool
blood in stool/sepsis
blood in stool

## 2022-03-07 NOTE — PROGRESS NOTE ADULT - PROBLEM SELECTOR PROBLEM 11
Severe protein-calorie malnutrition
Severe protein-calorie malnutrition
Anasarca
Severe protein-calorie malnutrition
Hypercalcemia
Severe protein-calorie malnutrition
Anasarca
Severe protein-calorie malnutrition
Anasarca
Severe protein-calorie malnutrition
Anasarca
Severe protein-calorie malnutrition

## 2022-03-07 NOTE — PROGRESS NOTE ADULT - ASSESSMENT
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 Recent hospitalization (12/20/2021- 1/28/22) notable for cardiac arrest, aspiration pna, gastric ulcer s/p clipping and ventilator dependent respiratory failure s/p Trach/ PEG discharged to NH. BIBEMS from NH (2/13/22) for bloody stools and blood in trach noted at the facility. Patient admitted to  for hypovolemic shock 2/2 acute anemia 2/2 GIB s/p 2 untis PRBCS. Patient requiring higher level of care and sent to ICU for hypotension requiring pressors. Patient underwent  EGD and colonoscopy no active bleeding noted. Patient down graded to  for further management 2/16. SCU course notable for fevers found to have Klebsiella ESBL PNA and VRE bacteremia with ID following started on linezolid (3/3), worsening chest xray and increasing FiO2 requirements despite diuresis.  3/5- Uptrending cr w/ K 5.4,  likely 2/2 to over diuresing.  Gentle hydration and monitor output.  F/U BMP in AM.  3/6- Hyperkalemia 6, Lokelma 10mg x2; Patient going into Kidney failure, poor prognosis. 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 Recent hospitalization (12/20/2021- 1/28/22) notable for cardiac arrest, aspiration pna, gastric ulcer s/p clipping and ventilator dependent respiratory failure s/p Trach/ PEG discharged to NH. BIBEMS from NH (2/13/22) for bloody stools and blood in trach noted at the facility. Patient admitted to  for hypovolemic shock 2/2 acute anemia 2/2 GIB s/p 2 untis PRBCS. Patient requiring higher level of care and sent to ICU for hypotension requiring pressors. Patient underwent  EGD and colonoscopy no active bleeding noted. Patient down graded to  for further management 2/16. SCU course notable for fevers found to have Klebsiella ESBL PNA and VRE bacteremia with ID following started on linezolid (3/3), worsening chest xray and increasing FiO2 requirements despite diuresis.  3/5- Uptrending cr w/ K 5.4,  likely 2/2 to over diuresing.  Gentle hydration and monitor output.  F/U BMP in AM.  3/6- Hyperkalemia 6, Lokelma 10mg x2; Patient going into Kidney failure, poor prognosis.  3/7- Received pt with high peak pressure. Vent setting adjusted with improvement in Peak Pressure.  Update:  Received call by RN to evaluate pt for unresponsiveness.  RRT was called. Received two rounds of Epi and pt was pronounced at 13:56PM.  Pt's son Mr. Moses Amaya was informed and condolences were offered.  Pt was visited by his daughter and his son.

## 2022-03-07 NOTE — PROGRESS NOTE ADULT - PROBLEM SELECTOR PLAN 10
Continue tube feeds and supplementation  25% albumin followed by lasix every 6 hours for 3 days
Likely secondary to metastatic cancer as well as bed bound status  Continue Free water  50ml q1hr  Monitor calcium daily   s/p Zoledronic acid  on 2/26/2022,
Pt is from [place], can go back to there.  Monitor CBC, and for bloody stool
temporal and clavicular muscle wasting  Continue tube feeds and supplementation  25% albumin followed by lasix every 6 hours for 3 days
Passive ROM exercises daily.  Turn every 2 hours.
temporal and clavicular muscle wasting  Continue tube feeds and supplementation
Continue tube feeds and supplementation  25% albumin followed by lasix every 6 hours for 3 days
Likely secondary to metastatic cancer as well as bed bound status  Continue Free water  50ml q1hr  Monitor calcium daily   s/p Zoledronic acid  on 2/26/2022,
Passive ROM exercises daily.  Turn every 2 hours.
Passive ROM exercises daily.  Turn every 2 hours.
Passive ROM exercises daily.  Turn and position every 2 hours.
Likely secondary to metastatic cancer as well as bed bound status  Continue Free water  50ml q1hr  Monitor calcium daily   s/p Zoledronic acid  on 2/26/2022,
Likely secondary to metastatic cancer as well as bed bound status  Continue Free water via Peg; 50ml/hr given rising calcium.  Monitor calcium daily - Continues to be Hypercalcemic - s/p  Zoledronic acid 4mg IV x1 on 2/26/2022,  Corrected calcium today is 12.30
Passive ROM exercises daily.  Turn every 2 hours.
+Temporal and clavicular waisting.  Continue tube feeds and supplementation.
Likely secondary to metastatic cancer as well as bed bound status  Continue Free water via Peg; 50ml/hr given rising calcium.  Monitor calcium daily - Continues to be Hypercalcemic - s/p  Zoledronic acid 4mg IV x1 on 2/26/2022,  Corrected calcium today is 12.30
Likely secondary to metastatic cancer as well as bed bound status  Continue Free water  50ml q1hr  Monitor calcium daily   s/p Zoledronic acid  on 2/26/2022,
Pt is from [place], can go back to there.  Mnitor CBC, and for bloody stool

## 2022-03-07 NOTE — PROGRESS NOTE ADULT - SUBJECTIVE AND OBJECTIVE BOX
Patient is a 76y old  Male who presents with a chief complaint of blood in stool (06 Mar 2022 16:12)/hypovolemic shock/GIB/sepsis/ESBL PNA/VRE bacteremia/EVA      INTERVAL HPI/OVERNIGHT EVENTS:  T(C): 37 (03-07-22 @ 05:00), Max: 37 (03-07-22 @ 05:00)  HR: 105 (03-07-22 @ 08:52) (92 - 105)  BP: 107/52 (03-07-22 @ 05:00) (107/52 - 116/61)  RR: 18 (03-07-22 @ 05:00) (18 - 22)  SpO2: 97% (03-07-22 @ 08:52) (94% - 99%)  Wt(kg): --    LABS:                        7.1    12.88 )-----------( 235      ( 07 Mar 2022 06:36 )             24.2     03-07    133<L>  |  98  |  102<H>  ----------------------------<  100<H>  5.7<H>   |  24  |  2.04<H>    Ca    8.2<L>      07 Mar 2022 06:36  Phos  6.7     03-07  Mg     3.0     03-07    TPro  5.7<L>  /  Alb  1.7<L>  /  TBili  0.4  /  DBili  x   /  AST  47<H>  /  ALT  17  /  AlkPhos  375<H>  03-07        CAPILLARY BLOOD GLUCOSE      POCT Blood Glucose.: 129 mg/dL (07 Mar 2022 06:10)  POCT Blood Glucose.: 156 mg/dL (06 Mar 2022 23:29)  POCT Blood Glucose.: 183 mg/dL (06 Mar 2022 17:06)  POCT Blood Glucose.: 205 mg/dL (06 Mar 2022 11:37)        RADIOLOGY & ADDITIONAL TESTS:    Consultant(s) Notes Reviewed:  [x ] YES  [ ] NO    PHYSICAL EXAM:  GENERAL: well built, well nourished  HEAD:  Atraumatic, Normocephalic  EYES: EOMI, PERRLA, conjunctiva and sclera clear  ENT: No tonsillar erythema, exudates, or enlargement; Moist mucous membranes, Good dentition, No lesions  NECK: Supple, No JVD, Normal thyroid, no enlarged nodes, trach  NERVOUS SYSTEM:  lethargic  CHEST/LUNG: B/L good air entry; No rales, rhonchi, or wheezing  HEART: S1S2 normal, no S3, Regular rate and rhythm; No murmurs, rubs, or gallops  ABDOMEN: Soft, Nontender, Nondistended; Bowel sounds present, GT in place  EXTREMITIES:  2+ Peripheral Pulses, No clubbing, cyanosis, all limbs edema  LYMPH: No lymphadenopathy noted  SKIN: sacrum D/U    Care Discussed with Consultants/Other Providers [ x] YES  [ ] NO

## 2022-03-07 NOTE — PROGRESS NOTE ADULT - PROBLEM SELECTOR PLAN 8
Pt slept through the night without any concerns. Will continue to monitor.    Continue sliding scale coverage.  Monitor glucose for euglycemia

## 2022-03-07 NOTE — DISCHARGE NOTE FOR THE EXPIRED PATIENT - HOSPITAL COURSE
Mr. Amaya is a 76-year-old male former smoker (3 pack year, quit ~1972) with PMH HTN, HLD, DM2, CAD s/p stents (most recent cardiac cath 2019), MAC pneumonia, metastatic SCC base of tongue 8/2020 s/p resection s/p chemoradiation. Recent hospitalization (12/20/2021- 1/28/22) notable for cardiac arrest, aspiration pna, gastric ulcer s/p clipping and ventilator dependent respiratory failure s/p Trach/ PEG discharged to NH. BIBEMS from NH (2/13/22) for bloody stools and blood in trach noted at the facility. Patient admitted to  for hypovolemic shock 2/2 acute anemia 2/2 GIB . s/p 2 untis PRBCS. Patient requiring higher level of care and sent to ICU for hypotension requiring pressors. Patient down graded to  for further management once hemodynamically stable. Patient underwent a EGD and colonoscopy no active bleeding noted.     On February 21, he was started on IV antibiotic for Klebsiella PNA- ESBL sensitive to Meropenem.  On March 2, he was started on Linezolid for VREF bacteremia with ongoing increased o2 requirement and high vent peak pressure.      On 3/7/22, called by RN about patient being unresponsive.  Pt was seen at bedside and in asystole, RRT was called and pt was pronounced at 13:56PM.  Attending, Dr. Sin was informed regarding patient’s status.  Pt's son Mr. Moses Amaya was informed and condolences were offered.  Mr. Amaya is a 76-year-old male former smoker (3 pack year, quit ~1972) with PMH HTN, HLD, DM2, CAD s/p stents (most recent cardiac cath 2019), MAC pneumonia, metastatic SCC base of tongue 8/2020 s/p resection s/p chemoradiation. Recent hospitalization (12/20/2021- 1/28/22) notable for cardiac arrest, aspiration pna, gastric ulcer s/p clipping and ventilator dependent respiratory failure s/p Trach/ PEG discharged to NH. BIBEMS from NH (2/13/22) for bloody stools and blood in trach noted at the facility. Patient admitted to  for hypovolemic shock 2/2 acute anemia 2/2 GIB . s/p 2 untis PRBCS. Patient requiring higher level of care and sent to ICU for hypotension requiring pressors. Patient down graded to  for further management once hemodynamically stable. Patient underwent a EGD and colonoscopy no active bleeding noted.     On February 21, he was started on IV antibiotic for Klebsiella PNA- ESBL sensitive to Meropenem.  On March 2, he was started on Linezolid for VREF bacteremia with ongoing increased o2 requirement and high vent peak pressure.      On 3/7/22, called by RN about patient being unresponsive.  Pt was seen at bedside and in asystole, RRT was called and pt was pronounced at 13:56PM.  Attending, Dr. Sin was informed regarding patient’s status.  Pt's son Mr. Moses Amaya was informed and condolences were offered.     for a full account of hospital course please refer to actual medical records for this is a brief summery

## 2022-03-07 NOTE — PROGRESS NOTE ADULT - PROVIDER SPECIALTY LIST ADULT
Infectious Disease
Infectious Disease
Internal Medicine
Internal Medicine
Critical Care
Gastroenterology
Internal Medicine
Pulmonology
Gastroenterology
Infectious Disease
Internal Medicine
Pulmonology
Pulmonology
Critical Care
Internal Medicine
Critical Care
Internal Medicine
Critical Care
Gastroenterology
Critical Care
Palliative Care
Critical Care

## 2022-03-07 NOTE — PROGRESS NOTE ADULT - PROBLEM SELECTOR PROBLEM 9
Hypercalcemia
Metastatic cancer
Hypercalcemia
Metastatic cancer
Metastatic cancer
Functional quadriplegia
DVT prophylaxis
Hypercalcemia
Hypotension
Hypotension
Metastatic cancer
Functional quadriplegia
Hypercalcemia
Functional quadriplegia
Functional quadriplegia
Hypercalcemia
Functional quadriplegia
DVT prophylaxis

## 2022-03-07 NOTE — PROGRESS NOTE ADULT - PROBLEM SELECTOR PROBLEM 8
Hypotension
Hypotension
Severe protein-energy malnutrition
DM (diabetes mellitus)
Metastatic cancer
Hypotension
Metastatic cancer
Hypotension
Hypotension
DM (diabetes mellitus)
Hypotension
DVT prophylaxis
DM (diabetes mellitus)
DM (diabetes mellitus)
Severe protein-energy malnutrition
Hypotension
Hypotension

## 2022-03-07 NOTE — PROGRESS NOTE ADULT - SUBJECTIVE AND OBJECTIVE BOX
76y Male is under our care for     REVIEW OF SYSTEMS:  [  ] Not able to elicit  General:	  Chest:	  GI:	  :  Skin:	  Musculoskeletal:	  Neuro:	    MEDS:  linezolid  IVPB      linezolid  IVPB 600 milliGRAM(s) IV Intermittent every 12 hours    ALLERGIES: Allergies    No Known Allergies    Intolerances        VITALS:  Vital Signs Last 24 Hrs  T(C): 37 (07 Mar 2022 05:00), Max: 37 (07 Mar 2022 05:00)  T(F): 98.6 (07 Mar 2022 05:00), Max: 98.6 (07 Mar 2022 05:00)  HR: 105 (07 Mar 2022 08:52) (92 - 105)  BP: 107/52 (07 Mar 2022 05:00) (107/52 - 116/61)  BP(mean): --  RR: 18 (07 Mar 2022 05:00) (18 - 22)  SpO2: 97% (07 Mar 2022 08:52) (94% - 99%)      PHYSICAL EXAM:  HEENT:  Neck:  Respiratory:  Cardiovascular:  Gastrointestinal:  Extremities:  Skin:  Ortho:  Neuro:    LABS/DIAGNOSTIC TESTS:                        7.1    12.88 )-----------( 235      ( 07 Mar 2022 06:36 )             24.2     WBC Count: 12.88 K/uL (03-07 @ 06:36)  WBC Count: 13.31 K/uL (03-06 @ 06:53)  WBC Count: 12.14 K/uL (03-05 @ 06:55)  WBC Count: 14.14 K/uL (03-04 @ 07:08)    03-07    133<L>  |  98  |  102<H>  ----------------------------<  100<H>  5.7<H>   |  24  |  2.04<H>    Ca    8.2<L>      07 Mar 2022 06:36  Phos  6.7     03-07  Mg     3.0     03-07    TPro  5.7<L>  /  Alb  1.7<L>  /  TBili  0.4  /  DBili  x   /  AST  47<H>  /  ALT  17  /  AlkPhos  375<H>  03-07      CULTURES:   .Blood Blood  03-05 @ 12:12   No growth to date.  --  --      .Blood Blood  03-03 @ 10:49   No growth to date.  --  --      .Blood Blood  03-02 @ 13:33   No growth to date.  --  --      .Blood Blood  03-01 @ 13:42   Growth in aerobic and anaerobic bottles: Enterococcus faecium (vancomycin  resistant)  See previous culture [id number]  --    Growth in anaerobic bottle: Gram Positive Cocci in Pairs and Chains  Growth in aerobic bottle: Gram Positive Cocci in Pairs and Chains      .Blood Blood-Peripheral  02-27 @ 19:04   No Growth Final  --  Blood Culture PCR  Enterococcus faecium (vancomycin resistant)      .Sputum Sputum  02-18 @ 21:07   Moderate Klebsiella pneumoniae ESBL Multiple Morphological Strains  Normal Respiratory Karla absent  --  Klebsiella pneumoniae ESBL  Klebsiella pneumoniae ESBL      .Blood Blood-Peripheral  02-18 @ 18:39   No Growth Final  --  --      .Blood Blood  01-18 @ 02:51   No Growth Final  --  --      .Blood Blood  01-18 @ 02:50   No Growth Final  --  --      .Sputum Sputum  12-29 @ 18:28   Normal Respiratory Karla present  --    Numerous polymorphonuclear leukocytes per low power field  No squamous epithelial cells per low power field  Few Gram positive cocci in pairs per oil power field      Clean Catch Clean Catch (Midstream)  12-21 @ 04:42   No growth  --  --        RADIOLOGY:  no new studies 76y Male is under our care for bacteremia with VRE.  Patient was seen laying in bed with no acte distress.  He remains afebrile, WBC count is downtrending and repeat blood cultures are negative x 3.    REVIEW OF SYSTEMS:  [ x ] Not able to elicit      MEDS:  linezolid  IVPB      linezolid  IVPB 600 milliGRAM(s) IV Intermittent every 12 hours    ALLERGIES: Allergies    No Known Allergies    Intolerances        VITALS:  Vital Signs Last 24 Hrs  T(C): 37 (07 Mar 2022 05:00), Max: 37 (07 Mar 2022 05:00)  T(F): 98.6 (07 Mar 2022 05:00), Max: 98.6 (07 Mar 2022 05:00)  HR: 105 (07 Mar 2022 08:52) (92 - 105)  BP: 107/52 (07 Mar 2022 05:00) (107/52 - 116/61)  BP(mean): --  RR: 18 (07 Mar 2022 05:00) (18 - 22)  SpO2: 97% (07 Mar 2022 08:52) (94% - 99%)      PHYSICAL EXAM:  HEENT: trach+  Neck: supple no LN's   Respiratory: bilateral rhonchi  Cardiovascular: S1 S2 reg no murmurs  Gastrointestinal: +BS with soft, nondistended abdomen; nontender, peg tube in place  : Freedman catheter in place   Extremities: Right hand edema +2  Skin: right facial mass  Ortho: n/a  Neuro: Awake and alert    LABS/DIAGNOSTIC TESTS:                        7.1    12.88 )-----------( 235      ( 07 Mar 2022 06:36 )             24.2     WBC Count: 12.88 K/uL (03-07 @ 06:36)  WBC Count: 13.31 K/uL (03-06 @ 06:53)  WBC Count: 12.14 K/uL (03-05 @ 06:55)  WBC Count: 14.14 K/uL (03-04 @ 07:08)    03-07    133<L>  |  98  |  102<H>  ----------------------------<  100<H>  5.7<H>   |  24  |  2.04<H>    Ca    8.2<L>      07 Mar 2022 06:36  Phos  6.7     03-07  Mg     3.0     03-07    TPro  5.7<L>  /  Alb  1.7<L>  /  TBili  0.4  /  DBili  x   /  AST  47<H>  /  ALT  17  /  AlkPhos  375<H>  03-07      CULTURES:   .Blood Blood  03-05 @ 12:12   No growth to date.  --  --      .Blood Blood  03-03 @ 10:49   No growth to date.  --  --      .Blood Blood  03-02 @ 13:33   No growth to date.  --  --      .Blood Blood  03-01 @ 13:42   Growth in aerobic and anaerobic bottles: Enterococcus faecium (vancomycin  resistant)  See previous culture [id number]  --    Growth in anaerobic bottle: Gram Positive Cocci in Pairs and Chains  Growth in aerobic bottle: Gram Positive Cocci in Pairs and Chains      .Blood Blood-Peripheral  02-27 @ 19:04   No Growth Final  --  Blood Culture PCR  Enterococcus faecium (vancomycin resistant)      .Sputum Sputum  02-18 @ 21:07   Moderate Klebsiella pneumoniae ESBL Multiple Morphological Strains  Normal Respiratory Karla absent  --  Klebsiella pneumoniae ESBL  Klebsiella pneumoniae ESBL      .Blood Blood-Peripheral  02-18 @ 18:39   No Growth Final  --  --      .Blood Blood  01-18 @ 02:51   No Growth Final  --  --      .Blood Blood  01-18 @ 02:50   No Growth Final  --  --      .Sputum Sputum  12-29 @ 18:28   Normal Respiratory Karla present  --    Numerous polymorphonuclear leukocytes per low power field  No squamous epithelial cells per low power field  Few Gram positive cocci in pairs per oil power field      Clean Catch Clean Catch (Midstream)  12-21 @ 04:42   No growth  --  --        RADIOLOGY:  no new studies

## 2022-03-07 NOTE — PROGRESS NOTE ADULT - SUBJECTIVE AND OBJECTIVE BOX
MELISSA JOHNS    SCU progress note    INTERVAL HPI/OVERNIGHT EVENTS: ***    DNR [ ]   DNI  [  ]    Covid - 19 PCR:     The 4Ms    What Matters Most: see GOC  Age appropriate Medications/Screen for High Risk Medication: Yes  Mentation: see CAM below  Mobility: defer to physical exam    The Confusion Assessment Method (CAM) Diagnostic Algorithm     1: Acute Onset or Fluctuating Course  - Is there evidence of an acute change in mental status from the patient’s baseline? Did the (abnormal) behavior  fluctuate during the day, that is, tend to come and go, or increase and decrease in severity?  [ ] YES [ ] NO     2: Inattention  - Did the patient have difficulty focusing attention, being easily distractible, or having difficulty keeping track of what was being said?  [ ] YES [ ] NO     3: Disorganized thinking  -Was the patient’s thinking disorganized or incoherent, such as rambling or irrelevant conversation, unclear or illogical flow of ideas, or unpredictable switching from subject to subject?  [ ] YES [ ] NO    4: Altered Level of consciousness?  [ ] YES [ ] NO    The diagnosis of delirium by CAM requires the presence of features 1 and 2 and either 3 or 4.    PRESSORS: [ ] YES [ ] NO  linezolid  IVPB      linezolid  IVPB 600 milliGRAM(s) IV Intermittent every 12 hours    Cardiovascular:  Heart Failure  Acute   Acute on Chronic  Chronic         Pulmonary:    Hematalogic:    Other:  acetaminophen    Suspension .. 650 milliGRAM(s) Oral every 6 hours PRN  ascorbic acid 500 milliGRAM(s) Oral daily  atorvastatin 40 milliGRAM(s) Oral at bedtime  chlorhexidine 0.12% Liquid 15 milliLiter(s) Oral Mucosa every 12 hours  chlorhexidine 2% Cloths 1 Application(s) Topical <User Schedule>  collagenase Ointment 1 Application(s) Topical daily  glucagon  Injectable 1 milliGRAM(s) IntraMuscular once  insulin lispro (ADMELOG) corrective regimen sliding scale   SubCutaneous every 6 hours  morphine  - Injectable 1 milliGRAM(s) IV Push every 4 hours PRN  multivitamin/minerals/iron Oral Solution (CENTRUM) 15 milliLiter(s) Oral daily  pantoprazole   Suspension 40 milliGRAM(s) Enteral Tube every 12 hours  sodium zirconium cyclosilicate 10 Gram(s) Oral three times a day    acetaminophen    Suspension .. 650 milliGRAM(s) Oral every 6 hours PRN  ascorbic acid 500 milliGRAM(s) Oral daily  atorvastatin 40 milliGRAM(s) Oral at bedtime  chlorhexidine 0.12% Liquid 15 milliLiter(s) Oral Mucosa every 12 hours  chlorhexidine 2% Cloths 1 Application(s) Topical <User Schedule>  collagenase Ointment 1 Application(s) Topical daily  glucagon  Injectable 1 milliGRAM(s) IntraMuscular once  insulin lispro (ADMELOG) corrective regimen sliding scale   SubCutaneous every 6 hours  linezolid  IVPB      linezolid  IVPB 600 milliGRAM(s) IV Intermittent every 12 hours  morphine  - Injectable 1 milliGRAM(s) IV Push every 4 hours PRN  multivitamin/minerals/iron Oral Solution (CENTRUM) 15 milliLiter(s) Oral daily  pantoprazole   Suspension 40 milliGRAM(s) Enteral Tube every 12 hours  sodium zirconium cyclosilicate 10 Gram(s) Oral three times a day    Drug Dosing Weight  Height (cm): 175.2 (16 Feb 2022 15:23)  Weight (kg): 78 (13 Feb 2022 18:39)  BMI (kg/m2): 25.4 (16 Feb 2022 15:23)  BSA (m2): 1.94 (16 Feb 2022 15:23)    CENTRAL LINE: [ ] YES [ ] NO  LOCATION:   DATE INSERTED:  REMOVE: [ ] YES [ ] NO  EXPLAIN:    HUTCHINS: [ ] YES [ ] NO    DATE INSERTED:  REMOVE:  [ ] YES [ ] NO  EXPLAIN:    PAST MEDICAL & SURGICAL HISTORY:  Hypertension    Diabetes    High cholesterol    Primary osteoarthritis of both knees    Coronary artery disease of native artery of native heart with stable angina pectoris    Allergic bronchitis    Diabetic retinopathy    Oral cancer    SCC (squamous cell carcinoma)    Metastatic cancer    Recurrent disease    Edema of extremity    Hyponatremia    Microalbuminuria    Neuralgia    Obstructive sleep apnea, adult    Vitamin D deficiency    S/P knee replacement  b/l    S/P hernia repair  b/l    Status post cataract extraction and insertion of intraocular lens, unspecified laterality  b/l    History of surgery  On 9/10/20 he underwent right hemiglossectomy and partial neck dissection with Dr. Darinel Servin at [facility].                  Mode: AC/ CMV (Assist Control/ Continuous Mandatory Ventilation)  RR (machine): 14  TV (machine): 360  FiO2: 65  PEEP: 5  ITime: 1  MAP: 15  PIP: 52      PHYSICAL EXAM:    GENERAL: NAD, well-groomed, well-developed  HEAD:  Atraumatic, Normocephalic  EYES: EOMI, PERRLA, conjunctiva and sclera clear  ENMT: No tonsillar erythema, exudates, or enlargement; Moist mucous membranes, Good dentition, No lesions  NECK: Supple, No JVD, Normal thyroid  NERVOUS SYSTEM:  Alert & Oriented X3, Good concentration; Motor Strength 5/5 B/L upper and lower extremities; DTRs 2+ intact and symmetric  CHEST/LUNG: Clear to percussion bilaterally; No rales, rhonchi, wheezing, or rubs  HEART: Regular rate and rhythm; No murmurs, rubs, or gallops  ABDOMEN: Soft, Nontender, Nondistended; Bowel sounds present  EXTREMITIES:  2+ Peripheral Pulses, No clubbing, cyanosis, or edema  LYMPH: No lymphadenopathy noted  SKIN: No rashes or lesions      LABS:  CBC Full  -  ( 07 Mar 2022 06:36 )  WBC Count : 12.88 K/uL  RBC Count : 2.63 M/uL  Hemoglobin : 7.1 g/dL  Hematocrit : 24.2 %  Platelet Count - Automated : 235 K/uL  Mean Cell Volume : 92.0 fl  Mean Cell Hemoglobin : 27.0 pg  Mean Cell Hemoglobin Concentration : 29.3 gm/dL  Auto Neutrophil # : x  Auto Lymphocyte # : x  Auto Monocyte # : x  Auto Eosinophil # : x  Auto Basophil # : x  Auto Neutrophil % : x  Auto Lymphocyte % : x  Auto Monocyte % : x  Auto Eosinophil % : x  Auto Basophil % : x    03-07    133<L>  |  98  |  102<H>  ----------------------------<  100<H>  5.7<H>   |  24  |  2.04<H>    Ca    8.2<L>      07 Mar 2022 06:36  Phos  6.7     03-07  Mg     3.0     03-07    TPro  5.7<L>  /  Alb  1.7<L>  /  TBili  0.4  /  DBili  x   /  AST  47<H>  /  ALT  17  /  AlkPhos  375<H>  03-07              [  ]  DVT Prophylaxis  [  ]  Nutrition, Brand, Rate         Goal Rate        Abnormal Nutritional Status -  Malnutrition   Cachexia      Morbid Obesity BMI >/=40    RADIOLOGY & ADDITIONAL STUDIES:  ***    Goals of Care Discussion with Family/Proxy/Other   - see note from/family meeting set up for...     MELISSA JOHNS    SCU progress note    INTERVAL HPI/OVERNIGHT EVENTS: High Peak pressure overnight.    DNR [X ]   DNI  [  ]- Intubated    Covid - 19 PCR: COVID-19 PCR: NotDetec (07 Mar 2022 14:18)      The 4Ms    What Matters Most: see GOC  Age appropriate Medications/Screen for High Risk Medication: Yes  Mentation: see CAM below  Mobility: defer to physical exam    The Confusion Assessment Method (CAM) Diagnostic Algorithm     1: Acute Onset or Fluctuating Course  - Is there evidence of an acute change in mental status from the patient’s baseline? Did the (abnormal) behavior  fluctuate during the day, that is, tend to come and go, or increase and decrease in severity?  [ ] YES [X ] NO     2: Inattention  - Did the patient have difficulty focusing attention, being easily distractible, or having difficulty keeping track of what was being said?  [ ] YES [ ] NO- Unable to assess     3: Disorganized thinking  -Was the patient’s thinking disorganized or incoherent, such as rambling or irrelevant conversation, unclear or illogical flow of ideas, or unpredictable switching from subject to subject?  [ ] YES [ ] NO- Unable to assess    4: Altered Level of consciousness?  [ ] YES [ ] NO- Unable to assess    The diagnosis of delirium by CAM requires the presence of features 1 and 2 and either 3 or 4.    PRESSORS: [ ] YES [ ] NO  linezolid  IVPB      linezolid  IVPB 600 milliGRAM(s) IV Intermittent every 12 hours    Cardiovascular:  Heart Failure  Acute   Acute on Chronic  Chronic         Pulmonary:    Hematalogic:    Other:  acetaminophen    Suspension .. 650 milliGRAM(s) Oral every 6 hours PRN  ascorbic acid 500 milliGRAM(s) Oral daily  atorvastatin 40 milliGRAM(s) Oral at bedtime  chlorhexidine 0.12% Liquid 15 milliLiter(s) Oral Mucosa every 12 hours  chlorhexidine 2% Cloths 1 Application(s) Topical <User Schedule>  collagenase Ointment 1 Application(s) Topical daily  glucagon  Injectable 1 milliGRAM(s) IntraMuscular once  insulin lispro (ADMELOG) corrective regimen sliding scale   SubCutaneous every 6 hours  morphine  - Injectable 1 milliGRAM(s) IV Push every 4 hours PRN  multivitamin/minerals/iron Oral Solution (CENTRUM) 15 milliLiter(s) Oral daily  pantoprazole   Suspension 40 milliGRAM(s) Enteral Tube every 12 hours  sodium zirconium cyclosilicate 10 Gram(s) Oral three times a day    acetaminophen    Suspension .. 650 milliGRAM(s) Oral every 6 hours PRN  ascorbic acid 500 milliGRAM(s) Oral daily  atorvastatin 40 milliGRAM(s) Oral at bedtime  chlorhexidine 0.12% Liquid 15 milliLiter(s) Oral Mucosa every 12 hours  chlorhexidine 2% Cloths 1 Application(s) Topical <User Schedule>  collagenase Ointment 1 Application(s) Topical daily  glucagon  Injectable 1 milliGRAM(s) IntraMuscular once  insulin lispro (ADMELOG) corrective regimen sliding scale   SubCutaneous every 6 hours  linezolid  IVPB      linezolid  IVPB 600 milliGRAM(s) IV Intermittent every 12 hours  morphine  - Injectable 1 milliGRAM(s) IV Push every 4 hours PRN  multivitamin/minerals/iron Oral Solution (CENTRUM) 15 milliLiter(s) Oral daily  pantoprazole   Suspension 40 milliGRAM(s) Enteral Tube every 12 hours  sodium zirconium cyclosilicate 10 Gram(s) Oral three times a day    Drug Dosing Weight  Height (cm): 175.2 (16 Feb 2022 15:23)  Weight (kg): 78 (13 Feb 2022 18:39)  BMI (kg/m2): 25.4 (16 Feb 2022 15:23)  BSA (m2): 1.94 (16 Feb 2022 15:23)    CENTRAL LINE: [ ] YES [x ] NO  LOCATION:   DATE INSERTED:  REMOVE: [ ] YES [ ] NO  EXPLAIN:    HUTCHINS: [x ] YES [ ] NO    DATE INSERTED:  REMOVE:  [ ] YES [ ] NO  EXPLAIN: Monitor output    PAST MEDICAL & SURGICAL HISTORY:  Hypertension    Diabetes    High cholesterol    Primary osteoarthritis of both knees    Coronary artery disease of native artery of native heart with stable angina pectoris    Allergic bronchitis    Diabetic retinopathy    Oral cancer    SCC (squamous cell carcinoma)    Metastatic cancer    Recurrent disease    Edema of extremity    Hyponatremia    Microalbuminuria    Neuralgia    Obstructive sleep apnea, adult    Vitamin D deficiency    S/P knee replacement  b/l    S/P hernia repair  b/l    Status post cataract extraction and insertion of intraocular lens, unspecified laterality  b/l    History of surgery  On 9/10/20 he underwent right hemiglossectomy and partial neck dissection with Dr. Darinel Servin at [facility].                  Mode: AC/ CMV (Assist Control/ Continuous Mandatory Ventilation)  RR (machine): 14  TV (machine): 360  FiO2: 65  PEEP: 5  ITime: 1  MAP: 15  PIP: 52      PHYSICAL EXAM:    GENERAL: chronically ill appearing  HEAD:  Atraumatic, Normocephalic  EYES: PRRLA, conjunctiva and sclera clear  ENMT: No tonsillar erythema, exudates, or enlargement; Moist mucous membranes, Good dentition, No lesions  NECK: + trach, Supple, No JVD, Normal thyroid  NERVOUS SYSTEM:  Nonresponsive to verbal stimuli, does not follow commands  CHEST/LUNG: B/l w/ decrease bs, + rhonchi at the bases   HEART: Regular rate and rhythm; No murmurs, rubs, or gallops  ABDOMEN: Soft, Nontender, Nondistended; Bowel sounds present  EXTREMITIES:  2+ Peripheral Pulses, No clubbing, cyanosis, or edema  LYMPH: No lymphadenopathy noted  SKIN: No rashes or lesions      LABS:  CBC Full  -  ( 07 Mar 2022 06:36 )  WBC Count : 12.88 K/uL  RBC Count : 2.63 M/uL  Hemoglobin : 7.1 g/dL  Hematocrit : 24.2 %  Platelet Count - Automated : 235 K/uL  Mean Cell Volume : 92.0 fl  Mean Cell Hemoglobin : 27.0 pg  Mean Cell Hemoglobin Concentration : 29.3 gm/dL  Auto Neutrophil # : x  Auto Lymphocyte # : x  Auto Monocyte # : x  Auto Eosinophil # : x  Auto Basophil # : x  Auto Neutrophil % : x  Auto Lymphocyte % : x  Auto Monocyte % : x  Auto Eosinophil % : x  Auto Basophil % : x    03-07    133<L>  |  98  |  102<H>  ----------------------------<  100<H>  5.7<H>   |  24  |  2.04<H>    Ca    8.2<L>      07 Mar 2022 06:36  Phos  6.7     03-07  Mg     3.0     03-07    TPro  5.7<L>  /  Alb  1.7<L>  /  TBili  0.4  /  DBili  x   /  AST  47<H>  /  ALT  17  /  AlkPhos  375<H>  03-07              [  ]  DVT Prophylaxis  [  ]  Nutrition, Brand, Rate         Goal Rate        Abnormal Nutritional Status -  Malnutrition   Cachexia      Morbid Obesity BMI >/=40    RADIOLOGY & ADDITIONAL STUDIES:  ***    Goals of Care Discussion with Family/Proxy/Other   - see note from/family meeting set up for...

## 2022-03-07 NOTE — PROGRESS NOTE ADULT - ATTENDING SUPERVISION STATEMENT
Resident
ACP
Resident
Resident
ACP
Resident

## 2022-03-07 NOTE — PROGRESS NOTE ADULT - ASSESSMENT
Fevers - improving  Bacteremia with VRE - repeat cultures are negative  Leukocytosis - improving  Pneumonia    Plan:  ·	Continue Zyvox 600mg iv q12, will need a total of 4 weeks (until 3/30/22)  ·	Overall prognosis is very poor and family is aware of this.                  Fevers - improving  Bacteremia with VRE - repeat cultures are negative  Leukocytosis - improving  Pneumonia    Plan:  ·	Continue Zyvox 600mg iv q12, will need a total of 4 weeks (until 3/30/22)  ·	Overall prognosis is very poor and family is aware of this.     I agree with above

## 2022-03-08 ENCOUNTER — NON-APPOINTMENT (OUTPATIENT)
Age: 77
End: 2022-03-08

## 2022-03-08 LAB
CULTURE RESULTS: SIGNIFICANT CHANGE UP
SPECIMEN SOURCE: SIGNIFICANT CHANGE UP

## 2022-03-08 NOTE — DISCUSSION/SUMMARY
[FreeTextEntry1] : - Date/Time patient had no spontaneous respiration and absence of pulse: \par 07-Mar-2022 15:14\par - Date/ Time patient was pronounced dead: 07-Mar-2022 15:14\par - Name of Physician, KURT, Palliative Care RN that pronounced patient dead: NP\par Jermain Knox\par - Was Patient's next of kin contacted regarding patient's death? yes Moses\par Jose Luis \par - Name of person who contacted the patients next of kin: Jermain Knox, ALIZE\par \par Organ Donation:\par Organ Donor Information:\par - Organ Donor Confirmation Number: Yes\par - Organ Donor Confirmation Number: -[id number]\par \par Provider Section:\par Patient Death Criteria (Provider Only):\par - Patient Death Criteria Patient is dead based upon Brain Death Criteria\par Patient is dead based on Cardiopulmonary criteria including absent breath\par sounds, pulselessness and/or asystole\par \par Death (Provider only):\par Patient condition: .\par \par Preliminary cause of death: Respiratory Failure.\par \par CoVID-19 and Influenza:\par Do you suspect that this death is related to CoVID-19 or Influenza? No.\par \par Reason for Admission:\par Reason for Admission blood in stool\par \par Hospital Course:\par - Hospital Course \par Mr. Amaya is a 76-year-old male former smoker (3 pack year, quit ~) with\par PMH HTN, HLD, DM2, CAD s/p stents (most recent cardiac cath ), MAC\par pneumonia, metastatic SCC base of tongue 2020 s/p resection s/p\par chemoradiation. Recent hospitalization (2021- 22) notable for\par cardiac arrest, aspiration pna, gastric ulcer s/p clipping and ventilator\par dependent respiratory failure s/p Trach/ PEG discharged to NH. BIBEMS from NH\par (22) for bloody stools and blood in trach noted at the facility. Patient\par admitted to  for hypovolemic shock 2/2 acute anemia 2/2 GIB . s/p 2 untis\par PRBCS. Patient requiring higher level of care and sent to ICU for hypotension\par requiring pressors. Patient down graded to AI for further management once\par hemodynamically stable. Patient underwent a EGD and colonoscopy no active\par bleeding noted.\par \par On , he was started on IV antibiotic for Klebsiella PNA- ESBL\par sensitive to Meropenem. On , he was started on Linezolid for VREF\par bacteremia with ongoing increased o2 requirement and high vent peak pressure.\par \par On 3/7/22, called by RN about patient being unresponsive. Pt was seen at\par bedside and in asystole, RRT was called and pt was pronounced at 13:56PM.\par Attending, Dr. Sin was informed regarding patients status. Pt's son \par Moses Amaya was informed and condolences were offered.\par \par for a full account of hospital course please refer to actual medical records\par for this is a brief summery\par

## 2022-03-10 LAB
CULTURE RESULTS: SIGNIFICANT CHANGE UP
SPECIMEN SOURCE: SIGNIFICANT CHANGE UP

## 2022-04-15 NOTE — DISCHARGE NOTE PROVIDER - INSTRUCTIONS
MD called to be made aware patient Peg tube is clogged. Multiple attempts made to unclog tube with Fellow staff with no relief. This nurse waiting on call back from MD for next step. Glucerna 1.5  40ml per hour X 24 hours Add 2 Prosource packets per   Activities as tolerated.

## 2022-04-18 NOTE — DIETITIAN INITIAL EVALUATION ADULT. - PERTINENT MEDS FT
No MEDICATIONS  (STANDING):  amLODIPine   Tablet 5 milliGRAM(s) Oral daily  aspirin enteric coated 81 milliGRAM(s) Oral daily  bisacodyl 10 milliGRAM(s) Oral at bedtime  dextrose 40% Gel 15 Gram(s) Oral once  dextrose 5%. 1000 milliLiter(s) (100 mL/Hr) IV Continuous <Continuous>  dextrose 5%. 1000 milliLiter(s) (50 mL/Hr) IV Continuous <Continuous>  dextrose 50% Injectable 25 Gram(s) IV Push once  dextrose 50% Injectable 12.5 Gram(s) IV Push once  dextrose 50% Injectable 25 Gram(s) IV Push once  doxazosin 2 milliGRAM(s) Oral daily  FIRST- Mouthwash  BLM 10 milliLiter(s) Swish and Spit every 8 hours  gabapentin 400 milliGRAM(s) Oral three times a day  glucagon  Injectable 1 milliGRAM(s) IntraMuscular once  heparin   Injectable 5000 Unit(s) SubCutaneous every 12 hours  insulin lispro (ADMELOG) corrective regimen sliding scale   SubCutaneous three times a day before meals  insulin lispro (ADMELOG) corrective regimen sliding scale   SubCutaneous at bedtime  methadone   Solution 1.25 milliGRAM(s) Oral two times a day  polyethylene glycol 3350 17 Gram(s) Oral two times a day  senna 2 Tablet(s) Oral at bedtime  simvastatin 20 milliGRAM(s) Oral at bedtime

## 2022-05-17 NOTE — ED ADULT NURSE REASSESSMENT NOTE - PAIN: RESPONSE TO INTERVENTIONS
absence of nonverbal indicators of pain Mohs Rapid Report Verbiage: The area of clinically evident tumor was marked with skin marking ink and appropriately hatched.  The initial incision was made following the Mohs approach through the skin.  The specimen was taken to the lab, divided into the necessary number of pieces, chromacoded and processed according to the Mohs protocol.  This was repeated in successive stages until a tumor free defect was achieved.

## 2022-07-08 NOTE — ED PROCEDURE NOTE - CPROC ED POST PROC CARE GUIDE1
Verbal/written post procedure instructions were given to patient/caregiver.
Verbal/written post procedure instructions were given to patient/caregiver./Instructed patient/caregiver to follow-up with primary care physician./Instructed patient/caregiver regarding signs and symptoms of infection./Keep the cast/splint/dressing clean and dry.
Yes

## 2022-08-24 NOTE — PROGRESS NOTE ADULT - NUTRITIONAL ASSESSMENT
This patient has been assessed with a concern for Malnutrition and has been determined to have a diagnosis/diagnoses of Severe protein-calorie malnutrition.    This patient is being managed with:   Diet NPO with Tube Feed-  Tube Feeding Modality: Gastrostomy  Glucerna 1.5 Dhruv  Total Volume for 24 Hours (mL): 960  Continuous  Starting Tube Feed Rate {mL per Hour}: 30  Increase Tube Feed Rate by (mL): 10     Every 6 hours  Until Goal Tube Feed Rate (mL per Hour): 40  Tube Feed Duration (in Hours): 24  Tube Feed Start Time: 11:00  No Carb Prosource TF     Qty per Day:  1 pack /day  Entered: Feb 22 2022 10:57AM    Diet NPO with Tube Feed-  Tube Feeding Modality: Gastrostomy  Glucerna 1.5 Dhruv  Total Volume for 24 Hours (mL): 960  Continuous  Starting Tube Feed Rate {mL per Hour}: 10  Increase Tube Feed Rate by (mL): 10     Every hour  Until Goal Tube Feed Rate (mL per Hour): 40  Tube Feed Duration (in Hours): 24  Tube Feed Start Time: 13:10  Entered: Feb 16 2022  1:26PM    The following pending diet order is being considered for treatment of Severe protein-calorie malnutrition:null 5

## 2022-12-24 NOTE — HISTORY OF PRESENT ILLNESS
[Home] : at home, [unfilled] , at the time of the visit. [Medical Office: ([facility])___] : at the medical office located in  Quality 431: Preventive Care And Screening: Unhealthy Alcohol Use - Screening: Patient screened for unhealthy alcohol use using a single question and scores less than 2 times per year [Family Member] : family member [TextBox_4] : francesco Amaya ----- son. I can be reached at [phone]. \par \par This letter  is regarding your patient  who  attended pulmonary out patient office today.  I have reviewed  patient's  past history, social history, family history and medication list. I also  reviewed nurse practitioners/ and fellows  notes and assessment and agree with it.  \par The patient was referred by Dr. Chen--S/P SURGERY FOR TONGUE CA   SEPT 2020-----\par \par ------No history of , fever, chills , rigors, chest pain, or hemoptysis. Questionable history of Raynaud's phenomenon. No h/o significant weight loss in last few months. No history of liver dysfunction , collagen vascular disorder or chronic thromboembolic disease. I would classify the patient's dyspnea as WHO  FUNCTIONAL CLASS II--------\par \par Has chronic cough associated with some shortness of breath. --COUGH BECAME WORSE  ON ACE INHIBITOR\par Has also been prescribed ACe inhibitor due to significant proteinuria from his diabtes. \par \par ----Echo  date----2019-- pasp 56 \par ----Pft date---------2020  MILD RESTRICTION \par PFT MAY  2021---- MILD RESTRICTION \par --6MWT  2020--363 METERS\par ----Ct scan date-------SLIGHT UPPER LOBE FIBROSIS \par ---ct chest 2020---b/l upper lobe ggo\par ----Cath date------------ H/O LCX STENT, MT SINAInot done\par ----repeat   Jennifer womack  mean pa---24  mmhg\par \par   ---DAX  --- psg w/ahi=10.7  ON CPAP 8  CM H2O \par \par \par PET SCAN APRIL2021---NEW SUV AVID CERVICAL  LN  AND ALSO A CAVITARY LN LLL\par \par sept 2019 accompanied by children- feels improved since last visit\par \par FEB 2020------ DAX ON CPAP, NO SIGNIFICANT RESP COMPLAINTS\par \par april 2021  sq cell ca tongue -----s/p  surgery at [place]--\par DAX on cpap 8 cm h20---benefits from nightly use. 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 FDG-avid partially necrotic metastatic right cervical lymphadenopathy is increased in size and metabolism as compared to prior PET/CT, and is mildly increased in size as compared to CT dated 8/21/2021.\par \par 2. Mildly FDG-avid cavitary bilateral lung lesions are increased in size, and are similar, or increased metabolism, compatible with progression of lung metastases. Status post interval left lower lobe wedge resection.\par \par 3. Mildly FDG-avid, partially calcified mediastinal and bilateral hilar lymph nodes are unchanged, compatible with sarcoidosis.\par \par 4. Hypermetabolism in left thyroid lobe may be further evaluated with ultrasound. Differential diagnosis includes thyroiditis.\par \par 5. Findings compatible with right vocal cord paralysis. Please correlate clinically and with direct visualization, as indicated.\par \par \par 10/2021 Mr. 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 He subsequently underwent adjuvant radiation with protons at [facility], completing adjuvant RT 11/2020. On surveilance scans 4/2021, note to have recurrent and metastatic disease concern s/p confirmatory biopsy\par TTM with family (son & daughter)- recent CT c/w pneumothorax[ small  left side] - pt w/lung malignancy\par O2 sat 87% room air (rest)but comfortable/ Following palliative care and to get RT this week\par \par oct 2021 --- patient feels much better on supplemental oxygen------- will repeat his chest x-ray next week----- Saturation is 96% no dyspnea He is continuing with------------- radiation to the neck following up with his oncologist---\par NOV 2021------RA SAT I S98% , NO DYSPNEA AT REST  , S/P RT TO NECK Quality 130: Documentation Of Current Medications In The Medical Record: Current Medications Documented Quality 110: Preventive Care And Screening: Influenza Immunization: Influenza immunization was not ordered or administered, reason not given Quality 226: Preventive Care And Screening: Tobacco Use: Screening And Cessation Intervention: Patient screened for tobacco and is an ex-smoker Detail Level: Detailed Quality 265: Biopsy Follow-Up: Biopsy results reviewed, communicated, tracked, and documented 24-Dec-2022 20:55

## 2023-04-12 NOTE — HISTORY OF PRESENT ILLNESS
Please refer to most recent History and Physical.     I have reviewed the history and physical and I have examined the patient.     Based on the exam, the H and P is updated with the following changes: No new changes since seen in office    Quentin Trejo DO,  PGY-3  12:11 PM 04/12/23     [Home] : at home, [unfilled] , at the time of the visit. [Medical Office: ([facility])___] : at the medical office located in  [Verbal consent obtained from patient] : the patient, [unfilled] [Family Member] : family member [Other:____] : [unfilled] [FreeTextEntry1] : DM [de-identified] : DM- pt was taken off all meds by Endo- 5 wk ago but told to take trajenda if glucose if >180- \par fasting glucose, prior to dinner 150\par drinking soft foods\par HTN-  reviewed 12/10/21\par CAD- no CP or SOB\par cough-pt was started on steroids by Pulm\par SCC CA tongue w lung mets- reviewed 11/4/21 CT chest, Palliative Care notes\par pt is not ill. Got COVID booster this wk, pt didn't get flu vac-rec pt get the medication

## 2023-10-01 PROBLEM — Z92.3 HISTORY OF RADIATION THERAPY: Status: RESOLVED | Noted: 2021-01-01 | Resolved: 2023-10-01

## 2023-11-02 NOTE — PHYSICAL THERAPY INITIAL EVALUATION ADULT - PHYSICAL ASSIST/NONPHYSICAL ASSIST: SIT/STAND, REHAB EVAL
From: Cesilia Lawrence  To: Latrell Gloria  Sent: 10/31/2023 2:48 PM CDT  Subject: Cortisone shot    It has been 1 week since I got the cortisone shot and my leg / hip still feels terrible. Physical therapy increases the major discomfort. Any suggestions you may have to lessen the pain would be appreciated.   Thank you,  Julienne   1 person assist

## 2024-03-27 NOTE — PROGRESS NOTE ADULT - ASSESSMENT
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 Patient had cardiac arrest in ED , was intubated CPR was initiated and subsequently ROSC was achieved after 20 minutes. Patient is admitted to ICU.    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  - CAD s/p stent  - Diabetes    Plan  ====Neuro====  #baseline mentation: AAOx3  #intubated  - Extubated on 12/27  - re-intubated on 12/28  - OFF propofol and pressors  - PRN fent pushes    ====CVS====  #Cardiac arrest   12/20 on arrival to ED  - ROSC after 20min  - trop trended down  - started norepi 12/28 post intubation, tapered OFF  - s/p midodrine 5mg TID     ====Pulm====  #Acute hypoxic respiratory failure  - has Hx of chronic PTX on LEFT  - likely 2/2 PTX vs aspiration PNA  - SBT (12/21, 22, 24)  - extubated on 12/22  - re-intubated on 12/23  - s/p solumedrol  - s/p cefepime 2g q12 (12/20-12/28)  - extubated again on 12/27; re-intubated on 12/28  - CXR w/ improvement, stable chronic LEFT PTX  - thoracic surgery consulted for trach/PEG  - plan for CT - with IV contrast shows interval worsening  - on zosyn Abx for asp PNA coverage    #PTX (acute/on/chronic)  - s/p pigtail in ED 12/20  - chest tube to water seal- serosanginous exudate  - CXR interval improvement  - plan for re-positioning of pigtail during trach    ====GI====  #no active issues  #diet: NPO w/ tube feeding   s/p free water 350 q 6 + pro-source    #bowel regimen: none    ====nephro====  #hypernatremia  serum Na 153 >>> improving 146 > 143  - s/p free water 250 q 4    ====ID====  #fever  started 12/29 after intubation  wbc trending back down  - sputum culture no bacterial growth   - started Zosyn 12/29    #leukocytosis  - likely 2/2 aspiration PNA vs reactive process  - Ucx neg  - Scx MAC  - s/p cefepime 2g q 12 (12/20-12/28)  - started Zosyn 12/29    ====Heme/onc====  #Metastatic SCC of tongue base  - s/p resection, chemo/radiation, and immunotherapy (10/2021)  - plan for experimental drug as outpatient.  - heme/onc as o/p   - palliative chemo, post trach/PEG will not be candidate for chemo  - palliative on board, discussion to be had today regarding trach PEG    ====Endo====  #DM  - a1c 6.3  - c/w sliding scale    ====Ppx====  #DVT: Lovenox  #GI: PPI   no known allergies

## 2024-04-01 NOTE — PROCEDURE NOTE - NSINDICATIONS_GEN_A_CORE
feeding tube replacement
critical illness/emergency venous access
critical illness/hemodynamic monitoring/venous access
numerical 0-10

## 2024-05-09 NOTE — PROGRESS NOTE ADULT - PROBLEM SELECTOR PLAN 3
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 CT chest during this admission showed left cavitary lesions demonstrating increased peripheral opacity.   - F/U sputum cultures, if positive, will consult ID.  - QuantiFeron - inconclusive.  - Airborne precautions pending above results.  - Known metastatic disease to lungs - no need for bronchoscopy at this time. no
